# Patient Record
Sex: MALE | Race: WHITE | NOT HISPANIC OR LATINO | Employment: OTHER | URBAN - METROPOLITAN AREA
[De-identification: names, ages, dates, MRNs, and addresses within clinical notes are randomized per-mention and may not be internally consistent; named-entity substitution may affect disease eponyms.]

---

## 2017-01-03 ENCOUNTER — AMBULATORY - HEALTHEAST (OUTPATIENT)
Dept: INFUSION THERAPY | Facility: HOSPITAL | Age: 72
End: 2017-01-03

## 2017-01-03 DIAGNOSIS — I82.409 DVT (DEEP VENOUS THROMBOSIS) (H): ICD-10-CM

## 2017-01-04 ENCOUNTER — COMMUNICATION - HEALTHEAST (OUTPATIENT)
Dept: ONCOLOGY | Facility: HOSPITAL | Age: 72
End: 2017-01-04

## 2017-01-04 ENCOUNTER — AMBULATORY - HEALTHEAST (OUTPATIENT)
Dept: INFUSION THERAPY | Facility: HOSPITAL | Age: 72
End: 2017-01-04

## 2017-01-04 DIAGNOSIS — I82.409 DVT (DEEP VENOUS THROMBOSIS) (H): ICD-10-CM

## 2017-01-04 DIAGNOSIS — C90.01 MULTIPLE MYELOMA IN REMISSION (H): ICD-10-CM

## 2017-01-04 LAB
IGA SERPL-MCNC: 369 MG/DL (ref 700–1700)
IGA SERPL-MCNC: 47 MG/DL (ref 65–400)
IGM SERPL-MCNC: 14 MG/DL (ref 60–280)

## 2017-01-05 LAB
KAPPA FREE LIGHT CHAIN, S - HISTORICAL: 75.3 MG/DL (ref 0.33–1.94)
KAPPA/LAMBDA FLC RATIO - HISTORICAL: 41.6 (ref 0.26–1.65)
LAMBDA FREE LIGHT CHAIN, S - HISTORICAL: 1.81 MG/DL (ref 0.57–2.63)

## 2017-01-06 ENCOUNTER — COMMUNICATION - HEALTHEAST (OUTPATIENT)
Dept: ONCOLOGY | Facility: HOSPITAL | Age: 72
End: 2017-01-06

## 2017-01-06 LAB
ALBUMIN PERCENT: 66.3 % (ref 51–67)
ALBUMIN SERPL ELPH-MCNC: 3.1 G/DL (ref 3.2–4.7)
ALPHA 1 PERCENT: 3.3 % (ref 2–4)
ALPHA 2 PERCENT: 12.2 % (ref 5–13)
ALPHA1 GLOB SERPL ELPH-MCNC: 0.2 G/DL (ref 0.1–0.3)
ALPHA2 GLOB SERPL ELPH-MCNC: 0.6 G/DL (ref 0.4–0.9)
B-GLOBULIN SERPL ELPH-MCNC: 0.5 G/DL (ref 0.7–1.2)
BETA PERCENT: 11 % (ref 10–17)
GAMMA GLOB SERPL ELPH-MCNC: 0.3 G/DL (ref 0.6–1.4)
GAMMA GLOBULIN PERCENT: 7.2 % (ref 9–20)
PATH ICD:: ABNORMAL
PATH ICD:: NORMAL
PROT PATTERN SERPL ELPH-IMP: ABNORMAL
PROT PATTERN SERPL IFE-IMP: NORMAL
PROT SERPL-MCNC: 4.7 G/DL (ref 6–8)
REVIEWING PATHOLOGIST: ABNORMAL
REVIEWING PATHOLOGIST: NORMAL

## 2017-01-12 ENCOUNTER — INFUSION - HEALTHEAST (OUTPATIENT)
Dept: INFUSION THERAPY | Facility: HOSPITAL | Age: 72
End: 2017-01-12

## 2017-01-12 ENCOUNTER — RECORDS - HEALTHEAST (OUTPATIENT)
Dept: ADMINISTRATIVE | Facility: OTHER | Age: 72
End: 2017-01-12

## 2017-01-12 ENCOUNTER — OFFICE VISIT - HEALTHEAST (OUTPATIENT)
Dept: ONCOLOGY | Facility: HOSPITAL | Age: 72
End: 2017-01-12

## 2017-01-12 DIAGNOSIS — C90.01 MULTIPLE MYELOMA IN REMISSION (H): ICD-10-CM

## 2017-01-12 DIAGNOSIS — C90.00 MULTIPLE MYELOMA NOT HAVING ACHIEVED REMISSION (H): ICD-10-CM

## 2017-01-12 DIAGNOSIS — I82.409 DVT (DEEP VENOUS THROMBOSIS) (H): ICD-10-CM

## 2017-01-18 ENCOUNTER — COMMUNICATION - HEALTHEAST (OUTPATIENT)
Dept: ONCOLOGY | Facility: HOSPITAL | Age: 72
End: 2017-01-18

## 2017-01-25 ENCOUNTER — COMMUNICATION - HEALTHEAST (OUTPATIENT)
Dept: ADMINISTRATIVE | Facility: HOSPITAL | Age: 72
End: 2017-01-25

## 2017-01-25 ENCOUNTER — INFUSION - HEALTHEAST (OUTPATIENT)
Dept: INFUSION THERAPY | Facility: HOSPITAL | Age: 72
End: 2017-01-25

## 2017-01-25 DIAGNOSIS — C90.01 MULTIPLE MYELOMA IN REMISSION (H): ICD-10-CM

## 2017-01-25 DIAGNOSIS — I82.409 DVT (DEEP VENOUS THROMBOSIS) (H): ICD-10-CM

## 2017-01-26 ENCOUNTER — AMBULATORY - HEALTHEAST (OUTPATIENT)
Dept: ONCOLOGY | Facility: HOSPITAL | Age: 72
End: 2017-01-26

## 2017-01-26 ENCOUNTER — AMBULATORY - HEALTHEAST (OUTPATIENT)
Dept: INFUSION THERAPY | Facility: HOSPITAL | Age: 72
End: 2017-01-26

## 2017-01-26 DIAGNOSIS — Z79.01 MONITORING FOR ANTICOAGULANT USE: ICD-10-CM

## 2017-01-26 DIAGNOSIS — Z79.01 ENCOUNTER FOR MONITORING COUMADIN THERAPY: ICD-10-CM

## 2017-01-26 DIAGNOSIS — Z51.81 MONITORING FOR ANTICOAGULANT USE: ICD-10-CM

## 2017-01-26 DIAGNOSIS — Z51.81 ENCOUNTER FOR MONITORING COUMADIN THERAPY: ICD-10-CM

## 2017-01-27 ENCOUNTER — COMMUNICATION - HEALTHEAST (OUTPATIENT)
Dept: ONCOLOGY | Facility: HOSPITAL | Age: 72
End: 2017-01-27

## 2017-01-30 ENCOUNTER — COMMUNICATION - HEALTHEAST (OUTPATIENT)
Dept: ONCOLOGY | Facility: HOSPITAL | Age: 72
End: 2017-01-30

## 2017-02-03 ENCOUNTER — RECORDS - HEALTHEAST (OUTPATIENT)
Dept: ADMINISTRATIVE | Facility: OTHER | Age: 72
End: 2017-02-03

## 2017-02-06 ENCOUNTER — COMMUNICATION - HEALTHEAST (OUTPATIENT)
Dept: ONCOLOGY | Facility: HOSPITAL | Age: 72
End: 2017-02-06

## 2017-02-08 ENCOUNTER — INFUSION - HEALTHEAST (OUTPATIENT)
Dept: INFUSION THERAPY | Facility: HOSPITAL | Age: 72
End: 2017-02-08

## 2017-02-08 ENCOUNTER — AMBULATORY - HEALTHEAST (OUTPATIENT)
Dept: ONCOLOGY | Facility: HOSPITAL | Age: 72
End: 2017-02-08

## 2017-02-08 DIAGNOSIS — Z79.01 ENCOUNTER FOR MONITORING COUMADIN THERAPY: ICD-10-CM

## 2017-02-08 DIAGNOSIS — Z51.81 ENCOUNTER FOR MONITORING COUMADIN THERAPY: ICD-10-CM

## 2017-02-08 DIAGNOSIS — C90.01 MULTIPLE MYELOMA IN REMISSION (H): ICD-10-CM

## 2017-02-08 DIAGNOSIS — I82.409 DVT (DEEP VENOUS THROMBOSIS) (H): ICD-10-CM

## 2017-02-22 ENCOUNTER — AMBULATORY - HEALTHEAST (OUTPATIENT)
Dept: ONCOLOGY | Facility: HOSPITAL | Age: 72
End: 2017-02-22

## 2017-02-22 ENCOUNTER — COMMUNICATION - HEALTHEAST (OUTPATIENT)
Dept: ONCOLOGY | Facility: HOSPITAL | Age: 72
End: 2017-02-22

## 2017-02-22 ENCOUNTER — INFUSION - HEALTHEAST (OUTPATIENT)
Dept: INFUSION THERAPY | Facility: HOSPITAL | Age: 72
End: 2017-02-22

## 2017-02-22 DIAGNOSIS — Z51.81 MONITORING FOR ANTICOAGULANT USE: ICD-10-CM

## 2017-02-22 DIAGNOSIS — Z51.81 ENCOUNTER FOR MONITORING COUMADIN THERAPY: ICD-10-CM

## 2017-02-22 DIAGNOSIS — I82.409 DVT (DEEP VENOUS THROMBOSIS) (H): ICD-10-CM

## 2017-02-22 DIAGNOSIS — Z79.01 ENCOUNTER FOR MONITORING COUMADIN THERAPY: ICD-10-CM

## 2017-02-22 DIAGNOSIS — C90.01 MULTIPLE MYELOMA IN REMISSION (H): ICD-10-CM

## 2017-02-22 DIAGNOSIS — C90.00 MULTIPLE MYELOMA (H): ICD-10-CM

## 2017-02-22 DIAGNOSIS — Z79.01 MONITORING FOR ANTICOAGULANT USE: ICD-10-CM

## 2017-02-22 LAB
IGA SERPL-MCNC: 431 MG/DL (ref 700–1700)
IGA SERPL-MCNC: 45 MG/DL (ref 65–400)
IGM SERPL-MCNC: 10 MG/DL (ref 60–280)

## 2017-02-23 LAB
ALBUMIN PERCENT: 68 % (ref 51–67)
ALBUMIN SERPL ELPH-MCNC: 3.1 G/DL (ref 3.2–4.7)
ALPHA 1 PERCENT: 2.8 % (ref 2–4)
ALPHA 2 PERCENT: 11.7 % (ref 5–13)
ALPHA1 GLOB SERPL ELPH-MCNC: 0.1 G/DL (ref 0.1–0.3)
ALPHA2 GLOB SERPL ELPH-MCNC: 0.5 G/DL (ref 0.4–0.9)
B-GLOBULIN SERPL ELPH-MCNC: 0.5 G/DL (ref 0.7–1.2)
BETA PERCENT: 10.3 % (ref 10–17)
GAMMA GLOB SERPL ELPH-MCNC: 0.3 G/DL (ref 0.6–1.4)
GAMMA GLOBULIN PERCENT: 7.2 % (ref 9–20)
KAPPA FREE LIGHT CHAIN, S - HISTORICAL: 39.2 MG/DL (ref 0.33–1.94)
KAPPA/LAMBDA FLC RATIO - HISTORICAL: 30.4 (ref 0.26–1.65)
LAMBDA FREE LIGHT CHAIN, S - HISTORICAL: 1.29 MG/DL (ref 0.57–2.63)
PATH ICD:: ABNORMAL
PATH ICD:: NORMAL
PROT PATTERN SERPL ELPH-IMP: ABNORMAL
PROT PATTERN SERPL IFE-IMP: NORMAL
PROT SERPL-MCNC: 4.5 G/DL (ref 6–8)
REVIEWING PATHOLOGIST: ABNORMAL
REVIEWING PATHOLOGIST: NORMAL

## 2017-02-28 ENCOUNTER — COMMUNICATION - HEALTHEAST (OUTPATIENT)
Dept: ONCOLOGY | Facility: HOSPITAL | Age: 72
End: 2017-02-28

## 2017-02-28 ENCOUNTER — RECORDS - HEALTHEAST (OUTPATIENT)
Dept: ADMINISTRATIVE | Facility: OTHER | Age: 72
End: 2017-02-28

## 2017-03-01 ENCOUNTER — AMBULATORY - HEALTHEAST (OUTPATIENT)
Dept: ONCOLOGY | Facility: HOSPITAL | Age: 72
End: 2017-03-01

## 2017-03-01 ENCOUNTER — OFFICE VISIT - HEALTHEAST (OUTPATIENT)
Dept: ONCOLOGY | Facility: HOSPITAL | Age: 72
End: 2017-03-01

## 2017-03-01 ENCOUNTER — AMBULATORY - HEALTHEAST (OUTPATIENT)
Dept: INFUSION THERAPY | Facility: HOSPITAL | Age: 72
End: 2017-03-01

## 2017-03-01 DIAGNOSIS — Z79.01 ENCOUNTER FOR MONITORING COUMADIN THERAPY: ICD-10-CM

## 2017-03-01 DIAGNOSIS — C90.00 MULTIPLE MYELOMA NOT HAVING ACHIEVED REMISSION (H): ICD-10-CM

## 2017-03-01 DIAGNOSIS — Z79.01 MONITORING FOR ANTICOAGULANT USE: ICD-10-CM

## 2017-03-01 DIAGNOSIS — Z51.81 MONITORING FOR ANTICOAGULANT USE: ICD-10-CM

## 2017-03-01 DIAGNOSIS — Z51.81 ENCOUNTER FOR MONITORING COUMADIN THERAPY: ICD-10-CM

## 2017-03-02 ENCOUNTER — AMBULATORY - HEALTHEAST (OUTPATIENT)
Dept: ONCOLOGY | Facility: HOSPITAL | Age: 72
End: 2017-03-02

## 2017-03-08 ENCOUNTER — INFUSION - HEALTHEAST (OUTPATIENT)
Dept: INFUSION THERAPY | Facility: HOSPITAL | Age: 72
End: 2017-03-08

## 2017-03-08 ENCOUNTER — AMBULATORY - HEALTHEAST (OUTPATIENT)
Dept: ONCOLOGY | Facility: HOSPITAL | Age: 72
End: 2017-03-08

## 2017-03-08 DIAGNOSIS — C90.01 MULTIPLE MYELOMA IN REMISSION (H): ICD-10-CM

## 2017-03-08 DIAGNOSIS — Z79.01 ENCOUNTER FOR MONITORING COUMADIN THERAPY: ICD-10-CM

## 2017-03-08 DIAGNOSIS — Z51.81 ENCOUNTER FOR MONITORING COUMADIN THERAPY: ICD-10-CM

## 2017-03-22 ENCOUNTER — COMMUNICATION - HEALTHEAST (OUTPATIENT)
Dept: ONCOLOGY | Facility: HOSPITAL | Age: 72
End: 2017-03-22

## 2017-03-22 ENCOUNTER — INFUSION - HEALTHEAST (OUTPATIENT)
Dept: INFUSION THERAPY | Facility: HOSPITAL | Age: 72
End: 2017-03-22

## 2017-03-22 DIAGNOSIS — C90.01 MULTIPLE MYELOMA IN REMISSION (H): ICD-10-CM

## 2017-03-22 DIAGNOSIS — I82.409 DVT (DEEP VENOUS THROMBOSIS) (H): ICD-10-CM

## 2017-03-26 ENCOUNTER — COMMUNICATION - HEALTHEAST (OUTPATIENT)
Dept: ONCOLOGY | Facility: HOSPITAL | Age: 72
End: 2017-03-26

## 2017-03-26 DIAGNOSIS — I82.409 DEEP VEIN THROMBOSIS (DVT) (H): ICD-10-CM

## 2017-03-31 ENCOUNTER — RECORDS - HEALTHEAST (OUTPATIENT)
Dept: ADMINISTRATIVE | Facility: OTHER | Age: 72
End: 2017-03-31

## 2017-03-31 ENCOUNTER — COMMUNICATION - HEALTHEAST (OUTPATIENT)
Dept: ONCOLOGY | Facility: HOSPITAL | Age: 72
End: 2017-03-31

## 2017-04-05 ENCOUNTER — INFUSION - HEALTHEAST (OUTPATIENT)
Dept: INFUSION THERAPY | Facility: HOSPITAL | Age: 72
End: 2017-04-05

## 2017-04-05 DIAGNOSIS — C90.01 MULTIPLE MYELOMA IN REMISSION (H): ICD-10-CM

## 2017-04-05 DIAGNOSIS — I82.409 DVT (DEEP VENOUS THROMBOSIS) (H): ICD-10-CM

## 2017-04-06 ENCOUNTER — AMBULATORY - HEALTHEAST (OUTPATIENT)
Dept: ONCOLOGY | Facility: HOSPITAL | Age: 72
End: 2017-04-06

## 2017-04-06 DIAGNOSIS — Z79.01 ENCOUNTER FOR MONITORING COUMADIN THERAPY: ICD-10-CM

## 2017-04-06 DIAGNOSIS — Z51.81 ENCOUNTER FOR MONITORING COUMADIN THERAPY: ICD-10-CM

## 2017-04-17 ENCOUNTER — COMMUNICATION - HEALTHEAST (OUTPATIENT)
Dept: INTERNAL MEDICINE | Facility: CLINIC | Age: 72
End: 2017-04-17

## 2017-04-18 ENCOUNTER — COMMUNICATION - HEALTHEAST (OUTPATIENT)
Dept: ONCOLOGY | Facility: HOSPITAL | Age: 72
End: 2017-04-18

## 2017-04-19 ENCOUNTER — INFUSION - HEALTHEAST (OUTPATIENT)
Dept: INFUSION THERAPY | Facility: HOSPITAL | Age: 72
End: 2017-04-19

## 2017-04-19 DIAGNOSIS — I82.409 DVT (DEEP VENOUS THROMBOSIS) (H): ICD-10-CM

## 2017-04-19 DIAGNOSIS — C90.01 MULTIPLE MYELOMA IN REMISSION (H): ICD-10-CM

## 2017-04-26 ENCOUNTER — RECORDS - HEALTHEAST (OUTPATIENT)
Dept: ADMINISTRATIVE | Facility: OTHER | Age: 72
End: 2017-04-26

## 2017-04-26 ENCOUNTER — AMBULATORY - HEALTHEAST (OUTPATIENT)
Dept: INFUSION THERAPY | Facility: HOSPITAL | Age: 72
End: 2017-04-26

## 2017-04-26 ENCOUNTER — AMBULATORY - HEALTHEAST (OUTPATIENT)
Dept: ONCOLOGY | Facility: HOSPITAL | Age: 72
End: 2017-04-26

## 2017-04-26 DIAGNOSIS — Z51.81 MONITORING FOR ANTICOAGULANT USE: ICD-10-CM

## 2017-04-26 DIAGNOSIS — Z51.81 ENCOUNTER FOR MONITORING COUMADIN THERAPY: ICD-10-CM

## 2017-04-26 DIAGNOSIS — C90.01 MULTIPLE MYELOMA IN REMISSION (H): ICD-10-CM

## 2017-04-26 DIAGNOSIS — Z79.01 ENCOUNTER FOR MONITORING COUMADIN THERAPY: ICD-10-CM

## 2017-04-26 DIAGNOSIS — C90.00 MULTIPLE MYELOMA, REMISSION STATUS UNSPECIFIED (H): ICD-10-CM

## 2017-04-26 DIAGNOSIS — Z79.01 MONITORING FOR ANTICOAGULANT USE: ICD-10-CM

## 2017-04-26 DIAGNOSIS — I82.409 DVT (DEEP VENOUS THROMBOSIS) (H): ICD-10-CM

## 2017-04-26 LAB
IGA SERPL-MCNC: 354 MG/DL (ref 700–1700)
IGA SERPL-MCNC: 44 MG/DL (ref 65–400)
IGM SERPL-MCNC: 10 MG/DL (ref 60–280)

## 2017-04-27 LAB
KAPPA FREE LIGHT CHAIN, S - HISTORICAL: 38.3 MG/DL (ref 0.33–1.94)
KAPPA/LAMBDA FLC RATIO - HISTORICAL: 28.6 (ref 0.26–1.65)
LAMBDA FREE LIGHT CHAIN, S - HISTORICAL: 1.34 MG/DL (ref 0.57–2.63)

## 2017-04-28 LAB
ALBUMIN PERCENT: 66.7 % (ref 51–67)
ALBUMIN SERPL ELPH-MCNC: 3.3 G/DL (ref 3.2–4.7)
ALPHA 1 PERCENT: 3.7 % (ref 2–4)
ALPHA 2 PERCENT: 12.7 % (ref 5–13)
ALPHA1 GLOB SERPL ELPH-MCNC: 0.2 G/DL (ref 0.1–0.3)
ALPHA2 GLOB SERPL ELPH-MCNC: 0.6 G/DL (ref 0.4–0.9)
B-GLOBULIN SERPL ELPH-MCNC: 0.5 G/DL (ref 0.7–1.2)
BETA PERCENT: 10.9 % (ref 10–17)
GAMMA GLOB SERPL ELPH-MCNC: 0.3 G/DL (ref 0.6–1.4)
GAMMA GLOBULIN PERCENT: 6 % (ref 9–20)
PATH ICD:: ABNORMAL
PATH ICD:: NORMAL
PROT PATTERN SERPL ELPH-IMP: ABNORMAL
PROT PATTERN SERPL IFE-IMP: NORMAL
PROT SERPL-MCNC: 4.9 G/DL (ref 6–8)
REVIEWING PATHOLOGIST: ABNORMAL
REVIEWING PATHOLOGIST: NORMAL

## 2017-05-03 ENCOUNTER — OFFICE VISIT - HEALTHEAST (OUTPATIENT)
Dept: ONCOLOGY | Facility: HOSPITAL | Age: 72
End: 2017-05-03

## 2017-05-03 ENCOUNTER — AMBULATORY - HEALTHEAST (OUTPATIENT)
Dept: INFUSION THERAPY | Facility: HOSPITAL | Age: 72
End: 2017-05-03

## 2017-05-03 ENCOUNTER — INFUSION - HEALTHEAST (OUTPATIENT)
Dept: INFUSION THERAPY | Facility: HOSPITAL | Age: 72
End: 2017-05-03

## 2017-05-03 DIAGNOSIS — C90.00 MULTIPLE MYELOMA, REMISSION STATUS UNSPECIFIED (H): ICD-10-CM

## 2017-05-03 DIAGNOSIS — I82.409 DVT (DEEP VENOUS THROMBOSIS) (H): ICD-10-CM

## 2017-05-03 DIAGNOSIS — C90.01 MULTIPLE MYELOMA IN REMISSION (H): ICD-10-CM

## 2017-05-17 ENCOUNTER — INFUSION - HEALTHEAST (OUTPATIENT)
Dept: INFUSION THERAPY | Facility: HOSPITAL | Age: 72
End: 2017-05-17

## 2017-05-17 DIAGNOSIS — C90.01 MULTIPLE MYELOMA IN REMISSION (H): ICD-10-CM

## 2017-05-19 ENCOUNTER — COMMUNICATION - HEALTHEAST (OUTPATIENT)
Dept: ONCOLOGY | Facility: HOSPITAL | Age: 72
End: 2017-05-19

## 2017-05-22 ENCOUNTER — RECORDS - HEALTHEAST (OUTPATIENT)
Dept: ADMINISTRATIVE | Facility: OTHER | Age: 72
End: 2017-05-22

## 2017-05-31 ENCOUNTER — AMBULATORY - HEALTHEAST (OUTPATIENT)
Dept: ONCOLOGY | Facility: HOSPITAL | Age: 72
End: 2017-05-31

## 2017-05-31 ENCOUNTER — INFUSION - HEALTHEAST (OUTPATIENT)
Dept: INFUSION THERAPY | Facility: HOSPITAL | Age: 72
End: 2017-05-31

## 2017-05-31 DIAGNOSIS — C90.00 MULTIPLE MYELOMA, REMISSION STATUS UNSPECIFIED (H): ICD-10-CM

## 2017-05-31 DIAGNOSIS — I82.409 DVT (DEEP VENOUS THROMBOSIS) (H): ICD-10-CM

## 2017-05-31 DIAGNOSIS — Z79.01 ENCOUNTER FOR MONITORING COUMADIN THERAPY: ICD-10-CM

## 2017-05-31 DIAGNOSIS — C90.01 MULTIPLE MYELOMA IN REMISSION (H): ICD-10-CM

## 2017-05-31 DIAGNOSIS — Z51.81 ENCOUNTER FOR MONITORING COUMADIN THERAPY: ICD-10-CM

## 2017-05-31 ASSESSMENT — MIFFLIN-ST. JEOR: SCORE: 1424.59

## 2017-06-14 ENCOUNTER — OFFICE VISIT - HEALTHEAST (OUTPATIENT)
Dept: ONCOLOGY | Facility: HOSPITAL | Age: 72
End: 2017-06-14

## 2017-06-14 ENCOUNTER — INFUSION - HEALTHEAST (OUTPATIENT)
Dept: INFUSION THERAPY | Facility: HOSPITAL | Age: 72
End: 2017-06-14

## 2017-06-14 DIAGNOSIS — C90.01 MULTIPLE MYELOMA IN REMISSION (H): ICD-10-CM

## 2017-06-14 DIAGNOSIS — C90.00 MULTIPLE MYELOMA, REMISSION STATUS UNSPECIFIED (H): ICD-10-CM

## 2017-06-14 DIAGNOSIS — R21 RASH AND NONSPECIFIC SKIN ERUPTION: ICD-10-CM

## 2017-06-19 ENCOUNTER — COMMUNICATION - HEALTHEAST (OUTPATIENT)
Dept: ONCOLOGY | Facility: HOSPITAL | Age: 72
End: 2017-06-19

## 2017-06-21 ENCOUNTER — AMBULATORY - HEALTHEAST (OUTPATIENT)
Dept: INFUSION THERAPY | Facility: HOSPITAL | Age: 72
End: 2017-06-21

## 2017-06-21 DIAGNOSIS — C90.01 MULTIPLE MYELOMA IN REMISSION (H): ICD-10-CM

## 2017-06-21 LAB
IGA SERPL-MCNC: 351 MG/DL (ref 700–1700)
IGA SERPL-MCNC: 38 MG/DL (ref 65–400)
IGM SERPL-MCNC: 14 MG/DL (ref 60–280)

## 2017-06-22 LAB
KAPPA FREE LIGHT CHAIN, S - HISTORICAL: 50.9 MG/DL (ref 0.33–1.94)
KAPPA/LAMBDA FLC RATIO - HISTORICAL: 48.5 (ref 0.26–1.65)
LAMBDA FREE LIGHT CHAIN, S - HISTORICAL: 1.05 MG/DL (ref 0.57–2.63)
PATH ICD:: NORMAL
PROT PATTERN SERPL IFE-IMP: NORMAL
REVIEWING PATHOLOGIST: NORMAL

## 2017-06-23 LAB
ALBUMIN PERCENT: 63.9 % (ref 51–67)
ALBUMIN SERPL ELPH-MCNC: 2.9 G/DL (ref 3.2–4.7)
ALPHA 1 PERCENT: 3.7 % (ref 2–4)
ALPHA 2 PERCENT: 12.4 % (ref 5–13)
ALPHA1 GLOB SERPL ELPH-MCNC: 0.2 G/DL (ref 0.1–0.3)
ALPHA2 GLOB SERPL ELPH-MCNC: 0.6 G/DL (ref 0.4–0.9)
B-GLOBULIN SERPL ELPH-MCNC: 0.6 G/DL (ref 0.7–1.2)
BETA PERCENT: 12.3 % (ref 10–17)
GAMMA GLOB SERPL ELPH-MCNC: 0.4 G/DL (ref 0.6–1.4)
GAMMA GLOBULIN PERCENT: 7.7 % (ref 9–20)
PATH ICD:: ABNORMAL
PROT PATTERN SERPL ELPH-IMP: ABNORMAL
PROT SERPL-MCNC: 4.6 G/DL (ref 6–8)
REVIEWING PATHOLOGIST: ABNORMAL

## 2017-06-27 ENCOUNTER — COMMUNICATION - HEALTHEAST (OUTPATIENT)
Dept: ADMINISTRATIVE | Facility: HOSPITAL | Age: 72
End: 2017-06-27

## 2017-06-28 ENCOUNTER — RECORDS - HEALTHEAST (OUTPATIENT)
Dept: ADMINISTRATIVE | Facility: OTHER | Age: 72
End: 2017-06-28

## 2017-06-28 ENCOUNTER — COMMUNICATION - HEALTHEAST (OUTPATIENT)
Dept: ONCOLOGY | Facility: HOSPITAL | Age: 72
End: 2017-06-28

## 2017-06-28 ENCOUNTER — AMBULATORY - HEALTHEAST (OUTPATIENT)
Dept: ONCOLOGY | Facility: HOSPITAL | Age: 72
End: 2017-06-28

## 2017-06-28 ENCOUNTER — AMBULATORY - HEALTHEAST (OUTPATIENT)
Dept: INFUSION THERAPY | Facility: HOSPITAL | Age: 72
End: 2017-06-28

## 2017-06-28 ENCOUNTER — INFUSION - HEALTHEAST (OUTPATIENT)
Dept: INFUSION THERAPY | Facility: HOSPITAL | Age: 72
End: 2017-06-28

## 2017-06-28 DIAGNOSIS — Z79.01 MONITORING FOR ANTICOAGULANT USE: ICD-10-CM

## 2017-06-28 DIAGNOSIS — Z79.01 ENCOUNTER FOR MONITORING COUMADIN THERAPY: ICD-10-CM

## 2017-06-28 DIAGNOSIS — Z51.81 MONITORING FOR ANTICOAGULANT USE: ICD-10-CM

## 2017-06-28 DIAGNOSIS — I82.409 DVT (DEEP VENOUS THROMBOSIS) (H): ICD-10-CM

## 2017-06-28 DIAGNOSIS — C90.01 MULTIPLE MYELOMA IN REMISSION (H): ICD-10-CM

## 2017-06-28 DIAGNOSIS — Z51.81 ENCOUNTER FOR MONITORING COUMADIN THERAPY: ICD-10-CM

## 2017-07-12 ENCOUNTER — INFUSION - HEALTHEAST (OUTPATIENT)
Dept: INFUSION THERAPY | Facility: HOSPITAL | Age: 72
End: 2017-07-12

## 2017-07-12 DIAGNOSIS — C90.01 MULTIPLE MYELOMA IN REMISSION (H): ICD-10-CM

## 2017-07-14 ENCOUNTER — COMMUNICATION - HEALTHEAST (OUTPATIENT)
Dept: ONCOLOGY | Facility: HOSPITAL | Age: 72
End: 2017-07-14

## 2017-07-19 ENCOUNTER — RECORDS - HEALTHEAST (OUTPATIENT)
Dept: ADMINISTRATIVE | Facility: OTHER | Age: 72
End: 2017-07-19

## 2017-07-26 ENCOUNTER — COMMUNICATION - HEALTHEAST (OUTPATIENT)
Dept: ONCOLOGY | Facility: HOSPITAL | Age: 72
End: 2017-07-26

## 2017-07-26 ENCOUNTER — AMBULATORY - HEALTHEAST (OUTPATIENT)
Dept: INFUSION THERAPY | Facility: HOSPITAL | Age: 72
End: 2017-07-26

## 2017-07-26 ENCOUNTER — INFUSION - HEALTHEAST (OUTPATIENT)
Dept: INFUSION THERAPY | Facility: HOSPITAL | Age: 72
End: 2017-07-26

## 2017-07-26 ENCOUNTER — OFFICE VISIT - HEALTHEAST (OUTPATIENT)
Dept: ONCOLOGY | Facility: HOSPITAL | Age: 72
End: 2017-07-26

## 2017-07-26 DIAGNOSIS — C90.01 MULTIPLE MYELOMA IN REMISSION (H): ICD-10-CM

## 2017-07-26 DIAGNOSIS — C90.00 MULTIPLE MYELOMA, REMISSION STATUS UNSPECIFIED (H): ICD-10-CM

## 2017-07-26 DIAGNOSIS — I82.409 DVT (DEEP VENOUS THROMBOSIS) (H): ICD-10-CM

## 2017-08-07 ENCOUNTER — COMMUNICATION - HEALTHEAST (OUTPATIENT)
Dept: ONCOLOGY | Facility: HOSPITAL | Age: 72
End: 2017-08-07

## 2017-08-07 DIAGNOSIS — R11.0 NAUSEA: ICD-10-CM

## 2017-08-08 ENCOUNTER — COMMUNICATION - HEALTHEAST (OUTPATIENT)
Dept: ADMINISTRATIVE | Facility: HOSPITAL | Age: 72
End: 2017-08-08

## 2017-08-09 ENCOUNTER — INFUSION - HEALTHEAST (OUTPATIENT)
Dept: INFUSION THERAPY | Facility: HOSPITAL | Age: 72
End: 2017-08-09

## 2017-08-09 DIAGNOSIS — C90.01 MULTIPLE MYELOMA IN REMISSION (H): ICD-10-CM

## 2017-08-14 ENCOUNTER — COMMUNICATION - HEALTHEAST (OUTPATIENT)
Dept: ONCOLOGY | Facility: HOSPITAL | Age: 72
End: 2017-08-14

## 2017-08-15 ENCOUNTER — COMMUNICATION - HEALTHEAST (OUTPATIENT)
Dept: ONCOLOGY | Facility: HOSPITAL | Age: 72
End: 2017-08-15

## 2017-08-23 ENCOUNTER — INFUSION - HEALTHEAST (OUTPATIENT)
Dept: INFUSION THERAPY | Facility: HOSPITAL | Age: 72
End: 2017-08-23

## 2017-08-23 ENCOUNTER — AMBULATORY - HEALTHEAST (OUTPATIENT)
Dept: ONCOLOGY | Facility: HOSPITAL | Age: 72
End: 2017-08-23

## 2017-08-23 DIAGNOSIS — C90.01 MULTIPLE MYELOMA IN REMISSION (H): ICD-10-CM

## 2017-08-23 DIAGNOSIS — Z51.81 ENCOUNTER FOR MONITORING COUMADIN THERAPY: ICD-10-CM

## 2017-08-23 DIAGNOSIS — Z79.01 ENCOUNTER FOR MONITORING COUMADIN THERAPY: ICD-10-CM

## 2017-08-23 DIAGNOSIS — I82.409 DVT (DEEP VENOUS THROMBOSIS) (H): ICD-10-CM

## 2017-08-23 LAB
IGA SERPL-MCNC: 36 MG/DL (ref 65–400)
IGA SERPL-MCNC: 366 MG/DL (ref 700–1700)
IGM SERPL-MCNC: 11 MG/DL (ref 60–280)

## 2017-08-24 ENCOUNTER — COMMUNICATION - HEALTHEAST (OUTPATIENT)
Dept: ONCOLOGY | Facility: HOSPITAL | Age: 72
End: 2017-08-24

## 2017-08-24 ENCOUNTER — RECORDS - HEALTHEAST (OUTPATIENT)
Dept: ADMINISTRATIVE | Facility: OTHER | Age: 72
End: 2017-08-24

## 2017-08-24 LAB
KAPPA FREE LIGHT CHAIN, S - HISTORICAL: 61.4 MG/DL (ref 0.33–1.94)
KAPPA/LAMBDA FLC RATIO - HISTORICAL: 54.3 (ref 0.26–1.65)
LAMBDA FREE LIGHT CHAIN, S - HISTORICAL: 1.13 MG/DL (ref 0.57–2.63)

## 2017-08-25 LAB
ALBUMIN PERCENT: 67 % (ref 51–67)
ALBUMIN SERPL ELPH-MCNC: 3.2 G/DL (ref 3.2–4.7)
ALPHA 1 PERCENT: 3.2 % (ref 2–4)
ALPHA 2 PERCENT: 11.5 % (ref 5–13)
ALPHA1 GLOB SERPL ELPH-MCNC: 0.2 G/DL (ref 0.1–0.3)
ALPHA2 GLOB SERPL ELPH-MCNC: 0.6 G/DL (ref 0.4–0.9)
B-GLOBULIN SERPL ELPH-MCNC: 0.5 G/DL (ref 0.7–1.2)
BETA PERCENT: 11.2 % (ref 10–17)
GAMMA GLOB SERPL ELPH-MCNC: 0.3 G/DL (ref 0.6–1.4)
GAMMA GLOBULIN PERCENT: 7.1 % (ref 9–20)
PATH ICD:: ABNORMAL
PATH ICD:: NORMAL
PROT PATTERN SERPL ELPH-IMP: ABNORMAL
PROT PATTERN SERPL IFE-IMP: NORMAL
PROT SERPL-MCNC: 4.8 G/DL (ref 6–8)
REVIEWING PATHOLOGIST: ABNORMAL
REVIEWING PATHOLOGIST: NORMAL

## 2017-09-03 ENCOUNTER — COMMUNICATION - HEALTHEAST (OUTPATIENT)
Dept: ONCOLOGY | Facility: HOSPITAL | Age: 72
End: 2017-09-03

## 2017-09-03 DIAGNOSIS — C90.00 MYELOMA (H): ICD-10-CM

## 2017-09-04 ENCOUNTER — AMBULATORY - HEALTHEAST (OUTPATIENT)
Dept: ONCOLOGY | Facility: HOSPITAL | Age: 72
End: 2017-09-04

## 2017-09-04 DIAGNOSIS — D72.0 GENETIC ANOMALIES OF LEUKOCYTES (H): ICD-10-CM

## 2017-09-04 DIAGNOSIS — C90.00 MULTIPLE MYELOMA NOT HAVING ACHIEVED REMISSION (H): ICD-10-CM

## 2017-09-06 ENCOUNTER — COMMUNICATION - HEALTHEAST (OUTPATIENT)
Dept: ONCOLOGY | Facility: HOSPITAL | Age: 72
End: 2017-09-06

## 2017-09-06 ENCOUNTER — INFUSION - HEALTHEAST (OUTPATIENT)
Dept: INFUSION THERAPY | Facility: HOSPITAL | Age: 72
End: 2017-09-06

## 2017-09-06 DIAGNOSIS — I82.409 DVT (DEEP VENOUS THROMBOSIS) (H): ICD-10-CM

## 2017-09-06 DIAGNOSIS — C90.01 MULTIPLE MYELOMA IN REMISSION (H): ICD-10-CM

## 2017-09-07 ENCOUNTER — AMBULATORY - HEALTHEAST (OUTPATIENT)
Dept: ONCOLOGY | Facility: HOSPITAL | Age: 72
End: 2017-09-07

## 2017-09-07 DIAGNOSIS — C90.00 MULTIPLE MYELOMA, REMISSION STATUS UNSPECIFIED (H): ICD-10-CM

## 2017-09-07 DIAGNOSIS — D72.0 GENETIC ANOMALIES OF LEUKOCYTES (H): ICD-10-CM

## 2017-09-07 DIAGNOSIS — C90.00 MULTIPLE MYELOMA NOT HAVING ACHIEVED REMISSION (H): ICD-10-CM

## 2017-09-11 ENCOUNTER — COMMUNICATION - HEALTHEAST (OUTPATIENT)
Dept: ONCOLOGY | Facility: HOSPITAL | Age: 72
End: 2017-09-11

## 2017-09-12 ENCOUNTER — AMBULATORY - HEALTHEAST (OUTPATIENT)
Dept: LAB | Facility: HOSPITAL | Age: 72
End: 2017-09-12

## 2017-09-12 ENCOUNTER — INFUSION - HEALTHEAST (OUTPATIENT)
Dept: INFUSION THERAPY | Facility: HOSPITAL | Age: 72
End: 2017-09-12

## 2017-09-12 DIAGNOSIS — C90.00 MULTIPLE MYELOMA, REMISSION STATUS UNSPECIFIED (H): ICD-10-CM

## 2017-09-12 DIAGNOSIS — D72.0 GENETIC ANOMALIES OF LEUKOCYTES (H): ICD-10-CM

## 2017-09-13 ENCOUNTER — AMBULATORY - HEALTHEAST (OUTPATIENT)
Dept: INFUSION THERAPY | Facility: HOSPITAL | Age: 72
End: 2017-09-13

## 2017-09-13 DIAGNOSIS — C90.00 MULTIPLE MYELOMA, REMISSION STATUS UNSPECIFIED (H): ICD-10-CM

## 2017-09-13 LAB
LAB AP CHARGES (HE HISTORICAL CONVERSION): NORMAL
PATH REPORT.COMMENTS IMP SPEC: NORMAL
PATH REPORT.FINAL DX SPEC: NORMAL
PATH REPORT.MICROSCOPIC SPEC OTHER STN: NORMAL
RESULT FLAG (HE HISTORICAL CONVERSION): NORMAL

## 2017-09-19 ENCOUNTER — COMMUNICATION - HEALTHEAST (OUTPATIENT)
Dept: ONCOLOGY | Facility: HOSPITAL | Age: 72
End: 2017-09-19

## 2017-09-19 ENCOUNTER — RECORDS - HEALTHEAST (OUTPATIENT)
Dept: ADMINISTRATIVE | Facility: OTHER | Age: 72
End: 2017-09-19

## 2017-09-20 ENCOUNTER — INFUSION - HEALTHEAST (OUTPATIENT)
Dept: INFUSION THERAPY | Facility: HOSPITAL | Age: 72
End: 2017-09-20

## 2017-09-20 ENCOUNTER — AMBULATORY - HEALTHEAST (OUTPATIENT)
Dept: INFUSION THERAPY | Facility: HOSPITAL | Age: 72
End: 2017-09-20

## 2017-09-20 ENCOUNTER — AMBULATORY - HEALTHEAST (OUTPATIENT)
Dept: ONCOLOGY | Facility: HOSPITAL | Age: 72
End: 2017-09-20

## 2017-09-20 ENCOUNTER — OFFICE VISIT - HEALTHEAST (OUTPATIENT)
Dept: ONCOLOGY | Facility: HOSPITAL | Age: 72
End: 2017-09-20

## 2017-09-20 DIAGNOSIS — R79.1 ABNORMAL COAGULATION PROFILE: ICD-10-CM

## 2017-09-20 DIAGNOSIS — C90.01 MULTIPLE MYELOMA IN REMISSION (H): ICD-10-CM

## 2017-09-20 DIAGNOSIS — I82.409 DVT (DEEP VENOUS THROMBOSIS) (H): ICD-10-CM

## 2017-09-20 DIAGNOSIS — C90.00 MULTIPLE MYELOMA, REMISSION STATUS UNSPECIFIED (H): ICD-10-CM

## 2017-09-20 DIAGNOSIS — Z51.11 CHEMOTHERAPY MANAGEMENT, ENCOUNTER FOR: ICD-10-CM

## 2017-09-20 DIAGNOSIS — Z51.81 ANTICOAGULATION MANAGEMENT ENCOUNTER: ICD-10-CM

## 2017-09-20 DIAGNOSIS — Z79.01 ANTICOAGULATION MANAGEMENT ENCOUNTER: ICD-10-CM

## 2017-09-20 DIAGNOSIS — D64.9 ANEMIA: ICD-10-CM

## 2017-09-20 DIAGNOSIS — Z86.718 HISTORY OF DVT IN ADULTHOOD: ICD-10-CM

## 2017-09-20 LAB — SPECIMEN STATUS: NORMAL

## 2017-09-21 ENCOUNTER — COMMUNICATION - HEALTHEAST (OUTPATIENT)
Dept: ONCOLOGY | Facility: HOSPITAL | Age: 72
End: 2017-09-21

## 2017-09-21 LAB
BKR LAB AP CORE BIOPSY/ASPIRATE CLOT: ABNORMAL
LAB AP ASPIRATE SMEAR (HE HISTORICAL CONVERSION): ABNORMAL
LAB AP BONE MARROW DIFF (HE HISTORICAL CONVERSION): ABNORMAL
LAB AP CHARGES (HE HISTORICAL CONVERSION): ABNORMAL
PATH REPORT.ADDENDUM SPEC: ABNORMAL
PATH REPORT.COMMENTS IMP SPEC: ABNORMAL
PATH REPORT.FINAL DX SPEC: ABNORMAL
PATH REPORT.MICROSCOPIC SPEC OTHER STN: ABNORMAL
PATH REPORT.RELEVANT HX SPEC: ABNORMAL
RESULT FLAG (HE HISTORICAL CONVERSION): ABNORMAL

## 2017-09-22 ENCOUNTER — AMBULATORY - HEALTHEAST (OUTPATIENT)
Dept: LAB | Facility: HOSPITAL | Age: 72
End: 2017-09-22

## 2017-09-22 DIAGNOSIS — C90.00 MULTIPLE MYELOMA, REMISSION STATUS UNSPECIFIED (H): ICD-10-CM

## 2017-09-23 LAB
KAPPA LC UR-MCNC: 4.57 MG/DL
KAPPA LC/LAMBDA UR: >6.53 {RATIO} (ref 0.7–6.2)
LAMBDA LC UR-MCNC: <0.7 MG/DL

## 2017-09-26 LAB
PATH ICD:: NORMAL
PROT PATTERN SERPL ELPH-IMP: NORMAL
PROTEIN TOTAL TIMED URINE MG/SPEC LHE: NORMAL MG/24HR (ref 0–150)
REVIEWING PATHOLOGIST: NORMAL
SPECIMEN VOL UR: 2200 ML

## 2017-09-27 ENCOUNTER — AMBULATORY - HEALTHEAST (OUTPATIENT)
Dept: INFUSION THERAPY | Facility: HOSPITAL | Age: 72
End: 2017-09-27

## 2017-09-27 ENCOUNTER — AMBULATORY - HEALTHEAST (OUTPATIENT)
Dept: ONCOLOGY | Facility: HOSPITAL | Age: 72
End: 2017-09-27

## 2017-09-27 DIAGNOSIS — C90.00 MULTIPLE MYELOMA, REMISSION STATUS UNSPECIFIED (H): ICD-10-CM

## 2017-09-27 DIAGNOSIS — R79.1 ABNORMAL COAGULATION PROFILE: ICD-10-CM

## 2017-09-27 DIAGNOSIS — D64.9 ANEMIA: ICD-10-CM

## 2017-09-28 ENCOUNTER — COMMUNICATION - HEALTHEAST (OUTPATIENT)
Dept: ONCOLOGY | Facility: HOSPITAL | Age: 72
End: 2017-09-28

## 2017-09-28 LAB
KAPPA FREE LIGHT CHAIN, S - HISTORICAL: 53.6 MG/DL (ref 0.33–1.94)
KAPPA/LAMBDA FLC RATIO - HISTORICAL: 46.6 (ref 0.26–1.65)
LAMBDA FREE LIGHT CHAIN, S - HISTORICAL: 1.15 MG/DL (ref 0.57–2.63)

## 2017-10-04 ENCOUNTER — AMBULATORY - HEALTHEAST (OUTPATIENT)
Dept: ONCOLOGY | Facility: HOSPITAL | Age: 72
End: 2017-10-04

## 2017-10-04 ENCOUNTER — INFUSION - HEALTHEAST (OUTPATIENT)
Dept: INFUSION THERAPY | Facility: HOSPITAL | Age: 72
End: 2017-10-04

## 2017-10-04 DIAGNOSIS — C90.01 MULTIPLE MYELOMA IN REMISSION (H): ICD-10-CM

## 2017-10-11 ENCOUNTER — AMBULATORY - HEALTHEAST (OUTPATIENT)
Dept: INFUSION THERAPY | Facility: HOSPITAL | Age: 72
End: 2017-10-11

## 2017-10-11 DIAGNOSIS — C90.01 MULTIPLE MYELOMA IN REMISSION (H): ICD-10-CM

## 2017-10-11 DIAGNOSIS — O22.30 DVT (DEEP VEIN THROMBOSIS) IN PREGNANCY: ICD-10-CM

## 2017-10-11 LAB
IGA SERPL-MCNC: 34 MG/DL (ref 65–400)
IGA SERPL-MCNC: 410 MG/DL (ref 700–1700)
IGM SERPL-MCNC: 10 MG/DL (ref 60–280)

## 2017-10-12 ENCOUNTER — AMBULATORY - HEALTHEAST (OUTPATIENT)
Dept: ONCOLOGY | Facility: HOSPITAL | Age: 72
End: 2017-10-12

## 2017-10-12 DIAGNOSIS — Z79.01 ENCOUNTER FOR MONITORING COUMADIN THERAPY: ICD-10-CM

## 2017-10-12 DIAGNOSIS — Z51.81 ENCOUNTER FOR MONITORING COUMADIN THERAPY: ICD-10-CM

## 2017-10-12 LAB
KAPPA FREE LIGHT CHAIN, S - HISTORICAL: 49.9 MG/DL (ref 0.33–1.94)
KAPPA/LAMBDA FLC RATIO - HISTORICAL: 37.5 (ref 0.26–1.65)
LAMBDA FREE LIGHT CHAIN, S - HISTORICAL: 1.33 MG/DL (ref 0.57–2.63)
PATH ICD:: NORMAL
PROT PATTERN SERPL IFE-IMP: NORMAL
REVIEWING PATHOLOGIST: NORMAL

## 2017-10-13 LAB
ALBUMIN PERCENT: 62.7 % (ref 51–67)
ALBUMIN SERPL ELPH-MCNC: 3.1 G/DL (ref 3.2–4.7)
ALPHA 1 PERCENT: 4 % (ref 2–4)
ALPHA 2 PERCENT: 15 % (ref 5–13)
ALPHA1 GLOB SERPL ELPH-MCNC: 0.2 G/DL (ref 0.1–0.3)
ALPHA2 GLOB SERPL ELPH-MCNC: 0.7 G/DL (ref 0.4–0.9)
B-GLOBULIN SERPL ELPH-MCNC: 0.6 G/DL (ref 0.7–1.2)
BETA PERCENT: 12 % (ref 10–17)
GAMMA GLOB SERPL ELPH-MCNC: 0.3 G/DL (ref 0.6–1.4)
GAMMA GLOBULIN PERCENT: 6.3 % (ref 9–20)
PATH ICD:: ABNORMAL
PROT PATTERN SERPL ELPH-IMP: ABNORMAL
PROT SERPL-MCNC: 4.9 G/DL (ref 6–8)
REVIEWING PATHOLOGIST: ABNORMAL

## 2017-10-18 ENCOUNTER — COMMUNICATION - HEALTHEAST (OUTPATIENT)
Dept: ONCOLOGY | Facility: HOSPITAL | Age: 72
End: 2017-10-18

## 2017-10-18 ENCOUNTER — INFUSION - HEALTHEAST (OUTPATIENT)
Dept: INFUSION THERAPY | Facility: HOSPITAL | Age: 72
End: 2017-10-18

## 2017-10-18 DIAGNOSIS — C90.01 MULTIPLE MYELOMA IN REMISSION (H): ICD-10-CM

## 2017-10-19 ENCOUNTER — COMMUNICATION - HEALTHEAST (OUTPATIENT)
Dept: ONCOLOGY | Facility: HOSPITAL | Age: 72
End: 2017-10-19

## 2017-10-23 ENCOUNTER — COMMUNICATION - HEALTHEAST (OUTPATIENT)
Dept: ONCOLOGY | Facility: HOSPITAL | Age: 72
End: 2017-10-23

## 2017-10-30 ENCOUNTER — RECORDS - HEALTHEAST (OUTPATIENT)
Dept: ADMINISTRATIVE | Facility: OTHER | Age: 72
End: 2017-10-30

## 2017-10-30 ENCOUNTER — COMMUNICATION - HEALTHEAST (OUTPATIENT)
Dept: ONCOLOGY | Facility: HOSPITAL | Age: 72
End: 2017-10-30

## 2017-11-01 ENCOUNTER — INFUSION - HEALTHEAST (OUTPATIENT)
Dept: INFUSION THERAPY | Facility: HOSPITAL | Age: 72
End: 2017-11-01

## 2017-11-01 DIAGNOSIS — C90.01 MULTIPLE MYELOMA IN REMISSION (H): ICD-10-CM

## 2017-11-08 ENCOUNTER — AMBULATORY - HEALTHEAST (OUTPATIENT)
Dept: INFUSION THERAPY | Facility: HOSPITAL | Age: 72
End: 2017-11-08

## 2017-11-08 DIAGNOSIS — O22.30 DVT (DEEP VEIN THROMBOSIS) IN PREGNANCY: ICD-10-CM

## 2017-11-08 DIAGNOSIS — C90.00 MULTIPLE MYELOMA (H): ICD-10-CM

## 2017-11-08 LAB
IGA SERPL-MCNC: 36 MG/DL (ref 65–400)
IGA SERPL-MCNC: 394 MG/DL (ref 700–1700)
IGM SERPL-MCNC: 11 MG/DL (ref 60–280)
PATH ICD:: NORMAL
PROT PATTERN SERPL IFE-IMP: NORMAL
REVIEWING PATHOLOGIST: NORMAL

## 2017-11-09 LAB
KAPPA FREE LIGHT CHAIN, S - HISTORICAL: 61.2 MG/DL (ref 0.33–1.94)
KAPPA/LAMBDA FLC RATIO - HISTORICAL: 27.4 (ref 0.26–1.65)
LAMBDA FREE LIGHT CHAIN, S - HISTORICAL: 2.23 MG/DL (ref 0.57–2.63)

## 2017-11-10 LAB
ALBUMIN PERCENT: 66.2 % (ref 51–67)
ALBUMIN SERPL ELPH-MCNC: 3.1 G/DL (ref 3.2–4.7)
ALPHA 1 PERCENT: 3.7 % (ref 2–4)
ALPHA 2 PERCENT: 11.5 % (ref 5–13)
ALPHA1 GLOB SERPL ELPH-MCNC: 0.2 G/DL (ref 0.1–0.3)
ALPHA2 GLOB SERPL ELPH-MCNC: 0.5 G/DL (ref 0.4–0.9)
B-GLOBULIN SERPL ELPH-MCNC: 0.5 G/DL (ref 0.7–1.2)
BETA PERCENT: 11.3 % (ref 10–17)
GAMMA GLOB SERPL ELPH-MCNC: 0.3 G/DL (ref 0.6–1.4)
GAMMA GLOBULIN PERCENT: 7.3 % (ref 9–20)
PATH ICD:: ABNORMAL
PROT PATTERN SERPL ELPH-IMP: ABNORMAL
PROT SERPL-MCNC: 4.7 G/DL (ref 6–8)
REVIEWING PATHOLOGIST: ABNORMAL

## 2017-11-15 ENCOUNTER — OFFICE VISIT - HEALTHEAST (OUTPATIENT)
Dept: ONCOLOGY | Facility: HOSPITAL | Age: 72
End: 2017-11-15

## 2017-11-15 ENCOUNTER — INFUSION - HEALTHEAST (OUTPATIENT)
Dept: INFUSION THERAPY | Facility: HOSPITAL | Age: 72
End: 2017-11-15

## 2017-11-15 DIAGNOSIS — Z51.11 CHEMOTHERAPY MANAGEMENT, ENCOUNTER FOR: ICD-10-CM

## 2017-11-15 DIAGNOSIS — I82.409 DVT (DEEP VENOUS THROMBOSIS) (H): ICD-10-CM

## 2017-11-15 DIAGNOSIS — D61.811 DRUG-INDUCED PANCYTOPENIA (H): ICD-10-CM

## 2017-11-15 DIAGNOSIS — C90.00 MULTIPLE MYELOMA NOT HAVING ACHIEVED REMISSION (H): ICD-10-CM

## 2017-11-15 DIAGNOSIS — C90.01 MULTIPLE MYELOMA IN REMISSION (H): ICD-10-CM

## 2017-11-16 ENCOUNTER — COMMUNICATION - HEALTHEAST (OUTPATIENT)
Dept: ONCOLOGY | Facility: HOSPITAL | Age: 72
End: 2017-11-16

## 2017-11-20 ENCOUNTER — RECORDS - HEALTHEAST (OUTPATIENT)
Dept: ADMINISTRATIVE | Facility: OTHER | Age: 72
End: 2017-11-20

## 2017-11-21 ENCOUNTER — AMBULATORY - HEALTHEAST (OUTPATIENT)
Dept: ONCOLOGY | Facility: HOSPITAL | Age: 72
End: 2017-11-21

## 2017-11-24 ENCOUNTER — COMMUNICATION - HEALTHEAST (OUTPATIENT)
Dept: INTERNAL MEDICINE | Facility: CLINIC | Age: 72
End: 2017-11-24

## 2017-11-29 ENCOUNTER — INFUSION - HEALTHEAST (OUTPATIENT)
Dept: INFUSION THERAPY | Facility: HOSPITAL | Age: 72
End: 2017-11-29

## 2017-11-29 DIAGNOSIS — C90.01 MULTIPLE MYELOMA IN REMISSION (H): ICD-10-CM

## 2017-11-29 DIAGNOSIS — I82.409 DVT (DEEP VENOUS THROMBOSIS) (H): ICD-10-CM

## 2017-11-30 ENCOUNTER — AMBULATORY - HEALTHEAST (OUTPATIENT)
Dept: ONCOLOGY | Facility: HOSPITAL | Age: 72
End: 2017-11-30

## 2017-12-07 ENCOUNTER — RECORDS - HEALTHEAST (OUTPATIENT)
Dept: ADMINISTRATIVE | Facility: OTHER | Age: 72
End: 2017-12-07

## 2017-12-13 ENCOUNTER — COMMUNICATION - HEALTHEAST (OUTPATIENT)
Dept: ONCOLOGY | Facility: HOSPITAL | Age: 72
End: 2017-12-13

## 2017-12-13 ASSESSMENT — MIFFLIN-ST. JEOR
SCORE: 1446.14
SCORE: 1488.32

## 2017-12-14 ENCOUNTER — SURGERY - HEALTHEAST (OUTPATIENT)
Dept: GASTROENTEROLOGY | Facility: HOSPITAL | Age: 72
End: 2017-12-14

## 2017-12-14 ENCOUNTER — RECORDS - HEALTHEAST (OUTPATIENT)
Dept: ADMINISTRATIVE | Facility: OTHER | Age: 72
End: 2017-12-14

## 2017-12-15 ENCOUNTER — COMMUNICATION - HEALTHEAST (OUTPATIENT)
Dept: INTERNAL MEDICINE | Facility: CLINIC | Age: 72
End: 2017-12-15

## 2017-12-18 ENCOUNTER — OFFICE VISIT - HEALTHEAST (OUTPATIENT)
Dept: INTERNAL MEDICINE | Facility: CLINIC | Age: 72
End: 2017-12-18

## 2017-12-18 DIAGNOSIS — D62 ACUTE BLOOD LOSS ANEMIA: ICD-10-CM

## 2017-12-18 DIAGNOSIS — N40.0 BENIGN PROSTATIC HYPERPLASIA WITHOUT LOWER URINARY TRACT SYMPTOMS: ICD-10-CM

## 2017-12-18 DIAGNOSIS — J01.90 ACUTE SINUSITIS, RECURRENCE NOT SPECIFIED, UNSPECIFIED LOCATION: ICD-10-CM

## 2017-12-18 DIAGNOSIS — Z51.81 MEDICATION MONITORING ENCOUNTER: ICD-10-CM

## 2017-12-18 DIAGNOSIS — R68.84 JAW PAIN: ICD-10-CM

## 2017-12-18 DIAGNOSIS — C90.00 MULTIPLE MYELOMA NOT HAVING ACHIEVED REMISSION (H): ICD-10-CM

## 2017-12-18 DIAGNOSIS — D61.818 PANCYTOPENIA (H): ICD-10-CM

## 2017-12-18 DIAGNOSIS — Z86.718 HISTORY OF DVT (DEEP VEIN THROMBOSIS): ICD-10-CM

## 2017-12-18 DIAGNOSIS — E83.42 HYPOMAGNESEMIA: ICD-10-CM

## 2017-12-18 DIAGNOSIS — R19.5 LOOSE STOOLS: ICD-10-CM

## 2017-12-18 DIAGNOSIS — G62.9 PERIPHERAL POLYNEUROPATHY: ICD-10-CM

## 2017-12-18 DIAGNOSIS — S01.311D LACERATION OF RIGHT EAR, SUBSEQUENT ENCOUNTER: ICD-10-CM

## 2017-12-18 ASSESSMENT — MIFFLIN-ST. JEOR: SCORE: 1473.35

## 2017-12-19 ENCOUNTER — COMMUNICATION - HEALTHEAST (OUTPATIENT)
Dept: INTERNAL MEDICINE | Facility: CLINIC | Age: 72
End: 2017-12-19

## 2017-12-19 ENCOUNTER — COMMUNICATION - HEALTHEAST (OUTPATIENT)
Dept: ONCOLOGY | Facility: HOSPITAL | Age: 72
End: 2017-12-19

## 2017-12-19 DIAGNOSIS — E83.42 HYPOMAGNESEMIA: ICD-10-CM

## 2017-12-20 ENCOUNTER — INFUSION - HEALTHEAST (OUTPATIENT)
Dept: INFUSION THERAPY | Facility: HOSPITAL | Age: 72
End: 2017-12-20

## 2017-12-20 ENCOUNTER — AMBULATORY - HEALTHEAST (OUTPATIENT)
Dept: ONCOLOGY | Facility: HOSPITAL | Age: 72
End: 2017-12-20

## 2017-12-20 ENCOUNTER — COMMUNICATION - HEALTHEAST (OUTPATIENT)
Dept: ONCOLOGY | Facility: HOSPITAL | Age: 72
End: 2017-12-20

## 2017-12-20 DIAGNOSIS — Z51.81 ENCOUNTER FOR MONITORING COUMADIN THERAPY: ICD-10-CM

## 2017-12-20 DIAGNOSIS — I82.409 DVT (DEEP VENOUS THROMBOSIS) (H): ICD-10-CM

## 2017-12-20 DIAGNOSIS — C90.01 MULTIPLE MYELOMA IN REMISSION (H): ICD-10-CM

## 2017-12-20 DIAGNOSIS — Z79.01 ENCOUNTER FOR MONITORING COUMADIN THERAPY: ICD-10-CM

## 2017-12-21 LAB
KAPPA LC FREE SER-MCNC: 34.75 MG/DL (ref 0.33–1.94)
KAPPA LC FREE/LAMBDA FREE SER NEPH: 23.32 {RATIO} (ref 0.26–1.65)
LAMBDA LC FREE SERPL-MCNC: 1.49 MG/DL (ref 0.57–2.63)

## 2017-12-22 ENCOUNTER — RECORDS - HEALTHEAST (OUTPATIENT)
Dept: ADMINISTRATIVE | Facility: OTHER | Age: 72
End: 2017-12-22

## 2017-12-26 ENCOUNTER — COMMUNICATION - HEALTHEAST (OUTPATIENT)
Dept: INTERNAL MEDICINE | Facility: CLINIC | Age: 72
End: 2017-12-26

## 2017-12-26 LAB
ALBUMIN PERCENT: 67.6 % (ref 51–67)
ALBUMIN SERPL ELPH-MCNC: 3.3 G/DL (ref 3.2–4.7)
ALPHA 1 PERCENT: 4.2 % (ref 2–4)
ALPHA 2 PERCENT: 10.2 % (ref 5–13)
ALPHA1 GLOB SERPL ELPH-MCNC: 0.2 G/DL (ref 0.1–0.3)
ALPHA2 GLOB SERPL ELPH-MCNC: 0.5 G/DL (ref 0.4–0.9)
B-GLOBULIN SERPL ELPH-MCNC: 0.5 G/DL (ref 0.7–1.2)
BETA PERCENT: 11.2 % (ref 10–17)
GAMMA GLOB SERPL ELPH-MCNC: 0.3 G/DL (ref 0.6–1.4)
GAMMA GLOBULIN PERCENT: 6.8 % (ref 9–20)
PATH ICD:: ABNORMAL
PROT PATTERN SERPL ELPH-IMP: ABNORMAL
PROT SERPL-MCNC: 4.9 G/DL (ref 6–8)
REVIEWING PATHOLOGIST: ABNORMAL

## 2017-12-28 ENCOUNTER — OFFICE VISIT - HEALTHEAST (OUTPATIENT)
Dept: INTERNAL MEDICINE | Facility: CLINIC | Age: 72
End: 2017-12-28

## 2017-12-28 DIAGNOSIS — Z86.718 HISTORY OF DVT (DEEP VEIN THROMBOSIS): ICD-10-CM

## 2017-12-28 DIAGNOSIS — C90.00 MULTIPLE MYELOMA, REMISSION STATUS UNSPECIFIED (H): ICD-10-CM

## 2017-12-28 DIAGNOSIS — Z48.02 VISIT FOR SUTURE REMOVAL: ICD-10-CM

## 2017-12-28 DIAGNOSIS — D13.2 ADENOMATOUS DUODENAL POLYP: ICD-10-CM

## 2017-12-28 DIAGNOSIS — D64.9 ANEMIA, UNSPECIFIED TYPE: ICD-10-CM

## 2017-12-28 DIAGNOSIS — Z51.81 MEDICATION MONITORING ENCOUNTER: ICD-10-CM

## 2017-12-28 DIAGNOSIS — E83.42 HYPOMAGNESEMIA: ICD-10-CM

## 2017-12-28 DIAGNOSIS — R68.84 JAW PAIN: ICD-10-CM

## 2017-12-28 ASSESSMENT — MIFFLIN-ST. JEOR: SCORE: 1473.35

## 2018-01-02 ENCOUNTER — COMMUNICATION - HEALTHEAST (OUTPATIENT)
Dept: ONCOLOGY | Facility: HOSPITAL | Age: 73
End: 2018-01-02

## 2018-01-02 ENCOUNTER — RECORDS - HEALTHEAST (OUTPATIENT)
Dept: ADMINISTRATIVE | Facility: OTHER | Age: 73
End: 2018-01-02

## 2018-01-03 ENCOUNTER — AMBULATORY - HEALTHEAST (OUTPATIENT)
Dept: ONCOLOGY | Facility: HOSPITAL | Age: 73
End: 2018-01-03

## 2018-01-03 ENCOUNTER — INFUSION - HEALTHEAST (OUTPATIENT)
Dept: INFUSION THERAPY | Facility: HOSPITAL | Age: 73
End: 2018-01-03

## 2018-01-03 DIAGNOSIS — I82.409 DVT (DEEP VENOUS THROMBOSIS) (H): ICD-10-CM

## 2018-01-03 DIAGNOSIS — C90.01 MULTIPLE MYELOMA IN REMISSION (H): ICD-10-CM

## 2018-01-03 DIAGNOSIS — Z86.718 HISTORY OF DVT (DEEP VEIN THROMBOSIS): ICD-10-CM

## 2018-01-03 LAB — INR PPP: 1.09 (ref 0.9–1.1)

## 2018-01-08 ENCOUNTER — COMMUNICATION - HEALTHEAST (OUTPATIENT)
Dept: ONCOLOGY | Facility: HOSPITAL | Age: 73
End: 2018-01-08

## 2018-01-09 ENCOUNTER — RECORDS - HEALTHEAST (OUTPATIENT)
Dept: ADMINISTRATIVE | Facility: OTHER | Age: 73
End: 2018-01-09

## 2018-01-17 ENCOUNTER — INFUSION - HEALTHEAST (OUTPATIENT)
Dept: INFUSION THERAPY | Facility: HOSPITAL | Age: 73
End: 2018-01-17

## 2018-01-17 ENCOUNTER — OFFICE VISIT - HEALTHEAST (OUTPATIENT)
Dept: ONCOLOGY | Facility: HOSPITAL | Age: 73
End: 2018-01-17

## 2018-01-17 ENCOUNTER — HOSPITAL ENCOUNTER (OUTPATIENT)
Dept: ULTRASOUND IMAGING | Facility: HOSPITAL | Age: 73
Setting detail: RADIATION/ONCOLOGY SERIES
Discharge: STILL ADMITTED OR ADMINISTRATIVE | End: 2018-01-17
Attending: INTERNAL MEDICINE

## 2018-01-17 ENCOUNTER — AMBULATORY - HEALTHEAST (OUTPATIENT)
Dept: INFUSION THERAPY | Facility: HOSPITAL | Age: 73
End: 2018-01-17

## 2018-01-17 ENCOUNTER — COMMUNICATION - HEALTHEAST (OUTPATIENT)
Dept: INTERNAL MEDICINE | Facility: CLINIC | Age: 73
End: 2018-01-17

## 2018-01-17 DIAGNOSIS — C90.01 MULTIPLE MYELOMA IN REMISSION (H): ICD-10-CM

## 2018-01-17 DIAGNOSIS — C90.00 MULTIPLE MYELOMA, REMISSION STATUS UNSPECIFIED (H): ICD-10-CM

## 2018-01-17 DIAGNOSIS — D64.9 ANEMIA, UNSPECIFIED TYPE: ICD-10-CM

## 2018-01-17 DIAGNOSIS — M79.89 LEFT LEG SWELLING: ICD-10-CM

## 2018-01-17 DIAGNOSIS — E83.42 HYPOMAGNESEMIA: ICD-10-CM

## 2018-01-17 DIAGNOSIS — D61.818 PANCYTOPENIA (H): ICD-10-CM

## 2018-01-17 DIAGNOSIS — Z86.718 HISTORY OF DVT (DEEP VEIN THROMBOSIS): ICD-10-CM

## 2018-01-17 DIAGNOSIS — Z51.81 MEDICATION MONITORING ENCOUNTER: ICD-10-CM

## 2018-01-17 LAB
ANION GAP SERPL CALCULATED.3IONS-SCNC: 8 MMOL/L (ref 5–18)
BASOPHILS # BLD AUTO: 0 THOU/UL (ref 0–0.2)
BASOPHILS NFR BLD AUTO: 1 % (ref 0–2)
BUN SERPL-MCNC: 12 MG/DL (ref 8–28)
CALCIUM SERPL-MCNC: 8.5 MG/DL (ref 8.5–10.5)
CHLORIDE BLD-SCNC: 110 MMOL/L (ref 98–107)
CO2 SERPL-SCNC: 25 MMOL/L (ref 22–31)
CREAT SERPL-MCNC: 0.74 MG/DL (ref 0.7–1.3)
EOSINOPHIL # BLD AUTO: 0.1 THOU/UL (ref 0–0.4)
EOSINOPHIL NFR BLD AUTO: 6 % (ref 0–6)
ERYTHROCYTE [DISTWIDTH] IN BLOOD BY AUTOMATED COUNT: 21.6 % (ref 11–14.5)
GFR SERPL CREATININE-BSD FRML MDRD: >60 ML/MIN/1.73M2
GLUCOSE BLD-MCNC: 104 MG/DL (ref 70–125)
HCT VFR BLD AUTO: 28 % (ref 40–54)
HGB BLD-MCNC: 9.2 G/DL (ref 14–18)
INR PPP: 1.02 (ref 0.9–1.1)
LYMPHOCYTES # BLD AUTO: 0.3 THOU/UL (ref 0.8–4.4)
LYMPHOCYTES NFR BLD AUTO: 15 % (ref 20–40)
MAGNESIUM SERPL-MCNC: 1 MG/DL (ref 1.8–2.6)
MCH RBC QN AUTO: 32.5 PG (ref 27–34)
MCHC RBC AUTO-ENTMCNC: 32.9 G/DL (ref 32–36)
MCV RBC AUTO: 99 FL (ref 80–100)
MONOCYTES # BLD AUTO: 0.2 THOU/UL (ref 0–0.9)
MONOCYTES NFR BLD AUTO: 9 % (ref 2–10)
NEUTROPHILS # BLD AUTO: 1.5 THOU/UL (ref 2–7.7)
NEUTROPHILS NFR BLD AUTO: 69 % (ref 50–70)
OVALOCYTES: ABNORMAL
PLAT MORPH BLD: ABNORMAL
PLATELET # BLD AUTO: 130 THOU/UL (ref 140–440)
PMV BLD AUTO: 11 FL (ref 8.5–12.5)
POTASSIUM BLD-SCNC: 3.7 MMOL/L (ref 3.5–5)
RBC # BLD AUTO: 2.83 MILL/UL (ref 4.4–6.2)
SODIUM SERPL-SCNC: 143 MMOL/L (ref 136–145)
WBC: 2.1 THOU/UL (ref 4–11)

## 2018-01-17 ASSESSMENT — MIFFLIN-ST. JEOR: SCORE: 1492.4

## 2018-01-18 ENCOUNTER — COMMUNICATION - HEALTHEAST (OUTPATIENT)
Dept: INTERNAL MEDICINE | Facility: CLINIC | Age: 73
End: 2018-01-18

## 2018-01-18 ENCOUNTER — OFFICE VISIT - HEALTHEAST (OUTPATIENT)
Dept: INTERNAL MEDICINE | Facility: CLINIC | Age: 73
End: 2018-01-18

## 2018-01-18 DIAGNOSIS — Z86.718 HISTORY OF DVT (DEEP VEIN THROMBOSIS): ICD-10-CM

## 2018-01-18 DIAGNOSIS — D13.2 ADENOMATOUS DUODENAL POLYP: ICD-10-CM

## 2018-01-18 DIAGNOSIS — E83.42 HYPOMAGNESEMIA: ICD-10-CM

## 2018-01-18 DIAGNOSIS — D61.818 PANCYTOPENIA (H): ICD-10-CM

## 2018-01-18 DIAGNOSIS — C90.00 MULTIPLE MYELOMA, REMISSION STATUS UNSPECIFIED (H): ICD-10-CM

## 2018-01-18 LAB — MAGNESIUM SERPL-MCNC: 1.6 MG/DL (ref 1.8–2.6)

## 2018-01-18 ASSESSMENT — MIFFLIN-ST. JEOR: SCORE: 1496.03

## 2018-01-19 ENCOUNTER — ANESTHESIA - HEALTHEAST (OUTPATIENT)
Dept: SURGERY | Facility: HOSPITAL | Age: 73
End: 2018-01-19

## 2018-01-19 ENCOUNTER — SURGERY - HEALTHEAST (OUTPATIENT)
Dept: SURGERY | Facility: HOSPITAL | Age: 73
End: 2018-01-19

## 2018-01-24 ENCOUNTER — COMMUNICATION - HEALTHEAST (OUTPATIENT)
Dept: ONCOLOGY | Facility: HOSPITAL | Age: 73
End: 2018-01-24

## 2018-01-24 ENCOUNTER — RECORDS - HEALTHEAST (OUTPATIENT)
Dept: ADMINISTRATIVE | Facility: OTHER | Age: 73
End: 2018-01-24

## 2018-01-31 ENCOUNTER — INFUSION - HEALTHEAST (OUTPATIENT)
Dept: INFUSION THERAPY | Facility: HOSPITAL | Age: 73
End: 2018-01-31

## 2018-01-31 DIAGNOSIS — C90.01 MULTIPLE MYELOMA IN REMISSION (H): ICD-10-CM

## 2018-01-31 LAB — MAGNESIUM SERPL-MCNC: 1.4 MG/DL (ref 1.8–2.6)

## 2018-02-12 ENCOUNTER — RECORDS - HEALTHEAST (OUTPATIENT)
Dept: ADMINISTRATIVE | Facility: OTHER | Age: 73
End: 2018-02-12

## 2018-02-13 ENCOUNTER — RECORDS - HEALTHEAST (OUTPATIENT)
Dept: ADMINISTRATIVE | Facility: OTHER | Age: 73
End: 2018-02-13

## 2018-02-13 ENCOUNTER — OFFICE VISIT - HEALTHEAST (OUTPATIENT)
Dept: INTERNAL MEDICINE | Facility: CLINIC | Age: 73
End: 2018-02-13

## 2018-02-13 ENCOUNTER — HOSPITAL ENCOUNTER (OUTPATIENT)
Dept: ULTRASOUND IMAGING | Facility: HOSPITAL | Age: 73
Discharge: HOME | End: 2018-02-13
Attending: INTERNAL MEDICINE

## 2018-02-13 ENCOUNTER — COMMUNICATION - HEALTHEAST (OUTPATIENT)
Dept: INTERNAL MEDICINE | Facility: CLINIC | Age: 73
End: 2018-02-13

## 2018-02-13 DIAGNOSIS — C90.00 MULTIPLE MYELOMA, REMISSION STATUS UNSPECIFIED (H): ICD-10-CM

## 2018-02-13 DIAGNOSIS — D64.9 ANEMIA, UNSPECIFIED TYPE: ICD-10-CM

## 2018-02-13 DIAGNOSIS — E83.42 HYPOMAGNESEMIA: ICD-10-CM

## 2018-02-13 DIAGNOSIS — Z86.718 HISTORY OF DVT (DEEP VEIN THROMBOSIS): ICD-10-CM

## 2018-02-13 DIAGNOSIS — M79.89 LEFT LEG SWELLING: ICD-10-CM

## 2018-02-14 ENCOUNTER — INFUSION - HEALTHEAST (OUTPATIENT)
Dept: INFUSION THERAPY | Facility: HOSPITAL | Age: 73
End: 2018-02-14

## 2018-02-14 DIAGNOSIS — C90.01 MULTIPLE MYELOMA IN REMISSION (H): ICD-10-CM

## 2018-02-14 DIAGNOSIS — I82.409 DVT (DEEP VENOUS THROMBOSIS) (H): ICD-10-CM

## 2018-02-14 LAB
ALBUMIN SERPL-MCNC: 2.9 G/DL (ref 3.5–5)
ALP SERPL-CCNC: 55 U/L (ref 45–120)
ALT SERPL W P-5'-P-CCNC: 12 U/L (ref 0–45)
ANION GAP SERPL CALCULATED.3IONS-SCNC: 4 MMOL/L (ref 5–18)
AST SERPL W P-5'-P-CCNC: 13 U/L (ref 0–40)
BASOPHILS # BLD AUTO: 0 THOU/UL (ref 0–0.2)
BASOPHILS NFR BLD AUTO: 2 % (ref 0–2)
BILIRUB SERPL-MCNC: 0.4 MG/DL (ref 0–1)
BUN SERPL-MCNC: 14 MG/DL (ref 8–28)
CALCIUM SERPL-MCNC: 8.4 MG/DL (ref 8.5–10.5)
CHLORIDE BLD-SCNC: 112 MMOL/L (ref 98–107)
CO2 SERPL-SCNC: 27 MMOL/L (ref 22–31)
CREAT SERPL-MCNC: 0.79 MG/DL (ref 0.7–1.3)
EOSINOPHIL # BLD AUTO: 0.2 THOU/UL (ref 0–0.4)
EOSINOPHIL NFR BLD AUTO: 7 % (ref 0–6)
ERYTHROCYTE [DISTWIDTH] IN BLOOD BY AUTOMATED COUNT: 19.5 % (ref 11–14.5)
GFR SERPL CREATININE-BSD FRML MDRD: >60 ML/MIN/1.73M2
GLUCOSE BLD-MCNC: 105 MG/DL (ref 70–125)
HCT VFR BLD AUTO: 28.8 % (ref 40–54)
HGB BLD-MCNC: 9.5 G/DL (ref 14–18)
IGA SERPL-MCNC: 32 MG/DL (ref 65–400)
IGA SERPL-MCNC: 432 MG/DL (ref 700–1700)
IGM SERPL-MCNC: 13 MG/DL (ref 60–280)
INR PPP: 1.08 (ref 0.9–1.1)
LYMPHOCYTES # BLD AUTO: 0.3 THOU/UL (ref 0.8–4.4)
LYMPHOCYTES NFR BLD AUTO: 13 % (ref 20–40)
MAGNESIUM SERPL-MCNC: 1.4 MG/DL (ref 1.8–2.6)
MCH RBC QN AUTO: 33.8 PG (ref 27–34)
MCHC RBC AUTO-ENTMCNC: 33 G/DL (ref 32–36)
MCV RBC AUTO: 103 FL (ref 80–100)
MONOCYTES # BLD AUTO: 0.3 THOU/UL (ref 0–0.9)
MONOCYTES NFR BLD AUTO: 11 % (ref 2–10)
NEUTROPHILS # BLD AUTO: 1.6 THOU/UL (ref 2–7.7)
NEUTROPHILS NFR BLD AUTO: 67 % (ref 50–70)
PLATELET # BLD AUTO: 107 THOU/UL (ref 140–440)
PMV BLD AUTO: 11.8 FL (ref 8.5–12.5)
POTASSIUM BLD-SCNC: 4 MMOL/L (ref 3.5–5)
PROT SERPL-MCNC: 4.9 G/DL (ref 6–8)
RBC # BLD AUTO: 2.81 MILL/UL (ref 4.4–6.2)
SODIUM SERPL-SCNC: 143 MMOL/L (ref 136–145)
WBC: 2.5 THOU/UL (ref 4–11)

## 2018-02-15 LAB
KAPPA LC FREE SER-MCNC: 36.25 MG/DL (ref 0.33–1.94)
KAPPA LC FREE/LAMBDA FREE SER NEPH: 33.88 {RATIO} (ref 0.26–1.65)
LAMBDA LC FREE SERPL-MCNC: 1.07 MG/DL (ref 0.57–2.63)

## 2018-02-19 ENCOUNTER — OFFICE VISIT - HEALTHEAST (OUTPATIENT)
Dept: INTERNAL MEDICINE | Facility: CLINIC | Age: 73
End: 2018-02-19

## 2018-02-19 ENCOUNTER — COMMUNICATION - HEALTHEAST (OUTPATIENT)
Dept: INTERNAL MEDICINE | Facility: CLINIC | Age: 73
End: 2018-02-19

## 2018-02-19 DIAGNOSIS — E83.42 HYPOMAGNESEMIA: ICD-10-CM

## 2018-02-19 DIAGNOSIS — R05.9 COUGH: ICD-10-CM

## 2018-02-19 DIAGNOSIS — I82.409 DVT (DEEP VENOUS THROMBOSIS) (H): ICD-10-CM

## 2018-02-19 DIAGNOSIS — D61.818 PANCYTOPENIA (H): ICD-10-CM

## 2018-02-19 LAB
ALBUMIN PERCENT: 68.5 % (ref 51–67)
ALBUMIN SERPL ELPH-MCNC: 3.2 G/DL (ref 3.2–4.7)
ALPHA 1 PERCENT: 3.4 % (ref 2–4)
ALPHA 2 PERCENT: 11.1 % (ref 5–13)
ALPHA1 GLOB SERPL ELPH-MCNC: 0.2 G/DL (ref 0.1–0.3)
ALPHA2 GLOB SERPL ELPH-MCNC: 0.5 G/DL (ref 0.4–0.9)
B-GLOBULIN SERPL ELPH-MCNC: 0.4 G/DL (ref 0.7–1.2)
BETA PERCENT: 9.5 % (ref 10–17)
GAMMA GLOB SERPL ELPH-MCNC: 0.3 G/DL (ref 0.6–1.4)
GAMMA GLOBULIN PERCENT: 7.5 % (ref 9–20)
INR PPP: 5.4 (ref 0.9–1.1)
PATH ICD:: ABNORMAL
PATH ICD:: NORMAL
PROT PATTERN SERPL ELPH-IMP: ABNORMAL
PROT PATTERN SERPL IFE-IMP: NORMAL
PROT SERPL-MCNC: 4.6 G/DL (ref 6–8)
REVIEWING PATHOLOGIST: ABNORMAL
REVIEWING PATHOLOGIST: NORMAL

## 2018-02-19 ASSESSMENT — MIFFLIN-ST. JEOR: SCORE: 1477.89

## 2018-02-20 ENCOUNTER — COMMUNICATION - HEALTHEAST (OUTPATIENT)
Dept: LAB | Facility: CLINIC | Age: 73
End: 2018-02-20

## 2018-02-20 DIAGNOSIS — I82.409 DVT (DEEP VENOUS THROMBOSIS) (H): ICD-10-CM

## 2018-02-21 ENCOUNTER — AMBULATORY - HEALTHEAST (OUTPATIENT)
Dept: LAB | Facility: CLINIC | Age: 73
End: 2018-02-21

## 2018-02-21 ENCOUNTER — COMMUNICATION - HEALTHEAST (OUTPATIENT)
Dept: INTERNAL MEDICINE | Facility: CLINIC | Age: 73
End: 2018-02-21

## 2018-02-21 DIAGNOSIS — I82.409 DVT (DEEP VENOUS THROMBOSIS) (H): ICD-10-CM

## 2018-02-21 LAB — INR PPP: 4.2 (ref 0.9–1.1)

## 2018-02-22 ENCOUNTER — COMMUNICATION - HEALTHEAST (OUTPATIENT)
Dept: ONCOLOGY | Facility: HOSPITAL | Age: 73
End: 2018-02-22

## 2018-02-22 ENCOUNTER — COMMUNICATION - HEALTHEAST (OUTPATIENT)
Dept: INTERNAL MEDICINE | Facility: CLINIC | Age: 73
End: 2018-02-22

## 2018-02-23 ENCOUNTER — AMBULATORY - HEALTHEAST (OUTPATIENT)
Dept: LAB | Facility: CLINIC | Age: 73
End: 2018-02-23

## 2018-02-23 ENCOUNTER — COMMUNICATION - HEALTHEAST (OUTPATIENT)
Dept: INTERNAL MEDICINE | Facility: CLINIC | Age: 73
End: 2018-02-23

## 2018-02-23 DIAGNOSIS — I82.409 DVT (DEEP VENOUS THROMBOSIS) (H): ICD-10-CM

## 2018-02-23 LAB — INR PPP: 1.6 (ref 0.9–1.1)

## 2018-02-26 ENCOUNTER — AMBULATORY - HEALTHEAST (OUTPATIENT)
Dept: LAB | Facility: CLINIC | Age: 73
End: 2018-02-26

## 2018-02-26 ENCOUNTER — COMMUNICATION - HEALTHEAST (OUTPATIENT)
Dept: INTERNAL MEDICINE | Facility: CLINIC | Age: 73
End: 2018-02-26

## 2018-02-26 DIAGNOSIS — I82.409 DVT (DEEP VENOUS THROMBOSIS) (H): ICD-10-CM

## 2018-02-26 LAB — INR PPP: 3.4 (ref 0.9–1.1)

## 2018-02-28 ENCOUNTER — COMMUNICATION - HEALTHEAST (OUTPATIENT)
Dept: INTERNAL MEDICINE | Facility: CLINIC | Age: 73
End: 2018-02-28

## 2018-02-28 ENCOUNTER — INFUSION - HEALTHEAST (OUTPATIENT)
Dept: INFUSION THERAPY | Facility: HOSPITAL | Age: 73
End: 2018-02-28

## 2018-02-28 DIAGNOSIS — I82.409 DVT (DEEP VENOUS THROMBOSIS) (H): ICD-10-CM

## 2018-02-28 DIAGNOSIS — C90.01 MULTIPLE MYELOMA IN REMISSION (H): ICD-10-CM

## 2018-02-28 DIAGNOSIS — I82.5Y9 CHRONIC DEEP VEIN THROMBOSIS (DVT) OF PROXIMAL VEIN OF LOWER EXTREMITY, UNSPECIFIED LATERALITY (H): ICD-10-CM

## 2018-02-28 LAB — INR PPP: 2.12 (ref 0.9–1.1)

## 2018-03-01 ENCOUNTER — COMMUNICATION - HEALTHEAST (OUTPATIENT)
Dept: ONCOLOGY | Facility: HOSPITAL | Age: 73
End: 2018-03-01

## 2018-03-05 ENCOUNTER — AMBULATORY - HEALTHEAST (OUTPATIENT)
Dept: LAB | Facility: CLINIC | Age: 73
End: 2018-03-05

## 2018-03-05 ENCOUNTER — COMMUNICATION - HEALTHEAST (OUTPATIENT)
Dept: INTERNAL MEDICINE | Facility: CLINIC | Age: 73
End: 2018-03-05

## 2018-03-05 DIAGNOSIS — I82.409 DVT (DEEP VENOUS THROMBOSIS) (H): ICD-10-CM

## 2018-03-05 LAB — INR PPP: 2.3 (ref 0.9–1.1)

## 2018-03-08 ENCOUNTER — COMMUNICATION - HEALTHEAST (OUTPATIENT)
Dept: INTERNAL MEDICINE | Facility: CLINIC | Age: 73
End: 2018-03-08

## 2018-03-08 ENCOUNTER — AMBULATORY - HEALTHEAST (OUTPATIENT)
Dept: LAB | Facility: CLINIC | Age: 73
End: 2018-03-08

## 2018-03-08 DIAGNOSIS — I82.409 DVT (DEEP VENOUS THROMBOSIS) (H): ICD-10-CM

## 2018-03-08 LAB — INR PPP: 2.5 (ref 0.9–1.1)

## 2018-03-14 ENCOUNTER — OFFICE VISIT - HEALTHEAST (OUTPATIENT)
Dept: ONCOLOGY | Facility: HOSPITAL | Age: 73
End: 2018-03-14

## 2018-03-14 ENCOUNTER — INFUSION - HEALTHEAST (OUTPATIENT)
Dept: INFUSION THERAPY | Facility: HOSPITAL | Age: 73
End: 2018-03-14

## 2018-03-14 ENCOUNTER — AMBULATORY - HEALTHEAST (OUTPATIENT)
Dept: INFUSION THERAPY | Facility: HOSPITAL | Age: 73
End: 2018-03-14

## 2018-03-14 DIAGNOSIS — C90.01 MULTIPLE MYELOMA IN REMISSION (H): ICD-10-CM

## 2018-03-14 DIAGNOSIS — I82.409 DVT (DEEP VENOUS THROMBOSIS) (H): ICD-10-CM

## 2018-03-14 DIAGNOSIS — C90.00 MULTIPLE MYELOMA NOT HAVING ACHIEVED REMISSION (H): ICD-10-CM

## 2018-03-14 DIAGNOSIS — I82.5Y9 CHRONIC DEEP VEIN THROMBOSIS (DVT) OF PROXIMAL VEIN OF LOWER EXTREMITY, UNSPECIFIED LATERALITY (H): ICD-10-CM

## 2018-03-14 LAB — INR PPP: 2.04 (ref 0.9–1.1)

## 2018-03-21 ENCOUNTER — COMMUNICATION - HEALTHEAST (OUTPATIENT)
Dept: INTERNAL MEDICINE | Facility: CLINIC | Age: 73
End: 2018-03-21

## 2018-03-21 DIAGNOSIS — I82.409 DVT (DEEP VENOUS THROMBOSIS) (H): ICD-10-CM

## 2018-03-22 ENCOUNTER — COMMUNICATION - HEALTHEAST (OUTPATIENT)
Dept: ONCOLOGY | Facility: HOSPITAL | Age: 73
End: 2018-03-22

## 2018-03-23 ENCOUNTER — COMMUNICATION - HEALTHEAST (OUTPATIENT)
Dept: ONCOLOGY | Facility: HOSPITAL | Age: 73
End: 2018-03-23

## 2018-03-28 ENCOUNTER — OFFICE VISIT - HEALTHEAST (OUTPATIENT)
Dept: INTERNAL MEDICINE | Facility: CLINIC | Age: 73
End: 2018-03-28

## 2018-03-28 ENCOUNTER — INFUSION - HEALTHEAST (OUTPATIENT)
Dept: INFUSION THERAPY | Facility: HOSPITAL | Age: 73
End: 2018-03-28

## 2018-03-28 ENCOUNTER — COMMUNICATION - HEALTHEAST (OUTPATIENT)
Dept: INTERNAL MEDICINE | Facility: CLINIC | Age: 73
End: 2018-03-28

## 2018-03-28 DIAGNOSIS — I82.5Y9 CHRONIC DEEP VEIN THROMBOSIS (DVT) OF PROXIMAL VEIN OF LOWER EXTREMITY, UNSPECIFIED LATERALITY (H): ICD-10-CM

## 2018-03-28 DIAGNOSIS — I82.502 CHRONIC DEEP VEIN THROMBOSIS (DVT) OF LEFT LOWER EXTREMITY, UNSPECIFIED VEIN (H): ICD-10-CM

## 2018-03-28 DIAGNOSIS — C90.00 MULTIPLE MYELOMA NOT HAVING ACHIEVED REMISSION (H): ICD-10-CM

## 2018-03-28 DIAGNOSIS — C90.01 MULTIPLE MYELOMA IN REMISSION (H): ICD-10-CM

## 2018-03-28 DIAGNOSIS — D64.9 ANEMIA, UNSPECIFIED TYPE: ICD-10-CM

## 2018-03-28 DIAGNOSIS — I82.409 DVT (DEEP VENOUS THROMBOSIS) (H): ICD-10-CM

## 2018-03-28 LAB — INR PPP: 2.5 (ref 0.9–1.1)

## 2018-03-28 ASSESSMENT — MIFFLIN-ST. JEOR: SCORE: 1473.35

## 2018-04-02 ENCOUNTER — COMMUNICATION - HEALTHEAST (OUTPATIENT)
Dept: ONCOLOGY | Facility: HOSPITAL | Age: 73
End: 2018-04-02

## 2018-04-02 DIAGNOSIS — R11.0 NAUSEA: ICD-10-CM

## 2018-04-02 DIAGNOSIS — I82.409 DEEP VEIN THROMBOSIS (DVT) (H): ICD-10-CM

## 2018-04-02 DIAGNOSIS — C90.00 MULTIPLE MYELOMA (H): ICD-10-CM

## 2018-04-04 ENCOUNTER — COMMUNICATION - HEALTHEAST (OUTPATIENT)
Dept: ANTICOAGULATION | Facility: CLINIC | Age: 73
End: 2018-04-04

## 2018-04-04 ENCOUNTER — AMBULATORY - HEALTHEAST (OUTPATIENT)
Dept: INFUSION THERAPY | Facility: HOSPITAL | Age: 73
End: 2018-04-04

## 2018-04-04 DIAGNOSIS — I82.409 DVT (DEEP VENOUS THROMBOSIS) (H): ICD-10-CM

## 2018-04-04 DIAGNOSIS — C90.01 MULTIPLE MYELOMA IN REMISSION (H): ICD-10-CM

## 2018-04-04 DIAGNOSIS — I82.5Y9 CHRONIC DEEP VEIN THROMBOSIS (DVT) OF PROXIMAL VEIN OF LOWER EXTREMITY, UNSPECIFIED LATERALITY (H): ICD-10-CM

## 2018-04-04 DIAGNOSIS — C90.00 MULTIPLE MYELOMA NOT HAVING ACHIEVED REMISSION (H): ICD-10-CM

## 2018-04-04 LAB
ALBUMIN SERPL-MCNC: 2.9 G/DL (ref 3.5–5)
ALP SERPL-CCNC: 73 U/L (ref 45–120)
ALT SERPL W P-5'-P-CCNC: 19 U/L (ref 0–45)
ANION GAP SERPL CALCULATED.3IONS-SCNC: 3 MMOL/L (ref 5–18)
AST SERPL W P-5'-P-CCNC: 15 U/L (ref 0–40)
BASOPHILS # BLD AUTO: 0.1 THOU/UL (ref 0–0.2)
BASOPHILS NFR BLD AUTO: 3 % (ref 0–2)
BILIRUB SERPL-MCNC: 0.5 MG/DL (ref 0–1)
BUN SERPL-MCNC: 18 MG/DL (ref 8–28)
CALCIUM SERPL-MCNC: 8.5 MG/DL (ref 8.5–10.5)
CHLORIDE BLD-SCNC: 108 MMOL/L (ref 98–107)
CO2 SERPL-SCNC: 30 MMOL/L (ref 22–31)
CREAT SERPL-MCNC: 0.87 MG/DL (ref 0.7–1.3)
EOSINOPHIL COUNT (ABSOLUTE): 0.2 THOU/UL (ref 0–0.4)
EOSINOPHIL NFR BLD AUTO: 13 % (ref 0–6)
ERYTHROCYTE [DISTWIDTH] IN BLOOD BY AUTOMATED COUNT: 15.4 % (ref 11–14.5)
GFR SERPL CREATININE-BSD FRML MDRD: >60 ML/MIN/1.73M2
GLUCOSE BLD-MCNC: 94 MG/DL (ref 70–125)
HCT VFR BLD AUTO: 29.8 % (ref 40–54)
HGB BLD-MCNC: 9.9 G/DL (ref 14–18)
IGA SERPL-MCNC: 41 MG/DL (ref 65–400)
IGA SERPL-MCNC: 492 MG/DL (ref 700–1700)
IGM SERPL-MCNC: 10 MG/DL (ref 60–280)
INR PPP: 1.92 (ref 0.9–1.1)
LYMPHOCYTES # BLD AUTO: 0.4 THOU/UL (ref 0.8–4.4)
LYMPHOCYTES NFR BLD AUTO: 24 % (ref 20–40)
MCH RBC QN AUTO: 35.2 PG (ref 27–34)
MCHC RBC AUTO-ENTMCNC: 33.2 G/DL (ref 32–36)
MCV RBC AUTO: 106 FL (ref 80–100)
MONOCYTES # BLD AUTO: 0.4 THOU/UL (ref 0–0.9)
MONOCYTES NFR BLD AUTO: 22 % (ref 2–10)
OVALOCYTES: ABNORMAL
PLAT MORPH BLD: ABNORMAL
PLATELET # BLD AUTO: 90 THOU/UL (ref 140–440)
PMV BLD AUTO: 11.2 FL (ref 8.5–12.5)
POTASSIUM BLD-SCNC: 4.2 MMOL/L (ref 3.5–5)
PROT SERPL-MCNC: 5.1 G/DL (ref 6–8)
RBC # BLD AUTO: 2.81 MILL/UL (ref 4.4–6.2)
SODIUM SERPL-SCNC: 141 MMOL/L (ref 136–145)
TEAR DROP: ABNORMAL
TOTAL NEUTROPHILS-ABS(DIFF): 0.7 THOU/UL (ref 2–7.7)
TOTAL NEUTROPHILS-REL(DIFF): 38 % (ref 50–70)
WBC: 1.8 THOU/UL (ref 4–11)

## 2018-04-05 ENCOUNTER — AMBULATORY - HEALTHEAST (OUTPATIENT)
Dept: ONCOLOGY | Facility: HOSPITAL | Age: 73
End: 2018-04-05

## 2018-04-05 DIAGNOSIS — C90.00 MULTIPLE MYELOMA NOT HAVING ACHIEVED REMISSION (H): ICD-10-CM

## 2018-04-05 LAB
KAPPA LC FREE SER-MCNC: 55.5 MG/DL (ref 0.33–1.94)
KAPPA LC FREE/LAMBDA FREE SER NEPH: 39.08 {RATIO} (ref 0.26–1.65)
LAMBDA LC FREE SERPL-MCNC: 1.42 MG/DL (ref 0.57–2.63)
MAGNESIUM SERPL-MCNC: 1.6 MG/DL (ref 1.8–2.6)

## 2018-04-06 LAB
ALBUMIN PERCENT: 67.7 % (ref 51–67)
ALBUMIN SERPL ELPH-MCNC: 3.1 G/DL (ref 3.2–4.7)
ALPHA 1 PERCENT: 3.2 % (ref 2–4)
ALPHA 2 PERCENT: 11.5 % (ref 5–13)
ALPHA1 GLOB SERPL ELPH-MCNC: 0.1 G/DL (ref 0.1–0.3)
ALPHA2 GLOB SERPL ELPH-MCNC: 0.5 G/DL (ref 0.4–0.9)
B-GLOBULIN SERPL ELPH-MCNC: 0.5 G/DL (ref 0.7–1.2)
BETA PERCENT: 10 % (ref 10–17)
GAMMA GLOB SERPL ELPH-MCNC: 0.3 G/DL (ref 0.6–1.4)
GAMMA GLOBULIN PERCENT: 7.6 % (ref 9–20)
PATH ICD:: ABNORMAL
PATH ICD:: NORMAL
PROT PATTERN SERPL ELPH-IMP: ABNORMAL
PROT PATTERN SERPL IFE-IMP: NORMAL
PROT SERPL-MCNC: 4.6 G/DL (ref 6–8)
REVIEWING PATHOLOGIST: ABNORMAL
REVIEWING PATHOLOGIST: NORMAL

## 2018-04-11 ENCOUNTER — AMBULATORY - HEALTHEAST (OUTPATIENT)
Dept: INFUSION THERAPY | Facility: HOSPITAL | Age: 73
End: 2018-04-11

## 2018-04-11 ENCOUNTER — INFUSION - HEALTHEAST (OUTPATIENT)
Dept: INFUSION THERAPY | Facility: HOSPITAL | Age: 73
End: 2018-04-11

## 2018-04-11 ENCOUNTER — OFFICE VISIT - HEALTHEAST (OUTPATIENT)
Dept: ONCOLOGY | Facility: HOSPITAL | Age: 73
End: 2018-04-11

## 2018-04-11 DIAGNOSIS — I82.409 DVT (DEEP VENOUS THROMBOSIS) (H): ICD-10-CM

## 2018-04-11 DIAGNOSIS — D61.818 PANCYTOPENIA (H): ICD-10-CM

## 2018-04-11 DIAGNOSIS — I82.5Y9 CHRONIC DEEP VEIN THROMBOSIS (DVT) OF PROXIMAL VEIN OF LOWER EXTREMITY, UNSPECIFIED LATERALITY (H): ICD-10-CM

## 2018-04-11 DIAGNOSIS — C90.00 MULTIPLE MYELOMA NOT HAVING ACHIEVED REMISSION (H): ICD-10-CM

## 2018-04-11 DIAGNOSIS — M54.9 BACK PAIN: ICD-10-CM

## 2018-04-11 DIAGNOSIS — C90.01 MULTIPLE MYELOMA IN REMISSION (H): ICD-10-CM

## 2018-04-11 LAB — INR PPP: 1.95 (ref 0.9–1.1)

## 2018-04-14 ENCOUNTER — AMBULATORY - HEALTHEAST (OUTPATIENT)
Dept: ONCOLOGY | Facility: HOSPITAL | Age: 73
End: 2018-04-14

## 2018-04-18 ENCOUNTER — COMMUNICATION - HEALTHEAST (OUTPATIENT)
Dept: ONCOLOGY | Facility: HOSPITAL | Age: 73
End: 2018-04-18

## 2018-04-18 ENCOUNTER — COMMUNICATION - HEALTHEAST (OUTPATIENT)
Dept: INTERNAL MEDICINE | Facility: CLINIC | Age: 73
End: 2018-04-18

## 2018-04-19 ENCOUNTER — COMMUNICATION - HEALTHEAST (OUTPATIENT)
Dept: ONCOLOGY | Facility: HOSPITAL | Age: 73
End: 2018-04-19

## 2018-04-25 ENCOUNTER — COMMUNICATION - HEALTHEAST (OUTPATIENT)
Dept: ANTICOAGULATION | Facility: CLINIC | Age: 73
End: 2018-04-25

## 2018-04-25 ENCOUNTER — INFUSION - HEALTHEAST (OUTPATIENT)
Dept: INFUSION THERAPY | Facility: HOSPITAL | Age: 73
End: 2018-04-25

## 2018-04-25 DIAGNOSIS — I82.409 DVT (DEEP VENOUS THROMBOSIS) (H): ICD-10-CM

## 2018-04-25 DIAGNOSIS — I82.5Y9 CHRONIC DEEP VEIN THROMBOSIS (DVT) OF PROXIMAL VEIN OF LOWER EXTREMITY, UNSPECIFIED LATERALITY (H): ICD-10-CM

## 2018-04-25 DIAGNOSIS — C90.01 MULTIPLE MYELOMA IN REMISSION (H): ICD-10-CM

## 2018-04-25 LAB
BASOPHILS # BLD AUTO: 0 THOU/UL (ref 0–0.2)
BASOPHILS NFR BLD AUTO: 1 % (ref 0–2)
EOSINOPHIL # BLD AUTO: 0.1 THOU/UL (ref 0–0.4)
EOSINOPHIL NFR BLD AUTO: 6 % (ref 0–6)
ERYTHROCYTE [DISTWIDTH] IN BLOOD BY AUTOMATED COUNT: 14.9 % (ref 11–14.5)
HCT VFR BLD AUTO: 31.2 % (ref 40–54)
HGB BLD-MCNC: 10.2 G/DL (ref 14–18)
INR PPP: 3.1 (ref 0.9–1.1)
LYMPHOCYTES # BLD AUTO: 0.5 THOU/UL (ref 0.8–4.4)
LYMPHOCYTES NFR BLD AUTO: 23 % (ref 20–40)
MCH RBC QN AUTO: 35.2 PG (ref 27–34)
MCHC RBC AUTO-ENTMCNC: 32.7 G/DL (ref 32–36)
MCV RBC AUTO: 108 FL (ref 80–100)
MONOCYTES # BLD AUTO: 0.2 THOU/UL (ref 0–0.9)
MONOCYTES NFR BLD AUTO: 9 % (ref 2–10)
NEUTROPHILS # BLD AUTO: 1.2 THOU/UL (ref 2–7.7)
NEUTROPHILS NFR BLD AUTO: 60 % (ref 50–70)
PLATELET # BLD AUTO: 72 THOU/UL (ref 140–440)
PMV BLD AUTO: 12.1 FL (ref 8.5–12.5)
RBC # BLD AUTO: 2.9 MILL/UL (ref 4.4–6.2)
WBC: 1.9 THOU/UL (ref 4–11)

## 2018-04-30 ENCOUNTER — COMMUNICATION - HEALTHEAST (OUTPATIENT)
Dept: INTERNAL MEDICINE | Facility: CLINIC | Age: 73
End: 2018-04-30

## 2018-04-30 DIAGNOSIS — D13.2 ADENOMATOUS DUODENAL POLYP: ICD-10-CM

## 2018-05-01 ENCOUNTER — RECORDS - HEALTHEAST (OUTPATIENT)
Dept: ADMINISTRATIVE | Facility: OTHER | Age: 73
End: 2018-05-01

## 2018-05-02 ENCOUNTER — RECORDS - HEALTHEAST (OUTPATIENT)
Dept: ADMINISTRATIVE | Facility: OTHER | Age: 73
End: 2018-05-02

## 2018-05-08 ENCOUNTER — AMBULATORY - HEALTHEAST (OUTPATIENT)
Dept: LAB | Facility: CLINIC | Age: 73
End: 2018-05-08

## 2018-05-08 ENCOUNTER — COMMUNICATION - HEALTHEAST (OUTPATIENT)
Dept: INTERNAL MEDICINE | Facility: CLINIC | Age: 73
End: 2018-05-08

## 2018-05-08 DIAGNOSIS — I82.409 DVT (DEEP VENOUS THROMBOSIS) (H): ICD-10-CM

## 2018-05-08 LAB — INR PPP: 1.5 (ref 0.9–1.1)

## 2018-05-09 ENCOUNTER — AMBULATORY - HEALTHEAST (OUTPATIENT)
Dept: INFUSION THERAPY | Facility: HOSPITAL | Age: 73
End: 2018-05-09

## 2018-05-09 ENCOUNTER — INFUSION - HEALTHEAST (OUTPATIENT)
Dept: INFUSION THERAPY | Facility: HOSPITAL | Age: 73
End: 2018-05-09

## 2018-05-09 DIAGNOSIS — I82.5Y9 CHRONIC DEEP VEIN THROMBOSIS (DVT) OF PROXIMAL VEIN OF LOWER EXTREMITY, UNSPECIFIED LATERALITY (H): ICD-10-CM

## 2018-05-09 DIAGNOSIS — C90.00 MULTIPLE MYELOMA (H): ICD-10-CM

## 2018-05-09 DIAGNOSIS — C90.01 MULTIPLE MYELOMA IN REMISSION (H): ICD-10-CM

## 2018-05-15 ENCOUNTER — COMMUNICATION - HEALTHEAST (OUTPATIENT)
Dept: ONCOLOGY | Facility: HOSPITAL | Age: 73
End: 2018-05-15

## 2018-05-17 ENCOUNTER — COMMUNICATION - HEALTHEAST (OUTPATIENT)
Dept: ONCOLOGY | Facility: HOSPITAL | Age: 73
End: 2018-05-17

## 2018-05-23 ENCOUNTER — INFUSION - HEALTHEAST (OUTPATIENT)
Dept: INFUSION THERAPY | Facility: HOSPITAL | Age: 73
End: 2018-05-23

## 2018-05-23 DIAGNOSIS — C90.01 MULTIPLE MYELOMA IN REMISSION (H): ICD-10-CM

## 2018-05-23 DIAGNOSIS — I82.5Y9 CHRONIC DEEP VEIN THROMBOSIS (DVT) OF PROXIMAL VEIN OF LOWER EXTREMITY, UNSPECIFIED LATERALITY (H): ICD-10-CM

## 2018-05-30 ENCOUNTER — COMMUNICATION - HEALTHEAST (OUTPATIENT)
Dept: ANTICOAGULATION | Facility: CLINIC | Age: 73
End: 2018-05-30

## 2018-06-06 ENCOUNTER — INFUSION - HEALTHEAST (OUTPATIENT)
Dept: INFUSION THERAPY | Facility: HOSPITAL | Age: 73
End: 2018-06-06

## 2018-06-06 ENCOUNTER — COMMUNICATION - HEALTHEAST (OUTPATIENT)
Dept: INTERNAL MEDICINE | Facility: CLINIC | Age: 73
End: 2018-06-06

## 2018-06-06 DIAGNOSIS — I82.5Y9 CHRONIC DEEP VEIN THROMBOSIS (DVT) OF PROXIMAL VEIN OF LOWER EXTREMITY, UNSPECIFIED LATERALITY (H): ICD-10-CM

## 2018-06-06 DIAGNOSIS — I82.409 DVT (DEEP VENOUS THROMBOSIS) (H): ICD-10-CM

## 2018-06-06 DIAGNOSIS — C90.01 MULTIPLE MYELOMA IN REMISSION (H): ICD-10-CM

## 2018-06-06 LAB
ALBUMIN SERPL-MCNC: 2.9 G/DL (ref 3.5–5)
ALP SERPL-CCNC: 64 U/L (ref 45–120)
ALT SERPL W P-5'-P-CCNC: 13 U/L (ref 0–45)
ANION GAP SERPL CALCULATED.3IONS-SCNC: 7 MMOL/L (ref 5–18)
AST SERPL W P-5'-P-CCNC: 14 U/L (ref 0–40)
BASOPHILS # BLD AUTO: 0 THOU/UL (ref 0–0.2)
BASOPHILS NFR BLD AUTO: 2 % (ref 0–2)
BILIRUB SERPL-MCNC: 0.5 MG/DL (ref 0–1)
BUN SERPL-MCNC: 15 MG/DL (ref 8–28)
CALCIUM SERPL-MCNC: 8.7 MG/DL (ref 8.5–10.5)
CHLORIDE BLD-SCNC: 110 MMOL/L (ref 98–107)
CO2 SERPL-SCNC: 26 MMOL/L (ref 22–31)
CREAT SERPL-MCNC: 0.84 MG/DL (ref 0.7–1.3)
EOSINOPHIL # BLD AUTO: 0.1 THOU/UL (ref 0–0.4)
EOSINOPHIL NFR BLD AUTO: 6 % (ref 0–6)
ERYTHROCYTE [DISTWIDTH] IN BLOOD BY AUTOMATED COUNT: 15 % (ref 11–14.5)
GFR SERPL CREATININE-BSD FRML MDRD: >60 ML/MIN/1.73M2
GLUCOSE BLD-MCNC: 101 MG/DL (ref 70–125)
HCT VFR BLD AUTO: 28.8 % (ref 40–54)
HGB BLD-MCNC: 9.6 G/DL (ref 14–18)
IGA SERPL-MCNC: 29 MG/DL (ref 65–400)
IGA SERPL-MCNC: 388 MG/DL (ref 700–1700)
IGM SERPL-MCNC: 10 MG/DL (ref 60–280)
INR PPP: 2.19 (ref 0.9–1.1)
LYMPHOCYTES # BLD AUTO: 0.3 THOU/UL (ref 0.8–4.4)
LYMPHOCYTES NFR BLD AUTO: 18 % (ref 20–40)
MCH RBC QN AUTO: 35.3 PG (ref 27–34)
MCHC RBC AUTO-ENTMCNC: 33.3 G/DL (ref 32–36)
MCV RBC AUTO: 106 FL (ref 80–100)
MONOCYTES # BLD AUTO: 0.3 THOU/UL (ref 0–0.9)
MONOCYTES NFR BLD AUTO: 15 % (ref 2–10)
NEUTROPHILS # BLD AUTO: 1 THOU/UL (ref 2–7.7)
NEUTROPHILS NFR BLD AUTO: 59 % (ref 50–70)
PLATELET # BLD AUTO: 151 THOU/UL (ref 140–440)
PMV BLD AUTO: 11 FL (ref 8.5–12.5)
POTASSIUM BLD-SCNC: 4.1 MMOL/L (ref 3.5–5)
PROT SERPL-MCNC: 5.2 G/DL (ref 6–8)
RBC # BLD AUTO: 2.72 MILL/UL (ref 4.4–6.2)
SODIUM SERPL-SCNC: 143 MMOL/L (ref 136–145)
WBC: 1.8 THOU/UL (ref 4–11)

## 2018-06-07 LAB
KAPPA LC FREE SER-MCNC: 47 MG/DL (ref 0.33–1.94)
KAPPA LC FREE/LAMBDA FREE SER NEPH: 62.67 {RATIO} (ref 0.26–1.65)
LAMBDA LC FREE SERPL-MCNC: 0.75 MG/DL (ref 0.57–2.63)

## 2018-06-08 LAB
ALBUMIN PERCENT: 66.1 % (ref 51–67)
ALBUMIN SERPL ELPH-MCNC: 3.2 G/DL (ref 3.2–4.7)
ALPHA 1 PERCENT: 3.4 % (ref 2–4)
ALPHA 2 PERCENT: 12.3 % (ref 5–13)
ALPHA1 GLOB SERPL ELPH-MCNC: 0.2 G/DL (ref 0.1–0.3)
ALPHA2 GLOB SERPL ELPH-MCNC: 0.6 G/DL (ref 0.4–0.9)
B-GLOBULIN SERPL ELPH-MCNC: 0.5 G/DL (ref 0.7–1.2)
BETA PERCENT: 10.7 % (ref 10–17)
GAMMA GLOB SERPL ELPH-MCNC: 0.4 G/DL (ref 0.6–1.4)
GAMMA GLOBULIN PERCENT: 7.5 % (ref 9–20)
PATH ICD:: ABNORMAL
PATH ICD:: NORMAL
PROT PATTERN SERPL ELPH-IMP: ABNORMAL
PROT PATTERN SERPL IFE-IMP: NORMAL
PROT SERPL-MCNC: 4.8 G/DL (ref 6–8)
REVIEWING PATHOLOGIST: ABNORMAL
REVIEWING PATHOLOGIST: NORMAL

## 2018-06-13 ENCOUNTER — COMMUNICATION - HEALTHEAST (OUTPATIENT)
Dept: ONCOLOGY | Facility: HOSPITAL | Age: 73
End: 2018-06-13

## 2018-06-16 ENCOUNTER — COMMUNICATION - HEALTHEAST (OUTPATIENT)
Dept: ONCOLOGY | Facility: HOSPITAL | Age: 73
End: 2018-06-16

## 2018-06-16 DIAGNOSIS — C90.00 MYELOMA (H): ICD-10-CM

## 2018-06-17 ENCOUNTER — COMMUNICATION - HEALTHEAST (OUTPATIENT)
Dept: ONCOLOGY | Facility: HOSPITAL | Age: 73
End: 2018-06-17

## 2018-06-20 ENCOUNTER — OFFICE VISIT - HEALTHEAST (OUTPATIENT)
Dept: ONCOLOGY | Facility: HOSPITAL | Age: 73
End: 2018-06-20

## 2018-06-20 ENCOUNTER — INFUSION - HEALTHEAST (OUTPATIENT)
Dept: INFUSION THERAPY | Facility: HOSPITAL | Age: 73
End: 2018-06-20

## 2018-06-20 DIAGNOSIS — G89.29 CHRONIC MIDLINE LOW BACK PAIN WITHOUT SCIATICA: ICD-10-CM

## 2018-06-20 DIAGNOSIS — C90.00 MULTIPLE MYELOMA NOT HAVING ACHIEVED REMISSION (H): ICD-10-CM

## 2018-06-20 DIAGNOSIS — C90.01 MULTIPLE MYELOMA IN REMISSION (H): ICD-10-CM

## 2018-06-20 DIAGNOSIS — D61.810 PANCYTOPENIA DUE TO ANTINEOPLASTIC CHEMOTHERAPY (H): ICD-10-CM

## 2018-06-20 DIAGNOSIS — T45.1X5A PANCYTOPENIA DUE TO ANTINEOPLASTIC CHEMOTHERAPY (H): ICD-10-CM

## 2018-06-20 DIAGNOSIS — I82.5Y9 CHRONIC DEEP VEIN THROMBOSIS (DVT) OF PROXIMAL VEIN OF LOWER EXTREMITY, UNSPECIFIED LATERALITY (H): ICD-10-CM

## 2018-06-20 DIAGNOSIS — M54.50 CHRONIC MIDLINE LOW BACK PAIN WITHOUT SCIATICA: ICD-10-CM

## 2018-06-24 ENCOUNTER — HOSPITAL ENCOUNTER (OUTPATIENT)
Dept: MRI IMAGING | Facility: HOSPITAL | Age: 73
Setting detail: RADIATION/ONCOLOGY SERIES
Discharge: STILL ADMITTED OR ADMINISTRATIVE | End: 2018-06-24
Attending: INTERNAL MEDICINE

## 2018-06-24 DIAGNOSIS — C90.00 MULTIPLE MYELOMA NOT HAVING ACHIEVED REMISSION (H): ICD-10-CM

## 2018-06-26 ENCOUNTER — COMMUNICATION - HEALTHEAST (OUTPATIENT)
Dept: ONCOLOGY | Facility: HOSPITAL | Age: 73
End: 2018-06-26

## 2018-06-27 ENCOUNTER — HOSPITAL ENCOUNTER (OUTPATIENT)
Dept: PHYSICAL MEDICINE AND REHAB | Facility: CLINIC | Age: 73
Discharge: HOME | End: 2018-06-27
Attending: INTERNAL MEDICINE

## 2018-06-27 DIAGNOSIS — M47.816 LUMBAR FACET ARTHROPATHY: ICD-10-CM

## 2018-06-27 DIAGNOSIS — M48.062 SPINAL STENOSIS OF LUMBAR REGION WITH NEUROGENIC CLAUDICATION: ICD-10-CM

## 2018-06-27 ASSESSMENT — MIFFLIN-ST. JEOR: SCORE: 1486.96

## 2018-07-03 ENCOUNTER — INFUSION - HEALTHEAST (OUTPATIENT)
Dept: INFUSION THERAPY | Facility: HOSPITAL | Age: 73
End: 2018-07-03

## 2018-07-03 DIAGNOSIS — C90.01 MULTIPLE MYELOMA IN REMISSION (H): ICD-10-CM

## 2018-07-03 DIAGNOSIS — I82.5Y9 CHRONIC DEEP VEIN THROMBOSIS (DVT) OF PROXIMAL VEIN OF LOWER EXTREMITY, UNSPECIFIED LATERALITY (H): ICD-10-CM

## 2018-07-03 LAB
FOLATE SERPL-MCNC: 16.9 NG/ML
INR PPP: 2.45 (ref 0.9–1.1)
TSH SERPL DL<=0.005 MIU/L-ACNC: 1.3 UIU/ML (ref 0.3–5)
VIT B12 SERPL-MCNC: 205 PG/ML (ref 213–816)

## 2018-07-05 ENCOUNTER — COMMUNICATION - HEALTHEAST (OUTPATIENT)
Dept: ANTICOAGULATION | Facility: CLINIC | Age: 73
End: 2018-07-05

## 2018-07-06 LAB
KAPPA LC FREE SER-MCNC: 41 MG/DL (ref 0.33–1.94)
KAPPA LC FREE/LAMBDA FREE SER NEPH: 50 {RATIO} (ref 0.26–1.65)
LAMBDA LC FREE SERPL-MCNC: 0.82 MG/DL (ref 0.57–2.63)

## 2018-07-10 ENCOUNTER — OFFICE VISIT - HEALTHEAST (OUTPATIENT)
Dept: PHYSICAL THERAPY | Facility: REHABILITATION | Age: 73
End: 2018-07-10

## 2018-07-10 DIAGNOSIS — R19.8 ABDOMINAL WEAKNESS: ICD-10-CM

## 2018-07-10 DIAGNOSIS — M54.50 LUMBAR SPINE PAIN: ICD-10-CM

## 2018-07-10 DIAGNOSIS — M48.062 LUMBAR STENOSIS WITH NEUROGENIC CLAUDICATION: ICD-10-CM

## 2018-07-11 ENCOUNTER — COMMUNICATION - HEALTHEAST (OUTPATIENT)
Dept: ONCOLOGY | Facility: HOSPITAL | Age: 73
End: 2018-07-11

## 2018-07-12 ENCOUNTER — COMMUNICATION - HEALTHEAST (OUTPATIENT)
Dept: ONCOLOGY | Facility: HOSPITAL | Age: 73
End: 2018-07-12

## 2018-07-12 ENCOUNTER — AMBULATORY - HEALTHEAST (OUTPATIENT)
Dept: ONCOLOGY | Facility: HOSPITAL | Age: 73
End: 2018-07-12

## 2018-07-13 ENCOUNTER — AMBULATORY - HEALTHEAST (OUTPATIENT)
Dept: ONCOLOGY | Facility: HOSPITAL | Age: 73
End: 2018-07-13

## 2018-07-13 ENCOUNTER — OFFICE VISIT - HEALTHEAST (OUTPATIENT)
Dept: PHYSICAL THERAPY | Facility: REHABILITATION | Age: 73
End: 2018-07-13

## 2018-07-13 DIAGNOSIS — C90.00 MULTIPLE MYELOMA (H): ICD-10-CM

## 2018-07-13 DIAGNOSIS — R19.8 ABDOMINAL WEAKNESS: ICD-10-CM

## 2018-07-13 DIAGNOSIS — M54.50 LUMBAR SPINE PAIN: ICD-10-CM

## 2018-07-13 DIAGNOSIS — M48.062 LUMBAR STENOSIS WITH NEUROGENIC CLAUDICATION: ICD-10-CM

## 2018-07-17 ENCOUNTER — OFFICE VISIT - HEALTHEAST (OUTPATIENT)
Dept: PHYSICAL THERAPY | Facility: REHABILITATION | Age: 73
End: 2018-07-17

## 2018-07-17 DIAGNOSIS — M54.50 LUMBAR SPINE PAIN: ICD-10-CM

## 2018-07-17 DIAGNOSIS — R19.8 ABDOMINAL WEAKNESS: ICD-10-CM

## 2018-07-17 DIAGNOSIS — M48.062 LUMBAR STENOSIS WITH NEUROGENIC CLAUDICATION: ICD-10-CM

## 2018-07-18 ENCOUNTER — INFUSION - HEALTHEAST (OUTPATIENT)
Dept: INFUSION THERAPY | Facility: HOSPITAL | Age: 73
End: 2018-07-18

## 2018-07-18 DIAGNOSIS — I82.5Y9 CHRONIC DEEP VEIN THROMBOSIS (DVT) OF PROXIMAL VEIN OF LOWER EXTREMITY, UNSPECIFIED LATERALITY (H): ICD-10-CM

## 2018-07-18 DIAGNOSIS — C90.01 MULTIPLE MYELOMA IN REMISSION (H): ICD-10-CM

## 2018-07-20 ENCOUNTER — OFFICE VISIT - HEALTHEAST (OUTPATIENT)
Dept: FAMILY MEDICINE | Facility: CLINIC | Age: 73
End: 2018-07-20

## 2018-07-20 ENCOUNTER — OFFICE VISIT - HEALTHEAST (OUTPATIENT)
Dept: PHYSICAL THERAPY | Facility: REHABILITATION | Age: 73
End: 2018-07-20

## 2018-07-20 ENCOUNTER — COMMUNICATION - HEALTHEAST (OUTPATIENT)
Dept: SCHEDULING | Facility: CLINIC | Age: 73
End: 2018-07-20

## 2018-07-20 ENCOUNTER — COMMUNICATION - HEALTHEAST (OUTPATIENT)
Dept: ONCOLOGY | Facility: HOSPITAL | Age: 73
End: 2018-07-20

## 2018-07-20 DIAGNOSIS — J18.9 COMMUNITY ACQUIRED PNEUMONIA, UNSPECIFIED LATERALITY: ICD-10-CM

## 2018-07-20 DIAGNOSIS — R19.8 ABDOMINAL WEAKNESS: ICD-10-CM

## 2018-07-20 DIAGNOSIS — M48.062 LUMBAR STENOSIS WITH NEUROGENIC CLAUDICATION: ICD-10-CM

## 2018-07-20 DIAGNOSIS — M54.50 LUMBAR SPINE PAIN: ICD-10-CM

## 2018-07-22 ENCOUNTER — COMMUNICATION - HEALTHEAST (OUTPATIENT)
Dept: INTERNAL MEDICINE | Facility: CLINIC | Age: 73
End: 2018-07-22

## 2018-07-22 DIAGNOSIS — D13.2 ADENOMATOUS DUODENAL POLYP: ICD-10-CM

## 2018-07-23 ENCOUNTER — COMMUNICATION - HEALTHEAST (OUTPATIENT)
Dept: ANTICOAGULATION | Facility: CLINIC | Age: 73
End: 2018-07-23

## 2018-07-24 ENCOUNTER — OFFICE VISIT - HEALTHEAST (OUTPATIENT)
Dept: PHYSICAL THERAPY | Facility: REHABILITATION | Age: 73
End: 2018-07-24

## 2018-07-24 DIAGNOSIS — R19.8 ABDOMINAL WEAKNESS: ICD-10-CM

## 2018-07-24 DIAGNOSIS — M54.50 LUMBAR SPINE PAIN: ICD-10-CM

## 2018-07-24 DIAGNOSIS — M48.062 LUMBAR STENOSIS WITH NEUROGENIC CLAUDICATION: ICD-10-CM

## 2018-07-25 ENCOUNTER — OFFICE VISIT - HEALTHEAST (OUTPATIENT)
Dept: INTERNAL MEDICINE | Facility: CLINIC | Age: 73
End: 2018-07-25

## 2018-07-25 ENCOUNTER — HOSPITAL ENCOUNTER (OUTPATIENT)
Dept: PHYSICAL MEDICINE AND REHAB | Facility: CLINIC | Age: 73
Discharge: HOME | End: 2018-07-25
Attending: PHYSICIAN ASSISTANT

## 2018-07-25 ENCOUNTER — COMMUNICATION - HEALTHEAST (OUTPATIENT)
Dept: INTERNAL MEDICINE | Facility: CLINIC | Age: 73
End: 2018-07-25

## 2018-07-25 ENCOUNTER — AMBULATORY - HEALTHEAST (OUTPATIENT)
Dept: INFUSION THERAPY | Facility: HOSPITAL | Age: 73
End: 2018-07-25

## 2018-07-25 DIAGNOSIS — E83.42 HYPOMAGNESEMIA: ICD-10-CM

## 2018-07-25 DIAGNOSIS — I82.502 CHRONIC DEEP VEIN THROMBOSIS (DVT) OF LEFT LOWER EXTREMITY, UNSPECIFIED VEIN (H): ICD-10-CM

## 2018-07-25 DIAGNOSIS — D13.2 ADENOMATOUS DUODENAL POLYP: ICD-10-CM

## 2018-07-25 DIAGNOSIS — I82.5Y9 CHRONIC DEEP VEIN THROMBOSIS (DVT) OF PROXIMAL VEIN OF LOWER EXTREMITY, UNSPECIFIED LATERALITY (H): ICD-10-CM

## 2018-07-25 DIAGNOSIS — I82.409 DVT (DEEP VENOUS THROMBOSIS) (H): ICD-10-CM

## 2018-07-25 DIAGNOSIS — C90.00 MULTIPLE MYELOMA (H): ICD-10-CM

## 2018-07-25 DIAGNOSIS — C90.01 MULTIPLE MYELOMA IN REMISSION (H): ICD-10-CM

## 2018-07-25 DIAGNOSIS — R05.9 COUGH: ICD-10-CM

## 2018-07-25 DIAGNOSIS — M47.816 LUMBAR FACET ARTHROPATHY: ICD-10-CM

## 2018-07-25 DIAGNOSIS — M48.061 SPINAL STENOSIS OF LUMBAR REGION WITHOUT NEUROGENIC CLAUDICATION: ICD-10-CM

## 2018-07-25 DIAGNOSIS — C90.00 MULTIPLE MYELOMA NOT HAVING ACHIEVED REMISSION (H): ICD-10-CM

## 2018-07-25 DIAGNOSIS — M48.062 SPINAL STENOSIS OF LUMBAR REGION WITH NEUROGENIC CLAUDICATION: ICD-10-CM

## 2018-07-25 LAB
ALBUMIN SERPL-MCNC: 2.9 G/DL (ref 3.5–5)
ALP SERPL-CCNC: 57 U/L (ref 45–120)
ALT SERPL W P-5'-P-CCNC: 15 U/L (ref 0–45)
ANION GAP SERPL CALCULATED.3IONS-SCNC: 7 MMOL/L (ref 5–18)
AST SERPL W P-5'-P-CCNC: 14 U/L (ref 0–40)
BASOPHILS # BLD AUTO: 0 THOU/UL (ref 0–0.2)
BASOPHILS NFR BLD AUTO: 2 % (ref 0–2)
BILIRUB SERPL-MCNC: 0.5 MG/DL (ref 0–1)
BUN SERPL-MCNC: 19 MG/DL (ref 8–28)
CALCIUM SERPL-MCNC: 8.2 MG/DL (ref 8.5–10.5)
CHLORIDE BLD-SCNC: 106 MMOL/L (ref 98–107)
CO2 SERPL-SCNC: 26 MMOL/L (ref 22–31)
CREAT SERPL-MCNC: 1.1 MG/DL (ref 0.7–1.3)
EOSINOPHIL COUNT (ABSOLUTE): 0.1 THOU/UL (ref 0–0.4)
EOSINOPHIL NFR BLD AUTO: 8 % (ref 0–6)
ERYTHROCYTE [DISTWIDTH] IN BLOOD BY AUTOMATED COUNT: 14.6 % (ref 11–14.5)
GFR SERPL CREATININE-BSD FRML MDRD: >60 ML/MIN/1.73M2
GLUCOSE BLD-MCNC: 119 MG/DL (ref 70–125)
HCT VFR BLD AUTO: 30.4 % (ref 40–54)
HGB BLD-MCNC: 10 G/DL (ref 14–18)
IGA SERPL-MCNC: 27 MG/DL (ref 65–400)
IGA SERPL-MCNC: 410 MG/DL (ref 700–1700)
IGM SERPL-MCNC: 12 MG/DL (ref 60–280)
INR PPP: 1.65 (ref 0.9–1.1)
LYMPHOCYTES # BLD AUTO: 0.2 THOU/UL (ref 0.8–4.4)
LYMPHOCYTES NFR BLD AUTO: 14 % (ref 20–40)
MAGNESIUM SERPL-MCNC: 1.7 MG/DL (ref 1.8–2.6)
MCH RBC QN AUTO: 35 PG (ref 27–34)
MCHC RBC AUTO-ENTMCNC: 32.9 G/DL (ref 32–36)
MCV RBC AUTO: 106 FL (ref 80–100)
MONOCYTES # BLD AUTO: 0.2 THOU/UL (ref 0–0.9)
MONOCYTES NFR BLD AUTO: 12 % (ref 2–10)
OVALOCYTES: ABNORMAL
PLAT MORPH BLD: ABNORMAL
PLATELET # BLD AUTO: 94 THOU/UL (ref 140–440)
PMV BLD AUTO: 11.8 FL (ref 8.5–12.5)
POTASSIUM BLD-SCNC: 3.8 MMOL/L (ref 3.5–5)
PROT SERPL-MCNC: 5.1 G/DL (ref 6–8)
RBC # BLD AUTO: 2.86 MILL/UL (ref 4.4–6.2)
SCHISTOCYTES: ABNORMAL
SODIUM SERPL-SCNC: 139 MMOL/L (ref 136–145)
TEAR DROP: ABNORMAL
TOTAL NEUTROPHILS-ABS(DIFF): 1 THOU/UL (ref 2–7.7)
TOTAL NEUTROPHILS-REL(DIFF): 64 % (ref 50–70)
WBC: 1.5 THOU/UL (ref 4–11)

## 2018-07-25 ASSESSMENT — MIFFLIN-ST. JEOR: SCORE: 1466.09

## 2018-07-26 LAB
ALBUMIN PERCENT: 66.5 % (ref 51–67)
ALBUMIN SERPL ELPH-MCNC: 3.1 G/DL (ref 3.2–4.7)
ALPHA 1 PERCENT: 3.5 % (ref 2–4)
ALPHA 2 PERCENT: 12.9 % (ref 5–13)
ALPHA1 GLOB SERPL ELPH-MCNC: 0.2 G/DL (ref 0.1–0.3)
ALPHA2 GLOB SERPL ELPH-MCNC: 0.6 G/DL (ref 0.4–0.9)
B-GLOBULIN SERPL ELPH-MCNC: 0.5 G/DL (ref 0.7–1.2)
BETA PERCENT: 10.7 % (ref 10–17)
GAMMA GLOB SERPL ELPH-MCNC: 0.3 G/DL (ref 0.6–1.4)
GAMMA GLOBULIN PERCENT: 6.4 % (ref 9–20)
PATH ICD:: ABNORMAL
PATH ICD:: NORMAL
PROT PATTERN SERPL ELPH-IMP: ABNORMAL
PROT PATTERN SERPL IFE-IMP: NORMAL
PROT SERPL-MCNC: 4.6 G/DL (ref 6–8)
REVIEWING PATHOLOGIST: ABNORMAL
REVIEWING PATHOLOGIST: NORMAL

## 2018-07-27 LAB
KAPPA LC FREE SER-MCNC: 47.8 MG/DL (ref 0.33–1.94)
KAPPA LC FREE/LAMBDA FREE SER NEPH: 54.32 {RATIO} (ref 0.26–1.65)
LAMBDA LC FREE SERPL-MCNC: 0.88 MG/DL (ref 0.57–2.63)

## 2018-08-01 ENCOUNTER — INFUSION - HEALTHEAST (OUTPATIENT)
Dept: INFUSION THERAPY | Facility: HOSPITAL | Age: 73
End: 2018-08-01

## 2018-08-01 ENCOUNTER — OFFICE VISIT - HEALTHEAST (OUTPATIENT)
Dept: ONCOLOGY | Facility: HOSPITAL | Age: 73
End: 2018-08-01

## 2018-08-01 ENCOUNTER — AMBULATORY - HEALTHEAST (OUTPATIENT)
Dept: INFUSION THERAPY | Facility: HOSPITAL | Age: 73
End: 2018-08-01

## 2018-08-01 DIAGNOSIS — I82.5Y9 CHRONIC DEEP VEIN THROMBOSIS (DVT) OF PROXIMAL VEIN OF LOWER EXTREMITY, UNSPECIFIED LATERALITY (H): ICD-10-CM

## 2018-08-01 DIAGNOSIS — M54.9 BACK PAIN: ICD-10-CM

## 2018-08-01 DIAGNOSIS — I82.409 DVT (DEEP VENOUS THROMBOSIS) (H): ICD-10-CM

## 2018-08-01 DIAGNOSIS — C90.00 MULTIPLE MYELOMA NOT HAVING ACHIEVED REMISSION (H): ICD-10-CM

## 2018-08-01 DIAGNOSIS — C90.01 MULTIPLE MYELOMA IN REMISSION (H): ICD-10-CM

## 2018-08-01 DIAGNOSIS — D51.9 ANEMIA DUE TO VITAMIN B12 DEFICIENCY, UNSPECIFIED B12 DEFICIENCY TYPE: ICD-10-CM

## 2018-08-01 LAB — INR PPP: 2.58 (ref 0.9–1.1)

## 2018-08-06 ENCOUNTER — COMMUNICATION - HEALTHEAST (OUTPATIENT)
Dept: SCHEDULING | Facility: CLINIC | Age: 73
End: 2018-08-06

## 2018-08-06 ENCOUNTER — COMMUNICATION - HEALTHEAST (OUTPATIENT)
Dept: ONCOLOGY | Facility: HOSPITAL | Age: 73
End: 2018-08-06

## 2018-08-06 ENCOUNTER — COMMUNICATION - HEALTHEAST (OUTPATIENT)
Dept: ANTICOAGULATION | Facility: CLINIC | Age: 73
End: 2018-08-06

## 2018-08-06 DIAGNOSIS — I82.409 DVT (DEEP VENOUS THROMBOSIS) (H): ICD-10-CM

## 2018-08-06 DIAGNOSIS — J06.9 UPPER RESPIRATORY INFECTION: ICD-10-CM

## 2018-08-07 ENCOUNTER — OFFICE VISIT - HEALTHEAST (OUTPATIENT)
Dept: INTERNAL MEDICINE | Facility: CLINIC | Age: 73
End: 2018-08-07

## 2018-08-07 ENCOUNTER — COMMUNICATION - HEALTHEAST (OUTPATIENT)
Dept: INTERNAL MEDICINE | Facility: CLINIC | Age: 73
End: 2018-08-07

## 2018-08-07 ENCOUNTER — COMMUNICATION - HEALTHEAST (OUTPATIENT)
Dept: ANTICOAGULATION | Facility: CLINIC | Age: 73
End: 2018-08-07

## 2018-08-07 ENCOUNTER — RECORDS - HEALTHEAST (OUTPATIENT)
Dept: GENERAL RADIOLOGY | Facility: CLINIC | Age: 73
End: 2018-08-07

## 2018-08-07 DIAGNOSIS — M48.061 SPINAL STENOSIS OF LUMBAR REGION WITHOUT NEUROGENIC CLAUDICATION: ICD-10-CM

## 2018-08-07 DIAGNOSIS — R05.9 COUGH: ICD-10-CM

## 2018-08-07 DIAGNOSIS — I82.502 CHRONIC DEEP VEIN THROMBOSIS (DVT) OF LEFT LOWER EXTREMITY, UNSPECIFIED VEIN (H): ICD-10-CM

## 2018-08-07 DIAGNOSIS — C90.00 MULTIPLE MYELOMA NOT HAVING ACHIEVED REMISSION (H): ICD-10-CM

## 2018-08-07 DIAGNOSIS — I82.409 DVT (DEEP VENOUS THROMBOSIS) (H): ICD-10-CM

## 2018-08-07 LAB — INR PPP: 4.9 (ref 0.9–1.1)

## 2018-08-07 ASSESSMENT — MIFFLIN-ST. JEOR: SCORE: 1468.82

## 2018-08-08 ENCOUNTER — COMMUNICATION - HEALTHEAST (OUTPATIENT)
Dept: ONCOLOGY | Facility: HOSPITAL | Age: 73
End: 2018-08-08

## 2018-08-09 ENCOUNTER — AMBULATORY - HEALTHEAST (OUTPATIENT)
Dept: ONCOLOGY | Facility: HOSPITAL | Age: 73
End: 2018-08-09

## 2018-08-09 ENCOUNTER — COMMUNICATION - HEALTHEAST (OUTPATIENT)
Dept: ANTICOAGULATION | Facility: CLINIC | Age: 73
End: 2018-08-09

## 2018-08-09 ENCOUNTER — AMBULATORY - HEALTHEAST (OUTPATIENT)
Dept: LAB | Facility: CLINIC | Age: 73
End: 2018-08-09

## 2018-08-09 DIAGNOSIS — I82.409 DVT (DEEP VENOUS THROMBOSIS) (H): ICD-10-CM

## 2018-08-09 LAB — INR PPP: 1.4 (ref 0.9–1.1)

## 2018-08-10 ENCOUNTER — COMMUNICATION - HEALTHEAST (OUTPATIENT)
Dept: INTERNAL MEDICINE | Facility: CLINIC | Age: 73
End: 2018-08-10

## 2018-08-10 ENCOUNTER — COMMUNICATION - HEALTHEAST (OUTPATIENT)
Dept: ONCOLOGY | Facility: HOSPITAL | Age: 73
End: 2018-08-10

## 2018-08-10 ENCOUNTER — COMMUNICATION - HEALTHEAST (OUTPATIENT)
Dept: ANTICOAGULATION | Facility: CLINIC | Age: 73
End: 2018-08-10

## 2018-08-10 DIAGNOSIS — I82.409 DVT (DEEP VENOUS THROMBOSIS) (H): ICD-10-CM

## 2018-08-14 ENCOUNTER — COMMUNICATION - HEALTHEAST (OUTPATIENT)
Dept: ANTICOAGULATION | Facility: CLINIC | Age: 73
End: 2018-08-14

## 2018-08-14 LAB — INR PPP: 1.7 (ref 0.9–1.1)

## 2018-08-15 ENCOUNTER — COMMUNICATION - HEALTHEAST (OUTPATIENT)
Dept: ANTICOAGULATION | Facility: CLINIC | Age: 73
End: 2018-08-15

## 2018-08-15 ENCOUNTER — INFUSION - HEALTHEAST (OUTPATIENT)
Dept: INFUSION THERAPY | Facility: HOSPITAL | Age: 73
End: 2018-08-15

## 2018-08-15 DIAGNOSIS — I82.5Y9 CHRONIC DEEP VEIN THROMBOSIS (DVT) OF PROXIMAL VEIN OF LOWER EXTREMITY, UNSPECIFIED LATERALITY (H): ICD-10-CM

## 2018-08-15 DIAGNOSIS — C90.01 MULTIPLE MYELOMA IN REMISSION (H): ICD-10-CM

## 2018-08-15 DIAGNOSIS — I82.409 DVT (DEEP VENOUS THROMBOSIS) (H): ICD-10-CM

## 2018-08-20 ENCOUNTER — COMMUNICATION - HEALTHEAST (OUTPATIENT)
Dept: INTERNAL MEDICINE | Facility: CLINIC | Age: 73
End: 2018-08-20

## 2018-08-21 ENCOUNTER — COMMUNICATION - HEALTHEAST (OUTPATIENT)
Dept: ANTICOAGULATION | Facility: CLINIC | Age: 73
End: 2018-08-21

## 2018-08-21 ENCOUNTER — AMBULATORY - HEALTHEAST (OUTPATIENT)
Dept: LAB | Facility: CLINIC | Age: 73
End: 2018-08-21

## 2018-08-21 DIAGNOSIS — I82.409 DVT (DEEP VENOUS THROMBOSIS) (H): ICD-10-CM

## 2018-08-21 LAB — INR PPP: 2.3 (ref 0.9–1.1)

## 2018-08-29 ENCOUNTER — AMBULATORY - HEALTHEAST (OUTPATIENT)
Dept: INFUSION THERAPY | Facility: HOSPITAL | Age: 73
End: 2018-08-29

## 2018-08-29 ENCOUNTER — INFUSION - HEALTHEAST (OUTPATIENT)
Dept: INFUSION THERAPY | Facility: HOSPITAL | Age: 73
End: 2018-08-29

## 2018-08-29 DIAGNOSIS — C90.00 MULTIPLE MYELOMA NOT HAVING ACHIEVED REMISSION (H): ICD-10-CM

## 2018-08-29 DIAGNOSIS — I82.5Y9 CHRONIC DEEP VEIN THROMBOSIS (DVT) OF PROXIMAL VEIN OF LOWER EXTREMITY, UNSPECIFIED LATERALITY (H): ICD-10-CM

## 2018-08-29 DIAGNOSIS — C90.01 MULTIPLE MYELOMA IN REMISSION (H): ICD-10-CM

## 2018-08-29 LAB
ALBUMIN SERPL-MCNC: 3.1 G/DL (ref 3.5–5)
ALP SERPL-CCNC: 70 U/L (ref 45–120)
ALT SERPL W P-5'-P-CCNC: <9 U/L (ref 0–45)
ANION GAP SERPL CALCULATED.3IONS-SCNC: 6 MMOL/L (ref 5–18)
AST SERPL W P-5'-P-CCNC: 12 U/L (ref 0–40)
BASOPHILS # BLD AUTO: 0.1 THOU/UL (ref 0–0.2)
BASOPHILS NFR BLD AUTO: 2 % (ref 0–2)
BILIRUB SERPL-MCNC: 0.4 MG/DL (ref 0–1)
BUN SERPL-MCNC: 13 MG/DL (ref 8–28)
CALCIUM SERPL-MCNC: 9.1 MG/DL (ref 8.5–10.5)
CHLORIDE BLD-SCNC: 105 MMOL/L (ref 98–107)
CO2 SERPL-SCNC: 29 MMOL/L (ref 22–31)
CREAT SERPL-MCNC: 1.04 MG/DL (ref 0.7–1.3)
EOSINOPHIL # BLD AUTO: 0.1 THOU/UL (ref 0–0.4)
EOSINOPHIL NFR BLD AUTO: 3 % (ref 0–6)
ERYTHROCYTE [DISTWIDTH] IN BLOOD BY AUTOMATED COUNT: 15 % (ref 11–14.5)
GFR SERPL CREATININE-BSD FRML MDRD: >60 ML/MIN/1.73M2
GLUCOSE BLD-MCNC: 95 MG/DL (ref 70–125)
HCT VFR BLD AUTO: 31 % (ref 40–54)
HGB BLD-MCNC: 10.2 G/DL (ref 14–18)
INR PPP: 2.48 (ref 0.9–1.1)
LYMPHOCYTES # BLD AUTO: 0.4 THOU/UL (ref 0.8–4.4)
LYMPHOCYTES NFR BLD AUTO: 13 % (ref 20–40)
MCH RBC QN AUTO: 35.3 PG (ref 27–34)
MCHC RBC AUTO-ENTMCNC: 32.9 G/DL (ref 32–36)
MCV RBC AUTO: 107 FL (ref 80–100)
MONOCYTES # BLD AUTO: 0.3 THOU/UL (ref 0–0.9)
MONOCYTES NFR BLD AUTO: 9 % (ref 2–10)
NEUTROPHILS # BLD AUTO: 2.3 THOU/UL (ref 2–7.7)
NEUTROPHILS NFR BLD AUTO: 73 % (ref 50–70)
PLATELET # BLD AUTO: 170 THOU/UL (ref 140–440)
PMV BLD AUTO: 10.8 FL (ref 8.5–12.5)
POTASSIUM BLD-SCNC: 4.5 MMOL/L (ref 3.5–5)
PROT SERPL-MCNC: 5.6 G/DL (ref 6–8)
RBC # BLD AUTO: 2.89 MILL/UL (ref 4.4–6.2)
SODIUM SERPL-SCNC: 140 MMOL/L (ref 136–145)
WBC: 3.2 THOU/UL (ref 4–11)

## 2018-09-06 ENCOUNTER — OFFICE VISIT - HEALTHEAST (OUTPATIENT)
Dept: INTERNAL MEDICINE | Facility: CLINIC | Age: 73
End: 2018-09-06

## 2018-09-06 ENCOUNTER — COMMUNICATION - HEALTHEAST (OUTPATIENT)
Dept: ONCOLOGY | Facility: HOSPITAL | Age: 73
End: 2018-09-06

## 2018-09-06 DIAGNOSIS — Z86.718 HISTORY OF DVT (DEEP VEIN THROMBOSIS): ICD-10-CM

## 2018-09-06 DIAGNOSIS — C90.00 MULTIPLE MYELOMA NOT HAVING ACHIEVED REMISSION (H): ICD-10-CM

## 2018-09-06 DIAGNOSIS — R05.9 COUGH: ICD-10-CM

## 2018-09-06 ASSESSMENT — MIFFLIN-ST. JEOR: SCORE: 1450.67

## 2018-09-11 ENCOUNTER — COMMUNICATION - HEALTHEAST (OUTPATIENT)
Dept: ANTICOAGULATION | Facility: CLINIC | Age: 73
End: 2018-09-11

## 2018-09-12 ENCOUNTER — INFUSION - HEALTHEAST (OUTPATIENT)
Dept: INFUSION THERAPY | Facility: HOSPITAL | Age: 73
End: 2018-09-12

## 2018-09-12 DIAGNOSIS — C90.01 MULTIPLE MYELOMA IN REMISSION (H): ICD-10-CM

## 2018-09-12 DIAGNOSIS — I82.5Y9 CHRONIC DEEP VEIN THROMBOSIS (DVT) OF PROXIMAL VEIN OF LOWER EXTREMITY, UNSPECIFIED LATERALITY (H): ICD-10-CM

## 2018-09-14 ENCOUNTER — COMMUNICATION - HEALTHEAST (OUTPATIENT)
Dept: ONCOLOGY | Facility: HOSPITAL | Age: 73
End: 2018-09-14

## 2018-09-19 ENCOUNTER — AMBULATORY - HEALTHEAST (OUTPATIENT)
Dept: INFUSION THERAPY | Facility: HOSPITAL | Age: 73
End: 2018-09-19

## 2018-09-19 ENCOUNTER — COMMUNICATION - HEALTHEAST (OUTPATIENT)
Dept: ANTICOAGULATION | Facility: CLINIC | Age: 73
End: 2018-09-19

## 2018-09-19 DIAGNOSIS — I82.409 DVT (DEEP VENOUS THROMBOSIS) (H): ICD-10-CM

## 2018-09-19 DIAGNOSIS — C90.01 MULTIPLE MYELOMA IN REMISSION (H): ICD-10-CM

## 2018-09-19 DIAGNOSIS — I82.5Y9 CHRONIC DEEP VEIN THROMBOSIS (DVT) OF PROXIMAL VEIN OF LOWER EXTREMITY, UNSPECIFIED LATERALITY (H): ICD-10-CM

## 2018-09-19 LAB
ALBUMIN SERPL-MCNC: 3 G/DL (ref 3.5–5)
ALP SERPL-CCNC: 78 U/L (ref 45–120)
ALT SERPL W P-5'-P-CCNC: 26 U/L (ref 0–45)
ANION GAP SERPL CALCULATED.3IONS-SCNC: 6 MMOL/L (ref 5–18)
AST SERPL W P-5'-P-CCNC: 16 U/L (ref 0–40)
BASOPHILS # BLD AUTO: 0 THOU/UL (ref 0–0.2)
BASOPHILS NFR BLD AUTO: 1 % (ref 0–2)
BILIRUB SERPL-MCNC: 0.4 MG/DL (ref 0–1)
BUN SERPL-MCNC: 19 MG/DL (ref 8–28)
CALCIUM SERPL-MCNC: 8.2 MG/DL (ref 8.5–10.5)
CHLORIDE BLD-SCNC: 110 MMOL/L (ref 98–107)
CO2 SERPL-SCNC: 24 MMOL/L (ref 22–31)
CREAT SERPL-MCNC: 0.97 MG/DL (ref 0.7–1.3)
EOSINOPHIL # BLD AUTO: 0.3 THOU/UL (ref 0–0.4)
EOSINOPHIL NFR BLD AUTO: 10 % (ref 0–6)
ERYTHROCYTE [DISTWIDTH] IN BLOOD BY AUTOMATED COUNT: 15 % (ref 11–14.5)
GFR SERPL CREATININE-BSD FRML MDRD: >60 ML/MIN/1.73M2
GLUCOSE BLD-MCNC: 102 MG/DL (ref 70–125)
HCT VFR BLD AUTO: 30.8 % (ref 40–54)
HGB BLD-MCNC: 9.9 G/DL (ref 14–18)
IGA SERPL-MCNC: 32 MG/DL (ref 65–400)
IGA SERPL-MCNC: 406 MG/DL (ref 700–1700)
IGM SERPL-MCNC: 10 MG/DL (ref 60–280)
INR PPP: 2 (ref 0.9–1.1)
LYMPHOCYTES # BLD AUTO: 0.5 THOU/UL (ref 0.8–4.4)
LYMPHOCYTES NFR BLD AUTO: 16 % (ref 20–40)
MCH RBC QN AUTO: 34.9 PG (ref 27–34)
MCHC RBC AUTO-ENTMCNC: 32.1 G/DL (ref 32–36)
MCV RBC AUTO: 109 FL (ref 80–100)
MONOCYTES # BLD AUTO: 0.5 THOU/UL (ref 0–0.9)
MONOCYTES NFR BLD AUTO: 15 % (ref 2–10)
NEUTROPHILS # BLD AUTO: 1.8 THOU/UL (ref 2–7.7)
NEUTROPHILS NFR BLD AUTO: 57 % (ref 50–70)
PLATELET # BLD AUTO: 93 THOU/UL (ref 140–440)
PMV BLD AUTO: 12.3 FL (ref 8.5–12.5)
POTASSIUM BLD-SCNC: 4.2 MMOL/L (ref 3.5–5)
PROT SERPL-MCNC: 4.8 G/DL (ref 6–8)
RBC # BLD AUTO: 2.84 MILL/UL (ref 4.4–6.2)
SODIUM SERPL-SCNC: 140 MMOL/L (ref 136–145)
WBC: 3.3 THOU/UL (ref 4–11)

## 2018-09-20 LAB
KAPPA LC FREE SER-MCNC: 42.25 MG/DL (ref 0.33–1.94)
KAPPA LC FREE/LAMBDA FREE SER NEPH: 36.74 {RATIO} (ref 0.26–1.65)
LAMBDA LC FREE SERPL-MCNC: 1.15 MG/DL (ref 0.57–2.63)

## 2018-09-21 LAB
ALBUMIN PERCENT: 68.1 % (ref 51–67)
ALBUMIN SERPL ELPH-MCNC: 3.2 G/DL (ref 3.2–4.7)
ALPHA 1 PERCENT: 3 % (ref 2–4)
ALPHA 2 PERCENT: 11.7 % (ref 5–13)
ALPHA1 GLOB SERPL ELPH-MCNC: 0.1 G/DL (ref 0.1–0.3)
ALPHA2 GLOB SERPL ELPH-MCNC: 0.5 G/DL (ref 0.4–0.9)
B-GLOBULIN SERPL ELPH-MCNC: 0.5 G/DL (ref 0.7–1.2)
BETA PERCENT: 11 % (ref 10–17)
GAMMA GLOB SERPL ELPH-MCNC: 0.3 G/DL (ref 0.6–1.4)
GAMMA GLOBULIN PERCENT: 6.2 % (ref 9–20)
PATH ICD:: ABNORMAL
PATH ICD:: NORMAL
PROT PATTERN SERPL ELPH-IMP: ABNORMAL
PROT PATTERN SERPL IFE-IMP: NORMAL
PROT SERPL-MCNC: 4.7 G/DL (ref 6–8)
REVIEWING PATHOLOGIST: ABNORMAL
REVIEWING PATHOLOGIST: NORMAL

## 2018-09-26 ENCOUNTER — INFUSION - HEALTHEAST (OUTPATIENT)
Dept: INFUSION THERAPY | Facility: HOSPITAL | Age: 73
End: 2018-09-26

## 2018-09-26 ENCOUNTER — OFFICE VISIT - HEALTHEAST (OUTPATIENT)
Dept: ONCOLOGY | Facility: HOSPITAL | Age: 73
End: 2018-09-26

## 2018-09-26 DIAGNOSIS — C90.01 MULTIPLE MYELOMA IN REMISSION (H): ICD-10-CM

## 2018-09-26 DIAGNOSIS — C90.00 MULTIPLE MYELOMA NOT HAVING ACHIEVED REMISSION (H): ICD-10-CM

## 2018-09-26 DIAGNOSIS — I82.5Y9 CHRONIC DEEP VEIN THROMBOSIS (DVT) OF PROXIMAL VEIN OF LOWER EXTREMITY, UNSPECIFIED LATERALITY (H): ICD-10-CM

## 2018-10-04 ENCOUNTER — COMMUNICATION - HEALTHEAST (OUTPATIENT)
Dept: ONCOLOGY | Facility: HOSPITAL | Age: 73
End: 2018-10-04

## 2018-10-10 ENCOUNTER — COMMUNICATION - HEALTHEAST (OUTPATIENT)
Dept: ANTICOAGULATION | Facility: CLINIC | Age: 73
End: 2018-10-10

## 2018-10-10 ENCOUNTER — INFUSION - HEALTHEAST (OUTPATIENT)
Dept: INFUSION THERAPY | Facility: HOSPITAL | Age: 73
End: 2018-10-10

## 2018-10-10 ENCOUNTER — AMBULATORY - HEALTHEAST (OUTPATIENT)
Dept: INFUSION THERAPY | Facility: HOSPITAL | Age: 73
End: 2018-10-10

## 2018-10-10 DIAGNOSIS — C90.01 MULTIPLE MYELOMA IN REMISSION (H): ICD-10-CM

## 2018-10-10 DIAGNOSIS — I82.409 DVT (DEEP VENOUS THROMBOSIS) (H): ICD-10-CM

## 2018-10-10 DIAGNOSIS — I82.5Y9 CHRONIC DEEP VEIN THROMBOSIS (DVT) OF PROXIMAL VEIN OF LOWER EXTREMITY, UNSPECIFIED LATERALITY (H): ICD-10-CM

## 2018-10-10 LAB — INR PPP: 3.31 (ref 0.9–1.1)

## 2018-10-23 ENCOUNTER — COMMUNICATION - HEALTHEAST (OUTPATIENT)
Dept: ONCOLOGY | Facility: HOSPITAL | Age: 73
End: 2018-10-23

## 2018-10-23 DIAGNOSIS — I82.409 DEEP VEIN THROMBOSIS (DVT) (H): ICD-10-CM

## 2018-10-24 ENCOUNTER — COMMUNICATION - HEALTHEAST (OUTPATIENT)
Dept: ANTICOAGULATION | Facility: CLINIC | Age: 73
End: 2018-10-24

## 2018-10-24 ENCOUNTER — INFUSION - HEALTHEAST (OUTPATIENT)
Dept: INFUSION THERAPY | Facility: HOSPITAL | Age: 73
End: 2018-10-24

## 2018-10-24 DIAGNOSIS — I82.5Y9 CHRONIC DEEP VEIN THROMBOSIS (DVT) OF PROXIMAL VEIN OF LOWER EXTREMITY, UNSPECIFIED LATERALITY (H): ICD-10-CM

## 2018-10-24 DIAGNOSIS — C90.01 MULTIPLE MYELOMA IN REMISSION (H): ICD-10-CM

## 2018-10-24 LAB
ALBUMIN SERPL-MCNC: 2.9 G/DL (ref 3.5–5)
ALP SERPL-CCNC: 59 U/L (ref 45–120)
ALT SERPL W P-5'-P-CCNC: 14 U/L (ref 0–45)
ANION GAP SERPL CALCULATED.3IONS-SCNC: 5 MMOL/L (ref 5–18)
AST SERPL W P-5'-P-CCNC: 14 U/L (ref 0–40)
BASOPHILS # BLD AUTO: 0 THOU/UL (ref 0–0.2)
BASOPHILS NFR BLD AUTO: 2 % (ref 0–2)
BILIRUB SERPL-MCNC: 0.4 MG/DL (ref 0–1)
BUN SERPL-MCNC: 15 MG/DL (ref 8–28)
CALCIUM SERPL-MCNC: 8.6 MG/DL (ref 8.5–10.5)
CHLORIDE BLD-SCNC: 112 MMOL/L (ref 98–107)
CO2 SERPL-SCNC: 26 MMOL/L (ref 22–31)
CREAT SERPL-MCNC: 0.82 MG/DL (ref 0.7–1.3)
EOSINOPHIL # BLD AUTO: 0.1 THOU/UL (ref 0–0.4)
EOSINOPHIL NFR BLD AUTO: 7 % (ref 0–6)
ERYTHROCYTE [DISTWIDTH] IN BLOOD BY AUTOMATED COUNT: 15.8 % (ref 11–14.5)
GFR SERPL CREATININE-BSD FRML MDRD: >60 ML/MIN/1.73M2
GLUCOSE BLD-MCNC: 97 MG/DL (ref 70–125)
HCT VFR BLD AUTO: 28.9 % (ref 40–54)
HGB BLD-MCNC: 9.4 G/DL (ref 14–18)
INR PPP: 2.31 (ref 0.9–1.1)
LYMPHOCYTES # BLD AUTO: 0.4 THOU/UL (ref 0.8–4.4)
LYMPHOCYTES NFR BLD AUTO: 22 % (ref 20–40)
MAGNESIUM SERPL-MCNC: 1.6 MG/DL (ref 1.8–2.6)
MCH RBC QN AUTO: 34.6 PG (ref 27–34)
MCHC RBC AUTO-ENTMCNC: 32.5 G/DL (ref 32–36)
MCV RBC AUTO: 106 FL (ref 80–100)
MONOCYTES # BLD AUTO: 0.2 THOU/UL (ref 0–0.9)
MONOCYTES NFR BLD AUTO: 12 % (ref 2–10)
NEUTROPHILS # BLD AUTO: 1 THOU/UL (ref 2–7.7)
NEUTROPHILS NFR BLD AUTO: 57 % (ref 50–70)
PLATELET # BLD AUTO: 123 THOU/UL (ref 140–440)
PMV BLD AUTO: 10.9 FL (ref 8.5–12.5)
POTASSIUM BLD-SCNC: 4.3 MMOL/L (ref 3.5–5)
PROT SERPL-MCNC: 4.7 G/DL (ref 6–8)
RBC # BLD AUTO: 2.72 MILL/UL (ref 4.4–6.2)
SODIUM SERPL-SCNC: 143 MMOL/L (ref 136–145)
WBC: 1.8 THOU/UL (ref 4–11)

## 2018-10-30 ENCOUNTER — COMMUNICATION - HEALTHEAST (OUTPATIENT)
Dept: ONCOLOGY | Facility: HOSPITAL | Age: 73
End: 2018-10-30

## 2018-11-01 ENCOUNTER — COMMUNICATION - HEALTHEAST (OUTPATIENT)
Dept: ONCOLOGY | Facility: HOSPITAL | Age: 73
End: 2018-11-01

## 2018-11-03 ENCOUNTER — COMMUNICATION - HEALTHEAST (OUTPATIENT)
Dept: ONCOLOGY | Facility: HOSPITAL | Age: 73
End: 2018-11-03

## 2018-11-07 ENCOUNTER — INFUSION - HEALTHEAST (OUTPATIENT)
Dept: INFUSION THERAPY | Facility: HOSPITAL | Age: 73
End: 2018-11-07

## 2018-11-07 DIAGNOSIS — C90.01 MULTIPLE MYELOMA IN REMISSION (H): ICD-10-CM

## 2018-11-07 DIAGNOSIS — I82.5Y9 CHRONIC DEEP VEIN THROMBOSIS (DVT) OF PROXIMAL VEIN OF LOWER EXTREMITY, UNSPECIFIED LATERALITY (H): ICD-10-CM

## 2018-11-14 ENCOUNTER — AMBULATORY - HEALTHEAST (OUTPATIENT)
Dept: INFUSION THERAPY | Facility: HOSPITAL | Age: 73
End: 2018-11-14

## 2018-11-14 ENCOUNTER — COMMUNICATION - HEALTHEAST (OUTPATIENT)
Dept: ANTICOAGULATION | Facility: CLINIC | Age: 73
End: 2018-11-14

## 2018-11-14 DIAGNOSIS — I82.409 DVT (DEEP VENOUS THROMBOSIS) (H): ICD-10-CM

## 2018-11-14 DIAGNOSIS — I82.5Y9 CHRONIC DEEP VEIN THROMBOSIS (DVT) OF PROXIMAL VEIN OF LOWER EXTREMITY, UNSPECIFIED LATERALITY (H): ICD-10-CM

## 2018-11-14 DIAGNOSIS — C90.01 MULTIPLE MYELOMA IN REMISSION (H): ICD-10-CM

## 2018-11-14 LAB
ALBUMIN SERPL-MCNC: 2.9 G/DL (ref 3.5–5)
ALP SERPL-CCNC: 66 U/L (ref 45–120)
ALT SERPL W P-5'-P-CCNC: 15 U/L (ref 0–45)
ANION GAP SERPL CALCULATED.3IONS-SCNC: 5 MMOL/L (ref 5–18)
AST SERPL W P-5'-P-CCNC: 12 U/L (ref 0–40)
BASOPHILS # BLD AUTO: 0 THOU/UL (ref 0–0.2)
BASOPHILS NFR BLD AUTO: 0 % (ref 0–2)
BILIRUB SERPL-MCNC: 0.5 MG/DL (ref 0–1)
BUN SERPL-MCNC: 14 MG/DL (ref 8–28)
CALCIUM SERPL-MCNC: 8.2 MG/DL (ref 8.5–10.5)
CHLORIDE BLD-SCNC: 111 MMOL/L (ref 98–107)
CO2 SERPL-SCNC: 27 MMOL/L (ref 22–31)
CREAT SERPL-MCNC: 0.87 MG/DL (ref 0.7–1.3)
EOSINOPHIL COUNT (ABSOLUTE): 0 THOU/UL (ref 0–0.4)
EOSINOPHIL NFR BLD AUTO: 2 % (ref 0–6)
ERYTHROCYTE [DISTWIDTH] IN BLOOD BY AUTOMATED COUNT: 15.6 % (ref 11–14.5)
GFR SERPL CREATININE-BSD FRML MDRD: >60 ML/MIN/1.73M2
GLUCOSE BLD-MCNC: 98 MG/DL (ref 70–125)
HCT VFR BLD AUTO: 30.3 % (ref 40–54)
HGB BLD-MCNC: 9.9 G/DL (ref 14–18)
IGA SERPL-MCNC: 20 MG/DL (ref 65–400)
IGA SERPL-MCNC: 382 MG/DL
IGM SERPL-MCNC: 11 MG/DL (ref 60–280)
INR PPP: 3.16 (ref 0.9–1.1)
LYMPHOCYTES # BLD AUTO: 0.6 THOU/UL (ref 0.8–4.4)
LYMPHOCYTES NFR BLD AUTO: 30 % (ref 20–40)
MCH RBC QN AUTO: 34.9 PG (ref 27–34)
MCHC RBC AUTO-ENTMCNC: 32.7 G/DL (ref 32–36)
MCV RBC AUTO: 107 FL (ref 80–100)
MONOCYTES # BLD AUTO: 0.4 THOU/UL (ref 0–0.9)
MONOCYTES NFR BLD AUTO: 19 % (ref 2–10)
OVALOCYTES: ABNORMAL
PLAT MORPH BLD: ABNORMAL
PLATELET # BLD AUTO: 92 THOU/UL (ref 140–440)
PMV BLD AUTO: 11.8 FL (ref 8.5–12.5)
POTASSIUM BLD-SCNC: 4.4 MMOL/L (ref 3.5–5)
PROT SERPL-MCNC: 4.7 G/DL (ref 6–8)
RBC # BLD AUTO: 2.84 MILL/UL (ref 4.4–6.2)
SCHISTOCYTES: ABNORMAL
SODIUM SERPL-SCNC: 143 MMOL/L (ref 136–145)
TEAR DROP: ABNORMAL
TOTAL NEUTROPHILS-ABS(DIFF): 0.9 THOU/UL (ref 2–7.7)
TOTAL NEUTROPHILS-REL(DIFF): 49 % (ref 50–70)
WBC: 1.9 THOU/UL (ref 4–11)

## 2018-11-16 LAB
ALBUMIN PERCENT: 64.4 % (ref 51–67)
ALBUMIN SERPL ELPH-MCNC: 2.8 G/DL (ref 3.2–4.7)
ALPHA 1 PERCENT: 3.6 % (ref 2–4)
ALPHA 2 PERCENT: 13.2 % (ref 5–13)
ALPHA1 GLOB SERPL ELPH-MCNC: 0.2 G/DL (ref 0.1–0.3)
ALPHA2 GLOB SERPL ELPH-MCNC: 0.6 G/DL (ref 0.4–0.9)
B-GLOBULIN SERPL ELPH-MCNC: 0.5 G/DL (ref 0.7–1.2)
BETA PERCENT: 11.3 % (ref 10–17)
GAMMA GLOB SERPL ELPH-MCNC: 0.3 G/DL (ref 0.6–1.4)
GAMMA GLOBULIN PERCENT: 7.5 % (ref 9–20)
KAPPA LC FREE SER-MCNC: 60.25 MG/DL (ref 0.33–1.94)
KAPPA LC FREE/LAMBDA FREE SER NEPH: 95.63 {RATIO} (ref 0.26–1.65)
LAMBDA LC FREE SERPL-MCNC: 0.63 MG/DL (ref 0.57–2.63)
PATH ICD:: ABNORMAL
PATH ICD:: NORMAL
PROT PATTERN SERPL ELPH-IMP: ABNORMAL
PROT PATTERN SERPL IFE-IMP: NORMAL
PROT SERPL-MCNC: 4.3 G/DL (ref 6–8)
REVIEWING PATHOLOGIST: ABNORMAL
REVIEWING PATHOLOGIST: NORMAL

## 2018-11-21 ENCOUNTER — OFFICE VISIT - HEALTHEAST (OUTPATIENT)
Dept: ONCOLOGY | Facility: HOSPITAL | Age: 73
End: 2018-11-21

## 2018-11-21 ENCOUNTER — INFUSION - HEALTHEAST (OUTPATIENT)
Dept: INFUSION THERAPY | Facility: HOSPITAL | Age: 73
End: 2018-11-21

## 2018-11-21 ENCOUNTER — AMBULATORY - HEALTHEAST (OUTPATIENT)
Dept: INFUSION THERAPY | Facility: HOSPITAL | Age: 73
End: 2018-11-21

## 2018-11-21 ENCOUNTER — COMMUNICATION - HEALTHEAST (OUTPATIENT)
Dept: ONCOLOGY | Facility: HOSPITAL | Age: 73
End: 2018-11-21

## 2018-11-21 DIAGNOSIS — D61.818 PANCYTOPENIA (H): ICD-10-CM

## 2018-11-21 DIAGNOSIS — C90.00 MULTIPLE MYELOMA NOT HAVING ACHIEVED REMISSION (H): ICD-10-CM

## 2018-11-21 DIAGNOSIS — C90.01 MULTIPLE MYELOMA IN REMISSION (H): ICD-10-CM

## 2018-11-21 DIAGNOSIS — I82.502 CHRONIC DEEP VEIN THROMBOSIS (DVT) OF LEFT LOWER EXTREMITY, UNSPECIFIED VEIN (H): ICD-10-CM

## 2018-11-21 DIAGNOSIS — I82.5Y9 CHRONIC DEEP VEIN THROMBOSIS (DVT) OF PROXIMAL VEIN OF LOWER EXTREMITY, UNSPECIFIED LATERALITY (H): ICD-10-CM

## 2018-11-29 ENCOUNTER — COMMUNICATION - HEALTHEAST (OUTPATIENT)
Dept: ONCOLOGY | Facility: HOSPITAL | Age: 73
End: 2018-11-29

## 2018-12-05 ENCOUNTER — COMMUNICATION - HEALTHEAST (OUTPATIENT)
Dept: ANTICOAGULATION | Facility: CLINIC | Age: 73
End: 2018-12-05

## 2018-12-05 ENCOUNTER — INFUSION - HEALTHEAST (OUTPATIENT)
Dept: INFUSION THERAPY | Facility: HOSPITAL | Age: 73
End: 2018-12-05

## 2018-12-05 DIAGNOSIS — C90.01 MULTIPLE MYELOMA IN REMISSION (H): ICD-10-CM

## 2018-12-05 DIAGNOSIS — I82.409 DVT (DEEP VENOUS THROMBOSIS) (H): ICD-10-CM

## 2018-12-05 DIAGNOSIS — I82.5Y9 CHRONIC DEEP VEIN THROMBOSIS (DVT) OF PROXIMAL VEIN OF LOWER EXTREMITY, UNSPECIFIED LATERALITY (H): ICD-10-CM

## 2018-12-05 LAB — INR PPP: 2.13 (ref 0.9–1.1)

## 2018-12-11 ENCOUNTER — COMMUNICATION - HEALTHEAST (OUTPATIENT)
Dept: ONCOLOGY | Facility: HOSPITAL | Age: 73
End: 2018-12-11

## 2018-12-19 ENCOUNTER — INFUSION - HEALTHEAST (OUTPATIENT)
Dept: INFUSION THERAPY | Facility: HOSPITAL | Age: 73
End: 2018-12-19

## 2018-12-19 DIAGNOSIS — D51.9 ANEMIA DUE TO VITAMIN B12 DEFICIENCY, UNSPECIFIED B12 DEFICIENCY TYPE: ICD-10-CM

## 2018-12-19 DIAGNOSIS — I82.5Y9 CHRONIC DEEP VEIN THROMBOSIS (DVT) OF PROXIMAL VEIN OF LOWER EXTREMITY, UNSPECIFIED LATERALITY (H): ICD-10-CM

## 2018-12-19 DIAGNOSIS — C90.01 MULTIPLE MYELOMA IN REMISSION (H): ICD-10-CM

## 2018-12-19 LAB
ALBUMIN SERPL-MCNC: 3 G/DL (ref 3.5–5)
ALP SERPL-CCNC: 78 U/L (ref 45–120)
ALT SERPL W P-5'-P-CCNC: 11 U/L (ref 0–45)
ANION GAP SERPL CALCULATED.3IONS-SCNC: 4 MMOL/L (ref 5–18)
AST SERPL W P-5'-P-CCNC: 15 U/L (ref 0–40)
BASOPHILS # BLD AUTO: 0.1 THOU/UL (ref 0–0.2)
BASOPHILS NFR BLD AUTO: 2 % (ref 0–2)
BILIRUB SERPL-MCNC: 0.4 MG/DL (ref 0–1)
BUN SERPL-MCNC: 21 MG/DL (ref 8–28)
CALCIUM SERPL-MCNC: 9 MG/DL (ref 8.5–10.5)
CHLORIDE BLD-SCNC: 106 MMOL/L (ref 98–107)
CO2 SERPL-SCNC: 27 MMOL/L (ref 22–31)
CREAT SERPL-MCNC: 1.05 MG/DL (ref 0.7–1.3)
EOSINOPHIL # BLD AUTO: 0.1 THOU/UL (ref 0–0.4)
EOSINOPHIL NFR BLD AUTO: 5 % (ref 0–6)
ERYTHROCYTE [DISTWIDTH] IN BLOOD BY AUTOMATED COUNT: 14.7 % (ref 11–14.5)
GFR SERPL CREATININE-BSD FRML MDRD: >60 ML/MIN/1.73M2
GLUCOSE BLD-MCNC: 131 MG/DL (ref 70–125)
HCT VFR BLD AUTO: 30 % (ref 40–54)
HGB BLD-MCNC: 9.9 G/DL (ref 14–18)
INR PPP: 1.48 (ref 0.9–1.1)
LYMPHOCYTES # BLD AUTO: 0.4 THOU/UL (ref 0.8–4.4)
LYMPHOCYTES NFR BLD AUTO: 14 % (ref 20–40)
MCH RBC QN AUTO: 34.9 PG (ref 27–34)
MCHC RBC AUTO-ENTMCNC: 33 G/DL (ref 32–36)
MCV RBC AUTO: 106 FL (ref 80–100)
MONOCYTES # BLD AUTO: 0.4 THOU/UL (ref 0–0.9)
MONOCYTES NFR BLD AUTO: 14 % (ref 2–10)
NEUTROPHILS # BLD AUTO: 1.9 THOU/UL (ref 2–7.7)
NEUTROPHILS NFR BLD AUTO: 66 % (ref 50–70)
PLATELET # BLD AUTO: 170 THOU/UL (ref 140–440)
PMV BLD AUTO: 11 FL (ref 8.5–12.5)
POTASSIUM BLD-SCNC: 4.3 MMOL/L (ref 3.5–5)
PROT SERPL-MCNC: 5.3 G/DL (ref 6–8)
RBC # BLD AUTO: 2.84 MILL/UL (ref 4.4–6.2)
SODIUM SERPL-SCNC: 137 MMOL/L (ref 136–145)
VIT B12 SERPL-MCNC: 658 PG/ML (ref 213–816)
WBC: 3 THOU/UL (ref 4–11)

## 2018-12-26 ENCOUNTER — AMBULATORY - HEALTHEAST (OUTPATIENT)
Dept: LAB | Facility: CLINIC | Age: 73
End: 2018-12-26

## 2018-12-26 ENCOUNTER — COMMUNICATION - HEALTHEAST (OUTPATIENT)
Dept: ANTICOAGULATION | Facility: CLINIC | Age: 73
End: 2018-12-26

## 2018-12-26 ENCOUNTER — COMMUNICATION - HEALTHEAST (OUTPATIENT)
Dept: ONCOLOGY | Facility: HOSPITAL | Age: 73
End: 2018-12-26

## 2018-12-26 DIAGNOSIS — I82.409 DVT (DEEP VENOUS THROMBOSIS) (H): ICD-10-CM

## 2018-12-26 LAB — INR PPP: 1.5 (ref 0.9–1.1)

## 2018-12-31 ENCOUNTER — AMBULATORY - HEALTHEAST (OUTPATIENT)
Dept: ONCOLOGY | Facility: HOSPITAL | Age: 73
End: 2018-12-31

## 2019-01-02 ENCOUNTER — INFUSION - HEALTHEAST (OUTPATIENT)
Dept: INFUSION THERAPY | Facility: HOSPITAL | Age: 74
End: 2019-01-02

## 2019-01-02 DIAGNOSIS — I82.5Y9 CHRONIC DEEP VEIN THROMBOSIS (DVT) OF PROXIMAL VEIN OF LOWER EXTREMITY, UNSPECIFIED LATERALITY (H): ICD-10-CM

## 2019-01-02 DIAGNOSIS — C90.01 MULTIPLE MYELOMA IN REMISSION (H): ICD-10-CM

## 2019-01-03 ENCOUNTER — COMMUNICATION - HEALTHEAST (OUTPATIENT)
Dept: ONCOLOGY | Facility: HOSPITAL | Age: 74
End: 2019-01-03

## 2019-01-03 ENCOUNTER — AMBULATORY - HEALTHEAST (OUTPATIENT)
Dept: LAB | Facility: CLINIC | Age: 74
End: 2019-01-03

## 2019-01-03 ENCOUNTER — COMMUNICATION - HEALTHEAST (OUTPATIENT)
Dept: ANTICOAGULATION | Facility: CLINIC | Age: 74
End: 2019-01-03

## 2019-01-03 DIAGNOSIS — I82.409 DVT (DEEP VENOUS THROMBOSIS) (H): ICD-10-CM

## 2019-01-03 LAB — INR PPP: 4 (ref 0.9–1.1)

## 2019-01-09 ENCOUNTER — COMMUNICATION - HEALTHEAST (OUTPATIENT)
Dept: ANTICOAGULATION | Facility: CLINIC | Age: 74
End: 2019-01-09

## 2019-01-09 ENCOUNTER — COMMUNICATION - HEALTHEAST (OUTPATIENT)
Dept: ONCOLOGY | Facility: HOSPITAL | Age: 74
End: 2019-01-09

## 2019-01-09 ENCOUNTER — AMBULATORY - HEALTHEAST (OUTPATIENT)
Dept: INFUSION THERAPY | Facility: HOSPITAL | Age: 74
End: 2019-01-09

## 2019-01-09 DIAGNOSIS — I82.409 DVT (DEEP VENOUS THROMBOSIS) (H): ICD-10-CM

## 2019-01-09 DIAGNOSIS — I82.5Y9 CHRONIC DEEP VEIN THROMBOSIS (DVT) OF PROXIMAL VEIN OF LOWER EXTREMITY, UNSPECIFIED LATERALITY (H): ICD-10-CM

## 2019-01-09 DIAGNOSIS — C90.01 MULTIPLE MYELOMA IN REMISSION (H): ICD-10-CM

## 2019-01-09 LAB
ALBUMIN SERPL-MCNC: 2.9 G/DL (ref 3.5–5)
ALP SERPL-CCNC: 98 U/L (ref 45–120)
ALT SERPL W P-5'-P-CCNC: 28 U/L (ref 0–45)
ANION GAP SERPL CALCULATED.3IONS-SCNC: 6 MMOL/L (ref 5–18)
AST SERPL W P-5'-P-CCNC: 14 U/L (ref 0–40)
BASOPHILS # BLD AUTO: 0 THOU/UL (ref 0–0.2)
BASOPHILS NFR BLD AUTO: 0 % (ref 0–2)
BILIRUB SERPL-MCNC: 0.5 MG/DL (ref 0–1)
BUN SERPL-MCNC: 18 MG/DL (ref 8–28)
CALCIUM SERPL-MCNC: 8.4 MG/DL (ref 8.5–10.5)
CHLORIDE BLD-SCNC: 102 MMOL/L (ref 98–107)
CO2 SERPL-SCNC: 27 MMOL/L (ref 22–31)
CREAT SERPL-MCNC: 1.11 MG/DL (ref 0.7–1.3)
EOSINOPHIL COUNT (ABSOLUTE): 0.3 THOU/UL (ref 0–0.4)
EOSINOPHIL NFR BLD AUTO: 12 % (ref 0–6)
ERYTHROCYTE [DISTWIDTH] IN BLOOD BY AUTOMATED COUNT: 15 % (ref 11–14.5)
GFR SERPL CREATININE-BSD FRML MDRD: >60 ML/MIN/1.73M2
GLUCOSE BLD-MCNC: 108 MG/DL (ref 70–125)
HCT VFR BLD AUTO: 31.2 % (ref 40–54)
HGB BLD-MCNC: 10.1 G/DL (ref 14–18)
IGA SERPL-MCNC: 21 MG/DL (ref 65–400)
IGA SERPL-MCNC: 366 MG/DL
IGM SERPL-MCNC: 10 MG/DL (ref 60–280)
INR PPP: 2.13 (ref 0.9–1.1)
LYMPHOCYTES # BLD AUTO: 0.4 THOU/UL (ref 0.8–4.4)
LYMPHOCYTES NFR BLD AUTO: 18 % (ref 20–40)
MCH RBC QN AUTO: 34.2 PG (ref 27–34)
MCHC RBC AUTO-ENTMCNC: 32.4 G/DL (ref 32–36)
MCV RBC AUTO: 106 FL (ref 80–100)
MONOCYTES # BLD AUTO: 0.4 THOU/UL (ref 0–0.9)
MONOCYTES NFR BLD AUTO: 15 % (ref 2–10)
OVALOCYTES: ABNORMAL
PLAT MORPH BLD: ABNORMAL
PLATELET # BLD AUTO: 107 THOU/UL (ref 140–440)
PMV BLD AUTO: 12 FL (ref 8.5–12.5)
POTASSIUM BLD-SCNC: 4.5 MMOL/L (ref 3.5–5)
PROT SERPL-MCNC: 5.1 G/DL (ref 6–8)
RBC # BLD AUTO: 2.95 MILL/UL (ref 4.4–6.2)
SCHISTOCYTES: ABNORMAL
SODIUM SERPL-SCNC: 135 MMOL/L (ref 136–145)
TOTAL NEUTROPHILS-ABS(DIFF): 1.3 THOU/UL (ref 2–7.7)
TOTAL NEUTROPHILS-REL(DIFF): 55 % (ref 50–70)
WBC: 2.4 THOU/UL (ref 4–11)

## 2019-01-10 LAB
KAPPA LC FREE SER-MCNC: 51 MG/DL (ref 0.33–1.94)
KAPPA LC FREE/LAMBDA FREE SER NEPH: 41.46 {RATIO} (ref 0.26–1.65)
LAMBDA LC FREE SERPL-MCNC: 1.23 MG/DL (ref 0.57–2.63)

## 2019-01-11 LAB
ALBUMIN PERCENT: 62.2 % (ref 51–67)
ALBUMIN SERPL ELPH-MCNC: 2.9 G/DL (ref 3.2–4.7)
ALPHA 1 PERCENT: 4.1 % (ref 2–4)
ALPHA 2 PERCENT: 14.4 % (ref 5–13)
ALPHA1 GLOB SERPL ELPH-MCNC: 0.2 G/DL (ref 0.1–0.3)
ALPHA2 GLOB SERPL ELPH-MCNC: 0.7 G/DL (ref 0.4–0.9)
B-GLOBULIN SERPL ELPH-MCNC: 0.6 G/DL (ref 0.7–1.2)
BETA PERCENT: 12 % (ref 10–17)
GAMMA GLOB SERPL ELPH-MCNC: 0.3 G/DL (ref 0.6–1.4)
GAMMA GLOBULIN PERCENT: 7.3 % (ref 9–20)
PATH ICD:: ABNORMAL
PATH ICD:: NORMAL
PROT PATTERN SERPL ELPH-IMP: ABNORMAL
PROT PATTERN SERPL IFE-IMP: NORMAL
PROT SERPL-MCNC: 4.6 G/DL (ref 6–8)
REVIEWING PATHOLOGIST: ABNORMAL
REVIEWING PATHOLOGIST: NORMAL

## 2019-01-16 ENCOUNTER — INFUSION - HEALTHEAST (OUTPATIENT)
Dept: INFUSION THERAPY | Facility: HOSPITAL | Age: 74
End: 2019-01-16

## 2019-01-16 ENCOUNTER — OFFICE VISIT - HEALTHEAST (OUTPATIENT)
Dept: ONCOLOGY | Facility: HOSPITAL | Age: 74
End: 2019-01-16

## 2019-01-16 DIAGNOSIS — C90.01 MULTIPLE MYELOMA IN REMISSION (H): ICD-10-CM

## 2019-01-16 DIAGNOSIS — C90.00 MULTIPLE MYELOMA NOT HAVING ACHIEVED REMISSION (H): ICD-10-CM

## 2019-01-16 DIAGNOSIS — I82.5Y9 CHRONIC DEEP VEIN THROMBOSIS (DVT) OF PROXIMAL VEIN OF LOWER EXTREMITY, UNSPECIFIED LATERALITY (H): ICD-10-CM

## 2019-01-28 ENCOUNTER — COMMUNICATION - HEALTHEAST (OUTPATIENT)
Dept: ONCOLOGY | Facility: HOSPITAL | Age: 74
End: 2019-01-28

## 2019-01-30 ENCOUNTER — INFUSION - HEALTHEAST (OUTPATIENT)
Dept: INFUSION THERAPY | Facility: HOSPITAL | Age: 74
End: 2019-01-30

## 2019-01-30 ENCOUNTER — AMBULATORY - HEALTHEAST (OUTPATIENT)
Dept: INFUSION THERAPY | Facility: HOSPITAL | Age: 74
End: 2019-01-30

## 2019-01-30 ENCOUNTER — COMMUNICATION - HEALTHEAST (OUTPATIENT)
Dept: ANTICOAGULATION | Facility: CLINIC | Age: 74
End: 2019-01-30

## 2019-01-30 DIAGNOSIS — I82.409 DVT (DEEP VENOUS THROMBOSIS) (H): ICD-10-CM

## 2019-01-30 DIAGNOSIS — I82.5Y9 CHRONIC DEEP VEIN THROMBOSIS (DVT) OF PROXIMAL VEIN OF LOWER EXTREMITY, UNSPECIFIED LATERALITY (H): ICD-10-CM

## 2019-01-30 DIAGNOSIS — C90.01 MULTIPLE MYELOMA IN REMISSION (H): ICD-10-CM

## 2019-01-30 LAB — INR PPP: 2.04 (ref 0.9–1.1)

## 2019-01-31 ENCOUNTER — COMMUNICATION - HEALTHEAST (OUTPATIENT)
Dept: ONCOLOGY | Facility: HOSPITAL | Age: 74
End: 2019-01-31

## 2019-02-11 ENCOUNTER — OFFICE VISIT - HEALTHEAST (OUTPATIENT)
Dept: INTERNAL MEDICINE | Facility: CLINIC | Age: 74
End: 2019-02-11

## 2019-02-11 DIAGNOSIS — R06.02 SHORTNESS OF BREATH: ICD-10-CM

## 2019-02-12 ENCOUNTER — COMMUNICATION - HEALTHEAST (OUTPATIENT)
Dept: INTERNAL MEDICINE | Facility: CLINIC | Age: 74
End: 2019-02-12

## 2019-02-12 ENCOUNTER — COMMUNICATION - HEALTHEAST (OUTPATIENT)
Dept: ONCOLOGY | Facility: HOSPITAL | Age: 74
End: 2019-02-12

## 2019-02-13 ENCOUNTER — COMMUNICATION - HEALTHEAST (OUTPATIENT)
Dept: ANTICOAGULATION | Facility: CLINIC | Age: 74
End: 2019-02-13

## 2019-02-13 ENCOUNTER — INFUSION - HEALTHEAST (OUTPATIENT)
Dept: INFUSION THERAPY | Facility: HOSPITAL | Age: 74
End: 2019-02-13

## 2019-02-13 DIAGNOSIS — C90.01 MULTIPLE MYELOMA IN REMISSION (H): ICD-10-CM

## 2019-02-13 DIAGNOSIS — I82.5Y9 CHRONIC DEEP VEIN THROMBOSIS (DVT) OF PROXIMAL VEIN OF LOWER EXTREMITY, UNSPECIFIED LATERALITY (H): ICD-10-CM

## 2019-02-13 LAB
ALBUMIN SERPL-MCNC: 2.4 G/DL (ref 3.5–5)
ALP SERPL-CCNC: 66 U/L (ref 45–120)
ALT SERPL W P-5'-P-CCNC: 15 U/L (ref 0–45)
ANION GAP SERPL CALCULATED.3IONS-SCNC: 7 MMOL/L (ref 5–18)
AST SERPL W P-5'-P-CCNC: 13 U/L (ref 0–40)
BASOPHILS # BLD AUTO: 0 THOU/UL (ref 0–0.2)
BASOPHILS NFR BLD AUTO: 1 % (ref 0–2)
BILIRUB SERPL-MCNC: 0.4 MG/DL (ref 0–1)
BUN SERPL-MCNC: 13 MG/DL (ref 8–28)
CALCIUM SERPL-MCNC: 8.5 MG/DL (ref 8.5–10.5)
CHLORIDE BLD-SCNC: 107 MMOL/L (ref 98–107)
CO2 SERPL-SCNC: 27 MMOL/L (ref 22–31)
CREAT SERPL-MCNC: 0.87 MG/DL (ref 0.7–1.3)
EOSINOPHIL # BLD AUTO: 0.2 THOU/UL (ref 0–0.4)
EOSINOPHIL NFR BLD AUTO: 10 % (ref 0–6)
ERYTHROCYTE [DISTWIDTH] IN BLOOD BY AUTOMATED COUNT: 14.6 % (ref 11–14.5)
GFR SERPL CREATININE-BSD FRML MDRD: >60 ML/MIN/1.73M2
GLUCOSE BLD-MCNC: 114 MG/DL (ref 70–125)
HCT VFR BLD AUTO: 24.9 % (ref 40–54)
HGB BLD-MCNC: 7.8 G/DL (ref 14–18)
INR PPP: 3.09 (ref 0.9–1.1)
LYMPHOCYTES # BLD AUTO: 0.2 THOU/UL (ref 0.8–4.4)
LYMPHOCYTES NFR BLD AUTO: 8 % (ref 20–40)
MCH RBC QN AUTO: 33.1 PG (ref 27–34)
MCHC RBC AUTO-ENTMCNC: 31.3 G/DL (ref 32–36)
MCV RBC AUTO: 106 FL (ref 80–100)
MONOCYTES # BLD AUTO: 0.2 THOU/UL (ref 0–0.9)
MONOCYTES NFR BLD AUTO: 8 % (ref 2–10)
NEUTROPHILS # BLD AUTO: 1.5 THOU/UL (ref 2–7.7)
NEUTROPHILS NFR BLD AUTO: 73 % (ref 50–70)
PLATELET # BLD AUTO: 173 THOU/UL (ref 140–440)
PMV BLD AUTO: 10.9 FL (ref 8.5–12.5)
POTASSIUM BLD-SCNC: 4.1 MMOL/L (ref 3.5–5)
PROT SERPL-MCNC: 4.9 G/DL (ref 6–8)
RBC # BLD AUTO: 2.36 MILL/UL (ref 4.4–6.2)
SODIUM SERPL-SCNC: 141 MMOL/L (ref 136–145)
WBC: 2.1 THOU/UL (ref 4–11)

## 2019-02-20 ENCOUNTER — COMMUNICATION - HEALTHEAST (OUTPATIENT)
Dept: ANTICOAGULATION | Facility: CLINIC | Age: 74
End: 2019-02-20

## 2019-02-20 ENCOUNTER — AMBULATORY - HEALTHEAST (OUTPATIENT)
Dept: INFUSION THERAPY | Facility: HOSPITAL | Age: 74
End: 2019-02-20

## 2019-02-20 DIAGNOSIS — I82.409 DVT (DEEP VENOUS THROMBOSIS) (H): ICD-10-CM

## 2019-02-20 DIAGNOSIS — C90.01 MULTIPLE MYELOMA IN REMISSION (H): ICD-10-CM

## 2019-02-20 LAB
BASOPHILS # BLD AUTO: 0 THOU/UL (ref 0–0.2)
BASOPHILS NFR BLD AUTO: 1 % (ref 0–2)
EOSINOPHIL # BLD AUTO: 0.3 THOU/UL (ref 0–0.4)
EOSINOPHIL NFR BLD AUTO: 8 % (ref 0–6)
ERYTHROCYTE [DISTWIDTH] IN BLOOD BY AUTOMATED COUNT: 15.7 % (ref 11–14.5)
HCT VFR BLD AUTO: 28.3 % (ref 40–54)
HGB BLD-MCNC: 8.9 G/DL (ref 14–18)
INR PPP: 1.68 (ref 0.9–1.1)
LYMPHOCYTES # BLD AUTO: 0.3 THOU/UL (ref 0.8–4.4)
LYMPHOCYTES NFR BLD AUTO: 8 % (ref 20–40)
MCH RBC QN AUTO: 33.6 PG (ref 27–34)
MCHC RBC AUTO-ENTMCNC: 31.4 G/DL (ref 32–36)
MCV RBC AUTO: 107 FL (ref 80–100)
MONOCYTES # BLD AUTO: 0.6 THOU/UL (ref 0–0.9)
MONOCYTES NFR BLD AUTO: 16 % (ref 2–10)
NEUTROPHILS # BLD AUTO: 2.3 THOU/UL (ref 2–7.7)
NEUTROPHILS NFR BLD AUTO: 67 % (ref 50–70)
PLATELET # BLD AUTO: 124 THOU/UL (ref 140–440)
PMV BLD AUTO: 11.7 FL (ref 8.5–12.5)
RBC # BLD AUTO: 2.65 MILL/UL (ref 4.4–6.2)
WBC: 3.5 THOU/UL (ref 4–11)

## 2019-02-21 ENCOUNTER — OFFICE VISIT - HEALTHEAST (OUTPATIENT)
Dept: INTERNAL MEDICINE | Facility: CLINIC | Age: 74
End: 2019-02-21

## 2019-02-21 ENCOUNTER — RECORDS - HEALTHEAST (OUTPATIENT)
Dept: GENERAL RADIOLOGY | Facility: CLINIC | Age: 74
End: 2019-02-21

## 2019-02-21 ENCOUNTER — COMMUNICATION - HEALTHEAST (OUTPATIENT)
Dept: INTERNAL MEDICINE | Facility: CLINIC | Age: 74
End: 2019-02-21

## 2019-02-21 DIAGNOSIS — D64.9 ANEMIA, UNSPECIFIED TYPE: ICD-10-CM

## 2019-02-21 DIAGNOSIS — R06.02 SHORTNESS OF BREATH: ICD-10-CM

## 2019-02-21 DIAGNOSIS — R05.9 COUGH: ICD-10-CM

## 2019-02-21 DIAGNOSIS — C90.00 MULTIPLE MYELOMA NOT HAVING ACHIEVED REMISSION (H): ICD-10-CM

## 2019-02-21 DIAGNOSIS — M48.061 SPINAL STENOSIS OF LUMBAR REGION WITHOUT NEUROGENIC CLAUDICATION: ICD-10-CM

## 2019-02-21 DIAGNOSIS — I82.502 CHRONIC DEEP VEIN THROMBOSIS (DVT) OF LEFT LOWER EXTREMITY, UNSPECIFIED VEIN (H): ICD-10-CM

## 2019-02-26 ENCOUNTER — AMBULATORY - HEALTHEAST (OUTPATIENT)
Dept: ONCOLOGY | Facility: HOSPITAL | Age: 74
End: 2019-02-26

## 2019-02-27 ENCOUNTER — COMMUNICATION - HEALTHEAST (OUTPATIENT)
Dept: ANTICOAGULATION | Facility: CLINIC | Age: 74
End: 2019-02-27

## 2019-02-27 ENCOUNTER — INFUSION - HEALTHEAST (OUTPATIENT)
Dept: INFUSION THERAPY | Facility: HOSPITAL | Age: 74
End: 2019-02-27

## 2019-02-27 ENCOUNTER — HOSPITAL ENCOUNTER (OUTPATIENT)
Dept: CARDIOLOGY | Facility: HOSPITAL | Age: 74
Discharge: HOME | End: 2019-02-27
Attending: INTERNAL MEDICINE

## 2019-02-27 ENCOUNTER — AMBULATORY - HEALTHEAST (OUTPATIENT)
Dept: INFUSION THERAPY | Facility: HOSPITAL | Age: 74
End: 2019-02-27

## 2019-02-27 DIAGNOSIS — I82.5Y9 CHRONIC DEEP VEIN THROMBOSIS (DVT) OF PROXIMAL VEIN OF LOWER EXTREMITY, UNSPECIFIED LATERALITY (H): ICD-10-CM

## 2019-02-27 DIAGNOSIS — R06.02 SHORTNESS OF BREATH: ICD-10-CM

## 2019-02-27 DIAGNOSIS — I82.409 DVT (DEEP VENOUS THROMBOSIS) (H): ICD-10-CM

## 2019-02-27 DIAGNOSIS — C90.01 MULTIPLE MYELOMA IN REMISSION (H): ICD-10-CM

## 2019-02-27 LAB
AORTIC ROOT: 2.9 CM
AORTIC VALVE MEAN VELOCITY: 189 CM/S
AV DIMENSIONLESS INDEX VTI: 0.3
AV MEAN GRADIENT: 16 MMHG
AV PEAK GRADIENT: 28.9 MMHG
AV VALVE AREA: 1.1 CM2
BASOPHILS # BLD AUTO: 0 THOU/UL (ref 0–0.2)
BASOPHILS NFR BLD AUTO: 0 % (ref 0–2)
BSA FOR ECHO PROCEDURE: 1.92 M2
CV BLOOD PRESSURE: NORMAL MMHG
CV ECHO HEIGHT: 69 IN
CV ECHO WEIGHT: 167 LBS
DOP CALC AO PEAK VEL: 269 CM/S
DOP CALC AO VTI: 51.3 CM
DOP CALC LVOT AREA: 3.46 CM2
DOP CALC LVOT DIAMETER: 2.1 CM
DOP CALC LVOT STROKE VOLUME: 56.1 CM3
DOP CALC MV VTI: 42.9 CM
DOP CALCLVOT PEAK VEL VTI: 16.2 CM
EJECTION FRACTION: 63 % (ref 55–75)
EOSINOPHIL COUNT (ABSOLUTE): 0.7 THOU/UL (ref 0–0.4)
EOSINOPHIL NFR BLD AUTO: 21 % (ref 0–6)
ERYTHROCYTE [DISTWIDTH] IN BLOOD BY AUTOMATED COUNT: 16.2 % (ref 11–14.5)
FRACTIONAL SHORTENING: 34.8 % (ref 28–44)
HCT VFR BLD AUTO: 30.3 % (ref 40–54)
HGB BLD-MCNC: 9.4 G/DL (ref 14–18)
INR PPP: 1.74 (ref 0.9–1.1)
INTERVENTRICULAR SEPTUM IN END DIASTOLE: 0.9 CM (ref 0.6–1)
IVS/PW RATIO: 1
LA AREA 1: 11.8 CM2
LA AREA 2: 17.7 CM2
LEFT ATRIUM LENGTH: 4.58 CM
LEFT ATRIUM SIZE: 3.2 CM
LEFT ATRIUM VOLUME INDEX: 20.2 ML/M2
LEFT ATRIUM VOLUME: 38.8 ML
LEFT VENTRICLE CARDIAC INDEX: 2.5 L/MIN/M2
LEFT VENTRICLE CARDIAC OUTPUT: 4.8 L/MIN
LEFT VENTRICLE DIASTOLIC VOLUME INDEX: 67.7 CM3/M2 (ref 34–74)
LEFT VENTRICLE DIASTOLIC VOLUME: 130 CM3 (ref 62–150)
LEFT VENTRICLE HEART RATE: 85 BPM
LEFT VENTRICLE MASS INDEX: 71.7 G/M2
LEFT VENTRICLE SYSTOLIC VOLUME INDEX: 25 CM3/M2 (ref 11–31)
LEFT VENTRICLE SYSTOLIC VOLUME: 48 CM3 (ref 21–61)
LEFT VENTRICULAR INTERNAL DIMENSION IN DIASTOLE: 4.6 CM (ref 4.2–5.8)
LEFT VENTRICULAR INTERNAL DIMENSION IN SYSTOLE: 3 CM (ref 2.5–4)
LEFT VENTRICULAR MASS: 137.7 G
LEFT VENTRICULAR OUTFLOW TRACT MEAN GRADIENT: 2 MMHG
LEFT VENTRICULAR OUTFLOW TRACT MEAN VELOCITY: 62.2 CM/S
LEFT VENTRICULAR POSTERIOR WALL IN END DIASTOLE: 0.9 CM (ref 0.6–1)
LV STROKE VOLUME INDEX: 29.2 ML/M2
LYMPHOCYTES # BLD AUTO: 0.4 THOU/UL (ref 0.8–4.4)
LYMPHOCYTES NFR BLD AUTO: 11 % (ref 20–40)
MCH RBC QN AUTO: 33.2 PG (ref 27–34)
MCHC RBC AUTO-ENTMCNC: 31 G/DL (ref 32–36)
MCV RBC AUTO: 107 FL (ref 80–100)
MITRAL VALVE DECELERATION SLOPE: 4280 MM/S2
MITRAL VALVE E/A RATIO: 0.7
MITRAL VALVE MEAN INFLOW VELOCITY: 86.9 CM/S
MITRAL VALVE PEAK VELOCITY: 128 CM/S
MITRAL VALVE PRESSURE HALF-TIME: 86 MS
MONOCYTES # BLD AUTO: 0.3 THOU/UL (ref 0–0.9)
MONOCYTES NFR BLD AUTO: 8 % (ref 2–10)
MV AREA VTI: 1.31 CM2
MV AVERAGE E/E' RATIO: 11.6 CM/S
MV DECELERATION TIME: 239 MS
MV E'TISSUE VEL-LAT: 7.31 CM/S
MV E'TISSUE VEL-MED: 6.92 CM/S
MV LATERAL E/E' RATIO: 11.3
MV MEAN GRADIENT: 3 MMHG
MV MEDIAL E/E' RATIO: 11.9
MV PEAK A VELOCITY: 112 CM/S
MV PEAK E VELOCITY: 82.4 CM/S
MV PEAK GRADIENT: 6.6 MMHG
MV VALVE AREA BY CONTINUITY EQUATION: 1.3 CM2
MV VALVE AREA PRESSURE 1/2 METHOD: 2.6 CM2
NUC REST DIASTOLIC VOLUME INDEX: 2672 LBS
NUC REST SYSTOLIC VOLUME INDEX: 69 IN
OVALOCYTES: ABNORMAL
PLAT MORPH BLD: NORMAL
PLATELET # BLD AUTO: 140 THOU/UL (ref 140–440)
PMV BLD AUTO: 12.4 FL (ref 8.5–12.5)
RBC # BLD AUTO: 2.83 MILL/UL (ref 4.4–6.2)
TOTAL NEUTROPHILS-ABS(DIFF): 2 THOU/UL (ref 2–7.7)
TOTAL NEUTROPHILS-REL(DIFF): 60 % (ref 50–70)
TRICUSPID REGURGITATION PEAK PRESSURE GRADIENT: 25 MMHG
TRICUSPID VALVE ANULAR PLANE SYSTOLIC EXCURSION: 1.9 CM
TRICUSPID VALVE PEAK REGURGITANT VELOCITY: 250 CM/S
WBC: 3.4 THOU/UL (ref 4–11)

## 2019-02-27 ASSESSMENT — MIFFLIN-ST. JEOR: SCORE: 1477.89

## 2019-02-28 ENCOUNTER — COMMUNICATION - HEALTHEAST (OUTPATIENT)
Dept: INTERNAL MEDICINE | Facility: CLINIC | Age: 74
End: 2019-02-28

## 2019-02-28 ENCOUNTER — COMMUNICATION - HEALTHEAST (OUTPATIENT)
Dept: ONCOLOGY | Facility: HOSPITAL | Age: 74
End: 2019-02-28

## 2019-02-28 DIAGNOSIS — C90.00 MYELOMA (H): ICD-10-CM

## 2019-03-06 ENCOUNTER — AMBULATORY - HEALTHEAST (OUTPATIENT)
Dept: INFUSION THERAPY | Facility: HOSPITAL | Age: 74
End: 2019-03-06

## 2019-03-06 ENCOUNTER — COMMUNICATION - HEALTHEAST (OUTPATIENT)
Dept: ANTICOAGULATION | Facility: CLINIC | Age: 74
End: 2019-03-06

## 2019-03-06 DIAGNOSIS — C90.01 MULTIPLE MYELOMA IN REMISSION (H): ICD-10-CM

## 2019-03-06 DIAGNOSIS — I82.409 DVT (DEEP VENOUS THROMBOSIS) (H): ICD-10-CM

## 2019-03-06 DIAGNOSIS — I82.5Y9 CHRONIC DEEP VEIN THROMBOSIS (DVT) OF PROXIMAL VEIN OF LOWER EXTREMITY, UNSPECIFIED LATERALITY (H): ICD-10-CM

## 2019-03-06 LAB
ALBUMIN SERPL-MCNC: 2.7 G/DL (ref 3.5–5)
ALP SERPL-CCNC: 138 U/L (ref 45–120)
ALT SERPL W P-5'-P-CCNC: 67 U/L (ref 0–45)
ANION GAP SERPL CALCULATED.3IONS-SCNC: 7 MMOL/L (ref 5–18)
AST SERPL W P-5'-P-CCNC: 32 U/L (ref 0–40)
BASOPHILS # BLD AUTO: 0 THOU/UL (ref 0–0.2)
BASOPHILS NFR BLD AUTO: 0 % (ref 0–2)
BILIRUB SERPL-MCNC: 0.3 MG/DL (ref 0–1)
BUN SERPL-MCNC: 19 MG/DL (ref 8–28)
CALCIUM SERPL-MCNC: 8.4 MG/DL (ref 8.5–10.5)
CHLORIDE BLD-SCNC: 105 MMOL/L (ref 98–107)
CO2 SERPL-SCNC: 26 MMOL/L (ref 22–31)
CREAT SERPL-MCNC: 1.13 MG/DL (ref 0.7–1.3)
EOSINOPHIL COUNT (ABSOLUTE): 0.4 THOU/UL (ref 0–0.4)
EOSINOPHIL NFR BLD AUTO: 14 % (ref 0–6)
ERYTHROCYTE [DISTWIDTH] IN BLOOD BY AUTOMATED COUNT: 16.2 % (ref 11–14.5)
GFR SERPL CREATININE-BSD FRML MDRD: >60 ML/MIN/1.73M2
GLUCOSE BLD-MCNC: 102 MG/DL (ref 70–125)
HCT VFR BLD AUTO: 27.2 % (ref 40–54)
HGB BLD-MCNC: 8.5 G/DL (ref 14–18)
IGA SERPL-MCNC: 30 MG/DL (ref 65–400)
IGA SERPL-MCNC: 357 MG/DL
IGM SERPL-MCNC: 17 MG/DL (ref 60–280)
INR PPP: 1.95 (ref 0.9–1.1)
LYMPHOCYTES # BLD AUTO: 0.2 THOU/UL (ref 0.8–4.4)
LYMPHOCYTES NFR BLD AUTO: 9 % (ref 20–40)
MCH RBC QN AUTO: 33.2 PG (ref 27–34)
MCHC RBC AUTO-ENTMCNC: 31.3 G/DL (ref 32–36)
MCV RBC AUTO: 106 FL (ref 80–100)
MONOCYTES # BLD AUTO: 0.5 THOU/UL (ref 0–0.9)
MONOCYTES NFR BLD AUTO: 18 % (ref 2–10)
OVALOCYTES: ABNORMAL
PLAT MORPH BLD: ABNORMAL
PLATELET # BLD AUTO: 112 THOU/UL (ref 140–440)
PMV BLD AUTO: 11.8 FL (ref 8.5–12.5)
POLYCHROMASIA BLD QL SMEAR: ABNORMAL
POTASSIUM BLD-SCNC: 4.3 MMOL/L (ref 3.5–5)
PROT SERPL-MCNC: 5 G/DL (ref 6–8)
RBC # BLD AUTO: 2.56 MILL/UL (ref 4.4–6.2)
SODIUM SERPL-SCNC: 138 MMOL/L (ref 136–145)
TEAR DROP: ABNORMAL
TOTAL NEUTROPHILS-ABS(DIFF): 1.5 THOU/UL (ref 2–7.7)
TOTAL NEUTROPHILS-REL(DIFF): 59 % (ref 50–70)
WBC: 2.6 THOU/UL (ref 4–11)

## 2019-03-07 LAB
KAPPA LC FREE SER-MCNC: 59 MG/DL (ref 0.33–1.94)
KAPPA LC FREE/LAMBDA FREE SER NEPH: 35.98 {RATIO} (ref 0.26–1.65)
LAMBDA LC FREE SERPL-MCNC: 1.64 MG/DL (ref 0.57–2.63)

## 2019-03-08 LAB
ALBUMIN PERCENT: 59.5 % (ref 51–67)
ALBUMIN SERPL ELPH-MCNC: 2.7 G/DL (ref 3.2–4.7)
ALPHA 1 PERCENT: 4.9 % (ref 2–4)
ALPHA 2 PERCENT: 14.9 % (ref 5–13)
ALPHA1 GLOB SERPL ELPH-MCNC: 0.2 G/DL (ref 0.1–0.3)
ALPHA2 GLOB SERPL ELPH-MCNC: 0.7 G/DL (ref 0.4–0.9)
B-GLOBULIN SERPL ELPH-MCNC: 0.6 G/DL (ref 0.7–1.2)
BETA PERCENT: 12.8 % (ref 10–17)
GAMMA GLOB SERPL ELPH-MCNC: 0.4 G/DL (ref 0.6–1.4)
GAMMA GLOBULIN PERCENT: 7.9 % (ref 9–20)
PATH ICD:: ABNORMAL
PATH ICD:: NORMAL
PROT PATTERN SERPL ELPH-IMP: ABNORMAL
PROT PATTERN SERPL IFE-IMP: NORMAL
PROT SERPL-MCNC: 4.6 G/DL (ref 6–8)
REVIEWING PATHOLOGIST: ABNORMAL
REVIEWING PATHOLOGIST: NORMAL

## 2019-03-13 ENCOUNTER — COMMUNICATION - HEALTHEAST (OUTPATIENT)
Dept: ANTICOAGULATION | Facility: CLINIC | Age: 74
End: 2019-03-13

## 2019-03-13 ENCOUNTER — OFFICE VISIT - HEALTHEAST (OUTPATIENT)
Dept: ONCOLOGY | Facility: HOSPITAL | Age: 74
End: 2019-03-13

## 2019-03-13 ENCOUNTER — AMBULATORY - HEALTHEAST (OUTPATIENT)
Dept: INFUSION THERAPY | Facility: HOSPITAL | Age: 74
End: 2019-03-13

## 2019-03-13 ENCOUNTER — INFUSION - HEALTHEAST (OUTPATIENT)
Dept: INFUSION THERAPY | Facility: HOSPITAL | Age: 74
End: 2019-03-13

## 2019-03-13 DIAGNOSIS — I82.509 CHRONIC DEEP VEIN THROMBOSIS (DVT) OF LOWER EXTREMITY, UNSPECIFIED LATERALITY, UNSPECIFIED VEIN (H): ICD-10-CM

## 2019-03-13 DIAGNOSIS — I82.5Y9 CHRONIC DEEP VEIN THROMBOSIS (DVT) OF PROXIMAL VEIN OF LOWER EXTREMITY, UNSPECIFIED LATERALITY (H): ICD-10-CM

## 2019-03-13 DIAGNOSIS — T45.1X5A ANEMIA ASSOCIATED WITH CHEMOTHERAPY: ICD-10-CM

## 2019-03-13 DIAGNOSIS — D64.81 ANEMIA ASSOCIATED WITH CHEMOTHERAPY: ICD-10-CM

## 2019-03-13 DIAGNOSIS — I82.409 DVT (DEEP VENOUS THROMBOSIS) (H): ICD-10-CM

## 2019-03-13 DIAGNOSIS — C90.01 MULTIPLE MYELOMA IN REMISSION (H): ICD-10-CM

## 2019-03-13 LAB
HGB BLD-MCNC: 9.3 G/DL (ref 14–18)
INR PPP: 2.3 (ref 0.9–1.1)

## 2019-03-27 ENCOUNTER — INFUSION - HEALTHEAST (OUTPATIENT)
Dept: INFUSION THERAPY | Facility: HOSPITAL | Age: 74
End: 2019-03-27

## 2019-03-27 ENCOUNTER — COMMUNICATION - HEALTHEAST (OUTPATIENT)
Dept: ONCOLOGY | Facility: HOSPITAL | Age: 74
End: 2019-03-27

## 2019-03-27 DIAGNOSIS — I82.5Y9 CHRONIC DEEP VEIN THROMBOSIS (DVT) OF PROXIMAL VEIN OF LOWER EXTREMITY, UNSPECIFIED LATERALITY (H): ICD-10-CM

## 2019-03-27 DIAGNOSIS — C90.01 MULTIPLE MYELOMA IN REMISSION (H): ICD-10-CM

## 2019-03-27 DIAGNOSIS — C90.02 MULTIPLE MYELOMA IN RELAPSE (H): ICD-10-CM

## 2019-04-02 ENCOUNTER — COMMUNICATION - HEALTHEAST (OUTPATIENT)
Dept: ONCOLOGY | Facility: HOSPITAL | Age: 74
End: 2019-04-02

## 2019-04-10 ENCOUNTER — OFFICE VISIT - HEALTHEAST (OUTPATIENT)
Dept: ONCOLOGY | Facility: HOSPITAL | Age: 74
End: 2019-04-10

## 2019-04-10 ENCOUNTER — INFUSION - HEALTHEAST (OUTPATIENT)
Dept: INFUSION THERAPY | Facility: HOSPITAL | Age: 74
End: 2019-04-10

## 2019-04-10 ENCOUNTER — COMMUNICATION - HEALTHEAST (OUTPATIENT)
Dept: ANTICOAGULATION | Facility: CLINIC | Age: 74
End: 2019-04-10

## 2019-04-10 ENCOUNTER — AMBULATORY - HEALTHEAST (OUTPATIENT)
Dept: INFUSION THERAPY | Facility: HOSPITAL | Age: 74
End: 2019-04-10

## 2019-04-10 DIAGNOSIS — C90.00 MULTIPLE MYELOMA NOT HAVING ACHIEVED REMISSION (H): ICD-10-CM

## 2019-04-10 DIAGNOSIS — I82.5Y9 CHRONIC DEEP VEIN THROMBOSIS (DVT) OF PROXIMAL VEIN OF LOWER EXTREMITY, UNSPECIFIED LATERALITY (H): ICD-10-CM

## 2019-04-10 DIAGNOSIS — C90.01 MULTIPLE MYELOMA IN REMISSION (H): ICD-10-CM

## 2019-04-10 LAB
BASOPHILS # BLD AUTO: 0 THOU/UL (ref 0–0.2)
BASOPHILS NFR BLD AUTO: 1 % (ref 0–2)
EOSINOPHIL # BLD AUTO: 0 THOU/UL (ref 0–0.4)
EOSINOPHIL NFR BLD AUTO: 0 % (ref 0–6)
ERYTHROCYTE [DISTWIDTH] IN BLOOD BY AUTOMATED COUNT: 16 % (ref 11–14.5)
HCT VFR BLD AUTO: 32.5 % (ref 40–54)
HGB BLD-MCNC: 10.3 G/DL (ref 14–18)
INR PPP: 2.23 (ref 0.9–1.1)
LYMPHOCYTES # BLD AUTO: 0.3 THOU/UL (ref 0.8–4.4)
LYMPHOCYTES NFR BLD AUTO: 11 % (ref 20–40)
MCH RBC QN AUTO: 33.6 PG (ref 27–34)
MCHC RBC AUTO-ENTMCNC: 31.7 G/DL (ref 32–36)
MCV RBC AUTO: 106 FL (ref 80–100)
MONOCYTES # BLD AUTO: 0.1 THOU/UL (ref 0–0.9)
MONOCYTES NFR BLD AUTO: 2 % (ref 2–10)
NEUTROPHILS # BLD AUTO: 2.1 THOU/UL (ref 2–7.7)
NEUTROPHILS NFR BLD AUTO: 85 % (ref 50–70)
PLATELET # BLD AUTO: 145 THOU/UL (ref 140–440)
PMV BLD AUTO: 10.7 FL (ref 8.5–12.5)
RBC # BLD AUTO: 3.07 MILL/UL (ref 4.4–6.2)
WBC: 2.5 THOU/UL (ref 4–11)

## 2019-04-22 ENCOUNTER — AMBULATORY - HEALTHEAST (OUTPATIENT)
Dept: ONCOLOGY | Facility: CLINIC | Age: 74
End: 2019-04-22

## 2019-04-22 DIAGNOSIS — C90.02 MULTIPLE MYELOMA IN RELAPSE (H): ICD-10-CM

## 2019-04-24 ENCOUNTER — INFUSION - HEALTHEAST (OUTPATIENT)
Dept: INFUSION THERAPY | Facility: HOSPITAL | Age: 74
End: 2019-04-24

## 2019-04-24 DIAGNOSIS — I82.5Y9 CHRONIC DEEP VEIN THROMBOSIS (DVT) OF PROXIMAL VEIN OF LOWER EXTREMITY, UNSPECIFIED LATERALITY (H): ICD-10-CM

## 2019-04-24 DIAGNOSIS — C90.01 MULTIPLE MYELOMA IN REMISSION (H): ICD-10-CM

## 2019-05-01 ENCOUNTER — COMMUNICATION - HEALTHEAST (OUTPATIENT)
Dept: ANTICOAGULATION | Facility: CLINIC | Age: 74
End: 2019-05-01

## 2019-05-01 ENCOUNTER — AMBULATORY - HEALTHEAST (OUTPATIENT)
Dept: INFUSION THERAPY | Facility: HOSPITAL | Age: 74
End: 2019-05-01

## 2019-05-01 DIAGNOSIS — I82.5Y9 CHRONIC DEEP VEIN THROMBOSIS (DVT) OF PROXIMAL VEIN OF LOWER EXTREMITY, UNSPECIFIED LATERALITY (H): ICD-10-CM

## 2019-05-01 DIAGNOSIS — C90.01 MULTIPLE MYELOMA IN REMISSION (H): ICD-10-CM

## 2019-05-01 DIAGNOSIS — I82.409 DVT (DEEP VENOUS THROMBOSIS) (H): ICD-10-CM

## 2019-05-01 LAB
IGA SERPL-MCNC: 33 MG/DL (ref 65–400)
IGA SERPL-MCNC: 339 MG/DL
IGM SERPL-MCNC: 10 MG/DL (ref 60–280)
INR PPP: 3.05 (ref 0.9–1.1)

## 2019-05-02 LAB
KAPPA LC FREE SER-MCNC: 55.25 MG/DL (ref 0.33–1.94)
KAPPA LC FREE/LAMBDA FREE SER NEPH: 51.64 {RATIO} (ref 0.26–1.65)
LAMBDA LC FREE SERPL-MCNC: 1.07 MG/DL (ref 0.57–2.63)

## 2019-05-03 ENCOUNTER — AMBULATORY - HEALTHEAST (OUTPATIENT)
Dept: ONCOLOGY | Facility: HOSPITAL | Age: 74
End: 2019-05-03

## 2019-05-03 DIAGNOSIS — C90.02 MULTIPLE MYELOMA IN RELAPSE (H): ICD-10-CM

## 2019-05-03 LAB
ALBUMIN PERCENT: 64.7 % (ref 51–67)
ALBUMIN SERPL ELPH-MCNC: 2.9 G/DL (ref 3.2–4.7)
ALPHA 1 PERCENT: 3.7 % (ref 2–4)
ALPHA 2 PERCENT: 13.4 % (ref 5–13)
ALPHA1 GLOB SERPL ELPH-MCNC: 0.2 G/DL (ref 0.1–0.3)
ALPHA2 GLOB SERPL ELPH-MCNC: 0.6 G/DL (ref 0.4–0.9)
B-GLOBULIN SERPL ELPH-MCNC: 0.5 G/DL (ref 0.7–1.2)
BETA PERCENT: 11.5 % (ref 10–17)
GAMMA GLOB SERPL ELPH-MCNC: 0.3 G/DL (ref 0.6–1.4)
GAMMA GLOBULIN PERCENT: 6.7 % (ref 9–20)
PATH ICD:: ABNORMAL
PROT PATTERN SERPL ELPH-IMP: ABNORMAL
PROT SERPL-MCNC: 4.5 G/DL (ref 6–8)
REVIEWING PATHOLOGIST: ABNORMAL

## 2019-05-07 ENCOUNTER — AMBULATORY - HEALTHEAST (OUTPATIENT)
Dept: INFUSION THERAPY | Facility: HOSPITAL | Age: 74
End: 2019-05-07

## 2019-05-07 ENCOUNTER — COMMUNICATION - HEALTHEAST (OUTPATIENT)
Dept: ANTICOAGULATION | Facility: CLINIC | Age: 74
End: 2019-05-07

## 2019-05-07 ENCOUNTER — INFUSION - HEALTHEAST (OUTPATIENT)
Dept: INFUSION THERAPY | Facility: HOSPITAL | Age: 74
End: 2019-05-07

## 2019-05-07 ENCOUNTER — OFFICE VISIT - HEALTHEAST (OUTPATIENT)
Dept: ONCOLOGY | Facility: HOSPITAL | Age: 74
End: 2019-05-07

## 2019-05-07 DIAGNOSIS — I82.409 DVT (DEEP VENOUS THROMBOSIS) (H): ICD-10-CM

## 2019-05-07 DIAGNOSIS — I82.5Y9 CHRONIC DEEP VEIN THROMBOSIS (DVT) OF PROXIMAL VEIN OF LOWER EXTREMITY, UNSPECIFIED LATERALITY (H): ICD-10-CM

## 2019-05-07 DIAGNOSIS — C90.01 MULTIPLE MYELOMA IN REMISSION (H): ICD-10-CM

## 2019-05-07 DIAGNOSIS — C90.00 MULTIPLE MYELOMA NOT HAVING ACHIEVED REMISSION (H): ICD-10-CM

## 2019-05-07 LAB
BASOPHILS # BLD AUTO: 0.1 THOU/UL (ref 0–0.2)
BASOPHILS NFR BLD AUTO: 2 % (ref 0–2)
EOSINOPHIL # BLD AUTO: 0.1 THOU/UL (ref 0–0.4)
EOSINOPHIL NFR BLD AUTO: 4 % (ref 0–6)
ERYTHROCYTE [DISTWIDTH] IN BLOOD BY AUTOMATED COUNT: 15.4 % (ref 11–14.5)
HCT VFR BLD AUTO: 30.1 % (ref 40–54)
HGB BLD-MCNC: 10 G/DL (ref 14–18)
INR PPP: 1.89 (ref 0.9–1.1)
LYMPHOCYTES # BLD AUTO: 0.3 THOU/UL (ref 0.8–4.4)
LYMPHOCYTES NFR BLD AUTO: 13 % (ref 20–40)
MCH RBC QN AUTO: 34.5 PG (ref 27–34)
MCHC RBC AUTO-ENTMCNC: 33.2 G/DL (ref 32–36)
MCV RBC AUTO: 104 FL (ref 80–100)
MONOCYTES # BLD AUTO: 0.2 THOU/UL (ref 0–0.9)
MONOCYTES NFR BLD AUTO: 9 % (ref 2–10)
NEUTROPHILS # BLD AUTO: 1.8 THOU/UL (ref 2–7.7)
NEUTROPHILS NFR BLD AUTO: 72 % (ref 50–70)
PLATELET # BLD AUTO: 155 THOU/UL (ref 140–440)
PMV BLD AUTO: 10.8 FL (ref 8.5–12.5)
RBC # BLD AUTO: 2.9 MILL/UL (ref 4.4–6.2)
WBC: 2.5 THOU/UL (ref 4–11)

## 2019-05-15 ENCOUNTER — AMBULATORY - HEALTHEAST (OUTPATIENT)
Dept: LAB | Facility: CLINIC | Age: 74
End: 2019-05-15

## 2019-05-15 ENCOUNTER — COMMUNICATION - HEALTHEAST (OUTPATIENT)
Dept: ANTICOAGULATION | Facility: CLINIC | Age: 74
End: 2019-05-15

## 2019-05-15 DIAGNOSIS — I82.409 DVT (DEEP VENOUS THROMBOSIS) (H): ICD-10-CM

## 2019-05-15 LAB — INR PPP: 2.4 (ref 0.9–1.1)

## 2019-05-20 ENCOUNTER — RECORDS - HEALTHEAST (OUTPATIENT)
Dept: ADMINISTRATIVE | Facility: OTHER | Age: 74
End: 2019-05-20

## 2019-05-22 ENCOUNTER — INFUSION - HEALTHEAST (OUTPATIENT)
Dept: INFUSION THERAPY | Facility: HOSPITAL | Age: 74
End: 2019-05-22

## 2019-05-22 DIAGNOSIS — C90.01 MULTIPLE MYELOMA IN REMISSION (H): ICD-10-CM

## 2019-05-22 DIAGNOSIS — I82.5Y9 CHRONIC DEEP VEIN THROMBOSIS (DVT) OF PROXIMAL VEIN OF LOWER EXTREMITY, UNSPECIFIED LATERALITY (H): ICD-10-CM

## 2019-05-23 ENCOUNTER — COMMUNICATION - HEALTHEAST (OUTPATIENT)
Dept: ONCOLOGY | Facility: HOSPITAL | Age: 74
End: 2019-05-23

## 2019-05-23 ENCOUNTER — OFFICE VISIT - HEALTHEAST (OUTPATIENT)
Dept: INTERNAL MEDICINE | Facility: CLINIC | Age: 74
End: 2019-05-23

## 2019-05-23 DIAGNOSIS — C90.00 MULTIPLE MYELOMA NOT HAVING ACHIEVED REMISSION (H): ICD-10-CM

## 2019-05-23 DIAGNOSIS — I82.502 CHRONIC DEEP VEIN THROMBOSIS (DVT) OF LEFT LOWER EXTREMITY, UNSPECIFIED VEIN (H): ICD-10-CM

## 2019-05-23 DIAGNOSIS — R05.9 COUGH: ICD-10-CM

## 2019-05-23 DIAGNOSIS — I35.0 AORTIC VALVE STENOSIS, ETIOLOGY OF CARDIAC VALVE DISEASE UNSPECIFIED: ICD-10-CM

## 2019-05-23 DIAGNOSIS — D64.9 ANEMIA, UNSPECIFIED TYPE: ICD-10-CM

## 2019-05-23 DIAGNOSIS — M48.061 SPINAL STENOSIS OF LUMBAR REGION WITHOUT NEUROGENIC CLAUDICATION: ICD-10-CM

## 2019-05-23 DIAGNOSIS — C90.02 MULTIPLE MYELOMA IN RELAPSE (H): ICD-10-CM

## 2019-06-05 ENCOUNTER — INFUSION - HEALTHEAST (OUTPATIENT)
Dept: INFUSION THERAPY | Facility: HOSPITAL | Age: 74
End: 2019-06-05

## 2019-06-05 ENCOUNTER — COMMUNICATION - HEALTHEAST (OUTPATIENT)
Dept: ANTICOAGULATION | Facility: CLINIC | Age: 74
End: 2019-06-05

## 2019-06-05 DIAGNOSIS — C90.01 MULTIPLE MYELOMA IN REMISSION (H): ICD-10-CM

## 2019-06-05 DIAGNOSIS — I82.5Y9 CHRONIC DEEP VEIN THROMBOSIS (DVT) OF PROXIMAL VEIN OF LOWER EXTREMITY, UNSPECIFIED LATERALITY (H): ICD-10-CM

## 2019-06-05 LAB
BASOPHILS # BLD AUTO: 0 THOU/UL (ref 0–0.2)
BASOPHILS NFR BLD AUTO: 1 % (ref 0–2)
EOSINOPHIL # BLD AUTO: 0.1 THOU/UL (ref 0–0.4)
EOSINOPHIL NFR BLD AUTO: 6 % (ref 0–6)
ERYTHROCYTE [DISTWIDTH] IN BLOOD BY AUTOMATED COUNT: 15.6 % (ref 11–14.5)
HCT VFR BLD AUTO: 30.3 % (ref 40–54)
HGB BLD-MCNC: 9.8 G/DL (ref 14–18)
INR PPP: 1.95 (ref 0.9–1.1)
LYMPHOCYTES # BLD AUTO: 0.3 THOU/UL (ref 0.8–4.4)
LYMPHOCYTES NFR BLD AUTO: 16 % (ref 20–40)
MCH RBC QN AUTO: 33.9 PG (ref 27–34)
MCHC RBC AUTO-ENTMCNC: 32.3 G/DL (ref 32–36)
MCV RBC AUTO: 105 FL (ref 80–100)
MONOCYTES # BLD AUTO: 0.3 THOU/UL (ref 0–0.9)
MONOCYTES NFR BLD AUTO: 13 % (ref 2–10)
NEUTROPHILS # BLD AUTO: 1.3 THOU/UL (ref 2–7.7)
NEUTROPHILS NFR BLD AUTO: 64 % (ref 50–70)
PLATELET # BLD AUTO: 128 THOU/UL (ref 140–440)
PMV BLD AUTO: 11 FL (ref 8.5–12.5)
RBC # BLD AUTO: 2.89 MILL/UL (ref 4.4–6.2)
WBC: 2.1 THOU/UL (ref 4–11)

## 2019-06-15 ENCOUNTER — COMMUNICATION - HEALTHEAST (OUTPATIENT)
Dept: ONCOLOGY | Facility: HOSPITAL | Age: 74
End: 2019-06-15

## 2019-06-15 DIAGNOSIS — C90.00 MULTIPLE MYELOMA NOT HAVING ACHIEVED REMISSION (H): ICD-10-CM

## 2019-06-19 ENCOUNTER — INFUSION - HEALTHEAST (OUTPATIENT)
Dept: INFUSION THERAPY | Facility: HOSPITAL | Age: 74
End: 2019-06-19

## 2019-06-19 ENCOUNTER — COMMUNICATION - HEALTHEAST (OUTPATIENT)
Dept: ONCOLOGY | Facility: HOSPITAL | Age: 74
End: 2019-06-19

## 2019-06-19 DIAGNOSIS — C90.01 MULTIPLE MYELOMA IN REMISSION (H): ICD-10-CM

## 2019-06-19 DIAGNOSIS — I82.5Y9 CHRONIC DEEP VEIN THROMBOSIS (DVT) OF PROXIMAL VEIN OF LOWER EXTREMITY, UNSPECIFIED LATERALITY (H): ICD-10-CM

## 2019-06-19 DIAGNOSIS — C90.02 MULTIPLE MYELOMA IN RELAPSE (H): ICD-10-CM

## 2019-07-03 ENCOUNTER — RECORDS - HEALTHEAST (OUTPATIENT)
Dept: ANTICOAGULATION | Facility: CLINIC | Age: 74
End: 2019-07-03

## 2019-07-08 ENCOUNTER — AMBULATORY - HEALTHEAST (OUTPATIENT)
Dept: INTENSIVE CARE | Facility: CLINIC | Age: 74
End: 2019-07-08

## 2019-07-08 DIAGNOSIS — J18.9 PARAPNEUMONIC EFFUSION: ICD-10-CM

## 2019-07-08 DIAGNOSIS — J91.8 PARAPNEUMONIC EFFUSION: ICD-10-CM

## 2019-07-10 ENCOUNTER — COMMUNICATION - HEALTHEAST (OUTPATIENT)
Dept: CARE COORDINATION | Facility: CLINIC | Age: 74
End: 2019-07-10

## 2019-07-10 ENCOUNTER — OFFICE VISIT - HEALTHEAST (OUTPATIENT)
Dept: INTERNAL MEDICINE | Facility: CLINIC | Age: 74
End: 2019-07-10

## 2019-07-10 DIAGNOSIS — A04.72 COLITIS DUE TO CLOSTRIDIUM DIFFICILE: ICD-10-CM

## 2019-07-10 DIAGNOSIS — J18.9 PARAPNEUMONIC EFFUSION: ICD-10-CM

## 2019-07-10 DIAGNOSIS — I48.0 PAROXYSMAL ATRIAL FIBRILLATION (H): ICD-10-CM

## 2019-07-10 DIAGNOSIS — J91.8 PARAPNEUMONIC EFFUSION: ICD-10-CM

## 2019-07-11 ENCOUNTER — COMMUNICATION - HEALTHEAST (OUTPATIENT)
Dept: ANTICOAGULATION | Facility: CLINIC | Age: 74
End: 2019-07-11

## 2019-07-12 ENCOUNTER — AMBULATORY - HEALTHEAST (OUTPATIENT)
Dept: INFUSION THERAPY | Facility: HOSPITAL | Age: 74
End: 2019-07-12

## 2019-07-12 ENCOUNTER — INFUSION - HEALTHEAST (OUTPATIENT)
Dept: INFUSION THERAPY | Facility: HOSPITAL | Age: 74
End: 2019-07-12

## 2019-07-12 ENCOUNTER — COMMUNICATION - HEALTHEAST (OUTPATIENT)
Dept: ANTICOAGULATION | Facility: CLINIC | Age: 74
End: 2019-07-12

## 2019-07-12 ENCOUNTER — OFFICE VISIT - HEALTHEAST (OUTPATIENT)
Dept: ONCOLOGY | Facility: HOSPITAL | Age: 74
End: 2019-07-12

## 2019-07-12 DIAGNOSIS — Z86.718 HISTORY OF DVT (DEEP VEIN THROMBOSIS): ICD-10-CM

## 2019-07-12 DIAGNOSIS — I82.5Y9 CHRONIC DEEP VEIN THROMBOSIS (DVT) OF PROXIMAL VEIN OF LOWER EXTREMITY, UNSPECIFIED LATERALITY (H): ICD-10-CM

## 2019-07-12 DIAGNOSIS — C90.01 MULTIPLE MYELOMA IN REMISSION (H): ICD-10-CM

## 2019-07-12 DIAGNOSIS — C90.00 MULTIPLE MYELOMA NOT HAVING ACHIEVED REMISSION (H): ICD-10-CM

## 2019-07-12 LAB
IGA SERPL-MCNC: 349 MG/DL
IGA SERPL-MCNC: 41 MG/DL (ref 65–400)
IGM SERPL-MCNC: 29 MG/DL (ref 60–280)
INR PPP: 2.03 (ref 0.9–1.1)

## 2019-07-12 ASSESSMENT — MIFFLIN-ST. JEOR: SCORE: 1423.45

## 2019-07-14 LAB
ARUP MISCELLANEOUS TEST: ABNORMAL
MISCELLANEOUS TEST DEPT. - HE HISTORICAL: NORMAL
PERFORMING LAB: NORMAL
SPECIMEN STATUS: NORMAL
TEST NAME: NORMAL

## 2019-07-15 ENCOUNTER — COMMUNICATION - HEALTHEAST (OUTPATIENT)
Dept: ONCOLOGY | Facility: HOSPITAL | Age: 74
End: 2019-07-15

## 2019-07-15 DIAGNOSIS — C90.02 MULTIPLE MYELOMA IN RELAPSE (H): ICD-10-CM

## 2019-07-16 ENCOUNTER — COMMUNICATION - HEALTHEAST (OUTPATIENT)
Dept: ANTICOAGULATION | Facility: CLINIC | Age: 74
End: 2019-07-16

## 2019-07-16 ENCOUNTER — COMMUNICATION - HEALTHEAST (OUTPATIENT)
Dept: ONCOLOGY | Facility: HOSPITAL | Age: 74
End: 2019-07-16

## 2019-07-16 ENCOUNTER — AMBULATORY - HEALTHEAST (OUTPATIENT)
Dept: LAB | Facility: CLINIC | Age: 74
End: 2019-07-16

## 2019-07-16 DIAGNOSIS — I82.409 DVT (DEEP VENOUS THROMBOSIS) (H): ICD-10-CM

## 2019-07-16 LAB
ALBUMIN PERCENT: 56.5 % (ref 51–67)
ALBUMIN SERPL ELPH-MCNC: 2.7 G/DL (ref 3.2–4.7)
ALPHA 1 PERCENT: 6.2 % (ref 2–4)
ALPHA 2 PERCENT: 17.2 % (ref 5–13)
ALPHA1 GLOB SERPL ELPH-MCNC: 0.3 G/DL (ref 0.1–0.3)
ALPHA2 GLOB SERPL ELPH-MCNC: 0.8 G/DL (ref 0.4–0.9)
B-GLOBULIN SERPL ELPH-MCNC: 0.6 G/DL (ref 0.7–1.2)
BETA PERCENT: 12.9 % (ref 10–17)
GAMMA GLOB SERPL ELPH-MCNC: 0.3 G/DL (ref 0.6–1.4)
GAMMA GLOBULIN PERCENT: 7.2 % (ref 9–20)
INR PPP: 2.3 (ref 0.9–1.1)
PATH ICD:: ABNORMAL
PATH ICD:: NORMAL
PROT PATTERN SERPL ELPH-IMP: ABNORMAL
PROT PATTERN SERPL IFE-IMP: NORMAL
PROT SERPL-MCNC: 4.8 G/DL (ref 6–8)
REVIEWING PATHOLOGIST: ABNORMAL
REVIEWING PATHOLOGIST: NORMAL

## 2019-07-18 ENCOUNTER — COMMUNICATION - HEALTHEAST (OUTPATIENT)
Dept: ANTICOAGULATION | Facility: CLINIC | Age: 74
End: 2019-07-18

## 2019-07-18 DIAGNOSIS — I82.409 DVT (DEEP VENOUS THROMBOSIS) (H): ICD-10-CM

## 2019-07-23 ENCOUNTER — COMMUNICATION - HEALTHEAST (OUTPATIENT)
Dept: ANTICOAGULATION | Facility: CLINIC | Age: 74
End: 2019-07-23

## 2019-07-23 ENCOUNTER — AMBULATORY - HEALTHEAST (OUTPATIENT)
Dept: ONCOLOGY | Facility: HOSPITAL | Age: 74
End: 2019-07-23

## 2019-07-23 ENCOUNTER — AMBULATORY - HEALTHEAST (OUTPATIENT)
Dept: LAB | Facility: CLINIC | Age: 74
End: 2019-07-23

## 2019-07-23 DIAGNOSIS — I82.409 DVT (DEEP VENOUS THROMBOSIS) (H): ICD-10-CM

## 2019-07-23 LAB — INR PPP: 2.5 (ref 0.9–1.1)

## 2019-07-25 ENCOUNTER — OFFICE VISIT - HEALTHEAST (OUTPATIENT)
Dept: ONCOLOGY | Facility: HOSPITAL | Age: 74
End: 2019-07-25

## 2019-07-25 ENCOUNTER — COMMUNICATION - HEALTHEAST (OUTPATIENT)
Dept: ANTICOAGULATION | Facility: CLINIC | Age: 74
End: 2019-07-25

## 2019-07-25 ENCOUNTER — INFUSION - HEALTHEAST (OUTPATIENT)
Dept: INFUSION THERAPY | Facility: HOSPITAL | Age: 74
End: 2019-07-25

## 2019-07-25 ENCOUNTER — AMBULATORY - HEALTHEAST (OUTPATIENT)
Dept: INFUSION THERAPY | Facility: HOSPITAL | Age: 74
End: 2019-07-25

## 2019-07-25 DIAGNOSIS — I82.5Y9 CHRONIC DEEP VEIN THROMBOSIS (DVT) OF PROXIMAL VEIN OF LOWER EXTREMITY, UNSPECIFIED LATERALITY (H): ICD-10-CM

## 2019-07-25 DIAGNOSIS — C90.01 MULTIPLE MYELOMA IN REMISSION (H): ICD-10-CM

## 2019-07-25 DIAGNOSIS — C90.00 MULTIPLE MYELOMA NOT HAVING ACHIEVED REMISSION (H): ICD-10-CM

## 2019-07-25 LAB
BASOPHILS # BLD AUTO: 0 THOU/UL (ref 0–0.2)
BASOPHILS NFR BLD AUTO: 0 % (ref 0–2)
EOSINOPHIL # BLD AUTO: 0.1 THOU/UL (ref 0–0.4)
EOSINOPHIL NFR BLD AUTO: 3 % (ref 0–6)
ERYTHROCYTE [DISTWIDTH] IN BLOOD BY AUTOMATED COUNT: 18.4 % (ref 11–14.5)
HCT VFR BLD AUTO: 24.3 % (ref 40–54)
HGB BLD-MCNC: 7.7 G/DL (ref 14–18)
INR PPP: 1.88 (ref 0.9–1.1)
INR PPP: 1.88 (ref 0.9–1.1)
LYMPHOCYTES # BLD AUTO: 0.4 THOU/UL (ref 0.8–4.4)
LYMPHOCYTES NFR BLD AUTO: 13 % (ref 20–40)
MCH RBC QN AUTO: 33.9 PG (ref 27–34)
MCHC RBC AUTO-ENTMCNC: 31.7 G/DL (ref 32–36)
MCV RBC AUTO: 107 FL (ref 80–100)
MONOCYTES # BLD AUTO: 0.2 THOU/UL (ref 0–0.9)
MONOCYTES NFR BLD AUTO: 6 % (ref 2–10)
NEUTROPHILS # BLD AUTO: 2.5 THOU/UL (ref 2–7.7)
NEUTROPHILS NFR BLD AUTO: 78 % (ref 50–70)
PLATELET # BLD AUTO: 121 THOU/UL (ref 140–440)
PMV BLD AUTO: 11.9 FL (ref 8.5–12.5)
RBC # BLD AUTO: 2.27 MILL/UL (ref 4.4–6.2)
WBC: 3.2 THOU/UL (ref 4–11)

## 2019-07-25 ASSESSMENT — MIFFLIN-ST. JEOR: SCORE: 1466.54

## 2019-07-28 ENCOUNTER — AMBULATORY - HEALTHEAST (OUTPATIENT)
Dept: ONCOLOGY | Facility: HOSPITAL | Age: 74
End: 2019-07-28

## 2019-08-05 ENCOUNTER — COMMUNICATION - HEALTHEAST (OUTPATIENT)
Dept: INTERNAL MEDICINE | Facility: CLINIC | Age: 74
End: 2019-08-05

## 2019-08-05 ENCOUNTER — COMMUNICATION - HEALTHEAST (OUTPATIENT)
Dept: ONCOLOGY | Facility: HOSPITAL | Age: 74
End: 2019-08-05

## 2019-08-05 DIAGNOSIS — I82.5Z9 CHRONIC DEEP VEIN THROMBOSIS (DVT) OF DISTAL VEIN OF LOWER EXTREMITY, UNSPECIFIED LATERALITY (H): ICD-10-CM

## 2019-08-05 DIAGNOSIS — I48.0 PAROXYSMAL ATRIAL FIBRILLATION (H): ICD-10-CM

## 2019-08-08 ENCOUNTER — INFUSION - HEALTHEAST (OUTPATIENT)
Dept: INFUSION THERAPY | Facility: HOSPITAL | Age: 74
End: 2019-08-08

## 2019-08-08 ENCOUNTER — HOSPITAL ENCOUNTER (OUTPATIENT)
Dept: RADIOLOGY | Facility: HOSPITAL | Age: 74
Discharge: HOME | End: 2019-08-08
Attending: INTERNAL MEDICINE

## 2019-08-08 ENCOUNTER — COMMUNICATION - HEALTHEAST (OUTPATIENT)
Dept: PULMONOLOGY | Facility: OTHER | Age: 74
End: 2019-08-08

## 2019-08-08 DIAGNOSIS — I82.5Y9 CHRONIC DEEP VEIN THROMBOSIS (DVT) OF PROXIMAL VEIN OF LOWER EXTREMITY, UNSPECIFIED LATERALITY (H): ICD-10-CM

## 2019-08-08 DIAGNOSIS — C90.01 MULTIPLE MYELOMA IN REMISSION (H): ICD-10-CM

## 2019-08-08 DIAGNOSIS — C90.00 MULTIPLE MYELOMA (H): ICD-10-CM

## 2019-08-08 DIAGNOSIS — J18.9 PARAPNEUMONIC EFFUSION: ICD-10-CM

## 2019-08-08 DIAGNOSIS — J91.8 PARAPNEUMONIC EFFUSION: ICD-10-CM

## 2019-08-08 LAB
FOLATE SERPL-MCNC: 18 NG/ML
TSH SERPL DL<=0.005 MIU/L-ACNC: 1.19 UIU/ML (ref 0.3–5)
VIT B12 SERPL-MCNC: 612 PG/ML (ref 213–816)

## 2019-08-19 ENCOUNTER — OFFICE VISIT - HEALTHEAST (OUTPATIENT)
Dept: PULMONOLOGY | Facility: OTHER | Age: 74
End: 2019-08-19

## 2019-08-19 DIAGNOSIS — J18.9 PARAPNEUMONIC EFFUSION: ICD-10-CM

## 2019-08-19 DIAGNOSIS — J91.8 PARAPNEUMONIC EFFUSION: ICD-10-CM

## 2019-08-19 ASSESSMENT — MIFFLIN-ST. JEOR: SCORE: 1471.08

## 2019-08-22 ENCOUNTER — AMBULATORY - HEALTHEAST (OUTPATIENT)
Dept: ONCOLOGY | Facility: CLINIC | Age: 74
End: 2019-08-22

## 2019-08-22 ENCOUNTER — INFUSION - HEALTHEAST (OUTPATIENT)
Dept: INFUSION THERAPY | Facility: HOSPITAL | Age: 74
End: 2019-08-22

## 2019-08-22 ENCOUNTER — COMMUNICATION - HEALTHEAST (OUTPATIENT)
Dept: ANTICOAGULATION | Facility: CLINIC | Age: 74
End: 2019-08-22

## 2019-08-22 ENCOUNTER — OFFICE VISIT - HEALTHEAST (OUTPATIENT)
Dept: ONCOLOGY | Facility: HOSPITAL | Age: 74
End: 2019-08-22

## 2019-08-22 ENCOUNTER — AMBULATORY - HEALTHEAST (OUTPATIENT)
Dept: INFUSION THERAPY | Facility: HOSPITAL | Age: 74
End: 2019-08-22

## 2019-08-22 DIAGNOSIS — T45.1X5A CHEMOTHERAPY-INDUCED NEUTROPENIA (H): ICD-10-CM

## 2019-08-22 DIAGNOSIS — I82.5Y9 CHRONIC DEEP VEIN THROMBOSIS (DVT) OF PROXIMAL VEIN OF LOWER EXTREMITY, UNSPECIFIED LATERALITY (H): ICD-10-CM

## 2019-08-22 DIAGNOSIS — C90.01 MULTIPLE MYELOMA IN REMISSION (H): ICD-10-CM

## 2019-08-22 DIAGNOSIS — D70.1 CHEMOTHERAPY-INDUCED NEUTROPENIA (H): ICD-10-CM

## 2019-08-22 DIAGNOSIS — I82.502 CHRONIC DEEP VEIN THROMBOSIS (DVT) OF LEFT LOWER EXTREMITY, UNSPECIFIED VEIN (H): ICD-10-CM

## 2019-08-22 DIAGNOSIS — C90.00 MULTIPLE MYELOMA NOT HAVING ACHIEVED REMISSION (H): ICD-10-CM

## 2019-08-22 LAB
ALBUMIN SERPL-MCNC: 3 G/DL (ref 3.5–5)
ALP SERPL-CCNC: 82 U/L (ref 45–120)
ALT SERPL W P-5'-P-CCNC: 16 U/L (ref 0–45)
ANION GAP SERPL CALCULATED.3IONS-SCNC: 4 MMOL/L (ref 5–18)
AST SERPL W P-5'-P-CCNC: 13 U/L (ref 0–40)
BASOPHILS # BLD AUTO: 0.1 THOU/UL (ref 0–0.2)
BASOPHILS NFR BLD AUTO: 3 % (ref 0–2)
BILIRUB SERPL-MCNC: 0.4 MG/DL (ref 0–1)
BUN SERPL-MCNC: 24 MG/DL (ref 8–28)
CALCIUM SERPL-MCNC: 9.1 MG/DL (ref 8.5–10.5)
CHLORIDE BLD-SCNC: 110 MMOL/L (ref 98–107)
CO2 SERPL-SCNC: 27 MMOL/L (ref 22–31)
CREAT SERPL-MCNC: 0.96 MG/DL (ref 0.7–1.3)
EOSINOPHIL COUNT (ABSOLUTE): 0.1 THOU/UL (ref 0–0.4)
EOSINOPHIL NFR BLD AUTO: 4 % (ref 0–6)
ERYTHROCYTE [DISTWIDTH] IN BLOOD BY AUTOMATED COUNT: 18.1 % (ref 11–14.5)
GFR SERPL CREATININE-BSD FRML MDRD: >60 ML/MIN/1.73M2
GLUCOSE BLD-MCNC: 109 MG/DL (ref 70–125)
HCT VFR BLD AUTO: 25.8 % (ref 40–54)
HGB BLD-MCNC: 8.2 G/DL (ref 14–18)
INR PPP: 1.64 (ref 0.9–1.1)
LYMPHOCYTES # BLD AUTO: 0.2 THOU/UL (ref 0.8–4.4)
LYMPHOCYTES NFR BLD AUTO: 9 % (ref 20–40)
MCH RBC QN AUTO: 34.9 PG (ref 27–34)
MCHC RBC AUTO-ENTMCNC: 31.8 G/DL (ref 32–36)
MCV RBC AUTO: 110 FL (ref 80–100)
MONOCYTES # BLD AUTO: 0.2 THOU/UL (ref 0–0.9)
MONOCYTES NFR BLD AUTO: 6 % (ref 2–10)
OVALOCYTES: ABNORMAL
PLAT MORPH BLD: ABNORMAL
PLATELET # BLD AUTO: 137 THOU/UL (ref 140–440)
PMV BLD AUTO: 11.6 FL (ref 8.5–12.5)
POTASSIUM BLD-SCNC: 4.3 MMOL/L (ref 3.5–5)
PROT SERPL-MCNC: 5.1 G/DL (ref 6–8)
RBC # BLD AUTO: 2.35 MILL/UL (ref 4.4–6.2)
SODIUM SERPL-SCNC: 141 MMOL/L (ref 136–145)
TOTAL NEUTROPHILS-ABS(DIFF): 1.9 THOU/UL (ref 2–7.7)
TOTAL NEUTROPHILS-REL(DIFF): 77 % (ref 50–70)
WBC: 2.4 THOU/UL (ref 4–11)

## 2019-08-22 ASSESSMENT — MIFFLIN-ST. JEOR: SCORE: 1464.27

## 2019-08-26 ENCOUNTER — COMMUNICATION - HEALTHEAST (OUTPATIENT)
Dept: INTERNAL MEDICINE | Facility: CLINIC | Age: 74
End: 2019-08-26

## 2019-08-26 DIAGNOSIS — D13.2 ADENOMATOUS DUODENAL POLYP: ICD-10-CM

## 2019-09-04 ENCOUNTER — COMMUNICATION - HEALTHEAST (OUTPATIENT)
Dept: ONCOLOGY | Facility: HOSPITAL | Age: 74
End: 2019-09-04

## 2019-09-04 ENCOUNTER — COMMUNICATION - HEALTHEAST (OUTPATIENT)
Dept: ANTICOAGULATION | Facility: CLINIC | Age: 74
End: 2019-09-04

## 2019-09-04 ENCOUNTER — INFUSION - HEALTHEAST (OUTPATIENT)
Dept: INFUSION THERAPY | Facility: HOSPITAL | Age: 74
End: 2019-09-04

## 2019-09-04 DIAGNOSIS — C90.02 MULTIPLE MYELOMA IN RELAPSE (H): ICD-10-CM

## 2019-09-04 DIAGNOSIS — I82.5Y9 CHRONIC DEEP VEIN THROMBOSIS (DVT) OF PROXIMAL VEIN OF LOWER EXTREMITY, UNSPECIFIED LATERALITY (H): ICD-10-CM

## 2019-09-04 DIAGNOSIS — C90.01 MULTIPLE MYELOMA IN REMISSION (H): ICD-10-CM

## 2019-09-04 LAB — INR PPP: 2.77 (ref 0.9–1.1)

## 2019-09-11 ENCOUNTER — AMBULATORY - HEALTHEAST (OUTPATIENT)
Dept: INFUSION THERAPY | Facility: HOSPITAL | Age: 74
End: 2019-09-11

## 2019-09-11 ENCOUNTER — COMMUNICATION - HEALTHEAST (OUTPATIENT)
Dept: ANTICOAGULATION | Facility: CLINIC | Age: 74
End: 2019-09-11

## 2019-09-11 DIAGNOSIS — C90.02 MULTIPLE MYELOMA IN RELAPSE (H): ICD-10-CM

## 2019-09-11 DIAGNOSIS — C90.01 MULTIPLE MYELOMA IN REMISSION (H): ICD-10-CM

## 2019-09-11 DIAGNOSIS — I82.5Y9 CHRONIC DEEP VEIN THROMBOSIS (DVT) OF PROXIMAL VEIN OF LOWER EXTREMITY, UNSPECIFIED LATERALITY (H): ICD-10-CM

## 2019-09-11 LAB
BASOPHILS # BLD AUTO: 0 THOU/UL (ref 0–0.2)
BASOPHILS NFR BLD AUTO: 0 % (ref 0–2)
EOSINOPHIL COUNT (ABSOLUTE): 0.3 THOU/UL (ref 0–0.4)
EOSINOPHIL NFR BLD AUTO: 28 % (ref 0–6)
ERYTHROCYTE [DISTWIDTH] IN BLOOD BY AUTOMATED COUNT: 16.9 % (ref 11–14.5)
HCT VFR BLD AUTO: 29.4 % (ref 40–54)
HGB BLD-MCNC: 9.4 G/DL (ref 14–18)
IGA SERPL-MCNC: 19 MG/DL (ref 65–400)
IGA SERPL-MCNC: 339 MG/DL
IGM SERPL-MCNC: 19 MG/DL (ref 60–280)
INR PPP: 3.04 (ref 0.9–1.1)
LYMPHOCYTES # BLD AUTO: 0.3 THOU/UL (ref 0.8–4.4)
LYMPHOCYTES NFR BLD AUTO: 28 % (ref 20–40)
MANUAL NRBC PER 100 CELLS: 0
MCH RBC QN AUTO: 35.7 PG (ref 27–34)
MCHC RBC AUTO-ENTMCNC: 32 G/DL (ref 32–36)
MCV RBC AUTO: 112 FL (ref 80–100)
MONOCYTES # BLD AUTO: 0.2 THOU/UL (ref 0–0.9)
MONOCYTES NFR BLD AUTO: 22 % (ref 2–10)
OVALOCYTES: ABNORMAL
PLAT MORPH BLD: ABNORMAL
PLATELET # BLD AUTO: 66 THOU/UL (ref 140–440)
PMV BLD AUTO: 12.5 FL (ref 8.5–12.5)
RBC # BLD AUTO: 2.63 MILL/UL (ref 4.4–6.2)
TEAR DROP: ABNORMAL
TOTAL NEUTROPHILS-ABS(DIFF): 0.2 THOU/UL (ref 2–7.7)
TOTAL NEUTROPHILS-REL(DIFF): 22 % (ref 50–70)
WBC: 1 THOU/UL (ref 4–11)

## 2019-09-12 LAB
ALBUMIN PERCENT: 67.2 % (ref 51–67)
ALBUMIN SERPL ELPH-MCNC: 3.2 G/DL (ref 3.2–4.7)
ALPHA 1 PERCENT: 3.2 % (ref 2–4)
ALPHA 2 PERCENT: 12.2 % (ref 5–13)
ALPHA1 GLOB SERPL ELPH-MCNC: 0.2 G/DL (ref 0.1–0.3)
ALPHA2 GLOB SERPL ELPH-MCNC: 0.6 G/DL (ref 0.4–0.9)
B-GLOBULIN SERPL ELPH-MCNC: 0.5 G/DL (ref 0.7–1.2)
BETA PERCENT: 10.8 % (ref 10–17)
GAMMA GLOB SERPL ELPH-MCNC: 0.3 G/DL (ref 0.6–1.4)
GAMMA GLOBULIN PERCENT: 6.6 % (ref 9–20)
KAPPA LC FREE SER-MCNC: 40.18 MG/DL (ref 0.33–1.94)
KAPPA LC FREE/LAMBDA FREE SER NEPH: 39.01 {RATIO} (ref 0.26–1.65)
LAMBDA LC FREE SERPL-MCNC: 1.03 MG/DL (ref 0.57–2.63)
PATH ICD:: ABNORMAL
PATH ICD:: NORMAL
PROT PATTERN SERPL ELPH-IMP: ABNORMAL
PROT PATTERN SERPL IFE-IMP: NORMAL
PROT SERPL-MCNC: 4.8 G/DL (ref 6–8)
REVIEWING PATHOLOGIST: ABNORMAL
REVIEWING PATHOLOGIST: NORMAL

## 2019-09-18 ENCOUNTER — INFUSION - HEALTHEAST (OUTPATIENT)
Dept: INFUSION THERAPY | Facility: HOSPITAL | Age: 74
End: 2019-09-18

## 2019-09-18 ENCOUNTER — AMBULATORY - HEALTHEAST (OUTPATIENT)
Dept: INFUSION THERAPY | Facility: HOSPITAL | Age: 74
End: 2019-09-18

## 2019-09-18 ENCOUNTER — COMMUNICATION - HEALTHEAST (OUTPATIENT)
Dept: ANTICOAGULATION | Facility: CLINIC | Age: 74
End: 2019-09-18

## 2019-09-18 ENCOUNTER — OFFICE VISIT - HEALTHEAST (OUTPATIENT)
Dept: ONCOLOGY | Facility: HOSPITAL | Age: 74
End: 2019-09-18

## 2019-09-18 DIAGNOSIS — C90.01 MULTIPLE MYELOMA IN REMISSION (H): ICD-10-CM

## 2019-09-18 DIAGNOSIS — I82.5Y9 CHRONIC DEEP VEIN THROMBOSIS (DVT) OF PROXIMAL VEIN OF LOWER EXTREMITY, UNSPECIFIED LATERALITY (H): ICD-10-CM

## 2019-09-18 DIAGNOSIS — C90.00 MULTIPLE MYELOMA NOT HAVING ACHIEVED REMISSION (H): ICD-10-CM

## 2019-09-18 LAB
BASOPHILS # BLD AUTO: 0 THOU/UL (ref 0–0.2)
BASOPHILS NFR BLD AUTO: 0 % (ref 0–2)
EOSINOPHIL COUNT (ABSOLUTE): 0.2 THOU/UL (ref 0–0.4)
EOSINOPHIL NFR BLD AUTO: 16 % (ref 0–6)
ERYTHROCYTE [DISTWIDTH] IN BLOOD BY AUTOMATED COUNT: 16 % (ref 11–14.5)
HCT VFR BLD AUTO: 29.4 % (ref 40–54)
HGB BLD-MCNC: 9.4 G/DL (ref 14–18)
INR PPP: 2.28 (ref 0.9–1.1)
LYMPHOCYTES # BLD AUTO: 0.4 THOU/UL (ref 0.8–4.4)
LYMPHOCYTES NFR BLD AUTO: 26 % (ref 20–40)
MCH RBC QN AUTO: 35.3 PG (ref 27–34)
MCHC RBC AUTO-ENTMCNC: 32 G/DL (ref 32–36)
MCV RBC AUTO: 111 FL (ref 80–100)
METAMYELOCYTES (ABSOLUTE): 0 THOU/UL
METAMYELOCYTES NFR BLD MANUAL: 2 %
MONOCYTES # BLD AUTO: 0.1 THOU/UL (ref 0–0.9)
MONOCYTES NFR BLD AUTO: 4 % (ref 2–10)
OVALOCYTES: ABNORMAL
PLAT MORPH BLD: ABNORMAL
PLATELET # BLD AUTO: 131 THOU/UL (ref 140–440)
PMV BLD AUTO: 10.8 FL (ref 8.5–12.5)
POLYCHROMASIA BLD QL SMEAR: ABNORMAL
RBC # BLD AUTO: 2.66 MILL/UL (ref 4.4–6.2)
SCHISTOCYTES: ABNORMAL
TOTAL NEUTROPHILS-ABS(DIFF): 0.7 THOU/UL (ref 2–7.7)
TOTAL NEUTROPHILS-REL(DIFF): 52 % (ref 50–70)
WBC: 1.4 THOU/UL (ref 4–11)

## 2019-09-28 ENCOUNTER — COMMUNICATION - HEALTHEAST (OUTPATIENT)
Dept: ONCOLOGY | Facility: HOSPITAL | Age: 74
End: 2019-09-28

## 2019-09-28 DIAGNOSIS — C90.00 MULTIPLE MYELOMA NOT HAVING ACHIEVED REMISSION (H): ICD-10-CM

## 2019-10-02 ENCOUNTER — COMMUNICATION - HEALTHEAST (OUTPATIENT)
Dept: ONCOLOGY | Facility: HOSPITAL | Age: 74
End: 2019-10-02

## 2019-10-02 DIAGNOSIS — C90.02 MULTIPLE MYELOMA IN RELAPSE (H): ICD-10-CM

## 2019-10-03 ENCOUNTER — COMMUNICATION - HEALTHEAST (OUTPATIENT)
Dept: ANTICOAGULATION | Facility: CLINIC | Age: 74
End: 2019-10-03

## 2019-10-03 ENCOUNTER — INFUSION - HEALTHEAST (OUTPATIENT)
Dept: INFUSION THERAPY | Facility: HOSPITAL | Age: 74
End: 2019-10-03

## 2019-10-03 DIAGNOSIS — C90.01 MULTIPLE MYELOMA IN REMISSION (H): ICD-10-CM

## 2019-10-03 DIAGNOSIS — I82.5Y9 CHRONIC DEEP VEIN THROMBOSIS (DVT) OF PROXIMAL VEIN OF LOWER EXTREMITY, UNSPECIFIED LATERALITY (H): ICD-10-CM

## 2019-10-03 LAB — INR PPP: 3.6 (ref 0.9–1.1)

## 2019-10-10 ENCOUNTER — COMMUNICATION - HEALTHEAST (OUTPATIENT)
Dept: ANTICOAGULATION | Facility: CLINIC | Age: 74
End: 2019-10-10

## 2019-10-10 ENCOUNTER — AMBULATORY - HEALTHEAST (OUTPATIENT)
Dept: INFUSION THERAPY | Facility: HOSPITAL | Age: 74
End: 2019-10-10

## 2019-10-10 ENCOUNTER — COMMUNICATION - HEALTHEAST (OUTPATIENT)
Dept: ONCOLOGY | Facility: HOSPITAL | Age: 74
End: 2019-10-10

## 2019-10-10 DIAGNOSIS — C90.01 MULTIPLE MYELOMA IN REMISSION (H): ICD-10-CM

## 2019-10-10 DIAGNOSIS — I82.409 DVT (DEEP VENOUS THROMBOSIS) (H): ICD-10-CM

## 2019-10-10 DIAGNOSIS — I82.5Y9 CHRONIC DEEP VEIN THROMBOSIS (DVT) OF PROXIMAL VEIN OF LOWER EXTREMITY, UNSPECIFIED LATERALITY (H): ICD-10-CM

## 2019-10-10 LAB
BASOPHILS # BLD AUTO: 0 THOU/UL (ref 0–0.2)
BASOPHILS NFR BLD AUTO: 2 % (ref 0–2)
EOSINOPHIL COUNT (ABSOLUTE): 0.3 THOU/UL (ref 0–0.4)
EOSINOPHIL NFR BLD AUTO: 19 % (ref 0–6)
ERYTHROCYTE [DISTWIDTH] IN BLOOD BY AUTOMATED COUNT: 15.3 % (ref 11–14.5)
HCT VFR BLD AUTO: 30.2 % (ref 40–54)
HGB BLD-MCNC: 9.8 G/DL (ref 14–18)
INR PPP: 1.65 (ref 0.9–1.1)
LYMPHOCYTES # BLD AUTO: 0.2 THOU/UL (ref 0.8–4.4)
LYMPHOCYTES NFR BLD AUTO: 13 % (ref 20–40)
MCH RBC QN AUTO: 35.6 PG (ref 27–34)
MCHC RBC AUTO-ENTMCNC: 32.5 G/DL (ref 32–36)
MCV RBC AUTO: 110 FL (ref 80–100)
MONOCYTES # BLD AUTO: 0.6 THOU/UL (ref 0–0.9)
MONOCYTES NFR BLD AUTO: 32 % (ref 2–10)
OVALOCYTES: ABNORMAL
PLAT MORPH BLD: ABNORMAL
PLATELET # BLD AUTO: 89 THOU/UL (ref 140–440)
PMV BLD AUTO: 12.1 FL (ref 8.5–12.5)
RBC # BLD AUTO: 2.75 MILL/UL (ref 4.4–6.2)
TOTAL NEUTROPHILS-ABS(DIFF): 0.6 THOU/UL (ref 2–7.7)
TOTAL NEUTROPHILS-REL(DIFF): 34 % (ref 50–70)
WBC: 1.8 THOU/UL (ref 4–11)

## 2019-10-11 ENCOUNTER — OFFICE VISIT - HEALTHEAST (OUTPATIENT)
Dept: INTERNAL MEDICINE | Facility: CLINIC | Age: 74
End: 2019-10-11

## 2019-10-11 DIAGNOSIS — I82.502 CHRONIC DEEP VEIN THROMBOSIS (DVT) OF LEFT LOWER EXTREMITY, UNSPECIFIED VEIN (H): ICD-10-CM

## 2019-10-11 DIAGNOSIS — D61.818 PANCYTOPENIA (H): ICD-10-CM

## 2019-10-11 DIAGNOSIS — Z86.19 HISTORY OF CLOSTRIDIOIDES DIFFICILE INFECTION: ICD-10-CM

## 2019-10-11 DIAGNOSIS — C90.00 MULTIPLE MYELOMA NOT HAVING ACHIEVED REMISSION (H): ICD-10-CM

## 2019-10-11 DIAGNOSIS — I35.0 AORTIC VALVE STENOSIS, ETIOLOGY OF CARDIAC VALVE DISEASE UNSPECIFIED: ICD-10-CM

## 2019-10-11 DIAGNOSIS — M48.061 SPINAL STENOSIS OF LUMBAR REGION WITHOUT NEUROGENIC CLAUDICATION: ICD-10-CM

## 2019-10-11 DIAGNOSIS — R05.9 COUGH: ICD-10-CM

## 2019-10-11 DIAGNOSIS — I48.0 PAROXYSMAL ATRIAL FIBRILLATION (H): ICD-10-CM

## 2019-10-16 ENCOUNTER — COMMUNICATION - HEALTHEAST (OUTPATIENT)
Dept: ANTICOAGULATION | Facility: CLINIC | Age: 74
End: 2019-10-16

## 2019-10-16 ENCOUNTER — INFUSION - HEALTHEAST (OUTPATIENT)
Dept: INFUSION THERAPY | Facility: HOSPITAL | Age: 74
End: 2019-10-16

## 2019-10-16 DIAGNOSIS — I82.5Y9 CHRONIC DEEP VEIN THROMBOSIS (DVT) OF PROXIMAL VEIN OF LOWER EXTREMITY, UNSPECIFIED LATERALITY (H): ICD-10-CM

## 2019-10-16 DIAGNOSIS — C90.01 MULTIPLE MYELOMA IN REMISSION (H): ICD-10-CM

## 2019-10-16 LAB
ALBUMIN SERPL-MCNC: 2.9 G/DL (ref 3.5–5)
ALP SERPL-CCNC: 77 U/L (ref 45–120)
ALT SERPL W P-5'-P-CCNC: 19 U/L (ref 0–45)
ANION GAP SERPL CALCULATED.3IONS-SCNC: 6 MMOL/L (ref 5–18)
AST SERPL W P-5'-P-CCNC: 14 U/L (ref 0–40)
BASOPHILS # BLD AUTO: 0 THOU/UL (ref 0–0.2)
BASOPHILS NFR BLD AUTO: 0 % (ref 0–2)
BILIRUB SERPL-MCNC: 0.3 MG/DL (ref 0–1)
BUN SERPL-MCNC: 23 MG/DL (ref 8–28)
CALCIUM SERPL-MCNC: 9 MG/DL (ref 8.5–10.5)
CHLORIDE BLD-SCNC: 109 MMOL/L (ref 98–107)
CO2 SERPL-SCNC: 26 MMOL/L (ref 22–31)
CREAT SERPL-MCNC: 1.07 MG/DL (ref 0.7–1.3)
EOSINOPHIL COUNT (ABSOLUTE): 0.3 THOU/UL (ref 0–0.4)
EOSINOPHIL NFR BLD AUTO: 16 % (ref 0–6)
ERYTHROCYTE [DISTWIDTH] IN BLOOD BY AUTOMATED COUNT: 14.7 % (ref 11–14.5)
GFR SERPL CREATININE-BSD FRML MDRD: >60 ML/MIN/1.73M2
GLUCOSE BLD-MCNC: 103 MG/DL (ref 70–125)
HCT VFR BLD AUTO: 29.7 % (ref 40–54)
HGB BLD-MCNC: 9.7 G/DL (ref 14–18)
INR PPP: 2.23 (ref 0.9–1.1)
LYMPHOCYTES # BLD AUTO: 0.3 THOU/UL (ref 0.8–4.4)
LYMPHOCYTES NFR BLD AUTO: 14 % (ref 20–40)
MCH RBC QN AUTO: 35.8 PG (ref 27–34)
MCHC RBC AUTO-ENTMCNC: 32.7 G/DL (ref 32–36)
MCV RBC AUTO: 110 FL (ref 80–100)
MONOCYTES # BLD AUTO: 0.1 THOU/UL (ref 0–0.9)
MONOCYTES NFR BLD AUTO: 6 % (ref 2–10)
OVALOCYTES: ABNORMAL
PLAT MORPH BLD: NORMAL
PLATELET # BLD AUTO: 141 THOU/UL (ref 140–440)
PMV BLD AUTO: 10.6 FL (ref 8.5–12.5)
POLYCHROMASIA BLD QL SMEAR: ABNORMAL
POTASSIUM BLD-SCNC: 4.4 MMOL/L (ref 3.5–5)
PROT SERPL-MCNC: 5.3 G/DL (ref 6–8)
RBC # BLD AUTO: 2.71 MILL/UL (ref 4.4–6.2)
SODIUM SERPL-SCNC: 141 MMOL/L (ref 136–145)
TOTAL NEUTROPHILS-ABS(DIFF): 1.3 THOU/UL (ref 2–7.7)
TOTAL NEUTROPHILS-REL(DIFF): 64 % (ref 50–70)
WBC: 2 THOU/UL (ref 4–11)

## 2019-10-23 ENCOUNTER — AMBULATORY - HEALTHEAST (OUTPATIENT)
Dept: PHARMACY | Facility: CLINIC | Age: 74
End: 2019-10-23

## 2019-10-23 ENCOUNTER — OFFICE VISIT - HEALTHEAST (OUTPATIENT)
Dept: PHARMACY | Facility: CLINIC | Age: 74
End: 2019-10-23

## 2019-10-23 DIAGNOSIS — C90.00 MULTIPLE MYELOMA NOT HAVING ACHIEVED REMISSION (H): ICD-10-CM

## 2019-10-23 DIAGNOSIS — Z78.9 TAKES DIETARY SUPPLEMENTS: ICD-10-CM

## 2019-10-23 DIAGNOSIS — N40.1 BENIGN PROSTATIC HYPERPLASIA WITH LOWER URINARY TRACT SYMPTOMS, SYMPTOM DETAILS UNSPECIFIED: ICD-10-CM

## 2019-10-23 DIAGNOSIS — Z86.718 HISTORY OF DVT (DEEP VEIN THROMBOSIS): ICD-10-CM

## 2019-10-23 DIAGNOSIS — I48.0 PAROXYSMAL ATRIAL FIBRILLATION (H): ICD-10-CM

## 2019-10-23 DIAGNOSIS — R09.82 POST-NASAL DRIP: ICD-10-CM

## 2019-10-23 DIAGNOSIS — H40.9 GLAUCOMA, UNSPECIFIED GLAUCOMA TYPE, UNSPECIFIED LATERALITY: ICD-10-CM

## 2019-10-23 DIAGNOSIS — I82.502 CHRONIC DEEP VEIN THROMBOSIS (DVT) OF LEFT LOWER EXTREMITY, UNSPECIFIED VEIN (H): ICD-10-CM

## 2019-10-28 ENCOUNTER — COMMUNICATION - HEALTHEAST (OUTPATIENT)
Dept: ONCOLOGY | Facility: HOSPITAL | Age: 74
End: 2019-10-28

## 2019-10-28 DIAGNOSIS — C90.02 MULTIPLE MYELOMA IN RELAPSE (H): ICD-10-CM

## 2019-10-30 ENCOUNTER — INFUSION - HEALTHEAST (OUTPATIENT)
Dept: INFUSION THERAPY | Facility: HOSPITAL | Age: 74
End: 2019-10-30

## 2019-10-30 ENCOUNTER — COMMUNICATION - HEALTHEAST (OUTPATIENT)
Dept: ANTICOAGULATION | Facility: CLINIC | Age: 74
End: 2019-10-30

## 2019-10-30 DIAGNOSIS — C90.01 MULTIPLE MYELOMA IN REMISSION (H): ICD-10-CM

## 2019-10-30 DIAGNOSIS — I82.5Y9 CHRONIC DEEP VEIN THROMBOSIS (DVT) OF PROXIMAL VEIN OF LOWER EXTREMITY, UNSPECIFIED LATERALITY (H): ICD-10-CM

## 2019-10-30 LAB — INR PPP: 2.34 (ref 0.9–1.1)

## 2019-11-06 ENCOUNTER — AMBULATORY - HEALTHEAST (OUTPATIENT)
Dept: INFUSION THERAPY | Facility: HOSPITAL | Age: 74
End: 2019-11-06

## 2019-11-06 DIAGNOSIS — I82.5Y9 CHRONIC DEEP VEIN THROMBOSIS (DVT) OF PROXIMAL VEIN OF LOWER EXTREMITY, UNSPECIFIED LATERALITY (H): ICD-10-CM

## 2019-11-06 DIAGNOSIS — C90.01 MULTIPLE MYELOMA IN REMISSION (H): ICD-10-CM

## 2019-11-06 LAB
IGA SERPL-MCNC: 317 MG/DL
IGA SERPL-MCNC: 33 MG/DL (ref 65–400)
IGM SERPL-MCNC: 10 MG/DL (ref 60–280)
PATH ICD:: NORMAL
PROT PATTERN SERPL IFE-IMP: NORMAL
REVIEWING PATHOLOGIST: NORMAL

## 2019-11-07 LAB
KAPPA LC FREE SER-MCNC: 45.89 MG/DL (ref 0.33–1.94)
KAPPA LC FREE/LAMBDA FREE SER NEPH: 37.31 {RATIO} (ref 0.26–1.65)
LAMBDA LC FREE SERPL-MCNC: 1.23 MG/DL (ref 0.57–2.63)

## 2019-11-08 ENCOUNTER — COMMUNICATION - HEALTHEAST (OUTPATIENT)
Dept: PHARMACY | Facility: CLINIC | Age: 74
End: 2019-11-08

## 2019-11-11 LAB
ALBUMIN PERCENT: 66.8 % (ref 51–67)
ALBUMIN SERPL ELPH-MCNC: 3 G/DL (ref 3.2–4.7)
ALPHA 1 PERCENT: 3.4 % (ref 2–4)
ALPHA 2 PERCENT: 12.1 % (ref 5–13)
ALPHA1 GLOB SERPL ELPH-MCNC: 0.2 G/DL (ref 0.1–0.3)
ALPHA2 GLOB SERPL ELPH-MCNC: 0.5 G/DL (ref 0.4–0.9)
B-GLOBULIN SERPL ELPH-MCNC: 0.5 G/DL (ref 0.7–1.2)
BETA PERCENT: 10.9 % (ref 10–17)
GAMMA GLOB SERPL ELPH-MCNC: 0.3 G/DL (ref 0.6–1.4)
GAMMA GLOBULIN PERCENT: 6.8 % (ref 9–20)
PATH ICD:: ABNORMAL
PROT PATTERN SERPL ELPH-IMP: ABNORMAL
PROT SERPL-MCNC: 4.5 G/DL (ref 6–8)
REVIEWING PATHOLOGIST: ABNORMAL

## 2019-11-12 ENCOUNTER — AMBULATORY - HEALTHEAST (OUTPATIENT)
Dept: INFUSION THERAPY | Facility: HOSPITAL | Age: 74
End: 2019-11-12

## 2019-11-12 DIAGNOSIS — I82.5Y9 CHRONIC DEEP VEIN THROMBOSIS (DVT) OF PROXIMAL VEIN OF LOWER EXTREMITY, UNSPECIFIED LATERALITY (H): ICD-10-CM

## 2019-11-12 DIAGNOSIS — C90.01 MULTIPLE MYELOMA IN REMISSION (H): ICD-10-CM

## 2019-11-12 LAB
BASOPHILS # BLD AUTO: 0.1 THOU/UL (ref 0–0.2)
BASOPHILS NFR BLD AUTO: 6 % (ref 0–2)
EOSINOPHIL COUNT (ABSOLUTE): 0.3 THOU/UL (ref 0–0.4)
EOSINOPHIL NFR BLD AUTO: 14 % (ref 0–6)
ERYTHROCYTE [DISTWIDTH] IN BLOOD BY AUTOMATED COUNT: 15.3 % (ref 11–14.5)
HCT VFR BLD AUTO: 28 % (ref 40–54)
HGB BLD-MCNC: 9 G/DL (ref 14–18)
INR PPP: 1.35 (ref 0.9–1.1)
LYMPHOCYTES # BLD AUTO: 0.2 THOU/UL (ref 0.8–4.4)
LYMPHOCYTES NFR BLD AUTO: 8 % (ref 20–40)
MCH RBC QN AUTO: 34.7 PG (ref 27–34)
MCHC RBC AUTO-ENTMCNC: 32.1 G/DL (ref 32–36)
MCV RBC AUTO: 108 FL (ref 80–100)
MONOCYTES # BLD AUTO: 0.3 THOU/UL (ref 0–0.9)
MONOCYTES NFR BLD AUTO: 16 % (ref 2–10)
OVALOCYTES: ABNORMAL
PLAT MORPH BLD: ABNORMAL
PLATELET # BLD AUTO: 126 THOU/UL (ref 140–440)
PMV BLD AUTO: 11.3 FL (ref 8.5–12.5)
POLYCHROMASIA BLD QL SMEAR: ABNORMAL
RBC # BLD AUTO: 2.59 MILL/UL (ref 4.4–6.2)
TOTAL NEUTROPHILS-ABS(DIFF): 1.1 THOU/UL (ref 2–7.7)
TOTAL NEUTROPHILS-REL(DIFF): 56 % (ref 50–70)
WBC: 2 THOU/UL (ref 4–11)

## 2019-11-13 ENCOUNTER — INFUSION - HEALTHEAST (OUTPATIENT)
Dept: INFUSION THERAPY | Facility: HOSPITAL | Age: 74
End: 2019-11-13

## 2019-11-13 ENCOUNTER — OFFICE VISIT - HEALTHEAST (OUTPATIENT)
Dept: ONCOLOGY | Facility: HOSPITAL | Age: 74
End: 2019-11-13

## 2019-11-13 ENCOUNTER — RECORDS - HEALTHEAST (OUTPATIENT)
Dept: ANTICOAGULATION | Facility: CLINIC | Age: 74
End: 2019-11-13

## 2019-11-13 DIAGNOSIS — I82.4Z9 DEEP VEIN THROMBOSIS (DVT) OF DISTAL VEIN OF LOWER EXTREMITY, UNSPECIFIED CHRONICITY, UNSPECIFIED LATERALITY (H): ICD-10-CM

## 2019-11-13 DIAGNOSIS — C90.01 MULTIPLE MYELOMA IN REMISSION (H): ICD-10-CM

## 2019-11-13 DIAGNOSIS — I82.5Y9 CHRONIC DEEP VEIN THROMBOSIS (DVT) OF PROXIMAL VEIN OF LOWER EXTREMITY, UNSPECIFIED LATERALITY (H): ICD-10-CM

## 2019-11-13 DIAGNOSIS — C90.00 MULTIPLE MYELOMA NOT HAVING ACHIEVED REMISSION (H): ICD-10-CM

## 2019-11-13 DIAGNOSIS — D61.818 PANCYTOPENIA (H): ICD-10-CM

## 2019-11-13 ASSESSMENT — MIFFLIN-ST. JEOR: SCORE: 1504.19

## 2019-11-21 ENCOUNTER — COMMUNICATION - HEALTHEAST (OUTPATIENT)
Dept: ONCOLOGY | Facility: HOSPITAL | Age: 74
End: 2019-11-21

## 2019-11-21 DIAGNOSIS — C90.02 MULTIPLE MYELOMA IN RELAPSE (H): ICD-10-CM

## 2019-11-26 ENCOUNTER — OFFICE VISIT - HEALTHEAST (OUTPATIENT)
Dept: INTERNAL MEDICINE | Facility: CLINIC | Age: 74
End: 2019-11-26

## 2019-11-26 ENCOUNTER — COMMUNICATION - HEALTHEAST (OUTPATIENT)
Dept: ONCOLOGY | Facility: HOSPITAL | Age: 74
End: 2019-11-26

## 2019-11-26 ENCOUNTER — INFUSION - HEALTHEAST (OUTPATIENT)
Dept: INFUSION THERAPY | Facility: HOSPITAL | Age: 74
End: 2019-11-26

## 2019-11-26 DIAGNOSIS — Z86.19 HISTORY OF CLOSTRIDIOIDES DIFFICILE INFECTION: ICD-10-CM

## 2019-11-26 DIAGNOSIS — I82.5Z9 CHRONIC DEEP VEIN THROMBOSIS (DVT) OF DISTAL VEIN OF LOWER EXTREMITY, UNSPECIFIED LATERALITY (H): ICD-10-CM

## 2019-11-26 DIAGNOSIS — I48.0 PAROXYSMAL ATRIAL FIBRILLATION (H): ICD-10-CM

## 2019-11-26 DIAGNOSIS — C90.01 MULTIPLE MYELOMA IN REMISSION (H): ICD-10-CM

## 2019-11-26 DIAGNOSIS — C90.00 MULTIPLE MYELOMA NOT HAVING ACHIEVED REMISSION (H): ICD-10-CM

## 2019-11-26 DIAGNOSIS — I35.0 AORTIC VALVE STENOSIS, ETIOLOGY OF CARDIAC VALVE DISEASE UNSPECIFIED: ICD-10-CM

## 2019-11-26 DIAGNOSIS — I82.5Y9 CHRONIC DEEP VEIN THROMBOSIS (DVT) OF PROXIMAL VEIN OF LOWER EXTREMITY, UNSPECIFIED LATERALITY (H): ICD-10-CM

## 2019-11-26 DIAGNOSIS — I82.502 CHRONIC DEEP VEIN THROMBOSIS (DVT) OF LEFT LOWER EXTREMITY, UNSPECIFIED VEIN (H): ICD-10-CM

## 2019-11-26 DIAGNOSIS — M48.061 SPINAL STENOSIS OF LUMBAR REGION WITHOUT NEUROGENIC CLAUDICATION: ICD-10-CM

## 2019-12-04 ENCOUNTER — RECORDS - HEALTHEAST (OUTPATIENT)
Dept: ADMINISTRATIVE | Facility: OTHER | Age: 74
End: 2019-12-04

## 2019-12-12 ENCOUNTER — RECORDS - HEALTHEAST (OUTPATIENT)
Dept: ADMINISTRATIVE | Facility: OTHER | Age: 74
End: 2019-12-12

## 2019-12-12 ENCOUNTER — AMBULATORY - HEALTHEAST (OUTPATIENT)
Dept: INFUSION THERAPY | Facility: HOSPITAL | Age: 74
End: 2019-12-12

## 2019-12-12 ENCOUNTER — INFUSION - HEALTHEAST (OUTPATIENT)
Dept: INFUSION THERAPY | Facility: HOSPITAL | Age: 74
End: 2019-12-12

## 2019-12-12 DIAGNOSIS — I82.5Y9 CHRONIC DEEP VEIN THROMBOSIS (DVT) OF PROXIMAL VEIN OF LOWER EXTREMITY, UNSPECIFIED LATERALITY (H): ICD-10-CM

## 2019-12-12 DIAGNOSIS — C90.01 MULTIPLE MYELOMA IN REMISSION (H): ICD-10-CM

## 2019-12-12 LAB
ALBUMIN SERPL-MCNC: 3 G/DL (ref 3.5–5)
ALP SERPL-CCNC: 73 U/L (ref 45–120)
ALT SERPL W P-5'-P-CCNC: 14 U/L (ref 0–45)
ANION GAP SERPL CALCULATED.3IONS-SCNC: 4 MMOL/L (ref 5–18)
AST SERPL W P-5'-P-CCNC: 12 U/L (ref 0–40)
BASOPHILS # BLD AUTO: 0 THOU/UL (ref 0–0.2)
BASOPHILS NFR BLD AUTO: 1 % (ref 0–2)
BILIRUB SERPL-MCNC: 0.4 MG/DL (ref 0–1)
BUN SERPL-MCNC: 13 MG/DL (ref 8–28)
CALCIUM SERPL-MCNC: 8.7 MG/DL (ref 8.5–10.5)
CHLORIDE BLD-SCNC: 109 MMOL/L (ref 98–107)
CO2 SERPL-SCNC: 28 MMOL/L (ref 22–31)
CREAT SERPL-MCNC: 0.9 MG/DL (ref 0.7–1.3)
EOSINOPHIL # BLD AUTO: 0.1 THOU/UL (ref 0–0.4)
EOSINOPHIL NFR BLD AUTO: 3 % (ref 0–6)
ERYTHROCYTE [DISTWIDTH] IN BLOOD BY AUTOMATED COUNT: 15.2 % (ref 11–14.5)
GFR SERPL CREATININE-BSD FRML MDRD: >60 ML/MIN/1.73M2
GLUCOSE BLD-MCNC: 94 MG/DL (ref 70–125)
HCT VFR BLD AUTO: 28.3 % (ref 40–54)
HGB BLD-MCNC: 9.2 G/DL (ref 14–18)
LYMPHOCYTES # BLD AUTO: 0.3 THOU/UL (ref 0.8–4.4)
LYMPHOCYTES NFR BLD AUTO: 10 % (ref 20–40)
MCH RBC QN AUTO: 34.8 PG (ref 27–34)
MCHC RBC AUTO-ENTMCNC: 32.5 G/DL (ref 32–36)
MCV RBC AUTO: 107 FL (ref 80–100)
MONOCYTES # BLD AUTO: 0.1 THOU/UL (ref 0–0.9)
MONOCYTES NFR BLD AUTO: 4 % (ref 2–10)
NEUTROPHILS # BLD AUTO: 2.3 THOU/UL (ref 2–7.7)
NEUTROPHILS NFR BLD AUTO: 81 % (ref 50–70)
PLATELET # BLD AUTO: 160 THOU/UL (ref 140–440)
PMV BLD AUTO: 12 FL (ref 8.5–12.5)
POTASSIUM BLD-SCNC: 4 MMOL/L (ref 3.5–5)
PROT SERPL-MCNC: 5 G/DL (ref 6–8)
RBC # BLD AUTO: 2.64 MILL/UL (ref 4.4–6.2)
SODIUM SERPL-SCNC: 141 MMOL/L (ref 136–145)
WBC: 2.9 THOU/UL (ref 4–11)

## 2019-12-17 ENCOUNTER — COMMUNICATION - HEALTHEAST (OUTPATIENT)
Dept: SCHEDULING | Facility: CLINIC | Age: 74
End: 2019-12-17

## 2019-12-17 ENCOUNTER — OFFICE VISIT - HEALTHEAST (OUTPATIENT)
Dept: FAMILY MEDICINE | Facility: CLINIC | Age: 74
End: 2019-12-17

## 2019-12-17 DIAGNOSIS — J01.90 SUBACUTE SINUSITIS, UNSPECIFIED LOCATION: ICD-10-CM

## 2019-12-23 ENCOUNTER — COMMUNICATION - HEALTHEAST (OUTPATIENT)
Dept: SCHEDULING | Facility: CLINIC | Age: 74
End: 2019-12-23

## 2019-12-23 ENCOUNTER — OFFICE VISIT - HEALTHEAST (OUTPATIENT)
Dept: FAMILY MEDICINE | Facility: CLINIC | Age: 74
End: 2019-12-23

## 2019-12-23 DIAGNOSIS — R19.7 DIARRHEA, UNSPECIFIED TYPE: ICD-10-CM

## 2019-12-23 LAB
C DIFF TOX B STL QL: NEGATIVE
RIBOTYPE 027/NAP1/BI: NORMAL

## 2019-12-26 ENCOUNTER — INFUSION - HEALTHEAST (OUTPATIENT)
Dept: INFUSION THERAPY | Facility: HOSPITAL | Age: 74
End: 2019-12-26

## 2019-12-26 DIAGNOSIS — I82.5Y9 CHRONIC DEEP VEIN THROMBOSIS (DVT) OF PROXIMAL VEIN OF LOWER EXTREMITY, UNSPECIFIED LATERALITY (H): ICD-10-CM

## 2019-12-26 DIAGNOSIS — C90.01 MULTIPLE MYELOMA IN REMISSION (H): ICD-10-CM

## 2019-12-26 LAB
IGA SERPL-MCNC: 312 MG/DL
IGA SERPL-MCNC: 51 MG/DL (ref 65–400)
IGM SERPL-MCNC: 7 MG/DL (ref 60–280)

## 2019-12-27 LAB
ALBUMIN PERCENT: 64.3 % (ref 51–67)
ALBUMIN SERPL ELPH-MCNC: 3.1 G/DL (ref 3.2–4.7)
ALPHA 1 PERCENT: 3.7 % (ref 2–4)
ALPHA 2 PERCENT: 14 % (ref 5–13)
ALPHA1 GLOB SERPL ELPH-MCNC: 0.2 G/DL (ref 0.1–0.3)
ALPHA2 GLOB SERPL ELPH-MCNC: 0.7 G/DL (ref 0.4–0.9)
B-GLOBULIN SERPL ELPH-MCNC: 0.5 G/DL (ref 0.7–1.2)
BETA PERCENT: 10.7 % (ref 10–17)
GAMMA GLOB SERPL ELPH-MCNC: 0.4 G/DL (ref 0.6–1.4)
GAMMA GLOBULIN PERCENT: 7.3 % (ref 9–20)
KAPPA LC FREE SER-MCNC: 68.94 MG/DL (ref 0.33–1.94)
KAPPA LC FREE/LAMBDA FREE SER NEPH: 68.94 {RATIO} (ref 0.26–1.65)
LAMBDA LC FREE SERPL-MCNC: 1 MG/DL (ref 0.57–2.63)
PATH ICD:: ABNORMAL
PATH ICD:: NORMAL
PROT PATTERN SERPL ELPH-IMP: ABNORMAL
PROT PATTERN SERPL IFE-IMP: NORMAL
PROT SERPL-MCNC: 4.8 G/DL (ref 6–8)
REVIEWING PATHOLOGIST: ABNORMAL
REVIEWING PATHOLOGIST: NORMAL

## 2019-12-30 ENCOUNTER — COMMUNICATION - HEALTHEAST (OUTPATIENT)
Dept: ONCOLOGY | Facility: HOSPITAL | Age: 74
End: 2019-12-30

## 2019-12-30 DIAGNOSIS — C90.02 MULTIPLE MYELOMA IN RELAPSE (H): ICD-10-CM

## 2020-01-08 ENCOUNTER — OFFICE VISIT - HEALTHEAST (OUTPATIENT)
Dept: ONCOLOGY | Facility: HOSPITAL | Age: 75
End: 2020-01-08

## 2020-01-08 ENCOUNTER — AMBULATORY - HEALTHEAST (OUTPATIENT)
Dept: INFUSION THERAPY | Facility: HOSPITAL | Age: 75
End: 2020-01-08

## 2020-01-08 ENCOUNTER — INFUSION - HEALTHEAST (OUTPATIENT)
Dept: INFUSION THERAPY | Facility: HOSPITAL | Age: 75
End: 2020-01-08

## 2020-01-08 DIAGNOSIS — I82.5Y9 CHRONIC DEEP VEIN THROMBOSIS (DVT) OF PROXIMAL VEIN OF LOWER EXTREMITY, UNSPECIFIED LATERALITY (H): ICD-10-CM

## 2020-01-08 DIAGNOSIS — C90.01 MULTIPLE MYELOMA IN REMISSION (H): ICD-10-CM

## 2020-01-08 DIAGNOSIS — C90.00 MULTIPLE MYELOMA NOT HAVING ACHIEVED REMISSION (H): ICD-10-CM

## 2020-01-08 LAB
ALBUMIN SERPL-MCNC: 3.4 G/DL (ref 3.5–5)
ALP SERPL-CCNC: 73 U/L (ref 45–120)
ALT SERPL W P-5'-P-CCNC: 19 U/L (ref 0–45)
ANION GAP SERPL CALCULATED.3IONS-SCNC: 4 MMOL/L (ref 5–18)
AST SERPL W P-5'-P-CCNC: 15 U/L (ref 0–40)
BASOPHILS # BLD AUTO: 0 THOU/UL (ref 0–0.2)
BASOPHILS NFR BLD AUTO: 2 % (ref 0–2)
BILIRUB SERPL-MCNC: 0.6 MG/DL (ref 0–1)
BUN SERPL-MCNC: 14 MG/DL (ref 8–28)
CALCIUM SERPL-MCNC: 8.9 MG/DL (ref 8.5–10.5)
CHLORIDE BLD-SCNC: 108 MMOL/L (ref 98–107)
CO2 SERPL-SCNC: 27 MMOL/L (ref 22–31)
CREAT SERPL-MCNC: 0.86 MG/DL (ref 0.7–1.3)
EOSINOPHIL # BLD AUTO: 0 THOU/UL (ref 0–0.4)
EOSINOPHIL NFR BLD AUTO: 1 % (ref 0–6)
ERYTHROCYTE [DISTWIDTH] IN BLOOD BY AUTOMATED COUNT: 16.4 % (ref 11–14.5)
GFR SERPL CREATININE-BSD FRML MDRD: >60 ML/MIN/1.73M2
GLUCOSE BLD-MCNC: 103 MG/DL (ref 70–125)
HCT VFR BLD AUTO: 30.2 % (ref 40–54)
HGB BLD-MCNC: 9.8 G/DL (ref 14–18)
LYMPHOCYTES # BLD AUTO: 0.4 THOU/UL (ref 0.8–4.4)
LYMPHOCYTES NFR BLD AUTO: 18 % (ref 20–40)
MCH RBC QN AUTO: 35.5 PG (ref 27–34)
MCHC RBC AUTO-ENTMCNC: 32.5 G/DL (ref 32–36)
MCV RBC AUTO: 109 FL (ref 80–100)
MONOCYTES # BLD AUTO: 0.1 THOU/UL (ref 0–0.9)
MONOCYTES NFR BLD AUTO: 5 % (ref 2–10)
NEUTROPHILS # BLD AUTO: 1.5 THOU/UL (ref 2–7.7)
NEUTROPHILS NFR BLD AUTO: 73 % (ref 50–70)
PLATELET # BLD AUTO: 129 THOU/UL (ref 140–440)
PMV BLD AUTO: 11.7 FL (ref 8.5–12.5)
POTASSIUM BLD-SCNC: 4.4 MMOL/L (ref 3.5–5)
PROT SERPL-MCNC: 5.4 G/DL (ref 6–8)
RBC # BLD AUTO: 2.76 MILL/UL (ref 4.4–6.2)
SODIUM SERPL-SCNC: 139 MMOL/L (ref 136–145)
WBC: 2 THOU/UL (ref 4–11)

## 2020-01-22 ENCOUNTER — INFUSION - HEALTHEAST (OUTPATIENT)
Dept: INFUSION THERAPY | Facility: HOSPITAL | Age: 75
End: 2020-01-22

## 2020-01-22 DIAGNOSIS — C90.01 MULTIPLE MYELOMA IN REMISSION (H): ICD-10-CM

## 2020-01-22 DIAGNOSIS — I82.5Y9 CHRONIC DEEP VEIN THROMBOSIS (DVT) OF PROXIMAL VEIN OF LOWER EXTREMITY, UNSPECIFIED LATERALITY (H): ICD-10-CM

## 2020-01-23 ENCOUNTER — COMMUNICATION - HEALTHEAST (OUTPATIENT)
Dept: ONCOLOGY | Facility: HOSPITAL | Age: 75
End: 2020-01-23

## 2020-01-23 DIAGNOSIS — C90.02 MULTIPLE MYELOMA IN RELAPSE (H): ICD-10-CM

## 2020-02-05 ENCOUNTER — INFUSION - HEALTHEAST (OUTPATIENT)
Dept: INFUSION THERAPY | Facility: HOSPITAL | Age: 75
End: 2020-02-05

## 2020-02-05 DIAGNOSIS — C90.01 MULTIPLE MYELOMA IN REMISSION (H): ICD-10-CM

## 2020-02-05 DIAGNOSIS — I82.5Y9 CHRONIC DEEP VEIN THROMBOSIS (DVT) OF PROXIMAL VEIN OF LOWER EXTREMITY, UNSPECIFIED LATERALITY (H): ICD-10-CM

## 2020-02-05 LAB
ALBUMIN SERPL-MCNC: 3 G/DL (ref 3.5–5)
ALP SERPL-CCNC: 65 U/L (ref 45–120)
ALT SERPL W P-5'-P-CCNC: 12 U/L (ref 0–45)
ANION GAP SERPL CALCULATED.3IONS-SCNC: 6 MMOL/L (ref 5–18)
AST SERPL W P-5'-P-CCNC: 12 U/L (ref 0–40)
BASOPHILS # BLD AUTO: 0 THOU/UL (ref 0–0.2)
BASOPHILS NFR BLD AUTO: 2 % (ref 0–2)
BILIRUB SERPL-MCNC: 0.5 MG/DL (ref 0–1)
BUN SERPL-MCNC: 19 MG/DL (ref 8–28)
CALCIUM SERPL-MCNC: 8.5 MG/DL (ref 8.5–10.5)
CHLORIDE BLD-SCNC: 112 MMOL/L (ref 98–107)
CO2 SERPL-SCNC: 24 MMOL/L (ref 22–31)
CREAT SERPL-MCNC: 1.04 MG/DL (ref 0.7–1.3)
EOSINOPHIL COUNT (ABSOLUTE): 0.1 THOU/UL (ref 0–0.4)
EOSINOPHIL NFR BLD AUTO: 9 % (ref 0–6)
ERYTHROCYTE [DISTWIDTH] IN BLOOD BY AUTOMATED COUNT: 15.3 % (ref 11–14.5)
GFR SERPL CREATININE-BSD FRML MDRD: >60 ML/MIN/1.73M2
GLUCOSE BLD-MCNC: 123 MG/DL (ref 70–125)
HCT VFR BLD AUTO: 28.8 % (ref 40–54)
HGB BLD-MCNC: 9.3 G/DL (ref 14–18)
LYMPHOCYTES # BLD AUTO: 0.3 THOU/UL (ref 0.8–4.4)
LYMPHOCYTES NFR BLD AUTO: 17 % (ref 20–40)
MCH RBC QN AUTO: 35.4 PG (ref 27–34)
MCHC RBC AUTO-ENTMCNC: 32.3 G/DL (ref 32–36)
MCV RBC AUTO: 110 FL (ref 80–100)
MONOCYTES # BLD AUTO: 0.3 THOU/UL (ref 0–0.9)
MONOCYTES NFR BLD AUTO: 16 % (ref 2–10)
OVALOCYTES: ABNORMAL
PLAT MORPH BLD: ABNORMAL
PLATELET # BLD AUTO: 124 THOU/UL (ref 140–440)
PMV BLD AUTO: 11.6 FL (ref 8.5–12.5)
POTASSIUM BLD-SCNC: 3.9 MMOL/L (ref 3.5–5)
PROT SERPL-MCNC: 5 G/DL (ref 6–8)
RBC # BLD AUTO: 2.63 MILL/UL (ref 4.4–6.2)
SODIUM SERPL-SCNC: 142 MMOL/L (ref 136–145)
TOTAL NEUTROPHILS-ABS(DIFF): 0.9 THOU/UL (ref 2–7.7)
TOTAL NEUTROPHILS-REL(DIFF): 56 % (ref 50–70)
WBC: 1.6 THOU/UL (ref 4–11)

## 2020-02-16 ENCOUNTER — COMMUNICATION - HEALTHEAST (OUTPATIENT)
Dept: ONCOLOGY | Facility: HOSPITAL | Age: 75
End: 2020-02-16

## 2020-02-16 DIAGNOSIS — C90.00 MYELOMA (H): ICD-10-CM

## 2020-02-16 DIAGNOSIS — I82.5Z9 CHRONIC DEEP VEIN THROMBOSIS (DVT) OF DISTAL VEIN OF LOWER EXTREMITY, UNSPECIFIED LATERALITY (H): ICD-10-CM

## 2020-02-16 DIAGNOSIS — C90.00 MULTIPLE MYELOMA NOT HAVING ACHIEVED REMISSION (H): ICD-10-CM

## 2020-02-19 ENCOUNTER — INFUSION - HEALTHEAST (OUTPATIENT)
Dept: INFUSION THERAPY | Facility: HOSPITAL | Age: 75
End: 2020-02-19

## 2020-02-19 ENCOUNTER — AMBULATORY - HEALTHEAST (OUTPATIENT)
Dept: ONCOLOGY | Facility: HOSPITAL | Age: 75
End: 2020-02-19

## 2020-02-19 DIAGNOSIS — C90.02 MULTIPLE MYELOMA IN RELAPSE (H): ICD-10-CM

## 2020-02-19 DIAGNOSIS — I82.5Y9 CHRONIC DEEP VEIN THROMBOSIS (DVT) OF PROXIMAL VEIN OF LOWER EXTREMITY, UNSPECIFIED LATERALITY (H): ICD-10-CM

## 2020-02-19 DIAGNOSIS — C90.01 MULTIPLE MYELOMA IN REMISSION (H): ICD-10-CM

## 2020-02-19 LAB
IGA SERPL-MCNC: 21 MG/DL (ref 65–400)
IGA SERPL-MCNC: 328 MG/DL
IGM SERPL-MCNC: 6 MG/DL (ref 60–280)

## 2020-02-20 LAB
ALBUMIN PERCENT: 64.4 % (ref 51–67)
ALBUMIN SERPL ELPH-MCNC: 3 G/DL (ref 3.2–4.7)
ALPHA 1 PERCENT: 3.5 % (ref 2–4)
ALPHA 2 PERCENT: 11.8 % (ref 5–13)
ALPHA1 GLOB SERPL ELPH-MCNC: 0.2 G/DL (ref 0.1–0.3)
ALPHA2 GLOB SERPL ELPH-MCNC: 0.6 G/DL (ref 0.4–0.9)
B-GLOBULIN SERPL ELPH-MCNC: 0.5 G/DL (ref 0.7–1.2)
BETA PERCENT: 10.4 % (ref 10–17)
GAMMA GLOB SERPL ELPH-MCNC: 0.5 G/DL (ref 0.6–1.4)
GAMMA GLOBULIN PERCENT: 9.9 % (ref 9–20)
KAPPA LC FREE SER-MCNC: 65.08 MG/DL (ref 0.33–1.94)
KAPPA LC FREE/LAMBDA FREE SER NEPH: 108.47 {RATIO} (ref 0.26–1.65)
LAMBDA LC FREE SERPL-MCNC: 0.6 MG/DL (ref 0.57–2.63)
M PROTEIN SERPL ELPH-MCNC: 0.1 G/DL
PATH ICD:: ABNORMAL
PROT PATTERN SERPL ELPH-IMP: ABNORMAL
PROT SERPL-MCNC: 4.7 G/DL (ref 6–8)
REVIEWING PATHOLOGIST: ABNORMAL

## 2020-02-24 ENCOUNTER — COMMUNICATION - HEALTHEAST (OUTPATIENT)
Dept: ONCOLOGY | Facility: HOSPITAL | Age: 75
End: 2020-02-24

## 2020-02-24 DIAGNOSIS — C90.02 MULTIPLE MYELOMA IN RELAPSE (H): ICD-10-CM

## 2020-03-04 ENCOUNTER — OFFICE VISIT - HEALTHEAST (OUTPATIENT)
Dept: ONCOLOGY | Facility: HOSPITAL | Age: 75
End: 2020-03-04

## 2020-03-04 ENCOUNTER — INFUSION - HEALTHEAST (OUTPATIENT)
Dept: INFUSION THERAPY | Facility: HOSPITAL | Age: 75
End: 2020-03-04

## 2020-03-04 ENCOUNTER — AMBULATORY - HEALTHEAST (OUTPATIENT)
Dept: INFUSION THERAPY | Facility: HOSPITAL | Age: 75
End: 2020-03-04

## 2020-03-04 DIAGNOSIS — C90.01 MULTIPLE MYELOMA IN REMISSION (H): ICD-10-CM

## 2020-03-04 DIAGNOSIS — I82.5Y9 CHRONIC DEEP VEIN THROMBOSIS (DVT) OF PROXIMAL VEIN OF LOWER EXTREMITY, UNSPECIFIED LATERALITY (H): ICD-10-CM

## 2020-03-04 DIAGNOSIS — C90.02 MULTIPLE MYELOMA IN RELAPSE (H): ICD-10-CM

## 2020-03-04 DIAGNOSIS — C90.00 MULTIPLE MYELOMA NOT HAVING ACHIEVED REMISSION (H): ICD-10-CM

## 2020-03-04 LAB
ALBUMIN SERPL-MCNC: 3.2 G/DL (ref 3.5–5)
ALP SERPL-CCNC: 59 U/L (ref 45–120)
ALT SERPL W P-5'-P-CCNC: 15 U/L (ref 0–45)
ANION GAP SERPL CALCULATED.3IONS-SCNC: 4 MMOL/L (ref 5–18)
AST SERPL W P-5'-P-CCNC: 15 U/L (ref 0–40)
BASOPHILS # BLD AUTO: 0 THOU/UL (ref 0–0.2)
BASOPHILS NFR BLD AUTO: 1 % (ref 0–2)
BILIRUB SERPL-MCNC: 0.6 MG/DL (ref 0–1)
BUN SERPL-MCNC: 20 MG/DL (ref 8–28)
CALCIUM SERPL-MCNC: 8.8 MG/DL (ref 8.5–10.5)
CHLORIDE BLD-SCNC: 109 MMOL/L (ref 98–107)
CO2 SERPL-SCNC: 27 MMOL/L (ref 22–31)
CREAT SERPL-MCNC: 0.99 MG/DL (ref 0.7–1.3)
EOSINOPHIL # BLD AUTO: 0.1 THOU/UL (ref 0–0.4)
EOSINOPHIL NFR BLD AUTO: 6 % (ref 0–6)
ERYTHROCYTE [DISTWIDTH] IN BLOOD BY AUTOMATED COUNT: 15.5 % (ref 11–14.5)
GFR SERPL CREATININE-BSD FRML MDRD: >60 ML/MIN/1.73M2
GLUCOSE BLD-MCNC: 89 MG/DL (ref 70–125)
HCT VFR BLD AUTO: 28.8 % (ref 40–54)
HGB BLD-MCNC: 9.3 G/DL (ref 14–18)
LYMPHOCYTES # BLD AUTO: 0.3 THOU/UL (ref 0.8–4.4)
LYMPHOCYTES NFR BLD AUTO: 21 % (ref 20–40)
MCH RBC QN AUTO: 35.6 PG (ref 27–34)
MCHC RBC AUTO-ENTMCNC: 32.3 G/DL (ref 32–36)
MCV RBC AUTO: 110 FL (ref 80–100)
MONOCYTES # BLD AUTO: 0.2 THOU/UL (ref 0–0.9)
MONOCYTES NFR BLD AUTO: 13 % (ref 2–10)
NEUTROPHILS # BLD AUTO: 0.8 THOU/UL (ref 2–7.7)
NEUTROPHILS NFR BLD AUTO: 58 % (ref 50–70)
PLATELET # BLD AUTO: 129 THOU/UL (ref 140–440)
PMV BLD AUTO: 11.3 FL (ref 8.5–12.5)
POTASSIUM BLD-SCNC: 4.5 MMOL/L (ref 3.5–5)
PROT SERPL-MCNC: 5.2 G/DL (ref 6–8)
RBC # BLD AUTO: 2.61 MILL/UL (ref 4.4–6.2)
SODIUM SERPL-SCNC: 140 MMOL/L (ref 136–145)
WBC: 1.4 THOU/UL (ref 4–11)

## 2020-03-05 LAB
KAPPA LC FREE SER-MCNC: 60.34 MG/DL (ref 0.33–1.94)
KAPPA LC FREE/LAMBDA FREE SER NEPH: 94.28 {RATIO} (ref 0.26–1.65)
LAMBDA LC FREE SERPL-MCNC: 0.64 MG/DL (ref 0.57–2.63)

## 2020-03-06 ENCOUNTER — COMMUNICATION - HEALTHEAST (OUTPATIENT)
Dept: ONCOLOGY | Facility: HOSPITAL | Age: 75
End: 2020-03-06

## 2020-03-18 ENCOUNTER — INFUSION - HEALTHEAST (OUTPATIENT)
Dept: INFUSION THERAPY | Facility: HOSPITAL | Age: 75
End: 2020-03-18

## 2020-03-18 ENCOUNTER — COMMUNICATION - HEALTHEAST (OUTPATIENT)
Dept: ONCOLOGY | Facility: HOSPITAL | Age: 75
End: 2020-03-18

## 2020-03-18 DIAGNOSIS — C90.02 MULTIPLE MYELOMA IN RELAPSE (H): ICD-10-CM

## 2020-03-18 DIAGNOSIS — C90.01 MULTIPLE MYELOMA IN REMISSION (H): ICD-10-CM

## 2020-03-18 DIAGNOSIS — I82.5Y9 CHRONIC DEEP VEIN THROMBOSIS (DVT) OF PROXIMAL VEIN OF LOWER EXTREMITY, UNSPECIFIED LATERALITY (H): ICD-10-CM

## 2020-03-18 LAB
BASOPHILS # BLD AUTO: 0 THOU/UL (ref 0–0.2)
BASOPHILS NFR BLD AUTO: 1 % (ref 0–2)
EOSINOPHIL # BLD AUTO: 0.2 THOU/UL (ref 0–0.4)
EOSINOPHIL NFR BLD AUTO: 11 % (ref 0–6)
ERYTHROCYTE [DISTWIDTH] IN BLOOD BY AUTOMATED COUNT: 15.4 % (ref 11–14.5)
HCT VFR BLD AUTO: 29.5 % (ref 40–54)
HGB BLD-MCNC: 9.4 G/DL (ref 14–18)
INR PPP: 1.31 (ref 0.9–1.1)
LYMPHOCYTES # BLD AUTO: 0.4 THOU/UL (ref 0.8–4.4)
LYMPHOCYTES NFR BLD AUTO: 22 % (ref 20–40)
MCH RBC QN AUTO: 36 PG (ref 27–34)
MCHC RBC AUTO-ENTMCNC: 31.9 G/DL (ref 32–36)
MCV RBC AUTO: 113 FL (ref 80–100)
MONOCYTES # BLD AUTO: 0.2 THOU/UL (ref 0–0.9)
MONOCYTES NFR BLD AUTO: 10 % (ref 2–10)
NEUTROPHILS # BLD AUTO: 0.9 THOU/UL (ref 2–7.7)
NEUTROPHILS NFR BLD AUTO: 53 % (ref 50–70)
PLATELET # BLD AUTO: 78 THOU/UL (ref 140–440)
PMV BLD AUTO: 11.9 FL (ref 8.5–12.5)
RBC # BLD AUTO: 2.61 MILL/UL (ref 4.4–6.2)
WBC: 1.7 THOU/UL (ref 4–11)

## 2020-03-26 ENCOUNTER — COMMUNICATION - HEALTHEAST (OUTPATIENT)
Dept: ONCOLOGY | Facility: HOSPITAL | Age: 75
End: 2020-03-26

## 2020-03-26 DIAGNOSIS — B02.29 POST HERPETIC NEURALGIA: ICD-10-CM

## 2020-04-02 ENCOUNTER — AMBULATORY - HEALTHEAST (OUTPATIENT)
Dept: INFUSION THERAPY | Facility: HOSPITAL | Age: 75
End: 2020-04-02

## 2020-04-02 ENCOUNTER — INFUSION - HEALTHEAST (OUTPATIENT)
Dept: INFUSION THERAPY | Facility: HOSPITAL | Age: 75
End: 2020-04-02

## 2020-04-02 DIAGNOSIS — C90.01 MULTIPLE MYELOMA IN REMISSION (H): ICD-10-CM

## 2020-04-02 DIAGNOSIS — I82.5Y9 CHRONIC DEEP VEIN THROMBOSIS (DVT) OF PROXIMAL VEIN OF LOWER EXTREMITY, UNSPECIFIED LATERALITY (H): ICD-10-CM

## 2020-04-02 LAB
ALBUMIN SERPL-MCNC: 3.1 G/DL (ref 3.5–5)
ALP SERPL-CCNC: 67 U/L (ref 45–120)
ALT SERPL W P-5'-P-CCNC: 29 U/L (ref 0–45)
ANION GAP SERPL CALCULATED.3IONS-SCNC: 6 MMOL/L (ref 5–18)
AST SERPL W P-5'-P-CCNC: 24 U/L (ref 0–40)
BASOPHILS # BLD AUTO: 0 THOU/UL (ref 0–0.2)
BASOPHILS NFR BLD AUTO: 2 % (ref 0–2)
BILIRUB SERPL-MCNC: 0.6 MG/DL (ref 0–1)
BUN SERPL-MCNC: 23 MG/DL (ref 8–28)
CALCIUM SERPL-MCNC: 8.6 MG/DL (ref 8.5–10.5)
CHLORIDE BLD-SCNC: 110 MMOL/L (ref 98–107)
CO2 SERPL-SCNC: 26 MMOL/L (ref 22–31)
CREAT SERPL-MCNC: 1.15 MG/DL (ref 0.7–1.3)
EOSINOPHIL # BLD AUTO: 0.2 THOU/UL (ref 0–0.4)
EOSINOPHIL NFR BLD AUTO: 11 % (ref 0–6)
ERYTHROCYTE [DISTWIDTH] IN BLOOD BY AUTOMATED COUNT: 14.6 % (ref 11–14.5)
GFR SERPL CREATININE-BSD FRML MDRD: >60 ML/MIN/1.73M2
GLUCOSE BLD-MCNC: 105 MG/DL (ref 70–125)
HCT VFR BLD AUTO: 29.6 % (ref 40–54)
HGB BLD-MCNC: 9.6 G/DL (ref 14–18)
LYMPHOCYTES # BLD AUTO: 0.4 THOU/UL (ref 0.8–4.4)
LYMPHOCYTES NFR BLD AUTO: 30 % (ref 20–40)
MCH RBC QN AUTO: 35.3 PG (ref 27–34)
MCHC RBC AUTO-ENTMCNC: 32.4 G/DL (ref 32–36)
MCV RBC AUTO: 109 FL (ref 80–100)
MONOCYTES # BLD AUTO: 0.2 THOU/UL (ref 0–0.9)
MONOCYTES NFR BLD AUTO: 12 % (ref 2–10)
NEUTROPHILS # BLD AUTO: 0.6 THOU/UL (ref 2–7.7)
NEUTROPHILS NFR BLD AUTO: 45 % (ref 50–70)
PLATELET # BLD AUTO: 118 THOU/UL (ref 140–440)
PMV BLD AUTO: 12.3 FL (ref 8.5–12.5)
POTASSIUM BLD-SCNC: 4.2 MMOL/L (ref 3.5–5)
PROT SERPL-MCNC: 5.1 G/DL (ref 6–8)
RBC # BLD AUTO: 2.72 MILL/UL (ref 4.4–6.2)
SODIUM SERPL-SCNC: 142 MMOL/L (ref 136–145)
WBC: 1.4 THOU/UL (ref 4–11)

## 2020-04-14 ENCOUNTER — COMMUNICATION - HEALTHEAST (OUTPATIENT)
Dept: ONCOLOGY | Facility: HOSPITAL | Age: 75
End: 2020-04-14

## 2020-04-14 DIAGNOSIS — C90.02 MULTIPLE MYELOMA IN RELAPSE (H): ICD-10-CM

## 2020-04-15 ENCOUNTER — INFUSION - HEALTHEAST (OUTPATIENT)
Dept: INFUSION THERAPY | Facility: HOSPITAL | Age: 75
End: 2020-04-15

## 2020-04-15 DIAGNOSIS — I82.5Y9 CHRONIC DEEP VEIN THROMBOSIS (DVT) OF PROXIMAL VEIN OF LOWER EXTREMITY, UNSPECIFIED LATERALITY (H): ICD-10-CM

## 2020-04-15 DIAGNOSIS — C90.01 MULTIPLE MYELOMA IN REMISSION (H): ICD-10-CM

## 2020-04-15 LAB
IGA SERPL-MCNC: 19 MG/DL (ref 65–400)
IGA SERPL-MCNC: 318 MG/DL
IGM SERPL-MCNC: 5 MG/DL (ref 60–280)

## 2020-04-17 LAB
ALBUMIN PERCENT: 69.5 % (ref 51–67)
ALBUMIN SERPL ELPH-MCNC: 3.2 G/DL (ref 3.2–4.7)
ALPHA 1 PERCENT: 3 % (ref 2–4)
ALPHA 2 PERCENT: 12 % (ref 5–13)
ALPHA1 GLOB SERPL ELPH-MCNC: 0.1 G/DL (ref 0.1–0.3)
ALPHA2 GLOB SERPL ELPH-MCNC: 0.6 G/DL (ref 0.4–0.9)
B-GLOBULIN SERPL ELPH-MCNC: 0.5 G/DL (ref 0.7–1.2)
BETA PERCENT: 10 % (ref 10–17)
GAMMA GLOB SERPL ELPH-MCNC: 0.3 G/DL (ref 0.6–1.4)
GAMMA GLOBULIN PERCENT: 5.5 % (ref 9–20)
KAPPA LC FREE SER-MCNC: 55.22 MG/DL (ref 0.33–1.94)
KAPPA LC FREE/LAMBDA FREE SER NEPH: 63.47 {RATIO} (ref 0.26–1.65)
LAMBDA LC FREE SERPL-MCNC: 0.87 MG/DL (ref 0.57–2.63)
PATH ICD:: ABNORMAL
PATH ICD:: NORMAL
PROT PATTERN SERPL ELPH-IMP: ABNORMAL
PROT PATTERN SERPL IFE-IMP: NORMAL
PROT SERPL-MCNC: 4.6 G/DL (ref 6–8)
REVIEWING PATHOLOGIST: ABNORMAL
REVIEWING PATHOLOGIST: NORMAL

## 2020-04-29 ENCOUNTER — OFFICE VISIT - HEALTHEAST (OUTPATIENT)
Dept: ONCOLOGY | Facility: HOSPITAL | Age: 75
End: 2020-04-29

## 2020-04-29 ENCOUNTER — AMBULATORY - HEALTHEAST (OUTPATIENT)
Dept: INFUSION THERAPY | Facility: HOSPITAL | Age: 75
End: 2020-04-29

## 2020-04-29 ENCOUNTER — AMBULATORY - HEALTHEAST (OUTPATIENT)
Dept: ONCOLOGY | Facility: HOSPITAL | Age: 75
End: 2020-04-29

## 2020-04-29 ENCOUNTER — INFUSION - HEALTHEAST (OUTPATIENT)
Dept: INFUSION THERAPY | Facility: HOSPITAL | Age: 75
End: 2020-04-29

## 2020-04-29 DIAGNOSIS — I82.5Y9 CHRONIC DEEP VEIN THROMBOSIS (DVT) OF PROXIMAL VEIN OF LOWER EXTREMITY, UNSPECIFIED LATERALITY (H): ICD-10-CM

## 2020-04-29 DIAGNOSIS — C90.01 MULTIPLE MYELOMA IN REMISSION (H): ICD-10-CM

## 2020-04-29 DIAGNOSIS — D51.9 ANEMIA DUE TO VITAMIN B12 DEFICIENCY, UNSPECIFIED B12 DEFICIENCY TYPE: ICD-10-CM

## 2020-04-29 DIAGNOSIS — C90.00 MULTIPLE MYELOMA NOT HAVING ACHIEVED REMISSION (H): ICD-10-CM

## 2020-04-29 DIAGNOSIS — T45.1X5A CHEMOTHERAPY-INDUCED NEUTROPENIA (H): ICD-10-CM

## 2020-04-29 DIAGNOSIS — D70.1 CHEMOTHERAPY-INDUCED NEUTROPENIA (H): ICD-10-CM

## 2020-04-29 LAB
ALBUMIN SERPL-MCNC: 3.2 G/DL (ref 3.5–5)
ALP SERPL-CCNC: 63 U/L (ref 45–120)
ALT SERPL W P-5'-P-CCNC: 15 U/L (ref 0–45)
ANION GAP SERPL CALCULATED.3IONS-SCNC: 6 MMOL/L (ref 5–18)
AST SERPL W P-5'-P-CCNC: 14 U/L (ref 0–40)
BASOPHILS # BLD AUTO: 0 THOU/UL (ref 0–0.2)
BASOPHILS NFR BLD AUTO: 1 % (ref 0–2)
BILIRUB SERPL-MCNC: 0.4 MG/DL (ref 0–1)
BUN SERPL-MCNC: 26 MG/DL (ref 8–28)
CALCIUM SERPL-MCNC: 8.9 MG/DL (ref 8.5–10.5)
CHLORIDE BLD-SCNC: 110 MMOL/L (ref 98–107)
CO2 SERPL-SCNC: 25 MMOL/L (ref 22–31)
CREAT SERPL-MCNC: 1.18 MG/DL (ref 0.7–1.3)
EOSINOPHIL # BLD AUTO: 0.1 THOU/UL (ref 0–0.4)
EOSINOPHIL NFR BLD AUTO: 6 % (ref 0–6)
ERYTHROCYTE [DISTWIDTH] IN BLOOD BY AUTOMATED COUNT: 15 % (ref 11–14.5)
GFR SERPL CREATININE-BSD FRML MDRD: 60 ML/MIN/1.73M2
GLUCOSE BLD-MCNC: 97 MG/DL (ref 70–125)
HCT VFR BLD AUTO: 27.8 % (ref 40–54)
HGB BLD-MCNC: 9.2 G/DL (ref 14–18)
LYMPHOCYTES # BLD AUTO: 0.4 THOU/UL (ref 0.8–4.4)
LYMPHOCYTES NFR BLD AUTO: 25 % (ref 20–40)
MCH RBC QN AUTO: 36.4 PG (ref 27–34)
MCHC RBC AUTO-ENTMCNC: 33.1 G/DL (ref 32–36)
MCV RBC AUTO: 110 FL (ref 80–100)
MONOCYTES # BLD AUTO: 0.1 THOU/UL (ref 0–0.9)
MONOCYTES NFR BLD AUTO: 10 % (ref 2–10)
NEUTROPHILS # BLD AUTO: 0.8 THOU/UL (ref 2–7.7)
NEUTROPHILS NFR BLD AUTO: 57 % (ref 50–70)
PLATELET # BLD AUTO: 104 THOU/UL (ref 140–440)
PMV BLD AUTO: 10.9 FL (ref 8.5–12.5)
POTASSIUM BLD-SCNC: 4.7 MMOL/L (ref 3.5–5)
PROT SERPL-MCNC: 5 G/DL (ref 6–8)
RBC # BLD AUTO: 2.53 MILL/UL (ref 4.4–6.2)
SODIUM SERPL-SCNC: 141 MMOL/L (ref 136–145)
VIT B12 SERPL-MCNC: 362 PG/ML (ref 213–816)
WBC: 1.5 THOU/UL (ref 4–11)

## 2020-04-29 ASSESSMENT — MIFFLIN-ST. JEOR: SCORE: 1501.47

## 2020-04-30 ENCOUNTER — AMBULATORY - HEALTHEAST (OUTPATIENT)
Dept: ONCOLOGY | Facility: CLINIC | Age: 75
End: 2020-04-30

## 2020-05-13 ENCOUNTER — COMMUNICATION - HEALTHEAST (OUTPATIENT)
Dept: ONCOLOGY | Facility: HOSPITAL | Age: 75
End: 2020-05-13

## 2020-05-13 ENCOUNTER — INFUSION - HEALTHEAST (OUTPATIENT)
Dept: INFUSION THERAPY | Facility: HOSPITAL | Age: 75
End: 2020-05-13

## 2020-05-13 DIAGNOSIS — C90.02 MULTIPLE MYELOMA IN RELAPSE (H): ICD-10-CM

## 2020-05-13 DIAGNOSIS — C90.01 MULTIPLE MYELOMA IN REMISSION (H): ICD-10-CM

## 2020-05-13 DIAGNOSIS — I82.5Y9 CHRONIC DEEP VEIN THROMBOSIS (DVT) OF PROXIMAL VEIN OF LOWER EXTREMITY, UNSPECIFIED LATERALITY (H): ICD-10-CM

## 2020-05-13 DIAGNOSIS — D51.9 ANEMIA DUE TO VITAMIN B12 DEFICIENCY, UNSPECIFIED B12 DEFICIENCY TYPE: ICD-10-CM

## 2020-05-14 ENCOUNTER — AMBULATORY - HEALTHEAST (OUTPATIENT)
Dept: ONCOLOGY | Facility: CLINIC | Age: 75
End: 2020-05-14

## 2020-05-14 DIAGNOSIS — C90.00 MULTIPLE MYELOMA NOT HAVING ACHIEVED REMISSION (H): ICD-10-CM

## 2020-05-25 ENCOUNTER — COMMUNICATION - HEALTHEAST (OUTPATIENT)
Dept: ONCOLOGY | Facility: HOSPITAL | Age: 75
End: 2020-05-25

## 2020-05-25 DIAGNOSIS — I82.5Z9 CHRONIC DEEP VEIN THROMBOSIS (DVT) OF DISTAL VEIN OF LOWER EXTREMITY, UNSPECIFIED LATERALITY (H): ICD-10-CM

## 2020-05-26 ENCOUNTER — COMMUNICATION - HEALTHEAST (OUTPATIENT)
Dept: ONCOLOGY | Facility: HOSPITAL | Age: 75
End: 2020-05-26

## 2020-05-26 DIAGNOSIS — B02.29 POST HERPETIC NEURALGIA: ICD-10-CM

## 2020-05-27 ENCOUNTER — INFUSION - HEALTHEAST (OUTPATIENT)
Dept: INFUSION THERAPY | Facility: HOSPITAL | Age: 75
End: 2020-05-27

## 2020-05-27 DIAGNOSIS — C90.01 MULTIPLE MYELOMA IN REMISSION (H): ICD-10-CM

## 2020-05-27 DIAGNOSIS — I82.5Y9 CHRONIC DEEP VEIN THROMBOSIS (DVT) OF PROXIMAL VEIN OF LOWER EXTREMITY, UNSPECIFIED LATERALITY (H): ICD-10-CM

## 2020-05-27 LAB
ALBUMIN SERPL-MCNC: 3.3 G/DL (ref 3.5–5)
ALP SERPL-CCNC: 76 U/L (ref 45–120)
ALT SERPL W P-5'-P-CCNC: 28 U/L (ref 0–45)
ANION GAP SERPL CALCULATED.3IONS-SCNC: 6 MMOL/L (ref 5–18)
AST SERPL W P-5'-P-CCNC: 25 U/L (ref 0–40)
BASOPHILS # BLD AUTO: 0 THOU/UL (ref 0–0.2)
BASOPHILS NFR BLD AUTO: 2 % (ref 0–2)
BILIRUB SERPL-MCNC: 0.4 MG/DL (ref 0–1)
BUN SERPL-MCNC: 33 MG/DL (ref 8–28)
CALCIUM SERPL-MCNC: 9.1 MG/DL (ref 8.5–10.5)
CHLORIDE BLD-SCNC: 107 MMOL/L (ref 98–107)
CO2 SERPL-SCNC: 26 MMOL/L (ref 22–31)
CREAT SERPL-MCNC: 1.38 MG/DL (ref 0.7–1.3)
EOSINOPHIL # BLD AUTO: 0.1 THOU/UL (ref 0–0.4)
EOSINOPHIL NFR BLD AUTO: 3 % (ref 0–6)
ERYTHROCYTE [DISTWIDTH] IN BLOOD BY AUTOMATED COUNT: 14.8 % (ref 11–14.5)
GFR SERPL CREATININE-BSD FRML MDRD: 50 ML/MIN/1.73M2
GLUCOSE BLD-MCNC: 105 MG/DL (ref 70–125)
HCT VFR BLD AUTO: 28.9 % (ref 40–54)
HGB BLD-MCNC: 9.3 G/DL (ref 14–18)
LYMPHOCYTES # BLD AUTO: 0.3 THOU/UL (ref 0.8–4.4)
LYMPHOCYTES NFR BLD AUTO: 19 % (ref 20–40)
MCH RBC QN AUTO: 35.2 PG (ref 27–34)
MCHC RBC AUTO-ENTMCNC: 32.2 G/DL (ref 32–36)
MCV RBC AUTO: 110 FL (ref 80–100)
MONOCYTES # BLD AUTO: 0.1 THOU/UL (ref 0–0.9)
MONOCYTES NFR BLD AUTO: 8 % (ref 2–10)
NEUTROPHILS # BLD AUTO: 1.2 THOU/UL (ref 2–7.7)
NEUTROPHILS NFR BLD AUTO: 67 % (ref 50–70)
OVALOCYTES: ABNORMAL
PLAT MORPH BLD: ABNORMAL
PLATELET # BLD AUTO: 122 THOU/UL (ref 140–440)
PMV BLD AUTO: 12 FL (ref 8.5–12.5)
POLYCHROMASIA BLD QL SMEAR: ABNORMAL
POTASSIUM BLD-SCNC: 4.8 MMOL/L (ref 3.5–5)
PROT SERPL-MCNC: 5.4 G/DL (ref 6–8)
RBC # BLD AUTO: 2.64 MILL/UL (ref 4.4–6.2)
SODIUM SERPL-SCNC: 139 MMOL/L (ref 136–145)
WBC: 1.8 THOU/UL (ref 4–11)

## 2020-06-08 ENCOUNTER — COMMUNICATION - HEALTHEAST (OUTPATIENT)
Dept: ONCOLOGY | Facility: HOSPITAL | Age: 75
End: 2020-06-08

## 2020-06-08 DIAGNOSIS — C90.02 MULTIPLE MYELOMA IN RELAPSE (H): ICD-10-CM

## 2020-06-10 ENCOUNTER — INFUSION - HEALTHEAST (OUTPATIENT)
Dept: INFUSION THERAPY | Facility: HOSPITAL | Age: 75
End: 2020-06-10

## 2020-06-10 DIAGNOSIS — I82.5Y9 CHRONIC DEEP VEIN THROMBOSIS (DVT) OF PROXIMAL VEIN OF LOWER EXTREMITY, UNSPECIFIED LATERALITY (H): ICD-10-CM

## 2020-06-10 DIAGNOSIS — C90.01 MULTIPLE MYELOMA IN REMISSION (H): ICD-10-CM

## 2020-06-10 LAB
IGA SERPL-MCNC: 18 MG/DL (ref 65–400)
IGA SERPL-MCNC: 268 MG/DL
IGM SERPL-MCNC: <5 MG/DL (ref 60–280)

## 2020-06-12 LAB
ALBUMIN PERCENT: 69.6 % (ref 51–67)
ALBUMIN SERPL ELPH-MCNC: 3.2 G/DL (ref 3.2–4.7)
ALPHA 1 PERCENT: 3.6 % (ref 2–4)
ALPHA 2 PERCENT: 12.1 % (ref 5–13)
ALPHA1 GLOB SERPL ELPH-MCNC: 0.2 G/DL (ref 0.1–0.3)
ALPHA2 GLOB SERPL ELPH-MCNC: 0.6 G/DL (ref 0.4–0.9)
B-GLOBULIN SERPL ELPH-MCNC: 0.4 G/DL (ref 0.7–1.2)
BETA PERCENT: 9.7 % (ref 10–17)
GAMMA GLOB SERPL ELPH-MCNC: 0.2 G/DL (ref 0.6–1.4)
GAMMA GLOBULIN PERCENT: 5 % (ref 9–20)
KAPPA LC FREE SER-MCNC: 71.62 MG/DL (ref 0.33–1.94)
KAPPA LC FREE/LAMBDA FREE SER NEPH: 80.47 {RATIO} (ref 0.26–1.65)
LAMBDA LC FREE SERPL-MCNC: 0.89 MG/DL (ref 0.57–2.63)
PATH ICD:: ABNORMAL
PROT PATTERN SERPL ELPH-IMP: ABNORMAL
PROT SERPL-MCNC: 4.6 G/DL (ref 6–8)
REVIEWING PATHOLOGIST: ABNORMAL

## 2020-06-24 ENCOUNTER — INFUSION - HEALTHEAST (OUTPATIENT)
Dept: INFUSION THERAPY | Facility: HOSPITAL | Age: 75
End: 2020-06-24

## 2020-06-24 ENCOUNTER — OFFICE VISIT - HEALTHEAST (OUTPATIENT)
Dept: ONCOLOGY | Facility: HOSPITAL | Age: 75
End: 2020-06-24

## 2020-06-24 ENCOUNTER — AMBULATORY - HEALTHEAST (OUTPATIENT)
Dept: INFUSION THERAPY | Facility: HOSPITAL | Age: 75
End: 2020-06-24

## 2020-06-24 DIAGNOSIS — C90.01 MULTIPLE MYELOMA IN REMISSION (H): ICD-10-CM

## 2020-06-24 DIAGNOSIS — C90.00 MULTIPLE MYELOMA NOT HAVING ACHIEVED REMISSION (H): ICD-10-CM

## 2020-06-24 DIAGNOSIS — D51.9 ANEMIA DUE TO VITAMIN B12 DEFICIENCY, UNSPECIFIED B12 DEFICIENCY TYPE: ICD-10-CM

## 2020-06-24 DIAGNOSIS — I82.5Y9 CHRONIC DEEP VEIN THROMBOSIS (DVT) OF PROXIMAL VEIN OF LOWER EXTREMITY, UNSPECIFIED LATERALITY (H): ICD-10-CM

## 2020-06-24 LAB
ALBUMIN SERPL-MCNC: 3.2 G/DL (ref 3.5–5)
ALP SERPL-CCNC: 89 U/L (ref 45–120)
ALT SERPL W P-5'-P-CCNC: 61 U/L (ref 0–45)
ANION GAP SERPL CALCULATED.3IONS-SCNC: 7 MMOL/L (ref 5–18)
AST SERPL W P-5'-P-CCNC: 71 U/L (ref 0–40)
BASOPHILS # BLD AUTO: 0 THOU/UL (ref 0–0.2)
BASOPHILS NFR BLD AUTO: 0 % (ref 0–2)
BILIRUB SERPL-MCNC: 0.4 MG/DL (ref 0–1)
BUN SERPL-MCNC: 26 MG/DL (ref 8–28)
CALCIUM SERPL-MCNC: 8.8 MG/DL (ref 8.5–10.5)
CHLORIDE BLD-SCNC: 108 MMOL/L (ref 98–107)
CO2 SERPL-SCNC: 24 MMOL/L (ref 22–31)
CREAT SERPL-MCNC: 1.22 MG/DL (ref 0.7–1.3)
EOSINOPHIL COUNT (ABSOLUTE): 0 THOU/UL (ref 0–0.4)
EOSINOPHIL NFR BLD AUTO: 2 % (ref 0–6)
ERYTHROCYTE [DISTWIDTH] IN BLOOD BY AUTOMATED COUNT: 15.1 % (ref 11–14.5)
GFR SERPL CREATININE-BSD FRML MDRD: 58 ML/MIN/1.73M2
GLUCOSE BLD-MCNC: 103 MG/DL (ref 70–125)
HCT VFR BLD AUTO: 26.3 % (ref 40–54)
HGB BLD-MCNC: 8.6 G/DL (ref 14–18)
LYMPHOCYTES # BLD AUTO: 0.3 THOU/UL (ref 0.8–4.4)
LYMPHOCYTES NFR BLD AUTO: 17 % (ref 20–40)
MCH RBC QN AUTO: 36.4 PG (ref 27–34)
MCHC RBC AUTO-ENTMCNC: 32.7 G/DL (ref 32–36)
MCV RBC AUTO: 111 FL (ref 80–100)
MONOCYTES # BLD AUTO: 0.1 THOU/UL (ref 0–0.9)
MONOCYTES NFR BLD AUTO: 6 % (ref 2–10)
OVALOCYTES: ABNORMAL
PLAT MORPH BLD: ABNORMAL
PLATELET # BLD AUTO: 109 THOU/UL (ref 140–440)
PMV BLD AUTO: 11.7 FL (ref 8.5–12.5)
POTASSIUM BLD-SCNC: 4.9 MMOL/L (ref 3.5–5)
PROT SERPL-MCNC: 5.3 G/DL (ref 6–8)
RBC # BLD AUTO: 2.36 MILL/UL (ref 4.4–6.2)
SODIUM SERPL-SCNC: 139 MMOL/L (ref 136–145)
TOTAL NEUTROPHILS-ABS(DIFF): 1.3 THOU/UL (ref 2–7.7)
TOTAL NEUTROPHILS-REL(DIFF): 75 % (ref 50–70)
WBC: 1.7 THOU/UL (ref 4–11)

## 2020-07-02 ENCOUNTER — COMMUNICATION - HEALTHEAST (OUTPATIENT)
Dept: INTERNAL MEDICINE | Facility: CLINIC | Age: 75
End: 2020-07-02

## 2020-07-02 DIAGNOSIS — I82.502 CHRONIC DEEP VEIN THROMBOSIS (DVT) OF LEFT LOWER EXTREMITY, UNSPECIFIED VEIN (H): ICD-10-CM

## 2020-07-07 ENCOUNTER — INFUSION - HEALTHEAST (OUTPATIENT)
Dept: INFUSION THERAPY | Facility: HOSPITAL | Age: 75
End: 2020-07-07

## 2020-07-07 DIAGNOSIS — I82.5Y9 CHRONIC DEEP VEIN THROMBOSIS (DVT) OF PROXIMAL VEIN OF LOWER EXTREMITY, UNSPECIFIED LATERALITY (H): ICD-10-CM

## 2020-07-07 DIAGNOSIS — C90.01 MULTIPLE MYELOMA IN REMISSION (H): ICD-10-CM

## 2020-07-08 ENCOUNTER — COMMUNICATION - HEALTHEAST (OUTPATIENT)
Dept: ONCOLOGY | Facility: HOSPITAL | Age: 75
End: 2020-07-08

## 2020-07-08 DIAGNOSIS — C90.02 MULTIPLE MYELOMA IN RELAPSE (H): ICD-10-CM

## 2020-07-09 ENCOUNTER — COMMUNICATION - HEALTHEAST (OUTPATIENT)
Dept: ONCOLOGY | Facility: HOSPITAL | Age: 75
End: 2020-07-09

## 2020-07-13 ENCOUNTER — COMMUNICATION - HEALTHEAST (OUTPATIENT)
Dept: ONCOLOGY | Facility: HOSPITAL | Age: 75
End: 2020-07-13

## 2020-07-13 DIAGNOSIS — C90.02 MULTIPLE MYELOMA IN RELAPSE (H): ICD-10-CM

## 2020-07-22 ENCOUNTER — INFUSION - HEALTHEAST (OUTPATIENT)
Dept: INFUSION THERAPY | Facility: HOSPITAL | Age: 75
End: 2020-07-22

## 2020-07-22 DIAGNOSIS — C90.01 MULTIPLE MYELOMA IN REMISSION (H): ICD-10-CM

## 2020-07-22 DIAGNOSIS — I82.5Y9 CHRONIC DEEP VEIN THROMBOSIS (DVT) OF PROXIMAL VEIN OF LOWER EXTREMITY, UNSPECIFIED LATERALITY (H): ICD-10-CM

## 2020-07-22 DIAGNOSIS — D51.9 ANEMIA DUE TO VITAMIN B12 DEFICIENCY, UNSPECIFIED B12 DEFICIENCY TYPE: ICD-10-CM

## 2020-07-22 LAB
ALBUMIN SERPL-MCNC: 3.2 G/DL (ref 3.5–5)
ALP SERPL-CCNC: 80 U/L (ref 45–120)
ALT SERPL W P-5'-P-CCNC: 36 U/L (ref 0–45)
ANION GAP SERPL CALCULATED.3IONS-SCNC: 4 MMOL/L (ref 5–18)
AST SERPL W P-5'-P-CCNC: 27 U/L (ref 0–40)
BASOPHILS # BLD AUTO: 0 THOU/UL (ref 0–0.2)
BASOPHILS NFR BLD AUTO: 3 % (ref 0–2)
BILIRUB SERPL-MCNC: 0.4 MG/DL (ref 0–1)
BUN SERPL-MCNC: 31 MG/DL (ref 8–28)
CALCIUM SERPL-MCNC: 8.7 MG/DL (ref 8.5–10.5)
CHLORIDE BLD-SCNC: 110 MMOL/L (ref 98–107)
CO2 SERPL-SCNC: 26 MMOL/L (ref 22–31)
CREAT SERPL-MCNC: 1.29 MG/DL (ref 0.7–1.3)
EOSINOPHIL COUNT (ABSOLUTE): 0.1 THOU/UL (ref 0–0.4)
EOSINOPHIL NFR BLD AUTO: 6 % (ref 0–6)
ERYTHROCYTE [DISTWIDTH] IN BLOOD BY AUTOMATED COUNT: 14.9 % (ref 11–14.5)
FOLATE SERPL-MCNC: 18 NG/ML
GFR SERPL CREATININE-BSD FRML MDRD: 54 ML/MIN/1.73M2
GLUCOSE BLD-MCNC: 115 MG/DL (ref 70–125)
HCT VFR BLD AUTO: 27.3 % (ref 40–54)
HGB BLD-MCNC: 8.6 G/DL (ref 14–18)
LYMPHOCYTES # BLD AUTO: 0.3 THOU/UL (ref 0.8–4.4)
LYMPHOCYTES NFR BLD AUTO: 17 % (ref 20–40)
MCH RBC QN AUTO: 35.7 PG (ref 27–34)
MCHC RBC AUTO-ENTMCNC: 31.5 G/DL (ref 32–36)
MCV RBC AUTO: 113 FL (ref 80–100)
MONOCYTES # BLD AUTO: 0 THOU/UL (ref 0–0.9)
MONOCYTES NFR BLD AUTO: 3 % (ref 2–10)
OVALOCYTES: ABNORMAL
PLAT MORPH BLD: ABNORMAL
PLATELET # BLD AUTO: 93 THOU/UL (ref 140–440)
PMV BLD AUTO: 12.5 FL (ref 8.5–12.5)
POTASSIUM BLD-SCNC: 4.8 MMOL/L (ref 3.5–5)
PROT SERPL-MCNC: 5.2 G/DL (ref 6–8)
RBC # BLD AUTO: 2.41 MILL/UL (ref 4.4–6.2)
RETICS # AUTO: 0.04 MILL/UL (ref 0.01–0.11)
RETICS/RBC NFR AUTO: 1.45 % (ref 0.8–2.7)
SCHISTOCYTES: ABNORMAL
SODIUM SERPL-SCNC: 140 MMOL/L (ref 136–145)
TEAR DROP: ABNORMAL
TOTAL NEUTROPHILS-ABS(DIFF): 1.1 THOU/UL (ref 2–7.7)
TOTAL NEUTROPHILS-REL(DIFF): 71 % (ref 50–70)
VIT B12 SERPL-MCNC: 449 PG/ML (ref 213–816)
WBC: 1.5 THOU/UL (ref 4–11)

## 2020-07-27 ENCOUNTER — RECORDS - HEALTHEAST (OUTPATIENT)
Dept: ADMINISTRATIVE | Facility: OTHER | Age: 75
End: 2020-07-27

## 2020-07-30 ENCOUNTER — OFFICE VISIT - HEALTHEAST (OUTPATIENT)
Dept: INTERNAL MEDICINE | Facility: CLINIC | Age: 75
End: 2020-07-30

## 2020-07-30 DIAGNOSIS — Z00.00 ROUTINE GENERAL MEDICAL EXAMINATION AT A HEALTH CARE FACILITY: ICD-10-CM

## 2020-07-30 DIAGNOSIS — Z86.718 HISTORY OF DVT (DEEP VEIN THROMBOSIS): ICD-10-CM

## 2020-07-30 DIAGNOSIS — C90.00 MULTIPLE MYELOMA NOT HAVING ACHIEVED REMISSION (H): ICD-10-CM

## 2020-07-30 DIAGNOSIS — M48.061 SPINAL STENOSIS OF LUMBAR REGION WITHOUT NEUROGENIC CLAUDICATION: ICD-10-CM

## 2020-07-30 DIAGNOSIS — Z86.19 HISTORY OF CLOSTRIDIOIDES DIFFICILE INFECTION: ICD-10-CM

## 2020-07-30 DIAGNOSIS — M35.89 FIBRILLINOPATHY (H): ICD-10-CM

## 2020-07-30 DIAGNOSIS — Z00.00 HEALTHCARE MAINTENANCE: ICD-10-CM

## 2020-07-30 DIAGNOSIS — I35.0 AORTIC VALVE STENOSIS, ETIOLOGY OF CARDIAC VALVE DISEASE UNSPECIFIED: ICD-10-CM

## 2020-07-30 ASSESSMENT — MIFFLIN-ST. JEOR: SCORE: 1438.2

## 2020-08-04 ENCOUNTER — COMMUNICATION - HEALTHEAST (OUTPATIENT)
Dept: ONCOLOGY | Facility: HOSPITAL | Age: 75
End: 2020-08-04

## 2020-08-04 DIAGNOSIS — C90.02 MULTIPLE MYELOMA IN RELAPSE (H): ICD-10-CM

## 2020-08-05 ENCOUNTER — INFUSION - HEALTHEAST (OUTPATIENT)
Dept: INFUSION THERAPY | Facility: HOSPITAL | Age: 75
End: 2020-08-05

## 2020-08-05 DIAGNOSIS — I82.5Y9 CHRONIC DEEP VEIN THROMBOSIS (DVT) OF PROXIMAL VEIN OF LOWER EXTREMITY, UNSPECIFIED LATERALITY (H): ICD-10-CM

## 2020-08-05 DIAGNOSIS — C90.01 MULTIPLE MYELOMA IN REMISSION (H): ICD-10-CM

## 2020-08-05 LAB
IGA SERPL-MCNC: 15 MG/DL (ref 65–400)
IGA SERPL-MCNC: 270 MG/DL
IGM SERPL-MCNC: <5 MG/DL (ref 60–280)

## 2020-08-06 LAB
KAPPA LC FREE SER-MCNC: 99.71 MG/DL (ref 0.33–1.94)
KAPPA LC FREE/LAMBDA FREE SER NEPH: 127.83 {RATIO} (ref 0.26–1.65)
LAMBDA LC FREE SERPL-MCNC: 0.78 MG/DL (ref 0.57–2.63)

## 2020-08-10 LAB
ALBUMIN PERCENT: 68.6 % (ref 51–67)
ALBUMIN SERPL ELPH-MCNC: 3 G/DL (ref 3.2–4.7)
ALPHA 1 PERCENT: 3.4 % (ref 2–4)
ALPHA 2 PERCENT: 11.7 % (ref 5–13)
ALPHA1 GLOB SERPL ELPH-MCNC: 0.1 G/DL (ref 0.1–0.3)
ALPHA2 GLOB SERPL ELPH-MCNC: 0.5 G/DL (ref 0.4–0.9)
B-GLOBULIN SERPL ELPH-MCNC: 0.4 G/DL (ref 0.7–1.2)
BETA PERCENT: 9.8 % (ref 10–17)
GAMMA GLOB SERPL ELPH-MCNC: 0.3 G/DL (ref 0.6–1.4)
GAMMA GLOBULIN PERCENT: 6.5 % (ref 9–20)
PATH ICD:: ABNORMAL
PATH ICD:: NORMAL
PROT PATTERN SERPL ELPH-IMP: ABNORMAL
PROT PATTERN SERPL IFE-IMP: NORMAL
PROT SERPL-MCNC: 4.4 G/DL (ref 6–8)
REVIEWING PATHOLOGIST: ABNORMAL
REVIEWING PATHOLOGIST: NORMAL

## 2020-08-19 ENCOUNTER — INFUSION - HEALTHEAST (OUTPATIENT)
Dept: INFUSION THERAPY | Facility: HOSPITAL | Age: 75
End: 2020-08-19

## 2020-08-19 ENCOUNTER — AMBULATORY - HEALTHEAST (OUTPATIENT)
Dept: INFUSION THERAPY | Facility: HOSPITAL | Age: 75
End: 2020-08-19

## 2020-08-19 ENCOUNTER — OFFICE VISIT - HEALTHEAST (OUTPATIENT)
Dept: ONCOLOGY | Facility: HOSPITAL | Age: 75
End: 2020-08-19

## 2020-08-19 DIAGNOSIS — C90.01 MULTIPLE MYELOMA IN REMISSION (H): ICD-10-CM

## 2020-08-19 DIAGNOSIS — I82.5Y9 CHRONIC DEEP VEIN THROMBOSIS (DVT) OF PROXIMAL VEIN OF LOWER EXTREMITY, UNSPECIFIED LATERALITY (H): ICD-10-CM

## 2020-08-19 DIAGNOSIS — D51.9 ANEMIA DUE TO VITAMIN B12 DEFICIENCY, UNSPECIFIED B12 DEFICIENCY TYPE: ICD-10-CM

## 2020-08-19 DIAGNOSIS — C90.00 MULTIPLE MYELOMA NOT HAVING ACHIEVED REMISSION (H): ICD-10-CM

## 2020-08-19 LAB
ALBUMIN SERPL-MCNC: 3.3 G/DL (ref 3.5–5)
ALP SERPL-CCNC: 73 U/L (ref 45–120)
ALT SERPL W P-5'-P-CCNC: 55 U/L (ref 0–45)
ANION GAP SERPL CALCULATED.3IONS-SCNC: 4 MMOL/L (ref 5–18)
AST SERPL W P-5'-P-CCNC: 53 U/L (ref 0–40)
BASOPHILS # BLD AUTO: 0 THOU/UL (ref 0–0.2)
BASOPHILS NFR BLD AUTO: 2 % (ref 0–2)
BILIRUB SERPL-MCNC: 0.4 MG/DL (ref 0–1)
BUN SERPL-MCNC: 34 MG/DL (ref 8–28)
CALCIUM SERPL-MCNC: 8.5 MG/DL (ref 8.5–10.5)
CHLORIDE BLD-SCNC: 110 MMOL/L (ref 98–107)
CO2 SERPL-SCNC: 25 MMOL/L (ref 22–31)
CREAT SERPL-MCNC: 1.3 MG/DL (ref 0.7–1.3)
EOSINOPHIL COUNT (ABSOLUTE): 0.1 THOU/UL (ref 0–0.4)
EOSINOPHIL NFR BLD AUTO: 4 % (ref 0–6)
ERYTHROCYTE [DISTWIDTH] IN BLOOD BY AUTOMATED COUNT: 15.1 % (ref 11–14.5)
GFR SERPL CREATININE-BSD FRML MDRD: 54 ML/MIN/1.73M2
GLUCOSE BLD-MCNC: 110 MG/DL (ref 70–125)
HCT VFR BLD AUTO: 25.7 % (ref 40–54)
HGB BLD-MCNC: 8.3 G/DL (ref 14–18)
IMMATURE GRANULOCYTE % - MAN (DIFF): 0 %
IMMATURE GRANULOCYTE ABSOLUTE - MAN (DIFF): 0 THOU/UL
LYMPHOCYTES # BLD AUTO: 0.3 THOU/UL (ref 0.8–4.4)
LYMPHOCYTES NFR BLD AUTO: 24 % (ref 20–40)
MCH RBC QN AUTO: 36.4 PG (ref 27–34)
MCHC RBC AUTO-ENTMCNC: 32.3 G/DL (ref 32–36)
MCV RBC AUTO: 113 FL (ref 80–100)
MONOCYTES # BLD AUTO: 0.1 THOU/UL (ref 0–0.9)
MONOCYTES NFR BLD AUTO: 10 % (ref 2–10)
OVALOCYTES: ABNORMAL
PLAT MORPH BLD: ABNORMAL
PLATELET # BLD AUTO: 78 THOU/UL (ref 140–440)
PMV BLD AUTO: 12.5 FL (ref 8.5–12.5)
POLYCHROMASIA BLD QL SMEAR: ABNORMAL
POTASSIUM BLD-SCNC: 5 MMOL/L (ref 3.5–5)
PROT SERPL-MCNC: 5.1 G/DL (ref 6–8)
RBC # BLD AUTO: 2.28 MILL/UL (ref 4.4–6.2)
SODIUM SERPL-SCNC: 139 MMOL/L (ref 136–145)
TEAR DROP: ABNORMAL
TOTAL NEUTROPHILS-ABS(DIFF): 0.8 THOU/UL (ref 2–7.7)
TOTAL NEUTROPHILS-REL(DIFF): 60 % (ref 50–70)
WBC: 1.4 THOU/UL (ref 4–11)

## 2020-08-23 ENCOUNTER — COMMUNICATION - HEALTHEAST (OUTPATIENT)
Dept: INTERNAL MEDICINE | Facility: CLINIC | Age: 75
End: 2020-08-23

## 2020-08-23 DIAGNOSIS — D13.2 ADENOMATOUS DUODENAL POLYP: ICD-10-CM

## 2020-08-25 ENCOUNTER — AMBULATORY - HEALTHEAST (OUTPATIENT)
Dept: ONCOLOGY | Facility: HOSPITAL | Age: 75
End: 2020-08-25

## 2020-08-25 DIAGNOSIS — C90.00 MULTIPLE MYELOMA NOT HAVING ACHIEVED REMISSION (H): ICD-10-CM

## 2020-08-25 LAB
BASOPHILS # BLD AUTO: 0 THOU/UL (ref 0–0.2)
BASOPHILS NFR BLD AUTO: 1 % (ref 0–2)
EOSINOPHIL # BLD AUTO: 0.2 THOU/UL (ref 0–0.4)
EOSINOPHIL NFR BLD AUTO: 11 % (ref 0–6)
ERYTHROCYTE [DISTWIDTH] IN BLOOD BY AUTOMATED COUNT: 16 % (ref 11–14.5)
HCT VFR BLD AUTO: 25.9 % (ref 40–54)
HGB BLD-MCNC: 8.2 G/DL (ref 14–18)
IMM GRANULOCYTES # BLD: 0 THOU/UL
IMM GRANULOCYTES NFR BLD: 2 %
LYMPHOCYTES # BLD AUTO: 0.3 THOU/UL (ref 0.8–4.4)
LYMPHOCYTES NFR BLD AUTO: 14 % (ref 20–40)
MCH RBC QN AUTO: 36.6 PG (ref 27–34)
MCHC RBC AUTO-ENTMCNC: 31.7 G/DL (ref 32–36)
MCV RBC AUTO: 116 FL (ref 80–100)
MONOCYTES # BLD AUTO: 0.3 THOU/UL (ref 0–0.9)
MONOCYTES NFR BLD AUTO: 14 % (ref 2–10)
NEUTROPHILS # BLD AUTO: 1.1 THOU/UL (ref 2–7.7)
NEUTROPHILS NFR BLD AUTO: 59 % (ref 50–70)
PLATELET # BLD AUTO: 59 THOU/UL (ref 140–440)
PMV BLD AUTO: 14.1 FL (ref 8.5–12.5)
RBC # BLD AUTO: 2.24 MILL/UL (ref 4.4–6.2)
WBC: 1.8 THOU/UL (ref 4–11)

## 2020-08-27 LAB
LAB AP CHARGES (HE HISTORICAL CONVERSION): ABNORMAL
PATH REPORT.COMMENTS IMP SPEC: ABNORMAL
PATH REPORT.FINAL DX SPEC: ABNORMAL
PATH REPORT.MICROSCOPIC SPEC OTHER STN: ABNORMAL
RESULT FLAG (HE HISTORICAL CONVERSION): ABNORMAL

## 2020-09-02 ENCOUNTER — OFFICE VISIT - HEALTHEAST (OUTPATIENT)
Dept: ONCOLOGY | Facility: HOSPITAL | Age: 75
End: 2020-09-02

## 2020-09-02 ENCOUNTER — AMBULATORY - HEALTHEAST (OUTPATIENT)
Dept: INFUSION THERAPY | Facility: HOSPITAL | Age: 75
End: 2020-09-02

## 2020-09-02 ENCOUNTER — INFUSION - HEALTHEAST (OUTPATIENT)
Dept: INFUSION THERAPY | Facility: HOSPITAL | Age: 75
End: 2020-09-02

## 2020-09-02 DIAGNOSIS — I82.5Y9 CHRONIC DEEP VEIN THROMBOSIS (DVT) OF PROXIMAL VEIN OF LOWER EXTREMITY, UNSPECIFIED LATERALITY (H): ICD-10-CM

## 2020-09-02 DIAGNOSIS — C90.01 MULTIPLE MYELOMA IN REMISSION (H): ICD-10-CM

## 2020-09-02 DIAGNOSIS — C90.00 MULTIPLE MYELOMA NOT HAVING ACHIEVED REMISSION (H): ICD-10-CM

## 2020-09-02 LAB
IGA SERPL-MCNC: 16 MG/DL (ref 65–400)
IGA SERPL-MCNC: 228 MG/DL
IGM SERPL-MCNC: <5 MG/DL (ref 60–280)

## 2020-09-03 ENCOUNTER — AMBULATORY - HEALTHEAST (OUTPATIENT)
Dept: ONCOLOGY | Facility: HOSPITAL | Age: 75
End: 2020-09-03

## 2020-09-03 LAB
KAPPA LC FREE SER-MCNC: 70.23 MG/DL (ref 0.33–1.94)
KAPPA LC FREE/LAMBDA FREE SER NEPH: 101.78 {RATIO} (ref 0.26–1.65)
LAMBDA LC FREE SERPL-MCNC: 0.69 MG/DL (ref 0.57–2.63)

## 2020-09-04 ENCOUNTER — COMMUNICATION - HEALTHEAST (OUTPATIENT)
Dept: ONCOLOGY | Facility: HOSPITAL | Age: 75
End: 2020-09-04

## 2020-09-04 DIAGNOSIS — C90.02 MULTIPLE MYELOMA IN RELAPSE (H): ICD-10-CM

## 2020-09-04 LAB
ALBUMIN PERCENT: 69.9 % (ref 51–67)
ALBUMIN SERPL ELPH-MCNC: 2.9 G/DL (ref 3.2–4.7)
ALPHA 1 PERCENT: 3.4 % (ref 2–4)
ALPHA 2 PERCENT: 11.1 % (ref 5–13)
ALPHA1 GLOB SERPL ELPH-MCNC: 0.1 G/DL (ref 0.1–0.3)
ALPHA2 GLOB SERPL ELPH-MCNC: 0.5 G/DL (ref 0.4–0.9)
B-GLOBULIN SERPL ELPH-MCNC: 0.4 G/DL (ref 0.7–1.2)
BETA PERCENT: 10.1 % (ref 10–17)
GAMMA GLOB SERPL ELPH-MCNC: 0.2 G/DL (ref 0.6–1.4)
GAMMA GLOBULIN PERCENT: 5.5 % (ref 9–20)
PATH ICD:: ABNORMAL
PATH ICD:: NORMAL
PROT PATTERN SERPL ELPH-IMP: ABNORMAL
PROT PATTERN SERPL IFE-IMP: NORMAL
PROT SERPL-MCNC: 4.4 G/DL (ref 6–8)
REVIEWING PATHOLOGIST: ABNORMAL
REVIEWING PATHOLOGIST: NORMAL

## 2020-09-09 ENCOUNTER — COMMUNICATION - HEALTHEAST (OUTPATIENT)
Dept: ONCOLOGY | Facility: HOSPITAL | Age: 75
End: 2020-09-09

## 2020-09-09 LAB
BKR LAB AP CORE BIOPSY/ASPIRATE CLOT: ABNORMAL
LAB AP ASPIRATE SMEAR (HE HISTORICAL CONVERSION): ABNORMAL
LAB AP BONE MARROW DIFF (HE HISTORICAL CONVERSION): ABNORMAL
LAB AP CHARGES (HE HISTORICAL CONVERSION): ABNORMAL
PATH REPORT.ADDENDUM SPEC: ABNORMAL
PATH REPORT.COMMENTS IMP SPEC: ABNORMAL
PATH REPORT.FINAL DX SPEC: ABNORMAL
PATH REPORT.MICROSCOPIC SPEC OTHER STN: ABNORMAL
PATH REPORT.MICROSCOPIC SPEC OTHER STN: ABNORMAL
PATH REPORT.RELEVANT HX SPEC: ABNORMAL
RESULT FLAG (HE HISTORICAL CONVERSION): ABNORMAL

## 2020-09-14 LAB
MISCELLANEOUS TEST DEPT. - HE HISTORICAL: NORMAL
PERFORMING LAB: NORMAL
SPECIMEN STATUS: NORMAL
SPECIMEN STATUS: NORMAL
TEST NAME: NORMAL

## 2020-09-16 ENCOUNTER — INFUSION - HEALTHEAST (OUTPATIENT)
Dept: INFUSION THERAPY | Facility: HOSPITAL | Age: 75
End: 2020-09-16

## 2020-09-16 DIAGNOSIS — I82.5Y9 CHRONIC DEEP VEIN THROMBOSIS (DVT) OF PROXIMAL VEIN OF LOWER EXTREMITY, UNSPECIFIED LATERALITY (H): ICD-10-CM

## 2020-09-16 DIAGNOSIS — C90.01 MULTIPLE MYELOMA IN REMISSION (H): ICD-10-CM

## 2020-09-16 DIAGNOSIS — D51.9 ANEMIA DUE TO VITAMIN B12 DEFICIENCY, UNSPECIFIED B12 DEFICIENCY TYPE: ICD-10-CM

## 2020-09-16 LAB
ALBUMIN SERPL-MCNC: 3.1 G/DL (ref 3.5–5)
ALP SERPL-CCNC: 69 U/L (ref 45–120)
ALT SERPL W P-5'-P-CCNC: 19 U/L (ref 0–45)
ANION GAP SERPL CALCULATED.3IONS-SCNC: 8 MMOL/L (ref 5–18)
AST SERPL W P-5'-P-CCNC: 14 U/L (ref 0–40)
BASOPHILS # BLD AUTO: 0 THOU/UL (ref 0–0.2)
BASOPHILS NFR BLD AUTO: 4 % (ref 0–2)
BILIRUB SERPL-MCNC: 0.5 MG/DL (ref 0–1)
BUN SERPL-MCNC: 25 MG/DL (ref 8–28)
CALCIUM SERPL-MCNC: 8.6 MG/DL (ref 8.5–10.5)
CHLORIDE BLD-SCNC: 109 MMOL/L (ref 98–107)
CO2 SERPL-SCNC: 23 MMOL/L (ref 22–31)
CREAT SERPL-MCNC: 1.23 MG/DL (ref 0.7–1.3)
EOSINOPHIL COUNT (ABSOLUTE): 0.1 THOU/UL (ref 0–0.4)
EOSINOPHIL NFR BLD AUTO: 7 % (ref 0–6)
ERYTHROCYTE [DISTWIDTH] IN BLOOD BY AUTOMATED COUNT: 15.9 % (ref 11–14.5)
GFR SERPL CREATININE-BSD FRML MDRD: 57 ML/MIN/1.73M2
GLUCOSE BLD-MCNC: 107 MG/DL (ref 70–125)
HCT VFR BLD AUTO: 24.7 % (ref 40–54)
HGB BLD-MCNC: 7.9 G/DL (ref 14–18)
IMMATURE GRANULOCYTE % - MAN (DIFF): 0 %
IMMATURE GRANULOCYTE ABSOLUTE - MAN (DIFF): 0 THOU/UL
LYMPHOCYTES # BLD AUTO: 0.3 THOU/UL (ref 0.8–4.4)
LYMPHOCYTES NFR BLD AUTO: 29 % (ref 20–40)
MCH RBC QN AUTO: 36.6 PG (ref 27–34)
MCHC RBC AUTO-ENTMCNC: 32 G/DL (ref 32–36)
MCV RBC AUTO: 114 FL (ref 80–100)
MONOCYTES # BLD AUTO: 0.1 THOU/UL (ref 0–0.9)
MONOCYTES NFR BLD AUTO: 13 % (ref 2–10)
OVALOCYTES: ABNORMAL
PLAT MORPH BLD: ABNORMAL
PLATELET # BLD AUTO: 81 THOU/UL (ref 140–440)
PMV BLD AUTO: 12.9 FL (ref 8.5–12.5)
POLYCHROMASIA BLD QL SMEAR: ABNORMAL
POTASSIUM BLD-SCNC: 4.5 MMOL/L (ref 3.5–5)
PROT SERPL-MCNC: 4.8 G/DL (ref 6–8)
RBC # BLD AUTO: 2.16 MILL/UL (ref 4.4–6.2)
REACTIVE LYMPHS: ABNORMAL
RETICS # AUTO: 0.03 MILL/UL (ref 0.01–0.11)
RETICS/RBC NFR AUTO: 1.35 % (ref 0.8–2.7)
SODIUM SERPL-SCNC: 140 MMOL/L (ref 136–145)
TEAR DROP: ABNORMAL
TOTAL NEUTROPHILS-ABS(DIFF): 0.5 THOU/UL (ref 2–7.7)
TOTAL NEUTROPHILS-REL(DIFF): 47 % (ref 50–70)
TSH SERPL DL<=0.005 MIU/L-ACNC: 2.02 UIU/ML (ref 0.3–5)
WBC: 1 THOU/UL (ref 4–11)

## 2020-09-30 ENCOUNTER — INFUSION - HEALTHEAST (OUTPATIENT)
Dept: INFUSION THERAPY | Facility: HOSPITAL | Age: 75
End: 2020-09-30

## 2020-09-30 ENCOUNTER — AMBULATORY - HEALTHEAST (OUTPATIENT)
Dept: ONCOLOGY | Facility: HOSPITAL | Age: 75
End: 2020-09-30

## 2020-09-30 DIAGNOSIS — I82.5Y9 CHRONIC DEEP VEIN THROMBOSIS (DVT) OF PROXIMAL VEIN OF LOWER EXTREMITY, UNSPECIFIED LATERALITY (H): ICD-10-CM

## 2020-09-30 DIAGNOSIS — C90.01 MULTIPLE MYELOMA IN REMISSION (H): ICD-10-CM

## 2020-09-30 LAB
ALBUMIN SERPL-MCNC: 3 G/DL (ref 3.5–5)
ALP SERPL-CCNC: 55 U/L (ref 45–120)
ALT SERPL W P-5'-P-CCNC: 15 U/L (ref 0–45)
ANION GAP SERPL CALCULATED.3IONS-SCNC: 6 MMOL/L (ref 5–18)
AST SERPL W P-5'-P-CCNC: 16 U/L (ref 0–40)
BASOPHILS # BLD AUTO: 0 THOU/UL (ref 0–0.2)
BASOPHILS NFR BLD AUTO: 0 % (ref 0–2)
BILIRUB SERPL-MCNC: 0.4 MG/DL (ref 0–1)
BUN SERPL-MCNC: 21 MG/DL (ref 8–28)
CALCIUM SERPL-MCNC: 7.9 MG/DL (ref 8.5–10.5)
CHLORIDE BLD-SCNC: 113 MMOL/L (ref 98–107)
CO2 SERPL-SCNC: 24 MMOL/L (ref 22–31)
CREAT SERPL-MCNC: 1.11 MG/DL (ref 0.7–1.3)
DOHLE BODIES: ABNORMAL
EOSINOPHIL COUNT (ABSOLUTE): 0.2 THOU/UL (ref 0–0.4)
EOSINOPHIL NFR BLD AUTO: 16 % (ref 0–6)
ERYTHROCYTE [DISTWIDTH] IN BLOOD BY AUTOMATED COUNT: 15.9 % (ref 11–14.5)
GFR SERPL CREATININE-BSD FRML MDRD: >60 ML/MIN/1.73M2
GLUCOSE BLD-MCNC: 100 MG/DL (ref 70–125)
HCT VFR BLD AUTO: 24.9 % (ref 40–54)
HGB BLD-MCNC: 7.8 G/DL (ref 14–18)
IGA SERPL-MCNC: 15 MG/DL (ref 65–400)
IGA SERPL-MCNC: 243 MG/DL
IGM SERPL-MCNC: <5 MG/DL (ref 60–280)
IMMATURE GRANULOCYTE % - MAN (DIFF): 0 %
IMMATURE GRANULOCYTE ABSOLUTE - MAN (DIFF): 0 THOU/UL
LYMPHOCYTES # BLD AUTO: 0.3 THOU/UL (ref 0.8–4.4)
LYMPHOCYTES NFR BLD AUTO: 28 % (ref 20–40)
MCH RBC QN AUTO: 36.6 PG (ref 27–34)
MCHC RBC AUTO-ENTMCNC: 31.3 G/DL (ref 32–36)
MCV RBC AUTO: 117 FL (ref 80–100)
MONOCYTES # BLD AUTO: 0 THOU/UL (ref 0–0.9)
MONOCYTES NFR BLD AUTO: 4 % (ref 2–10)
OVALOCYTES: ABNORMAL
PLAT MORPH BLD: ABNORMAL
PLATELET # BLD AUTO: 51 THOU/UL (ref 140–440)
PMV BLD AUTO: 13.2 FL (ref 8.5–12.5)
POLYCHROMASIA BLD QL SMEAR: ABNORMAL
POTASSIUM BLD-SCNC: 4.6 MMOL/L (ref 3.5–5)
PROT SERPL-MCNC: 4.4 G/DL (ref 6–8)
RBC # BLD AUTO: 2.13 MILL/UL (ref 4.4–6.2)
SODIUM SERPL-SCNC: 143 MMOL/L (ref 136–145)
TEAR DROP: ABNORMAL
TOTAL NEUTROPHILS-ABS(DIFF): 0.6 THOU/UL (ref 2–7.7)
TOTAL NEUTROPHILS-REL(DIFF): 52 % (ref 50–70)
WBC: 1.1 THOU/UL (ref 4–11)

## 2020-10-01 LAB
KAPPA LC FREE SER-MCNC: 99.52 MG/DL (ref 0.33–1.94)
KAPPA LC FREE/LAMBDA FREE SER NEPH: 157.97 {RATIO} (ref 0.26–1.65)
LAMBDA LC FREE SERPL-MCNC: 0.63 MG/DL (ref 0.57–2.63)

## 2020-10-02 ENCOUNTER — COMMUNICATION - HEALTHEAST (OUTPATIENT)
Dept: ONCOLOGY | Facility: HOSPITAL | Age: 75
End: 2020-10-02

## 2020-10-02 DIAGNOSIS — C90.02 MULTIPLE MYELOMA IN RELAPSE (H): ICD-10-CM

## 2020-10-02 LAB
ALBUMIN PERCENT: 71.1 % (ref 51–67)
ALBUMIN SERPL ELPH-MCNC: 3.1 G/DL (ref 3.2–4.7)
ALPHA 1 PERCENT: 3.3 % (ref 2–4)
ALPHA 2 PERCENT: 11 % (ref 5–13)
ALPHA1 GLOB SERPL ELPH-MCNC: 0.1 G/DL (ref 0.1–0.3)
ALPHA2 GLOB SERPL ELPH-MCNC: 0.5 G/DL (ref 0.4–0.9)
B-GLOBULIN SERPL ELPH-MCNC: 0.4 G/DL (ref 0.7–1.2)
BETA PERCENT: 9.8 % (ref 10–17)
GAMMA GLOB SERPL ELPH-MCNC: 0.2 G/DL (ref 0.6–1.4)
GAMMA GLOBULIN PERCENT: 4.8 % (ref 9–20)
PATH ICD:: ABNORMAL
PATH ICD:: NORMAL
PROT PATTERN SERPL ELPH-IMP: ABNORMAL
PROT PATTERN SERPL IFE-IMP: NORMAL
PROT SERPL-MCNC: 4.4 G/DL (ref 6–8)
REVIEWING PATHOLOGIST: ABNORMAL
REVIEWING PATHOLOGIST: NORMAL

## 2020-10-05 ENCOUNTER — OFFICE VISIT - HEALTHEAST (OUTPATIENT)
Dept: ONCOLOGY | Facility: CLINIC | Age: 75
End: 2020-10-05

## 2020-10-05 ENCOUNTER — COMMUNICATION - HEALTHEAST (OUTPATIENT)
Dept: ONCOLOGY | Facility: HOSPITAL | Age: 75
End: 2020-10-05

## 2020-10-05 DIAGNOSIS — T45.1X5A PANCYTOPENIA DUE TO ANTINEOPLASTIC CHEMOTHERAPY (H): ICD-10-CM

## 2020-10-05 DIAGNOSIS — C90.02 MULTIPLE MYELOMA IN RELAPSE (H): ICD-10-CM

## 2020-10-05 DIAGNOSIS — D61.810 PANCYTOPENIA DUE TO ANTINEOPLASTIC CHEMOTHERAPY (H): ICD-10-CM

## 2020-10-07 ENCOUNTER — COMMUNICATION - HEALTHEAST (OUTPATIENT)
Dept: ONCOLOGY | Facility: HOSPITAL | Age: 75
End: 2020-10-07

## 2020-10-08 ENCOUNTER — COMMUNICATION - HEALTHEAST (OUTPATIENT)
Dept: ONCOLOGY | Facility: HOSPITAL | Age: 75
End: 2020-10-08

## 2020-10-08 ENCOUNTER — AMBULATORY - HEALTHEAST (OUTPATIENT)
Dept: ONCOLOGY | Facility: HOSPITAL | Age: 75
End: 2020-10-08

## 2020-10-09 ENCOUNTER — AMBULATORY - HEALTHEAST (OUTPATIENT)
Dept: ONCOLOGY | Facility: HOSPITAL | Age: 75
End: 2020-10-09

## 2020-10-09 ENCOUNTER — COMMUNICATION - HEALTHEAST (OUTPATIENT)
Dept: ONCOLOGY | Facility: HOSPITAL | Age: 75
End: 2020-10-09

## 2020-10-09 DIAGNOSIS — C90.01 MULTIPLE MYELOMA IN REMISSION (H): ICD-10-CM

## 2020-10-09 DIAGNOSIS — C90.00 MULTIPLE MYELOMA NOT HAVING ACHIEVED REMISSION (H): ICD-10-CM

## 2020-10-13 ENCOUNTER — COMMUNICATION - HEALTHEAST (OUTPATIENT)
Dept: ONCOLOGY | Facility: CLINIC | Age: 75
End: 2020-10-13

## 2020-10-13 ENCOUNTER — AMBULATORY - HEALTHEAST (OUTPATIENT)
Dept: INFUSION THERAPY | Facility: HOSPITAL | Age: 75
End: 2020-10-13

## 2020-10-13 ENCOUNTER — HOSPITAL ENCOUNTER (OUTPATIENT)
Dept: PET IMAGING | Facility: HOSPITAL | Age: 75
Setting detail: RADIATION/ONCOLOGY SERIES
Discharge: STILL ADMITTED OR ADMINISTRATIVE | End: 2020-10-13
Attending: INTERNAL MEDICINE

## 2020-10-13 DIAGNOSIS — C90.02 MULTIPLE MYELOMA IN RELAPSE (H): ICD-10-CM

## 2020-10-13 DIAGNOSIS — C90.01 MULTIPLE MYELOMA IN REMISSION (H): ICD-10-CM

## 2020-10-13 LAB
ABO/RH(D): NORMAL
ALBUMIN SERPL-MCNC: 3.1 G/DL (ref 3.5–5)
ALP SERPL-CCNC: 67 U/L (ref 45–120)
ALT SERPL W P-5'-P-CCNC: 29 U/L (ref 0–45)
ANION GAP SERPL CALCULATED.3IONS-SCNC: 5 MMOL/L (ref 5–18)
ANTIBODY SCREEN: NEGATIVE
AST SERPL W P-5'-P-CCNC: 22 U/L (ref 0–40)
BASOPHILS # BLD AUTO: 0 THOU/UL (ref 0–0.2)
BASOPHILS NFR BLD AUTO: 1 % (ref 0–2)
BILIRUB SERPL-MCNC: 0.4 MG/DL (ref 0–1)
BUN SERPL-MCNC: 31 MG/DL (ref 8–28)
CALCIUM SERPL-MCNC: 8.2 MG/DL (ref 8.5–10.5)
CHLORIDE BLD-SCNC: 113 MMOL/L (ref 98–107)
CO2 SERPL-SCNC: 25 MMOL/L (ref 22–31)
CREAT SERPL-MCNC: 1.09 MG/DL (ref 0.7–1.3)
EOSINOPHIL # BLD AUTO: 0.1 THOU/UL (ref 0–0.4)
EOSINOPHIL NFR BLD AUTO: 5 % (ref 0–6)
ERYTHROCYTE [DISTWIDTH] IN BLOOD BY AUTOMATED COUNT: 15.7 % (ref 11–14.5)
GFR SERPL CREATININE-BSD FRML MDRD: >60 ML/MIN/1.73M2
GLUCOSE BLD-MCNC: 89 MG/DL (ref 70–125)
GLUCOSE BLDC GLUCOMTR-MCNC: 91 MG/DL (ref 70–139)
HCT VFR BLD AUTO: 26 % (ref 40–54)
HGB BLD-MCNC: 8.3 G/DL (ref 14–18)
IMM GRANULOCYTES # BLD: 0 THOU/UL
IMM GRANULOCYTES NFR BLD: 4 %
LYMPHOCYTES # BLD AUTO: 0.3 THOU/UL (ref 0.8–4.4)
LYMPHOCYTES NFR BLD AUTO: 31 % (ref 20–40)
MCH RBC QN AUTO: 36.7 PG (ref 27–34)
MCHC RBC AUTO-ENTMCNC: 31.9 G/DL (ref 32–36)
MCV RBC AUTO: 115 FL (ref 80–100)
MONOCYTES # BLD AUTO: 0.1 THOU/UL (ref 0–0.9)
MONOCYTES NFR BLD AUTO: 14 % (ref 2–10)
NEUTROPHILS # BLD AUTO: 0.5 THOU/UL (ref 2–7.7)
NEUTROPHILS NFR BLD AUTO: 46 % (ref 50–70)
PLATELET # BLD AUTO: 97 THOU/UL (ref 140–440)
PMV BLD AUTO: 11 FL (ref 8.5–12.5)
POTASSIUM BLD-SCNC: 4.7 MMOL/L (ref 3.5–5)
PROT SERPL-MCNC: 5 G/DL (ref 6–8)
RBC # BLD AUTO: 2.26 MILL/UL (ref 4.4–6.2)
SODIUM SERPL-SCNC: 143 MMOL/L (ref 136–145)
WBC: 1 THOU/UL (ref 4–11)

## 2020-10-13 ASSESSMENT — MIFFLIN-ST. JEOR: SCORE: 1464.28

## 2020-10-14 ENCOUNTER — OFFICE VISIT - HEALTHEAST (OUTPATIENT)
Dept: ONCOLOGY | Facility: HOSPITAL | Age: 75
End: 2020-10-14

## 2020-10-14 ENCOUNTER — INFUSION - HEALTHEAST (OUTPATIENT)
Dept: INFUSION THERAPY | Facility: HOSPITAL | Age: 75
End: 2020-10-14

## 2020-10-14 ENCOUNTER — AMBULATORY - HEALTHEAST (OUTPATIENT)
Dept: ONCOLOGY | Facility: HOSPITAL | Age: 75
End: 2020-10-14

## 2020-10-14 DIAGNOSIS — C90.02 MULTIPLE MYELOMA IN RELAPSE (H): ICD-10-CM

## 2020-10-14 DIAGNOSIS — C90.01 MULTIPLE MYELOMA IN REMISSION (H): ICD-10-CM

## 2020-10-15 ENCOUNTER — INFUSION - HEALTHEAST (OUTPATIENT)
Dept: INFUSION THERAPY | Facility: HOSPITAL | Age: 75
End: 2020-10-15

## 2020-10-15 ENCOUNTER — AMBULATORY - HEALTHEAST (OUTPATIENT)
Dept: ONCOLOGY | Facility: HOSPITAL | Age: 75
End: 2020-10-15

## 2020-10-15 DIAGNOSIS — C90.01 MULTIPLE MYELOMA IN REMISSION (H): ICD-10-CM

## 2020-10-16 ENCOUNTER — COMMUNICATION - HEALTHEAST (OUTPATIENT)
Dept: ONCOLOGY | Facility: HOSPITAL | Age: 75
End: 2020-10-16

## 2020-10-16 DIAGNOSIS — C90.02 MULTIPLE MYELOMA IN RELAPSE (H): ICD-10-CM

## 2020-10-20 ENCOUNTER — INFUSION - HEALTHEAST (OUTPATIENT)
Dept: INFUSION THERAPY | Facility: HOSPITAL | Age: 75
End: 2020-10-20

## 2020-10-20 DIAGNOSIS — C90.01 MULTIPLE MYELOMA IN REMISSION (H): ICD-10-CM

## 2020-10-20 LAB
BASOPHILS # BLD AUTO: 0 THOU/UL (ref 0–0.2)
BASOPHILS NFR BLD AUTO: 0 % (ref 0–2)
EOSINOPHIL COUNT (ABSOLUTE): 0.1 THOU/UL (ref 0–0.4)
EOSINOPHIL NFR BLD AUTO: 8 % (ref 0–6)
ERYTHROCYTE [DISTWIDTH] IN BLOOD BY AUTOMATED COUNT: 15.1 % (ref 11–14.5)
HCT VFR BLD AUTO: 24.7 % (ref 40–54)
HGB BLD-MCNC: 7.9 G/DL (ref 14–18)
IMMATURE GRANULOCYTE % - MAN (DIFF): 0 %
IMMATURE GRANULOCYTE ABSOLUTE - MAN (DIFF): 0 THOU/UL
LYMPHOCYTES # BLD AUTO: 0.1 THOU/UL (ref 0.8–4.4)
LYMPHOCYTES NFR BLD AUTO: 8 % (ref 20–40)
MCH RBC QN AUTO: 36.1 PG (ref 27–34)
MCHC RBC AUTO-ENTMCNC: 32 G/DL (ref 32–36)
MCV RBC AUTO: 113 FL (ref 80–100)
MONOCYTES # BLD AUTO: 0.1 THOU/UL (ref 0–0.9)
MONOCYTES NFR BLD AUTO: 14 % (ref 2–10)
OVALOCYTES: ABNORMAL
PLAT MORPH BLD: ABNORMAL
PLATELET # BLD AUTO: 93 THOU/UL (ref 140–440)
PMV BLD AUTO: 11.8 FL (ref 8.5–12.5)
POLYCHROMASIA BLD QL SMEAR: ABNORMAL
RBC # BLD AUTO: 2.19 MILL/UL (ref 4.4–6.2)
SCHISTOCYTES: ABNORMAL
TEAR DROP: ABNORMAL
TOTAL NEUTROPHILS-ABS(DIFF): 0.6 THOU/UL (ref 2–7.7)
TOTAL NEUTROPHILS-REL(DIFF): 70 % (ref 50–70)
WBC: 0.9 THOU/UL (ref 4–11)

## 2020-10-21 ENCOUNTER — COMMUNICATION - HEALTHEAST (OUTPATIENT)
Dept: ONCOLOGY | Facility: HOSPITAL | Age: 75
End: 2020-10-21

## 2020-10-28 ENCOUNTER — INFUSION - HEALTHEAST (OUTPATIENT)
Dept: INFUSION THERAPY | Facility: HOSPITAL | Age: 75
End: 2020-10-28

## 2020-10-28 DIAGNOSIS — C90.01 MULTIPLE MYELOMA IN REMISSION (H): ICD-10-CM

## 2020-10-28 LAB
BASOPHILS # BLD AUTO: 0 THOU/UL (ref 0–0.2)
BASOPHILS NFR BLD AUTO: 1 % (ref 0–2)
EOSINOPHIL COUNT (ABSOLUTE): 0 THOU/UL (ref 0–0.4)
EOSINOPHIL NFR BLD AUTO: 1 % (ref 0–6)
ERYTHROCYTE [DISTWIDTH] IN BLOOD BY AUTOMATED COUNT: 14.8 % (ref 11–14.5)
HCT VFR BLD AUTO: 25.4 % (ref 40–54)
HGB BLD-MCNC: 8.1 G/DL (ref 14–18)
IMMATURE GRANULOCYTE % - MAN (DIFF): 1 %
IMMATURE GRANULOCYTE ABSOLUTE - MAN (DIFF): 0 THOU/UL
LYMPHOCYTES # BLD AUTO: 0.1 THOU/UL (ref 0.8–4.4)
LYMPHOCYTES NFR BLD AUTO: 6 % (ref 20–40)
MCH RBC QN AUTO: 36.2 PG (ref 27–34)
MCHC RBC AUTO-ENTMCNC: 31.9 G/DL (ref 32–36)
MCV RBC AUTO: 113 FL (ref 80–100)
METAMYELOCYTES (ABSOLUTE): 0 THOU/UL
METAMYELOCYTES NFR BLD MANUAL: 1 %
MONOCYTES # BLD AUTO: 0.1 THOU/UL (ref 0–0.9)
MONOCYTES NFR BLD AUTO: 11 % (ref 2–10)
OVALOCYTES: ABNORMAL
PLAT MORPH BLD: ABNORMAL
PLATELET # BLD AUTO: 99 THOU/UL (ref 140–440)
PMV BLD AUTO: 12.7 FL (ref 8.5–12.5)
POLYCHROMASIA BLD QL SMEAR: ABNORMAL
RBC # BLD AUTO: 2.24 MILL/UL (ref 4.4–6.2)
SCHISTOCYTES: ABNORMAL
TOTAL NEUTROPHILS-ABS(DIFF): 1 THOU/UL (ref 2–7.7)
TOTAL NEUTROPHILS-REL(DIFF): 80 % (ref 50–70)
WBC: 1.3 THOU/UL (ref 4–11)

## 2020-11-04 ENCOUNTER — INFUSION - HEALTHEAST (OUTPATIENT)
Dept: INFUSION THERAPY | Facility: HOSPITAL | Age: 75
End: 2020-11-04

## 2020-11-04 DIAGNOSIS — D51.9 ANEMIA DUE TO VITAMIN B12 DEFICIENCY, UNSPECIFIED B12 DEFICIENCY TYPE: ICD-10-CM

## 2020-11-04 DIAGNOSIS — C90.01 MULTIPLE MYELOMA IN REMISSION (H): ICD-10-CM

## 2020-11-04 LAB
BASOPHILS # BLD AUTO: 0 THOU/UL (ref 0–0.2)
BASOPHILS NFR BLD AUTO: 0 % (ref 0–2)
EOSINOPHIL COUNT (ABSOLUTE): 0 THOU/UL (ref 0–0.4)
EOSINOPHIL NFR BLD AUTO: 0 % (ref 0–6)
ERYTHROCYTE [DISTWIDTH] IN BLOOD BY AUTOMATED COUNT: 15 % (ref 11–14.5)
HCT VFR BLD AUTO: 24.4 % (ref 40–54)
HGB BLD-MCNC: 7.8 G/DL (ref 14–18)
IGA SERPL-MCNC: 251 MG/DL
IGA SERPL-MCNC: 6 MG/DL (ref 65–400)
IGM SERPL-MCNC: <5 MG/DL (ref 60–280)
IMMATURE GRANULOCYTE % - MAN (DIFF): 0 %
IMMATURE GRANULOCYTE ABSOLUTE - MAN (DIFF): 0 THOU/UL
LYMPHOCYTES # BLD AUTO: 0.2 THOU/UL (ref 0.8–4.4)
LYMPHOCYTES NFR BLD AUTO: 12 % (ref 20–40)
MCH RBC QN AUTO: 36.6 PG (ref 27–34)
MCHC RBC AUTO-ENTMCNC: 32 G/DL (ref 32–36)
MCV RBC AUTO: 115 FL (ref 80–100)
MONOCYTES # BLD AUTO: 0.1 THOU/UL (ref 0–0.9)
MONOCYTES NFR BLD AUTO: 10 % (ref 2–10)
OVALOCYTES: ABNORMAL
PLAT MORPH BLD: ABNORMAL
PLATELET # BLD AUTO: 111 THOU/UL (ref 140–440)
PMV BLD AUTO: 12.1 FL (ref 8.5–12.5)
RBC # BLD AUTO: 2.13 MILL/UL (ref 4.4–6.2)
SCHISTOCYTES: ABNORMAL
TEAR DROP: ABNORMAL
TOTAL NEUTROPHILS-ABS(DIFF): 1 THOU/UL (ref 2–7.7)
TOTAL NEUTROPHILS-REL(DIFF): 78 % (ref 50–70)
WBC: 1.3 THOU/UL (ref 4–11)

## 2020-11-05 ENCOUNTER — COMMUNICATION - HEALTHEAST (OUTPATIENT)
Dept: ONCOLOGY | Facility: HOSPITAL | Age: 75
End: 2020-11-05

## 2020-11-05 ENCOUNTER — AMBULATORY - HEALTHEAST (OUTPATIENT)
Dept: ONCOLOGY | Facility: HOSPITAL | Age: 75
End: 2020-11-05

## 2020-11-05 DIAGNOSIS — C90.02 MULTIPLE MYELOMA IN RELAPSE (H): ICD-10-CM

## 2020-11-05 LAB
KAPPA LC FREE SER-MCNC: 17.62 MG/DL (ref 0.33–1.94)
KAPPA LC FREE/LAMBDA FREE SER NEPH: 88.1 {RATIO} (ref 0.26–1.65)
LAMBDA LC FREE SERPL-MCNC: 0.2 MG/DL (ref 0.57–2.63)

## 2020-11-11 ENCOUNTER — INFUSION - HEALTHEAST (OUTPATIENT)
Dept: INFUSION THERAPY | Facility: HOSPITAL | Age: 75
End: 2020-11-11

## 2020-11-11 ENCOUNTER — AMBULATORY - HEALTHEAST (OUTPATIENT)
Dept: INFUSION THERAPY | Facility: HOSPITAL | Age: 75
End: 2020-11-11

## 2020-11-11 ENCOUNTER — OFFICE VISIT - HEALTHEAST (OUTPATIENT)
Dept: ONCOLOGY | Facility: HOSPITAL | Age: 75
End: 2020-11-11

## 2020-11-11 DIAGNOSIS — C90.01 MULTIPLE MYELOMA IN REMISSION (H): ICD-10-CM

## 2020-11-11 DIAGNOSIS — D61.818 PANCYTOPENIA (H): ICD-10-CM

## 2020-11-11 LAB
ALBUMIN SERPL-MCNC: 3.2 G/DL (ref 3.5–5)
ALP SERPL-CCNC: 64 U/L (ref 45–120)
ALT SERPL W P-5'-P-CCNC: 14 U/L (ref 0–45)
ANION GAP SERPL CALCULATED.3IONS-SCNC: 7 MMOL/L (ref 5–18)
AST SERPL W P-5'-P-CCNC: 14 U/L (ref 0–40)
BASOPHILS # BLD AUTO: 0 THOU/UL (ref 0–0.2)
BASOPHILS NFR BLD AUTO: 0 % (ref 0–2)
BILIRUB SERPL-MCNC: 0.4 MG/DL (ref 0–1)
BUN SERPL-MCNC: 18 MG/DL (ref 8–28)
CALCIUM SERPL-MCNC: 8 MG/DL (ref 8.5–10.5)
CHLORIDE BLD-SCNC: 106 MMOL/L (ref 98–107)
CO2 SERPL-SCNC: 25 MMOL/L (ref 22–31)
CREAT SERPL-MCNC: 1.2 MG/DL (ref 0.7–1.3)
EOSINOPHIL COUNT (ABSOLUTE): 0 THOU/UL (ref 0–0.4)
EOSINOPHIL NFR BLD AUTO: 1 % (ref 0–6)
ERYTHROCYTE [DISTWIDTH] IN BLOOD BY AUTOMATED COUNT: 15.2 % (ref 11–14.5)
GFR SERPL CREATININE-BSD FRML MDRD: 59 ML/MIN/1.73M2
GLUCOSE BLD-MCNC: 109 MG/DL (ref 70–125)
HCT VFR BLD AUTO: 24.7 % (ref 40–54)
HGB BLD-MCNC: 8 G/DL (ref 14–18)
IMMATURE GRANULOCYTE % - MAN (DIFF): 0 %
IMMATURE GRANULOCYTE ABSOLUTE - MAN (DIFF): 0 THOU/UL
LYMPHOCYTES # BLD AUTO: 0.2 THOU/UL (ref 0.8–4.4)
LYMPHOCYTES NFR BLD AUTO: 15 % (ref 20–40)
MCH RBC QN AUTO: 37.6 PG (ref 27–34)
MCHC RBC AUTO-ENTMCNC: 32.4 G/DL (ref 32–36)
MCV RBC AUTO: 116 FL (ref 80–100)
MONOCYTES # BLD AUTO: 0.2 THOU/UL (ref 0–0.9)
MONOCYTES NFR BLD AUTO: 16 % (ref 2–10)
OVALOCYTES: ABNORMAL
PLAT MORPH BLD: ABNORMAL
PLATELET # BLD AUTO: 116 THOU/UL (ref 140–440)
PMV BLD AUTO: 11 FL (ref 8.5–12.5)
POLYCHROMASIA BLD QL SMEAR: ABNORMAL
POTASSIUM BLD-SCNC: 4.4 MMOL/L (ref 3.5–5)
PROT SERPL-MCNC: 5 G/DL (ref 6–8)
RBC # BLD AUTO: 2.13 MILL/UL (ref 4.4–6.2)
SCHISTOCYTES: ABNORMAL
SODIUM SERPL-SCNC: 138 MMOL/L (ref 136–145)
TEAR DROP: ABNORMAL
TOTAL NEUTROPHILS-ABS(DIFF): 1 THOU/UL (ref 2–7.7)
TOTAL NEUTROPHILS-REL(DIFF): 68 % (ref 50–70)
WBC: 1.5 THOU/UL (ref 4–11)

## 2020-11-18 ENCOUNTER — INFUSION - HEALTHEAST (OUTPATIENT)
Dept: INFUSION THERAPY | Facility: HOSPITAL | Age: 75
End: 2020-11-18

## 2020-11-18 DIAGNOSIS — C90.01 MULTIPLE MYELOMA IN REMISSION (H): ICD-10-CM

## 2020-11-19 ENCOUNTER — COMMUNICATION - HEALTHEAST (OUTPATIENT)
Dept: ONCOLOGY | Facility: HOSPITAL | Age: 75
End: 2020-11-19

## 2020-11-19 DIAGNOSIS — C90.02 MULTIPLE MYELOMA IN RELAPSE (H): ICD-10-CM

## 2020-11-25 ENCOUNTER — INFUSION - HEALTHEAST (OUTPATIENT)
Dept: INFUSION THERAPY | Facility: HOSPITAL | Age: 75
End: 2020-11-25

## 2020-11-25 DIAGNOSIS — C90.01 MULTIPLE MYELOMA IN REMISSION (H): ICD-10-CM

## 2020-11-25 LAB
ALBUMIN SERPL-MCNC: 3.1 G/DL (ref 3.5–5)
ALP SERPL-CCNC: 65 U/L (ref 45–120)
ALT SERPL W P-5'-P-CCNC: 25 U/L (ref 0–45)
ANION GAP SERPL CALCULATED.3IONS-SCNC: 6 MMOL/L (ref 5–18)
AST SERPL W P-5'-P-CCNC: 16 U/L (ref 0–40)
BASOPHILS # BLD AUTO: 0 THOU/UL (ref 0–0.2)
BASOPHILS NFR BLD AUTO: 1 % (ref 0–2)
BILIRUB SERPL-MCNC: 0.4 MG/DL (ref 0–1)
BUN SERPL-MCNC: 18 MG/DL (ref 8–28)
CALCIUM SERPL-MCNC: 8.7 MG/DL (ref 8.5–10.5)
CHLORIDE BLD-SCNC: 110 MMOL/L (ref 98–107)
CO2 SERPL-SCNC: 24 MMOL/L (ref 22–31)
CREAT SERPL-MCNC: 1.1 MG/DL (ref 0.7–1.3)
EOSINOPHIL COUNT (ABSOLUTE): 0 THOU/UL (ref 0–0.4)
EOSINOPHIL NFR BLD AUTO: 0 % (ref 0–6)
ERYTHROCYTE [DISTWIDTH] IN BLOOD BY AUTOMATED COUNT: 15.3 % (ref 11–14.5)
GFR SERPL CREATININE-BSD FRML MDRD: >60 ML/MIN/1.73M2
GLUCOSE BLD-MCNC: 117 MG/DL (ref 70–125)
HCT VFR BLD AUTO: 26.8 % (ref 40–54)
HGB BLD-MCNC: 8.5 G/DL (ref 14–18)
IMMATURE GRANULOCYTE % - MAN (DIFF): 0 %
IMMATURE GRANULOCYTE ABSOLUTE - MAN (DIFF): 0 THOU/UL
LYMPHOCYTES # BLD AUTO: 0.1 THOU/UL (ref 0.8–4.4)
LYMPHOCYTES NFR BLD AUTO: 6 % (ref 20–40)
MCH RBC QN AUTO: 36.8 PG (ref 27–34)
MCHC RBC AUTO-ENTMCNC: 31.7 G/DL (ref 32–36)
MCV RBC AUTO: 116 FL (ref 80–100)
MONOCYTES # BLD AUTO: 0.1 THOU/UL (ref 0–0.9)
MONOCYTES NFR BLD AUTO: 6 % (ref 2–10)
OVALOCYTES: ABNORMAL
PLAT MORPH BLD: ABNORMAL
PLATELET # BLD AUTO: 128 THOU/UL (ref 140–440)
PMV BLD AUTO: 10.3 FL (ref 8.5–12.5)
POTASSIUM BLD-SCNC: 4.5 MMOL/L (ref 3.5–5)
PROT SERPL-MCNC: 5 G/DL (ref 6–8)
RBC # BLD AUTO: 2.31 MILL/UL (ref 4.4–6.2)
SODIUM SERPL-SCNC: 140 MMOL/L (ref 136–145)
TEAR DROP: ABNORMAL
TOTAL NEUTROPHILS-ABS(DIFF): 2 THOU/UL (ref 2–7.7)
TOTAL NEUTROPHILS-REL(DIFF): 87 % (ref 50–70)
WBC: 2.3 THOU/UL (ref 4–11)

## 2020-11-30 ENCOUNTER — COMMUNICATION - HEALTHEAST (OUTPATIENT)
Dept: ONCOLOGY | Facility: HOSPITAL | Age: 75
End: 2020-11-30

## 2020-12-02 ENCOUNTER — INFUSION - HEALTHEAST (OUTPATIENT)
Dept: INFUSION THERAPY | Facility: HOSPITAL | Age: 75
End: 2020-12-02

## 2020-12-02 DIAGNOSIS — C90.01 MULTIPLE MYELOMA IN REMISSION (H): ICD-10-CM

## 2020-12-02 LAB
IGA SERPL-MCNC: 272 MG/DL
IGA SERPL-MCNC: 7 MG/DL (ref 65–400)
IGM SERPL-MCNC: <5 MG/DL (ref 60–280)

## 2020-12-03 LAB
KAPPA LC FREE SER-MCNC: 14.42 MG/DL (ref 0.33–1.94)
KAPPA LC FREE/LAMBDA FREE SER NEPH: 80.11 {RATIO} (ref 0.26–1.65)
LAMBDA LC FREE SERPL-MCNC: 0.18 MG/DL (ref 0.57–2.63)

## 2020-12-09 ENCOUNTER — INFUSION - HEALTHEAST (OUTPATIENT)
Dept: INFUSION THERAPY | Facility: HOSPITAL | Age: 75
End: 2020-12-09

## 2020-12-09 ENCOUNTER — OFFICE VISIT - HEALTHEAST (OUTPATIENT)
Dept: ONCOLOGY | Facility: HOSPITAL | Age: 75
End: 2020-12-09

## 2020-12-09 ENCOUNTER — AMBULATORY - HEALTHEAST (OUTPATIENT)
Dept: INFUSION THERAPY | Facility: HOSPITAL | Age: 75
End: 2020-12-09

## 2020-12-09 DIAGNOSIS — C90.01 MULTIPLE MYELOMA IN REMISSION (H): ICD-10-CM

## 2020-12-09 DIAGNOSIS — D51.9 ANEMIA DUE TO VITAMIN B12 DEFICIENCY, UNSPECIFIED B12 DEFICIENCY TYPE: ICD-10-CM

## 2020-12-09 LAB
ALBUMIN SERPL-MCNC: 3.2 G/DL (ref 3.5–5)
ALP SERPL-CCNC: 63 U/L (ref 45–120)
ALT SERPL W P-5'-P-CCNC: 13 U/L (ref 0–45)
ANION GAP SERPL CALCULATED.3IONS-SCNC: 6 MMOL/L (ref 5–18)
AST SERPL W P-5'-P-CCNC: 14 U/L (ref 0–40)
BASOPHILS # BLD AUTO: 0 THOU/UL (ref 0–0.2)
BASOPHILS NFR BLD AUTO: 0 % (ref 0–2)
BILIRUB SERPL-MCNC: 0.4 MG/DL (ref 0–1)
BUN SERPL-MCNC: 21 MG/DL (ref 8–28)
CALCIUM SERPL-MCNC: 8.3 MG/DL (ref 8.5–10.5)
CHLORIDE BLD-SCNC: 109 MMOL/L (ref 98–107)
CO2 SERPL-SCNC: 26 MMOL/L (ref 22–31)
CREAT SERPL-MCNC: 1.21 MG/DL (ref 0.7–1.3)
EOSINOPHIL COUNT (ABSOLUTE): 0 THOU/UL (ref 0–0.4)
EOSINOPHIL NFR BLD AUTO: 0 % (ref 0–6)
ERYTHROCYTE [DISTWIDTH] IN BLOOD BY AUTOMATED COUNT: 14.2 % (ref 11–14.5)
GFR SERPL CREATININE-BSD FRML MDRD: 58 ML/MIN/1.73M2
GLUCOSE BLD-MCNC: 114 MG/DL (ref 70–125)
HCT VFR BLD AUTO: 27.7 % (ref 40–54)
HGB BLD-MCNC: 8.8 G/DL (ref 14–18)
IMMATURE GRANULOCYTE % - MAN (DIFF): 0 %
IMMATURE GRANULOCYTE ABSOLUTE - MAN (DIFF): 0 THOU/UL
LYMPHOCYTES # BLD AUTO: 0.1 THOU/UL (ref 0.8–4.4)
LYMPHOCYTES NFR BLD AUTO: 5 % (ref 20–40)
MCH RBC QN AUTO: 36.5 PG (ref 27–34)
MCHC RBC AUTO-ENTMCNC: 31.8 G/DL (ref 32–36)
MCV RBC AUTO: 115 FL (ref 80–100)
MONOCYTES # BLD AUTO: 0.5 THOU/UL (ref 0–0.9)
MONOCYTES NFR BLD AUTO: 18 % (ref 2–10)
OVALOCYTES: ABNORMAL
PLAT MORPH BLD: NORMAL
PLATELET # BLD AUTO: 140 THOU/UL (ref 140–440)
PMV BLD AUTO: 11 FL (ref 8.5–12.5)
POTASSIUM BLD-SCNC: 4.5 MMOL/L (ref 3.5–5)
PROT SERPL-MCNC: 5 G/DL (ref 6–8)
RBC # BLD AUTO: 2.41 MILL/UL (ref 4.4–6.2)
SCHISTOCYTES: ABNORMAL
SODIUM SERPL-SCNC: 141 MMOL/L (ref 136–145)
TEAR DROP: ABNORMAL
TOTAL NEUTROPHILS-ABS(DIFF): 2 THOU/UL (ref 2–7.7)
TOTAL NEUTROPHILS-REL(DIFF): 77 % (ref 50–70)
WBC: 2.6 THOU/UL (ref 4–11)

## 2020-12-09 ASSESSMENT — MIFFLIN-ST. JEOR: SCORE: 1477.43

## 2020-12-16 ENCOUNTER — COMMUNICATION - HEALTHEAST (OUTPATIENT)
Dept: ONCOLOGY | Facility: HOSPITAL | Age: 75
End: 2020-12-16

## 2020-12-17 ENCOUNTER — COMMUNICATION - HEALTHEAST (OUTPATIENT)
Dept: ONCOLOGY | Facility: HOSPITAL | Age: 75
End: 2020-12-17

## 2020-12-19 ENCOUNTER — COMMUNICATION - HEALTHEAST (OUTPATIENT)
Dept: ONCOLOGY | Facility: HOSPITAL | Age: 75
End: 2020-12-19

## 2020-12-19 DIAGNOSIS — C90.00 MYELOMA (H): ICD-10-CM

## 2020-12-23 ENCOUNTER — INFUSION - HEALTHEAST (OUTPATIENT)
Dept: INFUSION THERAPY | Facility: HOSPITAL | Age: 75
End: 2020-12-23

## 2020-12-23 DIAGNOSIS — C90.01 MULTIPLE MYELOMA IN REMISSION (H): ICD-10-CM

## 2020-12-23 LAB
ALBUMIN SERPL-MCNC: 3 G/DL (ref 3.5–5)
ALP SERPL-CCNC: 68 U/L (ref 45–120)
ALT SERPL W P-5'-P-CCNC: 11 U/L (ref 0–45)
ANION GAP SERPL CALCULATED.3IONS-SCNC: 5 MMOL/L (ref 5–18)
AST SERPL W P-5'-P-CCNC: 13 U/L (ref 0–40)
BASOPHILS # BLD AUTO: 0 THOU/UL (ref 0–0.2)
BASOPHILS NFR BLD AUTO: 1 % (ref 0–2)
BILIRUB SERPL-MCNC: 0.4 MG/DL (ref 0–1)
BUN SERPL-MCNC: 18 MG/DL (ref 8–28)
CALCIUM SERPL-MCNC: 8.1 MG/DL (ref 8.5–10.5)
CHLORIDE BLD-SCNC: 110 MMOL/L (ref 98–107)
CO2 SERPL-SCNC: 27 MMOL/L (ref 22–31)
CREAT SERPL-MCNC: 1.1 MG/DL (ref 0.7–1.3)
EOSINOPHIL COUNT (ABSOLUTE): 0.1 THOU/UL (ref 0–0.4)
EOSINOPHIL NFR BLD AUTO: 4 % (ref 0–6)
ERYTHROCYTE [DISTWIDTH] IN BLOOD BY AUTOMATED COUNT: 13.8 % (ref 11–14.5)
GFR SERPL CREATININE-BSD FRML MDRD: >60 ML/MIN/1.73M2
GLUCOSE BLD-MCNC: 96 MG/DL (ref 70–125)
HCT VFR BLD AUTO: 27.4 % (ref 40–54)
HGB BLD-MCNC: 8.7 G/DL (ref 14–18)
IGA SERPL-MCNC: 248 MG/DL
IGA SERPL-MCNC: 6 MG/DL (ref 65–400)
IGM SERPL-MCNC: <5 MG/DL (ref 60–280)
IMMATURE GRANULOCYTE % - MAN (DIFF): 0 %
IMMATURE GRANULOCYTE ABSOLUTE - MAN (DIFF): 0 THOU/UL
LYMPHOCYTES # BLD AUTO: 0.2 THOU/UL (ref 0.8–4.4)
LYMPHOCYTES NFR BLD AUTO: 8 % (ref 20–40)
MCH RBC QN AUTO: 36.4 PG (ref 27–34)
MCHC RBC AUTO-ENTMCNC: 31.8 G/DL (ref 32–36)
MCV RBC AUTO: 115 FL (ref 80–100)
MONOCYTES # BLD AUTO: 0.2 THOU/UL (ref 0–0.9)
MONOCYTES NFR BLD AUTO: 9 % (ref 2–10)
OVALOCYTES: ABNORMAL
PLAT MORPH BLD: ABNORMAL
PLATELET # BLD AUTO: 96 THOU/UL (ref 140–440)
PMV BLD AUTO: 10.3 FL (ref 8.5–12.5)
POLYCHROMASIA BLD QL SMEAR: ABNORMAL
POTASSIUM BLD-SCNC: 4.3 MMOL/L (ref 3.5–5)
PROT SERPL-MCNC: 4.8 G/DL (ref 6–8)
RBC # BLD AUTO: 2.39 MILL/UL (ref 4.4–6.2)
SCHISTOCYTES: ABNORMAL
SODIUM SERPL-SCNC: 142 MMOL/L (ref 136–145)
TEAR DROP: ABNORMAL
TOTAL NEUTROPHILS-ABS(DIFF): 2 THOU/UL (ref 2–7.7)
TOTAL NEUTROPHILS-REL(DIFF): 78 % (ref 50–70)
WBC: 2.6 THOU/UL (ref 4–11)

## 2020-12-24 LAB
KAPPA LC FREE SER-MCNC: 16.4 MG/DL (ref 0.33–1.94)
KAPPA LC FREE/LAMBDA FREE SER NEPH: 52.9 {RATIO} (ref 0.26–1.65)
LAMBDA LC FREE SERPL-MCNC: 0.31 MG/DL (ref 0.57–2.63)

## 2020-12-25 ENCOUNTER — COMMUNICATION - HEALTHEAST (OUTPATIENT)
Dept: ONCOLOGY | Facility: HOSPITAL | Age: 75
End: 2020-12-25

## 2020-12-25 DIAGNOSIS — B02.29 POST HERPETIC NEURALGIA: ICD-10-CM

## 2020-12-29 ENCOUNTER — COMMUNICATION - HEALTHEAST (OUTPATIENT)
Dept: ONCOLOGY | Facility: HOSPITAL | Age: 75
End: 2020-12-29

## 2021-01-01 ENCOUNTER — TELEPHONE (OUTPATIENT)
Dept: ONCOLOGY | Facility: HOSPITAL | Age: 76
End: 2021-01-01

## 2021-01-01 ENCOUNTER — INFUSION THERAPY VISIT (OUTPATIENT)
Dept: INFUSION THERAPY | Facility: HOSPITAL | Age: 76
End: 2021-01-01
Attending: INTERNAL MEDICINE
Payer: MEDICARE

## 2021-01-01 ENCOUNTER — NURSE TRIAGE (OUTPATIENT)
Dept: NURSING | Facility: CLINIC | Age: 76
End: 2021-01-01
Payer: MEDICARE

## 2021-01-01 ENCOUNTER — HOSPITAL ENCOUNTER (OUTPATIENT)
Dept: CARDIOLOGY | Facility: CLINIC | Age: 76
Discharge: HOME | End: 2021-11-03
Attending: INTERNAL MEDICINE | Admitting: INTERNAL MEDICINE
Payer: MEDICARE

## 2021-01-01 ENCOUNTER — TELEPHONE (OUTPATIENT)
Dept: ONCOLOGY | Facility: HOSPITAL | Age: 76
End: 2021-01-01
Payer: MEDICARE

## 2021-01-01 ENCOUNTER — OFFICE VISIT (OUTPATIENT)
Dept: INTERNAL MEDICINE | Facility: CLINIC | Age: 76
End: 2021-01-01
Payer: MEDICARE

## 2021-01-01 ENCOUNTER — ONCOLOGY VISIT (OUTPATIENT)
Dept: ONCOLOGY | Facility: HOSPITAL | Age: 76
End: 2021-01-01
Attending: INTERNAL MEDICINE
Payer: MEDICARE

## 2021-01-01 VITALS
OXYGEN SATURATION: 99 % | WEIGHT: 167 LBS | HEART RATE: 69 BPM | TEMPERATURE: 97.8 F | DIASTOLIC BLOOD PRESSURE: 54 MMHG | SYSTOLIC BLOOD PRESSURE: 95 MMHG | BODY MASS INDEX: 24.66 KG/M2 | RESPIRATION RATE: 16 BRPM

## 2021-01-01 VITALS
SYSTOLIC BLOOD PRESSURE: 99 MMHG | TEMPERATURE: 97.8 F | RESPIRATION RATE: 16 BRPM | DIASTOLIC BLOOD PRESSURE: 57 MMHG | OXYGEN SATURATION: 98 % | HEART RATE: 62 BPM

## 2021-01-01 VITALS
HEART RATE: 60 BPM | RESPIRATION RATE: 16 BRPM | DIASTOLIC BLOOD PRESSURE: 57 MMHG | SYSTOLIC BLOOD PRESSURE: 119 MMHG | OXYGEN SATURATION: 96 % | TEMPERATURE: 97.4 F

## 2021-01-01 VITALS
HEART RATE: 65 BPM | OXYGEN SATURATION: 100 % | WEIGHT: 165.9 LBS | SYSTOLIC BLOOD PRESSURE: 91 MMHG | DIASTOLIC BLOOD PRESSURE: 57 MMHG | TEMPERATURE: 97.8 F | BODY MASS INDEX: 24.5 KG/M2

## 2021-01-01 DIAGNOSIS — C90.00 MULTIPLE MYELOMA NOT HAVING ACHIEVED REMISSION (H): ICD-10-CM

## 2021-01-01 DIAGNOSIS — T45.1X5A NEUROPATHY DUE TO CHEMOTHERAPEUTIC DRUG (H): ICD-10-CM

## 2021-01-01 DIAGNOSIS — I35.0 AORTIC VALVE STENOSIS, ETIOLOGY OF CARDIAC VALVE DISEASE UNSPECIFIED: ICD-10-CM

## 2021-01-01 DIAGNOSIS — C90.00 MULTIPLE MYELOMA NOT HAVING ACHIEVED REMISSION (H): Primary | ICD-10-CM

## 2021-01-01 DIAGNOSIS — C90.00 MULTIPLE MYELOMA, REMISSION STATUS UNSPECIFIED (H): ICD-10-CM

## 2021-01-01 DIAGNOSIS — D51.9 ANEMIA DUE TO VITAMIN B12 DEFICIENCY, UNSPECIFIED B12 DEFICIENCY TYPE: Primary | ICD-10-CM

## 2021-01-01 DIAGNOSIS — Z86.718 PERSONAL HISTORY OF DVT (DEEP VEIN THROMBOSIS): ICD-10-CM

## 2021-01-01 DIAGNOSIS — Z23 NEED FOR PROPHYLACTIC VACCINATION AND INOCULATION AGAINST INFLUENZA: ICD-10-CM

## 2021-01-01 DIAGNOSIS — I25.10 CORONARY ARTERY CALCIFICATION SEEN ON CAT SCAN: Primary | ICD-10-CM

## 2021-01-01 DIAGNOSIS — Z86.39 HISTORY OF HYPOKALEMIA: ICD-10-CM

## 2021-01-01 DIAGNOSIS — G62.0 NEUROPATHY DUE TO CHEMOTHERAPEUTIC DRUG (H): ICD-10-CM

## 2021-01-01 DIAGNOSIS — M48.061 SPINAL STENOSIS OF LUMBAR REGION WITHOUT NEUROGENIC CLAUDICATION: ICD-10-CM

## 2021-01-01 DIAGNOSIS — I65.22 ASYMPTOMATIC CAROTID ARTERY STENOSIS, LEFT: ICD-10-CM

## 2021-01-01 LAB
ALBUMIN PERCENT: 70.7 % (ref 51–67)
ALBUMIN PERCENT: 70.9 % (ref 51–67)
ALBUMIN SERPL ELPH-MCNC: 3.1 G/DL (ref 3.2–4.7)
ALBUMIN SERPL ELPH-MCNC: 3.2 G/DL (ref 3.2–4.7)
ALBUMIN SERPL-MCNC: 2.9 G/DL (ref 3.5–5)
ALBUMIN SERPL-MCNC: 3 G/DL (ref 3.5–5)
ALBUMIN SERPL-MCNC: 3.2 G/DL (ref 3.5–5)
ALP SERPL-CCNC: 48 U/L (ref 45–120)
ALP SERPL-CCNC: 53 U/L (ref 45–120)
ALP SERPL-CCNC: 61 U/L (ref 45–120)
ALPHA 1 PERCENT: 3 % (ref 2–4)
ALPHA 1 PERCENT: 3.2 % (ref 2–4)
ALPHA 2 PERCENT: 10.7 % (ref 5–13)
ALPHA 2 PERCENT: 11.9 % (ref 5–13)
ALPHA1 GLOB SERPL ELPH-MCNC: 0.1 G/DL (ref 0.1–0.3)
ALPHA1 GLOB SERPL ELPH-MCNC: 0.1 G/DL (ref 0.1–0.3)
ALPHA2 GLOB SERPL ELPH-MCNC: 0.5 G/DL (ref 0.4–0.9)
ALPHA2 GLOB SERPL ELPH-MCNC: 0.5 G/DL (ref 0.4–0.9)
ALT SERPL W P-5'-P-CCNC: 10 U/L (ref 0–45)
ALT SERPL W P-5'-P-CCNC: 10 U/L (ref 0–45)
ALT SERPL W P-5'-P-CCNC: 11 U/L (ref 0–45)
ANION GAP SERPL CALCULATED.3IONS-SCNC: 5 MMOL/L (ref 5–18)
ANION GAP SERPL CALCULATED.3IONS-SCNC: 5 MMOL/L (ref 5–18)
ANION GAP SERPL CALCULATED.3IONS-SCNC: 6 MMOL/L (ref 5–18)
AST SERPL W P-5'-P-CCNC: 14 U/L (ref 0–40)
AST SERPL W P-5'-P-CCNC: 14 U/L (ref 0–40)
AST SERPL W P-5'-P-CCNC: 19 U/L (ref 0–40)
B-GLOBULIN SERPL ELPH-MCNC: 0.4 G/DL (ref 0.7–1.2)
B-GLOBULIN SERPL ELPH-MCNC: 0.5 G/DL (ref 0.7–1.2)
BASOPHILS # BLD MANUAL: 0.1 10E3/UL (ref 0–0.2)
BASOPHILS NFR BLD MANUAL: 4 %
BASOPHILS NFR BLD MANUAL: 5 %
BASOPHILS NFR BLD MANUAL: 5 %
BETA PERCENT: 10.8 % (ref 10–17)
BETA PERCENT: 9.8 % (ref 10–17)
BILIRUB SERPL-MCNC: 0.4 MG/DL (ref 0–1)
BILIRUB SERPL-MCNC: 0.4 MG/DL (ref 0–1)
BILIRUB SERPL-MCNC: 0.5 MG/DL (ref 0–1)
BUN SERPL-MCNC: 16 MG/DL (ref 8–28)
BUN SERPL-MCNC: 17 MG/DL (ref 8–28)
BUN SERPL-MCNC: 26 MG/DL (ref 8–28)
CALCIUM SERPL-MCNC: 8.4 MG/DL (ref 8.5–10.5)
CALCIUM SERPL-MCNC: 8.9 MG/DL (ref 8.5–10.5)
CALCIUM SERPL-MCNC: 9.3 MG/DL (ref 8.5–10.5)
CHLORIDE BLD-SCNC: 109 MMOL/L (ref 98–107)
CHLORIDE BLD-SCNC: 109 MMOL/L (ref 98–107)
CHLORIDE BLD-SCNC: 112 MMOL/L (ref 98–107)
CO2 SERPL-SCNC: 25 MMOL/L (ref 22–31)
CO2 SERPL-SCNC: 26 MMOL/L (ref 22–31)
CO2 SERPL-SCNC: 27 MMOL/L (ref 22–31)
CREAT SERPL-MCNC: 1.13 MG/DL (ref 0.7–1.3)
CREAT SERPL-MCNC: 1.13 MG/DL (ref 0.7–1.3)
CREAT SERPL-MCNC: 1.3 MG/DL (ref 0.7–1.3)
ELLIPTOCYTES BLD QL SMEAR: SLIGHT
ELLIPTOCYTES BLD QL SMEAR: SLIGHT
EOSINOPHIL # BLD MANUAL: 0.1 10E3/UL (ref 0–0.7)
EOSINOPHIL # BLD MANUAL: 0.2 10E3/UL (ref 0–0.7)
EOSINOPHIL # BLD MANUAL: 0.4 10E3/UL (ref 0–0.7)
EOSINOPHIL NFR BLD MANUAL: 14 %
EOSINOPHIL NFR BLD MANUAL: 5 %
EOSINOPHIL NFR BLD MANUAL: 6 %
ERYTHROCYTE [DISTWIDTH] IN BLOOD BY AUTOMATED COUNT: 14.7 % (ref 10–15)
ERYTHROCYTE [DISTWIDTH] IN BLOOD BY AUTOMATED COUNT: 15.3 % (ref 10–15)
ERYTHROCYTE [DISTWIDTH] IN BLOOD BY AUTOMATED COUNT: 15.9 % (ref 10–15)
GAMMA GLOB SERPL ELPH-MCNC: 0.2 G/DL (ref 0.6–1.4)
GAMMA GLOB SERPL ELPH-MCNC: 0.2 G/DL (ref 0.6–1.4)
GAMMA GLOBULIN PERCENT: 4.2 % (ref 9–20)
GAMMA GLOBULIN PERCENT: 4.8 % (ref 9–20)
GFR SERPL CREATININE-BSD FRML MDRD: 53 ML/MIN/1.73M2
GFR SERPL CREATININE-BSD FRML MDRD: 63 ML/MIN/1.73M2
GFR SERPL CREATININE-BSD FRML MDRD: 63 ML/MIN/1.73M2
GLUCOSE BLD-MCNC: 106 MG/DL (ref 70–125)
GLUCOSE BLD-MCNC: 117 MG/DL (ref 70–125)
GLUCOSE BLD-MCNC: 142 MG/DL (ref 70–125)
HCT VFR BLD AUTO: 34.1 % (ref 40–53)
HCT VFR BLD AUTO: 34.6 % (ref 40–53)
HCT VFR BLD AUTO: 35.5 % (ref 40–53)
HGB BLD-MCNC: 11 G/DL (ref 13.3–17.7)
HGB BLD-MCNC: 11 G/DL (ref 13.3–17.7)
HGB BLD-MCNC: 11.3 G/DL (ref 13.3–17.7)
KAPPA LC FREE SER-MCNC: 37.09 MG/DL (ref 0.33–1.94)
KAPPA LC FREE SER-MCNC: 41.63 MG/DL (ref 0.33–1.94)
KAPPA LC FREE SER-MCNC: 57.7 MG/DL (ref 0.33–1.94)
KAPPA LC FREE/LAMBDA FREE SER NEPH: 100.24 {RATIO} (ref 0.26–1.65)
KAPPA LC FREE/LAMBDA FREE SER NEPH: 112.51 {RATIO} (ref 0.26–1.65)
KAPPA LC FREE/LAMBDA FREE SER NEPH: 155.95 {RATIO} (ref 0.26–1.65)
LAMBDA LC FREE SERPL-MCNC: 0.37 MG/DL (ref 0.57–2.63)
LVEF ECHO: NORMAL
LYMPHOCYTES # BLD MANUAL: 0.6 10E3/UL (ref 0.8–5.3)
LYMPHOCYTES # BLD MANUAL: 0.6 10E3/UL (ref 0.8–5.3)
LYMPHOCYTES # BLD MANUAL: 0.7 10E3/UL (ref 0.8–5.3)
LYMPHOCYTES NFR BLD MANUAL: 21 %
LYMPHOCYTES NFR BLD MANUAL: 23 %
LYMPHOCYTES NFR BLD MANUAL: 23 %
MCH RBC QN AUTO: 33.3 PG (ref 26.5–33)
MCH RBC QN AUTO: 33.5 PG (ref 26.5–33)
MCH RBC QN AUTO: 33.7 PG (ref 26.5–33)
MCHC RBC AUTO-ENTMCNC: 31.8 G/DL (ref 31.5–36.5)
MCHC RBC AUTO-ENTMCNC: 31.8 G/DL (ref 31.5–36.5)
MCHC RBC AUTO-ENTMCNC: 32.3 G/DL (ref 31.5–36.5)
MCV RBC AUTO: 105 FL (ref 78–100)
MONOCYTES # BLD MANUAL: 0.4 10E3/UL (ref 0–1.3)
MONOCYTES # BLD MANUAL: 0.6 10E3/UL (ref 0–1.3)
MONOCYTES # BLD MANUAL: 0.7 10E3/UL (ref 0–1.3)
MONOCYTES NFR BLD MANUAL: 13 %
MONOCYTES NFR BLD MANUAL: 19 %
MONOCYTES NFR BLD MANUAL: 25 %
NEUTROPHILS # BLD MANUAL: 1.2 10E3/UL (ref 1.6–8.3)
NEUTROPHILS # BLD MANUAL: 1.4 10E3/UL (ref 1.6–8.3)
NEUTROPHILS # BLD MANUAL: 1.4 10E3/UL (ref 1.6–8.3)
NEUTROPHILS NFR BLD MANUAL: 42 %
NEUTROPHILS NFR BLD MANUAL: 47 %
NEUTROPHILS NFR BLD MANUAL: 48 %
PATH ICD:: ABNORMAL
PATH ICD:: ABNORMAL
PLAT MORPH BLD: ABNORMAL
PLATELET # BLD AUTO: 213 10E3/UL (ref 150–450)
PLATELET # BLD AUTO: 218 10E3/UL (ref 150–450)
PLATELET # BLD AUTO: 271 10E3/UL (ref 150–450)
POTASSIUM BLD-SCNC: 4.3 MMOL/L (ref 3.5–5)
POTASSIUM BLD-SCNC: 4.3 MMOL/L (ref 3.5–5)
POTASSIUM BLD-SCNC: 4.6 MMOL/L (ref 3.5–5)
PROT PATTERN SERPL ELPH-IMP: ABNORMAL
PROT PATTERN SERPL ELPH-IMP: ABNORMAL
PROT SERPL-MCNC: 4.5 G/DL (ref 6–8)
PROT SERPL-MCNC: 4.9 G/DL (ref 6–8)
PROT SERPL-MCNC: 5 G/DL (ref 6–8)
RBC # BLD AUTO: 3.26 10E6/UL (ref 4.4–5.9)
RBC # BLD AUTO: 3.3 10E6/UL (ref 4.4–5.9)
RBC # BLD AUTO: 3.37 10E6/UL (ref 4.4–5.9)
RBC MORPH BLD: ABNORMAL
REVIEWING PATHOLOGIST: ABNORMAL
REVIEWING PATHOLOGIST: ABNORMAL
SODIUM SERPL-SCNC: 140 MMOL/L (ref 136–145)
SODIUM SERPL-SCNC: 142 MMOL/L (ref 136–145)
SODIUM SERPL-SCNC: 142 MMOL/L (ref 136–145)
TOTAL PROTEIN SERUM FOR ELP (SYNCED VALUE): 4.4 G/DL
TOTAL PROTEIN SERUM FOR ELP (SYNCED VALUE): 4.5 G/DL
TOTAL PROTEIN SERUM FOR ELP: 4.4 G/DL (ref 6–8)
TOTAL PROTEIN SERUM FOR ELP: 4.5 G/DL (ref 6–8)
WBC # BLD AUTO: 2.8 10E3/UL (ref 4–11)
WBC # BLD AUTO: 2.9 10E3/UL (ref 4–11)
WBC # BLD AUTO: 2.9 10E3/UL (ref 4–11)

## 2021-01-01 PROCEDURE — 85027 COMPLETE CBC AUTOMATED: CPT | Performed by: INTERNAL MEDICINE

## 2021-01-01 PROCEDURE — 90662 IIV NO PRSV INCREASED AG IM: CPT

## 2021-01-01 PROCEDURE — 84155 ASSAY OF PROTEIN SERUM: CPT | Mod: 91

## 2021-01-01 PROCEDURE — 36591 DRAW BLOOD OFF VENOUS DEVICE: CPT

## 2021-01-01 PROCEDURE — 84155 ASSAY OF PROTEIN SERUM: CPT

## 2021-01-01 PROCEDURE — 99214 OFFICE O/P EST MOD 30 MIN: CPT | Performed by: INTERNAL MEDICINE

## 2021-01-01 PROCEDURE — 250N000011 HC RX IP 250 OP 636: Performed by: NURSE PRACTITIONER

## 2021-01-01 PROCEDURE — 80053 COMPREHEN METABOLIC PANEL: CPT | Performed by: INTERNAL MEDICINE

## 2021-01-01 PROCEDURE — 96367 TX/PROPH/DG ADDL SEQ IV INF: CPT

## 2021-01-01 PROCEDURE — 258N000003 HC RX IP 258 OP 636: Performed by: INTERNAL MEDICINE

## 2021-01-01 PROCEDURE — 96372 THER/PROPH/DIAG INJ SC/IM: CPT | Mod: XS | Performed by: NURSE PRACTITIONER

## 2021-01-01 PROCEDURE — 82247 BILIRUBIN TOTAL: CPT | Performed by: INTERNAL MEDICINE

## 2021-01-01 PROCEDURE — 96413 CHEMO IV INFUSION 1 HR: CPT

## 2021-01-01 PROCEDURE — 84165 PROTEIN E-PHORESIS SERUM: CPT | Mod: 26 | Performed by: PATHOLOGY

## 2021-01-01 PROCEDURE — 93306 TTE W/DOPPLER COMPLETE: CPT

## 2021-01-01 PROCEDURE — 83883 ASSAY NEPHELOMETRY NOT SPEC: CPT

## 2021-01-01 PROCEDURE — 84165 PROTEIN E-PHORESIS SERUM: CPT | Mod: TC

## 2021-01-01 PROCEDURE — 84165 PROTEIN E-PHORESIS SERUM: CPT | Mod: 26

## 2021-01-01 PROCEDURE — 250N000011 HC RX IP 250 OP 636: Performed by: INTERNAL MEDICINE

## 2021-01-01 PROCEDURE — 85027 COMPLETE CBC AUTOMATED: CPT | Performed by: NURSE PRACTITIONER

## 2021-01-01 PROCEDURE — 82040 ASSAY OF SERUM ALBUMIN: CPT | Performed by: INTERNAL MEDICINE

## 2021-01-01 PROCEDURE — 99214 OFFICE O/P EST MOD 30 MIN: CPT | Performed by: NURSE PRACTITIONER

## 2021-01-01 PROCEDURE — G0463 HOSPITAL OUTPT CLINIC VISIT: HCPCS | Mod: 25

## 2021-01-01 PROCEDURE — 96372 THER/PROPH/DIAG INJ SC/IM: CPT | Performed by: NURSE PRACTITIONER

## 2021-01-01 PROCEDURE — 96415 CHEMO IV INFUSION ADDL HR: CPT

## 2021-01-01 PROCEDURE — 258N000003 HC RX IP 258 OP 636: Performed by: NURSE PRACTITIONER

## 2021-01-01 PROCEDURE — 96375 TX/PRO/DX INJ NEW DRUG ADDON: CPT

## 2021-01-01 PROCEDURE — 80053 COMPREHEN METABOLIC PANEL: CPT | Performed by: NURSE PRACTITIONER

## 2021-01-01 PROCEDURE — 93306 TTE W/DOPPLER COMPLETE: CPT | Mod: 26 | Performed by: INTERNAL MEDICINE

## 2021-01-01 PROCEDURE — G0008 ADMIN INFLUENZA VIRUS VAC: HCPCS

## 2021-01-01 RX ORDER — HEPARIN SODIUM (PORCINE) LOCK FLUSH IV SOLN 100 UNIT/ML 100 UNIT/ML
5 SOLUTION INTRAVENOUS
Status: DISCONTINUED | OUTPATIENT
Start: 2021-01-01 | End: 2021-01-01 | Stop reason: HOSPADM

## 2021-01-01 RX ORDER — ALBUTEROL SULFATE 0.83 MG/ML
2.5 SOLUTION RESPIRATORY (INHALATION)
Status: DISCONTINUED | OUTPATIENT
Start: 2021-01-01 | End: 2021-01-01 | Stop reason: HOSPADM

## 2021-01-01 RX ORDER — EPINEPHRINE 1 MG/ML
0.3 INJECTION, SOLUTION INTRAMUSCULAR; SUBCUTANEOUS EVERY 5 MIN PRN
Status: DISCONTINUED | OUTPATIENT
Start: 2021-01-01 | End: 2021-01-01 | Stop reason: HOSPADM

## 2021-01-01 RX ORDER — HEPARIN SODIUM (PORCINE) LOCK FLUSH IV SOLN 100 UNIT/ML 100 UNIT/ML
5 SOLUTION INTRAVENOUS
Status: CANCELLED | OUTPATIENT
Start: 2021-01-01

## 2021-01-01 RX ORDER — DIPHENHYDRAMINE HYDROCHLORIDE 50 MG/ML
50 INJECTION INTRAMUSCULAR; INTRAVENOUS
Status: CANCELLED
Start: 2021-01-01

## 2021-01-01 RX ORDER — DIPHENHYDRAMINE HCL 50 MG
50 CAPSULE ORAL
Status: CANCELLED | OUTPATIENT
Start: 2022-01-01

## 2021-01-01 RX ORDER — ALBUTEROL SULFATE 0.83 MG/ML
2.5 SOLUTION RESPIRATORY (INHALATION)
Status: CANCELLED | OUTPATIENT
Start: 2021-01-01

## 2021-01-01 RX ORDER — NALOXONE HYDROCHLORIDE 0.4 MG/ML
0.2 INJECTION, SOLUTION INTRAMUSCULAR; INTRAVENOUS; SUBCUTANEOUS
Status: DISCONTINUED | OUTPATIENT
Start: 2021-01-01 | End: 2021-01-01 | Stop reason: HOSPADM

## 2021-01-01 RX ORDER — ALBUTEROL SULFATE 90 UG/1
1-2 AEROSOL, METERED RESPIRATORY (INHALATION)
Status: DISCONTINUED | OUTPATIENT
Start: 2021-01-01 | End: 2021-01-01 | Stop reason: HOSPADM

## 2021-01-01 RX ORDER — MEPERIDINE HYDROCHLORIDE 25 MG/ML
25 INJECTION INTRAMUSCULAR; INTRAVENOUS; SUBCUTANEOUS EVERY 30 MIN PRN
Status: CANCELLED | OUTPATIENT
Start: 2021-01-01

## 2021-01-01 RX ORDER — MEPERIDINE HYDROCHLORIDE 25 MG/ML
25 INJECTION INTRAMUSCULAR; INTRAVENOUS; SUBCUTANEOUS EVERY 30 MIN PRN
Status: DISCONTINUED | OUTPATIENT
Start: 2021-01-01 | End: 2021-01-01 | Stop reason: HOSPADM

## 2021-01-01 RX ORDER — DIPHENHYDRAMINE HYDROCHLORIDE 50 MG/ML
50 INJECTION INTRAMUSCULAR; INTRAVENOUS
Status: DISCONTINUED | OUTPATIENT
Start: 2021-01-01 | End: 2021-01-01 | Stop reason: HOSPADM

## 2021-01-01 RX ORDER — ALBUTEROL SULFATE 0.83 MG/ML
2.5 SOLUTION RESPIRATORY (INHALATION)
Status: CANCELLED | OUTPATIENT
Start: 2022-01-01

## 2021-01-01 RX ORDER — LORAZEPAM 2 MG/ML
0.5 INJECTION INTRAMUSCULAR EVERY 4 HOURS PRN
Status: CANCELLED
Start: 2021-01-01

## 2021-01-01 RX ORDER — NALOXONE HYDROCHLORIDE 0.4 MG/ML
0.2 INJECTION, SOLUTION INTRAMUSCULAR; INTRAVENOUS; SUBCUTANEOUS
Status: CANCELLED | OUTPATIENT
Start: 2021-01-01

## 2021-01-01 RX ORDER — ALBUTEROL SULFATE 90 UG/1
1-2 AEROSOL, METERED RESPIRATORY (INHALATION)
Status: CANCELLED
Start: 2021-01-01

## 2021-01-01 RX ORDER — EPINEPHRINE 1 MG/ML
0.3 INJECTION, SOLUTION INTRAMUSCULAR; SUBCUTANEOUS EVERY 5 MIN PRN
Status: CANCELLED | OUTPATIENT
Start: 2021-01-01

## 2021-01-01 RX ORDER — METHYLPREDNISOLONE SODIUM SUCCINATE 125 MG/2ML
125 INJECTION, POWDER, LYOPHILIZED, FOR SOLUTION INTRAMUSCULAR; INTRAVENOUS
Status: DISCONTINUED | OUTPATIENT
Start: 2021-01-01 | End: 2021-01-01 | Stop reason: HOSPADM

## 2021-01-01 RX ORDER — CYANOCOBALAMIN 1000 UG/ML
1000 INJECTION, SOLUTION INTRAMUSCULAR; SUBCUTANEOUS ONCE
Status: CANCELLED
Start: 2021-01-01 | End: 2021-01-01

## 2021-01-01 RX ORDER — CYANOCOBALAMIN 1000 UG/ML
1000 INJECTION, SOLUTION INTRAMUSCULAR; SUBCUTANEOUS ONCE
Status: COMPLETED | OUTPATIENT
Start: 2021-01-01 | End: 2021-01-01

## 2021-01-01 RX ORDER — ACETAMINOPHEN 325 MG/1
650 TABLET ORAL
Status: CANCELLED | OUTPATIENT
Start: 2022-01-01

## 2021-01-01 RX ORDER — GABAPENTIN 300 MG/1
CAPSULE ORAL
Qty: 30 CAPSULE | Refills: 5 | Status: SHIPPED | OUTPATIENT
Start: 2021-01-01 | End: 2022-01-01

## 2021-01-01 RX ORDER — NALOXONE HYDROCHLORIDE 0.4 MG/ML
0.2 INJECTION, SOLUTION INTRAMUSCULAR; INTRAVENOUS; SUBCUTANEOUS
Status: CANCELLED | OUTPATIENT
Start: 2022-01-01

## 2021-01-01 RX ORDER — METHYLPREDNISOLONE SODIUM SUCCINATE 125 MG/2ML
125 INJECTION, POWDER, LYOPHILIZED, FOR SOLUTION INTRAMUSCULAR; INTRAVENOUS
Status: CANCELLED
Start: 2021-01-01

## 2021-01-01 RX ORDER — DIPHENHYDRAMINE HCL 50 MG
50 CAPSULE ORAL
Status: CANCELLED | OUTPATIENT
Start: 2021-01-01

## 2021-01-01 RX ORDER — HEPARIN SODIUM,PORCINE 10 UNIT/ML
5 VIAL (ML) INTRAVENOUS
Status: CANCELLED | OUTPATIENT
Start: 2022-01-01

## 2021-01-01 RX ORDER — HEPARIN SODIUM,PORCINE 10 UNIT/ML
5 VIAL (ML) INTRAVENOUS
Status: CANCELLED | OUTPATIENT
Start: 2021-01-01

## 2021-01-01 RX ORDER — CYANOCOBALAMIN 1000 UG/ML
1000 INJECTION, SOLUTION INTRAMUSCULAR; SUBCUTANEOUS ONCE
Status: CANCELLED
Start: 2022-01-01 | End: 2022-01-01

## 2021-01-01 RX ORDER — DIPHENHYDRAMINE HYDROCHLORIDE 50 MG/ML
50 INJECTION INTRAMUSCULAR; INTRAVENOUS
Status: CANCELLED
Start: 2022-01-01

## 2021-01-01 RX ORDER — METHYLPREDNISOLONE SODIUM SUCCINATE 125 MG/2ML
125 INJECTION, POWDER, LYOPHILIZED, FOR SOLUTION INTRAMUSCULAR; INTRAVENOUS
Status: CANCELLED
Start: 2022-01-01

## 2021-01-01 RX ORDER — HEPARIN SODIUM (PORCINE) LOCK FLUSH IV SOLN 100 UNIT/ML 100 UNIT/ML
5 SOLUTION INTRAVENOUS
Status: CANCELLED | OUTPATIENT
Start: 2022-01-01

## 2021-01-01 RX ORDER — ALBUTEROL SULFATE 90 UG/1
1-2 AEROSOL, METERED RESPIRATORY (INHALATION)
Status: CANCELLED
Start: 2022-01-01

## 2021-01-01 RX ORDER — MEPERIDINE HYDROCHLORIDE 25 MG/ML
25 INJECTION INTRAMUSCULAR; INTRAVENOUS; SUBCUTANEOUS EVERY 30 MIN PRN
Status: CANCELLED | OUTPATIENT
Start: 2022-01-01

## 2021-01-01 RX ORDER — EPINEPHRINE 1 MG/ML
0.3 INJECTION, SOLUTION INTRAMUSCULAR; SUBCUTANEOUS EVERY 5 MIN PRN
Status: CANCELLED | OUTPATIENT
Start: 2022-01-01

## 2021-01-01 RX ORDER — LORAZEPAM 2 MG/ML
0.5 INJECTION INTRAMUSCULAR EVERY 4 HOURS PRN
Status: CANCELLED
Start: 2022-01-01

## 2021-01-01 RX ORDER — ATORVASTATIN CALCIUM 10 MG/1
10 TABLET, FILM COATED ORAL DAILY
Qty: 90 TABLET | Refills: 1 | Status: SHIPPED | OUTPATIENT
Start: 2021-01-01 | End: 2022-01-01

## 2021-01-01 RX ORDER — ACETAMINOPHEN 325 MG/1
650 TABLET ORAL
Status: CANCELLED | OUTPATIENT
Start: 2021-01-01

## 2021-01-01 RX ORDER — POTASSIUM CHLORIDE 1500 MG/1
20 TABLET, EXTENDED RELEASE ORAL EVERY OTHER DAY
Start: 2021-01-01 | End: 2022-01-01

## 2021-01-01 RX ADMIN — CYANOCOBALAMIN 1000 MCG: 1000 INJECTION, SOLUTION INTRAMUSCULAR; SUBCUTANEOUS at 11:50

## 2021-01-01 RX ADMIN — HEPARIN SODIUM (PORCINE) LOCK FLUSH IV SOLN 100 UNIT/ML 5 ML: 100 SOLUTION at 11:53

## 2021-01-01 RX ADMIN — SODIUM CHLORIDE 250 ML: 9 INJECTION, SOLUTION INTRAVENOUS at 09:21

## 2021-01-01 RX ADMIN — CYANOCOBALAMIN 1000 MCG: 1000 INJECTION, SOLUTION INTRAMUSCULAR; SUBCUTANEOUS at 12:10

## 2021-01-01 RX ADMIN — CYANOCOBALAMIN 1000 MCG: 1000 INJECTION, SOLUTION INTRAMUSCULAR; SUBCUTANEOUS at 11:33

## 2021-01-01 RX ADMIN — SODIUM CHLORIDE 250 ML: 9 INJECTION, SOLUTION INTRAVENOUS at 09:02

## 2021-01-01 RX ADMIN — DARATUMUMAB 1200 MG: 100 INJECTION, SOLUTION, CONCENTRATE INTRAVENOUS at 10:05

## 2021-01-01 RX ADMIN — DEXAMETHASONE SODIUM PHOSPHATE 20 MG: 10 INJECTION, SOLUTION INTRAMUSCULAR; INTRAVENOUS at 09:31

## 2021-01-01 RX ADMIN — DEXAMETHASONE SODIUM PHOSPHATE 20 MG: 10 INJECTION, SOLUTION INTRAMUSCULAR; INTRAVENOUS at 09:47

## 2021-01-01 RX ADMIN — HEPARIN 5 ML: 100 SYRINGE at 12:22

## 2021-01-01 RX ADMIN — HEPARIN SODIUM (PORCINE) LOCK FLUSH IV SOLN 100 UNIT/ML 5 ML: 100 SOLUTION at 11:36

## 2021-01-01 RX ADMIN — DARATUMUMAB 1200 MG: 100 INJECTION, SOLUTION, CONCENTRATE INTRAVENOUS at 10:46

## 2021-01-01 RX ADMIN — SODIUM CHLORIDE 250 ML: 9 INJECTION, SOLUTION INTRAVENOUS at 09:18

## 2021-01-01 RX ADMIN — DARATUMUMAB 1200 MG: 100 INJECTION, SOLUTION, CONCENTRATE INTRAVENOUS at 10:16

## 2021-01-01 ASSESSMENT — PAIN SCALES - GENERAL: PAINLEVEL: NO PAIN (0)

## 2021-01-06 ENCOUNTER — OFFICE VISIT - HEALTHEAST (OUTPATIENT)
Dept: ONCOLOGY | Facility: HOSPITAL | Age: 76
End: 2021-01-06

## 2021-01-06 ENCOUNTER — AMBULATORY - HEALTHEAST (OUTPATIENT)
Dept: INFUSION THERAPY | Facility: HOSPITAL | Age: 76
End: 2021-01-06

## 2021-01-06 ENCOUNTER — INFUSION - HEALTHEAST (OUTPATIENT)
Dept: INFUSION THERAPY | Facility: HOSPITAL | Age: 76
End: 2021-01-06

## 2021-01-06 DIAGNOSIS — C90.01 MULTIPLE MYELOMA IN REMISSION (H): ICD-10-CM

## 2021-01-06 DIAGNOSIS — D51.9 ANEMIA DUE TO VITAMIN B12 DEFICIENCY, UNSPECIFIED B12 DEFICIENCY TYPE: ICD-10-CM

## 2021-01-06 LAB
ALBUMIN SERPL-MCNC: 2.9 G/DL (ref 3.5–5)
ALP SERPL-CCNC: 60 U/L (ref 45–120)
ALT SERPL W P-5'-P-CCNC: 18 U/L (ref 0–45)
ANION GAP SERPL CALCULATED.3IONS-SCNC: 6 MMOL/L (ref 5–18)
AST SERPL W P-5'-P-CCNC: 11 U/L (ref 0–40)
BASOPHILS # BLD AUTO: 0 THOU/UL (ref 0–0.2)
BASOPHILS NFR BLD AUTO: 2 % (ref 0–2)
BILIRUB SERPL-MCNC: 0.3 MG/DL (ref 0–1)
BUN SERPL-MCNC: 10 MG/DL (ref 8–28)
CALCIUM SERPL-MCNC: 8.2 MG/DL (ref 8.5–10.5)
CHLORIDE BLD-SCNC: 113 MMOL/L (ref 98–107)
CO2 SERPL-SCNC: 23 MMOL/L (ref 22–31)
CREAT SERPL-MCNC: 0.96 MG/DL (ref 0.7–1.3)
EOSINOPHIL COUNT (ABSOLUTE): 0.1 THOU/UL (ref 0–0.4)
EOSINOPHIL NFR BLD AUTO: 5 % (ref 0–6)
ERYTHROCYTE [DISTWIDTH] IN BLOOD BY AUTOMATED COUNT: 13.6 % (ref 11–14.5)
GFR SERPL CREATININE-BSD FRML MDRD: >60 ML/MIN/1.73M2
GLUCOSE BLD-MCNC: 113 MG/DL (ref 70–125)
HCT VFR BLD AUTO: 27.3 % (ref 40–54)
HGB BLD-MCNC: 8.5 G/DL (ref 14–18)
IMMATURE GRANULOCYTE % - MAN (DIFF): 0 %
IMMATURE GRANULOCYTE ABSOLUTE - MAN (DIFF): 0 THOU/UL
LYMPHOCYTES # BLD AUTO: 0.1 THOU/UL (ref 0.8–4.4)
LYMPHOCYTES NFR BLD AUTO: 5 % (ref 20–40)
MCH RBC QN AUTO: 35.7 PG (ref 27–34)
MCHC RBC AUTO-ENTMCNC: 31.1 G/DL (ref 32–36)
MCV RBC AUTO: 115 FL (ref 80–100)
MONOCYTES # BLD AUTO: 0.4 THOU/UL (ref 0–0.9)
MONOCYTES NFR BLD AUTO: 16 % (ref 2–10)
OVALOCYTES: ABNORMAL
PLAT MORPH BLD: NORMAL
PLATELET # BLD AUTO: 217 THOU/UL (ref 140–440)
PMV BLD AUTO: 10 FL (ref 8.5–12.5)
POTASSIUM BLD-SCNC: 4.4 MMOL/L (ref 3.5–5)
PROT SERPL-MCNC: 4.8 G/DL (ref 6–8)
RBC # BLD AUTO: 2.38 MILL/UL (ref 4.4–6.2)
SCHISTOCYTES: ABNORMAL
SODIUM SERPL-SCNC: 142 MMOL/L (ref 136–145)
TARGETS BLD QL SMEAR: ABNORMAL
TOTAL NEUTROPHILS-ABS(DIFF): 1.9 THOU/UL (ref 2–7.7)
TOTAL NEUTROPHILS-REL(DIFF): 74 % (ref 50–70)
WBC: 2.6 THOU/UL (ref 4–11)

## 2021-01-06 ASSESSMENT — MIFFLIN-ST. JEOR: SCORE: 1481.52

## 2021-01-07 ENCOUNTER — AMBULATORY - HEALTHEAST (OUTPATIENT)
Dept: ONCOLOGY | Facility: HOSPITAL | Age: 76
End: 2021-01-07

## 2021-01-18 ENCOUNTER — COMMUNICATION - HEALTHEAST (OUTPATIENT)
Dept: ONCOLOGY | Facility: HOSPITAL | Age: 76
End: 2021-01-18

## 2021-01-20 ENCOUNTER — INFUSION - HEALTHEAST (OUTPATIENT)
Dept: INFUSION THERAPY | Facility: HOSPITAL | Age: 76
End: 2021-01-20

## 2021-01-20 ENCOUNTER — COMMUNICATION - HEALTHEAST (OUTPATIENT)
Dept: ONCOLOGY | Facility: HOSPITAL | Age: 76
End: 2021-01-20

## 2021-01-20 DIAGNOSIS — C90.01 MULTIPLE MYELOMA IN REMISSION (H): ICD-10-CM

## 2021-01-20 LAB
ALBUMIN SERPL-MCNC: 3 G/DL (ref 3.5–5)
ALP SERPL-CCNC: 74 U/L (ref 45–120)
ALT SERPL W P-5'-P-CCNC: 10 U/L (ref 0–45)
ANION GAP SERPL CALCULATED.3IONS-SCNC: 7 MMOL/L (ref 5–18)
AST SERPL W P-5'-P-CCNC: 10 U/L (ref 0–40)
BASOPHILS # BLD AUTO: 0.1 THOU/UL (ref 0–0.2)
BASOPHILS NFR BLD AUTO: 2 % (ref 0–2)
BILIRUB SERPL-MCNC: 0.4 MG/DL (ref 0–1)
BUN SERPL-MCNC: 19 MG/DL (ref 8–28)
CALCIUM SERPL-MCNC: 8.5 MG/DL (ref 8.5–10.5)
CHLORIDE BLD-SCNC: 102 MMOL/L (ref 98–107)
CO2 SERPL-SCNC: 26 MMOL/L (ref 22–31)
CREAT SERPL-MCNC: 1.37 MG/DL (ref 0.7–1.3)
EOSINOPHIL COUNT (ABSOLUTE): 0.3 THOU/UL (ref 0–0.4)
EOSINOPHIL NFR BLD AUTO: 12 % (ref 0–6)
ERYTHROCYTE [DISTWIDTH] IN BLOOD BY AUTOMATED COUNT: 13.2 % (ref 11–14.5)
GFR SERPL CREATININE-BSD FRML MDRD: 51 ML/MIN/1.73M2
GLUCOSE BLD-MCNC: 117 MG/DL (ref 70–125)
HCT VFR BLD AUTO: 32.4 % (ref 40–54)
HGB BLD-MCNC: 10.3 G/DL (ref 14–18)
IGA SERPL-MCNC: 247 MG/DL
IGA SERPL-MCNC: 8 MG/DL (ref 65–400)
IGM SERPL-MCNC: <5 MG/DL (ref 60–280)
IMMATURE GRANULOCYTE % - MAN (DIFF): 0 %
IMMATURE GRANULOCYTE ABSOLUTE - MAN (DIFF): 0 THOU/UL
LYMPHOCYTES # BLD AUTO: 0.1 THOU/UL (ref 0.8–4.4)
LYMPHOCYTES NFR BLD AUTO: 3 % (ref 20–40)
MCH RBC QN AUTO: 35 PG (ref 27–34)
MCHC RBC AUTO-ENTMCNC: 31.8 G/DL (ref 32–36)
MCV RBC AUTO: 110 FL (ref 80–100)
MONOCYTES # BLD AUTO: 1.2 THOU/UL (ref 0–0.9)
MONOCYTES NFR BLD AUTO: 43 % (ref 2–10)
OVALOCYTES: ABNORMAL
PLAT MORPH BLD: NORMAL
PLATELET # BLD AUTO: 160 THOU/UL (ref 140–440)
PMV BLD AUTO: 9.9 FL (ref 8.5–12.5)
POTASSIUM BLD-SCNC: 5 MMOL/L (ref 3.5–5)
PROT SERPL-MCNC: 5.5 G/DL (ref 6–8)
RBC # BLD AUTO: 2.94 MILL/UL (ref 4.4–6.2)
SCHISTOCYTES: ABNORMAL
SODIUM SERPL-SCNC: 135 MMOL/L (ref 136–145)
TEAR DROP: ABNORMAL
TOTAL NEUTROPHILS-ABS(DIFF): 1.2 THOU/UL (ref 2–7.7)
TOTAL NEUTROPHILS-REL(DIFF): 40 % (ref 50–70)
WBC: 2.9 THOU/UL (ref 4–11)

## 2021-01-21 LAB
KAPPA LC FREE SER-MCNC: 18.17 MG/DL (ref 0.33–1.94)
KAPPA LC FREE/LAMBDA FREE SER NEPH: 44.32 {RATIO} (ref 0.26–1.65)
LAMBDA LC FREE SERPL-MCNC: 0.41 MG/DL (ref 0.57–2.63)

## 2021-01-26 ENCOUNTER — COMMUNICATION - HEALTHEAST (OUTPATIENT)
Dept: ONCOLOGY | Facility: HOSPITAL | Age: 76
End: 2021-01-26

## 2021-01-26 DIAGNOSIS — C90.01 MULTIPLE MYELOMA IN REMISSION (H): ICD-10-CM

## 2021-02-03 ENCOUNTER — AMBULATORY - HEALTHEAST (OUTPATIENT)
Dept: INFUSION THERAPY | Facility: HOSPITAL | Age: 76
End: 2021-02-03

## 2021-02-03 ENCOUNTER — INFUSION - HEALTHEAST (OUTPATIENT)
Dept: INFUSION THERAPY | Facility: HOSPITAL | Age: 76
End: 2021-02-03

## 2021-02-03 ENCOUNTER — OFFICE VISIT - HEALTHEAST (OUTPATIENT)
Dept: ONCOLOGY | Facility: HOSPITAL | Age: 76
End: 2021-02-03

## 2021-02-03 DIAGNOSIS — C90.01 MULTIPLE MYELOMA IN REMISSION (H): ICD-10-CM

## 2021-02-03 DIAGNOSIS — C90.02 MULTIPLE MYELOMA IN RELAPSE (H): ICD-10-CM

## 2021-02-03 DIAGNOSIS — D51.9 ANEMIA DUE TO VITAMIN B12 DEFICIENCY, UNSPECIFIED B12 DEFICIENCY TYPE: ICD-10-CM

## 2021-02-03 LAB
ALBUMIN SERPL-MCNC: 3 G/DL (ref 3.5–5)
ALP SERPL-CCNC: 70 U/L (ref 45–120)
ALT SERPL W P-5'-P-CCNC: 11 U/L (ref 0–45)
ANION GAP SERPL CALCULATED.3IONS-SCNC: 7 MMOL/L (ref 5–18)
AST SERPL W P-5'-P-CCNC: 12 U/L (ref 0–40)
BASOPHILS # BLD AUTO: 0.2 THOU/UL (ref 0–0.2)
BASOPHILS NFR BLD AUTO: 5 % (ref 0–2)
BILIRUB SERPL-MCNC: 0.3 MG/DL (ref 0–1)
BUN SERPL-MCNC: 18 MG/DL (ref 8–28)
CALCIUM SERPL-MCNC: 8.6 MG/DL (ref 8.5–10.5)
CHLORIDE BLD-SCNC: 103 MMOL/L (ref 98–107)
CO2 SERPL-SCNC: 25 MMOL/L (ref 22–31)
CREAT SERPL-MCNC: 1.23 MG/DL (ref 0.7–1.3)
EOSINOPHIL COUNT (ABSOLUTE): 0.1 THOU/UL (ref 0–0.4)
EOSINOPHIL NFR BLD AUTO: 3 % (ref 0–6)
ERYTHROCYTE [DISTWIDTH] IN BLOOD BY AUTOMATED COUNT: 13.5 % (ref 11–14.5)
GFR SERPL CREATININE-BSD FRML MDRD: 57 ML/MIN/1.73M2
GLUCOSE BLD-MCNC: 104 MG/DL (ref 70–125)
HCT VFR BLD AUTO: 31.1 % (ref 40–54)
HGB BLD-MCNC: 10 G/DL (ref 14–18)
IMMATURE GRANULOCYTE % - MAN (DIFF): 0 %
IMMATURE GRANULOCYTE ABSOLUTE - MAN (DIFF): 0 THOU/UL
LYMPHOCYTES # BLD AUTO: 0.1 THOU/UL (ref 0.8–4.4)
LYMPHOCYTES NFR BLD AUTO: 3 % (ref 20–40)
MCH RBC QN AUTO: 34.8 PG (ref 27–34)
MCHC RBC AUTO-ENTMCNC: 32.2 G/DL (ref 32–36)
MCV RBC AUTO: 108 FL (ref 80–100)
MONOCYTES # BLD AUTO: 0.6 THOU/UL (ref 0–0.9)
MONOCYTES NFR BLD AUTO: 13 % (ref 2–10)
OVALOCYTES: ABNORMAL
PLAT MORPH BLD: NORMAL
PLATELET # BLD AUTO: 244 THOU/UL (ref 140–440)
PMV BLD AUTO: 9.9 FL (ref 8.5–12.5)
POTASSIUM BLD-SCNC: 4.5 MMOL/L (ref 3.5–5)
PROT SERPL-MCNC: 5.2 G/DL (ref 6–8)
RBC # BLD AUTO: 2.87 MILL/UL (ref 4.4–6.2)
SCHISTOCYTES: ABNORMAL
SODIUM SERPL-SCNC: 135 MMOL/L (ref 136–145)
TOTAL NEUTROPHILS-ABS(DIFF): 3.4 THOU/UL (ref 2–7.7)
TOTAL NEUTROPHILS-REL(DIFF): 76 % (ref 50–70)
WBC: 4.5 THOU/UL (ref 4–11)

## 2021-02-12 ENCOUNTER — AMBULATORY - HEALTHEAST (OUTPATIENT)
Dept: NURSING | Facility: CLINIC | Age: 76
End: 2021-02-12

## 2021-02-16 ENCOUNTER — COMMUNICATION - HEALTHEAST (OUTPATIENT)
Dept: ONCOLOGY | Facility: HOSPITAL | Age: 76
End: 2021-02-16

## 2021-02-17 ENCOUNTER — INFUSION - HEALTHEAST (OUTPATIENT)
Dept: INFUSION THERAPY | Facility: HOSPITAL | Age: 76
End: 2021-02-17

## 2021-02-17 DIAGNOSIS — C90.01 MULTIPLE MYELOMA IN REMISSION (H): ICD-10-CM

## 2021-02-17 LAB
ALBUMIN SERPL-MCNC: 3.1 G/DL (ref 3.5–5)
ALP SERPL-CCNC: 76 U/L (ref 45–120)
ALT SERPL W P-5'-P-CCNC: 13 U/L (ref 0–45)
ANION GAP SERPL CALCULATED.3IONS-SCNC: 6 MMOL/L (ref 5–18)
AST SERPL W P-5'-P-CCNC: 11 U/L (ref 0–40)
BASOPHILS # BLD AUTO: 0.1 THOU/UL (ref 0–0.2)
BASOPHILS NFR BLD AUTO: 2 % (ref 0–2)
BILIRUB SERPL-MCNC: 0.2 MG/DL (ref 0–1)
BUN SERPL-MCNC: 22 MG/DL (ref 8–28)
CALCIUM SERPL-MCNC: 8.4 MG/DL (ref 8.5–10.5)
CHLORIDE BLD-SCNC: 104 MMOL/L (ref 98–107)
CO2 SERPL-SCNC: 25 MMOL/L (ref 22–31)
CREAT SERPL-MCNC: 1.16 MG/DL (ref 0.7–1.3)
EOSINOPHIL # BLD AUTO: 0.4 THOU/UL (ref 0–0.4)
EOSINOPHIL NFR BLD AUTO: 6 % (ref 0–6)
ERYTHROCYTE [DISTWIDTH] IN BLOOD BY AUTOMATED COUNT: 13.9 % (ref 11–14.5)
GFR SERPL CREATININE-BSD FRML MDRD: >60 ML/MIN/1.73M2
GLUCOSE BLD-MCNC: 105 MG/DL (ref 70–125)
HCT VFR BLD AUTO: 31.9 % (ref 40–54)
HGB BLD-MCNC: 10.1 G/DL (ref 14–18)
IMM GRANULOCYTES # BLD: 0 THOU/UL
IMM GRANULOCYTES NFR BLD: 1 %
LYMPHOCYTES # BLD AUTO: 0.3 THOU/UL (ref 0.8–4.4)
LYMPHOCYTES NFR BLD AUTO: 5 % (ref 20–40)
MCH RBC QN AUTO: 33.8 PG (ref 27–34)
MCHC RBC AUTO-ENTMCNC: 31.7 G/DL (ref 32–36)
MCV RBC AUTO: 107 FL (ref 80–100)
MONOCYTES # BLD AUTO: 1 THOU/UL (ref 0–0.9)
MONOCYTES NFR BLD AUTO: 17 % (ref 2–10)
NEUTROPHILS # BLD AUTO: 3.9 THOU/UL (ref 2–7.7)
NEUTROPHILS NFR BLD AUTO: 69 % (ref 50–70)
PLATELET # BLD AUTO: 182 THOU/UL (ref 140–440)
PMV BLD AUTO: 10.1 FL (ref 8.5–12.5)
POTASSIUM BLD-SCNC: 5 MMOL/L (ref 3.5–5)
PROT SERPL-MCNC: 4.9 G/DL (ref 6–8)
RBC # BLD AUTO: 2.99 MILL/UL (ref 4.4–6.2)
SODIUM SERPL-SCNC: 135 MMOL/L (ref 136–145)
WBC: 5.6 THOU/UL (ref 4–11)

## 2021-02-18 ENCOUNTER — COMMUNICATION - HEALTHEAST (OUTPATIENT)
Dept: ONCOLOGY | Facility: HOSPITAL | Age: 76
End: 2021-02-18

## 2021-02-18 ENCOUNTER — RECORDS - HEALTHEAST (OUTPATIENT)
Dept: ADMINISTRATIVE | Facility: OTHER | Age: 76
End: 2021-02-18

## 2021-02-18 ENCOUNTER — AMBULATORY - HEALTHEAST (OUTPATIENT)
Dept: ONCOLOGY | Facility: HOSPITAL | Age: 76
End: 2021-02-18

## 2021-02-18 DIAGNOSIS — C90.01 MULTIPLE MYELOMA IN REMISSION (H): ICD-10-CM

## 2021-02-22 ENCOUNTER — AMBULATORY - HEALTHEAST (OUTPATIENT)
Dept: ONCOLOGY | Facility: HOSPITAL | Age: 76
End: 2021-02-22

## 2021-02-22 ENCOUNTER — AMBULATORY - HEALTHEAST (OUTPATIENT)
Dept: PHARMACY | Facility: HOSPITAL | Age: 76
End: 2021-02-22

## 2021-02-22 DIAGNOSIS — C90.01 MULTIPLE MYELOMA IN REMISSION (H): ICD-10-CM

## 2021-02-24 ENCOUNTER — AMBULATORY - HEALTHEAST (OUTPATIENT)
Dept: INFUSION THERAPY | Facility: HOSPITAL | Age: 76
End: 2021-02-24

## 2021-02-24 ENCOUNTER — COMMUNICATION - HEALTHEAST (OUTPATIENT)
Dept: ONCOLOGY | Facility: HOSPITAL | Age: 76
End: 2021-02-24

## 2021-02-24 LAB
BASOPHILS # BLD AUTO: 0 THOU/UL (ref 0–0.2)
BASOPHILS NFR BLD AUTO: 1 % (ref 0–2)
EOSINOPHIL COUNT (ABSOLUTE): 0.4 THOU/UL (ref 0–0.4)
EOSINOPHIL NFR BLD AUTO: 14 % (ref 0–6)
ERYTHROCYTE [DISTWIDTH] IN BLOOD BY AUTOMATED COUNT: 13.9 % (ref 11–14.5)
HCT VFR BLD AUTO: 32.4 % (ref 40–54)
HGB BLD-MCNC: 10.1 G/DL (ref 14–18)
IMMATURE GRANULOCYTE % - MAN (DIFF): 0 %
IMMATURE GRANULOCYTE ABSOLUTE - MAN (DIFF): 0 THOU/UL
LYMPHOCYTES # BLD AUTO: 0.3 THOU/UL (ref 0.8–4.4)
LYMPHOCYTES NFR BLD AUTO: 12 % (ref 20–40)
MCH RBC QN AUTO: 33.2 PG (ref 27–34)
MCHC RBC AUTO-ENTMCNC: 31.2 G/DL (ref 32–36)
MCV RBC AUTO: 107 FL (ref 80–100)
MONOCYTES # BLD AUTO: 0.7 THOU/UL (ref 0–0.9)
MONOCYTES NFR BLD AUTO: 28 % (ref 2–10)
OVALOCYTES: ABNORMAL
PLAT MORPH BLD: ABNORMAL
PLATELET # BLD AUTO: 127 THOU/UL (ref 140–440)
PMV BLD AUTO: 10.8 FL (ref 8.5–12.5)
RBC # BLD AUTO: 3.04 MILL/UL (ref 4.4–6.2)
SCHISTOCYTES: ABNORMAL
TOTAL NEUTROPHILS-ABS(DIFF): 1.1 THOU/UL (ref 2–7.7)
TOTAL NEUTROPHILS-REL(DIFF): 45 % (ref 50–70)
WBC: 2.5 THOU/UL (ref 4–11)

## 2021-02-25 ENCOUNTER — OFFICE VISIT - HEALTHEAST (OUTPATIENT)
Dept: INTERNAL MEDICINE | Facility: CLINIC | Age: 76
End: 2021-02-25

## 2021-02-25 ENCOUNTER — COMMUNICATION - HEALTHEAST (OUTPATIENT)
Dept: ADMINISTRATIVE | Facility: CLINIC | Age: 76
End: 2021-02-25

## 2021-02-25 DIAGNOSIS — R19.7 DIARRHEA, UNSPECIFIED TYPE: ICD-10-CM

## 2021-02-25 DIAGNOSIS — J01.40 ACUTE NON-RECURRENT PANSINUSITIS: ICD-10-CM

## 2021-02-25 DIAGNOSIS — Z86.19 HX OF CLOSTRIDIUM DIFFICILE INFECTION: ICD-10-CM

## 2021-03-03 ENCOUNTER — INFUSION - HEALTHEAST (OUTPATIENT)
Dept: INFUSION THERAPY | Facility: HOSPITAL | Age: 76
End: 2021-03-03

## 2021-03-03 ENCOUNTER — AMBULATORY - HEALTHEAST (OUTPATIENT)
Dept: INFUSION THERAPY | Facility: HOSPITAL | Age: 76
End: 2021-03-03

## 2021-03-03 ENCOUNTER — OFFICE VISIT - HEALTHEAST (OUTPATIENT)
Dept: ONCOLOGY | Facility: HOSPITAL | Age: 76
End: 2021-03-03

## 2021-03-03 DIAGNOSIS — I82.502 CHRONIC DEEP VEIN THROMBOSIS (DVT) OF LEFT LOWER EXTREMITY, UNSPECIFIED VEIN (H): ICD-10-CM

## 2021-03-03 DIAGNOSIS — C90.01 MULTIPLE MYELOMA IN REMISSION (H): ICD-10-CM

## 2021-03-03 DIAGNOSIS — C90.02 MULTIPLE MYELOMA IN RELAPSE (H): ICD-10-CM

## 2021-03-03 DIAGNOSIS — D51.9 ANEMIA DUE TO VITAMIN B12 DEFICIENCY, UNSPECIFIED B12 DEFICIENCY TYPE: ICD-10-CM

## 2021-03-03 DIAGNOSIS — D69.6 THROMBOCYTOPENIA (H): ICD-10-CM

## 2021-03-03 LAB
ALBUMIN SERPL-MCNC: 3.1 G/DL (ref 3.5–5)
ALP SERPL-CCNC: 82 U/L (ref 45–120)
ALT SERPL W P-5'-P-CCNC: 23 U/L (ref 0–45)
ANION GAP SERPL CALCULATED.3IONS-SCNC: 6 MMOL/L (ref 5–18)
AST SERPL W P-5'-P-CCNC: 17 U/L (ref 0–40)
BASOPHILS # BLD AUTO: 0.1 THOU/UL (ref 0–0.2)
BASOPHILS NFR BLD AUTO: 3 % (ref 0–2)
BILIRUB SERPL-MCNC: 0.3 MG/DL (ref 0–1)
BUN SERPL-MCNC: 15 MG/DL (ref 8–28)
CALCIUM SERPL-MCNC: 8.5 MG/DL (ref 8.5–10.5)
CHLORIDE BLD-SCNC: 108 MMOL/L (ref 98–107)
CO2 SERPL-SCNC: 25 MMOL/L (ref 22–31)
CREAT SERPL-MCNC: 1.3 MG/DL (ref 0.7–1.3)
EOSINOPHIL COUNT (ABSOLUTE): 0.3 THOU/UL (ref 0–0.4)
EOSINOPHIL NFR BLD AUTO: 7 % (ref 0–6)
ERYTHROCYTE [DISTWIDTH] IN BLOOD BY AUTOMATED COUNT: 14.1 % (ref 11–14.5)
GFR SERPL CREATININE-BSD FRML MDRD: 54 ML/MIN/1.73M2
GLUCOSE BLD-MCNC: 142 MG/DL (ref 70–125)
HCT VFR BLD AUTO: 35.3 % (ref 40–54)
HGB BLD-MCNC: 10.9 G/DL (ref 14–18)
IMMATURE GRANULOCYTE % - MAN (DIFF): 0 %
IMMATURE GRANULOCYTE ABSOLUTE - MAN (DIFF): 0 THOU/UL
LYMPHOCYTES # BLD AUTO: 0.8 THOU/UL (ref 0.8–4.4)
LYMPHOCYTES NFR BLD AUTO: 21 % (ref 20–40)
MCH RBC QN AUTO: 32.8 PG (ref 27–34)
MCHC RBC AUTO-ENTMCNC: 30.9 G/DL (ref 32–36)
MCV RBC AUTO: 106 FL (ref 80–100)
MONOCYTES # BLD AUTO: 0.7 THOU/UL (ref 0–0.9)
MONOCYTES NFR BLD AUTO: 20 % (ref 2–10)
OVALOCYTES: ABNORMAL
PLAT MORPH BLD: NORMAL
PLATELET # BLD AUTO: 229 THOU/UL (ref 140–440)
PMV BLD AUTO: 10 FL (ref 8.5–12.5)
POTASSIUM BLD-SCNC: 4.1 MMOL/L (ref 3.5–5)
PROT SERPL-MCNC: 5.3 G/DL (ref 6–8)
RBC # BLD AUTO: 3.32 MILL/UL (ref 4.4–6.2)
SODIUM SERPL-SCNC: 139 MMOL/L (ref 136–145)
TEAR DROP: ABNORMAL
TOTAL NEUTROPHILS-ABS(DIFF): 1.8 THOU/UL (ref 2–7.7)
TOTAL NEUTROPHILS-REL(DIFF): 49 % (ref 50–70)
WBC: 3.6 THOU/UL (ref 4–11)

## 2021-03-05 ENCOUNTER — AMBULATORY - HEALTHEAST (OUTPATIENT)
Dept: NURSING | Facility: CLINIC | Age: 76
End: 2021-03-05

## 2021-03-10 ENCOUNTER — RECORDS - HEALTHEAST (OUTPATIENT)
Dept: ADMINISTRATIVE | Facility: OTHER | Age: 76
End: 2021-03-10
Payer: MEDICARE

## 2021-03-16 ENCOUNTER — COMMUNICATION - HEALTHEAST (OUTPATIENT)
Dept: ONCOLOGY | Facility: HOSPITAL | Age: 76
End: 2021-03-16

## 2021-03-17 ENCOUNTER — INFUSION - HEALTHEAST (OUTPATIENT)
Dept: INFUSION THERAPY | Facility: HOSPITAL | Age: 76
End: 2021-03-17

## 2021-03-17 DIAGNOSIS — C90.01 MULTIPLE MYELOMA IN REMISSION (H): ICD-10-CM

## 2021-03-17 LAB
ALBUMIN SERPL-MCNC: 3.2 G/DL (ref 3.5–5)
ALP SERPL-CCNC: 76 U/L (ref 45–120)
ALT SERPL W P-5'-P-CCNC: 16 U/L (ref 0–45)
ANION GAP SERPL CALCULATED.3IONS-SCNC: 5 MMOL/L (ref 5–18)
AST SERPL W P-5'-P-CCNC: 11 U/L (ref 0–40)
BASOPHILS # BLD AUTO: 0.1 THOU/UL (ref 0–0.2)
BASOPHILS NFR BLD AUTO: 2 % (ref 0–2)
BILIRUB SERPL-MCNC: 0.3 MG/DL (ref 0–1)
BUN SERPL-MCNC: 26 MG/DL (ref 8–28)
CALCIUM SERPL-MCNC: 8.5 MG/DL (ref 8.5–10.5)
CHLORIDE BLD-SCNC: 107 MMOL/L (ref 98–107)
CO2 SERPL-SCNC: 27 MMOL/L (ref 22–31)
CREAT SERPL-MCNC: 1.28 MG/DL (ref 0.7–1.3)
EOSINOPHIL # BLD AUTO: 0.2 THOU/UL (ref 0–0.4)
EOSINOPHIL NFR BLD AUTO: 4 % (ref 0–6)
ERYTHROCYTE [DISTWIDTH] IN BLOOD BY AUTOMATED COUNT: 14 % (ref 11–14.5)
GFR SERPL CREATININE-BSD FRML MDRD: 55 ML/MIN/1.73M2
GLUCOSE BLD-MCNC: 115 MG/DL (ref 70–125)
HCT VFR BLD AUTO: 35.9 % (ref 40–54)
HGB BLD-MCNC: 11.4 G/DL (ref 14–18)
IGA SERPL-MCNC: 18 MG/DL (ref 65–400)
IGA SERPL-MCNC: 250 MG/DL
IGM SERPL-MCNC: 8 MG/DL (ref 60–280)
IMM GRANULOCYTES # BLD: 0 THOU/UL
IMM GRANULOCYTES NFR BLD: 1 %
LYMPHOCYTES # BLD AUTO: 0.7 THOU/UL (ref 0.8–4.4)
LYMPHOCYTES NFR BLD AUTO: 13 % (ref 20–40)
MCH RBC QN AUTO: 33.1 PG (ref 27–34)
MCHC RBC AUTO-ENTMCNC: 31.8 G/DL (ref 32–36)
MCV RBC AUTO: 104 FL (ref 80–100)
MONOCYTES # BLD AUTO: 0.8 THOU/UL (ref 0–0.9)
MONOCYTES NFR BLD AUTO: 16 % (ref 2–10)
NEUTROPHILS # BLD AUTO: 3.4 THOU/UL (ref 2–7.7)
NEUTROPHILS NFR BLD AUTO: 65 % (ref 50–70)
PLATELET # BLD AUTO: 157 THOU/UL (ref 140–440)
PMV BLD AUTO: 10.2 FL (ref 8.5–12.5)
POTASSIUM BLD-SCNC: 5.3 MMOL/L (ref 3.5–5)
PROT SERPL-MCNC: 5.3 G/DL (ref 6–8)
RBC # BLD AUTO: 3.44 MILL/UL (ref 4.4–6.2)
SODIUM SERPL-SCNC: 139 MMOL/L (ref 136–145)
WBC: 5.2 THOU/UL (ref 4–11)

## 2021-03-18 LAB
KAPPA LC FREE SER-MCNC: 18.72 MG/DL (ref 0.33–1.94)
KAPPA LC FREE/LAMBDA FREE SER NEPH: 26 {RATIO} (ref 0.26–1.65)
LAMBDA LC FREE SERPL-MCNC: 0.72 MG/DL (ref 0.57–2.63)

## 2021-03-22 ENCOUNTER — AMBULATORY - HEALTHEAST (OUTPATIENT)
Dept: ONCOLOGY | Facility: HOSPITAL | Age: 76
End: 2021-03-22

## 2021-03-22 DIAGNOSIS — C90.01 MULTIPLE MYELOMA IN REMISSION (H): ICD-10-CM

## 2021-04-01 ENCOUNTER — AMBULATORY - HEALTHEAST (OUTPATIENT)
Dept: INFUSION THERAPY | Facility: HOSPITAL | Age: 76
End: 2021-04-01

## 2021-04-01 ENCOUNTER — INFUSION - HEALTHEAST (OUTPATIENT)
Dept: INFUSION THERAPY | Facility: HOSPITAL | Age: 76
End: 2021-04-01

## 2021-04-01 ENCOUNTER — OFFICE VISIT - HEALTHEAST (OUTPATIENT)
Dept: ONCOLOGY | Facility: HOSPITAL | Age: 76
End: 2021-04-01

## 2021-04-01 DIAGNOSIS — C90.01 MULTIPLE MYELOMA IN REMISSION (H): ICD-10-CM

## 2021-04-01 DIAGNOSIS — D51.9 ANEMIA DUE TO VITAMIN B12 DEFICIENCY, UNSPECIFIED B12 DEFICIENCY TYPE: ICD-10-CM

## 2021-04-01 LAB
ALBUMIN SERPL-MCNC: 3.2 G/DL (ref 3.5–5)
ALP SERPL-CCNC: 62 U/L (ref 45–120)
ALT SERPL W P-5'-P-CCNC: 19 U/L (ref 0–45)
ANION GAP SERPL CALCULATED.3IONS-SCNC: 5 MMOL/L (ref 5–18)
AST SERPL W P-5'-P-CCNC: 13 U/L (ref 0–40)
BASOPHILS # BLD AUTO: 0.1 THOU/UL (ref 0–0.2)
BASOPHILS NFR BLD AUTO: 3 % (ref 0–2)
BILIRUB SERPL-MCNC: 0.3 MG/DL (ref 0–1)
BUN SERPL-MCNC: 20 MG/DL (ref 8–28)
CALCIUM SERPL-MCNC: 8.4 MG/DL (ref 8.5–10.5)
CHLORIDE BLD-SCNC: 111 MMOL/L (ref 98–107)
CO2 SERPL-SCNC: 26 MMOL/L (ref 22–31)
CREAT SERPL-MCNC: 1.12 MG/DL (ref 0.7–1.3)
EOSINOPHIL COUNT (ABSOLUTE): 0.3 THOU/UL (ref 0–0.4)
EOSINOPHIL NFR BLD AUTO: 7 % (ref 0–6)
ERYTHROCYTE [DISTWIDTH] IN BLOOD BY AUTOMATED COUNT: 14.9 % (ref 11–14.5)
GFR SERPL CREATININE-BSD FRML MDRD: >60 ML/MIN/1.73M2
GLUCOSE BLD-MCNC: 115 MG/DL (ref 70–125)
HCT VFR BLD AUTO: 35.1 % (ref 40–54)
HGB BLD-MCNC: 11.1 G/DL (ref 14–18)
IMMATURE GRANULOCYTE % - MAN (DIFF): 0 %
IMMATURE GRANULOCYTE ABSOLUTE - MAN (DIFF): 0 THOU/UL
LYMPHOCYTES # BLD AUTO: 0.9 THOU/UL (ref 0.8–4.4)
LYMPHOCYTES NFR BLD AUTO: 22 % (ref 20–40)
MCH RBC QN AUTO: 33.2 PG (ref 27–34)
MCHC RBC AUTO-ENTMCNC: 31.6 G/DL (ref 32–36)
MCV RBC AUTO: 105 FL (ref 80–100)
MONOCYTES # BLD AUTO: 0.8 THOU/UL (ref 0–0.9)
MONOCYTES NFR BLD AUTO: 19 % (ref 2–10)
OVALOCYTES: ABNORMAL
PLAT MORPH BLD: NORMAL
PLATELET # BLD AUTO: 234 THOU/UL (ref 140–440)
PMV BLD AUTO: 9.9 FL (ref 8.5–12.5)
POTASSIUM BLD-SCNC: 4.4 MMOL/L (ref 3.5–5)
PROT SERPL-MCNC: 5.1 G/DL (ref 6–8)
RBC # BLD AUTO: 3.34 MILL/UL (ref 4.4–6.2)
SODIUM SERPL-SCNC: 142 MMOL/L (ref 136–145)
TOTAL NEUTROPHILS-ABS(DIFF): 2.1 THOU/UL (ref 2–7.7)
TOTAL NEUTROPHILS-REL(DIFF): 49 % (ref 50–70)
WBC: 4.2 THOU/UL (ref 4–11)

## 2021-04-01 ASSESSMENT — MIFFLIN-ST. JEOR: SCORE: 1465.64

## 2021-04-14 ENCOUNTER — AMBULATORY - HEALTHEAST (OUTPATIENT)
Dept: ONCOLOGY | Facility: HOSPITAL | Age: 76
End: 2021-04-14

## 2021-04-14 ENCOUNTER — COMMUNICATION - HEALTHEAST (OUTPATIENT)
Dept: ONCOLOGY | Facility: HOSPITAL | Age: 76
End: 2021-04-14

## 2021-04-14 DIAGNOSIS — C90.01 MULTIPLE MYELOMA IN REMISSION (H): ICD-10-CM

## 2021-04-19 ENCOUNTER — COMMUNICATION - HEALTHEAST (OUTPATIENT)
Dept: ONCOLOGY | Facility: HOSPITAL | Age: 76
End: 2021-04-19

## 2021-04-19 ENCOUNTER — AMBULATORY - HEALTHEAST (OUTPATIENT)
Dept: ONCOLOGY | Facility: HOSPITAL | Age: 76
End: 2021-04-19

## 2021-04-19 DIAGNOSIS — C90.01 MULTIPLE MYELOMA IN REMISSION (H): ICD-10-CM

## 2021-04-28 ENCOUNTER — INFUSION - HEALTHEAST (OUTPATIENT)
Dept: INFUSION THERAPY | Facility: HOSPITAL | Age: 76
End: 2021-04-28

## 2021-04-28 DIAGNOSIS — D51.9 ANEMIA DUE TO VITAMIN B12 DEFICIENCY, UNSPECIFIED B12 DEFICIENCY TYPE: ICD-10-CM

## 2021-04-28 DIAGNOSIS — C90.01 MULTIPLE MYELOMA IN REMISSION (H): ICD-10-CM

## 2021-04-28 LAB
ALBUMIN SERPL-MCNC: 2.9 G/DL (ref 3.5–5)
ALP SERPL-CCNC: 58 U/L (ref 45–120)
ALT SERPL W P-5'-P-CCNC: 13 U/L (ref 0–45)
ANION GAP SERPL CALCULATED.3IONS-SCNC: 5 MMOL/L (ref 5–18)
AST SERPL W P-5'-P-CCNC: 13 U/L (ref 0–40)
BASOPHILS # BLD AUTO: 0 THOU/UL (ref 0–0.2)
BASOPHILS NFR BLD AUTO: 1 % (ref 0–2)
BILIRUB SERPL-MCNC: 0.3 MG/DL (ref 0–1)
BUN SERPL-MCNC: 19 MG/DL (ref 8–28)
CALCIUM SERPL-MCNC: 8 MG/DL (ref 8.5–10.5)
CHLORIDE BLD-SCNC: 111 MMOL/L (ref 98–107)
CO2 SERPL-SCNC: 27 MMOL/L (ref 22–31)
CREAT SERPL-MCNC: 1.08 MG/DL (ref 0.7–1.3)
EOSINOPHIL COUNT (ABSOLUTE): 0.2 THOU/UL (ref 0–0.4)
EOSINOPHIL NFR BLD AUTO: 7 % (ref 0–6)
ERYTHROCYTE [DISTWIDTH] IN BLOOD BY AUTOMATED COUNT: 15.6 % (ref 11–14.5)
GFR SERPL CREATININE-BSD FRML MDRD: >60 ML/MIN/1.73M2
GLUCOSE BLD-MCNC: 109 MG/DL (ref 70–125)
HCT VFR BLD AUTO: 34.5 % (ref 40–54)
HGB BLD-MCNC: 10.9 G/DL (ref 14–18)
IGA SERPL-MCNC: 12 MG/DL (ref 65–400)
IGA SERPL-MCNC: 211 MG/DL
IGM SERPL-MCNC: <5 MG/DL (ref 60–280)
IMMATURE GRANULOCYTE % - MAN (DIFF): 0 %
IMMATURE GRANULOCYTE ABSOLUTE - MAN (DIFF): 0 THOU/UL
LYMPHOCYTES # BLD AUTO: 0.6 THOU/UL (ref 0.8–4.4)
LYMPHOCYTES NFR BLD AUTO: 19 % (ref 20–40)
MCH RBC QN AUTO: 32.6 PG (ref 27–34)
MCHC RBC AUTO-ENTMCNC: 31.6 G/DL (ref 32–36)
MCV RBC AUTO: 103 FL (ref 80–100)
MONOCYTES # BLD AUTO: 1 THOU/UL (ref 0–0.9)
MONOCYTES NFR BLD AUTO: 28 % (ref 2–10)
OVALOCYTES: ABNORMAL
PLAT MORPH BLD: NORMAL
PLATELET # BLD AUTO: 228 THOU/UL (ref 140–440)
PMV BLD AUTO: 10.1 FL (ref 8.5–12.5)
POTASSIUM BLD-SCNC: 4 MMOL/L (ref 3.5–5)
PROT SERPL-MCNC: 4.8 G/DL (ref 6–8)
RBC # BLD AUTO: 3.34 MILL/UL (ref 4.4–6.2)
SODIUM SERPL-SCNC: 143 MMOL/L (ref 136–145)
TOTAL NEUTROPHILS-ABS(DIFF): 1.5 THOU/UL (ref 2–7.7)
TOTAL NEUTROPHILS-REL(DIFF): 45 % (ref 50–70)
WBC: 3.4 THOU/UL (ref 4–11)

## 2021-04-29 LAB
KAPPA LC FREE SER-MCNC: 21.55 MG/DL (ref 0.33–1.94)
KAPPA LC FREE/LAMBDA FREE SER NEPH: 43.1 {RATIO} (ref 0.26–1.65)
LAMBDA LC FREE SERPL-MCNC: 0.5 MG/DL (ref 0.57–2.63)

## 2021-05-07 ENCOUNTER — COMMUNICATION - HEALTHEAST (OUTPATIENT)
Dept: ONCOLOGY | Facility: HOSPITAL | Age: 76
End: 2021-05-07

## 2021-05-13 ENCOUNTER — AMBULATORY - HEALTHEAST (OUTPATIENT)
Dept: ONCOLOGY | Facility: HOSPITAL | Age: 76
End: 2021-05-13

## 2021-05-13 DIAGNOSIS — C90.01 MULTIPLE MYELOMA IN REMISSION (H): ICD-10-CM

## 2021-05-16 ENCOUNTER — COMMUNICATION - HEALTHEAST (OUTPATIENT)
Dept: ONCOLOGY | Facility: HOSPITAL | Age: 76
End: 2021-05-16

## 2021-05-16 DIAGNOSIS — C90.00 MULTIPLE MYELOMA NOT HAVING ACHIEVED REMISSION (H): ICD-10-CM

## 2021-05-16 DIAGNOSIS — I82.5Z9 CHRONIC DEEP VEIN THROMBOSIS (DVT) OF DISTAL VEIN OF LOWER EXTREMITY, UNSPECIFIED LATERALITY (H): ICD-10-CM

## 2021-05-18 ENCOUNTER — RECORDS - HEALTHEAST (OUTPATIENT)
Dept: INTERNAL MEDICINE | Facility: CLINIC | Age: 76
End: 2021-05-18

## 2021-05-26 ENCOUNTER — AMBULATORY - HEALTHEAST (OUTPATIENT)
Dept: INFUSION THERAPY | Facility: HOSPITAL | Age: 76
End: 2021-05-26

## 2021-05-26 ENCOUNTER — OFFICE VISIT - HEALTHEAST (OUTPATIENT)
Dept: ONCOLOGY | Facility: HOSPITAL | Age: 76
End: 2021-05-26

## 2021-05-26 ENCOUNTER — INFUSION - HEALTHEAST (OUTPATIENT)
Dept: INFUSION THERAPY | Facility: HOSPITAL | Age: 76
End: 2021-05-26

## 2021-05-26 VITALS
HEART RATE: 75 BPM | TEMPERATURE: 98 F | DIASTOLIC BLOOD PRESSURE: 61 MMHG | OXYGEN SATURATION: 99 % | SYSTOLIC BLOOD PRESSURE: 105 MMHG

## 2021-05-26 VITALS
DIASTOLIC BLOOD PRESSURE: 64 MMHG | TEMPERATURE: 97.7 F | SYSTOLIC BLOOD PRESSURE: 117 MMHG | OXYGEN SATURATION: 98 % | HEART RATE: 68 BPM

## 2021-05-26 VITALS
SYSTOLIC BLOOD PRESSURE: 119 MMHG | HEART RATE: 58 BPM | TEMPERATURE: 97.6 F | DIASTOLIC BLOOD PRESSURE: 61 MMHG | OXYGEN SATURATION: 98 %

## 2021-05-26 VITALS
OXYGEN SATURATION: 100 % | TEMPERATURE: 97.8 F | DIASTOLIC BLOOD PRESSURE: 62 MMHG | SYSTOLIC BLOOD PRESSURE: 89 MMHG | HEART RATE: 67 BPM

## 2021-05-26 VITALS
SYSTOLIC BLOOD PRESSURE: 109 MMHG | DIASTOLIC BLOOD PRESSURE: 54 MMHG | OXYGEN SATURATION: 98 % | HEART RATE: 61 BPM | TEMPERATURE: 97.6 F

## 2021-05-26 VITALS
OXYGEN SATURATION: 100 % | SYSTOLIC BLOOD PRESSURE: 112 MMHG | TEMPERATURE: 97.6 F | DIASTOLIC BLOOD PRESSURE: 62 MMHG | HEART RATE: 57 BPM

## 2021-05-26 VITALS — HEART RATE: 74 BPM | SYSTOLIC BLOOD PRESSURE: 150 MMHG | DIASTOLIC BLOOD PRESSURE: 80 MMHG

## 2021-05-26 VITALS
DIASTOLIC BLOOD PRESSURE: 48 MMHG | TEMPERATURE: 98 F | HEART RATE: 60 BPM | SYSTOLIC BLOOD PRESSURE: 102 MMHG | OXYGEN SATURATION: 100 %

## 2021-05-26 VITALS
DIASTOLIC BLOOD PRESSURE: 52 MMHG | OXYGEN SATURATION: 100 % | TEMPERATURE: 97.7 F | HEART RATE: 72 BPM | SYSTOLIC BLOOD PRESSURE: 90 MMHG

## 2021-05-26 VITALS
TEMPERATURE: 97.8 F | HEART RATE: 63 BPM | SYSTOLIC BLOOD PRESSURE: 94 MMHG | OXYGEN SATURATION: 98 % | DIASTOLIC BLOOD PRESSURE: 59 MMHG

## 2021-05-26 DIAGNOSIS — D51.9 ANEMIA DUE TO VITAMIN B12 DEFICIENCY, UNSPECIFIED B12 DEFICIENCY TYPE: ICD-10-CM

## 2021-05-26 DIAGNOSIS — C90.01 MULTIPLE MYELOMA IN REMISSION (H): ICD-10-CM

## 2021-05-26 LAB
ALBUMIN SERPL-MCNC: 3.1 G/DL (ref 3.5–5)
ALP SERPL-CCNC: 55 U/L (ref 45–120)
ALT SERPL W P-5'-P-CCNC: 11 U/L (ref 0–45)
ANION GAP SERPL CALCULATED.3IONS-SCNC: 6 MMOL/L (ref 5–18)
AST SERPL W P-5'-P-CCNC: 14 U/L (ref 0–40)
BASOPHILS # BLD AUTO: 0.1 THOU/UL (ref 0–0.2)
BASOPHILS NFR BLD AUTO: 4 % (ref 0–2)
BILIRUB SERPL-MCNC: 0.3 MG/DL (ref 0–1)
BUN SERPL-MCNC: 20 MG/DL (ref 8–28)
CALCIUM SERPL-MCNC: 8.2 MG/DL (ref 8.5–10.5)
CHLORIDE BLD-SCNC: 110 MMOL/L (ref 98–107)
CO2 SERPL-SCNC: 25 MMOL/L (ref 22–31)
CREAT SERPL-MCNC: 1.11 MG/DL (ref 0.7–1.3)
EOSINOPHIL COUNT (ABSOLUTE): 0.5 THOU/UL (ref 0–0.4)
EOSINOPHIL NFR BLD AUTO: 12 % (ref 0–6)
ERYTHROCYTE [DISTWIDTH] IN BLOOD BY AUTOMATED COUNT: 15.5 % (ref 11–14.5)
GFR SERPL CREATININE-BSD FRML MDRD: >60 ML/MIN/1.73M2
GLUCOSE BLD-MCNC: 125 MG/DL (ref 70–125)
HCT VFR BLD AUTO: 34.6 % (ref 40–54)
HGB BLD-MCNC: 11.2 G/DL (ref 14–18)
IMMATURE GRANULOCYTE % - MAN (DIFF): 0 %
IMMATURE GRANULOCYTE ABSOLUTE - MAN (DIFF): 0 THOU/UL
LYMPHOCYTES # BLD AUTO: 0.6 THOU/UL (ref 0.8–4.4)
LYMPHOCYTES NFR BLD AUTO: 14 % (ref 20–40)
MCH RBC QN AUTO: 33.4 PG (ref 27–34)
MCHC RBC AUTO-ENTMCNC: 32.4 G/DL (ref 32–36)
MCV RBC AUTO: 103 FL (ref 80–100)
MONOCYTES # BLD AUTO: 0.7 THOU/UL (ref 0–0.9)
MONOCYTES NFR BLD AUTO: 17 % (ref 2–10)
OVALOCYTES: ABNORMAL
PLAT MORPH BLD: NORMAL
PLATELET # BLD AUTO: 205 THOU/UL (ref 140–440)
PMV BLD AUTO: 10.2 FL (ref 8.5–12.5)
POLYCHROMASIA BLD QL SMEAR: ABNORMAL
POTASSIUM BLD-SCNC: 4.3 MMOL/L (ref 3.5–5)
PROT SERPL-MCNC: 5 G/DL (ref 6–8)
RBC # BLD AUTO: 3.35 MILL/UL (ref 4.4–6.2)
SCHISTOCYTES: ABNORMAL
SODIUM SERPL-SCNC: 141 MMOL/L (ref 136–145)
TOTAL NEUTROPHILS-ABS(DIFF): 2.2 THOU/UL (ref 2–7.7)
TOTAL NEUTROPHILS-REL(DIFF): 54 % (ref 50–70)
WBC: 4 THOU/UL (ref 4–11)

## 2021-05-27 VITALS
HEART RATE: 61 BPM | SYSTOLIC BLOOD PRESSURE: 109 MMHG | OXYGEN SATURATION: 99 % | TEMPERATURE: 97.7 F | DIASTOLIC BLOOD PRESSURE: 58 MMHG

## 2021-05-27 VITALS
TEMPERATURE: 97.5 F | HEART RATE: 60 BPM | OXYGEN SATURATION: 100 % | DIASTOLIC BLOOD PRESSURE: 55 MMHG | SYSTOLIC BLOOD PRESSURE: 104 MMHG

## 2021-05-27 VITALS
TEMPERATURE: 98.2 F | DIASTOLIC BLOOD PRESSURE: 55 MMHG | HEART RATE: 71 BPM | SYSTOLIC BLOOD PRESSURE: 104 MMHG | OXYGEN SATURATION: 100 %

## 2021-05-27 VITALS
OXYGEN SATURATION: 97 % | SYSTOLIC BLOOD PRESSURE: 115 MMHG | TEMPERATURE: 98.3 F | DIASTOLIC BLOOD PRESSURE: 68 MMHG | HEART RATE: 76 BPM

## 2021-05-27 VITALS
SYSTOLIC BLOOD PRESSURE: 179 MMHG | TEMPERATURE: 98.7 F | OXYGEN SATURATION: 99 % | HEART RATE: 68 BPM | DIASTOLIC BLOOD PRESSURE: 80 MMHG

## 2021-05-27 VITALS
OXYGEN SATURATION: 100 % | SYSTOLIC BLOOD PRESSURE: 87 MMHG | DIASTOLIC BLOOD PRESSURE: 50 MMHG | HEART RATE: 76 BPM | TEMPERATURE: 97.5 F

## 2021-05-27 VITALS
OXYGEN SATURATION: 100 % | DIASTOLIC BLOOD PRESSURE: 53 MMHG | TEMPERATURE: 97.4 F | HEART RATE: 58 BPM | SYSTOLIC BLOOD PRESSURE: 109 MMHG

## 2021-05-27 VITALS
HEART RATE: 66 BPM | DIASTOLIC BLOOD PRESSURE: 57 MMHG | TEMPERATURE: 97.5 F | SYSTOLIC BLOOD PRESSURE: 97 MMHG | OXYGEN SATURATION: 100 %

## 2021-05-27 VITALS
TEMPERATURE: 97.7 F | HEART RATE: 60 BPM | SYSTOLIC BLOOD PRESSURE: 89 MMHG | OXYGEN SATURATION: 100 % | DIASTOLIC BLOOD PRESSURE: 50 MMHG

## 2021-05-27 VITALS
HEART RATE: 62 BPM | DIASTOLIC BLOOD PRESSURE: 55 MMHG | SYSTOLIC BLOOD PRESSURE: 101 MMHG | OXYGEN SATURATION: 95 % | TEMPERATURE: 98.6 F

## 2021-05-27 VITALS
TEMPERATURE: 97.3 F | SYSTOLIC BLOOD PRESSURE: 102 MMHG | DIASTOLIC BLOOD PRESSURE: 55 MMHG | HEART RATE: 75 BPM | OXYGEN SATURATION: 100 %

## 2021-05-27 VITALS
TEMPERATURE: 98.3 F | HEART RATE: 70 BPM | SYSTOLIC BLOOD PRESSURE: 140 MMHG | OXYGEN SATURATION: 97 % | DIASTOLIC BLOOD PRESSURE: 60 MMHG

## 2021-05-27 VITALS
HEART RATE: 64 BPM | DIASTOLIC BLOOD PRESSURE: 55 MMHG | TEMPERATURE: 97.9 F | OXYGEN SATURATION: 99 % | SYSTOLIC BLOOD PRESSURE: 99 MMHG

## 2021-05-27 VITALS
SYSTOLIC BLOOD PRESSURE: 120 MMHG | DIASTOLIC BLOOD PRESSURE: 58 MMHG | TEMPERATURE: 98 F | HEART RATE: 61 BPM | OXYGEN SATURATION: 98 %

## 2021-05-27 VITALS
OXYGEN SATURATION: 99 % | TEMPERATURE: 97.8 F | HEART RATE: 61 BPM | SYSTOLIC BLOOD PRESSURE: 104 MMHG | DIASTOLIC BLOOD PRESSURE: 52 MMHG

## 2021-05-27 VITALS
SYSTOLIC BLOOD PRESSURE: 105 MMHG | HEART RATE: 63 BPM | TEMPERATURE: 97.8 F | DIASTOLIC BLOOD PRESSURE: 49 MMHG | OXYGEN SATURATION: 98 %

## 2021-05-27 VITALS
SYSTOLIC BLOOD PRESSURE: 97 MMHG | OXYGEN SATURATION: 100 % | TEMPERATURE: 97.6 F | HEART RATE: 73 BPM | DIASTOLIC BLOOD PRESSURE: 52 MMHG

## 2021-05-27 VITALS
HEART RATE: 68 BPM | DIASTOLIC BLOOD PRESSURE: 76 MMHG | SYSTOLIC BLOOD PRESSURE: 168 MMHG | RESPIRATION RATE: 18 BRPM | OXYGEN SATURATION: 99 %

## 2021-05-27 VITALS
OXYGEN SATURATION: 100 % | TEMPERATURE: 97.9 F | HEART RATE: 65 BPM | DIASTOLIC BLOOD PRESSURE: 59 MMHG | SYSTOLIC BLOOD PRESSURE: 117 MMHG

## 2021-05-27 NOTE — PROGRESS NOTES
Patient is here for labs, provider and treatment for Multiple myeloma not having achieved remission.

## 2021-05-27 NOTE — PROGRESS NOTES
Pt ambulates to infusion center for treatment following NP visit.  Pt was seen by BRITTANI Valdez CNP and orders approved.  Treatment administered as ordered.  Pt left clinic stable to lobby.  Plan RTC as scheduled.

## 2021-05-27 NOTE — PROGRESS NOTES
[facility] Hematology and Oncology Progress Note    Patient: Theo Meyers  MRN: [id number]  Date of Service: 04/10/2019        Reason for Visit    Chief Complaint   Patient presents with     Malignant Hematology     Multiple myeloma not having achieved remission        Assessment and Plan  1.  Myeloma: Patient continues on his current treatment of Velcade and dexamethasone every other week along with Revlimid.  His labs have fluctuated.. His light chains and the ratio continue to be elevated. It is a little higher this month than it has been running.  If it goes up consistently, we may talk about switching to daratumumab and pomalyst. We will continue current regimen.  He will see Dr. Clifford in 1 month    2. DVT: on coumadin. INR therapeutic.  No change.  He will continue to follow with the Coumadin clinic    3. Pancytopenia: likekly from treatment with velcade and revlimid. Stable. No complications. No changes. Continue to monitor monthly.     4. Aortic stenosis: worse on recent echo. Likely will repeat in one year. If really anxious about this, we could refer to cardiologist. Currently PCP will manage.     ECOG Performance   ECOG Performance Status: 0     Distress Assessment  Distress Assessment Score: No distress    Pain  Currently in Pain: Yes  Pain Score (Initial OR Reassessment): Unable to Assess  Location: low back pain      Problem List    No diagnosis found.   ______________________________________________________________________________    History of Present Illness    DIAGNOSIS:   1. IgG kappa light chain myeloma. Hyposecretory. Diagnosed 2009. No significant measurable M protein. 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 There was also on 11;14 translocation.   2. Deep vein thrombosis of the left leg diagnosed 09/2012 while on treatment.       TREATMENT:   1. Most recently he has been on Velcade every 2 weeks since 09/2012. He is getting dexamethasone 20 mg every other week with the  velcade. Finally, he is receiving Revlimid 20 mg daily for 3 weeks on, 1 week off.   2. Continues on warfarin      PAST TREATMENT and HISTORY:   He presented at diagnosis with a lytic lesion involving the C6 vertebral body, although no measurable M protein. IgG kappa monoclonal immunoglobulin was confirmed by [facility] as well as elevated kappa free light chains with an abnormal kappa lambda ratio. Bone marrow biopsy showed 30% involvement and cytogenetics confirmed deletion of 13 q. as well as 11;14 translocation. He was initially treated with Velcade and dexamethasone and achieved a good partial response. He was referred to the [facility] where he underwent high-dose chemotherapy with autologous peripheral stem cell transplant in April of 2010. He began Revlimid as maintenance therapy post transplant. Repeat bone marrow biopsy done October 2010 showed about 5% kappa restricted plasma cells and so at that time weekly Velcade was added along with his maintenance Revlimid and dexamethasone. He has done well since that time with stable minimal residual disease, best assessed by serum free light chains. He required dose reductions of weekly Velcade because of cytopenias and was ultimately switched to a q.2 week maintenance schedule which he has been on since September 2012. His Revlimid dose is 20 mg daily and he continues on dexamethasone 20 mg p.o. q. Week.       INTERIM HISTORY:    Pt is here today for follow up. He is doing well.  Mild neuropathy. No new issues or concerns except some intermittent SAMANO, shortness of breath and worsening fatigue. A little anxious about echo results    Pain Status  Currently in Pain: Yes    Review of Systems  Constitutional  Constitutional (WDL): All constitutional elements are within defined limits  Fatigue: No Concerns  Fever: No Concerns  Chills: No Concerns  Weight Gain: No Concerns  Weight Loss: No Concerns  Hot flashes/Night Sweats: No Concerns  Neurosensory  Neurosensory  (WDL): Exceptions to WDL  Peripheral Motor Neuropathy: Concerns  Ataxia: No Concerns  Peripheral Sensory Neuropathy: Concerns  Confusion: No Concerns  Dizziness: No Concerns(improving)  Syncope: No Concerns  Eye   Eye Disorder (WDL): Exceptions to WDL  Blurred Vision: Concerns(glaucoma)  Dry Eye: No Concerns  Eye Pain: No Concerns  Watering Eyes: No Concerns  Ear  Ear Disorder (WDL): Exceptions to WDL  Ear Pain: No Concerns  Tinnitus: Concerns(chronic)  Cardiovascular  Cardiovascular (WDL): All cardiovascular elements are within defined limits  Palpitations: No Concerns  Edema: No Concerns  SVT, DVT/PE: No Concerns  Chest Pain - Cardiac: No Concerns  Pulmonary  Respiratory (WDL): Exceptions to WDL  Cough: Concerns  Dyspnea: No Concerns  Hypoxia: No Concerns  Gastrointestinal  Gastrointestinal (WDL): All gastrointestinal elements are within defined limits  Anorexia: No Concerns  Nausea: No Concerns  Vomiting: No Concerns  Dehydration: No Concerns  Dysgeusia: No Concerns  Dysphagia: No Concerns  Mucositis Oral: No Concerns  Esophagitis: No Concerns  Constipation: No Concerns  Diarrhea: No Concerns  Pharyngitis: No Concerns  Dry Mouth: No Concerns  Genitourinary  Genitourinary (WDL): All genitourinary elements are within defined limits  Urinary Frequency: No Concerns  Urinary Retention: No Concerns  Urinary Tract Pain: No Concerns  Lymphatic  Lymph (WDL): All lymph disorder elements are within defined limits  Lymphedema: No Concerns  Lymph Node Discomfort: No Concerns  Musculoskeletal and Connective Tissue  Musculoskeletal and Connetive Tissue Disorders (WDL): Exceptions to WDL  Arthralgia: Concerns(lower back)  Bone Pain: No Concerns  Muscle Weakness : No Concerns  Myalgia: No Concerns  Integumentary  Integumentary (WDL): All integumentary elements are within defined limits  Alopecia: No Concerns  Rash Maculo-Papular: No Concerns  Pruritus: No Concerns  Urticaria: No Concerns  Palmar-Plantar Erythrodysesthesia  Syndrome: No Concerns  Flushing: No Concerns  Patient Coping  Patient Coping: Accepting  Accompanied by  Accompanied by: Alone  Oral Chemo Adherence         Past History  Past Medical History:   Diagnosis Date     Anemia 12/13/2017     Arthritis      Bilateral inguinal hernia      Glaucoma      Glaucoma      History of blood clots     DVT - left chronic     Multiple myeloma (H)     Gets chemo infusions every 2 weeks     Pancytopenia (H)      Peripheral neuropathy      Syncope      TMJ (temporomandibular joint disorder)        PHYSICAL EXAM:  /76   Pulse 60   Temp 97.5  F (36.4  C) (Oral)   Wt 168 lb (76.2 kg)   SpO2 99%   BMI 24.81 kg/m    GENERAL: no acute distress. Cooperative in conversation. Here alone  HEENT: pupils are equal, round and reactive. Oromucosa is clean and intact. No ulcerations or mucositis noted. No bleeding noted.  RESP: lungs are clear bilaterally per auscultation. Regular respiratory rate. No wheezes or rhonchi.  CV: Regular, rate and rhythm. No murmurs.  ABD: soft, nontender. Positive bowel sounds. No organomegaly.   MUSCULOSKELETAL: No lower extremity swelling.   NEURO: non focal. Alert and oriented x3.   PSYCH: within normal limits. No depression or anxiety.  SKIN: warm dry intact   LYMPH: no cervical, supraclavicular or axillary lymphadenopathy        Lab Results    Infusion on 04/10/2019   Component Date Value Ref Range Status     INR 04/10/2019 2.23* 0.90 - 1.10 Final     WBC 04/10/2019 2.5* 4.0 - 11.0 thou/uL Final     RBC 04/10/2019 3.07* 4.40 - 6.20 mill/uL Final     Hemoglobin 04/10/2019 10.3* 14.0 - 18.0 g/dL Final     Hematocrit 04/10/2019 32.5* 40.0 - 54.0 % Final     MCV 04/10/2019 106* 80 - 100 fL Final     MCH 04/10/2019 33.6  27.0 - 34.0 pg Final     MCHC 04/10/2019 31.7* 32.0 - 36.0 g/dL Final     RDW 04/10/2019 16.0* 11.0 - 14.5 % Final     Platelets 04/10/2019 145  140 - 440 thou/uL Final     MPV 04/10/2019 10.7  8.5 - 12.5 fL Final     Neutrophils % 04/10/2019  85* 50 - 70 % Final     Lymphocytes % 04/10/2019 11* 20 - 40 % Final     Monocytes % 04/10/2019 2  2 - 10 % Final     Eosinophils % 04/10/2019 0  0 - 6 % Final     Basophils % 04/10/2019 1  0 - 2 % Final     Neutrophils Absolute 04/10/2019 2.1  2.0 - 7.7 thou/uL Final     Lymphocytes Absolute 04/10/2019 0.3* 0.8 - 4.4 thou/uL Final     Monocytes Absolute 04/10/2019 0.1  0.0 - 0.9 thou/uL Final     Eosinophils Absolute 04/10/2019 0.0  0.0 - 0.4 thou/uL Final     Basophils Absolute 04/10/2019 0.0  0.0 - 0.2 thou/uL Final     Lab Results   Component Value Date    KFLC 59.00 (H) 03/06/2019    KFLC 51.00 (H) 01/09/2019    KFLC 60.25 (H) 11/14/2018    KFLC 42.25 (H) 09/19/2018    KFLC 47.80 (H) 07/25/2018    KFLC 41.00 (H) 07/03/2018    KFLC 47.00 (H) 06/06/2018    KFLC 55.50 (H) 04/04/2018    KFLC 36.25 (H) 02/14/2018    KFLC 34.75 (H) 12/20/2017     Lab Results   Component Value Date    KLR 35.98 (H) 03/06/2019    KLR 41.46 (H) 01/09/2019    KLR 95.63 (H) 11/14/2018    KLR 36.74 (H) 09/19/2018    KLR 54.32 (H) 07/25/2018    KLR 50.00 (H) 07/03/2018    KLR 62.67 (H) 06/06/2018    KLR 39.08 (H) 04/04/2018    KLR 33.88 (H) 02/14/2018    KLR 23.32 (H) 12/20/2017   ]    Imaging    No results found.      Signed by: Alejandra Valdez, CNP

## 2021-05-27 NOTE — TELEPHONE ENCOUNTER
ANTICOAGULATION  MANAGEMENT    Assessment     Today's INR result of 2.23 is Therapeutic (goal INR of 2.0-3.0)        Warfarin taken as previously instructed    No new diet changes affecting INR    No new medication/supplements affecting INR    Continues to tolerate warfarin with no reported s/s of bleeding or thromboembolism     Previous INR was Therapeutic    Plan:     Spoke with Theo regarding INR result and instructed:     Warfarin Dosing Instructions:  Continue current warfarin dose 2.5 mg daily on Sundays, Tuesdays and Thurdays; and 5 mg daily rest of week  (0 % change)    Instructed patient to follow up no later than: one month.    Education provided: importance of therapeutic range, importance of following up for INR monitoring at instructed interval and importance of taking warfarin as instructed    Keith verbalizes understanding and agrees to warfarin dosing plan.    Instructed to call the [facility] for any changes, questions or concerns. (#[phone])   ?   Gricelda Parker RN    Subjective/Objective:      Theo HURT Mira, a 74 y.o. male is on warfarin.     Theo reports:     Home warfarin dose: verbally confirmed home dose with Keith and updated on anticoagulation calendar     Missed doses: No     Medication changes:  No     S/S of bleeding or thromboembolism:  No     New Injury or illness:  No     Changes in diet or alcohol consumption:  No     Upcoming surgery, procedure or cardioversion:  No    Anticoagulation Episode Summary     Current INR goal:   2.0-3.0   TTR:   69.5 % (1.1 y)   Next INR check:   5/8/2019   INR from last check:   2.23 (4/10/2019)   Weekly max warfarin dose:      Target end date:      INR check location:      Preferred lab:      Send INR reminders to:   ANTICOAGULATION POOL A (WBY,WBE,MID,RSC)    Indications    DVT (deep venous thrombosis) (H) [I82.409]           Comments:            Anticoagulation Care Providers     Provider Role Specialty Phone number    Mathew Ott,  MD [facility] Internal Medicine [phone]

## 2021-05-27 NOTE — PROGRESS NOTES
Pt ambulates to infusion center for treatment.  Pt voices no new concerns today.  Treatment administered as ordered without difficulty.  Pt left clinic stable to lobby.  Plan RTC as scheduled.

## 2021-05-28 NOTE — PROGRESS NOTES
Keith came to chemo infusion this morning after port lab draw and MD visit for cycle 124 day 1 using Velcade.  Port flushed with ns, heparinized then deaccessed and site covered.  Lab results noted and pt met provision for treatment.  Velcade was given sq into his RLQ of his abdomen.  He tolerated the injection well and site was covered with a bandaid.  Keith d/c from clinic ambulatory and unaccompanied.

## 2021-05-28 NOTE — TELEPHONE ENCOUNTER
ANTICOAGULATION  MANAGEMENT    Assessment     Today's INR result of 1.89 is Subtherapeutic (goal INR of 2.0-3.0)        Left message asking Keith to call with any missed doses or changes  diet or medicines this week    Continues to tolerate warfarin with no reported s/s of bleeding or thromboembolism     Previous INR was Therapeutic    Plan:     Left a detailed message for Theo regarding INR result and instructed:     Warfarin Dosing Instructions:  Continue current warfarin dose 2.5 mg daily on sun/tue/thu; and 5 mg daily rest of week  (0 % change)    Instructed patient to follow up no later than: one week scheduled      Keith verbalizes understanding and agrees to warfarin dosing plan.    Instructed to call the [facility] for any changes, questions or concerns. (#[phone])   ?   Elle Moreno RN    Subjective/Objective:      Theo HURT Mira, a 74 y.o. male is on warfarin.       Anticoagulation Episode Summary     Current INR goal:   2.0-3.0   TTR:   70.9 % (1.2 y)   Next INR check:   5/15/2019   INR from last check:   3.05! (5/1/2019)   Most recent INR:    1.89! (5/7/2019)   Weekly max warfarin dose:      Target end date:      INR check location:      Preferred lab:      Send INR reminders to:   ANTICOAGULATION POOL A (WBY,WBE,MID,RSC)    Indications    DVT (deep venous thrombosis) (H) [I82.409]           Comments:            Anticoagulation Care Providers     Provider Role Specialty Phone number    Mathew Ott MD Referring Internal Medicine [phone]

## 2021-05-28 NOTE — PROGRESS NOTES
Pt ambulates to infusion center for treatment.  Pt voices no new concerns today.  Velcade given SQ as ordered without difficulty.  Pt left clinic stable to lobby.  Plan RTC as scheduled.

## 2021-05-28 NOTE — TELEPHONE ENCOUNTER
Who is calling:  Patient   Reason for Call:  Returning call   Date of last appointment with primary care:   Okay to leave a detailed message: Yes

## 2021-05-28 NOTE — TELEPHONE ENCOUNTER
ANTICOAGULATION  MANAGEMENT    Assessment     Today's INR result of 3.05 is Supratherapeutic (goal INR of 2.0-3.0)        Warfarin taken as previously instructed    No new diet changes affecting INR    No interaction expected between Robitussin DM and mucinex and warfarin    Continues to tolerate warfarin with no reported s/s of bleeding or thromboembolism     Previous INR was Therapeutic    Plan:     Spoke with Theo regarding INR result and instructed:     Warfarin Dosing Instructions:  Continue current warfarin dose 2.5 mg daily on Sun/Tues/thurs; and 5 mg daily rest of week  (0 % change)    Instructed patient to follow up no later than: two weeks    Education provided: importance of therapeutic range, target INR goal and significance of current INR result and no interaction anticipated between warfarin and mucinex and robitussin dm    Theo verbalizes understanding and agrees to warfarin dosing plan.    Instructed to call the [facility] for any changes, questions or concerns. (#[phone])   ?   Theresa Ramirez RN    Subjective/Objective:      Theo HURT Mira, a 74 y.o. male is on warfarin.     Theo reports:     Home warfarin dose: verbally confirmed correct dose taken     Missed doses: No     Medication changes:  Yes: Robitussin DM and Mucinex     S/S of bleeding or thromboembolism:  No     New Injury or illness:  Yes: upper respiratory infection      Changes in diet or alcohol consumption:  No     Upcoming surgery, procedure or cardioversion:  No    Anticoagulation Episode Summary     Current INR goal:   2.0-3.0   TTR:   70.7 % (1.2 y)   Next INR check:   5/15/2019   INR from last check:   3.05! (5/1/2019)   Weekly max warfarin dose:      Target end date:      INR check location:      Preferred lab:      Send INR reminders to:   ANTICOAGULATION POOL A (WBY,WBE,MID,RSC)    Indications    DVT (deep venous thrombosis) (H) [I82.409]           Comments:            Anticoagulation Care Providers      Provider Role Specialty Phone number    Mathew Ott MD Referring Internal Medicine [phone]

## 2021-05-28 NOTE — TELEPHONE ENCOUNTER
ANTICOAGULATION  MANAGEMENT    Assessment     Today's INR result of 2.4 is Therapeutic (goal INR of 2.0-3.0)        Warfarin taken as previously instructed    No new diet changes affecting INR    No new medication/supplements affecting INR    Continues to tolerate warfarin with no reported s/s of bleeding or thromboembolism     Previous INR was Subtherapeutic    Plan:     Spoke with Theo regarding INR result and instructed:     Warfarin Dosing Instructions:  Continue current warfarin dose 2.5 mg daily on Sundays, Tuesdays and Thursdays; and 5 mg daily rest of week  (0 % change)    Instructed patient to follow up no later than: 1-2 weeks.     Education provided: importance of consistent vitamin K intake, impact of vitamin K foods on INR, importance of therapeutic range, importance of following up for INR monitoring at instructed interval and importance of taking warfarin as instructed    Keith verbalizes understanding and agrees to warfarin dosing plan.    Instructed to call the [facility] for any changes, questions or concerns. (#[phone])   ?   Gricelda Parker RN    Subjective/Objective:      Theo Meyers, a 74 y.o. male is on warfarin.     Theo reports:     Home warfarin dose: verbally confirmed home dose with Keith and updated on anticoagulation calendar     Missed doses: No     Medication changes:  No     S/S of bleeding or thromboembolism:  No     New Injury or illness:  No     Changes in diet or alcohol consumption:  No     Upcoming surgery, procedure or cardioversion:  Yes: Endoscope on 5/20/19    Anticoagulation Episode Summary     Current INR goal:   2.0-3.0   TTR:   71.0 % (1.2 y)   Next INR check:   5/29/2019   INR from last check:   2.40 (5/15/2019)   Weekly max warfarin dose:      Target end date:      INR check location:      Preferred lab:      Send INR reminders to:   ANTICOAGULATION POOL A (WBY,WBE,MID,RSC)    Indications    DVT (deep venous thrombosis) (H) [I82.409]            Comments:            Anticoagulation Care Providers     Provider Role Specialty Phone number    Mathew Ott MD Referring Internal Medicine [phone]

## 2021-05-28 NOTE — PROGRESS NOTES
[facility] Hematology and Oncology Progress Note    Patient: Theo Meyers  MRN: [id number]  Date of Service:  May  7, 2019      Assessment and Plan:    1. Kappa light chain myeloma: Clinically he is doing well with no evidence of progression.  No evidence of new or worsening endorgan damage.  His labs are generally stable going back a year.  He does sometimes have significant fluctuation in his light chain ratio but overall not trending to worsening.  We will continue to monitor as we are with no changes in his treatment plan.    Continue Revlimid 21 days on and 7 days.  Velcade weekly.  I told him if there is clinical suspicion of progression then we will  Need to confirm with another bone marrow.We will see him in clinic roughly every 2 months.  His last imaging was thoracic and lumbar spine MRI in June 2018.  No lytic lesions.    2. Thromboprophylaxis: He is on Coumadin for this.     3.  Pancytopenia: Generally stable.  Continuing B12 injections monthly.     ECOG Performance   ECOG Performance Status: 0    Distress Assessment  Distress Assessment Score: 1    Pain  Currently in Pain: No/denies    Diagnosis:    1. IgG kappa light chain myeloma. Hyposecretory. Diagnosed 2009. No significant measurable M protein. 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 There was also on 11;14 translocation.  Past immunofixations from 2011 show IgG-kappa.  Bone marrow biopsy at 5%.  Residual kappa light chain myeloma involving 20% of nucleated cells.  No significant change from November 2016 marrow cellularity.    2. Deep vein thrombosis of the left leg diagnosed 9/2012 while on treatment.    3.  Vitamin B12 deficiency diagnosed in September 2017.    4.  GI bleed and anemia requiring hospitalization in December 2017.    Treatment:    He presented with a lytic lesion involving the C6 vertebral body, although no measurable M protein. IgG kappa monoclonal immunoglobulin was confirmed by [facility] as well as elevated kappa free  light chains with an abnormal kappa lambda ratio. Bone marrow biopsy showed 30% involvement and cytogenetics confirmed deletion of 13 q. as well as 11;14 translocation. He was initially treated with Velcade and dexamethasone and achieved a good partial response.     He was referred to the [facility] where he underwent high-dose chemotherapy with autologous peripheral stem cell transplant in April of 2010. He began Revlimid as maintenance therapy post transplant. Repeat bone marrow biopsy done October 2010 showed about 5% kappa restricted plasma cells and so at that time weekly Velcade was added along with his maintenance Revlimid and dexamethasone.   He has done well since that time with stable minimal residual disease, best assessed by serum free light chains. He required dose reductions of weekly Velcade because of cytopenias and was ultimately switched to a q 2 week maintenance schedule which he has been on since September 2012.     His Revlimid dose was reduced to 15 mg daily and he continues on dexamethasone 20 mg p.o. weekly  Revlimid dosing changed to 20 mg 3 weeks on 1 week (instead of 15 mg daily) for cost reasons.  Started March, 2018    Interim History:    Theo returns today for follow-up visit.  Overall he states that he is feeling okay.  Still has some postnasal drip and congestion.  No new areas of pain.  No constipation or skin rash.  No fevers or chills.  No headaches.    Review of Systems:    Constitutional  Constitutional (WDL): Exceptions to WDL  Fatigue: Fatigue relieved by rest  Neurosensory  Neurosensory (WDL): Exceptions to WDL  Peripheral Sensory Neuropathy: Asymptomatic, loss of deep tendon reflexes or paresthesia(feet and toes and some fingertips)  Cardiovascular  Cardiovascular (WDL): All cardiovascular elements are within defined limits  Pulmonary  Respiratory (WDL): Exceptions to WDL  Cough: Mild symptoms, nonprescription intervention indicated(white  phlegm)  Gastrointestinal  Gastrointestinal (WDL): All gastrointestinal elements are within defined limits  Genitourinary  Genitourinary (WDL): All genitourinary elements are within defined limits  Integumentary  Integumentary (WDL): All integumentary elements are within defined limits  Patient Coping  Patient Coping: Accepting  Accompanied by  Accompanied by: Alone    Past History:    Past Medical History:   Diagnosis Date     Anemia 12/13/2017     Arthritis      Bilateral inguinal hernia      Glaucoma      Glaucoma      History of blood clots     DVT - left chronic     Multiple myeloma (H)     Gets chemo infusions every 2 weeks     Pancytopenia (H)      Peripheral neuropathy      Syncope      TMJ (temporomandibular joint disorder)      Physical Exam:    Recent Vitals 5/7/2019   Weight 166 lbs 14 oz   BSA (m2) 1.92 m2   /67   Pulse 69   Temp 97.6   Temp src 1   SpO2 97   Some recent data might be hidden     General: patient appears stated age of 74 y.o.. Nontoxic and in no distress.   HEENT: Head: atraumatic, normocephalic. Sclerae anicteric.  Chest:  Normal respiratory effort.   Cardiac:  No edema.   Abdomen: abdomen is non-distended  Extremities: normal tone and muscle bulk.   Skin: no lesions or rash. Warm and dry.   CNS: alert and oriented. Grossly non-focal.   Psychiatric: normal mood and affect.     Lab Results:    Recent Results (from the past 240 hour(s))   Immunoglobulins, Quantitative    Collection Time: 05/01/19  8:23 AM   Result Value Ref Range    Immunoglobulin G 339 (L) 700-1,700 mg/dL    Immunoglobulin M 10 (L) 60 - 280 mg/dL    Immunoglobulin A 33 (L) 65 - 400 mg/dL   Immunoglobulin Free Light Chains, Serum    Collection Time: 05/01/19  8:23 AM   Result Value Ref Range    Kappa Free Lt Chain 55.25 (H) 0.33 - 1.94 mg/dL    Lambda Free Lt Chain 1.07 0.57 - 2.63 mg/dL    Kappa Lambda Ratio 51.64 (H) 0.26 - 1.65   Electrophoresis, Protein, Serum    Collection Time: 05/01/19  8:23 AM   Result Value  Ref Range    Albumin % 64.7 51.0 - 67.0 %    Albumin  2.9 (L) 3.2 - 4.7 g/dL    Alpha 1 % 3.7 2.0 - 4.0 %    Alpha 1 0.2 0.1 - 0.3 g/dL    Alpha 2 % 13.4 (H) 5.0 - 13.0 %    Alpha 2 0.6 0.4 - 0.9 g/dL    % Beta 11.5 10.0 - 17.0 %    Beta 0.5 (L) 0.7 - 1.2 g/dL    Gamma Globulin % 6.7 (L) 9.0 - 20.0 %    Gamma Globulin 0.3 (L) 0.6 - 1.4 g/dL    ELP Comment       The albumin fraction is decreased, and this may represent any combination of protein-calorie malnutrition, bulk protein loss, or accelerated protein breakdown.    Decreased beta globulin fraction, which can be seen in kidney disease, coagulopathies, and malnutrition, among other etiologies.    Decreased gamma globulin fraction. Etiologies include lymphoproliferative disorders, chemotherapy, and immunodeficiency.     Protein, Total 4.5 (L) 6.0 - 8.0 g/dL    Path ICD: C90.01     Interpreted By: Bharath Landis MD    INR    Collection Time: 05/01/19  8:23 AM   Result Value Ref Range    INR 3.05 (H) 0.90 - 1.10   INR    Collection Time: 05/07/19  8:39 AM   Result Value Ref Range    INR 1.89 (H) 0.90 - 1.10   HM1 (CBC with Diff)    Collection Time: 05/07/19  8:40 AM   Result Value Ref Range    WBC 2.5 (L) 4.0 - 11.0 thou/uL    RBC 2.90 (L) 4.40 - 6.20 mill/uL    Hemoglobin 10.0 (L) 14.0 - 18.0 g/dL    Hematocrit 30.1 (L) 40.0 - 54.0 %     (H) 80 - 100 fL    MCH 34.5 (H) 27.0 - 34.0 pg    MCHC 33.2 32.0 - 36.0 g/dL    RDW 15.4 (H) 11.0 - 14.5 %    Platelets 155 140 - 440 thou/uL    MPV 10.8 8.5 - 12.5 fL    Neutrophils % 72 (H) 50 - 70 %    Lymphocytes % 13 (L) 20 - 40 %    Monocytes % 9 2 - 10 %    Eosinophils % 4 0 - 6 %    Basophils % 2 0 - 2 %    Neutrophils Absolute 1.8 (L) 2.0 - 7.7 thou/uL    Lymphocytes Absolute 0.3 (L) 0.8 - 4.4 thou/uL    Monocytes Absolute 0.2 0.0 - 0.9 thou/uL    Eosinophils Absolute 0.1 0.0 - 0.4 thou/uL    Basophils Absolute 0.1 0.0 - 0.2 thou/uL     Imaging:    No results found.       Signed by: Per Clifford MD

## 2021-05-29 NOTE — PROGRESS NOTES
Pt ambulates to infusion center for treatment.  Velcade given as ordered without difficulty.  Pt left clinic stable to [facility].  Plan RTC as scheduled.

## 2021-05-29 NOTE — PATIENT INSTRUCTIONS - HE
If blood pressure consistently runs above 150/85, return for evaluation on your blood pressure.    If your blood pressure is running more consistently less than 105/60, or you are more lightheaded dizzy or fatigued, also get evaluated and have your hemoglobin checked.    Continue to follow with hematology for regular blood work and follow-up.    See me in 1 year for physical, or sooner if needed.    Plan to repeat your heart echo in 1 year.

## 2021-05-29 NOTE — PROGRESS NOTES
Pt ambulates to infusion center for labs and treatment.  Pt voices no new concerns today.  IVAD accessed, labs drawn et sent.  IVAD flushed w/NS et heparin and needle deaccessed.  Treatment administered as ordered without difficulty.  Pt left clinic stable to lobby.  Plan RTC as scheduled.

## 2021-05-29 NOTE — PROGRESS NOTES
[facility] clinic Follow Up Note    Theo Meyers   74 y.o. male    Date of Visit: 5/23/2019    Chief Complaint   Patient presents with     Follow Up     Checkup for anemia, weakness, and shortness of breath     Subjective  Keith is here for follow-up on his chronic cough that he has had since last fall.  He also wanted to talk about some higher blood pressures he had with recent EGD and at his hematology infusion appointments.    He has a past history of multiple myeloma kappa light chain diagnosed in 2009.  He still on Revlimid and Velcade.  I did review the note from May 7 from hematology.  His blood pressure was 136/67.    His blood counts at that time showed a hemoglobin of 10.0.  White count 2.5 and platelets 155.    His disease is stable without evidence of progression according to hematology, and he continues on regular follow-up.    No night sweats or fevers.    He is walking 3 miles a day with his dog.  No significant radicular pain currently.  He has a history of severe spinal stenosis.  I did review June 2018 MRI with L3-4 stenosis.  He occasionally uses the gabapentin at night.    He is on warfarin chronically.  Recent INR on May 15 was 2.4.  No bleeding issues or falls.  He had recurrent DVT in 2018 and is now on chronic warfarin.  DVT in left leg also in 2012.    I did review the cardiac echo February 2019 with patient today.  Mild to moderate aortic stenosis which was an increase from previous mild aortic stenosis.  Ejection fraction 60 to 65%.  He has not had any chest pain or lower extremity edema or increasing shortness of breath.    His blood pressure is usually on the low side.  In February was 96/70.    His blood pressures been somewhat higher recently.  He does not have a history of hypertension.  Was up to 140/80 during his EGD.    Patient states he did have an EGD this past Monday.  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  He was given an 18-month  follow-up.  He did cauterize it.  He is on Prilosec.  No upper abdominal pain or melena.    Urinating well on Flomax.  No dysuria.    He quit smoking in 1975.  Chest x-ray in February of this year was negative.    He had a chronic cough consistent with postnasal drip or mild irritative cough.    He was treated with antibiotics last fall.  No longer having nosebleeds.  Patient does feel he is back to his normal self now.  No shortness of breath.    Continues on glaucoma drops.    PMHx:    Past Medical History:   Diagnosis Date     Anemia 12/13/2017     Arthritis      Bilateral inguinal hernia      Glaucoma      Glaucoma      History of blood clots     DVT - left chronic     Multiple myeloma (H)     Gets chemo infusions every 2 weeks     Pancytopenia (H)      Peripheral neuropathy      Syncope      TMJ (temporomandibular joint disorder)      PSHx:    Past Surgical History:   Procedure Laterality Date     APPENDECTOMY      Age 16     CARPAL TUNNEL RELEASE Bilateral      ESOPHAGOGASTRODUODENOSCOPY N/A 12/14/2017    Procedure: ESOPHAGOGASTRODUODENOSCOPY (EGD) WITH BIOPSYS;  Surgeon: Marlon Roy MD;  Location: [facility];  Service:      ESOPHAGOGASTRODUODENOSCOPY N/A 1/19/2018    Procedure: ENDOSCOPIC ULTRASOUND  ESOPHAGOGASTRODUODENOSCOPY WITH DUODENAL POLYP REMOVAL;  Surgeon: Familia Mcgraw MD;  Location: [facility] OR;  Service:      EYE SURGERY      Cataract     PORTACATH PLACEMENT       VERTEBROPLASTY      T6     WISDOM TOOTH EXTRACTION       Immunizations:   Immunization History   Administered Date(s) Administered     Hep B, historic 03/01/2011, 05/19/2011     HiB, historic,unspecified 03/01/2011, 05/19/2011     IPV 03/01/2011, 05/19/2011     Influenza high dose, seasonal 10/07/2015, 09/21/2016, 09/20/2017, 09/26/2018     Influenza,seasonal quad, PF, 36+MOS 09/24/2014     Pneumo Conj 13-V (2010&after) 10/16/2015     Pneumo Polysac 23-V 05/19/2011     Tdap 03/01/2011       ROS A comprehensive review of  systems was performed and was otherwise negative    Medications, allergies, and problem list were reviewed and updated    Exam  /74 (Patient Site: Right Arm, Patient Position: Sitting, Cuff Size: Adult Large)   Pulse 72   Resp 12   Wt 166 lb 14.4 oz (75.7 kg)   SpO2 100%   BMI 24.65 kg/m    Appears well.  No jaundice.  Lungs are clear.  No wheezing or crackles or rhonchi.  No JVD.  Heart is regular with 2 out of 6 systolic murmur.  Abdomen is nontender.  No ankle edema, just slight indentation on his socks.    Assessment/Plan  1. Cough  Consistent with irritative cough, possibly some postnasal drip.  Persistence after URI last fall.    Symptoms essentially resolved.  Last chest x-ray in February was clear.  Follow-up if needed for relapse of symptoms.    2. Spinal stenosis of lumbar region without neurogenic claudication  Asymptomatic and walking regularly.  PRN gabapentin at night.    3. Chronic deep vein thrombosis (DVT) of left lower extremity, unspecified vein (H)  Chronic warfarin.  Therapeutic.    4. Multiple myeloma not having achieved remission (H)  Managed by hematology.  Regular lab monitoring by hematology.    5. Anemia, unspecified type  Hemoglobin improved.  Will be monitored closely by hematology.    Higher blood pressures likely reflective of his increasing hemoglobin and recovery from illness last fall.  See patient instructions below.  But he has not had high blood pressure in the past.    6. Aortic valve stenosis, etiology of cardiac valve disease unspecified  Repeat echo in 1 year.    History of duodenal tubular adenoma.  Repeat EGD in 18 months.  Continue Prilosec.    BPH controlled with Flomax.    Glaucoma drops.    Gets his B12 shots through hematology office.    Return in about 1 year (around 5/23/2020) for Annual physical.   Patient Instructions   If blood pressure consistently runs above 150/85, return for evaluation on your blood pressure.    If your blood pressure is running  more consistently less than 105/60, or you are more lightheaded dizzy or fatigued, also get evaluated and have your hemoglobin checked.    Continue to follow with hematology for regular blood work and follow-up.    See me in 1 year for physical, or sooner if needed.    Plan to repeat your heart echo in 1 year.    Mathew Ott MD      Current Outpatient Medications   Medication Sig Dispense Refill     acetaminophen (TYLENOL) 500 MG tablet Take 500 mg by mouth every 6 (six) hours as needed for pain.       acyclovir (ZOVIRAX) 400 MG tablet TAKE 1 TABLET BY MOUTH TWICE A  tablet 2     ascorbic acid (VITAMIN C) 1000 MG tablet Take 2,000 mg by mouth daily.        aspirin 81 MG EC tablet Take 2 tablets (162 mg total) by mouth daily. (Patient taking differently: Take 81 mg by mouth daily. )  0     bimatoprost (LUMIGAN) 0.01 % Drop Administer 1 drop to both eyes at bedtime.       brimonidine (ALPHAGAN P) 0.1 % Drop Administer 1 drop to both eyes 3 (three) times a day.        calcium, as carbonate, (TUMS) 200 mg calcium (500 mg) chewable tablet Chew 1 tablet 3 (three) times a day as needed.        calcium-vitamin D 500 mg(1,250mg) -200 unit per tablet Take 1 tablet by mouth daily.       cetirizine (ZYRTEC) 10 MG tablet Take 10 mg by mouth daily.       dexamethasone (DECADRON) 4 MG tablet Take 20 mg by mouth every 14 (fourteen) days.       gabapentin (NEURONTIN) 300 MG capsule TAKE 1-3 CAPSULES BY MOUTH FOUR TIMES DAILY (Patient taking differently: TAKE 1 CAPSULES BY MOUTH 3 times per week) 240 capsule 11     guaiFENesin-dextromethorphan (MUCINEX DM) 600-30 mg Tb12 Take 1 tablet by mouth 2 (two) times a day as needed.        KLOR-CON M20 20 mEq tablet TAKE 1 TABLET BY MOUTH EVERY DAY 90 tablet 3     lenalidomide (REVLIMID) 20 mg cap Take 20 mg by mouth every evening. Three weeks on and one week off 21 capsule 6     magnesium oxide (MAG-OX) 400 mg (241.3 mg magnesium) tablet TAKE 1 TABLET BY MOUTH TWICE A DAY 60 tablet  3     multivitamin (MULTIVITAMIN) per tablet Take 1 tablet by mouth daily.       omega 3-dha-epa-fish oil 900-1,400 mg CpDR Take 1 tablet by mouth daily.       omeprazole (PRILOSEC) 20 MG capsule TAKE 1 CAPSULE BY MOUTH EVERY DAY 90 capsule 3     psyllium (METAMUCIL) powder Take 2 packets by mouth daily. Takes 2 tablespoonsful       tamsulosin (FLOMAX) 0.4 mg cap TAKE ONE CAPSULE BY MOUTH DAILY 90 capsule 3     warfarin (COUMADIN/JANTOVEN) 5 MG tablet TAKE 1 TABLET BY MOUTH DAILY (Patient taking differently: 5 mg on ,, and Saturday and 2.5 mg all other days) 90 tablet 1     sodium chloride (OCEAN) 0.65 % nasal spray 2 sprays into each nostril as needed for congestion. 15 mL 12     No current facility-administered medications for this visit.      Facility-Administered Medications Ordered in Other Visits   Medication Dose Route Frequency Provider Last Rate Last Dose     heparin 100 unit/mL lockflush (PF) porcine 300-600 Units  300-600 Units Intravenous PRN Per Clifford MD   600 Units at 14 1013     No Known Allergies  Social History     Tobacco Use     Smoking status: Former Smoker     Last attempt to quit: 1975     Years since quittin.5     Smokeless tobacco: Never Used   Substance Use Topics     Alcohol use: Yes     Comment: occasional     Drug use: No

## 2021-05-29 NOTE — TELEPHONE ENCOUNTER
ANTICOAGULATION  MANAGEMENT    Assessment     Today's INR result of 1.95 is Subtherapeutic (goal INR of 2.0-3.0)        Warfarin taken as previously instructed    No new diet changes affecting INR    No new medication/supplements affecting INR    Continues to tolerate warfarin with no reported s/s of bleeding or thromboembolism     Previous INR was Therapeutic    Plan:     Spoke with Theo regarding INR result and instructed:     Warfarin Dosing Instructions:  Change warfarin dose to 2.5 mg daily on Sundays and Tuesdays; and 5 mg daily rest of week  (9.1 % change) He preferred to increase dose today and see what happens. He does not like when INR is low.    Instructed patient to follow up no later than: 1-2 weeks.    Education provided: importance of therapeutic range, importance of following up for INR monitoring at instructed interval and importance of taking warfarin as instructed    Keith verbalizes understanding and agrees to warfarin dosing plan.    Instructed to call the [facility] for any changes, questions or concerns. (#[phone])   ?   Gricelda Parker RN    Subjective/Objective:      Theo Meyers, a 74 y.o. male is on warfarin.     Theo reports:     Home warfarin dose: verbally confirmed home dose with Keith and updated on anticoagulation calendar     Missed doses: No     Medication changes:  No     S/S of bleeding or thromboembolism:  No     New Injury or illness:  No     Changes in diet or alcohol consumption:  No     Upcoming surgery, procedure or cardioversion:  No    Anticoagulation Episode Summary     Current INR goal:   2.0-3.0   TTR:   71.8 % (1.3 y)   Next INR check:   6/19/2019   INR from last check:   1.95! (6/5/2019)   Weekly max warfarin dose:      Target end date:      INR check location:      Preferred lab:      Send INR reminders to:   DEWAYNE SUN    Indications    DVT (deep venous thrombosis) (H) [I82.409]           Comments:            Anticoagulation Care Providers      Provider Role Specialty Phone number    Mathew Ott MD Referring Internal Medicine [phone]

## 2021-05-29 NOTE — PROGRESS NOTES
Pt ambulates to infusion center for treatment.  Pt voices no new concerns today.  Velcade given as ordered without difficulty.  Pt left clinic stable to lobby.  Plan RTC as scheduled.

## 2021-05-30 VITALS — BODY MASS INDEX: 23.25 KG/M2 | WEIGHT: 157 LBS | HEIGHT: 69 IN

## 2021-05-30 VITALS — WEIGHT: 165.9 LBS | BODY MASS INDEX: 24.86 KG/M2

## 2021-05-30 VITALS — BODY MASS INDEX: 23.82 KG/M2 | WEIGHT: 159 LBS

## 2021-05-30 VITALS — WEIGHT: 161.44 LBS | BODY MASS INDEX: 24.19 KG/M2

## 2021-05-30 VITALS — WEIGHT: 164.5 LBS | BODY MASS INDEX: 24.65 KG/M2

## 2021-05-30 VITALS — BODY MASS INDEX: 23.52 KG/M2 | WEIGHT: 157 LBS

## 2021-05-30 NOTE — PROGRESS NOTES
Pt ambulates to infusion center for lab draw prior to NP visit.   IVAD accessed, labs drawn et sent.  Pt was seen by BRITTANI Valdez CNP and orders approved.  Treatment administered as ordered.  IVAD flushed w/NS et heparin and needle deaccessed.  Pt left clinic stable to [facility].

## 2021-05-30 NOTE — TELEPHONE ENCOUNTER
Anticoagulation Annual Referral Renewal Review    Theo Meyers's chart reviewed for annual renewal of referral to anticoagulation monitoring.        Criteria for anticoagulation nurse and/or pharmacist renewal met   Warfarin indication: DVT Yes , DVT/PE with previous provider documentation patient to be on extended anticoagulation   Current with INR monitoring/compliant Yes Yes   Date of last office visit 7/10/19 Yes, had office visit within last year   Time in Therapeutic Range (TTR) 70.82 % Yes, TTR > 60%       Theo Meyers met all criteria for anticoagulation management program initiated renewal.  New INR standing orders and anticoagulation referral renewal placed.      Gricelda Parker RN  10:35 AM

## 2021-05-30 NOTE — PROGRESS NOTES
[facility] Hematology and Oncology Progress Note    Patient: Theo Meyers  MRN: [id number]  Date of Service: 07/25/2019        Reason for Visit    Chief Complaint   Patient presents with     HE Cancer     Multiple myeloma not having achieved remission       Assessment and Plan  1.  Myeloma: Patient continues on his current treatment of Velcade and dexamethasone every other week along with Revlimid.  His labs have fluctuated.. His light chains and the ratio continue to be elevated. It is a little higher 2 weeks ago, likely due to being off in June with being in the hospital. If it goes up consistently, we may talk about switching to daratumumab and pomalyst. We will continue current regimen.  He will see Dr. Clifford per protocol    2. DVT: on coumadin. INR slightly subtherapeutic, but was 2.5 2 days ago at PCP.  No change.  He will continue to follow with the Coumadin clinic    3. Pancytopenia: likekly from treatment with velcade and revlimid. Stable. No complications. No changes. Continue to monitor monthly.     4. Recent prolonged hospitalization: slowly recovering. Breathing is better. Trying to increase activity.     ECOG Performance   ECOG Performance Status: 1     Distress Assessment  Distress Assessment Score: No distress    Pain  Currently in Pain: No/denies      Problem List    1. Multiple myeloma not having achieved remission (H)  dexAMETHasone (DECADRON) 4 MG tablet   2. Chronic deep vein thrombosis (DVT) of proximal vein of lower extremity, unspecified laterality (H)        ______________________________________________________________________________    History of Present Illness    DIAGNOSIS:   1. IgG kappa light chain myeloma. Hyposecretory. Diagnosed 2009. No significant measurable M protein. 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 There was also on 11;14 translocation.   2. Deep vein thrombosis of the left leg diagnosed 09/2012 while on treatment.       TREATMENT:   1. Most recently  he has been on Velcade every 2 weeks since 09/2012. He is getting dexamethasone 20 mg every other week with the velcade. Finally, he is receiving Revlimid 20 mg daily for 3 weeks on, 1 week off.   2. Continues on warfarin      PAST TREATMENT and HISTORY:   He presented at diagnosis with a lytic lesion involving the C6 vertebral body, although no measurable M protein. IgG kappa monoclonal immunoglobulin was confirmed by [facility] as well as elevated kappa free light chains with an abnormal kappa lambda ratio. Bone marrow biopsy showed 30% involvement and cytogenetics confirmed deletion of 13 q. as well as 11;14 translocation. He was initially treated with Velcade and dexamethasone and achieved a good partial response. [facility] high-dose chemotherapy with autologous peripheral stem cell transplant in April of 2010. He began Revlimid as maintenance therapy post transplant. Repeat bone marrow biopsy done October 2010 showed about 5% kappa restricted plasma cells and so at that time weekly Velcade was added along with his maintenance Revlimid and dexamethasone. He has done well since that time with stable minimal residual disease, best assessed by serum free light chains. He required dose reductions of weekly Velcade because of cytopenias and was ultimately switched to a q.2 week maintenance schedule which he has been on since September 2012. His Revlimid dose is 20 mg daily and he continues on dexamethasone 20 mg p.o. q. Week.       INTERIM HISTORY:    Pt is here today for follow up. He is doing well.  He has been home for a couple weeks at this point and is quite happy about that.  He is back on his treatment for his myeloma.  He says he still feels pretty fatigued but feels that each day is a little bit better.  He denies any bleeding or bruising complications.  Said his breathing is stable but he does not really push his activity too much so he is not sure if he did  have shortness of breath with activity.    Pain Status  Currently in Pain: No/denies    Review of Systems  Constitutional  Constitutional (WDL): Exceptions to WDL  Fatigue: Concerns(didn't sleep well last night)  Fever: No Concerns  Chills: No Concerns  Weight Gain: No Concerns  Weight Loss: No Concerns  Hot flashes/Night Sweats: No Concerns  Neurosensory  Neurosensory (WDL): Exceptions to WDL  Peripheral Motor Neuropathy: Concerns  Ataxia: No Concerns  Peripheral Sensory Neuropathy: No Concerns  Confusion: No Concerns  Dizziness: Concerns(with quick movements/position changes)  Syncope: No Concerns  Eye   Eye Disorder (WDL): All eye disorder elements are within defined limits(glasses)  Blurred Vision: No Concerns  Dry Eye: No Concerns  Eye Pain: No Concerns  Watering Eyes: No Concerns  Ear  Ear Disorder (WDL): All ear disorder elements are within defined limits  Ear Pain: No Concerns  Tinnitus: No Concerns  Cardiovascular  Cardiovascular (WDL): Exceptions to WDL  Palpitations: Concerns(taking diltiazem daily)  Edema: Concerns(mild in left leg)  SVT, DVT/PE: No Concerns(dvt in left leg years ago)  Chest Pain - Cardiac: No Concerns  Pulmonary  Respiratory (WDL): Exceptions to WDL(recovering from pneumonia)  Cough: No Concerns  Dyspnea: No Concerns  Hypoxia: No Concerns  Gastrointestinal  Gastrointestinal (WDL): Exceptions to WDL  Anorexia: Concerns(improving gradually)  Nausea: No Concerns  Vomiting: No Concerns  Dehydration: No Concerns  Dysgeusia: No Concerns  Dysphagia: No Concerns  Mucositis Oral: No Concerns  Esophagitis: No Concerns  Constipation: No Concerns  Diarrhea: Concerns(being treated for c-diff)  Pharyngitis: No Concerns  Dry Mouth: No Concerns  Genitourinary  Genitourinary (WDL): All genitourinary elements are within defined limits  Urinary Frequency: No Concerns  Urinary Retention: No Concerns  Urinary Tract Pain: No Concerns  Lymphatic  Lymph (WDL): All lymph disorder elements are within defined  "limits  Lymphedema: No Concerns  Lymph Node Discomfort: No Concerns  Musculoskeletal and Connective Tissue  Musculoskeletal and Connetive Tissue Disorders (WDL): Exceptions to WDL  Arthralgia: No Concerns  Bone Pain: No Concerns  Muscle Weakness : Concerns  Myalgia: No Concerns  Integumentary  Integumentary (WDL): Exceptions to WDL(dry flaking skin; rough patch on thumb)  Alopecia: No Concerns  Rash Maculo-Papular: No Concerns  Pruritus: No Concerns  Urticaria: No Concerns  Palmar-Plantar Erythrodysesthesia Syndrome: No Concerns  Flushing: No Concerns  Patient Coping  Patient Coping: Accepting;Open/discussion  Accompanied by  Accompanied by: Alone  Oral Chemo Adherence         Past History  Past Medical History:   Diagnosis Date     Anemia 12/13/2017     Arthritis      Bilateral inguinal hernia      Glaucoma      Glaucoma      History of blood clots     DVT - left chronic     Multiple myeloma (H)     Gets chemo infusions every 2 weeks     Pancytopenia (H)      Peripheral neuropathy      Syncope      TMJ (temporomandibular joint disorder)        PHYSICAL EXAM:  BP (!) 88/52   Pulse 66   Temp 97.6  F (36.4  C) (Oral)   Ht 5' 10\" (1.778 m)   Wt 161 lb (73 kg)   SpO2 94%   BMI 23.10 kg/m    GENERAL: no acute distress. Cooperative in conversation. Here alone  HEENT: pupils are equal, round and reactive. Oromucosa is clean and intact. No ulcerations or mucositis noted. No bleeding noted.  RESP: lungs are clear bilaterally per auscultation. Regular respiratory rate. No wheezes or rhonchi.  CV: Regular, rate and rhythm. No murmurs.  ABD: soft, nontender. Positive bowel sounds. No organomegaly.   MUSCULOSKELETAL: No lower extremity swelling.   NEURO: non focal. Alert and oriented x3.   PSYCH: within normal limits. No depression or anxiety.  SKIN: warm dry intact   LYMPH: no cervical, supraclavicular or axillary lymphadenopathy        Lab Results    Lab Standing Order on 07/25/2019   Component Date Value Ref Range " Status     INR 07/25/2019 1.88* 0.90 - 1.10 Final     WBC 07/25/2019 3.2* 4.0 - 11.0 thou/uL Final     RBC 07/25/2019 2.27* 4.40 - 6.20 mill/uL Final     Hemoglobin 07/25/2019 7.7* 14.0 - 18.0 g/dL Final     Hematocrit 07/25/2019 24.3* 40.0 - 54.0 % Final     MCV 07/25/2019 107* 80 - 100 fL Final     MCH 07/25/2019 33.9  27.0 - 34.0 pg Final     MCHC 07/25/2019 31.7* 32.0 - 36.0 g/dL Final     RDW 07/25/2019 18.4* 11.0 - 14.5 % Final     Platelets 07/25/2019 121* 140 - 440 thou/uL Final    This value was suppressed on a previous preliminary result.     MPV 07/25/2019 11.9  8.5 - 12.5 fL Final    This value was suppressed on a previous preliminary result.     Neutrophils % 07/25/2019 78* 50 - 70 % Final     Lymphocytes % 07/25/2019 13* 20 - 40 % Final     Monocytes % 07/25/2019 6  2 - 10 % Final     Eosinophils % 07/25/2019 3  0 - 6 % Final     Basophils % 07/25/2019 0  0 - 2 % Final     Neutrophils Absolute 07/25/2019 2.5  2.0 - 7.7 thou/uL Final     Lymphocytes Absolute 07/25/2019 0.4* 0.8 - 4.4 thou/uL Final     Monocytes Absolute 07/25/2019 0.2  0.0 - 0.9 thou/uL Final     Eosinophils Absolute 07/25/2019 0.1  0.0 - 0.4 thou/uL Final     Basophils Absolute 07/25/2019 0.0  0.0 - 0.2 thou/uL Final   Lab on 07/23/2019   Component Date Value Ref Range Status     INR 07/23/2019 2.50* 0.90 - 1.10 Final   Lab on 07/16/2019   Component Date Value Ref Range Status     INR 07/16/2019 2.30* 0.90 - 1.10 Final     Lab Results   Component Value Date    KFLC 55.25 (H) 05/01/2019    KFLC 59.00 (H) 03/06/2019    KFLC 51.00 (H) 01/09/2019    KFLC 60.25 (H) 11/14/2018    KFLC 42.25 (H) 09/19/2018    KFLC 47.80 (H) 07/25/2018    KFLC 41.00 (H) 07/03/2018    KFLC 47.00 (H) 06/06/2018    KFLC 55.50 (H) 04/04/2018    KFLC 36.25 (H) 02/14/2018     Lab Results   Component Value Date    KLR 51.64 (H) 05/01/2019    KLR 35.98 (H) 03/06/2019    KLR 41.46 (H) 01/09/2019    KLR 95.63 (H) 11/14/2018    KLR 36.74 (H) 09/19/2018    KLR 54.32 (H)  07/25/2018    KLR 50.00 (H) 07/03/2018    KLR 62.67 (H) 06/06/2018    KLR 39.08 (H) 04/04/2018    KLR 33.88 (H) 02/14/2018   ]    Imaging    Xr Chest 1 View Portable    Result Date: 7/7/2019  EXAM: XR CHEST 1 VIEW PORTABLE LOCATION: [facility] DATE/TIME: 07/07/2019, 5:22 AM INDICATION: Follow-up pleural effusion. COMPARISON: None. FINDINGS: Left basilar pleural catheter. Minimal left pleural fluid with a small amount of fluid in the left fissure. Compressive infiltrate left base. No pneumothorax. Right-sided Port-A-Catheter. Right lung clear. Midthoracic vertebroplasty.     Xr Chest 1 View Portable    Result Date: 7/5/2019  EXAM: XR CHEST 1 VIEW PORTABLE LOCATION: [facility] DATE/TIME: 7/5/2019 5:44 AM INDICATION: Follow-up pleural effusion, chest tube in place COMPARISON: 07/04/2019 FINDINGS: Decreasing size of left pleural effusion. Minimal opacity persists at left base likely residual fluid and atelectasis. Lungs otherwise clear. Port-A-Cath in place. Heart size normal.    Xr Chest 1 View Portable    Result Date: 7/4/2019  EXAM: XR CHEST 1 VIEW PORTABLE LOCATION: [facility] DATE/TIME: 7/4/2019 5:46 AM INDICATION: Pleural fluid drained? COMPARISON: 06/26/2019. FINDINGS: Left basilar pleural catheter. Moderate-sized left pleural effusion which has increased. Compressive consolidation in the left base. Stable right-sided portacatheter. Thoracic vertebroplasty.    Xr Chest 1 View Portable    Result Date: 6/26/2019  EXAM: XR CHEST 1 VIEW PORTABLE LOCATION: [facility] DATE/TIME: 6/26/2019 11:27 AM INDICATION: sob COMPARISON: 06/21/2019 FINDINGS: Opacification persists and likely has worsened slightly at left base consistent with pneumonia and small parapneumonic pleural effusion. Left upper lobe and right lung remain clear. Mild cardiomegaly stable. Port-A-Cath present.    Ct Chest Without Contrast    Result Date: 7/5/2019  EXAM: CT CHEST WO CONTRAST LOCATION: [facility]  Hospital DATE/TIME: 7/5/2019 9:06 AM INDICATION: Follow-up left pleural effusion after drainage Follow-up left pleural effusion after drainage COMPARISON: 06/29/2019 TECHNIQUE: Helical images were obtained through the chest. Multiplanar reformats were obtained. Dose reduction techniques were used. CONTRAST: None. FINDINGS: LUNGS AND PLEURA: Interval placement of a left-sided pigtail drain with significant decrease in left pleural effusion improved aeration at left lung base. Only trace volume of residual pleural fluid present posteriorly and along the major fissure. Mild residual left lower lobe atelectasis. Left upper lobe and right lung clear. MEDIASTINUM: Aortic and coronary artery calcifications. Normal-sized heart. No adenopathy. Port-A-Cath present. LIMITED UPPER ABDOMEN: Heavy atherosclerotic calcifications. MUSCULOSKELETAL: Unchanged moderate compression fracture and prior vertebroplasty involving a midthoracic vertebral body.     CONCLUSION: 1.  Significant improvement with near complete resolution of left pleural effusion after percutaneous drainage. Mild atelectasis persists at left lung base.     Ct Chest Without Contrast    Result Date: 6/29/2019  EXAM: CT CHEST WO CONTRAST LOCATION: [facility] DATE/TIME: 6/29/2019 6:17 PM INDICATION: Shortness of breath, follow up fever and dyspnea. COMPARISON: 06/22/2019. TECHNIQUE: Helical images were obtained through the chest. Multiplanar reformats were obtained. Dose reduction techniques were used. CONTRAST: None. FINDINGS: LUNGS AND PLEURA: There has been interval increase in left-sided pleural effusion which is now more complex and likely loculated. Increasing dense consolidation and volume loss involving majority of left lower lobe. Increasing atelectasis and consolidation now present in left upper lobe. Similar very small right effusion with adjacent atelectasis and consolidation. MEDIASTINUM: Low density blood pool suggestive of anemia. Prominent  coronary artery calcification. Port. No mass or adenopathy. LIMITED UPPER ABDOMEN: No acute change. Extensive atherosclerotic plaque. Fatty replacement of the pancreas. MUSCULOSKELETAL: Presumed bone infarcts or enchondromas right humerus incompletely imaged. Degenerative disease. Thoracic vertebral body fracture post vertebroplasty as on prior.     CONCLUSION: 1.  Interval increase in left-sided pleural effusion which is likely loculated. Increasing adjacent consolidation and atelectasis. Potential empyema not evaluated due to the lack of IV contrast. Fluid sampling would be helpful. 2.  Low-density blood pool suggests anemia, correlate clinically. 3.  Remainder not significantly changed.     Ir Pleural Drainage With Catheter Insertion    Result Date: 7/2/2019  [facility] RADIOLOGY LOCATION: [facility] DATE: 7/2/2019 PROCEDURE: IMAGE GUIDED LEFT CHEST TUBE PLACEMENT INTERVENTIONAL RADIOLOGIST: Lázaro Butler MD. INDICATION: 74-year-old male with history of multiple myeloma, low white blood cell count and left pneumonia with para pneumonic effusion. CONSENT: The risks, benefits and alternatives of left chest tube placement were discussed with the patient  in detail. All questions were answered. Informed consent was given to proceed with the procedure. MODERATE SEDATION: Versed 1.5 mg IV; Fentanyl 75 mcg IV.  Under physician supervision, Versed and fentanyl were administered for moderate sedation. Pulse oximetry, heart rate and blood pressure were continuously monitored by an independent trained observer. The physician spent 15 minutes of face-to-face sedation time with the patient. CONTRAST: None ANTIBIOTICS: None. ADDITIONAL MEDICATIONS: None. FLUOROSCOPIC TIME: 0.9 minutes. RADIATION DOSE: Air Kerma: 9 mGy. COMPLICATIONS: No immediate complications. STERILE BARRIER TECHNIQUE: Maximum sterile barrier technique was used. Cutaneous antisepsis was performed at the operative site with application of 2%  chlorhexidine and large sterile drape. Prior to the procedure, the  and assistant performed hand hygiene and wore hat, mask, sterile gown, and sterile gloves during the entire procedure. PROCEDURE:  The left posterior lateral chest was prepped and draped in usual sterile fashion. Under ultrasound guidance using a Yueh needle the left pleural space was accessed. Pleural fluid was aspirated from the UA catheter to confirm good position within the left  pleural space. Through the UA catheter a Amplatz wire was advanced. Over the Amplatz wire dilatation of the subcutaneous tract a 12 Moroccan was performed. Over the wire a 12 nonlocking chest tube was placed. The chest tube was secured to the skin using 2-0 Prolene suture. FINDINGS: Ultrasound evaluation demonstrates moderate left pleural effusion.     Successful image guided placement of 12 Moroccan left chest tube. ____________________________________________________________________ CPT codes for physician reference only: [id number]     Us Thoracentesis    Result Date: 6/27/2019  EXAM: 1. LEFT THORACENTESIS 2. ULTRASOUND GUIDANCE LOCATION: [facility] DATE/TIME: 6/27/2019 3:47 PM INDICATION: Pleural effusion. PROCEDURE: Informed consent obtained. Time out performed. The chest was prepped and draped in sterile fashion. 10 mL of 1 % lidocaine was infused into the local soft tissues. Under direct ultrasound guidance, a 5 Moroccan Yueh catheter system was placed into the pleural effusion. 1.1 liters of red or bloody fluid were removed and sent to lab, if requested. Patient tolerated procedure well. RADIOLOGIC SUPERVISION AND INTERPRETATION: Left pleural effusion seen. ULTRASOUND GUIDANCE: Images demonstrate the pleural effusion. Catheter seen in good position.     CONCLUSION: Status post left ultrasound-guided thoracentesis. Reference CPT Code: 78651         Signed by: Alejandra Valdez CNP

## 2021-05-30 NOTE — TELEPHONE ENCOUNTER
"Spoke to patient regarding lab work per Dr Clifofrd: \"Light chains are up a little bit, not unexpected since he is been off Revlimid.  We will recheck at his next scheduled lab draw. Light chains are up a little bit, not unexpected since he is been off Revlimid.  We will recheck at his next scheduled lab draw.\".  Pt verbalized understanding.  Reviewed plan for follow up and pt will call scheduling to make future appt.     "

## 2021-05-30 NOTE — PATIENT INSTRUCTIONS - HE
We will help you schedule appointment with Dr. Floyd sometime within the next few months.    If you develop fever, worsening shortness of breath, chest pain, or severe fatigue, seek immediate medical evaluation.    Labs recently checked in hospital.  No new lab work today.    Blood pressure and other vital signs look good today.    Continue with diltiazem as originally prescribed.    Continue with vancomycin.  Continue with doxycycline.    Schedule follow-up appointment with infectious disease as originally instructed.    Follow-up with oncology on Friday to discuss anticoagulation medications.

## 2021-05-30 NOTE — PROGRESS NOTES
Pt arrived ambulatory to clinic for Cycle # 125 Day # 22 of his chemotherapy regimen.  Administered SQ Velcade injection into LLQ of ABD per MD order.  Pt tolerated procedures well, no s/s of bleeding or swelling at site.  Port was flushed with NS and Heparin then de-accessed using 2x2 and papertape.  Pt verbalized understanding of plan of care and return to clinic.

## 2021-05-30 NOTE — PROGRESS NOTES
Clinic Note    Assessment:     Assessment and Plan:    1. Paroxysmal atrial fibrillation (H)  He is tolerating his diltiazem.  Blood pressure and heart rate are within normal limits today.  He is going to have a discussion with hematology/oncology later this week to discuss whether he is going to continue with his warfarin or switch to a novel anticoagulant given his variable INR measurements recently.    2. Parapneumonic effusion  He was discharged on doxycycline and has follow-up with infectious disease.  He still has some shortness of breath but no fevers.  Fatigue is improving.  Eating and drinking normally.  Nontoxic physical exam today.  He was given strict emergent follow-up instructions.    3. Colitis due to Clostridium difficile  On vancomycin currently.  Again, has follow-up with infectious disease.  Bowels are improving.  I encouraged him to eat a daily yogurt.           Patient Instructions   We will help you schedule appointment with Dr. Floyd sometime within the next few months.    If you develop fever, worsening shortness of breath, chest pain, or severe fatigue, seek immediate medical evaluation.    Labs recently checked in hospital.  No new lab work today.    Blood pressure and other vital signs look good today.    Continue with diltiazem as originally prescribed.    Continue with vancomycin.  Continue with doxycycline.    Schedule follow-up appointment with infectious disease as originally instructed.    Follow-up with oncology on Friday to discuss anticoagulation medications.    Return in about 3 months (around 10/10/2019).         Subjective:      Patient comes the clinic today for hospital discharge follow-up.    He was admitted to [facility] on 6/21 and discharged 18 days later.    He presented to the ED with confusion.  Stroke code was called.  Initial head CT was negative.    Chest x-ray while in the ED revealed pneumonia.   his confusion was thought to be related to  infection.    He was started on IV antibiotics.    Unfortunately, he developed C. difficile infection.  He was started on vancomycin.  He developed an electrolyte imbalance with low potassium and low magnesium.    He developed atrial fibrillation and was started on diltiazem.    He was discharged on doxycycline for his pneumonia and told to follow-up with infectious disease.    He had been having troubles with his INR while in the hospital and there was some discussion about possibly switching to a novel anticoagulant such as Xarelto or Eliquis.  Patient has an appointment set up for hematology/oncology in a few days to discuss this further.    Since being discharged from the hospital, he has been feeling relatively well.  He does have some mild shortness of breath but says that this is improving.  He has having some fatigue but says that he is eating and drinking normally.    No fever since being discharged.  His wife has been helping him at home.    Labs were checked yesterday upon discharge, potassium had improved to 3.7.    The following portions of the patient's history were reviewed and updated as appropriate: Allergies, medications, problems, prior note.    Review of Systems:    Review is otherwise negative except for what is mentioned above.     Social Hx:    Social History     Tobacco Use   Smoking Status Former Smoker     Last attempt to quit: 1975     Years since quittin.6   Smokeless Tobacco Never Used         Objective:     Vitals:    07/10/19 1506   BP: 110/62   Pulse: 79   SpO2: 98%   Weight: 147 lb (66.7 kg)       Exam:    General: No apparent distress. Calm. Alert and Oriented X3. Pt behavior is appropriate.  Chest/Lungs: Normal chest wall, clear to auscultation, normal respiratory effort and rate.   Heart/Pulses: Regular rate and rhythm, strong and equal radial pulses, no murmurs. Capillary refill <2 seconds. No edema.   Skin: There is a small pinpoint incision wound on the patient's  left flank where the chest tube was located.  It is well approximated with no bleeding or bruising.      Patient Active Problem List   Diagnosis     Multiple myeloma (H)     Unspecified glaucoma     Cataract     DVT (deep venous thrombosis) (H)     Shingles     Shingles (herpes zoster) polyneuropathy     Back pain     Post herpetic neuralgia     Pancytopenia (H)     Acute blood loss anemia     Syncope and collapse     Elevated INR     Anemia     History of DVT (deep vein thrombosis)     Chronic deep vein thrombosis (DVT) of left lower extremity, unspecified vein (H)     Spinal stenosis of lumbar region without neurogenic claudication     Anemia, vitamin B12 deficiency     Acute encephalopathy     Fever, unspecified fever cause     Chemotherapy-induced neutropenia (H)     Thrombocytopenia (H)     Paroxysmal atrial fibrillation (H)     Nonrheumatic aortic valve stenosis     Parapneumonic effusion     Chest tube in place     SAMANO (dyspnea on exertion)     Immunocompromised state (H)     Current Outpatient Medications   Medication Sig Dispense Refill     acetaminophen (TYLENOL) 500 MG tablet Take 500 mg by mouth every 6 (six) hours as needed for pain.       acyclovir (ZOVIRAX) 400 MG tablet TAKE 1 TABLET BY MOUTH TWICE A  tablet 2     ascorbic acid (VITAMIN C) 1000 MG tablet Take 2,000 mg by mouth daily.        aspirin 81 MG EC tablet Take 2 tablets (162 mg total) by mouth daily. (Patient taking differently: Take 81 mg by mouth daily. )  0     brimonidine (ALPHAGAN P) 0.1 % Drop Administer 1 drop to both eyes 3 (three) times a day.        calcium, as carbonate, (TUMS) 200 mg calcium (500 mg) chewable tablet Chew 1 tablet 3 (three) times a day as needed.        calcium-vitamin D 500 mg(1,250mg) -200 unit per tablet Take 1 tablet by mouth daily.       cetirizine (ZYRTEC) 10 MG tablet Take 10 mg by mouth daily.       CYANOCOBALAMIN, VITAMIN B-12, INJ Inject monthly as directed       dexamethasone (DECADRON) 4 MG  tablet Take 20 mg by mouth every 14 (fourteen) days Prior to chemotherapy.       diltiazem (CARDIZEM CD) 120 MG 24 hr capsule Take 1 capsule (120 mg total) by mouth daily. 30 capsule 0     doxycycline (MONODOX) 100 MG capsule Take 1 capsule (100 mg total) by mouth 2 (two) times a day for 5 days. 10 capsule 0     enoxaparin (LOVENOX) 300 mg/3 mL Soln Inject 0.7 mL (70 mg total) under the skin every 12 (twelve) hours for 5 days. 7 mL 0     gabapentin (NEURONTIN) 300 MG capsule TAKE 1-3 CAPSULES BY MOUTH FOUR TIMES DAILY (Patient taking differently: Take one capsule by mouth daily as needed) 240 capsule 11     KLOR-CON M20 20 mEq tablet TAKE 1 TABLET BY MOUTH EVERY DAY 90 tablet 3     lenalidomide (REVLIMID) 20 mg cap Take 20 mg by mouth every evening. Three weeks on and one week off 21 capsule 6     magnesium oxide (MAG-OX) 400 mg (241.3 mg magnesium) tablet TAKE 1 TABLET BY MOUTH TWICE A DAY (Patient taking differently: TAKE 1 TABLET BY MOUTH TWICE A DAY AS NEEDED) 60 tablet 3     multivitamin (MULTIVITAMIN) per tablet Take 1 tablet by mouth daily.       omega 3-dha-epa-fish oil 900-1,400 mg CpDR Take 1 tablet by mouth daily.       omeprazole (PRILOSEC) 20 MG capsule TAKE 1 CAPSULE BY MOUTH EVERY DAY (Patient taking differently: Take 20 mg by mouth daily as needed.       ) 90 capsule 3     psyllium (METAMUCIL) powder Take 1 packet by mouth daily.              sodium chloride (OCEAN) 0.65 % nasal spray 2 sprays into each nostril as needed for congestion. 15 mL 12     tamsulosin (FLOMAX) 0.4 mg cap TAKE ONE CAPSULE BY MOUTH DAILY 90 capsule 3     vancomycin (VANCOCIN) 125 MG capsule Take 1 capsule (125 mg total) by mouth 4 (four) times a day for 1 day. 4 capsule 0     [START ON 7/11/2019] vancomycin (VANCOCIN) 125 MG capsule Take 1 capsule (125 mg total) by mouth 2 (two) times a day for 7 days. 14 capsule 0     [START ON 7/19/2019] vancomycin (VANCOCIN) 125 MG capsule Take 1 capsule (125 mg total) by mouth every other  day for 14 days. 7 capsule 0     warfarin (COUMADIN/JANTOVEN) 5 MG tablet Take 2.5 mg on Sundays and Tuesdays; and 5 mg all other days of the week. Adjust dose based on INR results as directed.       bimatoprost (LUMIGAN) 0.01 % Drop Administer 1 drop to both eyes at bedtime.       guaiFENesin-dextromethorphan (MUCINEX DM) 600-30 mg Tb12 Take 1 tablet by mouth 2 (two) times a day as needed.        No current facility-administered medications for this visit.      Facility-Administered Medications Ordered in Other Visits   Medication Dose Route Frequency Provider Last Rate Last Dose     heparin 100 unit/mL lockflush (PF) porcine 300-600 Units  300-600 Units Intravenous PRN Per Clifford MD   600 Units at 11/19/14 1013           Percy Mcgrath CNP (Rob)    7/10/2019

## 2021-05-30 NOTE — TELEPHONE ENCOUNTER
ANTICOAGULATION  MANAGEMENT: Discharge Continuity of Care Review    Hospital admission on  6/21/169 for Acute encephalopathy.    Discharge disposition: Home    INR Results:       Recent labs: (last 7 days)     07/05/19  0542 07/06/19  0523 07/07/19  0505 07/08/19  0511 07/09/19  0525   INR 1.62* 1.65* 1.52* 1.44* 1.32*       Warfarin inpatient management: Held and reversed with Vitamin K 10 IVPB on 7/1. Warfarin resumed on 7/8    Warfarin discharge instructions: home regimen continued and Lovenox     Medication Changes Affecting Anticoagulation: Yes: doxycycline and Lovenox    Additional Factors Affecting Anticoagulation: No    Plan     Recommend 7/12 due to Lovenox    Spoke with Keith    Anticoagulation calendar updated    Gricelda Parker RN

## 2021-05-30 NOTE — PROGRESS NOTES
Keith was on the phone with our schedulers yesterday.  There was some confusion about when his velcade is due.  Per Dr Clifford, Keith is getting velcade injections every 2 weeks. This is does not mean that it needs to be the same week as his Revlimid cycle start.  He started his most recent dose of Revlimid on 7/21/19.  He next dose of velcade is due on 7/26 but he will be getting on 7/25.  He then wants his next one on a Wednesday as Wednesdays work better for him.  He wanted to know about his MM labs.  He just had them on 7/12/19.  I let him know that his next MM labs are due on 9/12/19.  He verbalized understanding and was appreciative of the clarification.    Mere Machado RN

## 2021-05-30 NOTE — PROGRESS NOTES
"TCM DISCHARGE FOLLOW UP CALL    Discharge Date:  7/9/2019  Reason for hospital stay (discharge diagnosis)::  Acute encephalopathy, DVT, Fever, unspecified fever cause, Chemotherapy-induced neutropenia, Thrombocytopenia, Paroxysmal A-Fib, Nonrheumatic aortic valve stenosis, Parapneumonic effusion, Immunocompromised state   Are you feeling better, the same or worse since your discharge?:  Patient is feeling better (Slept well last night. \"I need to eat more; lost 20#.\"  Weakness.  Still having loose stools but not as often.)  Do you feel like you have a plan in the event of a health emergency?: No    (RN) Patient provided with information to call us in the event of a health emergency: Yes (Informed pt of 24/7 nurse triage, call oncologist)    As part of your discharge plan, were  home care services ordered for you?: No    Did you receive any new medications, or was there a change to your medications?: Yes    Are you taking those medications, or do you have any established regiment?:  Pt states he's taking diltiazem once a day, doxycycline 1 capsule twice a day, Lovenox injections twice a day, vancomycin 125 mg four times a day today, then decreases to 125 mg twice a day starting tomorrow for 7 days, he's increased ASA 81 mg to 2 tabs (162 mg) a day, magnesium oxide 400 mg twice a day, & no change in omeprazole dose.  States he taken gabapentin since home, and usually only averages ~ 3 capsules a week d/t timing and interactions of his other medications per letter from [facility], supposed to take it 4 hours from the magnesium oxide.  Do you have any follow up visits scheduled with your PCP or Specialist?:  Yes, with PCP and Yes, with Specialist (Percy Mcgrath CNP today)  (RN) Is PCP appt scheduled soon enough (within 14 days of discharge date)?: Yes    Who are you seeing and when is it scheduled?:  Oncology 7/12/19, Pulmonology 8/19/19  RN NOTES::  Recommended eating small frequent meals, and trying Boost to help increase " his calorie/protein intake d/t 20# wt loss.      Karen Rizvi RN Care Manager, Population Health

## 2021-05-30 NOTE — TELEPHONE ENCOUNTER
ANTICOAGULATION  MANAGEMENT    Assessment     Today's INR result of 1.88 is Subtherapeutic (goal INR of 2.0-3.0)        Warfarin taken as previously instructed    No new diet changes affecting INR    No new medication/supplements affecting INR    Continues to tolerate warfarin with no reported s/s of bleeding or thromboembolism     Previous INR was Therapeutic    Plan:     Spoke with Theo regarding INR result and instructed:     Warfarin Dosing Instructions:  Continue current warfarin dose 2.5 mg daily on Sundays and Tuesdays; and 5 mg daily rest of week  (0 % change)This was also instructions by provider he seen today    Instructed patient to follow up no later than: 1-2 weeks    Education provided: importance of therapeutic range, importance of following up for INR monitoring at instructed interval and importance of taking warfarin as instructed    Keith verbalizes understanding and agrees to warfarin dosing plan.    Instructed to call the [facility] for any changes, questions or concerns. (#[phone])   ?   Gricelda Parker RN    Subjective/Objective:      Theo HURT Jasbirtio, a 74 y.o. male is on warfarin.     Theo reports:     Home warfarin dose: verbally confirmed home dose with Keith and updated on anticoagulation calendar     Missed doses: No     Medication changes:  No     S/S of bleeding or thromboembolism:  No     New Injury or illness:  No     Changes in diet or alcohol consumption:  No     Upcoming surgery, procedure or cardioversion:  No    Anticoagulation Episode Summary     Current INR goal:   2.0-3.0   TTR:   70.1 % (1.3 y)   Next INR check:   8/8/2019   INR from last check:      Weekly max warfarin dose:      Target end date:      INR check location:      Preferred lab:      Send INR reminders to:   DEWAYNE SUN    Indications    DVT (deep venous thrombosis) (H) [I82.409]           Comments:            Anticoagulation Care Providers     Provider Role Specialty Phone number    Namrata,  Mathew MARQUEZ MD [facility] Internal Medicine [phone]

## 2021-05-30 NOTE — PROGRESS NOTES
[facility] Hematology and Oncology Progress Note    Patient: Theo Meyers  MRN: [id number]  Date of Service: July 12, 2019      Assessment and Plan:    1. Kappa light chain myeloma: He was due to start his next cycle of Revlimid on June 30.  His most recent cycle completed on June 23.  He still recovering from his prolonged hospitalization.  After some discussion we decided to start his next cycle of Revlimid on Sunday the 21st.  This will be a 3-week delay in starting.  We did check his myeloma labs today and I told him that his light chains may be up as he is been off treatment now for a few weeks.  If so, we will restart at his current dose and schedule of Revlimid and recheck the myeloma labs per the usual schedule.  He is not having any new areas of pain.  No biochemical evidence of any progression.  He will restart Velcade today.  Imaging he had in the hospital over the past few no evidence of lytic lesions.  Continue Revlimid 21 days on and 7 days.  Velcade weekly.  If there is clinical suspicion of progression then we will need to confirm with another bone marrow. We will see him in clinic roughly every 2 months.      2. Thromboprophylaxis: He is now back on Coumadin.  Prefers this over rivaroxaban.    3.  Pancytopenia: Generally stable.  Continuing B12 injections monthly.     4.  Parapneumonic effusion: He had a chest tube for a while this is not out.  Has some dyspnea on exertion but most likely from deconditioning.  I told him this will likely slowly improve with his activity level.    5.  C. difficile colitis: He is on an oral vancomycin taper.    ECOG Performance   ECOG Performance Status: 1    Distress Assessment  Distress Assessment Score: No distress    Pain  Currently in Pain: No/denies  Pain Score (Initial OR Reassessment): No/Denies Pain    Diagnosis:    1.  IgG kappa light chain myeloma. Hyposecretory. Diagnosed 2009. No significant measurable M protein. Initial cytogenetic analysis showed a  deletion 13q. There was also on 11;14 translocation.  Past immunofixations from 2011 show IgG-kappa.  Bone marrow biopsy at 5%.  Residual kappa light chain myeloma involving 20% of nucleated cells.  No significant change from November 2016 marrow cellularity.    2.  Deep vein thrombosis of the left leg diagnosed 9/2012 while on treatment.    3.  Vitamin B12 deficiency diagnosed in September 2017.    4.  GI bleed and anemia requiring hospitalization in December 2017.    Treatment:    He presented with a lytic lesion involving the C6 vertebral body, although no measurable M protein. IgG kappa monoclonal immunoglobulin was confirmed by [facility] as well as elevated kappa free light chains with an abnormal kappa lambda ratio. Bone marrow biopsy showed 30% involvement and cytogenetics confirmed deletion of 13 q. as well as 11;14 translocation. He was initially treated with Velcade and dexamethasone and achieved a good partial response.     He was referred to the [facility] where he underwent high-dose chemotherapy with autologous peripheral stem cell transplant in April of 2010. He began Revlimid as maintenance therapy post transplant. Repeat bone marrow biopsy done October 2010 showed about 5% kappa restricted plasma cells and so at that time weekly Velcade was added along with his maintenance Revlimid and dexamethasone.   He has done well since that time with stable minimal residual disease, best assessed by serum free light chains. He required dose reductions of weekly Velcade because of cytopenias and was ultimately switched to a q 2 week maintenance schedule which he has been on since September 2012.     His Revlimid dose was reduced to 15 mg daily and he continues on dexamethasone 20 mg p.o. weekly  Revlimid dosing changed to 20 mg 3 weeks on 1 week (instead of 15 mg daily) for cost reasons.  Started March, 2018    Interim History:    Theo returns today for follow-up visit.  He was recently discharged from  "the hospital.  He had a prolonged stay with C. difficile colitis, fever, parapneumonic effusion.  He is back home but still is tired and weak.  Stamina remains low.      Review of Systems:    As above in the history.     Review of Systems otherwise Negative for:  General: chills, fever or night sweats  Psychological: anxiety or depression  Ophthalmic: blurry vision, double vision or loss of vision, vision change  ENT: epistaxis, oral lesions, hearing changes  Hematological and Lymphatic: bleeding, bruising, jaundice, swollen lymph nodes  Endocrine: hot flashes, unexpected weight changes  Respiratory: cough, hemoptysis, orthopnea  Cardiovascular: chest pain, edema, palpitations or PND  Gastrointestinal: abdominal pain, blood in stools, change in bowel habits, constipation, diarrhea or nausea/vomiting  Genito-Urinary: change in urinary stream, incontinence, frequency/urgency  Musculoskeletal: joint pain, stiffness, swelling, muscle pain  Neurological: dizziness, headaches, numbness/tingling  Dermatological: lumps and rash    ECOG performance status is 1    Patient Coping  Patient Coping: Accepting;Open/discussion  Accompanied by  Accompanied by: Family Member    Past History:    Past Medical History:   Diagnosis Date     Anemia 12/13/2017     Arthritis      Bilateral inguinal hernia      Glaucoma      Glaucoma      History of blood clots     DVT - left chronic     Multiple myeloma (H)     Gets chemo infusions every 2 weeks     Pancytopenia (H)      Peripheral neuropathy      Syncope      TMJ (temporomandibular joint disorder)      Physical Exam:    Recent Vitals 7/12/2019   Height 5' 10\"   Weight 151 lbs 8 oz   BSA (m2) 1.84 m2   /61   Pulse 93   Temp 97.6   Temp src 1   SpO2 100   Some recent data might be hidden     General: patient appears stated age of 74 y.o.. Nontoxic and in no distress.   HEENT: Head: atraumatic, normocephalic. Sclerae anicteric.  Chest:  Normal respiratory effort.   Cardiac:  No edema. "   Abdomen: abdomen is non-distended  Extremities: normal tone and muscle bulk.   Skin: no lesions or rash. Warm and dry.   CNS: alert and oriented. Grossly non-focal.   Psychiatric: normal mood and affect.     Lab Results:    Recent Results (from the past 240 hour(s))   INR    Collection Time: 07/06/19  5:23 AM   Result Value Ref Range    INR 1.65 (H) 0.90 - 1.10   Potassium - Next AM    Collection Time: 07/06/19  5:23 AM   Result Value Ref Range    Potassium 3.6 3.5 - 5.0 mmol/L   HM1 (CBC with Diff)    Collection Time: 07/06/19  5:23 AM   Result Value Ref Range    WBC 4.0 4.0 - 11.0 thou/uL    RBC 2.65 (L) 4.40 - 6.20 mill/uL    Hemoglobin 8.7 (L) 14.0 - 18.0 g/dL    Hematocrit 27.1 (L) 40.0 - 54.0 %     (H) 80 - 100 fL    MCH 32.8 27.0 - 34.0 pg    MCHC 32.1 32.0 - 36.0 g/dL    RDW 15.9 (H) 11.0 - 14.5 %    Platelets 279 140 - 440 thou/uL    MPV 11.6 8.5 - 12.5 fL    Neutrophils % 74 (H) 50 - 70 %    Lymphocytes % 11 (L) 20 - 40 %    Monocytes % 13 (H) 2 - 10 %    Eosinophils % 1 0 - 6 %    Basophils % 1 0 - 2 %    Neutrophils Absolute 2.9 2.0 - 7.7 thou/uL    Lymphocytes Absolute 0.4 (L) 0.8 - 4.4 thou/uL    Monocytes Absolute 0.5 0.0 - 0.9 thou/uL    Eosinophils Absolute 0.1 0.0 - 0.4 thou/uL    Basophils Absolute 0.0 0.0 - 0.2 thou/uL   Creatinine    Collection Time: 07/06/19  5:23 AM   Result Value Ref Range    Creatinine 1.00 0.70 - 1.30 mg/dL    GFR MDRD Af Amer >60 >60 mL/min/1.73m2    GFR MDRD Non Af Amer >60 >60 mL/min/1.73m2   INR    Collection Time: 07/07/19  5:05 AM   Result Value Ref Range    INR 1.52 (H) 0.90 - 1.10   Potassium - Next AM    Collection Time: 07/07/19  5:05 AM   Result Value Ref Range    Potassium 4.5 3.5 - 5.0 mmol/L   HM1 (CBC with Diff)    Collection Time: 07/07/19  5:05 AM   Result Value Ref Range    WBC 4.2 4.0 - 11.0 thou/uL    RBC 2.66 (L) 4.40 - 6.20 mill/uL    Hemoglobin 8.7 (L) 14.0 - 18.0 g/dL    Hematocrit 27.5 (L) 40.0 - 54.0 %     (H) 80 - 100 fL    MCH  32.7 27.0 - 34.0 pg    MCHC 31.6 (L) 32.0 - 36.0 g/dL    RDW 16.1 (H) 11.0 - 14.5 %    Platelets 311 140 - 440 thou/uL    MPV 11.3 8.5 - 12.5 fL    Neutrophils % 74 (H) 50 - 70 %    Lymphocytes % 11 (L) 20 - 40 %    Monocytes % 15 (H) 2 - 10 %    Eosinophils % 0 0 - 6 %    Basophils % 1 0 - 2 %    Neutrophils Absolute 3.0 2.0 - 7.7 thou/uL    Lymphocytes Absolute 0.5 (L) 0.8 - 4.4 thou/uL    Monocytes Absolute 0.6 0.0 - 0.9 thou/uL    Eosinophils Absolute 0.0 0.0 - 0.4 thou/uL    Basophils Absolute 0.0 0.0 - 0.2 thou/uL   INR    Collection Time: 07/08/19  5:11 AM   Result Value Ref Range    INR 1.44 (H) 0.90 - 1.10   Potassium - Next AM    Collection Time: 07/08/19  5:11 AM   Result Value Ref Range    Potassium 3.9 3.5 - 5.0 mmol/L   HM1 (CBC with Diff)    Collection Time: 07/08/19  5:11 AM   Result Value Ref Range    WBC 3.5 (L) 4.0 - 11.0 thou/uL    RBC 2.42 (L) 4.40 - 6.20 mill/uL    Hemoglobin 7.9 (L) 14.0 - 18.0 g/dL    Hematocrit 24.7 (L) 40.0 - 54.0 %     (H) 80 - 100 fL    MCH 32.6 27.0 - 34.0 pg    MCHC 32.0 32.0 - 36.0 g/dL    RDW 15.8 (H) 11.0 - 14.5 %    Platelets 324 140 - 440 thou/uL    MPV 11.3 8.5 - 12.5 fL    Neutrophils % 70 50 - 70 %    Lymphocytes % 13 (L) 20 - 40 %    Monocytes % 15 (H) 2 - 10 %    Eosinophils % 1 0 - 6 %    Basophils % 1 0 - 2 %    Neutrophils Absolute 2.4 2.0 - 7.7 thou/uL    Lymphocytes Absolute 0.5 (L) 0.8 - 4.4 thou/uL    Monocytes Absolute 0.5 0.0 - 0.9 thou/uL    Eosinophils Absolute 0.1 0.0 - 0.4 thou/uL    Basophils Absolute 0.0 0.0 - 0.2 thou/uL   Basic Metabolic Panel    Collection Time: 07/08/19  5:11 AM   Result Value Ref Range    Sodium 139 136 - 145 mmol/L    Potassium 3.8 3.5 - 5.0 mmol/L    Chloride 105 98 - 107 mmol/L    CO2 25 22 - 31 mmol/L    Anion Gap, Calculation 9 5 - 18 mmol/L    Glucose 98 70 - 125 mg/dL    Calcium 8.7 8.5 - 10.5 mg/dL    BUN 28 8 - 28 mg/dL    Creatinine 1.09 0.70 - 1.30 mg/dL    GFR MDRD Af Amer >60 >60 mL/min/1.73m2    GFR  MDRD Non Af Amer >60 >60 mL/min/1.73m2   INR    Collection Time: 07/09/19  5:25 AM   Result Value Ref Range    INR 1.32 (H) 0.90 - 1.10   Potassium - Next AM    Collection Time: 07/09/19  5:25 AM   Result Value Ref Range    Potassium 3.7 3.5 - 5.0 mmol/L   HM1 (CBC with Diff)    Collection Time: 07/09/19  5:25 AM   Result Value Ref Range    WBC 3.4 (L) 4.0 - 11.0 thou/uL    RBC 2.54 (L) 4.40 - 6.20 mill/uL    Hemoglobin 8.2 (L) 14.0 - 18.0 g/dL    Hematocrit 26.1 (L) 40.0 - 54.0 %     (H) 80 - 100 fL    MCH 32.3 27.0 - 34.0 pg    MCHC 31.4 (L) 32.0 - 36.0 g/dL    RDW 15.7 (H) 11.0 - 14.5 %    Platelets 356 140 - 440 thou/uL    MPV 11.1 8.5 - 12.5 fL    Neutrophils % 72 (H) 50 - 70 %    Lymphocytes % 12 (L) 20 - 40 %    Monocytes % 15 (H) 2 - 10 %    Eosinophils % 1 0 - 6 %    Basophils % 1 0 - 2 %    Neutrophils Absolute 2.4 2.0 - 7.7 thou/uL    Lymphocytes Absolute 0.4 (L) 0.8 - 4.4 thou/uL    Monocytes Absolute 0.5 0.0 - 0.9 thou/uL    Eosinophils Absolute 0.0 0.0 - 0.4 thou/uL    Basophils Absolute 0.0 0.0 - 0.2 thou/uL   Immunoglobulins, Quantitative    Collection Time: 07/12/19 10:22 AM   Result Value Ref Range    Immunoglobulin G 349 (L) 700-1,700 mg/dL    Immunoglobulin M 29 (L) 60 - 280 mg/dL    Immunoglobulin A 41 (L) 65 - 400 mg/dL   Kappa/Lambda Quantitative Free Light Chains and Ratio, Serum - Misc. Lab Test    Collection Time: 07/12/19 10:22 AM   Result Value Ref Range    Miscellanous Test Dept. Chemistry Reference Test     Test Name        Kappa/Lambda Quantitative Free Light Chains and Ratio, Serum    Performing Lab       [facility] [address]  [address] [place] [id number]      Scan Result See [facility] Miscellaneous Test for results    [facility] Misc Test    Collection Time: 07/12/19 10:22 AM   Result Value Ref Range    [facility] Miscellaneous Test SEE NOTE (!)    INR    Collection Time: 07/12/19 12:08 PM   Result Value Ref Range    INR 2.03 (H) 0.90 - 1.10     Imaging:    Cta Head And  Neck    Result Date: 6/21/2019  EXAM: CTA HEAD AND NECK LOCATION: [facility] DATE/TIME: 6/21/2019 3:27 PM INDICATION: TIA or CVA Stroke code COMPARISON: CT head 12/13/2017 CONTRAST: Iohexol (Omni) 75mL TECHNIQUE: Head and neck CT angiogram with IV contrast. Noncontrast head CT followed by axial helical CT images of the head and neck vessels obtained during the arterial phase of intravenous contrast administration. Axial 2D reconstructed images and multiplanar 3D MIP reconstructed images of the head and neck vessels were performed by the technologist. Dose reduction techniques were used. FINDINGS: NONCONTRAST HEAD CT: INTRACRANIAL CONTENTS: No intracranial hemorrhage, extraaxial collection, or mass effect.  No CT evidence of acute infarct. Unchanged mild diffuse parenchymal volume loss with ex vacuo ventricular dilatation. VISUALIZED ORBITS/SINUSES/MASTOIDS: No significant orbital abnormality. No significant paranasal sinus mucosal disease. No significant middle ear or mastoid effusion. BONES/SOFT TISSUES: No significant abnormality. HEAD CTA: ANTERIOR CIRCULATION: No significant stenosis or occlusion. Standard [place] of [place] anatomy. POSTERIOR CIRCULATION: There is fetal origin of the left PCA. No significant stenosis or occlusion. Balanced vertebral arteries supply a normal basilar artery. ANEURYSM/VASCULAR MALFORMATION: None. DURAL VENOUS SINUSES: Expected enhancement of the major dural venous sinuses. NECK CTA: RIGHT CAROTID: 30% stenosis in the right ICA based on NASCET criteria. LEFT CAROTID: 70% stenosis in the left ICA based on NASCET criteria. VERTEBRAL ARTERIES: Balanced vertebral arteries are patent in the neck and into the head. AORTIC ARCH: Classic aortic arch anatomy with no significant stenosis at the origin of the great vessels. MISCELLANEOUS: No evidence for dissection or pseudoaneurysm. The visualized upper chest is unremarkable. Age appropriate appearance of the cervical spine.      CONCLUSION: HEAD CT: 1.  No acute intracranial abnormality. HEAD CTA: 1.  No branch vessel occlusion, high-grade stenosis, aneurysm, or high flow vascular malformation involving proximal [place] vessels. NECK CTA: 1.  There is 70% stenosis of the left ICA and 30% stenosis of the right ICA based on NASCET criteria. Noncontrast CT discussed with Dr. Connell on 6/21/2019 at 1535, and CTA results discussed at 1543    Xr Chest 1 View Portable    Result Date: 7/7/2019  EXAM: XR CHEST 1 VIEW PORTABLE LOCATION: [facility] DATE/TIME: 07/07/2019, 5:22 AM INDICATION: Follow-up pleural effusion. COMPARISON: None. FINDINGS: Left basilar pleural catheter. Minimal left pleural fluid with a small amount of fluid in the left fissure. Compressive infiltrate left base. No pneumothorax. Right-sided Port-A-Catheter. Right lung clear. Midthoracic vertebroplasty.     Xr Chest 1 View Portable    Result Date: 7/5/2019  EXAM: XR CHEST 1 VIEW PORTABLE LOCATION: [facility] DATE/TIME: 7/5/2019 5:44 AM INDICATION: Follow-up pleural effusion, chest tube in place COMPARISON: 07/04/2019 FINDINGS: Decreasing size of left pleural effusion. Minimal opacity persists at left base likely residual fluid and atelectasis. Lungs otherwise clear. Port-A-Cath in place. Heart size normal.    Xr Chest 1 View Portable    Result Date: 7/4/2019  EXAM: XR CHEST 1 VIEW PORTABLE LOCATION: [facility] DATE/TIME: 7/4/2019 5:46 AM INDICATION: Pleural fluid drained? COMPARISON: 06/26/2019. FINDINGS: Left basilar pleural catheter. Moderate-sized left pleural effusion which has increased. Compressive consolidation in the left base. Stable right-sided portacatheter. Thoracic vertebroplasty.    Xr Chest 1 View Portable    Result Date: 6/26/2019  EXAM: XR CHEST 1 VIEW PORTABLE LOCATION: [facility] DATE/TIME: 6/26/2019 11:27 AM INDICATION: sob COMPARISON: 06/21/2019 FINDINGS: Opacification persists and likely has worsened  slightly at left base consistent with pneumonia and small parapneumonic pleural effusion. Left upper lobe and right lung remain clear. Mild cardiomegaly stable. Port-A-Cath present.    Xr Chest 1 View Portable    Result Date: 6/21/2019  EXAM: XR CHEST 1 VIEW PORTABLE LOCATION: [facility] DATE/TIME: 6/21/2019 4:51 PM INDICATION: fever COMPARISON: 2/21/2019 FINDINGS: There is new mild cardiomegaly. There is also new infiltrate at left lung base and blunting at left costophrenic angle consistent with pneumonia small left pleural effusion. Left upper lobe and right lung remain clear. Port-A-Cath present.    Ct Chest Without Contrast    Result Date: 7/5/2019  EXAM: CT CHEST WO CONTRAST LOCATION: [facility] DATE/TIME: 7/5/2019 9:06 AM INDICATION: Follow-up left pleural effusion after drainage Follow-up left pleural effusion after drainage COMPARISON: 06/29/2019 TECHNIQUE: Helical images were obtained through the chest. Multiplanar reformats were obtained. Dose reduction techniques were used. CONTRAST: None. FINDINGS: LUNGS AND PLEURA: Interval placement of a left-sided pigtail drain with significant decrease in left pleural effusion improved aeration at left lung base. Only trace volume of residual pleural fluid present posteriorly and along the major fissure. Mild residual left lower lobe atelectasis. Left upper lobe and right lung clear. MEDIASTINUM: Aortic and coronary artery calcifications. Normal-sized heart. No adenopathy. Port-A-Cath present. LIMITED UPPER ABDOMEN: Heavy atherosclerotic calcifications. MUSCULOSKELETAL: Unchanged moderate compression fracture and prior vertebroplasty involving a midthoracic vertebral body.     CONCLUSION: 1.  Significant improvement with near complete resolution of left pleural effusion after percutaneous drainage. Mild atelectasis persists at left lung base.     Ct Chest Without Contrast    Result Date: 6/29/2019  EXAM: CT CHEST WO CONTRAST LOCATION: [facility]  [facility] DATE/TIME: 6/29/2019 6:17 PM INDICATION: Shortness of breath, follow up fever and dyspnea. COMPARISON: 06/22/2019. TECHNIQUE: Helical images were obtained through the chest. Multiplanar reformats were obtained. Dose reduction techniques were used. CONTRAST: None. FINDINGS: LUNGS AND PLEURA: There has been interval increase in left-sided pleural effusion which is now more complex and likely loculated. Increasing dense consolidation and volume loss involving majority of left lower lobe. Increasing atelectasis and consolidation now present in left upper lobe. Similar very small right effusion with adjacent atelectasis and consolidation. MEDIASTINUM: Low density blood pool suggestive of anemia. Prominent coronary artery calcification. Port. No mass or adenopathy. LIMITED UPPER ABDOMEN: No acute change. Extensive atherosclerotic plaque. Fatty replacement of the pancreas. MUSCULOSKELETAL: Presumed bone infarcts or enchondromas right humerus incompletely imaged. Degenerative disease. Thoracic vertebral body fracture post vertebroplasty as on prior.     CONCLUSION: 1.  Interval increase in left-sided pleural effusion which is likely loculated. Increasing adjacent consolidation and atelectasis. Potential empyema not evaluated due to the lack of IV contrast. Fluid sampling would be helpful. 2.  Low-density blood pool suggests anemia, correlate clinically. 3.  Remainder not significantly changed.     Ct Chest Without Contrast    Result Date: 6/22/2019  EXAM: CT CHEST WO CONTRAST LOCATION: [facility] DATE/TIME: 6/22/2019 5:24 PM INDICATION: Suspected pneumonia in neutropenic patient. Fevers. COMPARISON: CT chest exam 09/23/2012 TECHNIQUE: Helical images were obtained through the chest. Multiplanar reformats were obtained. Dose reduction techniques were used. CONTRAST: None. FINDINGS: LUNGS AND PLEURA: Moderate size left pleural effusion. Extensive infiltrate/consolidation left lower lobe. Trace  right pleural effusion and right basilar atelectasis. MEDIASTINUM: No enlarged mediastinal or hilar lymph nodes. Atherosclerotic disease thoracic aorta. Coronary artery calcifications. Anterior right chest wall Port-A-Cath tip distal SVC. LIMITED UPPER ABDOMEN: Atherosclerotic disease abdominal aorta. Plaque at the origins of the celiac trunk and both renal arteries. Gas and fluid distended small bowel loops and colon. MUSCULOSKELETAL: Upper thoracic vertebroplasty.     CONCLUSION: 1.  Moderate size left pleural effusion with extensive airspace consolidation left lower lobe concerning for pneumonia. 2.  Trace right pleural effusion and right basilar atelectasis. 3.  Advanced atherosclerotic disease.     Mr Brain With Without Contrast    Result Date: 6/23/2019  EXAM: MR BRAIN WITHOUT/WITH CONTRAST LOCATION: [facility] DATE/TIME: 06/23/2019, 12:37 PM INDICATION: Encephalopathy. COMPARISON: 06/21/2019 head CT. CONTRAST: Gadavist 7.5 mL. TECHNIQUE: Routine multiplanar multisequence head MRI without and with intravenous contrast. FINDINGS: INTRACRANIAL CONTENTS: No finding for acute infarct, intracranial hemorrhage, or concerning abnormally enhancing mass. A small developmental venous anomaly in the right frontal lobe is essentially incidental. Minimal burden FLAIR hyperintense presumed chronic small vessel ischemic change. Mild diffuse intraparenchymal volume loss. Ventricles are nondilated. Major intracranial vascular flow-voids are preserved. Left vertebral artery dominance. Brainstem and cerebellum are unremarkable. Cerebellar tonsils are normally positioned. SELLA: No significant abnormality accounting for technique. BONES/SOFT TISSUES: No aggressive osseous lesion involving the calvarium, skull base, or visualized upper cervical spine. ORBITS: Prior bilateral cataract surgery. Visualized portions of the orbits are otherwise unremarkable. SINUSES/MASTOIDS: Mild mucosal thickening in the bilateral  maxillary sinuses, ethmoid air cells, and right sphenoid sinus. No significant middle ear or mastoid effusion.     CONCLUSION: 1.  Age-related changes with no acute intracranial abnormality. No finding for acute infarct, intracranial hemorrhage, or concerning abnormally enhancing mass. 2.  The small developmental venous anomaly in the anterior right frontal lobe is essentially incidental.     Ir Pleural Drainage With Catheter Insertion    Result Date: 7/2/2019  [facility] RADIOLOGY LOCATION: [facility] DATE: 7/2/2019 PROCEDURE: IMAGE GUIDED LEFT CHEST TUBE PLACEMENT INTERVENTIONAL RADIOLOGIST: Lázaro Butler MD. INDICATION: 74-year-old male with history of multiple myeloma, low white blood cell count and left pneumonia with para pneumonic effusion. CONSENT: The risks, benefits and alternatives of left chest tube placement were discussed with the patient  in detail. All questions were answered. Informed consent was given to proceed with the procedure. MODERATE SEDATION: Versed 1.5 mg IV; Fentanyl 75 mcg IV.  Under physician supervision, Versed and fentanyl were administered for moderate sedation. Pulse oximetry, heart rate and blood pressure were continuously monitored by an independent trained observer. The physician spent 15 minutes of face-to-face sedation time with the patient. CONTRAST: None ANTIBIOTICS: None. ADDITIONAL MEDICATIONS: None. FLUOROSCOPIC TIME: 0.9 minutes. RADIATION DOSE: Air Kerma: 9 mGy. COMPLICATIONS: No immediate complications. STERILE BARRIER TECHNIQUE: Maximum sterile barrier technique was used. Cutaneous antisepsis was performed at the operative site with application of 2% chlorhexidine and large sterile drape. Prior to the procedure, the  and assistant performed hand hygiene and wore hat, mask, sterile gown, and sterile gloves during the entire procedure. PROCEDURE:  The left posterior lateral chest was prepped and draped in usual sterile fashion. Under ultrasound guidance  using a Yueh needle the left pleural space was accessed. Pleural fluid was aspirated from the UA catheter to confirm good position within the left  pleural space. Through the UA catheter a Amplatz wire was advanced. Over the Amplatz wire dilatation of the subcutaneous tract a 12 [place] was performed. Over the wire a 12 nonlocking chest tube was placed. The chest tube was secured to the skin using 2-0 Prolene suture. FINDINGS: Ultrasound evaluation demonstrates moderate left pleural effusion.     Successful image guided placement of 12 [place]ian left chest tube. ____________________________________________________________________ CPT codes for physician reference only: 55019 [id number]     Us Thoracentesis    Result Date: 6/27/2019  EXAM: 1. LEFT THORACENTESIS 2. ULTRASOUND GUIDANCE LOCATION: [facility] DATE/TIME: 6/27/2019 3:47 PM INDICATION: Pleural effusion. PROCEDURE: Informed consent obtained. Time out performed. The chest was prepped and draped in sterile fashion. 10 mL of 1 % lidocaine was infused into the local soft tissues. Under direct ultrasound guidance, a 5 [place]ib[place]ian Yueh catheter system was placed into the pleural effusion. 1.1 liters of red or bloody fluid were removed and sent to lab, if requested. Patient tolerated procedure well. RADIOLOGIC SUPERVISION AND INTERPRETATION: Left pleural effusion seen. ULTRASOUND GUIDANCE: Images demonstrate the pleural effusion. Catheter seen in good position.     CONCLUSION: Status post left ultrasound-guided thoracentesis. Reference CPT Code: 85862        Signed by: Per Clifford MD

## 2021-05-30 NOTE — TELEPHONE ENCOUNTER
ANTICOAGULATION  MANAGEMENT    Assessment     Today's INR result of 2.3 is Therapeutic (goal INR of 2.0-3.0)        Warfarin taken as previously instructed    No new diet changes affecting INR    Interaction between Vancomycin and warfarin may be affecting INR- still on Vanco    Completed Doxycycline    Continues to tolerate warfarin with no reported s/s of bleeding or thromboembolism     Previous INR was Therapeutic    Plan:     Spoke with Theo regarding INR result and instructed:     Warfarin Dosing Instructions:  Continue current warfarin dose    2.5 mg every Sun, Tue; 5 mg all other days         Instructed patient to follow up no later than: 1 week. Appt is made for 7/23.     Education provided: importance of taking warfarin as instructed    Keith verbalizes understanding and agrees to warfarin dosing plan.    Instructed to call the [facility] for any changes, questions or concerns. (#[phone])   ?   Eduin Kaminski RN    Subjective/Objective:      Theo Meyers, a 74 y.o. male is on warfarin.     Theo reports:     Home warfarin dose: verbally confirmed home dose with pt and updated on anticoagulation calendar     Missed doses: No     Medication changes:  No     S/S of bleeding or thromboembolism:  No     New Injury or illness:  No     Changes in diet or alcohol consumption:  No     Upcoming surgery, procedure or cardioversion:  No    Anticoagulation Episode Summary     Current INR goal:   2.0-3.0   TTR:   69.6 % (1.3 y)   Next INR check:   7/23/2019   INR from last check:   2.30 (7/16/2019)   Weekly max warfarin dose:      Target end date:      INR check location:      Preferred lab:      Send INR reminders to:   DEWAYNE SUN    Indications    DVT (deep venous thrombosis) (H) [I82.409]           Comments:            Anticoagulation Care Providers     Provider Role Specialty Phone number    Mathew Ott MD Referring Internal Medicine [phone]

## 2021-05-30 NOTE — TELEPHONE ENCOUNTER
ANTICOAGULATION  MANAGEMENT    Assessment     Today's INR result of 2.03 is Therapeutic (goal INR of 2.0-3.0)        Missed dose(s) may be affecting INR     No new diet changes affecting INR    Potential interaction between doxycycline and warfarin which may affect subsequent INRs    Continues to tolerate warfarin with no reported s/s of bleeding or thromboembolism     Previous INR was Subtherapeutic    Plan:     Spoke with Theo regarding INR result and instructed:     Warfarin Dosing Instructions:  Continue current warfarin dose 2.5 mg daily on Sundays and Tuesdays; and 5 mg daily rest of week  (0 % change)  Stop Lovenox injections at this time. He did not want to decrease his warfarin dose at this time. He thinks his INR will go low. Did explained with new medications and hospital stay will effect this.    Instructed patient to follow up no later than: 7/16    Education provided: importance of therapeutic range, importance of following up for INR monitoring at instructed interval, importance of taking warfarin as instructed, monitoring for bleeding signs and symptoms and when to seek medical attention/emergency care    Keith verbalizes understanding and agrees to warfarin dosing plan.    Instructed to call the [facility] for any changes, questions or concerns. (#[phone])   ?   Gricelda Parker RN    Subjective/Objective:      Theo Meyers, a 74 y.o. male is on warfarin.     Theo reports:     Home warfarin dose: verbally confirmed home dose with Keith and updated on anticoagulation calendar     Missed doses: Yes: night he came home from hospital     Medication changes:  Yes: started doxycycline     S/S of bleeding or thromboembolism:  No     New Injury or illness:  Yes: Acute encephalopathy     Changes in diet or alcohol consumption:  Yes-not eating as well due to illness     Upcoming surgery, procedure or cardioversion:  No    Anticoagulation Episode Summary     Current INR goal:   2.0-3.0   TTR:    69.4 % (1.3 y)   Next INR check:   7/16/2019   INR from last check:   2.03 (7/12/2019)   Weekly max warfarin dose:      Target end date:      INR check location:      Preferred lab:      Send INR reminders to:   DEWAYNE SUN    Indications    DVT (deep venous thrombosis) (H) [I82.409]           Comments:            Anticoagulation Care Providers     Provider Role Specialty Phone number    Mathew Ott MD Referring Internal Medicine [phone]

## 2021-05-30 NOTE — TELEPHONE ENCOUNTER
ANTICOAGULATION  MANAGEMENT    Assessment     Today's INR result of 2.5 is Therapeutic (goal INR of 2.0-3.0)        Warfarin taken as previously instructed    No new diet changes affecting INR    should finish Vanco on 7/25    Continues to tolerate warfarin with no reported s/s of bleeding or thromboembolism     Previous INR was Therapeutic    Plan:     Spoke with Theo regarding INR result and instructed:     Warfarin Dosing Instructions:  Continue current warfarin dose 2.5 mg daily on Sundays and Tuesdays; and 5 mg daily rest of week  (0 % change)    Instructed patient to follow up no later than: 1-2 weeks.    Education provided: importance of therapeutic range, importance of following up for INR monitoring at instructed interval and importance of taking warfarin as instructed    Keith verbalizes understanding and agrees to warfarin dosing plan.    Instructed to call the [facility] for any changes, questions or concerns. (#[phone])   ?   Gricelda Parker RN    Subjective/Objective:      Theo Meyers, a 74 y.o. male is on warfarin.     Theo reports:     Home warfarin dose: verbally confirmed home dose with Dve and updated on anticoagulation calendar     Missed doses: No     Medication changes:  No     S/S of bleeding or thromboembolism:  No     New Injury or illness:  No     Changes in diet or alcohol consumption:  No     Upcoming surgery, procedure or cardioversion:  No    Anticoagulation Episode Summary     Current INR goal:   2.0-3.0   TTR:   70.0 % (1.3 y)   Next INR check:   8/6/2019   INR from last check:   2.50 (7/23/2019)   Weekly max warfarin dose:      Target end date:      INR check location:      Preferred lab:      Send INR reminders to:   DEWAYNE SUN    Indications    DVT (deep venous thrombosis) (H) [I82.409]           Comments:            Anticoagulation Care Providers     Provider Role Specialty Phone number    Mathew Ott MD Referring Internal Medicine [phone]

## 2021-05-31 VITALS — HEIGHT: 69 IN | WEIGHT: 167 LBS | BODY MASS INDEX: 24.73 KG/M2

## 2021-05-31 VITALS — BODY MASS INDEX: 23.67 KG/M2 | WEIGHT: 158 LBS

## 2021-05-31 VITALS — BODY MASS INDEX: 25.21 KG/M2 | WEIGHT: 170.2 LBS | HEIGHT: 69 IN

## 2021-05-31 VITALS — WEIGHT: 169 LBS | BODY MASS INDEX: 25.32 KG/M2

## 2021-05-31 VITALS — BODY MASS INDEX: 25.22 KG/M2 | WEIGHT: 168.3 LBS

## 2021-05-31 VITALS — BODY MASS INDEX: 23.63 KG/M2 | WEIGHT: 160 LBS

## 2021-05-31 VITALS — HEIGHT: 69 IN | WEIGHT: 166 LBS | BODY MASS INDEX: 24.59 KG/M2

## 2021-05-31 VITALS — HEIGHT: 69 IN | BODY MASS INDEX: 25.33 KG/M2 | WEIGHT: 171 LBS

## 2021-05-31 VITALS — BODY MASS INDEX: 25.2 KG/M2 | WEIGHT: 168.21 LBS

## 2021-05-31 VITALS — WEIGHT: 169.8 LBS | BODY MASS INDEX: 25.08 KG/M2

## 2021-05-31 VITALS — WEIGHT: 169.3 LBS | BODY MASS INDEX: 25.07 KG/M2 | HEIGHT: 69 IN

## 2021-05-31 VITALS — WEIGHT: 162.4 LBS | BODY MASS INDEX: 24.33 KG/M2

## 2021-05-31 VITALS — BODY MASS INDEX: 24.14 KG/M2 | WEIGHT: 163.5 LBS

## 2021-05-31 NOTE — TELEPHONE ENCOUNTER
Patient notified of clinician's message and verbalized understanding. No further questions at this time.   Rachael Dc CMA ............... 5:20 PM, 08/05/19

## 2021-05-31 NOTE — TELEPHONE ENCOUNTER
Medication Question or Clarification  Who is calling: Patient  What medication are you calling about? (include dose and sig)    Disp Refills Start End    diltiazem (CARDIZEM CD) 120 MG 24 hr capsule 30 capsule 0 7/10/2019 8/9/2019    Sig - Route: Take 1 capsule (120 mg total) by mouth daily. - Oral    Sent to pharmacy as: diltiazem (CARDIZEM CD) 120 MG 24 hr capsule    E-Prescribing Status: Receipt confirmed by pharmacy (7/9/2019  3:36 PM CDT)        Who prescribed the medication?: Demetrius Piedra MBBS  What is your question/concern?: Patient stated he was given this medication while in the hospital.  Patient stated he will be finished with this medication by Saturday.      Patient is looking for clarification if he should continue taking this medication?  If so, will need a refill.  [facility]: [facility] ([place])  Okay to leave a detailed message?: Yes  Site CMT - Please call the pharmacy to obtain any additional needed information.

## 2021-05-31 NOTE — PATIENT INSTRUCTIONS - HE
It was a pleasure seeing you today     Your follow up chest x-ray showed that the fluid accumulated around your lungs continues to improve. There are no additional interventions needed for this, as it has resolved.     We can continue to see you as needed.      Emelia Winkler MD  Pulmonary and Critical Care Medicine  [facility]  Office: [phone]  Pager: [phone]

## 2021-05-31 NOTE — TELEPHONE ENCOUNTER
Patient does not have an appointment with me until October.  Patient can see me sooner than that, if available appointment and we can discuss diltiazem medication.  Otherwise, patient is stable from his appointment on July 10 he can continue on the diltiazem.    Patient was seen by Ed Mcgrath on July 10, with plan to continue diltiazem at that time.    I did refill the diltiazem.

## 2021-05-31 NOTE — TELEPHONE ENCOUNTER
Called Theo to let him know his CXR looks good, very little pleural fluid remains and the rest of the lungs look good. Follow up with Dr. Winkler as scheduled. Per Dr. Conley.

## 2021-05-31 NOTE — PROGRESS NOTES
Pt ambulates to infusion center for lab draw prior to NP visit.  IVAD accessed, labs drawn et sent.  Pt was seen by BRITTANI Valdez CNP today and orders were approved.  Treatment administered as ordered without difficulty.   IVAD flushed w/NS et heparin and needle deaccessed.  Pt left clinic stable to lobby.  Plan RTC as scheduled.

## 2021-05-31 NOTE — PROGRESS NOTES
[facility] Hematology and Oncology Progress Note    Patient: Theo Meyers  MRN: [id number]  Date of Service: 08/22/2019        Reason for Visit    Chief Complaint   Patient presents with     HE Cancer     Multiple myeloma not having achieved remission       Assessment and Plan    1.  Myeloma: Patient continues on his current treatment of Velcade and dexamethasone every other week along with Revlimid.  His labs have fluctuated.. His light chains and the ratio continue to be elevated. It is a little higher 2 weeks ago, likely due to being off in June with being in the hospital. If it goes up consistently, we may talk about switching to daratumumab and pomalyst. We will continue current regimen.  He will see Dr. Clifford per protocol    2. DVT: on coumadin. INR slightly subtherapeutic. He will likely need adjustment.  He will continue to follow with the Coumadin clinic who will call him later today    3. Pancytopenia: likekly from treatment with velcade and revlimid. Stable. No complications. No changes. Continue to monitor monthly.       ECOG Performance   ECOG Performance Status: 1     Distress Assessment  Distress Assessment Score: No distress    Pain  Currently in Pain: No/denies      Problem List    1. Multiple myeloma not having achieved remission (H)     2. Chemotherapy-induced neutropenia (H)     3. Chronic deep vein thrombosis (DVT) of left lower extremity, unspecified vein (H)        ______________________________________________________________________________    History of Present Illness    DIAGNOSIS:   1. IgG kappa light chain myeloma. Hyposecretory. Diagnosed 2009. No significant measurable M protein. 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 There was also on 11;14 translocation.   2. Deep vein thrombosis of the left leg diagnosed 09/2012 while on treatment.       TREATMENT:   1. Most recently he has been on Velcade every 2 weeks since 09/2012. He is getting dexamethasone 20 mg every other  week with the velcade. Finally, he is receiving Revlimid 20 mg daily for 3 weeks on, 1 week off.   2. Continues on warfarin      PAST TREATMENT and HISTORY:   He presented at diagnosis with a lytic lesion involving the C6 vertebral body, although no measurable M protein. IgG kappa monoclonal immunoglobulin was confirmed by [facility] as well as elevated kappa free light chains with an abnormal kappa lambda ratio. Bone marrow biopsy showed 30% involvement and cytogenetics confirmed deletion of 13 q. as well as 11;14 translocation. He was initially treated with Velcade and dexamethasone and achieved a good partial response. He was referred to the [facility] where he underwent high-dose chemotherapy with autologous peripheral stem cell transplant in April of 2010. He began Revlimid as maintenance therapy post transplant. Repeat bone marrow biopsy done October 2010 showed about 5% kappa restricted plasma cells and so at that time weekly Velcade was added along with his maintenance Revlimid and dexamethasone. He has done well since that time with stable minimal residual disease, best assessed by serum free light chains. He required dose reductions of weekly Velcade because of cytopenias and was ultimately switched to a q.2 week maintenance schedule which he has been on since September 2012. His Revlimid dose is 20 mg daily and he continues on dexamethasone 20 mg p.o. q. Week.       INTERIM HISTORY:    Pt is here today for follow up. He is doing pretty well. Gets tired easily since hospitalization, but very active. Breathing is pretty good.     Pain Status  Currently in Pain: No/denies    Review of Systems  Constitutional  Constitutional (WDL): All constitutional elements are within defined limits  Fatigue: No Concerns  Fever: No Concerns  Chills: No Concerns  Weight Gain: No Concerns  Weight Loss: No Concerns  Hot flashes/Night Sweats: No Concerns  Neurosensory  Neurosensory (WDL): Exceptions to WDL  Peripheral  Motor Neuropathy: Concerns  Ataxia: No Concerns  Peripheral Sensory Neuropathy: No Concerns  Confusion: No Concerns  Dizziness: Concerns(improving; not as noticeable with position changes)  Syncope: No Concerns  Eye   Eye Disorder (WDL): All eye disorder elements are within defined limits  Blurred Vision: No Concerns  Dry Eye: No Concerns  Eye Pain: No Concerns  Watering Eyes: No Concerns  Ear  Ear Disorder (WDL): Exceptions to WDL  Ear Pain: No Concerns  Tinnitus: Concerns(bilateral)  Cardiovascular  Cardiovascular (WDL): Exceptions to WDL  Palpitations: Concerns(taking diltiazem daily; will discuss need with PCP at next visit)  Edema: No Concerns  SVT, DVT/PE: No Concerns  Chest Pain - Cardiac: No Concerns  Pulmonary  Respiratory (WDL): Within Defined Limits  Cough: No Concerns  Dyspnea: No Concerns  Hypoxia: No Concerns  Gastrointestinal  Gastrointestinal (WDL): All gastrointestinal elements are within defined limits  Anorexia: No Concerns  Nausea: No Concerns  Vomiting: No Concerns  Dehydration: No Concerns  Dysgeusia: No Concerns  Dysphagia: No Concerns  Mucositis Oral: No Concerns  Esophagitis: No Concerns  Constipation: No Concerns(controlled with metamucil)  Diarrhea: No Concerns  Pharyngitis: No Concerns  Dry Mouth: No Concerns  Genitourinary  Genitourinary (WDL): All genitourinary elements are within defined limits  Urinary Frequency: No Concerns  Urinary Retention: No Concerns  Urinary Tract Pain: No Concerns  Lymphatic  Lymph (WDL): All lymph disorder elements are within defined limits  Lymphedema: No Concerns  Lymph Node Discomfort: No Concerns  Musculoskeletal and Connective Tissue  Musculoskeletal and Connetive Tissue Disorders (WDL): Exceptions to WDL  Arthralgia: No Concerns  Bone Pain: No Concerns  Muscle Weakness : Concerns  Myalgia: No Concerns  Integumentary  Integumentary (WDL): All integumentary elements are within defined limits  Alopecia: No Concerns  Rash Maculo-Papular: No  "Concerns  Pruritus: No Concerns  Urticaria: No Concerns  Palmar-Plantar Erythrodysesthesia Syndrome: No Concerns  Flushing: No Concerns  Patient Coping  Patient Coping: Accepting;Open/discussion  Accompanied by  Accompanied by: Alone  Oral Chemo Adherence         Past History  Past Medical History:   Diagnosis Date     Anemia 12/13/2017     Arthritis      Bilateral inguinal hernia      Glaucoma      Glaucoma      History of blood clots     DVT - left chronic     Multiple myeloma (H)     Gets chemo infusions every 2 weeks     Pancytopenia (H)      Peripheral neuropathy      Syncope      TMJ (temporomandibular joint disorder)        PHYSICAL EXAM:  BP 99/59   Pulse (!) 59   Temp 97.4  F (36.3  C) (Oral)   Ht 5' 10\" (1.778 m)   Wt 160 lb 8 oz (72.8 kg)   SpO2 98%   BMI 23.03 kg/m    GENERAL: no acute distress. Cooperative in conversation. Here alone  HEENT: pupils are equal, round and reactive. Oromucosa is clean and intact. No ulcerations or mucositis noted. No bleeding noted.  RESP: lungs are clear bilaterally per auscultation. Regular respiratory rate. No wheezes or rhonchi.  CV: Regular, rate and rhythm. No murmurs.  ABD: soft, nontender. Positive bowel sounds. No organomegaly.   MUSCULOSKELETAL: No lower extremity swelling.   NEURO: non focal. Alert and oriented x3.   PSYCH: within normal limits. No depression or anxiety.  SKIN: warm dry intact   LYMPH: no cervical, supraclavicular or axillary lymphadenopathy        Lab Results    Lab Standing Order on 08/22/2019   Component Date Value Ref Range Status     Sodium 08/22/2019 141  136 - 145 mmol/L Final     Potassium 08/22/2019 4.3  3.5 - 5.0 mmol/L Final     Chloride 08/22/2019 110* 98 - 107 mmol/L Final     CO2 08/22/2019 27  22 - 31 mmol/L Final     Anion Gap, Calculation 08/22/2019 4* 5 - 18 mmol/L Final     Glucose 08/22/2019 109  70 - 125 mg/dL Final     BUN 08/22/2019 24  8 - 28 mg/dL Final     Creatinine 08/22/2019 0.96  0.70 - 1.30 mg/dL Final     GFR " MDRD Af Amer 08/22/2019 >60  >60 mL/min/1.73m2 Final     GFR MDRD Non Af Amer 08/22/2019 >60  >60 mL/min/1.73m2 Final     Bilirubin, Total 08/22/2019 0.4  0.0 - 1.0 mg/dL Final     Calcium 08/22/2019 9.1  8.5 - 10.5 mg/dL Final     Protein, Total 08/22/2019 5.1* 6.0 - 8.0 g/dL Final     Albumin 08/22/2019 3.0* 3.5 - 5.0 g/dL Final     Alkaline Phosphatase 08/22/2019 82  45 - 120 U/L Final     AST 08/22/2019 13  0 - 40 U/L Final     ALT 08/22/2019 16  0 - 45 U/L Final     INR 08/22/2019 1.64* 0.90 - 1.10 Final     WBC 08/22/2019 2.4* 4.0 - 11.0 thou/uL Preliminary     RBC 08/22/2019 2.35* 4.40 - 6.20 mill/uL Preliminary     Hemoglobin 08/22/2019 8.2* 14.0 - 18.0 g/dL Preliminary     Hematocrit 08/22/2019 25.8* 40.0 - 54.0 % Preliminary     MCV 08/22/2019 110* 80 - 100 fL Preliminary     MCH 08/22/2019 34.9* 27.0 - 34.0 pg Preliminary     MCHC 08/22/2019 31.8* 32.0 - 36.0 g/dL Preliminary     RDW 08/22/2019 18.1* 11.0 - 14.5 % Preliminary     Platelets 08/22/2019 137* 140 - 440 thou/uL Preliminary     MPV 08/22/2019 11.6  8.5 - 12.5 fL Preliminary     Lab Results   Component Value Date    KFLC 55.25 (H) 05/01/2019    KFLC 59.00 (H) 03/06/2019    KFLC 51.00 (H) 01/09/2019    KFLC 60.25 (H) 11/14/2018    KFLC 42.25 (H) 09/19/2018    KFLC 47.80 (H) 07/25/2018    KFLC 41.00 (H) 07/03/2018    KFLC 47.00 (H) 06/06/2018    KFLC 55.50 (H) 04/04/2018    KFLC 36.25 (H) 02/14/2018     Lab Results   Component Value Date    KLR 51.64 (H) 05/01/2019    KLR 35.98 (H) 03/06/2019    KLR 41.46 (H) 01/09/2019    KLR 95.63 (H) 11/14/2018    KLR 36.74 (H) 09/19/2018    KLR 54.32 (H) 07/25/2018    KLR 50.00 (H) 07/03/2018    KLR 62.67 (H) 06/06/2018    KLR 39.08 (H) 04/04/2018    KLR 33.88 (H) 02/14/2018   ]    Imaging    Xr Chest 2 Views    Result Date: 8/8/2019  EXAM: XR CHEST 2 VIEWS LOCATION: [facility] DATE/TIME: 8/8/2019 8:54 AM INDICATION: follow up left pleural effusion and pneumonia COMPARISON: 7/7/2019 and older studies.  Chest CT 7/5/2019 FINDINGS: Right IJ Port-A-Cath. Left basilar chest tube has been removed. There is blunting of the left costophrenic angle extending superiorly and laterally consistent with a trace loculated effusion or pleural thickening. Clearing of most of the parenchymal opacities in the left base with residual areas of fibroatelectasis. Minimal scarring laterally in the left midlung. Right lung is clear. Heart and pulmonary vascularity are normal. Prior mid thoracic vertebroplasty.        Signed by: Alejandra Valdez, JESICA

## 2021-05-31 NOTE — PROGRESS NOTES
Pt ambulates to infusion center for lab draw and treatment.  IVAD accessed, labs drawn et sent.  IVAD flushed w/NS et heparin and needle deaccessed.  Velcade given as ordered.  Pt left clinic stable to lobby.  Plan RTC as scheduled.

## 2021-05-31 NOTE — PROGRESS NOTES
Recently it was noted that [facility] did not have a nurse navigator.  I met with him in clinic today and introduced myself.  I explained my role and gave him my contact information.  He appreciated the visit.  He has many family and friends to assist him.  No needs identified at this time.

## 2021-05-31 NOTE — TELEPHONE ENCOUNTER
Refill Approved    Rx renewed per Medication Renewal Policy. Medication was last renewed on 7/25/18.    Latonya Garcia, [facility] Triage/Med Refill 8/26/2019     Requested Prescriptions   Pending Prescriptions Disp Refills     omeprazole (PRILOSEC) 20 MG capsule [Pharmacy Med Name: OMEPRAZOLE 20MG CAPSULES] 90 capsule 0     Sig: TAKE 1 CAPSULE BY MOUTH ONCE DAILY       GI Medications Refill Protocol Passed - 8/26/2019  7:17 AM        Passed - PCP or prescribing provider visit in last 12 or next 3 months.     Last office visit with prescriber/PCP: 5/23/2019 Mathew Ott MD OR same dept: 7/10/2019 Percy Mcgrath CNP OR same specialty: 7/10/2019 Percy Mcgrath CNP  Last physical: 3/28/2018 Last MTM visit: Visit date not found   Next visit within 3 mo: Visit date not found  Next physical within 3 mo: Visit date not found  Prescriber OR PCP: Mathew Ott MD  Last diagnosis associated with med order: 1. Adenomatous duodenal polyp  - omeprazole (PRILOSEC) 20 MG capsule [Pharmacy Med Name: OMEPRAZOLE 20MG CAPSULES]; TAKE 1 CAPSULE BY MOUTH ONCE DAILY  Dispense: 90 capsule; Refill: 0    If protocol passes may refill for 12 months if within 3 months of last provider visit (or a total of 15 months).

## 2021-05-31 NOTE — PROGRESS NOTES
Pulmonary Clinic Follow-up Visit    Assessment & Plan:  73 yo M with leukopenia from multiple myeloma who presented to [facility] in July 2019 with pneumonia and effusion s/p thoracentesis that initially was parapneumonic,  with increased loculation and size of effusion concerning for complicated parapneumonic versus empyema given ongoing fevers underwent pigtail catheter placement and tPA/DNase protocol.    Presents today for follow-up. CXR looks good - trace residual left effusion. Clinically no new symptoms and is doing well overall.    PLAN:    No specific need to ongoing radiographic surveillance of parapneumonic effusion - resolved      He can follow-up on an as needed basis.       Emelia Winkler MD  Pulmonary and Critical Care Medicine  [facility]  Office: [phone]  Pager: [phone]    ----------------------------    CCx: Follow-up pleural effusion    HPI:   73 yo M with leukopenia from multiple myeloma who presented to [facility] in July 2019 with pneumonia and effusion s/p thoracentesis that initially was parapneumonic, with increased loculation and size of effusion concerning for complicated parapneumonic versus empyema given ongoing fevers underwent pigtail catheter placement and tPA/DNase protocol. He completed 14 days of doxycycline. Cultures were all negative.    Since discharge last month he has been doing well. He has no respiratory complaints. No cough, fevers, chills, or dyspnea. Walks 2 miles per day with his dogs without difficulty. Follow-up CXR was reviewed.      ROS:  A 12-system review was obtained and was negative with the exception of the symptoms endorsed in the history of present illness.    PMH:  Past Medical History:   Diagnosis Date     Anemia 12/13/2017     Arthritis      Bilateral inguinal hernia      Glaucoma      Glaucoma      History of blood clots     DVT - left chronic     Multiple myeloma (H)     Gets chemo infusions every 2 weeks     Pancytopenia (H)       "Peripheral neuropathy      Syncope      TMJ (temporomandibular joint disorder)      Social Hx:  Tobacco: 4 pack years, quit ~50 years ago    Physical Exam:  /72   Pulse 62   Resp 18   Ht 5' 10\" (1.778 m)   Wt 162 lb (73.5 kg)   SpO2 98% Comment: RA  BMI 23.24 kg/m    Gen: Alert, oriented, no distress  HEENT: nasal turbinates are unremarkable, no oropharyngeal lesions, no cervical or supraclavicular lymphadenopathy  CV: RRR, no M/G/R  Resp: CTAB, no focal crackles or wheezes  Abd: soft, nontender, no palpable organomegaly  Skin: no apparent rashes  Ext: no cyanosis, clubbing or edema  Neuro: alert, nonfocal    Labs:  Reviewed  Cultures reviewed - no pos cultures on pleural fluid  Negative cytology    Imaging studies:  Personally reviewed:    8/8/19 CXR:  FINDINGS: Right IJ Port-A-Cath. Left basilar chest tube has been removed. There is blunting of the left costophrenic angle extending superiorly and laterally consistent with a trace loculated effusion or pleural thickening. Clearing of most of the parenchymal opacities in the left base with residual areas of fibroatelectasis. Minimal scarring laterally in the left midlung.     Right lung is clear. Heart and pulmonary vascularity are normal. Prior mid thoracic vertebroplasty.      PFT's  None        "

## 2021-05-31 NOTE — TELEPHONE ENCOUNTER
Order pended. Should patient still continue this medication?  Rachael Dc CMA ............... 4:23 PM, 08/05/19

## 2021-05-31 NOTE — TELEPHONE ENCOUNTER
ANTICOAGULATION  MANAGEMENT    Assessment     Today's INR result of 1.64 is Subtherapeutic (goal INR of 2.0-3.0)        Warfarin taken as previously instructed    No new diet changes affecting INR    No new medication/supplements affecting INR    Continues to tolerate warfarin with no reported s/s of bleeding or thromboembolism     Previous INR was Subtherapeutic    Plan:     Spoke with Theo regarding INR result and instructed:     Warfarin Dosing Instructions:  Take 7.5 mg today then change warfarin dose to 5 mg daily. (16.7 % change) Hh preferred to increase dose to 5 mg daily. Say he used to be on that dose for a long time.     Instructed patient to follow up no later than: 1-2 weeks.    Education provided: importance of therapeutic range, target INR goal and significance of current INR result, importance of following up for INR monitoring at instructed interval, importance of taking warfarin as instructed, monitoring for clotting signs and symptoms and when to seek medical attention/emergency care    Keith verbalizes understanding and agrees to warfarin dosing plan.    Instructed to call the [facility] for any changes, questions or concerns. (#[phone])   ?   Gricelda Parker RN    Subjective/Objective:      BertBLU Meyers, a 74 y.o. male is on warfarin.     Theo reports:     Home warfarin dose: verbally confirmed home dose with Keith and updated on anticoagulation calendar     Missed doses: No     Medication changes:  No     S/S of bleeding or thromboembolism:  No     New Injury or illness:  No     Changes in diet or alcohol consumption:  No     Upcoming surgery, procedure or cardioversion:  No    Anticoagulation Episode Summary     Current INR goal:   2.0-3.0   TTR:   66.3 % (1.4 y)   Next INR check:   9/5/2019   INR from last check:   1.64! (8/22/2019)   Weekly max warfarin dose:      Target end date:      INR check location:      Preferred lab:      Send INR reminders to:   DEWAYNE SUN     Indications    DVT (deep venous thrombosis) (H) [I82.409]           Comments:            Anticoagulation Care Providers     Provider Role Specialty Phone number    Mathew Ott MD Referring Internal Medicine [phone]

## 2021-06-01 ENCOUNTER — RECORDS - HEALTHEAST (OUTPATIENT)
Dept: ADMINISTRATIVE | Facility: CLINIC | Age: 76
End: 2021-06-01

## 2021-06-01 VITALS — BODY MASS INDEX: 23.48 KG/M2 | WEIGHT: 159 LBS

## 2021-06-01 VITALS — BODY MASS INDEX: 24.57 KG/M2 | WEIGHT: 166.4 LBS

## 2021-06-01 VITALS — WEIGHT: 165 LBS | HEIGHT: 69 IN | BODY MASS INDEX: 24.44 KG/M2

## 2021-06-01 VITALS — HEIGHT: 69 IN | WEIGHT: 166 LBS | BODY MASS INDEX: 24.59 KG/M2

## 2021-06-01 VITALS — BODY MASS INDEX: 24.35 KG/M2 | HEIGHT: 69 IN | WEIGHT: 164.4 LBS

## 2021-06-01 VITALS — WEIGHT: 169 LBS | BODY MASS INDEX: 24.96 KG/M2

## 2021-06-01 VITALS — BODY MASS INDEX: 23.47 KG/M2 | WEIGHT: 158.95 LBS

## 2021-06-01 VITALS — HEIGHT: 69 IN | BODY MASS INDEX: 25.03 KG/M2 | WEIGHT: 169 LBS

## 2021-06-01 VITALS — BODY MASS INDEX: 25.02 KG/M2 | WEIGHT: 169.4 LBS

## 2021-06-01 VITALS — BODY MASS INDEX: 23.85 KG/M2 | HEIGHT: 69 IN | WEIGHT: 161 LBS

## 2021-06-01 VITALS — BODY MASS INDEX: 24.51 KG/M2 | WEIGHT: 166 LBS

## 2021-06-01 VITALS — BODY MASS INDEX: 24.63 KG/M2 | WEIGHT: 166.8 LBS

## 2021-06-01 NOTE — PROGRESS NOTES
[facility] Hematology and Oncology Progress Note    Patient: Theo Meyers  MRN: [id number]  Date of Service: September 18, 2019      Assessment and Plan:    1. Kappa light chain myeloma: Labs were reviewed.  Overall he looks stable.  Clinically he is not having any evidence of progressive disease.  No new symptoms.  He is immunofixation from a week ago was negative.  Light chain ratio has decreased as has the total kappa free light chain in the serum.  As such, we will continue on his current treatment plan.  Continue Revlimid 21 days on and 7 days.  Velcade every 2 weeks.  If there is clinical suspicion of progression then we will need to confirm with another bone marrow. We will see him in clinic roughly every 2 months.      2. Thromboprophylaxis: He is now back on Coumadin.  Prefers this over rivaroxaban.    3.  Pancytopenia: Generally stable.  Continuing B12 injections monthly.     4.  Parapneumonic effusion: He had a chest tube for a while this is not out.  Has some dyspnea on exertion but most likely from deconditioning.  I told him this will likely slowly improve with his activity level.    5.  C. difficile colitis: He is on an oral vancomycin taper.    ECOG Performance   ECOG Performance Status: 1    Distress Assessment  Distress Assessment Score: 1    Pain  Currently in Pain: No/denies    Diagnosis:    1.  IgG kappa light chain myeloma. Hyposecretory. Diagnosed 2009. No significant measurable M protein. 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 There was also on 11;14 translocation.  Past immunofixations from 2011 show IgG-kappa.  Bone marrow biopsy at 5%.  Residual kappa light chain myeloma involving 20% of nucleated cells.  No significant change from November 2016 marrow cellularity.    2.  Deep vein thrombosis of the left leg diagnosed 9/2012 while on treatment.    3.  Vitamin B12 deficiency diagnosed in September 2017.    4.  GI bleed and anemia requiring hospitalization in December  2017.    Treatment:    He presented with a lytic lesion involving the C6 vertebral body, although no measurable M protein. IgG kappa monoclonal immunoglobulin was confirmed by [facility] as well as elevated kappa free light chains with an abnormal kappa lambda ratio. Bone marrow biopsy showed 30% involvement and cytogenetics confirmed deletion of 13 q. as well as 11;14 translocation. He was initially treated with Velcade and dexamethasone and achieved a good partial response.     He was referred to the [facility] where he underwent high-dose chemotherapy with autologous peripheral stem cell transplant in April of 2010. He began Revlimid as maintenance therapy post transplant. Repeat bone marrow biopsy done October 2010 showed about 5% kappa restricted plasma cells and so at that time weekly Velcade was added along with his maintenance Revlimid and dexamethasone.   He has done well since that time with stable minimal residual disease, best assessed by serum free light chains. He required dose reductions of weekly Velcade because of cytopenias and was ultimately switched to a q 2 week maintenance schedule which he has been on since September 2012.     His Revlimid dose was reduced to 15 mg daily and he continues on dexamethasone 20 mg p.o. weekly  Revlimid dosing changed to 20 mg, 3 weeks on 1 week (instead of 15 mg daily) for cost reasons.  Started March, 2018    Interim History:    Theo returns today for follow-up visit.  Generally doing okay.  No acute complaints today.  No worsening pain anywhere.  No nausea or vomiting.  No fevers or rash.  Bowel habits are regular.    Review of Systems:    As above in the history.     Review of Systems otherwise Negative for:  General: chills, fever or night sweats  Psychological: anxiety or depression  Ophthalmic: blurry vision, double vision or loss of vision, vision change  ENT: epistaxis, oral lesions, hearing changes  Hematological and Lymphatic: bleeding, bruising,  jaundice, swollen lymph nodes  Endocrine: hot flashes, unexpected weight changes  Respiratory: cough, hemoptysis, orthopnea  Cardiovascular: chest pain, edema, palpitations or PND  Gastrointestinal: abdominal pain, blood in stools, change in bowel habits, constipation, diarrhea or nausea/vomiting  Genito-Urinary: change in urinary stream, incontinence, frequency/urgency  Musculoskeletal: joint pain, stiffness, swelling, muscle pain  Neurological: dizziness, headaches, numbness/tingling  Dermatological: lumps and rash    ECOG performance status is 1    Patient Coping  Patient Coping: Accepting  Accompanied by  Accompanied by: Alone    Past History:    Past Medical History:   Diagnosis Date     Anemia 12/13/2017     Arthritis      Bilateral inguinal hernia      Glaucoma      Glaucoma      History of blood clots     DVT - left chronic     Multiple myeloma (H)     Gets chemo infusions every 2 weeks     Pancytopenia (H)      Peripheral neuropathy      Syncope      TMJ (temporomandibular joint disorder)      Physical Exam:    Recent Vitals 9/18/2019   Height -   Weight 164 lbs 8 oz   BSA (m2) 1.92 m2   /59   Pulse 67   Temp 97.6   Temp src 1   SpO2 100   Some recent data might be hidden     General: patient appears stated age of 74 y.o.. Nontoxic and in no distress.   HEENT: Head: atraumatic, normocephalic. Sclerae anicteric.  Chest:  Normal respiratory effort.   Cardiac:  No edema.  Regular rate and rhythm.  Abdomen: abdomen is soft, non-distended  Extremities: normal tone and muscle bulk.   Skin: no lesions or rash. Warm and dry.   CNS: alert and oriented. Grossly non-focal.   Psychiatric: normal mood and affect.     Lab Results:    Recent Results (from the past 240 hour(s))   INR (add-ons/treatment plan)    Collection Time: 09/11/19 11:16 AM   Result Value Ref Range    INR 3.04 (H) 0.90 - 1.10   Immunofixation Electrophoresis, Serum (add-ons/treatment plan)    Collection Time: 09/11/19 11:16 AM   Result Value Ref  Range    Immunofixation Electrophoresis, Serum Unremarkable immunofixation electrophoresis.         Path ICD: C90.01     Interpreted By: Lan Perez MD    Immunoglobulins, Quantitative    Collection Time: 09/11/19 11:16 AM   Result Value Ref Range    Immunoglobulin G 339 (L) 700-1,700 mg/dL    Immunoglobulin M 19 (L) 60 - 280 mg/dL    Immunoglobulin A 19 (L) 65 - 400 mg/dL   Immunoglobulin Free Light Chains, Serum    Collection Time: 09/11/19 11:16 AM   Result Value Ref Range    Kappa Free Lt Chain 40.18 (H) 0.33 - 1.94 mg/dL    Lambda Free Lt Chain 1.03 0.57 - 2.63 mg/dL    Kappa Lambda Ratio 39.01 (H) 0.26 - 1.65   Electrophoresis, Protein, Serum    Collection Time: 09/11/19 11:16 AM   Result Value Ref Range    Albumin % 67.2 (H) 51.0 - 67.0 %    Albumin  3.2 3.2 - 4.7 g/dL    Alpha 1 % 3.2 2.0 - 4.0 %    Alpha 1 0.2 0.1 - 0.3 g/dL    Alpha 2 % 12.2 5.0 - 13.0 %    Alpha 2 0.6 0.4 - 0.9 g/dL    % Beta 10.8 10.0 - 17.0 %    Beta 0.5 (L) 0.7 - 1.2 g/dL    Gamma Globulin % 6.6 (L) 9.0 - 20.0 %    Gamma Globulin 0.3 (L) 0.6 - 1.4 g/dL    ELP Comment       Decreased beta globulin fraction, which can be seen in kidney disease, coagulopathies, and malnutrition, among other etiologies.    The gamma globulin fraction is decreased. Decreases in gamma globulin occur most frequently in association with protracted chronic inflammatory conditions or certain lymphoproliferative disorders.      Protein, Total 4.8 (L) 6.0 - 8.0 g/dL    Path ICD: C90.01     Interpreted By: Lan Perez MD    HM1 (CBC with Diff)    Collection Time: 09/11/19 11:26 AM   Result Value Ref Range    WBC 1.0 (LL) 4.0 - 11.0 thou/uL    RBC 2.63 (L) 4.40 - 6.20 mill/uL    Hemoglobin 9.4 (L) 14.0 - 18.0 g/dL    Hematocrit 29.4 (L) 40.0 - 54.0 %     (H) 80 - 100 fL    MCH 35.7 (H) 27.0 - 34.0 pg    MCHC 32.0 32.0 - 36.0 g/dL    RDW 16.9 (H) 11.0 - 14.5 %    Platelets 66 (L) 140 - 440 thou/uL    MPV 12.5 8.5 - 12.5 fL   Manual Differential     Collection Time: 09/11/19 11:26 AM   Result Value Ref Range    Total Neutrophils % 22 (L) 50 - 70 %    Lymphocytes % 28 20 - 40 %    Monocytes % 22 (H) 2 - 10 %    Eosinophils %  28 (H) 0 - 6 %    Basophils % 0 0 - 2 %    Total Neutrophils Absolute 0.2 (L) 2.0 - 7.7 thou/ul    Lymphocytes Absolute 0.3 (L) 0.8 - 4.4 thou/uL    Monocytes Absolute 0.2 0.0 - 0.9 thou/uL    Eosinophils Absolute 0.3 0.0 - 0.4 thou/uL    Basophils Absolute 0.0 0.0 - 0.2 thou/uL    Manual nRBC per 100 Cells 0 0-<1    Platelet Estimate Decreased (!) Normal    Ovalocytes 2+ (!) Negative    Tear Drop Cells 1+ (!) Negative   INR    Collection Time: 09/18/19  8:04 AM   Result Value Ref Range    INR 2.28 (H) 0.90 - 1.10   HM1 (CBC with Diff)    Collection Time: 09/18/19  8:04 AM   Result Value Ref Range    WBC 1.4 (LL) 4.0 - 11.0 thou/uL    RBC 2.66 (L) 4.40 - 6.20 mill/uL    Hemoglobin 9.4 (L) 14.0 - 18.0 g/dL    Hematocrit 29.4 (L) 40.0 - 54.0 %     (H) 80 - 100 fL    MCH 35.3 (H) 27.0 - 34.0 pg    MCHC 32.0 32.0 - 36.0 g/dL    RDW 16.0 (H) 11.0 - 14.5 %    Platelets 131 (L) 140 - 440 thou/uL    MPV 10.8 8.5 - 12.5 fL     Imaging:    No results found.       Signed by: Per Clifford MD

## 2021-06-01 NOTE — PROGRESS NOTES
Pt ambulates to infusion center for lab and treatment.  Lab drawn peripherally et sent.  Pt voices no new concerns today.  Velcade given SQ as ordered without difficulty.  Pt left clinic stable to lobby. Plan RTC as scheduled.

## 2021-06-01 NOTE — PROGRESS NOTES
Pt ambulates to infusion center for lab draw prior to MD visit.  IVAD accessed, labs drawn et sent.  Pt was seen by Dr. Clifford and orders were approved.  Treatment administered as ordered without difficulty.  IVAD flushed w/NS et heparin and needle deaccessed.  Pt left clinic stable to [facility].  Plan RTC as scheduled.

## 2021-06-01 NOTE — TELEPHONE ENCOUNTER
Who is calling:  Patient  Reason for Call:  Patient returning phone call from [facility].  Date of last appointment with primary care: none  Okay to leave a detailed message: Yes

## 2021-06-01 NOTE — TELEPHONE ENCOUNTER
ANTICOAGULATION  MANAGEMENT    Assessment     Today's INR result of 2.28 is Therapeutic (goal INR of 2.0-3.0)        Warfarin taken as previously instructed    No new diet changes affecting INR    No new medication/supplements affecting INR    Continues to tolerate warfarin with no reported s/s of bleeding or thromboembolism     Previous INR was Supratherapeutic    Plan:     Spoke with Theo regarding INR result and instructed:     Warfarin Dosing Instructions:  Continue current warfarin dose 2.5 mg daily on Saturdays; and 5 mg daily rest of week  (0 % change)    Instructed patient to follow up no later than: two weeks.    Education provided: importance of therapeutic range, importance of following up for INR monitoring at instructed interval and importance of taking warfarin as instructed    Keith verbalizes understanding and agrees to warfarin dosing plan.    Instructed to call the [facility] for any changes, questions or concerns. (#[phone])   ?   Gricelda Parker RN    Subjective/Objective:      Theo Meyers, a 74 y.o. male is on warfarin.     Theo reports:     Home warfarin dose: verbally confirmed home dose with Keith and updated on anticoagulation calendar     Missed doses: No     Medication changes:  No     S/S of bleeding or thromboembolism:  No     New Injury or illness:  No     Changes in diet or alcohol consumption:  No     Upcoming surgery, procedure or cardioversion:  No    Anticoagulation Episode Summary     Current INR goal:   2.0-3.0   TTR:   65.8 %   Next INR check:   10/2/2019   INR from last check:   2.28 (9/18/2019)   Weekly max warfarin dose:      Target end date:      INR check location:      Preferred lab:      Send INR reminders to:   DEWAYNE SUN    Indications    DVT (deep venous thrombosis) (H) [I82.409]           Comments:            Anticoagulation Care Providers     Provider Role Specialty Phone number    Mathew Ott MD Referring Internal Medicine [phone]

## 2021-06-01 NOTE — TELEPHONE ENCOUNTER
ANTICOAGULATION  MANAGEMENT    Assessment     Today's INR result of 3.04 is Supratherapeutic (goal INR of 2.0-3.0)        Warfarin taken as previously instructed    No new diet changes affecting INR    No new medication/supplements affecting INR    Continues to tolerate warfarin with no reported s/s of bleeding or thromboembolism     Previous INR was Therapeutic    Plan:     Spoke with Theo regarding INR result and instructed:     Warfarin Dosing Instructions:  Continue current warfarin dose 2.5 mg daily on Saturdays; and 5 mg daily rest of week  (0 % change)He preferred to continue dose and recheck next week to see if continues to elevate.     Instructed patient to follow up no later than: one week.    Education provided: importance of consistent vitamin K intake, impact of vitamin K foods on INR, importance of therapeutic range, importance of following up for INR monitoring at instructed interval and importance of taking warfarin as instructed    Keith verbalizes understanding and agrees to warfarin dosing plan.    Instructed to call the [facility] for any changes, questions or concerns. (#[phone])   ?   Gricelda Parker RN    Subjective/Objective:      Theo Meyers, a 74 y.o. male is on warfarin.     Theo reports:     Home warfarin dose: verbally confirmed home dose with Keith and updated on anticoagulation calendar     Missed doses: No     Medication changes:  No     S/S of bleeding or thromboembolism:  No     New Injury or illness:  No     Changes in diet or alcohol consumption:  No     Upcoming surgery, procedure or cardioversion:  No    Anticoagulation Episode Summary     Current INR goal:   2.0-3.0   TTR:   66.0 %   Next INR check:   9/18/2019   INR from last check:   3.04! (9/11/2019)   Weekly max warfarin dose:      Target end date:      INR check location:      Preferred lab:      Send INR reminders to:   DEWAYNE SUN    Indications    DVT (deep venous thrombosis) (H) [I82.409]            Comments:            Anticoagulation Care Providers     Provider Role Specialty Phone number    Mathew Ott MD Referring Internal Medicine [phone]

## 2021-06-01 NOTE — TELEPHONE ENCOUNTER
ANTICOAGULATION  MANAGEMENT    Assessment     Today's INR result of 2.77 is Therapeutic (goal INR of 2.0-3.0)        Less warfarin taken than instructed which may be affecting INR    No new diet changes affecting INR    No new medication/supplements affecting INR    Continues to tolerate warfarin with no reported s/s of bleeding or thromboembolism     Previous INR was Subtherapeutic    Plan:     Spoke with Theo regarding INR result and instructed:     Warfarin Dosing Instructions:  Continue current warfarin dose 2.5 mg daily on Saturdays; and 5 mg daily rest of week  (0 % change)    Instructed patient to follow up no later than: 1-2 weeks.    Education provided: importance of therapeutic range, importance of following up for INR monitoring at instructed interval and importance of taking warfarin as instructed    Keith verbalizes understanding and agrees to warfarin dosing plan.    Instructed to call the [facility] for any changes, questions or concerns. (#[phone])   ?   Gricelda Parker RN    Subjective/Objective:      Theo Meyers, a 74 y.o. male is on warfarin.     Theo reports:     Home warfarin dose: verbally confirmed home dose with Keith and updated on anticoagulation calendar     Missed doses: No     Medication changes:  No     S/S of bleeding or thromboembolism:  No     New Injury or illness:  No     Changes in diet or alcohol consumption:  No     Upcoming surgery, procedure or cardioversion:  No    Anticoagulation Episode Summary     Current INR goal:   2.0-3.0   TTR:   66.2 %   Next INR check:   9/18/2019   INR from last check:   2.77 (9/4/2019)   Weekly max warfarin dose:      Target end date:      INR check location:      Preferred lab:      Send INR reminders to:   DEWAYNE SUN    Indications    DVT (deep venous thrombosis) (H) [I82.409]           Comments:            Anticoagulation Care Providers     Provider Role Specialty Phone number    Mathew Ott MD Referring Internal  Medicine [phone]

## 2021-06-01 NOTE — PROGRESS NOTES
Patient is here for labs, provider visit and treatment of Multiple myeloma not having achieved remission.

## 2021-06-02 VITALS — BODY MASS INDEX: 24.41 KG/M2 | WEIGHT: 165.3 LBS

## 2021-06-02 VITALS — WEIGHT: 170 LBS | BODY MASS INDEX: 25.1 KG/M2

## 2021-06-02 VITALS — HEIGHT: 69 IN | BODY MASS INDEX: 24.73 KG/M2 | WEIGHT: 167 LBS

## 2021-06-02 VITALS — WEIGHT: 159 LBS | BODY MASS INDEX: 23.48 KG/M2

## 2021-06-02 VITALS — WEIGHT: 167 LBS | BODY MASS INDEX: 24.66 KG/M2

## 2021-06-02 VITALS — BODY MASS INDEX: 25.33 KG/M2 | WEIGHT: 171.5 LBS

## 2021-06-02 VITALS — WEIGHT: 168 LBS | BODY MASS INDEX: 24.81 KG/M2

## 2021-06-02 VITALS — WEIGHT: 166.9 LBS | BODY MASS INDEX: 24.65 KG/M2

## 2021-06-02 NOTE — PROGRESS NOTES
Pt ambulates to infusion center for treatment.  Pt voices no new concerns today.  INR drawn peripherally.  Velcade given SQ as ordered without difficulty.  Pt left clinic stable to lobby.  Plan RTC as scheduled.

## 2021-06-02 NOTE — PROGRESS NOTES
[facility] clinic Follow Up Note    Theo Meyers   74 y.o. male    Date of Visit: 10/11/2019    Chief Complaint   Patient presents with     Follow-up     3 month recheck     Subjective  Keith is here for follow-up after hospitalization in June for pneumonia with parapneumonic effusion.  Thoracentesis did show transudate.  He had a chest tube on the left for some time.  Cultures were negative.    He did follow-up with pulmonary medicine in August and I did review that note with him today.  I did review the chest x-ray which otherwise showed clear lung fields.    He does have a chronic postnasal drip type cough and chronic sinusitis.  He does have a continued cough with that with some nasal drainage.  No sinus pain or fever.  He is using some decongestants over-the-counter in addition to Mucinex and saline irrigation.    He does not feel there is any congestion in his chest.  No shortness of breath.    He did develop C. difficile colitis in June when he was in the hospital treated with vancomycin.  His bowels are normal now no abdominal pain.    He did have an episode of paroxysmal H fibrillation in the hospital, although the EKG was normal sinus rhythm.  Does appear to be a brief spell.    He is already on warfarin for history of recurrent DVT in 2012 and then recurrence in 2018.    It does appear that he remained on the low-dose aspirin despite being put on warfarin last year.    No history of coronary artery disease or cardiac stents.    He does have pancytopenia including low platelets.    He just had his blood counts checked yesterday with hemoglobin 9.8, increased.  White count 1.8, increased.  Platelets 89, increased.    He does not have any nosebleeds or bleeding issues currently.    He gets monthly B12 shots at hematology.    He still on chemotherapy intermittently for his kappa light chain myeloma of C6 vertebral body diagnosed in 2009.  Previous autologous bone marrow transplant twice in  2010.    His blood pressure tends to run low.    He was put on diltiazem 120 mg a day in the hospital in June because of atrial fibrillation.    He does complain of chronic fatigue and some mild orthostasis.  No edema.    He does not check his blood pressure.    He denies any symptomatic palpitations since leaving the hospital.    Last month at his hematology appointment his blood pressure was 103/59.    He has severe spinal stenosis.  Imaging last year showed severe stenosis at L3-4.  He takes gabapentin in the evening.    He is back to walking the dog every day.  No new radicular pain.    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  Plan was to repeat in 18 months.  On Prilosec.    No urinary complaints, on Flomax.    Glaucoma, on drops.    PMHx:    Past Medical History:   Diagnosis Date     Anemia 12/13/2017     Arthritis      Bilateral inguinal hernia      Glaucoma      Glaucoma      History of blood clots     DVT - left chronic     Multiple myeloma (H)     Gets chemo infusions every 2 weeks     Pancytopenia (H)      Peripheral neuropathy      Syncope      TMJ (temporomandibular joint disorder)      PSHx:    Past Surgical History:   Procedure Laterality Date     APPENDECTOMY      Age 16     CARPAL TUNNEL RELEASE Bilateral      ESOPHAGOGASTRODUODENOSCOPY N/A 12/14/2017    Procedure: ESOPHAGOGASTRODUODENOSCOPY (EGD) WITH BIOPSYS;  Surgeon: Marlon Roy MD;  Location: [facility];  Service:      ESOPHAGOGASTRODUODENOSCOPY N/A 1/19/2018    Procedure: ENDOSCOPIC ULTRASOUND  ESOPHAGOGASTRODUODENOSCOPY WITH DUODENAL POLYP REMOVAL;  Surgeon: Familia Mcgraw MD;  Location: [facility] OR;  Service:      EYE SURGERY      Cataract     IR PLEURAL DRAINAGE W CATHETER INSERTION  7/2/2019     PORTACATH PLACEMENT       US THORACENTESIS  6/27/2019     VERTEBROPLASTY      T6     WISDOM TOOTH EXTRACTION       Immunizations:   Immunization History   Administered Date(s) Administered     Hep B, historic  03/01/2011, 05/19/2011     HiB, historic,unspecified 03/01/2011, 05/19/2011     IPV 03/01/2011, 05/19/2011     Influenza high dose,seasonal,PF, 65+ yrs 10/07/2015, 09/21/2016, 09/20/2017, 09/26/2018, 10/03/2019     Influenza,seasonal quad, PF, =/> 6months 09/24/2014     Pneumo Conj 13-V (2010&after) 10/16/2015     Pneumo Polysac 23-V 05/19/2011     Tdap 03/01/2011       ROS A comprehensive review of systems was performed and was otherwise negative    Medications, allergies, and problem list were reviewed and updated    Exam  /64 (Patient Site: Right Arm, Patient Position: Sitting, Cuff Size: Adult Regular)   Pulse 72   Wt 167 lb 9.6 oz (76 kg)   BMI 24.05 kg/m    Does not appear acutely ill.  No jaundice.  Lungs are clear.  His nasal mucosa appears normal, no purulent drainage.  He does not have any pharyngitis.  No JVD.  Heart is regular with 2 out of 6 systolic murmur.  Just occasional premature beat but his heart is not irregular, even at extended auscultation.  There is no ankle edema.  Abdomen nontender.  Able to clamp on the exam table.  No leg weakness.    Assessment/Plan  1. Paroxysmal atrial fibrillation (H)  No evidence of recurrence at this time.  Paroxysmal atrial fibrillation appears to be brief in the hospital during a respiratory event.    He has fatigue and low blood pressure on the diltiazem.  He normally runs a low blood pressure.    He will stop the diltiazem to reduce hypotensive and fatigue effects.    He will contact clinic immediately if he has palpitations.    He will remain on the warfarin long-term because of a history of recurrent DVTs.    I did discuss risk of rapid atrial fibrillation with discontinuing diltiazem.  He accepts that risk and wishes to proceed with stopping diltiazem.    He was told not to use decongestants    2. Chronic deep vein thrombosis (DVT) of left lower extremity, unspecified vein (H)  Chronic anticoagulation.  Current warfarin.  Has had fluctuating INR  levels.    I did discuss with him option for Pradaxa or Eliquis type medication.    Refer him to [facility] pharmacy to see if that is possible with his insurance, discussed the change of warfarin over to Eliquis type medication.  - Ambulatory referral to Medication Management    Patient was told to stop the aspirin, as he is on full anticoagulation.  I discussed bleeding risk of taking aspirin plus warfarin.    3. History of Clostridioides difficile infection  Patient was told to avoid anti-biotics if possible.  I recommended he start a probiotic and/or yogurt.    4. Cough  Consistent with his postnasal drip which is chronic.  He has chronic sinusitis.  Does not appear to be severe.  Avoid antibiotics with his C. difficile colitis.    He did tolerate doxycycline in the past and could consider that if needed.    Continue saline irrigation and Mucinex.  He was told to stop decongestant.  Her graph follow-up if worsening symptoms.    Quit smoking in 1975, chest x-ray in August otherwise showed clear lung fields after pneumonia.    5. Spinal stenosis of lumbar region without neurogenic claudication  Moderately severe but stable.    Continue to walk dog daily.  Gabapentin in the evening.    6. Multiple myeloma not having achieved remission (H)  Managed by hematology.  Blood counts checked yesterday    7. Pancytopenia (H)  As above.  B12 shots at hematology office monthly.    8. Aortic valve stenosis, etiology of cardiac valve disease unspecified  Mild to moderate on February 2019 echo.  Plan to repeat in 1 year.  Ejection fraction 60-65%.    History of duodenal tubular adenoma.  Repeat EGD November 2020, 18-month follow-up.  Continue Prilosec.    BPH controlled with Flomax.    Glaucoma on drops.    Return in about 6 weeks (around 11/22/2019) for Recheck.   Patient Instructions   Stop aspirin.    Continue on warfarin and recheck INR next week.    Make an appointment with the [facility] pharmacist for consult on your anticoagulation.   I would have you consider Eliquis or Pradaxa type anticoagulation instead of warfarin, which would avoid the problem of fluctuating INRs and fluctuating anticoagulation with warfarin.    You can discontinue diltiazem.  Monitor blood pressure.  Goal blood pressure less than 135/85.    Follow-up with me in approximately 6 weeks to review your blood pressure and anticoagulation plan.    Avoid antibiotics with your history of C. difficile colitis.    Avoid decongestants, as they can increase her chance of atrial fibrillation.  You can use the Mucinex and saline spray.  If you develop fever or worsening sinusitis symptom, get evaluated at that time.    I would recommend a probiotic and/or daily yogurt, to encourage good bacteria in your intestines, because of your history of C. difficile colitis.    Mathew Ott MD        Current Outpatient Medications   Medication Sig Dispense Refill     acetaminophen (TYLENOL) 500 MG tablet Take 500 mg by mouth every 6 (six) hours as needed for pain.       acyclovir (ZOVIRAX) 400 MG tablet TAKE 1 TABLET BY MOUTH TWICE A  tablet 2     ascorbic acid (VITAMIN C) 1000 MG tablet Take 2,000 mg by mouth daily.        bimatoprost (LUMIGAN) 0.01 % Drop Administer 1 drop to both eyes at bedtime.       brimonidine (ALPHAGAN P) 0.1 % Drop Administer 1 drop to both eyes 3 (three) times a day.        calcium, as carbonate, (TUMS) 200 mg calcium (500 mg) chewable tablet Chew 1 tablet 3 (three) times a day as needed.        calcium-vitamin D 500 mg(1,250mg) -200 unit per tablet Take 1 tablet by mouth daily.       cetirizine (ZYRTEC) 10 MG tablet Take 10 mg by mouth daily.       CYANOCOBALAMIN, VITAMIN B-12, INJ Inject monthly as directed       dexAMETHasone (DECADRON) 4 MG tablet Take 20 mg by mouth every 14 (fourteen) days Prior to chemotherapy. 60 tablet 1     guaiFENesin-dextromethorphan (MUCINEX DM) 600-30 mg Tb12 Take 1 tablet by mouth 2 (two) times a day as needed.        lenalidomide  (REVLIMID) 20 mg cap Take 20 mg by mouth every evening. Three weeks on and one week off 21 capsule 6     magnesium oxide (MAG-OX) 400 mg (241.3 mg magnesium) tablet TAKE 1 TABLET BY MOUTH TWICE A DAY (Patient taking differently: TAKE 1 TABLET BY MOUTH TWICE A DAY AS NEEDED) 60 tablet 3     multivitamin (MULTIVITAMIN) per tablet Take 1 tablet by mouth daily.       omega 3-dha-epa-fish oil 900-1,400 mg CpDR Take 1 tablet by mouth daily.       omeprazole (PRILOSEC) 20 MG capsule TAKE 1 CAPSULE BY MOUTH ONCE DAILY 90 capsule 3     potassium chloride (K-DUR,KLOR-CON) 20 MEQ tablet TAKE 1 TABLET BY MOUTH EVERY DAY 90 tablet 0     psyllium (METAMUCIL) powder Take 1 packet by mouth daily.              sodium chloride (OCEAN) 0.65 % nasal spray 2 sprays into each nostril as needed for congestion. 15 mL 12     tamsulosin (FLOMAX) 0.4 mg cap TAKE ONE CAPSULE BY MOUTH DAILY 90 capsule 3     warfarin (COUMADIN/JANTOVEN) 5 MG tablet TAKE 1 TABLET BY MOUTH EVERY DAY 90 tablet 0     gabapentin (NEURONTIN) 300 MG capsule TAKE 1-3 CAPSULES BY MOUTH FOUR TIMES DAILY (Patient taking differently: Take one capsule by mouth daily as needed) 240 capsule 11     No current facility-administered medications for this visit.      Facility-Administered Medications Ordered in Other Visits   Medication Dose Route Frequency Provider Last Rate Last Dose     heparin 100 unit/mL lockflush (PF) porcine 300-600 Units  300-600 Units Intravenous PRN Per Clifford MD   600 Units at 14 1013     No Known Allergies  Social History     Tobacco Use     Smoking status: Former Smoker     Last attempt to quit: 1975     Years since quittin.9     Smokeless tobacco: Never Used   Substance Use Topics     Alcohol use: Not Currently     Comment: occasional     Drug use: No

## 2021-06-02 NOTE — PROGRESS NOTES
Pt came into infusion clinic for his Velcade injection as ordered. Pt tolerated this well. Pt left infusion clinic via ambulatory and will RTC as sched.

## 2021-06-02 NOTE — PROGRESS NOTES
Pt arrived ambulatory to clinic for Cycle # 129 Day # 1 of his chemotherapy regimen.  Port was accessed using aseptic technique without difficulties with excellent blood return.  Administered SQ Velcade injection into RLQ of ABD per MD order.  Pt tolerated procedures well, no s/s of bleeding or swelling at site.  Port was flushed with NS and Heparin then de-accessed using 2x2 and papertape.  Pt verbalized understanding of plan of care and return to clinic.

## 2021-06-02 NOTE — TELEPHONE ENCOUNTER
ANTICOAGULATION  MANAGEMENT    Assessment     Today's INR result of 2.34 is Therapeutic (goal INR of 2.0-3.0)        Warfarin taken as previously instructed    No new diet changes affecting INR    No new medication/supplements affecting INR    Continues to tolerate warfarin with no reported s/s of bleeding or thromboembolism     Previous INR was Therapeutic    Plan:     Spoke with Theo regarding INR result and instructed:     Warfarin Dosing Instructions:  He has made the decision to stop warfarin and start Xarelto after discussing with Mita Ratliff pharmacist. He will do this today.    Instructed patient to follow up no later than: no longer needs INR    Education provided: importance of therapeutic range, importance of following up for INR monitoring at instructed interval and importance of taking warfarin as instructed    Keith verbalizes understanding and agrees to warfarin dosing plan.    Instructed to call the [facility] for any changes, questions or concerns. (#[phone])   ?   Gricelda Parker RN    Subjective/Objective:      Theo Meyers, a 74 y.o. male is on warfarin.     Theo reports:     Home warfarin dose: verbally confirmed home dose with Keith and updated on anticoagulation calendar     Missed doses: No     Medication changes:  Yes: changing to Xarelto     S/S of bleeding or thromboembolism:  No     New Injury or illness:  No     Changes in diet or alcohol consumption:  No     Upcoming surgery, procedure or cardioversion:  No    Anticoagulation Episode Summary     Current INR goal:   2.0-3.0   TTR:   64.5 %   Next INR check:   10/30/2019   INR from last check:   2.34 (10/30/2019)   Weekly max warfarin dose:      Target end date:      INR check location:      Preferred lab:      Send INR reminders to:   DEWAYNE SUN    Indications    DVT (deep venous thrombosis) (H) [I82.409]           Comments:            Anticoagulation Care Providers     Provider Role Specialty Phone number     Mathew Ott MD [facility] Internal Medicine [phone]

## 2021-06-02 NOTE — TELEPHONE ENCOUNTER
ANTICOAGULATION  MANAGEMENT    Assessment     Today's INR result of 3.6 is Supratherapeutic (goal INR of 2.0-3.0)        Warfarin taken as previously instructed    No new diet changes affecting INR    No new medication/supplements affecting INR    Continues to tolerate warfarin with no reported s/s of bleeding or thromboembolism     Previous INR was Therapeutic    Plan:     Spoke with Theo regarding INR result and instructed:     Warfarin Dosing Instructions:  Take 2.5 mg today then change warfarin dose to 2.5 mg daily on Mondays and Saturdays; and 5 mg daily rest of week  (7.7 % change)    Instructed patient to follow up no later than: 1-2 weeks.    Education provided: importance of therapeutic range, importance of following up for INR monitoring at instructed interval and importance of taking warfarin as instructed    Keith verbalizes understanding and agrees to warfarin dosing plan.    Instructed to call the [facility] for any changes, questions or concerns. (#[phone])   ?   Gricelda Parker RN    Subjective/Objective:      Theo Meyers, a 74 y.o. male is on warfarin.     Theo reports:     Home warfarin dose: verbally confirmed home dose with Keith and updated on anticoagulation calendar     Missed doses: No     Medication changes:  No     S/S of bleeding or thromboembolism:  No     New Injury or illness:  No     Changes in diet or alcohol consumption:  No     Upcoming surgery, procedure or cardioversion:  No    Anticoagulation Episode Summary     Current INR goal:   2.0-3.0   TTR:   63.9 %   Next INR check:   10/17/2019   INR from last check:   3.60! (10/3/2019)   Weekly max warfarin dose:      Target end date:      INR check location:      Preferred lab:      Send INR reminders to:   DEWAYNE SUN    Indications    DVT (deep venous thrombosis) (H) [I82.409]           Comments:            Anticoagulation Care Providers     Provider Role Specialty Phone number    Mathew Ott MD Referring  Internal Medicine [phone]

## 2021-06-02 NOTE — PATIENT INSTRUCTIONS - HE
It was nice to see you today. I value your experience during your clinic visits and would be very thankful for your time with providing feedback if you receive a survey via email. Let me know personally if your experience today was not exceptional so that we can serve you better in the future!     Recommendations from today's Medication Management (MTM) visit:                                                      1. I'll talk to Dr. Clifford to see if he's on board with switching from warfarin to Xarelto - the new blood thinner.     To switch, we would check INR to make sure it's less than 3, then stop warfarin and start Xarelto 20 mg 1 tablet with evening meal.     2. Check blood pressure next week. Let Dr. Ott or I know if it is above 140/90.          To schedule another MTM appointment, please call the clinic directly at [phone] or schedule via [employer].     My MTM pharmacist's contact information:                                                      Please feel free to contact me with any questions or concerns you have.      Mita Ratliff, PharmD, BCACP  Medication Management (MTM) Pharmacist  [facility]  [phone]

## 2021-06-02 NOTE — PATIENT INSTRUCTIONS - HE
Labor Instructions    How do I know if it’s true labor? • One of the most important aspects of any pregnancy is being able to recognize the onset of labor.   Unfortunately, on occasion it can be difficult or confusing, especially if you have had one or mor Stop aspirin.    Continue on warfarin and recheck INR next week.    Make an appointment with the [facility] pharmacist for consult on your anticoagulation.  I would have you consider Eliquis or Pradaxa type anticoagulation instead of warfarin, which would avoid the problem of fluctuating INRs and fluctuating anticoagulation with warfarin.    You can discontinue diltiazem.  Monitor blood pressure.  Goal blood pressure less than 135/85.    Follow-up with me in approximately 6 weeks to review your blood pressure and anticoagulation plan.    Avoid antibiotics with your history of C. difficile colitis.    Avoid decongestants, as they can increase her chance of atrial fibrillation.  You can use the Mucinex and saline spray.  If you develop fever or worsening sinusitis symptom, get evaluated at that time.    I would recommend a probiotic and/or daily yogurt, to encourage good bacteria in your intestines, because of your history of C. difficile colitis.   of the contractions. Having regular (usually closer), longer lasting (35-70 seconds), and sharper (more painful) contractions are the common symptoms of actual labor.   The second way in which labor can begin which occurs in approximately 30% of all patien the room during your labor. During the delivery, the nurses will inform you of the hospital policy and how many coaches are allowed. You may desire pain medication or anesthesia for pain.   You probably discussed some aspects of pain medication with us dur Please call the office within a few days after you are discharged from the hospital to schedule your post-partum visit, which is usually 4-6 weeks after delivery. Any medications necessary will be discussed on an individual basis.   If you decide to jerrod Time M T W Th F S S                                                                                                           Time M T W Th

## 2021-06-02 NOTE — TELEPHONE ENCOUNTER
ANTICOAGULATION  MANAGEMENT    Assessment     Today's INR result of 2.23 is Therapeutic (goal INR of 2.0-3.0)        Warfarin taken as previously instructed    No new diet changes affecting INR    No new medication/supplements affecting INR    Continues to tolerate warfarin with no reported s/s of bleeding or thromboembolism     Previous INR was Subtherapeutic    Plan:     Spoke with Theo regarding INR result and instructed:     Warfarin Dosing Instructions:  Continue current warfarin dose 2.5 mg daily on Saturdys; and 5 mg daily rest of week  (0 % change) he has appointment with pharmacist to discuss one of the new blood thinning agent as a possibility.     Instructed patient to follow up no later than: 1-2 weeks.    Education provided: importance of therapeutic range, importance of following up for INR monitoring at instructed interval and importance of taking warfarin as instructed    Keith verbalizes understanding and agrees to warfarin dosing plan.    Instructed to call the [facility] for any changes, questions or concerns. (#[phone])   ?   Gricelda Parker RN    Subjective/Objective:      Theo HURT Mira, a 74 y.o. male is on warfarin.     Theo reports:     Home warfarin dose: verbally confirmed home dose with Keith and updated on anticoagulation calendar     Missed doses: No     Medication changes:  No     S/S of bleeding or thromboembolism:  No     New Injury or illness:  No     Changes in diet or alcohol consumption:  No     Upcoming surgery, procedure or cardioversion:  No    Anticoagulation Episode Summary     Current INR goal:   2.0-3.0   TTR:   64.5 %   Next INR check:   10/30/2019   INR from last check:   2.23 (10/16/2019)   Weekly max warfarin dose:      Target end date:      INR check location:      Preferred lab:      Send INR reminders to:   DEWAYNE SUN    Indications    DVT (deep venous thrombosis) (H) [I82.409]           Comments:            Anticoagulation Care Providers      Provider Role Specialty Phone number    Mathew Ott MD Referring Internal Medicine [phone]

## 2021-06-02 NOTE — TELEPHONE ENCOUNTER
ANTICOAGULATION  MANAGEMENT    Assessment     Today's INR result of 1.65 is Subtherapeutic (goal INR of 2.0-3.0)        Warfarin taken as previously instructed    No new diet changes affecting INR    Interaction between taking the dexamethaone 20 mg every 14 days. Took last on 10/3 and warfarin may be affecting INR    Continues to tolerate warfarin with no reported s/s of bleeding or thromboembolism     Previous INR was Supratherapeutic    Plan:     Spoke with Theo regarding INR result and instructed:     Warfarin Dosing Instructions:  Change warfarin dose to 2.5 mg daily on Mondays; and 5 mg daily rest of week  (8.3% change) He wanted to go back on regular dose and see what happens.     Instructed patient to follow up no later than: 1 week.    Education provided: importance of therapeutic range, importance of following up for INR monitoring at instructed interval and importance of taking warfarin as instructed    Keith verbalizes understanding and agrees to warfarin dosing plan.    Instructed to call the [facility] for any changes, questions or concerns. (#[phone])   ?   Gricelda Parker RN    Subjective/Objective:      Theo HURT Mira, a 74 y.o. male is on warfarin.     Theo reports:     Home warfarin dose: verbally confirmed home dose with Keith and updated on anticoagulation calendar     Missed doses: No     Medication changes:  No-he takes dexamethasone 20 mg every 14 days. Last on 10/3 and will take again 10/17/19     S/S of bleeding or thromboembolism:  No     New Injury or illness:  No     Changes in diet or alcohol consumption:  No     Upcoming surgery, procedure or cardioversion:  No    Anticoagulation Episode Summary     Current INR goal:   2.0-3.0   TTR:   64.3 %   Next INR check:   10/17/2019   INR from last check:   1.65! (10/10/2019)   Weekly max warfarin dose:      Target end date:      INR check location:      Preferred lab:      Send INR reminders to:   DEWAYNE SUN     Indications    DVT (deep venous thrombosis) (H) [I82.409]           Comments:            Anticoagulation Care Providers     Provider Role Specialty Phone number    Mathew Ott MD Referring Internal Medicine [phone]

## 2021-06-03 VITALS
OXYGEN SATURATION: 100 % | DIASTOLIC BLOOD PRESSURE: 61 MMHG | HEART RATE: 73 BPM | SYSTOLIC BLOOD PRESSURE: 103 MMHG | WEIGHT: 167.55 LBS | TEMPERATURE: 97.8 F | BODY MASS INDEX: 24.04 KG/M2

## 2021-06-03 VITALS — WEIGHT: 164.2 LBS | BODY MASS INDEX: 23.56 KG/M2

## 2021-06-03 VITALS — WEIGHT: 147 LBS | BODY MASS INDEX: 21.09 KG/M2

## 2021-06-03 VITALS
OXYGEN SATURATION: 97 % | BODY MASS INDEX: 23.68 KG/M2 | DIASTOLIC BLOOD PRESSURE: 67 MMHG | WEIGHT: 165 LBS | TEMPERATURE: 97.8 F | HEART RATE: 68 BPM | RESPIRATION RATE: 16 BRPM | SYSTOLIC BLOOD PRESSURE: 112 MMHG

## 2021-06-03 VITALS
WEIGHT: 169.3 LBS | DIASTOLIC BLOOD PRESSURE: 58 MMHG | HEART RATE: 69 BPM | HEIGHT: 70 IN | BODY MASS INDEX: 24.24 KG/M2 | OXYGEN SATURATION: 98 % | SYSTOLIC BLOOD PRESSURE: 106 MMHG | TEMPERATURE: 97.7 F

## 2021-06-03 VITALS — BODY MASS INDEX: 21.69 KG/M2 | HEIGHT: 70 IN | WEIGHT: 151.5 LBS

## 2021-06-03 VITALS
DIASTOLIC BLOOD PRESSURE: 97 MMHG | WEIGHT: 169 LBS | HEART RATE: 98 BPM | SYSTOLIC BLOOD PRESSURE: 161 MMHG | BODY MASS INDEX: 24.25 KG/M2

## 2021-06-03 VITALS
HEART RATE: 80 BPM | DIASTOLIC BLOOD PRESSURE: 78 MMHG | BODY MASS INDEX: 23.96 KG/M2 | SYSTOLIC BLOOD PRESSURE: 128 MMHG | WEIGHT: 167 LBS

## 2021-06-03 VITALS
HEART RATE: 72 BPM | WEIGHT: 167.6 LBS | SYSTOLIC BLOOD PRESSURE: 112 MMHG | BODY MASS INDEX: 24.05 KG/M2 | DIASTOLIC BLOOD PRESSURE: 64 MMHG

## 2021-06-03 VITALS
WEIGHT: 163 LBS | BODY MASS INDEX: 23.39 KG/M2 | HEART RATE: 79 BPM | SYSTOLIC BLOOD PRESSURE: 116 MMHG | OXYGEN SATURATION: 98 % | DIASTOLIC BLOOD PRESSURE: 68 MMHG | TEMPERATURE: 97.9 F

## 2021-06-03 VITALS
SYSTOLIC BLOOD PRESSURE: 103 MMHG | TEMPERATURE: 97.6 F | OXYGEN SATURATION: 100 % | HEART RATE: 67 BPM | BODY MASS INDEX: 23.6 KG/M2 | WEIGHT: 164.5 LBS | DIASTOLIC BLOOD PRESSURE: 59 MMHG

## 2021-06-03 VITALS — BODY MASS INDEX: 23.19 KG/M2 | WEIGHT: 162 LBS | HEIGHT: 70 IN

## 2021-06-03 VITALS — HEIGHT: 70 IN | BODY MASS INDEX: 23.96 KG/M2

## 2021-06-03 VITALS — BODY MASS INDEX: 23.05 KG/M2 | HEIGHT: 70 IN | WEIGHT: 161 LBS

## 2021-06-03 VITALS — HEIGHT: 70 IN | BODY MASS INDEX: 22.98 KG/M2 | WEIGHT: 160.5 LBS

## 2021-06-03 NOTE — PROGRESS NOTES
PT here for velcade inj. PT states neuropathy is stable and only side effect is fatigue. Velcade inj given and pt tolerated without any problems. Follow up reviewed and pt dc' d steady gait.

## 2021-06-03 NOTE — PROGRESS NOTES
[facility] clinic Follow Up Note    Theo Meyers   74 y.o. male    Date of Visit: 11/26/2019    Chief Complaint   Patient presents with     Follow-up     Subjective  Keith is here for follow-up on multiple medical issues.    He had pneumonia with parapneumonic effusion in June of this year.  He had a paroxysmal atrial fibrillation episode during that illness.  He was put on diltiazem 120 mg a day.    His blood pressure usually runs low when he was having fatigue with low blood pressures last month.  He was taken off diltiazem.    His blood pressures remain normal and his fatigue has improved some.    No palpitations or evidence of recurrent atrial fibrillation.    He remains on blood thinners as he has a history of DVT in 2012 and 2018.  He was changed from warfarin over to Xarelto last month.  Patient is requesting a 90-day prescription.    No falls.  No bleeding issues.    History of C. difficile colitis in June.  Treated with oral vancomycin.  He is taking yogurt daily.  He is on Metamucil daily and no recurrent diarrhea.  No abdominal pain.    He has chronic sinus congestion and postnasal drip, on saline irrigation and some nose sprays.  No purulent drainage or severe sinus pain or fever.  He quit smoking in 1975.    He has used doxycycline in the past, but avoiding anti-biotics with a history of C. difficile.    August 2019 chest x-ray was cleared after his pneumonia.    He has pancytopenia on chemotherapy for his multiple myeloma.  Followed closely by hematology.  Autologous bone marrow transplant in 2010.  Originally diagnosed in 2009.    Blood counts last month with platelets 89, white count 1.8 and hemoglobin 9.8.    Gets B12 shots monthly at hematology.    No worsening BPH symptoms on Flomax.    Severe spinal stenosis, last imaged last year.  Takes gabapentin in the evening.  Walks the dog daily.  No new radicular pain or exacerbation of his back pain.    No epigastric pain or blood in stool  or melena.    He had a previous tubular adenoma in the duodenum May 2019 EGD.  Plan repeat EGD November 2020.  He is on Prilosec.    PMHx:    Past Medical History:   Diagnosis Date     Anemia 12/13/2017     Arthritis      Bilateral inguinal hernia      Glaucoma      Glaucoma      History of blood clots     DVT - left chronic     Multiple myeloma (H)     Gets chemo infusions every 2 weeks     Pancytopenia (H)      Peripheral neuropathy      Syncope      TMJ (temporomandibular joint disorder)      PSHx:    Past Surgical History:   Procedure Laterality Date     APPENDECTOMY      Age 16     CARPAL TUNNEL RELEASE Bilateral      ESOPHAGOGASTRODUODENOSCOPY N/A 12/14/2017    Procedure: ESOPHAGOGASTRODUODENOSCOPY (EGD) WITH BIOPSYS;  Surgeon: Marlon Roy MD;  Location: [facility];  Service:      ESOPHAGOGASTRODUODENOSCOPY N/A 1/19/2018    Procedure: ENDOSCOPIC ULTRASOUND  ESOPHAGOGASTRODUODENOSCOPY WITH DUODENAL POLYP REMOVAL;  Surgeon: Familia Mcgraw MD;  Location: [facility] OR;  Service:      EYE SURGERY      Cataract     IR PLEURAL DRAINAGE W CATHETER INSERTION  7/2/2019     PORTACATH PLACEMENT       US THORACENTESIS  6/27/2019     VERTEBROPLASTY      T6     WISDOM TOOTH EXTRACTION       Immunizations:   Immunization History   Administered Date(s) Administered     Hep B, historic 03/01/2011, 05/19/2011     HiB, historic,unspecified 03/01/2011, 05/19/2011     IPV 03/01/2011, 05/19/2011     Influenza high dose,seasonal,PF, 65+ yrs 10/07/2015, 09/21/2016, 09/20/2017, 09/26/2018, 10/03/2019     Influenza,seasonal quad, PF, =/> 6months 09/24/2014     Pneumo Conj 13-V (2010&after) 10/16/2015     Pneumo Polysac 23-V 05/19/2011     Tdap 03/01/2011       ROS A comprehensive review of systems was performed and was otherwise negative    Medications, allergies, and problem list were reviewed and updated    Exam  /78 (Patient Site: Right Arm, Patient Position: Sitting, Cuff Size: Adult Regular)   Pulse 80   Wt 167  lb (75.8 kg)   BMI 23.96 kg/m    Lungs are clear.  Heart is regular with 2/6 murmur.  No ectopy.  Abdomen nontender and no edema.    Assessment/Plan  1. Chronic deep vein thrombosis (DVT) of left lower extremity, unspecified vein (H)  On chronic anticoagulation, now Xarelto.  Given a 90-day prescription.  I did again review bleeding warnings with patient.  Long-term anticoagulation.  - rivaroxaban (XARELTO) 20 mg tablet; Take 1 tablet (20 mg total) by mouth daily with supper.  Dispense: 90 tablet; Refill: 3    2. History of Clostridioides difficile infection  No evidence of recurrence.  Continue yogurt and Metamucil daily.  Avoid antibiotics.    3. Paroxysmal atrial fibrillation (H)  No evidence of recurrence.    4. Spinal stenosis of lumbar region without neurogenic claudication  No exacerbation.  I stressed the importance of daily walking which she is doing.  Gabapentin in the evening.    5. Multiple myeloma not having achieved remission (H)  Management per hematology.  Continues on chronic chemotherapy.  Pancytopenia.    No longer on low-dose aspirin, because of the blood thinner above    Monthly B12 shots given in hematology    6. Aortic valve stenosis, etiology of cardiac valve disease unspecified  Mild to moderate in February of this year.  Plan repeat echo in the spring when he is here for his physical exam next spring.    Glaucoma on drops and up-to-date on ophthalmology follow-up.    BPH stable on Flomax.    History of duodenal tubular adenoma.  18-month follow-up EGD November 2020.  Continue Prilosec.    History of chronic sinusitis and congestion with postnasal drip.  He can restart the Mucinex.  He can use saline.  Avoid Sudafed.  Avoid antibiotics, but could use doxycycline if absolutely needed.    Return in 5 months (on 5/4/2020) for Annual physical.   Patient Instructions   No change in treatment plan.  See me in May for adult wellness visit physical exam.    Seek medical attention if you do have  significant palpitations or racing heart rate sensation.    Avoid antibiotics unless absolutely necessary, as you do have a history of C. difficile colitis.  Continue daily yogurt.    Maintain daily walking through the winter.    Mathew Ott MD        Current Outpatient Medications   Medication Sig Dispense Refill     acetaminophen (TYLENOL) 500 MG tablet Take 500 mg by mouth every 6 (six) hours as needed for pain.       acyclovir (ZOVIRAX) 400 MG tablet TAKE 1 TABLET BY MOUTH TWICE A  tablet 2     ascorbic acid (VITAMIN C) 1000 MG tablet Take 2,000 mg by mouth daily.        bimatoprost (LUMIGAN) 0.01 % Drop Administer 1 drop to both eyes at bedtime.       brimonidine (ALPHAGAN P) 0.1 % Drop Administer 1 drop to both eyes 3 (three) times a day.        calcium, as carbonate, (TUMS) 200 mg calcium (500 mg) chewable tablet Chew 1 tablet 3 (three) times a day as needed.        calcium-vitamin D 500 mg(1,250mg) -200 unit per tablet Take 1 tablet by mouth daily.       cetirizine (ZYRTEC) 10 MG tablet Take 10 mg by mouth daily.       CYANOCOBALAMIN, VITAMIN B-12, INJ Inject monthly as directed       dexAMETHasone (DECADRON) 4 MG tablet Take 20 mg by mouth every 14 (fourteen) days Prior to chemotherapy. 60 tablet 1     gabapentin (NEURONTIN) 300 MG capsule TAKE 1-3 CAPSULES BY MOUTH FOUR TIMES DAILY (Patient taking differently: Take one capsule by mouth daily as needed) 240 capsule 11     lenalidomide (REVLIMID) 20 mg cap Take 20 mg by mouth every evening. Three weeks on and one week off 21 capsule 6     magnesium oxide (MAG-OX) 400 mg (241.3 mg magnesium) tablet TAKE 1 TABLET BY MOUTH TWICE A DAY (Patient taking differently: TAKE 1 TABLET BY MOUTH TWICE A DAY AS NEEDED) 60 tablet 3     multivitamin (MULTIVITAMIN) per tablet Take 1 tablet by mouth daily.       omega 3-dha-epa-fish oil 900-1,400 mg CpDR Take 1 tablet by mouth daily.       omeprazole (PRILOSEC) 20 MG capsule TAKE 1 CAPSULE BY MOUTH ONCE DAILY 90  capsule 3     potassium chloride (K-DUR,KLOR-CON) 20 MEQ tablet TAKE 1 TABLET BY MOUTH EVERY DAY 90 tablet 0     psyllium (METAMUCIL) powder Take 1 packet by mouth daily.              rivaroxaban (XARELTO) 20 mg tablet Take 1 tablet (20 mg total) by mouth daily with supper. 90 tablet 3     sodium chloride (OCEAN) 0.65 % nasal spray 2 sprays into each nostril as needed for congestion. 15 mL 12     tamsulosin (FLOMAX) 0.4 mg cap TAKE ONE CAPSULE BY MOUTH DAILY 90 capsule 3     guaiFENesin-dextromethorphan (MUCINEX DM) 600-30 mg Tb12 Take 1 tablet by mouth 2 (two) times a day as needed.        No current facility-administered medications for this visit.      Facility-Administered Medications Ordered in Other Visits   Medication Dose Route Frequency Provider Last Rate Last Dose     heparin 100 unit/mL lockflush (PF) porcine 300-600 Units  300-600 Units Intravenous PRN Per Clifford MD   600 Units at 14 1013     No Known Allergies  Social History     Tobacco Use     Smoking status: Former Smoker     Last attempt to quit: 1975     Years since quittin.0     Smokeless tobacco: Never Used   Substance Use Topics     Alcohol use: Not Currently     Comment: occasional     Drug use: No

## 2021-06-03 NOTE — TELEPHONE ENCOUNTER
Patient Returning Call    Reason for call:  tcb    Information relayed to patient:    Per: Mita Ratliff, PharmD     Patient was to report on how his medication change was going?   He states things are going good and he has no concerns at this time.    Patient has additional questions:  No  If YES, what are your questions/concerns:  NA    Okay to leave a detailed message?: No call back needed

## 2021-06-03 NOTE — PROGRESS NOTES
Keith came to chemo infusion after labs yesterday and NP visit today for his next dose of Velcade.  He is well educated on his drug.  Velcade was given sq into his LLQ of his abdomen.  He tolerated the injection well and site was covered with a bandaid.  Keith d/c from clinic ambulatory and unaccompanied.

## 2021-06-03 NOTE — TELEPHONE ENCOUNTER
Per MTM visit discussion on 10/23 regarding switch from warfarin to Xarelto, patient's oncologist, Dr. Clifford is on board with this change. Called patient and left a voicemail. Per ACN note on 10/30, patient has stopped warfarin and started Xarelto. Will remove warfarin from medication list.       Mita Ratliff, PharmD, BCACP  Medication Management Pharmacist  [facility]

## 2021-06-03 NOTE — PROGRESS NOTES
[facility] Hematology and Oncology Progress Note    Patient: Theo Meyers  MRN: [id number]  Date of Service: 11/13/2019        Reason for Visit    Chief Complaint   Patient presents with     HE Cancer     Multiple myeloma not having achieved remission       Assessment and Plan    1.  Myeloma: Patient continues on his current treatment of Velcade and dexamethasone every other week along with Revlimid.  His labs have fluctuated.. His light chains and the ratio continue to be elevated.  We talked at length again today about the fact that he has been on this treatment now for over 10 years and there is been a lot of fluctuations within his light chains but he has no other evidence of endorgan damage and his immunofixation is negative so at this time there is no indication to change treatment.  We will continue every other week Velcade with the Revlimid 3 weeks on 1 week off.  He will continue to be seen every 2 months with his myeloma labs.    2. DVT: This is recurrent.  He is on Revlimid so he does need to be on some sort of anticoagulation.  I think Xarelto is a fine choice for him no further INR checks needed.    3. Pancytopenia: likekly from treatment with velcade and revlimid. Stable. No complications. No changes. Continue to monitor monthly.       ECOG Performance   ECOG Performance Status: 1     Distress Assessment  Distress Assessment Score: No distress    Pain  Currently in Pain: No/denies  Pain Score (Initial OR Reassessment): No/Denies Pain      Problem List    No diagnosis found.   ______________________________________________________________________________    History of Present Illness    DIAGNOSIS:   1. IgG kappa light chain myeloma. Hyposecretory. Diagnosed 2009. No significant measurable M protein. 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 There was also on 11;14 translocation.   2. Deep vein thrombosis of the left leg diagnosed 09/2012 while on treatment.       TREATMENT:   1. Most  recently he has been on Velcade every 2 weeks since 09/2012. He is getting dexamethasone 20 mg every other week with the velcade. Finally, he is receiving Revlimid 20 mg daily for 3 weeks on, 1 week off.   2. Continues on warfarin      PAST TREATMENT and HISTORY:   He presented at diagnosis with a lytic lesion involving the C6 vertebral body, although no measurable M protein. IgG kappa monoclonal immunoglobulin was confirmed by [facility] as well as elevated kappa free light chains with an abnormal kappa lambda ratio. Bone marrow biopsy showed 30% involvement and cytogenetics confirmed deletion of 13 q. as well as 11;14 translocation. He was initially treated with Velcade and dexamethasone and achieved a good partial response. He was referred to the [facility] where he underwent high-dose chemotherapy with autologous peripheral stem cell transplant in April of 2010. He began Revlimid as maintenance therapy post transplant. Repeat bone marrow biopsy done October 2010 showed about 5% kappa restricted plasma cells and so at that time weekly Velcade was added along with his maintenance Revlimid and dexamethasone. He has done well since that time with stable minimal residual disease, best assessed by serum free light chains. He required dose reductions of weekly Velcade because of cytopenias and was ultimately switched to a q.2 week maintenance schedule which he has been on since September 2012. His Revlimid dose is 20 mg daily and he continues on dexamethasone 20 mg p.o. q. Week.       INTERIM HISTORY:    Pt is here today for follow up. He is doing pretty well.  He has a little bit of a sinus cold going on with some congestion but it is very mild.  Patient's complaint today is that he feels that he is getting less sleep at night.  He falls asleep early early in the night and then is awake a couple of hours to the middle of the night so he feels more tired and then is napping more during the day.  Other than that  "no other new issues.  He did switch from Coumadin to Xarelto.    Pain Status  Currently in Pain: No/denies    Review of Systems  Constitutional  Constitutional (WDL): Exceptions to WDL  Fatigue: Concerns(difficulty staying asleep)  Neurosensory  Neurosensory (WDL): Exceptions to WDL  Peripheral Motor Neuropathy: Concerns(stable)  Eye   Eye Disorder (WDL): Exceptions to WDL(glasses; glaucoma stable)  Blurred Vision: Concerns(right eye worse)  Ear  Ear Disorder (WDL): Exceptions to WDL  Tinnitus: Concerns(bilateral)  Cardiovascular  Cardiovascular (WDL): All cardiovascular elements are within defined limits  Pulmonary  Respiratory (WDL): Exceptions to WDL  Cough: Concerns(chest/sinus congestion)  Gastrointestinal  Gastrointestinal (WDL): Exceptions to WDL  Constipation: Concerns(varies)  Diarrhea: Concerns(varies)  Genitourinary  Genitourinary (WDL): All genitourinary elements are within defined limits  Lymphatic  Lymph (WDL): All lymph disorder elements are within defined limits  Musculoskeletal and Connective Tissue  Musculoskeletal and Connetive Tissue Disorders (WDL): Exceptions to WDL  Arthralgia: Concerns(right hand)  Integumentary  Integumentary (WDL): All integumentary elements are within defined limits(dermatology appt in December )  Patient Coping  Patient Coping: Accepting;Open/discussion  Accompanied by  Accompanied by: Alone  Oral Chemo Adherence         Past History  Past Medical History:   Diagnosis Date     Anemia 12/13/2017     Arthritis      Bilateral inguinal hernia      Glaucoma      Glaucoma      History of blood clots     DVT - left chronic     Multiple myeloma (H)     Gets chemo infusions every 2 weeks     Pancytopenia (H)      Peripheral neuropathy      Syncope      TMJ (temporomandibular joint disorder)        PHYSICAL EXAM:  /58   Pulse 69   Temp 97.7  F (36.5  C) (Oral)   Ht 5' 10\" (1.778 m)   Wt 169 lb 4.8 oz (76.8 kg)   SpO2 98%   BMI 24.29 kg/m    GENERAL: no acute distress. " Cooperative in conversation. Here alone  HEENT: pupils are equal, round and reactive. Oromucosa is clean and intact. No ulcerations or mucositis noted. No bleeding noted.  RESP: lungs are clear bilaterally per auscultation. Regular respiratory rate. No wheezes or rhonchi.  CV: Regular, rate and rhythm. No murmurs.  ABD: soft, nontender. Positive bowel sounds. No organomegaly.   MUSCULOSKELETAL: No lower extremity swelling.   NEURO: non focal. Alert and oriented x3.   PSYCH: within normal limits. No depression or anxiety.  SKIN: warm dry intact   LYMPH: no cervical, supraclavicular or axillary lymphadenopathy        Lab Results    Lab Standing Order on 11/12/2019   Component Date Value Ref Range Status     INR 11/12/2019 1.35* 0.90 - 1.10 Final     WBC 11/12/2019 2.0* 4.0 - 11.0 thou/uL Final     RBC 11/12/2019 2.59* 4.40 - 6.20 mill/uL Final     Hemoglobin 11/12/2019 9.0* 14.0 - 18.0 g/dL Final     Hematocrit 11/12/2019 28.0* 40.0 - 54.0 % Final     MCV 11/12/2019 108* 80 - 100 fL Final     MCH 11/12/2019 34.7* 27.0 - 34.0 pg Final     MCHC 11/12/2019 32.1  32.0 - 36.0 g/dL Final     RDW 11/12/2019 15.3* 11.0 - 14.5 % Final     Platelets 11/12/2019 126* 140 - 440 thou/uL Final     MPV 11/12/2019 11.3  8.5 - 12.5 fL Final     Total Neutrophils % 11/12/2019 56  50 - 70 % Final     Lymphocytes % 11/12/2019 8* 20 - 40 % Final     Monocytes % 11/12/2019 16* 2 - 10 % Final     Eosinophils %  11/12/2019 14* 0 - 6 % Final     Basophils % 11/12/2019 6* 0 - 2 % Final     Total Neutrophils Absolute 11/12/2019 1.1* 2.0 - 7.7 thou/ul Final     Lymphocytes Absolute 11/12/2019 0.2* 0.8 - 4.4 thou/uL Final     Monocytes Absolute 11/12/2019 0.3  0.0 - 0.9 thou/uL Final     Eosinophils Absolute 11/12/2019 0.3  0.0 - 0.4 thou/uL Final     Basophils Absolute 11/12/2019 0.1  0.0 - 0.2 thou/uL Final     Platelet Estimate 11/12/2019 Decreased* Normal Final     Ovalocytes 11/12/2019 1+* Negative Final     Polychromasia 11/12/2019 1+*  Negative Final   Lab Standing Order on 11/06/2019   Component Date Value Ref Range Status     Immunofixation Electrophoresis, Se* 11/06/2019 No monoclonal component identified.    Final     Path ICD: 11/06/2019 C90.01   Final     Interpreted By: 11/06/2019 Waldemar Enamorado MD    Final     Immunoglobulin G 11/06/2019 317* 700-1,700 mg/dL Final     Immunoglobulin M 11/06/2019 10* 60 - 280 mg/dL Final     Immunoglobulin A 11/06/2019 33* 65 - 400 mg/dL Final     [place] Free Lt Chain 11/06/2019 45.89* 0.33 - 1.94 mg/dL Final     Lambda Free Lt Chain 11/06/2019 1.23  0.57 - 2.63 mg/dL Final     Kappa Lambda Ratio 11/06/2019 37.31* 0.26 - 1.65 Final      Performed and/or entered by:  [address] [address]      Albumin % 11/06/2019 66.8  51.0 - 67.0 % Final     Albumin  11/06/2019 3.0* 3.2 - 4.7 g/dL Final     Alpha 1 % 11/06/2019 3.4  2.0 - 4.0 % Final     Alpha 1 11/06/2019 0.2  0.1 - 0.3 g/dL Final     Alpha 2 % 11/06/2019 12.1  5.0 - 13.0 % Final     Alpha 2 11/06/2019 0.5  0.4 - 0.9 g/dL Final     % Beta 11/06/2019 10.9  10.0 - 17.0 % Final     Beta 11/06/2019 0.5* 0.7 - 1.2 g/dL Final     Gamma Globulin % 11/06/2019 6.8* 9.0 - 20.0 % Final     Gamma Globulin 11/06/2019 0.3* 0.6 - 1.4 g/dL Final     ELP Comment 11/06/2019    Final                    Value:Possible faint band is visible in the gamma globulin region of the gel strip, which may represent a monoclonal globulin, but is too faint to quantify. See immunofixation electrophoresis results.    Decreased gamma globulin fraction. Etiologies include lymphoproliferative disorders, chemotherapy, and immunodeficiency.     Decreased beta globulin fraction, which can be seen in kidney disease, coagulopathies, and malnutrition, among other etiologies.     The albumin fraction is decreased, and this may represent any combination of protein-calorie malnutrition, bulk protein loss, or accelerated protein  breakdown.       Protein, Total 11/06/2019 4.5* 6.0 - 8.0 g/dL Final     Path ICD: 11/06/2019 C90.01   Final     Interpreted By: 11/06/2019 Bharath Landis MD   Final     Lab Results   Component Value Date    KFLC 45.89 (H) 11/06/2019    KFLC 40.18 (H) 09/11/2019    KFLC 55.25 (H) 05/01/2019    KFLC 59.00 (H) 03/06/2019    KFLC 51.00 (H) 01/09/2019    KFLC 60.25 (H) 11/14/2018    KFLC 42.25 (H) 09/19/2018    KFLC 47.80 (H) 07/25/2018    KFLC 41.00 (H) 07/03/2018    KFLC 47.00 (H) 06/06/2018     Lab Results   Component Value Date    KLR 37.31 (H) 11/06/2019    KLR 39.01 (H) 09/11/2019    KLR 51.64 (H) 05/01/2019    KLR 35.98 (H) 03/06/2019    KLR 41.46 (H) 01/09/2019    KLR 95.63 (H) 11/14/2018    KLR 36.74 (H) 09/19/2018    KLR 54.32 (H) 07/25/2018    KLR 50.00 (H) 07/03/2018    KLR 62.67 (H) 06/06/2018   ]    Imaging    No results found.      Signed by: Alejandra Valdez, CNP

## 2021-06-03 NOTE — PATIENT INSTRUCTIONS - HE
No change in treatment plan.  See me in May for adult wellness visit physical exam.    Seek medical attention if you do have significant palpitations or racing heart rate sensation.    Avoid antibiotics unless absolutely necessary, as you do have a history of C. difficile colitis.  Continue daily yogurt.    Maintain daily walking through the winter.

## 2021-06-03 NOTE — TELEPHONE ENCOUNTER
ANTICOAGULATION  MANAGEMENT PROGRAM    Theo Meyers is being discharged from the [facility] Anticoagulation Management Program (ACM).    Reason for discharge: warfarin replaced by alternate therapy, xarelto    ACM referral closed, anticoagulation episode resolved and INR standing order discontinued.     If Theo needs warfarin management in the future, please send a new referral.    Elle Moreno RN

## 2021-06-04 VITALS
SYSTOLIC BLOOD PRESSURE: 117 MMHG | DIASTOLIC BLOOD PRESSURE: 57 MMHG | TEMPERATURE: 98.3 F | OXYGEN SATURATION: 100 % | BODY MASS INDEX: 25.17 KG/M2 | BODY MASS INDEX: 24.29 KG/M2 | HEIGHT: 69 IN | WEIGHT: 168 LBS | HEART RATE: 67 BPM | WEIGHT: 164 LBS

## 2021-06-04 VITALS
HEART RATE: 61 BPM | DIASTOLIC BLOOD PRESSURE: 60 MMHG | TEMPERATURE: 98.3 F | SYSTOLIC BLOOD PRESSURE: 105 MMHG | OXYGEN SATURATION: 100 % | BODY MASS INDEX: 23.82 KG/M2 | WEIGHT: 166 LBS

## 2021-06-04 VITALS
DIASTOLIC BLOOD PRESSURE: 52 MMHG | BODY MASS INDEX: 24.32 KG/M2 | SYSTOLIC BLOOD PRESSURE: 125 MMHG | OXYGEN SATURATION: 99 % | WEIGHT: 164.7 LBS | HEART RATE: 70 BPM | TEMPERATURE: 98.1 F

## 2021-06-04 VITALS
OXYGEN SATURATION: 98 % | WEIGHT: 163 LBS | BODY MASS INDEX: 23.39 KG/M2 | DIASTOLIC BLOOD PRESSURE: 54 MMHG | SYSTOLIC BLOOD PRESSURE: 98 MMHG | TEMPERATURE: 97.8 F | HEART RATE: 59 BPM

## 2021-06-04 VITALS
SYSTOLIC BLOOD PRESSURE: 114 MMHG | OXYGEN SATURATION: 100 % | DIASTOLIC BLOOD PRESSURE: 57 MMHG | TEMPERATURE: 97.6 F | WEIGHT: 165 LBS | HEART RATE: 55 BPM | BODY MASS INDEX: 24.72 KG/M2

## 2021-06-04 VITALS
WEIGHT: 168.7 LBS | BODY MASS INDEX: 24.15 KG/M2 | OXYGEN SATURATION: 99 % | TEMPERATURE: 97.9 F | HEIGHT: 70 IN | SYSTOLIC BLOOD PRESSURE: 107 MMHG | RESPIRATION RATE: 16 BRPM | HEART RATE: 64 BPM | DIASTOLIC BLOOD PRESSURE: 62 MMHG

## 2021-06-04 VITALS
OXYGEN SATURATION: 100 % | SYSTOLIC BLOOD PRESSURE: 95 MMHG | BODY MASS INDEX: 23.39 KG/M2 | DIASTOLIC BLOOD PRESSURE: 52 MMHG | TEMPERATURE: 97.6 F | WEIGHT: 163 LBS | HEART RATE: 67 BPM

## 2021-06-04 VITALS
OXYGEN SATURATION: 98 % | SYSTOLIC BLOOD PRESSURE: 130 MMHG | WEIGHT: 168 LBS | HEART RATE: 60 BPM | BODY MASS INDEX: 25.17 KG/M2 | TEMPERATURE: 98.2 F | DIASTOLIC BLOOD PRESSURE: 60 MMHG

## 2021-06-04 VITALS
TEMPERATURE: 97.8 F | DIASTOLIC BLOOD PRESSURE: 56 MMHG | SYSTOLIC BLOOD PRESSURE: 94 MMHG | OXYGEN SATURATION: 100 % | BODY MASS INDEX: 24.08 KG/M2 | HEART RATE: 63 BPM | WEIGHT: 167.8 LBS

## 2021-06-04 VITALS
SYSTOLIC BLOOD PRESSURE: 105 MMHG | DIASTOLIC BLOOD PRESSURE: 56 MMHG | WEIGHT: 165 LBS | HEART RATE: 69 BPM | BODY MASS INDEX: 23.68 KG/M2 | TEMPERATURE: 97.5 F | OXYGEN SATURATION: 100 %

## 2021-06-04 VITALS
HEART RATE: 63 BPM | DIASTOLIC BLOOD PRESSURE: 56 MMHG | WEIGHT: 162.7 LBS | OXYGEN SATURATION: 99 % | SYSTOLIC BLOOD PRESSURE: 107 MMHG | BODY MASS INDEX: 24.38 KG/M2 | TEMPERATURE: 97.9 F

## 2021-06-04 VITALS
BODY MASS INDEX: 23.7 KG/M2 | SYSTOLIC BLOOD PRESSURE: 106 MMHG | HEIGHT: 69 IN | DIASTOLIC BLOOD PRESSURE: 66 MMHG | HEART RATE: 76 BPM | WEIGHT: 160 LBS

## 2021-06-04 NOTE — PROGRESS NOTES
ASSESSMENT:  1. Subacute sinusitis  Patient with greater than 1 month symptoms of sinus congestion, possible underlying sinus infection.  - amoxicillin-clavulanate (AUGMENTIN) 500-125 mg per tablet; Take 1 tablet (500 mg total) by mouth 2 (two) times a day for 10 days.  Dispense: 20 tablet; Refill: 0        PLAN:  1.  Add Augmentin 500 mg, twice daily x10 days.  2.  Patient to continue symptomatic treatment.  3.  Follow-up as needed.      No orders of the defined types were placed in this encounter.    There are no discontinued medications.    Return in about 2 weeks (around 12/31/2019) for recheck w/ PCP if symptoms persist or worsen.    CHIEF COMPLAINT:  Chief Complaint   Patient presents with     Sinus Problem     sinus pressure for about 1 month. OTC products not resolving it        SUBJECTIVE:  Theo is a 74 y.o. male with a history of sinus problems and multiple myeloma presenting to the clinic with sinus pressure that has been ongoing for the past month. Patient says he needs continuing maintenance treatments for his multiple myeloma. This makes illnesses last much longer for him. He says he currently has green nasal discharge and congestion. He is not able to sleep much longer than an hour and a half because of his congestion. Patient says he regularly wakes up breathing from his mouth. He uses afrin and a nasal spray which provides him with minimal relief. Patient says his absolute neutrophil count was high last week.    He recently started taking xeralto.    REVIEW OF SYSTEMS:   All other systems are negative.    PFSH:  Immunization History   Administered Date(s) Administered     Hep B, historic 03/01/2011, 05/19/2011     HiB, historic,unspecified 03/01/2011, 05/19/2011     IPV 03/01/2011, 05/19/2011     Influenza high dose,seasonal,PF, 65+ yrs 10/07/2015, 09/21/2016, 09/20/2017, 09/26/2018, 10/03/2019     Influenza,seasonal quad, PF, =/> 6months 09/24/2014     Pneumo Conj 13-V (2010&after) 10/16/2015      Pneumo Polysac 23-V 2011     Tdap 2011     Social History     Socioeconomic History     Marital status:      Spouse name: Not on file     Number of children: Not on file     Years of education: Not on file     Highest education level: Not on file   Occupational History     Not on file   Social Needs     Financial resource strain: Not on file     Food insecurity:     Worry: Not on file     Inability: Not on file     Transportation needs:     Medical: Not on file     Non-medical: Not on file   Tobacco Use     Smoking status: Former Smoker     Last attempt to quit: 1975     Years since quittin.1     Smokeless tobacco: Never Used   Substance and Sexual Activity     Alcohol use: Not Currently     Comment: occasional     Drug use: No     Sexual activity: Never   Lifestyle     Physical activity:     Days per week: Not on file     Minutes per session: Not on file     Stress: Not on file   Relationships     Social connections:     Talks on phone: Not on file     Gets together: Not on file     Attends Gnosticism service: Not on file     Active member of club or organization: Not on file     Attends meetings of clubs or organizations: Not on file     Relationship status: Not on file     Intimate partner violence:     Fear of current or ex partner: Not on file     Emotionally abused: Not on file     Physically abused: Not on file     Forced sexual activity: Not on file   Other Topics Concern     Not on file   Social History Narrative     Not on file     Past Medical History:   Diagnosis Date     Anemia 2017     Arthritis      Bilateral inguinal hernia      Glaucoma      Glaucoma      History of blood clots     DVT - left chronic     Multiple myeloma (H)     Gets chemo infusions every 2 weeks     Pancytopenia (H)      Peripheral neuropathy      Syncope      TMJ (temporomandibular joint disorder)      Family History   Problem Relation Age of Onset     Heart disease Mother      Glaucoma Mother       Cancer Father      No Medical Problems Sister      Cancer Brother      Pancreatic cancer Brother      No Medical Problems Brother      Cancer Daughter      Skin cancer Daughter      Melanoma Daughter      Glaucoma Daughter        MEDICATIONS:  Current Outpatient Medications   Medication Sig Dispense Refill     acetaminophen (TYLENOL) 500 MG tablet Take 500 mg by mouth every 6 (six) hours as needed for pain.       acyclovir (ZOVIRAX) 400 MG tablet TAKE 1 TABLET BY MOUTH TWICE A  tablet 2     ascorbic acid (VITAMIN C) 1000 MG tablet Take 2,000 mg by mouth daily.        bimatoprost (LUMIGAN) 0.01 % Drop Administer 1 drop to both eyes at bedtime.       brimonidine (ALPHAGAN P) 0.1 % Drop Administer 1 drop to both eyes 3 (three) times a day.        calcium, as carbonate, (TUMS) 200 mg calcium (500 mg) chewable tablet Chew 1 tablet 3 (three) times a day as needed.        calcium-vitamin D 500 mg(1,250mg) -200 unit per tablet Take 1 tablet by mouth daily.       cetirizine (ZYRTEC) 10 MG tablet Take 10 mg by mouth daily.       CYANOCOBALAMIN, VITAMIN B-12, INJ Inject monthly as directed       gabapentin (NEURONTIN) 300 MG capsule TAKE 1-3 CAPSULES BY MOUTH FOUR TIMES DAILY (Patient taking differently: Take one capsule by mouth daily as needed) 240 capsule 11     lenalidomide (REVLIMID) 20 mg cap Take 20 mg by mouth every evening. Three weeks on and one week off 21 capsule 6     magnesium oxide (MAG-OX) 400 mg (241.3 mg magnesium) tablet TAKE 1 TABLET BY MOUTH TWICE A DAY (Patient taking differently: TAKE 1 TABLET BY MOUTH TWICE A DAY AS NEEDED) 60 tablet 3     multivitamin (MULTIVITAMIN) per tablet Take 1 tablet by mouth daily.       omega 3-dha-epa-fish oil 900-1,400 mg CpDR Take 1 tablet by mouth daily.       omeprazole (PRILOSEC) 20 MG capsule TAKE 1 CAPSULE BY MOUTH ONCE DAILY 90 capsule 3     potassium chloride (K-DUR,KLOR-CON) 20 MEQ tablet TAKE 1 TABLET BY MOUTH EVERY DAY 90 tablet 0     psyllium  (METAMUCIL) powder Take 1 packet by mouth daily.              rivaroxaban (XARELTO) 20 mg tablet Take 1 tablet (20 mg total) by mouth daily with supper. 90 tablet 3     sodium chloride (OCEAN) 0.65 % nasal spray 2 sprays into each nostril as needed for congestion. 15 mL 12     tamsulosin (FLOMAX) 0.4 mg cap TAKE ONE CAPSULE BY MOUTH DAILY 90 capsule 3     amoxicillin-clavulanate (AUGMENTIN) 500-125 mg per tablet Take 1 tablet (500 mg total) by mouth 2 (two) times a day for 10 days. 20 tablet 0     dexAMETHasone (DECADRON) 4 MG tablet Take 20 mg by mouth every 14 (fourteen) days Prior to chemotherapy. 60 tablet 1     guaiFENesin-dextromethorphan (MUCINEX DM) 600-30 mg Tb12 Take 1 tablet by mouth 2 (two) times a day as needed.        No current facility-administered medications for this visit.      Facility-Administered Medications Ordered in Other Visits   Medication Dose Route Frequency Provider Last Rate Last Dose     heparin 100 unit/mL lockflush (PF) porcine 300-600 Units  300-600 Units Intravenous PRN Per Clifford MD   600 Units at 14 1013       TOBACCO USE:  Social History     Tobacco Use   Smoking Status Former Smoker     Last attempt to quit: 1975     Years since quittin.1   Smokeless Tobacco Never Used       VITALS:  Vitals:    19 1148   BP: (!) 161/97   Pulse: 98   Weight: 169 lb (76.7 kg)     Wt Readings from Last 3 Encounters:   19 169 lb (76.7 kg)   19 164 lb 3.2 oz (74.5 kg)   19 167 lb (75.8 kg)       PHYSICAL EXAM:  Constitutional:   Reveals a healthy appearing man.    Vitals: per nursing notes.  Head: Atraumatic, Normocephalic  Nose: Bilateral congestion.  Ears:  External canals, TMs clear.    Eyes:  EOMs full, PERRL.  Lungs: Clear to A&P without rales or wheezes.  Respiratory effort normal.  Cardiac:   Regular rate and rhythm, normal S1, S2, no murmur or gallop.  Musculoskeletal: No peripheral swelling.  Neuro:  Alert and oriented. Cranial nerves, motor,  sensory exams are intact.  No gross focal deficits.  Psychiatric:  Memory intact, mood appropriate.    DATA REVIEWED:  Additional History from Old Records Summarized (2): None.  Decision to Obtain Records (1): None.  Radiology Tests Summarized or Ordered (1): None.  Labs Reviewed or Ordered (1): None.  Medicine Test Summarized or Ordered (1): None.  Independent Review of EKG, X-RAY, or RAPID STREP (2 each): None.    The visit lasted a total of 10 minutes face to face with the patient. Over 50% of the time was spent counseling and educating the patient about sinus pressure.    IKashif, am scribing for and in the presence of, Dr. Bran.    I, Dr. Bran, personally performed the services described in this documentation, as scribed by Kashif Neumann in my presence, and it is both accurate and complete.    Total data points: 1

## 2021-06-04 NOTE — TELEPHONE ENCOUNTER
"Pt reports nasal congestion and sinus pressure for \"a month.\"  Now phlegm has become \"dark, discolored.\"  Trouble sleeping at night due to prolific phlegm.  Has been using saline nasal sprays for 3 weeks.  However no improvement whatsoever.    Pt's voice exhibits significant congestion.  Has been taking Tylenol for frontal headache.  Also Mucinex with some relief.  \"But it's ongoing.\"  \"I might sleep for an hour, then wake up because I can't breathe through my nose.\"    Pt suspects his chemo status prevents improvement in current sinus issue.  Discussed adding warm fluids and honey to his daily regimen.  Pt also agrees to clinical eval to discuss options for current symptoms.  Warm transferred to a  for this purpose now.    Verna Jauregui RN  [facility] Triage     Reason for Disposition    Sinus congestion (pressure, fullness) present > 10 days    Protocols used: SINUS PAIN AND CONGESTION-A-OH      "

## 2021-06-04 NOTE — PROGRESS NOTES
Pt arrived ambulatory to clinic for Cycle # 131 Day # 1 of his chemotherapy regimen.  Port was accessed using aseptic technique without difficulties with excellent blood return.  Administered SQ Velcade injection into RLQ of ABD per MD order.  Pt tolerated procedures well, no s/s of bleeding or swelling at site.  Port was flushed with NS and Heparin then de-accessed using 2x2 and papertape.  Pt verbalized understanding of plan of care and return to clinic.

## 2021-06-04 NOTE — PROGRESS NOTES
Pt came into infusion clinic for his Velcade injection as ordered. Labs drawn as ordered. Pt tolerated injection well. Pt left infusion clinic via ambulatory and will RTC as sched.

## 2021-06-04 NOTE — TELEPHONE ENCOUNTER
"Pt calls regarding Augmentin Rx, prescribed at OV of 12/17/19 for sinusitis.  Has complied with Augmentin regimen.  \"My sinuses did clear up almost immediately.\"  \"Problem is the diarrhea onset again.\"    Pt has adhered to starchy carbs which has been successful to slow loose stools.  Pt has also taken two doses Metamucil powder daily \"which also helps.\"  Diarrhea has not been severe.  Actually improved today after adding dietary measures and doubling Metamucil per Dr Ott's past advice.    Due to pt's history of C-Diff, pt questions whether he should stop the Augmentin prior to completing regimen -> has 3 more days of med.  Discussed with pt that this advice could be obtained and called back to pt.  However pt decides that he is already quite worried about potential C-Diff onsetting.  Therefore prefers clinical eval rather than trying self-care measures such as OTC probiotic combined with dietary measures to control loose stools, along with prn Imodium.  No open appt slots whatsoever.  Pt therefore intends to [facility] with his questions about stopping Augmentin in addition to stool testing for C-Diff.    Verna Jauregui RN  [facility] Triage     Reason for Disposition    Caller has URGENT medication question about med that PCP prescribed and triager unable to answer question    Protocols used: MEDICATION QUESTION CALL-A-AH      "

## 2021-06-04 NOTE — PROGRESS NOTES
Chief Complaint   Patient presents with     Follow-up     from 12/17  pt worried about c diff, has had  painful diarrhea since taking antibiotic        HPI:  Theo Meyers is a 74 y.o. male who presents today complaining of concern for possible C. difficile infection after being treated for community-acquired pneumonia with Augmentin on 12/17/2019.  Patient reports that he has been having painful diarrhea. He denies any fever or blood in stool. He has not had any uncontrollable bowel movements. He denies any abdominal pain.     History obtained from the patient.    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  2014-06: DVT (deep venous thrombosis) (H)  Multiple myeloma (H)  Unspecified glaucoma  Cataract  Syncope and collapse  Elevated INR  Fever, unspecified fever cause  Chemotherapy-induced neutropenia (H)  Thrombocytopenia (H)  Parapneumonic effusion  Chest tube in place  SAMANO (dyspnea on exertion)  Immunocompromised state (H)      Past Medical History:   Diagnosis Date     Anemia 12/13/2017     Arthritis      Bilateral inguinal hernia      Glaucoma      Glaucoma      History of blood clots     DVT - left chronic     Multiple myeloma (H)     Gets chemo infusions every 2 weeks     Pancytopenia (H)      Peripheral neuropathy      Syncope      TMJ (temporomandibular joint disorder)        Social History     Tobacco Use     Smoking status: Former Smoker      Last attempt to quit: 1975     Years since quittin.1     Smokeless tobacco: Never Used   Substance Use Topics     Alcohol use: Not Currently     Comment: occasional       Review of Systems   Constitutional: Negative for fever.   Gastrointestinal: Positive for diarrhea. Negative for abdominal pain, blood in stool, nausea and vomiting.       Vitals:    19 1328   BP: 112/67   Patient Site: Right Arm   Patient Position: Sitting   Cuff Size: Adult Large   Pulse: 68   Resp: 16   Temp: 97.8  F (36.6  C)   TempSrc: Oral   SpO2: 97%   Weight: 165 lb (74.8 kg)       Physical Exam  Constitutional:       General: He is not in acute distress.     Appearance: He is well-developed. He is not diaphoretic.   HENT:      Head: Normocephalic and atraumatic.      Right Ear: External ear normal.      Left Ear: External ear normal.   Eyes:      General:         Right eye: No discharge.         Left eye: No discharge.      Conjunctiva/sclera: Conjunctivae normal.   Cardiovascular:      Rate and Rhythm: Normal rate and regular rhythm.      Heart sounds: Normal heart sounds.   Pulmonary:      Effort: Pulmonary effort is normal. No respiratory distress.      Breath sounds: Normal breath sounds.   Abdominal:      General: Abdomen is flat. There is no distension.      Palpations: Abdomen is soft.      Tenderness: There is no abdominal tenderness. There is no guarding.   Psychiatric:         Behavior: Behavior normal.         Thought Content: Thought content normal.         Judgment: Judgment normal.           Labs:  Results for orders placed or performed in visit on 19   C. difficile Toxigenic by PCR   Result Value Ref Range    C.Difficile Toxigenic by PCR Negative Negative    Ribotype 027/NAP1/B1 Presumptive Negative Presumptive Negative         Clinical Decision Making:  Patient is vitally stable without signs of any severe dehydration.  He is considered high risk for CHF due to his past medical history.  C. difficile  testing was negative today.  Recommend continued Augmentin and follow-up if diarrhea worsens or continues after Augmentin is completed.  At the end of the encounter, I discussed results, diagnosis, medications. Discussed red flags for immediate return to clinic/ER, as well as indications for follow up if no improvement. Patient understood and agreed to plan. Patient was stable for discharge.    1. Diarrhea, unspecified type  C. difficile Toxigenic by PCR         Patient Instructions   1. You will be notified of C Diff results when they are available   2. Practice very good hand hygiene.   3. Continue with the Augmentin as previously prescribed.   4. Continue with solid foods.

## 2021-06-04 NOTE — PATIENT INSTRUCTIONS - HE
1. You will be notified of C Diff results when they are available   2. Practice very good hand hygiene.   3. Continue with the Augmentin as previously prescribed.   4. Continue with solid foods.

## 2021-06-05 VITALS
OXYGEN SATURATION: 100 % | DIASTOLIC BLOOD PRESSURE: 67 MMHG | BODY MASS INDEX: 24.37 KG/M2 | HEART RATE: 73 BPM | WEIGHT: 165 LBS | SYSTOLIC BLOOD PRESSURE: 114 MMHG | TEMPERATURE: 97.6 F

## 2021-06-05 VITALS
WEIGHT: 166.8 LBS | BODY MASS INDEX: 24.63 KG/M2 | DIASTOLIC BLOOD PRESSURE: 54 MMHG | TEMPERATURE: 97.6 F | HEART RATE: 60 BPM | OXYGEN SATURATION: 100 % | SYSTOLIC BLOOD PRESSURE: 105 MMHG

## 2021-06-05 VITALS
SYSTOLIC BLOOD PRESSURE: 121 MMHG | HEART RATE: 72 BPM | TEMPERATURE: 97.5 F | HEIGHT: 70 IN | DIASTOLIC BLOOD PRESSURE: 56 MMHG | BODY MASS INDEX: 23.39 KG/M2 | OXYGEN SATURATION: 100 % | WEIGHT: 163.4 LBS

## 2021-06-05 VITALS
HEART RATE: 98 BPM | SYSTOLIC BLOOD PRESSURE: 126 MMHG | TEMPERATURE: 97.8 F | DIASTOLIC BLOOD PRESSURE: 56 MMHG | WEIGHT: 167.8 LBS | BODY MASS INDEX: 24.85 KG/M2 | OXYGEN SATURATION: 99 % | HEIGHT: 69 IN

## 2021-06-05 VITALS
TEMPERATURE: 97.4 F | SYSTOLIC BLOOD PRESSURE: 119 MMHG | DIASTOLIC BLOOD PRESSURE: 61 MMHG | WEIGHT: 164.3 LBS | HEIGHT: 69 IN | BODY MASS INDEX: 24.34 KG/M2 | HEART RATE: 67 BPM | OXYGEN SATURATION: 100 %

## 2021-06-05 VITALS
OXYGEN SATURATION: 100 % | TEMPERATURE: 97.8 F | DIASTOLIC BLOOD PRESSURE: 71 MMHG | SYSTOLIC BLOOD PRESSURE: 111 MMHG | HEART RATE: 75 BPM | WEIGHT: 163 LBS | BODY MASS INDEX: 24.07 KG/M2

## 2021-06-05 NOTE — PROGRESS NOTES
Keith came to chemo infusion this morning for his next dose of Velcade.  VSS.  Pt assessed.  He is well educated on his treatment plan.  He received velcade as ordered and tolerated it well while in clinic today. Theo de santiago from clinic ambulatory and unaccompanied.

## 2021-06-05 NOTE — PROGRESS NOTES
Pt arrived to infusion, port accessed with great blood return. Labs reviewed. Port de-accessed, bandaid applied to site. Pt tolerated Velcade injection well to RLQ of ABD. Pt ambulated independently out of infusion clinic.

## 2021-06-05 NOTE — PROGRESS NOTES
BertBLU Meyers, 74 y.o., male arrived ambulatory to clinic at 0805 for Cycle #133 Day #1 of his chemotherapy regimen. Port was accessed using aseptic technique without difficulties with excellent blood return. Labs drawn and reviewed. Administered Velcade SQ per MD order. He tolerated injection well. Port was flushed with NS and Heparin then de-accessed using 2x2 and papertape. Theo HURT Mira verbalized understanding of plan of care and return to clinic. Discharged to [facility] at 0935 alert and ambulatory.

## 2021-06-06 NOTE — PROGRESS NOTES
Pt arrived ambulatory for labs and Velcade injection. Injection given into left lower abdomen and bandaid applied. Pt is aware of next RTC.

## 2021-06-06 NOTE — TELEPHONE ENCOUNTER
Keith calls back in wondering if he really needs labs on 2/26 or if he had everything drawn on 2/19.  He states that he did see his multiple myeloma labs and his ratio is the worst he has ever seen it so he thought maybe Dr Clifford would want a redraw on that this week.  I let him know that I will touch base with Dr Clifford tomorrow and let him know.  He will keep his appt for labs on 2/26 for now.    Mere Machado RN

## 2021-06-06 NOTE — PROGRESS NOTES
[facility] Hematology and Oncology Progress Note    Patient: Theo Meyers  MRN: [id number]  Date of Service: January 8, 2020      Assessment and Plan:    1. Kappa light chain myeloma: Kappa free light chains and subsequently the ratio, are up a little bit today.  However only faint light chains on the immunofixation.  We will continue to follow per usual plan to drawing labs.  He has no clinical evidence of progressive disease.  Hemoglobin and kidney function are stable.  Continue Revlimid 21 days on and 7 days.  Velcade every 2 weeks.  If there is clinical suspicion of progression then we will need to confirm with another bone marrow. We will see him in clinic roughly every 2 months.  He is on Xarelto for thromboprophylaxis.  20 mg daily.  Could reduce to 10 mg daily.  He is now just over 10 years out from his diagnosis.    2.  Pancytopenia: Generally stable.  Continuing B12 injections monthly.  I believe this is from the Revlimid.    ECOG Performance   ECOG Performance Status: 1    Distress Assessment  Distress Assessment Score: No distress    Pain  Currently in Pain: No/denies    Diagnosis:    1.  IgG kappa light chain myeloma. Hyposecretory. Diagnosed 2009. No significant measurable M protein. 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 There was also on 11;14 translocation.  Past immunofixations from 2011 show IgG-kappa.  Bone marrow biopsy at 5%.  Residual kappa light chain myeloma involving 20% of nucleated cells.  No significant change from November 2016 marrow cellularity.    2.  Deep vein thrombosis of the left leg diagnosed 9/2012 while on treatment.    3.  Vitamin B12 deficiency diagnosed in September 2017.    4.  GI bleed and anemia requiring hospitalization in December 2017.    Treatment:    He presented with a lytic lesion involving the C6 vertebral body, although no measurable M protein. IgG kappa monoclonal immunoglobulin was confirmed by ELMA as well as elevated kappa free light chains  with an abnormal kappa lambda ratio. Bone marrow biopsy showed 30% involvement and cytogenetics confirmed deletion of 13 q. as well as 11;14 translocation. He was initially treated with Velcade and dexamethasone and achieved a good partial response.     He was referred to the [facility] where he underwent high-dose chemotherapy with autologous peripheral stem cell transplant in April of 2010. He began Revlimid as maintenance therapy post transplant. Repeat bone marrow biopsy done October 2010 showed about 5% kappa restricted plasma cells and so at that time weekly Velcade was added along with his maintenance Revlimid and dexamethasone.   He has done well since that time with stable minimal residual disease, best assessed by serum free light chains. He required dose reductions of weekly Velcade because of cytopenias and was ultimately switched to a q 2 week maintenance schedule which he has been on since September 2012.     His Revlimid dose was reduced to 15 mg daily and he continues on dexamethasone 20 mg p.o. weekly  Revlimid dosing changed to 20 mg, 3 weeks on 1 week (instead of 15 mg daily) for cost reasons.  Started March, 2018    Interim History:    Theo returns today for follow-up visit.  Generally doing okay.  No issues with breathing or coughing.  He is just finishing up an antibiotic course for a cold.  Augmentin.  No fevers.  No new areas of pain.    Review of Systems:    As above in the history.     Review of Systems otherwise Negative for:  General: chills, fever or night sweats  Psychological: anxiety or depression  Ophthalmic: blurry vision, double vision or loss of vision, vision change  ENT: epistaxis, oral lesions, hearing changes  Hematological and Lymphatic: bleeding, bruising, jaundice, swollen lymph nodes  Endocrine: hot flashes, unexpected weight changes  Respiratory: cough, hemoptysis, orthopnea  Cardiovascular: chest pain, edema, palpitations or PND  Gastrointestinal: abdominal  pain, blood in stools, change in bowel habits, constipation, diarrhea or nausea/vomiting  Genito-Urinary: change in urinary stream, incontinence, frequency/urgency  Musculoskeletal: joint pain, stiffness, swelling, muscle pain  Neurological: dizziness, headaches, numbness/tingling  Dermatological: lumps and rash    ECOG performance status is 0    Patient Coping  Patient Coping: Accepting  Accompanied by  Accompanied by: Alone    Past History:    Past Medical History:   Diagnosis Date     Anemia 12/13/2017     Arthritis      Bilateral inguinal hernia      Glaucoma      Glaucoma      History of blood clots     DVT - left chronic     Multiple myeloma (H)     Gets chemo infusions every 2 weeks     Pancytopenia (H)      Peripheral neuropathy      Syncope      TMJ (temporomandibular joint disorder)      Physical Exam:    Recent Vitals 2/5/2020   Weight 163 lbs   BSA (m2) 1.91 m2   BP 95/52   Pulse 67   Temp 97.6   Temp src 1   SpO2 100   Some recent data might be hidden     General: patient appears stated age of 74 y.o.. Nontoxic and in no distress.   HEENT: Head: atraumatic, normocephalic. Sclerae anicteric.  Chest:  Normal respiratory effort.   Cardiac:  No edema.   Abdomen: abdomen is non-distended  Extremities: normal tone and muscle bulk.   Skin: no lesions or rash. Warm and dry.   CNS: alert and oriented. Grossly non-focal.   Psychiatric: normal mood and affect.     Lab Results:    Results for LENIN SCOTT (MRN [id number]) as of 2/11/2020 09:02   Ref. Range 1/8/2020 08:55   Sodium Latest Ref Range: 136 - 145 mmol/L 139   Potassium Latest Ref Range: 3.5 - 5.0 mmol/L 4.4   Chloride Latest Ref Range: 98 - 107 mmol/L 108 (H)   CO2 Latest Ref Range: 22 - 31 mmol/L 27   Anion Gap, Calculation Latest Ref Range: 5 - 18 mmol/L 4 (L)   BUN Latest Ref Range: 8 - 28 mg/dL 14   Creatinine Latest Ref Range: 0.70 - 1.30 mg/dL 0.86   GFR MDRD Af Amer Latest Ref Range: >60 mL/min/1.73m2 >60   GFR MDRD Non Af Amer Latest Ref  Range: >60 mL/min/1.73m2 >60   Calcium Latest Ref Range: 8.5 - 10.5 mg/dL 8.9   AST Latest Ref Range: 0 - 40 U/L 15   ALT Latest Ref Range: 0 - 45 U/L 19   ALBUMIN Latest Ref Range: 3.5 - 5.0 g/dL 3.4 (L)   Protein, Total Latest Ref Range: 6.0 - 8.0 g/dL 5.4 (L)   Alkaline Phosphatase Latest Ref Range: 45 - 120 U/L 73   Bilirubin, Total Latest Ref Range: 0.0 - 1.0 mg/dL 0.6   Glucose Latest Ref Range: 70 - 125 mg/dL 103   WBC Latest Ref Range: 4.0 - 11.0 thou/uL 2.0 (L)   RBC Latest Ref Range: 4.40 - 6.20 mill/uL 2.76 (L)   Hemoglobin Latest Ref Range: 14.0 - 18.0 g/dL 9.8 (L)   Hematocrit Latest Ref Range: 40.0 - 54.0 % 30.2 (L)   MCV Latest Ref Range: 80 - 100 fL 109 (H)   MCH Latest Ref Range: 27.0 - 34.0 pg 35.5 (H)   MCHC Latest Ref Range: 32.0 - 36.0 g/dL 32.5   RDW Latest Ref Range: 11.0 - 14.5 % 16.4 (H)   Platelets Latest Ref Range: 140 - 440 thou/uL 129 (L)   MPV Latest Ref Range: 8.5 - 12.5 fL 11.7   Neutrophils % Latest Ref Range: 50 - 70 % 73 (H)   Lymphocytes % Latest Ref Range: 20 - 40 % 18 (L)   Monocytes % Latest Ref Range: 2 - 10 % 5   Eosinophils % Latest Ref Range: 0 - 6 % 1   Basophils % Latest Ref Range: 0 - 2 % 2   Neutrophils Absolute Latest Ref Range: 2.0 - 7.7 thou/uL 1.5 (L)   Lymphocytes Absolute Latest Ref Range: 0.8 - 4.4 thou/uL 0.4 (L)   Monocytes Absolute Latest Ref Range: 0.0 - 0.9 thou/uL 0.1   Eosinophils Absolute Latest Ref Range: 0.0 - 0.4 thou/uL 0.0   Basophils Absolute Latest Ref Range: 0.0 - 0.2 thou/uL 0.0     Imaging:    No results found.       Signed by: Per Clifford MD

## 2021-06-06 NOTE — TELEPHONE ENCOUNTER
"Call placed to patient per Dr Clifford: \"Light chain ratio was down some.  Please let him know.  I would favor following up as we have been without a bone marrow biopsy at this time since number has come down.\"  Patient verbalized understanding and will follow up as recommended.    "

## 2021-06-06 NOTE — PROGRESS NOTES
[facility] Hematology and Oncology Progress Note    Patient: Theo Meyers  MRN: [id number]  Date of Service:  March 4, 2020      Assessment and Plan:    1. Kappa light chain myeloma: His labs were reviewed.  Light chain ratio spiked up last month but is now trending back downward.  Reviewed these data with patient.  For now we will continue to watch and recheck labs per our usual schedule.  I told him the next step would be a bone marrow biopsy if we are concerned about progressive myeloma.  We would need to document more plasma cells in the bone marrow before we change therapy.   I think he is otherwise tolerating his regimen well.  Is stable cytopenias but no other symptoms.  Continue Revlimid 21 days on and 7 days.  Velcade every 2 weeks.  If there is clinical suspicion of progression then we will need to confirm with another bone marrow. We will see him in clinic roughly every 2 months.  He is on Xarelto for thromboprophylaxis.  20 mg daily.  Could reduce to 10 mg daily.    2.  Pancytopenia: Generally stable.  Continuing B12 injections monthly.  I believe this is from the Revlimid.  The neutropenia is the most significant.  He has not been having frequent infections clinically.    ECOG Performance   ECOG Performance Status: 1    Distress Assessment  Distress Assessment Score: No distress    Pain  Currently in Pain: No/denies    Diagnosis:    1.  IgG kappa light chain myeloma. Hyposecretory. Diagnosed 2009. No significant measurable M protein. 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 There was also on 11;14 translocation.  Past immunofixations from 2011 show IgG-kappa.  Bone marrow biopsy at 5%.  Residual kappa light chain myeloma involving 20% of nucleated cells.  No significant change from November 2016 marrow cellularity.    2.  Deep vein thrombosis of the left leg diagnosed 9/2012 while on treatment.    3.  Vitamin B12 deficiency diagnosed in September 2017.    4.  GI bleed and anemia  requiring hospitalization in December 2017.    Treatment:    He presented with a lytic lesion involving the C6 vertebral body, although no measurable M protein. IgG kappa monoclonal immunoglobulin was confirmed by [facility] as well as elevated kappa free light chains with an abnormal kappa lambda ratio. Bone marrow biopsy showed 30% involvement and cytogenetics confirmed deletion of 13 q. as well as 11;14 translocation. He was initially treated with Velcade and dexamethasone and achieved a good partial response.     He was referred to the [facility] where he underwent high-dose chemotherapy with autologous peripheral stem cell transplant in April of 2010. He began Revlimid as maintenance therapy post transplant. Repeat bone marrow biopsy done October 2010 showed about 5% kappa restricted plasma cells and so at that time weekly Velcade was added along with his maintenance Revlimid and dexamethasone.   He has done well since that time with stable minimal residual disease, best assessed by serum free light chains. He required dose reductions of weekly Velcade because of cytopenias and was ultimately switched to a q 2 week maintenance schedule which he has been on since September 2012.     His Revlimid dose was reduced to 15 mg daily and he continues on dexamethasone 20 mg p.o. weekly  Revlimid dosing changed to 20 mg, 3 weeks on 1 week (instead of 15 mg daily) for cost reasons.  Started March, 2018    Interim History:    Theo returns today for follow-up visit.  Generally doing okay.  Still walking 2 miles a day.  However he notes his stamina is decreased.  No bleeding.  No melena.  No lightheadedness or dizziness.  He recently had a course of Augmentin for sinus infection.    Review of Systems:    As above in the history.     Review of Systems otherwise Negative for:  General: chills, fever or night sweats  Psychological: anxiety or depression  Ophthalmic: blurry vision, double vision or loss of vision, vision  change  ENT: epistaxis, oral lesions, hearing changes  Hematological and Lymphatic: bleeding, bruising, jaundice, swollen lymph nodes  Endocrine: hot flashes, unexpected weight changes  Respiratory: cough, hemoptysis, orthopnea  Cardiovascular: chest pain, edema, palpitations or PND  Gastrointestinal: abdominal pain, blood in stools, change in bowel habits, constipation, diarrhea or nausea/vomiting  Genito-Urinary: change in urinary stream, incontinence, frequency/urgency  Musculoskeletal: joint pain, stiffness, swelling, muscle pain  Neurological: dizziness, headaches, numbness/tingling  Dermatological: lumps and rash    ECOG performance status is 0    Patient Coping  Patient Coping: Accepting  Accompanied by  Accompanied by: Alone    Past History:    Past Medical History:   Diagnosis Date     Anemia 12/13/2017     Arthritis      Bilateral inguinal hernia      Glaucoma      Glaucoma      History of blood clots     DVT - left chronic     Multiple myeloma (H)     Gets chemo infusions every 2 weeks     Pancytopenia (H)      Peripheral neuropathy      Syncope      TMJ (temporomandibular joint disorder)      Physical Exam:    Recent Vitals 3/4/2020   Weight 165 lbs   BSA (m2) 1.92 m2   /56   Pulse 69   Temp 97.5   Temp src 1   SpO2 100   Some recent data might be hidden     General: patient appears stated age of 74 y.o.. Nontoxic and in no distress.   HEENT: Head: atraumatic, normocephalic. Sclerae anicteric.  Chest:  Normal respiratory effort.   Cardiac:  No edema.   Abdomen: abdomen is non-distended  Extremities: normal tone and muscle bulk.   Skin: no lesions or rash. Warm and dry.   CNS: alert and oriented. Grossly non-focal.   Psychiatric: normal mood and affect.     Lab Results:    Recent Results (from the past 240 hour(s))   Comprehensive Metabolic Panel    Collection Time: 03/04/20  8:27 AM   Result Value Ref Range    Sodium 140 136 - 145 mmol/L    Potassium 4.5 3.5 - 5.0 mmol/L    Chloride 109 (H) 98 -  107 mmol/L    CO2 27 22 - 31 mmol/L    Anion Gap, Calculation 4 (L) 5 - 18 mmol/L    Glucose 89 70 - 125 mg/dL    BUN 20 8 - 28 mg/dL    Creatinine 0.99 0.70 - 1.30 mg/dL    GFR MDRD Af Amer >60 >60 mL/min/1.73m2    GFR MDRD Non Af Amer >60 >60 mL/min/1.73m2    Bilirubin, Total 0.6 0.0 - 1.0 mg/dL    Calcium 8.8 8.5 - 10.5 mg/dL    Protein, Total 5.2 (L) 6.0 - 8.0 g/dL    Albumin 3.2 (L) 3.5 - 5.0 g/dL    Alkaline Phosphatase 59 45 - 120 U/L    AST 15 0 - 40 U/L    ALT 15 0 - 45 U/L   HM1 (CBC with Diff)    Collection Time: 03/04/20  8:27 AM   Result Value Ref Range    WBC 1.4 (LL) 4.0 - 11.0 thou/uL    RBC 2.61 (L) 4.40 - 6.20 mill/uL    Hemoglobin 9.3 (L) 14.0 - 18.0 g/dL    Hematocrit 28.8 (L) 40.0 - 54.0 %     (H) 80 - 100 fL    MCH 35.6 (H) 27.0 - 34.0 pg    MCHC 32.3 32.0 - 36.0 g/dL    RDW 15.5 (H) 11.0 - 14.5 %    Platelets 129 (L) 140 - 440 thou/uL    MPV 11.3 8.5 - 12.5 fL    Neutrophils % 58 50 - 70 %    Lymphocytes % 21 20 - 40 %    Monocytes % 13 (H) 2 - 10 %    Eosinophils % 6 0 - 6 %    Basophils % 1 0 - 2 %    Neutrophils Absolute 0.8 (L) 2.0 - 7.7 thou/uL    Lymphocytes Absolute 0.3 (L) 0.8 - 4.4 thou/uL    Monocytes Absolute 0.2 0.0 - 0.9 thou/uL    Eosinophils Absolute 0.1 0.0 - 0.4 thou/uL    Basophils Absolute 0.0 0.0 - 0.2 thou/uL   Kappa/Lambda Quantitative Free Light Chains and Ratio, Serum(FLCS)    Collection Time: 03/04/20  8:27 AM   Result Value Ref Range    Kappa Free Lt Chain 60.34 (H) 0.33 - 1.94 mg/dL    Lambda Free Lt Chain 0.64 0.57 - 2.63 mg/dL    Kappa Lambda Ratio 94.28 (H) 0.26 - 1.65     Imaging:    No results found.       Signed by: Per Clifford MD

## 2021-06-06 NOTE — PROGRESS NOTES
Pt came into infusion clinic for his Velcade injection as ordered. Labs reviewed. Pt tolerated injection well. Pt left infusion clinic via ambulatory and will RTC as sched.

## 2021-06-06 NOTE — TELEPHONE ENCOUNTER
Patient calls in and leaves message on nurse triage line stating that he has a lab only appt this week and then a follow-up with Dr Clifford next week and he has a question.  When I called him back, he did not answer so a message was left asking him to call back.    Mere Machado RN

## 2021-06-06 NOTE — PROGRESS NOTES
BertBLU Meyers, 74 y.o., male arrived ambulatory to clinic at 0800 for Cycle #133 Day #15 of his chemotherapy regimen. Port was accessed using aseptic technique without difficulties with excellent blood return. Labs drawn. VSS. Administered Velcade SQ per MD order. He tolerated injection well. Port was flushed with NS and Heparin then de-accessed using 2x2 and papertape. Theo HURT Mira verbalized understanding of plan of care and return to clinic. Discharged to [facility] at 0835 alert and ambulatory.

## 2021-06-06 NOTE — TELEPHONE ENCOUNTER
Dr. Clifford has reviewed the patient's labs that were drawn in 2/19/2020.  He states that Keith does not meet criteria to have a treatment change at this time.  Instead of waiting 2 months to check his immunoglobulin free light chain serum again we will do this in roughly 4 weeks, 3/18.  They verbalized understanding and would like to come in on the 18th at 8 AM for a lab port draw.  I let him know we would add this on.    Mere Machado RN

## 2021-06-07 NOTE — PROGRESS NOTES
[facility] Hematology and Oncology Progress Note    Patient: Theo Meyers  MRN: [id number]  Date of Service:  March 4, 2020      Assessment and Plan:    1. Kappa light chain myeloma:   His labs were reviewed.  Light chain ratio spiked up in February, but is now trending back downward the last two months.     For now we will continue to watch and recheck labs per our usual schedule. The next step would be a bone marrow biopsy if we are concerned about progressive myeloma.  We would need to document more plasma cells in the bone marrow before we change therapy.     His WBC and ANC have been trending lower these past months. ANC has ranged between 0.6 - 0.9 the past two cycles. No infections. It's stable today, so will continue at same doses but will trend ANC and if trending lower (threshold <0.6), will dose-reduce the Revlimid to 15 mg daily 21 days on/7 days off when he's due for his next cycle.    For now, he will continue Revlimid 21 days on and 7 days (he is 3 days into this cycle).   Velcade every 2 weeks.   Dexamethasone 20 mg every 2 weeks.    We will see him in clinic roughly every 2 months.     He is on Xarelto for thromboprophylaxis.    2.  Pancytopenia:   Generally stable over last two months, but trending lower.  We believe this is from the Revlimid.  The neutropenia is the most significant.  He has not been having frequent infections clinically. See above plan.    He had been receiving Vit B12 injections through June, 2019, but hasn't had any since. I will get an updated Vit B12 level and we will likely resume if running low.    ADDENDUM: Vit B12 trending lower end of normal since off the injections. Will resume.    ECOG Performance   ECOG Performance Status: 1    Distress Assessment  Distress Assessment Score: 1    Pain  Currently in Pain: No/denies    Diagnosis:    1.  IgG kappa light chain myeloma. Hyposecretory. Diagnosed 2009. No significant measurable M protein. Initial cytogenetic analysis  showed a deletion 13q. There was also on 11;14 translocation.  Past immunofixations from 2011 show IgG-kappa.  Bone marrow biopsy at 5%.  Residual kappa light chain myeloma involving 20% of nucleated cells.  No significant change from November 2016 marrow cellularity.    2.  Deep vein thrombosis of the left leg diagnosed 9/2012 while on treatment.    3.  Vitamin B12 deficiency diagnosed in September 2017.    4.  GI bleed and anemia requiring hospitalization in December 2017.    Treatment:    He presented with a lytic lesion involving the C6 vertebral body, although no measurable M protein. IgG kappa monoclonal immunoglobulin was confirmed by [facility] as well as elevated kappa free light chains with an abnormal kappa lambda ratio. Bone marrow biopsy showed 30% involvement and cytogenetics confirmed deletion of 13 q. as well as 11;14 translocation. He was initially treated with Velcade and dexamethasone and achieved a good partial response.     He was referred to the [facility] where he underwent high-dose chemotherapy with autologous peripheral stem cell transplant in April of 2010. He began Revlimid as maintenance therapy post transplant. Repeat bone marrow biopsy done October 2010 showed about 5% kappa restricted plasma cells and so at that time weekly Velcade was added along with his maintenance Revlimid and dexamethasone.   He has done well since that time with stable minimal residual disease, best assessed by serum free light chains. He required dose reductions of weekly Velcade because of cytopenias and was ultimately switched to a q 2 week maintenance schedule which he has been on since September 2012.     His Revlimid dose was reduced to 15 mg daily and he continues on dexamethasone 20 mg p.o. weekly  Revlimid dosing changed to 20 mg, 3 weeks on 1 week (instead of 15 mg daily) for cost reasons.  Started March, 2018    Interim History:    Keith continues on Revlimid, Velcade and dexamethasone. Over the  "past two months, he is doing very well without new concerns. Good appetite with weight gain.  Still walking 2 miles a day.  No bleeding.  No melena.  No lightheadedness or dizziness. No fevers or recurrent infections. No new pain. Intermittent/mild diarrhea on treatment is stable, manages with Metamucil.    Review of Systems:    As above in the history.    ECOG performance status is 0    Patient Coping  Patient Coping: Accepting;Open/discussion  Accompanied by  Accompanied by: Alone    Past History:    Past Medical History:   Diagnosis Date     Anemia 12/13/2017     Arthritis      Bilateral inguinal hernia      Glaucoma      Glaucoma      History of blood clots     DVT - left chronic     Multiple myeloma (H)     Gets chemo infusions every 2 weeks     Pancytopenia (H)      Peripheral neuropathy      Syncope      TMJ (temporomandibular joint disorder)      Physical Exam:    Recent Vitals 4/29/2020   Height 5' 10\"   Weight 168 lbs 11 oz   BSA (m2) 1.94 m2   /62   Pulse 64   Temp 97.9   Temp src -   SpO2 99   Some recent data might be hidden     General: patient appears stated age of 75 y.o.. Nontoxic and in no distress.     Lab Results:    Recent Results (from the past 240 hour(s))   Comprehensive Metabolic Panel    Collection Time: 04/29/20  8:55 AM   Result Value Ref Range    Sodium 141 136 - 145 mmol/L    Potassium 4.7 3.5 - 5.0 mmol/L    Chloride 110 (H) 98 - 107 mmol/L    CO2 25 22 - 31 mmol/L    Anion Gap, Calculation 6 5 - 18 mmol/L    Glucose 97 70 - 125 mg/dL    BUN 26 8 - 28 mg/dL    Creatinine 1.18 0.70 - 1.30 mg/dL    GFR MDRD Af Amer >60 >60 mL/min/1.73m2    GFR MDRD Non Af Amer 60 (L) >60 mL/min/1.73m2    Bilirubin, Total 0.4 0.0 - 1.0 mg/dL    Calcium 8.9 8.5 - 10.5 mg/dL    Protein, Total 5.0 (L) 6.0 - 8.0 g/dL    Albumin 3.2 (L) 3.5 - 5.0 g/dL    Alkaline Phosphatase 63 45 - 120 U/L    AST 14 0 - 40 U/L    ALT 15 0 - 45 U/L   HM1 (CBC with Diff)    Collection Time: 04/29/20  8:55 AM   Result " Value Ref Range    WBC 1.5 (LL) 4.0 - 11.0 thou/uL    RBC 2.53 (L) 4.40 - 6.20 mill/uL    Hemoglobin 9.2 (L) 14.0 - 18.0 g/dL    Hematocrit 27.8 (L) 40.0 - 54.0 %     (H) 80 - 100 fL    MCH 36.4 (H) 27.0 - 34.0 pg    MCHC 33.1 32.0 - 36.0 g/dL    RDW 15.0 (H) 11.0 - 14.5 %    Platelets 104 (L) 140 - 440 thou/uL    MPV 10.9 8.5 - 12.5 fL    Neutrophils % 57 50 - 70 %    Lymphocytes % 25 20 - 40 %    Monocytes % 10 2 - 10 %    Eosinophils % 6 0 - 6 %    Basophils % 1 0 - 2 %    Neutrophils Absolute 0.8 (L) 2.0 - 7.7 thou/uL    Lymphocytes Absolute 0.4 (L) 0.8 - 4.4 thou/uL    Monocytes Absolute 0.1 0.0 - 0.9 thou/uL    Eosinophils Absolute 0.1 0.0 - 0.4 thou/uL    Basophils Absolute 0.0 0.0 - 0.2 thou/uL     Imaging:    No results found.     Billing:  Virtual video visit. Total time 25 minutes.      Signed by: Gretchen Carballo, JESICA

## 2021-06-07 NOTE — PROGRESS NOTES
Pt ambulates to infusion center for lab draw and treatment.  IVAD accessed, labs drawn et sent w/results noted and reviewed w/Dr. Clifford. Dr. Cilfford approved treatment for today. IVAD flushed w/NS et heparin and needle deaccessed.  Velcade given SQ as ordered.  Pt left clinic stable to [facility].  Plan RTC as scheduled.

## 2021-06-07 NOTE — PROGRESS NOTES
"Theo Meyers is a 75 y.o. male who is being evaluated via a billable video visit.      The patient has been notified of following:     \"This video visit will be conducted via a call between you and your physician/provider. We have found that certain health care needs can be provided without the need for an in-person physical exam.  This service lets us provide the care you need with a video conversation.  If a prescription is necessary we can send it directly to your pharmacy.  If lab work is needed we can place an order for that and you can then stop by our lab to have the test done at a later time.    Video visits are billed at different rates depending on your insurance coverage. Please reach out to your insurance provider with any questions.    If during the course of the call the physician/provider feels a video visit is not appropriate, you will not be charged for this service.\"    Patient has given verbal consent to a Video visit? Yes Yes    Patient would like to receive their AVS by AVS Preference: Mail a copy.    Patient would like the video invitation sent by: Text to cell phone: [phone] phone    Will anyone else be joining your video visit? No No        Video Start Time: 0946. 25 minutes total time     Video-Visit Details    Type of service:  Video Visit    Video End Time (time video stopped): 1011  Originating Location (pt. Location): in clinic exam room    Distant Location (provider location):  [facility] CANCER CARE AND [facility]ATOLOGY     Mode of Communication:  Video Conference via [employer]        "

## 2021-06-07 NOTE — PROGRESS NOTES
Per MICHAEL Carballo patient needs to restart his vitamin B12 injections due to his lab level of 04/29. I called and let patient know that we will resume these in 2 weeks when he is back for his velcade and then monthly thereafter.  Gretchen put in order for this.    Gloria Rocha, CMA

## 2021-06-07 NOTE — PROGRESS NOTES
Pt ambulates to infusion center for labs and treatment.  IVAD accessed, labs drawn et sent.  IVAD flushed w/NS et heparin and needle deaccessed.  Velcade given SQ as ordered.  Pt left clinic stable to lobby.  Plan RTC as scheduled.

## 2021-06-07 NOTE — PROGRESS NOTES
Pt ambulates to infusion center for lab draw prior to NP visit.  IVAD accessed, flushes easily w/only intermittent blood return noted.  IVAD flushed w/NS et heparin and needle deaccessed.  Labs were drawn peripherally.  Pt had video conference with NP and orders were approved.  Velcade given SQ as ordered without difficulty.  Pt left clinic stable to [facility].  Plan RTC as scheduled.

## 2021-06-08 NOTE — PROGRESS NOTES
Information on daratumumab, pomalidomide and dexamethasone has been sent to patient per his request.    Mere Machado RN 2/8/17 [id number]

## 2021-06-08 NOTE — PROGRESS NOTES
[facility] Hematology and Oncology Progress Note    Patient: Theo Meyers  MRN: [id number]  Date of Service: 01/12/2017        Reason for Visit    Chief Complaint   Patient presents with     HE Cancer       Assessment and Plan  Kappa light chain myeloma    Presented with compression fracture ~ 2008    Managed with RVD to date    Minor paresthesias in fingers and toes    Gradually increasing free light chain, no current end organ impact  PLAN - Monitor light chains, other parameters, continue same therapy for now (Cycle 94 of RVD)    Previous DVT  On warfarin    Easy bruising    Extensive hip ecchymosis with fall on the ice last month    INR is in target range 2-3  PLAN - Continue warfarin    ECOG Performance   ECOG Performance Status: 0    Distress Assessment  Distress Assessment Score: No distress    Pain  Currently in Pain: No/denies    Problem List    1. Multiple myeloma not having achieved remission     2. DVT (deep venous thrombosis)        ______________________________________________________________________________    Interval History    He's had a couple of interval orthopedic events - a slip on the ice last month with a big bruise on his left hip, and more recently some soreness in his left wrist that was provoked by hours of snow shoveling, and relieved by using a splint.  Apart from that, he's actually having a very busy, happy life, taking care of dogs and grandchildren, helping his wife build a new house, various sports and fitness activities.    We discussed that his free light chains have been trending up for a little while, but at the current time, his disease really is pretty stable clinically despite the biochemical changes.  Dr. Clifford has also discussed this in the past and the agreement had been to stay the course for the time being.    Pain Status  Currently in Pain: No/denies    Review of Systems    Constitutional  Constitutional (WDL): Exceptions to WDL  Fatigue: Fatigue relieved by  rest  Neurosensory  Neurosensory (WDL): Exceptions to WDL  Peripheral Motor Neuropathy: Asymptomatic, clinical or diagnostic observations only, intervention not indicated  Peripheral Sensory Neuropathy: Asymptomatic, loss of deep tendon reflexes or paresthesia  Cardiovascular  Cardiovascular (WDL): All cardiovascular elements are within defined limits  Pulmonary  Respiratory (WDL): Within Defined Limits  Gastrointestinal  Gastrointestinal (WDL): All gastrointestinal elements are within defined limits  Genitourinary  Genitourinary (WDL): All genitourinary elements are within defined limits  Integumentary  Integumentary (WDL): All integumentary elements are within defined limits  Patient Coping  Patient Coping: Accepting  Distress Assessment  Distress Assessment Score: No distress  Accompanied by  Accompanied by: Alone    Past History  Past Medical History   Diagnosis Date     Arthritis      Bilateral inguinal hernia      DVT (deep venous thrombosis)      09/2012     Glaucoma      Multiple myeloma      Gets chemo infusions every 2 weeks     Peripheral neuropathy      TMJ (temporomandibular joint disorder)        Physical Exam    Recent Vitals 1/12/2017   Weight 164 lbs 8 oz   /65   Pulse 58   Temp 97.9   Temp src 1   SpO2 99       General: alert, appears stated age and cooperative  HEENT: Head: Normocephalic, no lesions, without obvious abnormality.  Eye: Normal external eye, conjunctiva, lids cornea, MACHO.  Nose: Normal external nose, mucus membranes and septum.  Pharynx: Dental Hygiene adequate. Normal buccal mucosa. Normal pharynx.  Neck / Thyroid: Supple, no masses, nodes, nodules or enlargement.  Chest: Normal chest wall and respirations. Clear to auscultation.  Cardiac: regular rate and rhythm, S1, S2 normal, no murmur, click, rub or gallop  Abdomen: abdomen is soft without significant tenderness, masses, organomegaly or guarding  Extremities: normal strength, tone, and muscle mass  no deformities or  edema;   Specifically, no changes in the left wrist  Skin: normal  CNS: normal without focal findings, mental status, speech normal, alert and oriented x3 and CATIA  Lymphatics: No abnormally enlarged lymph nodes.    Lab Results    No results found for this or any previous visit (from the past 168 hour(s)).    Imaging    No results found.      Signed by: Jenniffer Salazar MD

## 2021-06-08 NOTE — PROGRESS NOTES
Keith came to chemo infusion this morning for labs and his next dose of Velcade.  VSS. Pt assessed.  Port was accessed with good blood return and labs drawn.  Port was flushed with ns, heparinized then de-accessed.  He received his velcade sq into his abdomen and tolerated it well. Keith d/c from clinic ambulatory.

## 2021-06-08 NOTE — PROGRESS NOTES
Pt ambulates to infusion center for treatment.  Pt voices no new concerns today.  Velcade given SQ as ordered without complication.  Pt left clinic stable to lobby.  Plan RTC as scheduled.

## 2021-06-08 NOTE — PROGRESS NOTES
INR checked today. Result reviewed with JESICA Roberts. No change to his coumadin dose, 2.5 mg twice a week and 5 mg all other days. Theo indicates that he does 2.5 mg on Fridays and Sundays.     INR is to be rechecked on 2/22. Theo verbalized understanding and appreciation of my call.

## 2021-06-08 NOTE — PROGRESS NOTES
Patient's INR reviewed with JESICA Roberts. No change in patient's coumadin dosage. He is to have INR rechecked on 2/22. Keith verbalized understanding and appreciation.

## 2021-06-08 NOTE — PROGRESS NOTES
Pt ambulates to infusion center for lab draw.  IVAD accessed, labs drawn et sent.  IVAD flushed w/NS et heparin and needle deaccessed.  Pt left clinic stable to [facility].  Plan RTC as scheduled.

## 2021-06-08 NOTE — PROGRESS NOTES
Pt ambulates to infusion center following MD visit.  Pt seen by Dr. Salazar and orders approved.  Velcade given SQ as ordered without complication.  Pt left clinic stable to [facility].  Plan RTC as scheduled.

## 2021-06-08 NOTE — PROGRESS NOTES
Pt ambulates to infusion center for labs and treatment.  IVAD accessed, labs drawn et sent w/results noted and reviewed with Dr. Clifford.  Pt is hypotensive on arrival although pt denies dizziness, lightheadedness.  This was also reviewed w/Dr. Clifford.  IV hydration was offered to pt who declined.  IVAD flushed w/NS et hepairn and needle deaccessed.  Velcade given SQ as ordered without complication.  Pt left clinic stable to [facility].  Plan RTC as scheduled.

## 2021-06-08 NOTE — PROGRESS NOTES
Pt ambulates to infusion center for labs and treatment.  IVAD accessed, labs drawn et sent w/results noted.  IVAD flushed w/NS et heparin and needle deaccessed.  Velcade given as ordered.  Pt left clinic stable to lobby.  Plan RTC as scheduled.

## 2021-06-08 NOTE — PROGRESS NOTES
Pt ambulates to infusion center for treatment.  Pt voices no new concerns today.  Velcade and Vitamin B12 injections given as ordered.  Pt left clinic stable to lobby.  Plan RTC as scheduled.

## 2021-06-09 ENCOUNTER — RECORDS - HEALTHEAST (OUTPATIENT)
Dept: ADMINISTRATIVE | Facility: OTHER | Age: 76
End: 2021-06-09

## 2021-06-09 NOTE — PROGRESS NOTES
Patient was seen in clinic today. No change to his coumadin regimen. He will return to clinic on 3/08 and have his INR rechecked at that time.

## 2021-06-09 NOTE — PROGRESS NOTES
INR reviewed with JESICA Roberts. No changes to his coumadin regimen. He is to continue 2.5 mg on Sun + Fri, 5 mg all other days. I left Keith a voicemail. He is to call us back should he have any questions or concerns.     INR is to be rechecked on 4/05.

## 2021-06-09 NOTE — PROGRESS NOTES
Pt ambulates to infusion center for treatment.  Pt voices no new concerns, states that he's been busy with moving to their new home.  Velcade given SQ as ordered without complication.  Pt left clinic stable to lobby.  Plan RTC as scheduled.

## 2021-06-09 NOTE — PROGRESS NOTES
[facility] Hematology and Oncology Progress Note    Patient: Theo Meyers  MRN: [id number]  Date of Service: June 24, 2020      Assessment and Plan:    1. Kappa light chain myeloma: General stability of his myeloma labs.  Light chain ratio is up a bit but this is happened in the past.  We will continue to monitor for any definitive trends.  The only way to really going to know if he is progressing his with the bone marrow biopsy but I do not think he needs one now.  He gets his myeloma labs checked every month.  Continue Revlimid 21 days on and 7 days.  Velcade every 2 weeks.  We will see him in clinic roughly every 2 months.  He is on Xarelto for thromboprophylaxis.  20 mg daily.  Could reduce to 10 mg daily if needed.  No recent signs or symptoms of bleeding    2.  Pancytopenia: Generally stable.  Continuing B12 injections monthly.  I believe this is from the Revlimid.  Hemoglobin is a little bit lower today.  I reviewed with Theo that if his anemia becomes a problem we could try no other therapy which and injections.  Neutrophil count is generally stable.    ECOG Performance   ECOG Performance Status: 0    Distress Assessment  Distress Assessment Score: 1    Pain  Currently in Pain: No/denies    Diagnosis:    1.  IgG kappa light chain myeloma. Hyposecretory. Diagnosed 2009. No significant measurable M protein. 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 There was also on 11;14 translocation.  Past immunofixations from 2011 show IgG-kappa.  Bone marrow biopsy at 5%.  Residual kappa light chain myeloma involving 20% of nucleated cells.  No significant change from November 2016 marrow cellularity.    2.  Deep vein thrombosis of the left leg diagnosed 9/2012 while on treatment.    3.  Vitamin B12 deficiency diagnosed in September 2017.    4.  GI bleed and anemia requiring hospitalization in December 2017.    Treatment:    He presented with a lytic lesion involving the C6 vertebral body, although no  measurable M protein. IgG kappa monoclonal immunoglobulin was confirmed by [facility] as well as elevated kappa free light chains with an abnormal kappa lambda ratio. Bone marrow biopsy showed 30% involvement and cytogenetics confirmed deletion of 13 q. as well as 11;14 translocation. He was initially treated with Velcade and dexamethasone and achieved a good partial response.     He was referred to the [facility] where he underwent high-dose chemotherapy with autologous peripheral stem cell transplant in April of 2010. He began Revlimid as maintenance therapy post transplant. Repeat bone marrow biopsy done October 2010 showed about 5% kappa restricted plasma cells and so at that time weekly Velcade was added along with his maintenance Revlimid and dexamethasone.   He has done well since that time with stable minimal residual disease, best assessed by serum free light chains. He required dose reductions of weekly Velcade because of cytopenias and was ultimately switched to a q 2 week maintenance schedule which he has been on since September 2012.     His Revlimid dose was reduced to 15 mg daily and he continues on dexamethasone 20 mg p.o. weekly  Revlimid dosing changed to 20 mg, 3 weeks on 1 week (instead of 15 mg daily) for cost reasons.  Started March, 2018    Interim History:    Theo returns today for follow-up visit.  Seems to be doing okay.  No recent changes in his health.  No new symptoms to report.  Energy and appetite are good.  Still walks 3 miles a day.  No new areas of pain.  No skin rash or worsening neuropathy.    Review of Systems:    As above in the history.     Review of Systems otherwise Negative for:  General: chills, fever or night sweats  Psychological: anxiety or depression  Ophthalmic: blurry vision, double vision or loss of vision, vision change  ENT: epistaxis, oral lesions, hearing changes  Hematological and Lymphatic: bleeding, bruising, jaundice, swollen lymph nodes  Endocrine:  hot flashes, unexpected weight changes  Respiratory: cough, hemoptysis, orthopnea  Cardiovascular: chest pain, edema, palpitations or PND  Gastrointestinal: abdominal pain, blood in stools, change in bowel habits, constipation, diarrhea or nausea/vomiting  Genito-Urinary: change in urinary stream, incontinence, frequency/urgency  Musculoskeletal: joint pain, stiffness, swelling, muscle pain  Neurological: dizziness, headaches, numbness/tingling  Dermatological: lumps and rash    ECOG performance status is 0    Patient Coping  Patient Coping: Accepting  Accompanied by  Accompanied by: Alone    Past History:    Past Medical History:   Diagnosis Date     Anemia 12/13/2017     Arthritis      Bilateral inguinal hernia      Glaucoma      Glaucoma      History of blood clots     DVT - left chronic     Multiple myeloma (H)     Gets chemo infusions every 2 weeks     Pancytopenia (H)      Peripheral neuropathy      Syncope      TMJ (temporomandibular joint disorder)      Physical Exam:    Recent Vitals 6/24/2020   Height -   Weight 166 lbs   BSA (m2) 1.93 m2   /60   Pulse 61   Temp 98.3   Temp src 1   SpO2 100   Some recent data might be hidden     General: patient appears stated age of 75 y.o.. Nontoxic and in no distress.   HEENT: Head: atraumatic, normocephalic. Sclerae anicteric.  Chest:  Normal respiratory effort.   Cardiac:  No edema.   Abdomen: abdomen is soft, non-distended  Extremities: normal tone and muscle bulk.   Skin: no lesions or rash. Warm and dry.   CNS: alert and oriented. Grossly non-focal.   Psychiatric: normal mood and affect.     Lab Results:    Recent Results (from the past 240 hour(s))   Comprehensive Metabolic Panel    Collection Time: 06/24/20  7:58 AM   Result Value Ref Range    Sodium 139 136 - 145 mmol/L    Potassium 4.9 3.5 - 5.0 mmol/L    Chloride 108 (H) 98 - 107 mmol/L    CO2 24 22 - 31 mmol/L    Anion Gap, Calculation 7 5 - 18 mmol/L    Glucose 103 70 - 125 mg/dL    BUN 26 8 - 28 mg/dL     Creatinine 1.22 0.70 - 1.30 mg/dL    GFR MDRD Af Amer >60 >60 mL/min/1.73m2    GFR MDRD Non Af Amer 58 (L) >60 mL/min/1.73m2    Bilirubin, Total 0.4 0.0 - 1.0 mg/dL    Calcium 8.8 8.5 - 10.5 mg/dL    Protein, Total 5.3 (L) 6.0 - 8.0 g/dL    Albumin 3.2 (L) 3.5 - 5.0 g/dL    Alkaline Phosphatase 89 45 - 120 U/L    AST 71 (H) 0 - 40 U/L    ALT 61 (H) 0 - 45 U/L   HM1 (CBC with Diff)    Collection Time: 06/24/20  7:58 AM   Result Value Ref Range    WBC 1.7 (LL) 4.0 - 11.0 thou/uL    RBC 2.36 (L) 4.40 - 6.20 mill/uL    Hemoglobin 8.6 (L) 14.0 - 18.0 g/dL    Hematocrit 26.3 (L) 40.0 - 54.0 %     (H) 80 - 100 fL    MCH 36.4 (H) 27.0 - 34.0 pg    MCHC 32.7 32.0 - 36.0 g/dL    RDW 15.1 (H) 11.0 - 14.5 %    Platelets 109 (L) 140 - 440 thou/uL    MPV 11.7 8.5 - 12.5 fL   Manual Differential    Collection Time: 06/24/20  7:58 AM   Result Value Ref Range    Total Neutrophils % 75 (H) 50 - 70 %    Lymphocytes % 17 (L) 20 - 40 %    Monocytes % 6 2 - 10 %    Eosinophils %  2 0 - 6 %    Basophils % 0 0 - 2 %    Total Neutrophils Absolute 1.3 (L) 2.0 - 7.7 thou/ul    Lymphocytes Absolute 0.3 (L) 0.8 - 4.4 thou/uL    Monocytes Absolute 0.1 0.0 - 0.9 thou/uL    Eosinophils Absolute 0.0 0.0 - 0.4 thou/uL    Basophils Absolute 0.0 0.0 - 0.2 thou/uL    Platelet Estimate Decreased (!) Normal    Ovalocytes 1+ (!) Negative     Imaging:    No results found.       Signed by: Per Clifford MD

## 2021-06-09 NOTE — PROGRESS NOTES
Pt here for injection which was given in abd without incident.  Pt also had labs drawn from port easily.  Pt denies new complaint and d/c ambulatory to lobby alone

## 2021-06-09 NOTE — TELEPHONE ENCOUNTER
RN cannot approve Refill Request    RN can NOT refill this medication med is not covered by policy/route to provider. Last office visit: 11/26/2019 Mathew Ott MD Last Physical: 3/28/2018 Last MTM visit: Visit date not found Last visit same specialty: 11/26/2019 Mathew Ott MD.  Next visit within 3 mo: Visit date not found  Next physical within 3 mo: Visit date not found      Cindy Landa, [facility] Triage/Med Refill 7/2/2020    Requested Prescriptions   Pending Prescriptions Disp Refills     XARELTO 20 mg tablet [Pharmacy Med Name: XARELTO 20MG TABLETS] 90 tablet 3     Sig: TAKE 1 TABLET(20 MG) BY MOUTH DAILY WITH SUPPER       Apixaban/Rivaroxaban/Dabigatran Refill Protocol Failed - 7/2/2020  6:32 AM        Failed - Route to appropriate pool/provider     Last Anticoagulation Summary:           Passed - Renal function test in last year     Creatinine   Date Value Ref Range Status   06/24/2020 1.22 0.70 - 1.30 mg/dL Final             Passed - PCP or prescribing provider visit in past 12 months       Last office visit with prescriber/PCP: 11/26/2019 Mathew Ott MD OR same dept: 11/26/2019 Mathew Ott MD OR same specialty: 11/26/2019 Mathew Ott MD  Last physical: 3/28/2018 Last MTM visit: Visit date not found   Next visit within 3 mo: Visit date not found  Next physical within 3 mo: Visit date not found  Prescriber OR PCP: Mathew Ott MD  Last diagnosis associated with med order: 1. Chronic deep vein thrombosis (DVT) of left lower extremity, unspecified vein (H)  - XARELTO 20 mg tablet [Pharmacy Med Name: XARELTO 20MG TABLETS]; TAKE 1 TABLET(20 MG) BY MOUTH DAILY WITH SUPPER  Dispense: 90 tablet; Refill: 3    If protocol passes may refill for 12 months if within 3 months of last provider visit (or a total of 15 months).

## 2021-06-09 NOTE — PROGRESS NOTES
Pt ambulates to infusion center for labs and treatment.  IVAD accessed, labs drawn et sent w/results noted and reviewed w/K. JESICA Valdez and treatment authorized.  IVAD flushed w/NS et heparin and needle deaccessed.  Vitamin B12 and Velcade injections given as ordered without difficulty.  Pt left clinic stable to [facility].  Plan RTC as scheduled.

## 2021-06-09 NOTE — PROGRESS NOTES
INR result was reviewed with JESICA Roberts. No change to patient's coumadin dosage. He is to continue on 2.5 mg Sun, Fri and 5 mg all other days. I called Keith to report this. I had to leave him a voicemail. He will return to clinic next week.

## 2021-06-09 NOTE — TELEPHONE ENCOUNTER
Call came in and message left on nurse triage line today from Nelson with [facility].  She states that this patient is scheduled for an upper endoscopy on 7/27.  They are looking for a Xarelto hold prior to this procedure.  I did touch base with Alejandra Valdez CNP who states that this patient can be on a 3 or 5-day hold prior to the procedure.  She said depending on what [facility] is looking for, it can be up to a 5-day hold.  She said that he should resume the day after the procedure.  When I called [facility] back, they did not answer so a message was left on Nelson's extension.  I left a detailed message with this information and asked that she contact the direct triage line back if she has further questions.    Mere Machado RN

## 2021-06-09 NOTE — PROGRESS NOTES
Pt ambulates to infusion center for labs and treatment.  IVAD accessed, labs drawn et sent.  Velcade given SQ as ordered without complication.  IVAD flushed w/NS et heparin and needle deaccessed.  Pt left clinic stable to lobby.  Plan RTC as scheduled.

## 2021-06-09 NOTE — PROGRESS NOTES
Pt here for injection which was given without incident. BP was elevated but pt was rushed as he thought his appointment was tomorrow. No problem with injection and pt d/c ambulatory to lobby alone. Pt has future appointments.

## 2021-06-09 NOTE — PROGRESS NOTES
[facility] Hematology and Oncology Progress Note    Patient: Theo Meyers  MRN: [id number]  Date of Service:  March 1, 2017      Assessment and Plan:    1. Kappa light chain myeloma:  In light chain ratio is down a little bit.  Immunofixation remains negative.  In the past the ELMA has been positive, as recently as late 2016.  As such, I do not think he has progressive disease.  He has no other signs or symptoms to indicate progression.  I reviewed with the patient and his wife that I would like to see 2 laboratory metrics worsening before we check a marrow to document progression.  He was given written information on some lab testing modalities that he was asking about.  Upon progression, I told him I would switch to daratumumab, pomalidomide, and dexamethasone.  We reviewed some of the side effects of these medications.  Her going to repeat his myeloma labs every 2 months.    2. Thromboprophylaxis: He is on Coumadin for this. We'll adjust the dosing is needed.  INR is within range today.    3.  Pancytopenia: Secondary to Revlimid.  This has been stable.  Continue to follow.    Total time spent with the patient and his wife was 40 minutes.  Greater than 50% of the time was spent in counseling and coordinating of care.    ECOG Performance   ECOG Performance Status: 0    Distress Assessment  Distress Assessment Score: 2 (anxious about plans)    Pain  Currently in Pain: No/denies    Diagnosis:    1. IgG kappa light chain myeloma. Hyposecretory. Diagnosed 2009. No significant measurable M protein. 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 There was also on 11;14 translocation.  Past immunofixations from 2011 show IgG-kappa.    2. Deep vein thrombosis of the left leg diagnosed 09/2012 while on treatment.    Treatment:    He presented at that time with a lytic lesion involving the C6 vertebral body, although no measurable M protein. IgG kappa monoclonal immunoglobulin was confirmed by ELMA as well as  elevated kappa free light chains with an abnormal kappa lambda ratio. Bone marrow biopsy showed 30% involvement and cytogenetics confirmed deletion of 13 q. as well as 11;14 translocation. He was initially treated with Velcade and dexamethasone and achieved a good partial response.   He was referred to the [facility] where he underwent high-dose chemotherapy with autologous peripheral stem cell transplant in April of 2010. He began Revlimid as maintenance therapy post transplant. Repeat bone marrow biopsy done October 2010 showed about 5% kappa restricted plasma cells and so at that time weekly Velcade was added along with his maintenance Revlimid and dexamethasone. He has done well since that time with stable minimal residual disease, best assessed by serum free light chains. He required dose reductions of weekly Velcade because of cytopenias and was ultimately switched to a q.2 week maintenance schedule which he has been on since September 2012. His Revlimid dose was reduced to 15 mg daily and he continues on dexamethasone 20 mg p.o. q. Week.    Most recently he has been on Velcade every 2 weeks since 9/2012. He is getting dexamethasone 20 mg every other week. Finally, he is receiving Revlimid 15 mg daily with no breaks.     Interim History:    Theo returns today for follow-up visit.  Overall he is feeling okay.  He does not have any progressive neuropathy.  No fevers or chills.  No skin rash or constipation.  He's not having any pain which is chronic or worsening.  No acute complaints today.    Review of Systems:    Constitutional  Constitutional (WDL): Exceptions to WDL  Fatigue: Fatigue not relieved by rest - Limiting instrumental ADL  Neurosensory  Neurosensory (WDL): Exceptions to WDL  Peripheral Motor Neuropathy: Asymptomatic, clinical or diagnostic observations only, intervention not indicated  Peripheral Sensory Neuropathy: Asymptomatic, loss of deep tendon reflexes or paresthesia (no  changes)  Cardiovascular  Cardiovascular (WDL): All cardiovascular elements are within defined limits  Pulmonary  Respiratory (WDL): Within Defined Limits  Gastrointestinal  Gastrointestinal (WDL): All gastrointestinal elements are within defined limits  Genitourinary  Genitourinary (WDL): All genitourinary elements are within defined limits  Integumentary  Integumentary (WDL): All integumentary elements are within defined limits  Patient Coping  Patient Coping: Accepting  Accompanied by  Accompanied by: Family Member (wife)    Past History:    Past Medical History:   Diagnosis Date     Arthritis      Bilateral inguinal hernia      DVT (deep venous thrombosis)     09/2012     Glaucoma      Multiple myeloma     Gets chemo infusions every 2 weeks     Peripheral neuropathy      TMJ (temporomandibular joint disorder)      Physical Exam:    Recent Vitals 3/1/2017   Weight 165 lbs 14 oz   /69   Pulse 66   Temp 98   Temp src 1   SpO2 98     General: patient appears stated age of 71 y.o.. Nontoxic and in no distress.   HEENT: Head: atraumatic, normocephalic. Sclerae anicteric.  Chest:  Normal respiratory effort.   Cardiac:  No edema.   Abdomen: abdomen is non-distended  Extremities: normal tone and muscle bulk.   Skin: no lesions or rash. Warm and dry.   CNS: alert and oriented x3. Grossly non-focal.   Psychiatric: normal mood and affect.     Lab Results:    Recent Results (from the past 168 hour(s))   INR   Result Value Ref Range    INR 2.01 (H) 0.90 - 1.10      Results for MONICA SCOTT (MRN [id number]) as of 3/1/2017 17:34   Ref. Range 2/22/2017 08:25 2/22/2017 08:25   Sodium Latest Ref Range: 136 - 145 mmol/L 138    Potassium Latest Ref Range: 3.5 - 5.0 mmol/L 3.9    Chloride Latest Ref Range: 98 - 107 mmol/L 109 (H)    CO2 Latest Ref Range: 22 - 31 mmol/L 25    Anion Gap, Calculation Latest Ref Range: 5 - 18 mmol/L 4 (L)    BUN Latest Ref Range: 8 - 28 mg/dL 18    Creatinine Latest Ref Range: 0.70 - 1.30  mg/dL 0.99    GFR MDRD Af Amer Latest Ref Range: >60 mL/min/1.73m2 >60    GFR MDRD Non Af Amer Latest Ref Range: >60 mL/min/1.73m2 >60    Calcium Latest Ref Range: 8.5 - 10.5 mg/dL 8.6    AST Latest Ref Range: 0 - 40 U/L 12    ALT Latest Ref Range: 0 - 45 U/L 14    ALBUMIN Latest Ref Range: 3.5 - 5.0 g/dL 3.0 (L)    Protein, Total Latest Ref Range: 6.0 - 8.0 g/dL 4.9 (L) 4.5 (L)   Alkaline Phosphatase Latest Ref Range: 45 - 120 U/L 67    Bilirubin, Total Latest Ref Range: 0.0 - 1.0 mg/dL 0.6    Glucose Latest Ref Range: 70 - 125 mg/dL 95    % Beta Latest Ref Range: 10.0 - 17.0 %  10.3   Albumin % Latest Ref Range: 51.0 - 67.0 %  68.0 (H)   Alpha 1 Latest Ref Range: 0.1 - 0.3 g/dL  0.1   Alpha 1 % Latest Ref Range: 2.0 - 4.0 %  2.8   Alpha 2 Latest Ref Range: 0.4 - 0.9 g/dL  0.5   Alpha 2 % Latest Ref Range: 5.0 - 13.0 %  11.7   Beta Latest Ref Range: 0.7 - 1.2 g/dL  0.5 (L)   ELP Comment Unknown  The albumin fract...   Gamma Globulin Latest Ref Range: 0.6 - 1.4 g/dL  0.3 (L)   Gamma Globulin % Latest Ref Range: 9.0 - 20.0 %  7.2 (L)   IGA Latest Ref Range: 65 - 400 mg/dL 45 (L)    Immunoglobulin M Latest Ref Range: 60 - 280 mg/dL 10 (L)    Kappa Free Light Chains Latest Ref Range: 0.3300 - 1.94 mg/dL 39.2 (H)    Lambda Free Light Chain Latest Ref Range: 0.5700 - 2.63 mg/dL 1.29    Immunoglobulin G Latest Ref Range: 700 - 1700 mg/dL 431 (L)    INR Latest Ref Range: 0.90 - 1.10  1.96 (H)    WBC Latest Ref Range: 4.0 - 11.0 thou/uL 3.2 (L)    RBC Latest Ref Range: 4.40 - 6.20 mill/uL 3.27 (L)    Hemoglobin Latest Ref Range: 14.0 - 18.0 g/dL 11.0 (L)    Hematocrit Latest Ref Range: 40.0 - 54.0 % 32.7 (L)    MCV Latest Ref Range: 80 - 100 fL 100    MCH Latest Ref Range: 27.0 - 34.0 pg 33.7    MCHC Latest Ref Range: 32.0 - 36.0 g/dL 33.7    RDW Latest Ref Range: 11.0 - 14.5 % 17.0 (H)    Platelets Latest Ref Range: 140 - 440 thou/uL 125 (L)    MPV Latest Ref Range: 7.0 - 10.0 fL 9.1    Total Neutrophils % Latest Ref  Range: 50 - 70 % 69    Lymphocytes % Latest Ref Range: 20 - 40 % 12 (L)    Monocytes % Latest Ref Range: 2 - 10 % 10    Eosinophils %  Latest Ref Range: 0 - 6 % 7 (H)    Basophils % Latest Ref Range: 0 - 2 % 1    Metamyelocytes % Latest Ref Range: <=1 % 1    Total Neutrophils Absolute Latest Ref Range: 2.0 - 7.7 thou/ul 2.2      Imaging:    No results found.       Signed by: Per Clifford MD

## 2021-06-09 NOTE — PROGRESS NOTES
Pt ambulates to infusion center for lab draw prior to MD visit.  IVAD accessed, labs drawn et sent.  Pt was seen by Dr. Clifford and orders were approved.  Treatment administered as ordered without difficulty.  IVAD flushed w/NS et heparin and needle deaccessed.  Pt left clinic stable to [facility].

## 2021-06-09 NOTE — PROGRESS NOTES
Pt ambulates to infusion center for labs and treatment.  IVAD accessed, labs drawn et sent w/results noted.  IVAD flushed w/NS et heparin and needle deaccessed.  Velcade given SQ as ordered.  Pt left clinic stable to [facility] [place].  Plan RTC as scheduled.

## 2021-06-10 NOTE — PROGRESS NOTES
PROCEDURE: Bone marrow Biopsy and Aspiration    INDICATIONS FOR PROCEDURE: myeloma, restaging    DESCRIPTION OF PROCEDURE: Pt was given written information about the procedure.  They provided their written informed consent.  Pt declined premeds.  The patient got into the prone position and I sterilely prepped and draped their right posterior iliac crest.  I used 1% local lidocaine for anesthesia.  I then used an 11-gauge Jamshidi needle.  I was able to get about 10 mL of particulate aspirate.  I then went on to get a core of trabecular bone that was about 10 mm in size.  Patient tolerated the procedure with minimal pain and bleeding.  The specimens were sent for routine histology, flow cytometry, and cytogenetics.  I placed a sterile pressure dressing with instructions for it to remain clean dry and intact for 24 hours.  Patient will return to the clinic for follow-up of these results.

## 2021-06-10 NOTE — PROGRESS NOTES
Theo came to chemo infusion this morning for his next dose of velcade.  VSS.  Pt assessed and is feeling well today.  Port was accessed with excellent blood return and labs were drawn.  Lab results noted.  Velcade given sq as ordered and Theo tolerated this well.  Site was covered with a bandaid.  Port was flushed, heparinized then deaccessed and site covered.  Theo d/c from clinic ambulatory and unaccompanied.

## 2021-06-10 NOTE — PROGRESS NOTES
[facility] Hematology and Oncology Progress Note    Patient: Theo Meyers  MRN: [id number]  Date of Service: 05/03/2017        Reason for Visit    Chief Complaint   Patient presents with     HE Cancer       Assessment and Plan    1. Kappa light chain myeloma: Stable clinically. Light chains have been stable.  Immunofixation remains negative. In the past the ELMA has been positive, as recently as late 2016. We'll continue on his current treatment regimen. We'll see him back in clinic in 2 months. We can continue checking his labs every 2 months. Upon progression, Dr. martinez would switch to daratumumab, pomalidomide, and dexamethasone.    2. DVT: INR is therapeutic. Continue current regimen of 2.5mg 2 days a week and 5mg all other days. Recheck in 4 weeks    3. URI: symptoms are improving with OTC products.     4. Pancytopenia with neutropenia:  Revlimid induced. Educated pt about neutropenic precautions. Recheck his Heme1 today.  He will take his temp TID for the next couple of days and call with fever 100.4 or higher or any symptoms of infections. increase handwashing. Stay away from sick people.        ECOG Performance   ECOG Performance Status: 0     Distress Assessment  Distress Assessment Score: 1    Pain    none      Problem List    1. Multiple myeloma, remission status unspecified     2. DVT (deep venous thrombosis)        ______________________________________________________________________________    History of Present Illness    DIAGNOSIS:   1. IgG kappa light chain myeloma. Hyposecretory. Diagnosed 2009. No significant measurable M protein. 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 There was also on 11;14 translocation.   2. Deep vein thrombosis of the left leg diagnosed 09/2012 while on treatment.      TREATMENT:   1. Most recently he has been on Velcade every 2 weeks since 09/2012. He is getting dexamethasone 20 mg every other week with the velcade. Finally, he is receiving Revlimid 15 mg  daily with no breaks.   2. Continues on warfarin     PAST TREATMENT and HISTORY:   He presented at diagnosis with a lytic lesion involving the C6 vertebral body, although no measurable M protein. IgG kappa monoclonal immunoglobulin was confirmed by ELMA as well as elevated kappa free light chains with an abnormal kappa lambda ratio. Bone marrow biopsy showed 30% involvement and cytogenetics confirmed deletion of 13 q. as well as 11;14 translocation. He was initially treated with Velcade and dexamethasone and achieved a good partial response. He was referred to the [facility] where he underwent high-dose chemotherapy with autologous peripheral stem cell transplant in April of 2010. He began Revlimid as maintenance therapy post transplant. Repeat bone marrow biopsy done October 2010 showed about 5% kappa restricted plasma cells and so at that time weekly Velcade was added along with his maintenance Revlimid and dexamethasone. He has done well since that time with stable minimal residual disease, best assessed by serum free light chains. He required dose reductions of weekly Velcade because of cytopenias and was ultimately switched to a q.2 week maintenance schedule which he has been on since September 2012. His Revlimid dose was reduced to 15 mg daily and he continues on dexamethasone 20 mg p.o. q. Week.      INTERIM HISTORY:   Pt is here today to continue on treatment.  He is doing well for the most part.  He is just recovering from an upper respiratory viral infection.  He denies any other infection problems.  Denies any fevers.  He states that he is actually feeling pretty good and did have his myeloma labs drawn last week and was happy to see that the are stable if not improved.    Pain Status   none    Review of Systems    Constitutional  Constitutional (WDL): Exceptions to WDL  Fatigue: Fatigue relieved by rest  Fever: None  Neurosensory  Neurosensory (WDL): Exceptions to WDL  Peripheral Motor  Neuropathy: Asymptomatic, clinical or diagnostic observations only, intervention not indicated  Ataxia: None  Peripheral Sensory Neuropathy: Asymptomatic, loss of deep tendon reflexes or paresthesia  Syncope: None  Eye   Eye Disorder (WDL): Exceptions to WDL  Watering Eyes: Intervention not indicated  Ear  Ear Disorder (WDL): All ear disorder elements are within defined limits  Cardiovascular  Cardiovascular (WDL): All cardiovascular elements are within defined limits  Pulmonary  Respiratory (WDL): Exceptions to WDL  Cough: None  Gastrointestinal  Gastrointestinal (WDL): All gastrointestinal elements are within defined limits  Genitourinary  Genitourinary (WDL): All genitourinary elements are within defined limits  Lymphatic   WDL  Musculoskeletal and Connective Tissue  Musculoskeletal and Connetive Tissue Disorders (WDL): All Musculoskeletal and Connetive Tissue Disorder elements are within defined limits  Integumentary  Integumentary (WDL): All integumentary elements are within defined limits  Patient Coping  Patient Coping: Accepting  Distress Assessment  Distress Assessment Score: 1  Accompanied by  Accompanied by: Alone  Oral Chemo Adherence       Past History  Past Medical History:   Diagnosis Date     Arthritis      Bilateral inguinal hernia      DVT (deep venous thrombosis)     09/2012     Glaucoma      Multiple myeloma     Gets chemo infusions every 2 weeks     Peripheral neuropathy      TMJ (temporomandibular joint disorder)        PHYSICAL EXAM:  /62  Pulse 62  Temp 98.2  F (36.8  C) (Oral)   Wt 161 lb 7 oz (73.2 kg)  SpO2 95%  BMI 24.19 kg/m2  GENERAL: no acute distress. Cooperative in conversation. Here alone  HEENT: pupils are equal, round and reactive. Oromucosa is clean and intact. No ulcerations or mucositis noted. No bleeding noted.  RESP: lungs are clear bilaterally per auscultation. Regular respiratory rate. No wheezes or rhonchi.  CV: Regular, rate and rhythm. No murmurs.  ABD: soft,  nontender. Positive bowel sounds. No organomegaly.   MUSCULOSKELETAL: No lower extremity swelling.   NEURO: non focal. Alert and oriented x3.   PSYCH: within normal limits. No depression or anxiety.  SKIN: warm dry intact   LYMPH: no cervical, supraclavicular lymphadenopathy        Lab Results    Recent Results (from the past 168 hour(s))   HM1 (CBC with Diff)   Result Value Ref Range    WBC 1.8 (LL) 4.0 - 11.0 thou/uL    RBC 3.06 (L) 4.40 - 6.20 mill/uL    Hemoglobin 10.4 (L) 14.0 - 18.0 g/dL    Hematocrit 31.9 (L) 40.0 - 54.0 %     (H) 80 - 100 fL    MCH 34.0 27.0 - 34.0 pg    MCHC 32.6 32.0 - 36.0 g/dL    RDW 14.8 (H) 11.0 - 14.5 %    Platelets 123 (L) 140 - 440 thou/uL    MPV 11.9 8.5 - 12.5 fL    Neutrophils % 59 50 - 70 %    Lymphocytes % 18 (L) 20 - 40 %    Monocytes % 9 2 - 10 %    Eosinophils % 12 (H) 0 - 6 %    Basophils % 3 (H) 0 - 2 %    Neutrophils Absolute 0.9 (L) 2.0 - 7.7 thou/uL    Lymphocytes Absolute 0.3 (L) 0.8 - 4.4 thou/uL    Monocytes Absolute 0.2 0.0 - 0.9 thou/uL    Eosinophils Absolute 0.2 0.0 - 0.4 thou/uL    Basophils Absolute 0.1 0.0 - 0.2 thou/uL   Manual Differential   Result Value Ref Range    Platelet Estimate Decreased (!) Normal    Ovalocytes 2+ (!) Negative    Schistocytes 1+ (!) Negative       Imaging    No results found.      Signed by: Alejandra Valdez, CNP

## 2021-06-10 NOTE — PROGRESS NOTES
Assessment and Plan:   Patient instructions:  You are due for your yearly dermatology exam this December.    Plan to repeat your heart echo next summer.  If you do have any worsening lightheaded spells or worsening shortness of breath, contact me and we could consider doing the echo before next summer.    Avoid antibiotics if able, with your history of C. difficile colitis.  Continue daily yogurt or probiotic.    Follow-up up with me in 1 year for physical exam.      1. Healthcare maintenance  Patient's main issue is multiple myeloma, diagnosed in 2009 and autologous bone marrow transplant 2010.  He is had persistent pancytopenia but otherwise stable on current chemotherapy under the care of hematology.    He remains active on a regular basis.    No further prostate cancer screening or colon cancer screening planned.    Up-to-date on shots and get a flu shot in the fall.    Relatively low cardiovascular risk profile.  2015 LDL 70 and HDL 69.  He is not been on treatment.    Patient just had lab work on July 22 and I will not have him repeat a blood draw today for a lipid profile.  We can check that next year.    I did review lab work from July 22 with a creatinine stable at 1.29 and normal potassium and liver test.  Blood sugar 115 and normal B12 and folate.    He does get B12 shots monthly at hematology.    2. Multiple myeloma not having achieved remission (H)  I did review labs from July 22 with white count of 1.5 and hemoglobin 8.6 and platelets 93.  That stable.  Continue treatment plan per hematology.    He is still full CODE STATUS.      3. History of Clostridioides difficile infection  He had some loose stools after an Augmentin prescription last fall.  He had C. difficile colitis last June.    He does eat yogurt and bowels are normal now.    4. Spinal stenosis of lumbar region without neurogenic claudication  Severe spinal stenosis on imaging in 2018 at L3-4 but no radicular symptoms and he is walking his  dog every day.  Gabapentin in the evening.  No falls.    5. Aortic valve stenosis, etiology of cardiac valve disease unspecified  Mild to moderate aortic stenosis February 2019 echo with normal ejection fraction.  Patient is asymptomatic with this.  Patient does not wish to undergo the echo this summer with the current coronavirus outbreak and wishes to delay that until next year as long as no new symptoms.    6. History of DVT (deep vein thrombosis) DVT in 2018  Chronic Xarelto.  No bleeding issues.    7. History of sinusitis  His sinusitis did clear last fall.  Not currently being treated.    Quit smoking in 1975    8. Routine general medical examination at a [facility] care facility  Patient had a tubular adenoma of the stomach last year but EGD this month showed no regrowth of the polyp and no further EGD planned.  He continues on Prilosec daily.    Glaucoma, was seen by ophthalmology last month, controlled on current drops.    BPH controlled on Flomax.    The patient's current medical problems were reviewed.    I have had an Advance Directives discussion with the patient.  The following health maintenance schedule was reviewed with the patient and provided in printed form in the after visit summary:   Health Maintenance   Topic Date Due     ZOSTER VACCINES (1 of 2) 03/24/1995     MEDICARE ANNUAL WELLNESS VISIT  10/16/2016     INFLUENZA VACCINE RULE BASED (1) 08/01/2020     TD 18+ HE  03/01/2021     LIPID  10/16/2020     FALL RISK ASSESSMENT  06/24/2021     ADVANCE CARE PLANNING  02/11/2024     COLORECTAL CANCER SCREENING  08/18/2026     HEPATITIS C SCREENING  Completed     PNEUMOCOCCAL IMMUNIZATION 65+ LOW/MEDIUM RISK  Completed     HEPATITIS B VACCINES  Aged Out        Subjective:   Chief Complaint: Theo Meyers is an 75 y.o. male here for an Annual Wellness visit.   HPI: 75-year-old male, still receiving chronic treatment for his multiple myeloma and otherwise stable.  Remains active.    History outlined as  above.    No current cough.    He does have chronic dyspnea on exertion with his anemia but stable.    Some mild lightheaded dizzy spells in the morning when he first gets up but has not had any presyncopal episodes or syncope.    No chest pain or chest pressure.    No swallowing difficulty or epigastric pain.  Bowels are regular now.    No urinary complaints.    No falls.    He is in good spirits, denies depression or worsening anxiety.    He did have a severe pneumonia with effusion a year ago with paroxysmal atrial fibrillation, but no recurrent palpitations    Review of Systems: Otherwise negative alert and oriented x3 with good mood and affect.  Please see above.  The rest of the review of systems are negative for all systems.    Patient Care Team:  Mathew Ott MD as PCP - General  Per Clifford MD as Physician (Hematology and Oncology)  Vandana Pineda, RN as Oncology Nurse Navigator  Mathew Ott MD as Assigned PCP  Mita Ratliff PharmD as Pharmacist (Pharmacist)     Patient Active Problem List   Diagnosis     Multiple myeloma (H)     Unspecified glaucoma     Cataract     DVT (deep venous thrombosis) (H)     Shingles     Shingles (herpes zoster) polyneuropathy     Back pain     Post herpetic neuralgia     Pancytopenia (H)     Syncope and collapse     Elevated INR     History of DVT (deep vein thrombosis)     Chronic deep vein thrombosis (DVT) of left lower extremity, unspecified vein (H)     Spinal stenosis of lumbar region without neurogenic claudication     Anemia, vitamin B12 deficiency     Acute encephalopathy     Fever, unspecified fever cause     Chemotherapy-induced neutropenia (H)     Thrombocytopenia (H)     Paroxysmal atrial fibrillation (H)     Nonrheumatic aortic valve stenosis     Parapneumonic effusion     Chest tube in place     SAMANO (dyspnea on exertion)     Immunocompromised state (H)     Past Medical History:   Diagnosis Date     Anemia 12/13/2017     Arthritis      Bilateral  inguinal hernia      Glaucoma      Glaucoma      History of blood clots     DVT - left chronic     Multiple myeloma (H)     Gets chemo infusions every 2 weeks     Pancytopenia (H)      Peripheral neuropathy      Syncope      TMJ (temporomandibular joint disorder)       Past Surgical History:   Procedure Laterality Date     APPENDECTOMY      Age 16     CARPAL TUNNEL RELEASE Bilateral      ESOPHAGOGASTRODUODENOSCOPY N/A 2017    Procedure: ESOPHAGOGASTRODUODENOSCOPY (EGD) WITH BIOPSYS;  Surgeon: Marlon Roy MD;  Location: [facility];  Service:      ESOPHAGOGASTRODUODENOSCOPY N/A 2018    Procedure: ENDOSCOPIC ULTRASOUND  ESOPHAGOGASTRODUODENOSCOPY WITH DUODENAL POLYP REMOVAL;  Surgeon: Familia Mcgraw MD;  Location: [facility] OR;  Service:      EYE SURGERY      Cataract     IR PLEURAL DRAINAGE W CATHETER INSERTION  2019     PORTACATH PLACEMENT       US THORACENTESIS  2019     VERTEBROPLASTY      T6     WISDOM TOOTH EXTRACTION        Family History   Problem Relation Age of Onset     Heart disease Mother      Glaucoma Mother      Cancer Father      No Medical Problems Sister      Cancer Brother      Pancreatic cancer Brother      No Medical Problems Brother      Cancer Daughter      Skin cancer Daughter      Melanoma Daughter      Glaucoma Daughter       Social History     Socioeconomic History     Marital status:      Spouse name: Not on file     Number of children: Not on file     Years of education: Not on file     Highest education level: Not on file   Occupational History     Not on file   Social Needs     Financial resource strain: Not on file     Food insecurity     Worry: Not on file     Inability: Not on file     Transportation needs     Medical: Not on file     Non-medical: Not on file   Tobacco Use     Smoking status: Former Smoker     Last attempt to quit: 1975     Years since quittin.7     Smokeless tobacco: Never Used   Substance and Sexual Activity      Alcohol use: Not Currently     Comment: occasional     Drug use: No     Sexual activity: Never   Lifestyle     Physical activity     Days per week: Not on file     Minutes per session: Not on file     Stress: Not on file   Relationships     Social connections     Talks on phone: Not on file     Gets together: Not on file     Attends [employer] service: Not on file     Active member of club or organization: Not on file     Attends meetings of clubs or organizations: Not on file     Relationship status: Not on file     Intimate partner violence     Fear of current or ex partner: Not on file     Emotionally abused: Not on file     Physically abused: Not on file     Forced sexual activity: Not on file   Other Topics Concern     Not on file   Social History Narrative     Not on file      Current Outpatient Medications   Medication Sig Dispense Refill     acetaminophen (TYLENOL) 500 MG tablet Take 500 mg by mouth every 6 (six) hours as needed for pain.       acyclovir (ZOVIRAX) 400 MG tablet TAKE 1 TABLET TWICE DAILY 180 tablet 2     ascorbic acid (VITAMIN C) 1000 MG tablet Take 2,000 mg by mouth daily.        bimatoprost (LUMIGAN) 0.01 % Drop Administer 1 drop to both eyes at bedtime.       brimonidine (ALPHAGAN P) 0.1 % Drop Administer 1 drop to both eyes 3 (three) times a day.        calcium, as carbonate, (TUMS) 200 mg calcium (500 mg) chewable tablet Chew 1 tablet 3 (three) times a day as needed.        calcium-vitamin D 500 mg(1,250mg) -200 unit per tablet Take 1 tablet by mouth daily.       cetirizine (ZYRTEC) 10 MG tablet Take 10 mg by mouth daily.       CYANOCOBALAMIN, VITAMIN B-12, INJ Inject monthly as directed       dexAMETHasone (DECADRON) 4 MG tablet Take 20 mg by mouth every 14 (fourteen) days Prior to chemotherapy. 60 tablet 1     gabapentin (NEURONTIN) 300 MG capsule TAKE 1 CAPSULE(300 MG) BY MOUTH DAILY AS NEEDED 30 capsule 5     guaiFENesin-dextromethorphan (MUCINEX DM) 600-30 mg Tb12 Take 1 tablet by  "mouth 2 (two) times a day as needed.        lenalidomide (REVLIMID) 20 mg cap Take 20 mg by mouth every evening. Three weeks on and one week off 21 capsule 6     magnesium oxide (MAG-OX) 400 mg (241.3 mg magnesium) tablet TAKE 1 TABLET BY MOUTH TWICE A DAY (Patient taking differently: Take 350 mg by mouth 2 (two) times a day. ) 60 tablet 3     multivitamin (MULTIVITAMIN) per tablet Take 1 tablet by mouth daily.       omega 3-dha-epa-fish oil 900-1,400 mg CpDR Take 1 tablet by mouth daily.       omeprazole (PRILOSEC) 20 MG capsule TAKE 1 CAPSULE BY MOUTH ONCE DAILY 90 capsule 3     potassium chloride (K-DUR,KLOR-CON) 20 MEQ tablet TAKE 1 TABLET BY MOUTH EVERY DAY 90 tablet 3     psyllium (METAMUCIL) powder Take 1 packet by mouth daily.              sodium chloride (OCEAN) 0.65 % nasal spray 2 sprays into each nostril as needed for congestion. 15 mL 12     tamsulosin (FLOMAX) 0.4 mg cap TAKE ONE CAPSULE BY MOUTH ONCE DAILY 90 capsule 3     XARELTO 20 mg tablet TAKE 1 TABLET(20 MG) BY MOUTH DAILY WITH SUPPER 90 tablet 3     No current facility-administered medications for this visit.      Facility-Administered Medications Ordered in Other Visits   Medication Dose Route Frequency Provider Last Rate Last Dose     heparin 100 unit/mL lockflush (PF) porcine 300-600 Units  300-600 Units Intravenous PRN Per Clifford MD   600 Units at 11/19/14 1013      Objective:   Vital Signs:   Visit Vitals  /66 (Patient Site: Right Arm, Patient Position: Sitting, Cuff Size: Adult Large)   Pulse 76   Ht 5' 8.5\" (1.74 m)   Wt 160 lb (72.6 kg)   BMI 23.97 kg/m           VisionScreening:  No exam data present     PHYSICAL EXAM  He did miss 1 out of 3 words but clock face drawing normal.    Mobility exam is normal, normal gait.    Good mood and affect.  Pupils and irises equal and reactive.  Extraocular muscles intact.  No jaundice or conjunctivitis.  External ears and nose exam is normal and tympanic membranes are normal.  No " cervical or supraclavicular adenopathy.  No JVD and no carotid bruits.  No thyromegaly or nodularity.  Lungs clear to auscultation with good respiratory excursion and no crackles or wheezing.  No dullness.  Spine is straight.  Heart is regular with 2 out of 6 systolic murmur left upper sternal border unchanged and no gallop or rub.  No ankle edema.  Abdomen is thin nontender no hepatosplenomegaly and no pulsatile mass.  Skin exam shows multiple solar lentigo, and some actinic keratoses.  He does appear to have a more sclerotic seborrheic keratosis on his right upper back, that was pointed out to patient today.  No other suspicious lesions noted.    Assessment Results 7/30/2020   Activities of Daily Living No help needed   Instrumental Activities of Daily Living 1 - Full function   Mini Cog Total Score 4   Some recent data might be hidden     A Mini-Cog score of 0-2 suggests the possibility of dementia, score of 3-5 suggests no dementia      Identified Health Risks:     The patient reports that he has difficulty with instrumental activities of daily living.  He was provided with a list of local organizations that provide support services and advised to make a follow up appointment in 1 year follow-up weeks to address this further.   The patient was provided with written information regarding signs of hearing loss.  Patient's advanced directive was discussed and I am comfortable with the patient's wishes.

## 2021-06-10 NOTE — PROGRESS NOTES
Theo is here for Bm bx. He is well versed in the procedure as he has had several. Pre-vitals were good. He voiced no complaints. MIRTA Miller    9:45 Pt tolerated procedure well. His dressing is dry and intact. He was discharged amb without issues. MIRTA Miller

## 2021-06-10 NOTE — PROGRESS NOTES
Pt ambulates to infusion center following NP visit for treatment.  Pt was seen by BRITTANI Valdez CNP and orders approved.  Velcade given SQ as ordered without complication.  Pt left clinic stable to lobby.  Plan RTC as scheduled.

## 2021-06-10 NOTE — PROGRESS NOTES
Pt ambulates to infusion center for labs and treatment.  IVAD accessed, labs drawn et sent.  IVAD flushed w/NS et heparin and needle deaccessed.  Pt voices no new concerns today.  Velcade injection given as ordered.  Pt left clinic stable to lobby.

## 2021-06-10 NOTE — TELEPHONE ENCOUNTER
Refill Approved    Rx renewed per Medication Renewal Policy. Medication was last renewed on 8/26/19.    Latonya Garcia, [facility] Triage/Med Refill 8/25/2020     Requested Prescriptions   Pending Prescriptions Disp Refills     omeprazole (PRILOSEC) 20 MG capsule [Pharmacy Med Name: OMEPRAZOLE 20MG CAPSULES] 90 capsule 3     Sig: TAKE 1 CAPSULE BY MOUTH ONCE DAILY       GI Medications Refill Protocol Passed - 8/23/2020 11:53 AM        Passed - PCP or prescribing provider visit in last 12 or next 3 months.     Last office visit with prescriber/PCP: 11/26/2019 Mathew Ott MD OR same dept: 11/26/2019 Mathew Ott MD OR same specialty: 11/26/2019 Mathew Ott MD  Last physical: 7/30/2020 Last MTM visit: Visit date not found   Next visit within 3 mo: Visit date not found  Next physical within 3 mo: Visit date not found  Prescriber OR PCP: Mathew Ott MD  Last diagnosis associated with med order: 1. Adenomatous duodenal polyp  - omeprazole (PRILOSEC) 20 MG capsule [Pharmacy Med Name: OMEPRAZOLE 20MG CAPSULES]; TAKE 1 CAPSULE BY MOUTH ONCE DAILY  Dispense: 90 capsule; Refill: 3    If protocol passes may refill for 12 months if within 3 months of last provider visit (or a total of 15 months).

## 2021-06-10 NOTE — PROGRESS NOTES
INR reviewed with JESICA Roberts. No changes to coumadin regimen. INR to be rechecked on 5/31. I called Keith to report this. I left him a detailed voicemail. He is to call us should he have any concerns.

## 2021-06-10 NOTE — PROGRESS NOTES
INR within normal range. No change to coumadin regimen. INR is to be rechecked on 6/28 per Tx plan. Keith was made aware.

## 2021-06-10 NOTE — PATIENT INSTRUCTIONS - HE
Pt ambulates to infusion center for lab draw prior to MD visit.  IVAD accessed, labs drawn et sent.  Pt was seen by Dr. Clifford and orders were approved.  Treatment administered as ordered without difficulty.  IVAD flushed w/NS et heparin and needle deaccessed.  Pt left clinic stable to [facility].  Plan RTC as scheduled.

## 2021-06-10 NOTE — PROGRESS NOTES
"Pt ambulates to infusion center for treatment.  Pt states that he woke up in the middle of the night last night with \"sinus congestion\", but otherwise is feeling \"tired, but ok\".  Velcade given SQ as ordered without complication.  Pt left clinic stable to lobby.  Plan RTC as scheduled.  "

## 2021-06-11 ENCOUNTER — AMBULATORY - HEALTHEAST (OUTPATIENT)
Dept: ONCOLOGY | Facility: HOSPITAL | Age: 76
End: 2021-06-11

## 2021-06-11 DIAGNOSIS — C90.01 MULTIPLE MYELOMA IN REMISSION (H): ICD-10-CM

## 2021-06-11 NOTE — TELEPHONE ENCOUNTER
Per Dr Clifford, call placed to Keith to let him know that his kappa ligh chain has come down a fair amount from one month ago.  Dr Clifford wants to keep him on the same regimen and not make any treatment changes at this time.  Keith was curious on some of the numbers so we went over them and he verbalized understanding.  I let him know that the Revlimid has been sent over.    Mere Machado RN

## 2021-06-11 NOTE — PROGRESS NOTES
Pt ambulates to infusion center for labs and treatment.  IVAD accessed, labs drawn et sent w/results noted and reviewed w/Gretchen Carballo CNP.  Nicholas held today for ANC of 500.  IVAD flushed w/NS et heparin and needle deaccessed.  Vitamin B12 injection given as ordered.  Pt left clinic stable to [facility].  Plan RTC as scheduled.

## 2021-06-11 NOTE — PROGRESS NOTES
[facility] Hematology and Oncology Progress Note    Patient: Theo Meyers  MRN: [id number]  Date of Service: September 2, 2020      Assessment and Plan:    1. Kappa light chain myeloma: We reviewed his bone marrow results.  And a specimen obtained there is no evidence of worsening plasma cell number.  Additionally, his bone marrow report now is almost identical to his marrow report from 2017.  There is no evidence of endorgan damage or worsening anemia.  As such, I am not sure that he is clearly progressing.  We will not change his treatment right now.  We will wait and see what his light chains look like from today.  If they are increasing then we will go ahead and change him to daratumumab/pomalidomide/dexamethasone.  If they are decreased then we will likely continue on his current regimen.    Continue Revlimid 21 days on and 7 days.  Velcade every 2 weeks.  We will see him in clinic roughly every 2 months.  He is on Xarelto for thromboprophylaxis.  20 mg daily.  Could reduce to 10 mg daily if needed.  No recent signs or symptoms of bleeding    2.  Pancytopenia: Bone marrow biopsy shows a very hypocellular marrow.  Likely drug effect.  This is with most likely contributing to his pancytopenia.  He has a macrocytosis which is probably from reticulocytes.  B12 and folate were normal in July of this year.  We will check thyroid function and a reticulocyte count at his next visit.    ECOG Performance   ECOG Performance Status: 0    Distress Assessment       Pain  Currently in Pain: No/denies    Diagnosis:    1.  IgG kappa light chain myeloma. Hyposecretory. Diagnosed 2009. No significant measurable M protein. 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 There was also on 11;14 translocation.  Past immunofixations from 2011 show IgG-kappa.  Bone marrow biopsy at 5%.  Residual kappa light chain myeloma involving 20% of nucleated cells.  No significant change from November 2016 marrow cellularity.    2.   Deep vein thrombosis of the left leg diagnosed 9/2012 while on treatment.    3.  Vitamin B12 deficiency diagnosed in September 2017.    4.  GI bleed and anemia requiring hospitalization in December 2017.    Treatment:    He presented with a lytic lesion involving the C6 vertebral body, although no measurable M protein. IgG kappa monoclonal immunoglobulin was confirmed by ELMA as well as elevated kappa free light chains with an abnormal kappa lambda ratio. Bone marrow biopsy showed 30% involvement and cytogenetics confirmed deletion of 13 q. as well as 11;14 translocation. He was initially treated with Velcade and dexamethasone and achieved a good partial response.     He was referred to the [facility] where he underwent high-dose chemotherapy with autologous peripheral stem cell transplant in April of 2010. He began Revlimid as maintenance therapy post transplant. Repeat bone marrow biopsy done October 2010 showed about 5% kappa restricted plasma cells and so at that time weekly Velcade was added along with his maintenance Revlimid and dexamethasone.     He has done well since that time with stable minimal residual disease, best assessed by serum free light chains. He required dose reductions of weekly Velcade because of cytopenias and was ultimately switched to a q 2 week maintenance schedule which he has been on since September 2012.     His Revlimid dose was reduced to 15 mg daily and he continues on dexamethasone 20 mg p.o. weekly  Revlimid dosing changed to 20 mg, 3 weeks on 1 week (instead of 15 mg daily) for cost reasons.  Started March, 2018    Interim History:    Theo returns today for follow-up visit.      Review of Systems:    As above in the history.     Review of Systems otherwise Negative for:  General: chills, fever or night sweats  Psychological: anxiety or depression  Ophthalmic: blurry vision, double vision or loss of vision, vision change  ENT: epistaxis, oral lesions, hearing  changes  Hematological and Lymphatic: bleeding, bruising, jaundice, swollen lymph nodes  Endocrine: hot flashes, unexpected weight changes  Respiratory: cough, hemoptysis, orthopnea  Cardiovascular: chest pain, edema, palpitations or PND  Gastrointestinal: abdominal pain, blood in stools, change in bowel habits, constipation, diarrhea or nausea/vomiting  Genito-Urinary: change in urinary stream, incontinence, frequency/urgency  Musculoskeletal: joint pain, stiffness, swelling, muscle pain  Neurological: dizziness, headaches, numbness/tingling  Dermatological: lumps and rash    ECOG performance status is 0    Patient Coping  Patient Coping: Accepting  Accompanied by  Accompanied by: Alone    Past History:    Past Medical History:   Diagnosis Date     Anemia 12/13/2017     Arthritis      Bilateral inguinal hernia      Glaucoma      Glaucoma      History of blood clots     DVT - left chronic     Multiple myeloma (H)     Gets chemo infusions every 2 weeks     Pancytopenia (H)      Peripheral neuropathy      Syncope      TMJ (temporomandibular joint disorder)      Physical Exam:    Recent Vitals 9/2/2020   Height -   Weight 165 lbs   BSA (m2) 1.9 m2   /57   Pulse 55   Temp 97.6   Temp src 1   SpO2 100   Some recent data might be hidden     General: patient appears stated age of 75 y.o.. Nontoxic and in no distress.   HEENT: Head: atraumatic, normocephalic. Sclerae anicteric.  Chest:  Normal respiratory effort.   Cardiac:  No edema.   Abdomen: abdomen is non-distended  Extremities: normal tone and muscle bulk.   Skin: no lesions or rash. Warm and dry.   CNS: alert and oriented. Grossly non-focal.   Psychiatric: normal mood and affect.     Lab Results:    Recent Results (from the past 240 hour(s))   Flow cytometry    Collection Time: 08/25/20 12:00 AM   Result Value Ref Range    Case Report       Flow Cytometry Report                             Case: [id number]                                    Authorizing Provider:   Courtney Alejandra Lackeyinen,   Collected:           08/25/2020                                          CNP                                                                          Ordering Location:     [facility] and Received:            08/25/2020 1255                                     Hematology                                                                   Pathologist:           Bharath Landis MD                                                        Specimen:    Bone Marrow                                                                                Diagnosis       BONE MARROW, POSTERIOR ILIAC CREST, ASPIRATE WITH FLOW CYTOMETRY IMMUNOPHENOTYPIC CHARACTERIZATION:    -  2% /CD56/CD38(+) PLASMA CELLS    Phenotypic Data          Low Probability: Bone Marrow     Phenotyping Report    Phenotyping Description of Cells    Source: Bone Marrow  Case Reference/Related Case: [facility] [id number]    Antibody  %    Dual   %  B-Related     Labeling     CD19  2      <1  CD20  2       1  CD22   3    KV13-pgo.   1  CD10   NA    SZ87-atb.  1  CD11c  21    TS22-apz.   1        UT98-fwe.   1        Bianca./Calixto.*  1.0:1             Antibody %     Dual   %  T-Related     Labeling     CD5  45    CD3-4   20  CD3   44    CD3-8   22  CD4   20    CD4-8   1  CD8  65    CD4/CD8*  0.9:1   CD16  36      1  CD56   50    CD16-56  36        CD56-38   11 all         Antibody  %    Dual  %  Myeloid/Monocytic/Misc.   Labeling    HLA-DR  60    CS39-577   3 all  CD45   100    NS15-130   2 all  CD34  4 all    HK07-298   2 all  CD13  3  CD64  3  CD38   86    2 all    5 all     Cell count is approximately: 2.0 x 10*3/uL  Viability is: NA    *=ratio  NA=antibody not run  bianca.=kappa  Calixto.=lambda    Charges CPT:  30057 (20 markers)    ICD-10:  C90.00     Result Flag Abnormal (!) Normal   Bone Marrow Biopsy and Exam    Collection Time: 08/25/20  9:20 AM   Result Value Ref Range    Case Report       Bone  Marrow                                       Case: [id number]                                   Authorizing Provider:  Alejandra Valdez,   Collected:           08/25/2020 0920                                     CNP                                                                          Ordering Location:     [facility] and Received:            08/25/2020 1052                                     Hematology                                                                   Pathologist:           Bharath Landis MD                                                        Specimens:   A) - Iliac Crest, Right, Bone marrow biopsdy and aspirate from right iliac crest                    B) - Iliac Crest, Right                                                                             C) - Iliac Crest, Right                                                                             D) - Peripheral Blood                                                                      Final Diagnosis       BONE MARROW, POSTERIOR ILIAC CREST, ASPIRATE, CLOT SECTION, AND BIOPSY:     -   MARKEDLY HYPOCELLULAR MARROW (LESS THAN 5%) INVOLVED BY KAPPA LIGHT CHAIN            RESTRICTED MULTIPLE MYELOMA (ESTIMATED AT APPROXIMATELY 20%)     -   IRON STORES DIFFICULT TO EVALUATE    PERIPHERAL BLOOD:     -  MACROCYTIC PANCYTOPENIA     MCRS    Comment       Morphology, immunohistochemistry, in situ hybridization studies and flow cytometry () demonstrate involvement by multiple myeloma estimated at approximately 20%. Although sampling affect can not be ruled out, the marrow is markedly hypocellular at less than 5%. The case will forwarded to Dr. Dash Camacho for his review regarding percent involvement of the marrow as well as the aplastic anemia-like hypocellular marrow. Pending cytogenetic analysis. Clinical correlation is suggested.    Clinical Information Myeloma     Performed by: Alejandra Valdez CNP     Peripheral  Smear       Red blood cells are decreased in number and overall macrocytic and normochromic. Anisopoikilocytosis and polychromasia are increased but rouleaux formation does not appear prominent.    The white blood cell count and differential appear as reported on the CBC. Leukocytes are decreased in number and demonstrate an absolute neutropenia. No blasts or dysplastic changes are identified.    Platelets are decreased in number but overall normal in appearance.    Bone Marrow Differential       Neutrophils and precursors 46%; Erythroid cells 9%; Lymphocytes 31%; Monocytes 4%; Eosinophils 10%; Basophils 0%; Plasma cells 0%; Blasts 0%.    M:E Ratio: 5:1    Aspirate Smear       Aspirate smears are hypocellular and demonstrate scant marrow particles. Trilineage hematopoiesis is difficult to evaluate.  Iron stores are difficult to evaluate.    Core Biopsy/Aspirate Clot       Bone marrow biopsy and clot sections are hypocellular at less than 5%. Trilineage hematopoiesis is decreased. An iron stain is difficult to evaluate.  demonstrates approximately 20% plasma cells; kappa and lambda in situ hybridization studies demonstrate kappa light chain restriction. Scattered CD20(+) B-cells and CD3(+) T-cells. Clinical correlation is suggested.    All controls stain appropriately.    Special Stains       TARA  FDA Disclaimer:    The In-Situ Hybridization test/s was/were developed and the performance characteristics determined by [facility]. It has not been cleared or approved by the US Food and Drug Administration.    The Food and Drug Administration has determined that clearance or approval is not necessary, because this test is used for clinical purposes. This test should not be regarded as investigational or research.    [facility] is certified for the performance of high-complexity clinical testing under the Clinical Laboratory Improvement Amendments of 1988 (CLIA), and in keeping with the  certification requirements, [employer] has verified this test's accuracy and precision or validity of the method. Additional information is available upon request.    Charges       When performed, bone marrow core biopsies are decalcified prior to tissue processing.    CPT: 16999, 58089, 60535 ×2, 60215 ×2, 81816 ×2, 81835 ×2, 14261 ×2, 64415 ×2, 27759   ICD-10: C90.00    Result Flag Malignant (!) Normal   HM1 (CBC with Diff)    Collection Time: 08/25/20  9:34 AM   Result Value Ref Range    WBC 1.8 (LL) 4.0 - 11.0 thou/uL    RBC 2.24 (L) 4.40 - 6.20 mill/uL    Hemoglobin 8.2 (L) 14.0 - 18.0 g/dL    Hematocrit 25.9 (L) 40.0 - 54.0 %     (H) 80 - 100 fL    MCH 36.6 (H) 27.0 - 34.0 pg    MCHC 31.7 (L) 32.0 - 36.0 g/dL    RDW 16.0 (H) 11.0 - 14.5 %    Platelets 59 (L) 140 - 440 thou/uL    MPV 14.1 (H) 8.5 - 12.5 fL    Neutrophils % 59 50 - 70 %    Lymphocytes % 14 (L) 20 - 40 %    Monocytes % 14 (H) 2 - 10 %    Eosinophils % 11 (H) 0 - 6 %    Basophils % 1 0 - 2 %    Immature Granulocyte % 2 (H) <=0 %    Neutrophils Absolute 1.1 (L) 2.0 - 7.7 thou/uL    Lymphocytes Absolute 0.3 (L) 0.8 - 4.4 thou/uL    Monocytes Absolute 0.3 0.0 - 0.9 thou/uL    Eosinophils Absolute 0.2 0.0 - 0.4 thou/uL    Basophils Absolute 0.0 0.0 - 0.2 thou/uL    Immature Granulocyte Absolute 0.0 <=0.0 thou/uL   Immunoglobulins, Quantitative    Collection Time: 09/02/20  8:37 AM   Result Value Ref Range    Immunoglobulin G 228 (L) 700-1,700 mg/dL    Immunoglobulin M <5 (L) 60 - 280 mg/dL    Immunoglobulin A 16 (L) 65 - 400 mg/dL   Immunoglobulin Free Light Chains, Serum    Collection Time: 09/02/20  8:37 AM   Result Value Ref Range    Kappa Free Lt Chain 70.23 (H) 0.33 - 1.94 mg/dL    Lambda Free Lt Chain 0.69 0.57 - 2.63 mg/dL    Kappa Lambda Ratio 101.78 (H) 0.26 - 1.65     Imaging:    No results found.       Signed by: Per Clifford MD

## 2021-06-11 NOTE — PROGRESS NOTES
Pt here for velcade injection which was given without incident and pt d/c ambulatory to lobby alone.  Pt aware of INR result and plan.

## 2021-06-11 NOTE — PROGRESS NOTES
Pt ambulates to infusion center for treatment.  Pt voices no new concerns.  Velcade given SQ as ordered without complication.  Pt left clinic stable to lobby.  Plan RTC as scheduled.

## 2021-06-11 NOTE — PROGRESS NOTES
Pt ambulates to infusion center for labs and treatment.  Pt voices no new concerns today.  IVAD accessed, labs drawn et sent w/results noted and reviewed with Dr. Clifford.  Treatment is held for today and patient is instructed to hold Revlimid at home until send out lab results are available for MD to review.  Pt voices understanding and will make an appointment to meet with Dr. Clifford to review results.  Flu vaccine given after screening completed.  IVAD flushed w/NS et heparin and needle deaccessed. Pt left clinic stable to [facility].

## 2021-06-11 NOTE — PROGRESS NOTES
INR stable. Reviewed with Dr. Clifford. Patient indicates he does 2.5 mg on Fri and 5 mg all other days. He is to continue doing this.     INR to be rechecked on 7/26.

## 2021-06-11 NOTE — PROGRESS NOTES
[facility] Hematology and Oncology Progress Note    Patient: Theo Meyers  MRN: [id number]  Date of Service: 06/14/2017        Reason for Visit    Rash     Assessment and Plan    1.  Myeloma: Patient continues on his current treatment of Velcade and dexamethasone every other week.  He also gets low dose maintenance Revlimid.  No changes to his regimen at this time.    2.  Rash: Patient has an extensive all over body rash.  Unclear etiology.  At this time I will give him triamcinolone cream.  Also give him a Medrol Dosepak.  If this rash does not improve or returns I would have him refer to dermatology    ECOG Performance     0     Distress Assessment    mild    Pain    none      Problem List    1. Multiple myeloma, remission status unspecified     2. Rash and nonspecific skin eruption        ______________________________________________________________________________    History of Present Illness    DIAGNOSIS:   1. IgG kappa light chain myeloma. Hyposecretory. Diagnosed 2009. No significant measurable M protein. 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 There was also on 11;14 translocation.   2. Deep vein thrombosis of the left leg diagnosed 09/2012 while on treatment.       TREATMENT:   1. Most recently he has been on Velcade every 2 weeks since 09/2012. He is getting dexamethasone 20 mg every other week with the velcade. Finally, he is receiving Revlimid 15 mg daily with no breaks.   2. Continues on warfarin      PAST TREATMENT and HISTORY:   He presented at diagnosis with a lytic lesion involving the C6 vertebral body, although no measurable M protein. IgG kappa monoclonal immunoglobulin was confirmed by ELMA as well as elevated kappa free light chains with an abnormal kappa lambda ratio. Bone marrow biopsy showed 30% involvement and cytogenetics confirmed deletion of 13 q. as well as 11;14 translocation. He was initially treated with Velcade and dexamethasone and achieved a good partial  response. He was referred to the [facility] where he underwent high-dose chemotherapy with autologous peripheral stem cell transplant in April of 2010. He began Revlimid as maintenance therapy post transplant. Repeat bone marrow biopsy done October 2010 showed about 5% kappa restricted plasma cells and so at that time weekly Velcade was added along with his maintenance Revlimid and dexamethasone. He has done well since that time with stable minimal residual disease, best assessed by serum free light chains. He required dose reductions of weekly Velcade because of cytopenias and was ultimately switched to a q.2 week maintenance schedule which he has been on since September 2012. His Revlimid dose was reduced to 15 mg daily and he continues on dexamethasone 20 mg p.o. q. Week.       INTERIM HISTORY:  Is added on to my clinic schedule today for a rash.  He is here to get his Velcade.  He said he noticed the rash about a week ago.  He has not used any new soap products, no new clothes, no new bed sheets, no new medications.  He states he does walk his dogs and they do roll around in the grass and Nina and do rub up against him.  It is slightly itchy.  He has used Benadryl which has helped the itch as well as hydrocortisone which helps slightly.  No fevers.  No open blisters.    Review of Systems    ROS: As above in the history, otherwise the remainder of the 10point ROS is negative and not contributory to current reason for visit.     Past History  Past Medical History:   Diagnosis Date     Arthritis      Bilateral inguinal hernia      DVT (deep venous thrombosis)     09/2012     Glaucoma      Multiple myeloma     Gets chemo infusions every 2 weeks     Peripheral neuropathy      TMJ (temporomandibular joint disorder)        PHYSICAL EXAM:  /60- pulse 63- RR 16- Oxygen 100- temp 97.6  GENERAL: no acute distress. Cooperative in conversation. Here alone.   MUSCULOSKELETAL: No lower extremity swelling.    NEURO: non focal. Alert and oriented x3.   PSYCH: within normal limits. No depression or anxiety.  SKIN: warm dry intact, extensive rash on legs, arms. Small red lesions. Not thickened. No excoriations.   LYMPH: no cervical, supraclavicular lymphadenopathy        Lab Results    No results found for this or any previous visit (from the past 168 hour(s)).    Imaging    No results found.      Signed by: Alejandra Valdez, CNP

## 2021-06-11 NOTE — PROGRESS NOTES
Pt ambulates to infusion center for labs prior to MD visit.  IVAD accessed, labs drawn et sent.  IVAD flushed w/NS et heparin and needle deaccessed.  Pt was seen by Dr. Clifford today and orders were approved.  Velcade given as ordered. Pt left clinic stable to [facility].  Plan RTC as scheduled.

## 2021-06-11 NOTE — PROGRESS NOTES
Pt came into infusion clinic for Day 15, Cycle 99 of his treatement. Labs Reviewed. Medications explained to pt who verbalized understanding. Pt tolerated injection with no complications. Pt c/o rash starting about 1 week ago. Alejandra BEE saw pt in chair. Pt was prescribed oral and topical steroids. Pt was instructed on this and verbalized understanding. Pt left infusion clinic via ambulatory.

## 2021-06-12 ENCOUNTER — HEALTH MAINTENANCE LETTER (OUTPATIENT)
Age: 76
End: 2021-06-12

## 2021-06-12 NOTE — PROGRESS NOTES
Patient's INR result was reviewed with Alejandra MOONEY. She indicates no change in patient's coumadin. I reported this to Keith. He would like to go to 5 mg daily because he thinks his INR is on the low side, 2.0.    He will have his INR rechecked on 9/20.

## 2021-06-12 NOTE — PROGRESS NOTES
Patient dropped off prescription/pharmacy receipt information that he has paid on year to date from [facility] for his Revlimid.  If he could prove that he has spent down his income by 5%, he could potentially get approved for the prescription assistance plan through [employer].  This has been faxed off to them at [phone] along with his initial application again.  Patient was made aware.    Mere Machado RN

## 2021-06-12 NOTE — PROGRESS NOTES
Pt arrived ambulatory to clinic for Cycle # 1 Day # 8 of his chemotherapy regimen.  Port was accessed using aseptic technique without difficulties with excellent blood return.  Dr. Clifford reviewed labs, pt is ok for treatment even though ANC is 0.6.  Administered premedications and Daratumamab per MD order.  Pt tolerate infusion well, no s/s of infusion reaction.  Port was flushed with NS and Heparin then de-accessed using 2x2 and papertape.  Pt verbalized understanding of plan of care and return to clinic.

## 2021-06-12 NOTE — PROGRESS NOTES
Patient dropped off paperwork and asked that we fax it off for him as it has already been signed by Dr Clifford.  The paperwork was for [employer] Patient Assistance Application.  This was sent along with patients insurance information and his W-2 and faxed to Aly Mosqueda, Senior  at [employer] at [phone].    Mere Machado RN

## 2021-06-12 NOTE — TELEPHONE ENCOUNTER
PA initiated for Pomalyst.     Medication Prior Authorization    Start Date: 10/5/20  Type: New  Urgency: Urgent  Med Type: Specialty  Insurance Info: [employer] ([facility]) - Phone [phone] Fax [phone]  Method: ePA  Reference #: Key: [id number] - PA Case ID: [id number]  Pharmacy Filling the Rx: [facility] - [place], [place] - [address]  ___________________________________________________________________________________________________________________:

## 2021-06-12 NOTE — TELEPHONE ENCOUNTER
"Oral Chemotherapy Monitoring Program    Oral Chemotherapy Monitoring    Date: 10/8/20  Primary Oncologist: Per Clifford MD  Primary Oncology Clinic:  [facility]  Cancer Diagnosis: Multiple myeloma not having achieved remission (H)  Regimen: DPD  Drug Name: pomalidomide  Current Dosage: 2 mg   Current Frequency: daily  Cycle Details: d1-21 out of 28 day cycle  Encounter: New Start   New Start:  Planned Start Date: 10/15/20    until disease progression or unacceptable toxcities   Dose adjusted appropriately for: Renal Impairment, Hepatic Impairment   Lab monitoring plan: Per treatment plan   Intervention: See in clinic      Who was educated?: Patient       Mid-Cycle Assessment:  Next pharmacist intervention date: 10/15/20           Drug interactions: Pomalyst with brimonide (opth), gabapentin, and cetirizine - can lead to increased CNS depression. Discussed with Keith, and he currently does not have any issues right now. Will monitor going forward however.    Subjective/Objective:  Theo Meyers is a 75 y.o. male called by phone for an initial visit for oral chemotherapy education.     No flowsheet data found.    Vitals:  BP:   BP Readings from Last 1 Encounters:   10/05/20 130/60     Wt Readings from Last 1 Encounters:   10/05/20 76.2 kg (168 lb)     Estimated body surface area is 1.92 meters squared as calculated from the following:    Height as of 7/30/20: 5' 8.5\" (1.74 m).    Weight as of 10/5/20: 76.2 kg (168 lb).      Labs:  Lab Results   Component Value Date    WBC 1.1 (LL) 09/30/2020    HGB 7.8 (L) 09/30/2020    HCT 24.9 (L) 09/30/2020     (H) 09/30/2020    PLT 51 (L) 09/30/2020     Results for orders placed or performed in visit on 09/30/20   Comprehensive Metabolic Panel (add-ons/treatment plan)   Result Value Ref Range    Sodium 143 136 - 145 mmol/L    Potassium 4.6 3.5 - 5.0 mmol/L    Chloride 113 (H) 98 - 107 mmol/L    CO2 24 22 - 31 mmol/L    Anion Gap, Calculation 6 5 - 18 mmol/L "    Glucose 100 70 - 125 mg/dL    BUN 21 8 - 28 mg/dL    Creatinine 1.11 0.70 - 1.30 mg/dL    GFR MDRD Af Amer >60 >60 mL/min/1.73m2    GFR MDRD Non Af Amer >60 >60 mL/min/1.73m2    Bilirubin, Total 0.4 0.0 - 1.0 mg/dL    Calcium 7.9 (L) 8.5 - 10.5 mg/dL    Protein, Total 4.4 (L) 6.0 - 8.0 g/dL    Albumin 3.0 (L) 3.5 - 5.0 g/dL    Alkaline Phosphatase 55 45 - 120 U/L    AST 16 0 - 40 U/L    ALT 15 0 - 45 U/L         Assessment:  Patient is appropriate to start therapy. Keith is taking acyclovir, rivaroxaban, and will stop the lenalidomide as directed. He just mentioned that he currently takes a 3 mile walk with his dog daily and has been having some fatigue, likely due to the reduced Hgb. Hopefully we see some improvement but did discuss that this regimen could also contribute to low blood counts. We will monitor.    Plan:  Basic chemotherapy teaching was reviewed with Keith including indication, start date of therapy, dose, administration, adverse effects, missed doses, food and drug interactions, monitoring, side effect management, office contact information, and safe handling. Written materials were provided and all questions answered.    Follow-Up:  Will review 10/15/2020 appointment with Dr. Clifford which should also be C1D1 of DPD assuming blood counts have improved. Keith knows we plan to call a couple weeks after that. He has our number if he needs to reach us sooner.     Lupe Garcia (Saumya), PharmD  Oral Chemotherapy Pharmacist  [phone]

## 2021-06-12 NOTE — PROGRESS NOTES
Pt ambulates to infusion center for lab draw prior to MD visit.  IVAD accessed, labs drawn et sent.  Pt seen by Dr. Clifford and orders approved.  Pt returns to infusion center for treatment.  Velcade given SQ as ordered.  IVAD flushed w/NS et heparin and needle deaccessed.  Pt left clinic stable to [facility].  Plan RTC as scheduled.

## 2021-06-12 NOTE — PROGRESS NOTES
Pt here for treatment. Port accessed easily and labs obtained and reviewed with Dr Clifford. Daratumumab administered over 90 minutes without incident. B12 injection also given. Pt is holding pomalidomide for now per Dr Clifford. Port flushed and deaccessed and pt d.c ambulatory to lobby alone.

## 2021-06-12 NOTE — TELEPHONE ENCOUNTER
Called pt to inform him that he would be getting IV Daratumamab for his treatment on 10/14. Explained to pt that for the 1st infusion this is given over 2 days. Pt was added to the chemo schedule 10/15 and was informed of this. Pt states he will be receiving his Pomalyst on 10/15. He will also start the Dexamethasone weekly as prescribed. Pt verbalized understanding of this plan. He will RTC as sched.

## 2021-06-12 NOTE — PROGRESS NOTES
Pt ambulates to infusion center for treatment, he voices no new concerns today.  Velcade given SQ as ordered without complication.  Pt left clinic stable to lobby.  Plan RTC as scheduled.

## 2021-06-12 NOTE — PROGRESS NOTES
"Pt ambulates to infusion center for lab draw and treatment.  Pt c/o feeling \"lightheaded\" this am and states that he hasn't eaten or drank much last evening or this morning.  Pt drank two glasses of water and states that he \"feels better already\". Pt voices no new concerns today.  IVAD accessed, labs drawn et sent w/results noted.  Treatment administered as ordered without difficulty.  IVAD flushed w/NS et heparin and needle deaccessed. Pt left clinic stable to lobby.  Plan RTC as scheduled.  "

## 2021-06-12 NOTE — PROGRESS NOTES
[facility] Hematology and Oncology Progress Note    Patient: Theo Meyers  MRN: [id number]  Date of Service:  July 26, 2017      Assessment and Plan:    1. Kappa light chain myeloma: Myeloma labs from late June were reviewed.  Generally looks stable with no laboratory trending suggesting aggressive disease.  Clinically no evidence of disease progression.  We will continue on his current regimen.  Recheck myeloma labs next month.    In the past the ELMA has been positive, as recently as late 2016. Upon progression, I told him I would switch to daratumumab, pomalidomide, and dexamethasone.     2. Thromboprophylaxis: He is on Coumadin for this. We'll adjust the dosing is needed.  INR is within range today.  Monitor platelets.    3.  Pancytopenia: Secondary to Revlimid. Stable.  Continue to follow.    ECOG Performance   ECOG Performance Status: 0    Distress Assessment  Distress Assessment Score: 2    Pain  Currently in Pain: No/denies    Diagnosis:    1. IgG kappa light chain myeloma. Hyposecretory. Diagnosed 2009. No significant measurable M protein. 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 There was also on 11;14 translocation.  Past immunofixations from 2011 show IgG-kappa.    2. Deep vein thrombosis of the left leg diagnosed 09/2012 while on treatment.    Treatment:    He presented with a lytic lesion involving the C6 vertebral body, although no measurable M protein. IgG kappa monoclonal immunoglobulin was confirmed by ELMA as well as elevated kappa free light chains with an abnormal kappa lambda ratio. Bone marrow biopsy showed 30% involvement and cytogenetics confirmed deletion of 13 q. as well as 11;14 translocation. He was initially treated with Velcade and dexamethasone and achieved a good partial response.   He was referred to the [facility] where he underwent high-dose chemotherapy with autologous peripheral stem cell transplant in April of 2010. He began Revlimid as maintenance  therapy post transplant. Repeat bone marrow biopsy done October 2010 showed about 5% kappa restricted plasma cells and so at that time weekly Velcade was added along with his maintenance Revlimid and dexamethasone.   He has done well since that time with stable minimal residual disease, best assessed by serum free light chains. He required dose reductions of weekly Velcade because of cytopenias and was ultimately switched to a q 2 week maintenance schedule which he has been on since September 2012. His Revlimid dose was reduced to 15 mg daily and he continues on dexamethasone 20 mg p.o. weekly    Interim History:    Theo returns today for follow-up visit.  Overall he is feeling okay.  Maybe a little more fatigue.  No bleeding or bruising.  No worsening numbness or tingling in the hands or feet.  No bony pain.  No constipation or diarrhea.    Review of Systems:    Constitutional  Constitutional (WDL): Exceptions to WDL  Fatigue: Fatigue not relieved by rest - Limiting instrumental ADL (more than it has been)  Weight Gain: 5 - <10% from baseline (up 4lbs since 6/28)  Neurosensory  Neurosensory (WDL): Exceptions to WDL  Peripheral Motor Neuropathy: Asymptomatic, clinical or diagnostic observations only, intervention not indicated  Peripheral Sensory Neuropathy: Asymptomatic, loss of deep tendon reflexes or paresthesia  Cardiovascular  Cardiovascular (WDL): All cardiovascular elements are within defined limits  Pulmonary  Respiratory (WDL): Within Defined Limits  Gastrointestinal  Gastrointestinal (WDL): Exceptions to WDL  Diarrhea: Increase of <4 stools per day over baseline, mild increase in ostomy output compared to baseline (at times; imodium with relief)  Genitourinary  Genitourinary (WDL): All genitourinary elements are within defined limits  Integumentary  Integumentary (WDL): All integumentary elements are within defined limits (rash is gone)  Patient Coping  Patient Coping: Accepting  Accompanied  by  Accompanied by: Family Member (wife)    Past History:    Past Medical History:   Diagnosis Date     Arthritis      Bilateral inguinal hernia      DVT (deep venous thrombosis)     09/2012     Glaucoma      Multiple myeloma     Gets chemo infusions every 2 weeks     Peripheral neuropathy      TMJ (temporomandibular joint disorder)      Physical Exam:    Recent Vitals 7/26/2017   Height -   Weight 162 lbs 6 oz   BSA (m2) -   BP 99/57   Pulse 53   Temp 97.7   Temp src 1   SpO2 98   Some recent data might be hidden     General: patient appears stated age of 72 y.o.. Nontoxic and in no distress.   HEENT: Head: atraumatic, normocephalic. Sclerae anicteric.  Chest:  Normal respiratory effort.   Cardiac:  No edema.   Abdomen: abdomen is soft, non-distended  Extremities: normal tone and muscle bulk.   Skin: no lesions or rash. Warm and dry.   CNS: alert and oriented x3. Grossly non-focal.   Psychiatric: normal mood and affect.     Lab Results:    Recent Results (from the past 168 hour(s))   INR (for treatment plan use)   Result Value Ref Range    INR 2.33 (H) 0.90 - 1.10   Comprehensive Metabolic Panel   Result Value Ref Range    Sodium 141 136 - 145 mmol/L    Potassium 4.4 3.5 - 5.0 mmol/L    Chloride 108 (H) 98 - 107 mmol/L    CO2 28 22 - 31 mmol/L    Anion Gap, Calculation 5 5 - 18 mmol/L    Glucose 99 70 - 125 mg/dL    BUN 20 8 - 28 mg/dL    Creatinine 0.90 0.70 - 1.30 mg/dL    GFR MDRD Af Amer >60 >60 mL/min/1.73m2    GFR MDRD Non Af Amer >60 >60 mL/min/1.73m2    Bilirubin, Total 0.6 0.0 - 1.0 mg/dL    Calcium 8.6 8.5 - 10.5 mg/dL    Protein, Total 5.2 (L) 6.0 - 8.0 g/dL    Albumin 3.1 (L) 3.5 - 5.0 g/dL    Alkaline Phosphatase 63 45 - 120 U/L    AST 18 0 - 40 U/L    ALT 21 0 - 45 U/L   HM1 (CBC with Diff)   Result Value Ref Range    WBC 2.4 (L) 4.0 - 11.0 thou/uL    RBC 3.12 (L) 4.40 - 6.20 mill/uL    Hemoglobin 10.5 (L) 14.0 - 18.0 g/dL    Hematocrit 31.5 (L) 40.0 - 54.0 %     (H) 80 - 100 fL    MCH 33.7  27.0 - 34.0 pg    MCHC 33.3 32.0 - 36.0 g/dL    RDW 16.5 (H) 11.0 - 14.5 %    Platelets 95 (L) 140 - 440 thou/uL    MPV 11.9 8.5 - 12.5 fL   Manual Differential   Result Value Ref Range    Total Neutrophils % 52 50 - 70 %    Lymphocytes % 21 20 - 40 %    Monocytes % 11 (H) 2 - 10 %    Eosinophils %  13 (H) 0 - 6 %    Basophils % 3 (H) 0 - 2 %    Total Neutrophils Absolute 1.2 (L) 2.0 - 7.7 thou/ul    Lymphocytes Absolute 0.5 (L) 0.8 - 4.4 thou/uL    Monocytes Absolute 0.3 0.0 - 0.9 thou/uL    Eosinophils Absolute 0.3 0.0 - 0.4 thou/uL    Basophils Absolute 0.1 0.0 - 0.2 thou/uL    Platelet Estimate Decreased (!) Normal    Ovalocytes 1+ (!) Negative     Imaging:    No results found.       Signed by: Per Clifford MD

## 2021-06-12 NOTE — PROGRESS NOTES
Keith came to chemo infusion this morning for cycle 1 day 2 treatment with daratumumab.  VSS with  systolic.  Pt was assessed and feeling well.  He did have trouble sleeping last night.  Premedication given and 1 hour observation was completed.  VS taken prior to start of daratumumab reveled that BP up to 170's systolic.  He reported feeling fine.  Daratumumab was started at 50cc/hr and increased as ordered every hour by 50cc/hr to max rate of 200cc/hr.  He tolerated the infusion well while in clinic.  Port was flushed with ns, heparinized then de-accessed and site covered.  Keith left clinic ambulatory.

## 2021-06-12 NOTE — PROGRESS NOTES
[facility] Hematology and Oncology Progress Note    Patient: Theo Meyers  MRN: [id number]  Date of Service: October 14, 2020      Assessment and Plan:    1. Kappa light chain myeloma: He is starting Darzalex today.  We are going to hold the pomalidomide until his absolute neutrophil count is at least 2000.  I reviewed the schedule of dosing of the Darzalex with him.  I asked him to give us a call if he has any signs or symptoms of possible reaction.  Other questions were answered.  He will continue weekly dexamethasone.    2.  Pancytopenia: CBC yesterday showed slow recovery of his platelets and hemoglobin.  Still has neutropenia which is precluding starting pomalidomide.  Will check CBC weekly.    3.  Prophylaxis: We can have him stop his acyclovir now that he is off of Velcade.  Continue Xarelto 20 mg daily.    ECOG Performance   ECOG Performance Status: 0    Distress Assessment  Distress Assessment Score: 1    Pain  Currently in Pain: No/denies    Diagnosis:    1.  IgG kappa light chain myeloma. Hyposecretory. Diagnosed 2009. No significant measurable M protein. 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 There was also on 11;14 translocation.  Past immunofixations from 2011 show IgG-kappa.  Bone marrow biopsy at 5%.  Residual kappa light chain myeloma involving 20% of nucleated cells.  No significant change from November 2016 marrow cellularity.    2.  Deep vein thrombosis of the left leg diagnosed 9/2012 while on treatment.    3.  Vitamin B12 deficiency diagnosed in September 2017.    4.  GI bleed and anemia requiring hospitalization in December 2017.    Treatment:    He presented with a lytic lesion involving the C6 vertebral body, although no measurable M protein. IgG kappa monoclonal immunoglobulin was confirmed by ELMA as well as elevated kappa free light chains with an abnormal kappa lambda ratio. Bone marrow biopsy showed 30% involvement and cytogenetics confirmed deletion of 13 q. as well as  11;14 translocation. He was initially treated with Velcade and dexamethasone and achieved a good partial response.     He was referred to the [facility] where he underwent high-dose chemotherapy with autologous peripheral stem cell transplant in April of 2010. He began Revlimid as maintenance therapy post transplant. Repeat bone marrow biopsy done October 2010 showed about 5% kappa restricted plasma cells and so at that time weekly Velcade was added along with his maintenance Revlimid and dexamethasone.     He has done well since that time with stable minimal residual disease, best assessed by serum free light chains. He required dose reductions of weekly Velcade because of cytopenias and was ultimately switched to a q 2 week maintenance schedule which he has been on since September 2012.     His Revlimid dose was reduced to 15 mg daily and he continues on dexamethasone 20 mg p.o. weekly  Revlimid dosing changed to 20 mg, 3 weeks on 1 week (instead of 15 mg daily) for cost reasons.  Started March, 2018    Progressed on:  bortezomib  lenalidomide    Interim History:    Theo returns today for follow-up visit.  Starting darzalex today. Feeling ok. No pain. No acute complaints today.     Review of Systems:    As above in the history.     Review of Systems otherwise Negative for:  General: chills, fever or night sweats  Psychological: anxiety or depression  Ophthalmic: blurry vision, double vision or loss of vision, vision change  ENT: epistaxis, oral lesions, hearing changes  Hematological and Lymphatic: bleeding, bruising, jaundice, swollen lymph nodes  Endocrine: hot flashes, unexpected weight changes  Respiratory: cough, hemoptysis, orthopnea  Cardiovascular: chest pain, edema, palpitations or PND  Gastrointestinal: abdominal pain, blood in stools, change in bowel habits, constipation, diarrhea or nausea/vomiting  Genito-Urinary: change in urinary stream, incontinence,  frequency/urgency  Musculoskeletal: joint pain, stiffness, swelling, muscle pain  Neurological: dizziness, headaches, numbness/tingling  Dermatological: lumps and rash    ECOG performance status is 0    Patient Coping  Patient Coping: Accepting  Accompanied by  Accompanied by: Alone    Past History:    Past Medical History:   Diagnosis Date     Anemia 12/13/2017     Arthritis      Bilateral inguinal hernia      Glaucoma      Glaucoma      History of blood clots     DVT - left chronic     Multiple myeloma (H)     Gets chemo infusions every 2 weeks     Pancytopenia (H)      Peripheral neuropathy      Syncope      TMJ (temporomandibular joint disorder)      Physical Exam:    Recent Vitals 10/14/2020   Height -   Weight 168 lbs   BSA (m2) 1.93 m2   /57   Pulse 67   Temp 98.3   Temp src 1   SpO2 100   Some recent data might be hidden     General: patient appears stated age of 75 y.o.. Nontoxic and in no distress.   HEENT: Head: atraumatic, normocephalic. Sclerae anicteric.  Chest:  Normal respiratory effort.   Cardiac:  No edema.   Abdomen: abdomen is non-distended  Extremities: normal tone and muscle bulk.   Skin: no lesions or rash. Warm and dry.   CNS: alert and oriented. Grossly non-focal.   Psychiatric: normal mood and affect.     Lab Results:    Recent Results (from the past 240 hour(s))   POCT Glucose    Collection Time: 10/13/20  7:44 AM    Specimen: Capillary; Blood   Result Value Ref Range    Glucose 91 70 - 139 mg/dL   Comprehensive Metabolic Panel    Collection Time: 10/13/20  9:50 AM   Result Value Ref Range    Sodium 143 136 - 145 mmol/L    Potassium 4.7 3.5 - 5.0 mmol/L    Chloride 113 (H) 98 - 107 mmol/L    CO2 25 22 - 31 mmol/L    Anion Gap, Calculation 5 5 - 18 mmol/L    Glucose 89 70 - 125 mg/dL    BUN 31 (H) 8 - 28 mg/dL    Creatinine 1.09 0.70 - 1.30 mg/dL    GFR MDRD Af Amer >60 >60 mL/min/1.73m2    GFR MDRD Non Af Amer >60 >60 mL/min/1.73m2    Bilirubin, Total 0.4 0.0 - 1.0 mg/dL    Calcium  8.2 (L) 8.5 - 10.5 mg/dL    Protein, Total 5.0 (L) 6.0 - 8.0 g/dL    Albumin 3.1 (L) 3.5 - 5.0 g/dL    Alkaline Phosphatase 67 45 - 120 U/L    AST 22 0 - 40 U/L    ALT 29 0 - 45 U/L   HM1 (CBC with Diff)    Collection Time: 10/13/20  9:50 AM   Result Value Ref Range    WBC 1.0 (LL) 4.0 - 11.0 thou/uL    RBC 2.26 (L) 4.40 - 6.20 mill/uL    Hemoglobin 8.3 (L) 14.0 - 18.0 g/dL    Hematocrit 26.0 (L) 40.0 - 54.0 %     (H) 80 - 100 fL    MCH 36.7 (H) 27.0 - 34.0 pg    MCHC 31.9 (L) 32.0 - 36.0 g/dL    RDW 15.7 (H) 11.0 - 14.5 %    Platelets 97 (L) 140 - 440 thou/uL    MPV 11.0 8.5 - 12.5 fL    Neutrophils % 46 (L) 50 - 70 %    Lymphocytes % 31 20 - 40 %    Monocytes % 14 (H) 2 - 10 %    Eosinophils % 5 0 - 6 %    Basophils % 1 0 - 2 %    Immature Granulocyte % 4 (H) <=0 %    Neutrophils Absolute 0.5 (L) 2.0 - 7.7 thou/uL    Lymphocytes Absolute 0.3 (L) 0.8 - 4.4 thou/uL    Monocytes Absolute 0.1 0.0 - 0.9 thou/uL    Eosinophils Absolute 0.1 0.0 - 0.4 thou/uL    Basophils Absolute 0.0 0.0 - 0.2 thou/uL    Immature Granulocyte Absolute 0.0 <=0.0 thou/uL   Type and Screen    Collection Time: 10/13/20  9:50 AM   Result Value Ref Range    ABORh A POS     Antibody Screen Negative Negative     Imaging:    Nm Pet Ct Whole Body    Result Date: 10/13/2020  EXAM: NM PET CT WHOLE BODY LOCATION: [facility] DATE/TIME: 10/13/2020 10:10 AM INDICATION: Subsequent treatment planning and restaging for multiple myeloma in relapse. Status post systemic chemotherapy. Monitor treatment response. COMPARISON: FDG PET/CT dated 11/21/2016, CT the thorax dated 07/05/2019 TECHNIQUE: Serum glucose level 91 mg/dL. One hour post intravenous administration of 8.5 mCi F-18 FDG, PET imaging was performed from the skull vertex to feet utilizing attenuation correction with concurrent axial CT and PET/CT image fusion. Dose reduction techniques were used. FINDINGS: Physiologic FDG uptake from the skull vertex to feet. Mild  senescent intracranial changes. Postoperative change of the bilateral lenses. Moderate carotid artery bifurcation calcification. Right chest port with tip terminating near the superior cavoatrial junction. Severe coronary artery calcium/stenting. Left lower lobe scarring/atelectasis. Nonobstructing right inferior pole renal calculus. Pelvic phleboliths. Unchanged non-FDG avid avid lucent lesions in the right posterior iliac bone and left pubic bone. Multilevel degenerative changes of the spine. Chronic compression deformity of T6 with in situ vertebral cement. Benign enchondroma in the right proximal humerus Multilevel degenerative changes of the spine.     No metabolic evidence of active myeloma.      Signed by: Per Clifford MD

## 2021-06-12 NOTE — PROGRESS NOTES
[facility] Hematology and Oncology Progress Note    Patient: Theo Meyers  MRN: [id number]  Date of Service: October 5, 2020      Assessment and Plan:    1. Kappa light chain myeloma: Continues to have worsening of his kappa and lambda light chain ratio.  It is overall 100 and gradually worsening.  I think he is having progression.  He is also having worsening cytopenias.  Not sure if this is from bone marrow infiltration or side effects from his medications.  We are going to switch him to daratumumab/pomalidomide/weekly dexamethasone.  Plan is reviewed with the patient today.  We talked about some of the more common side effects of the drugs.  I gave him some written information about both daratumumab and pomalidomide to take, and review.  We will start on the 14th.  We will hold pomalidomide until his counts improve.  We will start at a 2 mg/day dose of pomalidomide.  All questions were answered.  Revlimid will be stopped.  I would also like to get a PET scan.    2.  Pancytopenia: Bone marrow biopsy shows a very hypocellular marrow.  Likely drug effect.  We had him hold his Revlimid 5 days ago.  We will continue to observe.  Would like to delay initiating pomalidomide until his counts have improved some.    We will start the pomalidomide dose at 2 mg daily instead of 4.  Titrate up as able.    He has a macrocytosis which is probably from reticulocytes.  B12 and folate were normal in July of this year.  TSH is normal today.  Recheck so not elevated consistent with drug effect.    ECOG Performance   ECOG Performance Status: 1    Distress Assessment  Distress Assessment Score: No distress    Pain  Currently in Pain: No/denies    Diagnosis:    1.  IgG kappa light chain myeloma. Hyposecretory. Diagnosed 2009. No significant measurable M protein. 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 There was also on 11;14 translocation.  Past immunofixations from 2011 show IgG-kappa.  Bone marrow biopsy at 5%.   Residual kappa light chain myeloma involving 20% of nucleated cells.  No significant change from November 2016 marrow cellularity.    2.  Deep vein thrombosis of the left leg diagnosed 9/2012 while on treatment.    3.  Vitamin B12 deficiency diagnosed in September 2017.    4.  GI bleed and anemia requiring hospitalization in December 2017.    Treatment:    He presented with a lytic lesion involving the C6 vertebral body, although no measurable M protein. IgG kappa monoclonal immunoglobulin was confirmed by [facility] as well as elevated kappa free light chains with an abnormal kappa lambda ratio. Bone marrow biopsy showed 30% involvement and cytogenetics confirmed deletion of 13 q. as well as 11;14 translocation. He was initially treated with Velcade and dexamethasone and achieved a good partial response.     He was referred to the [facility] ER where he underwent high-dose chemotherapy with autologous peripheral stem cell transplant in April of 2010. He began Revlimid as maintenance therapy post transplant. Repeat bone marrow biopsy done October 2010 showed about 5% kappa restricted plasma cells and so at that time weekly Velcade was added along with his maintenance Revlimid and dexamethasone.     He has done well since that time with stable minimal residual disease, best assessed by serum free light chains. He required dose reductions of weekly Velcade because of cytopenias and was ultimately switched to a q 2 week maintenance schedule which he has been on since September 2012.     His Revlimid dose was reduced to 15 mg daily and he continues on dexamethasone 20 mg p.o. weekly  Revlimid dosing changed to 20 mg, 3 weeks on 1 week (instead of 15 mg daily) for cost reasons.  Started March, 2018    Progressed on:  bortezomib  lenalidomide    Interim History:    Theo returns today for follow-up visit.  Overall doing okay.  No new pain.  Energy and appetite are good.  No bleeding or bruising.  No fevers.    Review  of Systems:    As above in the history.     Review of Systems otherwise Negative for:  General: chills, fever or night sweats  Psychological: anxiety or depression  Ophthalmic: blurry vision, double vision or loss of vision, vision change  ENT: epistaxis, oral lesions, hearing changes  Hematological and Lymphatic: bleeding, bruising, jaundice, swollen lymph nodes  Endocrine: hot flashes, unexpected weight changes  Respiratory: cough, hemoptysis, orthopnea  Cardiovascular: chest pain, edema, palpitations or PND  Gastrointestinal: abdominal pain, blood in stools, change in bowel habits, constipation, diarrhea or nausea/vomiting  Genito-Urinary: change in urinary stream, incontinence, frequency/urgency  Musculoskeletal: joint pain, stiffness, swelling, muscle pain  Neurological: dizziness, headaches, numbness/tingling  Dermatological: lumps and rash    ECOG performance status is 0    Patient Coping  Patient Coping: Accepting  Accompanied by  Accompanied by: Alone    Past History:    Past Medical History:   Diagnosis Date     Anemia 12/13/2017     Arthritis      Bilateral inguinal hernia      Glaucoma      Glaucoma      History of blood clots     DVT - left chronic     Multiple myeloma (H)     Gets chemo infusions every 2 weeks     Pancytopenia (H)      Peripheral neuropathy      Syncope      TMJ (temporomandibular joint disorder)      Physical Exam:    Recent Vitals 10/5/2020   Weight 168 lbs   BSA (m2) 1.92 m2   /60   Pulse 60   Temp 98.2   Temp src 1   SpO2 98   Some recent data might be hidden     General: patient appears stated age of 75 y.o.. Nontoxic and in no distress.   HEENT: Head: atraumatic, normocephalic. Sclerae anicteric.  Chest:  Normal respiratory effort.   Cardiac:  No edema.   Abdomen: abdomen is non-distended  Extremities: normal tone and muscle bulk.   Skin: no lesions or rash. Warm and dry.   CNS: alert and oriented. Grossly non-focal.   Psychiatric: normal mood and affect.     Lab  Results:    Recent Results (from the past 240 hour(s))   Immunoglobulins, Quantitative    Collection Time: 09/30/20  7:56 AM   Result Value Ref Range    Immunoglobulin G 243 (L) 700-1,700 mg/dL    Immunoglobulin M <5 (L) 60 - 280 mg/dL    Immunoglobulin A 15 (L) 65 - 400 mg/dL   Immunoglobulin Free Light Chains, Serum    Collection Time: 09/30/20  7:56 AM   Result Value Ref Range    Kappa Free Lt Chain 99.52 (H) 0.33 - 1.94 mg/dL    Lambda Free Lt Chain 0.63 0.57 - 2.63 mg/dL    Kappa Lambda Ratio 157.97 (H) 0.26 - 1.65   Electrophoresis, Protein, Serum    Collection Time: 09/30/20  7:56 AM   Result Value Ref Range    Albumin % 71.1 (H) 51.0 - 67.0 %    Albumin  3.1 (L) 3.2 - 4.7 g/dL    Alpha 1 % 3.3 2.0 - 4.0 %    Alpha 1 0.1 0.1 - 0.3 g/dL    Alpha 2 % 11.0 5.0 - 13.0 %    Alpha 2 0.5 0.4 - 0.9 g/dL    % Beta 9.8 (L) 10.0 - 17.0 %    Beta 0.4 (L) 0.7 - 1.2 g/dL    Gamma Globulin % 4.8 (L) 9.0 - 20.0 %    Gamma Globulin 0.2 (L) 0.6 - 1.4 g/dL    ELP Comment       The albumin fraction is decreased, and this may represent any combination of protein-calorie malnutrition, bulk protein loss, or accelerated protein breakdown.    Decreased beta globulin fraction, which can be seen in kidney disease, coagulopathies, and malnutrition, among other etiologies.    Decreased gamma globulin fraction. Etiologies include lymphoproliferative disorders, chemotherapy, and immunodeficiency.      Protein, Total 4.4 (L) 6.0 - 8.0 g/dL    Path ICD: C90.01     Interpreted By: Lan Perez MD    Immunofixation Electrophoresis, Serum    Collection Time: 09/30/20  7:56 AM   Result Value Ref Range    Immunofixation Electrophoresis, Serum Free monoclonal kappa light chains present.       Path ICD: C90.01     Interpreted By: Lan Perez MD    HM1 (CBC with Diff)    Collection Time: 09/30/20  7:56 AM   Result Value Ref Range    WBC 1.1 (LL) 4.0 - 11.0 thou/uL    RBC 2.13 (L) 4.40 - 6.20 mill/uL    Hemoglobin 7.8 (L) 14.0 - 18.0  g/dL    Hematocrit 24.9 (L) 40.0 - 54.0 %     (H) 80 - 100 fL    MCH 36.6 (H) 27.0 - 34.0 pg    MCHC 31.3 (L) 32.0 - 36.0 g/dL    RDW 15.9 (H) 11.0 - 14.5 %    Platelets 51 (L) 140 - 440 thou/uL    MPV 13.2 (H) 8.5 - 12.5 fL   Manual Differential    Collection Time: 09/30/20  7:56 AM   Result Value Ref Range    Total Neutrophils % 52 50 - 70 %    Lymphocytes % 28 20 - 40 %    Monocytes % 4 2 - 10 %    Eosinophils %  16 (H) 0 - 6 %    Basophils % 0 0 - 2 %    Immature Granulocyte % - Manual 0 <=0 %    Total Neutrophils Absolute 0.6 (L) 2.0 - 7.7 thou/ul    Lymphocytes Absolute 0.3 (L) 0.8 - 4.4 thou/uL    Monocytes Absolute 0.0 0.0 - 0.9 thou/uL    Eosinophils Absolute 0.2 0.0 - 0.4 thou/uL    Basophils Absolute 0.0 0.0 - 0.2 thou/uL    Immature Granulocyte Absolute - Manual 0.0 <=0.0 thou/uL    Platelet Estimate Decreased (!) Normal    Ovalocytes 2+ (!) Negative    Polychromasia 1+ (!) Negative    Tear Drop Cells 1+ (!) Negative    Dohle Bodies 1+ (!) Negative   Comprehensive Metabolic Panel (add-ons/treatment plan)    Collection Time: 09/30/20  9:06 AM   Result Value Ref Range    Sodium 143 136 - 145 mmol/L    Potassium 4.6 3.5 - 5.0 mmol/L    Chloride 113 (H) 98 - 107 mmol/L    CO2 24 22 - 31 mmol/L    Anion Gap, Calculation 6 5 - 18 mmol/L    Glucose 100 70 - 125 mg/dL    BUN 21 8 - 28 mg/dL    Creatinine 1.11 0.70 - 1.30 mg/dL    GFR MDRD Af Amer >60 >60 mL/min/1.73m2    GFR MDRD Non Af Amer >60 >60 mL/min/1.73m2    Bilirubin, Total 0.4 0.0 - 1.0 mg/dL    Calcium 7.9 (L) 8.5 - 10.5 mg/dL    Protein, Total 4.4 (L) 6.0 - 8.0 g/dL    Albumin 3.0 (L) 3.5 - 5.0 g/dL    Alkaline Phosphatase 55 45 - 120 U/L    AST 16 0 - 40 U/L    ALT 15 0 - 45 U/L     Imaging:    No results found.       Signed by: Per Clifford MD

## 2021-06-12 NOTE — PROGRESS NOTES
Pt ambulates to infusion center for labs and treatment.  IVAD accessed, labs drawn et sent w/results noted and reviewed w/K. JESICA Valdez.  Pt voices no new concerns today.  Pt is hypotensive on arrival, although denies any s/s of this (dizziness, lightheadedness).  This was also reviewed w/K. JESICA Valdez.  IVAD flushed w/NS et heparin and needle deaccessed.  Velcade given SQ as ordered.  Pt left clinic stable to lobby.  Plan RTC as scheduled.

## 2021-06-12 NOTE — PROGRESS NOTES
Bone Marrow Biopsy and Aspiration Procedure Note     Informed consent was obtained and potential risks including bleeding, infection and pain were reviewed with the patient.     Posterior iliac crest(s) prepped with Betadine.     Lidocaine 1% local anesthesia infiltrated into the subcutaneous tissue.    Left PIC bone marrow biopsy and bone marrow aspirate was obtained.     The procedure was tolerated well and there were no complications.    Specimens sent for: routine.    Physician: Bharath Landis

## 2021-06-12 NOTE — TELEPHONE ENCOUNTER
"Called patient with Dr. Clifford message.  \"let him know his PET scan is negative.  No evidence of lytic lesions from myeloma or any other cancer\"  I told him Dr. Clifford would further explain these results to him at his visit tomorrow. Patient had questions about his upcoming appointments. Answered them to the best of my ability.  Reviewed his upcoming appointments dates and times/SONIA Levy RN  "

## 2021-06-12 NOTE — PROGRESS NOTES
RESPIRATORY CARE NOTE     Patient Name: Theo Meyers  Today's Date: 10/14/2020     Called to give neb in outpatient infusion therapy.  Because pt has not been tested for COVID, changed to inhaler due to PUI status.  Instructed and administered Albuterol MDI 2 puffs per Dr Alba.  Clear breath sounds.   systolic, rr 20, HR 74, sat 100% on 3 lpm NC.  Pt alert and oriented.  Pt did well with instruction.        Karen Rhodes

## 2021-06-12 NOTE — TELEPHONE ENCOUNTER
Patient approved for free drug(Pomalyst).  Prescription that was previously sent to [facility] was never picked up so this was canceled by patient.  New prescription along with new authorization # has been sent to the new specialty pharmacy that the free drug program uses.  Patient has been updated.    Mere Machado RN

## 2021-06-12 NOTE — PROGRESS NOTES
"Theo Meyers came in today for a bone marrow biopsy. \"I do not want any premeds.\" He reports he has many bone marrow biopsy's. ID band placed and verified, consent completed. Dr. Landis completed bone marrow biopsy, see his note. Dressings are clean, dry and intact. Theo was discharged to home. He discharged ambulatory and independent from unit at 1056. He has a follow up[ appointment on 09/13/17. The after visit summary was given.  "

## 2021-06-13 NOTE — PROGRESS NOTES
Theo came to chemo infusion this morning for his next dose of Daratumumab.  VSS.  Pt assessed.  Port was accessed with good blood return.  Lab drawn.  He received treatment as ordered and tolerated it well while in clinic today.  Port was flushed with ns, heparinized then de-accessed and site covered.  Theo left clinic ambulatory.  He is aware of his future appointment.

## 2021-06-13 NOTE — PROGRESS NOTES
[facility] Hematology and Oncology Progress Note    Patient: Theo Meyers  MRN: [id number]  Date of Service: 12/09/2020        Reason for Visit    Chief Complaint   Patient presents with     HE Cancer     Multiple myeloma not having achieved remission        Assessment and Plan    1.  Myeloma: pt continues on daratumumab and dexamethasone. Holding pomalyst due to low counts. This regimen was initiated in October and he appears to be responding well. Light chains are improved. He is doing well. He has received daratumumab weekly for 8 weeks. We will now start every other week dosing. He will return in see provider in 4 weeks. Will add in pomalyst.     2. DVT: This is recurrent.  Will be on xarelto indefinitely.     3. Pancytopenia: some treatment and some myeloma. These are all improving with treatment. We are holding pomalyst. His ANC is now normal so we will start pomalyst. We will start at 50% dosing, 2mg daily for 3 weeks, then off for one week. Already has dose at home.       ECOG Performance   ECOG Performance Status: 0     Distress Assessment  Distress Assessment Score: No distress    Pain  Currently in Pain: No/denies      Problem List    1. Multiple myeloma in remission (H)  CC OFFICE VISIT LONG    CC OFFICE VISIT LONG    Infusion Appointment    DISCONTINUED: sodium chloride 0.9% 250 mL infusion    DISCONTINUED: dexAMETHasone 20 mg in sodium chloride 0.9% 50 mL (DECADRON)    DISCONTINUED: diphenhydrAMINE injection 25 mg (BENADRYL)    DISCONTINUED: acetaminophen tablet 650 mg (TYLENOL)    DISCONTINUED: daratumumab 1,200 mg in sodium chloride 0.9% 500 mL (DARZALEX)    DISCONTINUED: sodium chloride flush 20 mL (NS)    DISCONTINUED: heparin lockflush (PF) porcine 300-600 Units    DISCONTINUED: diphenhydrAMINE injection 50 mg (BENADRYL)    DISCONTINUED: famotidine 20 mg/2 mL injection 20 mg (PEPCID)    DISCONTINUED: hydrocortisone sod succ (PF) injection 100 mg    DISCONTINUED: acetaminophen tablet 1,000 mg  (TYLENOL)    DISCONTINUED: sodium chloride 0.9% 500 mL      ______________________________________________________________________________    History of Present Illness    DIAGNOSIS:   1. IgG kappa light chain myeloma. Hyposecretory. Diagnosed 2009. No significant measurable M protein. 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 There was also on 11;14 translocation.   2. Deep vein thrombosis of the left leg diagnosed 09/2012 while on treatment.       TREATMENT:   Started Daratumumab in October 2020. Dexamethasone weekly as well. We have been holding pomalyst due to pancytopenia. Today is cycle 3. We will add in pomalyst today.         PAST TREATMENT and HISTORY:   Most recently he has been on Velcade every 2 weeks since 09/2012. He is getting dexamethasone 20 mg every other week with the velcade. Finally, he is receiving Revlimid 20 mg daily for 3 weeks on, 1 week off.     He presented at diagnosis with a lytic lesion involving the C6 vertebral body, although no measurable M protein. IgG kappa monoclonal immunoglobulin was confirmed by [facility] as well as elevated kappa free light chains with an abnormal kappa lambda ratio. Bone marrow biopsy showed 30% involvement and cytogenetics confirmed deletion of 13 q. as well as 11;14 translocation. He was initially treated with Velcade and dexamethasone and achieved a good partial response. He was referred to the [facility] where he underwent high-dose chemotherapy with autologous peripheral stem cell transplant in April of 2010. He began Revlimid as maintenance therapy post transplant. Repeat bone marrow biopsy done October 2010 showed about 5% kappa restricted plasma cells and so at that time weekly Velcade was added along with his maintenance Revlimid and dexamethasone. He has done well since that time with stable minimal residual disease, best assessed by serum free light chains. He required dose reductions of weekly Velcade because of cytopenias and  was ultimately switched to a q.2 week maintenance schedule which he has been on since September 2012. His Revlimid dose is 20 mg daily and he continues on dexamethasone 20 mg p.o. q. Week.       INTERIM HISTORY:    Pt is here today for follow up. He is doing pretty well.  He is tolerating his new regimen quite well. No new issues. Mild neuropathy, which is stable. Happy with his counts so far. No infectious issues.     Pain Status  Currently in Pain: No/denies    Review of Systems  Constitutional  Constitutional (WDL): All constitutional elements are within defined limits  Neurosensory  Neurosensory (WDL): Exceptions to WDL  Peripheral Motor Neuropathy: Concerns(stable)  Peripheral Sensory Neuropathy: Concerns(stable)  Eye   Eye Disorder (WDL): Exceptions to WDL(glaucoma)  Blurred Vision: Concerns(with straining)  Ear  Ear Disorder (WDL): Exceptions to WDL  Tinnitus: Concerns(occ mild)  Cardiovascular  Cardiovascular (WDL): All cardiovascular elements are within defined limits  Pulmonary  Respiratory (WDL): Exceptions to WDL  Dyspnea: Concerns(with activity)  Gastrointestinal  Gastrointestinal (WDL): All gastrointestinal elements are within defined limits  Genitourinary  Genitourinary (WDL): All genitourinary elements are within defined limits  Lymphatic  Lymph (WDL): All lymph disorder elements are within defined limits  Musculoskeletal and Connective Tissue  Musculoskeletal and Connetive Tissue Disorders (WDL): Exceptions to WDL  Arthralgia: Concerns(right shoulder)  Integumentary  Integumentary (WDL): Exceptions to WDL(left forearm lesion)  Patient Coping  Patient Coping: Accepting;Open/discussion  Accompanied by  Accompanied by: Alone  Oral Chemo Adherence         Past History  Past Medical History:   Diagnosis Date     Anemia 12/13/2017     Arthritis      Bilateral inguinal hernia      Glaucoma      Glaucoma      History of blood clots     DVT - left chronic     Multiple myeloma (H)     Gets chemo infusions  "every 2 weeks     Pancytopenia (H)      Peripheral neuropathy      Syncope      TMJ (temporomandibular joint disorder)        PHYSICAL EXAM  /56   Pulse 72   Temp 97.5  F (36.4  C) (Oral)   Ht 5' 10\" (1.778 m)   Wt 163 lb 6.4 oz (74.1 kg)   SpO2 100%   BMI 23.45 kg/m      GENERAL: no acute distress. Cooperative in conversation. Here alone due to visitor restrictions. Mask on  RESP: Regular respiratory rate. No expiratory wheezes   MUSCULOSKELETAL: no bilateral leg swelling  NEURO: non focal. Alert and oriented x3.   PSYCH: within normal limits. No depression or anxiety.  SKIN: exposed skin is dry intact.     Lab Results    Lab Standing Order on 12/09/2020   Component Date Value Ref Range Status     Sodium 12/09/2020 141  136 - 145 mmol/L Final     Potassium 12/09/2020 4.5  3.5 - 5.0 mmol/L Final     Chloride 12/09/2020 109* 98 - 107 mmol/L Final     CO2 12/09/2020 26  22 - 31 mmol/L Final     Anion Gap, Calculation 12/09/2020 6  5 - 18 mmol/L Final     Glucose 12/09/2020 114  70 - 125 mg/dL Final     BUN 12/09/2020 21  8 - 28 mg/dL Final     Creatinine 12/09/2020 1.21  0.70 - 1.30 mg/dL Final     GFR MDRD Af Amer 12/09/2020 >60  >60 mL/min/1.73m2 Final     GFR MDRD Non Af Amer 12/09/2020 58* >60 mL/min/1.73m2 Final     Bilirubin, Total 12/09/2020 0.4  0.0 - 1.0 mg/dL Final     Calcium 12/09/2020 8.3* 8.5 - 10.5 mg/dL Final     Protein, Total 12/09/2020 5.0* 6.0 - 8.0 g/dL Final     Albumin 12/09/2020 3.2* 3.5 - 5.0 g/dL Final     Alkaline Phosphatase 12/09/2020 63  45 - 120 U/L Final     AST 12/09/2020 14  0 - 40 U/L Final     ALT 12/09/2020 13  0 - 45 U/L Final     WBC 12/09/2020 2.6* 4.0 - 11.0 thou/uL Final     RBC 12/09/2020 2.41* 4.40 - 6.20 mill/uL Final     Hemoglobin 12/09/2020 8.8* 14.0 - 18.0 g/dL Final     Hematocrit 12/09/2020 27.7* 40.0 - 54.0 % Final     MCV 12/09/2020 115* 80 - 100 fL Final     MCH 12/09/2020 36.5* 27.0 - 34.0 pg Final     MCHC 12/09/2020 31.8* 32.0 - 36.0 g/dL Final     " RDW 12/09/2020 14.2  11.0 - 14.5 % Final     Platelets 12/09/2020 140  140 - 440 thou/uL Final     MPV 12/09/2020 11.0  8.5 - 12.5 fL Final     Total Neutrophils % 12/09/2020 77* 50 - 70 % Final     Lymphocytes % 12/09/2020 5* 20 - 40 % Final     Monocytes % 12/09/2020 18* 2 - 10 % Final     Eosinophils %  12/09/2020 0  0 - 6 % Final     Basophils % 12/09/2020 0  0 - 2 % Final     Immature Granulocyte % - Manual 12/09/2020 0  <=0 % Final     Total Neutrophils Absolute 12/09/2020 2.0  2.0 - 7.7 thou/ul Final     Lymphocytes Absolute 12/09/2020 0.1* 0.8 - 4.4 thou/uL Final     Monocytes Absolute 12/09/2020 0.5  0.0 - 0.9 thou/uL Final     Eosinophils Absolute 12/09/2020 0.0  0.0 - 0.4 thou/uL Final     Basophils Absolute 12/09/2020 0.0  0.0 - 0.2 thou/uL Final     Immature Granulocyte Absolute - Ma* 12/09/2020 0.0  <=0.0 thou/uL Final     Platelet Estimate 12/09/2020 Normal  Normal Final     Ovalocytes 12/09/2020 1+* Negative Final     Tear Drop Cells 12/09/2020 1+* Negative Final     Schistocytes 12/09/2020 1+* Negative Final   Infusion on 12/02/2020   Component Date Value Ref Range Status     Immunoglobulin G 12/02/2020 272* 700-1,700 mg/dL Final     Immunoglobulin M 12/02/2020 <5* 60 - 280 mg/dL Final     Immunoglobulin A 12/02/2020 7* 65 - 400 mg/dL Final     [place] Free Lt Chain 12/02/2020 14.42* 0.33 - 1.94 mg/dL Final      Performed and/or entered by:  [address] [address]      Lambda Free Lt Chain 12/02/2020 0.18* 0.57 - 2.63 mg/dL Final     Kappa Lambda Ratio 12/02/2020 80.11* 0.26 - 1.65 Final     Lab Results   Component Value Date    KFLC 14.42 (H) 12/02/2020    KFLC 17.62 (H) 11/04/2020    KFLC 99.52 (H) 09/30/2020    KFLC 70.23 (H) 09/02/2020    KFLC 99.71 (H) 08/05/2020    KFLC 71.62 (H) 06/10/2020    KFLC 55.22 (H) 04/15/2020    KFLC 60.34 (H) 03/04/2020    KFLC 65.08 (H) 02/19/2020    KFLC 68.94 (H) 12/26/2019     Lab Results   Component  Value Date    KLR 80.11 (H) 12/02/2020    KLR 88.10 (H) 11/04/2020    .97 (H) 09/30/2020    .78 (H) 09/02/2020    .83 (H) 08/05/2020    KLR 80.47 (H) 06/10/2020    KLR 63.47 (H) 04/15/2020    KLR 94.28 (H) 03/04/2020    .47 (H) 02/19/2020    KLR 68.94 (H) 12/26/2019   ]    Imaging    No results found.      Signed by: Alejandra Valdez, CNP

## 2021-06-13 NOTE — PROGRESS NOTES
Pt ambulates to infusion center for lab draw prior to NP visit.  IVAD accessed, labs drawn et sent.  Pt was seen by MICHAEL Carballo CNP and orders were approved.  Treatment administered as ordered without difficulty.  IVAD flushed w/NS et heparin and needle deaccessed.  Pt left clinic stable to [facility].  Plan RTC as scheduled.

## 2021-06-13 NOTE — PROGRESS NOTES
Pt ambulates to infusion center for treatment.  IVAD accessed, flushes easily w/good blood return noted.  Treatment administered as ordered without difficulty.  IVAD flushed w/NS et heparin and needle deaccessed.  Pt left clinic stable to [facility]. Plan RTC as scheduled.

## 2021-06-13 NOTE — TELEPHONE ENCOUNTER
Patient calls in today stating that he would like to take Tylenol at home prior to coming in on the days of his infusions.  His insurance company is charging him $24 each time he takes a dose of Tylenol here.  I let him know that this is just fine.  When he arrives in the clinic he should tell his infusion nurse that he does not need the Tylenol as he has already taken it.  He is aware that he gets 650 mg prior to his infusion.  He verbalized understanding and was appreciative.    Mere Machado RN

## 2021-06-13 NOTE — TELEPHONE ENCOUNTER
Oral Chemotherapy Monitoring Program    Oral Chemotherapy Monitoring    Date: 12/17/20  Primary Oncologist: Per Clifford MD  Primary Oncology Clinic:  [facility]  Cancer Diagnosis: Multiple myeloma not having achieved remission (H)  Regimen: DPD  Drug Name: pomalidomide  Current Dosage: 2 mg   Current Frequency: daily  Cycle Details: d1-21 out of 28 day cycle  Encounter: Mid-Cycle Assessment   New Start:   until disease progression or unacceptable toxcities   Intervention: See in clinic      Who was educated?: Patient       Mid-Cycle Assessment:  Start Date of Last Cycle: 12/9/20   Unplanned doses missed in last 2 weeks: 0   Adherence assessment : adherent   Drug Interaction Assessment: No new medications reported   Adverse Effects reported: 0   Next pharmacist intervention date: 1/6/21   Intervention: R           Subjective/Objective:   Theo Meyers is a 75 y.o. male called by phone for a follow-up visit for oral chemotherapy.  Keith said he is doing well and potentially even a little bit better since starting on the 9th. He said had enough to complete this current 21 day cycle and received a delivery of pomalidomide while we were on the phone for his next cycle (~1/6/21).    No flowsheet data found.    Assessment/Plan:  Keith is tolerating therapy with pomalidomide.     Follow-Up:  Will review Keith's appointment with Alejandra Valdez 1/6/21 and plan to call a week or two after that.    Lupe Stevenson), PharmD  Oral Chemotherapy Pharmacist  [phone]

## 2021-06-13 NOTE — PROGRESS NOTES
Pt came into infusion clinic for Day 15, Cycle 104. Labs Reviewed. Pt tolerated injection with no complications. Pt left infusion clinic via ambulatory.

## 2021-06-13 NOTE — PROGRESS NOTES
Theo came to chemo infusion this morning for his next dose of velcade.  No labs needed today. VSS.  Pt assessed.  He does have a chest cold with sinus congestion but otherwise feeling well. Pt is well educated on velcade.  Mere KIRBY met with patient in treatment chair.  Pt will be continuing on present treatment plan (Revlimid, dex, velcade) as his last labs had improved. Velcade given sq into his left upper abdomen.  He tolerated the injection well and site was covered with a bandaid.

## 2021-06-13 NOTE — PROGRESS NOTES
Per Dr Clifford, I talked with Keith while he was in infusion today.  I let him know that because his light chains and ratio came down, he wants to hold off on changing treatment until we absolutely have to.  He would like him to start back on Revlimid and dexamethasone.  I have contacted Chayo to start the refill for Revlimid.  He has enough dexamethasone at home.  He is comfortable with this decision and will keep his follow-up appointments that are already scheduled.    Mere Machado RN 10/4/17 0936

## 2021-06-13 NOTE — PROGRESS NOTES
[facility] Hematology and Oncology Progress Note    Patient: Theo Meyers  MRN: [id number]  Date of Service:  September 20, 2017      Assessment and Plan:    1. Kappa light chain myeloma: He had a bone marrow biopsy.  From an I can tell it does not appear much different in terms of plasma cell percentage when compared to his marrow from last fall.  However, is kappa/lambda light chain ratio has been increasing over the past 4 months.  He also has mild renal insufficiency now which is new.  Overall, I think the picture is 1 of progressive disease.  Reviewed this with the patient and his wife.  Going to get a 24-hour urine to check for proteins but I think we are going to have to change her therapy.  Would recommend daratumumab, pomalidomide, and dexamethasone.  Dexamethasone dose today at 20 mg weekly.  We will start the pomalidomide at 2 mg due to his existing cytopenias and give on a 3 week on 1 week off schedule.  I am going to recheck his light chain ratio next week to document continued worsening.    2. Thromboprophylaxis: He is on Coumadin for this.  INR supratherapeutic today.  We will hold for 2 days and then restart his Coumadin at 5 mg daily except on Fridays when he will take 2-1/2 mg.    3.  Pancytopenia: Secondary to Revlimid. Stable.  Continue to follow.    ECOG Performance   ECOG Performance Status: 0    Distress Assessment  Distress Assessment Score: No distress    Pain  Currently in Pain: No/denies    Diagnosis:    1. IgG kappa light chain myeloma. Hyposecretory. Diagnosed 2009. No significant measurable M protein. 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 There was also on 11;14 translocation.  Past immunofixations from 2011 show IgG-kappa.    2. Deep vein thrombosis of the left leg diagnosed 9/2012 while on treatment.    Treatment:    He presented with a lytic lesion involving the C6 vertebral body, although no measurable M protein. IgG kappa monoclonal immunoglobulin was confirmed  by ELMA as well as elevated kappa free light chains with an abnormal kappa lambda ratio. Bone marrow biopsy showed 30% involvement and cytogenetics confirmed deletion of 13 q. as well as 11;14 translocation. He was initially treated with Velcade and dexamethasone and achieved a good partial response.   He was referred to the [facility] where he underwent high-dose chemotherapy with autologous peripheral stem cell transplant in April of 2010. He began Revlimid as maintenance therapy post transplant. Repeat bone marrow biopsy done October 2010 showed about 5% kappa restricted plasma cells and so at that time weekly Velcade was added along with his maintenance Revlimid and dexamethasone.   He has done well since that time with stable minimal residual disease, best assessed by serum free light chains. He required dose reductions of weekly Velcade because of cytopenias and was ultimately switched to a q 2 week maintenance schedule which he has been on since September 2012. His Revlimid dose was reduced to 15 mg daily and he continues on dexamethasone 20 mg p.o. weekly    Interim History:    Theo returns today for follow-up visit.  Continues to feel well.  No new areas of pain.  Energy and appetite are stable.  Has some watery eyes.  No skin rash.  No lower extremity edema or shortness of breath.    Review of Systems:    Constitutional  Constitutional (WDL): Exceptions to WDL  Fatigue: Fatigue relieved by rest  Neurosensory  Neurosensory (WDL): Exceptions to WDL  Peripheral Motor Neuropathy: Asymptomatic, clinical or diagnostic observations only, intervention not indicated  Ataxia: Asymptomatic, clinical or diagnostic observations only, intervention not indicated  Peripheral Sensory Neuropathy: Asymptomatic, loss of deep tendon reflexes or paresthesia  Cardiovascular  Cardiovascular (WDL): Exceptions to WDL (hx of DVT)  Pulmonary  Respiratory (WDL): Within Defined Limits  Gastrointestinal  Gastrointestinal  (WDL): All gastrointestinal elements are within defined limits  Genitourinary  Genitourinary (WDL): All genitourinary elements are within defined limits  Integumentary  Integumentary (WDL): All integumentary elements are within defined limits  Patient Coping  Patient Coping: Accepting  Accompanied by  Accompanied by: Family Member    Past History:    Past Medical History:   Diagnosis Date     Arthritis      Bilateral inguinal hernia      DVT (deep venous thrombosis)     09/2012     Glaucoma      Multiple myeloma     Gets chemo infusions every 2 weeks     Peripheral neuropathy      TMJ (temporomandibular joint disorder)      Physical Exam:    Recent Vitals 9/20/2017   Weight 168 lbs 5 oz   /67   Pulse 67   Temp -   Temp src -   SpO2 97   Some recent data might be hidden     General: patient appears stated age of 72 y.o.. Nontoxic and in no distress.   HEENT: Head: atraumatic, normocephalic. Sclerae anicteric.  Chest:  Normal respiratory effort.   Cardiac:  No edema.   Abdomen: abdomen is non-distended  Extremities: normal tone and muscle bulk.   Skin: no lesions or rash. Warm and dry.   CNS: alert and oriented x3. Grossly non-focal.   Psychiatric: normal mood and affect.     Lab Results:    Recent Results (from the past 168 hour(s))   INR (for treatment plan use)   Result Value Ref Range    INR 5.20 (H) 0.90 - 1.10   Comprehensive Metabolic Panel   Result Value Ref Range    Sodium 143 136 - 145 mmol/L    Potassium 4.3 3.5 - 5.0 mmol/L    Chloride 109 (H) 98 - 107 mmol/L    CO2 26 22 - 31 mmol/L    Anion Gap, Calculation 8 5 - 18 mmol/L    Glucose 182 (H) 70 - 125 mg/dL    BUN 20 8 - 28 mg/dL    Creatinine 1.32 (H) 0.70 - 1.30 mg/dL    GFR MDRD Af Amer >60 >60 mL/min/1.73m2    GFR MDRD Non Af Amer 53 (L) >60 mL/min/1.73m2    Bilirubin, Total 0.5 0.0 - 1.0 mg/dL    Calcium 8.9 8.5 - 10.5 mg/dL    Protein, Total 5.2 (L) 6.0 - 8.0 g/dL    Albumin 3.3 (L) 3.5 - 5.0 g/dL    Alkaline Phosphatase 80 45 - 120 U/L    AST  17 0 - 40 U/L    ALT 24 0 - 45 U/L   HM1 (CBC with Diff)   Result Value Ref Range    WBC 2.3 (L) 4.0 - 11.0 thou/uL    RBC 3.05 (L) 4.40 - 6.20 mill/uL    Hemoglobin 10.3 (L) 14.0 - 18.0 g/dL    Hematocrit 30.9 (L) 40.0 - 54.0 %     (H) 80 - 100 fL    MCH 33.8 27.0 - 34.0 pg    MCHC 33.3 32.0 - 36.0 g/dL    RDW 16.4 (H) 11.0 - 14.5 %    Platelets 115 (L) 140 - 440 thou/uL    MPV 12.5 8.5 - 12.5 fL    Neutrophils % 84 (H) 50 - 70 %    Lymphocytes % 12 (L) 20 - 40 %    Monocytes % 3 2 - 10 %    Eosinophils % 0 0 - 6 %    Basophils % 1 0 - 2 %    Neutrophils Absolute 1.9 (L) 2.0 - 7.7 thou/uL    Lymphocytes Absolute 0.3 (L) 0.8 - 4.4 thou/uL    Monocytes Absolute 0.1 0.0 - 0.9 thou/uL    Eosinophils Absolute 0.0 0.0 - 0.4 thou/uL    Basophils Absolute 0.0 0.0 - 0.2 thou/uL     Imaging:    No results found.       Signed by: Per Clifford MD

## 2021-06-13 NOTE — PROGRESS NOTES
[facility] Hematology and Oncology Progress Note    Patient: Theo Meyers  MRN: [id number]  Date of Service: October 14, 2020      Assessment and Plan:    1. Kappa light chain myeloma:   Initiated new treatment with daratumumab 10/14 and weekly dexamethasone.   Pomalidomide on hold -  will be initiated when counts have improved from prior revlimid-induced cytopenias (when ANC at least 2000), 2 mg daily (50% dose).   Getting 20 mg IV dex weekly with daratumumab infusions.    Getting a good early response. Kappa light chain down to 17.6 (99.5 at start of this treatment) and K/L ratio down to 88.1 (157.97 at start of this treatment).    With first daratumumab infusion, had infusion reaction. Ultimately was able to complete infusion with a few interruptions and then went onto receive remaining days 8, 15 and 22 infusions without incident.    He is here for start of cycle 2.   Tolerating well, no new side effects noted.   Labwork shows slowly improving WBC (1.5) with ANC 0.78. Hemoglobin (8.0) and platelets (116K) stable.     Will get daratumumab weekly x 4 with cycle 2.  Will continue holding the start of pomalidomide for ANC. Will reassess in 4 weeks with start of cycle 3.  Getting dexamethasone weekly with daratumumab.    In 4 weeks, with cycle 3, will change to daratumumab every 2 weeks. He will need to start oral dexamethasone on his off weeks as plan is for him to take this weekly.    2.  Pancytopenia:   CBC overall stable.   Still has neutropenia (0.78) which is precluding starting pomalidomide.     Will consider initiation of pomalidomide at 2 mg daily (50% dose) once ANC improved.  Will check CBC weekly.    Macrocytosis felt related to reticulocytes. B12 and folate normal 4 months ago. TSH normal. Follow.    3.  Prophylaxis:   Continue Xarelto 20 mg daily.    ECOG Performance   ECOG Performance Status: 1    Distress Assessment  Distress Assessment Score: No distress    Pain  Currently in Pain:  No/denies  Pain Score (Initial OR Reassessment): No/Denies Pain    Diagnosis:    1.  IgG kappa light chain myeloma. Hyposecretory. Diagnosed 2009. No significant measurable M protein. 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 There was also on 11;14 translocation.  Past immunofixations from 2011 show IgG-kappa.  Bone marrow biopsy at 5%.  Residual kappa light chain myeloma involving 20% of nucleated cells.  No significant change from November 2016 marrow cellularity.    2.  Deep vein thrombosis of the left leg diagnosed 9/2012 while on treatment.    3.  Vitamin B12 deficiency diagnosed in September 2017.    4.  GI bleed and anemia requiring hospitalization in December 2017.    Treatment:    He presented with a lytic lesion involving the C6 vertebral body, although no measurable M protein. IgG kappa monoclonal immunoglobulin was confirmed by ELMA as well as elevated kappa free light chains with an abnormal kappa lambda ratio. Bone marrow biopsy showed 30% involvement and cytogenetics confirmed deletion of 13 q. as well as 11;14 translocation. He was initially treated with Velcade and dexamethasone and achieved a good partial response.     He was referred to the [facility] where he underwent high-dose chemotherapy with autologous peripheral stem cell transplant in April of 2010. He began Revlimid as maintenance therapy post transplant. Repeat bone marrow biopsy done October 2010 showed about 5% kappa restricted plasma cells and so at that time weekly Velcade was added along with his maintenance Revlimid and dexamethasone.     He has done well since that time with stable minimal residual disease, best assessed by serum free light chains. He required dose reductions of weekly Velcade because of cytopenias and was ultimately switched to a q 2 week maintenance schedule which he has been on since September 2012.     His Revlimid dose was reduced to 15 mg daily and he continues on dexamethasone 20 mg  p.o. weekly  Revlimid dosing changed to 20 mg, 3 weeks on 1 week (instead of 15 mg daily) for cost reasons.  Started March, 2018. Gradual progression in K/L ratio and worsening cytopenias (unclear if disease marrow infiltration vs cumulative chemo toxicity). Bone marrow biopsy shows hypocellular marrow, likely drug effect. PET negative for lytic disease.    10/14/2020: Initiated daratumumab/pomalidomide/weekly dexamehtasone (pomalidomide on hold until counts improved, then plan to start 2 mg/daily dose)    Progressed on:  revlimid  bortezomib  lenalidomide    Interim History:    Theo returns today for follow-up visit ahead of cycle 2. Getting dex weekly with daratumumab infusions.. Had infusion reaction with cycle 1, day 1 daratumumab but tolerated all subsequent weekly doses well.  Feeling ok. No pain. No acute complaints today. Stable, chronic fatigue. No fevers.    ECOG performance status is 0    Past History:    Past Medical History:   Diagnosis Date     Anemia 12/13/2017     Arthritis      Bilateral inguinal hernia      Glaucoma      Glaucoma      History of blood clots     DVT - left chronic     Multiple myeloma (H)     Gets chemo infusions every 2 weeks     Pancytopenia (H)      Peripheral neuropathy      Syncope      TMJ (temporomandibular joint disorder)      Physical Exam:    Recent Vitals 11/11/2020   Weight 164 lbs 11 oz   BSA (m2) 1.91 m2   /52   Pulse 70   Temp 98.1   Temp src 1   SpO2 99   Some recent data might be hidden     General: patient appears stated age of 75 y.o.. Nontoxic and in no distress. Alone.  HEENT: Head: atraumatic, normocephalic. Sclerae anicteric.  Chest:  Normal respiratory effort.   Cardiac:  No edema.   Abdomen: abdomen is non-distended  Extremities: normal tone and muscle bulk.   Skin: no lesions or rash. Warm and dry.   CNS: alert and oriented. Grossly non-focal.   Psychiatric: normal mood and affect.     Lab Results:    Recent Results (from the past 240 hour(s))    Immunoglobulins, Quantitative    Collection Time: 11/04/20  8:01 AM   Result Value Ref Range    Immunoglobulin G 251 (L) 700-1,700 mg/dL    Immunoglobulin M <5 (L) 60 - 280 mg/dL    Immunoglobulin A 6 (L) 65 - 400 mg/dL   Immunoglobulin Free Light Chains, Serum    Collection Time: 11/04/20  8:01 AM   Result Value Ref Range    Kappa Free Lt Chain 17.62 (H) 0.33 - 1.94 mg/dL    Lambda Free Lt Chain 0.20 (L) 0.57 - 2.63 mg/dL    Kappa Lambda Ratio 88.10 (H) 0.26 - 1.65   HM1 (CBC with Diff)    Collection Time: 11/04/20  8:01 AM   Result Value Ref Range    WBC 1.3 (LL) 4.0 - 11.0 thou/uL    RBC 2.13 (L) 4.40 - 6.20 mill/uL    Hemoglobin 7.8 (L) 14.0 - 18.0 g/dL    Hematocrit 24.4 (L) 40.0 - 54.0 %     (H) 80 - 100 fL    MCH 36.6 (H) 27.0 - 34.0 pg    MCHC 32.0 32.0 - 36.0 g/dL    RDW 15.0 (H) 11.0 - 14.5 %    Platelets 111 (L) 140 - 440 thou/uL    MPV 12.1 8.5 - 12.5 fL   Manual Differential    Collection Time: 11/04/20  8:01 AM   Result Value Ref Range    Total Neutrophils % 78 (H) 50 - 70 %    Lymphocytes % 12 (L) 20 - 40 %    Monocytes % 10 2 - 10 %    Eosinophils %  0 0 - 6 %    Basophils % 0 0 - 2 %    Immature Granulocyte % - Manual 0 <=0 %    Total Neutrophils Absolute 1.0 (L) 2.0 - 7.7 thou/ul    Lymphocytes Absolute 0.2 (L) 0.8 - 4.4 thou/uL    Monocytes Absolute 0.1 0.0 - 0.9 thou/uL    Eosinophils Absolute 0.0 0.0 - 0.4 thou/uL    Basophils Absolute 0.0 0.0 - 0.2 thou/uL    Immature Granulocyte Absolute - Manual 0.0 <=0.0 thou/uL    Platelet Estimate Decreased (!) Normal    Ovalocytes 1+ (!) Negative    Tear Drop Cells 1+ (!) Negative    Schistocytes 1+ (!) Negative   Comprehensive Metabolic Panel    Collection Time: 11/11/20  8:40 AM   Result Value Ref Range    Sodium 138 136 - 145 mmol/L    Potassium 4.4 3.5 - 5.0 mmol/L    Chloride 106 98 - 107 mmol/L    CO2 25 22 - 31 mmol/L    Anion Gap, Calculation 7 5 - 18 mmol/L    Glucose 109 70 - 125 mg/dL    BUN 18 8 - 28 mg/dL    Creatinine 1.20 0.70 -  1.30 mg/dL    GFR MDRD Af Amer >60 >60 mL/min/1.73m2    GFR MDRD Non Af Amer 59 (L) >60 mL/min/1.73m2    Bilirubin, Total 0.4 0.0 - 1.0 mg/dL    Calcium 8.0 (L) 8.5 - 10.5 mg/dL    Protein, Total 5.0 (L) 6.0 - 8.0 g/dL    Albumin 3.2 (L) 3.5 - 5.0 g/dL    Alkaline Phosphatase 64 45 - 120 U/L    AST 14 0 - 40 U/L    ALT 14 0 - 45 U/L   HM1 (CBC with Diff)    Collection Time: 11/11/20  8:40 AM   Result Value Ref Range    WBC 1.5 (LL) 4.0 - 11.0 thou/uL    RBC 2.13 (L) 4.40 - 6.20 mill/uL    Hemoglobin 8.0 (L) 14.0 - 18.0 g/dL    Hematocrit 24.7 (L) 40.0 - 54.0 %     (H) 80 - 100 fL    MCH 37.6 (H) 27.0 - 34.0 pg    MCHC 32.4 32.0 - 36.0 g/dL    RDW 15.2 (H) 11.0 - 14.5 %    Platelets 116 (L) 140 - 440 thou/uL    MPV 11.0 8.5 - 12.5 fL   Manual Differential    Collection Time: 11/11/20  8:40 AM   Result Value Ref Range    Total Neutrophils % 68 50 - 70 %    Lymphocytes % 15 (L) 20 - 40 %    Monocytes % 16 (H) 2 - 10 %    Eosinophils %  1 0 - 6 %    Basophils % 0 0 - 2 %    Immature Granulocyte % - Manual 0 <=0 %    Total Neutrophils Absolute 1.0 (L) 2.0 - 7.7 thou/ul    Lymphocytes Absolute 0.2 (L) 0.8 - 4.4 thou/uL    Monocytes Absolute 0.2 0.0 - 0.9 thou/uL    Eosinophils Absolute 0.0 0.0 - 0.4 thou/uL    Basophils Absolute 0.0 0.0 - 0.2 thou/uL    Immature Granulocyte Absolute - Manual 0.0 <=0.0 thou/uL    Platelet Estimate Decreased (!) Normal    Ovalocytes 3+ (!) Negative    Polychromasia 1+ (!) Negative    Tear Drop Cells 1+ (!) Negative    Schistocytes 1+ (!) Negative     Imaging:    Nm Pet Ct Whole Body    Result Date: 10/13/2020  EXAM: NM PET CT WHOLE BODY LOCATION: [facility] DATE/TIME: 10/13/2020 10:10 AM INDICATION: Subsequent treatment planning and restaging for multiple myeloma in relapse. Status post systemic chemotherapy. Monitor treatment response. COMPARISON: FDG PET/CT dated 11/21/2016, CT the thorax dated 07/05/2019 TECHNIQUE: Serum glucose level 91 mg/dL. One hour  post intravenous administration of 8.5 mCi F-18 FDG, PET imaging was performed from the skull vertex to feet utilizing attenuation correction with concurrent axial CT and PET/CT image fusion. Dose reduction techniques were used. FINDINGS: Physiologic FDG uptake from the skull vertex to feet. Mild senescent intracranial changes. Postoperative change of the bilateral lenses. Moderate carotid artery bifurcation calcification. Right chest port with tip terminating near the superior cavoatrial junction. Severe coronary artery calcium/stenting. Left lower lobe scarring/atelectasis. Nonobstructing right inferior pole renal calculus. Pelvic phleboliths. Unchanged non-FDG avid avid lucent lesions in the right posterior iliac bone and left pubic bone. Multilevel degenerative changes of the spine. Chronic compression deformity of T6 with in situ vertebral cement. Benign enchondroma in the right proximal humerus Multilevel degenerative changes of the spine.     No metabolic evidence of active myeloma.    Total time: 25 min; 20 min counseling/coordination of care    Signed by: Gretchen Carballo, JESICA

## 2021-06-13 NOTE — PROGRESS NOTES
Pt ambulates to infusion center for lab draw prior to MD visit.  IVAD accessed, labs drawn et sent.  Pt seen by Dr. Clifford and orders approved.  Pt returns to infusion center for treatment.  Velcade given SQ as ordered. Flu shot given per pt request.   IVAD flushed w/NS et heparin and needle deaccessed.  Pt left clinic stable to [facility] accompanied by his wife.  Plan RTC as scheduled.

## 2021-06-13 NOTE — PROGRESS NOTES
"Pt ambulates to infusion center for treatment.  Pt states that his \"cold is improving\".  Pt is also c/o vision changes, which he contacted Mere Machado RN about.  I spoke with Mere and she will be contacting patient after Dr. Clifford is able to talk to his opthalmalogist.  Velcade given SQ as ordered.  Pt left clinic stable to [facility].  Plan RTC as scheduled.  "

## 2021-06-13 NOTE — PROGRESS NOTES
Theo Meyers, 74 y.o., male arrived ambulatory to clinic at 0800 for Cycle #2 Day #15 of his treatment regimen. Port was accessed using aseptic technique without difficulties with excellent blood return. Labs drawn and reviewed. Patient assessed and VSS. Administered premedications and treatment per provider order. He tolerated infusion well, no signs or symptoms of infusion reaction. Port was flushed with NS and Heparin then de-accessed using 2x2 and papertape. Theo HURT Mira verbalized understanding of plan of care and return to clinic. Discharged to [facility] at 1200 alert and ambulatory.

## 2021-06-14 NOTE — TELEPHONE ENCOUNTER
"Oral Chemotherapy Monitoring Program    Oral Chemotherapy Monitoring    Date: 1/18/21  Primary Oncologist: Per Clifford MD  Primary Oncology Clinic:  [facility]  Cancer Diagnosis: Multiple myeloma not having achieved remission (H)  Regimen: DPD  Drug Name: pomalidomide  Current Dosage: 2 mg   Current Frequency: daily  Cycle Details: d1-21 out of 28 day cycle  Encounter: Mid-Cycle Assessment   New Start:   until disease progression or unacceptable toxcities   Intervention: See in clinic      Who was educated?: Patient       Mid-Cycle Assessment:  Start Date of Last Cycle: 1/6/21   Unplanned doses missed in last 2 weeks: 0   Adherence assessment : adherent   Drug Interaction Assessment: No new medications reported   Adverse Effects reported: 0   Next pharmacist intervention date: 2/3/21   Intervention: R               Assessment/Plan:  Keith is tolerating therapy well. Denies any AE. Continue as prescribed.    Keith says he has his \"standard cold\" right now. Started 3 days ago (1/16). Started with sore throat, then coughing up phlem, yesterday, congested, but denies fever, chills, or body aches. Taking Mucinex and Pseudophed and making sure he drinks plenty of water.    Discussed how he could he could get tested for COVID if he thinks this starts to differ from his standard cold or if he starts to get a fever or body aches.    Keith also inquired about when he could expect to get the COVID 19 vaccine. I informed him that [facility] has not started vaccinating patients yet, but once we have a plan in place then patients will be contacted to see if theyd like the vaccine.    Follow-Up:  Will plant to review appointment with Dr. Clifford on 2/3. Will plan to call a couple weeks after that for another follow up assessment.     Mann Chandler, PharmD, BCOP  Oral Chemotherapy Pharmacist  [phone]      "

## 2021-06-14 NOTE — PROGRESS NOTES
[facility] Hematology and Oncology Progress Note    Patient: Theo Meyers  MRN: [id number]  Date of Service: 01/06/2021        Reason for Visit    Chief Complaint   Patient presents with     HE Cancer     Multiple myeloma in remission       Assessment and Plan    1.  Myeloma: pt continues on daratumumab and dexamethasone. Holding pomalyst due to low counts. This regimen was initiated in October and he appears to be responding well. Light chains are improved. He is doing well. He has received daratumumab weekly for 8 weeks. Started every other week dosing in December. He will return in see provider in 4 weeks. Did start pomalyst last month at 50% dosing. He will continue that dose. His labs are better than last week.     2. DVT: This is recurrent.  Will be on xarelto indefinitely.     3. Pancytopenia: some treatment and some myeloma. These are all improving with treatment. He did get hospitalized with respiratory symptoms and mild fever. Felt better with antibiotics. CT showed some inflammatory changes    4. Severe aortic atherosclerosis: seen on CT. Pt questions this. I told him to discuss with PCP to see if there is anything that should be done.       ECOG Performance   ECOG Performance Status: 1     Distress Assessment  Distress Assessment Score: No distress    Pain  Currently in Pain: No/denies      Problem List    1. Multiple myeloma in remission (H)  CC OFFICE VISIT LONG    Infusion Appointment      ______________________________________________________________________________    History of Present Illness    DIAGNOSIS:   1. IgG kappa light chain myeloma. Hyposecretory. Diagnosed 2009. No significant measurable M protein. 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 There was also on 11;14 translocation.   2. Deep vein thrombosis of the left leg diagnosed 09/2012 while on treatment.       TREATMENT:   Started Daratumumab in October 2020. Dexamethasone weekly as well. We have been holding pomalyst  due to pancytopenia. Today is cycle 3. We will add in pomalyst today.         PAST TREATMENT and HISTORY:   Most recently he has been on Velcade every 2 weeks since 09/2012. He is getting dexamethasone 20 mg every other week with the velcade. Finally, he is receiving Revlimid 20 mg daily for 3 weeks on, 1 week off.     He presented at diagnosis with a lytic lesion involving the C6 vertebral body, although no measurable M protein. IgG kappa monoclonal immunoglobulin was confirmed by [facility] as well as elevated kappa free light chains with an abnormal kappa lambda ratio. Bone marrow biopsy showed 30% involvement and cytogenetics confirmed deletion of 13 q. as well as 11;14 translocation. He was initially treated with Velcade and dexamethasone and achieved a good partial response. He was referred to the [facility] where he underwent high-dose chemotherapy with autologous peripheral stem cell transplant in April of 2010. He began Revlimid as maintenance therapy post transplant. Repeat bone marrow biopsy done October 2010 showed about 5% kappa restricted plasma cells and so at that time weekly Velcade was added along with his maintenance Revlimid and dexamethasone. He has done well since that time with stable minimal residual disease, best assessed by serum free light chains. He required dose reductions of weekly Velcade because of cytopenias and was ultimately switched to a q.2 week maintenance schedule which he has been on since September 2012. His Revlimid dose is 20 mg daily and he continues on dexamethasone 20 mg p.o. q. Week.       INTERIM HISTORY:    Pt is here today for follow up. He is doing pretty well.  He is tolerating his new regimen quite well. Did fine with adding in pomalyst. Unfortunately did get admitted for a couple of days last week when he had respiratory symptoms with mild fever. Thought he had Covid, but testing was negative. Improved pretty quickly in hospital. Only fever in hospital was  "100.3.     Pain Status  Currently in Pain: No/denies    Review of Systems  Constitutional  Constitutional (WDL): Exceptions to WDL  Weight Gain: Concerns(up 4 pounds since 12/9/2020)  Neurosensory  Neurosensory (WDL): Exceptions to WDL  Peripheral Motor Neuropathy: Concerns(stable)  Peripheral Sensory Neuropathy: Concerns(stable)  Eye   Eye Disorder (WDL): All eye disorder elements are within defined limits  Ear  Ear Disorder (WDL): Exceptions to WDL  Tinnitus: Concerns(bilateral)  Cardiovascular  Cardiovascular (WDL): All cardiovascular elements are within defined limits  Pulmonary  Respiratory (WDL): Exceptions to WDL  Dyspnea: Concerns(improving since hospital discharge)  Gastrointestinal  Gastrointestinal (WDL): All gastrointestinal elements are within defined limits  Genitourinary  Genitourinary (WDL): All genitourinary elements are within defined limits  Lymphatic  Lymph (WDL): All lymph disorder elements are within defined limits  Musculoskeletal and Connective Tissue  Musculoskeletal and Connetive Tissue Disorders (WDL): Exceptions to WDL  Arthralgia: Concerns(right shoulder with certain movements/weight bearing)  Integumentary  Integumentary (WDL): Exceptions to WDL(scab on right chest)  Patient Coping  Patient Coping: Accepting;Open/discussion  Accompanied by  Accompanied by: Alone  Oral Chemo Adherence         Past History  Past Medical History:   Diagnosis Date     Anemia 12/13/2017     Arthritis      Bilateral inguinal hernia      Glaucoma      Glaucoma      History of blood clots     DVT - left chronic     Multiple myeloma (H)     Gets chemo infusions every 2 weeks     Pancytopenia (H)      Peripheral neuropathy      Syncope      TMJ (temporomandibular joint disorder)        PHYSICAL EXAM  /56   Pulse 98   Temp 97.8  F (36.6  C) (Oral)   Ht 5' 9\" (1.753 m)   Wt 167 lb 12.8 oz (76.1 kg)   SpO2 99%   BMI 24.78 kg/m      GENERAL: no acute distress. Cooperative in conversation. Here alone due " to visitor restrictions. Mask on  RESP: Regular respiratory rate. No expiratory wheezes   MUSCULOSKELETAL: no bilateral leg swelling  NEURO: non focal. Alert and oriented x3.   PSYCH: within normal limits. No depression or anxiety.  SKIN: exposed skin is dry intact.     Lab Results    Recent Results (from the past 48 hour(s))   Comprehensive Metabolic Panel   Result Value Ref Range    Sodium 142 136 - 145 mmol/L    Potassium 4.4 3.5 - 5.0 mmol/L    Chloride 113 (H) 98 - 107 mmol/L    CO2 23 22 - 31 mmol/L    Anion Gap, Calculation 6 5 - 18 mmol/L    Glucose 113 70 - 125 mg/dL    BUN 10 8 - 28 mg/dL    Creatinine 0.96 0.70 - 1.30 mg/dL    GFR MDRD Af Amer >60 >60 mL/min/1.73m2    GFR MDRD Non Af Amer >60 >60 mL/min/1.73m2    Bilirubin, Total 0.3 0.0 - 1.0 mg/dL    Calcium 8.2 (L) 8.5 - 10.5 mg/dL    Protein, Total 4.8 (L) 6.0 - 8.0 g/dL    Albumin 2.9 (L) 3.5 - 5.0 g/dL    Alkaline Phosphatase 60 45 - 120 U/L    AST 11 0 - 40 U/L    ALT 18 0 - 45 U/L   HM1 (CBC with Diff)   Result Value Ref Range    WBC 2.6 (L) 4.0 - 11.0 thou/uL    RBC 2.38 (L) 4.40 - 6.20 mill/uL    Hemoglobin 8.5 (L) 14.0 - 18.0 g/dL    Hematocrit 27.3 (L) 40.0 - 54.0 %     (H) 80 - 100 fL    MCH 35.7 (H) 27.0 - 34.0 pg    MCHC 31.1 (L) 32.0 - 36.0 g/dL    RDW 13.6 11.0 - 14.5 %    Platelets 217 140 - 440 thou/uL    MPV 10.0 8.5 - 12.5 fL     Prelim ANC 1.6      Lab Results   Component Value Date    KFLC 16.40 (H) 12/23/2020    KFLC 14.42 (H) 12/02/2020    KFLC 17.62 (H) 11/04/2020    KFLC 99.52 (H) 09/30/2020    KFLC 70.23 (H) 09/02/2020    KFLC 99.71 (H) 08/05/2020    KFLC 71.62 (H) 06/10/2020    KFLC 55.22 (H) 04/15/2020    KFLC 60.34 (H) 03/04/2020    KFLC 65.08 (H) 02/19/2020     Lab Results   Component Value Date    KLR 52.90 (H) 12/23/2020    KLR 80.11 (H) 12/02/2020    KLR 88.10 (H) 11/04/2020    .97 (H) 09/30/2020    .78 (H) 09/02/2020    .83 (H) 08/05/2020    KLR 80.47 (H) 06/10/2020    KLR 63.47 (H)  04/15/2020    KLR 94.28 (H) 03/04/2020    .47 (H) 02/19/2020   ]    Imaging    Xr Chest 1 View Portable    Result Date: 12/29/2020  EXAM: XR CHEST 1 VIEW PORTABLE LOCATION: [facility] DATE/TIME: 12/29/2020 6:23 PM INDICATION: Cough and dyspnea. COMPARISON: 08/08/2019.     Lungs are clear. Redemonstrated slight left costophrenic angle blunting. No pleural effusion of substantial size. No pneumothorax. No pulmonary edema. Stable cardiomediastinal silhouette. Aortic atherosclerosis. Portacatheter tip over the SVC.     Ct Chest Without Contrast    Result Date: 12/29/2020  EXAM: CT CHEST WO CONTRAST LOCATION: [facility] DATE/TIME: 12/29/2020 9:04 PM INDICATION: Shortness of breath, unclear cause, hx of multiple myeloma. COMPARISON: PET/CT 10/13/2020. TECHNIQUE: CT chest without IV contrast. Multiplanar reformats were obtained. Dose reduction techniques were used. CONTRAST: None. FINDINGS: LUNGS AND PLEURA: Mild to moderate bilateral airway thickening. Scattered lower lung predominant endobronchial contents. Few minimal centrilobular nodules, consistent with airways disease. Minimal left base pleural thickening. Minimal basilar predominant  atelectasis / scarring. MEDIASTINUM/AXILLAE: No adenopathy. No pericardial effusion. Moderate-severe LAD predominant coronary calcifications. Aortic valvular calcifications. Nonaneurysmal aorta. Portacatheter. UPPER ABDOMEN: Severe aortic atherosclerosis. Mildly distended gallbladder. MUSCULOSKELETAL: Bony demineralization. Mild T6 compression fracture post vertebroplasty change.     1.  Mild to moderate bilateral airway thickening consistent with infectious / inflammatory airways disease, nonspecific in etiology. 2.  Scattered lower lung predominant retained airway debris or mucous plugging. 3.  No focal consolidation or pneumonia. 4.  Other findings in the report.         Signed by: Alejandra Valdez, JESICA

## 2021-06-14 NOTE — PROGRESS NOTES
Pt ambulates to infusion center for lab draw prior to MD visit.  IVAD accessed, labs drawn et sent.  Pt was seen by Dr. Clifford today and orders were approved.  Treatment administered as ordered without difficulty.  IVAD flushed w/NS et heparin and needle deaccessed.  Vitamin B12 injection given as ordered.  Pt left clinic stable to [facility].  Plan RTC as scheduled.

## 2021-06-14 NOTE — PROGRESS NOTES
Theo came to chemo infusion this morning for labs and to get restarted on treatment after hospitalization.  VSS.  Pt assessed.  He is most concerned with his jaw that he is unable to open fully.  He is seeing his primary MD regarding this and will be following up next week.  Per Dr martinez Hm1 and CMP do not need to be drawn today as he had these drawn on 12/18 at his primary MD appointment.  Port was accessed with good blood return and labs drawn.  Triage RN will follow up with pt regarding his INR.  He then received his velcade sq into his abdomen.  He tolerated the injection well while in clinic.  He took his dexamethasone brought from home while in clinic.  He is rescheduling his future appointments as they are off due to his hospitalization prior to leaving.

## 2021-06-14 NOTE — PROGRESS NOTES
Pt ambulates to infusion center following MD visit.  Pt seen by Dr. Clifford and orders approved.  Velcade given SQ as ordered without complication.  [facility]  Plan RTC as scheduled.

## 2021-06-14 NOTE — TELEPHONE ENCOUNTER
Patient calls in today stating that yesterday he started developing a cough and some shortness of breath.  He states that this continues today as well as the chills.  He denies any fever stating that his temperature this morning was 96.8.  He was only able to sleep about 2 hours as his cough was pretty bad last evening.  Just listening to him over the phone, the cough was quite frequent and sounded like he was trying to cough phlegm up.  He states that today is his last day to take Pomalyst and then he has 1 week off.  Patient has a scheduled appointment with our office on 1/6/2021 for lab, NP and infusion.  I let him know that he should go to [email or web address] and put in his symptoms.  And let him know that this would be a visit with a provider who will assess him and likely put in Covid test orders to be completed.  I let him know that if his test comes back positive, he will need to move out his appointments until at least 2 weeks post positive results or until he is asymptomatic beyond the 2 weeks.  If the results are negative and he does not have any symptoms by 1/5/2021, he can continue to keep his appointment.  Patient verbalized understanding.  He states that this feels like when he had pneumonia about a year and a half ago.  I did asked that he address this with the provider that he talks to.  Does not Covid, I told him we do not want to just put his symptoms aside, we do want to address them.  He states that he will call us either way to let us know    Mere Machado RN

## 2021-06-14 NOTE — PROGRESS NOTES
INR results reviewed with Dr. Clifford. Keith is continue on 2 baby aspirins daily. INR is to be rechecked on 1/03. I called Keith to report this; I spoke with his spouse. She verbalized understanding and appreciaiton of our conversation.

## 2021-06-14 NOTE — PROGRESS NOTES
[facility] Follow Up Note    Theo Meyers   72 y.o. male    Date of Visit: 12/18/2017    Chief Complaint   Patient presents with     Hospital Visit Follow Up     blood loss anemia. falls     Subjective  Theo is here with his wife, Cherri.  He is here for hospital follow-up.  He has not seen me for 2 years since November 2015 when he saw me for a preop for hernia surgery.    Patient was diagnosed with multiple myeloma after T6 compression fracture back in 2009.  He is undergone chemotherapy, eventually high-dose chemotherapy with autologous peripheral stem cell transplant in 2010.  He has had continue multiple myeloma, currently on Revlimid and regular infusion therapy with oncology/hematology.  He has pancytopenia with baseline white count of 2.0, hemoglobin around 10 and platelets around 115.  Hemoglobin on November 18 was 9.7.    Patient had a DVT in 2000 1200 been on Coumadin since, usually therapeutic.    Patient had some mild intermittent lightheaded spells and orthostatic spells for a couple of years.    He was stable until the week after Thanksgiving he developed a stomach flu, along with many of his other family members.  He had nausea vomiting and diarrhea for a few days but that improved.  No hematemesis or blood in stool or melena at that time.    He has had some chronic yellow stools for many years, he takes a fiber supplement and Imodium.  Some greasiness to the stools, likely malabsorption.    On December 11 he had acute nausea with loss of appetite and upper abdominal bloating with discomfort.  Soon thereafter he began having melena.  He had some increased weakness and poor oral intake.  This progressed until December 13 when he fell with loss of consciousness early in the morning, lacerated behind his right ear.  He had quite a bit of bleeding, the wife noted that it looked almost like pink water coming out of his ear.    He was brought to the emergency room by  ambulance and hemoglobin was 4.0.    He was given multiple transfusions including 4 units RBCs, 2 units platelets, 2 units FFP.  His INR was found to be 13.5, INR reversed and not put back on Coumadin.    Head CT scan was negative for bleed, chest x-ray negative.    He had some bruising on his left neck and some left jaw pain that occurred after these events, presumably trauma from loss of consciousness episodes.    December 13 soft tissue CT scan of the neck showed some inflammatory changes in the left anterior neck without fluid collection.  Consistent with patient's recent trauma.  The swelling and bruising has been improving, really no pain anymore.  Swallowing okay.  He has had some reduced opening of the mouth with some left jaw pain he has had chronic jaw pain for many years but worse since the fall with trauma last week.    Patient was started on Augmentin for possible sinusitis with nasal congestion in the hospital.  He is currently on that, no fever or purulent discharge from the sinuses, having less sinus congestion.    He is on aspirin 162 mg a day since being off the Coumadin.  No calf tenderness.  No new chest pain or increasing shortness of breath.    Remains ambulatory and active.    No further lightheaded dizzy spells or syncope.    He saw oncology last month for his multiple myeloma and has an appointment in 2 days as well.    When he left the hospital on December 15 his white count was 2.0 with 72% PMNs and 9% lymphocytes.  Hemoglobin 9.0.  Platelets 130.    Bone marrow biopsy in the hospital showed less than 5% bone marrow with markedly hypocellular bone marrow with approximately 20% multiple myeloma.    Creatinine was normal at 0.8, normal potassium and sodium but albumin was 2.5.  Normal blood sugar.    He has been urinating normally, no hematuria.  He is on Flomax for some mild BPH symptoms but no worsening.    He said some herbal bowel and has been on Metamucil 1 scoop a day for many years.   Is been somewhat more greasy and loose over the recent years.  No recurrent melena since being in the hospital.    Past history of glaucoma on drops, stable.    He has had some mild peripheral neuropathy since the chemotherapy, no foot sores with minimal numbness.    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  Esophageal biopsy showed inflammation but no dysplasia.  No active bleeding was noted.  There was no Tammie-Erazo tear.    GI hypothesized that he may have had a Tammie-Erazo tear with bleeding from the nausea and vomiting likely viral gastroenteritis Thanksgiving, but then resolved.  Lower likelihood of the duodenal adenoma causing some bleeding.  Patient has not had a follow-up appointment with gastroenterology yet.    August 2016 colonoscopy was normal.  No further colonoscopies are planned.    He did see dermatology week ago, had number of AK's frozen but no new skin cancers noted.    Laceration behind his right ear was sutured, but then glue used on top of the sutures because of continued bleeding.  I was done on November 13 in the ER.  No further bleeding, no significant tenderness there.  No change in hearing.    Cardiac echo in 2015 showed ejection fraction 55-60% with mild MR and mild aortic stenosis.      PMHx:    Past Medical History:   Diagnosis Date     Anemia 12/13/2017     Arthritis      Bilateral inguinal hernia      DVT (deep venous thrombosis)     09/2012     Glaucoma      Multiple myeloma     Gets chemo infusions every 2 weeks     Peripheral neuropathy      Syncope      TMJ (temporomandibular joint disorder)      PSHx:    Past Surgical History:   Procedure Laterality Date     APPENDECTOMY      Age 16     CARPAL TUNNEL RELEASE Bilateral      CATARACT EXTRACTION Bilateral      ESOPHAGOGASTRODUODENOSCOPY N/A 12/14/2017    Procedure: ESOPHAGOGASTRODUODENOSCOPY (EGD) WITH BIOPSYS;  Surgeon: Marlon Roy MD;  Location: [facility];   "Service:      PORTACATH PLACEMENT       VERTEBROPLASTY      T6     WISDOM TOOTH EXTRACTION       Immunizations:   Immunization History   Administered Date(s) Administered     Hep B, historic 03/01/2011, 05/19/2011     HiB, historic,unspecified 03/01/2011, 05/19/2011     IPV 03/01/2011, 05/19/2011     Influenza high dose, seasonal 10/07/2015, 09/21/2016, 09/20/2017     Influenza,seasonal quad, PF, 36+MOS 09/24/2014     Pneumo Conj 13-V (2010&after) 10/16/2015     Pneumo Polysac 23-V 05/19/2011     Tdap 03/01/2011       ROS A comprehensive review of systems was performed and was otherwise negative    Medications, allergies, and problem list were reviewed and updated    Exam  /80  Pulse 66  Ht 5' 9\" (1.753 m)  Wt 166 lb (75.3 kg)  SpO2 99%  BMI 24.51 kg/m2  Left pupil larger than right pupil, confirmed as baseline per wife.  Alert and oriented ×3 with normal mood and affect.  He does have reduced opening of his jaw, but no clicking or locking.  Left side of the jaw appears to be reduced range of motion.  Minimal swelling in that area no erythema.  He does have some bruising on the left side of his neck, no hematoma with minimal tenderness.  Good range of motion on the neck and no midline tenderness.  Lungs are clear to auscultation with good respiratory excursion, no crepitus or dullness.  Heart is regular with 1 out of 6 to 2 out of 6 systolic murmur.  No rub or gallop.  Abdomen is without significant tenderness, no epigastric tenderness no hepatosplenomegaly.  No lower abdominal tenderness and belly soft.  No lower extremity edema or calf tenderness.    Laceration behind the right ear is healed well and no active bleeding.  The glue is starting to flake up.  He does have a number of sutures, but they appear embedded in the glue.  Tympanic membrane appears normal and external ear canal appears normal.    Port-A-Cath site in the right subclavian area, without tenderness or erythema.    He has a small " laceration on right forearm, is healed well and no active bleeding.  Continue with Band-Aid.    Assessment/Plan  1. Acute blood loss anemia  Severe blood loss anemia likely from upper GI bleed approximately 2 days before his hospital admission from unclear cause.  Possibly previous Tammie-Erazo tear that was not seen on EGD, or other occult upper GI source.    He has baseline pancytopenia with very poor bone marrow after chemotherapy for his multiple myeloma.    He then had additional blood loss from laceration from his right ear on the day of admission.    Hemoglobin improved back to baseline and stable at discharge.  I will recheck today.  He will be following up closely with his hematologist.    Refer back to gastroenterology to determine if more evaluation warranted for his upper GI bleed episode.  Consider PillCam.    He also had the adenomatous polyp on duodenum, and I will have gastroenterology determine his follow-up endoscopy planned for that polyp.    Would anticipate not needing a colonoscopy with his normal colonoscopy last year and description of his melena suggesting upper GI source of bleeding.  I will have gastroneurology determine endoscopy assessment plan.    He will continue off the Coumadin until further GI evaluation and stabilization of his hemoglobin.    If he has any further evidence of bleeding, he will need to stop the aspirin.      - HM1(CBC and Differential)  - HM1 (CBC with Diff)  - Ambulatory referral to Gastroenterology  - Manual Differential    2. Laceration of right ear, subsequent encounter  Healing well and no sign of infection.  Patient and wife are given signs of infection to watch for.  I will reevaluate next week, hopefully the glue will be coming out more not be able to get at the sutures.    3. History of DVT (deep vein thrombosis)  Patient was warned of risk of recurrent DVT.  He was told to continue to ambulate on a regular basis, avoid prolonged sitting.  Medical  attention immediately if any calf swelling or tenderness or increasing shortness of breath or chest pain.    Continue on the Aspirin a day, I would plan long-term to get him back on Coumadin, but reevaluate after further GI evaluation.    4. Loose stools  Chronic loose stools, I suspect some pancreatic malabsorption.  I encouraged him to discuss this further with gastroenterology.    Could consider pancreatic enzyme supplement prescribed by gastroenterology.    I recommended increase of his Metamucil to 2 scoops a day.  Can can continue the Imodium.    5. Pancytopenia  Baseline with previous chemotherapy and multiple myeloma.  To be followed by hematology.    6. Multiple myeloma not having achieved remission  Management per hematology, continues on current infusion therapy and Revlimid.    7. Benign prostatic hyperplasia without lower urinary tract symptoms  Mild, Flomax.    8. Acute sinusitis, recurrence not specified, unspecified location  Symptoms resolving.  Finish Augmentin.  Using some saline irrigation.    9. Jaw pain  Reduced opening of the jaw, some left jaw trauma likely from the fall with syncope, he says some left soft tissue neck ecchymosis from the trauma, see previous next CT scan.    Should slowly improve over the next few weeks.    10. Peripheral polyneuropathy  Mild, stable.    11. Hypomagnesemia  Previously low but normal on recheck in the hospital.  Recheck today.  - Magnesium    12. Medication monitoring encounter    - Comprehensive Metabolic Panel  - Phosphorus    Glaucoma, controlled on drops.    Multiple AK's, just saw dermatology last week    Return in 10 days (on 12/28/2017) for Recheck.   Patient Instructions   Lab work today.    See  for referral to gastroenterology to discuss further endoscopy.    You can discuss the loose stools with gastroenterology.  Continue Imodium.  Increase Metamucil to 2 scoops daily.  Avoid large fatty meals.    Continue the omeprazole twice a day.   Seek medical attention immediately if you develop recurrent abdominal pain, black stools, or weakness.    Seek medical attention immediately if you develops leg swelling, calf tenderness, or shortness of breath or chest pain.    See me next week for follow-up and removal of the sutures.  Seek medical attention immediately if increasing redness, tenderness or drainage from the right ear.            Mathew Ott MD  Total time with patient over 40 minutes and over 50% coord care.  Time all face to face.      Current Outpatient Prescriptions   Medication Sig Dispense Refill     acyclovir (ZOVIRAX) 400 MG tablet Take 400 mg by mouth 2 (two) times a day.       amoxicillin-clavulanate (AUGMENTIN) 875-125 mg per tablet Take 1 tablet by mouth 2 (two) times a day for 6 days. 12 tablet 0     ascorbic acid (VITAMIN C) 1000 MG tablet Take 1,000 mg by mouth daily.        aspirin 81 MG EC tablet Take 2 tablets (162 mg total) by mouth daily.  0     bimatoprost (LUMIGAN) 0.01 % Drop Administer 1 drop to both eyes at bedtime.       brimonidine (ALPHAGAN P) 0.1 % Drop Apply 1 drop to eye 3 (three) times a day.        calcium, as carbonate, (TUMS) 200 mg calcium (500 mg) chewable tablet Chew 1 tablet 3 (three) times a day as needed.        calcium-vitamin D 500 mg(1,250mg) -200 unit per tablet Take 1 tablet by mouth daily.       cetirizine (ZYRTEC) 10 MG tablet Take 10 mg by mouth daily.       dexamethasone (DECADRON) 4 MG tablet Take 20 mg by mouth every 14 (fourteen) days.       gabapentin (NEURONTIN) 300 MG capsule Take 300-900 mg by mouth daily.        guaiFENesin-dextromethorphan (MUCINEX DM) 600-30 mg Tb12 Take 1 tablet by mouth 2 (two) times a day as needed.        lenalidomide (REVLIMID) 20 mg cap Take 20 mg by mouth daily.       loperamide (IMODIUM) 2 mg capsule Take 2 mg by mouth 4 (four) times a day as needed for diarrhea.       multivitamin (MULTIVITAMIN) per tablet Take 1 tablet by mouth daily.       omega  3-dha-epa-fish oil 900-1,400 mg CpDR Take 1 tablet by mouth daily.       omeprazole (PRILOSEC) 20 MG capsule Take 1 capsule (20 mg total) by mouth 2 (two) times a day before meals. 60 capsule 0     potassium chloride SA (K-DUR,KLOR-CON) 20 MEQ tablet Take 20 mEq by mouth daily.       psyllium (METAMUCIL) powder Take 1 packet by mouth daily as needed.        sodium chloride (OCEAN) 0.65 % nasal spray 2 sprays into each nostril as needed for congestion. 15 mL 12     tamsulosin (FLOMAX) 0.4 mg Cp24 Take 0.4 mg by mouth daily.       ondansetron (ZOFRAN-ODT) 8 MG disintegrating tablet Take 8 mg by mouth every 8 (eight) hours as needed for nausea.       No current facility-administered medications for this visit.      Facility-Administered Medications Ordered in Other Visits   Medication Dose Route Frequency Provider Last Rate Last Dose     heparin 100 unit/mL lockflush (PF) porcine 300-600 Units  300-600 Units Intravenous PRN Per Clifford MD   600 Units at 11/19/14 1013     Allergies   Allergen Reactions     Pseudoephedrine-Guaifenesin Unknown     TB12     Social History   Substance Use Topics     Smoking status: Former Smoker     Quit date: 11/16/1975     Smokeless tobacco: Never Used     Alcohol use 0.6 oz/week     1 Glasses of wine per week

## 2021-06-14 NOTE — PROGRESS NOTES
Pt ambulates to infusion center for lab draw prior to NP visit.  IVAD accessed, labs drawn et sent.  Pt was seen by BRITTANI Valdez CNP and orders were approved.  Treatment administered as ordered without difficulty.  Vitamin B12 injection given as ordered.  IVAD flushed w/NS et heparin and needle deaccessed.  Pt left clinic stable to [facility].  Plan RTC as scheduled.

## 2021-06-14 NOTE — PROGRESS NOTES
[facility] Hematology and Oncology Progress Note    Patient: Theo Meyers  MRN: [id number]  Date of Service: November 15, 2017      Assessment and Plan:    1. Kappa light chain myeloma: Since her last visit I decided to hold starting daratumumab and his kappa lambda ratio has decreased significantly.  Clinically he is doing well with no evidence of progressive disease.  His bone marrow did not definitively show progression.  We will therefore keep him on his current regimen.  We will try 20 mg 3 weeks on 1 week off instead of 50 mg daily for cost purposes.  Will keep a closer eye on his blood counts with this change.      2. Thromboprophylaxis: He is on Coumadin for this.  INR is therapeutic today.  No changes in the Coumadin dose.    3.  Pancytopenia: Secondary to Revlimid. Stable.  Continue to follow.    ECOG Performance   ECOG Performance Status: 0    Distress Assessment  Distress Assessment Score: 1    Pain  Currently in Pain: No/denies    Diagnosis:    1. IgG kappa light chain myeloma. Hyposecretory. Diagnosed 2009. No significant measurable M protein. 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 There was also on 11;14 translocation.  Past immunofixations from 2011 show IgG-kappa.    2. Deep vein thrombosis of the left leg diagnosed 9/2012 while on treatment.    Treatment:    He presented with a lytic lesion involving the C6 vertebral body, although no measurable M protein. IgG kappa monoclonal immunoglobulin was confirmed by [facility] as well as elevated kappa free light chains with an abnormal kappa lambda ratio. Bone marrow biopsy showed 30% involvement and cytogenetics confirmed deletion of 13 q. as well as 11;14 translocation. He was initially treated with Velcade and dexamethasone and achieved a good partial response.   He was referred to the [facility] where he underwent high-dose chemotherapy with autologous peripheral stem cell transplant in April of 2010. He began Revlimid as  maintenance therapy post transplant. Repeat bone marrow biopsy done October 2010 showed about 5% kappa restricted plasma cells and so at that time weekly Velcade was added along with his maintenance Revlimid and dexamethasone.   He has done well since that time with stable minimal residual disease, best assessed by serum free light chains. He required dose reductions of weekly Velcade because of cytopenias and was ultimately switched to a q 2 week maintenance schedule which he has been on since September 2012.   His Revlimid dose was reduced to 15 mg daily and he continues on dexamethasone 20 mg p.o. weekly    Interim History:    Theo returns today for follow-up visit.  Doing okay.  No acute complaints.  No worsening neuropathy.  No fevers, chills, or night sweats.  He is wondering if he could change his Revlimid schedule.  No skin rash.  Bowel habits are normal.      Review of Systems:    Constitutional  Constitutional (WDL): Exceptions to WDL  Fatigue: Fatigue relieved by rest  Neurosensory  Neurosensory (WDL): Exceptions to WDL  Peripheral Motor Neuropathy: Asymptomatic, clinical or diagnostic observations only, intervention not indicated  Peripheral Sensory Neuropathy: Asymptomatic, loss of deep tendon reflexes or paresthesia  Cardiovascular  Cardiovascular (WDL): All cardiovascular elements are within defined limits  Pulmonary  Respiratory (WDL): Within Defined Limits  Gastrointestinal  Gastrointestinal (WDL): All gastrointestinal elements are within defined limits  Genitourinary  Genitourinary (WDL): All genitourinary elements are within defined limits  Integumentary  Integumentary (WDL): All integumentary elements are within defined limits  Patient Coping  Patient Coping: Accepting  Accompanied by  Accompanied by: Alone    Past History:    Past Medical History:   Diagnosis Date     Arthritis      Bilateral inguinal hernia      DVT (deep venous thrombosis)     09/2012     Glaucoma      Multiple myeloma      Gets chemo infusions every 2 weeks     Peripheral neuropathy      TMJ (temporomandibular joint disorder)      Physical Exam:    Recent Vitals 11/15/2017   Weight 169 lbs   /64   Pulse 67   Temp 97.8   Temp src 1   SpO2 100   Some recent data might be hidden     General: patient appears stated age of 72 y.o.. Nontoxic and in no distress.   HEENT: Head: atraumatic, normocephalic. Sclerae anicteric.  Chest:  Normal respiratory effort.   Cardiac:  No edema.   Abdomen: abdomen is soft, non-distended  Extremities: normal tone and muscle bulk.   Skin: no lesions or rash. Warm and dry.   CNS: alert and oriented x3. Grossly non-focal.   Psychiatric: normal mood and affect.     Lab Results:    Results for MONICA CSOTT (MRN [id number]) as of 11/15/2017 09:09   Ref. Range 11/8/2017 08:08   Sodium Latest Ref Range: 136 - 145 mmol/L 139   Potassium Latest Ref Range: 3.5 - 5.0 mmol/L 4.7   Chloride Latest Ref Range: 98 - 107 mmol/L 108 (H)   CO2 Latest Ref Range: 22 - 31 mmol/L 28   Anion Gap, Calculation Latest Ref Range: 5 - 18 mmol/L 3 (L)   BUN Latest Ref Range: 8 - 28 mg/dL 18   Creatinine Latest Ref Range: 0.70 - 1.30 mg/dL 0.93   GFR MDRD Af Amer Latest Ref Range: >60 mL/min/1.73m2 >60   GFR MDRD Non Af Amer Latest Ref Range: >60 mL/min/1.73m2 >60   Calcium Latest Ref Range: 8.5 - 10.5 mg/dL 8.6   AST Latest Ref Range: 0 - 40 U/L 13   ALT Latest Ref Range: 0 - 45 U/L 18   ALBUMIN Latest Ref Range: 3.5 - 5.0 g/dL 2.9 (L)   Protein, Total Latest Ref Range: 6.0 - 8.0 g/dL 5.0 (L)   Alkaline Phosphatase Latest Ref Range: 45 - 120 U/L 75   Bilirubin, Total Latest Ref Range: 0.0 - 1.0 mg/dL 0.6   Glucose Latest Ref Range: 70 - 125 mg/dL 103   % Beta Latest Ref Range: 10.0 - 17.0 %    Albumin % Latest Ref Range: 51.0 - 67.0 %    Alpha 1 Latest Ref Range: 0.1 - 0.3 g/dL    Alpha 1 % Latest Ref Range: 2.0 - 4.0 %    Alpha 2 Latest Ref Range: 0.4 - 0.9 g/dL    Alpha 2 % Latest Ref Range: 5.0 - 13.0 %    Beta Latest Ref  Range: 0.7 - 1.2 g/dL    ELP Comment Unknown    Gamma Globulin Latest Ref Range: 0.6 - 1.4 g/dL    Gamma Globulin % Latest Ref Range: 9.0 - 20.0 %    IGA Latest Ref Range: 65 - 400 mg/dL 36 (L)   Immunoglobulin M Latest Ref Range: 60 - 280 mg/dL 11 (L)   [place] Free Light Chains Latest Ref Range: 0.3300 - 1.94 mg/dL 61.2 (H)   Lambda Free Light Chain Latest Ref Range: 0.5700 - 2.63 mg/dL 2.23   Immunoglobulin G Latest Ref Range: 700 - 1700 mg/dL 394 (L)   WBC Latest Ref Range: 4.0 - 11.0 thou/uL 2.2 (L)   RBC Latest Ref Range: 4.40 - 6.20 mill/uL 2.79 (L)   Hemoglobin Latest Ref Range: 14.0 - 18.0 g/dL 9.7 (L)   Hematocrit Latest Ref Range: 40.0 - 54.0 % 29.3 (L)   MCV Latest Ref Range: 80 - 100 fL 105 (H)   MCH Latest Ref Range: 27.0 - 34.0 pg 34.8 (H)   MCHC Latest Ref Range: 32.0 - 36.0 g/dL 33.1   RDW Latest Ref Range: 11.0 - 14.5 % 16.2 (H)   Platelets Latest Ref Range: 140 - 440 thou/uL 91 (L)   MPV Latest Ref Range: 8.5 - 12.5 fL 12.3   Neutrophils % Latest Ref Range: 50 - 70 % 54   Lymphocytes % Latest Ref Range: 20 - 40 % 19 (L)   Monocytes % Latest Ref Range: 2 - 10 % 18 (H)   Eosinophils % Latest Ref Range: 0 - 6 % 8 (H)   Basophils % Latest Ref Range: 0 - 2 % 1   Neutrophils Absolute Latest Ref Range: 2.0 - 7.7 thou/uL 1.2 (L)   Lymphocytes Absolute Latest Ref Range: 0.8 - 4.4 thou/uL 0.4 (L)   Monocytes Absolute Latest Ref Range: 0.0 - 0.9 thou/uL 0.4   Eosinophils Absolute Latest Ref Range: 0.0 - 0.4 thou/uL 0.2   Basophils Absolute Latest Ref Range: 0.0 - 0.2 thou/uL 0.0     No results found for this or any previous visit (from the past 168 hour(s)).     Imaging:    No results found.       Signed by: Per Clifford MD

## 2021-06-14 NOTE — PROGRESS NOTES
[facility] Hematology and Oncology Progress Note    Patient: Theo Meyers  MRN: [id number]  Date of Service: February 3, 2021      Assessment and Plan:    1. Kappa light chain myeloma: He is responding to his current treatment.  He is on pomalidomide 2 mg daily.  His week off starts today.  I told him that we would likely increase the dose to 3 mg in the near future as his white blood cell count and platelet count are improving.  He is otherwise tolerating his treatment quite well.  Continue weekly dexamethasone.  We reviewed his Darzalex dosing.  He has 3 more doses at the 2-week interval.  He should be seen in clinic every 2 months and will think he needs to be seen more frequently than that.    2.  Pancytopenia: His counts are improving with the less frequent dosing of Darzalex.  We will likely increase his pomalidomide dose soon.    3.  Prophylaxis: Continue Xarelto 20 mg daily.    ECOG Performance   ECOG Performance Status: 0    Distress Assessment  Distress Assessment Score: No distress    Pain  Currently in Pain: No/denies    Diagnosis:    1.  IgG kappa light chain myeloma. Hyposecretory. Diagnosed 2009. No significant measurable M protein. 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 There was also on 11;14 translocation.  Past immunofixations from 2011 show IgG-kappa.  Bone marrow biopsy at 5%.  Residual kappa light chain myeloma involving 20% of nucleated cells.  No significant change from November 2016 marrow cellularity.    2.  Deep vein thrombosis of the left leg diagnosed 9/2012 while on treatment.    3.  Vitamin B12 deficiency diagnosed in September 2017.    4.  GI bleed and anemia requiring hospitalization in December 2017.    Treatment:    He presented with a lytic lesion involving the C6 vertebral body, although no measurable M protein. IgG kappa monoclonal immunoglobulin was confirmed by [facility] as well as elevated kappa free light chains with an abnormal kappa lambda ratio. Bone marrow  biopsy showed 30% involvement and cytogenetics confirmed deletion of 13 q. as well as 11;14 translocation. He was initially treated with Velcade and dexamethasone and achieved a good partial response.     He was referred to the [facility] where he underwent high-dose chemotherapy with autologous peripheral stem cell transplant in April of 2010. He began Revlimid as maintenance therapy post transplant. Repeat bone marrow biopsy done October 2010 showed about 5% kappa restricted plasma cells and so at that time weekly Velcade was added along with his maintenance Revlimid and dexamethasone.     He has done well since that time with stable minimal residual disease, best assessed by serum free light chains. He required dose reductions of weekly Velcade because of cytopenias and was ultimately switched to a q 2 week maintenance schedule which he has been on since September 2012.     His Revlimid dose was reduced to 15 mg daily and he continues on dexamethasone 20 mg p.o. weekly  Revlimid dosing changed to 20 mg, 3 weeks on 1 week (instead of 15 mg daily) for cost reasons.  Started March, 2018    Started daratumumab/pomalidomide/dexamethasone on October 14, 2020.    Progressed on:  bortezomib  lenalidomide    Interim History:    Theo returns today for follow-up visit.  Starting darzalex today. Feeling ok. No pain. No acute complaints today.     Review of Systems:    As above in the history.     Review of Systems otherwise Negative for:  General: chills, fever or night sweats  Psychological: anxiety or depression  Ophthalmic: blurry vision, double vision or loss of vision, vision change  ENT: epistaxis, oral lesions, hearing changes  Hematological and Lymphatic: bleeding, bruising, jaundice, swollen lymph nodes  Endocrine: hot flashes, malaise/lethargy or unexpected weight changes  Respiratory: cough, hemoptysis, orthopnea or shortness of breath/SAMANO  Cardiovascular: chest pain, edema, palpitations or  PND  Gastrointestinal: abdominal pain, blood in stools, change in bowel habits, constipation, diarrhea or nausea/vomiting  Genito-Urinary: change in urinary stream, incontinence, frequency/urgency  Musculoskeletal: joint pain, stiffness, swelling, muscle pain or weakness  Neurological: dizziness, headaches, numbness/tingling  Dermatological: lumps and rash    ECOG performance status is 0    Patient Coping  Patient Coping: Accepting  Accompanied by  Accompanied by: Alone    Past History:    Past Medical History:   Diagnosis Date     Anemia 12/13/2017     Arthritis      Bilateral inguinal hernia      Glaucoma      Glaucoma      History of blood clots     DVT - left chronic     Multiple myeloma (H)     Gets chemo infusions every 2 weeks     Pancytopenia (H)      Peripheral neuropathy      Syncope      TMJ (temporomandibular joint disorder)      Physical Exam:    Recent Vitals 2/3/2021   Height -   Weight 163 lbs   BSA (m2) 1.9 m2   /71   Pulse 75   Temp 97.8   Temp src 1   SpO2 100   Some recent data might be hidden     General: patient appears stated age of 75 y.o.. Nontoxic and in no distress.   HEENT: Head: atraumatic, normocephalic. Sclerae anicteric.  Chest:  Normal respiratory effort.   Cardiac:  No edema.   Abdomen: abdomen is non-distended  Extremities: normal tone and muscle bulk.   Skin: no lesions or rash. Warm and dry.   CNS: alert and oriented. Grossly non-focal.   Psychiatric: normal mood and affect.     Lab Results:    Recent Results (from the past 240 hour(s))   Comprehensive Metabolic Panel    Collection Time: 02/03/21  7:59 AM   Result Value Ref Range    Sodium 135 (L) 136 - 145 mmol/L    Potassium 4.5 3.5 - 5.0 mmol/L    Chloride 103 98 - 107 mmol/L    CO2 25 22 - 31 mmol/L    Anion Gap, Calculation 7 5 - 18 mmol/L    Glucose 104 70 - 125 mg/dL    BUN 18 8 - 28 mg/dL    Creatinine 1.23 0.70 - 1.30 mg/dL    GFR MDRD Af Amer >60 >60 mL/min/1.73m2    GFR MDRD Non Af Amer 57 (L) >60 mL/min/1.73m2     Bilirubin, Total 0.3 0.0 - 1.0 mg/dL    Calcium 8.6 8.5 - 10.5 mg/dL    Protein, Total 5.2 (L) 6.0 - 8.0 g/dL    Albumin 3.0 (L) 3.5 - 5.0 g/dL    Alkaline Phosphatase 70 45 - 120 U/L    AST 12 0 - 40 U/L    ALT 11 0 - 45 U/L   HM1 (CBC with Diff)    Collection Time: 02/03/21  7:59 AM   Result Value Ref Range    WBC 4.5 4.0 - 11.0 thou/uL    RBC 2.87 (L) 4.40 - 6.20 mill/uL    Hemoglobin 10.0 (L) 14.0 - 18.0 g/dL    Hematocrit 31.1 (L) 40.0 - 54.0 %     (H) 80 - 100 fL    MCH 34.8 (H) 27.0 - 34.0 pg    MCHC 32.2 32.0 - 36.0 g/dL    RDW 13.5 11.0 - 14.5 %    Platelets 244 140 - 440 thou/uL    MPV 9.9 8.5 - 12.5 fL   Manual Differential    Collection Time: 02/03/21  7:59 AM   Result Value Ref Range    Total Neutrophils % 76 (H) 50 - 70 %    Lymphocytes % 3 (L) 20 - 40 %    Monocytes % 13 (H) 2 - 10 %    Eosinophils %  3 0 - 6 %    Basophils % 5 (H) 0 - 2 %    Immature Granulocyte % - Manual 0 <=0 %    Total Neutrophils Absolute 3.4 2.0 - 7.7 thou/ul    Lymphocytes Absolute 0.1 (L) 0.8 - 4.4 thou/uL    Monocytes Absolute 0.6 0.0 - 0.9 thou/uL    Eosinophils Absolute 0.1 0.0 - 0.4 thou/uL    Basophils Absolute 0.2 0.0 - 0.2 thou/uL    Immature Granulocyte Absolute - Manual 0.0 <=0.0 thou/uL    Platelet Estimate Normal Normal    Ovalocytes 1+ (!) Negative    Schistocytes 1+ (!) Negative     Imaging:    No results found.      Signed by: Per Clifford MD

## 2021-06-14 NOTE — PROGRESS NOTES
"Pt ambulates to infusion center for treatment.  Pt c/o some \"stomach issues\" from Thanksgiving dinner, but otherwise has no new concerns today.  Velcade given SQ as ordered.  Pt will finish his Revlimid 15 mg capsules, take a week off then start on 20 mg PO QD for 3 weeks, off for one and repeat.  Pt verbalizes understanding of this.  Pt left clinic stable to [facility].  Plan RTC as scheduled.  "

## 2021-06-14 NOTE — PROGRESS NOTES
Pt ambulates to infusion center for labs and treatment.  IVAD accessed, labs drawn et sent w/results noted.  Treatment administered as ordered without difficulty.  IVAD flushed w/NS et heparin and needle deaccessed.  Pt left clinic stable to lobby.  Plan RTC as scheduled.

## 2021-06-14 NOTE — PROGRESS NOTES
Theo Meyers, 74 y.o., male arrived ambulatory to clinic at 0800 for Cycle #4 Day #15 of his treatment regimen. Port was accessed using aseptic technique without difficulties with excellent blood return. Labs drawn and reviewed. Patient assessed and VSS. Patient refused COVID PCR swab test. Administered premedications and treatment per provider order. Given 500ml normal saline today because patient feeling dehydrated and okay'd by Alejandra Valdez CNP. He tolerated infusion well, no signs or symptoms of infusion reaction. Port was flushed with NS and Heparin then de-accessed using 2x2 and papertape. Theo Meyers verbalized understanding of plan of care and return to clinic. Discharged to [facility] at 1150 alert and ambulatory.

## 2021-06-15 NOTE — PROGRESS NOTES
Pt ambulates to infusion center for labs and treatment.  Pt voices no new concerns today.  IVAD accessed, lab drawn et sent w/results noted and reviewed with BRITTANI Valdez CNP.  Pt encouraged to continue his magnesium regime and try to increase his intake of magnesium rich food.  IVAD flushed w/NS et heparin and needle deaccessed.  Velcade given SQ as ordered.  Pt left clinic stable to lobby.  Plan RTC as scheduled.

## 2021-06-15 NOTE — PROGRESS NOTES
Pt ambulates to infusion center for lab draw and treatment.  IVAD accessed, labs drawn et sent w/results noted.  Treatment administered as ordered without difficulty. IVAD flushed w/NS et heparin and needle deaccessed.  Pt left clinic stable to [facility].  Plan RTC as scheduled.

## 2021-06-15 NOTE — PROGRESS NOTES
[facility] Clinic Follow Up Note    Theo Meyers   72 y.o. male    Date of Visit: 12/28/2017    Chief Complaint   Patient presents with     Pre-op Exam     Endoscopic U/S with needle aspiration. on 1[facility]19, [facility]     Follow-up     1 wk follow up, surture removal Rt ear, still cant open jaw very wide.     Subjective  Theo is here for follow-up of multiple medical issues related to recent hospitalization.    Patient reported GI illness consistent with stomach flu and of November, the week after Thanksgiving.  He improved until December 11.  Patient had onset of acute nausea and vomiting and melena on December 11.  Increasing weakness and fell with laceration of right ear on December 13.    He was brought to the emergency room and found to have a hemoglobin of 4.  He has underlying multiple myeloma and has been receiving treatment.    Bone marrow biopsy in the hospital showed hypocellular bone marrow, less than 5% with 20% or more multiple myeloma.  He received multiple transfusions.  On December 18 his hemoglobin was 10.6, white count 2.7 platelets 158, all improved.    He has not had any further nausea and vomiting, back to a near normal diet.  Stools have normalized and no melena.  Improving strength, no new weakness or falls or lightheadedness.    He was having some loose stools which have been more chronic, but he increase the Metamucil and now on 2 scoops a day, stools normalized and no longer needing Imodium.  No abdominal pain complaints.    With his fall on December 13 he had left jaw and neck trauma with bruising.  The bruising is resolved.  The jaw feels much better now.  He had some stiffness to his jaw and difficulty opening his mouth, but that slowly improving.  CT scan of the neck on December 13 showed some soft tissue swelling consistent with trauma, no other cause noted.    Patient was seen by hematology on December 20.  He is restarted his chemotherapy and infusion  treatment with steroids for the multiple myeloma.    Past history of autologous peripheral stem cell transplant in 2010 and high-dose chemotherapy.    He has a past history of DVT in 2012 during chemotherapy.  He had been on Coumadin but that was stopped during the GI bleed earlier this month.  He is on aspirin 162 mg a day.  No calf pain or leg swelling.  No increasing shortness of breath or chest pain.    Baseline peripheral neuropathy after chemotherapy.  No new falls.    Laceration behind his right ear has healed well.  No pain or drainage from that now.    In the hospital he had an EGD on December 14.  It did not show a Tammie-Erazo tear, but was hypothesized that he may have had a previous tear with bleeding that was not seen on current EGD.  There was also a duodenal adenomatous polyp was biopsied but no dysplasia.  Patient does have endoscopic ultrasound planned for January 19 at [facility] for follow-up of the polyp.    Patient had a colonoscopy August 2016 that was negative.    Patient did have a low magnesium level in the hospital and magnesium level was still borderline low at 1.4 on check recently, now on a slow magnesium supplement daily.  No palpitations or history of ventricular arrhythmia.  Potassium level was normal on last check.    Patient has finished his antibiotic with Augmentin for possible sinusitis in the hospital.  No nasal discharge or fevers.    Recent creatinine was normal at 0.9 on December 18    Mild BPH symptoms controlled with Flomax.  No dysuria or hematuria.    Cardiac echo in 2015 with ejection fraction 55-60% with mild mitral regurgitation and mild aortic stenosis.    PMHx:    Past Medical History:   Diagnosis Date     Anemia 12/13/2017     Arthritis      Bilateral inguinal hernia      DVT (deep venous thrombosis)     09/2012     Glaucoma      Multiple myeloma     Gets chemo infusions every 2 weeks     Peripheral neuropathy      Syncope      TMJ (temporomandibular  "joint disorder)      PSHx:    Past Surgical History:   Procedure Laterality Date     APPENDECTOMY      Age 16     CARPAL TUNNEL RELEASE Bilateral      CATARACT EXTRACTION Bilateral      ESOPHAGOGASTRODUODENOSCOPY N/A 12/14/2017    Procedure: ESOPHAGOGASTRODUODENOSCOPY (EGD) WITH BIOPSYS;  Surgeon: Marlon Roy MD;  Location: [facility];  Service:      PORTACATH PLACEMENT       VERTEBROPLASTY      T6     WISDOM TOOTH EXTRACTION       Immunizations:   Immunization History   Administered Date(s) Administered     Hep B, historic 03/01/2011, 05/19/2011     HiB, historic,unspecified 03/01/2011, 05/19/2011     IPV 03/01/2011, 05/19/2011     Influenza high dose, seasonal 10/07/2015, 09/21/2016, 09/20/2017     Influenza,seasonal quad, PF, 36+MOS 09/24/2014     Pneumo Conj 13-V (2010&after) 10/16/2015     Pneumo Polysac 23-V 05/19/2011     Tdap 03/01/2011       ROS A comprehensive review of systems was performed and was otherwise negative    Medications, allergies, and problem list were reviewed and updated    Exam  /67  Pulse 67  Ht 5' 9\" (1.753 m)  Wt 166 lb (75.3 kg)  SpO2 98%  BMI 24.51 kg/m2  Left pupil somewhat larger than right pupil, baseline.  Alert and oriented ×3.  Laceration behind right ear is completely healed.  No erythema of the tissue.  4 sutures were removed and glue was removed.  Difficult removal of sutures with the adherent glue.  No cervical or subclavicular adenopathy or JVD.  Lungs are clear to auscultation with good respiratory excursion and no dullness.  It is regular with 1 out of 6 systolic murmur.  No gallop or rub.  Abdomen is nontender.  No edema.  The bruising on his neck is resolved.  Improved opening of the jaw.    EKG on December 13, 2017 normal sinus rhythm, normal EKG.    Assessment/Plan  1. Anemia, unspecified type  Improved on recent check on December 18.  He has a lab appointment on January 17 with hematology, I will plan to have him rechecked the blood counts " then.    Multifactorial with poor bone marrow with his multiple myeloma, as well as blood loss anemia with a laceration behind the right ear, and he may have had some GI blood loss with the nausea and melena reported on December 11.  Possible bleeding from the adenomatous polyp or an occult Tammie-Erazo tear that was not seen on EGD.  No evidence of new GI bleeding.  - HM1(CBC and Differential); Future    Patient is off his Coumadin until further evaluation of the GI bleeding event.    Patient does report a consult has been set up on January 9, did ask that he proceed with that consult, to determine any additional evaluation of his GI bleeding episode, other than the EGD that is plan.  He has not had a PillCam.    Continue twice daily omeprazole.    2. Multiple myeloma, remission status unspecified  Management per hematology with planned follow-up on January 17.  Continue treatment per hematology.  - HM1(CBC and Differential); Future    Mild peripheral neuropathy associate with previous chemotherapy, mild and stable.    3. History of DVT (deep vein thrombosis)  Patient given warnings of risk of recurrent DVT and pulmonary embolism.  If he does have calf swelling or pain or increasing shortness of breath or chest pain is told to seek medical attention immediately.    He will continue on aspirin 162 mg a day, keep him on that even though he is going to have an EGD with polypectomy.    Once the polypectomy is done, could consider returning to long-term Coumadin because of his history of DVT and current malignancy.    4. Hypomagnesemia  Continue over-the-counter daily supplement.  Recheck magnesium level the day before his EGD, I have asked that he get these labs drawn on the 17th when he goes in for his hematology labs.  - Magnesium; Future    5. Visit for suture removal  Removal of sutures as above, is healed well.  Just keep area clean and dry.    6. Adenomatous duodenal polyp  Plan EGD with endoscopic ultrasound on  January 19 with GI.    Patient will plan to proceed with that.  Skin the aspirin at this time given his history of DVT,  long as no recurrent GI bleeding.    He should not need to take any medications on the day of the EGD.    7. Jaw pain, associated with soft tissue injury and impact trauma from fall on December 13.  Improved.  Continue range of motion stretching of the jaw, is warned not to over stretch the jaw.  Improving over time.    8. Medication monitoring encounter  Have patient draw labs on January 17 when it hematology.  Patient has a Port-A-Cath.  - Basic Metabolic Panel; Future    Loose stools, essentially resolved.  He is off antibiotics and now on 2 scoops of Metamucil.  Continue Metamucil long-term.  He just had a colonoscopy last year and not planning repeat colonoscopy.    Mild BPH controlled with Flomax.    Glaucoma, controlled on drops.    Return in 3 weeks (on 1/18/2018) for Recheck.   Patient Instructions   Keep area behind right ear dry and clean.    Continue aspirin 162 mg a day.  Seek medical attention immediately if calf pain or swelling or increasing shortness of breath or new chest pain.  Plan to discuss restarting Coumadin after your endoscopy in January.    Plan to see gastroenterology for consult, you had stated that was scheduled on January 9.    See hematology on January 17 as planned, with planned blood work including rechecking hemoglobin, also have them check magnesium and potassium level.    I will see you on January 18 for assessment just prior to the endoscopy on January 19.    Mathew Ott MD  Total time with patient over 40 minutes and over 50% coord care.  Time all face to face.      Current Outpatient Prescriptions   Medication Sig Dispense Refill     acyclovir (ZOVIRAX) 400 MG tablet Take 400 mg by mouth 2 (two) times a day.       ascorbic acid (VITAMIN C) 1000 MG tablet Take 1,000 mg by mouth daily.        aspirin 81 MG EC tablet Take 2 tablets (162 mg total) by mouth  daily.  0     bimatoprost (LUMIGAN) 0.01 % Drop Administer 1 drop to both eyes at bedtime.       brimonidine (ALPHAGAN P) 0.1 % Drop Apply 1 drop to eye 3 (three) times a day.        calcium, as carbonate, (TUMS) 200 mg calcium (500 mg) chewable tablet Chew 1 tablet 3 (three) times a day as needed.        calcium-vitamin D 500 mg(1,250mg) -200 unit per tablet Take 1 tablet by mouth daily.       cetirizine (ZYRTEC) 10 MG tablet Take 10 mg by mouth daily.       dexamethasone (DECADRON) 4 MG tablet Take 20 mg by mouth every 14 (fourteen) days.       gabapentin (NEURONTIN) 300 MG capsule Take 300 mg by mouth daily.        guaiFENesin-dextromethorphan (MUCINEX DM) 600-30 mg Tb12 Take 1 tablet by mouth 2 (two) times a day as needed.        lenalidomide (REVLIMID) 20 mg cap Take 20 mg by mouth daily.       magnesium 30 mg tablet Take 1 tablet (30 mg total) by mouth 2 (two) times a day. 60 tablet 5     multivitamin (MULTIVITAMIN) per tablet Take 1 tablet by mouth daily.       omega 3-dha-epa-fish oil 900-1,400 mg CpDR Take 1 tablet by mouth daily.       omeprazole (PRILOSEC) 20 MG capsule Take 1 capsule (20 mg total) by mouth 2 (two) times a day before meals. 60 capsule 0     ondansetron (ZOFRAN-ODT) 8 MG disintegrating tablet Take 8 mg by mouth every 8 (eight) hours as needed for nausea.       potassium chloride SA (K-DUR,KLOR-CON) 20 MEQ tablet Take 20 mEq by mouth daily.       psyllium (METAMUCIL) powder Take 1 packet by mouth daily as needed.        sodium chloride (OCEAN) 0.65 % nasal spray 2 sprays into each nostril as needed for congestion. 15 mL 12     tamsulosin (FLOMAX) 0.4 mg Cp24 Take 0.4 mg by mouth daily.       loperamide (IMODIUM) 2 mg capsule Take 2 mg by mouth 4 (four) times a day as needed for diarrhea.       No current facility-administered medications for this visit.      Facility-Administered Medications Ordered in Other Visits   Medication Dose Route Frequency Provider Last Rate Last Dose     heparin  100 unit/mL lockflush (PF) porcine 300-600 Units  300-600 Units Intravenous PRN Per Clifford MD   600 Units at 11/19/14 1013     Allergies   Allergen Reactions     Pseudoephedrine-Guaifenesin Unknown     TB12     Social History   Substance Use Topics     Smoking status: Former Smoker     Quit date: 11/16/1975     Smokeless tobacco: Never Used     Alcohol use 0.6 oz/week     1 Glasses of wine per week

## 2021-06-15 NOTE — PROGRESS NOTES
Pt ambulates to infusion center for lab and treatment.  Lab drawn peripherally per pt request.  INR result will be followed up on by triage RN.  Pt voices no new concerns today.  Velcade given SQ as ordered without difficulty.  Pt left clinic stable to lobby.  Plan RTC as scheduled.

## 2021-06-15 NOTE — PROGRESS NOTES
Pt ambulates to infusion center for lab draw prior to MD visit.  IVAD accessed, labs drawn et sent.  Pt was seen by Dr. Clifford today and orders were approved.  Treatment administered as ordered without difficulty.  IVAD flushed w/NS et heparin and needle deaccessed.  Pt left clinic stable to [facility].  Plan RTC as scheduled.

## 2021-06-15 NOTE — PROGRESS NOTES
[facility] Hematology and Oncology Progress Note    Patient: Theo Meyers  MRN: [id number]  Date of Service: 03/03/2021        Reason for Visit    Chief Complaint   Patient presents with     HE Cancer     Multiple Myeloma       Assessment and Plan    1.  Myeloma: pt continues on daratumumab and dexamethasone. This regimen was initiated in October and he appears to be responding well. Light chains have plateaued, but ratio continues to improve. He is doing well. He has received daratumumab weekly for 8 weeks. Started every other week dosing in December. This will be his last cycle with every 2 week dosing and then we will switch to every 4 weeks. He will return in see provider in 4 weeks. After that, we can probably see him every other cycle. We did increase pomalyst to 3mg. Will continue that for now. Takes for 21 days and then off for 7 days.     2. DVT: This is recurrent.  Will be on xarelto indefinitely.     3. Anemia, leukopenia: likely from treatment and myeloma. No complications. Will continue to monitor.     4. Recurrent URI: on augmentin for 10 days. Feels like symptoms are getting better.     ECOG Performance   ECOG Performance Status: 0     Distress Assessment  Distress Assessment Score: No distress    Pain  Currently in Pain: No/denies      Problem List    1. Multiple myeloma in relapse (H)     2. Chronic deep vein thrombosis (DVT) of left lower extremity, unspecified vein (H)     3. Thrombocytopenia (H)        ______________________________________________________________________________    History of Present Illness    DIAGNOSIS:   1. IgG kappa light chain myeloma. Hyposecretory. Diagnosed 2009. No significant measurable M protein. 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 There was also on 11;14 translocation.   2. Deep vein thrombosis of the left leg diagnosed 09/2012 while on treatment.       TREATMENT:   Started Daratumumab in October 2020. Dexamethasone weekly as well.  Today is  cycle 6. Pomalyst added with cycle 3.         PAST TREATMENT and HISTORY:   Most recently he has been on Velcade every 2 weeks since 09/2012. He is getting dexamethasone 20 mg every other week with the velcade. Finally, he is receiving Revlimid 20 mg daily for 3 weeks on, 1 week off.     He presented at diagnosis with a lytic lesion involving the C6 vertebral body, although no measurable M protein. IgG kappa monoclonal immunoglobulin was confirmed by [facility] as well as elevated kappa free light chains with an abnormal kappa lambda ratio. Bone marrow biopsy showed 30% involvement and cytogenetics confirmed deletion of 13 q. as well as 11;14 translocation. He was initially treated with Velcade and dexamethasone and achieved a good partial response. He was referred to the [facility] where he underwent high-dose chemotherapy with autologous peripheral stem cell transplant in April of 2010. He began Revlimid as maintenance therapy post transplant. Repeat bone marrow biopsy done October 2010 showed about 5% kappa restricted plasma cells and so at that time weekly Velcade was added along with his maintenance Revlimid and dexamethasone. He has done well since that time with stable minimal residual disease, best assessed by serum free light chains. He required dose reductions of weekly Velcade because of cytopenias and was ultimately switched to a q.2 week maintenance schedule which he has been on since September 2012. His Revlimid dose is 20 mg daily and he continues on dexamethasone 20 mg p.o. q. Week.       INTERIM HISTORY:    Pt is here today for follow up. He is doing pretty well.  No new issues except had URI again. [place] like had cold for 4-5 weeks. Now on antibiotics and feels better. Still quite fatigued. Got first Covid dose.     Pain Status  Currently in Pain: No/denies    Review of Systems    Constitutional  Constitutional (WDL): Exceptions to WDL  Fatigue: Fatigue relieved by  rest  Neurosensory  Neurosensory (WDL): Exceptions to WDL  Peripheral Sensory Neuropathy: Asymptomatic, loss of deep tendon reflexes or paresthesia  Eye   Eye Disorder (WDL): Exceptions to WDL  Blurred Vision: Intervention not indicated  Ear  Ear Disorder (WDL): Exceptions to WDL  Tinnitus: Mild symptoms, intervention not indicated  Cardiovascular  Cardiovascular (WDL): Exceptions to WDL  Edema Limbs: 5 - 10% inter-limb discrepancy in volume or circumference at point of greatest visible difference, swelling or obscuration of anatomic architecture on close inspection  Pulmonary  Respiratory (WDL): Exceptions to WDL  Cough: Mild symptoms, nonprescription intervention indicated(on antibiotics fr cough x 10 days (3 days left))  Dyspnea: Shortness of breath with moderate exertion  Gastrointestinal  Gastrointestinal (WDL): Exceptions to WDL  Anorexia: Loss of appetite without alteration in eating habits(fills up fast)  Diarrhea: Increase of <4 stools per day over baseline, mild increase in ostomy output compared to baseline  Genitourinary  Genitourinary (WDL): All genitourinary elements are within defined limits  Lymphatic  Lymph (WDL): Assessment not pertinent to visit  Musculoskeletal and Connective Tissue  Musculoskeletal and Connetive Tissue Disorders (WDL): Assessment not pertinent to visit  Integumentary  Integumentary (WDL): All integumentary elements are within defined limits  Patient Coping  Patient Coping: Accepting  Distress Assessment  Distress Assessment Score: No distress  Accompanied by  Accompanied by: Alone  Oral Chemo Adherence             Past History  Past Medical History:   Diagnosis Date     Anemia 12/13/2017     Arthritis      Bilateral inguinal hernia      Chest tube in place      Glaucoma      Glaucoma      History of blood clots     DVT - left chronic     Multiple myeloma (H)     Gets chemo infusions every 2 weeks     Pancytopenia (H)      Parapneumonic effusion      Peripheral neuropathy       Syncope      TMJ (temporomandibular joint disorder)        PHYSICAL EXAM  /67   Pulse 73   Temp 97.6  F (36.4  C) (Oral)   Wt 165 lb (74.8 kg)   SpO2 100%   BMI 24.37 kg/m      GENERAL: no acute distress. Cooperative in conversation. Here alone due to visitor restrictions. Mask on  RESP: Regular respiratory rate. No expiratory wheezes   MUSCULOSKELETAL: no bilateral leg swelling  NEURO: non focal. Alert and oriented x3.   PSYCH: within normal limits. No depression or anxiety.  SKIN: exposed skin is dry intact.     Lab Results    Recent Results (from the past 48 hour(s))   Comprehensive Metabolic Panel   Result Value Ref Range    Sodium 139 136 - 145 mmol/L    Potassium 4.1 3.5 - 5.0 mmol/L    Chloride 108 (H) 98 - 107 mmol/L    CO2 25 22 - 31 mmol/L    Anion Gap, Calculation 6 5 - 18 mmol/L    Glucose 142 (H) 70 - 125 mg/dL    BUN 15 8 - 28 mg/dL    Creatinine 1.30 0.70 - 1.30 mg/dL    GFR MDRD Af Amer >60 >60 mL/min/1.73m2    GFR MDRD Non Af Amer 54 (L) >60 mL/min/1.73m2    Bilirubin, Total 0.3 0.0 - 1.0 mg/dL    Calcium 8.5 8.5 - 10.5 mg/dL    Protein, Total 5.3 (L) 6.0 - 8.0 g/dL    Albumin 3.1 (L) 3.5 - 5.0 g/dL    Alkaline Phosphatase 82 45 - 120 U/L    AST 17 0 - 40 U/L    ALT 23 0 - 45 U/L   HM1 (CBC with Diff)   Result Value Ref Range    WBC 3.6 (L) 4.0 - 11.0 thou/uL    RBC 3.32 (L) 4.40 - 6.20 mill/uL    Hemoglobin 10.9 (L) 14.0 - 18.0 g/dL    Hematocrit 35.3 (L) 40.0 - 54.0 %     (H) 80 - 100 fL    MCH 32.8 27.0 - 34.0 pg    MCHC 30.9 (L) 32.0 - 36.0 g/dL    RDW 14.1 11.0 - 14.5 %    Platelets 229 140 - 440 thou/uL    MPV 10.0 8.5 - 12.5 fL   Manual Differential   Result Value Ref Range    Total Neutrophils % 49 (L) 50 - 70 %    Lymphocytes % 21 20 - 40 %    Monocytes % 20 (H) 2 - 10 %    Eosinophils %  7 (H) 0 - 6 %    Basophils % 3 (H) 0 - 2 %    Immature Granulocyte % - Manual 0 <=0 %    Total Neutrophils Absolute 1.8 (L) 2.0 - 7.7 thou/ul    Lymphocytes Absolute 0.8 0.8 - 4.4 thou/uL     Monocytes Absolute 0.7 0.0 - 0.9 thou/uL    Eosinophils Absolute 0.3 0.0 - 0.4 thou/uL    Basophils Absolute 0.1 0.0 - 0.2 thou/uL    Immature Granulocyte Absolute - Manual 0.0 <=0.0 thou/uL    Platelet Estimate Normal Normal    Ovalocytes 1+ (!) Negative    Tear Drop Cells 1+ (!) Negative           Lab Results   Component Value Date    KFLC 18.17 (H) 01/20/2021    KFLC 16.40 (H) 12/23/2020    KFLC 14.42 (H) 12/02/2020    KFLC 17.62 (H) 11/04/2020    KFLC 99.52 (H) 09/30/2020    KFLC 70.23 (H) 09/02/2020    KFLC 99.71 (H) 08/05/2020    KFLC 71.62 (H) 06/10/2020    KFLC 55.22 (H) 04/15/2020    KFLC 60.34 (H) 03/04/2020     Lab Results   Component Value Date    KLR 44.32 (H) 01/20/2021    KLR 52.90 (H) 12/23/2020    KLR 80.11 (H) 12/02/2020    KLR 88.10 (H) 11/04/2020    .97 (H) 09/30/2020    .78 (H) 09/02/2020    .83 (H) 08/05/2020    KLR 80.47 (H) 06/10/2020    KLR 63.47 (H) 04/15/2020    KLR 94.28 (H) 03/04/2020   ]    Imaging    No results found.      Signed by: Alejandra Valdez, CNP

## 2021-06-15 NOTE — PROGRESS NOTES
Pt ambulates to infusion center for lab draw prior to NP visit.  IVAD accessed, labs drawn et sent.  Pt seen by BRITTANI Valdez CNP and orders entered and approved.  Pt returns to infusion center for treatment.  Magnesium sulfate given IV as ordered without complication.  IVAD flushed w/NS et heparin and needle deaccessed.  Velcade given SQ as ordered.  Pt left clinic stable to ultrasound and will return to our lobby to await results.

## 2021-06-15 NOTE — TELEPHONE ENCOUNTER
Oral Chemotherapy Monitoring Program    Oral Chemotherapy Monitoring    Date: 2/16/21  Primary Oncologist: Per Clifford MD  Primary Oncology Clinic:  [facility]  Cancer Diagnosis: Multiple myeloma not having achieved remission (H)  Regimen: DPD  Drug Name: pomalidomide  Current Dosage: 2 mg   Current Frequency: daily  Cycle Details: d1-21 out of 28 day cycle  Encounter: Mid-Cycle Assessment   New Start:   until disease progression or unacceptable toxcities   Intervention: See in clinic      Who was educated?: Patient       Mid-Cycle Assessment:  Start Date of Last Cycle: 2/4/21   Unplanned doses missed in last 2 weeks: 0   Adherence assessment : Adherent   Drug Interaction Assessment: No new medications reported   Adverse Effects reported: 0   Next pharmacist intervention date: 3/3/21   Intervention: R               Assessment/Plan:  Keith is tolerating therapy well. Continue as prescribed.  Denies any AE.  Might increase dose in future cycles to 3 mg, but pending how his labs look in the coming weeks.  Next cycle for pomalidomide to start 3/4.    Follow-Up:  Will review appointment with Dr. Clifford on 3/3.  Will plan for one more monthly assessment phone call in the event that we increase dose, but then will assess to see if Keith qualifies for quarterly assessment calls since he is stable.    Mann Chandler, PharmD, BCOP  Oral Chemotherapy Pharmacist  [phone]

## 2021-06-15 NOTE — ANESTHESIA PREPROCEDURE EVALUATION
Anesthesia Evaluation      Patient summary reviewed     Airway   Mallampati: II   Pulmonary - negative ROS and normal exam                          Cardiovascular   ECG reviewed     ROS comment: EF 55-60%. Mild AS, Mild MI     Neuro/Psych    (+) neuromuscular disease,      Comments: Shingles    Endo/Other       Comments: Multiple Myeloma  H/O DVT    GI/Hepatic/Renal       Other findings: Hb 9.2, K 3.7, Mg 1.6      Dental - normal exam                        Anesthesia Plan  Planned anesthetic: MAC    ASA 3     Anesthetic plan and risks discussed with: patient and spouse    Post-op plan: routine recovery

## 2021-06-15 NOTE — TELEPHONE ENCOUNTER
Reason for call:  Patient reporting a symptom. COLD LIKE SXS    Symptom or request: Productive cough, nasal congestion. Worse at night and wakes up 2-3 times a night. No fever, patient is also going through chemo and feels that it is due to his low white count (2.1). Feels like a sinus infection.      Pharm: [facility] [address]    Duration (how long have symptoms been present): ONGOING MONTH AN HALF    Have you been treated for this before? Yes    Additional comments: Would like to know if a medication can be called in or if there is anything else he can do from home.     Phone Number patient can be reached at:  Home number on file [phone] (home)    Best Time: anytime    Can we leave a detailed message on this number: Yes    Call taken on 2/25/2021 at 11:06 AM by Tiffany Candelaria

## 2021-06-15 NOTE — PROGRESS NOTES
Theo Meyers is a 75 y.o. male who is being evaluated via a billable video visit.      How would you like to obtain your AVS? MyChart.  If dropped from the video visit, the video invitation should be resent by: Text to cell phone: [phone]  Will anyone else be joining your video visit? No      Video Start Time: 3:15pm    Assessment & Plan     Acute non-recurrent pansinusitis: 1.5 month course of sinus pressure, pain, congestion, post nasal drip. He has had good success with Augmentin in the past. Will treat with Augmentin. Discussed resting, pushing fluids, follow up if symptoms persist or worsen.   - amoxicillin-clavulanate (AUGMENTIN) 875-125 mg per tablet  Dispense: 20 tablet; Refill: 0    Diarrhea, unspecified type/Hx of Clostridium difficile infection: Hx of c-diff colitis. Has been on Vancomycin in the past for prophylaxis, will do this again for 10 days, two times a day. Discussed eating a daily yogurt with a probiotic in it to help reestablish his gut of good bacteria. Lomotil ordered as a precaution. Follow up if diarrhea starts.   - diphenoxylate-atropine (LOMOTIL) 2.5-0.025 mg per tablet  Dispense: 30 tablet; Refill: 0  - vancomycin (VANCOCIN) 125 MG capsule  Dispense: 20 capsule; Refill: 0  69}  Return if symptoms worsen or fail to improve.    Oumou Gregory, [facility]   Theo Meyers is 75 y.o. and presents today for the following health issues   HPI The patient reports having sinus congestion, pressure, post nasal drip for the past 1.5 months.     He reports being in the Hospital in December for what he thought was COVID, was discharged home on Augmentin for febrile neutropenia. He does have a history of severe c-diff so he was sent home on oral Vancomycin as prophylaxis.     He denies a fever or chills today. He does have a history of Multiple Myeloma, is currently undergoing chemotherapy for this.         Objective        Physical Exam    The patient is well nourished and groomed.    Color is somewhat ashen gray.    Able to talk in full sentences without shortness of breath, equal chest rise and fall.    Dry cough noted.     Video-Visit Details    Type of service:  Video Visit    Video End Time (time video stopped): 3:22  Originating Location (pt. Location): Home    Distant Location (provider location):  [facility]     Platform used for Video Visit: [employer]

## 2021-06-15 NOTE — PROGRESS NOTES
[facility] Hematology and Oncology Progress Note    Patient: Theo Meyers  MRN: [id number]  Date of Service:         Reason for Visit    Myeloma    Assessment and Plan    1.  Myeloma: Patient continues on his current treatment of Velcade and dexamethasone every other week along with Revlimid.  His labs have overall been stable. We will continue current regimen. Return to see Dr. Clifford in one month.     2.  Swelling in left leg: I will get US. If there is acute clot, we will need to restart coumadin. Currently on aspirin due to bleeding and very elevated INR in hospital in December.     3. GI issues: getting an EUS on Friday and then a pill cam next week. Asked pt to have [facility] send us a copy of reports.     4. Pancytopenia: from revlimid and velcade. No complications at this time. Continue to monitor and take precautions.     5. Low magnesium: give 4g mag sulfate today, IV. Increase oral magnesium from daily to TID.     ECOG Performance   ECOG Performance Status: 0      Distress Assessment  Distress Assessment Score: 1    Pain  Currently in Pain: No/denies       Problem List    1. Multiple myeloma, remission status unspecified     2. Pancytopenia     3. Left leg swelling  US Venous Leg Left   4. Multiple myeloma in remission  DISCONTINUED: bortezomib 2.5 mg chemo (VELCADE)    DISCONTINUED: diphenhydrAMINE injection 50 mg (BENADRYL)    DISCONTINUED: famotidine 20 mg/2 mL injection 20 mg (PEPCID)    DISCONTINUED: hydrocortisone sod succ (PF) 100 mg/2 mL injection 100 mg    DISCONTINUED: acetaminophen tablet 1,000 mg (TYLENOL)      ______________________________________________________________________________    History of Present Illness    DIAGNOSIS:   1. IgG kappa light chain myeloma. Hyposecretory. Diagnosed 2009. No significant measurable M protein. 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 There was also on 11;14 translocation.   2. Deep vein thrombosis of the left leg diagnosed 09/2012 while on  treatment.       TREATMENT:   1. Most recently he has been on Velcade every 2 weeks since 09/2012. He is getting dexamethasone 20 mg every other week with the velcade. Finally, he is receiving Revlimid 20 mg daily for 3 weeks on, 1 week off.   2. Continues on warfarin      PAST TREATMENT and HISTORY:   He presented at diagnosis with a lytic lesion involving the C6 vertebral body, although no measurable M protein. IgG kappa monoclonal immunoglobulin was confirmed by [facility] as well as elevated kappa free light chains with an abnormal kappa lambda ratio. Bone marrow biopsy showed 30% involvement and cytogenetics confirmed deletion of 13 q. as well as 11;14 translocation. He was initially treated with Velcade and dexamethasone and achieved a good partial response. He was referred to the [facility] where he underwent high-dose chemotherapy with autologous peripheral stem cell transplant in April of 2010. He began Revlimid as maintenance therapy post transplant. Repeat bone marrow biopsy done October 2010 showed about 5% kappa restricted plasma cells and so at that time weekly Velcade was added along with his maintenance Revlimid and dexamethasone. He has done well since that time with stable minimal residual disease, best assessed by serum free light chains. He required dose reductions of weekly Velcade because of cytopenias and was ultimately switched to a q.2 week maintenance schedule which he has been on since September 2012. His Revlimid dose was reduced to 15 mg daily and he continues on dexamethasone 20 mg p.o. q. Week.       INTERIM HISTORY:  Pt is doing well. Was in the hospital in December and is feeling much better. No bleeding complications. No other new issues except feels like his left leg is a little swollen. No pain or redness.       Review of Systems    ROS: As above in the history, otherwise the remainder of the 10point ROS is negative and not contributory to current reason for visit.     Past  "History  Past Medical History:   Diagnosis Date     Anemia 12/13/2017     Arthritis      Bilateral inguinal hernia      DVT (deep venous thrombosis)     09/2012     Glaucoma      Multiple myeloma     Gets chemo infusions every 2 weeks     Peripheral neuropathy      Syncope      TMJ (temporomandibular joint disorder)        PHYSICAL EXAM:  /55  Pulse 62  Temp 97.8  F (36.6  C) (Oral)   Ht 5' 9\" (1.753 m)  Wt 170 lb 3.2 oz (77.2 kg)  SpO2 98%  BMI 25.13 kg/m2  GENERAL: no acute distress. Cooperative in conversation. Here alone  HEENT: pupils are equal, round and reactive. Oromucosa is clean and intact. No ulcerations or mucositis noted. No bleeding noted.  RESP: lungs are clear bilaterally per auscultation. Regular respiratory rate. No wheezes or rhonchi.  CV: Regular, rate and rhythm. No murmurs.  ABD: soft, nontender. Positive bowel sounds. No organomegaly.   MUSCULOSKELETAL: mild bilateral lower extremity swelling, left greater than right.   NEURO: non focal. Alert and oriented x3.   PSYCH: within normal limits. No depression or anxiety.  SKIN: warm dry intact   LYMPH: no cervical, supraclavicular lymphadenopathy        Lab Results    Recent Results (from the past 168 hour(s))   INR (for treatment plan use)   Result Value Ref Range    INR 1.02 0.90 - 1.10   Basic Metabolic Panel   Result Value Ref Range    Sodium 143 136 - 145 mmol/L    Potassium 3.7 3.5 - 5.0 mmol/L    Chloride 110 (H) 98 - 107 mmol/L    CO2 25 22 - 31 mmol/L    Anion Gap, Calculation 8 5 - 18 mmol/L    Glucose 104 70 - 125 mg/dL    Calcium 8.5 8.5 - 10.5 mg/dL    BUN 12 8 - 28 mg/dL    Creatinine 0.74 0.70 - 1.30 mg/dL    GFR MDRD Af Amer >60 >60 mL/min/1.73m2    GFR MDRD Non Af Amer >60 >60 mL/min/1.73m2   Magnesium   Result Value Ref Range    Magnesium 1.0 (LL) 1.8 - 2.6 mg/dL   HM1 (CBC with Diff)   Result Value Ref Range    WBC 2.1 (L) 4.0 - 11.0 thou/uL    RBC 2.83 (L) 4.40 - 6.20 mill/uL    Hemoglobin 9.2 (L) 14.0 - 18.0 g/dL    " Hematocrit 28.0 (L) 40.0 - 54.0 %    MCV 99 80 - 100 fL    MCH 32.5 27.0 - 34.0 pg    MCHC 32.9 32.0 - 36.0 g/dL    RDW 21.6 (H) 11.0 - 14.5 %    Platelets 130 (L) 140 - 440 thou/uL    MPV 11.0 8.5 - 12.5 fL    Neutrophils % 69 50 - 70 %    Lymphocytes % 15 (L) 20 - 40 %    Monocytes % 9 2 - 10 %    Eosinophils % 6 0 - 6 %    Basophils % 1 0 - 2 %    Neutrophils Absolute 1.5 (L) 2.0 - 7.7 thou/uL    Lymphocytes Absolute 0.3 (L) 0.8 - 4.4 thou/uL    Monocytes Absolute 0.2 0.0 - 0.9 thou/uL    Eosinophils Absolute 0.1 0.0 - 0.4 thou/uL    Basophils Absolute 0.0 0.0 - 0.2 thou/uL   Manual Differential   Result Value Ref Range    Platelet Estimate Decreased (!) Normal    Ovalocytes 1+ (!) Negative       Imaging    No results found.      Signed by: Alejandra Valdez, CNP

## 2021-06-15 NOTE — PROGRESS NOTES
[facility] Clinic Follow Up Note    Theo Meyers   72 y.o. male    Date of Visit: 1/18/2018    Chief Complaint   Patient presents with     Pre-op Exam     endoscopy with US to remove polyp, on 1/19, @ [facility], Dr Lucien Smith  Theo is here for a preop assessment prior to endoscopy with gastric polyp removal planned for tomorrow.    Patient had nausea and vomiting with melena in December.  He became weak from the anemia and fell the right ear laceration with additional bleeding.  His hemoglobin was 4.0 in the hospital, requiring multiple blood transfusions.    EGD showed a duodenal adenomatous polyp with no dysplasia, there was no obvious sign of bleeding at that time.  He showed no further evidence of GI bleeding.  Hemoglobin is stabilized at 9.2 yesterday.    He has multiple myeloma, status post autologous bone marrow transplant and extensive chemotherapy.  He has hypocellular bone marrow with significant multiple myeloma.  Currently getting Velcade, Revlimid and dexamethasone.    Patient has a past history of DVT in 2012.  Coumadin was discontinued last year because of bleeding.  He has had some chronic left lower extremity edema.  Yesterday ultrasound of the leg showed a chronic DVT on the left but no acute DVT.  No acute change in the leg swelling or new pain.    No increasing shortness of breath or chest pain.    He is fairly active and walks dog.  No chest pain or chest pressure.  No palpitations or history of arrhythmia or syncope.    He had some left jaw and neck pain after the fall in December, that has resolved.    Peripheral neuropathy from chemotherapy, stable.  No foot sores.    Mild BPH on Flomax, but stable and no dysuria.    Cardiac echo in 2015 with ejection fraction 55-60% with mild mitral regurgitation and mild aortic stenosis.    His kidney function was normal and creatinine 0.74 yesterday.    He has had hypomagnesemia since his chemotherapy, takes a magnesium  "supplement daily.  But yesterday his magnesium level is 1.0.    He was given 4 g of IV magnesium in clinic and told him increase his oral magnesium supplement 3 times a day.    His diarrhea, which have been chronic, is much better now on daily Metamucil.  Not using Imodium.    No flare of his sinusitis which was treated last month.  No cough.    Past history of glaucoma on drops.    No history of sleep apnea.    Colonoscopy August 2016 was negative.    PMHx:    Past Medical History:   Diagnosis Date     Anemia 12/13/2017     Arthritis      Bilateral inguinal hernia      DVT (deep venous thrombosis)     09/2012     Glaucoma      Multiple myeloma     Gets chemo infusions every 2 weeks     Peripheral neuropathy      Syncope      TMJ (temporomandibular joint disorder)      PSHx:    Past Surgical History:   Procedure Laterality Date     APPENDECTOMY      Age 16     CARPAL TUNNEL RELEASE Bilateral      CATARACT EXTRACTION Bilateral      ESOPHAGOGASTRODUODENOSCOPY N/A 12/14/2017    Procedure: ESOPHAGOGASTRODUODENOSCOPY (EGD) WITH BIOPSYS;  Surgeon: Marlon Roy MD;  Location: [facility];  Service:      PORTACATH PLACEMENT       VERTEBROPLASTY      T6     WISDOM TOOTH EXTRACTION       Immunizations:   Immunization History   Administered Date(s) Administered     Hep B, historic 03/01/2011, 05/19/2011     HiB, historic,unspecified 03/01/2011, 05/19/2011     IPV 03/01/2011, 05/19/2011     Influenza high dose, seasonal 10/07/2015, 09/21/2016, 09/20/2017     Influenza,seasonal quad, PF, 36+MOS 09/24/2014     Pneumo Conj 13-V (2010&after) 10/16/2015     Pneumo Polysac 23-V 05/19/2011     Tdap 03/01/2011       ROS A comprehensive review of systems was performed and was otherwise negative    Medications, allergies, and problem list were reviewed and updated    Exam  /74  Pulse 63  Ht 5' 9\" (1.753 m)  Wt 171 lb (77.6 kg)  SpO2 98%  BMI 25.25 kg/m2  Alert and oriented ×3.  Pupils and irises equal and reactive.  No " SHA GUTIERREZ    [id number]    History: 60y Male is status post right knee superficial I&D, POD # 4. Patient is doing well and is comfortable. The patient's pain is controlled using the prescribed pain medications. The patient is participating in physical therapy. He ambulates on his own frequently. Denies nausea, vomiting, chest pain, shortness of breath, abdominal pain or fever. No new complaints.          Culture - Surgical Swab (09.18.20 @ 02:14)    -  Ampicillin/Sulbactam: S <=8/4    -  Cefazolin: S <=4    -  Clindamycin: S 0.5    -  Erythromycin: S 0.5    -  Gentamicin: S <=1 Should not be used as monotherapy    -  Oxacillin: S 0.5    -  Penicillin: R >8    -  RIF- Rifampin: S <=1 Should not be used as monotherapy    -  Tetra/Doxy: S <=1    -  Trimethoprim/Sulfamethoxazole: S <=0.5/9.5    -  Vancomycin: S 2    Specimen Source: .Surgical Swab right knee superficial wound    Culture Results:   Numerous Staphylococcus aureus    Organism Identification: Staphylococcus aureus    Organism: Staphylococcus aureus    Method Type: JIM            MEDICATIONS  (STANDING):  acetaminophen   Tablet .. 975 milliGRAM(s) Oral every 8 hours  aspirin enteric coated 81 milliGRAM(s) Oral two times a day  ceFAZolin   IVPB 2000 milliGRAM(s) IV Intermittent every 8 hours  clopidogrel Tablet 75 milliGRAM(s) Oral daily  influenza   Vaccine 0.5 milliLiter(s) IntraMuscular once  lactated ringers. 1000 milliLiter(s) (100 mL/Hr) IV Continuous <Continuous>  pantoprazole    Tablet 40 milliGRAM(s) Oral before breakfast  polyethylene glycol 3350 17 Gram(s) Oral daily  senna 2 Tablet(s) Oral at bedtime    MEDICATIONS  (PRN):  aluminum hydroxide/magnesium hydroxide/simethicone Suspension 30 milliLiter(s) Oral four times a day PRN Indigestion  bisacodyl Suppository 10 milliGRAM(s) Rectal daily PRN If no bowel movement by postoperative day #2  HYDROmorphone   Tablet 4 milliGRAM(s) Oral every 3 hours PRN Severe Pain (7 - 10)  HYDROmorphone  Injectable 0.5 milliGRAM(s) IV Push every 3 hours PRN breakthrough pain  magnesium hydroxide Suspension 30 milliLiter(s) Oral daily PRN Constipation  ondansetron Injectable 4 milliGRAM(s) IV Push every 6 hours PRN Nausea and/or Vomiting  oxyCODONE    IR 5 milliGRAM(s) Oral every 3 hours PRN Mild Pain (1 - 3)  oxyCODONE    IR 10 milliGRAM(s) Oral every 3 hours PRN Moderate Pain (4 - 6)    Vital Signs Last 24 Hrs  T(C): 36.5 (21 Sep 2020 07:12), Max: 37.1 (20 Sep 2020 23:40)  T(F): 97.7 (21 Sep 2020 07:12), Max: 98.7 (20 Sep 2020 23:40)  HR: 60 (21 Sep 2020 07:12) (60 - 70)  BP: 147/98 (21 Sep 2020 07:12) (120/73 - 147/98)  BP(mean): --  RR: 19 (21 Sep 2020 07:12) (18 - 20)  SpO2: 99% (21 Sep 2020 07:12) (96% - 99%)  Lying in bed in NAD, awake and alert  Physical exam: Right lower extremity- The right knee prevena dressing is clean, dry and intact and functioing. No drainage or discharge in cannister. Prevena removed. Staples intact. No active dc noted. Small amount of bloody dc on prevena dsg. No erythema is noted. No blistering. No ecchymosis. The calf is supple. No calf tenderness. Sensation to light touch is grossly intact distally. Motor function distally is 5/5. Extensor hallucis longus and flexor hallucis longus are intact. No foot drop. 2+ dorsalis pedis pulse. Capillary refill is less than 2 seconds. No cyanosis.    Primary Orthopedic Assessment:  • s/p RIGHT knee superficial I&D, POD#4      Plan:   - Prevena vac changed  - Continue knee immobilizer RLE  • DVT prophylaxis as prescribed- Plavix/ASA, including use of compression devices and ankle pumps  • Continue physical therapy  • Weightbearing as tolerated of the right lower extremity with assistance of a walker  • Incentive spirometry encouraged  • Pain control as clinically indicated  - ID- IV abx  - Getting Midline  • Discharge planning – anticipated discharge is Home after midline and home abx arranged   jaundice.  Pharynx is widely patent without inflammation.  Teeth in good condition.  No thrush.  No JVD and no carotid bruits.  No cervical or supraclavicular adenopathy or neck mass.  Lungs are clear to auscultation with normal respiratory excursion.  Heart is regular with 1 out of 6 to 2 out of 6 systolic murmur right upper sternal border.  Abdomen is nonobese nontender no hepatosplenomegaly.  No epigastric tenderness.  No lower extremity edema on the right, trace on the left, which he states is chronic.  No calf tenderness.  Laceration behind the right ear is healed well.    ECG from December 13, 2017 shows normal sinus rhythm, normal EKG.    Assessment/Plan  1.  Duodenal polyp    Patient was given clearance to proceed with EGD tomorrow in the hospital.  He will be n.p.o. after 11 AM.  He does not need to take any medications on the morning of the procedure.      Plan EGD removal of the polyp tomorrow.  Patient had continued on aspirin because of his history of DVT.  Was told to stop the aspirin tomorrow.  I did discuss with him significant risk of bleeding.  He is also at higher risk for blood loss anemia because of poor bone marrow with his multiple myeloma and history of chemotherapy.    Patient accepts these risks and wishes to proceed as planned.  Patient was told to go to the emergency room immediately if significant GI bleeding occurs after this procedure.  Also told to seek medical attention immediately if increased weakness or lightheadedness.    I will have him restart the Prilosec 20 mg a day and take tonight.  - omeprazole (PRILOSEC) 20 MG capsule; Take 1 capsule (20 mg total) by mouth daily.  Dispense: 30 capsule; Refill: 3    No sign of recurrent GI bleeding after the episode last month.    2. Multiple myeloma, remission status unspecified  Follow-up in 1 month with hematology.  Continue with chemotherapy as current.    3. History of DVT (deep vein thrombosis)  Old DVT, yesterday ultrasound did not  show acute DVT.  Follow-up with me in 3 weeks, to discuss possibly going back on Coumadin.    4. Pancytopenia  White count 2.1, hemoglobin 9.2 and platelets 130 yesterday.    5. Hypomagnesemia  Recheck magnesium level today.  I would like to see the magnesium level 1.4 or greater prior to EGD.    - Magnesium    Increase magnesium supplement to 3 times a day.    Peripheral neuropathy with unsteady gait, no falls.    BPH, mild on Flomax.    Glaucoma, take eyedrops as usual.    Mild aortic stenosis, asymptomatic    Return in about 3 weeks (around 2/8/2018) for Recheck.   Patient Instructions   Restart omeprazole 20 mg a day, take tonight.    Do not take your aspirin tomorrow.  You do not need to take any medications the morning of the procedure.    Take your eyedrops as usual the morning of the procedure.    Restart aspirin day after procedure, but if you have significant bleeding or black stools or increasing abdominal pain, seek medical attention immediately.    Follow-up with me in approximately 3 weeks.    Increase magnesium supplement to 3 times a day.    Mathew Ott MD  Total time with patient over 25 minutes and over 50% coord care.  Time all face to face.      Current Outpatient Prescriptions   Medication Sig Dispense Refill     acyclovir (ZOVIRAX) 400 MG tablet Take 400 mg by mouth 2 (two) times a day.       ascorbic acid (VITAMIN C) 1000 MG tablet Take 1,000 mg by mouth daily.        aspirin 81 MG EC tablet Take 2 tablets (162 mg total) by mouth daily.  0     bimatoprost (LUMIGAN) 0.01 % Drop Administer 1 drop to both eyes at bedtime.       brimonidine (ALPHAGAN P) 0.1 % Drop Apply 1 drop to eye 3 (three) times a day.        calcium, as carbonate, (TUMS) 200 mg calcium (500 mg) chewable tablet Chew 1 tablet 3 (three) times a day as needed.        calcium-vitamin D 500 mg(1,250mg) -200 unit per tablet Take 1 tablet by mouth daily.       cetirizine (ZYRTEC) 10 MG tablet Take 10 mg by mouth daily.        dexamethasone (DECADRON) 4 MG tablet Take 20 mg by mouth every 14 (fourteen) days.       gabapentin (NEURONTIN) 300 MG capsule Take 300 mg by mouth daily.        guaiFENesin-dextromethorphan (MUCINEX DM) 600-30 mg Tb12 Take 1 tablet by mouth 2 (two) times a day as needed.        lenalidomide (REVLIMID) 20 mg cap Take 20 mg by mouth daily.       loperamide (IMODIUM) 2 mg capsule Take 2 mg by mouth 4 (four) times a day as needed for diarrhea.       magnesium 30 mg tablet Take 1 tablet (30 mg total) by mouth 2 (two) times a day. 60 tablet 5     multivitamin (MULTIVITAMIN) per tablet Take 1 tablet by mouth daily.       omega 3-dha-epa-fish oil 900-1,400 mg CpDR Take 1 tablet by mouth daily.       omeprazole (PRILOSEC) 20 MG capsule Take 1 capsule (20 mg total) by mouth 2 (two) times a day before meals. 60 capsule 0     ondansetron (ZOFRAN-ODT) 8 MG disintegrating tablet Take 8 mg by mouth every 8 (eight) hours as needed for nausea.       potassium chloride SA (K-DUR,KLOR-CON) 20 MEQ tablet Take 20 mEq by mouth daily.       psyllium (METAMUCIL) powder Take 1 packet by mouth daily as needed.        sodium chloride (OCEAN) 0.65 % nasal spray 2 sprays into each nostril as needed for congestion. 15 mL 12     tamsulosin (FLOMAX) 0.4 mg Cp24 TAKE ONE CAPSULE BY MOUTH DAILY 90 capsule 3     omeprazole (PRILOSEC) 20 MG capsule Take 1 capsule (20 mg total) by mouth daily. 30 capsule 3     No current facility-administered medications for this visit.      Facility-Administered Medications Ordered in Other Visits   Medication Dose Route Frequency Provider Last Rate Last Dose     heparin 100 unit/mL lockflush (PF) porcine 300-600 Units  300-600 Units Intravenous PRN Per Clifford MD   600 Units at 11/19/14 1013     Allergies   Allergen Reactions     Pseudoephedrine-Guaifenesin Unknown     TB12     Social History   Substance Use Topics     Smoking status: Former Smoker     Quit date: 11/16/1975     Smokeless tobacco: Never Used      Alcohol use 0.6 oz/week     1 Glasses of wine per week

## 2021-06-15 NOTE — ANESTHESIA POSTPROCEDURE EVALUATION
[facility]  ENDOSCOPIC ULTRAS[facility]  ESOPHAGOGASTRODUODENOSCOPY WITH DUODENAL POLYP REMOVAL  Anesthesia type: MAC    Patient location: Phase II Recovery  Last vitals:   Vitals:    01/19/18 1200   BP: 137/76   Pulse: (!) 52   Resp: 12   Temp:    SpO2: 97%     Post vital signs: stable  Level of consciousness: awake and responds to simple questions  Post-anesthesia pain: pain controlled  Post-anesthesia nausea and vomiting: no  Pulmonary: unassisted, return to baseline  Cardiovascular: stable and blood pressure at baseline  Hydration: adequate  Anesthetic events: no    QCDR Measures:  ASA# 11 - Daisy-op Cardiac Arrest: ASA11B - Patient did NOT experience unanticipated cardiac arrest  ASA# 12 - Daisy-op Mortality Rate: ASA12B - Patient did NOT die  ASA# 13 - PACU Re-Intubation Rate: ASA13B - Patient did NOT require a new airway mgmt  ASA# 10 - Composite Anes Safety: ASA10A - No serious adverse event    Additional Notes:

## 2021-06-16 PROBLEM — R50.81 NEUTROPENIC FEVER (H): Status: ACTIVE | Noted: 2020-12-29

## 2021-06-16 PROBLEM — D70.9 NEUTROPENIC FEVER (H): Status: ACTIVE | Noted: 2020-12-29

## 2021-06-16 PROBLEM — I82.502 CHRONIC DEEP VEIN THROMBOSIS (DVT) OF LEFT LOWER EXTREMITY, UNSPECIFIED VEIN (H): Status: ACTIVE | Noted: 2018-03-28

## 2021-06-16 PROBLEM — Z86.718 HISTORY OF DVT (DEEP VEIN THROMBOSIS): Status: ACTIVE | Noted: 2017-12-28

## 2021-06-16 PROBLEM — D61.818 PANCYTOPENIA (H): Status: ACTIVE | Noted: 2017-12-13

## 2021-06-16 PROBLEM — D51.9 ANEMIA, VITAMIN B12 DEFICIENCY: Status: ACTIVE | Noted: 2018-08-01

## 2021-06-16 PROBLEM — I48.0 PAROXYSMAL ATRIAL FIBRILLATION (H): Status: ACTIVE | Noted: 2019-06-25

## 2021-06-16 PROBLEM — I35.0 NONRHEUMATIC AORTIC VALVE STENOSIS: Status: ACTIVE | Noted: 2019-06-25

## 2021-06-16 PROBLEM — M48.061 SPINAL STENOSIS OF LUMBAR REGION WITHOUT NEUROGENIC CLAUDICATION: Status: ACTIVE | Noted: 2018-07-25

## 2021-06-16 NOTE — PROGRESS NOTES
Pt arrived ambulatory to clinic for Cycle # 7 Day # 1 of his chemotherapy regimen.  Port was accessed using aseptic technique without difficulties with excellent blood return.  Administered premedications, subcutaneous B-12 injection, and Daratumamab per MD order.  Pt tolerate infusion well, no s/s of infusion reaction.  Port was flushed with NS and Heparin then de-accessed using 2x2 and papertape.  Pt verbalized understanding of plan of care and return to clinic.

## 2021-06-16 NOTE — PROGRESS NOTES
[facility] Clinic Follow Up Note    Theo Meyers   73 y.o. male    Date of Visit: 3/28/2018    Chief Complaint   Patient presents with     Follow-up     INR, magox 400mg.     Subjective  Theo is here for a checkup prior to a planned EGD to follow-up after a gastric polyp was removed in January.  He has his repeat EGD scheduled for May 1.  They told him they would not planning a biopsy, and he was unsure if he needed to stop his Coumadin or not.    In January 2018 he had recurrent left leg DVT.  It was described as a chronic DVT without acute DVT.  It was extending into the popliteal fossa.  Repeat ultrasound in February 2018 was a stable chronic DVT of the left peroneal vein extending to the popliteal vein.    Patient had a DVT back in 2012 on the left leg during chemotherapy.    Patient still on chemotherapy for his multiple myeloma.    Patient did have a major GI bleed in January with severe low hemoglobin.  He has not had any recurrent GI bleeding or blood in stool or other bleeding issues.  He does not feel more short of breath or lightheaded or more fatigue.    No new chest pain or increasing shortness of breath.  No epigastric pain or problems swallowing.  No melena.    Last hemoglobin on February 14 was stable at 9.5.    Patient has a Port-A-Cath and gets labs drawn hematology, he was told it would be drawn him next week.  He still gets regular infusions.  On Revlimid, Velcade and dexamethasone for his multiple myeloma.  He saw Dr. Clifford on March 14, no evidence of progression and continued on same treatment plan.    No fevers or night sweats or new adenopathy.    He has had chronic low magnesium, now started on magnesium oxide supplement 400 mg twice daily.  No diarrhea.  Last magnesium was 1.4 on February 14.    Urinating okay with the BPH, on Flomax.  No dysuria or fever.    PMHx:    Past Medical History:   Diagnosis Date     Anemia 12/13/2017     Arthritis      Bilateral inguinal  "hernia      Glaucoma      History of blood clots     DVT - left chronic     Multiple myeloma     Gets chemo infusions every 2 weeks     Pancytopenia      Peripheral neuropathy      Syncope      TMJ (temporomandibular joint disorder)      PSHx:    Past Surgical History:   Procedure Laterality Date     APPENDECTOMY      Age 16     CARPAL TUNNEL RELEASE Bilateral      ESOPHAGOGASTRODUODENOSCOPY N/A 12/14/2017    Procedure: ESOPHAGOGASTRODUODENOSCOPY (EGD) WITH BIOPSYS;  Surgeon: Marlon Roy MD;  Location: [facility];  Service:      ESOPHAGOGASTRODUODENOSCOPY N/A 1/19/2018    Procedure: ENDOSCOPIC ULTRASOUND  ESOPHAGOGASTRODUODENOSCOPY WITH DUODENAL POLYP REMOVAL;  Surgeon: Familia Mcgraw MD;  Location: [facility] OR;  Service:      EYE SURGERY      Cataract     PORTACATH PLACEMENT       VERTEBROPLASTY      T6     WISDOM TOOTH EXTRACTION       Immunizations:   Immunization History   Administered Date(s) Administered     Hep B, historic 03/01/2011, 05/19/2011     HiB, historic,unspecified 03/01/2011, 05/19/2011     IPV 03/01/2011, 05/19/2011     Influenza high dose, seasonal 10/07/2015, 09/21/2016, 09/20/2017     Influenza,seasonal quad, PF, 36+MOS 09/24/2014     Pneumo Conj 13-V (2010&after) 10/16/2015     Pneumo Polysac 23-V 05/19/2011     Tdap 03/01/2011       ROS A comprehensive review of systems was performed and was otherwise negative    Medications, allergies, and problem list were reviewed and updated    Exam  /68  Pulse 63  Ht 5' 9\" (1.753 m)  Wt 166 lb (75.3 kg)  SpO2 99%  BMI 24.51 kg/m2  Patient appears well.  No neurologic changes.  Lungs are clear.  Trace ankle edema stable.  No calf tenderness.  Heart is regular without murmur.  Abdomen is nontender.  No jaundice.    Assessment/Plan  1. Chronic deep vein thrombosis (DVT) of left lower extremity, unspecified vein  Plan for long-term Coumadin.  If significant recurring bleeding issues, may need to reevaluate, as it did appear to be a " chronic calf vein thrombosis.,  But patient does have continued risk factors with ongoing chemotherapy.  He is ambulatory.    INR check today.    2. Multiple myeloma not having achieved remission  Chemotherapy per hematology.  Will have labs drawn next week with hematology office.  Hypomagnesemia will be managed by hematology, lab draw planned next week.      3. Anemia, unspecified type  Blood counts to be checked next week and hematology.    History of tubulovillous adenoma of the stomach that was removed in January.  Suspected at least partial source of the anemia.    Plan repeat EGD on May 1.  Patient states he does not need to do a preop for that.  He will contact gastroenterology about his Coumadin.  If he does need to stop that, directions as below.    Cough, that was suggestive of viral URI is now resolved.  Lungs clear.    Glaucoma controlled with drops.    Return in about 4 months (around 7/28/2018) for Recheck.   Patient Instructions   INR today.    Discuss stopping the warfarin with gastroenterology.  If they need you to stop your warfarin for the stomach endoscopy, stop your warfarin 3 days before the endoscopy and restart warfarin the night of your endoscopy if no bleeding issues.  Restart the warfarin your usual dose.    Routine follow-up with me in approximately 4 months.    Hematology will be checking lab work next week.    Mathew Ott MD      Current Outpatient Prescriptions   Medication Sig Dispense Refill     acetaminophen (TYLENOL) 500 MG tablet Take 500 mg by mouth every 6 (six) hours as needed for pain.       acyclovir (ZOVIRAX) 400 MG tablet Take 400 mg by mouth 2 (two) times a day.       ascorbic acid (VITAMIN C) 1000 MG tablet Take 2,000 mg by mouth daily.        aspirin 81 MG EC tablet Take 2 tablets (162 mg total) by mouth daily.  0     bimatoprost (LUMIGAN) 0.01 % Drop Administer 1 drop to both eyes at bedtime.       brimonidine (ALPHAGAN P) 0.1 % Drop Administer 1 drop to both eyes 3  (three) times a day.        calcium, as carbonate, (TUMS) 200 mg calcium (500 mg) chewable tablet Chew 1 tablet 3 (three) times a day as needed.        calcium-vitamin D 500 mg(1,250mg) -200 unit per tablet Take 1 tablet by mouth daily.       cetirizine (ZYRTEC) 10 MG tablet Take 10 mg by mouth daily.       dexamethasone (DECADRON) 4 MG tablet Take 20 mg by mouth every 14 (fourteen) days.       gabapentin (NEURONTIN) 300 MG capsule Take 300 mg by mouth daily.        guaiFENesin-dextromethorphan (MUCINEX DM) 600-30 mg Tb12 Take 1 tablet by mouth 2 (two) times a day as needed.        lenalidomide (REVLIMID) 20 mg cap Take 20 mg by mouth every evening.        loperamide (IMODIUM) 2 mg capsule Take 2 mg by mouth 4 (four) times a day as needed for diarrhea.       magnesium oxide (MAGOX) 400 mg tablet Take 1 tablet (400 mg total) by mouth 2 (two) times a day. 60 tablet 3     multivitamin (MULTIVITAMIN) per tablet Take 1 tablet by mouth daily.       omega 3-dha-epa-fish oil 900-1,400 mg CpDR Take 1 tablet by mouth daily.       omeprazole (PRILOSEC) 20 MG capsule Take 1 capsule (20 mg total) by mouth daily. 30 capsule 3     ondansetron (ZOFRAN-ODT) 8 MG disintegrating tablet Take 8 mg by mouth every 8 (eight) hours as needed for nausea.       potassium chloride SA (K-DUR,KLOR-CON) 20 MEQ tablet Take 20 mEq by mouth daily.       psyllium (METAMUCIL) powder Take 2 packets by mouth daily. Takes 2 tablespoonsful       sodium chloride (OCEAN) 0.65 % nasal spray 2 sprays into each nostril as needed for congestion. 15 mL 12     tamsulosin (FLOMAX) 0.4 mg Cp24 Take 0.4 mg by mouth every evening.       warfarin (COUMADIN) 5 MG tablet Take 1 tablet (5 mg total) by mouth daily. 30 tablet 1     No current facility-administered medications for this visit.      Facility-Administered Medications Ordered in Other Visits   Medication Dose Route Frequency Provider Last Rate Last Dose     heparin 100 unit/mL lockflush (PF) porcine 300-600  Units  300-600 Units Intravenous PRN Per Clifford MD   600 Units at 11/19/14 1013     Allergies   Allergen Reactions     Pseudoephedrine-Guaifenesin Unknown     TB12     Social History   Substance Use Topics     Smoking status: Former Smoker     Quit date: 11/16/1975     Smokeless tobacco: Never Used     Alcohol use 0.6 oz/week     1 Glasses of wine per week

## 2021-06-16 NOTE — PROGRESS NOTES
"Pt ambulates to infusion center for treatment.  Pt states that he's \"getting over a cold\" which he saw his primary MD for \"earlier this week\" and was started on zpack.  Pt voices no other new concerns today.  Velcade given SQ as ordered without complication.  Pt left clinic stable to lobby.  Plan RTC as scheduled.  "

## 2021-06-16 NOTE — PROGRESS NOTES
Pt ambulates to infusion center for labs and treatment.  IVAD accessed, labs drawn et sent w/results noted.  Velcade given SQ as ordered.  IVAD flushed w/NS et heparin and needle deaccessed.  Pt left clinic stable to [facility].  Plan RTC as scheduled.

## 2021-06-16 NOTE — PROGRESS NOTES
Pt ambulates to infusion center for treatment following MD visit.  Pt seen by Dr. Clifford and orders approved.  Velcade given SQ as ordered without complication.  Pt left clinic stable to [facility].  Plan RTC as scheduled.

## 2021-06-16 NOTE — PROGRESS NOTES
[facility] Clinic Follow Up Note    Theo Meyers   72 y.o. male    Date of Visit: 2/19/2018    Chief Complaint   Patient presents with     Follow-up     INR     Subjective  Theo is here for follow-up on initiation of Coumadin for recurrent left leg DVT.    He also new complaint of cough with some nausea and vomiting 2 days ago which is now resolved.  His grandson is sick with a similar illness.  He had one day of nausea and vomiting, now has some mild nasal congestion and nonproductive cough.  No fever or shortness of breath.  No abdominal pain.  His bowels returned to normal.  He is eating normal food now.  No significant sore throat or sinus pain.  No fever.    Patient had left leg DVT back in 2012 when getting chemotherapy.    He has been treated for multiple myeloma for many years including a bone marrow transplant 2010.  Still getting chemotherapy and Revlimid and Velcade.    Patient had increasing left lower extremity edema and foot swelling.  Ultrasound last week did show a stable old partial DVT clot.  No acute clot.  He did want to go back on Coumadin, see discussion last week of bleeding risk versus blood clot risk.  Patient did chose to go back on Coumadin.  He is no longer on the aspirin.    His lower extremity edema is improved in the left leg, almost resolved.  He is ambulating normally.  No increasing shortness of breath or chest pain.    No bleeding issues.    Patient had been on Coumadin for some time since the 2012 blood clot but had a GI bleed in December of last year complicated by a fall with a laceration behind the right ear and hemoglobin as low as 4.0 requiring multiple transfusions.    He has pancytopenia secondary to his cancer and treatment for that.  His hemoglobin is stable at 9.5 when checked on February 14.  White count 2.5 and platelets 107.    Patient denies epigastric pain.  He did have a tubulovillous adenoma removed from his stomach last month.  They are  planning a 3 month follow-up EGD on that.  Still on Prilosec.  His colonoscopy August 2016 was negative.    He was having some intermittent stools on and off for the past few years, since chemotherapy.  He has been on Metamucil regularly and that has improved, just soft stools, regular without full diarrhea.  Except for 2 days ago for 1 day.    BPH is still mild with Flomax treatment.    Past history of hypomagnesemia associate with previous chemotherapy.  His magnesium level is 1.4 in February 14.  He did not get an magnesium infusion at that time.  He continues on his Slow-Mag oral supplement 3-4 times a day.    No palpitations or tachycardia.  No history of arrhythmias.    Glaucoma controlled on drops.  Her graph February 14 his creatinine was 0.78, potassium 4.0 normal AST ALT.    2015 cardiac echo with ejection fraction 55-60% and mild aortic stenosis.    PMHx:    Past Medical History:   Diagnosis Date     Anemia 12/13/2017     Arthritis      Bilateral inguinal hernia      Glaucoma      History of blood clots     DVT - left chronic     Multiple myeloma     Gets chemo infusions every 2 weeks     Pancytopenia      Peripheral neuropathy      Syncope      TMJ (temporomandibular joint disorder)      PSHx:    Past Surgical History:   Procedure Laterality Date     APPENDECTOMY      Age 16     CARPAL TUNNEL RELEASE Bilateral      ESOPHAGOGASTRODUODENOSCOPY N/A 12/14/2017    Procedure: ESOPHAGOGASTRODUODENOSCOPY (EGD) WITH BIOPSYS;  Surgeon: Marlon Roy MD;  Location: [facility];  Service:      ESOPHAGOGASTRODUODENOSCOPY N/A 1/19/2018    Procedure: ENDOSCOPIC ULTRASOUND  ESOPHAGOGASTRODUODENOSCOPY WITH DUODENAL POLYP REMOVAL;  Surgeon: Familia Mcgraw MD;  Location: [facility] OR;  Service:      EYE SURGERY      Cataract     PORTACATH PLACEMENT       VERTEBROPLASTY      T6     WISDOM TOOTH EXTRACTION       Immunizations:   Immunization History   Administered Date(s) Administered     Hep B, historic  "03/01/2011, 05/19/2011     HiB, historic,unspecified 03/01/2011, 05/19/2011     IPV 03/01/2011, 05/19/2011     Influenza high dose, seasonal 10/07/2015, 09/21/2016, 09/20/2017     Influenza,seasonal quad, PF, 36+MOS 09/24/2014     Pneumo Conj 13-V (2010&after) 10/16/2015     Pneumo Polysac 23-V 05/19/2011     Tdap 03/01/2011       ROS A comprehensive review of systems was performed and was otherwise negative    Medications, allergies, and problem list were reviewed and updated    Exam  /78  Pulse 79  Temp 98.3  F (36.8  C)  Ht 5' 9\" (1.753 m)  Wt 167 lb (75.8 kg)  SpO2 98%  BMI 24.66 kg/m2  Alert and oriented ×3.  No jaundice.  Very minimal nonexudative pharyngitis diffusely, not specifically postnasal drip.  No cervical adenopathy.  Lungs are clear to auscultation with good respiratory excursion.  There is just trace ankle edema the left lower extremity which is improved.  None on the right.  No calf tenderness.  Abdomen is nontender no hepatomegaly.  No epigastric tenderness.    Assessment/Plan  1. DVT (deep venous thrombosis)  Upper extremity edema improved.  Patient did wish to resume Coumadin, see previous discussion of bleeding risk.    Goal INR 2-3.  Plan long-term Coumadin.  Patient told to recheck an INR in 1 week, or sooner if needed.  - INR  - Ambulatory referral to Anticoagulation Monitoring    2. Pancytopenia with his multiple myeloma    Management per hematology  From his cancer and previous cancer treatment.  Hemoglobin is stabilized.  Follow closely with hematology.    History of tubulovillous adenoma of the stomach.  Continue Prilosec.  Follow-up EGD in April.  We will need to come off the Coumadin temporarily for that scope.  I will see him within 2 weeks before that scope.    3. Hypomagnesemia  This will be followed through his hematologist office as they can draw through his Port-A-Cath and give him IV magnesium infusions if needed.  He will get his labs checked again on February 28.  " Continue the oral slow magnesium    4. Cough  Consistent with mild viral URI, currently improving.  Patient states she should have mild symptoms did improve over this next week.  He will call if any worsening symptoms.    Borderline loose stools, controlled with Metamucil and stable.    Glaucoma controlled with drops    Return in about 5 weeks (around 3/26/2018) for Recheck.   Patient Instructions   INR check today.  You may get a call to adjust Coumadin.  Recheck INR in 1 week, or possibly sooner.    See me in approximately 5 weeks, after you see hematology in March, but before your follow-up stomach scope for the adenoma.    Discussed your low magnesium treatment with your hematologist at next visit.    Mathew Ott MD      Current Outpatient Prescriptions   Medication Sig Dispense Refill     acetaminophen (TYLENOL) 500 MG tablet Take 500 mg by mouth every 6 (six) hours as needed for pain.       ascorbic acid (VITAMIN C) 1000 MG tablet Take 2,000 mg by mouth daily.        aspirin 81 MG EC tablet Take 2 tablets (162 mg total) by mouth daily.  0     bimatoprost (LUMIGAN) 0.01 % Drop Administer 1 drop to both eyes at bedtime.       brimonidine (ALPHAGAN P) 0.1 % Drop Administer 1 drop to both eyes 3 (three) times a day.        calcium, as carbonate, (TUMS) 200 mg calcium (500 mg) chewable tablet Chew 1 tablet 3 (three) times a day as needed.        calcium-vitamin D 500 mg(1,250mg) -200 unit per tablet Take 1 tablet by mouth daily.       cetirizine (ZYRTEC) 10 MG tablet Take 10 mg by mouth daily.       dexamethasone (DECADRON) 4 MG tablet Take 20 mg by mouth every 14 (fourteen) days.       gabapentin (NEURONTIN) 300 MG capsule Take 300 mg by mouth daily.        lenalidomide (REVLIMID) 20 mg cap Take 20 mg by mouth every evening.        loperamide (IMODIUM) 2 mg capsule Take 2 mg by mouth 4 (four) times a day as needed for diarrhea.       MAGNESIUM ORAL Take 1 tablet by mouth 3 (three) times a day.        multivitamin (MULTIVITAMIN) per tablet Take 1 tablet by mouth daily.       omega 3-dha-epa-fish oil 900-1,400 mg CpDR Take 1 tablet by mouth daily.       omeprazole (PRILOSEC) 20 MG capsule Take 1 capsule (20 mg total) by mouth daily. 30 capsule 3     ondansetron (ZOFRAN-ODT) 8 MG disintegrating tablet Take 8 mg by mouth every 8 (eight) hours as needed for nausea.       potassium chloride SA (K-DUR,KLOR-CON) 20 MEQ tablet Take 20 mEq by mouth daily.       psyllium (METAMUCIL) powder Take 2 packets by mouth daily. Takes 2 tablespoonsful       sodium chloride (OCEAN) 0.65 % nasal spray 2 sprays into each nostril as needed for congestion. 15 mL 12     tamsulosin (FLOMAX) 0.4 mg Cp24 Take 0.4 mg by mouth every evening.       warfarin (COUMADIN) 5 MG tablet Take 1 tablet (5 mg total) by mouth daily. 30 tablet 1     acyclovir (ZOVIRAX) 400 MG tablet Take 400 mg by mouth 2 (two) times a day.       guaiFENesin-dextromethorphan (MUCINEX DM) 600-30 mg Tb12 Take 1 tablet by mouth 2 (two) times a day as needed.        No current facility-administered medications for this visit.      Facility-Administered Medications Ordered in Other Visits   Medication Dose Route Frequency Provider Last Rate Last Dose     heparin 100 unit/mL lockflush (PF) porcine 300-600 Units  300-600 Units Intravenous PRN Per Clifford MD   600 Units at 11/19/14 1013     Allergies   Allergen Reactions     Pseudoephedrine-Guaifenesin Unknown     TB12     Social History   Substance Use Topics     Smoking status: Former Smoker     Quit date: 11/16/1975     Smokeless tobacco: Never Used     Alcohol use 0.6 oz/week     1 Glasses of wine per week

## 2021-06-16 NOTE — PROGRESS NOTES
Pt ambulates to infusion center for labs and treatment.  Pt states that he has had some increased fatigue since he started an increased dose of pomalyst.  He otherwise voices no new concerns today.  IVAD accessed, labs drawn et sent w/results noted.  Treatment administered as ordered without difficulty.  IVAD flushed w/NS et heparin and needle deaccessed.  Pt left clinic stable to [facility].  Plan RTC as scheduled.

## 2021-06-16 NOTE — TELEPHONE ENCOUNTER
Oral Chemotherapy Monitoring Program    Oral Chemotherapy Monitoring    Date: 4/14/21  Primary Oncologist: Per Clifford MD  Primary Oncology Clinic:  MEDICAL [facility]COLOGY  Cancer Diagnosis: Multiple myeloma not having achieved remission (H)  Regimen: DPD  Drug Name: pomalidomide  Current Dosage: 3 mg   Current Frequency: daily  Cycle Details: d1-21 out of 28 day cycle  Encounter: Mid-Cycle Assessment   New Start:   until disease progression or unacceptable toxcities   Intervention: See in clinic      Who was educated?: Patient       Mid-Cycle Assessment:  Start Date of Last Cycle: 3/31/21   Planned next cycle start date: 4/28/21   Unplanned doses missed in last 2 weeks: 0   Adherence assessment : Adherent   Drug Interaction Assessment: No new medications reported   Adverse Effects reported: 1   Adverse Events and Interventions: Fatigue (grade 0/1): Not as much as energy of course, but overall doing okay he said.   Next pharmacist intervention date: 5/26/21   Intervention: R           Subjective/Objective:  Theo Meyers is a 76 y.o. male called by phone for a follow-up visit for oral chemotherapy.  Keith said he is doing well. He has one more week of pomalidomide then off for a week. His main question was surrounding financial coverage of pomalidomide, and I sent [employer] a message regarding calling him tomorrow to discuss.     No flowsheet data found.    Assessment/Plan:  Keith is tolerating therapy and will continue. We will get a refill sent in to [facility] as well.    Follow-Up:  Will review next appt at the end of May and then plan to call quarterly. Keith was okay with this, and he can call us if he needs us before then of course.    Lupe Garcia, PharmD  Oral Chemotherapy Pharmacist  [phone]        Oral Chemotherapy Monitoring Program   Left Voicemail    Attempted to contact patient today for follow up regarding oral chemotherapy, pomalidomide, for follow-up assessment. No answer. Left voicemail  for patient to call back to clinic if they have any concerns. No medication name was left.    Lupe Garcia, PharmD  Oral Chemotherapy Pharmacist  [phone]

## 2021-06-16 NOTE — PROGRESS NOTES
[facility] Hematology and Oncology Progress Note    Patient: Theo Meyres  MRN: [id number]  Date of Service: 04/01/2021        Reason for Visit    Chief Complaint   Patient presents with     HE Cancer     Multiple myeloma in relapse       Assessment and Plan    1.  Myeloma: pt continues on daratumumab and dexamethasone. This regimen was initiated in October and he appears to be responding well. Light chains have plateaued, but ratio continues to improve. He is doing well. He has received daratumumab weekly for 8 weeks. Started every other week dosing in December. Now getting it every 4 weeks. He will return in see provider in 4 weeks. After that, we can probably see him every other cycle. We did increase pomalyst to 3mg. Will continue that for now. Takes for 21 days and then off for 7 days.     2. DVT: This is recurrent.  Will be on xarelto indefinitely.     3. Anemia: likely from treatment and myeloma. No complications. Will continue to monitor.       ECOG Performance   ECOG Performance Status: 0     Distress Assessment  Distress Assessment Score: No distress    Pain  Currently in Pain: No/denies      Problem List    1. Multiple myeloma in remission (H)  CC OFFICE VISIT LONG      ______________________________________________________________________________    History of Present Illness    DIAGNOSIS:   1. IgG kappa light chain myeloma. Hyposecretory. Diagnosed 2009. No significant measurable M protein. 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 There was also on 11;14 translocation.   2. Deep vein thrombosis of the left leg diagnosed 09/2012 while on treatment.       TREATMENT:   Started Daratumumab in October 2020. Dexamethasone weekly as well.  Today is cycle 7. Pomalyst added with cycle 3.         PAST TREATMENT and HISTORY:   Most recently he has been on Velcade every 2 weeks since 09/2012. He is getting dexamethasone 20 mg every other week with the velcade. Finally, he is receiving Revlimid 20 mg  daily for 3 weeks on, 1 week off.     He presented at diagnosis with a lytic lesion involving the C6 vertebral body, although no measurable M protein. IgG kappa monoclonal immunoglobulin was confirmed by [facility] as well as elevated kappa free light chains with an abnormal kappa lambda ratio. Bone marrow biopsy showed 30% involvement and cytogenetics confirmed deletion of 13 q. as well as 11;14 translocation. He was initially treated with Velcade and dexamethasone and achieved a good partial response. He was referred to the [facility] where he underwent high-dose chemotherapy with autologous peripheral stem cell transplant in April of 2010. He began Revlimid as maintenance therapy post transplant. Repeat bone marrow biopsy done October 2010 showed about 5% kappa restricted plasma cells and so at that time weekly Velcade was added along with his maintenance Revlimid and dexamethasone. He has done well since that time with stable minimal residual disease, best assessed by serum free light chains. He required dose reductions of weekly Velcade because of cytopenias and was ultimately switched to a q.2 week maintenance schedule which he has been on since September 2012. His Revlimid dose is 20 mg daily and he continues on dexamethasone 20 mg p.o. q. Week.       INTERIM HISTORY:    Pt is here today for follow up. He is doing pretty well.  No new issues and is feeling pretty good. Fully vaccinated.     Pain Status  Currently in Pain: No/denies    Review of Systems    Constitutional  Constitutional (WDL): All constitutional elements are within defined limits  Neurosensory  Neurosensory (WDL): Exceptions to WDL  Peripheral Motor Neuropathy: Concerns(stable)  Peripheral Sensory Neuropathy: Concerns(stable)  Eye   Eye Disorder (WDL): Exceptions to WDL  Blurred Vision: Concerns(strain with electronics)  Ear  Ear Disorder (WDL): Exceptions to WDL  Tinnitus: Concerns(bilateral)  Cardiovascular  Cardiovascular (WDL): All  "cardiovascular elements are within defined limits  Pulmonary  Respiratory (WDL): Within Defined Limits  Gastrointestinal  Gastrointestinal (WDL): Exceptions to WDL  Constipation: Concerns(using Metamucil daily)  Genitourinary  Genitourinary (WDL): All genitourinary elements are within defined limits  Lymphatic  Lymph (WDL): All lymph disorder elements are within defined limits  Musculoskeletal and Connective Tissue  Musculoskeletal and Connetive Tissue Disorders (WDL): Exceptions to WDL  Arthralgia: Concerns(right shoulder)  Myalgia: Concerns(right deltoid)  Integumentary  Integumentary (WDL): Exceptions to WDL(recent biopsies from dermatology)  Patient Coping  Patient Coping: Accepting;Open/discussion  Accompanied by  Accompanied by: Alone  Oral Chemo Adherence               Past History  Past Medical History:   Diagnosis Date     Anemia 12/13/2017     Arthritis      Bilateral inguinal hernia      Chest tube in place      Glaucoma      Glaucoma      History of blood clots     DVT - left chronic     Multiple myeloma (H)     Gets chemo infusions every 2 weeks     Pancytopenia (H)      Parapneumonic effusion      Peripheral neuropathy      Syncope      TMJ (temporomandibular joint disorder)        PHYSICAL EXAM  /61   Pulse 67   Temp 97.4  F (36.3  C) (Oral)   Ht 5' 9\" (1.753 m)   Wt 164 lb 4.8 oz (74.5 kg)   SpO2 100%   BMI 24.26 kg/m      GENERAL: no acute distress. Cooperative in conversation. Here alone due to visitor restrictions. Mask on  RESP: Regular respiratory rate. No expiratory wheezes   MUSCULOSKELETAL: no bilateral leg swelling  NEURO: non focal. Alert and oriented x3.   PSYCH: within normal limits. No depression or anxiety.  SKIN: exposed skin is dry intact.     Lab Results    Recent Results (from the past 48 hour(s))   Comprehensive Metabolic Panel   Result Value Ref Range    Sodium 142 136 - 145 mmol/L    Potassium 4.4 3.5 - 5.0 mmol/L    Chloride 111 (H) 98 - 107 mmol/L    CO2 26 22 - 31 " mmol/L    Anion Gap, Calculation 5 5 - 18 mmol/L    Glucose 115 70 - 125 mg/dL    BUN 20 8 - 28 mg/dL    Creatinine 1.12 0.70 - 1.30 mg/dL    GFR MDRD Af Amer >60 >60 mL/min/1.73m2    GFR MDRD Non Af Amer >60 >60 mL/min/1.73m2    Bilirubin, Total 0.3 0.0 - 1.0 mg/dL    Calcium 8.4 (L) 8.5 - 10.5 mg/dL    Protein, Total 5.1 (L) 6.0 - 8.0 g/dL    Albumin 3.2 (L) 3.5 - 5.0 g/dL    Alkaline Phosphatase 62 45 - 120 U/L    AST 13 0 - 40 U/L    ALT 19 0 - 45 U/L   HM1 (CBC with Diff)   Result Value Ref Range    WBC 4.2 4.0 - 11.0 thou/uL    RBC 3.34 (L) 4.40 - 6.20 mill/uL    Hemoglobin 11.1 (L) 14.0 - 18.0 g/dL    Hematocrit 35.1 (L) 40.0 - 54.0 %     (H) 80 - 100 fL    MCH 33.2 27.0 - 34.0 pg    MCHC 31.6 (L) 32.0 - 36.0 g/dL    RDW 14.9 (H) 11.0 - 14.5 %    Platelets 234 140 - 440 thou/uL    MPV 9.9 8.5 - 12.5 fL   Manual Differential   Result Value Ref Range    Total Neutrophils % 49 (L) 50 - 70 %    Lymphocytes % 22 20 - 40 %    Monocytes % 19 (H) 2 - 10 %    Eosinophils %  7 (H) 0 - 6 %    Basophils % 3 (H) 0 - 2 %    Immature Granulocyte % - Manual 0 <=0 %    Total Neutrophils Absolute 2.1 2.0 - 7.7 thou/ul    Lymphocytes Absolute 0.9 0.8 - 4.4 thou/uL    Monocytes Absolute 0.8 0.0 - 0.9 thou/uL    Eosinophils Absolute 0.3 0.0 - 0.4 thou/uL    Basophils Absolute 0.1 0.0 - 0.2 thou/uL    Immature Granulocyte Absolute - Manual 0.0 <=0.0 thou/uL    Platelet Estimate Normal Normal    Ovalocytes 1+ (!) Negative           Lab Results   Component Value Date    KF 18.72 (H) 03/17/2021    KFLC 18.17 (H) 01/20/2021    KFLC 16.40 (H) 12/23/2020    KFLC 14.42 (H) 12/02/2020    KFLC 17.62 (H) 11/04/2020    KFLC 99.52 (H) 09/30/2020    KFLC 70.23 (H) 09/02/2020    KFLC 99.71 (H) 08/05/2020    KFLC 71.62 (H) 06/10/2020    KFLC 55.22 (H) 04/15/2020     Lab Results   Component Value Date    KLR 26.00 (H) 03/17/2021    KLR 44.32 (H) 01/20/2021    KLR 52.90 (H) 12/23/2020    KLR 80.11 (H) 12/02/2020    KLR 88.10 (H)  11/04/2020    .97 (H) 09/30/2020    .78 (H) 09/02/2020    .83 (H) 08/05/2020    KLR 80.47 (H) 06/10/2020    KLR 63.47 (H) 04/15/2020   ]    Imaging    No results found.      Signed by: Alejandra Valdez, CNP

## 2021-06-16 NOTE — PROGRESS NOTES
Pt ambulates to infusion center for treatment.  Velcade injection given SQ as ordered.  Pt left clinic stable to [facility] and is aware of upcoming appointments.

## 2021-06-16 NOTE — TELEPHONE ENCOUNTER
Oral Chemotherapy Monitoring Program    Oral Chemotherapy Monitoring    Date: 3/16/21  Primary Oncologist: Per Clifford MD  Primary Oncology Clinic:  [facility]ICAL [facility]Y  Cancer Diagnosis: Multiple myeloma not having achieved remission (H)  Regimen: DPD  Drug Name: pomalidomide  Current Dosage: 3 mg   Current Frequency: daily  Cycle Details: d1-21 out of 28 day cycle  Encounter: Mid-Cycle Assessment   New Start:   until disease progression or unacceptable toxcities   Intervention: See in clinic      Who was educated?: Patient       Mid-Cycle Assessment:  Start Date of Last Cycle: 3/3/21   Unplanned doses missed in last 2 weeks: 0   Adherence assessment : Adherent   Drug Interaction Assessment: No new medications reported   Adverse Effects reported: 0   Adverse Events and Interventions: Fatigue (grade 0/1): maybe a little worse than when he was on the 2 mg dose   Next pharmacist intervention date: 4/1/21   Intervention: R               Assessment/Plan:  Keith is tolerating therapy well and confirmed he is taking the dose increase. Stated he might be feeling a little more tired than usual on this higher dose, but otherwise doing well. He's interested to see what his labs are tomorrow.    Follow-Up:  Will review appointment 4/1 with Alejandra and then next assessment call a couple weeks later. May then push out to quarterly calls. Made sure he had our phone number in the mean time.    Mann Chandler, PharmD, BCOP  Oral Chemotherapy Pharmacist  [phone]

## 2021-06-16 NOTE — PROGRESS NOTES
[facility] Follow Up Note    Theo Meyers   72 y.o. male    Date of Visit: 2/13/2018    Chief Complaint   Patient presents with     Follow-up     Stomach polyp removal F/U      Subjective  Theo is here for follow-up on his left lower leg swelling with history of DVT.    Patient is undergone multiple chemotherapy including bone marrow transplant 2010 for multiple myeloma.  He had a DVT in his left leg in 2012.  He has been on Coumadin.  He had some chronic left lower extremity edema with that.  In December he began having some GI bleeding of unclear cause.  He also had a fall with laceration behind his ear and had a hemoglobin down to 4.0.  Multiple transfusions.  EGD did not show a clear source of bleeding.  He did have a tubulovillous adenoma of the stomach that was removed in January.  No high-grade dysplasia.    He has been off of Coumadin and on aspirin 162 mg a day.  No epigastric pain or blood in stool or melena.  No increasing fatigue or shortness of breath or lightheadedness.    He is on Prilosec 20 mg a day now for GI prophylaxis with the aspirin.    He continues on chemotherapy and dexamethasone for his multiple myeloma.  Revlimid and Velcade.    Over the past 2 weeks he has had increase of his left lower extremity edema.  Some increased of his foot edema.  No significant pain or calf pain.    He has had some loose stools in the past but since using Metamucil every day that has largely resolved and that stable.  No melena.    Patient has chronic hypocellular bone marrow on bone marrow biopsy last month.  Hemoglobin was 9.2 last month.  Platelets 130 and white count 2.1.    Past history of hypomagnesemia.  IV infusion of magnesium with hematology.  He has a blood draw scheduled for tomorrow with hematology.  No palpitations or new muscle spasms.    Past history of mild BPH on Flomax.  No new urinary symptoms.    Colonoscopy August 2016 was negative.    2015 cardiac echo with  ejection fraction 55-60% and mild aortic stenosis.  No symptoms.    On eyedrops for glaucoma.    No headache complaints.  Eating normally.  No nausea.    PMHx:    Past Medical History:   Diagnosis Date     Anemia 12/13/2017     Arthritis      Bilateral inguinal hernia      Glaucoma      History of blood clots     DVT - left chronic     Multiple myeloma     Gets chemo infusions every 2 weeks     Pancytopenia      Peripheral neuropathy      Syncope      TMJ (temporomandibular joint disorder)      PSHx:    Past Surgical History:   Procedure Laterality Date     APPENDECTOMY      Age 16     CARPAL TUNNEL RELEASE Bilateral      ESOPHAGOGASTRODUODENOSCOPY N/A 12/14/2017    Procedure: ESOPHAGOGASTRODUODENOSCOPY (EGD) WITH BIOPSYS;  Surgeon: Marlon Roy MD;  Location: [facility] GI;  Service:      ESOPHAGOGASTRODUODENOSCOPY N/A 1/19/2018    Procedure: ENDOSCOPIC ULTRASOUND  ESOPHAGOGASTRODUODENOSCOPY WITH DUODENAL POLYP REMOVAL;  Surgeon: Familia Mcgraw MD;  Location: [facility] OR;  Service:      EYE SURGERY      Cataract     PORTACATH PLACEMENT       VERTEBROPLASTY      T6     WISDOM TOOTH EXTRACTION       Immunizations:   Immunization History   Administered Date(s) Administered     Hep B, historic 03/01/2011, 05/19/2011     HiB, historic,unspecified 03/01/2011, 05/19/2011     IPV 03/01/2011, 05/19/2011     Influenza high dose, seasonal 10/07/2015, 09/21/2016, 09/20/2017     Influenza,seasonal quad, PF, 36+MOS 09/24/2014     Pneumo Conj 13-V (2010&after) 10/16/2015     Pneumo Polysac 23-V 05/19/2011     Tdap 03/01/2011       ROS A comprehensive review of systems was performed and was otherwise negative    Medications, allergies, and problem list were reviewed and updated    Exam  /76  Pulse 61  Wt 169 lb 12.8 oz (77 kg)  SpO2 93%  BMI 25.08 kg/m2  Appears well.  No jaundice.  Lungs are clear.  Heart is regular 1-2 out of 6 murmur.  Abdomen is nontender.  He does have +1 lower extremity edema of the left  lower extremity which is increased.  No edema of the right leg.  No calf tenderness or cord.  No erythema.    Assessment/Plan  1. Left leg swelling  Increased lower extremity edema of the left leg.  He does have an old partially occlusive clot with some venous insufficiency.  He does not wear compression stockings, did discuss that as an option for him to wear.    I did discuss risk of recurrent blood clot and risk of pulmonary embolism which can be fatal.  I also discussed significant bleeding risk with using a blood thinner such as warfarin.  Also alternatives such as Eliquis discussed, but also with concern over lack of antidote.    Patient accepts risk of recurrent GI bleeding, possibly even fatal bleeding complicated by his poor bone marrow and red blood cell production.    He does wish to restart Coumadin at previous dose of 5 mg a day.  Stop aspirin in 5 days, see me in 6, on Monday and recheck an INR then.    Recheck ultrasound now to evaluate for days acute clot.  - US Venous Leg Left; Future    2. History of DVT (deep vein thrombosis)    - warfarin (COUMADIN) 5 MG tablet; Take 1 tablet (5 mg total) by mouth daily.  Dispense: 30 tablet; Refill: 1  - US Venous Leg Left; Future    3. Multiple myeloma, remission status unspecified  Follow-up with hematology    4. Anemia, unspecified type  Labs tomorrow    5. Hypomagnesemia  Labs tomorrow at hematology    Chronic loose stools, well-controlled now with Metamucil daily.  As needed Imodium.    Glaucoma, drops.  Her graph mild BPH stable on Flomax.    Tubulovillous adenoma of the stomach, GI recommended a 3 month follow-up to assess for complete removal.    Return in 6 days (on 2/19/2018) for Recheck.   Patient Instructions   Continue aspirin 162 mg a day until Sunday, do not take aspirin on Sunday.    Start warfarin 5 mg every evening, starting today.    See  to set up a hold and call ultrasound of your left leg to rule out DVT today.    See me on Monday,  February 19 for checkup in clinic, and INR check.    Draw labs tomorrow with hematology.    Continue on Prilosec 20 mg a day.    Mathew Ott MD  Total time with patient over 25 minutes and over 50% coord care.  Time all face to face.      Current Outpatient Prescriptions   Medication Sig Dispense Refill     acetaminophen (TYLENOL) 500 MG tablet Take 500 mg by mouth every 6 (six) hours as needed for pain.       acyclovir (ZOVIRAX) 400 MG tablet Take 400 mg by mouth 2 (two) times a day.       ascorbic acid (VITAMIN C) 1000 MG tablet Take 2,000 mg by mouth daily.        aspirin 81 MG EC tablet Take 2 tablets (162 mg total) by mouth daily.  0     bimatoprost (LUMIGAN) 0.01 % Drop Administer 1 drop to both eyes at bedtime.       brimonidine (ALPHAGAN P) 0.1 % Drop Administer 1 drop to both eyes 3 (three) times a day.        calcium, as carbonate, (TUMS) 200 mg calcium (500 mg) chewable tablet Chew 1 tablet 3 (three) times a day as needed.        calcium-vitamin D 500 mg(1,250mg) -200 unit per tablet Take 1 tablet by mouth daily.       cetirizine (ZYRTEC) 10 MG tablet Take 10 mg by mouth daily.       dexamethasone (DECADRON) 4 MG tablet Take 20 mg by mouth every 14 (fourteen) days.       gabapentin (NEURONTIN) 300 MG capsule Take 300 mg by mouth daily.        guaiFENesin-dextromethorphan (MUCINEX DM) 600-30 mg Tb12 Take 1 tablet by mouth 2 (two) times a day as needed.        lenalidomide (REVLIMID) 20 mg cap Take 20 mg by mouth every evening.        loperamide (IMODIUM) 2 mg capsule Take 2 mg by mouth 4 (four) times a day as needed for diarrhea.       MAGNESIUM ORAL Take 1 tablet by mouth 3 (three) times a day.       multivitamin (MULTIVITAMIN) per tablet Take 1 tablet by mouth daily.       omega 3-dha-epa-fish oil 900-1,400 mg CpDR Take 1 tablet by mouth daily.       omeprazole (PRILOSEC) 20 MG capsule Take 1 capsule (20 mg total) by mouth daily. 30 capsule 3     ondansetron (ZOFRAN-ODT) 8 MG disintegrating tablet  Take 8 mg by mouth every 8 (eight) hours as needed for nausea.       potassium chloride SA (K-DUR,KLOR-CON) 20 MEQ tablet Take 20 mEq by mouth daily.       psyllium (METAMUCIL) powder Take 2 packets by mouth daily. Takes 2 tablespoonsful       sodium chloride (OCEAN) 0.65 % nasal spray 2 sprays into each nostril as needed for congestion. 15 mL 12     tamsulosin (FLOMAX) 0.4 mg Cp24 Take 0.4 mg by mouth every evening.       warfarin (COUMADIN) 5 MG tablet Take 1 tablet (5 mg total) by mouth daily. 30 tablet 1     No current facility-administered medications for this visit.      Facility-Administered Medications Ordered in Other Visits   Medication Dose Route Frequency Provider Last Rate Last Dose     heparin 100 unit/mL lockflush (PF) porcine 300-600 Units  300-600 Units Intravenous PRN Per Clifford MD   600 Units at 11/19/14 1013     Allergies   Allergen Reactions     Pseudoephedrine-Guaifenesin Unknown     TB12     Social History   Substance Use Topics     Smoking status: Former Smoker     Quit date: 11/16/1975     Smokeless tobacco: Never Used     Alcohol use 0.6 oz/week     1 Glasses of wine per week

## 2021-06-16 NOTE — PROGRESS NOTES
[facility] Hematology and Oncology Progress Note    Patient: Theo Meyers  MRN: [id number]  Date of Service:  March 14, 2018      Assessment and Plan:    1. Kappa light chain myeloma: He continues on Revlimid, Velcade every 2 weeks, and dexamethasone. Revlimid is now 21 days on and 7 days off.  Numbers look stable.  We reviewed his light chain data going back a year and a half.  Overall there is been no trend to worsening.  He is not in remission but has no evidence of clinical progression or endorgan damage.  Therefore I think we can stay on his current regimen.  His light chain ratio does fluctuate significantly.  Therefore, I would like to see more of her progression in the marrow before we change his therapy.    2. Thromboprophylaxis: He is back on Coumadin.  He did have an episode of fall with supratherapeutic INR and significant bleeding and blood loss anemia in December 2017.  He will follow-up with the Coumadin clinic for his INR checks.  His fall was secondary to syncopal episodes.  Unclear if the syncope was secondary to the anemia with anemia secondary to fall from syncope.    3.  Pancytopenia: Secondary to Revlimid.  Neutrophils, hemoglobin, and platelets stable. Continue to follow.    4.  Hypomagnesemia: He is been taking Slow-Mag over-the-counter.  I put in a prescription for magnesium oxide 400 mg twice daily.    Total time spent with the patient today was 30 minutes.  Greater than 50% of the time was spent in counseling and coordinating of care.    ECOG Performance   ECOG Performance Status: 1    Distress Assessment  Distress Assessment Score: 1    Pain  Currently in Pain: No/denies    Diagnosis:    1. IgG kappa light chain myeloma. Hyposecretory. Diagnosed 2009. No significant measurable M protein. 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 There was also on 11;14 translocation.  Past immunofixations from 2011 show IgG-kappa.    2. Deep vein thrombosis of the left leg diagnosed 9/2012  while on treatment.    Treatment:    He presented with a lytic lesion involving the C6 vertebral body, although no measurable M protein. IgG kappa monoclonal immunoglobulin was confirmed by [facility] as well as elevated kappa free light chains with an abnormal kappa lambda ratio. Bone marrow biopsy showed 30% involvement and cytogenetics confirmed deletion of 13 q. as well as 11;14 translocation. He was initially treated with Velcade and dexamethasone and achieved a good partial response.   He was referred to the [facility] where he underwent high-dose chemotherapy with autologous peripheral stem cell transplant in April of 2010. He began Revlimid as maintenance therapy post transplant. Repeat bone marrow biopsy done October 2010 showed about 5% kappa restricted plasma cells and so at that time weekly Velcade was added along with his maintenance Revlimid and dexamethasone.     He required dose reductions of weekly Velcade because of cytopenias and was ultimately switched to a q 2 week maintenance schedule which he has been on since September 2012.     His Revlimid dose was reduced to 20 mg, 3 weeks on 1 week off, in November 2017.  He continues on dexamethasone 20 mg p.o. weekly    He was evaluated for progression in September 2017.  [place] light chain restricted myeloma was in about 20% of the cells.  The marrow is very hypocellular at 5% or less.  Bone marrow biopsy from November 2016 showed hypocellular marrow with 10-20% + plasma cells.    Interim History:    Theo returns today for for a follow-up visit.  Generally feeling well today.  No new complaints.  No new areas of pain.  No bleeding.  No lightheadedness or dizziness.  He is back on his Coumadin now.  He is just getting over an upper respiratory infection.  Bowel habits are functioning normally.  No headaches.  No fevers or chills.    Review of Systems:    Constitutional  Constitutional (WDL): Exceptions to WDL  Fatigue: Fatigue not relieved by  rest - Limiting instrumental ADL  Weight Loss: to <10% from baseline, intervention not indicated (down 4lbs since 2/19)  Neurosensory  Neurosensory (WDL): Exceptions to WDL  Peripheral Motor Neuropathy: Asymptomatic, clinical or diagnostic observations only, intervention not indicated  Ataxia: Asymptomatic, clinical or diagnostic observations only, intervention not indicated  Peripheral Sensory Neuropathy: Asymptomatic, loss of deep tendon reflexes or paresthesia  Cardiovascular  Cardiovascular (WDL): All cardiovascular elements are within defined limits  Pulmonary  Respiratory (WDL): Exceptions to WDL  Cough: Mild symptoms, nonprescription intervention indicated (has a cold)  Gastrointestinal  Gastrointestinal (WDL): Exceptions to WDL  Anorexia: Loss of appetite without alteration in eating habits (with cold, appetite has not been great)  Genitourinary  Genitourinary (WDL): Exceptions to WDL  Urinary Frequency: Present  Integumentary  Integumentary (WDL): All integumentary elements are within defined limits  Patient Coping  Patient Coping: Accepting  Accompanied by  Accompanied by: Alone    Past History:    Past Medical History:   Diagnosis Date     Anemia 12/13/2017     Arthritis      Bilateral inguinal hernia      Glaucoma      History of blood clots     DVT - left chronic     Multiple myeloma     Gets chemo infusions every 2 weeks     Pancytopenia      Peripheral neuropathy      Syncope      TMJ (temporomandibular joint disorder)      Physical Exam:    Recent Vitals 3/14/2018   Height -   Weight 163 lbs 8 oz   BSA (m2) -   BP 95/53   Pulse 69   Temp 97.6   Temp src 1   SpO2 96   Some recent data might be hidden     General: patient appears stated age of 72 y.o.. Nontoxic and in no distress.   HEENT: Head: atraumatic, normocephalic. Sclerae anicteric.  Chest:  Normal respiratory effort.   Cardiac:  No edema.   Abdomen: abdomen is non-distended  Extremities: normal tone and muscle bulk.   Skin: no lesions or rash.  Warm and dry.  No ecchymoses.  CNS: alert and oriented x3. Grossly non-focal.   Psychiatric: normal mood and affect.     Lab Results:    Recent Results (from the past 168 hour(s))   INR   Result Value Ref Range    INR 2.50 (H) 0.90 - 1.10   INR (for treatment plan use)   Result Value Ref Range    INR 2.04 (H) 0.90 - 1.10      Imaging:    Us Venous Leg Left    Result Date: 2/13/2018  US VENOUS LEG LEFT 2/13/2018 10:33 AM INDICATION: History of DVT left leg.  Is been off Coumadin.  Increasing leg swelling.  Recent ultrasound showed a partially occlusive old clot, evaluate for new thrombus TECHNIQUE: Routine exam without and with compression, augmentation, and duplex utilizing 2D gray-scale imaging, Doppler interrogation with color-flow and spectral waveform analysis. COMPARISON: 01/17/2018 FINDINGS: The common femoral, femoral, popliteal, and segmentally visualized calf veins were evaluated. The opposite CFV was also included in the evaluation. Stable appearance of chronic deep venous thrombosis in the in the proximal peroneal veins extending into the popliteal vein. The thrombus is echogenic and mural which is consistent with chronic deep venous thrombosis. No occlusive hypoechoic thrombus is identified to suggest acute deep venous thrombosis. Remainder of examination is negative. No popliteal cysts.     CONCLUSION: 1.  Stable chronic deep venous thrombosis proximal left peroneal veins extending into the popliteal vein compared to 01/17/2018. 2.  No acute deep venous thrombosis. Findings reported to Dr. Mathew Ott at 10:45 AM on 02/13/2018.       Signed by: Per Clifford MD

## 2021-06-16 NOTE — PROGRESS NOTES
[facility] Hematology and Oncology Progress Note    Patient: Theo Meyers  MRN: [id number]  Date of Service: March 14, 2018      Assessment and Plan:    1. Kappa light chain myeloma: Since her last visit I decided to hold starting daratumumab and his kappa lambda ratio has decreased significantly.  Clinically he is doing well with no evidence of progressive disease.  His bone marrow did not definitively show progression.  We will therefore keep him on his current regimen.  We will try 20 mg 3 weeks on 1 week off instead of 50 mg daily for cost purposes.  Will keep a closer eye on his blood counts with this change.      2. Thromboprophylaxis: He is on Coumadin for this.  INR is therapeutic today.  No changes in the Coumadin dose.    3.  Pancytopenia: Secondary to Revlimid. Stable.  Continue to follow.    ECOG Performance   ECOG Performance Status: 1    Distress Assessment  Distress Assessment Score: 1    Pain  Currently in Pain: No/denies    Diagnosis:    1. IgG kappa light chain myeloma. Hyposecretory. Diagnosed 2009. No significant measurable M protein. 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 There was also on 11;14 translocation.  Past immunofixations from 2011 show IgG-kappa.    2. Deep vein thrombosis of the left leg diagnosed 9/2012 while on treatment.    Treatment:    He presented with a lytic lesion involving the C6 vertebral body, although no measurable M protein. IgG kappa monoclonal immunoglobulin was confirmed by ELMA as well as elevated kappa free light chains with an abnormal kappa lambda ratio. Bone marrow biopsy showed 30% involvement and cytogenetics confirmed deletion of 13 q. as well as 11;14 translocation. He was initially treated with Velcade and dexamethasone and achieved a good partial response.   He was referred to the [facility] where he underwent high-dose chemotherapy with autologous peripheral stem cell transplant in April of 2010. He began Revlimid as  maintenance therapy post transplant. Repeat bone marrow biopsy done October 2010 showed about 5% kappa restricted plasma cells and so at that time weekly Velcade was added along with his maintenance Revlimid and dexamethasone.   He has done well since that time with stable minimal residual disease, best assessed by serum free light chains. He required dose reductions of weekly Velcade because of cytopenias and was ultimately switched to a q 2 week maintenance schedule which he has been on since September 2012.   His Revlimid dose was reduced to 15 mg daily and he continues on dexamethasone 20 mg p.o. weekly    Interim History:    Theo returns today for follow-up visit.  Doing okay.  No acute complaints.  No worsening neuropathy.  No fevers, chills, or night sweats.  He is wondering if he could change his Revlimid schedule.  No skin rash.  Bowel habits are normal.      Review of Systems:    Constitutional  Constitutional (WDL): Exceptions to WDL  Fatigue: Fatigue not relieved by rest - Limiting instrumental ADL  Weight Loss: to <10% from baseline, intervention not indicated (down 4lbs since 2/19)  Neurosensory  Neurosensory (WDL): Exceptions to WDL  Peripheral Motor Neuropathy: Asymptomatic, clinical or diagnostic observations only, intervention not indicated  Ataxia: Asymptomatic, clinical or diagnostic observations only, intervention not indicated  Peripheral Sensory Neuropathy: Asymptomatic, loss of deep tendon reflexes or paresthesia  Cardiovascular  Cardiovascular (WDL): All cardiovascular elements are within defined limits  Pulmonary  Respiratory (WDL): Exceptions to WDL  Cough: Mild symptoms, nonprescription intervention indicated (has a cold)  Gastrointestinal  Gastrointestinal (WDL): Exceptions to WDL  Anorexia: Loss of appetite without alteration in eating habits (with cold, appetite has not been great)  Genitourinary  Genitourinary (WDL): Exceptions to WDL  Urinary Frequency:  Present  Integumentary  Integumentary (WDL): All integumentary elements are within defined limits  Patient Coping  Patient Coping: Accepting  Accompanied by  Accompanied by: Alone    Past History:    Past Medical History:   Diagnosis Date     Anemia 12/13/2017     Arthritis      Bilateral inguinal hernia      Glaucoma      History of blood clots     DVT - left chronic     Multiple myeloma     Gets chemo infusions every 2 weeks     Pancytopenia      Peripheral neuropathy      Syncope      TMJ (temporomandibular joint disorder)      Physical Exam:    Recent Vitals 3/14/2018   Height -   Weight 163 lbs 8 oz   BSA (m2) -   BP 95/53   Pulse 69   Temp 97.6   Temp src 1   SpO2 96   Some recent data might be hidden     General: patient appears stated age of 72 y.o.. Nontoxic and in no distress.   HEENT: Head: atraumatic, normocephalic. Sclerae anicteric.  Chest:  Normal respiratory effort.   Cardiac:  No edema.   Abdomen: abdomen is soft, non-distended  Extremities: normal tone and muscle bulk.   Skin: no lesions or rash. Warm and dry.   CNS: alert and oriented x3. Grossly non-focal.   Psychiatric: normal mood and affect.     Lab Results:    Results for MONICA SCOTT (MRN [id number]) as of 11/15/2017 09:09   Ref. Range 11/8/2017 08:08   Sodium Latest Ref Range: 136 - 145 mmol/L 139   Potassium Latest Ref Range: 3.5 - 5.0 mmol/L 4.7   Chloride Latest Ref Range: 98 - 107 mmol/L 108 (H)   CO2 Latest Ref Range: 22 - 31 mmol/L 28   Anion Gap, Calculation Latest Ref Range: 5 - 18 mmol/L 3 (L)   BUN Latest Ref Range: 8 - 28 mg/dL 18   Creatinine Latest Ref Range: 0.70 - 1.30 mg/dL 0.93   GFR MDRD Af Amer Latest Ref Range: >60 mL/min/1.73m2 >60   GFR MDRD Non Af Amer Latest Ref Range: >60 mL/min/1.73m2 >60   Calcium Latest Ref Range: 8.5 - 10.5 mg/dL 8.6   AST Latest Ref Range: 0 - 40 U/L 13   ALT Latest Ref Range: 0 - 45 U/L 18   ALBUMIN Latest Ref Range: 3.5 - 5.0 g/dL 2.9 (L)   Protein, Total Latest Ref Range: 6.0 - 8.0  g/dL 5.0 (L)   Alkaline Phosphatase Latest Ref Range: 45 - 120 U/L 75   Bilirubin, Total Latest Ref Range: 0.0 - 1.0 mg/dL 0.6   Glucose Latest Ref Range: 70 - 125 mg/dL 103   % Beta Latest Ref Range: 10.0 - 17.0 %    Albumin % Latest Ref Range: 51.0 - 67.0 %    Alpha 1 Latest Ref Range: 0.1 - 0.3 g/dL    Alpha 1 % Latest Ref Range: 2.0 - 4.0 %    Alpha 2 Latest Ref Range: 0.4 - 0.9 g/dL    Alpha 2 % Latest Ref Range: 5.0 - 13.0 %    Beta Latest Ref Range: 0.7 - 1.2 g/dL    ELP Comment Unknown    Gamma Globulin Latest Ref Range: 0.6 - 1.4 g/dL    Gamma Globulin % Latest Ref Range: 9.0 - 20.0 %    IGA Latest Ref Range: 65 - 400 mg/dL 36 (L)   Immunoglobulin M Latest Ref Range: 60 - 280 mg/dL 11 (L)   [place] Free Light Chains Latest Ref Range: 0.3300 - 1.94 mg/dL 61.2 (H)   Lambda Free Light Chain Latest Ref Range: 0.5700 - 2.63 mg/dL 2.23   Immunoglobulin G Latest Ref Range: 700 - 1700 mg/dL 394 (L)   WBC Latest Ref Range: 4.0 - 11.0 thou/uL 2.2 (L)   RBC Latest Ref Range: 4.40 - 6.20 mill/uL 2.79 (L)   Hemoglobin Latest Ref Range: 14.0 - 18.0 g/dL 9.7 (L)   Hematocrit Latest Ref Range: 40.0 - 54.0 % 29.3 (L)   MCV Latest Ref Range: 80 - 100 fL 105 (H)   MCH Latest Ref Range: 27.0 - 34.0 pg 34.8 (H)   MCHC Latest Ref Range: 32.0 - 36.0 g/dL 33.1   RDW Latest Ref Range: 11.0 - 14.5 % 16.2 (H)   Platelets Latest Ref Range: 140 - 440 thou/uL 91 (L)   MPV Latest Ref Range: 8.5 - 12.5 fL 12.3   Neutrophils % Latest Ref Range: 50 - 70 % 54   Lymphocytes % Latest Ref Range: 20 - 40 % 19 (L)   Monocytes % Latest Ref Range: 2 - 10 % 18 (H)   Eosinophils % Latest Ref Range: 0 - 6 % 8 (H)   Basophils % Latest Ref Range: 0 - 2 % 1   Neutrophils Absolute Latest Ref Range: 2.0 - 7.7 thou/uL 1.2 (L)   Lymphocytes Absolute Latest Ref Range: 0.8 - 4.4 thou/uL 0.4 (L)   Monocytes Absolute Latest Ref Range: 0.0 - 0.9 thou/uL 0.4   Eosinophils Absolute Latest Ref Range: 0.0 - 0.4 thou/uL 0.2   Basophils Absolute Latest Ref Range: 0.0 -  0.2 thou/uL 0.0     Recent Results (from the past 168 hour(s))   INR   Result Value Ref Range    INR 2.50 (H) 0.90 - 1.10   INR (for treatment plan use)   Result Value Ref Range    INR 2.04 (H) 0.90 - 1.10        Imaging:    Us Venous Leg Left    Result Date: 2/13/2018  US VENOUS LEG LEFT 2/13/2018 10:33 AM INDICATION: History of DVT left leg.  Is been off Coumadin.  Increasing leg swelling.  Recent ultrasound showed a partially occlusive old clot, evaluate for new thrombus TECHNIQUE: Routine exam without and with compression, augmentation, and duplex utilizing 2D gray-scale imaging, Doppler interrogation with color-flow and spectral waveform analysis. COMPARISON: 01/17/2018 FINDINGS: The common femoral, femoral, popliteal, and segmentally visualized calf veins were evaluated. The opposite CFV was also included in the evaluation. Stable appearance of chronic deep venous thrombosis in the in the proximal peroneal veins extending into the popliteal vein. The thrombus is echogenic and mural which is consistent with chronic deep venous thrombosis. No occlusive hypoechoic thrombus is identified to suggest acute deep venous thrombosis. Remainder of examination is negative. No popliteal cysts.     CONCLUSION: 1.  Stable chronic deep venous thrombosis proximal left peroneal veins extending into the popliteal vein compared to 01/17/2018. 2.  No acute deep venous thrombosis. Findings reported to Dr. Mathew Ott at 10:45 AM on 02/13/2018.         Signed by: Per Clifford MD

## 2021-06-17 NOTE — TELEPHONE ENCOUNTER
Telephone Encounter by Wes Bullock at 10/5/2020  3:20 PM     Author: Wes Bullock Service: -- Author Type: Financial Resource Guide    Filed: 10/5/2020  3:20 PM Encounter Date: 10/5/2020 Status: Signed    : Wes Bullock (Financial Resource Guide)       PA approved for Pomalyst.     Medication Prior Authorization    Start Date: 10/5/20  Status: Approved  Approval Date: 10/5/20  Authorization Effective Date: 10/5/20  Authorization Expiration Date: 12/31/21  Type: New  Urgency: Urgent  Med Type: Specialty  Insurance Info: [employer] ([facility]) - Phone [phone] Fax [phone]  Method: ePA  Reference #: Key: [id number] - PA Case ID: [id number]  Pharmacy Filling the Rx: [facility] - [facility] [address] AT [address] formerly [facility]  Pharmacy Notified: Yes  Patient Notified: Yes  ___________________________________________________________________________________________________________________:

## 2021-06-17 NOTE — PROGRESS NOTES
"Pt ambulates to infusion center for treatment.  Pt voices no new concerns today, states that his back pain is \"much better\".  Velcade given SQ as ordered without complication.  Pt left clinic stable to lobby.  Plan RTC as scheduled.  "

## 2021-06-17 NOTE — PROGRESS NOTES
[facility] Hematology and Oncology Progress Note    Patient: Theo Meyers  MRN: [id number]  Date of Service:         Reason for Visit    Myeloma    Assessment and Plan    1.  Myeloma: Patient continues on his current treatment of Velcade and dexamethasone every other week along with Revlimid.  His labs have fluctuating, but overall stable. His light chains have gone up last week.  We will continue current regimen.      2. DVT: on coumadin. Was slightly subtherapeutic last week. We will recheck today. Being managed by coumadin clinic.     3. Pancytopenia: from revlimid and velcade. No complications at this time. Continue to monitor and take precautions.     4. Back pain: likely muscular, very positional. Continue heat/ice, exercising, PT. Try low dose flexeril. If no improvement in the next week or two, encourage him to call us.     ECOG Performance   ECOG Performance Status: 1      Distress Assessment  Distress Assessment Score: 2 (Back pain)    Pain  Currently in Pain: Yes  Pain Score (Initial OR Reassessment): 10  Location: Back see above.       Problem List    1. Multiple myeloma not having achieved remission     2. DVT (deep venous thrombosis)     3. Pancytopenia     4. Back pain        ______________________________________________________________________________    History of Present Illness    DIAGNOSIS:   1. IgG kappa light chain myeloma. Hyposecretory. Diagnosed 2009. No significant measurable M protein. 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 There was also on 11;14 translocation.   2. Deep vein thrombosis of the left leg diagnosed 09/2012 while on treatment.       TREATMENT:   1. Most recently he has been on Velcade every 2 weeks since 09/2012. He is getting dexamethasone 20 mg every other week with the velcade. Finally, he is receiving Revlimid 20 mg daily for 3 weeks on, 1 week off.   2. Continues on warfarin      PAST TREATMENT and HISTORY:   He presented at diagnosis with a lytic  lesion involving the C6 vertebral body, although no measurable M protein. IgG kappa monoclonal immunoglobulin was confirmed by ELMA as well as elevated kappa free light chains with an abnormal kappa lambda ratio. Bone marrow biopsy showed 30% involvement and cytogenetics confirmed deletion of 13 q. as well as 11;14 translocation. He was initially treated with Velcade and dexamethasone and achieved a good partial response. He was referred to the [facility] where he underwent high-dose chemotherapy with autologous peripheral stem cell transplant in April of 2010. He began Revlimid as maintenance therapy post transplant. Repeat bone marrow biopsy done October 2010 showed about 5% kappa restricted plasma cells and so at that time weekly Velcade was added along with his maintenance Revlimid and dexamethasone. He has done well since that time with stable minimal residual disease, best assessed by serum free light chains. He required dose reductions of weekly Velcade because of cytopenias and was ultimately switched to a q.2 week maintenance schedule which he has been on since September 2012. His Revlimid dose is 20 mg daily and he continues on dexamethasone 20 mg p.o. q. Week.       INTERIM HISTORY:  Pt is doing well. Having intermittent back pain. Using exercising and ibuprofen prn which helps. His energy levels are good. He denies any new issues. Denies fevers. Denies vision issues. Denies changes in his bowels.       Review of Systems    ROS: As above in the history, otherwise the remainder of the 10point ROS is negative and not contributory to current reason for visit.     Past History  Past Medical History:   Diagnosis Date     Anemia 12/13/2017     Arthritis      Bilateral inguinal hernia      Glaucoma      History of blood clots     DVT - left chronic     Multiple myeloma     Gets chemo infusions every 2 weeks     Pancytopenia      Peripheral neuropathy      Syncope      TMJ (temporomandibular joint  disorder)        PHYSICAL EXAM:  /58  Pulse 62  Temp 97.8  F (36.6  C) (Oral)   Wt 166 lb (75.3 kg)  SpO2 100%  BMI 24.51 kg/m2  GENERAL: no acute distress. Cooperative in conversation. Here alone  HEENT: pupils are equal, round and reactive. Oromucosa is clean and intact. No ulcerations or mucositis noted. No bleeding noted.  RESP: lungs are clear bilaterally per auscultation. Regular respiratory rate. No wheezes or rhonchi.  CV: Regular, rate and rhythm. No murmurs.  ABD: soft, nontender. Positive bowel sounds. No organomegaly.   MUSCULOSKELETAL: mild bilateral lower extremity swelling, left greater than right.   NEURO: non focal. Alert and oriented x3.   PSYCH: within normal limits. No depression or anxiety.  SKIN: warm dry intact   LYMPH: no cervical, supraclavicular lymphadenopathy        Lab Results    Lab Standing Order on 04/04/2018   Component Date Value Ref Range Status     Sodium 04/04/2018 141  136 - 145 mmol/L Final     Potassium 04/04/2018 4.2  3.5 - 5.0 mmol/L Final     Chloride 04/04/2018 108* 98 - 107 mmol/L Final     CO2 04/04/2018 30  22 - 31 mmol/L Final     Anion Gap, Calculation 04/04/2018 3* 5 - 18 mmol/L Final     Glucose 04/04/2018 94  70 - 125 mg/dL Final     BUN 04/04/2018 18  8 - 28 mg/dL Final     Creatinine 04/04/2018 0.87  0.70 - 1.30 mg/dL Final     GFR MDRD Af Amer 04/04/2018 >60  >60 mL/min/1.73m2 Final     GFR MDRD Non Af Amer 04/04/2018 >60  >60 mL/min/1.73m2 Final     Bilirubin, Total 04/04/2018 0.5  0.0 - 1.0 mg/dL Final     Calcium 04/04/2018 8.5  8.5 - 10.5 mg/dL Final     Protein, Total 04/04/2018 5.1* 6.0 - 8.0 g/dL Final     Albumin 04/04/2018 2.9* 3.5 - 5.0 g/dL Final     Alkaline Phosphatase 04/04/2018 73  45 - 120 U/L Final     AST 04/04/2018 15  0 - 40 U/L Final     ALT 04/04/2018 19  0 - 45 U/L Final     Immunoglobulin G 04/04/2018 492* 700 - 1700 mg/dL Final     Immunoglobulin M 04/04/2018 10* 60 - 280 mg/dL Final     Immunoglobulin A 04/04/2018 41* 65 -  400 mg/dL Final     [place] Free Lt Chain 04/04/2018 55.50* 0.33 - 1.94 mg/dL Final    NOTE: This result is outside the initial measuring range of this assay. Samples  which are outside of the initial measuring range of the assay must be  re-assayed at a dilution in order to obtain a result. In these cases, the  result reported may not be proportional to results that are within the  measuring range and were performed using the initial dilution.  This is due to the fact that all serum free light chain assays are prone to  sample non-linearity. That is, some samples will have different results if  performed on different dilutions. In some instances where the result is just  outside the limit of the initial measuring range, the results obtained from the  diluted sample may be lower or higher than expected.  In these cases the result will be reported as greater than or less than the  limit of the measuring range.     Lambda Free Lt Chain 04/04/2018 1.42  0.57 - 2.63 mg/dL Final     Kappa Lambda Ratio 04/04/2018 39.08* 0.26 - 1.65 Final    PERFORMED AT  [address] [address]      Albumin % 04/04/2018 67.7* 51.0 - 67.0 % Final     Albumin  04/04/2018 3.1* 3.2 - 4.7 g/dL Final     Alpha 1 % 04/04/2018 3.2  2.0 - 4.0 % Final     Alpha 1 04/04/2018 0.1  0.1 - 0.3 g/dL Final     Alpha 2 % 04/04/2018 11.5  5.0 - 13.0 % Final     Alpha 2 04/04/2018 0.5  0.4 - 0.9 g/dL Final     % Beta 04/04/2018 10.0  10.0 - 17.0 % Final     Beta 04/04/2018 0.5* 0.7 - 1.2 g/dL Final     Gamma Globulin % 04/04/2018 7.6* 9.0 - 20.0 % Final     Gamma Globulin 04/04/2018 0.3* 0.6 - 1.4 g/dL Final     ELP Comment 04/04/2018    Final                    Value:Distortion of gamma globulin region, most likely obscuring a faint monoclonal band.     Decrease in beta globulin and gamma globulin fractions, possibly related to chemotherapy effect, lymphoproliferative disorder, immunodeficiency, or  liver disease. Suggest clinical correlation and follow-up.     Mild nonspecific decrease in albumin fraction, possibly related to bulk protein loss, accelerated protein breakdown, or malnutrition.      Protein, Total 04/04/2018 4.6* 6.0 - 8.0 g/dL Final     Path ICD: 04/04/2018 C90.01   Final     Interpreted By: 04/04/2018 Dash Camacho MD   Final     Immunofixation Electrophoresis, Se* 04/04/2018 Ill-defined electrophoresis pattern, possibly obscuring faint IgG kappa monoclonal protein. Recommend repeat study in 6-8 weeks.    Final     Path ICD: 04/04/2018 C90.01   Final     Interpreted By: 04/04/2018 Dash Camacho MD   Final     WBC 04/04/2018 1.8* 4.0 - 11.0 thou/uL Final     RBC 04/04/2018 2.81* 4.40 - 6.20 mill/uL Final     Hemoglobin 04/04/2018 9.9* 14.0 - 18.0 g/dL Final     Hematocrit 04/04/2018 29.8* 40.0 - 54.0 % Final     MCV 04/04/2018 106* 80 - 100 fL Final     MCH 04/04/2018 35.2* 27.0 - 34.0 pg Final     MCHC 04/04/2018 33.2  32.0 - 36.0 g/dL Final     RDW 04/04/2018 15.4* 11.0 - 14.5 % Final     Platelets 04/04/2018 90* 140 - 440 thou/uL Final     MPV 04/04/2018 11.2  8.5 - 12.5 fL Final     INR 04/04/2018 1.92* 0.90 - 1.10 Final     Total Neutrophils % 04/04/2018 38* 50 - 70 % Final     Lymphocytes % 04/04/2018 24  20 - 40 % Final     Monocytes % 04/04/2018 22* 2 - 10 % Final     Eosinophils %  04/04/2018 13* 0 - 6 % Final     Basophils % 04/04/2018 3* 0 - 2 % Final     Total Neutrophils Absolute 04/04/2018 0.7* 2.0 - 7.7 thou/ul Final     Lymphocytes Absolute 04/04/2018 0.4* 0.8 - 4.4 thou/uL Final     Monocytes Absolute 04/04/2018 0.4  0.0 - 0.9 thou/uL Final     Eosinophils Absolute 04/04/2018 0.2  0.0 - 0.4 thou/uL Final     Basophils Absolute 04/04/2018 0.1  0.0 - 0.2 thou/uL Final     Platelet Estimate 04/04/2018 Decreased* Normal Final     Ovalocytes 04/04/2018 2+* Negative Final     Tear Drop Cells 04/04/2018 1+* Negative Final     Magnesium 04/04/2018 1.6* 1.8 - 2.6 mg/dL Final    ]  Imaging    No results found.      Signed by: Alejandra Valdez, CNP

## 2021-06-17 NOTE — PROGRESS NOTES
Pt arrived ambulatory to clinic for Cycle # 8 Day # 1 of his chemotherapy regimen.  Port was accessed using aseptic technique without difficulties with excellent blood return.  Labs were reviewed, pt ok for treatment.  Administered premedications, subcutaneous B-12 injection, and Daratumamab per MD order.  Pt tolerate infusion well, no s/s of infusion reaction.  Port was flushed with NS and Heparin then de-accessed using 2x2 and papertape.  Pt verbalized understanding of plan of care and return to clinic.

## 2021-06-17 NOTE — PATIENT INSTRUCTIONS - HE
Patient Instructions by Alejandra Valdez CNP at 3/13/2019  8:30 AM     Author: Alejandra Valdez CNP Service: -- Author Type: Nurse Practitioner    Filed: 3/13/2019  9:06 AM Encounter Date: 3/13/2019 Status: Signed    : Alejandra Valdez CNP (Nurse Practitioner)       Patient Education     Iron Supplements  Most people think of iron as a metal used to make pots, frying pans, and soup kettles. This same metal (or mineral) also plays a vital role in the body. Iron helps the blood cells carry oxygen. When you dont get enough iron, you may feel tired and lack energy. Over time, without enough iron, your body makes fewer red blood cells. This can cause a condition called iron-deficiency anemia. Since your body doesnt make iron, you must get it from foods or supplements.     Food sources of iron  Iron is found in a few types of foods. Good sources include:    Red meat, poultry, fish, eggs    Dried fruits (especially raisins, prunes, figs)    Legumes such as dried beans and lentils    Breads and cereals with iron added    Blackstrap molasses    Spinach    Foods cooked in cast-iron pans. This is especially true of acidic foods, such as tomatoes and marc.   Why use a supplement?  Women often need additional iron because they lose blood during their menstrual period. But even grown men and boys may need a supplement at some point. You may need an iron supplement if any of the following is true for you:    I rarely eat foods that are high in iron (such as red meat, poultry, dried beans, and foods with iron added).    I am a vegetarian and I rarely eat legumes (dried beans, peas, lentils).    I have heavy menstrual periods.    I am pregnant or breastfeeding.    I have been diagnosed with iron-deficiency anemia.  If you take iron  Here are some tips to help you get the most from an iron supplement:    Take it with vitamin C for better absorption.    Dont take an iron supplement with milk. The  calcium in milk limits iron absorption.    Iron supplements can cause constipation. To prevent this, drink plenty of water, eat high-fiber foods, and exercise often. Also try an iron supplement with an added stool softener.    Eat a healthy diet to get all the nutrients your body needs.  Recommended iron intake  The amount of iron each person needs is different, and varies by age. Women who are pregnant or breastfeeding need extra iron. But taking more than the suggested amount is not always healthy. Your healthcare provider can help you choose the right amount of iron for you.   Date Last Reviewed: 6/1/2017 2000-2017 [employer]. [address] [place] [address], [place] [address]. All rights reserved. This information is not intended as a substitute for professional medical care. Always follow your healthcare professional's instructions.

## 2021-06-17 NOTE — PROGRESS NOTES
Pt ambulates to infusion center for treatment following NP visit.  Pt seen by BRITTANI Valdez CNP and orders approved.  Velcade given SQ as ordered without complication.  Pt left clinic stable to [facility]. Plan RTC as scheduled.

## 2021-06-17 NOTE — TELEPHONE ENCOUNTER
Refill Request  Medication name:   Requested Prescriptions     Pending Prescriptions Disp Refills     rivaroxaban ANTICOAGULANT (XARELTO) 20 mg tablet 90 tablet 3     Who prescribed the medication: MAKENZIE  Last refill on medication: 07/02/2020  [facility]ed [facility]  Last appointment with PCP: 02/25/2021  Next appointment: Appointment scheduled for 09/09/2021    Liborio Simon RT (R)

## 2021-06-17 NOTE — TELEPHONE ENCOUNTER
Telephone Encounter by Ondina Vanegas at 5/7/2021  3:38 PM     Author: Ondina Vanegas Service: -- Author Type: Financial Resource Guide    Filed: 5/7/2021  3:39 PM Encounter Date: 5/7/2021 Status: Signed    : Ondina Vanegas (Financial Resource Guide)

## 2021-06-17 NOTE — PROGRESS NOTES
Pt ambulates to infusion center for labs and treatment.  Labs drawn peripherally w/results noted and reviewed w/Dr. Clifford.  Pt is approved for treatment per MD.  Velcade given SQ as ordered without complication.  Pt left clinic stable to [facility].  Plan RTC as scheduled.

## 2021-06-18 NOTE — PROGRESS NOTES
Pt ambulates to infusion center for treatment.  Velcade given SQ as ordered.  Pt left clinic stable to [facility].  Plan RTC as scheduled.

## 2021-06-18 NOTE — PROGRESS NOTES
Pt ambulates to infusion center following MD visit.  Pt was seen by Dr. Clifford today and orders approved.  Velcade given SQ as ordered without difficulty.  Pt left clinic stable to lobby.  Plan RTC as scheduled.

## 2021-06-18 NOTE — LETTER
Letter by Mathew Ott MD at      Author: Mathew Ott MD Service: -- Author Type: --    Filed:  Encounter Date: 2/21/2019 Status: (Other)       Theo Meyers  [address]  [facility] [address]             February 21, 2019         Dear Mr. Meyers,    Below are the results from your recent visit:    Resulted Orders   XR Chest 2 Views    Narrative    XR CHEST 2 VIEWS  2/21/2019 12:08 PM    INDICATION: Shortness of breath  COMPARISON: 08/07/2018    FINDINGS: Port-A-Cath tip in the SVC. Heart size upper limits of normal. Previous vertebral plasty T6.    No significant acute new findings.       Lungs were clear on chest x-ray.  No cause of cough on chest x-ray.  No change in treatment plan.    Please call with questions or contact us using [employer].    Sincerely,        Electronically signed by Mathew Ott MD

## 2021-06-18 NOTE — PATIENT INSTRUCTIONS - HE
Patient Instructions by Mathew Ott MD at 7/30/2020  9:40 AM     Author: Mathew Ott MD Service: -- Author Type: Physician    Filed: 7/30/2020 10:17 AM Encounter Date: 7/30/2020 Status: Addendum    : Mathew Ott MD (Physician)    Related Notes: Original Note by Mathew Ott MD (Physician) filed at 7/30/2020 10:16 AM       You are due for your yearly dermatology exam this December.    Plan to repeat your heart echo next summer.  If you do have any worsening lightheaded spells or worsening shortness of breath, contact me and we could consider doing the echo before next summer.    Avoid antibiotics if able, with your history of C. difficile colitis.  Continue daily yogurt or probiotic.    Follow-up up with me in 1 year for physical exam.  Patient Education   Instrumental Activities of Daily Living  Your Health Risk Assessment indicates you have difficulties with instrumental activities of daily living which include laundry, housekeeping, banking, shopping, using the telephone, food preparation, transportation, or taking your own medications. Please make a follow up appointment for us to address this issue in more detail.    [employer] has resources available on the following website: https://[email or web address].org/caregivers.[email or web address]     Also, here is a local agency that provides help with meals and other assistance:   [facility] Line: [phone]     Patient Education   Signs of Hearing Loss  Hearing loss is a problem shared by many people. In fact, it is one of the most common health conditions, particularly as people age. Most people over age 65 have some hearing loss, and by age 80, almost everyone does. Because hearing loss usually occurs slowly over the years, you may not realize your hearing ability has gotten worse.       Have your hearing checked  Contact your [facility] care provider if you:    Have to strain to hear normal conversation.    Have to watch other peoples faces very carefully to  follow what theyre saying.    Need to ask people to repeat what theyve said.    Often misunderstand what people are saying.    Turn the volume of the television or radio up so high that others complain.    Feel that people are mumbling when theyre talking to you.    Find that the effort to hear leaves you feeling tired and irritated.    Notice, when using the phone, that you hear better with 1 ear than the other.    4942-5209 [employer]. [address] [place] [address], [place] [address]. All rights reserved. This information is not intended as a substitute for professional medical care. Always follow your healthcare professional's instructions.           Advance Directive  Patients advance directive was discussed and I am comfortable with the patients wishes.  Patient Education   Personalized Prevention Plan  You are due for the preventive services outlined below.  Your care team is available to assist you in scheduling these services.  If you have already completed any of these items, please share that information with your care team to update in your medical record.  Health Maintenance   Topic Date Due   ? ZOSTER VACCINES (1 of 2) 03/24/1995   ? MEDICARE ANNUAL WELLNESS VISIT  10/16/2016   ? INFLUENZA VACCINE RULE BASED (1) 08/01/2020   ? TD 18+ HE  03/01/2021   ? LIPID  10/16/2020   ? FALL RISK ASSESSMENT  06/24/2021   ? ADVANCE CARE PLANNING  02/11/2024   ? COLORECTAL CANCER SCREENING  08/18/2026   ? HEPATITIS C SCREENING  Completed   ? PNEUMOCOCCAL IMMUNIZATION 65+ LOW/MEDIUM RISK  Completed   ? HEPATITIS B VACCINES  Aged Out

## 2021-06-18 NOTE — PROGRESS NOTES
[facility] Hematology and Oncology Progress Note    Patient: Theo Meyers  MRN: [id number]  Date of Service: June 20, 2019      Assessment and Plan:    1. Kappa light chain myeloma: Slight radiation was markedly elevated today.  No other signs or symptoms of progressive disease.  We will have him return to clinic in a month to recheck his labs.  They continue to increase then we will likely change his regimen at that time.  Next regimen will either be clinical trial or dimple/pom/dex.     2. Thromboprophylaxis: He is on Coumadin for this.  INR is therapeutic today.  No changes in the Coumadin dose.  Significant GI bleeding in December 2017 requiring hospitalization.    3.  Pancytopenia: He has a neutropenia which is stable around 1.  Most likely due to Revlimid.  Macrocytic anemia.  B12 deficient in September 2017.  Recheck indices at next blood draw.    4.  Back pain: Were going to repeat an MRI.  We will send him to the spine clinic.  He may be a candidate for injections or physical therapy.  MRI in August 2015 showed degenerative changes in the lumbar disks with mild to moderate degree.  Also facet arthropathy.  Also lateral disc bulging at L3-4.    ECOG Performance   ECOG Performance Status: 1    Distress Assessment  Distress Assessment Score: 2 (pain)    Pain  Currently in Pain: Yes  Pain Score (Initial OR Reassessment): 8  Location: low back; can move around from left to right    Diagnosis:    1. IgG kappa light chain myeloma. Hyposecretory. Diagnosed 2009. No significant measurable M protein. 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 There was also on 11;14 translocation.  Past immunofixations from 2011 show IgG-kappa.  Bone marrow biopsy at 5%.  Residual kappa light chain myeloma involving 20% of nucleated cells.  No significant change from November 2016 marrow cellularity.    2. Deep vein thrombosis of the left leg diagnosed 9/2012 while on treatment.    3.  Vitamin B12 deficiency diagnosed in  September 2017.    4.  GI bleed and anemia requiring hospitalization in December 2017.    Treatment:    He presented with a lytic lesion involving the C6 vertebral body, although no measurable M protein. IgG kappa monoclonal immunoglobulin was confirmed by ELMA as well as elevated kappa free light chains with an abnormal kappa lambda ratio. Bone marrow biopsy showed 30% involvement and cytogenetics confirmed deletion of 13 q. as well as 11;14 translocation. He was initially treated with Velcade and dexamethasone and achieved a good partial response.   He was referred to the [facility] where he underwent high-dose chemotherapy with autologous peripheral stem cell transplant in April of 2010. He began Revlimid as maintenance therapy post transplant. Repeat bone marrow biopsy done October 2010 showed about 5% kappa restricted plasma cells and so at that time weekly Velcade was added along with his maintenance Revlimid and dexamethasone.   He has done well since that time with stable minimal residual disease, best assessed by serum free light chains. He required dose reductions of weekly Velcade because of cytopenias and was ultimately switched to a q 2 week maintenance schedule which he has been on since September 2012.     His Revlimid dose was reduced to 15 mg daily and he continues on dexamethasone 20 mg p.o. weekly  Revlimid dosing changed to 20 mg 3 weeks on 1 week (instead of 15 mg daily) for cost reasons.  Started March 2018    Interim History:    Theo returns today for follow-up visit.  He was last seen about 2 months ago.  Since then he has developed lower back pain.  This is been a chronic problem but it has been worse lately.  It is now steady.  He is taking ibuprofen and Flexeril.  No radicular symptoms.  No other changes in his health since his last visit.    Review of Systems:    Constitutional  Constitutional (WDL): Exceptions to WDL  Fatigue: Fatigue relieved by  rest  Neurosensory  Neurosensory (WDL): Exceptions to WDL  Peripheral Sensory Neuropathy: Asymptomatic, loss of deep tendon reflexes or paresthesia  Cardiovascular  Cardiovascular (WDL): All cardiovascular elements are within defined limits  Pulmonary  Respiratory (WDL): Within Defined Limits  Gastrointestinal  Gastrointestinal (WDL): All gastrointestinal elements are within defined limits  Genitourinary  Genitourinary (WDL): All genitourinary elements are within defined limits  Integumentary  Integumentary (WDL): All integumentary elements are within defined limits  Patient Coping  Patient Coping: Accepting  Accompanied by  Accompanied by: Alone    Past History:    Past Medical History:   Diagnosis Date     Anemia 12/13/2017     Arthritis      Bilateral inguinal hernia      Glaucoma      History of blood clots     DVT - left chronic     Multiple myeloma (H)     Gets chemo infusions every 2 weeks     Pancytopenia (H)      Peripheral neuropathy (H)      Syncope      TMJ (temporomandibular joint disorder)      Physical Exam:    Recent Vitals 6/20/2018   Weight 166 lbs 13 oz   /51   Pulse 58   Temp 97.5   Temp src 1   SpO2 100   Some recent data might be hidden     General: patient appears stated age of 73 y.o.. Nontoxic and in no distress.   HEENT: Head: atraumatic, normocephalic. Sclerae anicteric.  Chest:  Normal respiratory effort.   Cardiac:  No edema.   Abdomen: abdomen is non-distended  Extremities: normal tone and muscle bulk.   Skin: no lesions or rash. Warm and dry.   CNS: alert and oriented. Grossly non-focal.   Psychiatric: normal mood and affect.     Lab Results:    Results for MONICA SCOTT (MRN [id number]) as of 6/24/2018 20:50   Ref. Range 6/6/2018 08:09 6/6/2018 08:09   Sodium Latest Ref Range: 136 - 145 mmol/L 143    Potassium Latest Ref Range: 3.5 - 5.0 mmol/L 4.1    Chloride Latest Ref Range: 98 - 107 mmol/L 110 (H)    CO2 Latest Ref Range: 22 - 31 mmol/L 26    Anion Gap, Calculation  Latest Ref Range: 5 - 18 mmol/L 7    BUN Latest Ref Range: 8 - 28 mg/dL 15    Creatinine Latest Ref Range: 0.70 - 1.30 mg/dL 0.84    GFR MDRD Af Amer Latest Ref Range: >60 mL/min/1.73m2 >60    GFR MDRD Non Af Amer Latest Ref Range: >60 mL/min/1.73m2 >60    Calcium Latest Ref Range: 8.5 - 10.5 mg/dL 8.7    AST Latest Ref Range: 0 - 40 U/L 14    ALT Latest Ref Range: 0 - 45 U/L 13    ALBUMIN Latest Ref Range: 3.5 - 5.0 g/dL 2.9 (L)    Protein, Total Latest Ref Range: 6.0 - 8.0 g/dL 5.2 (L) 4.8 (L)   Alkaline Phosphatase Latest Ref Range: 45 - 120 U/L 64    Bilirubin, Total Latest Ref Range: 0.0 - 1.0 mg/dL 0.5    Glucose Latest Ref Range: 70 - 125 mg/dL 101    % Beta Latest Ref Range: 10.0 - 17.0 %  10.7   Albumin % Latest Ref Range: 51.0 - 67.0 %  66.1   Alpha 1 Latest Ref Range: 0.1 - 0.3 g/dL  0.2   Alpha 1 % Latest Ref Range: 2.0 - 4.0 %  3.4   Alpha 2 Latest Ref Range: 0.4 - 0.9 g/dL  0.6   Alpha 2 % Latest Ref Range: 5.0 - 13.0 %  12.3   Beta Latest Ref Range: 0.7 - 1.2 g/dL  0.5 (L)   ELP Comment Unknown  See immunofixatio...   Gamma Globulin Latest Ref Range: 0.6 - 1.4 g/dL  0.4 (L)   Gamma Globulin % Latest Ref Range: 9.0 - 20.0 %  7.5 (L)   IGA Latest Ref Range: 65 - 400 mg/dL 29 (L)    Immunoglobulin M Latest Ref Range: 60 - 280 mg/dL 10 (L)    Immunoglobulin G Latest Ref Range: 700 - 1700 mg/dL 388 (L)    INR Latest Ref Range: 0.90 - 1.10  2.19 (H)    WBC Latest Ref Range: 4.0 - 11.0 thou/uL 1.8 (LL)    RBC Latest Ref Range: 4.40 - 6.20 mill/uL 2.72 (L)    Hemoglobin Latest Ref Range: 14.0 - 18.0 g/dL 9.6 (L)    Hematocrit Latest Ref Range: 40.0 - 54.0 % 28.8 (L)    MCV Latest Ref Range: 80 - 100 fL 106 (H)    MCH Latest Ref Range: 27.0 - 34.0 pg 35.3 (H)    MCHC Latest Ref Range: 32.0 - 36.0 g/dL 33.3    RDW Latest Ref Range: 11.0 - 14.5 % 15.0 (H)    Platelets Latest Ref Range: 140 - 440 thou/uL 151    MPV Latest Ref Range: 8.5 - 12.5 fL 11.0    Neutrophils % Latest Ref Range: 50 - 70 % 59    Lymphocytes  % Latest Ref Range: 20 - 40 % 18 (L)    Monocytes % Latest Ref Range: 2 - 10 % 15 (H)    Eosinophils % Latest Ref Range: 0 - 6 % 6    Basophils % Latest Ref Range: 0 - 2 % 2    Neutrophils Absolute Latest Ref Range: 2.0 - 7.7 thou/uL 1.0 (L)    Lymphocytes Absolute Latest Ref Range: 0.8 - 4.4 thou/uL 0.3 (L)    Monocytes Absolute Latest Ref Range: 0.0 - 0.9 thou/uL 0.3    Eosinophils Absolute Latest Ref Range: 0.0 - 0.4 thou/uL 0.1    Basophils Absolute Latest Ref Range: 0.0 - 0.2 thou/uL 0.0      Imaging:    No results found.       Signed by: Per Clifford MD

## 2021-06-18 NOTE — LETTER
Letter by Mathew Ott MD at      Author: Mathew Ott MD Service: -- Author Type: --    Filed:  Encounter Date: 2/28/2019 Status: (Other)       Theo Meyers  [address]  [place] [address]             February 28, 2019         Dear Mr. Meyers,    Below are the results from your recent visit:    Resulted Orders   Echo Complete   Result Value Ref Range    LV volume diastolic 130 62 - 150 cm3    LV volume systolic 48 21 - 61 cm3    HR 85 bpm    IVSd 0.9 0.6 - 1.0 cm    LVIDd 4.6 4.2 - 5.8 cm    LVIDs 3 2.5 - 4.0 cm    LVOT diam 2.1 cm    LVOT mean gradient 2 mmHg    LVOT peak VTI 16.2 cm    LVOT mean quinten 62.2 cm/s    LV PWd 0.9 0.6 - 1.0 cm    MV E' lat quinten 7.31 cm/s    MV E' med quinten 6.92 cm/s    AV mean quinten 189 cm/s    AV mean gradient 16 mmHg    AV VTI 51.3 cm    AV peak quinetn 269 cm/s    AO root 2.9 cm    LA size 3.2 cm    MV decel slope 4,280 mm/s2    MV decel time 239 ms    MV P 1/2 time 86 ms    MV peak A quinten 112 cm/s    MV peak E quinten 82.4 cm/s    MV mean quinten 86.9 cm/s    MV mean gradient 3 mmHg    MV VTI 42.9 cm    MV peak velocityoctiy 128 cm/s    TR peak quinten 250 cm/s    TAPSE 1.9 cm    LA area 2 17.7 cm2    LA area 1 11.8 cm2    LA length 4.58 cm    Weight 2,672 lbs    /58 mmHg    BSA 1.92 m2    Hieght 69 in    IVS/PW ratio 1.0     TR peak gradent 25.0 mmHg    LV FS 34.8 28 - 44 %    Echo LVEF calculated 63 55 - 75 %    LA volume 38.8 mL    LV mass 137.7 g    AV area 1.1 cm2    MV area p 1/2 time 2.6 cm2    MV area cont eq 1.3 cm2    MV E/A Ratio 0.7     LVOT area 3.46 cm2    LVOT SV 56.1 cm3    AV peak gradient 28.9 mmHg    MV peak gradient 6.6 mmHg    LV systolic volume index 25.0 11 - 31 cm3/m2    LV diastolic volume index 67.7 34 - 74 cm3/m2    LA volume index 20.2 mL/m2    LV mass index 71.7 g/m2    LV SVi 29.2 ml/m2    MV med E/e' ratio 11.9     MV lat E/e' ratio 11.3     LV CO 4.8 l/min    LV Ci 2.5 l/min/m2    Height 69.0 in    Weight 167 lbs    MV Avg E/e' Ratio 11.6 cm/s    AV  DIM IND VTI 0.3     MVA VTI 1.31 cm2    Narrative    1. Normal left ventricular size and systolic performance with a visually   estimated ejection fraction of 60-65%.   2. There is mild to moderate aortic stenosis.   3. Normal right ventricular size and systolic performance.     When compared to the prior real-time echocardiogram dated 5 November 2015,   the degree of aortic stenosis has increased slightly from mild to now   mild-moderate.  Otherwise the findings are similar in both examinations.       The degree of your aortic stenosis has increased mildly, now mild to moderate aortic stenosis.  This is not severe enough to cause symptoms, however.    Your heart function was otherwise normal.    No change in treatment plan.    I would recommend a repeat heart echo in 1 year.    Please call with questions or contact us using [employer].    Sincerely,        Electronically signed by Mathew Ott MD

## 2021-06-18 NOTE — PATIENT INSTRUCTIONS - HE
Patient Instructions by Per Clifford MD at 10/5/2020 12:45 PM     Author: Per Clifford MD Service: -- Author Type: Physician    Filed: 10/5/2020  1:41 PM Encounter Date: 10/5/2020 Status: Signed    : Per Clifford MD (Physician)       Patient Education   Patient Education     Pomalidomide Oral capsule  What is this medicine?  POMALIDOMIDE (pom a LID oh mide) is a chemotherapy drug used to treat multiple myeloma. It targets specific proteins within cancer cells and stops the cancer cell from growing.  This medicine may be used for other purposes; ask your health care provider or pharmacist if you have questions.  What should I tell my health care provider before I take this medicine?  They need to know if you have any of these conditions:    history of blood clots    irregular monthly periods or menstrual cycles    an unusual or allergic reaction to pomalidomide, other medicines, foods, dyes, or preservatives    pregnant or trying to get pregnant    breast-feeding  How should I use this medicine?  Take this medicine by mouth with a glass of water. Follow the directions on the prescription label. Take this medicine on an empty stomach, at least 2 hours before or 2 hours after a meal. Do not take with food. Do not cut, crush, or chew this medicine. Take your medicine at regular intervals. Do not take it more often than directed. Do not stop taking except on your doctor's advice.  A special MedGuide will be given to you by the pharmacist with each prescription and refill. Be sure to read this information carefully each time.  Talk to your pediatrician regarding the use of this medicine in children. Special care may be needed.  Overdosage: If you think you've taken too much of this medicine contact a poison control center or emergency room at once.  NOTE: This medicine is only for you. Do not share this medicine with others.  What if I miss a dose?  If you miss a dose, take it as soon as you can. If  your next dose is to be taken in less than 12 hours, then do not take the missed dose. Take the next dose at your regular time. Do not take double or extra doses.  What may interact with this medicine?  This medicine may interact with the following medications:    amprenavir    boceprevir    carbamazepine    dalfopristin; quinupristin    delavirdine    enzalutamide    fosamprenavir    indinavir    isoniazid, INH    itraconazole    ketoconazole    nicardipine    rifampin    ritonavir    [place], [place] perforatum    telaprevir    telithromycin    thiabendazole    tipranavir    tobacco (cigarettes)  This list may not describe all possible interactions. Give your health care provider a list of all the medicines, herbs, non-prescription drugs, or dietary supplements you use. Also tell them if you smoke, drink alcohol, or use illegal drugs. Some items may interact with your medicine.  What should I watch for while using this medicine?  Visit your doctor for regular check ups. Tell your doctor or healthcare professional if your symptoms do not start to get better or if they get worse. You will need to have important blood work done while you are taking this medicine.  This medicine is available only through a special program. Doctors, pharmacies, and patients must meet all of the conditions of the program. Your health care provider will help you get signed up with the program if you need this medicine. Through the program you will only receive up to a 28 day supply of the medicine at one time. You will need a new prescription for each refill.  This medicine can cause birth defects. Do not get pregnant while taking this drug. Females with child-bearing potential will need to have 2 negative pregnancy tests before starting this medicine. Pregnancy testing must be done every 2 to 4 weeks as directed while taking this medicine. Use 2 reliable forms of birth control together while you are taking this medicine and  for 1 month after you stop taking this medicine. If you think that you might be pregnant talk to your doctor right away.  Men must use a latex condom during sexual contact with a woman while taking this medicine and for 28 days after you stop taking this medicine. A latex condom is needed even if you have had a vasectomy. Contact your doctor right away if your partner becomes pregnant. Do not donate sperm while taking this medicine and for 28 days after you stop taking this medicine.  Do not give blood while taking the medicine and for 1 month after completion of treatment to avoid exposing pregnant women to the medicine through the donated blood.  You may need blood work done while you are taking this medicine.  If you are going to have surgery or any other procedures, tell your doctor you are taking this medicine.  What side effects may I notice from receiving this medicine?  Side effects that you should report to your doctor or health care professional as soon as possible:    allergic reactions like skin rash, itching or hives, swelling of the face, lips, or tongue    bloody or dark, tarry stools    breathing problems    chest pain    confusion    dark urine    fever, infection, runny nose, or sore throat    nausea, vomiting    pain, tingling, numbness in the hands or feet    red spots on the skin    swelling of your hands, ankles or leg    trouble passing urine or change in the amount of urine    unusual bleeding or bruising  Side effects that usually do not require medical attention (Report these to your doctor or health care professional if they continue or are bothersome.):    constipation    diarrhea    dizziness    headache    joint pain    muscle pain    tiredness    trouble sleeping  This list may not describe all possible side effects. Call your doctor for medical advice about side effects. You may report side effects to FDA at 1-843-FDA-2948.  Where should I keep my medicine?  Keep out of the reach of  children.  Store between 20 and 25 degrees C (68 and 77 degrees F). Throw away any unused medicine after the expiration date.  NOTE: This sheet is a summary. It may not cover all possible information. If you have questions about this medicine, talk to your doctor, pharmacist, or health care provider.  NOTE:This sheet is a summary. It may not cover all possible information. If you have questions about this medicine, talk to your doctor, pharmacist, or health care provider. Copyright  2015 [employer]           Daratumumab injection  Brand Name: DARZALEX  What is this medicine?  DARATUMUMAB (dyllan a toom ue mab) is a monoclonal antibody. It is used to treat multiple myeloma.  How should I use this medicine?  This medicine is for infusion into a vein. It is given by a health care professional in a hospital or clinic setting.  Talk to your pediatrician regarding the use of this medicine in children. Special care may be needed.  What side effects may I notice from receiving this medicine?  Side effects that you should report to your doctor or health care professional as soon as possible:    allergic reactions like skin rash, itching or hives, swelling of the face, lips, or tongue    breathing problems    chills    cough    dizziness    feeling faint or lightheaded    headache    low blood counts - this medicine may decrease the number of white blood cells, red blood cells and platelets. You may be at increased risk for infections and bleeding.    nausea, vomiting    shortness of breath    signs of decreased platelets or bleeding - bruising, pinpoint red spots on the skin, black, tarry stools, blood in the urine    signs of decreased red blood cells - unusually weak or tired, feeling faint or lightheaded, falls    signs of infection - fever or chills, cough, sore throat, pain or difficulty passing urine  Side effects that usually do not require medical attention (report to your doctor or health care professional if they  continue or are bothersome):    back pain    diarrhea    muscle cramps    pain, tingling, numbness in the hands or feet    swelling of the ankles, feet, hands    tiredness  What may interact with this medicine?  Interactions have not been studied.  Give your health care provider a list of all the medicines, herbs, non-prescription drugs, or dietary supplements you use. Also tell them if you smoke, drink alcohol, or use illegal drugs. Some items may interact with your medicine.  What if I miss a dose?  Keep appointments for follow-up doses as directed. It is important not to miss your dose. Call your doctor or health care professional if you are unable to keep an appointment.  Where should I keep my medicine?  Keep out of the reach of children.  This drug is given in a hospital or clinic and will not be stored at home.  What should I tell my health care provider before I take this medicine?  They need to know if you have any of these conditions:    infection (especially a virus infection such as chickenpox, cold sores, or herpes)    lung or breathing disease    pregnant or trying to get pregnant    breast-feeding    an unusual or allergic reaction to daratumumab, other medicines, foods, dyes, or preservatives  What should I watch for while using this medicine?  This drug may make you feel generally unwell. Report any side effects. Continue your course of treatment even though you feel ill unless your doctor tells you to stop.  This medicine can cause serious allergic reactions. To reduce your risk you may need to take medicine before treatment with this medicine. Take your medicine as directed.  This medicine can affect the results of blood tests to match your blood type. These changes can last for up to 6 months after the final dose. Your healthcare provider will do blood tests to match your blood type before you start treatment. Tell all of your healthcare providers that you are being treated with this medicine  before receiving a blood transfusion.  This medicine can affect the results of some tests used to determine treatment response; extra tests may be needed to evaluate response.  Do not become pregnant while taking this medicine or for 3 months after stopping it. Women should inform their doctor if they wish to become pregnant or think they might be pregnant. There is a potential for serious side effects to an unborn child. Talk to your health care professional or pharmacist for more information.  NOTE:This sheet is a summary. It may not cover all possible information. If you have questions about this medicine, talk to your doctor, pharmacist, or health care provider. Copyright  2018 [employer]

## 2021-06-18 NOTE — PROGRESS NOTES
Pt ambulates to infusion center for labs and treatment.  IVAD accessed, labs drawn et sent.  IVAD flushed w/NS et heparin and needle deaccessed.  Velcade given SQ as ordered without difficulty.  Pt left clinic stable to lobby.  Plan RTC as scheduled.

## 2021-06-18 NOTE — PROGRESS NOTES
ASSESSMENT: Theo Meyers is a 73 y.o. male with past medical history significant for multiple myeloma (getting chemotherapy infusions every 2 weeks), glaucoma, DVT (on warfarin), syncopal episode related to significant GI bleeding December 2017 requiring hospitalization, pancytopenia (neutropenia around 1) who presents today for new patient evaluation of chronic bilateral low back pain without radicular symptoms.  MRI lumbar spine shows degenerative changes most pronounced at L3-4 where there is moderate to severe spinal canal stenosis and severe left lateral recess stenosis.  Patient does not have symptoms of neurogenic claudication.  He is able to walk 3 miles without having to stop and rest.  He seems to be more symptomatic from facet arthropathy which is most pronounced at L3-4 and to a lesser extent at L4-5.  SI joint provocative testing was negative.  He does have peripheral neuropathy in fingers and toes.  Other than the sensory deficit related to the peripheral neuropathy, he was neurologically intact.    HAL: 28  Who 5: 12    PLAN:  A shared decision making model was used.  The patient's values and choices were respected.  The following represents what was discussed and decided upon by the physician assistant and the patient.      1.  DIAGNOSTIC TESTS: I reviewed the MRI lumbar spine in detail with the help of a spine model.  No further diagnostic tests were ordered.    2.  PHYSICAL THERAPY:  Discussed the importance of core strengthening, ROM, stretching exercises with the patient and how each of these entities is important in decreasing pain.  Explained to the patient that the purpose of physical therapy is to teach the patient a home exercise program.  These exercises need to be performed every day in order to decrease pain and prevent future occurrences of pain.  I placed an order for the patient begin physical therapy at the [place] [facility].    3.  MEDICATIONS: No changes are  made to the patient's medications.  He is on gabapentin 300 mg daily for peripheral neuropathy.  Patient does state that he uses ibuprofen for pain.  I suggested that he avoid ibuprofen since he is on warfarin.  She also uses Tylenol as needed.    4.  INTERVENTIONS: No interventions were ordered.  Patient may benefit from interventional pain management if he fails to improve with conservative treatment.  I would likely recommend a bilateral L3-4, L4-5 facet joint injection initially.  We would need to obtain medical clearance from his oncologist for him to have the procedure.   -If that does not help, we could consider an L4-5 interlaminar epidural steroid injection to address the spinal stenosis at L3-4.  The patient would need to be off of his warfarin for the interlaminar epidural steroid injection.     5.  PATIENT EDUCATION: The patient asked if he would benefit from spine surgery.  I told the patient that I believe we should exhaust all conservative treatment options before considering surgical intervention.  He is neurologically intact.  -Patient is in agreement with the above plan.  All questions were answered.    6.  FOLLOW-UP:   I will see the patient back in the clinic in about 4 weeks to follow-up.  If he has any questions or concerns in the meantime, he should not hesitate to contact our clinic.      SUBJECTIVE:  Theo Meyers  Is a 73 y.o. male who presents today in consultation at the request of Dr. Clifford for new patient evaluation of low back pain.  Patient states that he has a history of low back pain.  Patient was last seen in our clinic in 2013.  At that time he was having low back pain that radiated into both legs.  He had a bilateral L4-5 transforaminal epidural steroid injection on August 15, 2013.  According to the patient's chart, he did have some relief of his pain following this injection at his two-week post procedure follow-up visit.  However, the patient today does not feel it was  very helpful.  He remembers that the physical therapy that he was getting at that same time was the more effective treatment for him.  The patient states that after 3 months of physical therapy, his symptoms had almost completely resolved.  The patient states that he was doing well until about 2 months ago, when he began to have back pain again.  He denies any specific injury or event at that time to cause the pain.  He states the pain comes and goes.  He complained about back pain when he was at his oncology visit last week and he was referred to our clinic for further evaluation and treatment.    She complains of bilateral lower back pain.  Patient reports that pain spans across the low back/upper buttock bilaterally.  He states that it switches sides, but both sides are equally affected.  He denies any pain radiating down the legs, as he did not 2013.  Describes the pain as an aching pain.  He states that his back feels very stiff.  His pain is aggravated with getting in and out of a car, getting in and out of bed, transitioning from seated to standing, prolonged standing, and bending.  The patient states that his walking is not limited by pain.  He goes for a 3 mile walk with his dogs on a regular basis.  He does note some increased soreness after he finishes the walk.  The patient states that he has some relief of his pain if he is leaning forward.  The patient states this pain is not really holding him back from doing any activities that he wants to be doing.  Patient states that he has peripheral neuropathy affecting the fingers and toes which is stable.  He denies loss of bowel or bladder control.  Denies any recent fevers, chills, sweats.  Denies weakness down the legs.    Patient to physical therapy in 2013.  He felt this was very helpful.  He still do some of his home exercises.  He does not go to chiropractor.  He has not had any injections since his bilateral L4-5 transforaminal epidural steroid  injection on August 15, 2013.  He has never had any spine surgery.  The patient uses gabapentin 300 mg daily for peripheral neuropathy.  He states that he uses ibuprofen as needed for pain as well.  He also takes Tylenol.  He is on warfarin for DVT.  He takes the dexamethasone when he has his chemotherapy treatment.  He has noticed that he feels no back pain on the days that he takes his dexamethasone.    Current Outpatient Prescriptions on File Prior to Encounter   Medication Sig Dispense Refill     acetaminophen (TYLENOL) 500 MG tablet Take 500 mg by mouth every 6 (six) hours as needed for pain.       acyclovir (ZOVIRAX) 400 MG tablet Take 400 mg by mouth 2 (two) times a day.       ascorbic acid (VITAMIN C) 1000 MG tablet Take 2,000 mg by mouth daily.        aspirin 81 MG EC tablet Take 2 tablets (162 mg total) by mouth daily.  0     bimatoprost (LUMIGAN) 0.01 % Drop Administer 1 drop to both eyes at bedtime.       brimonidine (ALPHAGAN P) 0.1 % Drop Administer 1 drop to both eyes 3 (three) times a day.        calcium, as carbonate, (TUMS) 200 mg calcium (500 mg) chewable tablet Chew 1 tablet 3 (three) times a day as needed.        calcium-vitamin D 500 mg(1,250mg) -200 unit per tablet Take 1 tablet by mouth daily.       cetirizine (ZYRTEC) 10 MG tablet Take 10 mg by mouth daily.       cyclobenzaprine (FLEXERIL) 10 MG tablet Take 0.5-1 tablets (5-10 mg total) by mouth 3 (three) times a day as needed for muscle spasms. 30 tablet 1     dexamethasone (DECADRON) 4 MG tablet Take 20 mg by mouth every 14 (fourteen) days.       gabapentin (NEURONTIN) 300 MG capsule Take 300 mg by mouth daily.        guaiFENesin-dextromethorphan (MUCINEX DM) 600-30 mg Tb12 Take 1 tablet by mouth 2 (two) times a day as needed.        KLOR-CON M20 20 mEq tablet TAKE 1 TABLET BY MOUTH EVERY DAY 90 tablet 3     lenalidomide (REVLIMID) 20 mg cap Take 20 mg by mouth every evening.        loperamide (IMODIUM) 2 mg capsule Take 2 mg by mouth 4  (four) times a day as needed for diarrhea.       magnesium oxide (MAG-OX) 400 mg tablet TAKE 1 TABLET (400 MG TOTAL) BY MOUTH 2 (TWO) TIMES A DAY. 60 tablet 3     multivitamin (MULTIVITAMIN) per tablet Take 1 tablet by mouth daily.       omega 3-dha-epa-fish oil 900-1,400 mg CpDR Take 1 tablet by mouth daily.       omeprazole (PRILOSEC) 20 MG capsule Take 1 capsule (20 mg total) by mouth daily. 30 capsule 2     ondansetron (ZOFRAN-ODT) 8 MG disintegrating tablet Take 8 mg by mouth every 8 (eight) hours as needed for nausea.       psyllium (METAMUCIL) powder Take 2 packets by mouth daily. Takes 2 tablespoonsful       sodium chloride (OCEAN) 0.65 % nasal spray 2 sprays into each nostril as needed for congestion. 15 mL 12     tamsulosin (FLOMAX) 0.4 mg Cp24 Take 0.4 mg by mouth every evening.       warfarin (COUMADIN) 5 MG tablet TAKE 1 TABLET BY MOUTH DAILY 90 tablet 1     Current Facility-Administered Medications on File Prior to Encounter   Medication Dose Route Frequency Provider Last Rate Last Dose     heparin 100 unit/mL lockflush (PF) porcine 300-600 Units  300-600 Units Intravenous PRN Per Clifford MD   600 Units at 11/19/14 1013       No Known Allergies    Past Medical History:   Diagnosis Date     Anemia 12/13/2017     Arthritis      Bilateral inguinal hernia      Glaucoma      History of blood clots     DVT - left chronic     Multiple myeloma (H)     Gets chemo infusions every 2 weeks     Pancytopenia (H)      Peripheral neuropathy (H)      Syncope      TMJ (temporomandibular joint disorder)         Past Surgical History:   Procedure Laterality Date     APPENDECTOMY      Age 16     CARPAL TUNNEL RELEASE Bilateral      ESOPHAGOGASTRODUODENOSCOPY N/A 12/14/2017    Procedure: ESOPHAGOGASTRODUODENOSCOPY (EGD) WITH BIOPSYS;  Surgeon: Marlon Roy MD;  Location: [facility];  Service:      ESOPHAGOGASTRODUODENOSCOPY N/A 1/19/2018    Procedure: ENDOSCOPIC ULTRASOUND  ESOPHAGOGASTRODUODENOSCOPY WITH  DUODENAL POLYP REMOVAL;  Surgeon: Familia Mcgraw MD;  Location: [facility];  Service:      EYE SURGERY      Cataract     PORTACATH PLACEMENT       VERTEBROPLASTY      T6     WISDOM TOOTH EXTRACTION       Family history: Father and brother had cancer    Social history: The patient is .  He is a retired  from [employer].  Patient drinks 3-4 alcoholic beverages per month.  He denies tobacco use.  He denies illicit drug use.    ROS: Positive for blood in stools, easy bruising, back pain, black stools, fainting.  The changes in stools and fainting are related to a GI bleed in December 2017.  This is resolved.  Specifically negative for bowel/bladder dysfunction, fevers,chills, appetite changes, unexplained weight loss.   Otherwise 13 systems reviewed are negative.  Please see the patient's intake questionnaire from today for details.      OBJECTIVE:  PHYSICAL EXAMINATION:    CONSTITUTIONAL:  Vital signs as above.  No acute distress.  The patient is well nourished and well groomed.  PSYCHIATRIC:  The patient is awake, alert, oriented to person, place, time and answering questions appropriately with clear speech.    HEENT: Normocephalic, atraumatic.  Sclera clear.  Neck is supple.  SKIN:  Skin over the face, bilateral lower extremities, and posterior torso is clean, dry, intact without rashes.    GAIT:  Gait is non-antalgic.  The patient is able to heel and toe walk without significant difficulty.    STANDING EXAMINATION: No significant tenderness palpation bilateral lower lumbar paraspinous muscles.  No significant tenderness palpation sacroiliac joints.  Lumbar range of motion is full.  Lumbar facet loading maneuvers did not increase pain today.  MUSCLE STRENGTH:  The patient has 5/5 strength for the bilateral hip flexors, knee flexors/extensors, ankle dorsiflexors/plantar flexors, great toe extensors, ankle evertors/invertors.  NEUROLOGICAL: 2+ patellar, and 1+ Achilles reflexes bilaterally.  Negative  Babinski's bilaterally.  No ankle clonus bilaterally. Sensation to light touch is intact in the bilateral L4, L5, and S1 dermatomes.  VASCULAR:  2/4 dorsalis pedis pulses bilaterally.  Bilateral lower extremities are warm.    There is no pitting edema of the bilateral lower extremities.  ABDOMINAL:  Soft, non-distended, non-tender throughout all quadrants.  No pulsatile mass palpated in the left lower quadrant.  LYMPH NODES:  No palpable or tender inguinal lymph nodes.  MUSCULOSKELETAL: Negative pelvic distraction.  Negative thigh thrust bilaterally.  Negative Clayton's bilaterally.    RESULTS: Reviewed the MRI lumbar spine from [facility] dated June 24, 2018.  This shows degenerative changes which have progressed since 2015.  Findings are most pronounced at L3-4 there is severe left lateral recess stenosis and overall moderate to severe spinal canal stenosis related to a mild circumferential disc bulge and left paracentral protrusion with mild to moderate facet arthropathy.  There is also mild spinal canal stenosis at L2-3.  There is moderate right and mild left lateral recess stenosis at L4-5 where there is also mild foraminal stenosis.  Patient also has multilevel facet arthropathy most pronounced at L3-4 and L4-5.  Please see report for further details.    I also reviewed the MRI thoracic spine from [facility] dated June 25, 2018.  This shows redemonstration of chronic moderate compression fracture at T6 with slightly exaggerated focal thoracic kyphosis.  There are mild degenerative changes without significant canal or foraminal stenosis.  There is redemonstration of slight heterogeneity throughout bone marrow of the thoracic spine which is nonspecific but could be observed in multiple myeloma.  Please see report for further details.

## 2021-06-19 NOTE — PROGRESS NOTES
[facility] Hematology and Oncology Progress Note    Patient: Theo Meyers  MRN: [id number]  Date of Service: 08/01/18          Reason for Visit    Myeloma    Assessment and Plan    1.  Myeloma: Patient continues on his current treatment of Velcade and dexamethasone every other week along with Revlimid.  His labs have fluctuating, but overall stable. His light chains and the ratio continue to be elevated, but continue to fluctuate.  We will continue current regimen.  He will see Dr. Clifford in 2 months.     2. DVT: on coumadin. Therapeutic today. Being managed by coumadin clinic.     3. Pancytopenia: from revlimid and velcade. No complications at this time. Continue to monitor and take precautions. Encourage him to call with infectious, bleeding complications.      4. Back pain: improved with PT. No need for any other interventions.     ECOG Performance   ECOG Performance Status: 0      Distress Assessment  Distress Assessment Score: No distress    Pain  Currently in Pain: No/denies       Problem List    1. Multiple myeloma not having achieved remission (H)     2. DVT (deep venous thrombosis) (H)        ______________________________________________________________________________    History of Present Illness    DIAGNOSIS:   1. IgG kappa light chain myeloma. Hyposecretory. Diagnosed 2009. No significant measurable M protein. 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 There was also on 11;14 translocation.   2. Deep vein thrombosis of the left leg diagnosed 09/2012 while on treatment.       TREATMENT:   1. Most recently he has been on Velcade every 2 weeks since 09/2012. He is getting dexamethasone 20 mg every other week with the velcade. Finally, he is receiving Revlimid 20 mg daily for 3 weeks on, 1 week off.   2. Continues on warfarin      PAST TREATMENT and HISTORY:   He presented at diagnosis with a lytic lesion involving the C6 vertebral body, although no measurable M protein. IgG kappa monoclonal  immunoglobulin was confirmed by ELMA as well as elevated kappa free light chains with an abnormal kappa lambda ratio. Bone marrow biopsy showed 30% involvement and cytogenetics confirmed deletion of 13 q. as well as 11;14 translocation. He was initially treated with Velcade and dexamethasone and achieved a good partial response. He was referred to the [facility] where he underwent high-dose chemotherapy with autologous peripheral stem cell transplant in April of 2010. He began Revlimid as maintenance therapy post transplant. Repeat bone marrow biopsy done October 2010 showed about 5% kappa restricted plasma cells and so at that time weekly Velcade was added along with his maintenance Revlimid and dexamethasone. He has done well since that time with stable minimal residual disease, best assessed by serum free light chains. He required dose reductions of weekly Velcade because of cytopenias and was ultimately switched to a q.2 week maintenance schedule which he has been on since September 2012. His Revlimid dose is 20 mg daily and he continues on dexamethasone 20 mg p.o. q. Week.       INTERIM HISTORY:  Pt is doing well. Having intermittent back pain, this has improved quite a bit with PT. His energy levels are good. He denies any new issues. Denies fevers. Denies vision issues. Denies changes in his bowels.       Review of Systems    ROS: As above in the history, otherwise the remainder of the 10point ROS is negative and not contributory to current reason for visit.     Past History  Past Medical History:   Diagnosis Date     Anemia 12/13/2017     Arthritis      Bilateral inguinal hernia      Glaucoma      History of blood clots     DVT - left chronic     Multiple myeloma (H)     Gets chemo infusions every 2 weeks     Pancytopenia (H)      Peripheral neuropathy (H)      Syncope      TMJ (temporomandibular joint disorder)        PHYSICAL EXAM:  /59  Pulse (!) 54  Temp 97.7  F (36.5  C) (Oral)   Wt  166 lb 6.4 oz (75.5 kg)  SpO2 98%  BMI 24.57 kg/m2  GENERAL: no acute distress. Cooperative in conversation. Here alone  HEENT: pupils are equal, round and reactive. Oromucosa is clean and intact. No ulcerations or mucositis noted. No bleeding noted.  RESP: lungs are clear bilaterally per auscultation. Regular respiratory rate. No wheezes or rhonchi.  CV: Regular, rate and rhythm. No murmurs.  ABD: soft, nontender. Positive bowel sounds. No organomegaly.   MUSCULOSKELETAL: mild/trace bilateral lower extremity swelling.   NEURO: non focal. Alert and oriented x3.   PSYCH: within normal limits. No depression or anxiety.  SKIN: warm dry intact   LYMPH: no cervical, supraclavicular lymphadenopathy        Lab Results    Lab Standing Order on 08/01/2018   Component Date Value Ref Range Status     INR 08/01/2018 2.58* 0.90 - 1.10 Final   Lab Standing Order on 07/25/2018   Component Date Value Ref Range Status     Sodium 07/25/2018 139  136 - 145 mmol/L Final     Potassium 07/25/2018 3.8  3.5 - 5.0 mmol/L Final     Chloride 07/25/2018 106  98 - 107 mmol/L Final     CO2 07/25/2018 26  22 - 31 mmol/L Final     Anion Gap, Calculation 07/25/2018 7  5 - 18 mmol/L Final     Glucose 07/25/2018 119  70 - 125 mg/dL Final     BUN 07/25/2018 19  8 - 28 mg/dL Final     Creatinine 07/25/2018 1.10  0.70 - 1.30 mg/dL Final     GFR MDRD Af Amer 07/25/2018 >60  >60 mL/min/1.73m2 Final     GFR MDRD Non Af Amer 07/25/2018 >60  >60 mL/min/1.73m2 Final     Bilirubin, Total 07/25/2018 0.5  0.0 - 1.0 mg/dL Final     Calcium 07/25/2018 8.2* 8.5 - 10.5 mg/dL Final     Protein, Total 07/25/2018 5.1* 6.0 - 8.0 g/dL Final     Albumin 07/25/2018 2.9* 3.5 - 5.0 g/dL Final     Alkaline Phosphatase 07/25/2018 57  45 - 120 U/L Final     AST 07/25/2018 14  0 - 40 U/L Final     ALT 07/25/2018 15  0 - 45 U/L Final     Immunoglobulin G 07/25/2018 410* 700 - 1700 mg/dL Final     Immunoglobulin M 07/25/2018 12* 60 - 280 mg/dL Final     Immunoglobulin A  07/25/2018 27* 65 - 400 mg/dL Final     Kappa Free Lt Chain 07/25/2018 47.80* 0.33 - 1.94 mg/dL Corrected    CORRECTED ON 07/27 AT 1400: PREVIOUSLY REPORTED AS >19.40 NOTE: This result is  outside the initial measuring range of this assay. Samples which are outside of  the initial measuring range of the assay must be re assayed at a dilution in  order to obtain a result. In these cases, the result reported may not be  proportional to results that are within the measuring range and were performed  using the initial dilution. This is due to the fact that all serum free light  chain assays are prone to sample non linearity. That is, some samples will have  different results if performed on different dilutions. In some instances where  the result is just outside the limit of the initial measuring range, the  results obtained from the diluted sample may be lower or higher than expected.  In these cases the result will be reported as greater than or less than the  limit of the measuring range.     Lambda Free Lt Chain 07/25/2018 0.88  0.57 - 2.63 mg/dL Final     Kappa Lambda Ratio 07/25/2018 54.32* 0.26 - 1.65 Corrected    CORRECTED ON 07/27 AT 1400: PREVIOUSLY REPORTED AS Unable to Calculate    PERFORMED AT  [address] [address]      Albumin % 07/25/2018 66.5  51.0 - 67.0 % Final     Albumin  07/25/2018 3.1* 3.2 - 4.7 g/dL Final     Alpha 1 % 07/25/2018 3.5  2.0 - 4.0 % Final     Alpha 1 07/25/2018 0.2  0.1 - 0.3 g/dL Final     Alpha 2 % 07/25/2018 12.9  5.0 - 13.0 % Final     Alpha 2 07/25/2018 0.6  0.4 - 0.9 g/dL Final     % Beta 07/25/2018 10.7  10.0 - 17.0 % Final     Beta 07/25/2018 0.5* 0.7 - 1.2 g/dL Final     Gamma Globulin % 07/25/2018 6.4* 9.0 - 20.0 % Final     Gamma Globulin 07/25/2018 0.3* 0.6 - 1.4 g/dL Final     ELP Comment 07/25/2018    Final                    Value:See ELMA results.     The albumin fraction is decreased, and this may represent  any combination of protein-calorie malnutrition, bulk protein loss, or accelerated protein breakdown.     Decreased beta globulin fraction, which can be seen in kidney disease, coagulopathies, and malnutrition, among other etiologies.     Decreased gamma globulin fraction. Etiologies include lymphoproliferative disorders, chemotherapy, and immunodeficiency.      Protein, Total 07/25/2018 4.6* 6.0 - 8.0 g/dL Final     Path ICD: 07/25/2018 C90.01   Final     Interpreted By: 07/25/2018 Bharath Landis MD   Final     Immunofixation Electrophoresis, Se* 07/25/2018 Unremarkable immunofixation electrophoresis.    Final     Path ICD: 07/25/2018 C90.01   Final     Interpreted By: 07/25/2018 Bharath Landis MD   Final     WBC 07/25/2018 1.5* 4.0 - 11.0 thou/uL Final     RBC 07/25/2018 2.86* 4.40 - 6.20 mill/uL Final     Hemoglobin 07/25/2018 10.0* 14.0 - 18.0 g/dL Final     Hematocrit 07/25/2018 30.4* 40.0 - 54.0 % Final     MCV 07/25/2018 106* 80 - 100 fL Final     MCH 07/25/2018 35.0* 27.0 - 34.0 pg Final     MCHC 07/25/2018 32.9  32.0 - 36.0 g/dL Final     RDW 07/25/2018 14.6* 11.0 - 14.5 % Final     Platelets 07/25/2018 94* 140 - 440 thou/uL Final     MPV 07/25/2018 11.8  8.5 - 12.5 fL Final     INR 07/25/2018 1.65* 0.90 - 1.10 Final     Magnesium 07/25/2018 1.7* 1.8 - 2.6 mg/dL Final     Total Neutrophils % 07/25/2018 64  50 - 70 % Final     Lymphocytes % 07/25/2018 14* 20 - 40 % Final     Monocytes % 07/25/2018 12* 2 - 10 % Final     Eosinophils %  07/25/2018 8* 0 - 6 % Final     Basophils % 07/25/2018 2  0 - 2 % Final     Total Neutrophils Absolute 07/25/2018 1.0* 2.0 - 7.7 thou/ul Final     Lymphocytes Absolute 07/25/2018 0.2* 0.8 - 4.4 thou/uL Final     Monocytes Absolute 07/25/2018 0.2  0.0 - 0.9 thou/uL Final     Eosinophils Absolute 07/25/2018 0.1  0.0 - 0.4 thou/uL Final     Basophils Absolute 07/25/2018 0.0  0.0 - 0.2 thou/uL Final     Platelet Estimate 07/25/2018 Decreased* Normal Final     Ovalocytes  07/25/2018 1+* Negative Final     Schistocytes 07/25/2018 1+* Negative Final     Tear Drop Cells 07/25/2018 1+* Negative Final   ]  Imaging    No results found.      Signed by: Alejandra Valdez, CNP

## 2021-06-19 NOTE — PROGRESS NOTES
[facility] Daily Progress     Patient Name: Theo Meyers  Date: 7/13/2018  Visit #: 2/12  Referral Diagnosis: lumbar stenosis   Referring provider: Shayna Silva PA-C  Visit Diagnosis:     ICD-10-CM    1. Lumbar spine pain M54.5    2. Lumbar stenosis with neurogenic claudication M48.062    3. Abdominal weakness R19.8        Assessment:        Patient is a 73 y.o. male that presents with signs and symptoms consistent with midline lumbar spine pain secondary to L2-L5 central canal spinal stenosis per MRI imaging. Patient demonstrates impairments including impaired core stability, pelvic posture, and lumbar flexibility leading to impaired functional mobility. Patient's functional limitations include transfer movements such as rolling over in bed and getting in/out of the car.    Today patient returns from initial visit 3 days ago, he does feel the exercises in the morning are helping. Today was spent on lumbar assessment, demonstrating tightness of his bilateral thoraco>lumbar paraspinals, QL on the right more than left.     HEP/POC compliance is  good .  Patient demonstrates understanding/independence with home program.  Patient is appropriate to continue with skilled physical therapy intervention, as indicated by initial plan of care.    Goal Status:  Pt. will be independent with home exercise program in : 4 weeks  Pt. will have improved quality of sleep: with less pain;waking less times/night;in 6 weeks  Patient will stand : with no pain;with less difficultty;30 minutes;for home chores;in 6 weeks  Patient will sit: 30 minutes;with no pain;with less difficultty;in 6 weeks  Patient will transfer: sit/stand;for in/out of chair;for car;in 6 weeks  Patient will decrease : HAL score;for improved quality of function;for improved quality of life;in 6 weeks      Plan / Patient Education:     Continue with initial plan of care.  Progress with home program as tolerated.    Plan for next visit: pelvic  postural awareness, core strengthening and hip abd/ext, glute strengthening, squats, lumbar rotations, LTR, abdo isos     Subjective:     Pain Rating: hadn't noticed any today  Patient reports he is tired today, he didn't sleep much last night and that happens on occasion. He did do the exercises when he had an ache in his back and it felt better. Patient likes to tell stories.     Functional limitations are described as occurring with:   performing routine daily activities  transitional movements getting in  bed and car and getting out of  bed and car  rolling over in bed, he sleeps on his back, doing housework and yard work    IMAGING: Findings are most pronounced at L3-L4 where there is severe left lateral recess narrowing and overall moderate to severe central spinal canal stenosis. Mild left and moderate right foraminal narrowing at this level. Mild spinal canal stenosis at L2-L3. Moderate right and mild left lateral recess narrowing at L4-L5 where there is mild foraminal narrowing.       Objective:     Lumbar ROM:             Date: 7/13/2018       *Indicate scale AROM AROM AROM   Lumbar Flexion  WFL       Lumbar Extension  limited - added to HEP x 10         Right Left Right Left Right Left   Lumbar Sidebending  WFL WFL            Lumbar Rotation  WFL WFL            Thoracic Flexion         Thoracic Extension         Thoracic Sidebending               Thoracic Rotation                  Lower Extremity Strength:               Date: 7/13/18       LE strength/5 Right Left Right Left Right Left   Hip Flexion (L1-3)               Hip Extension (L5-S1)               Hip Abduction (L4-5)               Hip Adduction (L2-3)               Hip External Rotation               Hip Internal Rotation               Knee Extension (L3-4)               Knee Flexion               Ankle Dorsiflexion (L4-5)               Great Toe Extension (L5)               Ankle Plantar flexion (S1)               Abdominals              Treatment Today       Patient Education: Patient was educated on continuing plan of care, progress and review of current HEP. Patient educated on importance of consistency with exercise and therapy, as well as activity modification in order to see change and improvements. Patient demonstrated and verbalized understanding.     Exercises:  Exercise #1: SKTC - hold 30 sec x 2  Comment #1: supine figure 4 stretch - hold 30 sec x 2  Exercise #2: supine lumbar rotation stretch - hold 30 sec x 2  Comment #2: sit to stands - 10 reps   Exercise #3: prone laying 5 min  Comment #3: lumbar extension in standing - 10 reps      TREATMENT MINUTES COMMENTS   Evaluation     Self-care/ Home management 15 Education on continuing plan of care, pelvic tilts in sitting   Manual therapy 10 STM of lumbar paraspinals, QL and piriformis in prone with pillow under hip crease   Neuromuscular Re-education     Therapeutic Activity     Therapeutic Exercises  See above flowsheet   Gait training     Modality__________________                Total 25    Blank areas are intentional and mean the treatment did not include these items.       Esperanza Gaston, PT  7/13/2018

## 2021-06-19 NOTE — PROGRESS NOTES
"Pt ambulates to infusion center for treatment.  Pt c/o having a \"sinus infection\" which he was prescribed Levaquin for by his primary.  He voices no other new concerns.  Velcade given SQ as ordered without difficulty.  Pt left clinic stable to lobby.  Plan RTC as scheduled.  "

## 2021-06-19 NOTE — PROGRESS NOTES
Assessment:   Theo Meyers is a 73 y.o. y.o. male with past medical history significant for multiple myeloma (getting chemotherapy infusions every 2 weeks), glaucoma, DVT (on warfarin), syncopal episode related to significant GI bleeding December 2017 requiring hospitalization, pancytopenia (neutropenia at 1.5) who presents today for follow-up regarding chronic bilateral low back pain without radicular symptoms.  MRI lumbar spine shows degenerative changes most pronounced at L3-4 there is moderate to severe spinal canal stenosis and severe left lateral recess stenosis.  Patient does not have symptoms of neurogenic claudication.  He seems to be more symptomatic from facet arthropathy which is most pronounced at L3-4 and to a lesser extent at L4-5.  Patient notes 75-80% improvement in his pain with physical therapy.        Plan:     A shared decision making plan was used.  The patient's values and choices were respected.  The following represents what was discussed and decided upon by the physician assistant and the patient.      1.  DIAGNOSTIC TESTS: I reviewed the MRI lumbar spine in detail with the help of a spine model.  No further diagnostic tests were ordered.    2.  PHYSICAL THERAPY: Patient is currently in physical therapy.  He has had 5 sessions.  I encouraged him to continue doing his home exercises.    3.  MEDICATIONS: No changes are made to the patient's medications.  Patient is not taking any pain relief medications.    4.  INTERVENTIONS: No interventions were ordered.  If pain were to flare back up and not respond to conservative treatment, we could consider interventional pain management.  I would likely begin with facet joint injections.    5.  PATIENT EDUCATION: Patient's blood pressure was low today, 88/52.  Patient typically runs about 100/60.  Patient has an appointment with his primary care provider later this morning.  Patient does feel slightly lightheaded.  He has been dealing with an  upper respiratory infection and is taking a decongestant.  -Patient is in agreement with the above plan.  All questions were answered.    6.  FOLLOW-UP: Patient to follow-up with me as needed.  If you have any questions or concerns, he should not hesitate to call.    Subjective:     Theo Meyers is a 73 y.o. male who presents today for follow-up regarding chronic bilateral low back pain.  I saw the patient in consultation on June 27.  I referred the patient physical therapy.  Patient has had 5 sessions of physical therapy.  He notes 75-80% improvement in his pain.    Patient states that he has been feeling very good and for the past 2 days, he has felt almost no pain.  Patient states he has only slight ache in the right lower back.  He denies any significant pain down the legs.  He states with one stretch (half pigeon) he feels slight pain in the right groin/proximal medial thigh.  This resolves when he stops stretching.  Patient has numbness and tingling in toes due to peripheral neuropathy which is unchanged.  He denies weakness.  He rates his pain today as 0.  He is unable to identify aggravating or alleviating factors the pain.  He denies any new symptoms since his last seen.    As mentioned above, patient has had 5 sessions of physical therapy.  He does his home exercises.  He is not taking any pain relieving medications.    Past medical history is reviewed and is pertinent for having an upper respiratory infection.  He recently can completed a Z-Gonzalo.  He continues to use Mucinex.  He still feels some congestion in the chest.    Family history is reviewed and is unchanged in the interim.    Review of Systems:  Positive for numbness/tingling.  Negative for loss of bowel/bladder control, footdrop, weakness, headache, dizziness, nausea/vomiting, blurry vision, balance changes.     Objective:   CONSTITUTIONAL:  Vital signs as above.  No acute distress.  The patient is well nourished and well groomed.     PSYCHIATRIC:  The patient is awake, alert, oriented to person, place and time.  The patient is answering questions appropriately with clear speech.  Normal affect.  HEENT: Normocephalic, atraumatic.  Sclera clear.    SKIN:  Skin over the face, posterior torso, bilateral upper and lower extremities is clean, dry, intact without rashes.  MUSCULOSKELETAL:  Gait is non-antalgic.  The patient is able to heel and toe walk without any difficulty.  No significant tenderness over the bilateral lower lumbar paraspinal muscles.   Lumbar range of motion is full.  Lumbar facet loading maneuvers did not reproduce pain.   The patient has 5/5 strength for the bilateral hip flexors, knee flexors/extensors, ankle dorsiflexors/plantar flexors, ankle evertors/invertors.    NEUROLOGICAL: Sensation to light touch is intact in the bilateral L4, L5, and S1 dermatomes.       RESULTS:  Reviewed the MRI lumbar spine from [facility] dated June 24, 2018.  This shows degenerative changes which have progressed since 2015.  Findings are most pronounced at L3-4 there is severe left lateral recess stenosis and overall moderate to severe spinal canal stenosis related to a mild circumferential disc bulge and left paracentral protrusion with mild to moderate facet arthropathy.  There is also mild spinal canal stenosis at L2-3.  There is moderate right and mild left lateral recess stenosis at L4-5 where there is also mild foraminal stenosis.  Patient also has multilevel facet arthropathy most pronounced at L3-4 and L4-5.  Please see report for further details.

## 2021-06-19 NOTE — PROGRESS NOTES
Patient here today for labs, OV with BRITTANI Valdez CNP, and velcade injection for multiple myeloma/SONIA Levy RN

## 2021-06-19 NOTE — PROGRESS NOTES
[facility] Certification Request    July 10, 2018      Patient: Theo Meyers  MR Number: [id number]  YOB: 1945  Date of Visit: 7/10/2018      Dear Shayna Silva,     Thank you for this referral.   We are seeing Theo Meyers for Physical Therapy of lumbar spinal stenosis.    Medicare and/or Medicaid requires physician review and approval of the treatment plan. Please review the plan of care and verify that you agree with the therapy plan of care by co-signing this note.      Plan of Care  Authorization / Certification Start Date: 07/10/18  Authorization / Certification End Date: 10/10/18  Authorization / Certification Number of Visits: 12  Communication with: Referral Source  Patient Related Instruction: Nature of Condition;Treatment plan and rationale;Self Care instruction;Basis of treatment;Body mechanics;Posture;Next steps;Expected outcome  Times per Week: 1-2  Number of Weeks: 6-8  Number of Visits: 12  Therapeutic Exercise: ROM;Stretching;Strengthening  Neuromuscular Reeducation: kinesio tape;posture;balance/proprioception;TNE;core  Manual Therapy: soft tissue mobilization;myofascial release;joint mobilization;muscle energy  Modalities: TENS  Gait Training: as indicated    Goals:  Pt. will be independent with home exercise program in : 4 weeks  Pt. will have improved quality of sleep: with less pain;waking less times/night;in 6 weeks  Patient will stand : with no pain;with less difficultty;30 minutes;for home chores;in 6 weeks  Patient will sit: 30 minutes;with no pain;with less difficultty;in 6 weeks  Patient will transfer: sit/stand;for in/out of chair;for car;in 6 weeks  Patient will decrease : HAL score;for improved quality of function;for improved quality of life;in 6 weeks      If you have any questions or concerns, please don't hesitate to call.    Sincerely,      Esperanza Gaston, PT  [phone]      Physician recommendation:     ___ Follow therapist's  recommendation        ___ Modify therapy      *Physician co-signature indicates they certify the need for these services furnished within this plan and while under their care.          [facility]   [facility] Initial Evaluation    Patient Name: Theo Meyers  Date of evaluation: 7/10/2018  Referral Diagnosis: Spinal stenosis of lumbar region with neurogenic claudication  Referring provider: Shayna Silva PA-C  Visit Diagnosis:     ICD-10-CM    1. Lumbar spine pain M54.5    2. Lumbar stenosis with neurogenic claudication M48.062    3. Abdominal weakness R19.8        Assessment:        Patient is a 73 y.o. male that presents with signs and symptoms consistent with midline lumbar spine pain secondary to L2-L5 central canal spinal stenosis per MRI imaging. Patient demonstrates impairments including impaired core stability, pelvic posture, and lumbar flexibility leading to impaired functional mobility. Patient's functional limitations include transfer movements such as rolling over in bed and getting in/out of the car. Today patient responded well to patient education and therapeutic exercise. Most of today's session was spent on plan of care review and subjective measures, so objective measures were not taken at this visit; will address at next visit and trial manual therapy.    Patient educated on and demonstrated understanding of nature of impairment, plan of care, patient role and HEP. Patient compliant with PT and prognosis is good. Patient would benefit from skilled PT to progress and improve above impairments.    The POC is dynamic and will be modified on an ongoing basis.  Patient will return to clinic if symptoms persist.  Barriers to achieving goals as noted in the assessment section may affect outcome.  Prognosis to achieve goals is  good   Pt. is appropriate for skilled PT intervention as outlined in the Plan of Care (POC).  Pt. is a good candidate for skilled PT services to improve  pain levels and function.    Goals:  Pt. will be independent with home exercise program in : 4 weeks  Pt. will have improved quality of sleep: with less pain;waking less times/night;in 6 weeks  Patient will stand : with no pain;with less difficultty;30 minutes;for home chores;in 6 weeks  Patient will sit: 30 minutes;with no pain;with less difficultty;in 6 weeks  Patient will transfer: sit/stand;for in/out of chair;for car;in 6 weeks  Patient will decrease : HAL score;for improved quality of function;for improved quality of life;in 6 weeks    Patient's expectations/goals are realistic.    Barriers to Learning or Achieving Goals:  Chronicity of the problem.       Plan / Patient Instructions:        Plan of Care:   Authorization / Certification Start Date: 07/10/18  Authorization / Certification End Date: 10/10/18  Authorization / Certification Number of Visits: 12  Communication with: Referral Source  Patient Related Instruction: Nature of Condition;Treatment plan and rationale;Self Care instruction;Basis of treatment;Body mechanics;Posture;Next steps;Expected outcome  Times per Week: 1-2  Number of Weeks: 6-8  Number of Visits: 12  Therapeutic Exercise: ROM;Stretching;Strengthening  Neuromuscular Reeducation: kinesio tape;posture;balance/proprioception;TNE;core  Manual Therapy: soft tissue mobilization;myofascial release;joint mobilization;muscle energy  Modalities: TENS  Gait Training: as indicated    POC and pathology of condition were reviewed with patient.  Pt. is in agreement with the Plan of Care  A Home Exercise Program (HEP) was initiated today.  Pt. was instructed in exercises by PT and patient was given a handout with detailed instructions.    Plan for next visit: lumbar assessment, review HEP, add core stability exercises, hip/glute strengthening exercises     Subjective:         History of Present Illness:    Theo is a 73 y.o. male who presents to therapy today with complaints of midline low back pain.  Date of onset is 2018 and onset was gradual and his previous pain had come back. Symptoms are intermittent, getting better and not improving. Patient reports he had this same thing about 4 years ago, and he had MRI, it was very painful he was only able to stand at the island and bend forward; he had tried PT for about 3 months and by that point the pain was almost all gone. Patient has stopped doing sit ups, his pain is similar to what it was before, there are times it is painful to lay in bed, get into a car, change positions. He reports  an episodic history of similar symptoms. He describes their previous level of function as not limited. Patient does 3 mile walks with his two dogs. Patient might attribute him feeling better this week to stopping the sit ups per Shayna's request.    Pain Ratin - 3 this week has been pretty good, he is feeling some anterior L hip pain  Pain rating at best: 1   Pain rating at worst: 5   Pain description: aching and sharp    Functional limitations are described as occurring with:   performing routine daily activities  transitional movements getting in  bed and car and getting out of  bed and car  rolling over in bed, he sleeps on his back, doing housework and yard work    Patient reports benefit from:  rest  , anti-inflammatory    IMAGING: Findings are most pronounced at L3-L4 where there is severe left lateral recess narrowing and overall moderate to severe central spinal canal stenosis. Mild left and moderate right foraminal narrowing at this level. Mild spinal canal stenosis at L2-L3. Moderate right and mild left lateral recess narrowing at L4-L5 where there is mild foraminal narrowing.       Objective:      Note: Items left blank indicates the item was not performed or not indicated at the time of the evaluation.    Patient Outcome Measures :    Modified Oswestry Low Back Pain Disablity Questionnaire  in %: 32   Scores range from 0-100%, where a score of 0% represents  minimal pain and maximal function. The minimal clinically important difference is a score reduction of 12%.    Examination  1. Lumbar spine pain     2. Lumbar stenosis with neurogenic claudication     3. Abdominal weakness       Involved side: Bilateral and it does travel from L to R side  Posture Observation:      General sitting posture is  fair.  General standing posture is fair.    Lumbar ROM:  NT today  Date: 7/10/2018     *Indicate scale AROM AROM AROM   Lumbar Flexion      Lumbar Extension       Right Left Right Left Right Left   Lumbar Sidebending         Lumbar Rotation         Thoracic Flexion      Thoracic Extension      Thoracic Sidebending         Thoracic Rotation           Lower Extremity Strength:   NT today   Date: 7/10/2018     LE strength/5 Right Left Right Left Right Left   Hip Flexion (L1-3)         Hip Extension (L5-S1)         Hip Abduction (L4-5)         Hip Adduction (L2-3)         Hip External Rotation         Hip Internal Rotation         Knee Extension (L3-4)         Knee Flexion         Ankle Dorsiflexion (L4-5)         Great Toe Extension (L5)         Ankle Plantar flexion (S1)         Abdominals        Sensation    NT today        Reflex Testing  Lumbar Dermatomes Right Left UE Reflexes Right Left   Iliac Crest and Groin (L1)   Biceps (C5-6)     Anterior Medial Thigh (L2)   Brachioradialis (C5-6)     Anterior Thigh, Medial Epicondyle Femur (L3)   Triceps (C7-8)     Lateral Thigh, Anterior Knee, Medial Leg/Malleolus (L4)   Wild s test     Lateral Leg, Dorsal Foot (L5)   LE Reflexes     Lateral Foot (S1)   Patellar (L3-4)     Posterior Leg (S2)   Achilles (S1-2)     Other:   Babinski Response         Lumbar Special Tests:   NT today  Lumbar Special Tests Right Left SI Tests Right  Left   Quadrant test   SI Compression     Straight leg raise   SI Distraction     Crossover response   POSH Test     Slump   Sacral Thrust     Sit-up test  FADIR     Trunk extensor endurance test  Resisted  Abduction     Prone instability test  Other:     Pubic shotgun  Other:         Treatment Today       Patient Education: Patient educated on plan of care, prognosis, PT/patient role and HEP. Patient educated on impairments related to condition and reproduction of symptoms. Patient instructed to focus on the small goals and this may be a long process to recovery, and that exercises at home are just as important as coming to therapy. Patient was educated on importance of activity modification and exercise consistency in order to see change and progress. Patient demonstrated and verbalized understanding.     Exercises:  Exercise #1: SKTC - hold 30 sec x 2  Comment #1: supine figure 4 stretch - hold 30 sec x 2  Exercise #2: supine lumbar rotation stretch - hold 30 sec x 2  Comment #2: sit to stands - 10 reps    Patient does piriformis stretch, SLR, bridging, lumbar twists for abdominal strengthening,       TREATMENT MINUTES COMMENTS   Evaluation 30    Self-care/ Home management     Manual therapy     Neuromuscular Re-education     Therapeutic Activity     Therapeutic Exercises 25 See flowsheet; review of current HEP and continuing plan of care  Importance of activity modification and exercise consistency and not overdoing it to help get him stronger   Gait training     Modality__________________                Total 55    Blank areas are intentional and mean the treatment did not include these items.     PT Evaluation Code: (Please list factors)  Patient History/Comorbidities: lumbar stenosis, DVT, multiple myloma  Examination: decreased core stability and flexibility  Clinical Presentation: stable  Clinical Decision Making: low    Patient History/  Comorbidities Examination  (body structures and functions, activity limitations, and/or participation restrictions) Clinical Presentation Clinical Decision Making (Complexity)   No documented Comorbidities or personal factors 1-2 Elements Stable and/or uncomplicated Low   1-2  documented comorbidities or personal factor 3 Elements Evolving clinical presentation with changing characteristics Moderate   3-4 documented comorbidities or personal factors 4 or more Unstable and unpredictable High                Esperanza Gaston, PT  7/10/2018  10:56 AM

## 2021-06-19 NOTE — PROGRESS NOTES
Pt ambulates to infusion center for injections following NP visit.  Pt was seen by BRITTANI Valdez CNP today and orders were approved.  Velcade and Vitamin B12 injections given as ordered.  [facility]  Plan RTC as scheduled.

## 2021-06-19 NOTE — PROGRESS NOTES
[facility] Discharge Summary  Patient Name: Theo Meyers  Date: 8/20/2018  Referral Diagnosis: lumbar stenosis  Referring provider: Shayna Silva PA-C  Visit Diagnosis:   1. Lumbar spine pain     2. Lumbar stenosis with neurogenic claudication     3. Abdominal weakness         Goals:  Pt. will be independent with home exercise program in : 4 weeks  Pt. will have improved quality of sleep: with less pain;waking less times/night;in 6 weeks  Patient will stand : with no pain;with less difficultty;30 minutes;for home chores;in 6 weeks  Patient will sit: 30 minutes;with no pain;with less difficultty;in 6 weeks  Patient will transfer: sit/stand;for in/out of chair;for car;in 6 weeks  Patient will decrease : HAL score;for improved quality of function;for improved quality of life;in 6 weeks    Patient was seen for 5 visits for physical therapy of lumbar stenosis from 7/10/19 to 7/24/18 with no follow up appointments.   The patient met goals and has demonstrated understanding of and independence in the home program for self-care, and progression to next steps.  He will initiate contact if questions or concerns arise.  The patient reports feeling better and did not wish to schedule further therapy at this time.  No further therapy is required at this time.    Therapy will be discontinued at this time.  The patient will need a new referral to resume physical therapy treatment. Please see below for patient's current status.    Thank you for your referral.  Esperanza Gaston, PT, DPT  8/20/2018   12:17 PM      [facility] Daily Progress     Patient Name: Theo Meyers  Date: 7/24/2018  Visit #: 5/12  Referral Diagnosis: lumbar stenosis   Referring provider: Shayna Silva PA-C  Visit Diagnosis:     ICD-10-CM    1. Lumbar spine pain M54.5    2. Lumbar stenosis with neurogenic claudication M48.062    3. Abdominal weakness R19.8        Assessment:        Patient is a 73 y.o. male that presents  with signs and symptoms consistent with midline lumbar spine pain secondary to L2-L5 central canal spinal stenosis per MRI imaging. Patient demonstrates impairments including impaired core stability, pelvic posture, and lumbar flexibility leading to impaired functional mobility. Patient's functional limitations include transfer movements such as rolling over in bed and getting in/out of the car.    Today patient reports he is feeling pretty good, he does feel the exercises in the morning are helping. Patient continues to demonstrate tightness of his bilateral thoraco>lumbar paraspinals, QL on the right more than left. Patient and PT agreed he will trial independence.     HEP/POC compliance is  good .  Patient demonstrates understanding/independence with home program.  Patient is appropriate to continue with skilled physical therapy intervention, as indicated by initial plan of care.    Goal Status:  Pt. will be independent with home exercise program in : 4 weeks  Pt. will have improved quality of sleep: with less pain;waking less times/night;in 6 weeks  Patient will stand : with no pain;with less difficultty;30 minutes;for home chores;in 6 weeks  Patient will sit: 30 minutes;with no pain;with less difficultty;in 6 weeks  Patient will transfer: sit/stand;for in/out of chair;for car;in 6 weeks  Patient will decrease : HAL score;for improved quality of function;for improved quality of life;in 6 weeks      Plan / Patient Education:     Continue with initial plan of care.  Progress with home program as tolerated.    Plan for next visit: pelvic postural awareness, core strengthening and hip abd/ext, glute strengthening, squats, lumbar rotations, manual to throracolumbar spine - kinesiotape? - add more core exercises     Subjective:     Pain Rating: hadn't noticed any today, feeling pretty good   Patient did have a cold Friday and over the weekend that seemed to make him tired. Patient likes to tell stories.     Functional  limitations are described as occurring with:   performing routine daily activities  transitional movements getting in  bed and car and getting out of  bed and car  rolling over in bed, he sleeps on his back, doing housework and yard work    IMAGING: Findings are most pronounced at L3-L4 where there is severe left lateral recess narrowing and overall moderate to severe central spinal canal stenosis. Mild left and moderate right foraminal narrowing at this level. Mild spinal canal stenosis at L2-L3. Moderate right and mild left lateral recess narrowing at L4-L5 where there is mild foraminal narrowing.       Objective:     Lumbar ROM:             Date: 7/13/2018       *Indicate scale AROM AROM AROM   Lumbar Flexion  WFL       Lumbar Extension  limited - added to HEP x 10         Right Left Right Left Right Left   Lumbar Sidebending  WFL WFL            Lumbar Rotation  WFL WFL            Thoracic Flexion         Thoracic Extension         Thoracic Sidebending               Thoracic Rotation                    Treatment Today       Patient Education: Patient was educated on continuing plan of care, progress and review of current HEP. Patient educated on importance of consistency with exercise and therapy, as well as activity modification in order to see change and improvements. Patient demonstrated and verbalized understanding.     Exercises:  Exercise #1: SKTC - hold 30 sec x 2  Comment #1: supine figure 4 stretch - hold 30 sec x 2  Exercise #2: supine lumbar rotation stretch - hold 30 sec x 2  Comment #2: sit to stands - 10 reps   Exercise #3: prone laying 5 min  Comment #3: lumbar extension in standing - 10 reps  Exercise #4: hip abd/ext - 10-20 reps  Comment #4: supine bridge with hip adduction - 10-20 reps  Exercise #5: abdominal isometrics - hold 5 sec x 10  Comment #5: LTR - 5 reps x 4  Exercise #6: supine hip abd/ER with band - 10 reps each side      TREATMENT MINUTES COMMENTS   Evaluation     Self-care/ Home  management  Education on continuing plan of care, pelvic tilts in sitting   Manual therapy 15 STM of lumbar paraspinals, QL and piriformis in prone with pillow under hip crease   Neuromuscular Re-education     Therapeutic Activity     Therapeutic Exercises 10 See above flowsheet; added core strengthening exercises  Review of POC and HAL   Gait training     Modality__________________                Total 25    Blank areas are intentional and mean the treatment did not include these items.       Esperanza Gaston, PT  7/24/2018

## 2021-06-19 NOTE — PROGRESS NOTES
[facility] Daily Progress     Patient Name: Theo Meyers  Date: 7/20/2018  Visit #: 4/12  Referral Diagnosis: lumbar stenosis   Referring provider: Shayna Silva PA-C  Visit Diagnosis:     ICD-10-CM    1. Lumbar spine pain M54.5    2. Lumbar stenosis with neurogenic claudication M48.062    3. Abdominal weakness R19.8        Assessment:        Patient is a 73 y.o. male that presents with signs and symptoms consistent with midline lumbar spine pain secondary to L2-L5 central canal spinal stenosis per MRI imaging. Patient demonstrates impairments including impaired core stability, pelvic posture, and lumbar flexibility leading to impaired functional mobility. Patient's functional limitations include transfer movements such as rolling over in bed and getting in/out of the car.    Today patient reports he is feeling pretty good, he does feel the exercises in the morning are helping. Patient continues to demonstrate tightness of his bilateral thoraco>lumbar paraspinals, QL on the right more than left. Patient and PT agreed he may only need the 1 visits before trialing independence.     HEP/POC compliance is  good .  Patient demonstrates understanding/independence with home program.  Patient is appropriate to continue with skilled physical therapy intervention, as indicated by initial plan of care.    Goal Status:  Pt. will be independent with home exercise program in : 4 weeks  Pt. will have improved quality of sleep: with less pain;waking less times/night;in 6 weeks  Patient will stand : with no pain;with less difficultty;30 minutes;for home chores;in 6 weeks  Patient will sit: 30 minutes;with no pain;with less difficultty;in 6 weeks  Patient will transfer: sit/stand;for in/out of chair;for car;in 6 weeks  Patient will decrease : HAL score;for improved quality of function;for improved quality of life;in 6 weeks      Plan / Patient Education:     Continue with initial plan of care.  Progress with  home program as tolerated.    Plan for next visit: pelvic postural awareness, core strengthening and hip abd/ext, glute strengthening, squats, lumbar rotations, manual to throracolumbar spine - kinesiotape? - add more core exercises     Subjective:     Pain Rating: hadn't noticed any today   He does get some R anterior hip pain on occasion but not much. New exercises feel pretty comfortable  No pain over the weekend, he is feeling pretty good, getting better. Patient likes to tell stories.     Functional limitations are described as occurring with:   performing routine daily activities  transitional movements getting in  bed and car and getting out of  bed and car  rolling over in bed, he sleeps on his back, doing housework and yard work    IMAGING: Findings are most pronounced at L3-L4 where there is severe left lateral recess narrowing and overall moderate to severe central spinal canal stenosis. Mild left and moderate right foraminal narrowing at this level. Mild spinal canal stenosis at L2-L3. Moderate right and mild left lateral recess narrowing at L4-L5 where there is mild foraminal narrowing.       Objective:     Lumbar ROM:             Date: 7/13/2018       *Indicate scale AROM AROM AROM   Lumbar Flexion  WFL       Lumbar Extension  limited - added to HEP x 10         Right Left Right Left Right Left   Lumbar Sidebending  WFL WFL            Lumbar Rotation  WFL WFL            Thoracic Flexion         Thoracic Extension         Thoracic Sidebending               Thoracic Rotation                    Treatment Today       Patient Education: Patient was educated on continuing plan of care, progress and review of current HEP. Patient educated on importance of consistency with exercise and therapy, as well as activity modification in order to see change and improvements. Patient demonstrated and verbalized understanding.     Exercises:  Exercise #1: SKTC - hold 30 sec x 2  Comment #1: supine figure 4 stretch -  hold 30 sec x 2  Exercise #2: supine lumbar rotation stretch - hold 30 sec x 2  Comment #2: sit to stands - 10 reps   Exercise #3: prone laying 5 min  Comment #3: lumbar extension in standing - 10 reps  Exercise #4: hip abd/ext - 10-20 reps  Comment #4: supine bridge with hip adduction - 10-20 reps  Exercise #5: abdominal isometrics - hold 5 sec x 10  Comment #5: LTR - 5 reps x 4  Exercise #6: supine hip abd/ER with band - 10 reps each side      TREATMENT MINUTES COMMENTS   Evaluation     Self-care/ Home management  Education on continuing plan of care, pelvic tilts in sitting   Manual therapy 23 STM of lumbar paraspinals, QL and piriformis in prone with pillow under hip crease   Neuromuscular Re-education     Therapeutic Activity     Therapeutic Exercises 3 See above flowsheet; added core strengthening exercises   Gait training     Modality__________________                Total 26    Blank areas are intentional and mean the treatment did not include these items.       Esperanza Gaston, PT  7/20/2018

## 2021-06-19 NOTE — PROGRESS NOTES
[facility] Daily Progress     Patient Name: Theo Meyers  Date: 7/17/2018  Visit #: 3/12  Referral Diagnosis: lumbar stenosis   Referring provider: Shayna Silva PA-C  Visit Diagnosis:     ICD-10-CM    1. Lumbar spine pain M54.5    2. Lumbar stenosis with neurogenic claudication M48.062    3. Abdominal weakness R19.8        Assessment:        Patient is a 73 y.o. male that presents with signs and symptoms consistent with midline lumbar spine pain secondary to L2-L5 central canal spinal stenosis per MRI imaging. Patient demonstrates impairments including impaired core stability, pelvic posture, and lumbar flexibility leading to impaired functional mobility. Patient's functional limitations include transfer movements such as rolling over in bed and getting in/out of the car.    Today patient reports he is feeling pretty good, he does feel the exercises in the morning are helping. Patient continues to demonstrate tightness of his bilateral thoraco>lumbar paraspinals, QL on the right more than left. Patient and PT agreed he may only need the 2 visits before trialing independence.     HEP/POC compliance is  good .  Patient demonstrates understanding/independence with home program.  Patient is appropriate to continue with skilled physical therapy intervention, as indicated by initial plan of care.    Goal Status:  Pt. will be independent with home exercise program in : 4 weeks  Pt. will have improved quality of sleep: with less pain;waking less times/night;in 6 weeks  Patient will stand : with no pain;with less difficultty;30 minutes;for home chores;in 6 weeks  Patient will sit: 30 minutes;with no pain;with less difficultty;in 6 weeks  Patient will transfer: sit/stand;for in/out of chair;for car;in 6 weeks  Patient will decrease : HAL score;for improved quality of function;for improved quality of life;in 6 weeks      Plan / Patient Education:     Continue with initial plan of care.  Progress with  home program as tolerated.    Plan for next visit: pelvic postural awareness, core strengthening and hip abd/ext, glute strengthening, squats, lumbar rotations, manual to throracolumbar spine - kinesiotape?    Subjective:     Pain Rating: hadn't noticed any today   No pain over the weekend, he is feeling pretty good, getting better. Patient likes to tell stories.     Functional limitations are described as occurring with:   performing routine daily activities  transitional movements getting in  bed and car and getting out of  bed and car  rolling over in bed, he sleeps on his back, doing housework and yard work    IMAGING: Findings are most pronounced at L3-L4 where there is severe left lateral recess narrowing and overall moderate to severe central spinal canal stenosis. Mild left and moderate right foraminal narrowing at this level. Mild spinal canal stenosis at L2-L3. Moderate right and mild left lateral recess narrowing at L4-L5 where there is mild foraminal narrowing.       Objective:     Lumbar ROM:             Date: 7/13/2018       *Indicate scale AROM AROM AROM   Lumbar Flexion  WFL       Lumbar Extension  limited - added to HEP x 10         Right Left Right Left Right Left   Lumbar Sidebending  WFL WFL            Lumbar Rotation  WFL WFL            Thoracic Flexion         Thoracic Extension         Thoracic Sidebending               Thoracic Rotation                    Treatment Today       Patient Education: Patient was educated on continuing plan of care, progress and review of current HEP. Patient educated on importance of consistency with exercise and therapy, as well as activity modification in order to see change and improvements. Patient demonstrated and verbalized understanding.     Exercises:  Exercise #1: SKTC - hold 30 sec x 2  Comment #1: supine figure 4 stretch - hold 30 sec x 2  Exercise #2: supine lumbar rotation stretch - hold 30 sec x 2  Comment #2: sit to stands - 10 reps   Exercise #3:  prone laying 5 min  Comment #3: lumbar extension in standing - 10 reps      TREATMENT MINUTES COMMENTS   Evaluation     Self-care/ Home management  Education on continuing plan of care, pelvic tilts in sitting   Manual therapy 10 STM of lumbar paraspinals, QL and piriformis in prone with pillow under hip crease   Neuromuscular Re-education     Therapeutic Activity     Therapeutic Exercises 15 See above flowsheet; added core strengthening exercises   Gait training     Modality__________________                Total 25    Blank areas are intentional and mean the treatment did not include these items.       Esperanza Gaston, PT  7/17/2018

## 2021-06-19 NOTE — PROGRESS NOTES
[facility]     SUBJECTIVE: Theo Meyers is a 73 y.o. male who presents today with a cough.    He says for the past 2 days he has had a worsening cough with productive sputum.  He has not been sleeping well.  He has a slight runny nose.  No significant shortness of breath.  No fevers.  Has had a little increased fatigue.  He has a long history of multiple myeloma and is known to be immunosuppressed.  He is worried he has pneumonia again.     ROS:   - As above     PMH:   Past Medical History:   Diagnosis Date     Anemia 12/13/2017     Arthritis      Bilateral inguinal hernia      Glaucoma      History of blood clots     DVT - left chronic     Multiple myeloma (H)     Gets chemo infusions every 2 weeks     Pancytopenia (H)      Peripheral neuropathy (H)      Syncope      TMJ (temporomandibular joint disorder)          OBJECTIVE:    Vitals: /60 (Patient Site: Right Arm, Patient Position: Sitting, Cuff Size: Adult Large)  Pulse 67  Temp 97.7  F (36.5  C) (Oral)   Resp 20  Wt 169 lb 6.4 oz (76.8 kg)  SpO2 98%  BMI 25.02 kg/m2   BMI= Body mass index is 25.02 kg/(m^2).    General: Sitting on exam table, NAD  HEENT: PERRL, MMM.  Normal Conjunctiva, normal TMs bilaterally, no nasal drainage, no sinus tenderness, no pharyngeal erythema or tonsillar exudates.  Non-tender/non-enlarged ant cervical nodes.  Neck: Supple  CV: RRR, no m/r/g  Pulm: Subtle crackles with occasional wheeze that clears with coughing right greater than left.  No increased work of breathing.  Ext: Warm, no edema, no erythema  Neuro: Alert and interactive  Psych: Calm, normal affect    ASSESSMENT AND PLAN:   Theo Meyers is a 73 y.o. male who presents today with a cough; in stable condition.     1. Community acquired pneumonia, unspecified laterality  - 2 days of worsening productive cough in the setting of immune suppression due to multiple myeloma.  I discussed that it was unclear at this point whether he has a viral illness  or the beginning of a bacterial pneumonia.  We discussed options today including watchful waiting, obtaining a chest x-ray, or empirically treating and he opted to empirically treat.  Discussed risks and benefits of this plan and he was in agreement.  Okay to use over-the-counter medicines for symptom control as well.  Return precautions discussed.  - azithromycin (ZITHROMAX Z-VEE) 250 MG tablet; Take 2 tablets (500 mg) on  Day 1,  followed by 1 tablet (250 mg) once daily on Days 2 through 5.  Dispense: 6 tablet; Refill: 0    Options for treatment and/or follow-up care were reviewed with the patient and/or guardian. Theo Meyers and/or guardian was engaged and actively involved in the decision making process. He and/or guardian verbalized understanding of the options discussed and was satisfied with the final plan.    Follow up above with PCP within 1 week if not improving or sooner if develops new or worsening symptoms.    Lexx Martinez, DO    This note was created with help of Dragon dictation system. Grammatical /typing errors are not intentional.

## 2021-06-19 NOTE — PROGRESS NOTES
Theo came to chemo infusion this morning for lab and her next dose of velcade.  VSS.  Pt assessed and is feeling well.  Port accessed with good blood return and labs drawn.  Port was flushed and heparinized then deaccessed.  Velcade given as ordered into his LLQ of his abdomen sq.  He tolerated the injection well and site was covered with a bandaid.  INR result noted and reviewed with patient. Theo d/c from clinic ambulatory and unaccompanied.

## 2021-06-19 NOTE — LETTER
Letter by Emelia Winkler MD at      Author: Emelia Winkler MD Service: -- Author Type: --    Filed:  Encounter Date: 8/19/2019 Status: (Other)         Mathew Ott MD  [facility]  [address] [place] [address]                                  August 19, 2019    Patient: Theo Meyers   MR Number: [id number]   YOB: 1945   Date of Visit: 8/19/2019     Dear Dr. Namrata MD:    Thank you for referring Theo Meyers to me for evaluation. Below are the relevant portions of my assessment and plan of care.    If you have questions, please do not hesitate to call me. I look forward to following Theo along with you.    Sincerely,        Emelia Winkler MD          CC  No Recipients  Emelia Winkler MD  8/19/2019  3:13 PM  Sign at close encounter  Pulmonary Clinic Follow-up Visit    Assessment & Plan:  75 yo M with leukopenia from multiple myeloma who presented to [facility] in July 2019 with pneumonia and effusion s/p thoracentesis that initially was parapneumonic,  with increased loculation and size of effusion concerning for complicated parapneumonic versus empyema given ongoing fevers underwent pigtail catheter placement and tPA/DNase protocol .    Presents today for follow-up. CXR looks good - trace residual left effusion. Clinically no new symptoms and is doing well overall.    PLAN:    No specific need to ongoing radiographic surveillance of parapneumonic effusion - resolved      He can follow-up on an as needed basis.       Emelia Winkler MD  Pulmonary and Critical Care Medicine  [facility]  Office: [phone]  Pager: [phone]    ----------------------------    CCx: Follow-up pleural effusion    HPI:   75 yo M with leukopenia from multiple myeloma who presented to [facility] in July 2019 with pneumonia and effusion s/p thoracentesis that initially was parapneumonic, with increased loculation and size of effusion concerning for  "complicated parapneumonic versus empyema given ongoing fevers underwent pigtail catheter placement and tPA/DNase protocol . He completed 14 days of doxycycline. Cultures were all negative.    Since discharge last month he has been doing well. He has no respiratory complaints. No cough, fevers, chills, or dyspnea. Walks 2 miles per day with his dogs without difficulty. Follow-up CXR was reviewed.      ROS:  A 12-system review was obtained and was negative with the exception of the symptoms endorsed in the history of present illness.    PMH:  Past Medical History:   Diagnosis Date   ? Anemia 12/13/2017   ? Arthritis    ? Bilateral inguinal hernia    ? Glaucoma    ? Glaucoma    ? History of blood clots     DVT - left chronic   ? Multiple myeloma (H)     Gets chemo infusions every 2 weeks   ? Pancytopenia (H)    ? Peripheral neuropathy    ? Syncope    ? TMJ (temporomandibular joint disorder)      Social Hx:  Tobacco: 4 pack years, quit ~50 years ago    Physical Exam:  /72   Pulse 62   Resp 18   Ht 5' 10\" (1.778 m)   Wt 162 lb (73.5 kg)   SpO2 98% Comment: RA  BMI 23.24 kg/m     Gen: Alert, oriented, no distress  HEENT: nasal turbinates are unremarkable, no oropharyngeal lesions, no cervical or supraclavicular lymphadenopathy  CV: RRR, no M/G/R  Resp: CTAB, no focal crackles or wheezes  Abd: soft, nontender, no palpable organomegaly  Skin: no apparent rashes  Ext: no cyanosis, clubbing or edema  Neuro: alert, nonfocal    Labs:  Reviewed  Cultures reviewed - no pos cultures on pleural fluid  Negative cytology    Imaging studies:  Personally reviewed:    8/8/19 CXR:  FINDINGS: Right IJ Port-A-Cath. Left basilar chest tube has been removed. There is blunting of the left costophrenic angle extending superiorly and laterally consistent with a trace loculated effusion or pleural thickening. Clearing of most of the parenchymal opacities in the left base with residual areas of fibroatelectasis. Minimal scarring " laterally in the left midlung.     Right lung is clear. Heart and pulmonary vascularity are normal. Prior mid thoracic vertebroplasty.      PFT's  None

## 2021-06-19 NOTE — PROGRESS NOTES
[facility] clinic Follow Up Note    Theo Meyers   73 y.o. male    Date of Visit: 8/7/2018    Chief Complaint   Patient presents with     Cough     x 2 days, blood in sputum, INR      Subjective  Theo is here with his wife.  Patient is here for recurrent cough now with hemoptysis.    Patient saw me in July 25 2 weeks into a cough with nasal congestion.  He just finished a Z-Gonzalo and was getting better.  Patient stated he did get completely better at that time.    This past weekend he began having increasing coughing and some nasal congestion for recurrence.  Yesterday it had a significant increase in cough and he was coughing up some brown sputum with a small amount of blood.    No fevers, mild malaise.  He is using a decongestant and Mucinex and saline irrigation of his nose again.    No shortness of breath or chest pain.  No diarrhea or GI symptoms.  Some mild ear congestion but not ear pain.    Patient has multiple myeloma on Velcade and Revlimid chemotherapy and dexamethasone.  Pancytopenia.  Last blood counts in July were stable with a white count of 1.5.  Saw hematology on August 1 with no changes in 2 month follow-up given.    He has a past history of left leg DVT recurrence last year and is all on long-term Coumadin.  INR on August 1 was 2.6.    Yesterday he was put on a Z-Gonzalo.  He does feel significantly better today.  Less congestion, less cough, no gross hemoptysis.  No shortness of breath.    No flare of his severe spinal stenosis.  Takes gabapentin at night.    He has not been sleeping well of recent nights with the cough.  Yesterday he became slightly confused getting his antibiotic in the evening and could not find his car.  He tried to open other people's cars, the police were called on him.  He denies confusion.  The wife has not had other major spells.  No falls.  No other neurologic deficits.  No headache.    PMHx:    Past Medical History:   Diagnosis Date     Anemia 12/13/2017      "Arthritis      Bilateral inguinal hernia      Glaucoma      History of blood clots     DVT - left chronic     Multiple myeloma (H)     Gets chemo infusions every 2 weeks     Pancytopenia (H)      Peripheral neuropathy (H)      Syncope      TMJ (temporomandibular joint disorder)      PSHx:    Past Surgical History:   Procedure Laterality Date     APPENDECTOMY      Age 16     CARPAL TUNNEL RELEASE Bilateral      ESOPHAGOGASTRODUODENOSCOPY N/A 12/14/2017    Procedure: ESOPHAGOGASTRODUODENOSCOPY (EGD) WITH BIOPSYS;  Surgeon: Marlon Roy MD;  Location: [facility];  Service:      ESOPHAGOGASTRODUODENOSCOPY N/A 1/19/2018    Procedure: ENDOSCOPIC ULTRASOUND  ESOPHAGOGASTRODUODENOSCOPY WITH DUODENAL POLYP REMOVAL;  Surgeon: Familia Mcgraw MD;  Location: [facility] OR;  Service:      EYE SURGERY      Cataract     PORTACATH PLACEMENT       VERTEBROPLASTY      T6     WISDOM TOOTH EXTRACTION       Immunizations:   Immunization History   Administered Date(s) Administered     Hep B, historic 03/01/2011, 05/19/2011     HiB, historic,unspecified 03/01/2011, 05/19/2011     IPV 03/01/2011, 05/19/2011     Influenza high dose, seasonal 10/07/2015, 09/21/2016, 09/20/2017     Influenza,seasonal quad, PF, 36+MOS 09/24/2014     Pneumo Conj 13-V (2010&after) 10/16/2015     Pneumo Polysac 23-V 05/19/2011     Tdap 03/01/2011       ROS A comprehensive review of systems was performed and was otherwise negative    Medications, allergies, and problem list were reviewed and updated    Exam  /62  Pulse 70  Temp 97.6  F (36.4  C)  Ht 5' 9\" (1.753 m)  Wt 165 lb (74.8 kg)  SpO2 99%  BMI 24.37 kg/m2  Patient is alert and oriented ×3.  Nonfocal neurologic exam.  Able to clamp on the exam table without difficulty.  No jaundice or conjunctivitis.  Tympanic membranes are normal.  He has moderate congestion of his nose with clear discharge.  Some mild postnasal drip type pharyngitis no exudate.  No cervical or supraclavicular " adenopathy.  Lungs are clear.  Heart is regular with 1 out of 6 systolic murmur just trace ankle edema.    Assessment/Plan  1. Cough  Consistent with postnasal drip I suspect recurrent sinusitis.    Chest x-ray images reviewed by me and lung fields appear clear.  Will await radiology review.  - XR Chest 2 Views; Future    Small amount of hemoptysis with high INR.    He did recover from his symptoms with the first Z-Gonzalo, and then had recurrence off antibiotics.  He is feeling better already back on the Z-Gonzalo.  He will finish the current Z-Gonzalo.  But if he gets worse or fails to improve over the next 5 days, I did discuss with him alternative of Levaquin or Cipro antibiotic.    I discussed risks of antibiotics including Cipro, including tendon rupture risk, medication reaction risk, diarrhea risk, resistant bacteria risk and other risks.  Patient would accept these risks if needed.    Could consider Levaquin 500 mg daily for 10 days if he is not improving on the Z-Gonzalo.    He was told to hold the saline irrigation of his nose until INR is rechecked, given risk of epistaxis.  Otherwise he can continue that in the Mucinex.    Follow-up if not improving.    2. Multiple myeloma not having achieved remission (H)  Followed by hematology.  2 month follow-up with Dr. Clifford.  Continue current chemotherapy and steroids.    3. Chronic deep vein thrombosis (DVT) of left lower extremity, unspecified vein (H)    High INR  Hold warfarin for 2 days and recheck INR in 2 days.  - INR    4. Spinal stenosis of lumbar region without neurogenic claudication  No flare pain.    5. DVT (deep venous thrombosis) (H)    - INR    Confusional spell yesterday in the parking lot.  No evidence of new neurologic event.  No head trauma to suspect bleed and no headache.  I suspect he has not been sleeping well and was still became somewhat confused in the parking lot.  No evidence of confusion now.  He was given a handout for adult driving assessment and  [facility]    I did suggest a follow-up in the next 1-2 months if there are any other concerns, otherwise he is followed by his hematologist.    History of gastric polyp May 2018 with one-year follow-up EGD planned.  On Prilosec.    Flomax for BPH.    Blood pressure runs low at baseline.    No Follow-up on file.   Patient Instructions   Finish the current azithromycin antibiotic.    If you have worsening sinusitis symptoms, or the cough does not improve in 5 days, contact the clinic to change antibiotic to Levaquin or Cipro.    Continue salt water irrigation of the nose and gargling.    Do not take warfarin today or tomorrow.  Recheck INR in 2 days.    Follow-up with me as needed for issues.    Mathew Ott MD        Current Outpatient Prescriptions   Medication Sig Dispense Refill     acetaminophen (TYLENOL) 500 MG tablet Take 500 mg by mouth every 6 (six) hours as needed for pain.       acyclovir (ZOVIRAX) 400 MG tablet Take 400 mg by mouth 2 (two) times a day.       ascorbic acid (VITAMIN C) 1000 MG tablet Take 2,000 mg by mouth daily.        aspirin 81 MG EC tablet Take 2 tablets (162 mg total) by mouth daily. (Patient taking differently: Take 81 mg by mouth daily. )  0     azithromycin (ZITHROMAX Z-VEE) 250 MG tablet Take 2 tablets (500 mg) on  Day 1,  followed by 1 tablet (250 mg) once daily on Days 2 through 5. 6 tablet 0     bimatoprost (LUMIGAN) 0.01 % Drop Administer 1 drop to both eyes at bedtime.       brimonidine (ALPHAGAN P) 0.1 % Drop Administer 1 drop to both eyes 3 (three) times a day.        calcium, as carbonate, (TUMS) 200 mg calcium (500 mg) chewable tablet Chew 1 tablet 3 (three) times a day as needed.        calcium-vitamin D 500 mg(1,250mg) -200 unit per tablet Take 1 tablet by mouth daily.       cetirizine (ZYRTEC) 10 MG tablet Take 10 mg by mouth daily.       cyclobenzaprine (FLEXERIL) 10 MG tablet Take 0.5-1 tablets (5-10 mg total) by mouth 3 (three) times a day as needed  for muscle spasms. 30 tablet 1     dexamethasone (DECADRON) 4 MG tablet Take 20 mg by mouth every 14 (fourteen) days.       gabapentin (NEURONTIN) 300 MG capsule Take 300 mg by mouth daily.        guaiFENesin-dextromethorphan (MUCINEX DM) 600-30 mg Tb12 Take 1 tablet by mouth 2 (two) times a day as needed.        KLOR-CON M20 20 mEq tablet TAKE 1 TABLET BY MOUTH EVERY DAY 90 tablet 3     lenalidomide (REVLIMID) 20 mg cap Take 20 mg by mouth every evening.        loperamide (IMODIUM) 2 mg capsule Take 2 mg by mouth 4 (four) times a day as needed for diarrhea.       magnesium oxide (MAG-OX) 400 mg tablet TAKE 1 TABLET (400 MG TOTAL) BY MOUTH 2 (TWO) TIMES A DAY. 60 tablet 3     multivitamin (MULTIVITAMIN) per tablet Take 1 tablet by mouth daily.       omega 3-dha-epa-fish oil 900-1,400 mg CpDR Take 1 tablet by mouth daily.       omeprazole (PRILOSEC) 20 MG capsule TAKE 1 CAPSULE BY MOUTH EVERY DAY 90 capsule 3     ondansetron (ZOFRAN-ODT) 8 MG disintegrating tablet Take 8 mg by mouth every 8 (eight) hours as needed for nausea.       psyllium (METAMUCIL) powder Take 2 packets by mouth daily. Takes 2 tablespoonsful       sodium chloride (OCEAN) 0.65 % nasal spray 2 sprays into each nostril as needed for congestion. 15 mL 12     tamsulosin (FLOMAX) 0.4 mg Cp24 Take 0.4 mg by mouth every evening.       warfarin (COUMADIN) 5 MG tablet TAKE 1 TABLET BY MOUTH DAILY 90 tablet 1     No current facility-administered medications for this visit.      Facility-Administered Medications Ordered in Other Visits   Medication Dose Route Frequency Provider Last Rate Last Dose     heparin 100 unit/mL lockflush (PF) porcine 300-600 Units  300-600 Units Intravenous PRN Per Clifford MD   600 Units at 11/19/14 1013     No Known Allergies  Social History   Substance Use Topics     Smoking status: Former Smoker     Quit date: 11/16/1975     Smokeless tobacco: Never Used     Alcohol use 0.6 oz/week     1 Glasses of wine per week

## 2021-06-19 NOTE — PROGRESS NOTES
[facility] clinic Follow Up Note    Theo Meyers   73 y.o. male    Date of Visit: 7/25/2018    Chief Complaint   Patient presents with     Follow-up     Cough     x 1 week was seen at walk in not sure if it is allergies cough worse at night and some troubles sleeping      Subjective  Theo is here for routine follow-up of his multiple medical issues, he also is a new complaint of a cough he developed approximately 2 weeks ago.    He developed a moderately productive cough.  He was given a Z-Gonzalo which he just finished.  His sputum is cleared up, just a scant amount of sputum, much better.  He denies any fevers.  Denies shortness of breath or chest pain.  He has been taking over-the-counter decongestant.  He does tend to run a low blood pressure.  No palpitations or history of arrhythmias.    Patient has not had a chest x-ray, but he did not want to get a chest x-ray today, but one was offered.  He does feel like he is all better now.    He continues on chemotherapy for his multiple myeloma including dexamethasone.  He sees hematology next week.    He had blood drawn today with his stable low white count of 1.5.  Hemoglobin slightly better at 10.  Platelets slightly lower 94.    Normal liver tests and creatinine stable at 1.1.  Calcium 8.2.  Her graph he said history of low magnesium, currently on magnesium oxide 400 mg twice daily.  Magnesium level today was 1.7.    He has had recurrent left leg DVT earlier this year with a stable blood clot on ultrasound in March.  Previous DVT during chemotherapy in 2012.  He is on long-term Coumadin now.  INR was slightly subtherapeutic at 1.6, denies missing any doses.    No worsening leg swelling.    BPH is well controlled on Flomax.  No dysuria.    Glaucoma controlled and was recently seen by ophthalmology and given a 4 month follow-up.    He does have severe spinal canal stenosis at L3-4 seen on MRI last month.  I did review the MRI report personally  today.    Patient was just seen in the spine clinic today.  He is made significant improvement with physical therapy and will continue that.  He does not have any radicular pain or leg weakness now.  He does have some intermittent achiness in his bilateral lower back.  Still able to walk his dog on a daily basis.  No injection intervention is planned.  He does take gabapentin 300 mg in the evening.  He is avoiding ibuprofen on Coumadin.  Does not take other pain pills.    Earlier this month his TSH was normal.  He did have a borderline B12 level of 205.    No chest pain or palpitations.    No epigastric pain.  Previous GI bleed associated with gastric polyp.  He just had an EGD in May with one tubular adenoma, 10 mm, removed.  One-year follow-up EGD was recommended.  He still on Prilosec.  No blood in stool or melena.    Hemoglobin today was 10.    PMHx:    Past Medical History:   Diagnosis Date     Anemia 12/13/2017     Arthritis      Bilateral inguinal hernia      Glaucoma      History of blood clots     DVT - left chronic     Multiple myeloma (H)     Gets chemo infusions every 2 weeks     Pancytopenia (H)      Peripheral neuropathy (H)      Syncope      TMJ (temporomandibular joint disorder)      PSHx:    Past Surgical History:   Procedure Laterality Date     APPENDECTOMY      Age 16     CARPAL TUNNEL RELEASE Bilateral      ESOPHAGOGASTRODUODENOSCOPY N/A 12/14/2017    Procedure: ESOPHAGOGASTRODUODENOSCOPY (EGD) WITH BIOPSYS;  Surgeon: Marlon Roy MD;  Location: [facility];  Service:      ESOPHAGOGASTRODUODENOSCOPY N/A 1/19/2018    Procedure: ENDOSCOPIC ULTRASOUND  ESOPHAGOGASTRODUODENOSCOPY WITH DUODENAL POLYP REMOVAL;  Surgeon: Familia Mcgraw MD;  Location: [facility] OR;  Service:      EYE SURGERY      Cataract     PORTACATH PLACEMENT       VERTEBROPLASTY      T6     WISDOM TOOTH EXTRACTION       Immunizations:   Immunization History   Administered Date(s) Administered     Hep B, historic  "03/01/2011, 05/19/2011     HiB, historic,unspecified 03/01/2011, 05/19/2011     IPV 03/01/2011, 05/19/2011     Influenza high dose, seasonal 10/07/2015, 09/21/2016, 09/20/2017     Influenza,seasonal quad, PF, 36+MOS 09/24/2014     Pneumo Conj 13-V (2010&after) 10/16/2015     Pneumo Polysac 23-V 05/19/2011     Tdap 03/01/2011       ROS A comprehensive review of systems was performed and was otherwise negative    Medications, allergies, and problem list were reviewed and updated    Exam  BP 94/54 (Patient Site: Left Arm, Patient Position: Sitting, Cuff Size: Adult Regular)  Pulse 62  Ht 5' 9\" (1.753 m)  Wt 164 lb 6.4 oz (74.6 kg)  SpO2 99%  BMI 24.28 kg/m2  No thrush.  No significant pharyngitis.  His lungs are clear.  Good respiratory excursion.  Heart is regular, did not hear a slight 1 out of 6 systolic murmur in the supine position today.  No rub.  Abdomen is nontender and no epigastric tenderness or hepatomegaly.  Trace ankle edema, baseline.    Assessment/Plan  1. Cough  Consistent with URI, possible viral type URI.  Resolved after Z-Gonzalo.  Declined a chest x-ray today.  I did discuss with patient that he is at high risk for infections with his low white count and chemotherapy.    If he has any worsening symptoms or fever develops, or continues to cough for more than 2 weeks more, patient was told to seek medical attention for evaluation.    2. Adenomatous duodenal polyp  Continue on omeprazole long-term.  One-year follow-up EGD  - omeprazole (PRILOSEC) 20 MG capsule; TAKE 1 CAPSULE BY MOUTH EVERY DAY  Dispense: 90 capsule; Refill: 3    3. Chronic deep vein thrombosis (DVT) of left lower extremity, unspecified vein (H)  Long-term Coumadin.  INR subtherapeutic today and will be contacted to adjust dose and recheck.    4. Multiple myeloma not having achieved remission (H)  Managed by hematology and will be seen next week.  Continue chemotherapy.    5. Spinal stenosis of lumbar region without neurogenic " claudication  Severe at L3-4 but no clear radicular symptoms currently.  Continue regular walking.  Was seen by spine clinic today that manages this issue.    Avoiding ibuprofen or Aleve with his Coumadin.    Continue gabapentin 300 mg nightly.    6. Hypomagnesemia  Adequately replaced on today's check.  Continue magnesium oxide twice daily.    Flomax EPH, stable.    Glaucoma, stable on drops and four-month follow-up with ophthalmology.    Borderline low B12 level.  I would hesitate to start giving him B12 injections with his hematologic malignancy, however.  I will have his hematologist review that issue.    His hematologist is caring for his ongoing medical care at this time.  He can see the spine clinic as needed for the back pain.    Follow-up with me as needed otherwise in 1 year for a adult wellness visit    Return in about 1 year (around 7/25/2019) for Annual physical.   Patient Instructions   No medication changes today.    He will be contacted to adjust your warfarin, and will be given instructions on when to recheck your INR.    Seek medical attention if any worsening cough, worsening shortness of breath or fever develops.    Continue to follow closely with hematology.    Follow-up with me for an adult wellness visit in 1 year, or as needed.    Continue omeprazole daily, because of your history of stomach polyp.    Mathew Ott MD  I spent a total time with patient of over 25 minutes and over 50% coord care.  Time all face to face.      Current Outpatient Prescriptions   Medication Sig Dispense Refill     acetaminophen (TYLENOL) 500 MG tablet Take 500 mg by mouth every 6 (six) hours as needed for pain.       acyclovir (ZOVIRAX) 400 MG tablet Take 400 mg by mouth 2 (two) times a day.       ascorbic acid (VITAMIN C) 1000 MG tablet Take 2,000 mg by mouth daily.        aspirin 81 MG EC tablet Take 2 tablets (162 mg total) by mouth daily. (Patient taking differently: Take 81 mg by mouth daily. )  0      bimatoprost (LUMIGAN) 0.01 % Drop Administer 1 drop to both eyes at bedtime.       brimonidine (ALPHAGAN P) 0.1 % Drop Administer 1 drop to both eyes 3 (three) times a day.        calcium, as carbonate, (TUMS) 200 mg calcium (500 mg) chewable tablet Chew 1 tablet 3 (three) times a day as needed.        calcium-vitamin D 500 mg(1,250mg) -200 unit per tablet Take 1 tablet by mouth daily.       cetirizine (ZYRTEC) 10 MG tablet Take 10 mg by mouth daily.       dexamethasone (DECADRON) 4 MG tablet Take 20 mg by mouth every 14 (fourteen) days.       gabapentin (NEURONTIN) 300 MG capsule Take 300 mg by mouth daily.        guaiFENesin-dextromethorphan (MUCINEX DM) 600-30 mg Tb12 Take 1 tablet by mouth 2 (two) times a day as needed.        KLOR-CON M20 20 mEq tablet TAKE 1 TABLET BY MOUTH EVERY DAY 90 tablet 3     lenalidomide (REVLIMID) 20 mg cap Take 20 mg by mouth every evening.        magnesium oxide (MAG-OX) 400 mg tablet TAKE 1 TABLET (400 MG TOTAL) BY MOUTH 2 (TWO) TIMES A DAY. 60 tablet 3     multivitamin (MULTIVITAMIN) per tablet Take 1 tablet by mouth daily.       omega 3-dha-epa-fish oil 900-1,400 mg CpDR Take 1 tablet by mouth daily.       ondansetron (ZOFRAN-ODT) 8 MG disintegrating tablet Take 8 mg by mouth every 8 (eight) hours as needed for nausea.       psyllium (METAMUCIL) powder Take 2 packets by mouth daily. Takes 2 tablespoonsful       sodium chloride (OCEAN) 0.65 % nasal spray 2 sprays into each nostril as needed for congestion. 15 mL 12     tamsulosin (FLOMAX) 0.4 mg Cp24 Take 0.4 mg by mouth every evening.       warfarin (COUMADIN) 5 MG tablet TAKE 1 TABLET BY MOUTH DAILY 90 tablet 1     cyclobenzaprine (FLEXERIL) 10 MG tablet Take 0.5-1 tablets (5-10 mg total) by mouth 3 (three) times a day as needed for muscle spasms. 30 tablet 1     loperamide (IMODIUM) 2 mg capsule Take 2 mg by mouth 4 (four) times a day as needed for diarrhea.       omeprazole (PRILOSEC) 20 MG capsule TAKE 1 CAPSULE BY MOUTH  EVERY DAY 90 capsule 3     No current facility-administered medications for this visit.      Facility-Administered Medications Ordered in Other Visits   Medication Dose Route Frequency Provider Last Rate Last Dose     heparin 100 unit/mL lockflush (PF) porcine 300-600 Units  300-600 Units Intravenous PRN Per Clifford MD   600 Units at 11/19/14 1013     No Known Allergies  Social History   Substance Use Topics     Smoking status: Former Smoker     Quit date: 11/16/1975     Smokeless tobacco: Never Used     Alcohol use 0.6 oz/week     1 Glasses of wine per week

## 2021-06-20 NOTE — PROGRESS NOTES
[facility] Hematology and Oncology Progress Note    Patient: Theo Meyers  MRN: [id number]  Date of Service: September 26, 2018      Assessment and Plan:    1. Kappa light chain myeloma: Overall, his numbers look stable to improved.  Ratio is down compared to 2 months ago.  Clinically he is doing well with no evidence of progressive of disease.  No M protein noted on serum protein electrophoresis.  No evidence of any worsening endorgan damage.  Continue Revlimid 21 days on 7 days off.  Velcade every other week.    2. Thromboprophylaxis: He is on Coumadin for this.  INR is therapeutic today.  No changes in the Coumadin dose.  Significant GI bleeding in December 2017 requiring hospitalization.    3.  Pancytopenia: Stable.  Neutropenia has apparently improved some.  Anemia normocytic.  Generally stable.  Continue to follow.  He does have a history of B12 deficiency.    ECOG Performance   ECOG Performance Status: 0    Distress Assessment  Distress Assessment Score: No distress    Pain  Currently in Pain: No/denies    Diagnosis:    1. IgG kappa light chain myeloma. Hyposecretory. Diagnosed 2009. No significant measurable M protein. 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 There was also on 11;14 translocation.  Past immunofixations from 2011 show IgG-kappa.  Bone marrow biopsy at 5%.  Residual kappa light chain myeloma involving 20% of nucleated cells.  No significant change from November 2016 marrow cellularity.    2. Deep vein thrombosis of the left leg diagnosed 9/2012 while on treatment.    3.  Vitamin B12 deficiency diagnosed in September 2017.    4.  GI bleed and anemia requiring hospitalization in December 2017.    Treatment:    He presented with a lytic lesion involving the C6 vertebral body, although no measurable M protein. IgG kappa monoclonal immunoglobulin was confirmed by ELMA as well as elevated kappa free light chains with an abnormal kappa lambda ratio. Bone marrow biopsy showed 30%  involvement and cytogenetics confirmed deletion of 13 q. as well as 11;14 translocation. He was initially treated with Velcade and dexamethasone and achieved a good partial response.     He was referred to the [facility] where he underwent high-dose chemotherapy with autologous peripheral stem cell transplant in April of 2010. He began Revlimid as maintenance therapy post transplant. Repeat bone marrow biopsy done October 2010 showed about 5% kappa restricted plasma cells and so at that time weekly Velcade was added along with his maintenance Revlimid and dexamethasone.   He has done well since that time with stable minimal residual disease, best assessed by serum free light chains. He required dose reductions of weekly Velcade because of cytopenias and was ultimately switched to a q 2 week maintenance schedule which he has been on since September 2012.     His Revlimid dose was reduced to 15 mg daily and he continues on dexamethasone 20 mg p.o. weekly  Revlimid dosing changed to 20 mg 3 weeks on 1 week (instead of 15 mg daily) for cost reasons.  Started March, 2018    Interim History:    Theo returns today for follow-up visit.  Overall he states that he is feeling okay.  No pain anywhere.  Back pain is improved.  No fevers or chills.  No rash or mouth sores.  His energy and appetite are stable.  Still maintaining good activity.  No acute complaints today.    Review of Systems:    Constitutional  Constitutional (WDL): Exceptions to WDL  Fatigue: Fatigue relieved by rest  Neurosensory  Neurosensory (WDL): Exceptions to WDL  Peripheral Motor Neuropathy: Asymptomatic, clinical or diagnostic observations only, intervention not indicated  Peripheral Sensory Neuropathy: Asymptomatic, loss of deep tendon reflexes or paresthesia  Cardiovascular  Cardiovascular (WDL): All cardiovascular elements are within defined limits  Pulmonary  Respiratory (WDL): Exceptions to WDL  Cough: Mild symptoms, nonprescription  intervention indicated  Dyspnea: Shortness of breath with moderate exertion  Gastrointestinal  Gastrointestinal (WDL): All gastrointestinal elements are within defined limits  Genitourinary  Genitourinary (WDL): All genitourinary elements are within defined limits  Integumentary  Integumentary (WDL): All integumentary elements are within defined limits  Patient Coping  Patient Coping: Accepting  Accompanied by  Accompanied by: Alone    Past History:    Past Medical History:   Diagnosis Date     Anemia 12/13/2017     Arthritis      Bilateral inguinal hernia      Glaucoma      History of blood clots     DVT - left chronic     Multiple myeloma (H)     Gets chemo infusions every 2 weeks     Pancytopenia (H)      Peripheral neuropathy (H)      Syncope      TMJ (temporomandibular joint disorder)      Physical Exam:    Recent Vitals 9/26/2018   Height -   Weight 169 lbs   BSA (m2) -   /56   Pulse 62   Temp 97.5   Temp src 1   SpO2 99   Some recent data might be hidden     General: patient appears stated age of 73 y.o.. Nontoxic and in no distress.   HEENT: Head: atraumatic, normocephalic. Sclerae anicteric.  Chest:  Normal respiratory effort.   Cardiac:  No edema.   Abdomen: abdomen is non-distended  Extremities: normal tone and muscle bulk.   Skin: no lesions or rash. Warm and dry.   CNS: alert and oriented. Grossly non-focal.   Psychiatric: normal mood and affect.     Lab Results:    Recent Results (from the past 240 hour(s))   Comprehensive Metabolic Panel (add-ons/treatment plan)    Collection Time: 09/19/18  8:07 AM   Result Value Ref Range    Sodium 140 136 - 145 mmol/L    Potassium 4.2 3.5 - 5.0 mmol/L    Chloride 110 (H) 98 - 107 mmol/L    CO2 24 22 - 31 mmol/L    Anion Gap, Calculation 6 5 - 18 mmol/L    Glucose 102 70 - 125 mg/dL    BUN 19 8 - 28 mg/dL    Creatinine 0.97 0.70 - 1.30 mg/dL    GFR MDRD Af Amer >60 >60 mL/min/1.73m2    GFR MDRD Non Af Amer >60 >60 mL/min/1.73m2    Bilirubin, Total 0.4 0.0 -  1.0 mg/dL    Calcium 8.2 (L) 8.5 - 10.5 mg/dL    Protein, Total 4.8 (L) 6.0 - 8.0 g/dL    Albumin 3.0 (L) 3.5 - 5.0 g/dL    Alkaline Phosphatase 78 45 - 120 U/L    AST 16 0 - 40 U/L    ALT 26 0 - 45 U/L   Immunoglobulins, Quantitative    Collection Time: 09/19/18  8:07 AM   Result Value Ref Range    Immunoglobulin G 406 (L) 700 - 1700 mg/dL    Immunoglobulin M 10 (L) 60 - 280 mg/dL    Immunoglobulin A 32 (L) 65 - 400 mg/dL   Immunoglobulin Free Light Chains, Serum    Collection Time: 09/19/18  8:07 AM   Result Value Ref Range    Kappa Free Lt Chain 42.25 (H) 0.33 - 1.94 mg/dL    Lambda Free Lt Chain 1.15 0.57 - 2.63 mg/dL    Kappa Lambda Ratio 36.74 (H) 0.26 - 1.65   Electrophoresis, Protein, Serum    Collection Time: 09/19/18  8:07 AM   Result Value Ref Range    Albumin % 68.1 (H) 51.0 - 67.0 %    Albumin  3.2 3.2 - 4.7 g/dL    Alpha 1 % 3.0 2.0 - 4.0 %    Alpha 1 0.1 0.1 - 0.3 g/dL    Alpha 2 % 11.7 5.0 - 13.0 %    Alpha 2 0.5 0.4 - 0.9 g/dL    % Beta 11.0 10.0 - 17.0 %    Beta 0.5 (L) 0.7 - 1.2 g/dL    Gamma Globulin % 6.2 (L) 9.0 - 20.0 %    Gamma Globulin 0.3 (L) 0.6 - 1.4 g/dL    ELP Comment       Multiple faint bands are visible in the gamma globulin region of the gel strip, which may represent a monoclonal globulin, but is too faint to quantify. See immunofixation electrophoresis results.      Decreased beta globulin fraction, which can be seen in kidney disease, coagulopathies, and malnutrition, among other etiologies.    Decreased gamma globulin fraction. Etiologies include lymphoproliferative disorders, chemotherapy, and immunodeficiency.      Protein, Total 4.7 (L) 6.0 - 8.0 g/dL    Path ICD: I82.5Y9     Interpreted By: Bharath Landis MD    Immunofixation Electrophoresis, Serum    Collection Time: 09/19/18  8:07 AM   Result Value Ref Range    Immunofixation Electrophoresis, Serum       Multiple faint bands. Favor faint monoclonal IgG kappa and faint free monoclonal kappa.     Path ICD: C90.01      Interpreted By: Bharath Landis MD    HM1 (CBC with Diff)    Collection Time: 09/19/18  8:07 AM   Result Value Ref Range    WBC 3.3 (L) 4.0 - 11.0 thou/uL    RBC 2.84 (L) 4.40 - 6.20 mill/uL    Hemoglobin 9.9 (L) 14.0 - 18.0 g/dL    Hematocrit 30.8 (L) 40.0 - 54.0 %     (H) 80 - 100 fL    MCH 34.9 (H) 27.0 - 34.0 pg    MCHC 32.1 32.0 - 36.0 g/dL    RDW 15.0 (H) 11.0 - 14.5 %    Platelets 93 (L) 140 - 440 thou/uL    MPV 12.3 8.5 - 12.5 fL    Neutrophils % 57 50 - 70 %    Lymphocytes % 16 (L) 20 - 40 %    Monocytes % 15 (H) 2 - 10 %    Eosinophils % 10 (H) 0 - 6 %    Basophils % 1 0 - 2 %    Neutrophils Absolute 1.8 (L) 2.0 - 7.7 thou/uL    Lymphocytes Absolute 0.5 (L) 0.8 - 4.4 thou/uL    Monocytes Absolute 0.5 0.0 - 0.9 thou/uL    Eosinophils Absolute 0.3 0.0 - 0.4 thou/uL    Basophils Absolute 0.0 0.0 - 0.2 thou/uL   INR    Collection Time: 09/19/18  8:11 AM   Result Value Ref Range    INR 2.00 (H) 0.90 - 1.10     Imaging:    No results found.       Signed by: Per Clifford MD

## 2021-06-20 NOTE — PROGRESS NOTES
Pt ambulates to infusion center for treatment.  Injections given as ordered without difficulty.  Pt left clinic stable to markell.  Plan RTC as scheduled.

## 2021-06-20 NOTE — PROGRESS NOTES
Pt here for injection after seeing MD.  No problem with velcade in abdomen and B12 and flu shot in arms.  Pt denies complaint and d/c ambulatory to lobby alone.  Pt aware of treatment plan.

## 2021-06-20 NOTE — PROGRESS NOTES
Pt ambulates to infusion center for labs and treatment.  Lab results reviewed.  Velcade and Vitamin B12 injections given as ordered.  Pt left clinic stable to lobby.  Plan RTC as scheduled.

## 2021-06-20 NOTE — PROGRESS NOTES
[facility] clinic Follow Up Note    Theo Meyers   73 y.o. male    Date of Visit: 9/6/2018    Chief Complaint   Patient presents with     Cough     white phlegm, dizziness     Subjective  Theo is here for follow-up on his cough.  Patient developed increased cough, suspected sinusitis versus bronchitis.  He took a Z-Gonzalo in early August and then changed to Levaquin August 10 until August 20.  That did significantly help his cough and he returned to baseline, just rare irritative cough as of 1 week ago.  August 7 chest x-ray negative.    He continued on Mucinex and Zyrtec.  Only using saline irrigation rarely.  He stopped the decongestant.    He continues on Prilosec and Metamucil for reflux and that is well-controlled.    He denies sinus pain or purulent discharge.  No fever.    One week ago at a football game he aspirated some popcorn.  He had some significant increased cough since then.  It has been steadily improving and almost back to baseline now.  No fever or worsening shortness of breath.    He sleeping well at night now.    The cough tends to occur when he changes position such as getting up in the morning or lying down at night.  Cough since last just a few minutes.    No flare of his spinal stenosis pain.  He is no longer taking gabapentin.  Her graph he continues on multiple myeloma treatment.  History of stem cell transplant.  On Velcade, Revlimid, dexamethasone.    Quit smoking in 1975.  No hemoptysis currently.  He did have some scant hemoptysis previously but he is on Coumadin.    Past history of left leg DVT on long-term Coumadin.  Recent INR therapeutic.    On August 29 his white count was up to 3.2 with 73% PMNs.    Patient has a low blood pressure with some mild orthostasis.  He has been eating okay but has lost 4 pounds.  He does not always have a good appetite.  No new abdominal pain.  Past history of gastric polyp on EGD in May of this year.  One-year follow-up was planned.  No  "blood in stool or melena.    No new confusional spells.    He does take eyedrops for glaucoma.    PMHx:    Past Medical History:   Diagnosis Date     Anemia 12/13/2017     Arthritis      Bilateral inguinal hernia      Glaucoma      History of blood clots     DVT - left chronic     Multiple myeloma (H)     Gets chemo infusions every 2 weeks     Pancytopenia (H)      Peripheral neuropathy (H)      Syncope      TMJ (temporomandibular joint disorder)      PSHx:    Past Surgical History:   Procedure Laterality Date     APPENDECTOMY      Age 16     CARPAL TUNNEL RELEASE Bilateral      ESOPHAGOGASTRODUODENOSCOPY N/A 12/14/2017    Procedure: ESOPHAGOGASTRODUODENOSCOPY (EGD) WITH BIOPSYS;  Surgeon: Marlon Roy MD;  Location: [facility] GI;  Service:      ESOPHAGOGASTRODUODENOSCOPY N/A 1/19/2018    Procedure: ENDOSCOPIC ULTRASOUND  ESOPHAGOGASTRODUODENOSCOPY WITH DUODENAL POLYP REMOVAL;  Surgeon: Familia Mcgraw MD;  Location: [facility] OR;  Service:      EYE SURGERY      Cataract     PORTACATH PLACEMENT       VERTEBROPLASTY      T6     WISDOM TOOTH EXTRACTION       Immunizations:   Immunization History   Administered Date(s) Administered     Hep B, historic 03/01/2011, 05/19/2011     HiB, historic,unspecified 03/01/2011, 05/19/2011     IPV 03/01/2011, 05/19/2011     Influenza high dose, seasonal 10/07/2015, 09/21/2016, 09/20/2017     Influenza,seasonal quad, PF, 36+MOS 09/24/2014     Pneumo Conj 13-V (2010&after) 10/16/2015     Pneumo Polysac 23-V 05/19/2011     Tdap 03/01/2011       ROS A comprehensive review of systems was performed and was otherwise negative    Medications, allergies, and problem list were reviewed and updated    Exam  BP 94/69  Pulse 79  Ht 5' 9\" (1.753 m)  Wt 161 lb (73 kg)  SpO2 99%  BMI 23.78 kg/m2  Some very mild irritative pharyngitis, no exudate.  No thrush.  No cervical or supraclavicular adenopathy or neck mass.  Lungs are clear to auscultation with normal respiratory excursion.  " Heart is regular with 1 out of 6 systolic murmur.  There is no ankle edema today, previous trace edema.    Assessment/Plan  1. Cough  Consistent with postnasal drip or irritative cough.  Lower likelihood for reflux, he is on Metamucil and Prilosec.    He may have had minor aspiration event with popcorn which exacerbated his nasal congestion 1 week ago.  Cough is steadily improving his lungs are clear now.    Patient was told if he has worsening symptoms, the cough is not improving further over the next month return for further evaluation.  Could consider repeat chest x-ray and ENT evaluation.    He could consider Astelin nasal spray, which I discussed with patient.        2. Multiple myeloma not having achieved remission (H)  Management per hematology.    3. History of DVT (deep vein thrombosis)  Long-term Coumadin, recent INR therapeutic    Continue glaucoma eyedrops.    Severe spinal stenosis, pain is been less prominent.  No longer needing gabapentin.  Continue regular walking.    Low blood pressure with orthostasis fairly mild, chronic.  Does run a low blood pressure.  Worse in the morning.  He does take Flomax at night for his BPH, that stable.  Emphasized staying well-hydrated.    History of gastric polyp, consider one-year follow-up EGD next May.  On Prilosec.    No recurrent confusional spells, likely associated with lack of sleep at this time.    Return in about 6 months (around 3/6/2019) for Recheck.   Patient Instructions   You can continue the Mucinex and Zyrtec.  You can increase saline irrigation.    If your cough worsens, return for reevaluation.  You could consider repeat chest x-ray and evaluation by the ENT, sinus, Dr.    Routine follow-up in 6 months, or as needed.    Mathew Ott MD  I spent a total time with patient of over 25 minutes and over 50% coord care.  Time all face to face.      Current Outpatient Prescriptions   Medication Sig Dispense Refill     acetaminophen (TYLENOL) 500 MG tablet  Take 500 mg by mouth every 6 (six) hours as needed for pain.       ascorbic acid (VITAMIN C) 1000 MG tablet Take 2,000 mg by mouth daily.        aspirin 81 MG EC tablet Take 2 tablets (162 mg total) by mouth daily. (Patient taking differently: Take 81 mg by mouth daily. )  0     bimatoprost (LUMIGAN) 0.01 % Drop Administer 1 drop to both eyes at bedtime.       brimonidine (ALPHAGAN P) 0.1 % Drop Administer 1 drop to both eyes 3 (three) times a day.        calcium, as carbonate, (TUMS) 200 mg calcium (500 mg) chewable tablet Chew 1 tablet 3 (three) times a day as needed.        calcium-vitamin D 500 mg(1,250mg) -200 unit per tablet Take 1 tablet by mouth daily.       cetirizine (ZYRTEC) 10 MG tablet Take 10 mg by mouth daily.       dexamethasone (DECADRON) 4 MG tablet Take 20 mg by mouth every 14 (fourteen) days.       guaiFENesin-dextromethorphan (MUCINEX DM) 600-30 mg Tb12 Take 1 tablet by mouth 2 (two) times a day as needed.        KLOR-CON M20 20 mEq tablet TAKE 1 TABLET BY MOUTH EVERY DAY 90 tablet 3     lenalidomide (REVLIMID) 20 mg cap Take 20 mg by mouth every evening.        loperamide (IMODIUM) 2 mg capsule Take 2 mg by mouth 4 (four) times a day as needed for diarrhea.       magnesium oxide (MAG-OX) 400 mg tablet TAKE 1 TABLET (400 MG TOTAL) BY MOUTH 2 (TWO) TIMES A DAY. 60 tablet 3     multivitamin (MULTIVITAMIN) per tablet Take 1 tablet by mouth daily.       omega 3-dha-epa-fish oil 900-1,400 mg CpDR Take 1 tablet by mouth daily.       omeprazole (PRILOSEC) 20 MG capsule TAKE 1 CAPSULE BY MOUTH EVERY DAY 90 capsule 3     ondansetron (ZOFRAN-ODT) 8 MG disintegrating tablet Take 8 mg by mouth every 8 (eight) hours as needed for nausea.       psyllium (METAMUCIL) powder Take 2 packets by mouth daily. Takes 2 tablespoonsful       sodium chloride (OCEAN) 0.65 % nasal spray 2 sprays into each nostril as needed for congestion. 15 mL 12     tamsulosin (FLOMAX) 0.4 mg Cp24 Take 0.4 mg by mouth every evening.        warfarin (COUMADIN) 5 MG tablet TAKE 1 TABLET BY MOUTH DAILY 90 tablet 1     acyclovir (ZOVIRAX) 400 MG tablet Take 400 mg by mouth 2 (two) times a day.       gabapentin (NEURONTIN) 300 MG capsule Take 300 mg by mouth daily.        No current facility-administered medications for this visit.      Facility-Administered Medications Ordered in Other Visits   Medication Dose Route Frequency Provider Last Rate Last Dose     heparin 100 unit/mL lockflush (PF) porcine 300-600 Units  300-600 Units Intravenous PRN Per Clifford MD   600 Units at 11/19/14 1013     No Known Allergies  Social History   Substance Use Topics     Smoking status: Former Smoker     Quit date: 11/16/1975     Smokeless tobacco: Never Used     Alcohol use 0.6 oz/week     1 Glasses of wine per week

## 2021-06-21 NOTE — PROGRESS NOTES
Pt ambulates to infusion center for treatment following NP visit.  Pt was seen by BRITTANI Valdez CNP and orders approved.  Treatment administered as ordered without difficulty.  Pt left clinic stable to lobby.  Plan RTC as scheduled.

## 2021-06-21 NOTE — PROGRESS NOTES
[facility] Hematology and Oncology Progress Note    Patient: Theo Meyers  MRN: [id number]  Date of Service: 11/21/2018        Reason for Visit    Chief Complaint   Patient presents with     Malignant Hematology     Multiple myeloma not having achieved remission        Assessment and Plan  1.  Myeloma: Patient continues on his current treatment of Velcade and dexamethasone every other week along with Revlimid.  His labs have fluctuated.. His light chains and the ratio continue to be elevated. It is a little higher this month than it has been running.  If it goes up again, we may talk about switching to daratumumab and pomalyst. We will continue current regimen.  He will see Dr. Clifford in 2 months.     2. DVT: on coumadin. INR was 3.1 last week. Being managed by coumadin clinic.     3. Pancytopenia/neutropenia: from revlimid and velcade. No complications at this time. Continue to monitor and take precautions. Encourage him to call with infectious, bleeding complications.      ECOG Performance   ECOG Performance Status: 0     Distress Assessment  Distress Assessment Score: 3    Pain  Currently in Pain: No/denies      Problem List    1. Multiple myeloma not having achieved remission (H)     2. Pancytopenia (H)     3. Chronic deep vein thrombosis (DVT) of left lower extremity, unspecified vein (H)        ______________________________________________________________________________    History of Present Illness    DIAGNOSIS:   1. IgG kappa light chain myeloma. Hyposecretory. Diagnosed 2009. No significant measurable M protein. 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 There was also on 11;14 translocation.   2. Deep vein thrombosis of the left leg diagnosed 09/2012 while on treatment.       TREATMENT:   1. Most recently he has been on Velcade every 2 weeks since 09/2012. He is getting dexamethasone 20 mg every other week with the velcade. Finally, he is receiving Revlimid 20 mg daily for 3 weeks on, 1  week off.   2. Continues on warfarin      PAST TREATMENT and HISTORY:   He presented at diagnosis with a lytic lesion involving the C6 vertebral body, although no measurable M protein. IgG kappa monoclonal immunoglobulin was confirmed by [facility] as well as elevated kappa free light chains with an abnormal kappa lambda ratio. Bone marrow biopsy showed 30% involvement and cytogenetics confirmed deletion of 13 q. as well as 11;14 translocation. He was initially treated with Velcade and dexamethasone and achieved a good partial response. He was referred to the [facility] where he underwent high-dose chemotherapy with autologous peripheral stem cell transplant in April of 2010. He began Revlimid as maintenance therapy post transplant. Repeat bone marrow biopsy done October 2010 showed about 5% kappa restricted plasma cells and so at that time weekly Velcade was added along with his maintenance Revlimid and dexamethasone. He has done well since that time with stable minimal residual disease, best assessed by serum free light chains. He required dose reductions of weekly Velcade because of cytopenias and was ultimately switched to a q.2 week maintenance schedule which he has been on since September 2012. His Revlimid dose is 20 mg daily and he continues on dexamethasone 20 mg p.o. q. Week.       INTERIM HISTORY:    Pt is here today for follow up. He is doing well. Mild fatigue. Mild neuropathy. No new issues or concerns    Pain Status  Currently in Pain: No/denies    Review of Systems    Constitutional  Constitutional (WDL): Exceptions to WDL  Fatigue: Fatigue relieved by rest  Neurosensory  Neurosensory (WDL): Exceptions to WDL  Peripheral Motor Neuropathy: Asymptomatic, clinical or diagnostic observations only, intervention not indicated  Peripheral Sensory Neuropathy: Asymptomatic, loss of deep tendon reflexes or paresthesia(feet and hands)  Eye   Eye Disorder (WDL): Exceptions to WDL(glasses/glaucoma)  Dry  Eye: Asymptomatic, clinical or diagnostic observations only, mild symptoms relieved by lubricants  Ear  Ear Disorder (WDL): All ear disorder elements are within defined limits  Cardiovascular  Cardiovascular (WDL): Exceptions to WDL  Edema: Yes  Edema Limbs: 5 - 10% inter-limb discrepancy in volume or circumference at point of greatest visible difference, swelling or obscuration of anatomic architecture on close inspection(LT leg>RT leg)  Pulmonary  Respiratory (WDL): Within Defined Limits  Gastrointestinal  Gastrointestinal (WDL): All gastrointestinal elements are within defined limits  Genitourinary  Genitourinary (WDL): All genitourinary elements are within defined limits  Lymphatic  Lymph (WDL): Assessment not pertinent to visit  Musculoskeletal and Connective Tissue  Musculoskeletal and Connetive Tissue Disorders (WDL): All Musculoskeletal and Connetive Tissue Disorder elements are within defined limits  Integumentary  Integumentary (WDL): All integumentary elements are within defined limits  Patient Coping  Patient Coping: Accepting  Distress Assessment  Distress Assessment Score: 3  Accompanied by  Accompanied by: Alone  Oral Chemo Adherence       Past History  Past Medical History:   Diagnosis Date     Anemia 12/13/2017     Arthritis      Bilateral inguinal hernia      Glaucoma      Glaucoma      History of blood clots     DVT - left chronic     Multiple myeloma (H)     Gets chemo infusions every 2 weeks     Pancytopenia (H)      Peripheral neuropathy      Syncope      TMJ (temporomandibular joint disorder)        PHYSICAL EXAM:  /56   Pulse 66   Temp 97.7  F (36.5  C) (Oral)   Wt 171 lb 8 oz (77.8 kg)   SpO2 99%   BMI 25.33 kg/m    GENERAL: no acute distress. Cooperative in conversation. Here with   HEENT: pupils are equal, round and reactive. Oromucosa is clean and intact. No ulcerations or mucositis noted. No bleeding noted.  RESP: lungs are clear bilaterally per auscultation. Regular  respiratory rate. No wheezes or rhonchi.  CV: Regular, rate and rhythm. No murmurs.  ABD: soft, nontender. Positive bowel sounds. No organomegaly.   MUSCULOSKELETAL: No lower extremity swelling.   NEURO: non focal. Alert and oriented x3.   PSYCH: within normal limits. No depression or anxiety.  SKIN: warm dry intact   LYMPH: no cervical, supraclavicular or axillary lymphadenopathy        Lab Results    Lab Standing Order on 11/14/2018   Component Date Value Ref Range Status     Sodium 11/14/2018 143  136 - 145 mmol/L Final     Potassium 11/14/2018 4.4  3.5 - 5.0 mmol/L Final     Chloride 11/14/2018 111* 98 - 107 mmol/L Final     CO2 11/14/2018 27  22 - 31 mmol/L Final     Anion Gap, Calculation 11/14/2018 5  5 - 18 mmol/L Final     Glucose 11/14/2018 98  70 - 125 mg/dL Final     BUN 11/14/2018 14  8 - 28 mg/dL Final     Creatinine 11/14/2018 0.87  0.70 - 1.30 mg/dL Final     GFR MDRD Af Amer 11/14/2018 >60  >60 mL/min/1.73m2 Final     GFR MDRD Non Af Amer 11/14/2018 >60  >60 mL/min/1.73m2 Final     Bilirubin, Total 11/14/2018 0.5  0.0 - 1.0 mg/dL Final     Calcium 11/14/2018 8.2* 8.5 - 10.5 mg/dL Final     Protein, Total 11/14/2018 4.7* 6.0 - 8.0 g/dL Final     Albumin 11/14/2018 2.9* 3.5 - 5.0 g/dL Final     Alkaline Phosphatase 11/14/2018 66  45 - 120 U/L Final     AST 11/14/2018 12  0 - 40 U/L Final     ALT 11/14/2018 15  0 - 45 U/L Final     Immunoglobulin G 11/14/2018 382* 700-1,700 mg/dL Final     Immunoglobulin M 11/14/2018 11* 60 - 280 mg/dL Final     Immunoglobulin A 11/14/2018 20* 65 - 400 mg/dL Final     [place] Free Lt Chain 11/14/2018 60.25* 0.33 - 1.94 mg/dL Final    NOTE: This result is outside the initial measuring range of this assay. Samples  which are outside of the initial measuring range of the assay must be  re-assayed at a dilution in order to obtain a result. In these cases, the  result reported may not be proportional to results that are within the  measuring range and were performed using the  initial dilution.  This is due to the fact that all serum free light chain assays are prone to  sample non-linearity. That is, some samples will have different results if  performed on different dilutions. In some instances where the result is just  outside the limit of the initial measuring range, the results obtained from the  diluted sample may be lower or higher than expected.  In these cases the result will be reported as greater than or less than the  limit of the measuring range.     Lambda Free Lt Chain 11/14/2018 0.63  0.57 - 2.63 mg/dL Final     Kappa Lambda Ratio 11/14/2018 95.63* 0.26 - 1.65 Final    PERFORMED AT  [address] [address]      Albumin % 11/14/2018 64.4  51.0 - 67.0 % Final     Albumin  11/14/2018 2.8* 3.2 - 4.7 g/dL Final     Alpha 1 % 11/14/2018 3.6  2.0 - 4.0 % Final     Alpha 1 11/14/2018 0.2  0.1 - 0.3 g/dL Final     Alpha 2 % 11/14/2018 13.2* 5.0 - 13.0 % Final     Alpha 2 11/14/2018 0.6  0.4 - 0.9 g/dL Final     % Beta 11/14/2018 11.3  10.0 - 17.0 % Final     Beta 11/14/2018 0.5* 0.7 - 1.2 g/dL Final     Gamma Globulin % 11/14/2018 7.5* 9.0 - 20.0 % Final     Gamma Globulin 11/14/2018 0.3* 0.6 - 1.4 g/dL Final     ELP Comment 11/14/2018    Final                    Value:At least two faint bands are visible in the gamma globulin region of the gel strip, which may represent a monoclonal globulin, but is too faint to quantify. See immunofixation electrophoresis to further characterize.    The albumin fraction is decreased, and this may represent any combination of protein-calorie malnutrition, bulk protein loss, or accelerated protein breakdown.    Decreased beta globulin fraction, which can be seen in kidney disease, coagulopathies, and malnutrition, among other etiologies.    Decreased gamma globulin fraction. Etiologies include lymphoproliferative disorders, chemotherapy, and immunodeficiency.         Protein, Total  11/14/2018 4.3* 6.0 - 8.0 g/dL Final     Path ICD: 11/14/2018 C90.01   Final     Interpreted By: 11/14/2018 Bharath Landis MD   Final     Immunofixation Electrophoresis, Se* 11/14/2018 Favor faint monoclonal IgG-kappa immunoglobulin present.    Favor additional faint free monoclonal kappa light chains present.      Final     Path ICD: 11/14/2018 C90.01   Final     Interpreted By: 11/14/2018 Bharath Landis MD   Final     WBC 11/14/2018 1.9* 4.0 - 11.0 thou/uL Final     RBC 11/14/2018 2.84* 4.40 - 6.20 mill/uL Final     Hemoglobin 11/14/2018 9.9* 14.0 - 18.0 g/dL Final     Hematocrit 11/14/2018 30.3* 40.0 - 54.0 % Final     MCV 11/14/2018 107* 80 - 100 fL Final     MCH 11/14/2018 34.9* 27.0 - 34.0 pg Final     MCHC 11/14/2018 32.7  32.0 - 36.0 g/dL Final     RDW 11/14/2018 15.6* 11.0 - 14.5 % Final     Platelets 11/14/2018 92* 140 - 440 thou/uL Final    Slide reviewed, platelet estimate agrees with instrument results      MPV 11/14/2018 11.8  8.5 - 12.5 fL Final     INR 11/14/2018 3.16* 0.90 - 1.10 Final     Total Neutrophils % 11/14/2018 49* 50 - 70 % Final     Lymphocytes % 11/14/2018 30  20 - 40 % Final     Monocytes % 11/14/2018 19* 2 - 10 % Final     Eosinophils %  11/14/2018 2  0 - 6 % Final     Basophils % 11/14/2018 0  0 - 2 % Final     Total Neutrophils Absolute 11/14/2018 0.9* 2.0 - 7.7 thou/ul Final     Lymphocytes Absolute 11/14/2018 0.6* 0.8 - 4.4 thou/uL Final     Monocytes Absolute 11/14/2018 0.4  0.0 - 0.9 thou/uL Final     Eosinophils Absolute 11/14/2018 0.0  0.0 - 0.4 thou/uL Final     Basophils Absolute 11/14/2018 0.0  0.0 - 0.2 thou/uL Final     Platelet Estimate 11/14/2018 Decreased* Normal Final     Ovalocytes 11/14/2018 1+* Negative Final     Schistocytes 11/14/2018 1+* Negative Final     Tear Drop Cells 11/14/2018 1+* Negative Final     Last couple of kappa/lambda ratios were: 36, 54, 50. Last week it was 95.     Imaging    No results found.      Signed by: Alejandra Valdez, CNP

## 2021-06-21 NOTE — PROGRESS NOTES
Pt arrived ambulatory to clinic for Cycle # 117 Day # 22 of his chemotherapy regimen.  Administered SQ Velcade and B-12 injections per MD order.  Pt tolerated procedures well, no s/s of bleeding or swelling at site.  Pt verbalized understanding of plan of care and return to clinic.

## 2021-06-22 NOTE — TELEPHONE ENCOUNTER
ANTICOAGULATION  MANAGEMENT    Assessment     Today's INR result of 2.13 is Therapeutic (goal INR of 2.0-3.0)        Warfarin taken as previously instructed    No new diet changes affecting INR    No new medication/supplements affecting INR    Continues to tolerate warfarin with no reported s/s of bleeding or thromboembolism     Previous INR was Supratherapeutic    Plan:     Spoke with Theo regarding INR result and instructed:     Warfarin Dosing Instructions:  Continue current warfarin dose 5 mg daily on Mondays, Wednesdays and Fridays; and 2.5 mg daily rest of week  (0 % change)He questioned if we need to increase his warfarin dose. Explained with that last INR of 4.0 we should not increase dose. He agreed at this time.    Instructed patient to follow up no later than: 1-2 weeks.    Education provided: importance of therapeutic range, importance of following up for INR monitoring at instructed interval and importance of taking warfarin as instructed    Keith verbalizes understanding and agrees to warfarin dosing plan.    Instructed to call the [facility] for any changes, questions or concerns. (#[phone])   ?   Gricelda Parker RN    Subjective/Objective:      Theo HURT Mira, a 73 y.o. male is on warfarin.     Theo reports:     Home warfarin dose: verbally confirmed home dose with Keith and updated on anticoagulation calendar     Missed doses: Yes: held as instructed.     Medication changes:  No     S/S of bleeding or thromboembolism:  No     New Injury or illness:  No     Changes in diet or alcohol consumption:  No     Upcoming surgery, procedure or cardioversion:  No    Anticoagulation Episode Summary     Current INR goal:   2.0-3.0   TTR:   66.4 % (10.5 mo)   Next INR check:   1/23/2019   INR from last check:   2.13 (1/9/2019)   Weekly max warfarin dose:      Target end date:      INR check location:      Preferred lab:      Send INR reminders to:   ANTICOAGULATION POOL A (WBY,WBE,MID,RSC)     Indications    DVT (deep venous thrombosis) (H) [I82.409]           Comments:            Anticoagulation Care Providers     Provider Role Specialty Phone number    Mathew Ott MD Referring Internal Medicine [phone]

## 2021-06-22 NOTE — TELEPHONE ENCOUNTER
ANTICOAGULATION  MANAGEMENT    Assessment     Today's INR result of 4.0 is Supratherapeutic (goal INR of 2.0-3.0)        Warfarin taken as previously instructed    No new diet changes affecting INR    taking some OTC medications    Continues to tolerate warfarin with no reported s/s of bleeding or thromboembolism     Previous INR was Subtherapeutic    Plan:     Spoke with Theo regarding INR result and instructed:     Warfarin Dosing Instructions:  Hold dose today then continue current warfarin dose 5 mg daily on Mondays, Wednesdays and Fridays; and 2.5 mg daily rest of week  (0 % change)He did not want to adjust dose any more then just holding today.    Instructed patient to follow up no later than: 7-10 days. Discussed to make sure he talks with [facility] after every INR.    Education provided: importance of consistent vitamin K intake, impact of vitamin K foods on INR, importance of therapeutic range, importance of following up for INR monitoring at instructed interval and importance of taking warfarin as instructed    Keith verbalizes understanding and agrees to warfarin dosing plan.    Instructed to call the [facility] for any changes, questions or concerns. (#[phone])   ?   Gricelda Parker RN    Subjective/Objective:      Theo HURT Jasbirtio, a 73 y.o. male is on warfarin.     Theo reports:     Home warfarin dose: verbally confirmed home dose with Keith and updated on anticoagulation calendar     Missed doses: No     Medication changes:  Yes: OTC medications for cold     S/S of bleeding or thromboembolism:  No     New Injury or illness:  Yes: cold, sinus issues.     Changes in diet or alcohol consumption:  No     Upcoming surgery, procedure or cardioversion:  No    Anticoagulation Episode Summary     Current INR goal:   2.0-3.0   TTR:   66.8 % (10.3 mo)   Next INR check:   1/14/2019   INR from last check:   4.00! (1/3/2019)   Weekly max warfarin dose:      Target end date:      INR check location:       Preferred lab:      Send INR reminders to:   ANTICOAGULATION POOL A (WBY,WBE,MID,RSC)    Indications    DVT (deep venous thrombosis) (H) [I82.409]           Comments:            Anticoagulation Care Providers     Provider Role Specialty Phone number    Mathew Ott MD Referring Internal Medicine [phone]

## 2021-06-22 NOTE — PROGRESS NOTES
Pt here today for Velcade injection. Administered in pt left arm per request. Pt tolerated injection well and left clinic independent and ambulatory.

## 2021-06-22 NOTE — PROGRESS NOTES
Pt ambulates to infusion center for treatment.  Pt voices no new concerns today.  Velcade given SQ as ordered without difficulty.  INR drawn.  Pt left clinic stable to lobby.  Plan RTC as scheduled.

## 2021-06-22 NOTE — PROGRESS NOTES
Pt ambulates to infusion center for labs and treatment.  IVAD accessed, labs drawn et sent w/results noted.  Treatment administered as ordered without difficulty.  IVAD flushed w/NS et heparin and needle deaccessed.  Pt left clinic stable to lobby.

## 2021-06-23 NOTE — PROGRESS NOTES
[facility] Progress Note    Patient: Theo Meyers  MRN: [id number]  Date of Service: January 16, 2019      Assessment and Plan:    1. Kappa light chain myeloma: Compared to his last check, his involved light chain and light chain ratio have decreased today.  Generally back to his baseline.  We reviewed his numbers going back 2 years and there has not been a trend to worsening.  No evidence of progressive endorgan damage.  Symptomatically he is doing okay.  Continue Revlimid 21 days on and 7 days off and Velcade every other week.  We will see him back in clinic in 2 months with lab check.    2. Thromboprophylaxis: He is on Coumadin for this.     3.  Pancytopenia: Generally stable.  Continuing B12 injections monthly.  Neutrophils a little higher today.  We will not change his Revlimid dose.    ECOG Performance   ECOG Performance Status: 1    Distress Assessment  Distress Assessment Score: 2    Pain  Currently in Pain: No/denies    Diagnosis:    1. IgG kappa light chain myeloma. Hyposecretory. Diagnosed 2009. No significant measurable M protein. 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 There was also on 11;14 translocation.  Past immunofixations from 2011 show IgG-kappa.  Bone marrow biopsy at 5%.  Residual kappa light chain myeloma involving 20% of nucleated cells.  No significant change from November 2016 marrow cellularity.    2. Deep vein thrombosis of the left leg diagnosed 9/2012 while on treatment.    3.  Vitamin B12 deficiency diagnosed in September 2017.    4.  GI bleed and anemia requiring hospitalization in December 2017.    Treatment:    He presented with a lytic lesion involving the C6 vertebral body, although no measurable M protein. IgG kappa monoclonal immunoglobulin was confirmed by [facility] as well as elevated kappa free light chains with an abnormal kappa lambda ratio. Bone marrow biopsy showed 30% involvement and cytogenetics confirmed deletion of 13 q. as well as  11;14 translocation. He was initially treated with Velcade and dexamethasone and achieved a good partial response.     He was referred to the [facility] where he underwent high-dose chemotherapy with autologous peripheral stem cell transplant in April of 2010. He began Revlimid as maintenance therapy post transplant. Repeat bone marrow biopsy done October 2010 showed about 5% kappa restricted plasma cells and so at that time weekly Velcade was added along with his maintenance Revlimid and dexamethasone.   He has done well since that time with stable minimal residual disease, best assessed by serum free light chains. He required dose reductions of weekly Velcade because of cytopenias and was ultimately switched to a q 2 week maintenance schedule which he has been on since September 2012.     His Revlimid dose was reduced to 15 mg daily and he continues on dexamethasone 20 mg p.o. weekly  Revlimid dosing changed to 20 mg 3 weeks on 1 week (instead of 15 mg daily) for cost reasons.  Started March, 2018    Interim History:    Theo returns today for follow-up visit.  Overall he states that he is feeling okay.  Continuing to exercise.  No nausea or vomiting.  No fevers.  No pain.  No rash or constipation.  Energy and appetite are okay.    Review of Systems:    Constitutional  Constitutional (WDL): Exceptions to WDL  Fatigue: Fatigue not relieved by rest - Limiting instrumental ADL  Neurosensory  Neurosensory (WDL): Exceptions to WDL  Peripheral Motor Neuropathy: Asymptomatic, clinical or diagnostic observations only, intervention not indicated  Peripheral Sensory Neuropathy: Asymptomatic, loss of deep tendon reflexes or paresthesia  Cardiovascular  Cardiovascular (WDL): All cardiovascular elements are within defined limits  Pulmonary  Respiratory (WDL): Exceptions to WDL  Cough: Mild symptoms, nonprescription intervention indicated  Dyspnea: Shortness of breath with moderate  exertion  Gastrointestinal  Gastrointestinal (WDL): All gastrointestinal elements are within defined limits  Genitourinary  Genitourinary (WDL): All genitourinary elements are within defined limits  Integumentary  Integumentary (WDL): All integumentary elements are within defined limits(dry)  Patient Coping  Patient Coping: Accepting  Accompanied by  Accompanied by: Alone    Past History:    Past Medical History:   Diagnosis Date     Anemia 12/13/2017     Arthritis      Bilateral inguinal hernia      Glaucoma      Glaucoma      History of blood clots     DVT - left chronic     Multiple myeloma (H)     Gets chemo infusions every 2 weeks     Pancytopenia (H)      Peripheral neuropathy      Syncope      TMJ (temporomandibular joint disorder)      Physical Exam:    Recent Vitals 1/16/2019   Weight 168 lbs   BSA (m2) 1.93 m2   /63   Pulse 66   Temp 97.6   Temp src 1   SpO2 98   Some recent data might be hidden     General: patient appears stated age of 73 y.o.. Nontoxic and in no distress.   HEENT: Head: atraumatic, normocephalic. Sclerae anicteric.  Chest:  Normal respiratory effort.   Cardiac:  No edema.  Regular rate and rhythm.  No murmur appreciated.  Abdomen: abdomen is non-distended  Extremities: normal tone and muscle bulk.   Skin: no lesions or rash. Warm and dry.   CNS: alert and oriented. Grossly non-focal.   Psychiatric: normal mood and affect.     Lab Results:    Recent Results (from the past 240 hour(s))   Comprehensive Metabolic Panel (add-ons/treatment plan)    Collection Time: 01/09/19  7:58 AM   Result Value Ref Range    Sodium 135 (L) 136 - 145 mmol/L    Potassium 4.5 3.5 - 5.0 mmol/L    Chloride 102 98 - 107 mmol/L    CO2 27 22 - 31 mmol/L    Anion Gap, Calculation 6 5 - 18 mmol/L    Glucose 108 70 - 125 mg/dL    BUN 18 8 - 28 mg/dL    Creatinine 1.11 0.70 - 1.30 mg/dL    GFR MDRD Af Amer >60 >60 mL/min/1.73m2    GFR MDRD Non Af Amer >60 >60 mL/min/1.73m2    Bilirubin, Total 0.5 0.0 - 1.0 mg/dL     Calcium 8.4 (L) 8.5 - 10.5 mg/dL    Protein, Total 5.1 (L) 6.0 - 8.0 g/dL    Albumin 2.9 (L) 3.5 - 5.0 g/dL    Alkaline Phosphatase 98 45 - 120 U/L    AST 14 0 - 40 U/L    ALT 28 0 - 45 U/L   Immunoglobulins, Quantitative    Collection Time: 01/09/19  7:58 AM   Result Value Ref Range    Immunoglobulin G 366 (L) 700-1,700 mg/dL    Immunoglobulin M 10 (L) 60 - 280 mg/dL    Immunoglobulin A 21 (L) 65 - 400 mg/dL   Immunoglobulin Free Light Chains, Serum    Collection Time: 01/09/19  7:58 AM   Result Value Ref Range    Kappa Free Lt Chain 51.00 (H) 0.33 - 1.94 mg/dL    Lambda Free Lt Chain 1.23 0.57 - 2.63 mg/dL    Kappa Lambda Ratio 41.46 (H) 0.26 - 1.65   Electrophoresis, Protein, Serum    Collection Time: 01/09/19  7:58 AM   Result Value Ref Range    Albumin % 62.2 51.0 - 67.0 %    Albumin  2.9 (L) 3.2 - 4.7 g/dL    Alpha 1 % 4.1 (H) 2.0 - 4.0 %    Alpha 1 0.2 0.1 - 0.3 g/dL    Alpha 2 % 14.4 (H) 5.0 - 13.0 %    Alpha 2 0.7 0.4 - 0.9 g/dL    % Beta 12.0 10.0 - 17.0 %    Beta 0.6 (L) 0.7 - 1.2 g/dL    Gamma Globulin % 7.3 (L) 9.0 - 20.0 %    Gamma Globulin 0.3 (L) 0.6 - 1.4 g/dL    ELP Comment       Two faint bands are visible in the gamma globulin region of the gel strip, which may represent a monoclonal globulin, but is too faint to quantify. Suggest immunofixation electrophoresis to further characterize and/or repeat electrophoreses at intervals.    Decreased beta globulin fraction, which can be seen in kidney disease, coagulopathies, and malnutrition, among other etiologies.    Decreased gamma globulin fraction. Etiologies include lymphoproliferative disorders, chemotherapy, and immunodeficiency.    The albumin fraction is decreased, and this may represent any combination of protein-calorie malnutrition, bulk protein loss, or accelerated protein breakdown.          Protein, Total 4.6 (L) 6.0 - 8.0 g/dL    Path ICD: C90.01     Interpreted By: Bharath Landis MD    Immunofixation Electrophoresis, Serum     Collection Time: 01/09/19  7:58 AM   Result Value Ref Range    Immunofixation Electrophoresis, Serum       Difficult case. Possible faint monoclonal IgG-kappa immunoglobulin present. Consider repeat study if clinically indicated.     Path ICD: C90.01     Interpreted By: Bharath Landis MD    INR    Collection Time: 01/09/19  7:58 AM   Result Value Ref Range    INR 2.13 (H) 0.90 - 1.10   HM1 (CBC with Diff)    Collection Time: 01/09/19  7:58 AM   Result Value Ref Range    WBC 2.4 (L) 4.0 - 11.0 thou/uL    RBC 2.95 (L) 4.40 - 6.20 mill/uL    Hemoglobin 10.1 (L) 14.0 - 18.0 g/dL    Hematocrit 31.2 (L) 40.0 - 54.0 %     (H) 80 - 100 fL    MCH 34.2 (H) 27.0 - 34.0 pg    MCHC 32.4 32.0 - 36.0 g/dL    RDW 15.0 (H) 11.0 - 14.5 %    Platelets 107 (L) 140 - 440 thou/uL    MPV 12.0 8.5 - 12.5 fL   Manual Differential    Collection Time: 01/09/19  7:58 AM   Result Value Ref Range    Total Neutrophils % 55 50 - 70 %    Lymphocytes % 18 (L) 20 - 40 %    Monocytes % 15 (H) 2 - 10 %    Eosinophils %  12 (H) 0 - 6 %    Basophils % 0 0 - 2 %    Total Neutrophils Absolute 1.3 (L) 2.0 - 7.7 thou/ul    Lymphocytes Absolute 0.4 (L) 0.8 - 4.4 thou/uL    Monocytes Absolute 0.4 0.0 - 0.9 thou/uL    Eosinophils Absolute 0.3 0.0 - 0.4 thou/uL    Basophils Absolute 0.0 0.0 - 0.2 thou/uL    Platelet Estimate Decreased (!) Normal    Ovalocytes 1+ (!) Negative    Schistocytes 1+ (!) Negative     Imaging:    No results found.       Signed by: Per Clifford MD

## 2021-06-23 NOTE — PROGRESS NOTES
Clinic Note    Assessment:     Assessment and Plan:  1. Shortness of breath  Patient is a relatively poor historian and it was difficult to delineate an accurate HPI today.  He initially wanted to be evaluated for possible sinus infection.  However, he admits that he is not having any sinus pain or pressure.  He is not having much nasal congestion.  In fact, he is not having many URI-type symptoms at all.  Upon further questioning, he does apparently seem to be having some persistent shortness of breath but no chest pain.  Again, he had a hard time describing his symptoms today.  I would like him to record his symptoms and bring them to his PCP next week to decide if he needs any additional workup at that point.  He does not appear infectious at all today and he has normal vital signs.       Patient Instructions   We will set you up with a follow-up appointment with Dr. Floyd next week.    I do not think that you have a sinus infection today.  Your vital signs are stable.  You have a normal physical exam.    Monitor your symptoms of exertional shortness of breath and write these down.  Bring these descriptions to your follow-up appointment with Dr. Floyd    Return in about 1 week (around 2/18/2019).         Subjective:      Theo Meyers is a 73 y.o. male who comes to clinic today with shortness of breath.    Patient has history of multiple myeloma.  Says he is immunosuppressed.    Initially came to the clinic today because he was wanting to be evaluated for possible sinus infection.  Not having any sinus pain or pressure.  Minimal amounts of nasal discharge.  Very mild cough at times.  No chest pain.    He does say that he has been dealing with some fatigue.  Upon further questioning, this turns out to be more along the lines of shortness of breath.    He becomes short of breath with exertional activities.  For example, patient had to shovel his driveway 4 times yesterday-he feels as though he becomes short  of breath shortly after starting to clear his driveway.  He feels as though he would probably start to feel short of breath after walking up 2 flights of stairs.  He is unsure if this is related to deconditioning.  Knows that he is not as active as he used to be.  He does sit quite frequently.    He is not currently having any chest pain.  No swelling in his lower extremities.  Blood pressure has been well controlled.    The following portions of the patient's history were reviewed and updated as appropriate: Allergies, medications, problem list, prior note.     Review of Systems:    Review is otherwise negative except for what is mentioned above.     Social Hx:    Social History     Tobacco Use   Smoking Status Former Smoker     Last attempt to quit: 1975     Years since quittin.2   Smokeless Tobacco Never Used         Objective:     Vitals:    19 1317   BP: 122/70   Pulse: 68   Temp: 98.1  F (36.7  C)   TempSrc: Oral   Weight: 170 lb (77.1 kg)       Exam:    General: No apparent distress. Calm. Alert and Oriented X3. Pt behavior is appropriate.  Head:Atraumatic. Normocephalic, non-tender to palpation  Neck: Supple. No JVD. Full ROM. No adenopathy  Eyes: PERRL, No discharge. No strabismus. No nystagmus.  Ears: TMs pearly gray with landmarks visible.   Nose/Mouth/Throat: Patent nares, no oral lesions, pharynx clear and without exudate. Uvula mid-line. Nasal septum mid-line. Clear turbinates.   Lymph: No axillar or cervical adenopathy.   Chest/Lungs: Normal chest wall, clear to auscultation, normal respiratory effort and rate.   Heart/Pulses: Regular rate and rhythm, strong and equal radial pulses, no murmurs. Capillary refill <2 seconds. No edema.   Abdomen: Soft, no palpable masses. No hepatosplenomegaly, no tenderness with palpation noted. Bowel sounds active in all quadrants. No increased tympany.   Genitalia: Not examined.   Musculoskeletal: No CVA tenderness with palpation. Good ROM with  extremities.   Neurologic: Interactive, alert, no focal findings, CNs intact.   Skin: Warm, dry. Normal hair pattern. Free of lesions. Normal skin turgor.       Patient Active Problem List   Diagnosis     Multiple myeloma (H)     Glaucoma     Cataract     DVT (deep venous thrombosis) (H)     Shingles     Shingles (herpes zoster) polyneuropathy     Back pain     Post herpetic neuralgia     Pancytopenia (H)     Acute blood loss anemia     Syncope and collapse     Elevated INR     Anemia     History of DVT (deep vein thrombosis)     Chronic deep vein thrombosis (DVT) of left lower extremity, unspecified vein (H)     Spinal stenosis of lumbar region without neurogenic claudication     Anemia, vitamin B12 deficiency     Current Outpatient Medications   Medication Sig Dispense Refill     acetaminophen (TYLENOL) 500 MG tablet Take 500 mg by mouth every 6 (six) hours as needed for pain.       acyclovir (ZOVIRAX) 400 MG tablet Take 400 mg by mouth 2 (two) times a day.       ascorbic acid (VITAMIN C) 1000 MG tablet Take 2,000 mg by mouth daily.        aspirin 81 MG EC tablet Take 2 tablets (162 mg total) by mouth daily. (Patient taking differently: Take 81 mg by mouth daily. )  0     bimatoprost (LUMIGAN) 0.01 % Drop Administer 1 drop to both eyes at bedtime.       brimonidine (ALPHAGAN P) 0.1 % Drop Administer 1 drop to both eyes 3 (three) times a day.        calcium, as carbonate, (TUMS) 200 mg calcium (500 mg) chewable tablet Chew 1 tablet 3 (three) times a day as needed.        calcium-vitamin D 500 mg(1,250mg) -200 unit per tablet Take 1 tablet by mouth daily.       cetirizine (ZYRTEC) 10 MG tablet Take 10 mg by mouth daily.       dexamethasone (DECADRON) 4 MG tablet Take 20 mg by mouth every 14 (fourteen) days.       gabapentin (NEURONTIN) 300 MG capsule Take 300 mg by mouth daily.        guaiFENesin-dextromethorphan (MUCINEX DM) 600-30 mg Tb12 Take 1 tablet by mouth 2 (two) times a day as needed.        KLOR-CON M20 20  mEq tablet TAKE 1 TABLET BY MOUTH EVERY DAY 90 tablet 3     lenalidomide (REVLIMID) 20 mg cap Take 20 mg by mouth every evening. Three weeks on and one week off 21 capsule 5     magnesium oxide (MAG-OX) 400 mg (241.3 mg magnesium) tablet TAKE 1 TABLET BY MOUTH TWICE A DAY 60 tablet 3     multivitamin (MULTIVITAMIN) per tablet Take 1 tablet by mouth daily.       omega 3-dha-epa-fish oil 900-1,400 mg CpDR Take 1 tablet by mouth daily.       omeprazole (PRILOSEC) 20 MG capsule TAKE 1 CAPSULE BY MOUTH EVERY DAY 90 capsule 3     psyllium (METAMUCIL) powder Take 2 packets by mouth daily. Takes 2 tablespoonsful       sodium chloride (OCEAN) 0.65 % nasal spray 2 sprays into each nostril as needed for congestion. 15 mL 12     tamsulosin (FLOMAX) 0.4 mg cap TAKE ONE CAPSULE BY MOUTH DAILY 90 capsule 3     warfarin (COUMADIN/JANTOVEN) 5 MG tablet TAKE 1 TABLET BY MOUTH DAILY (Patient taking differently: 5 mg 3 days a week and 4 days 2.5 mg) 90 tablet 1     tamsulosin (FLOMAX) 0.4 mg Cp24 Take 0.4 mg by mouth every evening.       No current facility-administered medications for this visit.      Facility-Administered Medications Ordered in Other Visits   Medication Dose Route Frequency Provider Last Rate Last Dose     heparin 100 unit/mL lockflush (PF) porcine 300-600 Units  300-600 Units Intravenous PRN Per Clifford MD   600 Units at 11/19/14 1013       I spent 25 minutes with patient face to face, of which >50% was counseling regarding the above plan       Percy Mcgrath CNP (Rob)    2/11/2019

## 2021-06-23 NOTE — PROGRESS NOTES
Pt ambulates to infusion center for treatment following MD visit.  Pt was seen by Dr. Clifford today and orders were approved.  Injections given as ordered without difficulty.  Pt left clinic stable to lobby.  Plan RTC as scheduled.

## 2021-06-23 NOTE — PROGRESS NOTES
Keith came to chemo infusion this morning for his next dose of velcade as well as a INR check.  VSS.  Pt assessed.  Pt is well educated on velcade.  Velcade give sq into his LLQ of his abdomen.  He tolerated the injection well.  Keith d/c from clinic ambulatory and unaccompanied.

## 2021-06-23 NOTE — PATIENT INSTRUCTIONS - HE
We will set you up with a follow-up appointment with Dr. Floyd next week.    I do not think that you have a sinus infection today.  Your vital signs are stable.  You have a normal physical exam.    Monitor your symptoms of exertional shortness of breath and write these down.  Bring these descriptions to your follow-up appointment with Dr. Floyd

## 2021-06-23 NOTE — TELEPHONE ENCOUNTER
ANTICOAGULATION  MANAGEMENT    Assessment     Today's INR result of 2.04 is Therapeutic (goal INR of 2.0-3.0)        Warfarin taken as previously instructed    No new diet changes affecting INR    No new medication/supplements affecting INR    Continues to tolerate warfarin with no reported s/s of bleeding or thromboembolism     Previous INR was Therapeutic    Plan:     Spoke with Theo regarding INR result and instructed:     Warfarin Dosing Instructions:  Continue current warfarin dose    5 mg on Mon, Wed, Fri; 2.5 mg all other days       Instructed patient to follow up no later than: 4 weeks    Education provided: importance of therapeutic range    Theo verbalizes understanding and agrees to warfarin dosing plan.    Instructed to call the [facility] for any changes, questions or concerns. (#[phone])   ?   Theresa Ramirez RN    Subjective/Objective:      Theo HURT Mira, a 73 y.o. male is on warfarin.     Theo reports:     Home warfarin dose: verbalized correct dose taken with Theo     Missed doses: No     Medication changes:  No     S/S of bleeding or thromboembolism:  No     New Injury or illness:  No     Changes in diet or alcohol consumption:  No     Upcoming surgery, procedure or cardioversion:  No    Anticoagulation Episode Summary     Current INR goal:   2.0-3.0   TTR:   68.5 % (11.2 mo)   Next INR check:   2/27/2019   INR from last check:   2.04 (1/30/2019)   Weekly max warfarin dose:      Target end date:      INR check location:      Preferred lab:      Send INR reminders to:   ANTICOAGULATION POOL A (WBY,WBE,MID,RSC)    Indications    DVT (deep venous thrombosis) (H) [I82.409]           Comments:            Anticoagulation Care Providers     Provider Role Specialty Phone number    Mathew Ott MD Referring Internal Medicine [phone]

## 2021-06-24 NOTE — TELEPHONE ENCOUNTER
RN cannot approve Refill Request    RN can NOT refill this medication historical medication requested.     Latonya Garcia, [facility] Triage/Med Refill 2/14/2019    Requested Prescriptions   Pending Prescriptions Disp Refills     gabapentin (NEURONTIN) 300 MG capsule [Pharmacy Med Name: GABAPENTIN 300MG CAPSULES] 240 capsule 0     Sig: TAKE 1-3 CAPSULES BY MOUTH FOUR TIMES DAILY    Gabapentin/Levetiracetam/Tiagabine Refill Protocol  Passed - 2/12/2019  2:23 PM       Passed - PCP or prescribing provider visit in past 12 months or next 3 months    Last office visit with prescriber/PCP: 9/6/2018 Mathew Ott MD OR same dept: 2/11/2019 Percy Mcgrath CNP OR same specialty: 2/11/2019 Percy Mcgrath CNP  Last physical: 3/28/2018 Last MTM visit: Visit date not found   Next visit within 3 mo: Visit date not found  Next physical within 3 mo: Visit date not found  Prescriber OR PCP: Mathew Ott MD  Last diagnosis associated with med order: There are no diagnoses linked to this encounter.  If protocol passes may refill for 12 months if within 3 months of last provider visit (or a total of 15 months).

## 2021-06-24 NOTE — PROGRESS NOTES
Pt ambulates to infusion center for labs and treatment.  Pt c/o increased fatigue and SAMANO.  IVAD accessed, labs drawn et sent w/results noted.  Pt would like to wait for blood transfusion and have his hemoglobin checked again next week.  This was approved per Dr. Clifford.  IVAD flushed w/NS et heparin and needle deaccessed.  Treatment administered as ordered without difficulty.  Pt left clinic stable to [facility].  Plan RTC as scheduled.

## 2021-06-24 NOTE — TELEPHONE ENCOUNTER
ANTICOAGULATION  MANAGEMENT    Assessment     Today's INR result of 2.3 is Therapeutic (goal INR of 2.0-3.0)        Warfarin taken as previously instructed    No new diet changes affecting INR    No new medication/supplements affecting INR    Continues to tolerate warfarin with no reported s/s of bleeding or thromboembolism     Previous INR was Subtherapeutic    Plan:     Left a detailed message for Theo regarding INR result and instructed:     Warfarin Dosing Instructions:  Continue current warfarin dose 2.5 mg daily on sun/tue/thu; and 5 mg daily rest of week  (0 % change)    Instructed patient to follow up no later than: 1-2 weeks      Instructed to call the [facility] for any changes, questions or concerns. (#[phone])   ?   Elle Moreno RN    Subjective/Objective:      Theo Meyers, a 73 y.o. male is on warfarin.     Theo reports:     Home warfarin dose: as updated on anticoagulation calendar per template     Missed doses: No     Medication changes:  No     S/S of bleeding or thromboembolism:  No     New Injury or illness:  No     Changes in diet or alcohol consumption:  No     Upcoming surgery, procedure or cardioversion:  No    Anticoagulation Episode Summary     Current INR goal:   2.0-3.0   TTR:   67.2 % (1 y)   Next INR check:   3/27/2019   INR from last check:   2.30 (3/13/2019)   Weekly max warfarin dose:      Target end date:      INR check location:      Preferred lab:      Send INR reminders to:   ANTICOAGULATION POOL A (WBY,WBE,MID,RSC)    Indications    DVT (deep venous thrombosis) (H) [I82.409]           Comments:            Anticoagulation Care Providers     Provider Role Specialty Phone number    Mathew Ott MD Referring Internal Medicine [phone]

## 2021-06-24 NOTE — TELEPHONE ENCOUNTER
ANTICOAGULATION  MANAGEMENT    Assessment     Today's INR result of 1.68 is Subtherapeutic (goal INR of 2.0-3.0)        Warfarin taken as previously instructed    No new diet changes affecting INR    No new medication/supplements affecting INR    Continues to tolerate warfarin with no reported s/s of bleeding or thromboembolism     Previous INR was Supratherapeutic at 3.09 on 2/13/19.    Plan:     Spoke with Keith regarding INR result and instructed:     Warfarin Dosing Instructions:  (takes in the evening. has 5mg tabs)   - advised one time booster with 7.5mg warfarin dose tonight,     - then continue current warfarin dose 5 mg daily on Mon/Wed/Fri; and 2.5 mg daily rest of week.    Instructed patient to follow up no later than:  1-2 wks.   - scheduled on 2/27/19 @ [facility] during infusion.    Education provided: importance of consistent vitamin K intake, target INR goal and significance of current INR result and importance of notifying clinic for changes in medications    Keith verbalizes understanding and agrees to warfarin dosing plan.    Instructed to call the [facility] for any changes, questions or concerns. (#[phone])   ?   Elle Montgomery RN    Subjective/Objective:      BertBLU Meyers, a 73 y.o. male is on warfarin.     Theo reports:     Home warfarin dose: verbally confirmed home dose with Keith and updated on anticoagulation calendar     Missed doses: No     Medication changes:  Yes:  On 2/13/19 had infusion with heparin sodium, porcine, Borttezomib (Valcade) sub-q every other week,  Bortezomib 2.5mg chemo and Cyanocobalamin (B-12)     S/S of bleeding or thromboembolism:  No     New Injury or illness:  Yes:  Hgb today was 8.9 g/dl.   - Hx of multiple myeloma and chronic DVT of LE.  He reports he is immunosuppressed.     Changes in diet or alcohol consumption:  No     Upcoming surgery, procedure or cardioversion:  Yes:  Scheduled for infusion on 2/27/19 @ [facility].    Anticoagulation  Episode Summary     Current INR goal:   2.0-3.0   TTR:   69.5 % (11.9 mo)   Next INR check:   3/6/2019   INR from last check:   1.68! (2/20/2019)   Weekly max warfarin dose:      Target end date:      INR check location:      Preferred lab:      Send INR reminders to:   ANTICOAGULATION POOL A (WBY,WBE,MID,RSC)    Indications    DVT (deep venous thrombosis) (H) [I82.409]           Comments:            Anticoagulation Care Providers     Provider Role Specialty Phone number    Mathew Ott MD Referring Internal Medicine [phone]

## 2021-06-24 NOTE — TELEPHONE ENCOUNTER
ANTICOAGULATION  MANAGEMENT    Assessment     Today's INR result of 3.09 is Supratherapeutic (goal INR of 2.0-3.0)        Warfarin taken as previously instructed    No new diet changes affecting INR    No new medication/supplements affecting INR    Continues to tolerate warfarin with no reported s/s of bleeding or thromboembolism     Previous INR was Therapeutic    Plan:     Spoke with Theo regarding INR result and instructed:     Warfarin Dosing Instructions:  take 2.5 mg today then continue current warfarin dose 5 mg daily on Mondays, Wednesdays and Fridays; and 2.5 mg daily rest of week  (0 % change)    Instructed patient to follow up no later than: 1-2 weeks.    Education provided: importance of therapeutic range, importance of following up for INR monitoring at instructed interval and importance of taking warfarin as instructed    Keith verbalizes understanding and agrees to warfarin dosing plan.    Instructed to call the [facility] for any changes, questions or concerns. (#[phone])   ?   Gricelda Parker RN    Subjective/Objective:      Theo Meyers, a 73 y.o. male is on warfarin.     Theo reports:     Home warfarin dose: verbally confirmed home dose with Keith and updated on anticoagulation calendar     Missed doses: No     Medication changes:  No     S/S of bleeding or thromboembolism:  No     New Injury or illness:  No-says his hgb is low 7.8 and will get this rechecked again next week.     Changes in diet or alcohol consumption:  No     Upcoming surgery, procedure or cardioversion:  No    Anticoagulation Episode Summary     Current INR goal:   2.0-3.0   TTR:   69.4 % (11.6 mo)   Next INR check:   2/27/2019   INR from last check:   3.09! (2/13/2019)   Weekly max warfarin dose:      Target end date:      INR check location:      Preferred lab:      Send INR reminders to:   ANTICOAGULATION POOL A (WBY,WBE,MID,RSC)    Indications    DVT (deep venous thrombosis) (H) [I82.409]           Comments:             Anticoagulation Care Providers     Provider Role Specialty Phone number    Mathew Ott MD Referring Internal Medicine [phone]

## 2021-06-24 NOTE — PROGRESS NOTES
Pt ambulates to infusion center for labs and treatment.  Lab drawn peripherally w/results noted.  Velcade given as ordered.  Pt left clinic stable to lobby.  Plan RTC as scheduled.

## 2021-06-24 NOTE — PROGRESS NOTES
Pt ambulates to infusion center following NP visit.  Pt was seen by BRITTANI Valdez CNP today and orders were approved.  Velcade and vitamin B12 given as ordered.  Pt left clinic stable to lobby.  Plan RTC as scheduled.

## 2021-06-24 NOTE — PROGRESS NOTES
[facility] clinic Follow Up Note    Theo Meyers   73 y.o. male    Date of Visit: 2/21/2019    Chief Complaint   Patient presents with     Follow-up     Shortness of breath     Subjective  Keith is here to review his shortness of breath with global weakness and cough.    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  He has had recurrence and still is on Velcade, Revlimid and dexamethasone chemotherapy.    He has had pancytopenia, last hemoglobin in January was 10.1.    Last November he developed a mild cold with cough and mild nasal congestion.  He did not treat with antibiotics, continued to have simmering symptoms over the past couple of months.  He did have some mild to moderate intermittent nosebleeds at the time.    He continues to cough but it is significantly better.  No sinus pain or pressure.  No fever.  No earache or headache.  No further nosebleeds.    Patient has been having increasing shortness of breath with exertion, when he shovels snow or ambulates.  That has been worsening over the past couple of months.    February 13 hemoglobin check of 7.8 as he was complaining of increasing dizziness and weakness at that time.  He opted not to have a blood transfusion.    He rechecked his hemoglobin on February 20 and it was 8.9.    Patient will again follow closely with hematology, and will be seen again next week.    He does not feel the shortness of breath or fatigue is worsening at this time.    No chest pain or chest pressure or palpitations.  No lower extremity edema.    Last cardiac echo November 2015 with ejection fraction 55-60%, some diastolic dysfunction noted.  He did have some mild aortic stenosis and mild MR at the time.  He does have a heart murmur.    He has not been getting regular exercise this winter.    He quit smoking in 1975.    August 2018 chest x-ray was clear.    Usually his blood pressure is normal, somewhat lower recently.  He  does not have a history of hypertension.    He does have a past history of DVT in 2012 and is on long-term warfarin.  He had a mildly high INR earlier this month but recheck yesterday was 1.6 INR.    No flare of his severe spinal stenosis.  MRI June 2018 with L 3-4 stenosis.  No radicular pain complaints.    He previously walked his dog a regular basis but is not been walking recently.    On Flomax for BPH.    Past history of gastric polyp removed January 2018 with repeat in May 2018 but no ulcer.  He has not seen melena.  He is on Prilosec and Metamucil.        PMHx:    Past Medical History:   Diagnosis Date     Anemia 12/13/2017     Arthritis      Bilateral inguinal hernia      Glaucoma      Glaucoma      History of blood clots     DVT - left chronic     Multiple myeloma (H)     Gets chemo infusions every 2 weeks     Pancytopenia (H)      Peripheral neuropathy      Syncope      TMJ (temporomandibular joint disorder)      PSHx:    Past Surgical History:   Procedure Laterality Date     APPENDECTOMY      Age 16     CARPAL TUNNEL RELEASE Bilateral      ESOPHAGOGASTRODUODENOSCOPY N/A 12/14/2017    Procedure: ESOPHAGOGASTRODUODENOSCOPY (EGD) WITH BIOPSYS;  Surgeon: Marlon Roy MD;  Location: [facility];  Service:      ESOPHAGOGASTRODUODENOSCOPY N/A 1/19/2018    Procedure: ENDOSCOPIC ULTRASOUND  ESOPHAGOGASTRODUODENOSCOPY WITH DUODENAL POLYP REMOVAL;  Surgeon: Familia Mcgraw MD;  Location: [facility] OR;  Service:      EYE SURGERY      Cataract     PORTACATH PLACEMENT       VERTEBROPLASTY      T6     WISDOM TOOTH EXTRACTION       Immunizations:   Immunization History   Administered Date(s) Administered     Hep B, historic 03/01/2011, 05/19/2011     HiB, historic,unspecified 03/01/2011, 05/19/2011     IPV 03/01/2011, 05/19/2011     Influenza high dose, seasonal 10/07/2015, 09/21/2016, 09/20/2017, 09/26/2018     Influenza,seasonal quad, PF, 36+MOS 09/24/2014     Pneumo Conj 13-V (2010&after) 10/16/2015      Pneumo Polysac 23-V 05/19/2011     Tdap 03/01/2011       ROS A comprehensive review of systems was performed and was otherwise negative    Medications, allergies, and problem list were reviewed and updated    Exam  BP 96/70 (Patient Site: Right Arm, Patient Position: Sitting, Cuff Size: Adult Large)   Pulse 80   Resp 12   Wt 167 lb (75.8 kg)   SpO2 99%   BMI 24.66 kg/m    No pharyngitis.  No cervical or supraclavicular adenopathy.  No JVD.  Lungs are clear to auscultation with good respiratory excursion.  Heart is regular with 2 out of 6 systolic murmur right upper sternal border.  No rub or gallop.  No ankle edema.  Abdomen is nontender no hepatomegaly.  No epigastric tenderness.    Assessment/Plan  1. Shortness of breath  I suspect secondary to the anemia.    He has poor bone marrow after stem cell transplant, currently getting chemotherapy for his multiple myeloma.    On top of that he had a probable viral illness late last fall with intermittent nosebleeds.    Check chest x-ray today to rule out infiltrate or mass or fibrosis.    Repeat cardiac echo to reevaluate diastolic dysfunction and aortic stenosis.    Follow-up sooner if significant worsening shortness of breath or weakness.  He will follow-up closely with hematology regarding his anemia.    Follow-up with me in May for routine checkup if he continues to improve.  - XR Chest 2 Views; Future  - Echo Complete; Future    2. Cough  Likely some mild postnasal drip pharyngitis in the past but now largely resolved.  Check chest x-ray given his immunosuppression.  - XR Chest 2 Views; Future    3. Multiple myeloma not having achieved remission (H)  Managed by hematology    4. Chronic deep vein thrombosis (DVT) of left lower extremity, unspecified vein (H)  Chronic Coumadin.    5. Spinal stenosis of lumbar region without neurogenic claudication  No new radicular pain.  Continue regular walking.  Needs to return to a regular walking program and improve  conditioning.    6. Anemia, unspecified type  As above.  He does get monthly B12 shots.    History of gastric polyp, consider repeat EGD this spring.    Glaucoma, on drops.    BPH, on Flomax.    Lower blood pressure, with some weakness and lightheadedness, hopefully will improve with improved anemia.  May need to discontinue Flomax if that worsens.    Return in about 3 months (around 5/21/2019) for Recheck.   Patient Instructions   Chest x-ray today.    See  to set up a heart echo.    I will mail you the results of above tests, if they are okay.    Follow-up with oncology as planned, and recheck hemoglobin as planned.    If you develop worsening cough or sinusitis symptoms, or your weakness and dizziness worsen, follow-up sooner than planned.    Otherwise, see me in approximately 3 months for a checkup.        Mathew Ott MD  I spent a total time with patient of over 25 minutes and over 50% coord care.  Time all face to face.      Current Outpatient Medications   Medication Sig Dispense Refill     acetaminophen (TYLENOL) 500 MG tablet Take 500 mg by mouth every 6 (six) hours as needed for pain.       acyclovir (ZOVIRAX) 400 MG tablet TAKE 1 TABLET TWICE DAILY 180 tablet 0     ascorbic acid (VITAMIN C) 1000 MG tablet Take 2,000 mg by mouth daily.        aspirin 81 MG EC tablet Take 2 tablets (162 mg total) by mouth daily. (Patient taking differently: Take 81 mg by mouth daily. )  0     bimatoprost (LUMIGAN) 0.01 % Drop Administer 1 drop to both eyes at bedtime.       brimonidine (ALPHAGAN P) 0.1 % Drop Administer 1 drop to both eyes 3 (three) times a day.        calcium, as carbonate, (TUMS) 200 mg calcium (500 mg) chewable tablet Chew 1 tablet 3 (three) times a day as needed.        calcium-vitamin D 500 mg(1,250mg) -200 unit per tablet Take 1 tablet by mouth daily.       cetirizine (ZYRTEC) 10 MG tablet Take 10 mg by mouth daily.       dexamethasone (DECADRON) 4 MG tablet Take 20 mg by mouth every 14  (fourteen) days.       gabapentin (NEURONTIN) 300 MG capsule TAKE 1-3 CAPSULES BY MOUTH FOUR TIMES DAILY 240 capsule 11     guaiFENesin-dextromethorphan (MUCINEX DM) 600-30 mg Tb12 Take 1 tablet by mouth 2 (two) times a day as needed.        KLOR-CON M20 20 mEq tablet TAKE 1 TABLET BY MOUTH EVERY DAY 90 tablet 3     lenalidomide (REVLIMID) 20 mg cap Take 20 mg by mouth every evening. Three weeks on and one week off 21 capsule 5     magnesium oxide (MAG-OX) 400 mg (241.3 mg magnesium) tablet TAKE 1 TABLET BY MOUTH TWICE A DAY 60 tablet 3     multivitamin (MULTIVITAMIN) per tablet Take 1 tablet by mouth daily.       omega 3-dha-epa-fish oil 900-1,400 mg CpDR Take 1 tablet by mouth daily.       omeprazole (PRILOSEC) 20 MG capsule TAKE 1 CAPSULE BY MOUTH EVERY DAY 90 capsule 3     psyllium (METAMUCIL) powder Take 2 packets by mouth daily. Takes 2 tablespoonsful       sodium chloride (OCEAN) 0.65 % nasal spray 2 sprays into each nostril as needed for congestion. 15 mL 12     tamsulosin (FLOMAX) 0.4 mg cap TAKE ONE CAPSULE BY MOUTH DAILY 90 capsule 3     warfarin (COUMADIN/JANTOVEN) 5 MG tablet TAKE 1 TABLET BY MOUTH DAILY (Patient taking differently: 5 mg 3 days a week and 4 days 2.5 mg) 90 tablet 1     No current facility-administered medications for this visit.      Facility-Administered Medications Ordered in Other Visits   Medication Dose Route Frequency Provider Last Rate Last Dose     heparin 100 unit/mL lockflush (PF) porcine 300-600 Units  300-600 Units Intravenous PRN Per Clifford MD   600 Units at 14 1013     No Known Allergies  Social History     Tobacco Use     Smoking status: Former Smoker     Last attempt to quit: 1975     Years since quittin.2     Smokeless tobacco: Never Used   Substance Use Topics     Alcohol use: Yes     Alcohol/week: 0.6 oz     Types: 1 Glasses of wine per week     Comment: occasional     Drug use: No

## 2021-06-24 NOTE — PROGRESS NOTES
[facility] Hematology and Oncology Progress Note    Patient: Theo Meyers  MRN: [id number]  Date of Service: 03/13/2019        Reason for Visit    Chief Complaint   Patient presents with     Malignant Hematology     Multiple myeloma not having achieved remission        Assessment and Plan  1.  Myeloma: Patient continues on his current treatment of Velcade and dexamethasone every other week along with Revlimid.  His labs have fluctuated.. His light chains and the ratio continue to be elevated. It is a little higher this month than it has been running.  If it goes up consistently, we may talk about switching to daratumumab and pomalyst. We will continue current regimen.  He will see Dr. Clifford in 2 months.     2. DVT: on coumadin. INR was 1.95 last week. Being managed by coumadin clinic. Wants to recheck today    3. Pancytopenia: worried about anemia. Will recheck hemoglobin today. Some intermittent shortness of breath, SAMANO. Likely from revlimid and velcade. No complications at this time. Continue to monitor and take precautions. Encourage him to call with infectious, bleeding complications.      4. Aortic stenosis: worse on recent echo. Likely will repeat in one year. If really anxious about this, we could refer to cardiologist.     ECOG Performance   ECOG Performance Status: 1     Distress Assessment  Distress Assessment Score: No distress    Pain  Currently in Pain: No/denies      Problem List    1. Anemia associated with chemotherapy  Hemoglobin   2. Chronic deep vein thrombosis (DVT) of lower extremity, unspecified laterality, unspecified vein (H)  INR      ______________________________________________________________________________    History of Present Illness    DIAGNOSIS:   1. IgG kappa light chain myeloma. Hyposecretory. Diagnosed 2009. No significant measurable M protein. 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 There was also on 11;14 translocation.   2. Deep vein thrombosis of the left  leg diagnosed 09/2012 while on treatment.       TREATMENT:   1. Most recently he has been on Velcade every 2 weeks since 09/2012. He is getting dexamethasone 20 mg every other week with the velcade. Finally, he is receiving Revlimid 20 mg daily for 3 weeks on, 1 week off.   2. Continues on warfarin      PAST TREATMENT and HISTORY:   He presented at diagnosis with a lytic lesion involving the C6 vertebral body, although no measurable M protein. IgG kappa monoclonal immunoglobulin was confirmed by [facility] as well as elevated kappa free light chains with an abnormal kappa lambda ratio. Bone marrow biopsy showed 30% involvement and cytogenetics confirmed deletion of 13 q. as well as 11;14 translocation. He was initially treated with Velcade and dexamethasone and achieved a good partial response. He was referred to the [facility] where he underwent high-dose chemotherapy with autologous peripheral stem cell transplant in April of 2010. He began Revlimid as maintenance therapy post transplant. Repeat bone marrow biopsy done October 2010 showed about 5% kappa restricted plasma cells and so at that time weekly Velcade was added along with his maintenance Revlimid and dexamethasone. He has done well since that time with stable minimal residual disease, best assessed by serum free light chains. He required dose reductions of weekly Velcade because of cytopenias and was ultimately switched to a q.2 week maintenance schedule which he has been on since September 2012. His Revlimid dose is 20 mg daily and he continues on dexamethasone 20 mg p.o. q. Week.       INTERIM HISTORY:    Pt is here today for follow up. He is doing well.  Mild neuropathy. No new issues or concerns except some intermittent SAMANO, shortness of breath and worsening fatigue. A little anxious about echo results    Pain Status  Currently in Pain: No/denies    Review of Systems  Constitutional  Constitutional (WDL): Exceptions to WDL  Fatigue:  Concerns(due to shoveling yesterday)  Fever: No Concerns  Chills: No Concerns  Weight Gain: No Concerns  Weight Loss: No Concerns  Hot flashes/Night Sweats: No Concerns  Neurosensory  Neurosensory (WDL): Exceptions to WDL  Peripheral Motor Neuropathy: Concerns  Ataxia: No Concerns  Peripheral Sensory Neuropathy: Concerns  Confusion: No Concerns  Dizziness: No Concerns(improving the last few weeks)  Syncope: No Concerns  Eye   Eye Disorder (WDL): Exceptions to WDL  Blurred Vision: Concerns(glaucoma)  Dry Eye: No Concerns  Eye Pain: No Concerns  Watering Eyes: No Concerns  Ear  Ear Disorder (WDL): Exceptions to WDL  Ear Pain: No Concerns  Tinnitus: Concerns(chronic)  Cardiovascular  Cardiovascular (WDL): All cardiovascular elements are within defined limits  Palpitations: No Concerns  Edema: No Concerns  SVT, DVT/PE: No Concerns  Chest Pain - Cardiac: No Concerns  Pulmonary  Respiratory (WDL): Exceptions to WDL  Cough: Concerns(recovering from chest congestion)  Dyspnea: No Concerns  Hypoxia: No Concerns  Gastrointestinal  Gastrointestinal (WDL): Exceptions to WDL  Anorexia: Concerns(portions are smaller)  Nausea: No Concerns  Vomiting: No Concerns  Dehydration: No Concerns  Dysgeusia: No Concerns  Dysphagia: No Concerns  Mucositis Oral: No Concerns  Esophagitis: No Concerns  Constipation: No Concerns  Diarrhea: No Concerns  Pharyngitis: No Concerns  Dry Mouth: No Concerns  Genitourinary  Genitourinary (WDL): All genitourinary elements are within defined limits  Urinary Frequency: No Concerns  Urinary Retention: No Concerns  Urinary Tract Pain: No Concerns  Lymphatic  Lymph (WDL): All lymph disorder elements are within defined limits  Lymphedema: No Concerns  Lymph Node Discomfort: No Concerns  Musculoskeletal and Connective Tissue  Musculoskeletal and Connetive Tissue Disorders (WDL): All Musculoskeletal and Connetive Tissue Disorder elements are within defined limits  Arthralgia: No Concerns  Bone Pain: No  Concerns  Muscle Weakness : No Concerns  Myalgia: No Concerns  Integumentary  Integumentary (WDL): All integumentary elements are within defined limits  Alopecia: No Concerns  Rash Maculo-Papular: No Concerns  Pruritus: No Concerns  Urticaria: No Concerns  Palmar-Plantar Erythrodysesthesia Syndrome: No Concerns  Flushing: No Concerns  Patient Coping  Patient Coping: Accepting  Accompanied by  Accompanied by: Alone  Oral Chemo Adherence         Past History  Past Medical History:   Diagnosis Date     Anemia 12/13/2017     Arthritis      Bilateral inguinal hernia      Glaucoma      Glaucoma      History of blood clots     DVT - left chronic     Multiple myeloma (H)     Gets chemo infusions every 2 weeks     Pancytopenia (H)      Peripheral neuropathy      Syncope      TMJ (temporomandibular joint disorder)        PHYSICAL EXAM:  /57   Pulse 71   Temp 97.9  F (36.6  C) (Oral)   Wt 165 lb 4.8 oz (75 kg)   SpO2 99%   BMI 24.41 kg/m    GENERAL: no acute distress. Cooperative in conversation. Here alone  HEENT: pupils are equal, round and reactive. [place]ucosa is clean and intact. No ulcerations or mucositis noted. No bleeding noted.  RESP: lungs are clear bilaterally per auscultation. Regular respiratory rate. No wheezes or rhonchi.  CV: Regular, rate and rhythm. No murmurs.  ABD: soft, nontender. Positive bowel sounds. No organomegaly.   MUSCULOSKELETAL: No lower extremity swelling.   NEURO: non focal. Alert and oriented x3.   PSYCH: within normal limits. No depression or anxiety.  SKIN: warm dry intact   LYMPH: no cervical, supraclavicular or axillary lymphadenopathy        Lab Results    Office Visit on 03/13/2019   Component Date Value Ref Range Status     Hemoglobin 03/13/2019 9.3* 14.0 - 18.0 g/dL Final   Lab Standing Order on 03/06/2019   Component Date Value Ref Range Status     Sodium 03/06/2019 138  136 - 145 mmol/L Final     Potassium 03/06/2019 4.3  3.5 - 5.0 mmol/L Final     Chloride 03/06/2019 105   98 - 107 mmol/L Final     CO2 03/06/2019 26  22 - 31 mmol/L Final     Anion Gap, Calculation 03/06/2019 7  5 - 18 mmol/L Final     Glucose 03/06/2019 102  70 - 125 mg/dL Final     BUN 03/06/2019 19  8 - 28 mg/dL Final     Creatinine 03/06/2019 1.13  0.70 - 1.30 mg/dL Final     GFR MDRD Af Amer 03/06/2019 >60  >60 mL/min/1.73m2 Final     GFR MDRD Non Af Amer 03/06/2019 >60  >60 mL/min/1.73m2 Final     Bilirubin, Total 03/06/2019 0.3  0.0 - 1.0 mg/dL Final     Calcium 03/06/2019 8.4* 8.5 - 10.5 mg/dL Final     Protein, Total 03/06/2019 5.0* 6.0 - 8.0 g/dL Final     Albumin 03/06/2019 2.7* 3.5 - 5.0 g/dL Final     Alkaline Phosphatase 03/06/2019 138* 45 - 120 U/L Final     AST 03/06/2019 32  0 - 40 U/L Final     ALT 03/06/2019 67* 0 - 45 U/L Final     Immunoglobulin G 03/06/2019 357* 700-1,700 mg/dL Final     Immunoglobulin M 03/06/2019 17* 60 - 280 mg/dL Final     Immunoglobulin A 03/06/2019 30* 65 - 400 mg/dL Final     [place] Free Lt Chain 03/06/2019 59.00* 0.33 - 1.94 mg/dL Final    NOTE: This result is outside the initial measuring range of this assay. Samples  which are outside of the initial measuring range of the assay must be  re-assayed at a dilution in order to obtain a result. In these cases, the  result reported may not be proportional to results that are within the  measuring range and were performed using the initial dilution.  This is due to the fact that all serum free light chain assays are prone to  sample non-linearity. That is, some samples will have different results if  performed on different dilutions. In some instances where the result is just  outside the limit of the initial measuring range, the results obtained from the  diluted sample may be lower or higher than expected.  In these cases the result will be reported as greater than or less than the  limit of the measuring range.     Lambda Free Lt Chain 03/06/2019 1.64  0.57 - 2.63 mg/dL Final     Kappa Lambda Ratio 03/06/2019 35.98* 0.26 -  1.65 Final    PERFORMED AT  [address] [address]      Albumin % 03/06/2019 59.5  51.0 - 67.0 % Final     Albumin  03/06/2019 2.7* 3.2 - 4.7 g/dL Final     Alpha 1 % 03/06/2019 4.9* 2.0 - 4.0 % Final     Alpha 1 03/06/2019 0.2  0.1 - 0.3 g/dL Final     Alpha 2 % 03/06/2019 14.9* 5.0 - 13.0 % Final     Alpha 2 03/06/2019 0.7  0.4 - 0.9 g/dL Final     % Beta 03/06/2019 12.8  10.0 - 17.0 % Final     Beta 03/06/2019 0.6* 0.7 - 1.2 g/dL Final     Gamma Globulin % 03/06/2019 7.9* 9.0 - 20.0 % Final     Gamma Globulin 03/06/2019 0.4* 0.6 - 1.4 g/dL Final     ELP Comment 03/06/2019    Final                    Value:The albumin fraction is decreased, and this may represent any combination of protein-calorie malnutrition, bulk protein loss, or accelerated protein breakdown.    Decreased beta globulin fraction, which can be seen in kidney disease, coagulopathies, and malnutrition, among other etiologies.    Decreased gamma globulin fraction. Etiologies include lymphoproliferative disorders, chemotherapy, and immunodeficiency.       Protein, Total 03/06/2019 4.6* 6.0 - 8.0 g/dL Final     Path ICD: 03/06/2019 C90.01   Final     Interpreted By: 03/06/2019 Bharath Landis MD   Final     Immunofixation Electrophoresis, Se* 03/06/2019 Unremarkable immunofixation electrophoresis.     Final     Path ICD: 03/06/2019 C90.01   Final     Interpreted By: 03/06/2019 Bharath Landis MD   Final     WBC 03/06/2019 2.6* 4.0 - 11.0 thou/uL Final     RBC 03/06/2019 2.56* 4.40 - 6.20 mill/uL Final     Hemoglobin 03/06/2019 8.5* 14.0 - 18.0 g/dL Final     Hematocrit 03/06/2019 27.2* 40.0 - 54.0 % Final     MCV 03/06/2019 106* 80 - 100 fL Final     MCH 03/06/2019 33.2  27.0 - 34.0 pg Final     MCHC 03/06/2019 31.3* 32.0 - 36.0 g/dL Final     RDW 03/06/2019 16.2* 11.0 - 14.5 % Final     Platelets 03/06/2019 112* 140 - 440 thou/uL Final    This value was suppressed on a previous  preliminary result.     MPV 03/06/2019 11.8  8.5 - 12.5 fL Final    This value was suppressed on a previous preliminary result.     INR 03/06/2019 1.95* 0.90 - 1.10 Final     Total Neutrophils % 03/06/2019 59  50 - 70 % Final     Lymphocytes % 03/06/2019 9* 20 - 40 % Final     Monocytes % 03/06/2019 18* 2 - 10 % Final     Eosinophils %  03/06/2019 14* 0 - 6 % Final     Basophils % 03/06/2019 0  0 - 2 % Final     Total Neutrophils Absolute 03/06/2019 1.5* 2.0 - 7.7 thou/ul Final     Lymphocytes Absolute 03/06/2019 0.2* 0.8 - 4.4 thou/uL Final     Monocytes Absolute 03/06/2019 0.5  0.0 - 0.9 thou/uL Final     Eosinophils Absolute 03/06/2019 0.4  0.0 - 0.4 thou/uL Final     Basophils Absolute 03/06/2019 0.0  0.0 - 0.2 thou/uL Final     Platelet Estimate 03/06/2019 Decreased* Normal Final     Ovalocytes 03/06/2019 1+* Negative Final     Polychromasia 03/06/2019 1+* Negative Final     Tear Drop Cells 03/06/2019 1+* Negative Final     Lab Results   Component Value Date    KFLC 59.00 (H) 03/06/2019    KFLC 51.00 (H) 01/09/2019    KFLC 60.25 (H) 11/14/2018    KFLC 42.25 (H) 09/19/2018    KFLC 47.80 (H) 07/25/2018    KFLC 41.00 (H) 07/03/2018    KFLC 47.00 (H) 06/06/2018    KFLC 55.50 (H) 04/04/2018    KFLC 36.25 (H) 02/14/2018    KFLC 34.75 (H) 12/20/2017     Lab Results   Component Value Date    KLR 35.98 (H) 03/06/2019    KLR 41.46 (H) 01/09/2019    KLR 95.63 (H) 11/14/2018    KLR 36.74 (H) 09/19/2018    KLR 54.32 (H) 07/25/2018    KLR 50.00 (H) 07/03/2018    KLR 62.67 (H) 06/06/2018    KLR 39.08 (H) 04/04/2018    KLR 33.88 (H) 02/14/2018    KLR 23.32 (H) 12/20/2017   ]    Imaging    Xr Chest 2 Views    Result Date: 2/21/2019  XR CHEST 2 VIEWS 2/21/2019 12:08 PM INDICATION: Shortness of breath COMPARISON: 08/07/2018 FINDINGS: Port-A-Cath tip in the SVC. Heart size upper limits of normal. Previous vertebral plasty T6. No significant acute new findings.        Signed by: Alejandra Valdez, CNP

## 2021-06-24 NOTE — TELEPHONE ENCOUNTER
ANTICOAGULATION  MANAGEMENT    Assessment     Today's INR result of 1.95 is Subtherapeutic (goal INR of 2.0-3.0)        Warfarin taken as previously instructed    No new diet changes affecting INR    No new medication/supplements affecting INR    Continues to tolerate warfarin with no reported s/s of bleeding or thromboembolism     Previous INR was Subtherapeutic    Plan:     Spoke with Theo regarding INR result and instructed:     Warfarin Dosing Instructions:  Continue current warfarin dose 2.5 mg daily on Sundays, Tuesdays and Thurdsays; and 5 mg daily rest of week  (0 % change)    Instructed patient to follow up no later than: 1-2 weeks.    Education provided: importance of therapeutic range, importance of following up for INR monitoring at instructed interval and importance of taking warfarin as instructed    Keith verbalizes understanding and agrees to warfarin dosing plan.    Instructed to call the [facility] for any changes, questions or concerns. (#[phone])   ?   Gricelda Parker RN    Subjective/Objective:      Theo HURT Jasbirtio, a 73 y.o. male is on warfarin.     Theo reports:     Home warfarin dose: verbally confirmed home dose with Keith and updated on anticoagulation calendar     Missed doses: No     Medication changes:  No     S/S of bleeding or thromboembolism:  No     New Injury or illness:  No     Changes in diet or alcohol consumption:  No     Upcoming surgery, procedure or cardioversion:  No    Anticoagulation Episode Summary     Current INR goal:   2.0-3.0   TTR:   66.8 % (1 y)   Next INR check:   3/20/2019   INR from last check:   1.95! (3/6/2019)   Weekly max warfarin dose:      Target end date:      INR check location:      Preferred lab:      Send INR reminders to:   ANTICOAGULATION POOL A (WBY,WBE,MID,RSC)    Indications    DVT (deep venous thrombosis) (H) [I82.409]           Comments:            Anticoagulation Care Providers     Provider Role Specialty Phone number    Namrata,  Mathew MARQUEZ MD [facility] Internal Medicine [phone]

## 2021-06-24 NOTE — PATIENT INSTRUCTIONS - HE
Chest x-ray today.    See  to set up a heart echo.    I will mail you the results of above tests, if they are okay.    Follow-up with oncology as planned, and recheck hemoglobin as planned.    If you develop worsening cough or sinusitis symptoms, or your weakness and dizziness worsen, follow-up sooner than planned.    Otherwise, see me in approximately 3 months for a checkup.

## 2021-06-24 NOTE — TELEPHONE ENCOUNTER
ANTICOAGULATION  MANAGEMENT    Assessment     Today's INR result of 1.74 is Subtherapeutic (goal INR of 2.0-3.0)        Warfarin taken as previously instructed    No new diet changes affecting INR    No new medication/supplements affecting INR    Continues to tolerate warfarin with no reported s/s of bleeding or thromboembolism     Previous INR was Subtherapeutic    Plan:     Spoke with Theo regarding INR result and instructed:     Warfarin Dosing Instructions:  Take 7.5 mg today then change warfarin dose to 2.5 mg daily on Tuesdays, Thursdays and Saturdays; and 5 mg daily rest of week  (10 % change)He preferred to increased dose today.    Instructed patient to follow up no later than: one week. He has lab draw again next week.    Education provided: importance of therapeutic range, target INR goal and significance of current INR result, importance of following up for INR monitoring at instructed interval and importance of taking warfarin as instructed    Keith verbalizes understanding and agrees to warfarin dosing plan.    Instructed to call the [facility] for any changes, questions or concerns. (#[phone])   ?   Gricelda Parker RN    Subjective/Objective:      BertBLU Meyers, a 73 y.o. male is on warfarin.     Theo reports:     Home warfarin dose: verbally confirmed home dose with Keith and updated on anticoagulation calendar     Missed doses: No     Medication changes:  No     S/S of bleeding or thromboembolism:  No     New Injury or illness:  Yes: hgb was lower and causing symptoms. It is slowly coming up every week on its own.     Changes in diet or alcohol consumption:  No     Upcoming surgery, procedure or cardioversion:  No    Anticoagulation Episode Summary     Current INR goal:   2.0-3.0   TTR:   68.1 % (1 y)   Next INR check:   3/6/2019   INR from last check:   1.74! (2/27/2019)   Weekly max warfarin dose:      Target end date:      INR check location:      Preferred lab:      Send INR  reminders to:   ANTICOAGULATION POOL A (WBY,WBE,MID,RSC)    Indications    DVT (deep venous thrombosis) (H) [I82.409]           Comments:            Anticoagulation Care Providers     Provider Role Specialty Phone number    Mathew Ott MD Referring Internal Medicine [phone]

## 2021-06-25 DIAGNOSIS — D51.9 ANEMIA DUE TO VITAMIN B12 DEFICIENCY, UNSPECIFIED B12 DEFICIENCY TYPE: Primary | ICD-10-CM

## 2021-06-25 RX ORDER — CYANOCOBALAMIN 1000 UG/ML
1000 INJECTION, SOLUTION INTRAMUSCULAR; SUBCUTANEOUS ONCE
Status: CANCELLED
Start: 2021-07-22 | End: 2021-07-22

## 2021-06-25 RX ORDER — ALBUTEROL SULFATE 0.83 MG/ML
2.5 SOLUTION RESPIRATORY (INHALATION)
Status: CANCELLED | OUTPATIENT
Start: 2021-07-22

## 2021-06-25 RX ORDER — EPINEPHRINE 1 MG/ML
0.3 INJECTION, SOLUTION, CONCENTRATE INTRAVENOUS EVERY 5 MIN PRN
Status: CANCELLED | OUTPATIENT
Start: 2021-07-22

## 2021-06-25 RX ORDER — MEPERIDINE HYDROCHLORIDE 25 MG/ML
25 INJECTION INTRAMUSCULAR; INTRAVENOUS; SUBCUTANEOUS EVERY 30 MIN PRN
Status: CANCELLED | OUTPATIENT
Start: 2021-07-22

## 2021-06-25 RX ORDER — NALOXONE HYDROCHLORIDE 0.4 MG/ML
0.2 INJECTION, SOLUTION INTRAMUSCULAR; INTRAVENOUS; SUBCUTANEOUS
Status: CANCELLED | OUTPATIENT
Start: 2021-07-22

## 2021-06-25 RX ORDER — ALBUTEROL SULFATE 90 UG/1
1-2 AEROSOL, METERED RESPIRATORY (INHALATION)
Status: CANCELLED
Start: 2021-07-22

## 2021-06-25 RX ORDER — DIPHENHYDRAMINE HYDROCHLORIDE 50 MG/ML
50 INJECTION INTRAMUSCULAR; INTRAVENOUS
Status: CANCELLED
Start: 2021-07-22

## 2021-06-25 RX ORDER — METHYLPREDNISOLONE SODIUM SUCCINATE 125 MG/2ML
125 INJECTION, POWDER, LYOPHILIZED, FOR SOLUTION INTRAMUSCULAR; INTRAVENOUS
Status: CANCELLED
Start: 2021-07-22

## 2021-06-25 NOTE — PROGRESS NOTES
"...........................................  Oncology Rooming Note    05/26/21 8:31 AM    Theo Meyers is a 76 y.o. male who presents for:    Chief Complaint   Patient presents with     HE Cancer     Multiple myeloma in relapse        Initial Vitals: /65   Pulse 64   Temp 97.3  F (36.3  C) (Oral)   Wt 168 lb (76.2 kg)   SpO2 99%   BMI 24.81 kg/m       Estimated body mass index is 24.81 kg/m  as calculated from the following:    Height as of 4/1/21: 5' 9\" (1.753 m).    Weight as of this encounter: 168 lb (76.2 kg).     Body surface area is 1.93 meters squared.      Allergies reviewed: Yes  Medications reviewed: Yes    Refills needed: No    Clinical concerns: no concerns      Amy Bell RN      "

## 2021-06-25 NOTE — PROGRESS NOTES
[facility] Hematology and Oncology Progress Note    Patient: Theo Meyers  MRN: [id number]  Date of Service: May 26, 2021      Assessment and Plan:    1. Kappa light chain myeloma: He is now receiving daratumumab monthly with the pomalidomide and dexamethasone.  Tolerating his therapy well with minimal side effects.  His light chains have come down.  They may be plateauing now.  We'll continue to check every 2 months.  He'll be seen in clinic every 2 months.  His CBC is good with an appropriate neutrophil count.  He is at pomalidomide 3 mg which we will hold the dose it.    2.  Pancytopenia: Counts have improved.  Only mild neutropenia.  Continue to follow clinically.    3.  Prophylaxis: Continue Xarelto 20 mg daily.    Total time spent on the visit today was 40 minutes including document review, face-to-face time with patient, and documentation.    ECOG Performance   ECOG Performance Status: 0    Distress Assessment  Distress Assessment Score: No distress    Pain  Currently in Pain: No/denies    Diagnosis:    1.  IgG kappa light chain myeloma. Hyposecretory. Diagnosed 2009. No significant measurable M protein. 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 There was also on 11;14 translocation.  Past immunofixations from 2011 show IgG-kappa.  Bone marrow biopsy at 5%.  Residual kappa light chain myeloma involving 20% of nucleated cells.  No significant change from November 2016 marrow cellularity.    2.  Deep vein thrombosis of the left leg diagnosed 9/2012 while on treatment.    3.  Vitamin B12 deficiency diagnosed in September 2017.    4.  GI bleed and anemia requiring hospitalization in December 2017.    Treatment:    He presented with a lytic lesion involving the C6 vertebral body, although no measurable M protein. IgG kappa monoclonal immunoglobulin was confirmed by ELMA as well as elevated kappa free light chains with an abnormal kappa lambda ratio. Bone marrow biopsy showed 30% involvement and  cytogenetics confirmed deletion of 13 q. as well as 11;14 translocation. He was initially treated with Velcade and dexamethasone and achieved a good partial response.     He was referred to the [facility] where he underwent high-dose chemotherapy with autologous peripheral stem cell transplant in April of 2010. He began Revlimid as maintenance therapy post transplant. Repeat bone marrow biopsy done October 2010 showed about 5% kappa restricted plasma cells and so at that time weekly Velcade was added along with his maintenance Revlimid and dexamethasone.     He has done well since that time with stable minimal residual disease, best assessed by serum free light chains. He required dose reductions of weekly Velcade because of cytopenias and was ultimately switched to a q 2 week maintenance schedule which he has been on since September 2012.     His Revlimid dose was reduced to 15 mg daily and he continues on dexamethasone 20 mg p.o. weekly  Revlimid dosing changed to 20 mg, 3 weeks on 1 week (instead of 15 mg daily) for cost reasons.  Started March, 2018    Started daratumumab/pomalidomide/dexamethasone (20mg every 14 days) on October 14, 2020.    Progressed on:  bortezomib  lenalidomide    Interim History:    Theo returns today for follow-up visit.  Feeling pretty well.  No rash, fatigue, nausea or vomiting, or pain.  No acute complaints today.      Review of Systems:    As above in the history.     Review of Systems otherwise Negative for:  General: chills, fever or night sweats  Psychological: anxiety or depression  Ophthalmic: blurry vision, double vision or loss of vision, vision change  ENT: epistaxis, oral lesions, hearing changes  Hematological and Lymphatic: bleeding, bruising, jaundice, swollen lymph nodes  Endocrine: hot flashes, malaise/lethargy or unexpected weight changes  Respiratory: cough, hemoptysis, orthopnea or shortness of breath/SAMANO  Cardiovascular: chest pain, edema, palpitations  or PND  Gastrointestinal: abdominal pain, blood in stools, change in bowel habits, constipation, diarrhea or nausea/vomiting  Genito-Urinary: change in urinary stream, incontinence, frequency/urgency  Musculoskeletal: joint pain, stiffness, swelling, muscle pain or weakness  Neurological: dizziness, headaches, numbness/tingling  Dermatological: lumps and rash    Patient Coping  Patient Coping: Accepting  Accompanied by  Accompanied by: Alone    Past History:    Past Medical History:   Diagnosis Date     Anemia 12/13/2017     Arthritis      Bilateral inguinal hernia      Chest tube in place      Glaucoma      Glaucoma      History of blood clots     DVT - left chronic     Multiple myeloma (H)     Gets chemo infusions every 2 weeks     Pancytopenia (H)      Parapneumonic effusion      Peripheral neuropathy      Syncope      TMJ (temporomandibular joint disorder)      Physical Exam:    Recent Vitals 5/26/2021   Height -   Weight 168 lbs   BSA (m2) 1.93 m2   /65   Pulse 64   Temp 97.3   Temp src 1   SpO2 99   Some recent data might be hidden     General: patient appears stated age of 76 y.o.. Nontoxic and in no distress.   HEENT: Head: atraumatic, normocephalic. Sclerae anicteric.  Chest:  Normal respiratory effort.   Cardiac:  No edema.   Abdomen: abdomen is non-distended  Extremities: normal tone and muscle bulk.   Skin: no lesions or rash on visible skin. Warm and dry.   CNS: alert and oriented. Grossly non-focal.   Psychiatric: normal mood and affect.     Lab Results:    Recent Results (from the past 240 hour(s))   Comprehensive Metabolic Panel    Collection Time: 05/26/21  7:58 AM   Result Value Ref Range    Sodium 141 136 - 145 mmol/L    Potassium 4.3 3.5 - 5.0 mmol/L    Chloride 110 (H) 98 - 107 mmol/L    CO2 25 22 - 31 mmol/L    Anion Gap, Calculation 6 5 - 18 mmol/L    Glucose 125 70 - 125 mg/dL    BUN 20 8 - 28 mg/dL    Creatinine 1.11 0.70 - 1.30 mg/dL    GFR MDRD Af Amer >60 >60 mL/min/1.73m2    GFR  MDRD Non Af Amer >60 >60 mL/min/1.73m2    Bilirubin, Total 0.3 0.0 - 1.0 mg/dL    Calcium 8.2 (L) 8.5 - 10.5 mg/dL    Protein, Total 5.0 (L) 6.0 - 8.0 g/dL    Albumin 3.1 (L) 3.5 - 5.0 g/dL    Alkaline Phosphatase 55 45 - 120 U/L    AST 14 0 - 40 U/L    ALT 11 0 - 45 U/L   HM1 (CBC with Diff)    Collection Time: 05/26/21  7:58 AM   Result Value Ref Range    WBC 4.0 4.0 - 11.0 thou/uL    RBC 3.35 (L) 4.40 - 6.20 mill/uL    Hemoglobin 11.2 (L) 14.0 - 18.0 g/dL    Hematocrit 34.6 (L) 40.0 - 54.0 %     (H) 80 - 100 fL    MCH 33.4 27.0 - 34.0 pg    MCHC 32.4 32.0 - 36.0 g/dL    RDW 15.5 (H) 11.0 - 14.5 %    Platelets 205 140 - 440 thou/uL    MPV 10.2 8.5 - 12.5 fL     Imaging:    No results found.      Signed by: Per Clifford MD

## 2021-06-25 NOTE — PROGRESS NOTES
Pt ambulates to infusion center for lab draw prior to MD visit.  IVAD accessed, labs drawn et sent.  Pt was seen by Dr. Clifford and orders were approved. Treatment administered as ordered without difficulty.  IVAD flushed w/NS et heparin and needle deaccessed.  Vitamin B12 given subcutaneous as ordered.  Pt left clinic stable to [facility].  Plan RTC as scheduled.

## 2021-06-27 DIAGNOSIS — C90.00 MULTIPLE MYELOMA (H): Primary | ICD-10-CM

## 2021-07-03 NOTE — ADDENDUM NOTE
Addendum Note by Vicky Sewell RN at 4/5/2018 12:00 PM     Author: Vicky Sewell RN Service: -- Author Type: Registered Nurse    Filed: 4/5/2018 12:00 PM Encounter Date: 4/4/2018 Status: Signed    : Vicky Sewell RN (Registered Nurse)    Addended by: VICKY SEWELL on: 4/5/2018 12:00 PM        Modules accepted: Orders

## 2021-07-03 NOTE — ADDENDUM NOTE
Addendum Note by Vicky Sewell RN at 11/29/2017 10:20 AM     Author: Vicky Sewell RN Service: -- Author Type: Registered Nurse    Filed: 11/29/2017 10:20 AM Encounter Date: 11/29/2017 Status: Signed    : Vicky Sewell RN (Registered Nurse)    Addended by: VICKY SEWELL on: 11/29/2017 10:20 AM        Modules accepted: Orders

## 2021-07-03 NOTE — ADDENDUM NOTE
Addendum Note by Hollie Best at 8/25/2020  9:15 AM     Author: Hollie Best Service: -- Author Type:     Filed: 9/3/2020  6:16 AM Encounter Date: 8/25/2020 Status: Signed    : Hollie Best ()    Addended by: HOLLIE BEST on: 9/3/2020 06:16 AM        Modules accepted: Orders

## 2021-07-03 NOTE — ADDENDUM NOTE
Addendum Note by Shefali Valdez CNP at 7/8/2020  2:55 PM     Author: Shefali Valdez CNP Service: -- Author Type: Nurse Practitioner    Filed: 7/8/2020  2:55 PM Encounter Date: 7/8/2020 Status: Signed    : Shefali Valdez CNP (Nurse Practitioner)    Addended by: SHEFALI VALDEZ on: 7/8/2020 02:55 PM        Modules accepted: Orders

## 2021-07-03 NOTE — ADDENDUM NOTE
Addendum Note by Vicky Sewell RN at 4/26/2017 12:50 PM     Author: Vicky Sewell RN Service: -- Author Type: Registered Nurse    Filed: 4/26/2017 12:50 PM Encounter Date: 4/26/2017 Status: Signed    : Vicky Sewell RN (Registered Nurse)    Addended by: VICKY SEWELL on: 4/26/2017 12:50 PM        Modules accepted: Orders

## 2021-07-03 NOTE — ADDENDUM NOTE
Addendum Note by Shefali Valdez CNP at 8/1/2018  9:39 AM     Author: Shefali Valdez CNP Service: -- Author Type: Nurse Practitioner    Filed: 8/1/2018  9:39 AM Encounter Date: 8/1/2018 Status: Signed    : Shefali Valdez CNP (Nurse Practitioner)    Addended by: SHEFALI VALDEZ on: 8/1/2018 09:39 AM        Modules accepted: Orders

## 2021-07-06 VITALS
HEART RATE: 64 BPM | OXYGEN SATURATION: 99 % | WEIGHT: 168 LBS | SYSTOLIC BLOOD PRESSURE: 108 MMHG | TEMPERATURE: 97.3 F | DIASTOLIC BLOOD PRESSURE: 65 MMHG | BODY MASS INDEX: 24.81 KG/M2

## 2021-07-09 ENCOUNTER — AMBULATORY - HEALTHEAST (OUTPATIENT)
Dept: ONCOLOGY | Facility: HOSPITAL | Age: 76
End: 2021-07-09

## 2021-07-09 DIAGNOSIS — C90.01 MULTIPLE MYELOMA IN REMISSION (H): ICD-10-CM

## 2021-07-14 PROBLEM — K92.1 MELENA: Status: RESOLVED | Noted: 2017-12-13 | Resolved: 2018-01-17

## 2021-07-16 ENCOUNTER — TRANSFERRED RECORDS (OUTPATIENT)
Dept: HEALTH INFORMATION MANAGEMENT | Facility: CLINIC | Age: 76
End: 2021-07-16

## 2021-07-21 ENCOUNTER — LAB (OUTPATIENT)
Dept: INFUSION THERAPY | Facility: HOSPITAL | Age: 76
End: 2021-07-21
Payer: MEDICARE

## 2021-07-21 ENCOUNTER — INFUSION THERAPY VISIT (OUTPATIENT)
Dept: INFUSION THERAPY | Facility: HOSPITAL | Age: 76
End: 2021-07-21
Payer: MEDICARE

## 2021-07-21 ENCOUNTER — ONCOLOGY VISIT (OUTPATIENT)
Dept: ONCOLOGY | Facility: HOSPITAL | Age: 76
End: 2021-07-21
Payer: MEDICARE

## 2021-07-21 VITALS
DIASTOLIC BLOOD PRESSURE: 59 MMHG | HEART RATE: 63 BPM | BODY MASS INDEX: 23.85 KG/M2 | TEMPERATURE: 97.3 F | RESPIRATION RATE: 16 BRPM | HEIGHT: 69 IN | SYSTOLIC BLOOD PRESSURE: 102 MMHG | OXYGEN SATURATION: 99 % | WEIGHT: 161 LBS

## 2021-07-21 DIAGNOSIS — C90.00 MULTIPLE MYELOMA (H): Primary | ICD-10-CM

## 2021-07-21 DIAGNOSIS — C90.00 MULTIPLE MYELOMA NOT HAVING ACHIEVED REMISSION (H): ICD-10-CM

## 2021-07-21 DIAGNOSIS — C90.00 MULTIPLE MYELOMA NOT HAVING ACHIEVED REMISSION (H): Primary | ICD-10-CM

## 2021-07-21 DIAGNOSIS — D51.9 ANEMIA DUE TO VITAMIN B12 DEFICIENCY, UNSPECIFIED B12 DEFICIENCY TYPE: ICD-10-CM

## 2021-07-21 LAB
ALBUMIN SERPL-MCNC: 3 G/DL (ref 3.5–5)
ALP SERPL-CCNC: 54 U/L (ref 45–120)
ALT SERPL W P-5'-P-CCNC: 13 U/L (ref 0–45)
ANION GAP SERPL CALCULATED.3IONS-SCNC: 8 MMOL/L (ref 5–18)
AST SERPL W P-5'-P-CCNC: 11 U/L (ref 0–40)
BASOPHILS # BLD AUTO: 0.1 10E3/UL (ref 0–0.2)
BASOPHILS NFR BLD AUTO: 2 %
BILIRUB SERPL-MCNC: 0.4 MG/DL (ref 0–1)
BUN SERPL-MCNC: 31 MG/DL (ref 8–28)
CALCIUM SERPL-MCNC: 8.7 MG/DL (ref 8.5–10.5)
CHLORIDE BLD-SCNC: 111 MMOL/L (ref 98–107)
CO2 SERPL-SCNC: 23 MMOL/L (ref 22–31)
CREAT SERPL-MCNC: 1.25 MG/DL (ref 0.7–1.3)
EOSINOPHIL # BLD AUTO: 0.2 10E3/UL (ref 0–0.7)
EOSINOPHIL NFR BLD AUTO: 5 %
ERYTHROCYTE [DISTWIDTH] IN BLOOD BY AUTOMATED COUNT: 14.6 % (ref 10–15)
GFR SERPL CREATININE-BSD FRML MDRD: 56 ML/MIN/1.73M2
GLUCOSE BLD-MCNC: 124 MG/DL (ref 70–125)
HCT VFR BLD AUTO: 37.4 % (ref 40–53)
HGB BLD-MCNC: 11.9 G/DL (ref 13.3–17.7)
IMM GRANULOCYTES # BLD: 0 10E3/UL
IMM GRANULOCYTES NFR BLD: 0 %
LYMPHOCYTES # BLD AUTO: 0.8 10E3/UL (ref 0.8–5.3)
LYMPHOCYTES NFR BLD AUTO: 17 %
MCH RBC QN AUTO: 32.8 PG (ref 26.5–33)
MCHC RBC AUTO-ENTMCNC: 31.8 G/DL (ref 31.5–36.5)
MCV RBC AUTO: 103 FL (ref 78–100)
MONOCYTES # BLD AUTO: 0.9 10E3/UL (ref 0–1.3)
MONOCYTES NFR BLD AUTO: 19 %
NEUTROPHILS # BLD AUTO: 2.6 10E3/UL (ref 1.6–8.3)
NEUTROPHILS NFR BLD AUTO: 57 %
NRBC # BLD AUTO: 0 10E3/UL
NRBC BLD AUTO-RTO: 0 /100
PLATELET # BLD AUTO: 308 10E3/UL (ref 150–450)
POTASSIUM BLD-SCNC: 4.3 MMOL/L (ref 3.5–5)
PROT SERPL-MCNC: 5 G/DL (ref 6–8)
RBC # BLD AUTO: 3.63 10E6/UL (ref 4.4–5.9)
SODIUM SERPL-SCNC: 142 MMOL/L (ref 136–145)
WBC # BLD AUTO: 4.5 10E3/UL (ref 4–11)

## 2021-07-21 PROCEDURE — 250N000013 HC RX MED GY IP 250 OP 250 PS 637: Performed by: NURSE PRACTITIONER

## 2021-07-21 PROCEDURE — 80053 COMPREHEN METABOLIC PANEL: CPT | Performed by: NURSE PRACTITIONER

## 2021-07-21 PROCEDURE — 250N000011 HC RX IP 250 OP 636: Performed by: NURSE PRACTITIONER

## 2021-07-21 PROCEDURE — G0463 HOSPITAL OUTPT CLINIC VISIT: HCPCS | Mod: 25

## 2021-07-21 PROCEDURE — 99214 OFFICE O/P EST MOD 30 MIN: CPT | Performed by: NURSE PRACTITIONER

## 2021-07-21 PROCEDURE — 96409 CHEMO IV PUSH SNGL DRUG: CPT

## 2021-07-21 PROCEDURE — 85025 COMPLETE CBC W/AUTO DIFF WBC: CPT | Performed by: NURSE PRACTITIONER

## 2021-07-21 PROCEDURE — 96372 THER/PROPH/DIAG INJ SC/IM: CPT | Mod: XS | Performed by: NURSE PRACTITIONER

## 2021-07-21 PROCEDURE — 36415 COLL VENOUS BLD VENIPUNCTURE: CPT | Performed by: NURSE PRACTITIONER

## 2021-07-21 PROCEDURE — 96367 TX/PROPH/DG ADDL SEQ IV INF: CPT

## 2021-07-21 PROCEDURE — 258N000003 HC RX IP 258 OP 636: Performed by: NURSE PRACTITIONER

## 2021-07-21 RX ORDER — ACETAMINOPHEN 325 MG/1
650 TABLET ORAL ONCE
Status: CANCELLED | OUTPATIENT
Start: 2021-08-17

## 2021-07-21 RX ORDER — ALBUTEROL SULFATE 90 UG/1
1-2 AEROSOL, METERED RESPIRATORY (INHALATION)
Status: DISCONTINUED | OUTPATIENT
Start: 2021-07-21 | End: 2021-07-21 | Stop reason: HOSPADM

## 2021-07-21 RX ORDER — ALBUTEROL SULFATE 90 UG/1
1-2 AEROSOL, METERED RESPIRATORY (INHALATION)
Status: CANCELLED
Start: 2021-08-18

## 2021-07-21 RX ORDER — NALOXONE HYDROCHLORIDE 0.4 MG/ML
0.2 INJECTION, SOLUTION INTRAMUSCULAR; INTRAVENOUS; SUBCUTANEOUS
Status: CANCELLED | OUTPATIENT
Start: 2021-08-18

## 2021-07-21 RX ORDER — ALBUTEROL SULFATE 0.83 MG/ML
2.5 SOLUTION RESPIRATORY (INHALATION)
Status: DISCONTINUED | OUTPATIENT
Start: 2021-07-21 | End: 2021-07-21 | Stop reason: HOSPADM

## 2021-07-21 RX ORDER — DIPHENHYDRAMINE HYDROCHLORIDE 50 MG/ML
50 INJECTION INTRAMUSCULAR; INTRAVENOUS
Status: CANCELLED
Start: 2021-07-21

## 2021-07-21 RX ORDER — ACETAMINOPHEN 500 MG
500 TABLET ORAL EVERY 8 HOURS PRN
COMMUNITY
Start: 2020-12-31

## 2021-07-21 RX ORDER — METHYLPREDNISOLONE SODIUM SUCCINATE 125 MG/2ML
125 INJECTION, POWDER, LYOPHILIZED, FOR SOLUTION INTRAMUSCULAR; INTRAVENOUS
Status: DISCONTINUED | OUTPATIENT
Start: 2021-07-21 | End: 2021-07-21 | Stop reason: HOSPADM

## 2021-07-21 RX ORDER — ACYCLOVIR 400 MG/1
TABLET ORAL
COMMUNITY
Start: 2020-12-21 | End: 2021-09-28

## 2021-07-21 RX ORDER — ACETAMINOPHEN 325 MG/1
650 TABLET ORAL ONCE
Status: DISCONTINUED | OUTPATIENT
Start: 2021-07-21 | End: 2021-07-21 | Stop reason: HOSPADM

## 2021-07-21 RX ORDER — MEPERIDINE HYDROCHLORIDE 25 MG/ML
25 INJECTION INTRAMUSCULAR; INTRAVENOUS; SUBCUTANEOUS EVERY 30 MIN PRN
Status: DISCONTINUED | OUTPATIENT
Start: 2021-07-21 | End: 2021-07-21 | Stop reason: HOSPADM

## 2021-07-21 RX ORDER — ALBUTEROL SULFATE 90 UG/1
1-2 AEROSOL, METERED RESPIRATORY (INHALATION)
Status: CANCELLED
Start: 2021-07-21

## 2021-07-21 RX ORDER — EPINEPHRINE 1 MG/ML
0.3 INJECTION, SOLUTION INTRAMUSCULAR; SUBCUTANEOUS EVERY 5 MIN PRN
Status: CANCELLED | OUTPATIENT
Start: 2021-08-17

## 2021-07-21 RX ORDER — METHYLPREDNISOLONE SODIUM SUCCINATE 125 MG/2ML
125 INJECTION, POWDER, LYOPHILIZED, FOR SOLUTION INTRAMUSCULAR; INTRAVENOUS
Status: CANCELLED
Start: 2021-08-17

## 2021-07-21 RX ORDER — LORAZEPAM 2 MG/ML
0.5 INJECTION INTRAMUSCULAR EVERY 4 HOURS PRN
Status: CANCELLED
Start: 2021-07-21

## 2021-07-21 RX ORDER — DIPHENHYDRAMINE HCL 50 MG
50 CAPSULE ORAL ONCE
Status: CANCELLED | OUTPATIENT
Start: 2021-07-21

## 2021-07-21 RX ORDER — HEPARIN SODIUM (PORCINE) LOCK FLUSH IV SOLN 100 UNIT/ML 100 UNIT/ML
5 SOLUTION INTRAVENOUS
Status: CANCELLED | OUTPATIENT
Start: 2021-08-17

## 2021-07-21 RX ORDER — HEPARIN SODIUM,PORCINE 10 UNIT/ML
5 VIAL (ML) INTRAVENOUS
Status: CANCELLED | OUTPATIENT
Start: 2021-07-21

## 2021-07-21 RX ORDER — EPINEPHRINE 1 MG/ML
0.3 INJECTION, SOLUTION INTRAMUSCULAR; SUBCUTANEOUS EVERY 5 MIN PRN
Status: DISCONTINUED | OUTPATIENT
Start: 2021-07-21 | End: 2021-07-21 | Stop reason: HOSPADM

## 2021-07-21 RX ORDER — ALBUTEROL SULFATE 90 UG/1
1-2 AEROSOL, METERED RESPIRATORY (INHALATION)
Status: CANCELLED
Start: 2021-08-17

## 2021-07-21 RX ORDER — ALBUTEROL SULFATE 0.83 MG/ML
2.5 SOLUTION RESPIRATORY (INHALATION)
Status: CANCELLED | OUTPATIENT
Start: 2021-08-18

## 2021-07-21 RX ORDER — DIPHENHYDRAMINE HCL 50 MG
50 CAPSULE ORAL ONCE
Status: COMPLETED | OUTPATIENT
Start: 2021-07-21 | End: 2021-07-21

## 2021-07-21 RX ORDER — NALOXONE HYDROCHLORIDE 0.4 MG/ML
0.2 INJECTION, SOLUTION INTRAMUSCULAR; INTRAVENOUS; SUBCUTANEOUS
Status: CANCELLED | OUTPATIENT
Start: 2021-08-17

## 2021-07-21 RX ORDER — HEPARIN SODIUM (PORCINE) LOCK FLUSH IV SOLN 100 UNIT/ML 100 UNIT/ML
5 SOLUTION INTRAVENOUS
Status: CANCELLED | OUTPATIENT
Start: 2021-07-21

## 2021-07-21 RX ORDER — DEXAMETHASONE 4 MG/1
TABLET ORAL
COMMUNITY
Start: 2020-10-09 | End: 2021-08-17

## 2021-07-21 RX ORDER — DIPHENHYDRAMINE HYDROCHLORIDE 50 MG/ML
50 INJECTION INTRAMUSCULAR; INTRAVENOUS
Status: CANCELLED
Start: 2021-08-17

## 2021-07-21 RX ORDER — NALOXONE HYDROCHLORIDE 0.4 MG/ML
0.2 INJECTION, SOLUTION INTRAMUSCULAR; INTRAVENOUS; SUBCUTANEOUS
Status: CANCELLED | OUTPATIENT
Start: 2021-07-21

## 2021-07-21 RX ORDER — HEPARIN SODIUM,PORCINE 10 UNIT/ML
5 VIAL (ML) INTRAVENOUS
Status: CANCELLED | OUTPATIENT
Start: 2021-08-17

## 2021-07-21 RX ORDER — DIPHENHYDRAMINE HYDROCHLORIDE 50 MG/ML
50 INJECTION INTRAMUSCULAR; INTRAVENOUS
Status: DISCONTINUED | OUTPATIENT
Start: 2021-07-21 | End: 2021-07-21 | Stop reason: HOSPADM

## 2021-07-21 RX ORDER — DIPHENHYDRAMINE HYDROCHLORIDE 50 MG/ML
50 INJECTION INTRAMUSCULAR; INTRAVENOUS
Status: CANCELLED
Start: 2021-08-18

## 2021-07-21 RX ORDER — MEPERIDINE HYDROCHLORIDE 25 MG/ML
25 INJECTION INTRAMUSCULAR; INTRAVENOUS; SUBCUTANEOUS EVERY 30 MIN PRN
Status: CANCELLED | OUTPATIENT
Start: 2021-08-17

## 2021-07-21 RX ORDER — CYANOCOBALAMIN 1000 UG/ML
1000 INJECTION, SOLUTION INTRAMUSCULAR; SUBCUTANEOUS ONCE
Status: COMPLETED | OUTPATIENT
Start: 2021-07-21 | End: 2021-07-21

## 2021-07-21 RX ORDER — MEPERIDINE HYDROCHLORIDE 25 MG/ML
25 INJECTION INTRAMUSCULAR; INTRAVENOUS; SUBCUTANEOUS EVERY 30 MIN PRN
Status: CANCELLED | OUTPATIENT
Start: 2021-08-18

## 2021-07-21 RX ORDER — ALBUTEROL SULFATE 0.83 MG/ML
2.5 SOLUTION RESPIRATORY (INHALATION)
Status: CANCELLED | OUTPATIENT
Start: 2021-07-21

## 2021-07-21 RX ORDER — NALOXONE HYDROCHLORIDE 0.4 MG/ML
0.2 INJECTION, SOLUTION INTRAMUSCULAR; INTRAVENOUS; SUBCUTANEOUS
Status: DISCONTINUED | OUTPATIENT
Start: 2021-07-21 | End: 2021-07-21 | Stop reason: HOSPADM

## 2021-07-21 RX ORDER — METHYLPREDNISOLONE SODIUM SUCCINATE 125 MG/2ML
125 INJECTION, POWDER, LYOPHILIZED, FOR SOLUTION INTRAMUSCULAR; INTRAVENOUS
Status: CANCELLED
Start: 2021-07-21

## 2021-07-21 RX ORDER — CYANOCOBALAMIN 1000 UG/ML
1000 INJECTION, SOLUTION INTRAMUSCULAR; SUBCUTANEOUS ONCE
Status: CANCELLED
Start: 2021-08-18 | End: 2021-08-18

## 2021-07-21 RX ORDER — ACETAMINOPHEN 325 MG/1
650 TABLET ORAL ONCE
Status: CANCELLED | OUTPATIENT
Start: 2021-07-21

## 2021-07-21 RX ORDER — LORAZEPAM 2 MG/ML
0.5 INJECTION INTRAMUSCULAR EVERY 4 HOURS PRN
Status: CANCELLED
Start: 2021-08-17

## 2021-07-21 RX ORDER — EPINEPHRINE 1 MG/ML
0.3 INJECTION, SOLUTION INTRAMUSCULAR; SUBCUTANEOUS EVERY 5 MIN PRN
Status: CANCELLED | OUTPATIENT
Start: 2021-07-21

## 2021-07-21 RX ORDER — MEPERIDINE HYDROCHLORIDE 25 MG/ML
25 INJECTION INTRAMUSCULAR; INTRAVENOUS; SUBCUTANEOUS EVERY 30 MIN PRN
Status: CANCELLED | OUTPATIENT
Start: 2021-07-21

## 2021-07-21 RX ORDER — EPINEPHRINE 1 MG/ML
0.3 INJECTION, SOLUTION INTRAMUSCULAR; SUBCUTANEOUS EVERY 5 MIN PRN
Status: CANCELLED | OUTPATIENT
Start: 2021-08-18

## 2021-07-21 RX ORDER — POTASSIUM CHLORIDE 1500 MG/1
TABLET, EXTENDED RELEASE ORAL
COMMUNITY
Start: 2021-05-17 | End: 2021-01-01

## 2021-07-21 RX ORDER — CALCIUM CARBONATE 500(1250)
1 TABLET,CHEWABLE ORAL DAILY PRN
COMMUNITY

## 2021-07-21 RX ORDER — DIPHENHYDRAMINE HCL 50 MG
50 CAPSULE ORAL ONCE
Status: CANCELLED | OUTPATIENT
Start: 2021-08-17

## 2021-07-21 RX ORDER — ALBUTEROL SULFATE 0.83 MG/ML
2.5 SOLUTION RESPIRATORY (INHALATION)
Status: CANCELLED | OUTPATIENT
Start: 2021-08-17

## 2021-07-21 RX ORDER — HEPARIN SODIUM (PORCINE) LOCK FLUSH IV SOLN 100 UNIT/ML 100 UNIT/ML
5 SOLUTION INTRAVENOUS
Status: DISCONTINUED | OUTPATIENT
Start: 2021-07-21 | End: 2021-07-21 | Stop reason: HOSPADM

## 2021-07-21 RX ORDER — METHYLPREDNISOLONE SODIUM SUCCINATE 125 MG/2ML
125 INJECTION, POWDER, LYOPHILIZED, FOR SOLUTION INTRAMUSCULAR; INTRAVENOUS
Status: CANCELLED
Start: 2021-08-18

## 2021-07-21 RX ADMIN — SODIUM CHLORIDE 250 ML: 9 INJECTION, SOLUTION INTRAVENOUS at 10:28

## 2021-07-21 RX ADMIN — DARATUMUMAB 1200 MG: 100 INJECTION, SOLUTION, CONCENTRATE INTRAVENOUS at 11:35

## 2021-07-21 RX ADMIN — HEPARIN 5 ML: 100 SYRINGE at 13:06

## 2021-07-21 RX ADMIN — CYANOCOBALAMIN 1000 MCG: 1000 INJECTION, SOLUTION INTRAMUSCULAR at 13:06

## 2021-07-21 RX ADMIN — DEXAMETHASONE SODIUM PHOSPHATE 20 MG: 10 INJECTION, SOLUTION INTRAMUSCULAR; INTRAVENOUS at 10:52

## 2021-07-21 RX ADMIN — DIPHENHYDRAMINE HCL 50 MG: 50 CAPSULE ORAL at 10:28

## 2021-07-21 ASSESSMENT — MIFFLIN-ST. JEOR: SCORE: 1450.67

## 2021-07-21 NOTE — LETTER
7/21/2021         RE: Theo Meyers  [address]  [place] [address]        Dear Colleague,    Thank you for referring your patient, Theo Meyers, to the [facility] [place]. Please see a copy of my visit note below.    [facility] Hematology and Oncology Progress Note    Patient: Theo Meyers  MRN: [id number]  Date of Service: 07/21/2021        Reason for Visit    Chief Complaint   Patient presents with     Cancer     Multiple myeloma (H)       Assessment and Plan    Cancer Staging  No matching staging information was found for the patient.    1.  Myeloma: pt continues on daratumumab and dexamethasone. This regimen was initiated in October and he appears to be stable. Light chains have come up a little since December, but ratio is stable/improved. He is tolerating well. He has received daratumumab weekly for 8 weeks. Started every other week dosing in December. Now getting it every 4 weeks.  We did increase pomalyst to 3mg. Will continue that for now. Takes for 21 days and then off for 7 days. Will get labs and daratumumab in 4 weeks and see Dr. Clifford in 8 weeks.      2. DVT: This is recurrent.  Will be on xarelto indefinitely.      3. Anemia: likely from treatment and myeloma. No complications. Will continue to monitor.      ECOG Performance    0 - Independent    Distress Screening (within last 30 days)    No data recorded     Pain  Pain Score: No Pain (0)    Problem List    Patient Active Problem List   Diagnosis     Multiple myeloma (H)     Unspecified glaucoma     Cataract     DVT (deep venous thrombosis) (H)     Shingles     Shingles (herpes zoster) polyneuropathy     Back pain     Post herpetic neuralgia     Pancytopenia (H)     Syncope and collapse     Elevated INR     History of DVT (deep vein thrombosis)     Chronic deep vein thrombosis (DVT) of left lower extremity, unspecified vein (H)     Spinal stenosis of lumbar region without neurogenic claudication      Anemia, vitamin B12 deficiency     Chemotherapy-induced neutropenia (H)     Thrombocytopenia (H)     Paroxysmal atrial fibrillation (H)     Nonrheumatic aortic valve stenosis     SAMANO (dyspnea on exertion)     Immunocompromised state (H)     Neutropenic fever (H)     Personal history of DVT (deep vein thrombosis)     Paroxysmal A-fib (H)        ______________________________________________________________________________    History of Present Illness    DIAGNOSIS:   1. IgG kappa light chain myeloma. Hyposecretory. Diagnosed 2009. No significant measurable M protein. 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 There was also on 11;14 translocation.   2. Deep vein thrombosis of the left leg diagnosed 09/2012 while on treatment.       TREATMENT:   Started Daratumumab in October 2020. Dexamethasone weekly as well.  Today is cycle 12. Pomalyst added with cycle 3.          PAST TREATMENT and HISTORY:   Velcade every 2 weeks since 09/2012. He is getting dexamethasone 20 mg every other week with the velcade. Finally, he is receiving Revlimid 20 mg daily for 3 weeks on, 1 week off.      He presented at diagnosis with a lytic lesion involving the C6 vertebral body, although no measurable M protein. IgG kappa monoclonal immunoglobulin was confirmed by ELMA as well as elevated kappa free light chains with an abnormal kappa lambda ratio. Bone marrow biopsy showed 30% involvement and cytogenetics confirmed deletion of 13 q. as well as 11;14 translocation. He was initially treated with Velcade and dexamethasone and achieved a good partial response. He was referred to the [facility] where he underwent high-dose chemotherapy with autologous peripheral stem cell transplant in April of 2010. He began Revlimid as maintenance therapy post transplant. Repeat bone marrow biopsy done October 2010 showed about 5% kappa restricted plasma cells and so at that time weekly Velcade was added along with his maintenance  "Revlimid and dexamethasone. He has done well since that time with stable minimal residual disease, best assessed by serum free light chains. He required dose reductions of weekly Velcade because of cytopenias and was ultimately switched to a q.2 week maintenance schedule which he has been on since September 2012. His Revlimid dose is 20 mg daily and he continues on dexamethasone 20 mg p.o. q. Week.       INTERIM HISTORY:     Pt is here today for follow up.  Patient is doing pretty well.  He did have a skin lesion removed on his chest and that has gotten infected so he is finishing up some antibiotics.  He does feel that it is getting better.  Other than that he has some mild fatigue but overall tolerating treatment well and feeling pretty good.    Review of Systems    Pertinent items are noted in HPI.    Past History    Past Medical History:   Diagnosis Date     Anemia 12/13/2017     Arthritis      Bilateral inguinal hernia      Chest tube in place      Glaucoma      Glaucoma      History of blood clots     DVT - left chronic     Multiple myeloma (H)     Gets chemo infusions every 2 weeks     Pancytopenia (H)      Parapneumonic effusion      Peripheral neuropathy      Syncope      TMJ (temporomandibular joint disorder)        PHYSICAL EXAM  /59 (BP Location: Right arm)   Pulse 63   Temp 97.3  F (36.3  C)   Resp 16   Ht 1.753 m (5' 9\")   Wt 73 kg (161 lb)   SpO2 99%   BMI 23.78 kg/m      GENERAL: no acute distress. Cooperative in conversation. Here alone. Mask on  RESP: Regular respiratory rate. No expiratory wheezes   MUSCULOSKELETAL: no bilateral leg swelling  NEURO: non focal. Alert and oriented x3.   PSYCH: within normal limits. No depression or anxiety.  SKIN: exposed skin is dry intact.  He does have a Band-Aid on his chest that he change it this morning.  There is a little bit of mild redness around that but per patient it is improved.      Lab Results    Recent Results (from the past 168 hour(s)) " "  Comprehensive metabolic panel   Result Value Ref Range    Sodium 142 136 - 145 mmol/L    Potassium 4.3 3.5 - 5.0 mmol/L    Chloride 111 (H) 98 - 107 mmol/L    Carbon Dioxide (CO2) 23 22 - 31 mmol/L    Anion Gap 8 5 - 18 mmol/L    Urea Nitrogen 31 (H) 8 - 28 mg/dL    Creatinine 1.25 0.70 - 1.30 mg/dL    Calcium 8.7 8.5 - 10.5 mg/dL    Glucose 124 70 - 125 mg/dL    Alkaline Phosphatase 54 45 - 120 U/L    AST 11 0 - 40 U/L    ALT 13 0 - 45 U/L    Protein Total 5.0 (L) 6.0 - 8.0 g/dL    Albumin 3.0 (L) 3.5 - 5.0 g/dL    Bilirubin Total 0.4 0.0 - 1.0 mg/dL    GFR Estimate 56 (L) >60 mL/min/1.73m2   CBC with platelets and differential   Result Value Ref Range    WBC Count 4.5 4.0 - 11.0 10e3/uL    RBC Count 3.63 (L) 4.40 - 5.90 10e6/uL    Hemoglobin 11.9 (L) 13.3 - 17.7 g/dL    Hematocrit 37.4 (L) 40.0 - 53.0 %     (H) 78 - 100 fL    MCH 32.8 26.5 - 33.0 pg    MCHC 31.8 31.5 - 36.5 g/dL    RDW 14.6 10.0 - 15.0 %    Platelet Count 308 150 - 450 10e3/uL    % Neutrophils 57 %    % Lymphocytes 17 %    % Monocytes 19 %    % Eosinophils 5 %    % Basophils 2 %    % Immature Granulocytes 0 %    NRBCs per 100 WBC 0 <1 /100    Absolute Neutrophils 2.6 1.6 - 8.3 10e3/uL    Absolute Lymphocytes 0.8 0.8 - 5.3 10e3/uL    Absolute Monocytes 0.9 0.0 - 1.3 10e3/uL    Absolute Eosinophils 0.2 0.0 - 0.7 10e3/uL    Absolute Basophils 0.1 0.0 - 0.2 10e3/uL    Absolute Immature Granulocytes 0.0 <=0.0 10e3/uL    Absolute NRBCs 0.0 10e3/uL     Reviewed myeloma labs.      Imaging    No results found.      Signed by: Alejandra H. Elghazi, APRN CNP    Oncology Rooming Note    July 21, 2021 9:37 AM   Theo Meyers is a 76 year old male who presents for:    Chief Complaint   Patient presents with     Cancer     Multiple myeloma (H)     Initial Vitals: /59 (BP Location: Right arm)   Pulse 63   Temp 97.3  F (36.3  C)   Resp 16   Ht 1.753 m (5' 9\")   Wt 73 kg (161 lb)   SpO2 99%   BMI 23.78 kg/m   Estimated body mass index is " "23.78 kg/m  as calculated from the following:    Height as of this encounter: 1.753 m (5' 9\").    Weight as of this encounter: 73 kg (161 lb). Body surface area is 1.89 meters squared.  No Pain (0) Comment: Data Unavailable   No LMP for male patient.  Allergies reviewed: Yes  Medications reviewed: Yes    Medications: Medication refills not needed today.  Pharmacy name entered into [employer]: [id number] [place] [id number] [address] [facility] [address] [place]    Clinical concerns: Pt c/o infection on his chest. Is on doxycyline but that is not helping.        Lucía Avalos                Again, thank you for allowing me to participate in the care of your patient.        Sincerely,        CECELIA Elaine CNP    "

## 2021-07-21 NOTE — PROGRESS NOTES
"Oncology Rooming Note    July 21, 2021 9:37 AM   Theo Meyers is a 76 year old male who presents for:    Chief Complaint   Patient presents with     Cancer     Multiple myeloma (H)     Initial Vitals: /59 (BP Location: Right arm)   Pulse 63   Temp 97.3  F (36.3  C)   Resp 16   Ht 1.753 m (5' 9\")   Wt 73 kg (161 lb)   SpO2 99%   BMI 23.78 kg/m   Estimated body mass index is 23.78 kg/m  as calculated from the following:    Height as of this encounter: 1.753 m (5' 9\").    Weight as of this encounter: 73 kg (161 lb). Body surface area is 1.89 meters squared.  No Pain (0) Comment: Data Unavailable   No LMP for male patient.  Allergies reviewed: Yes  Medications reviewed: Yes    Medications: Medication refills not needed today.  Pharmacy name entered into [facility]: [facility] #[id number] [address] [facility] [place]    Clinical concerns: Pt c/o infection on his chest. Is on doxycyline but that is not helping.        Lucía Avalos            "

## 2021-07-21 NOTE — PROGRESS NOTES
[facility] Hematology and Oncology Progress Note    Patient: Theo Meyers  MRN: [id number]  Date of Service: 07/21/2021        Reason for Visit    Chief Complaint   Patient presents with     Cancer     Multiple myeloma (H)       Assessment and Plan    Cancer Staging  No matching staging information was found for the patient.    1.  Myeloma: pt continues on daratumumab and dexamethasone. This regimen was initiated in October and he appears to be stable. Light chains have come up a little since December, but ratio is stable/improved. He is tolerating well. He has received daratumumab weekly for 8 weeks. Started every other week dosing in December. Now getting it every 4 weeks.  We did increase pomalyst to 3mg. Will continue that for now. Takes for 21 days and then off for 7 days. Will get labs and daratumumab in 4 weeks and see Dr. Clifford in 8 weeks.      2. DVT: This is recurrent.  Will be on xarelto indefinitely.      3. Anemia: likely from treatment and myeloma. No complications. Will continue to monitor.      ECOG Performance    0 - Independent    Distress Screening (within last 30 days)    No data recorded     Pain  Pain Score: No Pain (0)    Problem List    Patient Active Problem List   Diagnosis     Multiple myeloma (H)     Unspecified glaucoma     Cataract     DVT (deep venous thrombosis) (H)     Shingles     Shingles (herpes zoster) polyneuropathy     Back pain     Post herpetic neuralgia     Pancytopenia (H)     Syncope and collapse     Elevated INR     History of DVT (deep vein thrombosis)     Chronic deep vein thrombosis (DVT) of left lower extremity, unspecified vein (H)     Spinal stenosis of lumbar region without neurogenic claudication     Anemia, vitamin B12 deficiency     Chemotherapy-induced neutropenia (H)     Thrombocytopenia (H)     Paroxysmal atrial fibrillation (H)     Nonrheumatic aortic valve stenosis     SAMANO (dyspnea on exertion)     Immunocompromised state (H)     Neutropenic  fever (H)     Personal history of DVT (deep vein thrombosis)     Paroxysmal A-fib (H)        ______________________________________________________________________________    History of Present Illness    DIAGNOSIS:   1. IgG kappa light chain myeloma. Hyposecretory. Diagnosed 2009. No significant measurable M protein. 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 There was also on 11;14 translocation.   2. Deep vein thrombosis of the left leg diagnosed 09/2012 while on treatment.       TREATMENT:   Started Daratumumab in October 2020. Dexamethasone weekly as well.  Today is cycle 12. Pomalyst added with cycle 3.          PAST TREATMENT and HISTORY:   Velcade every 2 weeks since 09/2012. He is getting dexamethasone 20 mg every other week with the velcade. Finally, he is receiving Revlimid 20 mg daily for 3 weeks on, 1 week off.      He presented at diagnosis with a lytic lesion involving the C6 vertebral body, although no measurable M protein. IgG kappa monoclonal immunoglobulin was confirmed by [facility] as well as elevated kappa free light chains with an abnormal kappa lambda ratio. Bone marrow biopsy showed 30% involvement and cytogenetics confirmed deletion of 13 q. as well as 11;14 translocation. He was initially treated with Velcade and dexamethasone and achieved a good partial response. He was referred to the [facility] where he underwent high-dose chemotherapy with autologous peripheral stem cell transplant in April of 2010. He began Revlimid as maintenance therapy post transplant. Repeat bone marrow biopsy done October 2010 showed about 5% kappa restricted plasma cells and so at that time weekly Velcade was added along with his maintenance Revlimid and dexamethasone. He has done well since that time with stable minimal residual disease, best assessed by serum free light chains. He required dose reductions of weekly Velcade because of cytopenias and was ultimately switched to a q.2 week  "maintenance schedule which he has been on since September 2012. His Revlimid dose is 20 mg daily and he continues on dexamethasone 20 mg p.o. q. Week.       INTERIM HISTORY:     Pt is here today for follow up.  Patient is doing pretty well.  He did have a skin lesion removed on his chest and that has gotten infected so he is finishing up some antibiotics.  He does feel that it is getting better.  Other than that he has some mild fatigue but overall tolerating treatment well and feeling pretty good.    Review of Systems    Pertinent items are noted in HPI.    Past History    Past Medical History:   Diagnosis Date     Anemia 12/13/2017     Arthritis      Bilateral inguinal hernia      Chest tube in place      Glaucoma      Glaucoma      History of blood clots     DVT - left chronic     Multiple myeloma (H)     Gets chemo infusions every 2 weeks     Pancytopenia (H)      Parapneumonic effusion      Peripheral neuropathy      Syncope      TMJ (temporomandibular joint disorder)        PHYSICAL EXAM  /59 (BP Location: Right arm)   Pulse 63   Temp 97.3  F (36.3  C)   Resp 16   Ht 1.753 m (5' 9\")   Wt 73 kg (161 lb)   SpO2 99%   BMI 23.78 kg/m      GENERAL: no acute distress. Cooperative in conversation. Here alone. Mask on  RESP: Regular respiratory rate. No expiratory wheezes   MUSCULOSKELETAL: no bilateral leg swelling  NEURO: non focal. Alert and oriented x3.   PSYCH: within normal limits. No depression or anxiety.  SKIN: exposed skin is dry intact.  He does have a Band-Aid on his chest that he change it this morning.  There is a little bit of mild redness around that but per patient it is improved.      Lab Results    Recent Results (from the past 168 hour(s))   Comprehensive metabolic panel   Result Value Ref Range    Sodium 142 136 - 145 mmol/L    Potassium 4.3 3.5 - 5.0 mmol/L    Chloride 111 (H) 98 - 107 mmol/L    Carbon Dioxide (CO2) 23 22 - 31 mmol/L    Anion Gap 8 5 - 18 mmol/L    Urea Nitrogen 31 " (H) 8 - 28 mg/dL    Creatinine 1.25 0.70 - 1.30 mg/dL    Calcium 8.7 8.5 - 10.5 mg/dL    Glucose 124 70 - 125 mg/dL    Alkaline Phosphatase 54 45 - 120 U/L    AST 11 0 - 40 U/L    ALT 13 0 - 45 U/L    Protein Total 5.0 (L) 6.0 - 8.0 g/dL    Albumin 3.0 (L) 3.5 - 5.0 g/dL    Bilirubin Total 0.4 0.0 - 1.0 mg/dL    GFR Estimate 56 (L) >60 mL/min/1.73m2   CBC with platelets and differential   Result Value Ref Range    WBC Count 4.5 4.0 - 11.0 10e3/uL    RBC Count 3.63 (L) 4.40 - 5.90 10e6/uL    Hemoglobin 11.9 (L) 13.3 - 17.7 g/dL    Hematocrit 37.4 (L) 40.0 - 53.0 %     (H) 78 - 100 fL    MCH 32.8 26.5 - 33.0 pg    MCHC 31.8 31.5 - 36.5 g/dL    RDW 14.6 10.0 - 15.0 %    Platelet Count 308 150 - 450 10e3/uL    % Neutrophils 57 %    % Lymphocytes 17 %    % Monocytes 19 %    % Eosinophils 5 %    % Basophils 2 %    % Immature Granulocytes 0 %    NRBCs per 100 WBC 0 <1 /100    Absolute Neutrophils 2.6 1.6 - 8.3 10e3/uL    Absolute Lymphocytes 0.8 0.8 - 5.3 10e3/uL    Absolute Monocytes 0.9 0.0 - 1.3 10e3/uL    Absolute Eosinophils 0.2 0.0 - 0.7 10e3/uL    Absolute Basophils 0.1 0.0 - 0.2 10e3/uL    Absolute Immature Granulocytes 0.0 <=0.0 10e3/uL    Absolute NRBCs 0.0 10e3/uL     Reviewed myeloma labs.      Imaging    No results found.      Signed by: CECELIA Elaine CNP

## 2021-08-03 ENCOUNTER — TELEPHONE (OUTPATIENT)
Dept: ONCOLOGY | Facility: HOSPITAL | Age: 76
End: 2021-08-03

## 2021-08-03 DIAGNOSIS — C90.00 MULTIPLE MYELOMA NOT HAVING ACHIEVED REMISSION (H): Primary | ICD-10-CM

## 2021-08-03 NOTE — TELEPHONE ENCOUNTER
Oral Chemotherapy Monitoring Program   Left Voicemail    Attempted to contact patient today for follow up regarding oral chemotherapy, pomalidomide, for routine assessment. No answer. Left voicemail for patient to call us back at [phone] when able. No medication name was left.    Mann Chandler, PharmD, [facility]  Clinical Oncology Pharmacist  [facility]  August 3, 2021

## 2021-08-09 ENCOUNTER — NURSE TRIAGE (OUTPATIENT)
Dept: NURSING | Facility: CLINIC | Age: 76
End: 2021-08-09

## 2021-08-09 DIAGNOSIS — R05.9 COUGH: Primary | ICD-10-CM

## 2021-08-09 NOTE — TELEPHONE ENCOUNTER
Given the patient's complex medical history, he should be evaluated in person.  Given presence of respiratory symptoms, Covid should be excluded.  I can order Covid testing and if negative, he can be evaluated with an available provider here at the office or at our walk-in clinic.  If he feels his symptoms are worsening, ER is appropriate.

## 2021-08-09 NOTE — TELEPHONE ENCOUNTER
FNA triage call : Fully covid vaccinated 4/2021.  Hx of chemo tx IV at [facility] with Dr Per Clifford and appt is 8/17/21 .  Hx of dizziness , after stem cell transplant that was corrected with IV fluid .   Presenting problem :  Pt called . New of this week productive cough for the last week .  Currently :  1&0 , &  eating are ok, and homebound independent activity , mild  Intermittently  Dizziness/ vertigo  when up- quick head movements and  more tired  ,  Voided within the hour , pulse is 62 now, taking Mucinex &  helping  and Oxymetazolime nasal spray bid - is controlling the nasal discharge .   Guideline used : Covid suspected A Ah and then Dizziness / veritgo A H and then  Dizziness A Oh.   Disposition and recommendations : go to ED / walk in or office visit with PCP approval . Pt would like PCP opinion about if he could have antibiotic for cold sx and dizziness or should he had appointment or what would PCP recommend , please  call back Pt ASAP to advise .   Caller verbalizes understanding and denies further questions and will call back if further symptoms to triage or questions  . Sandie Harris RN  - [facility] Nurse Advisor   COVID 19 Nurse Triage Plan/Patient Instructions    Please be aware that novel coronavirus (COVID-19) may be circulating in the community. If you develop symptoms such as fever, cough, or SOB or if you have concerns about the presence of another infection including coronavirus (COVID-19), please contact your health care provider or visit [email or web address].     Disposition/Instructions    ED Visit recommended or urgent care or PCP office visit or PCP 2nd level triage . . Follow protocol based instructions.     Thank you for taking steps to prevent the spread of this virus.  o Limit your contact with others.  o Wear a simple mask to cover your cough.  o Wash your hands well and often.      Reason for Disposition    [1] COVID-19 vaccine series completed (fully vaccinated)  in past 3 months AND [2] new-onset of COVID-19 symptoms BUT [3] no known exposure    [1] Dizziness is main symptom AND [2] NO spinning sensation (i.e., vertigo)    Spinning or tilting sensation (vertigo) present now and one or more stroke risk factors (i.e., hypertension, diabetes, prior stroke/TIA, heart attack, age over 60) (Exception: prior physician evaluation for this AND no different/worse than usual)    Additional Information    Negative: SEVERE difficulty breathing (e.g., struggling for each breath, speaks in single words)    Negative: Difficult to awaken or acting confused (e.g., disoriented, slurred speech)    Negative: Bluish (or gray) lips or face now    Negative: Shock suspected (e.g., cold/pale/clammy skin, too weak to stand, low BP, rapid pulse)    Negative: Sounds like a life-threatening emergency to the triager    Negative: [1] COVID-19 exposure AND [2] has not completed COVID-19 vaccine series AND [3] no symptoms    Negative: [1] COVID-19 exposure AND [2] completed COVID-19 vaccine series (fully vaccinated) AND [3] no symptoms    Negative: COVID-19 vaccine reaction suspected (e.g., fever, headache, muscle aches) occurring during days 1-3 after getting vaccine    Negative: COVID-19 vaccine, questions about    Negative: [1] Weakness (i.e., paralysis, loss of muscle strength) of the face, arm or leg on one side of the body AND [2] sudden onset AND [3] present now    Negative: [1] Numbness (i.e., loss of sensation) of the face, arm or leg on one side of the body AND [2] sudden onset AND [3] present now    Negative: [1] Loss of speech or garbled speech AND [2] sudden onset AND [3] present now    Negative: Difficult to awaken or acting confused (e.g., disoriented, slurred speech)    Negative: Sounds like a life-threatening emergency to the triager    Negative: Followed a head injury    Negative: Followed an ear injury    Negative: Localized weakness or numbness is main symptom    Negative: Dizziness  relates to riding in a car, going to an [employer] [place], etc.    Negative: Shock suspected (e.g., cold/pale/clammy skin, too weak to stand, low BP, rapid pulse)    Negative: Difficult to awaken or acting confused (e.g., disoriented, slurred speech)    Negative: Fainted, and still feels dizzy afterwards    Negative: Severe difficulty breathing (e.g., struggling for each breath, speaks in single words)    Negative: Overdose (accidental or intentional) of medications    Negative: New neurologic deficit that is present now: * Weakness of the face, arm, or leg on one side of the body * Numbness of the face, arm, or leg on one side of the body * Loss of speech or garbled speech    Negative: Heart beating < 50 beats per minute OR > 140 beats per minute    Negative: Sounds like a life-threatening emergency to the triager    Negative: Chest pain    Negative: Rectal bleeding, bloody stool, or tarry-black stool    Negative: Vomiting is the main symptom    Negative: Diarrhea is the main symptom    Negative: Headache is the main symptom    Negative: Heat exhaustion suspected (i.e., dehydration from heat exposure)    Negative: Patient states that he/she is having an anxiety/panic attack    Negative: SEVERE dizziness (e.g., unable to stand, requires support to walk, feels like passing out now)    Negative: SEVERE headache or neck pain    Protocols used: CORONAVIRUS (COVID-19) DIAGNOSED OR [id number] 3.25, DIZZINESS - VERTIGO-A-AH, DIZZINESS-A-OH

## 2021-08-09 NOTE — TELEPHONE ENCOUNTER
Pt was advised of Dr Kulkarni recommendations and he does not want to get covid tested at this time. He will wait for Dr Preston return or ER if symptoms worsen.

## 2021-08-15 DIAGNOSIS — I48.0 PAROXYSMAL ATRIAL FIBRILLATION (H): Primary | ICD-10-CM

## 2021-08-17 ENCOUNTER — LAB (OUTPATIENT)
Dept: INFUSION THERAPY | Facility: HOSPITAL | Age: 76
End: 2021-08-17
Attending: INTERNAL MEDICINE
Payer: MEDICARE

## 2021-08-17 VITALS
SYSTOLIC BLOOD PRESSURE: 114 MMHG | RESPIRATION RATE: 16 BRPM | WEIGHT: 161 LBS | TEMPERATURE: 97.8 F | BODY MASS INDEX: 23.78 KG/M2 | DIASTOLIC BLOOD PRESSURE: 64 MMHG | OXYGEN SATURATION: 95 % | HEART RATE: 59 BPM

## 2021-08-17 DIAGNOSIS — C90.00 MULTIPLE MYELOMA NOT HAVING ACHIEVED REMISSION (H): ICD-10-CM

## 2021-08-17 DIAGNOSIS — C90.00 MULTIPLE MYELOMA NOT HAVING ACHIEVED REMISSION (H): Primary | ICD-10-CM

## 2021-08-17 DIAGNOSIS — R05.9 COUGH: ICD-10-CM

## 2021-08-17 DIAGNOSIS — R12 HEARTBURN: Primary | ICD-10-CM

## 2021-08-17 DIAGNOSIS — D51.9 ANEMIA DUE TO VITAMIN B12 DEFICIENCY, UNSPECIFIED B12 DEFICIENCY TYPE: Primary | ICD-10-CM

## 2021-08-17 LAB
ALBUMIN SERPL-MCNC: 2.8 G/DL (ref 3.5–5)
ALP SERPL-CCNC: 59 U/L (ref 45–120)
ALT SERPL W P-5'-P-CCNC: 9 U/L (ref 0–45)
ANION GAP SERPL CALCULATED.3IONS-SCNC: 6 MMOL/L (ref 5–18)
AST SERPL W P-5'-P-CCNC: 12 U/L (ref 0–40)
BASOPHILS # BLD MANUAL: 0.2 10E3/UL (ref 0–0.2)
BASOPHILS NFR BLD MANUAL: 4 %
BILIRUB SERPL-MCNC: 0.3 MG/DL (ref 0–1)
BUN SERPL-MCNC: 13 MG/DL (ref 8–28)
CALCIUM SERPL-MCNC: 8.6 MG/DL (ref 8.5–10.5)
CHLORIDE BLD-SCNC: 109 MMOL/L (ref 98–107)
CO2 SERPL-SCNC: 24 MMOL/L (ref 22–31)
CREAT SERPL-MCNC: 1.11 MG/DL (ref 0.7–1.3)
ELLIPTOCYTES BLD QL SMEAR: SLIGHT
EOSINOPHIL # BLD MANUAL: 0.4 10E3/UL (ref 0–0.7)
EOSINOPHIL NFR BLD MANUAL: 9 %
ERYTHROCYTE [DISTWIDTH] IN BLOOD BY AUTOMATED COUNT: 15 % (ref 10–15)
GFR SERPL CREATININE-BSD FRML MDRD: 64 ML/MIN/1.73M2
GLUCOSE BLD-MCNC: 94 MG/DL (ref 70–125)
HCT VFR BLD AUTO: 35.7 % (ref 40–53)
HGB BLD-MCNC: 11.2 G/DL (ref 13.3–17.7)
LYMPHOCYTES # BLD MANUAL: 0.7 10E3/UL (ref 0.8–5.3)
LYMPHOCYTES NFR BLD MANUAL: 16 %
MCH RBC QN AUTO: 32.2 PG (ref 26.5–33)
MCHC RBC AUTO-ENTMCNC: 31.4 G/DL (ref 31.5–36.5)
MCV RBC AUTO: 103 FL (ref 78–100)
MONOCYTES # BLD MANUAL: 1 10E3/UL (ref 0–1.3)
MONOCYTES NFR BLD MANUAL: 22 %
MYELOCYTES # BLD MANUAL: 0 10E3/UL
MYELOCYTES NFR BLD MANUAL: 1 %
NEUTROPHILS # BLD MANUAL: 2.2 10E3/UL (ref 1.6–8.3)
NEUTROPHILS NFR BLD MANUAL: 48 %
PLAT MORPH BLD: ABNORMAL
PLATELET # BLD AUTO: 246 10E3/UL (ref 150–450)
POTASSIUM BLD-SCNC: 4.1 MMOL/L (ref 3.5–5)
PROT SERPL-MCNC: 4.8 G/DL (ref 6–8)
RBC # BLD AUTO: 3.48 10E6/UL (ref 4.4–5.9)
RBC MORPH BLD: ABNORMAL
SODIUM SERPL-SCNC: 139 MMOL/L (ref 136–145)
WBC # BLD AUTO: 4.6 10E3/UL (ref 4–11)

## 2021-08-17 PROCEDURE — 84165 PROTEIN E-PHORESIS SERUM: CPT | Mod: TC

## 2021-08-17 PROCEDURE — 258N000003 HC RX IP 258 OP 636: Performed by: NURSE PRACTITIONER

## 2021-08-17 PROCEDURE — 83883 ASSAY NEPHELOMETRY NOT SPEC: CPT

## 2021-08-17 PROCEDURE — 80053 COMPREHEN METABOLIC PANEL: CPT | Performed by: NURSE PRACTITIONER

## 2021-08-17 PROCEDURE — 96367 TX/PROPH/DG ADDL SEQ IV INF: CPT

## 2021-08-17 PROCEDURE — 96372 THER/PROPH/DIAG INJ SC/IM: CPT | Mod: XS | Performed by: NURSE PRACTITIONER

## 2021-08-17 PROCEDURE — 36591 DRAW BLOOD OFF VENOUS DEVICE: CPT

## 2021-08-17 PROCEDURE — 250N000011 HC RX IP 250 OP 636: Performed by: NURSE PRACTITIONER

## 2021-08-17 PROCEDURE — 250N000013 HC RX MED GY IP 250 OP 250 PS 637: Performed by: NURSE PRACTITIONER

## 2021-08-17 PROCEDURE — 85027 COMPLETE CBC AUTOMATED: CPT | Performed by: NURSE PRACTITIONER

## 2021-08-17 PROCEDURE — U0003 INFECTIOUS AGENT DETECTION BY NUCLEIC ACID (DNA OR RNA); SEVERE ACUTE RESPIRATORY SYNDROME CORONAVIRUS 2 (SARS-COV-2) (CORONAVIRUS DISEASE [COVID-19]), AMPLIFIED PROBE TECHNIQUE, MAKING USE OF HIGH THROUGHPUT TECHNOLOGIES AS DESCRIBED BY CMS-2020-01-R: HCPCS

## 2021-08-17 PROCEDURE — 96409 CHEMO IV PUSH SNGL DRUG: CPT

## 2021-08-17 RX ORDER — RIVAROXABAN 20 MG/1
20 TABLET, FILM COATED ORAL
COMMUNITY
Start: 2021-07-11 | End: 2022-01-01

## 2021-08-17 RX ORDER — EPINEPHRINE 1 MG/ML
0.3 INJECTION, SOLUTION INTRAMUSCULAR; SUBCUTANEOUS EVERY 5 MIN PRN
Status: CANCELLED | OUTPATIENT
Start: 2021-08-18

## 2021-08-17 RX ORDER — CYANOCOBALAMIN 1000 UG/ML
1000 INJECTION, SOLUTION INTRAMUSCULAR; SUBCUTANEOUS ONCE
Status: COMPLETED | OUTPATIENT
Start: 2021-08-17 | End: 2021-08-17

## 2021-08-17 RX ORDER — HEPARIN SODIUM (PORCINE) LOCK FLUSH IV SOLN 100 UNIT/ML 100 UNIT/ML
5 SOLUTION INTRAVENOUS
Status: DISCONTINUED | OUTPATIENT
Start: 2021-08-17 | End: 2021-08-17 | Stop reason: HOSPADM

## 2021-08-17 RX ORDER — CYANOCOBALAMIN 1000 UG/ML
1000 INJECTION, SOLUTION INTRAMUSCULAR; SUBCUTANEOUS ONCE
Status: CANCELLED
Start: 2021-08-18 | End: 2021-08-18

## 2021-08-17 RX ORDER — DIPHENHYDRAMINE HYDROCHLORIDE 50 MG/ML
50 INJECTION INTRAMUSCULAR; INTRAVENOUS
Status: CANCELLED
Start: 2021-08-18

## 2021-08-17 RX ORDER — DIPHENHYDRAMINE HCL 50 MG
50 CAPSULE ORAL ONCE
Status: COMPLETED | OUTPATIENT
Start: 2021-08-17 | End: 2021-08-17

## 2021-08-17 RX ORDER — ALBUTEROL SULFATE 0.83 MG/ML
2.5 SOLUTION RESPIRATORY (INHALATION)
Status: CANCELLED | OUTPATIENT
Start: 2021-08-18

## 2021-08-17 RX ORDER — ALBUTEROL SULFATE 90 UG/1
1-2 AEROSOL, METERED RESPIRATORY (INHALATION)
Status: CANCELLED
Start: 2021-08-18

## 2021-08-17 RX ORDER — TAMSULOSIN HYDROCHLORIDE 0.4 MG/1
CAPSULE ORAL
Qty: 90 CAPSULE | Refills: 1 | Status: SHIPPED | OUTPATIENT
Start: 2021-08-17 | End: 2022-01-01

## 2021-08-17 RX ORDER — NALOXONE HYDROCHLORIDE 0.4 MG/ML
0.2 INJECTION, SOLUTION INTRAMUSCULAR; INTRAVENOUS; SUBCUTANEOUS
Status: CANCELLED | OUTPATIENT
Start: 2021-08-18

## 2021-08-17 RX ORDER — METHYLPREDNISOLONE SODIUM SUCCINATE 125 MG/2ML
125 INJECTION, POWDER, LYOPHILIZED, FOR SOLUTION INTRAMUSCULAR; INTRAVENOUS
Status: CANCELLED
Start: 2021-08-18

## 2021-08-17 RX ORDER — DIPHENOXYLATE HCL/ATROPINE 2.5-.025MG
TABLET ORAL
COMMUNITY
Start: 2021-02-25 | End: 2022-01-01

## 2021-08-17 RX ORDER — MEPERIDINE HYDROCHLORIDE 25 MG/ML
25 INJECTION INTRAMUSCULAR; INTRAVENOUS; SUBCUTANEOUS EVERY 30 MIN PRN
Status: CANCELLED | OUTPATIENT
Start: 2021-08-18

## 2021-08-17 RX ADMIN — DEXAMETHASONE SODIUM PHOSPHATE 20 MG: 10 INJECTION, SOLUTION INTRAMUSCULAR; INTRAVENOUS at 09:43

## 2021-08-17 RX ADMIN — DARATUMUMAB 1200 MG: 100 INJECTION, SOLUTION, CONCENTRATE INTRAVENOUS at 10:15

## 2021-08-17 RX ADMIN — HEPARIN 5 ML: 100 SYRINGE at 11:48

## 2021-08-17 RX ADMIN — SODIUM CHLORIDE 250 ML: 9 INJECTION, SOLUTION INTRAVENOUS at 09:43

## 2021-08-17 RX ADMIN — DIPHENHYDRAMINE HYDROCHLORIDE 50 MG: 50 CAPSULE ORAL at 09:43

## 2021-08-17 RX ADMIN — CYANOCOBALAMIN 1000 MCG: 1000 INJECTION, SOLUTION INTRAMUSCULAR at 10:21

## 2021-08-17 NOTE — TELEPHONE ENCOUNTER
Refill Request  Medication name: Pending Prescriptions:                       Disp   Refills    omeprazole (PRILOSEC) 20 MG DR capsule                      Who prescribed the medication: Namrata  Last refill on medication: 05/16/21  [facility]ed Pharmacy: [facility]  Last appointment with PCP: 07/30/20  Next appointment: Appointment scheduled for 09/09/21

## 2021-08-17 NOTE — PROGRESS NOTES
Infusion Nursing Note:  Theo HURT Jasbircuauhtemocchris presents today for D1C12 and B12 injection.  Patient seen by provider today: No   present during visit today: Not Applicable.    Note: Once pt seated in infusion chair, nurse noted pt has Suspected COVID 19 banner. Pt states he called his PCP to report symptoms he believes are due to a sinus injection and a COVID test was ordered at that time. Pt says he does not want to be tested. Pt brought to private room and treated as PUI.  Spoke with Gretchen BEE who said pt could be treated today as his symptoms are improving but we should also COVID test him. Pt agreeable. COVID test sent and pt instructed to isolate while awaiting results. Pt verbalized understanding of this.   Pt also instructed to call PCP if cold symptoms do not improve.     Intravenous Access:  Implanted Port.    Treatment Conditions:  Results reviewed, labs MET treatment parameters, ok to proceed with treatment.      Post Infusion Assessment:  Patient tolerated infusion without incident.       Discharge Plan:   Patient discharged in stable condition accompanied by: self.      Meg Garcia RN

## 2021-08-18 LAB
KAPPA LC FREE SER-MCNC: 34.15 MG/DL (ref 0.33–1.94)
KAPPA LC FREE/LAMBDA FREE SER NEPH: 97.57 {RATIO} (ref 0.26–1.65)
LAMBDA LC FREE SERPL-MCNC: 0.35 MG/DL (ref 0.57–2.63)

## 2021-08-19 ENCOUNTER — TELEPHONE (OUTPATIENT)
Dept: INTERNAL MEDICINE | Facility: CLINIC | Age: 76
End: 2021-08-19

## 2021-08-19 ENCOUNTER — TELEPHONE (OUTPATIENT)
Dept: ONCOLOGY | Facility: HOSPITAL | Age: 76
End: 2021-08-19

## 2021-08-19 LAB
ALBUMIN PERCENT: 69 % (ref 51–67)
ALBUMIN SERPL ELPH-MCNC: 3 G/DL (ref 3.2–4.7)
ALPHA 1 PERCENT: 3.5 % (ref 2–4)
ALPHA 2 PERCENT: 12.8 % (ref 5–13)
ALPHA1 GLOB SERPL ELPH-MCNC: 0.2 G/DL (ref 0.1–0.3)
ALPHA2 GLOB SERPL ELPH-MCNC: 0.6 G/DL (ref 0.4–0.9)
B-GLOBULIN SERPL ELPH-MCNC: 0.4 G/DL (ref 0.7–1.2)
BETA PERCENT: 10.2 % (ref 10–17)
GAMMA GLOB SERPL ELPH-MCNC: 0.2 G/DL (ref 0.6–1.4)
GAMMA GLOBULIN PERCENT: 4.5 % (ref 9–20)
PATH ICD:: ABNORMAL
PROT PATTERN SERPL ELPH-IMP: ABNORMAL
REVIEWING PATHOLOGIST: ABNORMAL
TOTAL PROTEIN SERUM FOR ELP: 4.4 G/DL (ref 6–8)

## 2021-08-19 PROCEDURE — 84165 PROTEIN E-PHORESIS SERUM: CPT | Mod: 26

## 2021-08-19 NOTE — TELEPHONE ENCOUNTER
Have patient make an appointment.  I would suggest a virtual appointment until he knows the status of his COVID-19 test.

## 2021-08-19 NOTE — TELEPHONE ENCOUNTER
Spoke with the patient and scheduled him for a virtual visit with an available provider on 8/23. Patient was informed that the appointment may be changed to in clinic given that his COVID test result comes back negative.

## 2021-08-19 NOTE — TELEPHONE ENCOUNTER
Reason for Call:  Other sinus congestion concerns    Detailed comments: Patient called and states he has had a sinus infection for about 3 weeks and has been using a nasal spray. Patient states the spray should only be used for 3 days. Patient states he is wondering if he should get an antibiotic? Patient took a covid test on 08/17/2021 and the results are still pending. Patient also wants to know when he could get the covid booster vaccine since he gets chemotherapy. I advised patient that we are not giving anyone the booster vaccine yet because we are waiting for the CDC guidelines.     Phone Number Patient can be reached at: Home number on file [phone] (home)    Best Time: ANY    Can we leave a detailed message on this number? YES    Call taken on 8/19/2021 at 12:43 PM by Maureen Boothe

## 2021-08-19 NOTE — TELEPHONE ENCOUNTER
Called pt to inform him that his COVID test is still pending from 8/17. Pt states his symptoms are unchanged. He feels it is a sinus infection and he would like an antibiotic. Pt states he will contact PCP for this.   Call placed to [facility]  to inquire about COVID results. Per Lucía,  at [facility] pt's COVID test was sent to [facility] in [place]. Results will not be back for 1-4 days.

## 2021-08-21 LAB
SARS-COV-2 RNA RESP QL NAA+PROBE: NORMAL
SPECIMEN SOURCE: NORMAL

## 2021-08-23 ENCOUNTER — OFFICE VISIT (OUTPATIENT)
Dept: INTERNAL MEDICINE | Facility: CLINIC | Age: 76
End: 2021-08-23
Payer: MEDICARE

## 2021-08-23 VITALS
SYSTOLIC BLOOD PRESSURE: 127 MMHG | WEIGHT: 161 LBS | OXYGEN SATURATION: 99 % | HEART RATE: 51 BPM | HEIGHT: 69 IN | DIASTOLIC BLOOD PRESSURE: 76 MMHG | BODY MASS INDEX: 23.85 KG/M2

## 2021-08-23 DIAGNOSIS — R09.81 CONGESTION OF PARANASAL SINUS: Primary | ICD-10-CM

## 2021-08-23 PROCEDURE — 99213 OFFICE O/P EST LOW 20 MIN: CPT | Performed by: INTERNAL MEDICINE

## 2021-08-23 RX ORDER — DOXYCYCLINE 100 MG/1
100 CAPSULE ORAL 2 TIMES DAILY
Qty: 20 CAPSULE | Refills: 0 | Status: SHIPPED | OUTPATIENT
Start: 2021-08-23 | End: 2021-09-02

## 2021-08-23 RX ORDER — FLUTICASONE PROPIONATE 50 MCG
2 SPRAY, SUSPENSION (ML) NASAL DAILY
COMMUNITY
Start: 2021-08-23

## 2021-08-23 ASSESSMENT — MIFFLIN-ST. JEOR: SCORE: 1450.67

## 2021-08-23 NOTE — PROGRESS NOTES
Assessment & Plan     Congestion of paranasal sinus  76-year-old male who is immunosuppressed getting treatment for multiple myeloma.  He has had persistent sinus congestion for the past 4 weeks.  Has assumed it may represent a sinus infection as he has a history of recurrent sinus infections although symptoms seem to be improving over the last week.  No purulent drainage.  There is some associated cough.  He did test negative for Covid.  Denies any headache.  No fevers or chills or other flulike symptoms at any point.  Symptoms seem to be aggravated by poor air quality that we experienced earlier this summer.  He is finding use of nasal decongestant to be helpful but I am cautioning on continued use as he could be developing tolerance to the medication.  Allergies certainly may be a contributing factor to current symptoms.  In addition to the Claritin that he takes, I would recommend starting Flonase 2 sprays each nostril daily.  This will help him wean off of the decongestant.  Cannot exclude low-grade a resolving sinus infection and if symptoms do persist beyond the next week, beginning an antibiotic doxycycline 100 mg twice daily is reasonable.  He will try not to start the antibiotic especially with his history of C. difficile colitis.  He will continue his probiotic regimen.  - doxycycline hyclate (VIBRAMYCIN) 100 MG capsule  Dispense: 20 capsule; Refill: 0  - fluticasone (FLONASE) 50 MCG/ACT nasal spray              Return in about 2 weeks (around 9/6/2021) for Routine preventive.    Talat Contreras MD  [facility]          Subjective       HPI 76-year-old male with persistent sinus congestion for the past 4 weeks  See assessment and plan for details of visit    Current Outpatient Medications   Medication     acetaminophen (TYLENOL) 500 MG tablet     acyclovir (ZOVIRAX) 400 MG tablet     bimatoprost (LUMIGAN) 0.03 % ophthalmic drops     brimonidine (ALPHAGAN P) 0.1 %  "SOLN     calcium carbonate 1250 (500 Ca) MG CHEW     calcium carbonate-vitamin D (OYSTER SHELL CALCIUM/D) 500-200 MG-UNIT tablet     Calcium-Vitamin D (CALCIUM + D PO)     diphenoxylate-atropine (LOMOTIL) 2.5-0.025 MG tablet     doxycycline hyclate (VIBRAMYCIN) 100 MG capsule     fluticasone (FLONASE) 50 MCG/ACT nasal spray     gabapentin (NEURONTIN) 300 MG capsule     loratadine (CLARITIN REDITABS) 10 MG dissolvable tablet     Multiple Vitamin (MULTI-VITAMIN PO)     Omega-3 Fatty Acids (OMEGA 3 PO)     omeprazole (PRILOSEC) 20 MG DR capsule     pomalidomide (POMALYST) 3 MG capsule     potassium chloride ER (KLOR-CON M) 20 MEQ CR tablet     Psyllium (METAMUCIL PO)     senna-docusate (SENNA PLUS) 8.6-50 MG per tablet     sulfamethoxazole-trimethoprim (BACTRIM DS) 800-160 MG per tablet     tamsulosin (FLOMAX) 0.4 MG capsule     XARELTO ANTICOAGULANT 20 MG TABS tablet     No current facility-administered medications for this visit.        Review of Systems  No fevers or chills        Objective    Vitals:    08/23/21 1421   BP: 127/76   BP Location: Right arm   Patient Position: Sitting   Cuff Size: Adult Regular   Pulse: 51   SpO2: 99%   Weight: 73 kg (161 lb)   Height: 1.753 m (5' 9\")        Physical Exam  TMs normal  No cervical lymphadenopathy  Lungs clear bilaterally      "

## 2021-08-26 ENCOUNTER — TELEPHONE (OUTPATIENT)
Dept: ONCOLOGY | Facility: HOSPITAL | Age: 76
End: 2021-08-26

## 2021-08-26 ENCOUNTER — IMMUNIZATION (OUTPATIENT)
Dept: NURSING | Facility: CLINIC | Age: 76
End: 2021-08-26
Payer: MEDICARE

## 2021-08-26 PROCEDURE — 0003A PR COVID VAC PFIZER DIL RECON 30 MCG/0.3 ML IM: CPT

## 2021-08-26 PROCEDURE — 91300 PR COVID VAC PFIZER DIL RECON 30 MCG/0.3 ML IM: CPT
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
## 2021-09-03 DIAGNOSIS — C90.00 MULTIPLE MYELOMA NOT HAVING ACHIEVED REMISSION (H): Primary | ICD-10-CM

## 2021-09-15 ENCOUNTER — ONCOLOGY VISIT (OUTPATIENT)
Dept: ONCOLOGY | Facility: HOSPITAL | Age: 76
End: 2021-09-15
Attending: INTERNAL MEDICINE
Payer: MEDICARE

## 2021-09-15 ENCOUNTER — INFUSION THERAPY VISIT (OUTPATIENT)
Dept: INFUSION THERAPY | Facility: HOSPITAL | Age: 76
End: 2021-09-15
Attending: INTERNAL MEDICINE
Payer: MEDICARE

## 2021-09-15 VITALS
OXYGEN SATURATION: 100 % | SYSTOLIC BLOOD PRESSURE: 90 MMHG | TEMPERATURE: 97.5 F | BODY MASS INDEX: 23.92 KG/M2 | RESPIRATION RATE: 12 BRPM | DIASTOLIC BLOOD PRESSURE: 62 MMHG | WEIGHT: 162 LBS | HEART RATE: 62 BPM

## 2021-09-15 DIAGNOSIS — C90.00 MULTIPLE MYELOMA NOT HAVING ACHIEVED REMISSION (H): ICD-10-CM

## 2021-09-15 DIAGNOSIS — C90.00 MULTIPLE MYELOMA NOT HAVING ACHIEVED REMISSION (H): Primary | ICD-10-CM

## 2021-09-15 DIAGNOSIS — D51.9 ANEMIA DUE TO VITAMIN B12 DEFICIENCY, UNSPECIFIED B12 DEFICIENCY TYPE: Primary | ICD-10-CM

## 2021-09-15 LAB
ALBUMIN SERPL-MCNC: 3 G/DL (ref 3.5–5)
ALP SERPL-CCNC: 53 U/L (ref 45–120)
ALT SERPL W P-5'-P-CCNC: <9 U/L (ref 0–45)
ANION GAP SERPL CALCULATED.3IONS-SCNC: 6 MMOL/L (ref 5–18)
AST SERPL W P-5'-P-CCNC: 16 U/L (ref 0–40)
BASOPHILS # BLD MANUAL: 0 10E3/UL (ref 0–0.2)
BASOPHILS NFR BLD MANUAL: 0 %
BILIRUB SERPL-MCNC: 0.4 MG/DL (ref 0–1)
BUN SERPL-MCNC: 15 MG/DL (ref 8–28)
CALCIUM SERPL-MCNC: 8.8 MG/DL (ref 8.5–10.5)
CHLORIDE BLD-SCNC: 112 MMOL/L (ref 98–107)
CO2 SERPL-SCNC: 24 MMOL/L (ref 22–31)
CREAT SERPL-MCNC: 1.17 MG/DL (ref 0.7–1.3)
EOSINOPHIL # BLD MANUAL: 0.2 10E3/UL (ref 0–0.7)
EOSINOPHIL NFR BLD MANUAL: 6 %
ERYTHROCYTE [DISTWIDTH] IN BLOOD BY AUTOMATED COUNT: 15.9 % (ref 10–15)
GFR SERPL CREATININE-BSD FRML MDRD: 60 ML/MIN/1.73M2
GLUCOSE BLD-MCNC: 119 MG/DL (ref 70–125)
HCT VFR BLD AUTO: 34.6 % (ref 40–53)
HGB BLD-MCNC: 11.1 G/DL (ref 13.3–17.7)
LYMPHOCYTES # BLD MANUAL: 0.7 10E3/UL (ref 0.8–5.3)
LYMPHOCYTES NFR BLD MANUAL: 25 %
MCH RBC QN AUTO: 33.2 PG (ref 26.5–33)
MCHC RBC AUTO-ENTMCNC: 32.1 G/DL (ref 31.5–36.5)
MCV RBC AUTO: 104 FL (ref 78–100)
MONOCYTES # BLD MANUAL: 0.7 10E3/UL (ref 0–1.3)
MONOCYTES NFR BLD MANUAL: 23 %
NEUTROPHILS # BLD MANUAL: 1.3 10E3/UL (ref 1.6–8.3)
NEUTROPHILS NFR BLD MANUAL: 46 %
PLAT MORPH BLD: ABNORMAL
PLATELET # BLD AUTO: 233 10E3/UL (ref 150–450)
POTASSIUM BLD-SCNC: 4.6 MMOL/L (ref 3.5–5)
PROT SERPL-MCNC: 4.8 G/DL (ref 6–8)
RBC # BLD AUTO: 3.34 10E6/UL (ref 4.4–5.9)
RBC MORPH BLD: ABNORMAL
SODIUM SERPL-SCNC: 142 MMOL/L (ref 136–145)
WBC # BLD AUTO: 2.9 10E3/UL (ref 4–11)

## 2021-09-15 PROCEDURE — 96409 CHEMO IV PUSH SNGL DRUG: CPT

## 2021-09-15 PROCEDURE — 250N000011 HC RX IP 250 OP 636: Performed by: INTERNAL MEDICINE

## 2021-09-15 PROCEDURE — G0463 HOSPITAL OUTPT CLINIC VISIT: HCPCS

## 2021-09-15 PROCEDURE — 99215 OFFICE O/P EST HI 40 MIN: CPT | Performed by: INTERNAL MEDICINE

## 2021-09-15 PROCEDURE — 85027 COMPLETE CBC AUTOMATED: CPT | Performed by: INTERNAL MEDICINE

## 2021-09-15 PROCEDURE — G0463 HOSPITAL OUTPT CLINIC VISIT: HCPCS | Mod: 25

## 2021-09-15 PROCEDURE — 250N000013 HC RX MED GY IP 250 OP 250 PS 637: Performed by: INTERNAL MEDICINE

## 2021-09-15 PROCEDURE — 250N000011 HC RX IP 250 OP 636: Performed by: NURSE PRACTITIONER

## 2021-09-15 PROCEDURE — 258N000003 HC RX IP 258 OP 636: Performed by: INTERNAL MEDICINE

## 2021-09-15 PROCEDURE — 80053 COMPREHEN METABOLIC PANEL: CPT | Performed by: INTERNAL MEDICINE

## 2021-09-15 PROCEDURE — 96372 THER/PROPH/DIAG INJ SC/IM: CPT | Mod: XS | Performed by: NURSE PRACTITIONER

## 2021-09-15 PROCEDURE — 96375 TX/PRO/DX INJ NEW DRUG ADDON: CPT

## 2021-09-15 RX ORDER — ALBUTEROL SULFATE 0.83 MG/ML
2.5 SOLUTION RESPIRATORY (INHALATION)
Status: CANCELLED | OUTPATIENT
Start: 2021-01-01

## 2021-09-15 RX ORDER — LORAZEPAM 2 MG/ML
0.5 INJECTION INTRAMUSCULAR EVERY 4 HOURS PRN
Status: CANCELLED
Start: 2021-09-15

## 2021-09-15 RX ORDER — DIPHENHYDRAMINE HCL 50 MG
50 CAPSULE ORAL
Status: CANCELLED | OUTPATIENT
Start: 2021-01-01

## 2021-09-15 RX ORDER — DIPHENHYDRAMINE HYDROCHLORIDE 50 MG/ML
50 INJECTION INTRAMUSCULAR; INTRAVENOUS
Status: CANCELLED
Start: 2021-01-01

## 2021-09-15 RX ORDER — ACETAMINOPHEN 325 MG/1
650 TABLET ORAL
Status: DISCONTINUED | OUTPATIENT
Start: 2021-09-15 | End: 2021-09-15 | Stop reason: HOSPADM

## 2021-09-15 RX ORDER — LORAZEPAM 2 MG/ML
0.5 INJECTION INTRAMUSCULAR EVERY 4 HOURS PRN
Status: CANCELLED
Start: 2021-01-01

## 2021-09-15 RX ORDER — METHYLPREDNISOLONE SODIUM SUCCINATE 125 MG/2ML
125 INJECTION, POWDER, LYOPHILIZED, FOR SOLUTION INTRAMUSCULAR; INTRAVENOUS
Status: CANCELLED
Start: 2021-09-15

## 2021-09-15 RX ORDER — EPINEPHRINE 1 MG/ML
0.3 INJECTION, SOLUTION INTRAMUSCULAR; SUBCUTANEOUS EVERY 5 MIN PRN
Status: CANCELLED | OUTPATIENT
Start: 2021-01-01

## 2021-09-15 RX ORDER — HEPARIN SODIUM,PORCINE 10 UNIT/ML
5 VIAL (ML) INTRAVENOUS
Status: CANCELLED | OUTPATIENT
Start: 2021-01-01

## 2021-09-15 RX ORDER — HEPARIN SODIUM (PORCINE) LOCK FLUSH IV SOLN 100 UNIT/ML 100 UNIT/ML
5 SOLUTION INTRAVENOUS
Status: CANCELLED | OUTPATIENT
Start: 2021-09-15

## 2021-09-15 RX ORDER — HEPARIN SODIUM (PORCINE) LOCK FLUSH IV SOLN 100 UNIT/ML 100 UNIT/ML
5 SOLUTION INTRAVENOUS
Status: CANCELLED | OUTPATIENT
Start: 2021-01-01

## 2021-09-15 RX ORDER — MEPERIDINE HYDROCHLORIDE 25 MG/ML
25 INJECTION INTRAMUSCULAR; INTRAVENOUS; SUBCUTANEOUS EVERY 30 MIN PRN
Status: CANCELLED | OUTPATIENT
Start: 2021-09-15

## 2021-09-15 RX ORDER — EPINEPHRINE 1 MG/ML
0.3 INJECTION, SOLUTION INTRAMUSCULAR; SUBCUTANEOUS EVERY 5 MIN PRN
Status: DISCONTINUED | OUTPATIENT
Start: 2021-09-15 | End: 2021-09-15 | Stop reason: HOSPADM

## 2021-09-15 RX ORDER — DIPHENHYDRAMINE HYDROCHLORIDE 50 MG/ML
50 INJECTION INTRAMUSCULAR; INTRAVENOUS
Status: DISCONTINUED | OUTPATIENT
Start: 2021-09-15 | End: 2021-09-15 | Stop reason: HOSPADM

## 2021-09-15 RX ORDER — ALBUTEROL SULFATE 0.83 MG/ML
2.5 SOLUTION RESPIRATORY (INHALATION)
Status: DISCONTINUED | OUTPATIENT
Start: 2021-09-15 | End: 2021-09-15 | Stop reason: HOSPADM

## 2021-09-15 RX ORDER — MEPERIDINE HYDROCHLORIDE 25 MG/ML
25 INJECTION INTRAMUSCULAR; INTRAVENOUS; SUBCUTANEOUS EVERY 30 MIN PRN
Status: DISCONTINUED | OUTPATIENT
Start: 2021-09-15 | End: 2021-09-15 | Stop reason: HOSPADM

## 2021-09-15 RX ORDER — CYANOCOBALAMIN 1000 UG/ML
1000 INJECTION, SOLUTION INTRAMUSCULAR; SUBCUTANEOUS ONCE
Status: CANCELLED
Start: 2021-01-01 | End: 2021-01-01

## 2021-09-15 RX ORDER — ACETAMINOPHEN 325 MG/1
650 TABLET ORAL
Status: CANCELLED | OUTPATIENT
Start: 2021-01-01

## 2021-09-15 RX ORDER — METHYLPREDNISOLONE SODIUM SUCCINATE 125 MG/2ML
125 INJECTION, POWDER, LYOPHILIZED, FOR SOLUTION INTRAMUSCULAR; INTRAVENOUS
Status: CANCELLED
Start: 2021-01-01

## 2021-09-15 RX ORDER — DIPHENHYDRAMINE HYDROCHLORIDE 50 MG/ML
50 INJECTION INTRAMUSCULAR; INTRAVENOUS
Status: CANCELLED
Start: 2021-09-15

## 2021-09-15 RX ORDER — MEPERIDINE HYDROCHLORIDE 25 MG/ML
25 INJECTION INTRAMUSCULAR; INTRAVENOUS; SUBCUTANEOUS EVERY 30 MIN PRN
Status: CANCELLED | OUTPATIENT
Start: 2021-01-01

## 2021-09-15 RX ORDER — ALBUTEROL SULFATE 90 UG/1
1-2 AEROSOL, METERED RESPIRATORY (INHALATION)
Status: CANCELLED
Start: 2021-01-01

## 2021-09-15 RX ORDER — ALBUTEROL SULFATE 90 UG/1
1-2 AEROSOL, METERED RESPIRATORY (INHALATION)
Status: CANCELLED
Start: 2021-09-15

## 2021-09-15 RX ORDER — HEPARIN SODIUM,PORCINE 10 UNIT/ML
5 VIAL (ML) INTRAVENOUS
Status: DISCONTINUED | OUTPATIENT
Start: 2021-09-15 | End: 2021-09-15 | Stop reason: HOSPADM

## 2021-09-15 RX ORDER — NALOXONE HYDROCHLORIDE 0.4 MG/ML
0.2 INJECTION, SOLUTION INTRAMUSCULAR; INTRAVENOUS; SUBCUTANEOUS
Status: DISCONTINUED | OUTPATIENT
Start: 2021-09-15 | End: 2021-09-15 | Stop reason: HOSPADM

## 2021-09-15 RX ORDER — EPINEPHRINE 1 MG/ML
0.3 INJECTION, SOLUTION INTRAMUSCULAR; SUBCUTANEOUS EVERY 5 MIN PRN
Status: CANCELLED | OUTPATIENT
Start: 2021-09-15

## 2021-09-15 RX ORDER — ALBUTEROL SULFATE 90 UG/1
1-2 AEROSOL, METERED RESPIRATORY (INHALATION)
Status: DISCONTINUED | OUTPATIENT
Start: 2021-09-15 | End: 2021-09-15 | Stop reason: HOSPADM

## 2021-09-15 RX ORDER — NALOXONE HYDROCHLORIDE 0.4 MG/ML
0.2 INJECTION, SOLUTION INTRAMUSCULAR; INTRAVENOUS; SUBCUTANEOUS
Status: CANCELLED | OUTPATIENT
Start: 2021-09-15

## 2021-09-15 RX ORDER — LORAZEPAM 2 MG/ML
0.5 INJECTION INTRAMUSCULAR EVERY 4 HOURS PRN
Status: DISCONTINUED | OUTPATIENT
Start: 2021-09-15 | End: 2021-09-15 | Stop reason: HOSPADM

## 2021-09-15 RX ORDER — NALOXONE HYDROCHLORIDE 0.4 MG/ML
0.2 INJECTION, SOLUTION INTRAMUSCULAR; INTRAVENOUS; SUBCUTANEOUS
Status: CANCELLED | OUTPATIENT
Start: 2021-01-01

## 2021-09-15 RX ORDER — DIPHENHYDRAMINE HCL 50 MG
50 CAPSULE ORAL
Status: COMPLETED | OUTPATIENT
Start: 2021-09-15 | End: 2021-09-15

## 2021-09-15 RX ORDER — DIPHENHYDRAMINE HCL 50 MG
50 CAPSULE ORAL
Status: CANCELLED | OUTPATIENT
Start: 2021-09-15

## 2021-09-15 RX ORDER — ACETAMINOPHEN 325 MG/1
650 TABLET ORAL
Status: CANCELLED | OUTPATIENT
Start: 2021-09-15

## 2021-09-15 RX ORDER — CYANOCOBALAMIN 1000 UG/ML
1000 INJECTION, SOLUTION INTRAMUSCULAR; SUBCUTANEOUS ONCE
Status: COMPLETED | OUTPATIENT
Start: 2021-09-15 | End: 2021-09-15

## 2021-09-15 RX ORDER — METHYLPREDNISOLONE SODIUM SUCCINATE 125 MG/2ML
125 INJECTION, POWDER, LYOPHILIZED, FOR SOLUTION INTRAMUSCULAR; INTRAVENOUS
Status: DISCONTINUED | OUTPATIENT
Start: 2021-09-15 | End: 2021-09-15 | Stop reason: HOSPADM

## 2021-09-15 RX ORDER — HEPARIN SODIUM (PORCINE) LOCK FLUSH IV SOLN 100 UNIT/ML 100 UNIT/ML
5 SOLUTION INTRAVENOUS
Status: DISCONTINUED | OUTPATIENT
Start: 2021-09-15 | End: 2021-09-15 | Stop reason: HOSPADM

## 2021-09-15 RX ORDER — HEPARIN SODIUM,PORCINE 10 UNIT/ML
5 VIAL (ML) INTRAVENOUS
Status: CANCELLED | OUTPATIENT
Start: 2021-09-15

## 2021-09-15 RX ORDER — ALBUTEROL SULFATE 0.83 MG/ML
2.5 SOLUTION RESPIRATORY (INHALATION)
Status: CANCELLED | OUTPATIENT
Start: 2021-09-15

## 2021-09-15 RX ADMIN — SODIUM CHLORIDE 250 ML: 9 INJECTION, SOLUTION INTRAVENOUS at 09:40

## 2021-09-15 RX ADMIN — HEPARIN 5 ML: 100 SYRINGE at 12:38

## 2021-09-15 RX ADMIN — DEXAMETHASONE SODIUM PHOSPHATE 20 MG: 10 INJECTION, SOLUTION INTRAMUSCULAR; INTRAVENOUS at 10:00

## 2021-09-15 RX ADMIN — DARATUMUMAB 1200 MG: 100 INJECTION, SOLUTION, CONCENTRATE INTRAVENOUS at 11:06

## 2021-09-15 RX ADMIN — DIPHENHYDRAMINE HYDROCHLORIDE 50 MG: 50 CAPSULE ORAL at 09:41

## 2021-09-15 RX ADMIN — CYANOCOBALAMIN 1000 MCG: 1000 INJECTION, SOLUTION INTRAMUSCULAR; SUBCUTANEOUS at 12:38

## 2021-09-15 ASSESSMENT — PAIN SCALES - GENERAL: PAINLEVEL: NO PAIN (0)

## 2021-09-15 NOTE — LETTER
"    9/15/2021         RE: Theo Meyers  [address]  [facility] [address]        Dear Colleague,    Thank you for referring your patient, Theo Meyers, to the [facility]. Please see a copy of my visit note below.    Oncology Rooming Note    September 15, 2021 8:33 AM   Theo Meyers is a 76 year old male who presents for:    Chief Complaint   Patient presents with     Oncology Clinic Visit     Multiple myeloma (H)     Initial Vitals: BP 90/62 (BP Location: Left arm, Patient Position: Sitting, Cuff Size: Adult Regular)   Pulse 62   Temp 97.5  F (36.4  C) (Oral)   Resp 12   Wt 73.5 kg (162 lb)   SpO2 100%   BMI 23.92 kg/m   Estimated body mass index is 23.92 kg/m  as calculated from the following:    Height as of 8/23/21: 1.753 m (5' 9\").    Weight as of this encounter: 73.5 kg (162 lb). Body surface area is 1.89 meters squared.  No Pain (0) Comment: Data Unavailable   No LMP for male patient.  Allergies reviewed: Yes  Medications reviewed: Yes    Medications: Medication refills not needed today.  Pharmacy name entered into [place]:    [facility][id number] - [place], [place] - [address] AT [facility] [place] [address] [place]  [facility] - [place], [place] - [address] [facility]    Clinical concerns:  Multiple myeloma (H)      Ale Millan, [facility] Hematology and Oncology Progress Note    Patient: Theo Meyers  MRN: [id number]  Date of Service: Sep 15, 2021        Assessment and Plan:    1. Kappa light chain myeloma:  Kappa lc is slowly increasing. No other parameters worsening. He is tolerating therapy well.We will see what his numbers are next month. If they are rising, we will increase pomalidomide to 4mg daily.   At progression we can consider CART cell therapy.      2.  Pancytopenia: Counts have improved.  Only mild neutropenia which is generally stable.  Continue to follow clinically.     3.  Prophylaxis: Continue " Xarelto 20 mg daily.  No issues with bleeding recently.    I spent 40 minutes in the care of this patient today, which included time necessary for preparation for the visit, face to face time with the patient, communication of recommendations to the care team, and documentation time.    ECOG Performance  0    Diagnosis:    1.  IgG kappa light chain myeloma. Hyposecretory. Diagnosed 2009. No significant measurable M protein. 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 There was also on 11;14 translocation.  Past immunofixations from 2011 show IgG-kappa.  Bone marrow biopsy 8, 2020 at 5%.  Residual kappa light chain myeloma involving 20% of nucleated cells.  No significant change from November 2016 marrow cellularity.     2.  Deep vein thrombosis of the left leg diagnosed 9/2012 while on treatment.     3.  Vitamin B12 deficiency diagnosed in September 2017.     4.  GI bleed and anemia requiring hospitalization in December 2017.    5. MOHS surgery in January, 2021; May 2021 (chest; squamous).     Treatment:    He presented with a lytic lesion involving the C6 vertebral body, although no measurable M protein. IgG kappa monoclonal immunoglobulin was confirmed by ELMA as well as elevated kappa free light chains with an abnormal kappa lambda ratio. Bone marrow biopsy showed 30% involvement and cytogenetics confirmed deletion of 13 q. as well as 11;14 translocation. He was initially treated with Velcade and dexamethasone and achieved a good partial response.     He was referred to the [facility] where he underwent high-dose chemotherapy with autologous peripheral stem cell transplant in April of 2010. He began Revlimid as maintenance therapy post transplant. Repeat bone marrow biopsy done October 2010 showed about 5% kappa restricted plasma cells and so at that time weekly Velcade was added along with his maintenance Revlimid and dexamethasone.      He has done well since that time with stable minimal  residual disease, best assessed by serum free light chains. He required dose reductions of weekly Velcade because of cytopenias and was ultimately switched to a q 2 week maintenance schedule which he has been on since September 2012.      His Revlimid dose was reduced to 15 mg daily and he continues on dexamethasone 20 mg p.o. weekly  Revlimid dosing changed to 20 mg, 3 weeks on 1 week (instead of 15 mg daily) for cost reasons.  Started March, 2018     Started daratumumab/pomalidomide/dexamethasone (20mg every 14 days) on October 14, 2020.     Progressed on:  bortezomib  lenalidomide    Interim History:    Keith returns today for follow-up visit.  Overall he is feeling pretty well.  Energy and appetite are good.  No problems with his bowels.  No fevers.  No skin rash.  He has had 2 or 3 squamous cell carcinomas removed this year.    Review of Systems:    As above in the history.     Review of Systems otherwise Negative for:  General: chills, fever or night sweats  Psychological: anxiety or depression  Ophthalmic: blurry vision, double vision or loss of vision, vision change  ENT: epistaxis, oral lesions, hearing changes  Hematological and Lymphatic: bleeding, bruising, jaundice, swollen lymph nodes  Endocrine: hot flashes, unexpected weight changes  Respiratory: cough, hemoptysis, orthopnea or shortness of breath/SAMANO  Cardiovascular: chest pain, edema, palpitations or PND  Gastrointestinal: abdominal pain, blood in stools, change in bowel habits, constipation, diarrhea or nausea/vomiting  Genito-Urinary: change in urinary stream, incontinence, frequency/urgency  Musculoskeletal: joint pain, stiffness, swelling, muscle pain  Neurological: dizziness, headaches, numbness/tingling  Dermatological: lumps and rash    Past History:    Past Medical History:   Diagnosis Date     Anemia 12/13/2017     Arthritis      Bilateral inguinal hernia      Chest tube in place      Glaucoma      Glaucoma      History of blood clots      DVT - left chronic     Multiple myeloma (H)     Gets chemo infusions every 2 weeks     Pancytopenia (H)      Parapneumonic effusion      Peripheral neuropathy      Syncope      TMJ (temporomandibular joint disorder)      Physical Exam:    BP 90/62 (BP Location: Left arm, Patient Position: Sitting, Cuff Size: Adult Regular)   Pulse 62   Temp 97.5  F (36.4  C) (Oral)   Resp 12   Wt 73.5 kg (162 lb)   SpO2 100%   BMI 23.92 kg/m      General: patient appears stated age of 76 year old. Nontoxic and in no distress.   HEENT: Head: atraumatic, normocephalic. Sclerae anicteric.  Chest:  Normal respiratory effort  Cardiac:  No edema.  Regular rate and rhythm.  3/6 systolic ejection murmur is heard.  Abdomen: abdomen is non-distended  Extremities: normal tone and muscle bulk.  Skin: no lesions or rash on visible skin. Warm and dry.   CNS: alert and oriented. Grossly non-focal.   Psychiatric: normal mood and affect.     Lab Results:    Recent Results (from the past 168 hour(s))   Comprehensive metabolic panel   Result Value Ref Range    Sodium 142 136 - 145 mmol/L    Potassium 4.6 3.5 - 5.0 mmol/L    Chloride 112 (H) 98 - 107 mmol/L    Carbon Dioxide (CO2) 24 22 - 31 mmol/L    Anion Gap 6 5 - 18 mmol/L    Urea Nitrogen 15 8 - 28 mg/dL    Creatinine 1.17 0.70 - 1.30 mg/dL    Calcium 8.8 8.5 - 10.5 mg/dL    Glucose 119 70 - 125 mg/dL    Alkaline Phosphatase 53 45 - 120 U/L    AST 16 0 - 40 U/L    ALT <9 0 - 45 U/L    Protein Total 4.8 (L) 6.0 - 8.0 g/dL    Albumin 3.0 (L) 3.5 - 5.0 g/dL    Bilirubin Total 0.4 0.0 - 1.0 mg/dL    GFR Estimate 60 (L) >60 mL/min/1.73m2   CBC with platelets and differential   Result Value Ref Range    WBC Count 2.9 (L) 4.0 - 11.0 10e3/uL    RBC Count 3.34 (L) 4.40 - 5.90 10e6/uL    Hemoglobin 11.1 (L) 13.3 - 17.7 g/dL    Hematocrit 34.6 (L) 40.0 - 53.0 %     (H) 78 - 100 fL    MCH 33.2 (H) 26.5 - 33.0 pg    MCHC 32.1 31.5 - 36.5 g/dL    RDW 15.9 (H) 10.0 - 15.0 %    Platelet Count 233 150  - 450 10e3/uL     Imaging:    No results found.      Signed by: Per Clifford MD        Again, thank you for allowing me to participate in the care of your patient.        Sincerely,        Per Clifford MD

## 2021-09-15 NOTE — PROGRESS NOTES
[facility] Hematology and Oncology Progress Note    Patient: Theo Meyers  MRN: [id number]  Date of Service: Sep 15, 2021        Assessment and Plan:    1. Kappa light chain myeloma:  Kappa lc is slowly increasing. No other parameters worsening. He is tolerating therapy well.We will see what his numbers are next month. If they are rising, we will increase pomalidomide to 4mg daily.   At progression we can consider CART cell therapy.      2.  Pancytopenia: Counts have improved.  Only mild neutropenia which is generally stable.  Continue to follow clinically.     3.  Prophylaxis: Continue Xarelto 20 mg daily.  No issues with bleeding recently.    I spent 40 minutes in the care of this patient today, which included time necessary for preparation for the visit, face to face time with the patient, communication of recommendations to the care team, and documentation time.    ECOG Performance  0    Diagnosis:    1.  IgG kappa light chain myeloma. Hyposecretory. Diagnosed 2009. No significant measurable M protein. 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 There was also on 11;14 translocation.  Past immunofixations from 2011 show IgG-kappa.  Bone marrow biopsy 8, 2020 at 5%.  Residual kappa light chain myeloma involving 20% of nucleated cells.  No significant change from November 2016 marrow cellularity.     2.  Deep vein thrombosis of the left leg diagnosed 9/2012 while on treatment.     3.  Vitamin B12 deficiency diagnosed in September 2017.     4.  GI bleed and anemia requiring hospitalization in December 2017.    5. MOHS surgery in January, 2021; May 2021 (chest; squamous).     Treatment:    He presented with a lytic lesion involving the C6 vertebral body, although no measurable M protein. IgG kappa monoclonal immunoglobulin was confirmed by [facility] as well as elevated kappa free light chains with an abnormal kappa lambda ratio. Bone marrow biopsy showed 30% involvement and cytogenetics confirmed  deletion of 13 q. as well as 11;14 translocation. He was initially treated with Velcade and dexamethasone and achieved a good partial response.     He was referred to the [facility] where he underwent high-dose chemotherapy with autologous peripheral stem cell transplant in April of 2010. He began Revlimid as maintenance therapy post transplant. Repeat bone marrow biopsy done October 2010 showed about 5% kappa restricted plasma cells and so at that time weekly Velcade was added along with his maintenance Revlimid and dexamethasone.      He has done well since that time with stable minimal residual disease, best assessed by serum free light chains. He required dose reductions of weekly Velcade because of cytopenias and was ultimately switched to a q 2 week maintenance schedule which he has been on since September 2012.      His Revlimid dose was reduced to 15 mg daily and he continues on dexamethasone 20 mg p.o. weekly  Revlimid dosing changed to 20 mg, 3 weeks on 1 week (instead of 15 mg daily) for cost reasons.  Started March, 2018     Started daratumumab/pomalidomide/dexamethasone (20mg every 14 days) on October 14, 2020.     Progressed on:  bortezomib  lenalidomide    Interim History:    Keith returns today for follow-up visit.  Overall he is feeling pretty well.  Energy and appetite are good.  No problems with his bowels.  No fevers.  No skin rash.  He has had 2 or 3 squamous cell carcinomas removed this year.    Review of Systems:    As above in the history.     Review of Systems otherwise Negative for:  General: chills, fever or night sweats  Psychological: anxiety or depression  Ophthalmic: blurry vision, double vision or loss of vision, vision change  ENT: epistaxis, oral lesions, hearing changes  Hematological and Lymphatic: bleeding, bruising, jaundice, swollen lymph nodes  Endocrine: hot flashes, unexpected weight changes  Respiratory: cough, hemoptysis, orthopnea or shortness of  breath/SAMANO  Cardiovascular: chest pain, edema, palpitations or PND  Gastrointestinal: abdominal pain, blood in stools, change in bowel habits, constipation, diarrhea or nausea/vomiting  Genito-Urinary: change in urinary stream, incontinence, frequency/urgency  Musculoskeletal: joint pain, stiffness, swelling, muscle pain  Neurological: dizziness, headaches, numbness/tingling  Dermatological: lumps and rash    Past History:    Past Medical History:   Diagnosis Date     Anemia 12/13/2017     Arthritis      Bilateral inguinal hernia      Chest tube in place      Glaucoma      Glaucoma      History of blood clots     DVT - left chronic     Multiple myeloma (H)     Gets chemo infusions every 2 weeks     Pancytopenia (H)      Parapneumonic effusion      Peripheral neuropathy      Syncope      TMJ (temporomandibular joint disorder)      Physical Exam:    BP 90/62 (BP Location: Left arm, Patient Position: Sitting, Cuff Size: Adult Regular)   Pulse 62   Temp 97.5  F (36.4  C) (Oral)   Resp 12   Wt 73.5 kg (162 lb)   SpO2 100%   BMI 23.92 kg/m      General: patient appears stated age of 76 year old. Nontoxic and in no distress.   HEENT: Head: atraumatic, normocephalic. Sclerae anicteric.  Chest:  Normal respiratory effort  Cardiac:  No edema.  Regular rate and rhythm.  3/6 systolic ejection murmur is heard.  Abdomen: abdomen is non-distended  Extremities: normal tone and muscle bulk.  Skin: no lesions or rash on visible skin. Warm and dry.   CNS: alert and oriented. Grossly non-focal.   Psychiatric: normal mood and affect.     Lab Results:    Recent Results (from the past 168 hour(s))   Comprehensive metabolic panel   Result Value Ref Range    Sodium 142 136 - 145 mmol/L    Potassium 4.6 3.5 - 5.0 mmol/L    Chloride 112 (H) 98 - 107 mmol/L    Carbon Dioxide (CO2) 24 22 - 31 mmol/L    Anion Gap 6 5 - 18 mmol/L    Urea Nitrogen 15 8 - 28 mg/dL    Creatinine 1.17 0.70 - 1.30 mg/dL    Calcium 8.8 8.5 - 10.5 mg/dL    Glucose  119 70 - 125 mg/dL    Alkaline Phosphatase 53 45 - 120 U/L    AST 16 0 - 40 U/L    ALT <9 0 - 45 U/L    Protein Total 4.8 (L) 6.0 - 8.0 g/dL    Albumin 3.0 (L) 3.5 - 5.0 g/dL    Bilirubin Total 0.4 0.0 - 1.0 mg/dL    GFR Estimate 60 (L) >60 mL/min/1.73m2   CBC with platelets and differential   Result Value Ref Range    WBC Count 2.9 (L) 4.0 - 11.0 10e3/uL    RBC Count 3.34 (L) 4.40 - 5.90 10e6/uL    Hemoglobin 11.1 (L) 13.3 - 17.7 g/dL    Hematocrit 34.6 (L) 40.0 - 53.0 %     (H) 78 - 100 fL    MCH 33.2 (H) 26.5 - 33.0 pg    MCHC 32.1 31.5 - 36.5 g/dL    RDW 15.9 (H) 10.0 - 15.0 %    Platelet Count 233 150 - 450 10e3/uL     Imaging:    No results found.      Signed by: Per Clifford MD

## 2021-09-15 NOTE — PROGRESS NOTES
Pt ambulates to infusion center for lab draw prior to MD visit.  IVAD accessed, labs drawn et sent.  Pt was seen by Dr. Clifford and orders were approved.  Treatment administered as ordered without difficulty.  IVAD flushed w/NS et heparin and needle deaccessed.  Pt left clinic stable to [facility].  Plan RTC as scheduled.

## 2021-09-15 NOTE — PROGRESS NOTES
"Oncology Rooming Note    September 15, 2021 8:33 AM   Theo Meyers is a 76 year old male who presents for:    Chief Complaint   Patient presents with     Oncology Clinic Visit     Multiple myeloma (H)     Initial Vitals: BP 90/62 (BP Location: Left arm, Patient Position: Sitting, Cuff Size: Adult Regular)   Pulse 62   Temp 97.5  F (36.4  C) (Oral)   Resp 12   Wt 73.5 kg (162 lb)   SpO2 100%   BMI 23.92 kg/m   Estimated body mass index is 23.92 kg/m  as calculated from the following:    Height as of 8/23/21: 1.753 m (5' 9\").    Weight as of this encounter: 73.5 kg (162 lb). Body surface area is 1.89 meters squared.  No Pain (0) Comment: Data Unavailable   No LMP for male patient.  Allergies reviewed: Yes  Medications reviewed: Yes    Medications: Medication refills not needed today.  Pharmacy name entered into [employer] [place]:    [facility] #[id number] - [place], [place] - [address] [facility] [address]  [facility] - [place], [place] - [address] [place] [address]    Clinical concerns:  Multiple myeloma (H)      Ale Millan, MARY            "

## 2021-09-26 DIAGNOSIS — B02.23 SHINGLES (HERPES ZOSTER) POLYNEUROPATHY: Primary | ICD-10-CM

## 2021-09-28 RX ORDER — ACYCLOVIR 400 MG/1
TABLET ORAL
Qty: 180 TABLET | Refills: 1 | Status: SHIPPED | OUTPATIENT
Start: 2021-09-28 | End: 2022-01-01

## 2021-09-29 DIAGNOSIS — C90.00 MULTIPLE MYELOMA NOT HAVING ACHIEVED REMISSION (H): Primary | ICD-10-CM

## 2021-10-02 ENCOUNTER — HEALTH MAINTENANCE LETTER (OUTPATIENT)
Age: 76
End: 2021-10-02

## 2021-10-05 ENCOUNTER — OFFICE VISIT (OUTPATIENT)
Dept: INTERNAL MEDICINE | Facility: CLINIC | Age: 76
End: 2021-10-05
Payer: MEDICARE

## 2021-10-05 VITALS
SYSTOLIC BLOOD PRESSURE: 110 MMHG | HEIGHT: 69 IN | WEIGHT: 163 LBS | DIASTOLIC BLOOD PRESSURE: 66 MMHG | OXYGEN SATURATION: 96 % | HEART RATE: 62 BPM | BODY MASS INDEX: 24.14 KG/M2

## 2021-10-05 DIAGNOSIS — M48.061 SPINAL STENOSIS OF LUMBAR REGION WITHOUT NEUROGENIC CLAUDICATION: ICD-10-CM

## 2021-10-05 DIAGNOSIS — Z00.00 ENCOUNTER FOR WELLNESS EXAMINATION IN ADULT: Primary | ICD-10-CM

## 2021-10-05 DIAGNOSIS — Z85.828 HISTORY OF SQUAMOUS CELL CARCINOMA OF SKIN: ICD-10-CM

## 2021-10-05 DIAGNOSIS — I65.22 ASYMPTOMATIC STENOSIS OF LEFT CAROTID ARTERY: ICD-10-CM

## 2021-10-05 DIAGNOSIS — I35.0 AORTIC VALVE STENOSIS, ETIOLOGY OF CARDIAC VALVE DISEASE UNSPECIFIED: ICD-10-CM

## 2021-10-05 DIAGNOSIS — Z86.19 HISTORY OF CLOSTRIDIOIDES DIFFICILE COLITIS: ICD-10-CM

## 2021-10-05 DIAGNOSIS — Z86.718 PERSONAL HISTORY OF DVT (DEEP VEIN THROMBOSIS): ICD-10-CM

## 2021-10-05 DIAGNOSIS — H40.9 GLAUCOMA, UNSPECIFIED GLAUCOMA TYPE, UNSPECIFIED LATERALITY: ICD-10-CM

## 2021-10-05 DIAGNOSIS — Z51.81 ENCOUNTER FOR THERAPEUTIC DRUG MONITORING: ICD-10-CM

## 2021-10-05 DIAGNOSIS — C90.00 MULTIPLE MYELOMA, REMISSION STATUS UNSPECIFIED (H): ICD-10-CM

## 2021-10-05 DIAGNOSIS — N40.0 BENIGN PROSTATIC HYPERPLASIA, UNSPECIFIED WHETHER LOWER URINARY TRACT SYMPTOMS PRESENT: ICD-10-CM

## 2021-10-05 DIAGNOSIS — Z87.09 HISTORY OF SINUSITIS: ICD-10-CM

## 2021-10-05 LAB
CHOLEST SERPL-MCNC: 160 MG/DL
CK SERPL-CCNC: 113 U/L (ref 30–190)
FASTING STATUS PATIENT QL REPORTED: YES
HDLC SERPL-MCNC: 51 MG/DL
LDLC SERPL CALC-MCNC: 95 MG/DL
TRIGL SERPL-MCNC: 71 MG/DL

## 2021-10-05 PROCEDURE — 82550 ASSAY OF CK (CPK): CPT | Performed by: INTERNAL MEDICINE

## 2021-10-05 PROCEDURE — 36415 COLL VENOUS BLD VENIPUNCTURE: CPT | Performed by: INTERNAL MEDICINE

## 2021-10-05 PROCEDURE — 80061 LIPID PANEL: CPT | Performed by: INTERNAL MEDICINE

## 2021-10-05 PROCEDURE — G0439 PPPS, SUBSEQ VISIT: HCPCS | Performed by: INTERNAL MEDICINE

## 2021-10-05 ASSESSMENT — ACTIVITIES OF DAILY LIVING (ADL): CURRENT_FUNCTION: NO ASSISTANCE NEEDED

## 2021-10-05 ASSESSMENT — MIFFLIN-ST. JEOR: SCORE: 1459.74

## 2021-10-05 NOTE — PROGRESS NOTES
"SUBJECTIVE:   Theo Meyers is a 76 year old male who presents for Preventive Visit.    {Split Bill scripting  The purpose of this visit is to discuss your medical history and prevent health problems before you are sick. You may be responsible for a co-pay, coinsurance, or deductible if your visit today includes services such as checking on a sore throat, having an x-ray or lab test, or treating and evaluating a new or existing condition :073695}  Patient has been advised of split billing requirements and indicates understanding: {Yes and No:660073}   Are you in the first 12 months of your Medicare coverage?  { :462116::\"No\"}    HPI  Do you feel safe in your environment? { :466323}    Have you ever done Advance Care Planning? (For example, a Health Directive, POLST, or a discussion with a medical provider or your loved ones about your wishes): { :529014}    {Hearing Test Done (Optional):354990}   Fall risk  { :646986}  {If any of the above assessments are answered yes, consider ordering appropriate referrals (Optional):218071::\"click delete button to remove this line now\"}  Cognitive Screening { :814478}    {Do you have sleep apnea, excessive snoring or daytime drowsiness? (Optional):788119}    Reviewed and updated as needed this visit by clinical staff                 Reviewed and updated as needed this visit by Provider                Social History     Tobacco Use     Smoking status: Former Smoker     Quit date: 1975     Years since quittin.9     Smokeless tobacco: Never Used   Substance Use Topics     Alcohol use: Not Currently     Comment: Alcoholic Drinks/day: occasional     {Rooming Staff- Complete this question if Prescreen response is not shown below for today's visit. If you drink alcohol do you typically have >3 drinks per day or >7 drinks per week? (Optional):663581}    No flowsheet data found.{add AUDIT responses (Optional) (A score of 7 for adult men is an indication of hazardous " "drinking; a score of 8 or more is an indication of an alcohol use disorder.  A score of 7 or more for adult women is an indication of hazardous drinking or an alchohol use disorder):863556}    {Outside tests to abstract? :045819}    {additional problems to add (Optional):119114}    Current providers sharing in care for this patient include: {Rooming staff:  Please update Care Team in Rooming Activity, refresh this note and then delete this statement}  Patient Care Team:  Mathew Ott MD as PCP - General (Internal Medicine)  Zarina Foss MD as BMT Physician  Hannah Munoz RN as BMT Nurse Coordinator  Per Clifford MD as MD (Hematology & Oncology)  Mita Ratliff, EvieD as Pharmacist (Pharmacist)  Amy Bell RN as Specialty Care Coordinator (Hematology & Oncology)  Per Clifford MD as Assigned Cancer Care Provider  Talat Contreras MD as Assigned PCP    The following health maintenance items are reviewed in Epic and correct as of today:  Health Maintenance Due   Topic Date Due     ANNUAL REVIEW OF HM ORDERS  Never done     ZOSTER IMMUNIZATION (1 of 2) Never done     LIPID  10/16/2020     DTAP/TDAP/TD IMMUNIZATION (3 - Td or Tdap) 05/19/2021     MEDICARE ANNUAL WELLNESS VISIT  07/30/2021     INFLUENZA VACCINE (1) 09/01/2021     {Chronicprobdata (optional):142377}  {Decision Support (Optional):416791}        Review of Systems  {ROS COMP (Optional):846461}    OBJECTIVE:   There were no vitals taken for this visit. Estimated body mass index is 23.92 kg/m  as calculated from the following:    Height as of 8/23/21: 1.753 m (5' 9\").    Weight as of 9/15/21: 73.5 kg (162 lb).  Physical Exam  {Exam (Optional) :871529}    {Diagnostic Test Results (Optional):642184::\"Diagnostic Test Results:\",\"Labs reviewed in Epic\"}    ASSESSMENT / PLAN:   {Diag Picklist:024514}    Patient has been advised of split billing requirements and indicates understanding: {YES / NO:155948::\"Yes\"}  COUNSELING:  {Medicare " "Counselin}    Estimated body mass index is 23.92 kg/m  as calculated from the following:    Height as of 21: 1.753 m (5' 9\").    Weight as of 9/15/21: 73.5 kg (162 lb).    {Weight Management Plan (ACO) Complete if BMI is abnormal-  Ages 18-64  BMI >24.9.  Age 65+ with BMI <23 or >30 (Optional):407334}    He reports that he quit smoking about 45 years ago. He has never used smokeless tobacco.      Appropriate preventive services were discussed with this patient, including applicable screening as appropriate for cardiovascular disease, diabetes, osteopenia/osteoporosis, and glaucoma.  As appropriate for age/gender, discussed screening for colorectal cancer, prostate cancer, breast cancer, and cervical cancer. Checklist reviewing preventive services available has been given to the patient.    Reviewed patients plan of care and provided an AVS. The {CarePlan:856726} for Theo meets the Care Plan requirement. This Care Plan has been established and reviewed with the {PATIENT, FAMILY MEMBER, CAREGIVER:917463}.    Counseling Resources:  ATP IV Guidelines  Pooled Cohorts Equation Calculator  Breast Cancer Risk Calculator  Breast Cancer: Medication to Reduce Risk  FRAX Risk Assessment  ICSI Preventive Guidelines  Dietary Guidelines for Americans,   USDA's MyPlate  ASA Prophylaxis  Lung CA Screening    Mathew Ott MD  [facility]    Identified Health Risks:  "

## 2021-10-05 NOTE — PATIENT INSTRUCTIONS
Schedule a heart echo for follow-up on her aortic stenosis.    Schedule carotid ultrasound to reevaluate your carotid artery stenosis.    Check your [employer] computer portal for results of your tests.    See me in clinic in approximately 1 month, nonfasting visit, to discuss starting cholesterol medication such as atorvastatin.  You can discuss this with your cancer doctor first.    Get your flu shot sometime this fall.    If you do have an infected cyst of your chest, you can start the doxycycline antibiotic that you have already been given.    Follow-up sooner than planned with your skin doctor, to discuss possible recurrent squamous cell cancer of your chest, you can also discuss your other sclerotic skin lesions with dermatology.    Consider saline irrigation or [place] pot of your nose if you feel you have chronic nasal irritation or recurrent sinusitis.  You do not need to use the Flonase nasal spray if you do not have nasal allergies.  You can use doxycycline again in the future for sinusitis if needed.  But otherwise you are trying to avoid antibiotics with your history of C. difficile colitis.    You are due for a tetanus shot if you do have a puncture wound, puncture bite.
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
## 2021-10-07 ENCOUNTER — TRANSFERRED RECORDS (OUTPATIENT)
Dept: HEALTH INFORMATION MANAGEMENT | Facility: CLINIC | Age: 76
End: 2021-10-07

## 2021-10-07 ENCOUNTER — HOSPITAL ENCOUNTER (OUTPATIENT)
Dept: ULTRASOUND IMAGING | Facility: CLINIC | Age: 76
Discharge: HOME | End: 2021-10-07
Attending: INTERNAL MEDICINE | Admitting: INTERNAL MEDICINE
Payer: MEDICARE

## 2021-10-07 DIAGNOSIS — I65.22 ASYMPTOMATIC STENOSIS OF LEFT CAROTID ARTERY: ICD-10-CM

## 2021-10-07 PROCEDURE — 93880 EXTRACRANIAL BILAT STUDY: CPT

## 2021-10-13 NOTE — PROGRESS NOTES
Pt ambulates to infusion center for labs and treatment.  IVAD accessed, labs drawn et sent w/results noted et reviewed w/K. JESICA Valdez.  Treatment administered as ordered without difficulty.  Flu vaccine given, Vitamin B12 given.  IVAD flushed w/NS et heparin and needle deaccessed.  Pt left clinic stable to [facility].  Plan RTC as scheduled.

## 2021-11-05 NOTE — TELEPHONE ENCOUNTER
Free Drug Application Initiated  Medication [facility]  Sponsor [facility]  Phone # [phone]  Fax # [phone]  Additional Information 2022 renewal application

## 2021-11-10 NOTE — PROGRESS NOTES
[facility] Progress Note    Patient: Theo Meyers  MRN: [id number]  Date of Service: Nov 10, 2021          Reason for Visit    Chief Complaint   Patient presents with     Oncology Clinic Visit     Multiple myeloma (H)       Assessment and Plan    Cancer Staging  No matching staging information was found for the patient.    1.  Myeloma: pt continues on daratumumab and dexamethasone. This regimen was initiated in October 2020. Has been tolerating, but light chains are slowly going up. He has received daratumumab weekly for 8 weeks. Started every other week dosing in December. Now getting it every 4 weeks.  We did increase pomalyst to 3mg. Will continue that for now. If light chains continue to go up, we may try pomalyst at 4mg if he can tolerate.  Will get labs and daratumumab in 4 weeks and see Dr. Clifford in 8 weeks.      2. DVT: This is recurrent.  Will be on xarelto indefinitely.      3. Anemia: likely from treatment and myeloma. No complications. Will continue to monitor.      ECOG Performance    0 - Independent    Distress Screening (within last 30 days)    No data recorded     Pain  Pain Score: No Pain (0)    Problem List    Patient Active Problem List   Diagnosis     Multiple myeloma (H)     Unspecified glaucoma     Cataract     DVT (deep venous thrombosis) (H)     Shingles     Shingles (herpes zoster) polyneuropathy     Back pain     Post herpetic neuralgia     Pancytopenia (H)     Syncope and collapse     Elevated INR     History of DVT (deep vein thrombosis)     Chronic deep vein thrombosis (DVT) of left lower extremity, unspecified vein (H)     Spinal stenosis of lumbar region without neurogenic claudication     Anemia, vitamin B12 deficiency     Chemotherapy-induced neutropenia (H)     Thrombocytopenia (H)     Paroxysmal atrial fibrillation (H)     Nonrheumatic aortic valve stenosis     SAMANO (dyspnea on exertion)     Immunocompromised state (H)     Neutropenic fever (H)      Personal history of DVT (deep vein thrombosis)     Paroxysmal A-fib (H)        ______________________________________________________________________________    History of Present Illness    DIAGNOSIS:   1. IgG kappa light chain myeloma. Hyposecretory. Diagnosed 2009. No significant measurable M protein. 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 There was also on 11;14 translocation.   2. Deep vein thrombosis of the left leg diagnosed 09/2012 while on treatment.       TREATMENT:   Started Daratumumab in October 2020. Dexamethasone weekly as well.  Today is cycle 15. Pomalyst added with cycle 3.          PAST TREATMENT and HISTORY:   Velcade every 2 weeks since 09/2012. He is getting dexamethasone 20 mg every other week with the velcade. Finally, he is receiving Revlimid 20 mg daily for 3 weeks on, 1 week off.      He presented at diagnosis with a lytic lesion involving the C6 vertebral body, although no measurable M protein. IgG kappa monoclonal immunoglobulin was confirmed by [facility] as well as elevated kappa free light chains with an abnormal kappa lambda ratio. Bone marrow biopsy showed 30% involvement and cytogenetics confirmed deletion of 13 q. as well as 11;14 translocation. He was initially treated with Velcade and dexamethasone and achieved a good partial response. He was referred to the [facility] where he underwent high-dose chemotherapy with autologous peripheral stem cell transplant in April of 2010. He began Revlimid as maintenance therapy post transplant. Repeat bone marrow biopsy done October 2010 showed about 5% kappa restricted plasma cells and so at that time weekly Velcade was added along with his maintenance Revlimid and dexamethasone. He has done well since that time with stable minimal residual disease, best assessed by serum free light chains. He required dose reductions of weekly Velcade because of cytopenias and was ultimately switched to a q.2 week maintenance  schedule which he has been on since September 2012. His Revlimid dose is 20 mg daily and he continues on dexamethasone 20 mg p.o. q. Week.       INTERIM HISTORY:     Pt is here today for follow up.  Patient is doing pretty well.  No new issues. Feeling fine. No new bone or back pain.     Review of Systems    Pertinent items are noted in HPI.    Past History    Past Medical History:   Diagnosis Date     Anemia 12/13/2017     Arthritis      Bilateral inguinal hernia      Chest tube in place      Glaucoma      Glaucoma      History of blood clots     DVT - left chronic     Multiple myeloma (H)     Gets chemo infusions every 2 weeks     Pancytopenia (H)      Parapneumonic effusion      Peripheral neuropathy      Syncope      TMJ (temporomandibular joint disorder)        PHYSICAL EXAM  BP 95/54   Pulse 69   Temp 97.8  F (36.6  C)   Resp 16   Wt 75.8 kg (167 lb)   SpO2 99%   BMI 24.66 kg/m      GENERAL: no acute distress. Cooperative in conversation. Here alone. Mask on  RESP: Regular respiratory rate. No expiratory wheezes   MUSCULOSKELETAL: no bilateral leg swelling  NEURO: non focal. Alert and oriented x3.   PSYCH: within normal limits. No depression or anxiety.  SKIN: exposed skin is dry intact.  He does have a Band-Aid on his chest that he change it this morning.  There is a little bit of mild redness around that but per patient it is improved.      Lab Results    Recent Results (from the past 168 hour(s))   Echocardiogram Complete   Result Value Ref Range    LVEF  60-65%    Comprehensive metabolic panel   Result Value Ref Range    Sodium 140 136 - 145 mmol/L    Potassium 4.6 3.5 - 5.0 mmol/L    Chloride 109 (H) 98 - 107 mmol/L    Carbon Dioxide (CO2) 26 22 - 31 mmol/L    Anion Gap 5 5 - 18 mmol/L    Urea Nitrogen 17 8 - 28 mg/dL    Creatinine 1.30 0.70 - 1.30 mg/dL    Calcium 8.9 8.5 - 10.5 mg/dL    Glucose 142 (H) 70 - 125 mg/dL    Alkaline Phosphatase 53 45 - 120 U/L    AST 14 0 - 40 U/L    ALT 11 0 - 45 U/L     Protein Total 5.0 (L) 6.0 - 8.0 g/dL    Albumin 3.2 (L) 3.5 - 5.0 g/dL    Bilirubin Total 0.5 0.0 - 1.0 mg/dL    GFR Estimate 53 (L) >60 mL/min/1.73m2   CBC with platelets and differential   Result Value Ref Range    WBC Count 2.9 (L) 4.0 - 11.0 10e3/uL    RBC Count 3.37 (L) 4.40 - 5.90 10e6/uL    Hemoglobin 11.3 (L) 13.3 - 17.7 g/dL    Hematocrit 35.5 (L) 40.0 - 53.0 %     (H) 78 - 100 fL    MCH 33.5 (H) 26.5 - 33.0 pg    MCHC 31.8 31.5 - 36.5 g/dL    RDW 15.3 (H) 10.0 - 15.0 %    Platelet Count 271 150 - 450 10e3/uL   Manual Differential   Result Value Ref Range    % Neutrophils 47 %    % Lymphocytes 23 %    % Monocytes 19 %    % Eosinophils 6 %    % Basophils 5 %    Absolute Neutrophils 1.4 (L) 1.6 - 8.3 10e3/uL    Absolute Lymphocytes 0.7 (L) 0.8 - 5.3 10e3/uL    Absolute Monocytes 0.6 0.0 - 1.3 10e3/uL    Absolute Eosinophils 0.2 0.0 - 0.7 10e3/uL    Absolute Basophils 0.1 0.0 - 0.2 10e3/uL    RBC Morphology Confirmed RBC Indices     Platelet Assessment  Automated Count Confirmed. Platelet morphology is normal.     Automated Count Confirmed. Platelet morphology is normal.    Elliptocytes Slight (A) None Seen     Reviewed myeloma labs.      Imaging    Echocardiogram Complete    Result Date: 11/3/2021  [id number]^MAKENZIE^FABRIZIO^[place] [address]  Name: MONICA SCOTT MRN: [id number] : 1945 Study Date: 2021 12:49 PM Age: 76 yrs Gender: Male Patient Location: [facility] Reason For Study: Aortic valve stenosis, etiology of cardiac valve disease unspeci Ordering Physician: FABRIZIO BANEGAS Referring Physician: FABRIZIO BANEGAS Performed By: AH  BSA: 1.9 m2 Height: 69 in Weight: 163 lb HR: 67 BP: 112/67 mmHg ______________________________________________________________________________ ______________________________________________________________________________ Interpretation Summary  There is mild concentric left ventricular  hypertrophy. The visual ejection fraction is 60-65%. Grade II or moderate diastolic dysfunction. Mild valvular aortic stenosis. There is mild (1+) mitral regurgitation. There is mild (1+) tricuspid regurgitation. ______________________________________________________________________________ Left Ventricle The left ventricle is normal in size. There is mild concentric left ventricular hypertrophy. The visual ejection fraction is 60-65%. Diastolic Doppler findings (E/E' ratio and/or other parameters) suggest left ventricular filling pressures are increased. Grade II or moderate diastolic dysfunction. No regional wall motion abnormalities noted.  Right Ventricle The right ventricle is normal size. TAPSE is normal, which is consistent with normal right ventricular systolic function.  Atria The left atrium is mild to moderately dilated. Right atrial size is normal.  Mitral Valve There is moderate mitral annular calcification. The mitral valve leaflets appear thickened, but open well. There is mild (1+) mitral regurgitation.  Tricuspid Valve Tricuspid valve leaflets appear normal. There is mild (1+) tricuspid regurgitation. Right ventricular systolic pressure is normal.  Aortic Valve Moderate aortic valve calcification is present. No aortic regurgitation is present. Mild valvular aortic stenosis.  Pulmonic Valve The pulmonic valve is normal in structure and function.  Vessels The aorta root is normal. Normal size ascending aorta.  Pericardium There is no pericardial effusion.  ______________________________________________________________________________ MMode/2D Measurements & Calculations IVSd: 1.2 cm LVIDd: 3.8 cm LVIDs: 2.7 cm LVPWd: 1.1 cm FS: 29.4 % LV mass(C)d: 148.1 grams LV mass(C)dI: 78.2 grams/m2  Ao root diam: 3.5 cm LA dimension: 3.6 cm asc Aorta Diam: 3.8 cm LA/Ao: 1.0 LVOT diam: 2.2 cm LVOT area: 3.8 cm2 LA Volume (BP): 73.0 ml LA Volume Index (BP): 38.6 ml/m2  LA Volume Indexed (AL/bp): 40.0 ml/m2 RWT:  0.60  Doppler Measurements & Calculations MV E max iam: 94.3 cm/sec MV A max iam: 91.8 cm/sec MV E/A: 1.0 MV max P.5 mmHg MV mean P.2 mmHg MV V2 VTI: 36.1 cm MVA(VTI): 2.3 cm2 MV dec time: 0.31 sec Ao V2 max: 267.1 cm/sec Ao max P.0 mmHg Ao V2 mean: 178.7 cm/sec Ao mean PG: 15.0 mmHg Ao V2 VTI: 62.5 cm JOHANN(I,D): 1.3 cm2 JOHANN(V,D): 1.4 cm2 LV V1 max PG: 3.9 mmHg LV V1 max: 98.2 cm/sec LV V1 VTI: 22.2 cm SV(LVOT): 83.7 ml SI(LVOT): 44.2 ml/m2 PA acc time: 0.12 sec TR max iam: 253.8 cm/sec TR max P.8 mmHg AV Iam Ratio (DI): 0.37 JOHANN Index (cm2/m2): 0.71  E/E' av.1 Lateral E/e': 16.7 Medial E/e': 15.6  ______________________________________________________________________________ Report approved by: Cristina Trotter 2021 02:28 PM           Signed by: CECELIA Elaine CNP

## 2021-11-10 NOTE — PROGRESS NOTES
Pt ambulates to infusion center for lab draw prior to NP visit.  IVAD accessed, labs drawn et sent.  Pt was seen by BRITTANI Valdez CNP today and orders were approved.  Treatment administered as ordered without difficulty.  IVAD flushed w/NS et heparin and needle deaccessed.  Vitamin B12 injection given as ordered.  Pt left clinic stable to [facility].  Plan RTC as scheduled.

## 2021-11-10 NOTE — PROGRESS NOTES
"Oncology Rooming Note    November 10, 2021 8:21 AM   Theo Meyers is a 76 year old male who presents for:    Chief Complaint   Patient presents with     Oncology Clinic Visit     Multiple myeloma (H)     Initial Vitals: BP 95/54   Pulse 69   Temp 97.8  F (36.6  C)   Resp 16   Wt 75.8 kg (167 lb)   SpO2 99%   BMI 24.66 kg/m   Estimated body mass index is 24.66 kg/m  as calculated from the following:    Height as of 10/5/21: 1.753 m (5' 9\").    Weight as of this encounter: 75.8 kg (167 lb). Body surface area is 1.92 meters squared.  No Pain (0) Comment: Data Unavailable   No LMP for male patient.  Allergies reviewed: Yes  Medications reviewed: Yes    Medications: Medication refills not needed today.  Pharmacy name entered into [facility]:    [id number] - [place], [place] - [address] [facility] [address]  [facility] - [place], [place] - [address]    Clinical concerns: None       Lucía Avalos            "

## 2021-11-10 NOTE — LETTER
"    11/10/2021         RE: Theo Meyers  [address]  [facility] [address]        Dear Colleague,    Thank you for referring your patient, Theo Meyers, to the [facility] [place]. Please see a copy of my visit note below.    Oncology Rooming Note    November 10, 2021 8:21 AM   Theo Meyers is a 76 year old male who presents for:    Chief Complaint   Patient presents with     Oncology Clinic Visit     Multiple myeloma (H)     Initial Vitals: BP 95/54   Pulse 69   Temp 97.8  F (36.6  C)   Resp 16   Wt 75.8 kg (167 lb)   SpO2 99%   BMI 24.66 kg/m   Estimated body mass index is 24.66 kg/m  as calculated from the following:    Height as of 10/5/21: 1.753 m (5' 9\").    Weight as of this encounter: 75.8 kg (167 lb). Body surface area is 1.92 meters squared.  No Pain (0) Comment: Data Unavailable   No LMP for male patient.  Allergies reviewed: Yes  Medications reviewed: Yes    Medications: Medication refills not needed today.  Pharmacy name entered into "[employer]":    [id number] - [place], [place] - [address] AT [facility] [address]  [facility]  [place], [place] - [address] [place] [address]    Clinical concerns: None       Lucía Avalos              [facility] Hematology and Oncology Progress Note    Patient: Theo Meyers  MRN: [id number]  Date of Service: Nov 10, 2021          Reason for Visit    Chief Complaint   Patient presents with     Oncology Clinic Visit     Multiple myeloma (H)       Assessment and Plan    Cancer Staging  No matching staging information was found for the patient.    1.  Myeloma: pt continues on daratumumab and dexamethasone. This regimen was initiated in October 2020. Has been tolerating, but light chains are slowly going up. He has received daratumumab weekly for 8 weeks. Started every other week dosing in December. Now getting it every 4 weeks.  We did increase pomalyst to 3mg. Will continue that for now. If " light chains continue to go up, we may try pomalyst at 4mg if he can tolerate.  Will get labs and daratumumab in 4 weeks and see Dr. Clifford in 8 weeks.      2. DVT: This is recurrent.  Will be on xarelto indefinitely.      3. Anemia: likely from treatment and myeloma. No complications. Will continue to monitor.      ECOG Performance    0 - Independent    Distress Screening (within last 30 days)    No data recorded     Pain  Pain Score: No Pain (0)    Problem List    Patient Active Problem List   Diagnosis     Multiple myeloma (H)     Unspecified glaucoma     Cataract     DVT (deep venous thrombosis) (H)     Shingles     Shingles (herpes zoster) polyneuropathy     Back pain     Post herpetic neuralgia     Pancytopenia (H)     Syncope and collapse     Elevated INR     History of DVT (deep vein thrombosis)     Chronic deep vein thrombosis (DVT) of left lower extremity, unspecified vein (H)     Spinal stenosis of lumbar region without neurogenic claudication     Anemia, vitamin B12 deficiency     Chemotherapy-induced neutropenia (H)     Thrombocytopenia (H)     Paroxysmal atrial fibrillation (H)     Nonrheumatic aortic valve stenosis     SAMANO (dyspnea on exertion)     Immunocompromised state (H)     Neutropenic fever (H)     Personal history of DVT (deep vein thrombosis)     Paroxysmal A-fib (H)        ______________________________________________________________________________    History of Present Illness    DIAGNOSIS:   1. IgG kappa light chain myeloma. Hyposecretory. Diagnosed 2009. No significant measurable M protein. 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 There was also on 11;14 translocation.   2. Deep vein thrombosis of the left leg diagnosed 09/2012 while on treatment.       TREATMENT:   Started Daratumumab in October 2020. Dexamethasone weekly as well.  Today is cycle 15. Pomalyst added with cycle 3.          PAST TREATMENT and HISTORY:   Velcade every 2 weeks since 09/2012. He is getting  dexamethasone 20 mg every other week with the velcade. Finally, he is receiving Revlimid 20 mg daily for 3 weeks on, 1 week off.      He presented at diagnosis with a lytic lesion involving the C6 vertebral body, although no measurable M protein. IgG kappa monoclonal immunoglobulin was confirmed by [facility] as well as elevated kappa free light chains with an abnormal kappa lambda ratio. Bone marrow biopsy showed 30% involvement and cytogenetics confirmed deletion of 13 q. as well as 11;14 translocation. He was initially treated with Velcade and dexamethasone and achieved a good partial response. He was referred to the [facility] where he underwent high-dose chemotherapy with autologous peripheral stem cell transplant in April of 2010. He began Revlimid as maintenance therapy post transplant. Repeat bone marrow biopsy done October 2010 showed about 5% kappa restricted plasma cells and so at that time weekly Velcade was added along with his maintenance Revlimid and dexamethasone. He has done well since that time with stable minimal residual disease, best assessed by serum free light chains. He required dose reductions of weekly Velcade because of cytopenias and was ultimately switched to a q.2 week maintenance schedule which he has been on since September 2012. His Revlimid dose is 20 mg daily and he continues on dexamethasone 20 mg p.o. q. Week.       INTERIM HISTORY:     Pt is here today for follow up.  Patient is doing pretty well.  No new issues. Feeling fine. No new bone or back pain.     Review of Systems    Pertinent items are noted in HPI.    Past History    Past Medical History:   Diagnosis Date     Anemia 12/13/2017     Arthritis      Bilateral inguinal hernia      Chest tube in place      Glaucoma      Glaucoma      History of blood clots     DVT - left chronic     Multiple myeloma (H)     Gets chemo infusions every 2 weeks     Pancytopenia (H)      Parapneumonic effusion      Peripheral neuropathy       Syncope      TMJ (temporomandibular joint disorder)        PHYSICAL EXAM  BP 95/54   Pulse 69   Temp 97.8  F (36.6  C)   Resp 16   Wt 75.8 kg (167 lb)   SpO2 99%   BMI 24.66 kg/m      GENERAL: no acute distress. Cooperative in conversation. Here alone. Mask on  RESP: Regular respiratory rate. No expiratory wheezes   MUSCULOSKELETAL: no bilateral leg swelling  NEURO: non focal. Alert and oriented x3.   PSYCH: within normal limits. No depression or anxiety.  SKIN: exposed skin is dry intact.  He does have a Band-Aid on his chest that he change it this morning.  There is a little bit of mild redness around that but per patient it is improved.      Lab Results    Recent Results (from the past 168 hour(s))   Echocardiogram Complete   Result Value Ref Range    LVEF  60-65%    Comprehensive metabolic panel   Result Value Ref Range    Sodium 140 136 - 145 mmol/L    Potassium 4.6 3.5 - 5.0 mmol/L    Chloride 109 (H) 98 - 107 mmol/L    Carbon Dioxide (CO2) 26 22 - 31 mmol/L    Anion Gap 5 5 - 18 mmol/L    Urea Nitrogen 17 8 - 28 mg/dL    Creatinine 1.30 0.70 - 1.30 mg/dL    Calcium 8.9 8.5 - 10.5 mg/dL    Glucose 142 (H) 70 - 125 mg/dL    Alkaline Phosphatase 53 45 - 120 U/L    AST 14 0 - 40 U/L    ALT 11 0 - 45 U/L    Protein Total 5.0 (L) 6.0 - 8.0 g/dL    Albumin 3.2 (L) 3.5 - 5.0 g/dL    Bilirubin Total 0.5 0.0 - 1.0 mg/dL    GFR Estimate 53 (L) >60 mL/min/1.73m2   CBC with platelets and differential   Result Value Ref Range    WBC Count 2.9 (L) 4.0 - 11.0 10e3/uL    RBC Count 3.37 (L) 4.40 - 5.90 10e6/uL    Hemoglobin 11.3 (L) 13.3 - 17.7 g/dL    Hematocrit 35.5 (L) 40.0 - 53.0 %     (H) 78 - 100 fL    MCH 33.5 (H) 26.5 - 33.0 pg    MCHC 31.8 31.5 - 36.5 g/dL    RDW 15.3 (H) 10.0 - 15.0 %    Platelet Count 271 150 - 450 10e3/uL   Manual Differential   Result Value Ref Range    % Neutrophils 47 %    % Lymphocytes 23 %    % Monocytes 19 %    % Eosinophils 6 %    % Basophils 5 %    Absolute Neutrophils 1.4  (L) 1.6 - 8.3 10e3/uL    Absolute Lymphocytes 0.7 (L) 0.8 - 5.3 10e3/uL    Absolute Monocytes 0.6 0.0 - 1.3 10e3/uL    Absolute Eosinophils 0.2 0.0 - 0.7 10e3/uL    Absolute Basophils 0.1 0.0 - 0.2 10e3/uL    RBC Morphology Confirmed RBC Indices     Platelet Assessment  Automated Count Confirmed. Platelet morphology is normal.     Automated Count Confirmed. Platelet morphology is normal.    Elliptocytes Slight (A) None Seen     Reviewed myeloma labs.      Imaging    Echocardiogram Complete    Result Date: 11/3/2021  [id number]^MAKENZIE^FABRIZIO^[place] [address]  Name: MONICA SCOTT MRN: [id number] : 1945 Study Date: 2021 12:49 PM Age: 76 yrs Gender: Male Patient [facility]: [facility] Reason For Study: Aortic valve stenosis, etiology of cardiac valve disease unspeci Ordering Physician: FABRIZIO BANEGAS Referring Physician: FABRIZIO BANEGAS Performed By: ILIR  BSA: 1.9 m2 Height: 69 in Weight: 163 lb HR: 67 BP: 112/67 mmHg ______________________________________________________________________________ ______________________________________________________________________________ Interpretation Summary  There is mild concentric left ventricular hypertrophy. The visual ejection fraction is 60-65%. Grade II or moderate diastolic dysfunction. Mild valvular aortic stenosis. There is mild (1+) mitral regurgitation. There is mild (1+) tricuspid regurgitation. ______________________________________________________________________________ Left Ventricle The left ventricle is normal in size. There is mild concentric left ventricular hypertrophy. The visual ejection fraction is 60-65%. Diastolic Doppler findings (E/E' ratio and/or other parameters) suggest left ventricular filling pressures are increased. Grade II or moderate diastolic dysfunction. No regional wall motion abnormalities noted.  Right Ventricle The right ventricle is normal size. TAPSE is  normal, which is consistent with normal right ventricular systolic function.  Atria The left atrium is mild to moderately dilated. Right atrial size is normal.  Mitral Valve There is moderate mitral annular calcification. The mitral valve leaflets appear thickened, but open well. There is mild (1+) mitral regurgitation.  Tricuspid Valve Tricuspid valve leaflets appear normal. There is mild (1+) tricuspid regurgitation. Right ventricular systolic pressure is normal.  Aortic Valve Moderate aortic valve calcification is present. No aortic regurgitation is present. Mild valvular aortic stenosis.  Pulmonic Valve The pulmonic valve is normal in structure and function.  Vessels The aorta root is normal. Normal size ascending aorta.  Pericardium There is no pericardial effusion.  ______________________________________________________________________________ MMode/2D Measurements & Calculations IVSd: 1.2 cm LVIDd: 3.8 cm LVIDs: 2.7 cm LVPWd: 1.1 cm FS: 29.4 % LV mass(C)d: 148.1 grams LV mass(C)dI: 78.2 grams/m2  Ao root diam: 3.5 cm LA dimension: 3.6 cm asc Aorta Diam: 3.8 cm LA/Ao: 1.0 LVOT diam: 2.2 cm LVOT area: 3.8 cm2 LA Volume (BP): 73.0 ml LA Volume Index (BP): 38.6 ml/m2  LA Volume Indexed (AL/bp): 40.0 ml/m2 RWT: 0.60  Doppler Measurements & Calculations MV E max iam: 94.3 cm/sec MV A max iam: 91.8 cm/sec MV E/A: 1.0 MV max P.5 mmHg MV mean P.2 mmHg MV V2 VTI: 36.1 cm MVA(VTI): 2.3 cm2 MV dec time: 0.31 sec Ao V2 max: 267.1 cm/sec Ao max P.0 mmHg Ao V2 mean: 178.7 cm/sec Ao mean PG: 15.0 mmHg Ao V2 VTI: 62.5 cm JOHANN(I,D): 1.3 cm2 JOHANN(V,D): 1.4 cm2 LV V1 max PG: 3.9 mmHg LV V1 max: 98.2 cm/sec LV V1 VTI: 22.2 cm SV(LVOT): 83.7 ml SI(LVOT): 44.2 ml/m2 PA acc time: 0.12 sec TR max iam: 253.8 cm/sec TR max P.8 mmHg AV Iam Ratio (DI): 0.37 JOHANN Index (cm2/m2): 0.71  E/E' av.1 Lateral E/e': 16.7 Medial E/e': 15.6  ______________________________________________________________________________ Report  approved by: Cristina Trotter 11/03/2021 02:28 PM           Signed by: CECELIA Elaine CNP      Again, thank you for allowing me to participate in the care of your patient.        Sincerely,        CECELIA Elaine CNP

## 2021-11-12 NOTE — PROGRESS NOTES
[facility] clinic Follow Up Note    Theo Meyers   76 year old male    Date of Visit: 11/12/2021    Chief Complaint   Patient presents with     RECHECK     F/U Echo/US     Subjective  Theo is here to discuss evidence of arterial disease on his studies.    October 2021 LDL 95 and HDL 51 without medication.  He is not been on a cholesterol medication in the past.    December 2020 CT scan of the chest showed moderate to severe coronary calcification of the LAD and severe aortic atherosclerosis.    October 2021 ultrasound of his carotids showed 50-69% stenosis with plaque in the left and just mild plaque on the right.  No history of stroke.    Patient has multiple myeloma since 2009.  Underwent a bone marrow transplant in 2010.  He is on maintenance chemotherapy.  He is having rising light chain levels, unclear prognosis but otherwise doing well at this time.    History of DVT in 2018, on chronic Xarelto.    His echo in October showed just mild aortic stenosis, stable.    Patient gets his liver tests checked monthly through the oncology office and his AST ALT have been normal.  No history of liver disease.    No significant muscle achiness or weakness.  He exercises regularly and does lift light weights.    Is not had chest pain or chest pressure or increasing shortness of breath.  His blood pressure is always been low to normal without medication.  He has not had a heart event in the past.  No chest pain.  Good exertional ability.    CK was normal last month    PMHx:    Past Medical History:   Diagnosis Date     Anemia 12/13/2017     Arthritis      Bilateral inguinal hernia      Chest tube in place      Glaucoma      Glaucoma      History of blood clots     DVT - left chronic     Multiple myeloma (H)     Gets chemo infusions every 2 weeks     Pancytopenia (H)      Parapneumonic effusion      Peripheral neuropathy      Syncope      TMJ (temporomandibular joint disorder)      PSHx:    Past Surgical  History:   Procedure Laterality Date     APPENDECTOMY      Age 16     ESOPHAGOSCOPY, GASTROSCOPY, DUODENOSCOPY (EGD), COMBINED N/A 12/14/2017    Procedure: ESOPHAGOGASTRODUODENOSCOPY (EGD) WITH BIOPSYS;  Surgeon: Marlon Roy MD;  Location: [facility];  Service:      ESOPHAGOSCOPY, GASTROSCOPY, DUODENOSCOPY (EGD), COMBINED N/A 1/19/2018    Procedure: ENDOSCOPIC ULTRASOUND  ESOPHAGOGASTRODUODENOSCOPY WITH DUODENAL POLYP REMOVAL;  Surgeon: Familia Mcgraw MD;  Location: [facility] OR;  Service:      EYE SURGERY      Cataract     IR MISCELLANEOUS PROCEDURE  6/17/2009     IR MISCELLANEOUS PROCEDURE  7/31/2009     IR MISCELLANEOUS PROCEDURE  8/13/2009     IR PLEURAL DRAINAGE WITH CATHETER INSERTION  7/2/2019     PORTACATH PLACEMENT       RELEASE CARPAL TUNNEL Bilateral      US THORACENTESIS  6/27/2019     VERTEBROPLASTY      T6     WISDOM TOOTH EXTRACTION       Immunizations:   Immunization History   Administered Date(s) Administered     COVID-19,PF,Pfizer (12+ Yrs) 02/12/2021, 03/05/2021, 08/26/2021     FLU 6-35 months 11/17/2011, 09/30/2013     Hep B, Peds or Adolescent 05/31/2012     HepB, Unspecified 03/01/2011, 05/19/2011     Hib (PRP-T) 05/19/2011, 05/24/2012     Hib, Unspecified 03/01/2011, 05/19/2011     Influenza (High Dose) 3 valent vaccine 10/07/2015, 09/21/2016, 09/20/2017, 09/26/2018, 10/03/2019     Influenza Vaccine IM > 6 months Valent IIV4 (Alfuria,Fluzone) 09/24/2014     Influenza, Quad, High Dose, Pf, 65yr+ (Fluzone HD) 09/30/2020, 10/13/2021     Pneumo Conj 13-V (2010&after) 10/16/2015     Pneumococcal 23 valent 05/19/2011, 05/24/2012     Poliovirus, inactivated (IPV) 03/01/2011, 05/19/2011, 05/31/2012     Tdap (Adacel,Boostrix) 03/01/2011     Tetanus 05/19/2011       ROS A comprehensive review of systems was performed and was otherwise negative    Medications, allergies, and problem list were reviewed and updated    Exam  BP 91/57   Pulse 65   Temp 97.8  F (36.6  C)   Wt 75.3 kg (165 lb  14.4 oz)   SpO2 100%   BMI 24.50 kg/m    Appears well.  Heart regular with 3 out of 6 systolic murmur.  No edema.  Abdomen thin, nontender.    Assessment/Plan  1. Coronary artery calcification seen on CAT scan  I did review the CT scan with patient today.  Does have evidence of vascular disease with calcification.    His cholesterol levels are excellent, but I did discuss with patient that there is some evidence to suggest benefit with reduced heart attack event in people who are high risk for arterial disease, regardless of cholesterol levels.    I did discuss toxicity risk with atorvastatin including muscle and liver toxicity.  He will get his liver test checked monthly through oncology office.    Patient does wish to proceed with atorvastatin at this time.  Patient accepts risk of medication.  See patient instructions.  - atorvastatin (LIPITOR) 10 MG tablet; Take 1 tablet (10 mg) by mouth daily  Dispense: 90 tablet; Refill: 1    2. Asymptomatic carotid artery stenosis, left    - atorvastatin (LIPITOR) 10 MG tablet; Take 1 tablet (10 mg) by mouth daily  Dispense: 90 tablet; Refill: 1    3. Spinal stenosis of lumbar region without neurogenic claudication  No flare    4. Personal history of DVT (deep vein thrombosis)  Chronic Xarelto    5. Multiple myeloma, remission status unspecified (H)  Management per oncology, follow-up in January with oncology.  He can discuss his prognosis with oncologist.  If his prognosis significantly change for multiple myeloma, he may reconsider the atorvastatin.    6. History of hypokalemia  Patient reports that he is now taking the potassium every other day.  He had some high levels of potassium earlier.  His potassium level was normal earlier this week, continue current potassium supplement  - potassium chloride ER (KLOR-CON M) 20 MEQ CR tablet; Take 1 tablet (20 mEq) by mouth every other day      Return in about 2 months (around 1/12/2022).,  Video visit.  Patient can do Doximity  on his phone  Patient Instructions   Start atorvastatin 10 mg every day in order to reduce chance of heart attack or stroke with your evidence of arterial disease.    If you have significant increasing muscle achiness or weakness, or abdominal pain or significant side effects, contact me and stop atorvastatin.    Otherwise set up a virtual visit to discuss this with me in January, after you see your oncologist.    Monitor your liver tests on a monthly basis through oncology office, as you are doing.  If your AST/ALT becomes abnormal, stop atorvastatin and contact me.    Mathew Ott MD, MD        Current Outpatient Medications   Medication Sig Dispense Refill     acetaminophen (TYLENOL) 500 MG tablet Take 500-1,000 mg by mouth       acyclovir (ZOVIRAX) 400 MG tablet TAKE 1 TABLET BY MOUTH TWICE DAILY 180 tablet 1     atorvastatin (LIPITOR) 10 MG tablet Take 1 tablet (10 mg) by mouth daily 90 tablet 1     bimatoprost (LUMIGAN) 0.03 % ophthalmic drops Place 1 drop into both eyes At Bedtime.       brimonidine (ALPHAGAN P) 0.1 % SOLN Place 1 drop into both eyes every 8 hours.       calcium carbonate 1250 (500 Ca) MG CHEW Take 1 tablet by mouth       calcium carbonate-vitamin D (OYSTER SHELL CALCIUM/D) 500-200 MG-UNIT tablet Take 1 tablet by mouth       Calcium-Vitamin D (CALCIUM + D PO) Take 1 tablet by mouth daily.       diphenoxylate-atropine (LOMOTIL) 2.5-0.025 MG tablet TAKE 1 TABLET BY MOUTH FOUR TIMES DAILY AS NEEDED FOR DIARRHEA       gabapentin (NEURONTIN) 300 MG capsule TAKE 1 CAPSULE(300 MG) BY MOUTH DAILY AS NEEDED 30 capsule 5     Multiple Vitamin (MULTI-VITAMIN PO) Take 1 capsule by mouth daily.       Omega-3 Fatty Acids (OMEGA 3 PO) TAKE AS DIRECTED        omeprazole (PRILOSEC) 20 MG DR capsule TAKE 1 CAPSULE BY MOUTH ONCE DAILY 90 capsule 3     pomalidomide (POMALYST) 3 MG capsule Take 1 capsule (3 mg) by mouth daily Swallow whole, do NOT break, crush, chew or open capsule. Take on days 1-21 of repeated  28 day cycles. 21 capsule 0     potassium chloride ER (KLOR-CON M) 20 MEQ CR tablet Take 1 tablet (20 mEq) by mouth every other day       Psyllium (METAMUCIL PO) USE AS DIRECTED        tamsulosin (FLOMAX) 0.4 MG capsule TAKE 1 CAPSULE BY MOUTH EVERY DAY 90 capsule 1     UNABLE TO FIND MEDICATION NAME: Certazine       XARELTO ANTICOAGULANT 20 MG TABS tablet        fluticasone (FLONASE) 50 MCG/ACT nasal spray Spray 2 sprays into both nostrils daily (Patient not taking: Reported on 2021)       loratadine (CLARITIN REDITABS) 10 MG dissolvable tablet Take 1 tablet by mouth daily. (Patient not taking: Reported on 2021)       senna-docusate (SENNA PLUS) 8.6-50 MG per tablet Take 2 tablets by mouth 2 times daily as needed. (Patient not taking: Reported on 2021)       sulfamethoxazole-trimethoprim (BACTRIM DS) 800-160 MG per tablet Take 1 tablet by mouth 2 times daily. ON Monday AND Tuesday  (Patient not taking: Reported on 11/10/2021)       Allergies   Allergen Reactions     Centex-Pse Er [Pseudoephedrine-Guaifenesin Cr]      TB12     Social History     Tobacco Use     Smoking status: Former Smoker     Quit date: 1975     Years since quittin.0     Smokeless tobacco: Never Used   Substance Use Topics     Alcohol use: Not Currently     Comment: Alcoholic Drinks/day: occasional     Drug use: No

## 2021-11-12 NOTE — PATIENT INSTRUCTIONS
Start atorvastatin 10 mg every day in order to reduce chance of heart attack or stroke with your evidence of arterial disease.    If you have significant increasing muscle achiness or weakness, or abdominal pain or significant side effects, contact me and stop atorvastatin.    Otherwise set up a virtual visit to discuss this with me in January, after you see your oncologist.    Monitor your liver tests on a monthly basis through oncology office, as you are doing.  If your AST/ALT becomes abnormal, stop atorvastatin and contact me.

## 2021-11-17 NOTE — ORAL ONC MGMT
Oral Chemotherapy Monitoring Program    Subjective/Objective:  Theo Meyers is a 76 year old male with multiple myeloma contacted by phone for a follow-up visit for oral chemotherapy. Laura Valdez reached out and would like Theo to increase to pomalidomide 4 mg daily for 21 days of each 28 day cycle due to slight progression of his MM. They had discussed that this was a possibility at his last appointment.    Assessment/Plan:  Keith is in agreement of the increase and is aware to finish his current cycle of pomalidomide 3 mg. We will release 4 mg for his next cycle, which he is due to start 12/8/21.     Follow-Up:  We will call Keith about a week into his next cycle to see how he is tolerating the dose increase.    Refill Due:  11/24/2021    Sudarshan Zaragoza, PharmD  Oral Chemotherapy Pharmacist  [phone]

## 2021-11-17 NOTE — TELEPHONE ENCOUNTER
Triage Call:     Pt calling to find out how long a person can be contagious with COVID-19. He has a guest coming that tested positive for covid 2 weeks ago and another person that tested negative but was exposed within the last few days due to living with people with COVID-19.     Pt was given information about testing and general covid-19 answers    Gretchen Duncan RN  [facility] Nurse Advisor 3:12 PM 11/17/2021    Reason for Disposition    COVID-19 Testing, questions about    Protocols used: CORONAVIRUS (COVID-19) EXPOSURE-A- 8.25.2021    COVID 19 Nurse Triage Plan/Patient Instructions    Please be aware that novel coronavirus (COVID-19) may be circulating in the community. If you develop symptoms such as fever, cough, or SOB or if you have concerns about the presence of another infection including coronavirus (COVID-19), please contact your health care provider or visit [email or web address].     Disposition/Instructions    Home care recommended. Follow home care protocol based instructions.    Thank you for taking steps to prevent the spread of this virus.  o Limit your contact with others.  o Wear a simple mask to cover your cough.  o Wash your hands well and often.    Resources    [facility]: About COVID-19: [email or web address]    CDC: What to Do If You're Sick: www.cdc.gov/coronavirus/2019-ncov/about/steps-when-sick.html    CDC: Ending Home Isolation: www.cdc.gov/coronavirus/2019-ncov/hcp/disposition-in-home-patients.html     CDC: Caring for Someone: www.cdc.gov/coronavirus/2019-ncov/if-you-are-sick/care-for-someone.html     [facility]: Interim Guidance for Hospital Discharge to Home: www.health.[facility]/diseases/coronavirus/hcp/hospdischarge.pdf    [facility] clinical trials (COVID-19 research studies): [email or web address]-trials     Below are the COVID-19 hotlines at the [place] [facility] ([facility]). Interpreters are available.   o For [place]  questions: Call 816-012-5720 or 1-260.417.4085 (7 a.m. to 7 p.m.)  o For questions about schools and childcare: Call 048-792-2117 or 1-380.359.3193 (7 a.m. to 7 p.m.)

## 2021-12-08 NOTE — PROGRESS NOTES
Pt ambulates to infusion center for labs and treatment.  IVAD accessed, labs drawn et sent w/results noted.  Pt voices no new concerns today.  Treatment administered as ordered without difficulty.  IVAD flushed w/NS et heparin and needle deaccessed.  Pt left clinic stable to lobby.  Plan RTC as scheduled.

## 2021-12-14 NOTE — ORAL ONC MGMT
Oral Chemotherapy Monitoring Program    Subjective/Objective:  Theo Meyers is a 76 year old male contacted by phone for a follow-up visit for oral chemotherapy.  Keith said he started the increased pomalidomide dose 4 mg last Wednesday 12/8 as planned. He hasn't noticed any new side effects or any worsened side effects thus far. He did have questions on if the COVID vaccines had any impact on efficacy of the current regimen. Did discuss that usually I think of immunocompromised status affecting immune response to vaccines instead of the opposite effect, and would still recommend getting the COVID booster shot (which he said he received already). Did recommend he bring this up to Dr. Clifford for his expert opinion.     ORAL CHEMOTHERAPY 8/3/2021 8/26/2021 9/29/2021 10/27/2021 11/30/2021 12/14/2021   Assessment Type Monthly Follow up Incoming phone call Refill Refill Refill Other   Diagnosis Code Multiple Myeloma Multiple Myeloma Multiple Myeloma Multiple Myeloma Multiple Myeloma Multiple Myeloma   Providers Dr. Venessa Clifford   Clinic Name/Location [facility]   Drug Name Pomalyst (pomalidomide) Pomalyst (pomalidomide) Pomalyst (pomalidomide) Pomalyst (pomalidomide) Pomalyst (pomalidomide) Pomalyst (pomalidomide)   Dose 3 mg 3 mg 3 mg 3 mg 4 mg 4 mg   Current Schedule Daily Daily Daily Daily Daily Daily   Cycle Details 3 weeks on, 1 week off 3 weeks on, 1 week off 3 weeks on, 1 week off 3 weeks on, 1 week off 3 weeks on, 1 week off 3 weeks on, 1 week off   Start Date of Last Cycle 7/21/2021 8/15/2021 - - - 12/8/2021   Planned next cycle start date 8/17/2021 9/12/2021 - - - 1/5/2022   Doses missed in last 2 weeks - 0 - - - -   Adherence Assessment - Adherent - - - -   Adverse Effects - No AE identified during assessment - - - -   Any new drug interactions? - No - - - -   Is the dose as ordered appropriate for  "the patient? - Yes - - - -       Last PHQ-2 Score on record:   PHQ-2 ( 1999 Pf[employer]) 10/5/2021 8/23/2021   Q1: Little interest or pleasure in doing things 0 0   Q2: Feeling down, depressed or hopeless 0 0   PHQ-2 Score 0 0   PHQ-2 Total Score (12-17 Years)- Positive if 3 or more points; Administer PHQ-A if positive 0 0   Q1: Little interest or pleasure in doing things Not at all -   Q2: Feeling down, depressed or hopeless Not at all -   PHQ-2 Score 0 -       Vitals:  BP:   BP Readings from Last 1 Encounters:   12/08/21 119/57     Wt Readings from Last 1 Encounters:   11/12/21 75.3 kg (165 lb 14.4 oz)     Estimated body surface area is 1.91 meters squared as calculated from the following:    Height as of 10/5/21: 1.753 m (5' 9\").    Weight as of 11/12/21: 75.3 kg (165 lb 14.4 oz).    Labs:  _  Result Component Current Result Ref Range   Sodium 142 (12/8/2021) 136 - 145 mmol/L     _  Result Component Current Result Ref Range   Potassium 4.3 (12/8/2021) 3.5 - 5.0 mmol/L     _  Result Component Current Result Ref Range   Calcium 9.3 (12/8/2021) 8.5 - 10.5 mg/dL     No results found for Mag within last 30 days.     No results found for Phos within last 30 days.     _  Result Component Current Result Ref Range   Albumin 3.0 (L) (12/8/2021) 3.5 - 5.0 g/dL     _  Result Component Current Result Ref Range   Urea Nitrogen 16 (12/8/2021) 8 - 28 mg/dL     _  Result Component Current Result Ref Range   Creatinine 1.13 (12/8/2021) 0.70 - 1.30 mg/dL     _  Result Component Current Result Ref Range   AST 19 (12/8/2021) 0 - 40 U/L     _  Result Component Current Result Ref Range   ALT 10 (12/8/2021) 0 - 45 U/L     _  Result Component Current Result Ref Range   Bilirubin Total 0.4 (12/8/2021) 0.0 - 1.0 mg/dL     _  Result Component Current Result Ref Range   WBC Count 2.8 (L) (12/8/2021) 4.0 - 11.0 10e3/uL     _  Result Component Current Result Ref Range   Hemoglobin 11.0 (L) (12/8/2021) 13.3 - 17.7 g/dL     _  Result Component " Current Result Ref Range   Platelet Count 218 (12/8/2021) 150 - 450 10e3/uL     _  Result Component Current Result Ref Range   Absolute Neutrophils 1.2 (L) (12/8/2021) 1.6 - 8.3 10e3/uL     Assessment/Plan:  Keith is tolerating pomalidomide 4 mg dose increase so far. He will continue with his three weeks on, one week off and will get labs prior to next cycle start date.    Follow-Up:  Will review 1/5/22 appt and labs. Will plan to call one more time during next cycle and then can move back to quarterly if appropriate at that time.     Refill Due:  12/27/21    Lupe Garcia, PharmD, BCOP  Oral Chemotherapy Pharmacist  [phone]

## 2022-01-01 ENCOUNTER — HOSPITAL ENCOUNTER (OUTPATIENT)
Dept: CARDIOLOGY | Facility: CLINIC | Age: 77
Discharge: HOME | End: 2022-07-01
Attending: INTERNAL MEDICINE
Payer: MEDICARE

## 2022-01-01 ENCOUNTER — APPOINTMENT (OUTPATIENT)
Dept: LAB | Facility: CLINIC | Age: 77
End: 2022-01-01
Attending: STUDENT IN AN ORGANIZED HEALTH CARE EDUCATION/TRAINING PROGRAM
Payer: MEDICARE

## 2022-01-01 ENCOUNTER — TELEPHONE (OUTPATIENT)
Dept: CARDIOLOGY | Facility: CLINIC | Age: 77
End: 2022-01-01

## 2022-01-01 ENCOUNTER — OFFICE VISIT (OUTPATIENT)
Dept: INTERNAL MEDICINE | Facility: CLINIC | Age: 77
End: 2022-01-01
Payer: MEDICARE

## 2022-01-01 ENCOUNTER — VIRTUAL VISIT (OUTPATIENT)
Dept: CARDIOLOGY | Facility: CLINIC | Age: 77
End: 2022-01-01
Attending: INTERNAL MEDICINE
Payer: MEDICARE

## 2022-01-01 ENCOUNTER — TELEPHONE (OUTPATIENT)
Dept: ONCOLOGY | Facility: HOSPITAL | Age: 77
End: 2022-01-01
Payer: MEDICARE

## 2022-01-01 ENCOUNTER — APPOINTMENT (OUTPATIENT)
Dept: LAB | Facility: CLINIC | Age: 77
End: 2022-01-01
Attending: PHYSICIAN ASSISTANT
Payer: MEDICARE

## 2022-01-01 ENCOUNTER — LAB (OUTPATIENT)
Dept: LAB | Facility: CLINIC | Age: 77
End: 2022-01-01
Attending: INTERNAL MEDICINE
Payer: MEDICARE

## 2022-01-01 ENCOUNTER — INFUSION THERAPY VISIT (OUTPATIENT)
Dept: TRANSPLANT | Facility: CLINIC | Age: 77
End: 2022-01-01
Attending: PHYSICIAN ASSISTANT
Payer: MEDICARE

## 2022-01-01 ENCOUNTER — LAB (OUTPATIENT)
Dept: INFUSION THERAPY | Facility: HOSPITAL | Age: 77
End: 2022-01-01
Attending: INTERNAL MEDICINE
Payer: MEDICARE

## 2022-01-01 ENCOUNTER — TELEPHONE (OUTPATIENT)
Dept: INFECTIOUS DISEASES | Facility: CLINIC | Age: 77
End: 2022-01-01
Payer: MEDICARE

## 2022-01-01 ENCOUNTER — APPOINTMENT (OUTPATIENT)
Dept: LAB | Facility: CLINIC | Age: 77
End: 2022-01-01
Attending: NURSE PRACTITIONER
Payer: MEDICARE

## 2022-01-01 ENCOUNTER — OFFICE VISIT (OUTPATIENT)
Dept: TRANSPLANT | Facility: CLINIC | Age: 77
End: 2022-01-01
Attending: STUDENT IN AN ORGANIZED HEALTH CARE EDUCATION/TRAINING PROGRAM
Payer: MEDICARE

## 2022-01-01 ENCOUNTER — APPOINTMENT (OUTPATIENT)
Dept: NUCLEAR MEDICINE | Facility: CLINIC | Age: 77
DRG: 193 | End: 2022-01-01
Attending: INTERNAL MEDICINE
Payer: MEDICARE

## 2022-01-01 ENCOUNTER — ONCOLOGY VISIT (OUTPATIENT)
Dept: ONCOLOGY | Facility: HOSPITAL | Age: 77
End: 2022-01-01
Attending: INTERNAL MEDICINE
Payer: MEDICARE

## 2022-01-01 ENCOUNTER — INFUSION THERAPY VISIT (OUTPATIENT)
Dept: TRANSPLANT | Facility: CLINIC | Age: 77
End: 2022-01-01
Attending: INTERNAL MEDICINE
Payer: MEDICARE

## 2022-01-01 ENCOUNTER — LAB (OUTPATIENT)
Dept: CARDIOLOGY | Facility: CLINIC | Age: 77
End: 2022-01-01
Payer: MEDICARE

## 2022-01-01 ENCOUNTER — APPOINTMENT (OUTPATIENT)
Dept: CT IMAGING | Facility: CLINIC | Age: 77
DRG: 193 | End: 2022-01-01
Attending: INTERNAL MEDICINE
Payer: MEDICARE

## 2022-01-01 ENCOUNTER — TELEPHONE (OUTPATIENT)
Dept: CARDIOLOGY | Facility: CLINIC | Age: 77
End: 2022-01-01
Payer: MEDICARE

## 2022-01-01 ENCOUNTER — HOSPITAL ENCOUNTER (OUTPATIENT)
Dept: NUCLEAR MEDICINE | Facility: CLINIC | Age: 77
Setting detail: NUCLEAR MEDICINE
Discharge: HOME | End: 2022-05-03
Attending: INTERNAL MEDICINE
Payer: MEDICARE

## 2022-01-01 ENCOUNTER — HOSPITAL ENCOUNTER (OUTPATIENT)
Facility: CLINIC | Age: 77
Discharge: HOME | End: 2022-07-25
Attending: INTERNAL MEDICINE | Admitting: INTERNAL MEDICINE
Payer: MEDICARE

## 2022-01-01 ENCOUNTER — RESEARCH ENCOUNTER (OUTPATIENT)
Dept: TRANSPLANT | Facility: CLINIC | Age: 77
End: 2022-01-01

## 2022-01-01 ENCOUNTER — APPOINTMENT (OUTPATIENT)
Dept: RADIOLOGY | Facility: CLINIC | Age: 77
DRG: 193 | End: 2022-01-01
Attending: EMERGENCY MEDICINE
Payer: MEDICARE

## 2022-01-01 ENCOUNTER — OFFICE VISIT (OUTPATIENT)
Dept: TRANSPLANT | Facility: CLINIC | Age: 77
End: 2022-01-01
Attending: NURSE PRACTITIONER
Payer: MEDICARE

## 2022-01-01 ENCOUNTER — LAB (OUTPATIENT)
Dept: LAB | Facility: CLINIC | Age: 77
End: 2022-01-01
Payer: MEDICARE

## 2022-01-01 ENCOUNTER — HOSPITAL ENCOUNTER (EMERGENCY)
Facility: CLINIC | Age: 77
Discharge: HOME | DRG: 193 | End: 2022-03-14
Attending: EMERGENCY MEDICINE | Admitting: EMERGENCY MEDICINE
Payer: MEDICARE

## 2022-01-01 ENCOUNTER — ONCOLOGY VISIT (OUTPATIENT)
Dept: ONCOLOGY | Facility: HOSPITAL | Age: 77
End: 2022-01-01
Attending: NURSE PRACTITIONER
Payer: MEDICARE

## 2022-01-01 ENCOUNTER — OFFICE VISIT (OUTPATIENT)
Dept: CARDIOLOGY | Facility: CLINIC | Age: 77
End: 2022-01-01
Payer: MEDICARE

## 2022-01-01 ENCOUNTER — TELEPHONE (OUTPATIENT)
Dept: TRANSPLANT | Facility: CLINIC | Age: 77
End: 2022-01-01
Payer: MEDICARE

## 2022-01-01 ENCOUNTER — HOSPITAL ENCOUNTER (OUTPATIENT)
Dept: CT IMAGING | Facility: CLINIC | Age: 77
Discharge: HOME | End: 2022-10-04
Attending: INTERNAL MEDICINE | Admitting: INTERNAL MEDICINE
Payer: MEDICARE

## 2022-01-01 ENCOUNTER — LAB REQUISITION (OUTPATIENT)
Dept: LAB | Facility: CLINIC | Age: 77
End: 2022-01-01

## 2022-01-01 ENCOUNTER — HOSPITAL ENCOUNTER (EMERGENCY)
Facility: CLINIC | Age: 77
Discharge: HOME | End: 2022-09-10
Attending: STUDENT IN AN ORGANIZED HEALTH CARE EDUCATION/TRAINING PROGRAM | Admitting: STUDENT IN AN ORGANIZED HEALTH CARE EDUCATION/TRAINING PROGRAM
Payer: MEDICARE

## 2022-01-01 ENCOUNTER — DOCUMENTATION ONLY (OUTPATIENT)
Dept: CARDIOLOGY | Facility: CLINIC | Age: 77
End: 2022-01-01

## 2022-01-01 ENCOUNTER — PREP FOR PROCEDURE (OUTPATIENT)
Dept: CARDIOLOGY | Facility: CLINIC | Age: 77
End: 2022-01-01

## 2022-01-01 ENCOUNTER — HOSPITAL ENCOUNTER (OUTPATIENT)
Facility: CLINIC | Age: 77
Discharge: HOME | End: 2022-06-23
Attending: INTERNAL MEDICINE | Admitting: INTERNAL MEDICINE
Payer: MEDICARE

## 2022-01-01 ENCOUNTER — ANESTHESIA EVENT (OUTPATIENT)
Dept: CARDIOLOGY | Facility: HOSPITAL | Age: 77
End: 2022-01-01
Payer: MEDICARE

## 2022-01-01 ENCOUNTER — TELEPHONE (OUTPATIENT)
Dept: TRANSPLANT | Facility: CLINIC | Age: 77
End: 2022-01-01

## 2022-01-01 ENCOUNTER — MYC MEDICAL ADVICE (OUTPATIENT)
Dept: CARDIOLOGY | Facility: CLINIC | Age: 77
End: 2022-01-01
Payer: MEDICARE

## 2022-01-01 ENCOUNTER — HOSPITAL ENCOUNTER (OUTPATIENT)
Dept: PET IMAGING | Facility: CLINIC | Age: 77
Discharge: HOME | End: 2022-07-01
Attending: INTERNAL MEDICINE
Payer: MEDICARE

## 2022-01-01 ENCOUNTER — APPOINTMENT (OUTPATIENT)
Dept: MEDSURG UNIT | Facility: CLINIC | Age: 77
End: 2022-01-01
Attending: INTERNAL MEDICINE
Payer: MEDICARE

## 2022-01-01 ENCOUNTER — HOSPITAL ENCOUNTER (OUTPATIENT)
Dept: CARDIOLOGY | Facility: HOSPITAL | Age: 77
Discharge: HOME | End: 2022-04-01
Attending: INTERNAL MEDICINE | Admitting: NURSE PRACTITIONER
Payer: MEDICARE

## 2022-01-01 ENCOUNTER — HOSPITAL ENCOUNTER (INPATIENT)
Facility: HOSPITAL | Age: 77
Setting detail: SURGERY ADMIT
End: 2022-01-01
Attending: INTERNAL MEDICINE | Admitting: INTERNAL MEDICINE
Payer: MEDICARE

## 2022-01-01 ENCOUNTER — RESEARCH ENCOUNTER (OUTPATIENT)
Dept: ONCOLOGY | Facility: CLINIC | Age: 77
End: 2022-01-01

## 2022-01-01 ENCOUNTER — ANESTHESIA (OUTPATIENT)
Dept: CARDIOLOGY | Facility: HOSPITAL | Age: 77
End: 2022-01-01
Payer: MEDICARE

## 2022-01-01 ENCOUNTER — HOSPITAL ENCOUNTER (OUTPATIENT)
Facility: AMBULATORY SURGERY CENTER | Age: 77
Discharge: HOME | End: 2022-07-25
Attending: RADIOLOGY | Admitting: RADIOLOGY
Payer: MEDICARE

## 2022-01-01 ENCOUNTER — INFUSION THERAPY VISIT (OUTPATIENT)
Dept: INFUSION THERAPY | Facility: HOSPITAL | Age: 77
End: 2022-01-01
Attending: NURSE PRACTITIONER
Payer: MEDICARE

## 2022-01-01 ENCOUNTER — HOSPITAL ENCOUNTER (INPATIENT)
Facility: CLINIC | Age: 77
LOS: 4 days | Discharge: HOME | DRG: 193 | End: 2022-03-21
Attending: EMERGENCY MEDICINE | Admitting: HOSPITALIST
Payer: MEDICARE

## 2022-01-01 ENCOUNTER — ALLIED HEALTH/NURSE VISIT (OUTPATIENT)
Dept: CARDIOLOGY | Facility: CLINIC | Age: 77
End: 2022-01-01
Attending: INTERNAL MEDICINE

## 2022-01-01 ENCOUNTER — HOSPITAL ENCOUNTER (OUTPATIENT)
Dept: PET IMAGING | Facility: HOSPITAL | Age: 77
Discharge: HOME | End: 2022-01-27
Attending: INTERNAL MEDICINE | Admitting: INTERNAL MEDICINE
Payer: MEDICARE

## 2022-01-01 ENCOUNTER — MYC MEDICAL ADVICE (OUTPATIENT)
Dept: CARDIOLOGY | Facility: CLINIC | Age: 77
End: 2022-01-01

## 2022-01-01 ENCOUNTER — APPOINTMENT (OUTPATIENT)
Dept: CARDIOLOGY | Facility: CLINIC | Age: 77
DRG: 193 | End: 2022-01-01
Attending: INTERNAL MEDICINE
Payer: MEDICARE

## 2022-01-01 ENCOUNTER — ANCILLARY PROCEDURE (OUTPATIENT)
Dept: RADIOLOGY | Facility: AMBULATORY SURGERY CENTER | Age: 77
End: 2022-01-01
Attending: INTERNAL MEDICINE
Payer: MEDICARE

## 2022-01-01 ENCOUNTER — APPOINTMENT (OUTPATIENT)
Dept: CT IMAGING | Facility: CLINIC | Age: 77
End: 2022-01-01
Attending: STUDENT IN AN ORGANIZED HEALTH CARE EDUCATION/TRAINING PROGRAM
Payer: MEDICARE

## 2022-01-01 ENCOUNTER — MEDICAL CORRESPONDENCE (OUTPATIENT)
Dept: TRANSPLANT | Facility: CLINIC | Age: 77
End: 2022-01-01
Payer: MEDICARE

## 2022-01-01 ENCOUNTER — TELEPHONE (OUTPATIENT)
Dept: INFECTIOUS DISEASES | Facility: CLINIC | Age: 77
End: 2022-01-01

## 2022-01-01 ENCOUNTER — HOSPITAL ENCOUNTER (OUTPATIENT)
Dept: GENERAL RADIOLOGY | Facility: CLINIC | Age: 77
Discharge: HOME | End: 2022-07-01
Attending: INTERNAL MEDICINE
Payer: MEDICARE

## 2022-01-01 ENCOUNTER — TELEPHONE (OUTPATIENT)
Dept: ONCOLOGY | Facility: HOSPITAL | Age: 77
End: 2022-01-01

## 2022-01-01 ENCOUNTER — DOCUMENTATION ONLY (OUTPATIENT)
Dept: ONCOLOGY | Facility: HOSPITAL | Age: 77
End: 2022-01-01
Payer: MEDICARE

## 2022-01-01 ENCOUNTER — ALLIED HEALTH/NURSE VISIT (OUTPATIENT)
Dept: CARDIOLOGY | Facility: CLINIC | Age: 77
End: 2022-01-01

## 2022-01-01 ENCOUNTER — PATIENT OUTREACH (OUTPATIENT)
Dept: ONCOLOGY | Facility: HOSPITAL | Age: 77
End: 2022-01-01
Payer: MEDICARE

## 2022-01-01 ENCOUNTER — OFFICE VISIT (OUTPATIENT)
Dept: CARDIOLOGY | Facility: CLINIC | Age: 77
End: 2022-01-01
Attending: EMERGENCY MEDICINE
Payer: MEDICARE

## 2022-01-01 ENCOUNTER — TELEPHONE (OUTPATIENT)
Dept: ONCOLOGY | Facility: CLINIC | Age: 77
End: 2022-01-01

## 2022-01-01 ENCOUNTER — HOSPITAL ENCOUNTER (OUTPATIENT)
Dept: GENERAL RADIOLOGY | Facility: CLINIC | Age: 77
Discharge: HOME | End: 2022-05-03
Attending: INTERNAL MEDICINE
Payer: MEDICARE

## 2022-01-01 ENCOUNTER — OFFICE VISIT (OUTPATIENT)
Dept: CARDIOLOGY | Facility: CLINIC | Age: 77
End: 2022-01-01
Attending: NURSE PRACTITIONER
Payer: MEDICARE

## 2022-01-01 ENCOUNTER — HOSPITAL ENCOUNTER (OUTPATIENT)
Facility: CLINIC | Age: 77
Discharge: HOME | End: 2022-08-01
Attending: INTERNAL MEDICINE | Admitting: INTERNAL MEDICINE
Payer: MEDICARE

## 2022-01-01 ENCOUNTER — HOSPITAL ENCOUNTER (OUTPATIENT)
Dept: PET IMAGING | Facility: CLINIC | Age: 77
Discharge: HOME | End: 2022-08-22
Attending: INTERNAL MEDICINE
Payer: MEDICARE

## 2022-01-01 ENCOUNTER — VIRTUAL VISIT (OUTPATIENT)
Dept: INTERNAL MEDICINE | Facility: CLINIC | Age: 77
End: 2022-01-01
Payer: MEDICARE

## 2022-01-01 ENCOUNTER — OFFICE VISIT (OUTPATIENT)
Dept: INTERNAL MEDICINE | Facility: CLINIC | Age: 77
End: 2022-01-01

## 2022-01-01 ENCOUNTER — HOSPITAL ENCOUNTER (OUTPATIENT)
Dept: CARDIOLOGY | Facility: CLINIC | Age: 77
Discharge: HOME | End: 2022-06-09
Attending: INTERNAL MEDICINE | Admitting: INTERNAL MEDICINE
Payer: MEDICARE

## 2022-01-01 ENCOUNTER — HOSPITAL ENCOUNTER (OUTPATIENT)
Facility: CLINIC | Age: 77
Discharge: HOME | End: 2022-08-08
Attending: STUDENT IN AN ORGANIZED HEALTH CARE EDUCATION/TRAINING PROGRAM | Admitting: STUDENT IN AN ORGANIZED HEALTH CARE EDUCATION/TRAINING PROGRAM
Payer: MEDICARE

## 2022-01-01 ENCOUNTER — NURSE TRIAGE (OUTPATIENT)
Dept: NURSING | Facility: CLINIC | Age: 77
End: 2022-01-01
Payer: MEDICARE

## 2022-01-01 ENCOUNTER — OFFICE VISIT (OUTPATIENT)
Dept: EDUCATION SERVICES | Facility: CLINIC | Age: 77
End: 2022-01-01
Attending: INTERNAL MEDICINE
Payer: MEDICARE

## 2022-01-01 ENCOUNTER — TELEPHONE (OUTPATIENT)
Dept: INTERNAL MEDICINE | Facility: CLINIC | Age: 77
End: 2022-01-01

## 2022-01-01 ENCOUNTER — HOSPITAL ENCOUNTER (OUTPATIENT)
Facility: CLINIC | Age: 77
Discharge: HOME | End: 2022-05-20
Attending: INTERNAL MEDICINE | Admitting: INTERNAL MEDICINE
Payer: MEDICARE

## 2022-01-01 ENCOUNTER — OFFICE VISIT (OUTPATIENT)
Dept: INFECTIOUS DISEASES | Facility: CLINIC | Age: 77
End: 2022-01-01
Payer: MEDICARE

## 2022-01-01 VITALS
WEIGHT: 159.3 LBS | OXYGEN SATURATION: 99 % | SYSTOLIC BLOOD PRESSURE: 98 MMHG | DIASTOLIC BLOOD PRESSURE: 66 MMHG | RESPIRATION RATE: 20 BRPM | OXYGEN SATURATION: 97 % | OXYGEN SATURATION: 100 % | TEMPERATURE: 97.4 F | DIASTOLIC BLOOD PRESSURE: 86 MMHG | WEIGHT: 160.3 LBS | RESPIRATION RATE: 18 BRPM | DIASTOLIC BLOOD PRESSURE: 65 MMHG | TEMPERATURE: 98.1 F | TEMPERATURE: 98 F | HEART RATE: 58 BPM | HEART RATE: 70 BPM | SYSTOLIC BLOOD PRESSURE: 122 MMHG | SYSTOLIC BLOOD PRESSURE: 117 MMHG | BODY MASS INDEX: 23.73 KG/M2 | HEART RATE: 56 BPM | BODY MASS INDEX: 23.67 KG/M2

## 2022-01-01 VITALS
DIASTOLIC BLOOD PRESSURE: 74 MMHG | WEIGHT: 162.8 LBS | HEART RATE: 59 BPM | SYSTOLIC BLOOD PRESSURE: 116 MMHG | OXYGEN SATURATION: 99 % | BODY MASS INDEX: 24.22 KG/M2

## 2022-01-01 VITALS
HEIGHT: 68 IN | SYSTOLIC BLOOD PRESSURE: 144 MMHG | WEIGHT: 166.6 LBS | HEART RATE: 63 BPM | DIASTOLIC BLOOD PRESSURE: 83 MMHG | OXYGEN SATURATION: 100 % | TEMPERATURE: 98 F | BODY MASS INDEX: 25.25 KG/M2

## 2022-01-01 VITALS
WEIGHT: 162 LBS | RESPIRATION RATE: 14 BRPM | SYSTOLIC BLOOD PRESSURE: 122 MMHG | HEART RATE: 78 BPM | DIASTOLIC BLOOD PRESSURE: 80 MMHG | BODY MASS INDEX: 24.63 KG/M2

## 2022-01-01 VITALS
RESPIRATION RATE: 18 BRPM | TEMPERATURE: 97.6 F | OXYGEN SATURATION: 100 % | DIASTOLIC BLOOD PRESSURE: 80 MMHG | SYSTOLIC BLOOD PRESSURE: 130 MMHG | HEIGHT: 69 IN | WEIGHT: 159.9 LBS | HEART RATE: 74 BPM | BODY MASS INDEX: 23.68 KG/M2

## 2022-01-01 VITALS
WEIGHT: 157.6 LBS | DIASTOLIC BLOOD PRESSURE: 52 MMHG | BODY MASS INDEX: 23.34 KG/M2 | HEART RATE: 61 BPM | RESPIRATION RATE: 14 BRPM | OXYGEN SATURATION: 100 % | TEMPERATURE: 97.8 F | HEIGHT: 69 IN | SYSTOLIC BLOOD PRESSURE: 89 MMHG

## 2022-01-01 VITALS
HEART RATE: 69 BPM | TEMPERATURE: 97.5 F | RESPIRATION RATE: 16 BRPM | DIASTOLIC BLOOD PRESSURE: 57 MMHG | OXYGEN SATURATION: 100 % | SYSTOLIC BLOOD PRESSURE: 135 MMHG

## 2022-01-01 VITALS
HEART RATE: 80 BPM | DIASTOLIC BLOOD PRESSURE: 70 MMHG | WEIGHT: 165 LBS | HEIGHT: 69 IN | SYSTOLIC BLOOD PRESSURE: 120 MMHG | RESPIRATION RATE: 20 BRPM | BODY MASS INDEX: 24.44 KG/M2

## 2022-01-01 VITALS
HEART RATE: 73 BPM | TEMPERATURE: 97.3 F | WEIGHT: 159.8 LBS | SYSTOLIC BLOOD PRESSURE: 118 MMHG | DIASTOLIC BLOOD PRESSURE: 64 MMHG | RESPIRATION RATE: 16 BRPM | BODY MASS INDEX: 23.8 KG/M2 | OXYGEN SATURATION: 100 %

## 2022-01-01 VITALS
SYSTOLIC BLOOD PRESSURE: 157 MMHG | OXYGEN SATURATION: 100 % | HEART RATE: 66 BPM | BODY MASS INDEX: 23.51 KG/M2 | TEMPERATURE: 97.8 F | WEIGHT: 158.7 LBS | RESPIRATION RATE: 35 BRPM | HEIGHT: 69 IN | DIASTOLIC BLOOD PRESSURE: 63 MMHG

## 2022-01-01 VITALS
SYSTOLIC BLOOD PRESSURE: 130 MMHG | HEART RATE: 54 BPM | HEART RATE: 65 BPM | BODY MASS INDEX: 24.63 KG/M2 | TEMPERATURE: 97.6 F | SYSTOLIC BLOOD PRESSURE: 125 MMHG | OXYGEN SATURATION: 100 % | WEIGHT: 162 LBS | DIASTOLIC BLOOD PRESSURE: 73 MMHG | BODY MASS INDEX: 24.74 KG/M2 | DIASTOLIC BLOOD PRESSURE: 86 MMHG | RESPIRATION RATE: 16 BRPM | WEIGHT: 162.3 LBS | TEMPERATURE: 97.5 F | OXYGEN SATURATION: 100 %

## 2022-01-01 VITALS
TEMPERATURE: 97.5 F | DIASTOLIC BLOOD PRESSURE: 88 MMHG | OXYGEN SATURATION: 100 % | SYSTOLIC BLOOD PRESSURE: 141 MMHG | HEART RATE: 65 BPM | RESPIRATION RATE: 16 BRPM

## 2022-01-01 VITALS
RESPIRATION RATE: 14 BRPM | SYSTOLIC BLOOD PRESSURE: 132 MMHG | DIASTOLIC BLOOD PRESSURE: 86 MMHG | OXYGEN SATURATION: 100 % | HEART RATE: 65 BPM

## 2022-01-01 VITALS
HEART RATE: 50 BPM | OXYGEN SATURATION: 100 % | TEMPERATURE: 98 F | SYSTOLIC BLOOD PRESSURE: 138 MMHG | RESPIRATION RATE: 16 BRPM | BODY MASS INDEX: 23.72 KG/M2 | WEIGHT: 160.6 LBS | DIASTOLIC BLOOD PRESSURE: 74 MMHG

## 2022-01-01 VITALS
TEMPERATURE: 97.7 F | RESPIRATION RATE: 14 BRPM | DIASTOLIC BLOOD PRESSURE: 58 MMHG | HEART RATE: 78 BPM | WEIGHT: 161.2 LBS | BODY MASS INDEX: 23.88 KG/M2 | OXYGEN SATURATION: 100 % | HEIGHT: 69 IN | SYSTOLIC BLOOD PRESSURE: 100 MMHG

## 2022-01-01 VITALS
BODY MASS INDEX: 23.86 KG/M2 | OXYGEN SATURATION: 100 % | HEART RATE: 69 BPM | TEMPERATURE: 97.7 F | SYSTOLIC BLOOD PRESSURE: 102 MMHG | WEIGHT: 160.2 LBS | RESPIRATION RATE: 18 BRPM | DIASTOLIC BLOOD PRESSURE: 64 MMHG

## 2022-01-01 VITALS
RESPIRATION RATE: 16 BRPM | OXYGEN SATURATION: 100 % | TEMPERATURE: 97.7 F | DIASTOLIC BLOOD PRESSURE: 54 MMHG | HEART RATE: 60 BPM | SYSTOLIC BLOOD PRESSURE: 106 MMHG

## 2022-01-01 VITALS
WEIGHT: 158 LBS | DIASTOLIC BLOOD PRESSURE: 54 MMHG | HEART RATE: 62 BPM | BODY MASS INDEX: 23.95 KG/M2 | SYSTOLIC BLOOD PRESSURE: 122 MMHG | RESPIRATION RATE: 16 BRPM | HEIGHT: 68 IN

## 2022-01-01 VITALS
TEMPERATURE: 97.8 F | BODY MASS INDEX: 23.73 KG/M2 | HEART RATE: 54 BPM | SYSTOLIC BLOOD PRESSURE: 109 MMHG | WEIGHT: 159.3 LBS | DIASTOLIC BLOOD PRESSURE: 77 MMHG | OXYGEN SATURATION: 100 % | RESPIRATION RATE: 14 BRPM

## 2022-01-01 VITALS
DIASTOLIC BLOOD PRESSURE: 79 MMHG | WEIGHT: 160 LBS | BODY MASS INDEX: 25.06 KG/M2 | OXYGEN SATURATION: 100 % | TEMPERATURE: 97.4 F | RESPIRATION RATE: 18 BRPM | HEART RATE: 59 BPM | SYSTOLIC BLOOD PRESSURE: 144 MMHG

## 2022-01-01 VITALS
HEART RATE: 81 BPM | OXYGEN SATURATION: 98 % | TEMPERATURE: 97.8 F | RESPIRATION RATE: 16 BRPM | OXYGEN SATURATION: 94 % | TEMPERATURE: 97.6 F | DIASTOLIC BLOOD PRESSURE: 62 MMHG | RESPIRATION RATE: 18 BRPM | HEART RATE: 85 BPM | SYSTOLIC BLOOD PRESSURE: 104 MMHG | DIASTOLIC BLOOD PRESSURE: 56 MMHG | SYSTOLIC BLOOD PRESSURE: 102 MMHG

## 2022-01-01 VITALS
SYSTOLIC BLOOD PRESSURE: 107 MMHG | RESPIRATION RATE: 16 BRPM | TEMPERATURE: 96.8 F | DIASTOLIC BLOOD PRESSURE: 65 MMHG | BODY MASS INDEX: 24.73 KG/M2 | WEIGHT: 162.2 LBS | OXYGEN SATURATION: 100 % | HEART RATE: 54 BPM

## 2022-01-01 VITALS
HEART RATE: 65 BPM | RESPIRATION RATE: 16 BRPM | TEMPERATURE: 97.6 F | SYSTOLIC BLOOD PRESSURE: 122 MMHG | DIASTOLIC BLOOD PRESSURE: 68 MMHG | OXYGEN SATURATION: 100 %

## 2022-01-01 VITALS
DIASTOLIC BLOOD PRESSURE: 59 MMHG | OXYGEN SATURATION: 100 % | TEMPERATURE: 97.4 F | RESPIRATION RATE: 14 BRPM | BODY MASS INDEX: 23.7 KG/M2 | HEART RATE: 64 BPM | WEIGHT: 158.2 LBS | HEIGHT: 69 IN | HEART RATE: 73 BPM | DIASTOLIC BLOOD PRESSURE: 70 MMHG | WEIGHT: 160 LBS | BODY MASS INDEX: 23.36 KG/M2 | SYSTOLIC BLOOD PRESSURE: 125 MMHG | SYSTOLIC BLOOD PRESSURE: 120 MMHG

## 2022-01-01 VITALS
WEIGHT: 167.1 LBS | TEMPERATURE: 97.6 F | SYSTOLIC BLOOD PRESSURE: 120 MMHG | OXYGEN SATURATION: 98 % | DIASTOLIC BLOOD PRESSURE: 61 MMHG | BODY MASS INDEX: 25.41 KG/M2 | RESPIRATION RATE: 16 BRPM | HEART RATE: 71 BPM | RESPIRATION RATE: 18 BRPM | OXYGEN SATURATION: 96 % | SYSTOLIC BLOOD PRESSURE: 121 MMHG | BODY MASS INDEX: 24.51 KG/M2 | HEART RATE: 63 BPM | TEMPERATURE: 97.8 F | DIASTOLIC BLOOD PRESSURE: 61 MMHG | WEIGHT: 166 LBS

## 2022-01-01 VITALS
BODY MASS INDEX: 22.96 KG/M2 | RESPIRATION RATE: 16 BRPM | DIASTOLIC BLOOD PRESSURE: 89 MMHG | HEART RATE: 62 BPM | HEIGHT: 69 IN | SYSTOLIC BLOOD PRESSURE: 164 MMHG | OXYGEN SATURATION: 98 % | WEIGHT: 155 LBS

## 2022-01-01 VITALS
SYSTOLIC BLOOD PRESSURE: 146 MMHG | WEIGHT: 165 LBS | OXYGEN SATURATION: 100 % | RESPIRATION RATE: 16 BRPM | HEART RATE: 73 BPM | HEIGHT: 67 IN | BODY MASS INDEX: 25.9 KG/M2 | TEMPERATURE: 97.3 F | DIASTOLIC BLOOD PRESSURE: 88 MMHG

## 2022-01-01 VITALS
HEART RATE: 58 BPM | RESPIRATION RATE: 16 BRPM | SYSTOLIC BLOOD PRESSURE: 151 MMHG | DIASTOLIC BLOOD PRESSURE: 69 MMHG | OXYGEN SATURATION: 98 % | TEMPERATURE: 97.7 F

## 2022-01-01 VITALS
DIASTOLIC BLOOD PRESSURE: 76 MMHG | SYSTOLIC BLOOD PRESSURE: 110 MMHG | TEMPERATURE: 96 F | OXYGEN SATURATION: 100 % | RESPIRATION RATE: 16 BRPM | HEART RATE: 53 BPM | BODY MASS INDEX: 23.18 KG/M2 | WEIGHT: 155.6 LBS

## 2022-01-01 VITALS
HEART RATE: 60 BPM | RESPIRATION RATE: 16 BRPM | DIASTOLIC BLOOD PRESSURE: 68 MMHG | WEIGHT: 160.8 LBS | TEMPERATURE: 97.7 F | SYSTOLIC BLOOD PRESSURE: 117 MMHG | OXYGEN SATURATION: 100 % | BODY MASS INDEX: 23.95 KG/M2

## 2022-01-01 VITALS
HEART RATE: 66 BPM | TEMPERATURE: 97.8 F | RESPIRATION RATE: 16 BRPM | WEIGHT: 171 LBS | OXYGEN SATURATION: 99 % | SYSTOLIC BLOOD PRESSURE: 120 MMHG | BODY MASS INDEX: 25.25 KG/M2 | DIASTOLIC BLOOD PRESSURE: 60 MMHG

## 2022-01-01 VITALS
RESPIRATION RATE: 18 BRPM | TEMPERATURE: 97.7 F | OXYGEN SATURATION: 98 % | SYSTOLIC BLOOD PRESSURE: 150 MMHG | DIASTOLIC BLOOD PRESSURE: 78 MMHG | HEART RATE: 59 BPM

## 2022-01-01 VITALS
SYSTOLIC BLOOD PRESSURE: 143 MMHG | HEART RATE: 64 BPM | RESPIRATION RATE: 18 BRPM | TEMPERATURE: 97.3 F | BODY MASS INDEX: 23.92 KG/M2 | WEIGHT: 157.3 LBS | OXYGEN SATURATION: 98 % | DIASTOLIC BLOOD PRESSURE: 68 MMHG

## 2022-01-01 VITALS
TEMPERATURE: 97.7 F | OXYGEN SATURATION: 100 % | DIASTOLIC BLOOD PRESSURE: 63 MMHG | RESPIRATION RATE: 20 BRPM | SYSTOLIC BLOOD PRESSURE: 126 MMHG | HEART RATE: 51 BPM | DIASTOLIC BLOOD PRESSURE: 60 MMHG | HEART RATE: 70 BPM | BODY MASS INDEX: 25.7 KG/M2 | WEIGHT: 169 LBS | SYSTOLIC BLOOD PRESSURE: 106 MMHG | OXYGEN SATURATION: 98 % | RESPIRATION RATE: 16 BRPM | TEMPERATURE: 97.6 F

## 2022-01-01 VITALS
SYSTOLIC BLOOD PRESSURE: 140 MMHG | WEIGHT: 163.1 LBS | HEART RATE: 61 BPM | SYSTOLIC BLOOD PRESSURE: 96 MMHG | DIASTOLIC BLOOD PRESSURE: 51 MMHG | RESPIRATION RATE: 16 BRPM | OXYGEN SATURATION: 100 % | TEMPERATURE: 97.6 F | RESPIRATION RATE: 20 BRPM | BODY MASS INDEX: 24.8 KG/M2 | TEMPERATURE: 97.6 F | WEIGHT: 170 LBS | HEART RATE: 52 BPM | BODY MASS INDEX: 25.1 KG/M2 | OXYGEN SATURATION: 100 % | DIASTOLIC BLOOD PRESSURE: 69 MMHG

## 2022-01-01 VITALS
BODY MASS INDEX: 23.03 KG/M2 | HEART RATE: 63 BPM | OXYGEN SATURATION: 100 % | TEMPERATURE: 97.4 F | DIASTOLIC BLOOD PRESSURE: 69 MMHG | SYSTOLIC BLOOD PRESSURE: 99 MMHG | RESPIRATION RATE: 16 BRPM | WEIGHT: 154.6 LBS

## 2022-01-01 VITALS
DIASTOLIC BLOOD PRESSURE: 66 MMHG | OXYGEN SATURATION: 89 % | SYSTOLIC BLOOD PRESSURE: 106 MMHG | BODY MASS INDEX: 24.73 KG/M2 | WEIGHT: 166.2 LBS | HEART RATE: 57 BPM

## 2022-01-01 VITALS
DIASTOLIC BLOOD PRESSURE: 73 MMHG | SYSTOLIC BLOOD PRESSURE: 124 MMHG | OXYGEN SATURATION: 100 % | HEART RATE: 71 BPM | TEMPERATURE: 97.6 F | RESPIRATION RATE: 18 BRPM

## 2022-01-01 VITALS
RESPIRATION RATE: 16 BRPM | OXYGEN SATURATION: 100 % | DIASTOLIC BLOOD PRESSURE: 87 MMHG | HEART RATE: 72 BPM | TEMPERATURE: 97.4 F | WEIGHT: 154 LBS | BODY MASS INDEX: 22.94 KG/M2 | SYSTOLIC BLOOD PRESSURE: 163 MMHG

## 2022-01-01 VITALS
OXYGEN SATURATION: 100 % | DIASTOLIC BLOOD PRESSURE: 88 MMHG | HEART RATE: 58 BPM | RESPIRATION RATE: 14 BRPM | SYSTOLIC BLOOD PRESSURE: 143 MMHG | TEMPERATURE: 97.7 F

## 2022-01-01 VITALS
SYSTOLIC BLOOD PRESSURE: 116 MMHG | OXYGEN SATURATION: 98 % | DIASTOLIC BLOOD PRESSURE: 66 MMHG | HEART RATE: 57 BPM | TEMPERATURE: 97.5 F | RESPIRATION RATE: 16 BRPM

## 2022-01-01 VITALS
BODY MASS INDEX: 24.3 KG/M2 | OXYGEN SATURATION: 100 % | TEMPERATURE: 97.5 F | WEIGHT: 165.8 LBS | BODY MASS INDEX: 25.27 KG/M2 | RESPIRATION RATE: 16 BRPM | HEART RATE: 76 BPM | SYSTOLIC BLOOD PRESSURE: 107 MMHG | OXYGEN SATURATION: 100 % | TEMPERATURE: 97.6 F | DIASTOLIC BLOOD PRESSURE: 65 MMHG | RESPIRATION RATE: 16 BRPM | HEART RATE: 62 BPM | SYSTOLIC BLOOD PRESSURE: 96 MMHG | WEIGHT: 159.39 LBS | DIASTOLIC BLOOD PRESSURE: 62 MMHG

## 2022-01-01 VITALS
SYSTOLIC BLOOD PRESSURE: 108 MMHG | DIASTOLIC BLOOD PRESSURE: 56 MMHG | RESPIRATION RATE: 14 BRPM | HEIGHT: 68 IN | WEIGHT: 162 LBS | HEART RATE: 63 BPM | BODY MASS INDEX: 24.55 KG/M2

## 2022-01-01 VITALS
HEIGHT: 68 IN | DIASTOLIC BLOOD PRESSURE: 70 MMHG | OXYGEN SATURATION: 100 % | TEMPERATURE: 97.4 F | BODY MASS INDEX: 24.41 KG/M2 | RESPIRATION RATE: 14 BRPM | HEART RATE: 64 BPM | WEIGHT: 161.1 LBS | SYSTOLIC BLOOD PRESSURE: 108 MMHG

## 2022-01-01 VITALS
RESPIRATION RATE: 20 BRPM | HEART RATE: 96 BPM | WEIGHT: 160 LBS | DIASTOLIC BLOOD PRESSURE: 64 MMHG | BODY MASS INDEX: 23.7 KG/M2 | SYSTOLIC BLOOD PRESSURE: 100 MMHG | HEIGHT: 69 IN

## 2022-01-01 VITALS
SYSTOLIC BLOOD PRESSURE: 98 MMHG | DIASTOLIC BLOOD PRESSURE: 63 MMHG | BODY MASS INDEX: 24.06 KG/M2 | TEMPERATURE: 97.6 F | HEART RATE: 82 BPM | RESPIRATION RATE: 16 BRPM | OXYGEN SATURATION: 98 % | WEIGHT: 161.5 LBS

## 2022-01-01 VITALS
RESPIRATION RATE: 16 BRPM | HEART RATE: 63 BPM | DIASTOLIC BLOOD PRESSURE: 54 MMHG | OXYGEN SATURATION: 100 % | TEMPERATURE: 97.7 F | SYSTOLIC BLOOD PRESSURE: 115 MMHG

## 2022-01-01 VITALS
HEIGHT: 69 IN | HEART RATE: 62 BPM | TEMPERATURE: 97.6 F | WEIGHT: 157 LBS | BODY MASS INDEX: 23.25 KG/M2 | SYSTOLIC BLOOD PRESSURE: 154 MMHG | DIASTOLIC BLOOD PRESSURE: 87 MMHG | RESPIRATION RATE: 16 BRPM | OXYGEN SATURATION: 100 %

## 2022-01-01 VITALS
DIASTOLIC BLOOD PRESSURE: 64 MMHG | RESPIRATION RATE: 16 BRPM | WEIGHT: 162.7 LBS | TEMPERATURE: 97.5 F | BODY MASS INDEX: 24.8 KG/M2 | HEART RATE: 54 BPM | SYSTOLIC BLOOD PRESSURE: 99 MMHG | OXYGEN SATURATION: 100 %

## 2022-01-01 VITALS
RESPIRATION RATE: 16 BRPM | OXYGEN SATURATION: 98 % | TEMPERATURE: 97.8 F | DIASTOLIC BLOOD PRESSURE: 63 MMHG | SYSTOLIC BLOOD PRESSURE: 110 MMHG | HEART RATE: 60 BPM

## 2022-01-01 VITALS
RESPIRATION RATE: 16 BRPM | TEMPERATURE: 97.5 F | HEART RATE: 70 BPM | OXYGEN SATURATION: 97 % | SYSTOLIC BLOOD PRESSURE: 114 MMHG | DIASTOLIC BLOOD PRESSURE: 59 MMHG

## 2022-01-01 VITALS
TEMPERATURE: 97.5 F | OXYGEN SATURATION: 100 % | SYSTOLIC BLOOD PRESSURE: 94 MMHG | DIASTOLIC BLOOD PRESSURE: 50 MMHG | RESPIRATION RATE: 18 BRPM | HEART RATE: 67 BPM

## 2022-01-01 VITALS
OXYGEN SATURATION: 100 % | HEART RATE: 65 BPM | WEIGHT: 155.6 LBS | TEMPERATURE: 98 F | SYSTOLIC BLOOD PRESSURE: 131 MMHG | DIASTOLIC BLOOD PRESSURE: 59 MMHG | BODY MASS INDEX: 23.18 KG/M2 | RESPIRATION RATE: 16 BRPM

## 2022-01-01 VITALS
OXYGEN SATURATION: 100 % | DIASTOLIC BLOOD PRESSURE: 71 MMHG | HEART RATE: 62 BPM | BODY MASS INDEX: 23.61 KG/M2 | WEIGHT: 158.5 LBS | RESPIRATION RATE: 16 BRPM | SYSTOLIC BLOOD PRESSURE: 116 MMHG | TEMPERATURE: 97.3 F

## 2022-01-01 VITALS
HEART RATE: 68 BPM | DIASTOLIC BLOOD PRESSURE: 74 MMHG | OXYGEN SATURATION: 99 % | BODY MASS INDEX: 24.09 KG/M2 | SYSTOLIC BLOOD PRESSURE: 127 MMHG | RESPIRATION RATE: 18 BRPM | WEIGHT: 161.7 LBS | TEMPERATURE: 97.3 F

## 2022-01-01 VITALS
OXYGEN SATURATION: 99 % | SYSTOLIC BLOOD PRESSURE: 168 MMHG | WEIGHT: 160 LBS | HEIGHT: 68 IN | BODY MASS INDEX: 24.25 KG/M2 | RESPIRATION RATE: 10 BRPM | DIASTOLIC BLOOD PRESSURE: 79 MMHG | HEART RATE: 57 BPM | TEMPERATURE: 97.7 F

## 2022-01-01 VITALS
HEART RATE: 66 BPM | SYSTOLIC BLOOD PRESSURE: 101 MMHG | OXYGEN SATURATION: 100 % | RESPIRATION RATE: 16 BRPM | BODY MASS INDEX: 22.96 KG/M2 | DIASTOLIC BLOOD PRESSURE: 64 MMHG | WEIGHT: 154.1 LBS | TEMPERATURE: 97.4 F

## 2022-01-01 VITALS
OXYGEN SATURATION: 100 % | BODY MASS INDEX: 24.5 KG/M2 | TEMPERATURE: 97.6 F | DIASTOLIC BLOOD PRESSURE: 69 MMHG | SYSTOLIC BLOOD PRESSURE: 116 MMHG | HEART RATE: 58 BPM | WEIGHT: 160.7 LBS

## 2022-01-01 VITALS
TEMPERATURE: 97.4 F | OXYGEN SATURATION: 100 % | DIASTOLIC BLOOD PRESSURE: 89 MMHG | RESPIRATION RATE: 14 BRPM | SYSTOLIC BLOOD PRESSURE: 162 MMHG | HEART RATE: 63 BPM

## 2022-01-01 VITALS
OXYGEN SATURATION: 98 % | DIASTOLIC BLOOD PRESSURE: 79 MMHG | SYSTOLIC BLOOD PRESSURE: 128 MMHG | BODY MASS INDEX: 23.38 KG/M2 | WEIGHT: 157.2 LBS | HEART RATE: 71 BPM

## 2022-01-01 VITALS
BODY MASS INDEX: 24.09 KG/M2 | SYSTOLIC BLOOD PRESSURE: 114 MMHG | WEIGHT: 161.7 LBS | HEART RATE: 63 BPM | OXYGEN SATURATION: 100 % | TEMPERATURE: 96.8 F | DIASTOLIC BLOOD PRESSURE: 68 MMHG | RESPIRATION RATE: 16 BRPM

## 2022-01-01 VITALS
OXYGEN SATURATION: 95 % | SYSTOLIC BLOOD PRESSURE: 109 MMHG | WEIGHT: 159.8 LBS | HEART RATE: 68 BPM | DIASTOLIC BLOOD PRESSURE: 58 MMHG | BODY MASS INDEX: 23.6 KG/M2 | TEMPERATURE: 97.9 F | RESPIRATION RATE: 20 BRPM

## 2022-01-01 VITALS
OXYGEN SATURATION: 99 % | HEART RATE: 59 BPM | WEIGHT: 161 LBS | BODY MASS INDEX: 24.48 KG/M2 | RESPIRATION RATE: 16 BRPM | DIASTOLIC BLOOD PRESSURE: 64 MMHG | SYSTOLIC BLOOD PRESSURE: 110 MMHG

## 2022-01-01 VITALS
HEART RATE: 67 BPM | WEIGHT: 159.9 LBS | SYSTOLIC BLOOD PRESSURE: 108 MMHG | BODY MASS INDEX: 23.61 KG/M2 | OXYGEN SATURATION: 100 % | DIASTOLIC BLOOD PRESSURE: 70 MMHG

## 2022-01-01 VITALS
HEART RATE: 75 BPM | RESPIRATION RATE: 16 BRPM | SYSTOLIC BLOOD PRESSURE: 154 MMHG | OXYGEN SATURATION: 97 % | TEMPERATURE: 97.5 F | DIASTOLIC BLOOD PRESSURE: 80 MMHG | BODY MASS INDEX: 23.83 KG/M2 | WEIGHT: 160 LBS

## 2022-01-01 VITALS
DIASTOLIC BLOOD PRESSURE: 72 MMHG | TEMPERATURE: 97.5 F | OXYGEN SATURATION: 95 % | SYSTOLIC BLOOD PRESSURE: 120 MMHG | HEART RATE: 58 BPM | RESPIRATION RATE: 20 BRPM

## 2022-01-01 VITALS
TEMPERATURE: 98.4 F | SYSTOLIC BLOOD PRESSURE: 115 MMHG | HEART RATE: 64 BPM | RESPIRATION RATE: 16 BRPM | OXYGEN SATURATION: 98 % | DIASTOLIC BLOOD PRESSURE: 61 MMHG

## 2022-01-01 VITALS
HEART RATE: 65 BPM | TEMPERATURE: 97.7 F | SYSTOLIC BLOOD PRESSURE: 114 MMHG | OXYGEN SATURATION: 100 % | DIASTOLIC BLOOD PRESSURE: 76 MMHG | RESPIRATION RATE: 14 BRPM

## 2022-01-01 VITALS
TEMPERATURE: 97.5 F | OXYGEN SATURATION: 100 % | HEART RATE: 66 BPM | DIASTOLIC BLOOD PRESSURE: 79 MMHG | SYSTOLIC BLOOD PRESSURE: 143 MMHG | RESPIRATION RATE: 16 BRPM

## 2022-01-01 VITALS
HEART RATE: 88 BPM | BODY MASS INDEX: 23.6 KG/M2 | WEIGHT: 159.8 LBS | DIASTOLIC BLOOD PRESSURE: 72 MMHG | OXYGEN SATURATION: 96 % | SYSTOLIC BLOOD PRESSURE: 113 MMHG

## 2022-01-01 VITALS
HEART RATE: 60 BPM | TEMPERATURE: 96.8 F | SYSTOLIC BLOOD PRESSURE: 130 MMHG | WEIGHT: 161.4 LBS | OXYGEN SATURATION: 96 % | BODY MASS INDEX: 23.91 KG/M2 | RESPIRATION RATE: 16 BRPM | HEIGHT: 69 IN | DIASTOLIC BLOOD PRESSURE: 68 MMHG

## 2022-01-01 DIAGNOSIS — C90.02 MULTIPLE MYELOMA IN RELAPSE (H): ICD-10-CM

## 2022-01-01 DIAGNOSIS — C90.00 MULTIPLE MYELOMA NOT HAVING ACHIEVED REMISSION (H): Primary | ICD-10-CM

## 2022-01-01 DIAGNOSIS — I27.20 PULMONARY HYPERTENSION (H): ICD-10-CM

## 2022-01-01 DIAGNOSIS — C90.00 MULTIPLE MYELOMA NOT HAVING ACHIEVED REMISSION (H): ICD-10-CM

## 2022-01-01 DIAGNOSIS — I27.20 MODERATE PULMONARY HYPERTENSION (H): ICD-10-CM

## 2022-01-01 DIAGNOSIS — C90.02 MULTIPLE MYELOMA IN RELAPSE (H): Primary | ICD-10-CM

## 2022-01-01 DIAGNOSIS — I27.20 MODERATE PULMONARY HYPERTENSION (H): Primary | ICD-10-CM

## 2022-01-01 DIAGNOSIS — D64.89 ANEMIA DUE TO OTHER CAUSE, NOT CLASSIFIED: Primary | ICD-10-CM

## 2022-01-01 DIAGNOSIS — R79.89 ELEVATED BRAIN NATRIURETIC PEPTIDE (BNP) LEVEL: ICD-10-CM

## 2022-01-01 DIAGNOSIS — D70.1 CHEMOTHERAPY-INDUCED NEUTROPENIA (H): ICD-10-CM

## 2022-01-01 DIAGNOSIS — R06.09 DOE (DYSPNEA ON EXERTION): ICD-10-CM

## 2022-01-01 DIAGNOSIS — I48.20 CHRONIC ATRIAL FIBRILLATION (H): Primary | ICD-10-CM

## 2022-01-01 DIAGNOSIS — E78.00 HYPERCHOLESTEROLEMIA: ICD-10-CM

## 2022-01-01 DIAGNOSIS — D61.89 ANEMIA DUE TO OTHER BONE MARROW FAILURE (H): ICD-10-CM

## 2022-01-01 DIAGNOSIS — R50.81 NEUTROPENIC FEVER (H): ICD-10-CM

## 2022-01-01 DIAGNOSIS — R06.09 DOE (DYSPNEA ON EXERTION): Primary | ICD-10-CM

## 2022-01-01 DIAGNOSIS — I20.89 ANGINA AT REST (H): ICD-10-CM

## 2022-01-01 DIAGNOSIS — I35.0 NONRHEUMATIC AORTIC VALVE STENOSIS: ICD-10-CM

## 2022-01-01 DIAGNOSIS — I35.0 AORTIC VALVE STENOSIS, ETIOLOGY OF CARDIAC VALVE DISEASE UNSPECIFIED: ICD-10-CM

## 2022-01-01 DIAGNOSIS — C90.00 MULTIPLE MYELOMA (H): ICD-10-CM

## 2022-01-01 DIAGNOSIS — Z86.2 PERSONAL HISTORY OF DISEASES OF BLOOD AND BLOOD-FORMING ORGANS: ICD-10-CM

## 2022-01-01 DIAGNOSIS — I35.0 AORTIC VALVE STENOSIS, ETIOLOGY OF CARDIAC VALVE DISEASE UNSPECIFIED: Primary | ICD-10-CM

## 2022-01-01 DIAGNOSIS — I65.22 ASYMPTOMATIC CAROTID ARTERY STENOSIS, LEFT: ICD-10-CM

## 2022-01-01 DIAGNOSIS — I48.91 ATRIAL FIBRILLATION, UNSPECIFIED TYPE (H): Primary | ICD-10-CM

## 2022-01-01 DIAGNOSIS — I50.813 ACUTE ON CHRONIC RIGHT-SIDED HEART FAILURE (H): ICD-10-CM

## 2022-01-01 DIAGNOSIS — I27.20 PULMONARY HYPERTENSION (H): Primary | ICD-10-CM

## 2022-01-01 DIAGNOSIS — I48.19 PERSISTENT ATRIAL FIBRILLATION (H): Primary | ICD-10-CM

## 2022-01-01 DIAGNOSIS — I50.31 ACUTE DIASTOLIC CONGESTIVE HEART FAILURE (H): ICD-10-CM

## 2022-01-01 DIAGNOSIS — Z86.718 PERSONAL HISTORY OF DVT (DEEP VEIN THROMBOSIS): ICD-10-CM

## 2022-01-01 DIAGNOSIS — I48.0 PAROXYSMAL ATRIAL FIBRILLATION (H): ICD-10-CM

## 2022-01-01 DIAGNOSIS — T45.1X5A CHEMOTHERAPY-INDUCED NEUTROPENIA (H): Primary | ICD-10-CM

## 2022-01-01 DIAGNOSIS — T45.1X5A CHEMOTHERAPY-INDUCED NEUTROPENIA (H): ICD-10-CM

## 2022-01-01 DIAGNOSIS — D70.9 NEUTROPENIC FEVER (H): ICD-10-CM

## 2022-01-01 DIAGNOSIS — Z11.59 ENCOUNTER FOR SCREENING FOR OTHER VIRAL DISEASES: ICD-10-CM

## 2022-01-01 DIAGNOSIS — I48.91 ATRIAL FIBRILLATION, UNSPECIFIED TYPE (H): ICD-10-CM

## 2022-01-01 DIAGNOSIS — I35.0 NONRHEUMATIC AORTIC VALVE STENOSIS: Primary | ICD-10-CM

## 2022-01-01 DIAGNOSIS — N18.31 CHRONIC KIDNEY DISEASE, STAGE 3A (H): ICD-10-CM

## 2022-01-01 DIAGNOSIS — Z94.84 STEM CELLS TRANSPLANT STATUS (H): ICD-10-CM

## 2022-01-01 DIAGNOSIS — D64.89 ANEMIA DUE TO OTHER CAUSE, NOT CLASSIFIED: ICD-10-CM

## 2022-01-01 DIAGNOSIS — I65.22 CAROTID ARTERY STENOSIS, ASYMPTOMATIC, LEFT: ICD-10-CM

## 2022-01-01 DIAGNOSIS — Z92.89 HISTORY OF CARDIOVERSION: ICD-10-CM

## 2022-01-01 DIAGNOSIS — Z71.9 VISIT FOR COUNSELING: Primary | ICD-10-CM

## 2022-01-01 DIAGNOSIS — T45.1X5A NEUROPATHY DUE TO CHEMOTHERAPEUTIC DRUG (H): ICD-10-CM

## 2022-01-01 DIAGNOSIS — R50.81 NEUTROPENIC FEVER (H): Primary | ICD-10-CM

## 2022-01-01 DIAGNOSIS — Z94.84 STEM CELLS TRANSPLANT STATUS (H): Primary | ICD-10-CM

## 2022-01-01 DIAGNOSIS — G62.0 NEUROPATHY DUE TO CHEMOTHERAPEUTIC DRUG (H): ICD-10-CM

## 2022-01-01 DIAGNOSIS — D61.818 PANCYTOPENIA (H): ICD-10-CM

## 2022-01-01 DIAGNOSIS — D51.9 ANEMIA DUE TO VITAMIN B12 DEFICIENCY, UNSPECIFIED B12 DEFICIENCY TYPE: Primary | ICD-10-CM

## 2022-01-01 DIAGNOSIS — I25.84 CORONARY ARTERY DISEASE DUE TO CALCIFIED CORONARY LESION: ICD-10-CM

## 2022-01-01 DIAGNOSIS — I50.22 CHRONIC SYSTOLIC CONGESTIVE HEART FAILURE (H): ICD-10-CM

## 2022-01-01 DIAGNOSIS — R79.89 ELEVATED LIVER FUNCTION TESTS: ICD-10-CM

## 2022-01-01 DIAGNOSIS — I35.0 SEVERE AORTIC STENOSIS: Primary | ICD-10-CM

## 2022-01-01 DIAGNOSIS — I48.19 PERSISTENT ATRIAL FIBRILLATION (H): ICD-10-CM

## 2022-01-01 DIAGNOSIS — Z86.718 HISTORY OF DVT (DEEP VEIN THROMBOSIS): ICD-10-CM

## 2022-01-01 DIAGNOSIS — M48.061 SPINAL STENOSIS OF LUMBAR REGION WITHOUT NEUROGENIC CLAUDICATION: ICD-10-CM

## 2022-01-01 DIAGNOSIS — I48.19 ATRIAL FIBRILLATION, PERSISTENT (H): ICD-10-CM

## 2022-01-01 DIAGNOSIS — I25.10 CORONARY ARTERY DISEASE DUE TO CALCIFIED CORONARY LESION: ICD-10-CM

## 2022-01-01 DIAGNOSIS — D70.1 CHEMOTHERAPY-INDUCED NEUTROPENIA (H): Primary | ICD-10-CM

## 2022-01-01 DIAGNOSIS — I50.31 ACUTE HEART FAILURE WITH PRESERVED EJECTION FRACTION (HFPEF) (H): ICD-10-CM

## 2022-01-01 DIAGNOSIS — R12 HEARTBURN: ICD-10-CM

## 2022-01-01 DIAGNOSIS — Z20.822 COVID-19 RULED OUT BY LABORATORY TESTING: Primary | ICD-10-CM

## 2022-01-01 DIAGNOSIS — N28.9 RENAL INSUFFICIENCY: ICD-10-CM

## 2022-01-01 DIAGNOSIS — I50.31 ACUTE HEART FAILURE WITH PRESERVED EJECTION FRACTION (HFPEF) (H): Primary | ICD-10-CM

## 2022-01-01 DIAGNOSIS — I48.91 NEW ONSET ATRIAL FIBRILLATION (H): Primary | ICD-10-CM

## 2022-01-01 DIAGNOSIS — I50.22 CHRONIC SYSTOLIC CONGESTIVE HEART FAILURE (H): Primary | ICD-10-CM

## 2022-01-01 DIAGNOSIS — B39.9 HISTOPLASMOSIS: ICD-10-CM

## 2022-01-01 DIAGNOSIS — Z11.59 ENCOUNTER FOR SCREENING FOR VIRAL DISEASE: ICD-10-CM

## 2022-01-01 DIAGNOSIS — D51.9 ANEMIA DUE TO VITAMIN B12 DEFICIENCY, UNSPECIFIED B12 DEFICIENCY TYPE: ICD-10-CM

## 2022-01-01 DIAGNOSIS — D70.9 NEUTROPENIC FEVER (H): Primary | ICD-10-CM

## 2022-01-01 DIAGNOSIS — R06.00 DYSPNEA, UNSPECIFIED: ICD-10-CM

## 2022-01-01 DIAGNOSIS — C90.00 MULTIPLE MYELOMA, REMISSION STATUS UNSPECIFIED (H): Primary | ICD-10-CM

## 2022-01-01 DIAGNOSIS — I25.10 CORONARY ARTERY CALCIFICATION SEEN ON CAT SCAN: Primary | ICD-10-CM

## 2022-01-01 DIAGNOSIS — I48.0 PAROXYSMAL ATRIAL FIBRILLATION (H): Primary | ICD-10-CM

## 2022-01-01 DIAGNOSIS — D69.6 THROMBOCYTOPENIA (H): ICD-10-CM

## 2022-01-01 DIAGNOSIS — C90.00 MULTIPLE MYELOMA (H): Primary | ICD-10-CM

## 2022-01-01 DIAGNOSIS — J43.8 OTHER EMPHYSEMA (H): ICD-10-CM

## 2022-01-01 DIAGNOSIS — E83.39 HYPOPHOSPHATEMIA: Primary | ICD-10-CM

## 2022-01-01 DIAGNOSIS — R06.02 SHORTNESS OF BREATH ON EXERTION: ICD-10-CM

## 2022-01-01 DIAGNOSIS — Z53.9 ERRONEOUS ENCOUNTER--DISREGARD: Primary | ICD-10-CM

## 2022-01-01 DIAGNOSIS — J20.8 VIRAL BRONCHITIS: ICD-10-CM

## 2022-01-01 DIAGNOSIS — I35.0 SEVERE AORTIC STENOSIS: ICD-10-CM

## 2022-01-01 DIAGNOSIS — Z11.59 ENCOUNTER FOR SCREENING FOR OTHER VIRAL DISEASES: Primary | ICD-10-CM

## 2022-01-01 DIAGNOSIS — Z01.818 EXAMINATION PRIOR TO CHEMOTHERAPY: ICD-10-CM

## 2022-01-01 DIAGNOSIS — I35.0 AORTIC STENOSIS: Primary | ICD-10-CM

## 2022-01-01 DIAGNOSIS — Z23 NEED FOR COVID-19 VACCINE: ICD-10-CM

## 2022-01-01 DIAGNOSIS — H40.9 GLAUCOMA, UNSPECIFIED GLAUCOMA TYPE, UNSPECIFIED LATERALITY: ICD-10-CM

## 2022-01-01 DIAGNOSIS — C90.00 MULTIPLE MYELOMA, REMISSION STATUS UNSPECIFIED (H): ICD-10-CM

## 2022-01-01 DIAGNOSIS — Z01.818 EXAMINATION PRIOR TO CHEMOTHERAPY: Primary | ICD-10-CM

## 2022-01-01 DIAGNOSIS — N18.2 CHRONIC RENAL INSUFFICIENCY, STAGE 2 (MILD): ICD-10-CM

## 2022-01-01 DIAGNOSIS — B02.23 SHINGLES (HERPES ZOSTER) POLYNEUROPATHY: ICD-10-CM

## 2022-01-01 DIAGNOSIS — J20.8 VIRAL BRONCHITIS: Primary | ICD-10-CM

## 2022-01-01 DIAGNOSIS — Z86.39 HISTORY OF HYPOKALEMIA: ICD-10-CM

## 2022-01-01 DIAGNOSIS — I48.20 CHRONIC ATRIAL FIBRILLATION (H): ICD-10-CM

## 2022-01-01 DIAGNOSIS — C90.01 MULTIPLE MYELOMA IN REMISSION (H): Primary | ICD-10-CM

## 2022-01-01 DIAGNOSIS — J18.9 PNEUMONIA OF BOTH LUNGS DUE TO INFECTIOUS ORGANISM, UNSPECIFIED PART OF LUNG: ICD-10-CM

## 2022-01-01 LAB
1,3 BETA GLUCAN SER-MCNC: <31 PG/ML
A FUMIGATUS IGE QN: <0.1 KU/L
A FUMIGATUS1 AB SER QL ID: NORMAL
A FUMIGATUS6 AB SER QL ID: NORMAL
ABO/RH TYPE: NORMAL
ABO/RH(D): ABNORMAL
ACANTHOCYTES BLD QL SMEAR: SLIGHT
ADDITIONAL COMMENTS: NORMAL
ALBUMIN MFR UR ELPH: 20.6 %
ALBUMIN MFR UR ELPH: 22.3 %
ALBUMIN MFR UR ELPH: 34.8 %
ALBUMIN PERCENT: 66.4 % (ref 51–67)
ALBUMIN PERCENT: 70.5 % (ref 51–67)
ALBUMIN PERCENT: 70.5 % (ref 51–67)
ALBUMIN PERCENT: 70.6 % (ref 51–67)
ALBUMIN PERCENT: 71.1 % (ref 51–67)
ALBUMIN SERPL BCG-MCNC: 3.1 G/DL (ref 3.5–5.2)
ALBUMIN SERPL BCG-MCNC: 3.1 G/DL (ref 3.5–5.2)
ALBUMIN SERPL BCG-MCNC: 3.3 G/DL (ref 3.5–5.2)
ALBUMIN SERPL BCG-MCNC: 3.4 G/DL (ref 3.5–5.2)
ALBUMIN SERPL BCG-MCNC: 3.4 G/DL (ref 3.5–5.2)
ALBUMIN SERPL BCG-MCNC: 3.7 G/DL (ref 3.5–5.2)
ALBUMIN SERPL BCG-MCNC: 3.8 G/DL (ref 3.5–5.2)
ALBUMIN SERPL BCG-MCNC: 3.8 G/DL (ref 3.5–5.2)
ALBUMIN SERPL ELPH-MCNC: 2.9 G/DL (ref 3.7–5.1)
ALBUMIN SERPL ELPH-MCNC: 3.1 G/DL (ref 3.2–4.7)
ALBUMIN SERPL ELPH-MCNC: 3.1 G/DL (ref 3.7–5.1)
ALBUMIN SERPL ELPH-MCNC: 3.2 G/DL (ref 3.2–4.7)
ALBUMIN SERPL ELPH-MCNC: 3.3 G/DL (ref 3.2–4.7)
ALBUMIN SERPL ELPH-MCNC: 3.3 G/DL (ref 3.2–4.7)
ALBUMIN SERPL ELPH-MCNC: 3.3 G/DL (ref 3.7–5.1)
ALBUMIN SERPL ELPH-MCNC: 3.6 G/DL (ref 3.7–5.1)
ALBUMIN SERPL ELPH-MCNC: 3.7 G/DL (ref 3.2–4.7)
ALBUMIN SERPL-MCNC: 2.6 G/DL (ref 3.5–5)
ALBUMIN SERPL-MCNC: 2.7 G/DL (ref 3.4–5)
ALBUMIN SERPL-MCNC: 2.8 G/DL (ref 3.4–5)
ALBUMIN SERPL-MCNC: 2.8 G/DL (ref 3.5–5)
ALBUMIN SERPL-MCNC: 2.8 G/DL (ref 3.5–5)
ALBUMIN SERPL-MCNC: 2.9 G/DL (ref 3.4–5)
ALBUMIN SERPL-MCNC: 2.9 G/DL (ref 3.5–5)
ALBUMIN SERPL-MCNC: 3 G/DL (ref 3.4–5)
ALBUMIN SERPL-MCNC: 3 G/DL (ref 3.5–5)
ALBUMIN SERPL-MCNC: 3 G/DL (ref 3.5–5)
ALBUMIN SERPL-MCNC: 3.1 G/DL (ref 3.5–5)
ALBUMIN SERPL-MCNC: 3.2 G/DL (ref 3.4–5)
ALBUMIN SERPL-MCNC: 3.2 G/DL (ref 3.5–5)
ALBUMIN SERPL-MCNC: 3.2 G/DL (ref 3.5–5)
ALBUMIN SERPL-MCNC: 3.4 G/DL (ref 3.5–5)
ALBUMIN SERPL-MCNC: 3.5 G/DL (ref 3.5–5)
ALBUMIN UR-MCNC: NEGATIVE MG/DL
ALP SERPL-CCNC: 105 U/L (ref 40–129)
ALP SERPL-CCNC: 110 U/L (ref 40–150)
ALP SERPL-CCNC: 110 U/L (ref 40–150)
ALP SERPL-CCNC: 114 U/L (ref 40–150)
ALP SERPL-CCNC: 136 U/L (ref 40–150)
ALP SERPL-CCNC: 141 U/L (ref 40–150)
ALP SERPL-CCNC: 50 U/L (ref 45–120)
ALP SERPL-CCNC: 56 U/L (ref 45–120)
ALP SERPL-CCNC: 60 U/L (ref 45–120)
ALP SERPL-CCNC: 62 U/L (ref 40–150)
ALP SERPL-CCNC: 64 U/L (ref 40–129)
ALP SERPL-CCNC: 65 U/L (ref 45–120)
ALP SERPL-CCNC: 66 U/L (ref 45–120)
ALP SERPL-CCNC: 67 U/L (ref 40–129)
ALP SERPL-CCNC: 67 U/L (ref 45–120)
ALP SERPL-CCNC: 67 U/L (ref 45–120)
ALP SERPL-CCNC: 69 U/L (ref 40–129)
ALP SERPL-CCNC: 69 U/L (ref 45–120)
ALP SERPL-CCNC: 69 U/L (ref 45–120)
ALP SERPL-CCNC: 70 U/L (ref 40–150)
ALP SERPL-CCNC: 70 U/L (ref 45–120)
ALP SERPL-CCNC: 71 U/L (ref 40–150)
ALP SERPL-CCNC: 72 U/L (ref 45–120)
ALP SERPL-CCNC: 74 U/L (ref 40–129)
ALP SERPL-CCNC: 77 U/L (ref 45–120)
ALP SERPL-CCNC: 79 U/L (ref 45–120)
ALP SERPL-CCNC: 81 U/L (ref 40–150)
ALP SERPL-CCNC: 86 U/L (ref 40–150)
ALP SERPL-CCNC: 86 U/L (ref 40–150)
ALP SERPL-CCNC: 92 U/L (ref 40–150)
ALP SERPL-CCNC: 93 U/L (ref 40–150)
ALP SERPL-CCNC: 97 U/L (ref 40–129)
ALP SERPL-CCNC: 98 U/L (ref 40–150)
ALP SERPL-CCNC: 99 U/L (ref 40–129)
ALPHA 1 PERCENT: 2.9 % (ref 2–4)
ALPHA 1 PERCENT: 3 % (ref 2–4)
ALPHA 1 PERCENT: 3.1 % (ref 2–4)
ALPHA 1 PERCENT: 3.5 % (ref 2–4)
ALPHA 1 PERCENT: 3.5 % (ref 2–4)
ALPHA 2 PERCENT: 10.7 % (ref 5–13)
ALPHA 2 PERCENT: 11.6 % (ref 5–13)
ALPHA 2 PERCENT: 12.1 % (ref 5–13)
ALPHA 2 PERCENT: 12.3 % (ref 5–13)
ALPHA 2 PERCENT: 12.4 % (ref 5–13)
ALPHA1 GLOB MFR UR ELPH: 2.5 %
ALPHA1 GLOB MFR UR ELPH: 4.1 %
ALPHA1 GLOB MFR UR ELPH: 6.9 %
ALPHA1 GLOB SERPL ELPH-MCNC: 0.1 G/DL (ref 0.1–0.3)
ALPHA1 GLOB SERPL ELPH-MCNC: 0.2 G/DL (ref 0.1–0.3)
ALPHA1 GLOB SERPL ELPH-MCNC: 0.2 G/DL (ref 0.1–0.3)
ALPHA1 GLOB SERPL ELPH-MCNC: 0.3 G/DL (ref 0.2–0.4)
ALPHA2 GLOB MFR UR ELPH: 2.7 %
ALPHA2 GLOB MFR UR ELPH: 4.4 %
ALPHA2 GLOB MFR UR ELPH: 5.7 %
ALPHA2 GLOB SERPL ELPH-MCNC: 0.5 G/DL (ref 0.4–0.9)
ALPHA2 GLOB SERPL ELPH-MCNC: 0.5 G/DL (ref 0.5–0.9)
ALPHA2 GLOB SERPL ELPH-MCNC: 0.6 G/DL (ref 0.4–0.9)
ALPHA2 GLOB SERPL ELPH-MCNC: 0.6 G/DL (ref 0.5–0.9)
ALT SERPL W P-5'-P-CCNC: 12 U/L (ref 0–45)
ALT SERPL W P-5'-P-CCNC: 124 U/L (ref 0–70)
ALT SERPL W P-5'-P-CCNC: 13 U/L (ref 0–45)
ALT SERPL W P-5'-P-CCNC: 132 U/L (ref 0–70)
ALT SERPL W P-5'-P-CCNC: 14 U/L (ref 0–45)
ALT SERPL W P-5'-P-CCNC: 14 U/L (ref 0–45)
ALT SERPL W P-5'-P-CCNC: 15 U/L (ref 0–45)
ALT SERPL W P-5'-P-CCNC: 16 U/L (ref 0–45)
ALT SERPL W P-5'-P-CCNC: 17 U/L (ref 0–45)
ALT SERPL W P-5'-P-CCNC: 173 U/L (ref 0–70)
ALT SERPL W P-5'-P-CCNC: 179 U/L (ref 0–70)
ALT SERPL W P-5'-P-CCNC: 201 U/L (ref 0–70)
ALT SERPL W P-5'-P-CCNC: 22 U/L (ref 10–50)
ALT SERPL W P-5'-P-CCNC: 23 U/L (ref 10–50)
ALT SERPL W P-5'-P-CCNC: 26 U/L (ref 10–50)
ALT SERPL W P-5'-P-CCNC: 27 U/L (ref 0–45)
ALT SERPL W P-5'-P-CCNC: 29 U/L (ref 0–45)
ALT SERPL W P-5'-P-CCNC: 29 U/L (ref 0–70)
ALT SERPL W P-5'-P-CCNC: 31 U/L (ref 0–70)
ALT SERPL W P-5'-P-CCNC: 31 U/L (ref 0–70)
ALT SERPL W P-5'-P-CCNC: 38 U/L (ref 10–50)
ALT SERPL W P-5'-P-CCNC: 40 U/L (ref 0–70)
ALT SERPL W P-5'-P-CCNC: 48 U/L (ref 10–50)
ALT SERPL W P-5'-P-CCNC: 50 U/L (ref 0–45)
ALT SERPL W P-5'-P-CCNC: 53 U/L (ref 10–50)
ALT SERPL W P-5'-P-CCNC: 57 U/L (ref 0–70)
ALT SERPL W P-5'-P-CCNC: 57 U/L (ref 10–50)
ALT SERPL W P-5'-P-CCNC: 70 U/L (ref 0–70)
ALT SERPL W P-5'-P-CCNC: 71 U/L (ref 0–70)
ALT SERPL W P-5'-P-CCNC: 78 U/L (ref 0–45)
ALT SERPL W P-5'-P-CCNC: 85 U/L (ref 0–45)
ALT SERPL W P-5'-P-CCNC: 91 U/L (ref 0–45)
ALT SERPL W P-5'-P-CCNC: 94 U/L (ref 0–70)
ALT SERPL W P-5'-P-CCNC: 97 U/L (ref 0–70)
ANION GAP SERPL CALCULATED.3IONS-SCNC: 1 MMOL/L (ref 3–14)
ANION GAP SERPL CALCULATED.3IONS-SCNC: 10 MMOL/L (ref 3–14)
ANION GAP SERPL CALCULATED.3IONS-SCNC: 10 MMOL/L (ref 5–18)
ANION GAP SERPL CALCULATED.3IONS-SCNC: 11 MMOL/L (ref 5–18)
ANION GAP SERPL CALCULATED.3IONS-SCNC: 11 MMOL/L (ref 5–18)
ANION GAP SERPL CALCULATED.3IONS-SCNC: 12 MMOL/L (ref 7–15)
ANION GAP SERPL CALCULATED.3IONS-SCNC: 13 MMOL/L (ref 7–15)
ANION GAP SERPL CALCULATED.3IONS-SCNC: 2 MMOL/L (ref 3–14)
ANION GAP SERPL CALCULATED.3IONS-SCNC: 3 MMOL/L (ref 3–14)
ANION GAP SERPL CALCULATED.3IONS-SCNC: 4 MMOL/L (ref 3–14)
ANION GAP SERPL CALCULATED.3IONS-SCNC: 5 MMOL/L (ref 5–18)
ANION GAP SERPL CALCULATED.3IONS-SCNC: 6 MMOL/L (ref 3–14)
ANION GAP SERPL CALCULATED.3IONS-SCNC: 6 MMOL/L (ref 5–18)
ANION GAP SERPL CALCULATED.3IONS-SCNC: 7 MMOL/L (ref 3–14)
ANION GAP SERPL CALCULATED.3IONS-SCNC: 7 MMOL/L (ref 3–14)
ANION GAP SERPL CALCULATED.3IONS-SCNC: 7 MMOL/L (ref 5–18)
ANION GAP SERPL CALCULATED.3IONS-SCNC: 8 MMOL/L (ref 3–14)
ANION GAP SERPL CALCULATED.3IONS-SCNC: 8 MMOL/L (ref 5–18)
ANION GAP SERPL CALCULATED.3IONS-SCNC: 9 MMOL/L (ref 3–14)
ANION GAP SERPL CALCULATED.3IONS-SCNC: 9 MMOL/L (ref 5–18)
ANION GAP SERPL CALCULATED.3IONS-SCNC: 9 MMOL/L (ref 7–15)
ANION GAP SERPL CALCULATED.3IONS-SCNC: <1 MMOL/L (ref 3–14)
ANTIBODY SCREEN, TUBE: NORMAL
ANTIBODY SCREEN: POSITIVE
ANTIBODY UNIDENTIFIED: NORMAL
APPEARANCE UR: ABNORMAL
APPEARANCE UR: CLEAR
APPEARANCE UR: CLEAR
ASPERGILLUS AB SER QL ID: NORMAL
ASPERGILLUS AB TITR SER CF: NORMAL {TITER}
AST SERPL W P-5'-P-CCNC: 13 U/L (ref 0–40)
AST SERPL W P-5'-P-CCNC: 14 U/L (ref 0–40)
AST SERPL W P-5'-P-CCNC: 15 U/L (ref 0–40)
AST SERPL W P-5'-P-CCNC: 16 U/L (ref 0–40)
AST SERPL W P-5'-P-CCNC: 165 U/L (ref 0–45)
AST SERPL W P-5'-P-CCNC: 17 U/L (ref 0–45)
AST SERPL W P-5'-P-CCNC: 17 U/L (ref 10–50)
AST SERPL W P-5'-P-CCNC: 18 U/L (ref 10–50)
AST SERPL W P-5'-P-CCNC: 19 U/L (ref 0–40)
AST SERPL W P-5'-P-CCNC: 20 U/L (ref 0–45)
AST SERPL W P-5'-P-CCNC: 20 U/L (ref 10–50)
AST SERPL W P-5'-P-CCNC: 21 U/L (ref 0–40)
AST SERPL W P-5'-P-CCNC: 21 U/L (ref 0–45)
AST SERPL W P-5'-P-CCNC: 25 U/L (ref 10–50)
AST SERPL W P-5'-P-CCNC: 26 U/L (ref 0–45)
AST SERPL W P-5'-P-CCNC: 26 U/L (ref 10–50)
AST SERPL W P-5'-P-CCNC: 27 U/L (ref 0–45)
AST SERPL W P-5'-P-CCNC: 31 U/L (ref 0–45)
AST SERPL W P-5'-P-CCNC: 34 U/L (ref 0–45)
AST SERPL W P-5'-P-CCNC: 38 U/L (ref 0–45)
AST SERPL W P-5'-P-CCNC: 39 U/L (ref 0–40)
AST SERPL W P-5'-P-CCNC: 46 U/L (ref 0–45)
AST SERPL W P-5'-P-CCNC: 50 U/L (ref 0–45)
AST SERPL W P-5'-P-CCNC: 53 U/L (ref 0–45)
AST SERPL W P-5'-P-CCNC: 57 U/L (ref 0–40)
AST SERPL W P-5'-P-CCNC: 57 U/L (ref 0–45)
AST SERPL W P-5'-P-CCNC: 59 U/L (ref 0–40)
AST SERPL W P-5'-P-CCNC: 65 U/L (ref 0–45)
ATRIAL RATE - MUSE: 227 BPM
ATRIAL RATE - MUSE: 288 BPM
ATRIAL RATE - MUSE: 416 BPM
ATRIAL RATE - MUSE: 59 BPM
ATRIAL RATE - MUSE: 63 BPM
ATRIAL RATE - MUSE: 66 BPM
ATRIAL RATE - MUSE: 70 BPM
ATRIAL RATE - MUSE: 75 BPM
ATRIAL RATE - MUSE: 78 BPM
ATRIAL RATE - MUSE: 94 BPM
ATRIAL RATE - MUSE: 98 BPM
B DERMAT AB SER QL ID: NORMAL
B DERMAT AB SER-ACNC: 0 IV
B-GLOBULIN MFR UR ELPH: 47 %
B-GLOBULIN MFR UR ELPH: 48.4 %
B-GLOBULIN MFR UR ELPH: 58.4 %
B-GLOBULIN SERPL ELPH-MCNC: 0.4 G/DL (ref 0.6–1)
B-GLOBULIN SERPL ELPH-MCNC: 0.5 G/DL (ref 0.6–1)
B-GLOBULIN SERPL ELPH-MCNC: 0.5 G/DL (ref 0.7–1.2)
B-GLOBULIN SERPL ELPH-MCNC: 0.6 G/DL (ref 0.7–1.2)
B2 MICROGLOB TUMOR MARKER SER-MCNC: 5.3 MG/L
BACTERIA #/AREA URNS HPF: ABNORMAL /HPF
BACTERIA BLD CULT: NO GROWTH
BACTERIA BLD CULT: NO GROWTH
BACTERIA SPT CULT: NORMAL
BASOPHILS # BLD AUTO: 0 10E3/UL (ref 0–0.2)
BASOPHILS # BLD AUTO: 0.1 10E3/UL (ref 0–0.2)
BASOPHILS # BLD MANUAL: 0 10E3/UL (ref 0–0.2)
BASOPHILS # BLD MANUAL: 0.1 10E3/UL (ref 0–0.2)
BASOPHILS NFR BLD AUTO: 0 %
BASOPHILS NFR BLD AUTO: 1 %
BASOPHILS NFR BLD AUTO: 2 %
BASOPHILS NFR BLD AUTO: 3 %
BASOPHILS NFR BLD MANUAL: 0 %
BASOPHILS NFR BLD MANUAL: 1 %
BASOPHILS NFR BLD MANUAL: 2 %
BASOPHILS NFR BLD MANUAL: 2 %
BASOPHILS NFR BLD MANUAL: 3 %
BASOPHILS NFR BLD MANUAL: 4 %
BETA PERCENT: 10.1 % (ref 10–17)
BETA PERCENT: 10.5 % (ref 10–17)
BETA PERCENT: 10.6 % (ref 10–17)
BETA PERCENT: 10.9 % (ref 10–17)
BETA PERCENT: 9.9 % (ref 10–17)
BILIRUB DIRECT SERPL-MCNC: 0.1 MG/DL (ref 0–0.2)
BILIRUB DIRECT SERPL-MCNC: 0.2 MG/DL (ref 0–0.2)
BILIRUB DIRECT SERPL-MCNC: <0.1 MG/DL (ref 0–0.2)
BILIRUB DIRECT SERPL-MCNC: <0.1 MG/DL (ref 0–0.2)
BILIRUB DIRECT SERPL-MCNC: <0.2 MG/DL (ref 0–0.3)
BILIRUB DIRECT SERPL-MCNC: <0.2 MG/DL (ref 0–0.3)
BILIRUB SERPL-MCNC: 0.2 MG/DL
BILIRUB SERPL-MCNC: 0.2 MG/DL (ref 0.2–1.3)
BILIRUB SERPL-MCNC: 0.3 MG/DL
BILIRUB SERPL-MCNC: 0.3 MG/DL (ref 0.2–1.3)
BILIRUB SERPL-MCNC: 0.4 MG/DL (ref 0.2–1.3)
BILIRUB SERPL-MCNC: 0.4 MG/DL (ref 0–1)
BILIRUB SERPL-MCNC: 0.5 MG/DL (ref 0.2–1.3)
BILIRUB SERPL-MCNC: 0.5 MG/DL (ref 0.2–1.3)
BILIRUB SERPL-MCNC: 0.5 MG/DL (ref 0–1)
BILIRUB SERPL-MCNC: 0.6 MG/DL (ref 0–1)
BILIRUB SERPL-MCNC: 0.7 MG/DL
BILIRUB SERPL-MCNC: 0.7 MG/DL (ref 0–1)
BILIRUB SERPL-MCNC: 0.8 MG/DL (ref 0–1)
BILIRUB SERPL-MCNC: 0.9 MG/DL (ref 0–1)
BILIRUB UR QL STRIP: NEGATIVE
BILL ONLY STEM CELL INFUSION: NORMAL
BILL ONLY STEM CELL INFUSION: NORMAL
BLD PROD TYP BPU: NORMAL
BLOOD COMPONENT TYPE: NORMAL
BMT WORKUP IRRADIATED BLOOD REQUIRED: NORMAL
BNP SERPL-MCNC: 207 PG/ML (ref 0–80)
BNP SERPL-MCNC: 211 PG/ML (ref 0–80)
BNP SERPL-MCNC: 221 PG/ML (ref 0–78)
BNP SERPL-MCNC: 227 PG/ML (ref 0–78)
BNP SERPL-MCNC: 260 PG/ML (ref 0–78)
BNP SERPL-MCNC: 270 PG/ML (ref 0–78)
BNP SERPL-MCNC: 689 PG/ML (ref 0–80)
BUN SERPL-MCNC: 17 MG/DL (ref 8–28)
BUN SERPL-MCNC: 18 MG/DL (ref 7–30)
BUN SERPL-MCNC: 19 MG/DL (ref 8–28)
BUN SERPL-MCNC: 21 MG/DL (ref 7–30)
BUN SERPL-MCNC: 21 MG/DL (ref 7–30)
BUN SERPL-MCNC: 22 MG/DL (ref 7–30)
BUN SERPL-MCNC: 22 MG/DL (ref 8–28)
BUN SERPL-MCNC: 23 MG/DL (ref 8–28)
BUN SERPL-MCNC: 23.4 MG/DL (ref 8–23)
BUN SERPL-MCNC: 24 MG/DL (ref 7–30)
BUN SERPL-MCNC: 24 MG/DL (ref 7–30)
BUN SERPL-MCNC: 24 MG/DL (ref 8–28)
BUN SERPL-MCNC: 25 MG/DL (ref 7–30)
BUN SERPL-MCNC: 25 MG/DL (ref 8–28)
BUN SERPL-MCNC: 25 MG/DL (ref 8–28)
BUN SERPL-MCNC: 26 MG/DL (ref 7–30)
BUN SERPL-MCNC: 26 MG/DL (ref 7–30)
BUN SERPL-MCNC: 26.9 MG/DL (ref 8–23)
BUN SERPL-MCNC: 27 MG/DL (ref 8–23)
BUN SERPL-MCNC: 27.1 MG/DL (ref 8–23)
BUN SERPL-MCNC: 27.8 MG/DL (ref 8–23)
BUN SERPL-MCNC: 28 MG/DL (ref 7–30)
BUN SERPL-MCNC: 29 MG/DL (ref 7–30)
BUN SERPL-MCNC: 29 MG/DL (ref 8–28)
BUN SERPL-MCNC: 30 MG/DL (ref 7–30)
BUN SERPL-MCNC: 30 MG/DL (ref 8–28)
BUN SERPL-MCNC: 31 MG/DL (ref 8–28)
BUN SERPL-MCNC: 31.7 MG/DL (ref 8–23)
BUN SERPL-MCNC: 34 MG/DL (ref 8–28)
BUN SERPL-MCNC: 37 MG/DL (ref 8–28)
BUN SERPL-MCNC: 37.2 MG/DL (ref 8–23)
BUN SERPL-MCNC: 39 MG/DL (ref 8–28)
BUN SERPL-MCNC: 42.7 MG/DL (ref 8–23)
BUN SERPL-MCNC: 49 MG/DL (ref 8–28)
BUN SERPL-MCNC: 62 MG/DL (ref 7–30)
BURR CELLS BLD QL SMEAR: SLIGHT
C PNEUM DNA SPEC QL NAA+PROBE: NOT DETECTED
C REACTIVE PROTEIN LHE: 0.6 MG/DL (ref 0–0.8)
C REACTIVE PROTEIN LHE: 4 MG/DL (ref 0–0.8)
C REACTIVE PROTEIN LHE: 7.7 MG/DL (ref 0–0.8)
CALCIUM SERPL-MCNC: 7.9 MG/DL (ref 8.5–10.1)
CALCIUM SERPL-MCNC: 8 MG/DL (ref 8.5–10.1)
CALCIUM SERPL-MCNC: 8 MG/DL (ref 8.5–10.1)
CALCIUM SERPL-MCNC: 8.1 MG/DL (ref 8.5–10.1)
CALCIUM SERPL-MCNC: 8.1 MG/DL (ref 8.5–10.1)
CALCIUM SERPL-MCNC: 8.1 MG/DL (ref 8.5–10.5)
CALCIUM SERPL-MCNC: 8.1 MG/DL (ref 8.5–10.5)
CALCIUM SERPL-MCNC: 8.2 MG/DL (ref 8.5–10.1)
CALCIUM SERPL-MCNC: 8.2 MG/DL (ref 8.5–10.1)
CALCIUM SERPL-MCNC: 8.2 MG/DL (ref 8.5–10.5)
CALCIUM SERPL-MCNC: 8.2 MG/DL (ref 8.8–10.2)
CALCIUM SERPL-MCNC: 8.3 MG/DL (ref 8.5–10.1)
CALCIUM SERPL-MCNC: 8.4 MG/DL (ref 8.5–10.1)
CALCIUM SERPL-MCNC: 8.4 MG/DL (ref 8.5–10.5)
CALCIUM SERPL-MCNC: 8.4 MG/DL (ref 8.8–10.2)
CALCIUM SERPL-MCNC: 8.5 MG/DL (ref 8.5–10.1)
CALCIUM SERPL-MCNC: 8.5 MG/DL (ref 8.5–10.5)
CALCIUM SERPL-MCNC: 8.5 MG/DL (ref 8.8–10.2)
CALCIUM SERPL-MCNC: 8.6 MG/DL (ref 8.5–10.1)
CALCIUM SERPL-MCNC: 8.6 MG/DL (ref 8.5–10.5)
CALCIUM SERPL-MCNC: 8.6 MG/DL (ref 8.8–10.2)
CALCIUM SERPL-MCNC: 8.7 MG/DL (ref 8.5–10.1)
CALCIUM SERPL-MCNC: 8.7 MG/DL (ref 8.5–10.5)
CALCIUM SERPL-MCNC: 8.7 MG/DL (ref 8.8–10.2)
CALCIUM SERPL-MCNC: 8.8 MG/DL (ref 8.5–10.5)
CALCIUM SERPL-MCNC: 8.8 MG/DL (ref 8.8–10.2)
CALCIUM SERPL-MCNC: 8.8 MG/DL (ref 8.8–10.2)
CALCIUM SERPL-MCNC: 8.9 MG/DL (ref 8.5–10.1)
CALCIUM SERPL-MCNC: 9 MG/DL (ref 8.8–10.2)
CD3 CELLS # BLD: 212 CELLS/UL (ref 603–2990)
CD3 CELLS NFR BLD: 90 % (ref 49–84)
CD3+CD4+ CELLS # BLD: 73 CELLS/UL (ref 441–2156)
CD3+CD4+ CELLS NFR BLD: 31 % (ref 28–63)
CD3+CD4+ CELLS/CD3+CD8+ CLL BLD: 0.53 % (ref 1.4–2.6)
CD3+CD8+ CELLS # BLD: 136 CELLS/UL (ref 125–1312)
CD3+CD8+ CELLS NFR BLD: 58 % (ref 10–40)
CHLORIDE BLD-SCNC: 102 MMOL/L (ref 98–107)
CHLORIDE BLD-SCNC: 103 MMOL/L (ref 94–109)
CHLORIDE BLD-SCNC: 103 MMOL/L (ref 98–107)
CHLORIDE BLD-SCNC: 105 MMOL/L (ref 98–107)
CHLORIDE BLD-SCNC: 106 MMOL/L (ref 98–107)
CHLORIDE BLD-SCNC: 106 MMOL/L (ref 98–107)
CHLORIDE BLD-SCNC: 107 MMOL/L (ref 94–109)
CHLORIDE BLD-SCNC: 107 MMOL/L (ref 94–109)
CHLORIDE BLD-SCNC: 107 MMOL/L (ref 98–107)
CHLORIDE BLD-SCNC: 107 MMOL/L (ref 98–107)
CHLORIDE BLD-SCNC: 108 MMOL/L (ref 94–109)
CHLORIDE BLD-SCNC: 108 MMOL/L (ref 98–107)
CHLORIDE BLD-SCNC: 109 MMOL/L (ref 98–107)
CHLORIDE BLD-SCNC: 109 MMOL/L (ref 98–107)
CHLORIDE BLD-SCNC: 110 MMOL/L (ref 94–109)
CHLORIDE BLD-SCNC: 110 MMOL/L (ref 94–109)
CHLORIDE BLD-SCNC: 110 MMOL/L (ref 98–107)
CHLORIDE BLD-SCNC: 111 MMOL/L (ref 94–109)
CHLORIDE BLD-SCNC: 111 MMOL/L (ref 98–107)
CHLORIDE BLD-SCNC: 112 MMOL/L (ref 94–109)
CHLORIDE BLD-SCNC: 112 MMOL/L (ref 98–107)
CHLORIDE BLD-SCNC: 113 MMOL/L (ref 94–109)
CHLORIDE BLD-SCNC: 113 MMOL/L (ref 98–107)
CHLORIDE BLD-SCNC: 114 MMOL/L (ref 94–109)
CHLORIDE SERPL-SCNC: 101 MMOL/L (ref 98–107)
CHLORIDE SERPL-SCNC: 102 MMOL/L (ref 98–107)
CHLORIDE SERPL-SCNC: 102 MMOL/L (ref 98–107)
CHLORIDE SERPL-SCNC: 103 MMOL/L (ref 98–107)
CHLORIDE SERPL-SCNC: 108 MMOL/L (ref 98–107)
CHLORIDE SERPL-SCNC: 95 MMOL/L (ref 98–107)
CK SERPL-CCNC: 72 U/L (ref 30–300)
CO2 SERPL-SCNC: 21 MMOL/L (ref 20–32)
CO2 SERPL-SCNC: 21 MMOL/L (ref 22–31)
CO2 SERPL-SCNC: 21 MMOL/L (ref 22–31)
CO2 SERPL-SCNC: 22 MMOL/L (ref 20–32)
CO2 SERPL-SCNC: 22 MMOL/L (ref 22–31)
CO2 SERPL-SCNC: 22 MMOL/L (ref 22–31)
CO2 SERPL-SCNC: 23 MMOL/L (ref 22–31)
CO2 SERPL-SCNC: 24 MMOL/L (ref 20–32)
CO2 SERPL-SCNC: 24 MMOL/L (ref 20–32)
CO2 SERPL-SCNC: 24 MMOL/L (ref 22–31)
CO2 SERPL-SCNC: 25 MMOL/L (ref 20–32)
CO2 SERPL-SCNC: 25 MMOL/L (ref 22–31)
CO2 SERPL-SCNC: 26 MMOL/L (ref 20–32)
CO2 SERPL-SCNC: 26 MMOL/L (ref 22–31)
CO2 SERPL-SCNC: 26 MMOL/L (ref 22–31)
CO2 SERPL-SCNC: 27 MMOL/L (ref 20–32)
CO2 SERPL-SCNC: 27 MMOL/L (ref 20–32)
CO2 SERPL-SCNC: 27 MMOL/L (ref 22–31)
CO2 SERPL-SCNC: 28 MMOL/L (ref 20–32)
CO2 SERPL-SCNC: 28 MMOL/L (ref 22–31)
COCCIDIOIDES AB TITR SER CF: NORMAL {TITER}
CODING SYSTEM: NORMAL
COLLECT DURATION TIME UR: 24 H
COLOR UR AUTO: YELLOW
CREAT 24H UR-MRATE: 0.95 G/SPEC (ref 1–2)
CREAT CL 24H UR+SERPL-VRATE: 45 ML/MIN
CREAT CL/1.73 SQ M 24H UR+SERPL-ARVRAT: 42 ML/MIN/1.7M2
CREAT SERPL-MCNC: 0.87 MG/DL (ref 0.7–1.3)
CREAT SERPL-MCNC: 0.95 MG/DL (ref 0.7–1.3)
CREAT SERPL-MCNC: 1.07 MG/DL (ref 0.66–1.25)
CREAT SERPL-MCNC: 1.08 MG/DL (ref 0.66–1.25)
CREAT SERPL-MCNC: 1.12 MG/DL (ref 0.66–1.25)
CREAT SERPL-MCNC: 1.12 MG/DL (ref 0.7–1.3)
CREAT SERPL-MCNC: 1.13 MG/DL (ref 0.7–1.3)
CREAT SERPL-MCNC: 1.23 MG/DL (ref 0.7–1.3)
CREAT SERPL-MCNC: 1.24 MG/DL (ref 0.66–1.25)
CREAT SERPL-MCNC: 1.24 MG/DL (ref 0.67–1.17)
CREAT SERPL-MCNC: 1.25 MG/DL (ref 0.7–1.3)
CREAT SERPL-MCNC: 1.26 MG/DL (ref 0.7–1.3)
CREAT SERPL-MCNC: 1.3 MG/DL (ref 0.7–1.3)
CREAT SERPL-MCNC: 1.31 MG/DL (ref 0.66–1.25)
CREAT SERPL-MCNC: 1.31 MG/DL (ref 0.7–1.3)
CREAT SERPL-MCNC: 1.31 MG/DL (ref 0.7–1.3)
CREAT SERPL-MCNC: 1.33 MG/DL (ref 0.66–1.25)
CREAT SERPL-MCNC: 1.34 MG/DL (ref 0.66–1.25)
CREAT SERPL-MCNC: 1.34 MG/DL (ref 0.66–1.25)
CREAT SERPL-MCNC: 1.34 MG/DL (ref 0.7–1.3)
CREAT SERPL-MCNC: 1.36 MG/DL (ref 0.66–1.25)
CREAT SERPL-MCNC: 1.41 MG/DL (ref 0.66–1.25)
CREAT SERPL-MCNC: 1.41 MG/DL (ref 0.67–1.17)
CREAT SERPL-MCNC: 1.41 MG/DL (ref 0.7–1.3)
CREAT SERPL-MCNC: 1.42 MG/DL (ref 0.7–1.3)
CREAT SERPL-MCNC: 1.43 MG/DL (ref 0.66–1.25)
CREAT SERPL-MCNC: 1.44 MG/DL (ref 0.66–1.25)
CREAT SERPL-MCNC: 1.45 MG/DL (ref 0.67–1.17)
CREAT SERPL-MCNC: 1.47 MG/DL (ref 0.66–1.25)
CREAT SERPL-MCNC: 1.47 MG/DL (ref 0.66–1.25)
CREAT SERPL-MCNC: 1.48 MG/DL (ref 0.66–1.25)
CREAT SERPL-MCNC: 1.55 MG/DL (ref 0.7–1.3)
CREAT SERPL-MCNC: 1.58 MG/DL (ref 0.66–1.25)
CREAT SERPL-MCNC: 1.61 MG/DL (ref 0.7–1.3)
CREAT SERPL-MCNC: 1.63 MG/DL (ref 0.67–1.17)
CREAT SERPL-MCNC: 1.64 MG/DL (ref 0.66–1.25)
CREAT SERPL-MCNC: 1.64 MG/DL (ref 0.67–1.17)
CREAT SERPL-MCNC: 1.72 MG/DL (ref 0.67–1.17)
CREAT SERPL-MCNC: 1.79 MG/DL (ref 0.66–1.25)
CREAT SERPL-MCNC: 1.9 MG/DL (ref 0.67–1.17)
CREAT SERPL-MCNC: 1.93 MG/DL (ref 0.67–1.17)
CREAT SERPL-MCNC: 2.07 MG/DL (ref 0.7–1.3)
CREAT SERPL-MCNC: 2.39 MG/DL (ref 0.66–1.25)
CREAT UR-MCNC: 85 MG/DL
CROSSMATCH: NORMAL
CRP SERPL-MCNC: 5 MG/L (ref 0–8)
CULTURE HARVEST COMPLETE DATE: NORMAL
CULTURE HARVEST COMPLETE DATE: NORMAL
CV STRESS CURRENT BP HE: NORMAL
CV STRESS CURRENT HR HE: 65
CV STRESS CURRENT HR HE: 68
CV STRESS CURRENT HR HE: 72
CV STRESS CURRENT HR HE: 72
CV STRESS CURRENT HR HE: 74
CV STRESS CURRENT HR HE: 76
CV STRESS CURRENT HR HE: 76
CV STRESS CURRENT HR HE: 78
CV STRESS CURRENT HR HE: 79
CV STRESS CURRENT HR HE: 79
CV STRESS CURRENT HR HE: 81
CV STRESS CURRENT HR HE: 84
CV STRESS CURRENT HR HE: 84
CV STRESS CURRENT HR HE: 87
CV STRESS CURRENT HR HE: 89
CV STRESS CURRENT HR HE: 95
CV STRESS DEVIATION TIME HE: NORMAL
CV STRESS ECHO PERCENT HR HE: NORMAL
CV STRESS EXERCISE STAGE HE: NORMAL
CV STRESS FINAL RESTING BP HE: NORMAL
CV STRESS FINAL RESTING HR HE: 84
CV STRESS MAX HR HE: 95
CV STRESS MAX TREADMILL GRADE HE: 0
CV STRESS MAX TREADMILL SPEED HE: 0
CV STRESS PEAK DIA BP HE: NORMAL
CV STRESS PEAK SYS BP HE: NORMAL
CV STRESS PHASE HE: NORMAL
CV STRESS PROTOCOL HE: NORMAL
CV STRESS RESTING PT POSITION HE: NORMAL
CV STRESS ST DEVIATION AMOUNT HE: NORMAL
CV STRESS ST DEVIATION ELEVATION HE: NORMAL
CV STRESS ST EVELATION AMOUNT HE: NORMAL
CV STRESS TEST TYPE HE: NORMAL
CV STRESS TOTAL STAGE TIME MIN 1 HE: NORMAL
D DIMER PPP FEU-MCNC: 0.61 UG/ML FEU (ref 0–0.5)
D DIMER PPP FEU-MCNC: <0.27 UG/ML FEU (ref 0–0.5)
DACRYOCYTES BLD QL SMEAR: SLIGHT
DEPRECATED HCO3 PLAS-SCNC: 21 MMOL/L (ref 22–29)
DEPRECATED HCO3 PLAS-SCNC: 23 MMOL/L (ref 22–29)
DEPRECATED HCO3 PLAS-SCNC: 23 MMOL/L (ref 22–29)
DEPRECATED HCO3 PLAS-SCNC: 25 MMOL/L (ref 22–29)
DEPRECATED HCO3 PLAS-SCNC: 25 MMOL/L (ref 22–29)
DEPRECATED HCO3 PLAS-SCNC: 27 MMOL/L (ref 22–29)
DEPRECATED HCO3 PLAS-SCNC: 28 MMOL/L (ref 22–29)
DEPRECATED HCO3 PLAS-SCNC: 29 MMOL/L (ref 22–29)
DEPRECATED MISC ALLERGEN IGE RAST QL: NORMAL
DIASTOLIC BLOOD PRESSURE - MUSE: NORMAL MMHG
DONOR CYTOMEGALOVIRUS ABY: NORMAL
DONOR HEP B CORE ABY: NORMAL
DONOR HEP B SURF AGN: NORMAL
DONOR HEPATITIS C ABY: NORMAL
DONOR HTLV 1&2 ANTIBODY: NORMAL
DONOR TREPONEMA PAL ABY: NORMAL
EBV VCA IGG SER IA-ACNC: 251 U/ML
EBV VCA IGG SER IA-ACNC: POSITIVE
ELLIPTOCYTES BLD QL SMEAR: ABNORMAL
ELLIPTOCYTES BLD QL SMEAR: SLIGHT
EOSINOPHIL # BLD AUTO: 0 10E3/UL (ref 0–0.7)
EOSINOPHIL # BLD AUTO: 0.1 10E3/UL (ref 0–0.7)
EOSINOPHIL # BLD AUTO: 0.4 10E3/UL (ref 0–0.7)
EOSINOPHIL # BLD MANUAL: 0 10E3/UL (ref 0–0.7)
EOSINOPHIL # BLD MANUAL: 0.1 10E3/UL (ref 0–0.7)
EOSINOPHIL # BLD MANUAL: 0.1 10E3/UL (ref 0–0.7)
EOSINOPHIL # BLD MANUAL: 0.2 10E3/UL (ref 0–0.7)
EOSINOPHIL # BLD MANUAL: 0.4 10E3/UL (ref 0–0.7)
EOSINOPHIL NFR BLD AUTO: 0 %
EOSINOPHIL NFR BLD AUTO: 1 %
EOSINOPHIL NFR BLD AUTO: 2 %
EOSINOPHIL NFR BLD AUTO: 25 %
EOSINOPHIL NFR BLD AUTO: 3 %
EOSINOPHIL NFR BLD AUTO: 7 %
EOSINOPHIL NFR BLD MANUAL: 0 %
EOSINOPHIL NFR BLD MANUAL: 1 %
EOSINOPHIL NFR BLD MANUAL: 11 %
EOSINOPHIL NFR BLD MANUAL: 2 %
EOSINOPHIL NFR BLD MANUAL: 4 %
EOSINOPHIL NFR BLD MANUAL: 6 %
EOSINOPHIL NFR BLD MANUAL: 9 %
ERYTHROCYTE [DISTWIDTH] IN BLOOD BY AUTOMATED COUNT: 11.6 % (ref 10–15)
ERYTHROCYTE [DISTWIDTH] IN BLOOD BY AUTOMATED COUNT: 11.7 % (ref 10–15)
ERYTHROCYTE [DISTWIDTH] IN BLOOD BY AUTOMATED COUNT: 12.1 % (ref 10–15)
ERYTHROCYTE [DISTWIDTH] IN BLOOD BY AUTOMATED COUNT: 12.3 % (ref 10–15)
ERYTHROCYTE [DISTWIDTH] IN BLOOD BY AUTOMATED COUNT: 12.3 % (ref 10–15)
ERYTHROCYTE [DISTWIDTH] IN BLOOD BY AUTOMATED COUNT: 12.4 % (ref 10–15)
ERYTHROCYTE [DISTWIDTH] IN BLOOD BY AUTOMATED COUNT: 12.8 % (ref 10–15)
ERYTHROCYTE [DISTWIDTH] IN BLOOD BY AUTOMATED COUNT: 12.9 % (ref 10–15)
ERYTHROCYTE [DISTWIDTH] IN BLOOD BY AUTOMATED COUNT: 12.9 % (ref 10–15)
ERYTHROCYTE [DISTWIDTH] IN BLOOD BY AUTOMATED COUNT: 13 % (ref 10–15)
ERYTHROCYTE [DISTWIDTH] IN BLOOD BY AUTOMATED COUNT: 13.1 % (ref 10–15)
ERYTHROCYTE [DISTWIDTH] IN BLOOD BY AUTOMATED COUNT: 13.2 % (ref 10–15)
ERYTHROCYTE [DISTWIDTH] IN BLOOD BY AUTOMATED COUNT: 13.4 % (ref 10–15)
ERYTHROCYTE [DISTWIDTH] IN BLOOD BY AUTOMATED COUNT: 13.7 % (ref 10–15)
ERYTHROCYTE [DISTWIDTH] IN BLOOD BY AUTOMATED COUNT: 13.7 % (ref 10–15)
ERYTHROCYTE [DISTWIDTH] IN BLOOD BY AUTOMATED COUNT: 14.2 % (ref 10–15)
ERYTHROCYTE [DISTWIDTH] IN BLOOD BY AUTOMATED COUNT: 14.8 % (ref 10–15)
ERYTHROCYTE [DISTWIDTH] IN BLOOD BY AUTOMATED COUNT: 14.8 % (ref 10–15)
ERYTHROCYTE [DISTWIDTH] IN BLOOD BY AUTOMATED COUNT: 14.9 % (ref 10–15)
ERYTHROCYTE [DISTWIDTH] IN BLOOD BY AUTOMATED COUNT: 15 % (ref 10–15)
ERYTHROCYTE [DISTWIDTH] IN BLOOD BY AUTOMATED COUNT: 15.2 % (ref 10–15)
ERYTHROCYTE [DISTWIDTH] IN BLOOD BY AUTOMATED COUNT: 15.5 % (ref 10–15)
ERYTHROCYTE [DISTWIDTH] IN BLOOD BY AUTOMATED COUNT: 15.7 % (ref 10–15)
ERYTHROCYTE [DISTWIDTH] IN BLOOD BY AUTOMATED COUNT: 15.8 % (ref 10–15)
ERYTHROCYTE [DISTWIDTH] IN BLOOD BY AUTOMATED COUNT: 15.9 % (ref 10–15)
ERYTHROCYTE [DISTWIDTH] IN BLOOD BY AUTOMATED COUNT: 15.9 % (ref 10–15)
ERYTHROCYTE [DISTWIDTH] IN BLOOD BY AUTOMATED COUNT: 16.1 % (ref 10–15)
ERYTHROCYTE [DISTWIDTH] IN BLOOD BY AUTOMATED COUNT: 16.1 % (ref 10–15)
ERYTHROCYTE [DISTWIDTH] IN BLOOD BY AUTOMATED COUNT: 16.2 % (ref 10–15)
ERYTHROCYTE [DISTWIDTH] IN BLOOD BY AUTOMATED COUNT: 16.2 % (ref 10–15)
ERYTHROCYTE [DISTWIDTH] IN BLOOD BY AUTOMATED COUNT: 16.3 % (ref 10–15)
ERYTHROCYTE [DISTWIDTH] IN BLOOD BY AUTOMATED COUNT: 16.3 % (ref 10–15)
ERYTHROCYTE [DISTWIDTH] IN BLOOD BY AUTOMATED COUNT: 16.4 % (ref 10–15)
ERYTHROCYTE [DISTWIDTH] IN BLOOD BY AUTOMATED COUNT: 16.7 % (ref 10–15)
ERYTHROCYTE [DISTWIDTH] IN BLOOD BY AUTOMATED COUNT: 16.9 % (ref 10–15)
ERYTHROCYTE [DISTWIDTH] IN BLOOD BY AUTOMATED COUNT: 17.1 % (ref 10–15)
ERYTHROCYTE [DISTWIDTH] IN BLOOD BY AUTOMATED COUNT: 17.2 % (ref 10–15)
ERYTHROCYTE [DISTWIDTH] IN BLOOD BY AUTOMATED COUNT: 17.3 % (ref 10–15)
ERYTHROCYTE [DISTWIDTH] IN BLOOD BY AUTOMATED COUNT: 17.4 % (ref 10–15)
ERYTHROCYTE [DISTWIDTH] IN BLOOD BY AUTOMATED COUNT: 17.5 % (ref 10–15)
ERYTHROCYTE [DISTWIDTH] IN BLOOD BY AUTOMATED COUNT: 18.3 % (ref 10–15)
ERYTHROCYTE [DISTWIDTH] IN BLOOD BY AUTOMATED COUNT: 18.3 % (ref 10–15)
FERRITIN SERPL-MCNC: 184 NG/ML (ref 26–388)
FLUAV H1 2009 PAND RNA SPEC QL NAA+PROBE: NOT DETECTED
FLUAV H1 RNA SPEC QL NAA+PROBE: NOT DETECTED
FLUAV H3 RNA SPEC QL NAA+PROBE: NOT DETECTED
FLUAV RNA SPEC QL NAA+PROBE: NEGATIVE
FLUAV RNA SPEC QL NAA+PROBE: NOT DETECTED
FLUBV RNA RESP QL NAA+PROBE: NEGATIVE
FLUBV RNA SPEC QL NAA+PROBE: NOT DETECTED
FOLATE SERPL-MCNC: 17.3 NG/ML
FOLATE SERPL-MCNC: 29.8 NG/ML (ref 4.6–34.8)
FRAGMENTS BLD QL SMEAR: SLIGHT
GAMMA GLOB MFR UR ELPH: 12.8 %
GAMMA GLOB MFR UR ELPH: 19.7 %
GAMMA GLOB MFR UR ELPH: 9.7 %
GAMMA GLOB SERPL ELPH-MCNC: 0.1 G/DL (ref 0.6–1.4)
GAMMA GLOB SERPL ELPH-MCNC: 0.1 G/DL (ref 0.7–1.6)
GAMMA GLOB SERPL ELPH-MCNC: 0.1 G/DL (ref 0.7–1.6)
GAMMA GLOB SERPL ELPH-MCNC: 0.2 G/DL (ref 0.6–1.4)
GAMMA GLOB SERPL ELPH-MCNC: 0.2 G/DL (ref 0.7–1.6)
GAMMA GLOB SERPL ELPH-MCNC: 0.2 G/DL (ref 0.7–1.6)
GAMMA GLOB SERPL ELPH-MCNC: 0.4 G/DL (ref 0.6–1.4)
GAMMA GLOBULIN PERCENT: 3.2 % (ref 9–20)
GAMMA GLOBULIN PERCENT: 3.5 % (ref 9–20)
GAMMA GLOBULIN PERCENT: 3.8 % (ref 9–20)
GAMMA GLOBULIN PERCENT: 3.9 % (ref 9–20)
GAMMA GLOBULIN PERCENT: 9.4 % (ref 9–20)
GFR SERPL CREATININE-BSD FRML MDRD: 27 ML/MIN/1.73M2
GFR SERPL CREATININE-BSD FRML MDRD: 32 ML/MIN/1.73M2
GFR SERPL CREATININE-BSD FRML MDRD: 35 ML/MIN/1.73M2
GFR SERPL CREATININE-BSD FRML MDRD: 36 ML/MIN/1.73M2
GFR SERPL CREATININE-BSD FRML MDRD: 39 ML/MIN/1.73M2
GFR SERPL CREATININE-BSD FRML MDRD: 40 ML/MIN/1.73M2
GFR SERPL CREATININE-BSD FRML MDRD: 43 ML/MIN/1.73M2
GFR SERPL CREATININE-BSD FRML MDRD: 44 ML/MIN/1.73M2
GFR SERPL CREATININE-BSD FRML MDRD: 45 ML/MIN/1.73M2
GFR SERPL CREATININE-BSD FRML MDRD: 46 ML/MIN/1.73M2
GFR SERPL CREATININE-BSD FRML MDRD: 48 ML/MIN/1.73M2
GFR SERPL CREATININE-BSD FRML MDRD: 50 ML/MIN/1.73M2
GFR SERPL CREATININE-BSD FRML MDRD: 51 ML/MIN/1.73M2
GFR SERPL CREATININE-BSD FRML MDRD: 54 ML/MIN/1.73M2
GFR SERPL CREATININE-BSD FRML MDRD: 55 ML/MIN/1.73M2
GFR SERPL CREATININE-BSD FRML MDRD: 56 ML/MIN/1.73M2
GFR SERPL CREATININE-BSD FRML MDRD: 57 ML/MIN/1.73M2
GFR SERPL CREATININE-BSD FRML MDRD: 59 ML/MIN/1.73M2
GFR SERPL CREATININE-BSD FRML MDRD: 60 ML/MIN/1.73M2
GFR SERPL CREATININE-BSD FRML MDRD: 61 ML/MIN/1.73M2
GFR SERPL CREATININE-BSD FRML MDRD: 67 ML/MIN/1.73M2
GFR SERPL CREATININE-BSD FRML MDRD: 68 ML/MIN/1.73M2
GFR SERPL CREATININE-BSD FRML MDRD: 68 ML/MIN/1.73M2
GFR SERPL CREATININE-BSD FRML MDRD: 71 ML/MIN/1.73M2
GFR SERPL CREATININE-BSD FRML MDRD: 71 ML/MIN/1.73M2
GFR SERPL CREATININE-BSD FRML MDRD: 83 ML/MIN/1.73M2
GFR SERPL CREATININE-BSD FRML MDRD: 89 ML/MIN/1.73M2
GLUCOSE BLD-MCNC: 103 MG/DL (ref 70–125)
GLUCOSE BLD-MCNC: 103 MG/DL (ref 70–125)
GLUCOSE BLD-MCNC: 112 MG/DL (ref 70–99)
GLUCOSE BLD-MCNC: 114 MG/DL (ref 70–125)
GLUCOSE BLD-MCNC: 116 MG/DL (ref 70–125)
GLUCOSE BLD-MCNC: 116 MG/DL (ref 70–99)
GLUCOSE BLD-MCNC: 117 MG/DL (ref 70–99)
GLUCOSE BLD-MCNC: 117 MG/DL (ref 70–99)
GLUCOSE BLD-MCNC: 118 MG/DL (ref 70–125)
GLUCOSE BLD-MCNC: 118 MG/DL (ref 70–125)
GLUCOSE BLD-MCNC: 119 MG/DL (ref 70–125)
GLUCOSE BLD-MCNC: 120 MG/DL (ref 70–125)
GLUCOSE BLD-MCNC: 120 MG/DL (ref 70–99)
GLUCOSE BLD-MCNC: 121 MG/DL (ref 70–125)
GLUCOSE BLD-MCNC: 121 MG/DL (ref 70–125)
GLUCOSE BLD-MCNC: 121 MG/DL (ref 70–99)
GLUCOSE BLD-MCNC: 121 MG/DL (ref 70–99)
GLUCOSE BLD-MCNC: 124 MG/DL (ref 70–99)
GLUCOSE BLD-MCNC: 129 MG/DL (ref 70–125)
GLUCOSE BLD-MCNC: 133 MG/DL (ref 70–99)
GLUCOSE BLD-MCNC: 142 MG/DL (ref 70–99)
GLUCOSE BLD-MCNC: 147 MG/DL (ref 70–125)
GLUCOSE BLD-MCNC: 202 MG/DL (ref 70–125)
GLUCOSE BLD-MCNC: 84 MG/DL (ref 70–99)
GLUCOSE BLD-MCNC: 89 MG/DL (ref 70–125)
GLUCOSE BLD-MCNC: 90 MG/DL (ref 70–99)
GLUCOSE BLD-MCNC: 92 MG/DL (ref 70–99)
GLUCOSE BLD-MCNC: 94 MG/DL (ref 70–99)
GLUCOSE BLD-MCNC: 95 MG/DL (ref 70–99)
GLUCOSE BLD-MCNC: 97 MG/DL (ref 70–125)
GLUCOSE BLD-MCNC: 98 MG/DL (ref 70–125)
GLUCOSE BLD-MCNC: 98 MG/DL (ref 70–99)
GLUCOSE BLD-MCNC: 98 MG/DL (ref 70–99)
GLUCOSE BLDC GLUCOMTR-MCNC: 81 MG/DL (ref 70–99)
GLUCOSE SERPL-MCNC: 107 MG/DL (ref 70–99)
GLUCOSE SERPL-MCNC: 116 MG/DL (ref 70–99)
GLUCOSE SERPL-MCNC: 119 MG/DL (ref 70–99)
GLUCOSE SERPL-MCNC: 130 MG/DL (ref 70–99)
GLUCOSE SERPL-MCNC: 132 MG/DL (ref 70–99)
GLUCOSE SERPL-MCNC: 84 MG/DL (ref 70–99)
GLUCOSE SERPL-MCNC: 89 MG/DL (ref 70–99)
GLUCOSE SERPL-MCNC: 97 MG/DL (ref 70–99)
GLUCOSE UR STRIP-MCNC: NEGATIVE MG/DL
GRAM STAIN RESULT: NORMAL
H CAPSUL AG SER IA-ACNC: NOT DETECTED
H CAPSUL AG SER QL IA: NOT DETECTED
H CAPSUL AG UR QL IA: NOT DETECTED
H CAPSUL AG UR-MCNC: NOT DETECTED NG/ML
H CAPSUL MYC AB TITR SER CF: NORMAL {TITER}
H CAPSUL YST AB TITR SER CF: NORMAL {TITER}
HADV DNA SPEC QL NAA+PROBE: NOT DETECTED
HBV DNA SERPL QL NAA+PROBE: NORMAL
HCOV PNL SPEC NAA+PROBE: NOT DETECTED
HCT VFR BLD AUTO: 22.9 % (ref 40–53)
HCT VFR BLD AUTO: 24.3 % (ref 40–53)
HCT VFR BLD AUTO: 24.4 % (ref 40–53)
HCT VFR BLD AUTO: 24.6 % (ref 40–53)
HCT VFR BLD AUTO: 24.7 % (ref 40–53)
HCT VFR BLD AUTO: 24.8 % (ref 40–53)
HCT VFR BLD AUTO: 25.2 % (ref 40–53)
HCT VFR BLD AUTO: 25.5 % (ref 40–53)
HCT VFR BLD AUTO: 25.7 % (ref 40–53)
HCT VFR BLD AUTO: 26 % (ref 40–53)
HCT VFR BLD AUTO: 26.4 % (ref 40–53)
HCT VFR BLD AUTO: 26.6 % (ref 40–53)
HCT VFR BLD AUTO: 26.9 % (ref 40–53)
HCT VFR BLD AUTO: 27.1 % (ref 40–53)
HCT VFR BLD AUTO: 27.1 % (ref 40–53)
HCT VFR BLD AUTO: 27.3 % (ref 40–53)
HCT VFR BLD AUTO: 27.3 % (ref 40–53)
HCT VFR BLD AUTO: 27.7 % (ref 40–53)
HCT VFR BLD AUTO: 27.8 % (ref 40–53)
HCT VFR BLD AUTO: 27.8 % (ref 40–53)
HCT VFR BLD AUTO: 28 % (ref 40–53)
HCT VFR BLD AUTO: 28 % (ref 40–53)
HCT VFR BLD AUTO: 28.3 % (ref 40–53)
HCT VFR BLD AUTO: 28.4 % (ref 40–53)
HCT VFR BLD AUTO: 29 % (ref 40–53)
HCT VFR BLD AUTO: 29.2 % (ref 40–53)
HCT VFR BLD AUTO: 29.3 % (ref 40–53)
HCT VFR BLD AUTO: 29.7 % (ref 40–53)
HCT VFR BLD AUTO: 30.2 % (ref 40–53)
HCT VFR BLD AUTO: 30.3 % (ref 40–53)
HCT VFR BLD AUTO: 30.8 % (ref 40–53)
HCT VFR BLD AUTO: 31.4 % (ref 40–53)
HCT VFR BLD AUTO: 32 % (ref 40–53)
HCT VFR BLD AUTO: 32.1 % (ref 40–53)
HCT VFR BLD AUTO: 32.1 % (ref 40–53)
HCT VFR BLD AUTO: 32.4 % (ref 40–53)
HCT VFR BLD AUTO: 32.8 % (ref 40–53)
HCT VFR BLD AUTO: 33.8 % (ref 40–53)
HCT VFR BLD AUTO: 34.3 % (ref 40–53)
HCT VFR BLD AUTO: 35 % (ref 40–53)
HCT VFR BLD AUTO: 35.3 % (ref 40–53)
HCT VFR BLD AUTO: 37.5 % (ref 40–53)
HCV RNA SERPL QL NAA+PROBE: NORMAL
HGB BLD-MCNC: 10 G/DL (ref 13.3–17.7)
HGB BLD-MCNC: 10.1 G/DL (ref 13.3–17.7)
HGB BLD-MCNC: 10.2 G/DL (ref 13.3–17.7)
HGB BLD-MCNC: 10.2 G/DL (ref 13.3–17.7)
HGB BLD-MCNC: 10.3 G/DL (ref 13.3–17.7)
HGB BLD-MCNC: 10.5 G/DL (ref 13.3–17.7)
HGB BLD-MCNC: 10.5 G/DL (ref 13.3–17.7)
HGB BLD-MCNC: 10.7 G/DL (ref 13.3–17.7)
HGB BLD-MCNC: 10.9 G/DL (ref 13.3–17.7)
HGB BLD-MCNC: 11.1 G/DL (ref 13.3–17.7)
HGB BLD-MCNC: 11.4 G/DL (ref 13.3–17.7)
HGB BLD-MCNC: 11.9 G/DL (ref 13.3–17.7)
HGB BLD-MCNC: 7.7 G/DL (ref 13.3–17.7)
HGB BLD-MCNC: 7.7 G/DL (ref 13.3–17.7)
HGB BLD-MCNC: 7.8 G/DL (ref 13.3–17.7)
HGB BLD-MCNC: 8 G/DL (ref 13.3–17.7)
HGB BLD-MCNC: 8.1 G/DL (ref 13.3–17.7)
HGB BLD-MCNC: 8.3 G/DL (ref 13.3–17.7)
HGB BLD-MCNC: 8.4 G/DL (ref 13.3–17.7)
HGB BLD-MCNC: 8.5 G/DL (ref 13.3–17.7)
HGB BLD-MCNC: 8.7 G/DL (ref 13.3–17.7)
HGB BLD-MCNC: 8.7 G/DL (ref 13.3–17.7)
HGB BLD-MCNC: 8.8 G/DL (ref 13.3–17.7)
HGB BLD-MCNC: 8.9 G/DL (ref 13.3–17.7)
HGB BLD-MCNC: 8.9 G/DL (ref 13.3–17.7)
HGB BLD-MCNC: 9 G/DL (ref 13.3–17.7)
HGB BLD-MCNC: 9.1 G/DL (ref 13.3–17.7)
HGB BLD-MCNC: 9.2 G/DL (ref 13.3–17.7)
HGB BLD-MCNC: 9.3 G/DL (ref 13.3–17.7)
HGB BLD-MCNC: 9.4 G/DL (ref 13.3–17.7)
HGB BLD-MCNC: 9.5 G/DL (ref 13.3–17.7)
HGB BLD-MCNC: 9.6 G/DL (ref 13.3–17.7)
HGB BLD-MCNC: 9.8 G/DL (ref 13.3–17.7)
HGB BLD-MCNC: 9.9 G/DL (ref 13.3–17.7)
HGB BLD-MCNC: 9.9 G/DL (ref 13.3–17.7)
HGB UR QL STRIP: NEGATIVE
HIV1+2 AB SERPL QL IA: NORMAL
HIV1+2 RNA SERPL QL NAA+PROBE: NORMAL
HMPV RNA SPEC QL NAA+PROBE: NOT DETECTED
HOLD SPECIMEN: NORMAL
HPIV1 RNA SPEC QL NAA+PROBE: NOT DETECTED
HPIV2 RNA SPEC QL NAA+PROBE: NOT DETECTED
HPIV3 RNA SPEC QL NAA+PROBE: NOT DETECTED
HPIV4 RNA SPEC QL NAA+PROBE: NOT DETECTED
HSV1 IGG SERPL QL IA: 0.62 INDEX
HSV1 IGG SERPL QL IA: NORMAL
HSV2 IGG SERPL QL IA: 0.05 INDEX
HSV2 IGG SERPL QL IA: NORMAL
HYALINE CASTS: 10 /LPF
HYALINE CASTS: 11 /LPF
IGA SERPL-MCNC: 2 MG/DL (ref 84–499)
IGA SERPL-MCNC: 2 MG/DL (ref 84–499)
IGA SERPL-MCNC: 4 MG/DL (ref 84–499)
IGA SERPL-MCNC: <2 MG/DL (ref 84–499)
IGA SERPL-MCNC: <2 MG/DL (ref 84–499)
IGE SERPL-ACNC: <2 KU/L
IGG SERPL-MCNC: 186 MG/DL (ref 610–1616)
IGG SERPL-MCNC: 208 MG/DL (ref 610–1616)
IGG SERPL-MCNC: 231 MG/DL (ref 610–1616)
IGG SERPL-MCNC: 79 MG/DL (ref 610–1616)
IGG SERPL-MCNC: 86 MG/DL (ref 610–1616)
IGM SERPL-MCNC: <10 MG/DL (ref 35–242)
IMM GRANULOCYTES # BLD: 0 10E3/UL
IMM GRANULOCYTES # BLD: 0.1 10E3/UL
IMM GRANULOCYTES NFR BLD: 0 %
IMM GRANULOCYTES NFR BLD: 1 %
IMM GRANULOCYTES NFR BLD: 2 %
IMM GRANULOCYTES NFR BLD: 2 %
IMM GRANULOCYTES NFR BLD: 3 %
INR PPP: 0.91 (ref 0.85–1.15)
INR PPP: 1.15 (ref 0.85–1.15)
INR PPP: 1.36 (ref 0.85–1.15)
INTERPRETATION ECG - MUSE: NORMAL
INTERPRETATION: NORMAL
ISCN: NORMAL
ISSUE DATE AND TIME: NORMAL
KAPPA LC FREE SER-MCNC: 100.9 MG/DL (ref 0.33–1.94)
KAPPA LC FREE SER-MCNC: 108.57 MG/DL (ref 0.33–1.94)
KAPPA LC FREE SER-MCNC: 152.02 MG/DL (ref 0.33–1.94)
KAPPA LC FREE SER-MCNC: 29.02 MG/DL (ref 0.33–1.94)
KAPPA LC FREE SER-MCNC: 38.53 MG/DL (ref 0.33–1.94)
KAPPA LC FREE SER-MCNC: 55.04 MG/DL (ref 0.33–1.94)
KAPPA LC FREE SER-MCNC: 56.66 MG/DL (ref 0.33–1.94)
KAPPA LC FREE SER-MCNC: 57.39 MG/DL (ref 0.33–1.94)
KAPPA LC FREE SER-MCNC: 84.26 MG/DL (ref 0.33–1.94)
KAPPA LC FREE SER-MCNC: 87.74 MG/DL (ref 0.33–1.94)
KAPPA LC FREE SER-MCNC: 88.26 MG/DL (ref 0.33–1.94)
KAPPA LC FREE/LAMBDA FREE SER NEPH: 106.91 {RATIO} (ref 0.26–1.65)
KAPPA LC FREE/LAMBDA FREE SER NEPH: 148.76 {RATIO} (ref 0.26–1.65)
KAPPA LC FREE/LAMBDA FREE SER NEPH: 207.29 {RATIO} (ref 0.26–1.65)
KAPPA LC FREE/LAMBDA FREE SER NEPH: 256.87 {RATIO} (ref 0.26–1.65)
KAPPA LC FREE/LAMBDA FREE SER NEPH: 347.93 {RATIO} (ref 0.26–1.65)
KAPPA LC FREE/LAMBDA FREE SER NEPH: 443.47 {RATIO} (ref 0.26–1.65)
KAPPA LC FREE/LAMBDA FREE SER NEPH: 516.12 {RATIO} (ref 0.26–1.65)
KAPPA LC FREE/LAMBDA FREE SER NEPH: 517 {RATIO} (ref 0.26–1.65)
KAPPA LC FREE/LAMBDA FREE SER NEPH: 519.18 {RATIO} (ref 0.26–1.65)
KAPPA LC FREE/LAMBDA FREE SER NEPH: 844.56 {RATIO} (ref 0.26–1.65)
KAPPA LC FREE/LAMBDA FREE SER NEPH: 92.56 {RATIO} (ref 0.26–1.65)
KETONES UR STRIP-MCNC: NEGATIVE MG/DL
LAMBDA LC FREE SERPL-MCNC: 0.14 MG/DL (ref 0.57–2.63)
LAMBDA LC FREE SERPL-MCNC: 0.15 MG/DL (ref 0.57–2.63)
LAMBDA LC FREE SERPL-MCNC: 0.17 MG/DL (ref 0.57–2.63)
LAMBDA LC FREE SERPL-MCNC: 0.17 MG/DL (ref 0.57–2.63)
LAMBDA LC FREE SERPL-MCNC: 0.18 MG/DL (ref 0.57–2.63)
LAMBDA LC FREE SERPL-MCNC: 0.19 MG/DL (ref 0.57–2.63)
LAMBDA LC FREE SERPL-MCNC: 0.21 MG/DL (ref 0.57–2.63)
LAMBDA LC FREE SERPL-MCNC: 0.29 MG/DL (ref 0.57–2.63)
LAMBDA LC FREE SERPL-MCNC: 0.37 MG/DL (ref 0.57–2.63)
LAMBDA LC FREE SERPL-MCNC: 0.53 MG/DL (ref 0.57–2.63)
LAMBDA LC FREE SERPL-MCNC: 0.62 MG/DL (ref 0.57–2.63)
LDH SERPL L TO P-CCNC: 142 U/L (ref 85–227)
LDH SERPL L TO P-CCNC: 152 U/L (ref 0–250)
LDH SERPL L TO P-CCNC: 159 U/L (ref 85–227)
LDH SERPL L TO P-CCNC: 161 U/L (ref 85–227)
LDH SERPL L TO P-CCNC: 169 U/L (ref 0–250)
LDH SERPL L TO P-CCNC: 172 U/L (ref 85–227)
LDH SERPL L TO P-CCNC: 191 U/L (ref 85–227)
LDH SERPL L TO P-CCNC: 194 U/L (ref 85–227)
LDH SERPL L TO P-CCNC: 203 U/L (ref 85–227)
LDH SERPL L TO P-CCNC: 213 U/L (ref 85–227)
LDH SERPL L TO P-CCNC: 226 U/L (ref 85–227)
LEUKOCYTE ESTERASE UR QL STRIP: NEGATIVE
LVEF ECHO: NORMAL
LVEF ECHO: NORMAL
LYMPHOCYTES # BLD AUTO: 0 10E3/UL (ref 0.8–5.3)
LYMPHOCYTES # BLD AUTO: 0.1 10E3/UL (ref 0.8–5.3)
LYMPHOCYTES # BLD AUTO: 0.2 10E3/UL (ref 0.8–5.3)
LYMPHOCYTES # BLD AUTO: 0.3 10E3/UL (ref 0.8–5.3)
LYMPHOCYTES # BLD MANUAL: 0 10E3/UL (ref 0.8–5.3)
LYMPHOCYTES # BLD MANUAL: 0.1 10E3/UL (ref 0.8–5.3)
LYMPHOCYTES # BLD MANUAL: 0.2 10E3/UL (ref 0.8–5.3)
LYMPHOCYTES # BLD MANUAL: 0.3 10E3/UL (ref 0.8–5.3)
LYMPHOCYTES # BLD MANUAL: 0.4 10E3/UL (ref 0.8–5.3)
LYMPHOCYTES # BLD MANUAL: 0.4 10E3/UL (ref 0.8–5.3)
LYMPHOCYTES # BLD MANUAL: 0.5 10E3/UL (ref 0.8–5.3)
LYMPHOCYTES # BLD MANUAL: 0.6 10E3/UL (ref 0.8–5.3)
LYMPHOCYTES NFR BLD AUTO: 0 %
LYMPHOCYTES NFR BLD AUTO: 0 %
LYMPHOCYTES NFR BLD AUTO: 1 %
LYMPHOCYTES NFR BLD AUTO: 1 %
LYMPHOCYTES NFR BLD AUTO: 2 %
LYMPHOCYTES NFR BLD AUTO: 2 %
LYMPHOCYTES NFR BLD AUTO: 21 %
LYMPHOCYTES NFR BLD AUTO: 22 %
LYMPHOCYTES NFR BLD AUTO: 3 %
LYMPHOCYTES NFR BLD AUTO: 3 %
LYMPHOCYTES NFR BLD AUTO: 4 %
LYMPHOCYTES NFR BLD AUTO: 5 %
LYMPHOCYTES NFR BLD AUTO: 6 %
LYMPHOCYTES NFR BLD AUTO: 7 %
LYMPHOCYTES NFR BLD MANUAL: 1 %
LYMPHOCYTES NFR BLD MANUAL: 12 %
LYMPHOCYTES NFR BLD MANUAL: 12 %
LYMPHOCYTES NFR BLD MANUAL: 2 %
LYMPHOCYTES NFR BLD MANUAL: 21 %
LYMPHOCYTES NFR BLD MANUAL: 22 %
LYMPHOCYTES NFR BLD MANUAL: 24 %
LYMPHOCYTES NFR BLD MANUAL: 24 %
LYMPHOCYTES NFR BLD MANUAL: 36 %
LYMPHOCYTES NFR BLD MANUAL: 42 %
LYMPHOCYTES NFR BLD MANUAL: 5 %
LYMPHOCYTES NFR BLD MANUAL: 5 %
LYMPHOCYTES NFR BLD MANUAL: 6 %
LYMPHOCYTES NFR BLD MANUAL: 7 %
LYMPHOCYTES NFR BLD MANUAL: 7 %
LYMPHOCYTES NFR BLD MANUAL: 9 %
Lab: NORMAL
M PNEUMO DNA SPEC QL NAA+PROBE: NOT DETECTED
M PROTEIN MFR UR ELPH: 16 %
M PROTEIN MFR UR ELPH: 25.7 %
M PROTEIN MFR UR ELPH: 39.5 %
M PROTEIN SERPL ELPH-MCNC: 0 G/DL
M PROTEIN SERPL ELPH-MCNC: 0.1 G/DL
MAGNESIUM SERPL-MCNC: 1.6 MG/DL (ref 1.6–2.3)
MAGNESIUM SERPL-MCNC: 1.6 MG/DL (ref 1.6–2.3)
MAGNESIUM SERPL-MCNC: 1.7 MG/DL (ref 1.6–2.3)
MAGNESIUM SERPL-MCNC: 1.8 MG/DL (ref 1.6–2.3)
MAGNESIUM SERPL-MCNC: 1.9 MG/DL (ref 1.7–2.3)
MAGNESIUM SERPL-MCNC: 1.9 MG/DL (ref 1.7–2.3)
MAGNESIUM SERPL-MCNC: 2 MG/DL (ref 1.6–2.3)
MAGNESIUM SERPL-MCNC: 2 MG/DL (ref 1.6–2.3)
MAGNESIUM SERPL-MCNC: 2.2 MG/DL (ref 1.8–2.6)
MCH RBC QN AUTO: 32.6 PG (ref 26.5–33)
MCH RBC QN AUTO: 32.8 PG (ref 26.5–33)
MCH RBC QN AUTO: 33 PG (ref 26.5–33)
MCH RBC QN AUTO: 33.1 PG (ref 26.5–33)
MCH RBC QN AUTO: 33.1 PG (ref 26.5–33)
MCH RBC QN AUTO: 33.4 PG (ref 26.5–33)
MCH RBC QN AUTO: 33.4 PG (ref 26.5–33)
MCH RBC QN AUTO: 33.6 PG (ref 26.5–33)
MCH RBC QN AUTO: 33.7 PG (ref 26.5–33)
MCH RBC QN AUTO: 33.8 PG (ref 26.5–33)
MCH RBC QN AUTO: 33.8 PG (ref 26.5–33)
MCH RBC QN AUTO: 33.9 PG (ref 26.5–33)
MCH RBC QN AUTO: 33.9 PG (ref 26.5–33)
MCH RBC QN AUTO: 34 PG (ref 26.5–33)
MCH RBC QN AUTO: 34.1 PG (ref 26.5–33)
MCH RBC QN AUTO: 34.2 PG (ref 26.5–33)
MCH RBC QN AUTO: 34.3 PG (ref 26.5–33)
MCH RBC QN AUTO: 34.4 PG (ref 26.5–33)
MCH RBC QN AUTO: 34.4 PG (ref 26.5–33)
MCH RBC QN AUTO: 34.5 PG (ref 26.5–33)
MCH RBC QN AUTO: 34.6 PG (ref 26.5–33)
MCH RBC QN AUTO: 34.7 PG (ref 26.5–33)
MCH RBC QN AUTO: 34.9 PG (ref 26.5–33)
MCH RBC QN AUTO: 35 PG (ref 26.5–33)
MCH RBC QN AUTO: 35 PG (ref 26.5–33)
MCH RBC QN AUTO: 35.3 PG (ref 26.5–33)
MCH RBC QN AUTO: 35.4 PG (ref 26.5–33)
MCH RBC QN AUTO: 35.4 PG (ref 26.5–33)
MCH RBC QN AUTO: 35.6 PG (ref 26.5–33)
MCH RBC QN AUTO: 36.1 PG (ref 26.5–33)
MCH RBC QN AUTO: 36.2 PG (ref 26.5–33)
MCH RBC QN AUTO: 36.3 PG (ref 26.5–33)
MCH RBC QN AUTO: 36.4 PG (ref 26.5–33)
MCH RBC QN AUTO: 36.4 PG (ref 26.5–33)
MCH RBC QN AUTO: 36.5 PG (ref 26.5–33)
MCH RBC QN AUTO: 36.6 PG (ref 26.5–33)
MCH RBC QN AUTO: 36.6 PG (ref 26.5–33)
MCH RBC QN AUTO: 36.7 PG (ref 26.5–33)
MCH RBC QN AUTO: 36.8 PG (ref 26.5–33)
MCHC RBC AUTO-ENTMCNC: 30.6 G/DL (ref 31.5–36.5)
MCHC RBC AUTO-ENTMCNC: 31 G/DL (ref 31.5–36.5)
MCHC RBC AUTO-ENTMCNC: 31.2 G/DL (ref 31.5–36.5)
MCHC RBC AUTO-ENTMCNC: 31.7 G/DL (ref 31.5–36.5)
MCHC RBC AUTO-ENTMCNC: 31.8 G/DL (ref 31.5–36.5)
MCHC RBC AUTO-ENTMCNC: 31.9 G/DL (ref 31.5–36.5)
MCHC RBC AUTO-ENTMCNC: 31.9 G/DL (ref 31.5–36.5)
MCHC RBC AUTO-ENTMCNC: 32 G/DL (ref 31.5–36.5)
MCHC RBC AUTO-ENTMCNC: 32 G/DL (ref 31.5–36.5)
MCHC RBC AUTO-ENTMCNC: 32.1 G/DL (ref 31.5–36.5)
MCHC RBC AUTO-ENTMCNC: 32.2 G/DL (ref 31.5–36.5)
MCHC RBC AUTO-ENTMCNC: 32.3 G/DL (ref 31.5–36.5)
MCHC RBC AUTO-ENTMCNC: 32.4 G/DL (ref 31.5–36.5)
MCHC RBC AUTO-ENTMCNC: 32.4 G/DL (ref 31.5–36.5)
MCHC RBC AUTO-ENTMCNC: 32.5 G/DL (ref 31.5–36.5)
MCHC RBC AUTO-ENTMCNC: 32.5 G/DL (ref 31.5–36.5)
MCHC RBC AUTO-ENTMCNC: 32.6 G/DL (ref 31.5–36.5)
MCHC RBC AUTO-ENTMCNC: 32.7 G/DL (ref 31.5–36.5)
MCHC RBC AUTO-ENTMCNC: 32.7 G/DL (ref 31.5–36.5)
MCHC RBC AUTO-ENTMCNC: 32.8 G/DL (ref 31.5–36.5)
MCHC RBC AUTO-ENTMCNC: 32.8 G/DL (ref 31.5–36.5)
MCHC RBC AUTO-ENTMCNC: 32.9 G/DL (ref 31.5–36.5)
MCHC RBC AUTO-ENTMCNC: 33 G/DL (ref 31.5–36.5)
MCHC RBC AUTO-ENTMCNC: 33.2 G/DL (ref 31.5–36.5)
MCHC RBC AUTO-ENTMCNC: 33.3 G/DL (ref 31.5–36.5)
MCHC RBC AUTO-ENTMCNC: 33.5 G/DL (ref 31.5–36.5)
MCHC RBC AUTO-ENTMCNC: 33.6 G/DL (ref 31.5–36.5)
MCHC RBC AUTO-ENTMCNC: 33.9 G/DL (ref 31.5–36.5)
MCV RBC AUTO: 100 FL (ref 78–100)
MCV RBC AUTO: 100 FL (ref 78–100)
MCV RBC AUTO: 101 FL (ref 78–100)
MCV RBC AUTO: 101 FL (ref 78–100)
MCV RBC AUTO: 102 FL (ref 78–100)
MCV RBC AUTO: 103 FL (ref 78–100)
MCV RBC AUTO: 104 FL (ref 78–100)
MCV RBC AUTO: 105 FL (ref 78–100)
MCV RBC AUTO: 105 FL (ref 78–100)
MCV RBC AUTO: 106 FL (ref 78–100)
MCV RBC AUTO: 107 FL (ref 78–100)
MCV RBC AUTO: 108 FL (ref 78–100)
MCV RBC AUTO: 109 FL (ref 78–100)
MCV RBC AUTO: 110 FL (ref 78–100)
MCV RBC AUTO: 110 FL (ref 78–100)
MCV RBC AUTO: 111 FL (ref 78–100)
MCV RBC AUTO: 111 FL (ref 78–100)
MCV RBC AUTO: 113 FL (ref 78–100)
MCV RBC AUTO: 114 FL (ref 78–100)
MCV RBC AUTO: 115 FL (ref 78–100)
MCV RBC AUTO: 118 FL (ref 78–100)
MCV RBC AUTO: 118 FL (ref 78–100)
METAMYELOCYTES # BLD MANUAL: 0 10E3/UL
METAMYELOCYTES NFR BLD MANUAL: 1 %
METHODS: NORMAL
MONOCLONAL PEAK: 0.2 G/DL
MONOCYTES # BLD AUTO: 0 10E3/UL (ref 0–1.3)
MONOCYTES # BLD AUTO: 0.2 10E3/UL (ref 0–1.3)
MONOCYTES # BLD AUTO: 0.3 10E3/UL (ref 0–1.3)
MONOCYTES # BLD AUTO: 0.4 10E3/UL (ref 0–1.3)
MONOCYTES # BLD AUTO: 0.5 10E3/UL (ref 0–1.3)
MONOCYTES # BLD AUTO: 0.6 10E3/UL (ref 0–1.3)
MONOCYTES # BLD AUTO: 0.6 10E3/UL (ref 0–1.3)
MONOCYTES # BLD AUTO: 0.7 10E3/UL (ref 0–1.3)
MONOCYTES # BLD MANUAL: 0 10E3/UL (ref 0–1.3)
MONOCYTES # BLD MANUAL: 0.1 10E3/UL (ref 0–1.3)
MONOCYTES # BLD MANUAL: 0.1 10E3/UL (ref 0–1.3)
MONOCYTES # BLD MANUAL: 0.2 10E3/UL (ref 0–1.3)
MONOCYTES # BLD MANUAL: 0.3 10E3/UL (ref 0–1.3)
MONOCYTES # BLD MANUAL: 0.4 10E3/UL (ref 0–1.3)
MONOCYTES # BLD MANUAL: 0.5 10E3/UL (ref 0–1.3)
MONOCYTES # BLD MANUAL: 0.6 10E3/UL (ref 0–1.3)
MONOCYTES # BLD MANUAL: 0.7 10E3/UL (ref 0–1.3)
MONOCYTES # BLD MANUAL: 1.2 10E3/UL (ref 0–1.3)
MONOCYTES # BLD MANUAL: 1.4 10E3/UL (ref 0–1.3)
MONOCYTES NFR BLD AUTO: 0 %
MONOCYTES NFR BLD AUTO: 1 %
MONOCYTES NFR BLD AUTO: 1 %
MONOCYTES NFR BLD AUTO: 11 %
MONOCYTES NFR BLD AUTO: 11 %
MONOCYTES NFR BLD AUTO: 12 %
MONOCYTES NFR BLD AUTO: 13 %
MONOCYTES NFR BLD AUTO: 13 %
MONOCYTES NFR BLD AUTO: 14 %
MONOCYTES NFR BLD AUTO: 16 %
MONOCYTES NFR BLD AUTO: 16 %
MONOCYTES NFR BLD AUTO: 18 %
MONOCYTES NFR BLD AUTO: 19 %
MONOCYTES NFR BLD AUTO: 2 %
MONOCYTES NFR BLD AUTO: 20 %
MONOCYTES NFR BLD AUTO: 25 %
MONOCYTES NFR BLD AUTO: 26 %
MONOCYTES NFR BLD AUTO: 29 %
MONOCYTES NFR BLD AUTO: 6 %
MONOCYTES NFR BLD AUTO: 9 %
MONOCYTES NFR BLD MANUAL: 0 %
MONOCYTES NFR BLD MANUAL: 11 %
MONOCYTES NFR BLD MANUAL: 16 %
MONOCYTES NFR BLD MANUAL: 17 %
MONOCYTES NFR BLD MANUAL: 23 %
MONOCYTES NFR BLD MANUAL: 28 %
MONOCYTES NFR BLD MANUAL: 28 %
MONOCYTES NFR BLD MANUAL: 30 %
MONOCYTES NFR BLD MANUAL: 32 %
MONOCYTES NFR BLD MANUAL: 39 %
MONOCYTES NFR BLD MANUAL: 4 %
MONOCYTES NFR BLD MANUAL: 42 %
MONOCYTES NFR BLD MANUAL: 5 %
MONOCYTES NFR BLD MANUAL: 6 %
MONOCYTES NFR BLD MANUAL: 6 %
MONOCYTES NFR BLD MANUAL: 9 %
MUCOUS THREADS #/AREA URNS LPF: PRESENT /LPF
MUCOUS THREADS #/AREA URNS LPF: PRESENT /LPF
MYELOCYTES # BLD MANUAL: 0 10E3/UL
MYELOCYTES NFR BLD MANUAL: 1 %
MYELOCYTES NFR BLD MANUAL: 1 %
MYELOCYTES NFR BLD MANUAL: 2 %
NEUTROPHILS # BLD AUTO: 0.5 10E3/UL (ref 1.6–8.3)
NEUTROPHILS # BLD AUTO: 0.6 10E3/UL (ref 1.6–8.3)
NEUTROPHILS # BLD AUTO: 0.6 10E3/UL (ref 1.6–8.3)
NEUTROPHILS # BLD AUTO: 0.7 10E3/UL (ref 1.6–8.3)
NEUTROPHILS # BLD AUTO: 0.9 10E3/UL (ref 1.6–8.3)
NEUTROPHILS # BLD AUTO: 1 10E3/UL (ref 1.6–8.3)
NEUTROPHILS # BLD AUTO: 1.5 10E3/UL (ref 1.6–8.3)
NEUTROPHILS # BLD AUTO: 2.2 10E3/UL (ref 1.6–8.3)
NEUTROPHILS # BLD AUTO: 2.3 10E3/UL (ref 1.6–8.3)
NEUTROPHILS # BLD AUTO: 2.5 10E3/UL (ref 1.6–8.3)
NEUTROPHILS # BLD AUTO: 2.7 10E3/UL (ref 1.6–8.3)
NEUTROPHILS # BLD AUTO: 3 10E3/UL (ref 1.6–8.3)
NEUTROPHILS # BLD AUTO: 3.7 10E3/UL (ref 1.6–8.3)
NEUTROPHILS # BLD AUTO: 3.9 10E3/UL (ref 1.6–8.3)
NEUTROPHILS # BLD AUTO: 4 10E3/UL (ref 1.6–8.3)
NEUTROPHILS # BLD AUTO: 4.5 10E3/UL (ref 1.6–8.3)
NEUTROPHILS # BLD AUTO: 6.9 10E3/UL (ref 1.6–8.3)
NEUTROPHILS # BLD MANUAL: 0.2 10E3/UL (ref 1.6–8.3)
NEUTROPHILS # BLD MANUAL: 0.3 10E3/UL (ref 1.6–8.3)
NEUTROPHILS # BLD MANUAL: 0.3 10E3/UL (ref 1.6–8.3)
NEUTROPHILS # BLD MANUAL: 0.4 10E3/UL (ref 1.6–8.3)
NEUTROPHILS # BLD MANUAL: 0.6 10E3/UL (ref 1.6–8.3)
NEUTROPHILS # BLD MANUAL: 1 10E3/UL (ref 1.6–8.3)
NEUTROPHILS # BLD MANUAL: 1.2 10E3/UL (ref 1.6–8.3)
NEUTROPHILS # BLD MANUAL: 1.5 10E3/UL (ref 1.6–8.3)
NEUTROPHILS # BLD MANUAL: 1.5 10E3/UL (ref 1.6–8.3)
NEUTROPHILS # BLD MANUAL: 1.8 10E3/UL (ref 1.6–8.3)
NEUTROPHILS # BLD MANUAL: 2 10E3/UL (ref 1.6–8.3)
NEUTROPHILS # BLD MANUAL: 2.6 10E3/UL (ref 1.6–8.3)
NEUTROPHILS # BLD MANUAL: 2.6 10E3/UL (ref 1.6–8.3)
NEUTROPHILS # BLD MANUAL: 3.4 10E3/UL (ref 1.6–8.3)
NEUTROPHILS # BLD MANUAL: 4.3 10E3/UL (ref 1.6–8.3)
NEUTROPHILS # BLD MANUAL: 4.6 10E3/UL (ref 1.6–8.3)
NEUTROPHILS NFR BLD AUTO: 30 %
NEUTROPHILS NFR BLD AUTO: 51 %
NEUTROPHILS NFR BLD AUTO: 63 %
NEUTROPHILS NFR BLD AUTO: 66 %
NEUTROPHILS NFR BLD AUTO: 67 %
NEUTROPHILS NFR BLD AUTO: 75 %
NEUTROPHILS NFR BLD AUTO: 75 %
NEUTROPHILS NFR BLD AUTO: 77 %
NEUTROPHILS NFR BLD AUTO: 77 %
NEUTROPHILS NFR BLD AUTO: 78 %
NEUTROPHILS NFR BLD AUTO: 81 %
NEUTROPHILS NFR BLD AUTO: 82 %
NEUTROPHILS NFR BLD AUTO: 83 %
NEUTROPHILS NFR BLD AUTO: 84 %
NEUTROPHILS NFR BLD AUTO: 85 %
NEUTROPHILS NFR BLD AUTO: 89 %
NEUTROPHILS NFR BLD AUTO: 96 %
NEUTROPHILS NFR BLD AUTO: 96 %
NEUTROPHILS NFR BLD AUTO: 97 %
NEUTROPHILS NFR BLD AUTO: 98 %
NEUTROPHILS NFR BLD MANUAL: 28 %
NEUTROPHILS NFR BLD MANUAL: 33 %
NEUTROPHILS NFR BLD MANUAL: 38 %
NEUTROPHILS NFR BLD MANUAL: 42 %
NEUTROPHILS NFR BLD MANUAL: 44 %
NEUTROPHILS NFR BLD MANUAL: 46 %
NEUTROPHILS NFR BLD MANUAL: 48 %
NEUTROPHILS NFR BLD MANUAL: 59 %
NEUTROPHILS NFR BLD MANUAL: 65 %
NEUTROPHILS NFR BLD MANUAL: 79 %
NEUTROPHILS NFR BLD MANUAL: 80 %
NEUTROPHILS NFR BLD MANUAL: 81 %
NEUTROPHILS NFR BLD MANUAL: 85 %
NEUTROPHILS NFR BLD MANUAL: 88 %
NEUTROPHILS NFR BLD MANUAL: 90 %
NEUTROPHILS NFR BLD MANUAL: 98 %
NITRATE UR QL: NEGATIVE
NRBC # BLD AUTO: 0 10E3/UL
NRBC BLD AUTO-RTO: 0 /100
NRBC BLD MANUAL-RTO: 0 %
NT-PROBNP SERPL-MCNC: 1042 PG/ML (ref 0–1800)
NT-PROBNP SERPL-MCNC: 1240 PG/ML (ref 0–1800)
NT-PROBNP SERPL-MCNC: 1779 PG/ML (ref 0–1800)
NT-PROBNP SERPL-MCNC: 1805 PG/ML (ref 0–450)
NT-PROBNP SERPL-MCNC: 3039 PG/ML (ref 0–450)
NT-PROBNP SERPL-MCNC: 5262 PG/ML (ref 0–1800)
OBSERVATION IMP: NEGATIVE
OXYHGB MFR BLDA: 92 % (ref 92–100)
OXYHGB MFR BLDV: 50 % (ref 92–100)
OXYHGB MFR BLDV: 53 % (ref 92–100)
OXYHGB MFR BLDV: 96 % (ref 92–100)
P AXIS - MUSE: 68 DEGREES
P AXIS - MUSE: 83 DEGREES
P AXIS - MUSE: NORMAL DEGREES
P JIROVECII DNA SPEC QL NAA+PROBE: NOT DETECTED
PATH ICD:: ABNORMAL
PATH ICD:: NORMAL
PATH REPORT.COMMENTS IMP SPEC: ABNORMAL
PATH REPORT.COMMENTS IMP SPEC: NORMAL
PATH REPORT.COMMENTS IMP SPEC: YES
PATH REPORT.COMMENTS IMP SPEC: YES
PATH REPORT.FINAL DX SPEC: ABNORMAL
PATH REPORT.FINAL DX SPEC: ABNORMAL
PATH REPORT.FINAL DX SPEC: NORMAL
PATH REPORT.FINAL DX SPEC: NORMAL
PATH REPORT.GROSS SPEC: ABNORMAL
PATH REPORT.MICROSCOPIC SPEC OTHER STN: ABNORMAL
PATH REPORT.MICROSCOPIC SPEC OTHER STN: NORMAL
PATH REPORT.MICROSCOPIC SPEC OTHER STN: NORMAL
PATH REPORT.RELEVANT HX SPEC: ABNORMAL
PATH REPORT.RELEVANT HX SPEC: ABNORMAL
PATH REPORT.RELEVANT HX SPEC: NORMAL
PATH REPORT.RELEVANT HX SPEC: NORMAL
PERFORMING LABORATORY: NORMAL
PH UR STRIP: 5 [PH] (ref 5–7)
PH UR STRIP: 5 [PH] (ref 5–7)
PH UR STRIP: 6 [PH] (ref 5–7)
PHOSPHATE SERPL-MCNC: 2.1 MG/DL (ref 2.5–4.5)
PHOSPHATE SERPL-MCNC: 3.2 MG/DL (ref 2.5–4.5)
PHOSPHATE SERPL-MCNC: 3.4 MG/DL (ref 2.5–4.5)
PHOSPHATE SERPL-MCNC: 3.4 MG/DL (ref 2.5–4.5)
PHOSPHATE SERPL-MCNC: 3.7 MG/DL (ref 2.5–4.5)
PHOSPHATE SERPL-MCNC: 3.7 MG/DL (ref 2.5–4.5)
PHOSPHATE SERPL-MCNC: 3.8 MG/DL (ref 2.5–4.5)
PHOSPHATE SERPL-MCNC: 3.8 MG/DL (ref 2.5–4.5)
PHOSPHATE SERPL-MCNC: 3.9 MG/DL (ref 2.5–4.5)
PHOSPHATE SERPL-MCNC: 4 MG/DL (ref 2.5–4.5)
PHOSPHATE SERPL-MCNC: 4.2 MG/DL (ref 2.5–4.5)
PHOSPHATE SERPL-MCNC: 4.6 MG/DL (ref 2.5–4.5)
PLAT MORPH BLD: ABNORMAL
PLAT MORPH BLD: NORMAL
PLATELET # BLD AUTO: 119 10E3/UL (ref 150–450)
PLATELET # BLD AUTO: 120 10E3/UL (ref 150–450)
PLATELET # BLD AUTO: 122 10E3/UL (ref 150–450)
PLATELET # BLD AUTO: 123 10E3/UL (ref 150–450)
PLATELET # BLD AUTO: 128 10E3/UL (ref 150–450)
PLATELET # BLD AUTO: 129 10E3/UL (ref 150–450)
PLATELET # BLD AUTO: 131 10E3/UL (ref 150–450)
PLATELET # BLD AUTO: 139 10E3/UL (ref 150–450)
PLATELET # BLD AUTO: 141 10E3/UL (ref 150–450)
PLATELET # BLD AUTO: 142 10E3/UL (ref 150–450)
PLATELET # BLD AUTO: 144 10E3/UL (ref 150–450)
PLATELET # BLD AUTO: 148 10E3/UL (ref 150–450)
PLATELET # BLD AUTO: 150 10E3/UL (ref 150–450)
PLATELET # BLD AUTO: 153 10E3/UL (ref 150–450)
PLATELET # BLD AUTO: 160 10E3/UL (ref 150–450)
PLATELET # BLD AUTO: 170 10E3/UL (ref 150–450)
PLATELET # BLD AUTO: 170 10E3/UL (ref 150–450)
PLATELET # BLD AUTO: 173 10E3/UL (ref 150–450)
PLATELET # BLD AUTO: 206 10E3/UL (ref 150–450)
PLATELET # BLD AUTO: 222 10E3/UL (ref 150–450)
PLATELET # BLD AUTO: 238 10E3/UL (ref 150–450)
PLATELET # BLD AUTO: 25 10E3/UL (ref 150–450)
PLATELET # BLD AUTO: 281 10E3/UL (ref 150–450)
PLATELET # BLD AUTO: 29 10E3/UL (ref 150–450)
PLATELET # BLD AUTO: 30 10E3/UL (ref 150–450)
PLATELET # BLD AUTO: 35 10E3/UL (ref 150–450)
PLATELET # BLD AUTO: 39 10E3/UL (ref 150–450)
PLATELET # BLD AUTO: 42 10E3/UL (ref 150–450)
PLATELET # BLD AUTO: 44 10E3/UL (ref 150–450)
PLATELET # BLD AUTO: 46 10E3/UL (ref 150–450)
PLATELET # BLD AUTO: 46 10E3/UL (ref 150–450)
PLATELET # BLD AUTO: 47 10E3/UL (ref 150–450)
PLATELET # BLD AUTO: 48 10E3/UL (ref 150–450)
PLATELET # BLD AUTO: 48 10E3/UL (ref 150–450)
PLATELET # BLD AUTO: 50 10E3/UL (ref 150–450)
PLATELET # BLD AUTO: 56 10E3/UL (ref 150–450)
PLATELET # BLD AUTO: 60 10E3/UL (ref 150–450)
PLATELET # BLD AUTO: 62 10E3/UL (ref 150–450)
PLATELET # BLD AUTO: 63 10E3/UL (ref 150–450)
PLATELET # BLD AUTO: 67 10E3/UL (ref 150–450)
PLATELET # BLD AUTO: 67 10E3/UL (ref 150–450)
PLATELET # BLD AUTO: 75 10E3/UL (ref 150–450)
PLATELET # BLD AUTO: 89 10E3/UL (ref 150–450)
PLATELET # BLD AUTO: 98 10E3/UL (ref 150–450)
POLYCHROMASIA BLD QL SMEAR: SLIGHT
POTASSIUM BLD-SCNC: 3.5 MMOL/L (ref 3.5–5)
POTASSIUM BLD-SCNC: 3.7 MMOL/L (ref 3.4–5.3)
POTASSIUM BLD-SCNC: 3.7 MMOL/L (ref 3.5–5)
POTASSIUM BLD-SCNC: 3.8 MMOL/L (ref 3.4–5.3)
POTASSIUM BLD-SCNC: 3.9 MMOL/L (ref 3.4–5.3)
POTASSIUM BLD-SCNC: 3.9 MMOL/L (ref 3.4–5.3)
POTASSIUM BLD-SCNC: 3.9 MMOL/L (ref 3.5–5)
POTASSIUM BLD-SCNC: 3.9 MMOL/L (ref 3.5–5)
POTASSIUM BLD-SCNC: 4 MMOL/L (ref 3.5–5)
POTASSIUM BLD-SCNC: 4.1 MMOL/L (ref 3.4–5.3)
POTASSIUM BLD-SCNC: 4.2 MMOL/L (ref 3.4–5.3)
POTASSIUM BLD-SCNC: 4.2 MMOL/L (ref 3.5–5)
POTASSIUM BLD-SCNC: 4.3 MMOL/L (ref 3.4–5.3)
POTASSIUM BLD-SCNC: 4.3 MMOL/L (ref 3.5–5)
POTASSIUM BLD-SCNC: 4.3 MMOL/L (ref 3.5–5)
POTASSIUM BLD-SCNC: 4.4 MMOL/L (ref 3.4–5.3)
POTASSIUM BLD-SCNC: 4.4 MMOL/L (ref 3.4–5.3)
POTASSIUM BLD-SCNC: 4.4 MMOL/L (ref 3.5–5)
POTASSIUM BLD-SCNC: 4.5 MMOL/L (ref 3.5–5)
POTASSIUM BLD-SCNC: 4.5 MMOL/L (ref 3.5–5)
POTASSIUM BLD-SCNC: 4.6 MMOL/L (ref 3.5–5)
POTASSIUM BLD-SCNC: 4.6 MMOL/L (ref 3.5–5)
POTASSIUM BLD-SCNC: 4.8 MMOL/L (ref 3.5–5)
POTASSIUM BLD-SCNC: 4.9 MMOL/L (ref 3.5–5)
POTASSIUM SERPL-SCNC: 4.3 MMOL/L (ref 3.4–5.3)
POTASSIUM SERPL-SCNC: 4.4 MMOL/L (ref 3.4–5.3)
POTASSIUM SERPL-SCNC: 4.4 MMOL/L (ref 3.4–5.3)
POTASSIUM SERPL-SCNC: 4.5 MMOL/L (ref 3.4–5.3)
POTASSIUM SERPL-SCNC: 4.5 MMOL/L (ref 3.4–5.3)
POTASSIUM SERPL-SCNC: 4.6 MMOL/L (ref 3.4–5.3)
POTASSIUM SERPL-SCNC: 4.7 MMOL/L (ref 3.4–5.3)
POTASSIUM SERPL-SCNC: 4.8 MMOL/L (ref 3.4–5.3)
PR INTERVAL - MUSE: 174 MS
PR INTERVAL - MUSE: NORMAL MS
PROCALCITONIN SERPL-MCNC: 0.11 NG/ML (ref 0–0.49)
PROMYELOCYTES # BLD MANUAL: 0 10E3/UL
PROMYELOCYTES NFR BLD MANUAL: 1 %
PROT ELPH PNL UR ELPH: NORMAL
PROT PATTERN SERPL ELPH-IMP: ABNORMAL
PROT PATTERN SERPL IFE-IMP: NORMAL
PROT PATTERN UR ELPH-IMP: ABNORMAL
PROT SERPL-MCNC: 4.7 G/DL (ref 6.4–8.3)
PROT SERPL-MCNC: 4.7 G/DL (ref 6–8)
PROT SERPL-MCNC: 4.7 G/DL (ref 6–8)
PROT SERPL-MCNC: 4.8 G/DL (ref 6.4–8.3)
PROT SERPL-MCNC: 4.8 G/DL (ref 6.4–8.3)
PROT SERPL-MCNC: 4.8 G/DL (ref 6.8–8.8)
PROT SERPL-MCNC: 4.8 G/DL (ref 6.8–8.8)
PROT SERPL-MCNC: 4.9 G/DL (ref 6.8–8.8)
PROT SERPL-MCNC: 4.9 G/DL (ref 6.8–8.8)
PROT SERPL-MCNC: 4.9 G/DL (ref 6–8)
PROT SERPL-MCNC: 5 G/DL (ref 6.8–8.8)
PROT SERPL-MCNC: 5 G/DL (ref 6.8–8.8)
PROT SERPL-MCNC: 5 G/DL (ref 6–8)
PROT SERPL-MCNC: 5.1 G/DL (ref 6.8–8.8)
PROT SERPL-MCNC: 5.1 G/DL (ref 6.8–8.8)
PROT SERPL-MCNC: 5.2 G/DL (ref 6.8–8.8)
PROT SERPL-MCNC: 5.2 G/DL (ref 6–8)
PROT SERPL-MCNC: 5.3 G/DL (ref 6.4–8.3)
PROT SERPL-MCNC: 5.3 G/DL (ref 6.8–8.8)
PROT SERPL-MCNC: 5.3 G/DL (ref 6–8)
PROT SERPL-MCNC: 5.3 G/DL (ref 6–8)
PROT SERPL-MCNC: 5.4 G/DL (ref 6.8–8.8)
PROT SERPL-MCNC: 5.4 G/DL (ref 6.8–8.8)
PROT SERPL-MCNC: 5.5 G/DL (ref 6.4–8.3)
PROT SERPL-MCNC: 5.5 G/DL (ref 6–8)
PROT SERPL-MCNC: 5.7 G/DL (ref 6–8)
QRS DURATION - MUSE: 82 MS
QRS DURATION - MUSE: 82 MS
QRS DURATION - MUSE: 86 MS
QRS DURATION - MUSE: 88 MS
QRS DURATION - MUSE: 90 MS
QRS DURATION - MUSE: 94 MS
QRS DURATION - MUSE: 96 MS
QT - MUSE: 390 MS
QT - MUSE: 392 MS
QT - MUSE: 392 MS
QT - MUSE: 398 MS
QT - MUSE: 406 MS
QT - MUSE: 408 MS
QT - MUSE: 416 MS
QT - MUSE: 428 MS
QT - MUSE: 428 MS
QT - MUSE: 436 MS
QT - MUSE: 442 MS
QTC - MUSE: 394 MS
QTC - MUSE: 410 MS
QTC - MUSE: 422 MS
QTC - MUSE: 428 MS
QTC - MUSE: 428 MS
QTC - MUSE: 431 MS
QTC - MUSE: 435 MS
QTC - MUSE: 437 MS
QTC - MUSE: 440 MS
QTC - MUSE: 449 MS
QTC - MUSE: 462 MS
R AXIS - MUSE: 23 DEGREES
R AXIS - MUSE: 32 DEGREES
R AXIS - MUSE: 36 DEGREES
R AXIS - MUSE: 37 DEGREES
R AXIS - MUSE: 40 DEGREES
R AXIS - MUSE: 43 DEGREES
R AXIS - MUSE: 45 DEGREES
R AXIS - MUSE: 46 DEGREES
R AXIS - MUSE: 5 DEGREES
R AXIS - MUSE: 59 DEGREES
R AXIS - MUSE: 60 DEGREES
RATE PRESSURE PRODUCT: NORMAL
RBC # BLD AUTO: 2.13 10E6/UL (ref 4.4–5.9)
RBC # BLD AUTO: 2.16 10E6/UL (ref 4.4–5.9)
RBC # BLD AUTO: 2.28 10E6/UL (ref 4.4–5.9)
RBC # BLD AUTO: 2.28 10E6/UL (ref 4.4–5.9)
RBC # BLD AUTO: 2.29 10E6/UL (ref 4.4–5.9)
RBC # BLD AUTO: 2.3 10E6/UL (ref 4.4–5.9)
RBC # BLD AUTO: 2.32 10E6/UL (ref 4.4–5.9)
RBC # BLD AUTO: 2.35 10E6/UL (ref 4.4–5.9)
RBC # BLD AUTO: 2.36 10E6/UL (ref 4.4–5.9)
RBC # BLD AUTO: 2.36 10E6/UL (ref 4.4–5.9)
RBC # BLD AUTO: 2.38 10E6/UL (ref 4.4–5.9)
RBC # BLD AUTO: 2.39 10E6/UL (ref 4.4–5.9)
RBC # BLD AUTO: 2.44 10E6/UL (ref 4.4–5.9)
RBC # BLD AUTO: 2.46 10E6/UL (ref 4.4–5.9)
RBC # BLD AUTO: 2.46 10E6/UL (ref 4.4–5.9)
RBC # BLD AUTO: 2.48 10E6/UL (ref 4.4–5.9)
RBC # BLD AUTO: 2.51 10E6/UL (ref 4.4–5.9)
RBC # BLD AUTO: 2.51 10E6/UL (ref 4.4–5.9)
RBC # BLD AUTO: 2.52 10E6/UL (ref 4.4–5.9)
RBC # BLD AUTO: 2.54 10E6/UL (ref 4.4–5.9)
RBC # BLD AUTO: 2.55 10E6/UL (ref 4.4–5.9)
RBC # BLD AUTO: 2.56 10E6/UL (ref 4.4–5.9)
RBC # BLD AUTO: 2.57 10E6/UL (ref 4.4–5.9)
RBC # BLD AUTO: 2.67 10E6/UL (ref 4.4–5.9)
RBC # BLD AUTO: 2.7 10E6/UL (ref 4.4–5.9)
RBC # BLD AUTO: 2.71 10E6/UL (ref 4.4–5.9)
RBC # BLD AUTO: 2.72 10E6/UL (ref 4.4–5.9)
RBC # BLD AUTO: 2.73 10E6/UL (ref 4.4–5.9)
RBC # BLD AUTO: 2.91 10E6/UL (ref 4.4–5.9)
RBC # BLD AUTO: 2.93 10E6/UL (ref 4.4–5.9)
RBC # BLD AUTO: 2.95 10E6/UL (ref 4.4–5.9)
RBC # BLD AUTO: 2.96 10E6/UL (ref 4.4–5.9)
RBC # BLD AUTO: 2.96 10E6/UL (ref 4.4–5.9)
RBC # BLD AUTO: 3.02 10E6/UL (ref 4.4–5.9)
RBC # BLD AUTO: 3.03 10E6/UL (ref 4.4–5.9)
RBC # BLD AUTO: 3.03 10E6/UL (ref 4.4–5.9)
RBC # BLD AUTO: 3.04 10E6/UL (ref 4.4–5.9)
RBC # BLD AUTO: 3.23 10E6/UL (ref 4.4–5.9)
RBC # BLD AUTO: 3.24 10E6/UL (ref 4.4–5.9)
RBC # BLD AUTO: 3.3 10E6/UL (ref 4.4–5.9)
RBC # BLD AUTO: 3.37 10E6/UL (ref 4.4–5.9)
RBC # BLD AUTO: 3.5 10E6/UL (ref 4.4–5.9)
RBC MORPH BLD: ABNORMAL
RBC MORPH BLD: NORMAL
RBC URINE: 0 /HPF
RBC URINE: 0 /HPF
REVIEWING PATHOLOGIST: ABNORMAL
REVIEWING PATHOLOGIST: NORMAL
RSV RNA SPEC QL NAA+PROBE: NOT DETECTED
RSV RNA SPEC QL NAA+PROBE: NOT DETECTED
RV+EV RNA SPEC QL NAA+PROBE: DETECTED
SARS-COV-2 RNA RESP QL NAA+PROBE: NEGATIVE
SCANNED LAB RESULT: NORMAL
SODIUM SERPL-SCNC: 134 MMOL/L (ref 133–144)
SODIUM SERPL-SCNC: 134 MMOL/L (ref 133–144)
SODIUM SERPL-SCNC: 135 MMOL/L (ref 136–145)
SODIUM SERPL-SCNC: 136 MMOL/L (ref 136–145)
SODIUM SERPL-SCNC: 137 MMOL/L (ref 136–145)
SODIUM SERPL-SCNC: 138 MMOL/L (ref 133–144)
SODIUM SERPL-SCNC: 138 MMOL/L (ref 133–144)
SODIUM SERPL-SCNC: 138 MMOL/L (ref 136–145)
SODIUM SERPL-SCNC: 139 MMOL/L (ref 133–144)
SODIUM SERPL-SCNC: 139 MMOL/L (ref 133–144)
SODIUM SERPL-SCNC: 139 MMOL/L (ref 136–145)
SODIUM SERPL-SCNC: 140 MMOL/L (ref 133–144)
SODIUM SERPL-SCNC: 140 MMOL/L (ref 136–145)
SODIUM SERPL-SCNC: 141 MMOL/L (ref 136–145)
SODIUM SERPL-SCNC: 142 MMOL/L (ref 133–144)
SODIUM SERPL-SCNC: 142 MMOL/L (ref 136–145)
SODIUM SERPL-SCNC: 142 MMOL/L (ref 136–145)
SODIUM SERPL-SCNC: 143 MMOL/L (ref 133–144)
SODIUM SERPL-SCNC: 144 MMOL/L (ref 133–144)
SODIUM SERPL-SCNC: 145 MMOL/L (ref 133–144)
SODIUM SERPL-SCNC: 145 MMOL/L (ref 133–144)
SP GR UR STRIP: 1.01 (ref 1–1.03)
SPECIMEN EXPIRATION DATE: ABNORMAL
SPECIMEN EXPIRATION DATE: NORMAL
SPECIMEN VOL UR: 1121 ML
STRESS ECHO BASELINE DIASTOLIC HE: 79
STRESS ECHO BASELINE HR: 67
STRESS ECHO BASELINE SYSTOLIC BP: 140
STRESS ECHO CALCULATED PERCENT HR: 66 %
STRESS ECHO LAST STRESS DIASTOLIC BP: 73
STRESS ECHO LAST STRESS HR: 76
STRESS ECHO LAST STRESS SYSTOLIC BP: 133
STRESS ECHO TARGET HR: 144
SYSTOLIC BLOOD PRESSURE - MUSE: NORMAL MMHG
T AXIS - MUSE: -2 DEGREES
T AXIS - MUSE: 3 DEGREES
T AXIS - MUSE: 40 DEGREES
T AXIS - MUSE: 43 DEGREES
T AXIS - MUSE: 43 DEGREES
T AXIS - MUSE: 50 DEGREES
T AXIS - MUSE: 51 DEGREES
T AXIS - MUSE: 55 DEGREES
T AXIS - MUSE: 55 DEGREES
T AXIS - MUSE: 60 DEGREES
T AXIS - MUSE: 63 DEGREES
T CELL COMMENT: ABNORMAL
T4 FREE SERPL-MCNC: 1.11 NG/DL (ref 0.9–1.7)
TEST NAME: NORMAL
TOTAL PROTEIN SERUM FOR ELP (SYNCED VALUE): 4.6 G/DL
TOTAL PROTEIN SERUM FOR ELP (SYNCED VALUE): 4.7 G/DL
TOTAL PROTEIN SERUM FOR ELP (SYNCED VALUE): 5.3 G/DL
TOTAL PROTEIN SERUM FOR ELP: 4.5 G/DL (ref 6.4–8.3)
TOTAL PROTEIN SERUM FOR ELP: 4.6 G/DL (ref 6.4–8.3)
TOTAL PROTEIN SERUM FOR ELP: 4.6 G/DL (ref 6–8)
TOTAL PROTEIN SERUM FOR ELP: 4.7 G/DL (ref 6.4–8.3)
TOTAL PROTEIN SERUM FOR ELP: 4.7 G/DL (ref 6–8)
TOTAL PROTEIN SERUM FOR ELP: 5 G/DL (ref 6.4–8.3)
TOTAL PROTEIN SERUM FOR ELP: 5.3 G/DL (ref 6–8)
TOXIC GRANULES BLD QL SMEAR: PRESENT
TROPONIN I SERPL HS-MCNC: 100 NG/L
TROPONIN I SERPL HS-MCNC: 23 NG/L
TROPONIN I SERPL-MCNC: 0.03 NG/ML (ref 0–0.29)
TROPONIN I SERPL-MCNC: 0.04 NG/ML (ref 0–0.29)
TROPONIN I SERPL-MCNC: 0.05 NG/ML (ref 0–0.29)
TROPONIN I SERPL-MCNC: 0.13 NG/ML (ref 0–0.29)
TROPONIN I SERPL-MCNC: 0.14 NG/ML (ref 0–0.29)
TROPONIN T SERPL HS-MCNC: 71 NG/L
TRYPANOSOMA CRUZI: NORMAL
TSH SERPL DL<=0.005 MIU/L-ACNC: 2.42 UIU/ML (ref 0.3–5)
TSH SERPL DL<=0.005 MIU/L-ACNC: 7.6 UIU/ML (ref 0.3–4.2)
UNIT ABO/RH: NORMAL
UNIT NUMBER: NORMAL
UNIT STATUS: NORMAL
UNIT TYPE ISBT: 5100
UNIT TYPE ISBT: 5100
UNIT TYPE ISBT: 6200
UNIT TYPE ISBT: 8400
UNIT TYPE ISBT: 9500
UNIT TYPE ISBT: 9500
URATE SERPL-MCNC: 8.5 MG/DL (ref 3.5–7.2)
UROBILINOGEN UR STRIP-MCNC: NORMAL MG/DL
VENTRICULAR RATE- MUSE: 55 BPM
VENTRICULAR RATE- MUSE: 58 BPM
VENTRICULAR RATE- MUSE: 59 BPM
VENTRICULAR RATE- MUSE: 60 BPM
VENTRICULAR RATE- MUSE: 63 BPM
VENTRICULAR RATE- MUSE: 66 BPM
VENTRICULAR RATE- MUSE: 70 BPM
VENTRICULAR RATE- MUSE: 70 BPM
VENTRICULAR RATE- MUSE: 73 BPM
VENTRICULAR RATE- MUSE: 75 BPM
VENTRICULAR RATE- MUSE: 78 BPM
VIT B12 SERPL-MCNC: 1684 PG/ML (ref 213–816)
VIT B12 SERPL-MCNC: 881 PG/ML (ref 232–1245)
WBC # BLD AUTO: 0.1 10E3/UL (ref 4–11)
WBC # BLD AUTO: 0.2 10E3/UL (ref 4–11)
WBC # BLD AUTO: 0.4 10E3/UL (ref 4–11)
WBC # BLD AUTO: 0.6 10E3/UL (ref 4–11)
WBC # BLD AUTO: 0.6 10E3/UL (ref 4–11)
WBC # BLD AUTO: 0.7 10E3/UL (ref 4–11)
WBC # BLD AUTO: 0.9 10E3/UL (ref 4–11)
WBC # BLD AUTO: 1 10E3/UL (ref 4–11)
WBC # BLD AUTO: 1 10E3/UL (ref 4–11)
WBC # BLD AUTO: 1.2 10E3/UL (ref 4–11)
WBC # BLD AUTO: 1.2 10E3/UL (ref 4–11)
WBC # BLD AUTO: 1.4 10E3/UL (ref 4–11)
WBC # BLD AUTO: 1.5 10E3/UL (ref 4–11)
WBC # BLD AUTO: 1.5 10E3/UL (ref 4–11)
WBC # BLD AUTO: 1.6 10E3/UL (ref 4–11)
WBC # BLD AUTO: 1.7 10E3/UL (ref 4–11)
WBC # BLD AUTO: 2 10E3/UL (ref 4–11)
WBC # BLD AUTO: 2.2 10E3/UL (ref 4–11)
WBC # BLD AUTO: 2.3 10E3/UL (ref 4–11)
WBC # BLD AUTO: 2.5 10E3/UL (ref 4–11)
WBC # BLD AUTO: 2.6 10E3/UL (ref 4–11)
WBC # BLD AUTO: 2.8 10E3/UL (ref 4–11)
WBC # BLD AUTO: 2.8 10E3/UL (ref 4–11)
WBC # BLD AUTO: 3 10E3/UL (ref 4–11)
WBC # BLD AUTO: 3.1 10E3/UL (ref 4–11)
WBC # BLD AUTO: 3.1 10E3/UL (ref 4–11)
WBC # BLD AUTO: 3.2 10E3/UL (ref 4–11)
WBC # BLD AUTO: 3.3 10E3/UL (ref 4–11)
WBC # BLD AUTO: 3.3 10E3/UL (ref 4–11)
WBC # BLD AUTO: 3.5 10E3/UL (ref 4–11)
WBC # BLD AUTO: 3.9 10E3/UL (ref 4–11)
WBC # BLD AUTO: 4.2 10E3/UL (ref 4–11)
WBC # BLD AUTO: 4.4 10E3/UL (ref 4–11)
WBC # BLD AUTO: 4.6 10E3/UL (ref 4–11)
WBC # BLD AUTO: 4.8 10E3/UL (ref 4–11)
WBC # BLD AUTO: 5.1 10E3/UL (ref 4–11)
WBC # BLD AUTO: 5.3 10E3/UL (ref 4–11)
WBC # BLD AUTO: 5.4 10E3/UL (ref 4–11)
WBC # BLD AUTO: 7.2 10E3/UL (ref 4–11)
WBC URINE: 1 /HPF
WBC URINE: <1 /HPF
WNV RNA SERPL DONR QL NAA+PROBE: NORMAL

## 2022-01-01 PROCEDURE — 99214 OFFICE O/P EST MOD 30 MIN: CPT

## 2022-01-01 PROCEDURE — 86140 C-REACTIVE PROTEIN: CPT | Performed by: INTERNAL MEDICINE

## 2022-01-01 PROCEDURE — 96375 TX/PRO/DX INJ NEW DRUG ADDON: CPT

## 2022-01-01 PROCEDURE — 84155 ASSAY OF PROTEIN SERUM: CPT | Performed by: INTERNAL MEDICINE

## 2022-01-01 PROCEDURE — 272N000001 HC OR GENERAL SUPPLY STERILE: Performed by: INTERNAL MEDICINE

## 2022-01-01 PROCEDURE — 85025 COMPLETE CBC W/AUTO DIFF WBC: CPT | Performed by: INTERNAL MEDICINE

## 2022-01-01 PROCEDURE — 80048 BASIC METABOLIC PNL TOTAL CA: CPT | Performed by: INTERNAL MEDICINE

## 2022-01-01 PROCEDURE — 210N000002 HC R&B HEART CARE

## 2022-01-01 PROCEDURE — 96413 CHEMO IV INFUSION 1 HR: CPT

## 2022-01-01 PROCEDURE — 86335 IMMUNFIX E-PHORSIS/URINE/CSF: CPT | Mod: 26 | Performed by: PATHOLOGY

## 2022-01-01 PROCEDURE — 93005 ELECTROCARDIOGRAM TRACING: CPT | Performed by: EMERGENCY MEDICINE

## 2022-01-01 PROCEDURE — 87798 DETECT AGENT NOS DNA AMP: CPT | Performed by: INTERNAL MEDICINE

## 2022-01-01 PROCEDURE — 80048 BASIC METABOLIC PNL TOTAL CA: CPT

## 2022-01-01 PROCEDURE — 250N000009 HC RX 250: Performed by: INTERNAL MEDICINE

## 2022-01-01 PROCEDURE — 258N000003 HC RX IP 258 OP 636: Performed by: INTERNAL MEDICINE

## 2022-01-01 PROCEDURE — 96367 TX/PROPH/DG ADDL SEQ IV INF: CPT

## 2022-01-01 PROCEDURE — 258N000003 HC RX IP 258 OP 636: Performed by: NURSE PRACTITIONER

## 2022-01-01 PROCEDURE — U0005 INFEC AGEN DETEC AMPLI PROBE: HCPCS

## 2022-01-01 PROCEDURE — 250N000013 HC RX MED GY IP 250 OP 250 PS 637: Performed by: INTERNAL MEDICINE

## 2022-01-01 PROCEDURE — 93306 TTE W/DOPPLER COMPLETE: CPT | Mod: 26 | Performed by: INTERNAL MEDICINE

## 2022-01-01 PROCEDURE — 86334 IMMUNOFIX E-PHORESIS SERUM: CPT

## 2022-01-01 PROCEDURE — P9040 RBC LEUKOREDUCED IRRADIATED: HCPCS | Performed by: PHYSICIAN ASSISTANT

## 2022-01-01 PROCEDURE — 99223 1ST HOSP IP/OBS HIGH 75: CPT | Performed by: INTERNAL MEDICINE

## 2022-01-01 PROCEDURE — 71045 X-RAY EXAM CHEST 1 VIEW: CPT

## 2022-01-01 PROCEDURE — 99207 PR NO CHARGE LOS: CPT

## 2022-01-01 PROCEDURE — 82248 BILIRUBIN DIRECT: CPT | Performed by: INTERNAL MEDICINE

## 2022-01-01 PROCEDURE — 93000 ELECTROCARDIOGRAM COMPLETE: CPT | Performed by: GENERAL ACUTE CARE HOSPITAL

## 2022-01-01 PROCEDURE — 92960 CARDIOVERSION ELECTRIC EXT: CPT | Performed by: NURSE PRACTITIONER

## 2022-01-01 PROCEDURE — 86335 IMMUNFIX E-PHORSIS/URINE/CSF: CPT | Mod: 26

## 2022-01-01 PROCEDURE — 84100 ASSAY OF PHOSPHORUS: CPT | Performed by: INTERNAL MEDICINE

## 2022-01-01 PROCEDURE — 83521 IG LIGHT CHAINS FREE EACH: CPT

## 2022-01-01 PROCEDURE — 94640 AIRWAY INHALATION TREATMENT: CPT

## 2022-01-01 PROCEDURE — 88184 FLOWCYTOMETRY/ TC 1 MARKER: CPT | Performed by: INTERNAL MEDICINE

## 2022-01-01 PROCEDURE — A9552 F18 FDG: HCPCS | Performed by: INTERNAL MEDICINE

## 2022-01-01 PROCEDURE — 99215 OFFICE O/P EST HI 40 MIN: CPT | Performed by: INTERNAL MEDICINE

## 2022-01-01 PROCEDURE — G0463 HOSPITAL OUTPT CLINIC VISIT: HCPCS | Mod: 25

## 2022-01-01 PROCEDURE — 96401 CHEMO ANTI-NEOPL SQ/IM: CPT

## 2022-01-01 PROCEDURE — 99214 OFFICE O/P EST MOD 30 MIN: CPT | Performed by: INTERNAL MEDICINE

## 2022-01-01 PROCEDURE — 78816 PET IMAGE W/CT FULL BODY: CPT | Mod: PS

## 2022-01-01 PROCEDURE — 88188 FLOWCYTOMETRY/READ 9-15: CPT | Mod: GC | Performed by: STUDENT IN AN ORGANIZED HEALTH CARE EDUCATION/TRAINING PROGRAM

## 2022-01-01 PROCEDURE — 99001 SPECIMEN HANDLING PT-LAB: CPT | Performed by: INTERNAL MEDICINE

## 2022-01-01 PROCEDURE — 85027 COMPLETE CBC AUTOMATED: CPT | Performed by: NURSE PRACTITIONER

## 2022-01-01 PROCEDURE — 93306 TTE W/DOPPLER COMPLETE: CPT

## 2022-01-01 PROCEDURE — 83615 LACTATE (LD) (LDH) ENZYME: CPT | Performed by: INTERNAL MEDICINE

## 2022-01-01 PROCEDURE — 84165 PROTEIN E-PHORESIS SERUM: CPT | Mod: TC

## 2022-01-01 PROCEDURE — 80053 COMPREHEN METABOLIC PANEL: CPT

## 2022-01-01 PROCEDURE — 250N000011 HC RX IP 250 OP 636: Performed by: INTERNAL MEDICINE

## 2022-01-01 PROCEDURE — 88184 FLOWCYTOMETRY/ TC 1 MARKER: CPT | Performed by: STUDENT IN AN ORGANIZED HEALTH CARE EDUCATION/TRAINING PROGRAM

## 2022-01-01 PROCEDURE — 84165 PROTEIN E-PHORESIS SERUM: CPT | Mod: TC | Performed by: PATHOLOGY

## 2022-01-01 PROCEDURE — 83615 LACTATE (LD) (LDH) ENZYME: CPT | Performed by: PHYSICIAN ASSISTANT

## 2022-01-01 PROCEDURE — 86635 COCCIDIOIDES ANTIBODY: CPT | Performed by: INTERNAL MEDICINE

## 2022-01-01 PROCEDURE — 258N000003 HC RX IP 258 OP 636: Performed by: PHYSICIAN ASSISTANT

## 2022-01-01 PROCEDURE — 86334 IMMUNOFIX E-PHORESIS SERUM: CPT | Performed by: PATHOLOGY

## 2022-01-01 PROCEDURE — 250N000011 HC RX IP 250 OP 636: Performed by: PHYSICIAN ASSISTANT

## 2022-01-01 PROCEDURE — 88275 CYTOGENETICS 100-300: CPT | Mod: 59

## 2022-01-01 PROCEDURE — 96361 HYDRATE IV INFUSION ADD-ON: CPT

## 2022-01-01 PROCEDURE — 99233 SBSQ HOSP IP/OBS HIGH 50: CPT | Performed by: INTERNAL MEDICINE

## 2022-01-01 PROCEDURE — 93460 R&L HRT ART/VENTRICLE ANGIO: CPT | Performed by: INTERNAL MEDICINE

## 2022-01-01 PROCEDURE — 85007 BL SMEAR W/DIFF WBC COUNT: CPT | Performed by: INTERNAL MEDICINE

## 2022-01-01 PROCEDURE — 85610 PROTHROMBIN TIME: CPT | Performed by: EMERGENCY MEDICINE

## 2022-01-01 PROCEDURE — 84166 PROTEIN E-PHORESIS/URINE/CSF: CPT | Performed by: PATHOLOGY

## 2022-01-01 PROCEDURE — 85014 HEMATOCRIT: CPT | Performed by: INTERNAL MEDICINE

## 2022-01-01 PROCEDURE — 99207 PR NO CHARGE NURSE ONLY: CPT

## 2022-01-01 PROCEDURE — 87205 SMEAR GRAM STAIN: CPT | Performed by: HOSPITALIST

## 2022-01-01 PROCEDURE — 82746 ASSAY OF FOLIC ACID SERUM: CPT

## 2022-01-01 PROCEDURE — 83521 IG LIGHT CHAINS FREE EACH: CPT | Mod: 59 | Performed by: INTERNAL MEDICINE

## 2022-01-01 PROCEDURE — 36591 DRAW BLOOD OFF VENOUS DEVICE: CPT

## 2022-01-01 PROCEDURE — 200N000001 HC R&B ICU

## 2022-01-01 PROCEDURE — 85007 BL SMEAR W/DIFF WBC COUNT: CPT

## 2022-01-01 PROCEDURE — 83735 ASSAY OF MAGNESIUM: CPT

## 2022-01-01 PROCEDURE — C9803 HOPD COVID-19 SPEC COLLECT: HCPCS

## 2022-01-01 PROCEDURE — 88161 CYTOPATH SMEAR OTHER SOURCE: CPT | Mod: 26 | Performed by: PATHOLOGY

## 2022-01-01 PROCEDURE — 87449 NOS EACH ORGANISM AG IA: CPT | Performed by: INTERNAL MEDICINE

## 2022-01-01 PROCEDURE — 99207 PR NO BILLABLE SERVICE THIS VISIT: CPT | Performed by: NURSE PRACTITIONER

## 2022-01-01 PROCEDURE — 86698 HISTOPLASMA ANTIBODY: CPT | Performed by: INTERNAL MEDICINE

## 2022-01-01 PROCEDURE — 96368 THER/DIAG CONCURRENT INF: CPT

## 2022-01-01 PROCEDURE — 250N000011 HC RX IP 250 OP 636: Performed by: NURSE PRACTITIONER

## 2022-01-01 PROCEDURE — 86335 IMMUNFIX E-PHORSIS/URINE/CSF: CPT | Performed by: PATHOLOGY

## 2022-01-01 PROCEDURE — 84166 PROTEIN E-PHORESIS/URINE/CSF: CPT | Mod: 26 | Performed by: PATHOLOGY

## 2022-01-01 PROCEDURE — 93005 ELECTROCARDIOGRAM TRACING: CPT

## 2022-01-01 PROCEDURE — 88365 INSITU HYBRIDIZATION (FISH): CPT | Mod: 26 | Performed by: PATHOLOGY

## 2022-01-01 PROCEDURE — 85025 COMPLETE CBC W/AUTO DIFF WBC: CPT

## 2022-01-01 PROCEDURE — 78452 HT MUSCLE IMAGE SPECT MULT: CPT

## 2022-01-01 PROCEDURE — 93016 CV STRESS TEST SUPVJ ONLY: CPT | Performed by: INTERNAL MEDICINE

## 2022-01-01 PROCEDURE — 86696 HERPES SIMPLEX TYPE 2 TEST: CPT

## 2022-01-01 PROCEDURE — 99205 OFFICE O/P NEW HI 60 MIN: CPT | Performed by: INTERNAL MEDICINE

## 2022-01-01 PROCEDURE — 82040 ASSAY OF SERUM ALBUMIN: CPT

## 2022-01-01 PROCEDURE — 250N000012 HC RX MED GY IP 250 OP 636 PS 637: Performed by: INTERNAL MEDICINE

## 2022-01-01 PROCEDURE — 94642 AEROSOL INHALATION TREATMENT: CPT | Performed by: INTERNAL MEDICINE

## 2022-01-01 PROCEDURE — 78816 PET IMAGE W/CT FULL BODY: CPT | Mod: 26

## 2022-01-01 PROCEDURE — 86612 BLASTOMYCES ANTIBODY: CPT | Performed by: INTERNAL MEDICINE

## 2022-01-01 PROCEDURE — 80069 RENAL FUNCTION PANEL: CPT

## 2022-01-01 PROCEDURE — 83735 ASSAY OF MAGNESIUM: CPT | Performed by: STUDENT IN AN ORGANIZED HEALTH CARE EDUCATION/TRAINING PROGRAM

## 2022-01-01 PROCEDURE — 36591 DRAW BLOOD OFF VENOUS DEVICE: CPT | Performed by: INTERNAL MEDICINE

## 2022-01-01 PROCEDURE — 250N000011 HC RX IP 250 OP 636

## 2022-01-01 PROCEDURE — 84100 ASSAY OF PHOSPHORUS: CPT | Performed by: PHYSICIAN ASSISTANT

## 2022-01-01 PROCEDURE — 96372 THER/PROPH/DIAG INJ SC/IM: CPT | Mod: XS | Performed by: PHYSICIAN ASSISTANT

## 2022-01-01 PROCEDURE — 99223 1ST HOSP IP/OBS HIGH 75: CPT | Performed by: HOSPITALIST

## 2022-01-01 PROCEDURE — 36415 COLL VENOUS BLD VENIPUNCTURE: CPT | Performed by: HOSPITALIST

## 2022-01-01 PROCEDURE — 80053 COMPREHEN METABOLIC PANEL: CPT | Performed by: INTERNAL MEDICINE

## 2022-01-01 PROCEDURE — 84145 PROCALCITONIN (PCT): CPT | Performed by: HOSPITALIST

## 2022-01-01 PROCEDURE — 99203 OFFICE O/P NEW LOW 30 MIN: CPT | Performed by: THORACIC SURGERY (CARDIOTHORACIC VASCULAR SURGERY)

## 2022-01-01 PROCEDURE — 36415 COLL VENOUS BLD VENIPUNCTURE: CPT | Performed by: PATHOLOGY

## 2022-01-01 PROCEDURE — 85027 COMPLETE CBC AUTOMATED: CPT

## 2022-01-01 PROCEDURE — 84165 PROTEIN E-PHORESIS SERUM: CPT | Mod: 26

## 2022-01-01 PROCEDURE — 83880 ASSAY OF NATRIURETIC PEPTIDE: CPT | Performed by: PATHOLOGY

## 2022-01-01 PROCEDURE — 99214 OFFICE O/P EST MOD 30 MIN: CPT | Mod: 25 | Performed by: INTERNAL MEDICINE

## 2022-01-01 PROCEDURE — 38222 DX BONE MARROW BX & ASPIR: CPT | Performed by: NURSE PRACTITIONER

## 2022-01-01 PROCEDURE — 80053 COMPREHEN METABOLIC PANEL: CPT | Performed by: EMERGENCY MEDICINE

## 2022-01-01 PROCEDURE — G1010 CDSM STANSON: HCPCS

## 2022-01-01 PROCEDURE — 83880 ASSAY OF NATRIURETIC PEPTIDE: CPT | Performed by: EMERGENCY MEDICINE

## 2022-01-01 PROCEDURE — 96372 THER/PROPH/DIAG INJ SC/IM: CPT | Performed by: STUDENT IN AN ORGANIZED HEALTH CARE EDUCATION/TRAINING PROGRAM

## 2022-01-01 PROCEDURE — 250N000011 HC RX IP 250 OP 636: Performed by: STUDENT IN AN ORGANIZED HEALTH CARE EDUCATION/TRAINING PROGRAM

## 2022-01-01 PROCEDURE — 82550 ASSAY OF CK (CPK): CPT | Performed by: STUDENT IN AN ORGANIZED HEALTH CARE EDUCATION/TRAINING PROGRAM

## 2022-01-01 PROCEDURE — 86901 BLOOD TYPING SEROLOGIC RH(D): CPT | Performed by: PHYSICIAN ASSISTANT

## 2022-01-01 PROCEDURE — 81003 URINALYSIS AUTO W/O SCOPE: CPT | Performed by: INTERNAL MEDICINE

## 2022-01-01 PROCEDURE — 84484 ASSAY OF TROPONIN QUANT: CPT | Performed by: STUDENT IN AN ORGANIZED HEALTH CARE EDUCATION/TRAINING PROGRAM

## 2022-01-01 PROCEDURE — G0463 HOSPITAL OUTPT CLINIC VISIT: HCPCS

## 2022-01-01 PROCEDURE — 86665 EPSTEIN-BARR CAPSID VCA: CPT

## 2022-01-01 PROCEDURE — 36415 COLL VENOUS BLD VENIPUNCTURE: CPT | Performed by: INTERNAL MEDICINE

## 2022-01-01 PROCEDURE — 86970 RBC PRETX INCUBATJ W/CHEMICL: CPT

## 2022-01-01 PROCEDURE — 82248 BILIRUBIN DIRECT: CPT | Performed by: PHYSICIAN ASSISTANT

## 2022-01-01 PROCEDURE — 83735 ASSAY OF MAGNESIUM: CPT | Performed by: INTERNAL MEDICINE

## 2022-01-01 PROCEDURE — 99152 MOD SED SAME PHYS/QHP 5/>YRS: CPT | Performed by: INTERNAL MEDICINE

## 2022-01-01 PROCEDURE — 99443 PR PHYSICIAN TELEPHONE EVALUATION 21-30 MIN: CPT | Mod: 95 | Performed by: INTERNAL MEDICINE

## 2022-01-01 PROCEDURE — 85027 COMPLETE CBC AUTOMATED: CPT | Performed by: INTERNAL MEDICINE

## 2022-01-01 PROCEDURE — 99207 CT ANGIOGRAM TAVR: CPT | Mod: 26 | Performed by: INTERNAL MEDICINE

## 2022-01-01 PROCEDURE — 96360 HYDRATION IV INFUSION INIT: CPT

## 2022-01-01 PROCEDURE — 86703 HIV-1/HIV-2 1 RESULT ANTBDY: CPT | Mod: GZ

## 2022-01-01 PROCEDURE — 99214 OFFICE O/P EST MOD 30 MIN: CPT | Performed by: NURSE PRACTITIONER

## 2022-01-01 PROCEDURE — 88185 FLOWCYTOMETRY/TC ADD-ON: CPT | Performed by: INTERNAL MEDICINE

## 2022-01-01 PROCEDURE — 78580 LUNG PERFUSION IMAGING: CPT | Mod: 26

## 2022-01-01 PROCEDURE — 85025 COMPLETE CBC W/AUTO DIFF WBC: CPT | Performed by: NURSE PRACTITIONER

## 2022-01-01 PROCEDURE — 99215 OFFICE O/P EST HI 40 MIN: CPT | Mod: GC | Performed by: INTERNAL MEDICINE

## 2022-01-01 PROCEDURE — 82746 ASSAY OF FOLIC ACID SERUM: CPT | Performed by: HOSPITALIST

## 2022-01-01 PROCEDURE — 78580 LUNG PERFUSION IMAGING: CPT

## 2022-01-01 PROCEDURE — 99215 OFFICE O/P EST HI 40 MIN: CPT

## 2022-01-01 PROCEDURE — 92960 CARDIOVERSION ELECTRIC EXT: CPT

## 2022-01-01 PROCEDURE — 84165 PROTEIN E-PHORESIS SERUM: CPT | Mod: 26 | Performed by: STUDENT IN AN ORGANIZED HEALTH CARE EDUCATION/TRAINING PROGRAM

## 2022-01-01 PROCEDURE — 86360 T CELL ABSOLUTE COUNT/RATIO: CPT

## 2022-01-01 PROCEDURE — A9500 TC99M SESTAMIBI: HCPCS | Performed by: INTERNAL MEDICINE

## 2022-01-01 PROCEDURE — 88237 TISSUE CULTURE BONE MARROW: CPT

## 2022-01-01 PROCEDURE — 86606 ASPERGILLUS ANTIBODY: CPT | Performed by: INTERNAL MEDICINE

## 2022-01-01 PROCEDURE — 88237 TISSUE CULTURE BONE MARROW: CPT | Performed by: INTERNAL MEDICINE

## 2022-01-01 PROCEDURE — 38222 DX BONE MARROW BX & ASPIR: CPT

## 2022-01-01 PROCEDURE — 86900 BLOOD TYPING SEROLOGIC ABO: CPT | Mod: 91

## 2022-01-01 PROCEDURE — 96365 THER/PROPH/DIAG IV INF INIT: CPT

## 2022-01-01 PROCEDURE — 83880 ASSAY OF NATRIURETIC PEPTIDE: CPT | Performed by: INTERNAL MEDICINE

## 2022-01-01 PROCEDURE — 83880 ASSAY OF NATRIURETIC PEPTIDE: CPT

## 2022-01-01 PROCEDURE — 88369 M/PHMTRC ALYSISHQUANT/SEMIQ: CPT | Mod: 26 | Performed by: MEDICAL GENETICS

## 2022-01-01 PROCEDURE — 86850 RBC ANTIBODY SCREEN: CPT

## 2022-01-01 PROCEDURE — 36415 COLL VENOUS BLD VENIPUNCTURE: CPT

## 2022-01-01 PROCEDURE — 999N000054 HC STATISTIC EKG NON-CHARGEABLE

## 2022-01-01 PROCEDURE — 87385 HISTOPLASMA CAPSUL AG IA: CPT | Performed by: INTERNAL MEDICINE

## 2022-01-01 PROCEDURE — 99239 HOSP IP/OBS DSCHRG MGMT >30: CPT | Mod: GC | Performed by: INTERNAL MEDICINE

## 2022-01-01 PROCEDURE — 84165 PROTEIN E-PHORESIS SERUM: CPT | Mod: 26 | Performed by: PATHOLOGY

## 2022-01-01 PROCEDURE — 999N000157 HC STATISTIC RCP TIME EA 10 MIN

## 2022-01-01 PROCEDURE — 84155 ASSAY OF PROTEIN SERUM: CPT | Mod: 91 | Performed by: INTERNAL MEDICINE

## 2022-01-01 PROCEDURE — 96417 CHEMO IV INFUS EACH ADDL SEQ: CPT

## 2022-01-01 PROCEDURE — 82810 BLOOD GASES O2 SAT ONLY: CPT

## 2022-01-01 PROCEDURE — 85014 HEMATOCRIT: CPT | Performed by: EMERGENCY MEDICINE

## 2022-01-01 PROCEDURE — 88311 DECALCIFY TISSUE: CPT | Mod: 26 | Performed by: STUDENT IN AN ORGANIZED HEALTH CARE EDUCATION/TRAINING PROGRAM

## 2022-01-01 PROCEDURE — 36573 INSJ PICC RS&I 5 YR+: CPT | Mod: LT | Performed by: RADIOLOGY

## 2022-01-01 PROCEDURE — 38221 DX BONE MARROW BIOPSIES: CPT | Performed by: STUDENT IN AN ORGANIZED HEALTH CARE EDUCATION/TRAINING PROGRAM

## 2022-01-01 PROCEDURE — 0001U RBC DNA HEA 35 AG 11 BLD GRP: CPT

## 2022-01-01 PROCEDURE — 36430 TRANSFUSION BLD/BLD COMPNT: CPT

## 2022-01-01 PROCEDURE — 96415 CHEMO IV INFUSION ADDL HR: CPT

## 2022-01-01 PROCEDURE — 250N000011 HC RX IP 250 OP 636: Performed by: HOSPITALIST

## 2022-01-01 PROCEDURE — 96366 THER/PROPH/DIAG IV INF ADDON: CPT

## 2022-01-01 PROCEDURE — 83521 IG LIGHT CHAINS FREE EACH: CPT | Mod: 59

## 2022-01-01 PROCEDURE — 84439 ASSAY OF FREE THYROXINE: CPT

## 2022-01-01 PROCEDURE — 88341 IMHCHEM/IMCYTCHM EA ADD ANTB: CPT | Mod: 26 | Performed by: PATHOLOGY

## 2022-01-01 PROCEDURE — 99223 1ST HOSP IP/OBS HIGH 75: CPT | Mod: AI | Performed by: INTERNAL MEDICINE

## 2022-01-01 PROCEDURE — 96372 THER/PROPH/DIAG INJ SC/IM: CPT | Mod: XU | Performed by: NURSE PRACTITIONER

## 2022-01-01 PROCEDURE — 85610 PROTHROMBIN TIME: CPT | Performed by: INTERNAL MEDICINE

## 2022-01-01 PROCEDURE — 94640 AIRWAY INHALATION TREATMENT: CPT | Mod: 76

## 2022-01-01 PROCEDURE — 84443 ASSAY THYROID STIM HORMONE: CPT

## 2022-01-01 PROCEDURE — 86922 COMPATIBILITY TEST ANTIGLOB: CPT | Performed by: PHYSICIAN ASSISTANT

## 2022-01-01 PROCEDURE — 88313 SPECIAL STAINS GROUP 2: CPT | Mod: 26 | Performed by: PATHOLOGY

## 2022-01-01 PROCEDURE — 83880 ASSAY OF NATRIURETIC PEPTIDE: CPT | Performed by: STUDENT IN AN ORGANIZED HEALTH CARE EDUCATION/TRAINING PROGRAM

## 2022-01-01 PROCEDURE — 258N000003 HC RX IP 258 OP 636: Performed by: HOSPITALIST

## 2022-01-01 PROCEDURE — 82607 VITAMIN B-12: CPT

## 2022-01-01 PROCEDURE — 250N000011 HC RX IP 250 OP 636: Performed by: EMERGENCY MEDICINE

## 2022-01-01 PROCEDURE — 36415 COLL VENOUS BLD VENIPUNCTURE: CPT | Performed by: STUDENT IN AN ORGANIZED HEALTH CARE EDUCATION/TRAINING PROGRAM

## 2022-01-01 PROCEDURE — 999N000134 HC STATISTIC PP CARE STAGE 3

## 2022-01-01 PROCEDURE — 38208 THAW PRESERVED STEM CELLS: CPT | Performed by: STUDENT IN AN ORGANIZED HEALTH CARE EDUCATION/TRAINING PROGRAM

## 2022-01-01 PROCEDURE — 84155 ASSAY OF PROTEIN SERUM: CPT

## 2022-01-01 PROCEDURE — G1004 CDSM NDSC: HCPCS | Mod: GC

## 2022-01-01 PROCEDURE — 86870 RBC ANTIBODY IDENTIFICATION: CPT

## 2022-01-01 PROCEDURE — 370N000017 HC ANESTHESIA TECHNICAL FEE, PER MIN

## 2022-01-01 PROCEDURE — 85004 AUTOMATED DIFF WBC COUNT: CPT | Performed by: INTERNAL MEDICINE

## 2022-01-01 PROCEDURE — 94660 CPAP INITIATION&MGMT: CPT

## 2022-01-01 PROCEDURE — 80053 COMPREHEN METABOLIC PANEL: CPT | Performed by: STUDENT IN AN ORGANIZED HEALTH CARE EDUCATION/TRAINING PROGRAM

## 2022-01-01 PROCEDURE — 99215 OFFICE O/P EST HI 40 MIN: CPT | Performed by: NURSE PRACTITIONER

## 2022-01-01 PROCEDURE — 84166 PROTEIN E-PHORESIS/URINE/CSF: CPT | Mod: 26

## 2022-01-01 PROCEDURE — 84484 ASSAY OF TROPONIN QUANT: CPT | Performed by: EMERGENCY MEDICINE

## 2022-01-01 PROCEDURE — 96372 THER/PROPH/DIAG INJ SC/IM: CPT | Performed by: NURSE PRACTITIONER

## 2022-01-01 PROCEDURE — 86902 BLOOD TYPE ANTIGEN DONOR EA: CPT

## 2022-01-01 PROCEDURE — 343N000001 HC RX 343: Performed by: INTERNAL MEDICINE

## 2022-01-01 PROCEDURE — 86780 TREPONEMA PALLIDUM: CPT

## 2022-01-01 PROCEDURE — 94640 AIRWAY INHALATION TREATMENT: CPT | Performed by: INTERNAL MEDICINE

## 2022-01-01 PROCEDURE — 82784 ASSAY IGA/IGD/IGG/IGM EACH: CPT | Performed by: INTERNAL MEDICINE

## 2022-01-01 PROCEDURE — 78452 HT MUSCLE IMAGE SPECT MULT: CPT | Mod: 26 | Performed by: GENERAL ACUTE CARE HOSPITAL

## 2022-01-01 PROCEDURE — 99207 BONE MARROW BIOPSY: CPT | Performed by: STUDENT IN AN ORGANIZED HEALTH CARE EDUCATION/TRAINING PROGRAM

## 2022-01-01 PROCEDURE — 99152 MOD SED SAME PHYS/QHP 5/>YRS: CPT | Mod: GC | Performed by: INTERNAL MEDICINE

## 2022-01-01 PROCEDURE — 99233 SBSQ HOSP IP/OBS HIGH 50: CPT | Mod: 25 | Performed by: INTERNAL MEDICINE

## 2022-01-01 PROCEDURE — 80053 COMPREHEN METABOLIC PANEL: CPT | Performed by: NURSE PRACTITIONER

## 2022-01-01 PROCEDURE — 99207 PR NO BILLABLE SERVICE THIS VISIT: CPT | Mod: 95 | Performed by: INTERNAL MEDICINE

## 2022-01-01 PROCEDURE — 88368 INSITU HYBRIDIZATION MANUAL: CPT | Mod: 26 | Performed by: MEDICAL GENETICS

## 2022-01-01 PROCEDURE — 96372 THER/PROPH/DIAG INJ SC/IM: CPT | Mod: XS | Performed by: STUDENT IN AN ORGANIZED HEALTH CARE EDUCATION/TRAINING PROGRAM

## 2022-01-01 PROCEDURE — 82040 ASSAY OF SERUM ALBUMIN: CPT | Performed by: INTERNAL MEDICINE

## 2022-01-01 PROCEDURE — 258N000003 HC RX IP 258 OP 636: Performed by: EMERGENCY MEDICINE

## 2022-01-01 PROCEDURE — 85018 HEMOGLOBIN: CPT

## 2022-01-01 PROCEDURE — 86687 HTLV-I ANTIBODY: CPT

## 2022-01-01 PROCEDURE — 86850 RBC ANTIBODY SCREEN: CPT | Performed by: PHYSICIAN ASSISTANT

## 2022-01-01 PROCEDURE — 84132 ASSAY OF SERUM POTASSIUM: CPT | Performed by: INTERNAL MEDICINE

## 2022-01-01 PROCEDURE — 94799 UNLISTED PULMONARY SVC/PX: CPT

## 2022-01-01 PROCEDURE — G1004 CDSM NDSC: HCPCS | Mod: PS

## 2022-01-01 PROCEDURE — U0003 INFECTIOUS AGENT DETECTION BY NUCLEIC ACID (DNA OR RNA); SEVERE ACUTE RESPIRATORY SYNDROME CORONAVIRUS 2 (SARS-COV-2) (CORONAVIRUS DISEASE [COVID-19]), AMPLIFIED PROBE TECHNIQUE, MAKING USE OF HIGH THROUGHPUT TECHNOLOGIES AS DESCRIBED BY CMS-2020-01-R: HCPCS

## 2022-01-01 PROCEDURE — 88313 SPECIAL STAINS GROUP 2: CPT | Mod: TC | Performed by: INTERNAL MEDICINE

## 2022-01-01 PROCEDURE — 99285 EMERGENCY DEPT VISIT HI MDM: CPT | Mod: 25

## 2022-01-01 PROCEDURE — 99213 OFFICE O/P EST LOW 20 MIN: CPT

## 2022-01-01 PROCEDURE — 93018 CV STRESS TEST I&R ONLY: CPT | Performed by: GENERAL ACUTE CARE HOSPITAL

## 2022-01-01 PROCEDURE — 99222 1ST HOSP IP/OBS MODERATE 55: CPT | Performed by: INTERNAL MEDICINE

## 2022-01-01 PROCEDURE — 99232 SBSQ HOSP IP/OBS MODERATE 35: CPT | Performed by: INTERNAL MEDICINE

## 2022-01-01 PROCEDURE — 96374 THER/PROPH/DIAG INJ IV PUSH: CPT

## 2022-01-01 PROCEDURE — 93306 TTE W/DOPPLER COMPLETE: CPT | Mod: 26 | Performed by: GENERAL ACUTE CARE HOSPITAL

## 2022-01-01 PROCEDURE — 86334 IMMUNOFIX E-PHORESIS SERUM: CPT | Mod: 26 | Performed by: PATHOLOGY

## 2022-01-01 PROCEDURE — 88275 CYTOGENETICS 100-300: CPT | Performed by: INTERNAL MEDICINE

## 2022-01-01 PROCEDURE — M0220 HC INJECTION TIXAGEVIMAB & CILGAVIMAB (EVUSHELD): HCPCS | Performed by: NURSE PRACTITIONER

## 2022-01-01 PROCEDURE — 93010 ELECTROCARDIOGRAM REPORT: CPT | Performed by: INTERNAL MEDICINE

## 2022-01-01 PROCEDURE — 86850 RBC ANTIBODY SCREEN: CPT | Performed by: INTERNAL MEDICINE

## 2022-01-01 PROCEDURE — 93451 RIGHT HEART CATH: CPT | Performed by: INTERNAL MEDICINE

## 2022-01-01 PROCEDURE — 258N000003 HC RX IP 258 OP 636: Performed by: NURSE ANESTHETIST, CERTIFIED REGISTERED

## 2022-01-01 PROCEDURE — 86922 COMPATIBILITY TEST ANTIGLOB: CPT

## 2022-01-01 PROCEDURE — 88311 DECALCIFY TISSUE: CPT | Mod: 26 | Performed by: PATHOLOGY

## 2022-01-01 PROCEDURE — 88311 DECALCIFY TISSUE: CPT | Mod: TC | Performed by: INTERNAL MEDICINE

## 2022-01-01 PROCEDURE — 88364 INSITU HYBRIDIZATION (FISH): CPT | Mod: 26 | Performed by: PATHOLOGY

## 2022-01-01 PROCEDURE — 87633 RESP VIRUS 12-25 TARGETS: CPT | Performed by: INTERNAL MEDICINE

## 2022-01-01 PROCEDURE — 86334 IMMUNOFIX E-PHORESIS SERUM: CPT | Performed by: STUDENT IN AN ORGANIZED HEALTH CARE EDUCATION/TRAINING PROGRAM

## 2022-01-01 PROCEDURE — 86901 BLOOD TYPING SEROLOGIC RH(D): CPT

## 2022-01-01 PROCEDURE — C1894 INTRO/SHEATH, NON-LASER: HCPCS | Performed by: INTERNAL MEDICINE

## 2022-01-01 PROCEDURE — 93017 CV STRESS TEST TRACING ONLY: CPT

## 2022-01-01 PROCEDURE — 36415 COLL VENOUS BLD VENIPUNCTURE: CPT | Performed by: EMERGENCY MEDICINE

## 2022-01-01 PROCEDURE — 84484 ASSAY OF TROPONIN QUANT: CPT | Performed by: INTERNAL MEDICINE

## 2022-01-01 PROCEDURE — 71260 CT THORAX DX C+: CPT

## 2022-01-01 PROCEDURE — 80053 COMPREHEN METABOLIC PANEL: CPT | Performed by: PATHOLOGY

## 2022-01-01 PROCEDURE — 78816 PET IMAGE W/CT FULL BODY: CPT | Mod: 26 | Performed by: RADIOLOGY

## 2022-01-01 PROCEDURE — 86335 IMMUNFIX E-PHORSIS/URINE/CSF: CPT | Performed by: STUDENT IN AN ORGANIZED HEALTH CARE EDUCATION/TRAINING PROGRAM

## 2022-01-01 PROCEDURE — 93010 ELECTROCARDIOGRAM REPORT: CPT | Mod: HOP | Performed by: INTERNAL MEDICINE

## 2022-01-01 PROCEDURE — 5A09357 ASSISTANCE WITH RESPIRATORY VENTILATION, LESS THAN 24 CONSECUTIVE HOURS, CONTINUOUS POSITIVE AIRWAY PRESSURE: ICD-10-PCS | Performed by: EMERGENCY MEDICINE

## 2022-01-01 PROCEDURE — 85025 COMPLETE CBC W/AUTO DIFF WBC: CPT | Performed by: EMERGENCY MEDICINE

## 2022-01-01 PROCEDURE — 84443 ASSAY THYROID STIM HORMONE: CPT | Performed by: HOSPITALIST

## 2022-01-01 PROCEDURE — 84484 ASSAY OF TROPONIN QUANT: CPT

## 2022-01-01 PROCEDURE — 82248 BILIRUBIN DIRECT: CPT

## 2022-01-01 PROCEDURE — 93005 ELECTROCARDIOGRAM TRACING: CPT | Mod: XU

## 2022-01-01 PROCEDURE — 99213 OFFICE O/P EST LOW 20 MIN: CPT | Mod: 95 | Performed by: INTERNAL MEDICINE

## 2022-01-01 PROCEDURE — 85379 FIBRIN DEGRADATION QUANT: CPT | Performed by: INTERNAL MEDICINE

## 2022-01-01 PROCEDURE — 85025 COMPLETE CBC W/AUTO DIFF WBC: CPT | Performed by: STUDENT IN AN ORGANIZED HEALTH CARE EDUCATION/TRAINING PROGRAM

## 2022-01-01 PROCEDURE — C1769 GUIDE WIRE: HCPCS | Performed by: INTERNAL MEDICINE

## 2022-01-01 PROCEDURE — 93451 RIGHT HEART CATH: CPT | Mod: 26 | Performed by: INTERNAL MEDICINE

## 2022-01-01 PROCEDURE — 81050 URINALYSIS VOLUME MEASURE: CPT | Performed by: PATHOLOGY

## 2022-01-01 PROCEDURE — 91305 COVID-19,PF,PFIZER (12+ YRS): CPT | Performed by: INTERNAL MEDICINE

## 2022-01-01 PROCEDURE — 86334 IMMUNOFIX E-PHORESIS SERUM: CPT | Mod: 26

## 2022-01-01 PROCEDURE — C1887 CATHETER, GUIDING: HCPCS | Performed by: INTERNAL MEDICINE

## 2022-01-01 PROCEDURE — 36591 DRAW BLOOD OFF VENOUS DEVICE: CPT | Performed by: PATHOLOGY

## 2022-01-01 PROCEDURE — 88313 SPECIAL STAINS GROUP 2: CPT | Mod: TC,91 | Performed by: INTERNAL MEDICINE

## 2022-01-01 PROCEDURE — 99153 MOD SED SAME PHYS/QHP EA: CPT | Performed by: INTERNAL MEDICINE

## 2022-01-01 PROCEDURE — 85007 BL SMEAR W/DIFF WBC COUNT: CPT | Performed by: NURSE PRACTITIONER

## 2022-01-01 PROCEDURE — 85027 COMPLETE CBC AUTOMATED: CPT | Performed by: PATHOLOGY

## 2022-01-01 PROCEDURE — 88342 IMHCHEM/IMCYTCHM 1ST ANTB: CPT | Mod: 26 | Performed by: PATHOLOGY

## 2022-01-01 PROCEDURE — 93005 ELECTROCARDIOGRAM TRACING: CPT | Performed by: STUDENT IN AN ORGANIZED HEALTH CARE EDUCATION/TRAINING PROGRAM

## 2022-01-01 PROCEDURE — 87040 BLOOD CULTURE FOR BACTERIA: CPT | Performed by: EMERGENCY MEDICINE

## 2022-01-01 PROCEDURE — 88342 IMHCHEM/IMCYTCHM 1ST ANTB: CPT | Mod: 26 | Performed by: STUDENT IN AN ORGANIZED HEALTH CARE EDUCATION/TRAINING PROGRAM

## 2022-01-01 PROCEDURE — 83615 LACTATE (LD) (LDH) ENZYME: CPT

## 2022-01-01 PROCEDURE — 80048 BASIC METABOLIC PNL TOTAL CA: CPT | Performed by: PATHOLOGY

## 2022-01-01 PROCEDURE — 250N000011 HC RX IP 250 OP 636: Mod: JW | Performed by: INTERNAL MEDICINE

## 2022-01-01 PROCEDURE — 999N000142 HC STATISTIC PROCEDURE PREP ONLY

## 2022-01-01 PROCEDURE — 36592 COLLECT BLOOD FROM PICC: CPT

## 2022-01-01 PROCEDURE — 93000 ELECTROCARDIOGRAM COMPLETE: CPT | Performed by: INTERNAL MEDICINE

## 2022-01-01 PROCEDURE — 82040 ASSAY OF SERUM ALBUMIN: CPT | Performed by: EMERGENCY MEDICINE

## 2022-01-01 PROCEDURE — 99417 PROLNG OP E/M EACH 15 MIN: CPT

## 2022-01-01 PROCEDURE — 84999 UNLISTED CHEMISTRY PROCEDURE: CPT | Mod: 91

## 2022-01-01 PROCEDURE — 88305 TISSUE EXAM BY PATHOLOGIST: CPT | Mod: 26 | Performed by: STUDENT IN AN ORGANIZED HEALTH CARE EDUCATION/TRAINING PROGRAM

## 2022-01-01 PROCEDURE — 83735 ASSAY OF MAGNESIUM: CPT | Performed by: PHYSICIAN ASSISTANT

## 2022-01-01 PROCEDURE — 82575 CREATININE CLEARANCE TEST: CPT | Performed by: PATHOLOGY

## 2022-01-01 PROCEDURE — 38222 DX BONE MARROW BX & ASPIR: CPT | Performed by: PHYSICIAN ASSISTANT

## 2022-01-01 PROCEDURE — 258N000003 HC RX IP 258 OP 636: Performed by: STUDENT IN AN ORGANIZED HEALTH CARE EDUCATION/TRAINING PROGRAM

## 2022-01-01 PROCEDURE — 96409 CHEMO IV PUSH SNGL DRUG: CPT

## 2022-01-01 PROCEDURE — 38222 DX BONE MARROW BX & ASPIR: CPT | Performed by: STUDENT IN AN ORGANIZED HEALTH CARE EDUCATION/TRAINING PROGRAM

## 2022-01-01 PROCEDURE — 87636 SARSCOV2 & INF A&B AMP PRB: CPT | Performed by: EMERGENCY MEDICINE

## 2022-01-01 PROCEDURE — 83521 IG LIGHT CHAINS FREE EACH: CPT | Performed by: INTERNAL MEDICINE

## 2022-01-01 PROCEDURE — 99215 OFFICE O/P EST HI 40 MIN: CPT | Mod: 25

## 2022-01-01 PROCEDURE — 84484 ASSAY OF TROPONIN QUANT: CPT | Performed by: PATHOLOGY

## 2022-01-01 PROCEDURE — 71046 X-RAY EXAM CHEST 2 VIEWS: CPT | Mod: 26 | Performed by: RADIOLOGY

## 2022-01-01 PROCEDURE — 250N000009 HC RX 250: Performed by: HOSPITALIST

## 2022-01-01 PROCEDURE — 88341 IMHCHEM/IMCYTCHM EA ADD ANTB: CPT | Mod: 26 | Performed by: STUDENT IN AN ORGANIZED HEALTH CARE EDUCATION/TRAINING PROGRAM

## 2022-01-01 PROCEDURE — 82962 GLUCOSE BLOOD TEST: CPT

## 2022-01-01 PROCEDURE — 99207 BONE MARROW BIOPSY: CPT | Performed by: PATHOLOGY

## 2022-01-01 PROCEDURE — 84165 PROTEIN E-PHORESIS SERUM: CPT | Mod: TC | Performed by: STUDENT IN AN ORGANIZED HEALTH CARE EDUCATION/TRAINING PROGRAM

## 2022-01-01 PROCEDURE — 250N000011 HC RX IP 250 OP 636: Mod: JW | Performed by: NURSE PRACTITIONER

## 2022-01-01 PROCEDURE — 84999 UNLISTED CHEMISTRY PROCEDURE: CPT

## 2022-01-01 PROCEDURE — 84100 ASSAY OF PHOSPHORUS: CPT

## 2022-01-01 PROCEDURE — 88305 TISSUE EXAM BY PATHOLOGIST: CPT | Mod: 26 | Performed by: PATHOLOGY

## 2022-01-01 PROCEDURE — 71250 CT THORAX DX C-: CPT

## 2022-01-01 PROCEDURE — 99417 PROLNG OP E/M EACH 15 MIN: CPT | Performed by: INTERNAL MEDICINE

## 2022-01-01 PROCEDURE — 93460 R&L HRT ART/VENTRICLE ANGIO: CPT | Mod: 26 | Performed by: INTERNAL MEDICINE

## 2022-01-01 PROCEDURE — 83880 ASSAY OF NATRIURETIC PEPTIDE: CPT | Mod: GZ | Performed by: NURSE PRACTITIONER

## 2022-01-01 PROCEDURE — 87798 DETECT AGENT NOS DNA AMP: CPT

## 2022-01-01 PROCEDURE — 83880 ASSAY OF NATRIURETIC PEPTIDE: CPT | Performed by: NURSE PRACTITIONER

## 2022-01-01 PROCEDURE — 38222 DX BONE MARROW BX & ASPIR: CPT | Mod: LT | Performed by: PHYSICIAN ASSISTANT

## 2022-01-01 PROCEDURE — 82607 VITAMIN B-12: CPT | Performed by: HOSPITALIST

## 2022-01-01 PROCEDURE — 82728 ASSAY OF FERRITIN: CPT | Performed by: INTERNAL MEDICINE

## 2022-01-01 PROCEDURE — 88188 FLOWCYTOMETRY/READ 9-15: CPT | Performed by: STUDENT IN AN ORGANIZED HEALTH CARE EDUCATION/TRAINING PROGRAM

## 2022-01-01 PROCEDURE — 38208 THAW PRESERVED STEM CELLS: CPT | Performed by: INTERNAL MEDICINE

## 2022-01-01 PROCEDURE — A9540 TC99M MAA: HCPCS | Performed by: INTERNAL MEDICINE

## 2022-01-01 PROCEDURE — 80053 COMPREHEN METABOLIC PANEL: CPT | Performed by: PHYSICIAN ASSISTANT

## 2022-01-01 PROCEDURE — 86901 BLOOD TYPING SEROLOGIC RH(D): CPT | Mod: 91

## 2022-01-01 PROCEDURE — 82232 ASSAY OF BETA-2 PROTEIN: CPT

## 2022-01-01 PROCEDURE — 86359 T CELLS TOTAL COUNT: CPT

## 2022-01-01 PROCEDURE — 84550 ASSAY OF BLOOD/URIC ACID: CPT

## 2022-01-01 PROCEDURE — 250N000013 HC RX MED GY IP 250 OP 250 PS 637: Performed by: EMERGENCY MEDICINE

## 2022-01-01 PROCEDURE — 86880 COOMBS TEST DIRECT: CPT

## 2022-01-01 PROCEDURE — 36592 COLLECT BLOOD FROM PICC: CPT | Performed by: INTERNAL MEDICINE

## 2022-01-01 PROCEDURE — 999N000132 HC STATISTIC PP CARE STAGE 1

## 2022-01-01 PROCEDURE — 250N000009 HC RX 250: Performed by: NURSE ANESTHETIST, CERTIFIED REGISTERED

## 2022-01-01 PROCEDURE — 88291 CYTO/MOLECULAR REPORT: CPT | Performed by: MEDICAL GENETICS

## 2022-01-01 PROCEDURE — 85097 BONE MARROW INTERPRETATION: CPT | Performed by: PATHOLOGY

## 2022-01-01 PROCEDURE — 85610 PROTHROMBIN TIME: CPT | Performed by: STUDENT IN AN ORGANIZED HEALTH CARE EDUCATION/TRAINING PROGRAM

## 2022-01-01 PROCEDURE — 82310 ASSAY OF CALCIUM: CPT | Performed by: INTERNAL MEDICINE

## 2022-01-01 PROCEDURE — 82784 ASSAY IGA/IGD/IGG/IGM EACH: CPT | Performed by: PHYSICIAN ASSISTANT

## 2022-01-01 PROCEDURE — 85379 FIBRIN DEGRADATION QUANT: CPT | Performed by: STUDENT IN AN ORGANIZED HEALTH CARE EDUCATION/TRAINING PROGRAM

## 2022-01-01 PROCEDURE — 85018 HEMOGLOBIN: CPT | Mod: 91

## 2022-01-01 PROCEDURE — 88313 SPECIAL STAINS GROUP 2: CPT | Mod: 26 | Performed by: STUDENT IN AN ORGANIZED HEALTH CARE EDUCATION/TRAINING PROGRAM

## 2022-01-01 PROCEDURE — 82784 ASSAY IGA/IGD/IGG/IGM EACH: CPT

## 2022-01-01 PROCEDURE — 85097 BONE MARROW INTERPRETATION: CPT | Performed by: STUDENT IN AN ORGANIZED HEALTH CARE EDUCATION/TRAINING PROGRAM

## 2022-01-01 PROCEDURE — 81001 URINALYSIS AUTO W/SCOPE: CPT | Performed by: INTERNAL MEDICINE

## 2022-01-01 PROCEDURE — 71046 X-RAY EXAM CHEST 2 VIEWS: CPT

## 2022-01-01 PROCEDURE — 0001U RBC DNA HEA 35 AG 11 BLD GRP: CPT | Performed by: INTERNAL MEDICINE

## 2022-01-01 PROCEDURE — 88185 FLOWCYTOMETRY/TC ADD-ON: CPT | Performed by: NURSE PRACTITIONER

## 2022-01-01 PROCEDURE — 81001 URINALYSIS AUTO W/SCOPE: CPT

## 2022-01-01 PROCEDURE — 83521 IG LIGHT CHAINS FREE EACH: CPT | Performed by: PHYSICIAN ASSISTANT

## 2022-01-01 PROCEDURE — 0054A COVID-19,PF,PFIZER (12+ YRS): CPT | Performed by: INTERNAL MEDICINE

## 2022-01-01 DEVICE — IMPLANTABLE DEVICE: Type: IMPLANTABLE DEVICE | Site: ARM | Status: FUNCTIONAL

## 2022-01-01 RX ORDER — METHYLPREDNISOLONE SODIUM SUCCINATE 125 MG/2ML
125 INJECTION, POWDER, LYOPHILIZED, FOR SOLUTION INTRAMUSCULAR; INTRAVENOUS
Status: CANCELLED
Start: 2022-01-01

## 2022-01-01 RX ORDER — HEPARIN SODIUM (PORCINE) LOCK FLUSH IV SOLN 100 UNIT/ML 100 UNIT/ML
5 SOLUTION INTRAVENOUS
Status: DISCONTINUED | OUTPATIENT
Start: 2022-01-01 | End: 2022-01-01 | Stop reason: HOSPADM

## 2022-01-01 RX ORDER — NALOXONE HYDROCHLORIDE 0.4 MG/ML
0.2 INJECTION, SOLUTION INTRAMUSCULAR; INTRAVENOUS; SUBCUTANEOUS
Status: CANCELLED | OUTPATIENT
Start: 2022-01-01

## 2022-01-01 RX ORDER — DIPHENHYDRAMINE HCL 25 MG
25 CAPSULE ORAL EVERY 4 HOURS
Status: DISCONTINUED | OUTPATIENT
Start: 2022-01-01 | End: 2022-01-01 | Stop reason: HOSPADM

## 2022-01-01 RX ORDER — MEPERIDINE HYDROCHLORIDE 25 MG/ML
25 INJECTION INTRAMUSCULAR; INTRAVENOUS; SUBCUTANEOUS EVERY 30 MIN PRN
Status: CANCELLED | OUTPATIENT
Start: 2022-01-01

## 2022-01-01 RX ORDER — LORAZEPAM 2 MG/ML
0.5 INJECTION INTRAMUSCULAR EVERY 4 HOURS PRN
Status: CANCELLED | OUTPATIENT
Start: 2022-01-01

## 2022-01-01 RX ORDER — HEPARIN SODIUM,PORCINE 10 UNIT/ML
5 VIAL (ML) INTRAVENOUS
Status: CANCELLED | OUTPATIENT
Start: 2022-01-01

## 2022-01-01 RX ORDER — CYANOCOBALAMIN 1000 UG/ML
1 INJECTION, SOLUTION INTRAMUSCULAR; SUBCUTANEOUS
COMMUNITY
End: 2022-01-01

## 2022-01-01 RX ORDER — IPRATROPIUM BROMIDE AND ALBUTEROL SULFATE 2.5; .5 MG/3ML; MG/3ML
3 SOLUTION RESPIRATORY (INHALATION) 3 TIMES DAILY
Status: DISCONTINUED | OUTPATIENT
Start: 2022-01-01 | End: 2022-01-01 | Stop reason: HOSPADM

## 2022-01-01 RX ORDER — METHYLPREDNISOLONE SODIUM SUCCINATE 125 MG/2ML
125 INJECTION, POWDER, LYOPHILIZED, FOR SOLUTION INTRAMUSCULAR; INTRAVENOUS
Status: DISCONTINUED | OUTPATIENT
Start: 2022-01-01 | End: 2022-01-01 | Stop reason: HOSPADM

## 2022-01-01 RX ORDER — DIPHENHYDRAMINE HYDROCHLORIDE 50 MG/ML
50 INJECTION INTRAMUSCULAR; INTRAVENOUS
Status: CANCELLED
Start: 2022-01-01

## 2022-01-01 RX ORDER — CYANOCOBALAMIN 1000 UG/ML
1000 INJECTION, SOLUTION INTRAMUSCULAR; SUBCUTANEOUS ONCE
Status: COMPLETED | OUTPATIENT
Start: 2022-01-01 | End: 2022-01-01

## 2022-01-01 RX ORDER — PENTAMIDINE ISETHIONATE 300 MG/300MG
300 INHALANT RESPIRATORY (INHALATION)
Status: CANCELLED
Start: 2022-01-01

## 2022-01-01 RX ORDER — ALBUTEROL SULFATE 90 UG/1
1-2 AEROSOL, METERED RESPIRATORY (INHALATION)
Status: CANCELLED
Start: 2022-01-01

## 2022-01-01 RX ORDER — NALOXONE HYDROCHLORIDE 0.4 MG/ML
0.4 INJECTION, SOLUTION INTRAMUSCULAR; INTRAVENOUS; SUBCUTANEOUS
Status: DISCONTINUED | OUTPATIENT
Start: 2022-01-01 | End: 2022-01-01 | Stop reason: HOSPADM

## 2022-01-01 RX ORDER — HEPARIN SODIUM (PORCINE) LOCK FLUSH IV SOLN 100 UNIT/ML 100 UNIT/ML
5 SOLUTION INTRAVENOUS ONCE
Status: COMPLETED | OUTPATIENT
Start: 2022-01-01 | End: 2022-01-01

## 2022-01-01 RX ORDER — ALBUTEROL SULFATE 0.83 MG/ML
2.5 SOLUTION RESPIRATORY (INHALATION)
Status: CANCELLED | OUTPATIENT
Start: 2022-01-01

## 2022-01-01 RX ORDER — HEPARIN SODIUM,PORCINE 10 UNIT/ML
3 VIAL (ML) INTRAVENOUS ONCE
Status: COMPLETED | OUTPATIENT
Start: 2022-01-01 | End: 2022-01-01

## 2022-01-01 RX ORDER — FLUCONAZOLE 100 MG/1
100 TABLET ORAL DAILY
Qty: 30 TABLET | Refills: 1 | Status: SHIPPED | OUTPATIENT
Start: 2022-01-01 | End: 2022-01-01

## 2022-01-01 RX ORDER — NALOXONE HYDROCHLORIDE 0.4 MG/ML
0.2 INJECTION, SOLUTION INTRAMUSCULAR; INTRAVENOUS; SUBCUTANEOUS
Status: DISCONTINUED | OUTPATIENT
Start: 2022-01-01 | End: 2022-01-01 | Stop reason: HOSPADM

## 2022-01-01 RX ORDER — ALBUTEROL SULFATE 90 UG/1
1-2 AEROSOL, METERED RESPIRATORY (INHALATION)
Status: DISCONTINUED | OUTPATIENT
Start: 2022-01-01 | End: 2022-01-01 | Stop reason: HOSPADM

## 2022-01-01 RX ORDER — ALBUTEROL SULFATE 0.83 MG/ML
2.5 SOLUTION RESPIRATORY (INHALATION)
Status: DISCONTINUED | OUTPATIENT
Start: 2022-01-01 | End: 2022-01-01 | Stop reason: HOSPADM

## 2022-01-01 RX ORDER — SULFAMETHOXAZOLE AND TRIMETHOPRIM 400; 80 MG/1; MG/1
1 TABLET ORAL DAILY
Qty: 30 TABLET | Refills: 1 | Status: CANCELLED | OUTPATIENT
Start: 2022-01-01

## 2022-01-01 RX ORDER — SULFAMETHOXAZOLE AND TRIMETHOPRIM 400; 80 MG/1; MG/1
1 TABLET ORAL DAILY
Status: DISCONTINUED | OUTPATIENT
Start: 2022-01-01 | End: 2022-01-01 | Stop reason: HOSPADM

## 2022-01-01 RX ORDER — EPINEPHRINE 1 MG/ML
0.3 INJECTION, SOLUTION INTRAMUSCULAR; SUBCUTANEOUS EVERY 5 MIN PRN
Status: CANCELLED | OUTPATIENT
Start: 2022-01-01

## 2022-01-01 RX ORDER — ONDANSETRON 2 MG/ML
8 INJECTION INTRAMUSCULAR; INTRAVENOUS ONCE
Status: CANCELLED | OUTPATIENT
Start: 2022-01-01

## 2022-01-01 RX ORDER — OXYCODONE HYDROCHLORIDE 5 MG/1
5 TABLET ORAL EVERY 4 HOURS PRN
Status: DISCONTINUED | OUTPATIENT
Start: 2022-01-01 | End: 2022-01-01 | Stop reason: HOSPADM

## 2022-01-01 RX ORDER — DIPHENHYDRAMINE HCL 25 MG
25 CAPSULE ORAL EVERY 4 HOURS
Status: CANCELLED
Start: 2022-01-01

## 2022-01-01 RX ORDER — HEPARIN SODIUM (PORCINE) LOCK FLUSH IV SOLN 100 UNIT/ML 100 UNIT/ML
5 SOLUTION INTRAVENOUS
Status: CANCELLED | OUTPATIENT
Start: 2022-01-01

## 2022-01-01 RX ORDER — DIPHENHYDRAMINE HYDROCHLORIDE 50 MG/ML
50 INJECTION INTRAMUSCULAR; INTRAVENOUS
Status: DISCONTINUED | OUTPATIENT
Start: 2022-01-01 | End: 2022-01-01 | Stop reason: HOSPADM

## 2022-01-01 RX ORDER — HEPARIN SODIUM (PORCINE) LOCK FLUSH IV SOLN 100 UNIT/ML 100 UNIT/ML
500 SOLUTION INTRAVENOUS ONCE
Status: COMPLETED | OUTPATIENT
Start: 2022-01-01 | End: 2022-01-01

## 2022-01-01 RX ORDER — EPINEPHRINE 1 MG/ML
0.3 INJECTION, SOLUTION INTRAMUSCULAR; SUBCUTANEOUS EVERY 5 MIN PRN
Status: DISCONTINUED | OUTPATIENT
Start: 2022-01-01 | End: 2022-01-01 | Stop reason: HOSPADM

## 2022-01-01 RX ORDER — ALBUTEROL SULFATE 0.83 MG/ML
2.5 SOLUTION RESPIRATORY (INHALATION)
Status: CANCELLED
Start: 2022-01-01

## 2022-01-01 RX ORDER — METHYLPREDNISOLONE 4 MG/1
20 TABLET ORAL DAILY
Qty: 10 TABLET | Refills: 1 | Status: SHIPPED | OUTPATIENT
Start: 2022-01-01 | End: 2022-01-01

## 2022-01-01 RX ORDER — METHYLPREDNISOLONE SODIUM SUCCINATE 125 MG/2ML
60 INJECTION, POWDER, LYOPHILIZED, FOR SOLUTION INTRAMUSCULAR; INTRAVENOUS ONCE
Status: COMPLETED | OUTPATIENT
Start: 2022-01-01 | End: 2022-01-01

## 2022-01-01 RX ORDER — LIDOCAINE 40 MG/G
CREAM TOPICAL
Status: CANCELLED | OUTPATIENT
Start: 2022-01-01

## 2022-01-01 RX ORDER — DIPHENHYDRAMINE HCL 25 MG
50 CAPSULE ORAL ONCE
Status: COMPLETED | OUTPATIENT
Start: 2022-01-01 | End: 2022-01-01

## 2022-01-01 RX ORDER — LIDOCAINE 40 MG/G
CREAM TOPICAL
Status: DISCONTINUED | OUTPATIENT
Start: 2022-01-01 | End: 2022-01-01 | Stop reason: HOSPADM

## 2022-01-01 RX ORDER — CEFDINIR 300 MG/1
300 CAPSULE ORAL EVERY 12 HOURS SCHEDULED
Status: DISCONTINUED | OUTPATIENT
Start: 2022-01-01 | End: 2022-01-01 | Stop reason: HOSPADM

## 2022-01-01 RX ORDER — RIVAROXABAN 20 MG/1
TABLET, FILM COATED ORAL
COMMUNITY
Start: 2022-01-01 | End: 2022-01-01

## 2022-01-01 RX ORDER — MEPERIDINE HYDROCHLORIDE 25 MG/ML
25 INJECTION INTRAMUSCULAR; INTRAVENOUS; SUBCUTANEOUS EVERY 30 MIN PRN
Status: DISCONTINUED | OUTPATIENT
Start: 2022-01-01 | End: 2022-01-01 | Stop reason: HOSPADM

## 2022-01-01 RX ORDER — MEPERIDINE HYDROCHLORIDE 25 MG/ML
25-50 INJECTION INTRAMUSCULAR; INTRAVENOUS; SUBCUTANEOUS
Status: DISCONTINUED | OUTPATIENT
Start: 2022-01-01 | End: 2022-01-01 | Stop reason: HOSPADM

## 2022-01-01 RX ORDER — POTASSIUM CHLORIDE 750 MG/1
40 TABLET, EXTENDED RELEASE ORAL
Status: DISCONTINUED | OUTPATIENT
Start: 2022-01-01 | End: 2022-01-01 | Stop reason: HOSPADM

## 2022-01-01 RX ORDER — ACETAMINOPHEN 325 MG/1
650 TABLET ORAL ONCE
Status: COMPLETED | OUTPATIENT
Start: 2022-01-01 | End: 2022-01-01

## 2022-01-01 RX ORDER — ACETAMINOPHEN 325 MG/1
650 TABLET ORAL ONCE
Status: CANCELLED | OUTPATIENT
Start: 2022-01-01

## 2022-01-01 RX ORDER — PIPERACILLIN SODIUM, TAZOBACTAM SODIUM 3; .375 G/15ML; G/15ML
3.38 INJECTION, POWDER, LYOPHILIZED, FOR SOLUTION INTRAVENOUS ONCE
Status: COMPLETED | OUTPATIENT
Start: 2022-01-01 | End: 2022-01-01

## 2022-01-01 RX ORDER — DEXAMETHASONE 4 MG/1
20 TABLET ORAL ONCE
Status: CANCELLED
Start: 2022-01-01 | End: 2022-01-01

## 2022-01-01 RX ORDER — MEPERIDINE HYDROCHLORIDE 25 MG/ML
25-50 INJECTION INTRAMUSCULAR; INTRAVENOUS; SUBCUTANEOUS
Status: CANCELLED | OUTPATIENT
Start: 2022-01-01 | End: 2022-01-01

## 2022-01-01 RX ORDER — ATORVASTATIN CALCIUM 10 MG/1
10 TABLET, FILM COATED ORAL DAILY
Qty: 90 TABLET | Refills: 3 | Status: SHIPPED | OUTPATIENT
Start: 2022-01-01

## 2022-01-01 RX ORDER — ACETAMINOPHEN 325 MG/1
650 TABLET ORAL EVERY 6 HOURS PRN
Status: DISCONTINUED | OUTPATIENT
Start: 2022-01-01 | End: 2022-01-01 | Stop reason: HOSPADM

## 2022-01-01 RX ORDER — HEPARIN SODIUM (PORCINE) LOCK FLUSH IV SOLN 100 UNIT/ML 100 UNIT/ML
5 SOLUTION INTRAVENOUS
Status: ACTIVE | OUTPATIENT
Start: 2022-01-01

## 2022-01-01 RX ORDER — DIPHENHYDRAMINE HCL 25 MG
50 CAPSULE ORAL ONCE
Status: CANCELLED | OUTPATIENT
Start: 2022-01-01

## 2022-01-01 RX ORDER — METHYLPREDNISOLONE SODIUM SUCCINATE 125 MG/2ML
60 INJECTION, POWDER, LYOPHILIZED, FOR SOLUTION INTRAMUSCULAR; INTRAVENOUS ONCE
Status: CANCELLED | OUTPATIENT
Start: 2022-01-01

## 2022-01-01 RX ORDER — PANTOPRAZOLE SODIUM 20 MG/1
40 TABLET, DELAYED RELEASE ORAL
Status: DISCONTINUED | OUTPATIENT
Start: 2022-01-01 | End: 2022-01-01 | Stop reason: HOSPADM

## 2022-01-01 RX ORDER — ASPIRIN 325 MG
325 TABLET ORAL ONCE
Status: CANCELLED | OUTPATIENT
Start: 2022-01-01 | End: 2022-01-01

## 2022-01-01 RX ORDER — AZITHROMYCIN 500 MG/5ML
500 INJECTION, POWDER, LYOPHILIZED, FOR SOLUTION INTRAVENOUS ONCE
Status: COMPLETED | OUTPATIENT
Start: 2022-01-01 | End: 2022-01-01

## 2022-01-01 RX ORDER — ACYCLOVIR 200 MG/1
400 CAPSULE ORAL 2 TIMES DAILY
Status: DISCONTINUED | OUTPATIENT
Start: 2022-01-01 | End: 2022-01-01 | Stop reason: HOSPADM

## 2022-01-01 RX ORDER — ACETAMINOPHEN 325 MG/1
650 TABLET ORAL
Status: CANCELLED | OUTPATIENT
Start: 2022-01-01

## 2022-01-01 RX ORDER — HEPARIN SODIUM (PORCINE) LOCK FLUSH IV SOLN 100 UNIT/ML 100 UNIT/ML
SOLUTION INTRAVENOUS
Status: COMPLETED
Start: 2022-01-01 | End: 2022-01-01

## 2022-01-01 RX ORDER — IOPAMIDOL 755 MG/ML
INJECTION, SOLUTION INTRAVASCULAR
Status: DISCONTINUED | OUTPATIENT
Start: 2022-01-01 | End: 2022-01-01 | Stop reason: HOSPADM

## 2022-01-01 RX ORDER — SULFAMETHOXAZOLE AND TRIMETHOPRIM 400; 80 MG/1; MG/1
1 TABLET ORAL DAILY
Qty: 90 TABLET | Refills: 1 | Status: SHIPPED | OUTPATIENT
Start: 2022-01-01 | End: 2022-01-01

## 2022-01-01 RX ORDER — SODIUM CHLORIDE 9 MG/ML
INJECTION, SOLUTION INTRAVENOUS CONTINUOUS
Status: CANCELLED | OUTPATIENT
Start: 2022-01-01

## 2022-01-01 RX ORDER — HEPARIN SODIUM 1000 [USP'U]/ML
INJECTION, SOLUTION INTRAVENOUS; SUBCUTANEOUS
Status: DISCONTINUED | OUTPATIENT
Start: 2022-01-01 | End: 2022-01-01 | Stop reason: HOSPADM

## 2022-01-01 RX ORDER — ONDANSETRON 2 MG/ML
8 INJECTION INTRAMUSCULAR; INTRAVENOUS ONCE
Status: COMPLETED | OUTPATIENT
Start: 2022-01-01 | End: 2022-01-01

## 2022-01-01 RX ORDER — HEPARIN SODIUM,PORCINE 10 UNIT/ML
5 VIAL (ML) INTRAVENOUS ONCE
Status: COMPLETED | OUTPATIENT
Start: 2022-01-01 | End: 2022-01-01

## 2022-01-01 RX ORDER — REGADENOSON 0.08 MG/ML
0.4 INJECTION, SOLUTION INTRAVENOUS ONCE
Status: COMPLETED | OUTPATIENT
Start: 2022-01-01 | End: 2022-01-01

## 2022-01-01 RX ORDER — FLUCONAZOLE 200 MG/1
200 TABLET ORAL DAILY
Qty: 90 TABLET | Refills: 1 | Status: SHIPPED | OUTPATIENT
Start: 2022-01-01

## 2022-01-01 RX ORDER — LIDOCAINE HYDROCHLORIDE 10 MG/ML
INJECTION, SOLUTION EPIDURAL; INFILTRATION; INTRACAUDAL; PERINEURAL PRN
Status: DISCONTINUED | OUTPATIENT
Start: 2022-01-01 | End: 2022-01-01 | Stop reason: HOSPADM

## 2022-01-01 RX ORDER — ASPIRIN 81 MG/1
243 TABLET, CHEWABLE ORAL ONCE
Status: CANCELLED | OUTPATIENT
Start: 2022-01-01

## 2022-01-01 RX ORDER — TAMSULOSIN HYDROCHLORIDE 0.4 MG/1
CAPSULE ORAL
Qty: 90 CAPSULE | Refills: 2 | Status: SHIPPED | OUTPATIENT
Start: 2022-01-01

## 2022-01-01 RX ORDER — FUROSEMIDE 20 MG
40 TABLET ORAL DAILY
Qty: 180 TABLET | Refills: 0 | Status: SHIPPED | OUTPATIENT
Start: 2022-01-01 | End: 2022-01-01

## 2022-01-01 RX ORDER — PREDNISONE 20 MG/1
40 TABLET ORAL DAILY
Status: DISCONTINUED | OUTPATIENT
Start: 2022-01-01 | End: 2022-01-01 | Stop reason: HOSPADM

## 2022-01-01 RX ORDER — OXYCODONE AND ACETAMINOPHEN 5; 325 MG/1; MG/1
2 TABLET ORAL ONCE
Status: DISCONTINUED | OUTPATIENT
Start: 2022-01-01 | End: 2022-01-01

## 2022-01-01 RX ORDER — LIDOCAINE HYDROCHLORIDE 10 MG/ML
10 INJECTION, SOLUTION EPIDURAL; INFILTRATION; INTRACAUDAL; PERINEURAL ONCE
Status: CANCELLED | OUTPATIENT
Start: 2022-01-01 | End: 2022-01-01

## 2022-01-01 RX ORDER — FLUMAZENIL 0.1 MG/ML
0.2 INJECTION, SOLUTION INTRAVENOUS
Status: DISCONTINUED | OUTPATIENT
Start: 2022-01-01 | End: 2022-01-01 | Stop reason: HOSPADM

## 2022-01-01 RX ORDER — LORAZEPAM 2 MG/ML
0.5 INJECTION INTRAMUSCULAR EVERY 4 HOURS PRN
Status: CANCELLED
Start: 2022-01-01

## 2022-01-01 RX ORDER — TAMSULOSIN HYDROCHLORIDE 0.4 MG/1
0.4 CAPSULE ORAL AT BEDTIME
Status: DISCONTINUED | OUTPATIENT
Start: 2022-01-01 | End: 2022-01-01 | Stop reason: HOSPADM

## 2022-01-01 RX ORDER — ACETAMINOPHEN 325 MG/1
650 TABLET ORAL EVERY 4 HOURS
Status: CANCELLED
Start: 2022-01-01

## 2022-01-01 RX ORDER — ATORVASTATIN CALCIUM 10 MG/1
10 TABLET, FILM COATED ORAL EVERY EVENING
Status: DISCONTINUED | OUTPATIENT
Start: 2022-01-01 | End: 2022-01-01 | Stop reason: HOSPADM

## 2022-01-01 RX ORDER — SODIUM CHLORIDE 9 MG/ML
INJECTION, SOLUTION INTRAVENOUS CONTINUOUS
Status: DISCONTINUED | OUTPATIENT
Start: 2022-01-01 | End: 2022-01-01 | Stop reason: HOSPADM

## 2022-01-01 RX ORDER — IPRATROPIUM BROMIDE AND ALBUTEROL SULFATE 2.5; .5 MG/3ML; MG/3ML
3 SOLUTION RESPIRATORY (INHALATION) EVERY 4 HOURS
Status: DISCONTINUED | OUTPATIENT
Start: 2022-01-01 | End: 2022-01-01

## 2022-01-01 RX ORDER — MAGNESIUM OXIDE 400 MG/1
400 TABLET ORAL 2 TIMES DAILY
COMMUNITY

## 2022-01-01 RX ORDER — HEPARIN SODIUM,PORCINE 10 UNIT/ML
5-20 VIAL (ML) INTRAVENOUS EVERY 24 HOURS
Status: DISCONTINUED | OUTPATIENT
Start: 2022-01-01 | End: 2022-01-01 | Stop reason: HOSPADM

## 2022-01-01 RX ORDER — FUROSEMIDE 20 MG
20 TABLET ORAL DAILY
Qty: 30 TABLET | Refills: 0 | Status: SHIPPED | OUTPATIENT
Start: 2022-01-01 | End: 2022-01-01

## 2022-01-01 RX ORDER — SODIUM CHLORIDE 9 MG/ML
INJECTION, SOLUTION INTRAVENOUS CONTINUOUS PRN
Status: DISCONTINUED | OUTPATIENT
Start: 2022-01-01 | End: 2022-01-01

## 2022-01-01 RX ORDER — CETIRIZINE HYDROCHLORIDE 10 MG/1
10 TABLET ORAL EVERY EVENING
COMMUNITY

## 2022-01-01 RX ORDER — IPRATROPIUM BROMIDE AND ALBUTEROL SULFATE 2.5; .5 MG/3ML; MG/3ML
3 SOLUTION RESPIRATORY (INHALATION)
Status: DISCONTINUED | OUTPATIENT
Start: 2022-01-01 | End: 2022-01-01

## 2022-01-01 RX ORDER — POTASSIUM CHLORIDE 750 MG/1
20 TABLET, EXTENDED RELEASE ORAL
Status: DISCONTINUED | OUTPATIENT
Start: 2022-01-01 | End: 2022-01-01 | Stop reason: HOSPADM

## 2022-01-01 RX ORDER — LEVOFLOXACIN 250 MG/1
250 TABLET, FILM COATED ORAL DAILY
Qty: 30 TABLET | Refills: 1 | Status: SHIPPED | OUTPATIENT
Start: 2022-01-01 | End: 2022-01-01

## 2022-01-01 RX ORDER — CYANOCOBALAMIN 1000 UG/ML
1000 INJECTION, SOLUTION INTRAMUSCULAR; SUBCUTANEOUS ONCE
Status: CANCELLED
Start: 2022-01-01 | End: 2022-01-01

## 2022-01-01 RX ORDER — OXYCODONE HYDROCHLORIDE 10 MG/1
10 TABLET ORAL EVERY 4 HOURS PRN
Status: DISCONTINUED | OUTPATIENT
Start: 2022-01-01 | End: 2022-01-01 | Stop reason: HOSPADM

## 2022-01-01 RX ORDER — IOPAMIDOL 755 MG/ML
100 INJECTION, SOLUTION INTRAVASCULAR ONCE
Status: COMPLETED | OUTPATIENT
Start: 2022-01-01 | End: 2022-01-01

## 2022-01-01 RX ORDER — RIVAROXABAN 20 MG/1
20 TABLET, FILM COATED ORAL
Qty: 90 TABLET | Refills: 3 | Status: SHIPPED | OUTPATIENT
Start: 2022-01-01 | End: 2022-01-01

## 2022-01-01 RX ORDER — LIDOCAINE 40 MG/G
CREAM TOPICAL
Status: COMPLETED | OUTPATIENT
Start: 2022-01-01 | End: 2022-01-01

## 2022-01-01 RX ORDER — EPINEPHRINE 1 MG/ML
0.3 INJECTION, SOLUTION, CONCENTRATE INTRAVENOUS EVERY 5 MIN PRN
Status: DISCONTINUED | OUTPATIENT
Start: 2022-01-01 | End: 2022-01-01 | Stop reason: HOSPADM

## 2022-01-01 RX ORDER — DIPHENHYDRAMINE HCL 50 MG
50 CAPSULE ORAL
Status: CANCELLED | OUTPATIENT
Start: 2022-01-01

## 2022-01-01 RX ORDER — HEPARIN SODIUM (PORCINE) LOCK FLUSH IV SOLN 100 UNIT/ML 100 UNIT/ML
5-10 SOLUTION INTRAVENOUS
Status: DISCONTINUED | OUTPATIENT
Start: 2022-01-01 | End: 2022-01-01 | Stop reason: HOSPADM

## 2022-01-01 RX ORDER — DEXAMETHASONE 4 MG/1
TABLET ORAL
Qty: 120 TABLET | Refills: 1 | Status: SHIPPED | OUTPATIENT
Start: 2022-01-01 | End: 2022-01-01

## 2022-01-01 RX ORDER — METHYLPREDNISOLONE SODIUM SUCCINATE 125 MG/2ML
60 INJECTION, POWDER, LYOPHILIZED, FOR SOLUTION INTRAMUSCULAR; INTRAVENOUS EVERY 24 HOURS
Status: DISCONTINUED | OUTPATIENT
Start: 2022-01-01 | End: 2022-01-01

## 2022-01-01 RX ORDER — RIVAROXABAN 20 MG/1
TABLET, FILM COATED ORAL
COMMUNITY
Start: 2022-01-01

## 2022-01-01 RX ORDER — ACETAMINOPHEN 650 MG/1
650 SUPPOSITORY RECTAL EVERY 6 HOURS PRN
Status: DISCONTINUED | OUTPATIENT
Start: 2022-01-01 | End: 2022-01-01 | Stop reason: HOSPADM

## 2022-01-01 RX ORDER — ACYCLOVIR 400 MG/1
400 TABLET ORAL 2 TIMES DAILY
Qty: 180 TABLET | Refills: 1 | Status: SHIPPED | OUTPATIENT
Start: 2022-01-01

## 2022-01-01 RX ORDER — LEVOFLOXACIN 250 MG/1
250 TABLET, FILM COATED ORAL DAILY
Qty: 30 TABLET | Refills: 0 | Status: SHIPPED | OUTPATIENT
Start: 2022-01-01 | End: 2022-01-01

## 2022-01-01 RX ORDER — NICARDIPINE HYDROCHLORIDE 2.5 MG/ML
INJECTION INTRAVENOUS
Status: DISCONTINUED | OUTPATIENT
Start: 2022-01-01 | End: 2022-01-01 | Stop reason: HOSPADM

## 2022-01-01 RX ORDER — ALLOPURINOL 300 MG/1
300 TABLET ORAL DAILY
Qty: 30 TABLET | Refills: 3 | Status: SHIPPED | OUTPATIENT
Start: 2022-01-01 | End: 2022-01-01

## 2022-01-01 RX ORDER — BIMATOPROST 0.1 MG/ML
SOLUTION/ DROPS OPHTHALMIC
COMMUNITY
Start: 2022-01-01 | End: 2022-01-01

## 2022-01-01 RX ORDER — ACYCLOVIR 400 MG/1
TABLET ORAL
Qty: 180 TABLET | Refills: 1 | Status: SHIPPED | OUTPATIENT
Start: 2022-01-01 | End: 2022-01-01

## 2022-01-01 RX ORDER — NITROGLYCERIN 0.4 MG/1
0.4 TABLET SUBLINGUAL EVERY 5 MIN PRN
Status: DISCONTINUED | OUTPATIENT
Start: 2022-01-01 | End: 2022-01-01 | Stop reason: HOSPADM

## 2022-01-01 RX ORDER — HEPARIN SODIUM,PORCINE 10 UNIT/ML
5 VIAL (ML) INTRAVENOUS
Status: DISCONTINUED | OUTPATIENT
Start: 2022-01-01 | End: 2022-01-01 | Stop reason: HOSPADM

## 2022-01-01 RX ORDER — HEPARIN SODIUM (PORCINE) LOCK FLUSH IV SOLN 100 UNIT/ML 100 UNIT/ML
5 SOLUTION INTRAVENOUS ONCE
Status: DISCONTINUED | OUTPATIENT
Start: 2022-01-01 | End: 2022-01-01 | Stop reason: HOSPADM

## 2022-01-01 RX ORDER — GABAPENTIN 300 MG/1
CAPSULE ORAL
Qty: 30 CAPSULE | Refills: 5 | Status: SHIPPED | OUTPATIENT
Start: 2022-01-01 | End: 2022-01-01

## 2022-01-01 RX ORDER — PREDNISONE 10 MG/1
10 TABLET ORAL DAILY
Status: DISCONTINUED | OUTPATIENT
Start: 2022-01-01 | End: 2022-01-01 | Stop reason: HOSPADM

## 2022-01-01 RX ORDER — POTASSIUM CHLORIDE 1500 MG/1
20 TABLET, EXTENDED RELEASE ORAL
Status: CANCELLED | OUTPATIENT
Start: 2022-01-01

## 2022-01-01 RX ORDER — BRIMONIDINE TARTRATE 1 MG/ML
1 SOLUTION/ DROPS OPHTHALMIC 3 TIMES DAILY
Status: DISCONTINUED | OUTPATIENT
Start: 2022-01-01 | End: 2022-01-01 | Stop reason: HOSPADM

## 2022-01-01 RX ORDER — LORAZEPAM 1 MG/1
1 TABLET ORAL ONCE
Status: CANCELLED | OUTPATIENT
Start: 2022-01-01 | End: 2022-01-01

## 2022-01-01 RX ORDER — ACETAMINOPHEN 325 MG/1
650 TABLET ORAL EVERY 4 HOURS
Status: DISCONTINUED | OUTPATIENT
Start: 2022-01-01 | End: 2022-01-01 | Stop reason: HOSPADM

## 2022-01-01 RX ORDER — NITROGLYCERIN 5 MG/ML
VIAL (ML) INTRAVENOUS
Status: DISCONTINUED | OUTPATIENT
Start: 2022-01-01 | End: 2022-01-01 | Stop reason: HOSPADM

## 2022-01-01 RX ORDER — LORAZEPAM 1 MG/1
1 TABLET ORAL ONCE
Status: DISCONTINUED | OUTPATIENT
Start: 2022-01-01 | End: 2022-01-01

## 2022-01-01 RX ORDER — HEPARIN SODIUM,PORCINE 10 UNIT/ML
5 VIAL (ML) INTRAVENOUS DAILY
Qty: 100 ML | Refills: 1 | Status: SHIPPED | OUTPATIENT
Start: 2022-01-01 | End: 2022-01-01

## 2022-01-01 RX ORDER — PENTAMIDINE ISETHIONATE 300 MG/300MG
300 INHALANT RESPIRATORY (INHALATION)
Status: DISCONTINUED | OUTPATIENT
Start: 2022-01-01 | End: 2022-01-01 | Stop reason: HOSPADM

## 2022-01-01 RX ORDER — LIDOCAINE HYDROCHLORIDE 10 MG/ML
10 INJECTION, SOLUTION EPIDURAL; INFILTRATION; INTRACAUDAL; PERINEURAL ONCE
Status: DISCONTINUED | OUTPATIENT
Start: 2022-01-01 | End: 2022-01-01

## 2022-01-01 RX ORDER — METHYLPREDNISOLONE SODIUM SUCCINATE 125 MG/2ML
100 INJECTION, POWDER, LYOPHILIZED, FOR SOLUTION INTRAMUSCULAR; INTRAVENOUS ONCE
Status: COMPLETED | OUTPATIENT
Start: 2022-01-01 | End: 2022-01-01

## 2022-01-01 RX ORDER — ATROPINE SULFATE 0.1 MG/ML
0.5 INJECTION INTRAVENOUS
Status: DISCONTINUED | OUTPATIENT
Start: 2022-01-01 | End: 2022-01-01 | Stop reason: HOSPADM

## 2022-01-01 RX ORDER — FUROSEMIDE 10 MG/ML
40 INJECTION INTRAMUSCULAR; INTRAVENOUS ONCE
Status: COMPLETED | OUTPATIENT
Start: 2022-01-01 | End: 2022-01-01

## 2022-01-01 RX ORDER — DOXYCYCLINE 100 MG/1
CAPSULE ORAL
COMMUNITY
Start: 2021-07-26 | End: 2022-01-01

## 2022-01-01 RX ORDER — GABAPENTIN 300 MG/1
300 CAPSULE ORAL AT BEDTIME
Qty: 30 CAPSULE | Refills: 3 | Status: SHIPPED | OUTPATIENT
Start: 2022-01-01

## 2022-01-01 RX ORDER — MEROPENEM 1 G/1
1 INJECTION, POWDER, FOR SOLUTION INTRAVENOUS EVERY 8 HOURS
Status: DISCONTINUED | OUTPATIENT
Start: 2022-01-01 | End: 2022-01-01

## 2022-01-01 RX ORDER — FLUCONAZOLE 200 MG/1
200 TABLET ORAL DAILY
Qty: 90 TABLET | Refills: 1 | Status: SHIPPED | OUTPATIENT
Start: 2022-01-01 | End: 2022-01-01

## 2022-01-01 RX ORDER — FUROSEMIDE 10 MG/ML
20 INJECTION INTRAMUSCULAR; INTRAVENOUS ONCE
Status: COMPLETED | OUTPATIENT
Start: 2022-01-01 | End: 2022-01-01

## 2022-01-01 RX ORDER — SULFAMETHOXAZOLE/TRIMETHOPRIM 800-160 MG
2 TABLET ORAL 2 TIMES DAILY
Status: DISCONTINUED | OUTPATIENT
Start: 2022-01-01 | End: 2022-01-01

## 2022-01-01 RX ORDER — ONDANSETRON 8 MG/1
8 TABLET, FILM COATED ORAL EVERY 8 HOURS PRN
Qty: 10 TABLET | Refills: 0 | Status: SHIPPED | OUTPATIENT
Start: 2022-01-01 | End: 2022-01-01

## 2022-01-01 RX ORDER — AMINOPHYLLINE 25 MG/ML
50 INJECTION, SOLUTION INTRAVENOUS
Status: DISCONTINUED | OUTPATIENT
Start: 2022-01-01 | End: 2022-01-01 | Stop reason: HOSPADM

## 2022-01-01 RX ORDER — ALLOPURINOL 300 MG/1
TABLET ORAL
COMMUNITY
Start: 2022-01-01 | End: 2022-01-01

## 2022-01-01 RX ORDER — SULFAMETHOXAZOLE AND TRIMETHOPRIM 400; 80 MG/1; MG/1
TABLET ORAL
COMMUNITY
Start: 2022-01-01 | End: 2022-01-01

## 2022-01-01 RX ORDER — CEFDINIR 300 MG/1
300 CAPSULE ORAL EVERY 12 HOURS
Qty: 10 CAPSULE | Refills: 0 | Status: SHIPPED | OUTPATIENT
Start: 2022-01-01 | End: 2022-01-01

## 2022-01-01 RX ORDER — METHYLPREDNISOLONE SODIUM SUCCINATE 125 MG/2ML
100 INJECTION, POWDER, LYOPHILIZED, FOR SOLUTION INTRAMUSCULAR; INTRAVENOUS ONCE
Status: CANCELLED | OUTPATIENT
Start: 2022-01-01

## 2022-01-01 RX ORDER — HEPARIN SODIUM (PORCINE) LOCK FLUSH IV SOLN 100 UNIT/ML 100 UNIT/ML
5 SOLUTION INTRAVENOUS DAILY PRN
Status: DISCONTINUED | OUTPATIENT
Start: 2022-01-01 | End: 2022-01-01 | Stop reason: HOSPADM

## 2022-01-01 RX ORDER — FUROSEMIDE 20 MG
20 TABLET ORAL
Status: DISCONTINUED | OUTPATIENT
Start: 2022-01-01 | End: 2022-01-01 | Stop reason: HOSPADM

## 2022-01-01 RX ORDER — PIPERACILLIN SODIUM, TAZOBACTAM SODIUM 3; .375 G/15ML; G/15ML
3.38 INJECTION, POWDER, LYOPHILIZED, FOR SOLUTION INTRAVENOUS EVERY 8 HOURS
Status: DISCONTINUED | OUTPATIENT
Start: 2022-01-01 | End: 2022-01-01

## 2022-01-01 RX ORDER — FENTANYL CITRATE 50 UG/ML
INJECTION, SOLUTION INTRAMUSCULAR; INTRAVENOUS
Status: DISCONTINUED | OUTPATIENT
Start: 2022-01-01 | End: 2022-01-01 | Stop reason: HOSPADM

## 2022-01-01 RX ORDER — FLUCONAZOLE 200 MG/1
200 TABLET ORAL DAILY
Qty: 30 TABLET | Refills: 1 | Status: SHIPPED | OUTPATIENT
Start: 2022-01-01 | End: 2022-01-01

## 2022-01-01 RX ORDER — ASPIRIN 81 MG/1
243 TABLET, CHEWABLE ORAL ONCE
Status: DISCONTINUED | OUTPATIENT
Start: 2022-01-01 | End: 2022-01-01 | Stop reason: HOSPADM

## 2022-01-01 RX ORDER — LORAZEPAM 2 MG/ML
0.5 INJECTION INTRAMUSCULAR EVERY 4 HOURS PRN
Status: DISCONTINUED | OUTPATIENT
Start: 2022-01-01 | End: 2022-01-01 | Stop reason: HOSPADM

## 2022-01-01 RX ORDER — GUAIFENESIN AND DEXTROMETHORPHAN HYDROBROMIDE 600; 30 MG/1; MG/1
1 TABLET, EXTENDED RELEASE ORAL DAILY PRN
COMMUNITY
End: 2022-01-01

## 2022-01-01 RX ORDER — METHYLPREDNISOLONE 4 MG/1
20 TABLET ORAL DAILY
Qty: 10 TABLET | Refills: 9 | Status: SHIPPED | OUTPATIENT
Start: 2022-01-01 | End: 2022-01-01

## 2022-01-01 RX ORDER — POTASSIUM CHLORIDE 1500 MG/1
20 TABLET, EXTENDED RELEASE ORAL
Qty: 20 TABLET | Refills: 3 | Status: SHIPPED | OUTPATIENT
Start: 2022-01-01

## 2022-01-01 RX ORDER — FUROSEMIDE 20 MG
40 TABLET ORAL DAILY
Qty: 30 TABLET | Refills: 0
Start: 2022-01-01 | End: 2022-01-01

## 2022-01-01 RX ORDER — PREDNISONE 20 MG/1
20 TABLET ORAL DAILY
Status: DISCONTINUED | OUTPATIENT
Start: 2022-01-01 | End: 2022-01-01

## 2022-01-01 RX ORDER — METHYLPREDNISOLONE 4 MG/1
TABLET ORAL
Qty: 30 TABLET | Refills: 3 | Status: SHIPPED | OUTPATIENT
Start: 2022-01-01 | End: 2022-01-01

## 2022-01-01 RX ORDER — SULFAMETHOXAZOLE AND TRIMETHOPRIM 400; 80 MG/1; MG/1
1 TABLET ORAL DAILY
Qty: 30 TABLET | Refills: 1 | Status: SHIPPED | OUTPATIENT
Start: 2022-01-01 | End: 2022-01-01

## 2022-01-01 RX ORDER — PROCHLORPERAZINE MALEATE 10 MG
10 TABLET ORAL EVERY 6 HOURS PRN
Qty: 30 TABLET | Refills: 0 | Status: SHIPPED | OUTPATIENT
Start: 2022-01-01 | End: 2022-01-01

## 2022-01-01 RX ORDER — ACYCLOVIR 400 MG/1
400 TABLET ORAL 2 TIMES DAILY
Qty: 60 TABLET | Refills: 11 | Status: SHIPPED | OUTPATIENT
Start: 2022-01-01 | End: 2022-01-01

## 2022-01-01 RX ORDER — SULFAMETHOXAZOLE/TRIMETHOPRIM 800-160 MG
2 TABLET ORAL 3 TIMES DAILY
Status: DISCONTINUED | OUTPATIENT
Start: 2022-01-01 | End: 2022-01-01 | Stop reason: DRUGHIGH

## 2022-01-01 RX ORDER — HEPARIN SODIUM,PORCINE 10 UNIT/ML
VIAL (ML) INTRAVENOUS PRN
Status: DISCONTINUED | OUTPATIENT
Start: 2022-01-01 | End: 2022-01-01 | Stop reason: HOSPADM

## 2022-01-01 RX ORDER — FLUTICASONE PROPIONATE 50 MCG
2 SPRAY, SUSPENSION (ML) NASAL DAILY
Status: DISCONTINUED | OUTPATIENT
Start: 2022-01-01 | End: 2022-01-01 | Stop reason: HOSPADM

## 2022-01-01 RX ORDER — POTASSIUM CHLORIDE 1500 MG/1
40 TABLET, EXTENDED RELEASE ORAL
Status: CANCELLED | OUTPATIENT
Start: 2022-01-01

## 2022-01-01 RX ORDER — PREDNISONE 20 MG/1
20 TABLET ORAL DAILY
Status: DISCONTINUED | OUTPATIENT
Start: 2022-01-01 | End: 2022-01-01 | Stop reason: HOSPADM

## 2022-01-01 RX ORDER — OXYCODONE AND ACETAMINOPHEN 5; 325 MG/1; MG/1
2 TABLET ORAL ONCE
Status: CANCELLED | OUTPATIENT
Start: 2022-01-01 | End: 2022-01-01

## 2022-01-01 RX ORDER — PREDNISONE 10 MG/1
TABLET ORAL
Qty: 30 TABLET | Refills: 0 | Status: SHIPPED | OUTPATIENT
Start: 2022-01-01 | End: 2022-01-01

## 2022-01-01 RX ORDER — FENTANYL CITRATE 50 UG/ML
25 INJECTION, SOLUTION INTRAMUSCULAR; INTRAVENOUS
Status: DISCONTINUED | OUTPATIENT
Start: 2022-01-01 | End: 2022-01-01 | Stop reason: HOSPADM

## 2022-01-01 RX ORDER — HEPARIN SODIUM,PORCINE 10 UNIT/ML
5-20 VIAL (ML) INTRAVENOUS
Status: DISCONTINUED | OUTPATIENT
Start: 2022-01-01 | End: 2022-01-01 | Stop reason: HOSPADM

## 2022-01-01 RX ORDER — ASPIRIN 325 MG
325 TABLET ORAL ONCE
Status: DISCONTINUED | OUTPATIENT
Start: 2022-01-01 | End: 2022-01-01 | Stop reason: HOSPADM

## 2022-01-01 RX ORDER — LEVOFLOXACIN 500 MG/1
500 TABLET, FILM COATED ORAL DAILY
Qty: 30 TABLET | Refills: 3 | Status: SHIPPED | OUTPATIENT
Start: 2022-01-01

## 2022-01-01 RX ORDER — DEXAMETHASONE 4 MG/1
20 TABLET ORAL ONCE
Status: COMPLETED | OUTPATIENT
Start: 2022-01-01 | End: 2022-01-01

## 2022-01-01 RX ORDER — ACETAMINOPHEN 325 MG/1
650 TABLET ORAL ONCE
Status: DISCONTINUED | OUTPATIENT
Start: 2022-01-01 | End: 2022-01-01 | Stop reason: HOSPADM

## 2022-01-01 RX ADMIN — BRIMONIDINE TARTRATE 1 DROP: 1 SOLUTION/ DROPS OPHTHALMIC at 13:10

## 2022-01-01 RX ADMIN — DEXAMETHASONE SODIUM PHOSPHATE 20 MG: 10 INJECTION, SOLUTION INTRAMUSCULAR; INTRAVENOUS at 09:33

## 2022-01-01 RX ADMIN — DEXTROSE MONOHYDRATE 500 ML: 50 INJECTION, SOLUTION INTRAVENOUS at 08:20

## 2022-01-01 RX ADMIN — SODIUM CHLORIDE 500 ML: 9 INJECTION, SOLUTION INTRAVENOUS at 09:02

## 2022-01-01 RX ADMIN — DARATUMUMAB AND HYALURONIDASE-FIHJ (HUMAN RECOMBINANT) 1800 MG: 1800; 30000 INJECTION SUBCUTANEOUS at 10:36

## 2022-01-01 RX ADMIN — VANCOMYCIN HYDROCHLORIDE 750 MG: 5 INJECTION, POWDER, LYOPHILIZED, FOR SOLUTION INTRAVENOUS at 17:59

## 2022-01-01 RX ADMIN — MEROPENEM 1 G: 1 INJECTION, POWDER, FOR SOLUTION INTRAVENOUS at 11:18

## 2022-01-01 RX ADMIN — METHYLPREDNISOLONE SODIUM SUCCINATE 100 MG: 125 INJECTION, POWDER, FOR SOLUTION INTRAMUSCULAR; INTRAVENOUS at 08:04

## 2022-01-01 RX ADMIN — MICAFUNGIN SODIUM 100 MG: 50 INJECTION, POWDER, LYOPHILIZED, FOR SOLUTION INTRAVENOUS at 14:00

## 2022-01-01 RX ADMIN — RIVAROXABAN 20 MG: 10 TABLET, FILM COATED ORAL at 18:24

## 2022-01-01 RX ADMIN — NITROGLYCERIN 0.4 MG: 0.4 TABLET SUBLINGUAL at 04:25

## 2022-01-01 RX ADMIN — BRIMONIDINE TARTRATE 1 DROP: 1 SOLUTION/ DROPS OPHTHALMIC at 07:55

## 2022-01-01 RX ADMIN — FLUTICASONE PROPIONATE 2 SPRAY: 50 SPRAY, METERED NASAL at 12:52

## 2022-01-01 RX ADMIN — DARATUMUMAB AND HYALURONIDASE-FIHJ (HUMAN RECOMBINANT) 1800 MG: 1800; 30000 INJECTION SUBCUTANEOUS at 11:11

## 2022-01-01 RX ADMIN — DIPHENHYDRAMINE HYDROCHLORIDE 50 MG: 25 CAPSULE ORAL at 10:30

## 2022-01-01 RX ADMIN — IPRATROPIUM BROMIDE AND ALBUTEROL SULFATE 3 ML: 2.5; .5 SOLUTION RESPIRATORY (INHALATION) at 19:32

## 2022-01-01 RX ADMIN — FILGRASTIM 300 MCG: 300 INJECTION, SOLUTION INTRAVENOUS; SUBCUTANEOUS at 09:02

## 2022-01-01 RX ADMIN — DEXTROSE MONOHYDRATE 250 ML: 50 INJECTION, SOLUTION INTRAVENOUS at 08:35

## 2022-01-01 RX ADMIN — FUROSEMIDE 20 MG: 20 TABLET ORAL at 17:50

## 2022-01-01 RX ADMIN — RIVAROXABAN 20 MG: 10 TABLET, FILM COATED ORAL at 18:11

## 2022-01-01 RX ADMIN — BRIMONIDINE TARTRATE 1 DROP: 1 SOLUTION/ DROPS OPHTHALMIC at 20:53

## 2022-01-01 RX ADMIN — PANTOPRAZOLE SODIUM 40 MG: 20 TABLET, DELAYED RELEASE ORAL at 08:50

## 2022-01-01 RX ADMIN — SODIUM CHLORIDE 250 ML: 9 INJECTION, SOLUTION INTRAVENOUS at 13:46

## 2022-01-01 RX ADMIN — DARATUMUMAB 1200 MG: 100 INJECTION, SOLUTION, CONCENTRATE INTRAVENOUS at 10:45

## 2022-01-01 RX ADMIN — IPRATROPIUM BROMIDE AND ALBUTEROL SULFATE 3 ML: 2.5; .5 SOLUTION RESPIRATORY (INHALATION) at 08:51

## 2022-01-01 RX ADMIN — CYCLOPHOSPHAMIDE 560 MG: 1 INJECTION, POWDER, FOR SOLUTION INTRAVENOUS; ORAL at 09:26

## 2022-01-01 RX ADMIN — BRIMONIDINE TARTRATE 1 DROP: 1 SOLUTION/ DROPS OPHTHALMIC at 19:21

## 2022-01-01 RX ADMIN — CARFILZOMIB 104 MG: 60 INJECTION, POWDER, LYOPHILIZED, FOR SOLUTION INTRAVENOUS at 08:55

## 2022-01-01 RX ADMIN — METHYLPREDNISOLONE SODIUM SUCCINATE 62.5 MG: 125 INJECTION, POWDER, FOR SOLUTION INTRAMUSCULAR; INTRAVENOUS at 11:32

## 2022-01-01 RX ADMIN — FUROSEMIDE 40 MG: 10 INJECTION, SOLUTION INTRAVENOUS at 04:26

## 2022-01-01 RX ADMIN — PENTAMIDINE ISETHIONATE 300 MG: 300 INHALANT RESPIRATORY (INHALATION) at 13:28

## 2022-01-01 RX ADMIN — MEROPENEM 1 G: 1 INJECTION, POWDER, FOR SOLUTION INTRAVENOUS at 18:23

## 2022-01-01 RX ADMIN — METHYLPREDNISOLONE SODIUM SUCCINATE 62.5 MG: 125 INJECTION, POWDER, FOR SOLUTION INTRAMUSCULAR; INTRAVENOUS at 14:59

## 2022-01-01 RX ADMIN — DIPHENHYDRAMINE HYDROCHLORIDE 50 MG: 25 CAPSULE ORAL at 08:04

## 2022-01-01 RX ADMIN — VANCOMYCIN HYDROCHLORIDE 750 MG: 5 INJECTION, POWDER, LYOPHILIZED, FOR SOLUTION INTRAVENOUS at 06:40

## 2022-01-01 RX ADMIN — ACETAMINOPHEN 650 MG: 325 TABLET ORAL at 10:44

## 2022-01-01 RX ADMIN — SODIUM CHLORIDE: 9 INJECTION, SOLUTION INTRAVENOUS at 09:49

## 2022-01-01 RX ADMIN — Medication 5 ML: at 07:47

## 2022-01-01 RX ADMIN — ACETAMINOPHEN 650 MG: 325 TABLET ORAL at 10:52

## 2022-01-01 RX ADMIN — DEXTROSE MONOHYDRATE 500 ML: 50 INJECTION, SOLUTION INTRAVENOUS at 08:45

## 2022-01-01 RX ADMIN — Medication 5 ML: at 08:57

## 2022-01-01 RX ADMIN — CYANOCOBALAMIN 1000 MCG: 1000 INJECTION, SOLUTION INTRAMUSCULAR at 11:47

## 2022-01-01 RX ADMIN — Medication 33 MILLICURIE: at 08:15

## 2022-01-01 RX ADMIN — ACYCLOVIR 400 MG: 200 CAPSULE ORAL at 20:28

## 2022-01-01 RX ADMIN — Medication 1 PACKET: at 12:52

## 2022-01-01 RX ADMIN — PIPERACILLIN AND TAZOBACTAM 3.38 G: 3; .375 INJECTION, POWDER, LYOPHILIZED, FOR SOLUTION INTRAVENOUS at 05:18

## 2022-01-01 RX ADMIN — FUROSEMIDE 20 MG: 20 TABLET ORAL at 17:21

## 2022-01-01 RX ADMIN — ACYCLOVIR 400 MG: 200 CAPSULE ORAL at 21:19

## 2022-01-01 RX ADMIN — DIPHENHYDRAMINE HYDROCHLORIDE 50 MG: 25 CAPSULE ORAL at 15:00

## 2022-01-01 RX ADMIN — SODIUM CHLORIDE, PRESERVATIVE FREE 5 ML: 5 INJECTION INTRAVENOUS at 09:45

## 2022-01-01 RX ADMIN — BRIMONIDINE TARTRATE 1 DROP: 1 SOLUTION/ DROPS OPHTHALMIC at 08:51

## 2022-01-01 RX ADMIN — PREDNISONE 40 MG: 20 TABLET ORAL at 12:51

## 2022-01-01 RX ADMIN — Medication 5 ML: at 10:49

## 2022-01-01 RX ADMIN — FLUTICASONE PROPIONATE 2 SPRAY: 50 SPRAY, METERED NASAL at 07:54

## 2022-01-01 RX ADMIN — ACYCLOVIR 400 MG: 200 CAPSULE ORAL at 08:24

## 2022-01-01 RX ADMIN — CARFILZOMIB 104 MG: 60 INJECTION, POWDER, LYOPHILIZED, FOR SOLUTION INTRAVENOUS at 09:33

## 2022-01-01 RX ADMIN — Medication 5 ML: at 13:47

## 2022-01-01 RX ADMIN — SODIUM CHLORIDE 250 ML: 9 INJECTION, SOLUTION INTRAVENOUS at 13:17

## 2022-01-01 RX ADMIN — REGADENOSON 0.4 MG: 0.08 INJECTION, SOLUTION INTRAVENOUS at 07:51

## 2022-01-01 RX ADMIN — METHYLPREDNISOLONE SODIUM SUCCINATE 62.5 MG: 125 INJECTION, POWDER, FOR SOLUTION INTRAMUSCULAR; INTRAVENOUS at 08:20

## 2022-01-01 RX ADMIN — AZD7442 300 MG: KIT at 16:08

## 2022-01-01 RX ADMIN — DEXTROSE MONOHYDRATE 250 ML: 50 INJECTION, SOLUTION INTRAVENOUS at 08:54

## 2022-01-01 RX ADMIN — CYCLOPHOSPHAMIDE 560 MG: 1 INJECTION, POWDER, FOR SOLUTION INTRAVENOUS; ORAL at 09:29

## 2022-01-01 RX ADMIN — Medication 5 ML: at 07:32

## 2022-01-01 RX ADMIN — CEFDINIR 300 MG: 300 CAPSULE ORAL at 12:51

## 2022-01-01 RX ADMIN — Medication 5 ML: at 11:10

## 2022-01-01 RX ADMIN — FLUTICASONE PROPIONATE 2 SPRAY: 50 SPRAY, METERED NASAL at 08:51

## 2022-01-01 RX ADMIN — MICAFUNGIN SODIUM 100 MG: 50 INJECTION, POWDER, LYOPHILIZED, FOR SOLUTION INTRAVENOUS at 13:15

## 2022-01-01 RX ADMIN — ACETAMINOPHEN 650 MG: 325 TABLET, FILM COATED ORAL at 12:11

## 2022-01-01 RX ADMIN — CARFILZOMIB 104 MG: 60 INJECTION, POWDER, LYOPHILIZED, FOR SOLUTION INTRAVENOUS at 09:03

## 2022-01-01 RX ADMIN — Medication 5 ML: at 10:54

## 2022-01-01 RX ADMIN — DARATUMUMAB AND HYALURONIDASE-FIHJ (HUMAN RECOMBINANT) 1800 MG: 1800; 30000 INJECTION SUBCUTANEOUS at 11:09

## 2022-01-01 RX ADMIN — Medication 5 ML: at 07:34

## 2022-01-01 RX ADMIN — Medication 500 UNITS: at 14:35

## 2022-01-01 RX ADMIN — ONDANSETRON 4 MG: 2 INJECTION INTRAMUSCULAR; INTRAVENOUS at 08:50

## 2022-01-01 RX ADMIN — TAMSULOSIN HYDROCHLORIDE 0.4 MG: 0.4 CAPSULE ORAL at 21:11

## 2022-01-01 RX ADMIN — DARATUMUMAB AND HYALURONIDASE-FIHJ (HUMAN RECOMBINANT) 1800 MG: 1800; 30000 INJECTION SUBCUTANEOUS at 09:16

## 2022-01-01 RX ADMIN — RIVAROXABAN 20 MG: 10 TABLET, FILM COATED ORAL at 17:21

## 2022-01-01 RX ADMIN — DARATUMUMAB AND HYALURONIDASE-FIHJ (HUMAN RECOMBINANT) 1800 MG: 1800; 30000 INJECTION SUBCUTANEOUS at 11:13

## 2022-01-01 RX ADMIN — IOPAMIDOL 75 ML: 755 INJECTION, SOLUTION INTRAVENOUS at 20:33

## 2022-01-01 RX ADMIN — FLUDARABINE PHOSPHATE 45 MG: 50 INJECTION, POWDER, LYOPHILIZED, FOR SOLUTION INTRAVENOUS at 11:30

## 2022-01-01 RX ADMIN — Medication 500 UNITS: at 08:07

## 2022-01-01 RX ADMIN — Medication 5 ML: at 10:22

## 2022-01-01 RX ADMIN — DARATUMUMAB AND HYALURONIDASE-FIHJ (HUMAN RECOMBINANT) 1800 MG: 1800; 30000 INJECTION SUBCUTANEOUS at 16:21

## 2022-01-01 RX ADMIN — CEFDINIR 300 MG: 300 CAPSULE ORAL at 20:16

## 2022-01-01 RX ADMIN — DIPHENHYDRAMINE HYDROCHLORIDE 25 MG: 25 CAPSULE ORAL at 12:11

## 2022-01-01 RX ADMIN — PANTOPRAZOLE SODIUM 40 MG: 20 TABLET, DELAYED RELEASE ORAL at 12:50

## 2022-01-01 RX ADMIN — SODIUM CHLORIDE 250 ML: 9 INJECTION, SOLUTION INTRAVENOUS at 09:31

## 2022-01-01 RX ADMIN — METHYLPREDNISOLONE SODIUM SUCCINATE 62.5 MG: 125 INJECTION, POWDER, FOR SOLUTION INTRAMUSCULAR; INTRAVENOUS at 11:54

## 2022-01-01 RX ADMIN — SODIUM CHLORIDE 250 ML: 9 INJECTION, SOLUTION INTRAVENOUS at 11:26

## 2022-01-01 RX ADMIN — IPRATROPIUM BROMIDE AND ALBUTEROL SULFATE 3 ML: 2.5; .5 SOLUTION RESPIRATORY (INHALATION) at 03:23

## 2022-01-01 RX ADMIN — IPRATROPIUM BROMIDE AND ALBUTEROL SULFATE 3 ML: 2.5; .5 SOLUTION RESPIRATORY (INHALATION) at 08:04

## 2022-01-01 RX ADMIN — IPRATROPIUM BROMIDE AND ALBUTEROL SULFATE 3 ML: 2.5; .5 SOLUTION RESPIRATORY (INHALATION) at 04:25

## 2022-01-01 RX ADMIN — ACETAMINOPHEN 650 MG: 325 TABLET ORAL at 10:12

## 2022-01-01 RX ADMIN — ACETAMINOPHEN 650 MG: 325 TABLET, FILM COATED ORAL at 12:03

## 2022-01-01 RX ADMIN — SODIUM CHLORIDE 500 ML: 9 INJECTION, SOLUTION INTRAVENOUS at 12:56

## 2022-01-01 RX ADMIN — Medication 3 ML: at 07:42

## 2022-01-01 RX ADMIN — Medication 5 ML: at 16:10

## 2022-01-01 RX ADMIN — ATORVASTATIN CALCIUM 10 MG: 10 TABLET, FILM COATED ORAL at 20:17

## 2022-01-01 RX ADMIN — HEPARIN 5 ML: 100 SYRINGE at 09:48

## 2022-01-01 RX ADMIN — ACETAMINOPHEN 650 MG: 325 TABLET ORAL at 08:04

## 2022-01-01 RX ADMIN — FILGRASTIM 400 MCG: 480 INJECTION, SOLUTION INTRAVENOUS; SUBCUTANEOUS at 12:27

## 2022-01-01 RX ADMIN — SULFAMETHOXAZOLE AND TRIMETHOPRIM 2 TABLET: 800; 160 TABLET ORAL at 18:26

## 2022-01-01 RX ADMIN — DEXTROSE MONOHYDRATE 500 ML: 50 INJECTION, SOLUTION INTRAVENOUS at 09:20

## 2022-01-01 RX ADMIN — FUROSEMIDE 20 MG: 10 INJECTION, SOLUTION INTRAVENOUS at 11:18

## 2022-01-01 RX ADMIN — KIT FOR THE PREPARATION OF TECHNETIUM TC 99M ALBUMIN AGGREGATED 7 MILLICURIE: 2.5 INJECTION, POWDER, FOR SOLUTION INTRAVENOUS at 11:59

## 2022-01-01 RX ADMIN — SODIUM CHLORIDE 500 ML: 9 INJECTION, SOLUTION INTRAVENOUS at 08:20

## 2022-01-01 RX ADMIN — DEXTROSE MONOHYDRATE 500 ML: 50 INJECTION, SOLUTION INTRAVENOUS at 08:19

## 2022-01-01 RX ADMIN — MEROPENEM 1 G: 1 INJECTION, POWDER, FOR SOLUTION INTRAVENOUS at 12:12

## 2022-01-01 RX ADMIN — SULFAMETHOXAZOLE AND TRIMETHOPRIM 1 TABLET: 400; 80 TABLET ORAL at 12:51

## 2022-01-01 RX ADMIN — ATORVASTATIN CALCIUM 10 MG: 10 TABLET, FILM COATED ORAL at 21:20

## 2022-01-01 RX ADMIN — FUROSEMIDE 20 MG: 20 TABLET ORAL at 08:50

## 2022-01-01 RX ADMIN — HEPARIN 5 ML: 100 SYRINGE at 08:22

## 2022-01-01 RX ADMIN — Medication 8.7 MILLICURIE: at 07:05

## 2022-01-01 RX ADMIN — Medication 5 ML: at 10:34

## 2022-01-01 RX ADMIN — ALBUTEROL SULFATE 2.5 MG: 0.83 SOLUTION RESPIRATORY (INHALATION) at 08:47

## 2022-01-01 RX ADMIN — Medication 5 ML: at 12:54

## 2022-01-01 RX ADMIN — BRIMONIDINE TARTRATE 1 DROP: 1 SOLUTION/ DROPS OPHTHALMIC at 14:26

## 2022-01-01 RX ADMIN — IOPAMIDOL 100 ML: 755 INJECTION, SOLUTION INTRAVENOUS at 12:44

## 2022-01-01 RX ADMIN — IPRATROPIUM BROMIDE AND ALBUTEROL SULFATE 3 ML: 2.5; .5 SOLUTION RESPIRATORY (INHALATION) at 00:25

## 2022-01-01 RX ADMIN — SODIUM CHLORIDE 500 ML: 9 INJECTION, SOLUTION INTRAVENOUS at 11:40

## 2022-01-01 RX ADMIN — Medication 500 UNITS: at 10:07

## 2022-01-01 RX ADMIN — MEROPENEM 1 G: 1 INJECTION, POWDER, FOR SOLUTION INTRAVENOUS at 03:24

## 2022-01-01 RX ADMIN — DEXTROSE MONOHYDRATE 250 ML: 50 INJECTION, SOLUTION INTRAVENOUS at 09:17

## 2022-01-01 RX ADMIN — SODIUM CHLORIDE: 9 INJECTION, SOLUTION INTRAVENOUS at 14:08

## 2022-01-01 RX ADMIN — Medication 5 ML: at 09:59

## 2022-01-01 RX ADMIN — SODIUM CHLORIDE 500 ML: 9 INJECTION, SOLUTION INTRAVENOUS at 08:04

## 2022-01-01 RX ADMIN — Medication 500 UNITS: at 10:53

## 2022-01-01 RX ADMIN — HEPARIN 5 ML: 100 SYRINGE at 10:03

## 2022-01-01 RX ADMIN — ACETAMINOPHEN 650 MG: 325 TABLET ORAL at 15:00

## 2022-01-01 RX ADMIN — VANCOMYCIN HYDROCHLORIDE 750 MG: 5 INJECTION, POWDER, LYOPHILIZED, FOR SOLUTION INTRAVENOUS at 20:12

## 2022-01-01 RX ADMIN — DIPHENHYDRAMINE HYDROCHLORIDE 25 MG: 25 CAPSULE ORAL at 12:03

## 2022-01-01 RX ADMIN — FILGRASTIM 400 MCG: 480 INJECTION, SOLUTION INTRAVENOUS; SUBCUTANEOUS at 11:12

## 2022-01-01 RX ADMIN — LIDOCAINE: 40 CREAM TOPICAL at 11:33

## 2022-01-01 RX ADMIN — MEROPENEM 1 G: 1 INJECTION, POWDER, FOR SOLUTION INTRAVENOUS at 10:45

## 2022-01-01 RX ADMIN — Medication 5 ML: at 09:33

## 2022-01-01 RX ADMIN — FLUTICASONE PROPIONATE 2 SPRAY: 50 SPRAY, METERED NASAL at 08:34

## 2022-01-01 RX ADMIN — PENTAMIDINE ISETHIONATE 300 MG: 300 INHALANT RESPIRATORY (INHALATION) at 08:46

## 2022-01-01 RX ADMIN — Medication 5 ML: at 11:31

## 2022-01-01 RX ADMIN — Medication 5 ML: at 10:03

## 2022-01-01 RX ADMIN — CARFILZOMIB 37 MG: 60 INJECTION, POWDER, LYOPHILIZED, FOR SOLUTION INTRAVENOUS at 08:45

## 2022-01-01 RX ADMIN — METHYLPREDNISOLONE SODIUM SUCCINATE 62.5 MG: 125 INJECTION, POWDER, FOR SOLUTION INTRAMUSCULAR; INTRAVENOUS at 10:53

## 2022-01-01 RX ADMIN — SULFAMETHOXAZOLE AND TRIMETHOPRIM 2 TABLET: 800; 160 TABLET ORAL at 12:35

## 2022-01-01 RX ADMIN — ACETAMINOPHEN 650 MG: 325 TABLET, FILM COATED ORAL at 11:54

## 2022-01-01 RX ADMIN — Medication 5 ML: at 11:03

## 2022-01-01 RX ADMIN — SODIUM CHLORIDE 250 ML: 9 INJECTION, SOLUTION INTRAVENOUS at 09:07

## 2022-01-01 RX ADMIN — MEROPENEM 1 G: 1 INJECTION, POWDER, FOR SOLUTION INTRAVENOUS at 03:19

## 2022-01-01 RX ADMIN — AZITHROMYCIN MONOHYDRATE 500 MG: 500 INJECTION, POWDER, LYOPHILIZED, FOR SOLUTION INTRAVENOUS at 05:52

## 2022-01-01 RX ADMIN — BRIMONIDINE TARTRATE 1 DROP: 1 SOLUTION/ DROPS OPHTHALMIC at 08:34

## 2022-01-01 RX ADMIN — SODIUM CHLORIDE 250 ML: 9 INJECTION, SOLUTION INTRAVENOUS at 11:00

## 2022-01-01 RX ADMIN — FLUDEOXYGLUCOSE F-18 9.91 MCI.: 500 INJECTION, SOLUTION INTRAVENOUS at 07:37

## 2022-01-01 RX ADMIN — DEXAMETHASONE SODIUM PHOSPHATE 20 MG: 10 INJECTION, SOLUTION INTRAMUSCULAR; INTRAVENOUS at 09:30

## 2022-01-01 RX ADMIN — MEROPENEM 1 G: 1 INJECTION, POWDER, FOR SOLUTION INTRAVENOUS at 03:59

## 2022-01-01 RX ADMIN — DIPHENHYDRAMINE HYDROCHLORIDE 50 MG: 25 CAPSULE ORAL at 08:20

## 2022-01-01 RX ADMIN — DIPHENHYDRAMINE HYDROCHLORIDE 50 MG: 25 CAPSULE ORAL at 10:52

## 2022-01-01 RX ADMIN — IPRATROPIUM BROMIDE AND ALBUTEROL SULFATE 3 ML: 2.5; .5 SOLUTION RESPIRATORY (INHALATION) at 12:17

## 2022-01-01 RX ADMIN — PREDNISONE 20 MG: 20 TABLET ORAL at 08:16

## 2022-01-01 RX ADMIN — CARFILZOMIB 37 MG: 60 INJECTION, POWDER, LYOPHILIZED, FOR SOLUTION INTRAVENOUS at 09:56

## 2022-01-01 RX ADMIN — ACETAMINOPHEN 650 MG: 325 TABLET ORAL at 08:37

## 2022-01-01 RX ADMIN — CYCLOPHOSPHAMIDE 560 MG: 1 INJECTION, POWDER, FOR SOLUTION INTRAVENOUS; ORAL at 10:51

## 2022-01-01 RX ADMIN — CARFILZOMIB 78 MG: 60 INJECTION, POWDER, LYOPHILIZED, FOR SOLUTION INTRAVENOUS at 08:49

## 2022-01-01 RX ADMIN — Medication 5 ML: at 09:46

## 2022-01-01 RX ADMIN — CYANOCOBALAMIN 1000 MCG: 1000 INJECTION INTRAMUSCULAR; SUBCUTANEOUS at 10:34

## 2022-01-01 RX ADMIN — HEPARIN 5 ML: 100 SYRINGE at 09:47

## 2022-01-01 RX ADMIN — HEPARIN 5 ML: 100 SYRINGE at 10:41

## 2022-01-01 RX ADMIN — SODIUM CHLORIDE 1000 ML: 9 INJECTION, SOLUTION INTRAVENOUS at 14:03

## 2022-01-01 RX ADMIN — DIPHENHYDRAMINE HYDROCHLORIDE 25 MG: 25 CAPSULE ORAL at 07:58

## 2022-01-01 RX ADMIN — HEPARIN 5 ML: 100 SYRINGE at 06:48

## 2022-01-01 RX ADMIN — ONDANSETRON 8 MG: 2 INJECTION INTRAMUSCULAR; INTRAVENOUS at 08:40

## 2022-01-01 RX ADMIN — Medication 5 ML: at 10:01

## 2022-01-01 RX ADMIN — HEPARIN 5 ML: 100 SYRINGE at 11:00

## 2022-01-01 RX ADMIN — CARFILZOMIB 78 MG: 60 INJECTION, POWDER, LYOPHILIZED, FOR SOLUTION INTRAVENOUS at 09:45

## 2022-01-01 RX ADMIN — IPRATROPIUM BROMIDE AND ALBUTEROL SULFATE 3 ML: 2.5; .5 SOLUTION RESPIRATORY (INHALATION) at 20:59

## 2022-01-01 RX ADMIN — ACYCLOVIR 400 MG: 200 CAPSULE ORAL at 20:16

## 2022-01-01 RX ADMIN — IPRATROPIUM BROMIDE AND ALBUTEROL SULFATE 3 ML: 2.5; .5 SOLUTION RESPIRATORY (INHALATION) at 20:01

## 2022-01-01 RX ADMIN — RIVAROXABAN 20 MG: 10 TABLET, FILM COATED ORAL at 17:50

## 2022-01-01 RX ADMIN — DEXTROSE MONOHYDRATE 250 ML: 50 INJECTION, SOLUTION INTRAVENOUS at 09:51

## 2022-01-01 RX ADMIN — Medication 1 PACKET: at 11:32

## 2022-01-01 RX ADMIN — IOPAMIDOL 75 ML: 755 INJECTION, SOLUTION INTRAVENOUS at 08:17

## 2022-01-01 RX ADMIN — BRIMONIDINE TARTRATE 1 DROP: 1 SOLUTION/ DROPS OPHTHALMIC at 12:52

## 2022-01-01 RX ADMIN — ACYCLOVIR 400 MG: 200 CAPSULE ORAL at 12:51

## 2022-01-01 RX ADMIN — DIPHENHYDRAMINE HYDROCHLORIDE 50 MG: 25 CAPSULE ORAL at 08:10

## 2022-01-01 RX ADMIN — SULFAMETHOXAZOLE AND TRIMETHOPRIM 1 TABLET: 400; 80 TABLET ORAL at 08:16

## 2022-01-01 RX ADMIN — HEPARIN, PORCINE (PF) 10 UNIT/ML INTRAVENOUS SYRINGE 5 ML: at 08:49

## 2022-01-01 RX ADMIN — ACETAMINOPHEN 650 MG: 325 TABLET ORAL at 10:30

## 2022-01-01 RX ADMIN — PANTOPRAZOLE SODIUM 40 MG: 20 TABLET, DELAYED RELEASE ORAL at 06:39

## 2022-01-01 RX ADMIN — TAMSULOSIN HYDROCHLORIDE 0.4 MG: 0.4 CAPSULE ORAL at 23:02

## 2022-01-01 RX ADMIN — ATORVASTATIN CALCIUM 10 MG: 10 TABLET, FILM COATED ORAL at 20:16

## 2022-01-01 RX ADMIN — DIPHENHYDRAMINE HYDROCHLORIDE 50 MG: 25 CAPSULE ORAL at 10:02

## 2022-01-01 RX ADMIN — DIPHENHYDRAMINE HYDROCHLORIDE 50 MG: 25 CAPSULE ORAL at 10:12

## 2022-01-01 RX ADMIN — CEFDINIR 300 MG: 300 CAPSULE ORAL at 11:32

## 2022-01-01 RX ADMIN — SODIUM CHLORIDE, PRESERVATIVE FREE 5 ML: 5 INJECTION INTRAVENOUS at 15:28

## 2022-01-01 RX ADMIN — CARFILZOMIB 104 MG: 60 INJECTION, POWDER, LYOPHILIZED, FOR SOLUTION INTRAVENOUS at 10:52

## 2022-01-01 RX ADMIN — Medication 500 UNITS: at 10:18

## 2022-01-01 RX ADMIN — CARFILZOMIB 52 MG: 60 INJECTION, POWDER, LYOPHILIZED, FOR SOLUTION INTRAVENOUS at 08:54

## 2022-01-01 RX ADMIN — IPRATROPIUM BROMIDE AND ALBUTEROL SULFATE 3 ML: 2.5; .5 SOLUTION RESPIRATORY (INHALATION) at 13:40

## 2022-01-01 RX ADMIN — FILGRASTIM 480 MCG: 480 INJECTION, SOLUTION INTRAVENOUS; SUBCUTANEOUS at 15:32

## 2022-01-01 RX ADMIN — IPRATROPIUM BROMIDE AND ALBUTEROL SULFATE 3 ML: 2.5; .5 SOLUTION RESPIRATORY (INHALATION) at 21:04

## 2022-01-01 RX ADMIN — HEPARIN 5 ML: 100 SYRINGE at 09:45

## 2022-01-01 RX ADMIN — Medication: at 09:21

## 2022-01-01 RX ADMIN — SODIUM CHLORIDE 500 ML: 9 INJECTION, SOLUTION INTRAVENOUS at 11:30

## 2022-01-01 RX ADMIN — HEPARIN 5 ML: 100 SYRINGE at 11:50

## 2022-01-01 RX ADMIN — FLUDEOXYGLUCOSE F-18 10.12 MCI.: 500 INJECTION, SOLUTION INTRAVENOUS at 07:43

## 2022-01-01 RX ADMIN — ACYCLOVIR 400 MG: 200 CAPSULE ORAL at 20:17

## 2022-01-01 RX ADMIN — SODIUM CHLORIDE 250 ML: 9 INJECTION, SOLUTION INTRAVENOUS at 09:47

## 2022-01-01 RX ADMIN — IPRATROPIUM BROMIDE AND ALBUTEROL SULFATE 3 ML: .5; 3 SOLUTION RESPIRATORY (INHALATION) at 06:16

## 2022-01-01 RX ADMIN — FLUDEOXYGLUCOSE F-18 10.65 MCI.: 500 INJECTION, SOLUTION INTRAVENOUS at 08:12

## 2022-01-01 RX ADMIN — Medication 500 UNITS: at 07:29

## 2022-01-01 RX ADMIN — Medication 5 ML: at 07:38

## 2022-01-01 RX ADMIN — ONDANSETRON 8 MG: 2 INJECTION INTRAMUSCULAR; INTRAVENOUS at 08:21

## 2022-01-01 RX ADMIN — HEPARIN 5 ML: 100 SYRINGE at 10:58

## 2022-01-01 RX ADMIN — BRIMONIDINE TARTRATE 1 DROP: 1 SOLUTION/ DROPS OPHTHALMIC at 20:36

## 2022-01-01 RX ADMIN — DARATUMUMAB 1200 MG: 100 INJECTION, SOLUTION, CONCENTRATE INTRAVENOUS at 10:47

## 2022-01-01 RX ADMIN — DEXTROSE MONOHYDRATE 250 ML: 50 INJECTION, SOLUTION INTRAVENOUS at 09:15

## 2022-01-01 RX ADMIN — Medication 5 ML: at 14:54

## 2022-01-01 RX ADMIN — DARATUMUMAB 1200 MG: 100 INJECTION, SOLUTION, CONCENTRATE INTRAVENOUS at 09:58

## 2022-01-01 RX ADMIN — FUROSEMIDE 20 MG: 20 TABLET ORAL at 18:11

## 2022-01-01 RX ADMIN — ACYCLOVIR 400 MG: 200 CAPSULE ORAL at 08:16

## 2022-01-01 RX ADMIN — VANCOMYCIN HYDROCHLORIDE 1250 MG: 5 INJECTION, POWDER, LYOPHILIZED, FOR SOLUTION INTRAVENOUS at 06:13

## 2022-01-01 RX ADMIN — SODIUM CHLORIDE 500 ML: 9 INJECTION, SOLUTION INTRAVENOUS at 07:56

## 2022-01-01 RX ADMIN — FLUDARABINE PHOSPHATE 45 MG: 50 INJECTION, POWDER, LYOPHILIZED, FOR SOLUTION INTRAVENOUS at 11:41

## 2022-01-01 RX ADMIN — Medication 5 ML: at 09:36

## 2022-01-01 RX ADMIN — ACETAMINOPHEN 650 MG: 325 TABLET ORAL at 07:58

## 2022-01-01 RX ADMIN — HEPARIN 5 ML: 100 SYRINGE at 08:46

## 2022-01-01 RX ADMIN — CARFILZOMIB 104 MG: 60 INJECTION, POWDER, LYOPHILIZED, FOR SOLUTION INTRAVENOUS at 09:56

## 2022-01-01 RX ADMIN — SULFAMETHOXAZOLE AND TRIMETHOPRIM 2 TABLET: 800; 160 TABLET ORAL at 08:34

## 2022-01-01 RX ADMIN — FUROSEMIDE 20 MG: 20 TABLET ORAL at 08:16

## 2022-01-01 RX ADMIN — IPRATROPIUM BROMIDE AND ALBUTEROL SULFATE 3 ML: 2.5; .5 SOLUTION RESPIRATORY (INHALATION) at 09:25

## 2022-01-01 RX ADMIN — ACYCLOVIR 400 MG: 200 CAPSULE ORAL at 08:50

## 2022-01-01 RX ADMIN — BRIMONIDINE TARTRATE 1 DROP: 1 SOLUTION/ DROPS OPHTHALMIC at 20:18

## 2022-01-01 RX ADMIN — DEXTROSE MONOHYDRATE 500 ML: 50 INJECTION, SOLUTION INTRAVENOUS at 08:46

## 2022-01-01 RX ADMIN — TAMSULOSIN HYDROCHLORIDE 0.4 MG: 0.4 CAPSULE ORAL at 21:19

## 2022-01-01 RX ADMIN — IPRATROPIUM BROMIDE AND ALBUTEROL SULFATE 3 ML: 2.5; .5 SOLUTION RESPIRATORY (INHALATION) at 07:35

## 2022-01-01 RX ADMIN — FUROSEMIDE 40 MG: 10 INJECTION, SOLUTION INTRAVENOUS at 08:51

## 2022-01-01 RX ADMIN — CYANOCOBALAMIN 1000 MCG: 1000 INJECTION, SOLUTION INTRAMUSCULAR; SUBCUTANEOUS at 12:40

## 2022-01-01 RX ADMIN — DARATUMUMAB AND HYALURONIDASE-FIHJ (HUMAN RECOMBINANT) 1800 MG: 1800; 30000 INJECTION SUBCUTANEOUS at 09:12

## 2022-01-01 RX ADMIN — PANTOPRAZOLE SODIUM 40 MG: 20 TABLET, DELAYED RELEASE ORAL at 08:24

## 2022-01-01 RX ADMIN — HEPARIN SODIUM (PORCINE) LOCK FLUSH IV SOLN 100 UNIT/ML 5 ML: 100 SOLUTION at 08:12

## 2022-01-01 RX ADMIN — METHOHEXITAL SODIUM 60 MG: 500 INJECTION, POWDER, LYOPHILIZED, FOR SOLUTION INTRAMUSCULAR; INTRAVENOUS; RECTAL at 09:54

## 2022-01-01 RX ADMIN — BRIMONIDINE TARTRATE 1 DROP: 1 SOLUTION/ DROPS OPHTHALMIC at 11:34

## 2022-01-01 RX ADMIN — DEXTROSE MONOHYDRATE 250 ML: 50 INJECTION, SOLUTION INTRAVENOUS at 08:08

## 2022-01-01 RX ADMIN — ATORVASTATIN CALCIUM 10 MG: 10 TABLET, FILM COATED ORAL at 20:28

## 2022-01-01 RX ADMIN — MEROPENEM 1 G: 1 INJECTION, POWDER, FOR SOLUTION INTRAVENOUS at 20:22

## 2022-01-01 RX ADMIN — HEPARIN 5 ML: 100 SYRINGE at 10:25

## 2022-01-01 RX ADMIN — DEXAMETHASONE 20 MG: 4 TABLET ORAL at 09:19

## 2022-01-01 RX ADMIN — SODIUM CHLORIDE 250 ML: 9 INJECTION, SOLUTION INTRAVENOUS at 13:37

## 2022-01-01 RX ADMIN — HEPARIN, PORCINE (PF) 10 UNIT/ML INTRAVENOUS SYRINGE 5 ML: at 08:48

## 2022-01-01 RX ADMIN — PREDNISONE 20 MG: 20 TABLET ORAL at 13:10

## 2022-01-01 RX ADMIN — DARATUMUMAB AND HYALURONIDASE-FIHJ (HUMAN RECOMBINANT) 1800 MG: 1800; 30000 INJECTION SUBCUTANEOUS at 12:04

## 2022-01-01 RX ADMIN — CYANOCOBALAMIN 1000 MCG: 1000 INJECTION, SOLUTION INTRAMUSCULAR at 12:19

## 2022-01-01 RX ADMIN — Medication 5 ML: at 09:47

## 2022-01-01 RX ADMIN — SODIUM CHLORIDE 250 ML: 9 INJECTION, SOLUTION INTRAVENOUS at 12:03

## 2022-01-01 RX ADMIN — DEXAMETHASONE SODIUM PHOSPHATE 20 MG: 10 INJECTION, SOLUTION INTRAMUSCULAR; INTRAVENOUS at 09:56

## 2022-01-01 RX ADMIN — Medication 3 ML: at 07:43

## 2022-01-01 RX ADMIN — DEXTROSE MONOHYDRATE 500 ML: 50 INJECTION, SOLUTION INTRAVENOUS at 08:49

## 2022-01-01 RX ADMIN — MEROPENEM 1 G: 1 INJECTION, POWDER, FOR SOLUTION INTRAVENOUS at 18:12

## 2022-01-01 RX ADMIN — Medication 5 ML: at 08:06

## 2022-01-01 RX ADMIN — FLUDARABINE PHOSPHATE 45 MG: 50 INJECTION, POWDER, LYOPHILIZED, FOR SOLUTION INTRAVENOUS at 08:54

## 2022-01-01 RX ADMIN — DIPHENHYDRAMINE HYDROCHLORIDE 25 MG: 25 CAPSULE ORAL at 11:54

## 2022-01-01 RX ADMIN — CYANOCOBALAMIN 1000 MCG: 1000 INJECTION INTRAMUSCULAR; SUBCUTANEOUS at 11:28

## 2022-01-01 RX ADMIN — CARFILZOMIB 104 MG: 60 INJECTION, POWDER, LYOPHILIZED, FOR SOLUTION INTRAVENOUS at 09:17

## 2022-01-01 RX ADMIN — HEPARIN SODIUM (PORCINE) LOCK FLUSH IV SOLN 100 UNIT/ML 500 UNITS: 100 SOLUTION at 19:01

## 2022-01-01 RX ADMIN — VANCOMYCIN HYDROCHLORIDE 750 MG: 5 INJECTION, POWDER, LYOPHILIZED, FOR SOLUTION INTRAVENOUS at 07:42

## 2022-01-01 RX ADMIN — SODIUM CHLORIDE, PRESERVATIVE FREE 500 UNITS: 5 INJECTION INTRAVENOUS at 08:19

## 2022-01-01 RX ADMIN — IPRATROPIUM BROMIDE AND ALBUTEROL SULFATE 3 ML: 2.5; .5 SOLUTION RESPIRATORY (INHALATION) at 13:37

## 2022-01-01 RX ADMIN — SODIUM CHLORIDE 500 ML: 9 INJECTION, SOLUTION INTRAVENOUS at 09:27

## 2022-01-01 RX ADMIN — CARFILZOMIB 52 MG: 60 INJECTION, POWDER, LYOPHILIZED, FOR SOLUTION INTRAVENOUS at 09:30

## 2022-01-01 RX ADMIN — HEPARIN 5 ML: 100 SYRINGE at 12:31

## 2022-01-01 RX ADMIN — SULFAMETHOXAZOLE AND TRIMETHOPRIM 1 TABLET: 400; 80 TABLET ORAL at 08:49

## 2022-01-01 RX ADMIN — DEXAMETHASONE 20 MG: 4 TABLET ORAL at 09:14

## 2022-01-01 RX ADMIN — FUROSEMIDE 20 MG: 20 TABLET ORAL at 12:51

## 2022-01-01 RX ADMIN — HEPARIN SODIUM (PORCINE) LOCK FLUSH IV SOLN 100 UNIT/ML 5 ML: 100 SOLUTION at 12:22

## 2022-01-01 RX ADMIN — METHYLPREDNISOLONE SODIUM SUCCINATE 62.5 MG: 125 INJECTION, POWDER, FOR SOLUTION INTRAMUSCULAR; INTRAVENOUS at 08:00

## 2022-01-01 RX ADMIN — DARATUMUMAB AND HYALURONIDASE-FIHJ (HUMAN RECOMBINANT) 1800 MG: 1800; 30000 INJECTION SUBCUTANEOUS at 09:06

## 2022-01-01 RX ADMIN — Medication 5 ML: at 09:14

## 2022-01-01 RX ADMIN — Medication 5 ML: at 11:39

## 2022-01-01 ASSESSMENT — ACTIVITIES OF DAILY LIVING (ADL)
ADLS_ACUITY_SCORE: 10
DEPENDENT_IADLS:: INDEPENDENT
ADLS_ACUITY_SCORE: 10
WALKING_OR_CLIMBING_STAIRS_DIFFICULTY: NO
ADLS_ACUITY_SCORE: 10
ADLS_ACUITY_SCORE: 8
ADLS_ACUITY_SCORE: 14
ADLS_ACUITY_SCORE: 6
ADLS_ACUITY_SCORE: 10
ADLS_ACUITY_SCORE: 6
ADLS_ACUITY_SCORE: 10
ADLS_ACUITY_SCORE: 6
ADLS_ACUITY_SCORE: 12
ADLS_ACUITY_SCORE: 6
WEAR_GLASSES_OR_BLIND: YES
ADLS_ACUITY_SCORE: 10
ADLS_ACUITY_SCORE: 6
ADLS_ACUITY_SCORE: 6
ADLS_ACUITY_SCORE: 10
ADLS_ACUITY_SCORE: 6
ADLS_ACUITY_SCORE: 7
ADLS_ACUITY_SCORE: 10
TOILETING_ISSUES: NO
ADLS_ACUITY_SCORE: 6
ADLS_ACUITY_SCORE: 10
ADLS_ACUITY_SCORE: 14
ADLS_ACUITY_SCORE: 6
ADLS_ACUITY_SCORE: 6
ADLS_ACUITY_SCORE: 12
ADLS_ACUITY_SCORE: 6
ADLS_ACUITY_SCORE: 14
ADLS_ACUITY_SCORE: 6
ADLS_ACUITY_SCORE: 14
ADLS_ACUITY_SCORE: 8
ADLS_ACUITY_SCORE: 10
ADLS_ACUITY_SCORE: 7
ADLS_ACUITY_SCORE: 6
ADLS_ACUITY_SCORE: 14
ADLS_ACUITY_SCORE: 14
VISION_MANAGEMENT: GLASSES
ADLS_ACUITY_SCORE: 6
ADLS_ACUITY_SCORE: 35
ADLS_ACUITY_SCORE: 6
ADLS_ACUITY_SCORE: 6
ADLS_ACUITY_SCORE: 10
ADLS_ACUITY_SCORE: 14
ADLS_ACUITY_SCORE: 10
ADLS_ACUITY_SCORE: 9
ADLS_ACUITY_SCORE: 6
ADLS_ACUITY_SCORE: 6
ADLS_ACUITY_SCORE: 10
ADLS_ACUITY_SCORE: 6
ADLS_ACUITY_SCORE: 10
ADLS_ACUITY_SCORE: 14
ADLS_ACUITY_SCORE: 6
ADLS_ACUITY_SCORE: 14
ADLS_ACUITY_SCORE: 6
ADLS_ACUITY_SCORE: 14
CHANGE_IN_FUNCTIONAL_STATUS_SINCE_ONSET_OF_CURRENT_ILLNESS/INJURY: NO
ADLS_ACUITY_SCORE: 6
ADLS_ACUITY_SCORE: 6
ADLS_ACUITY_SCORE: 7
DIFFICULTY_EATING/SWALLOWING: NO
ADLS_ACUITY_SCORE: 10
FALL_HISTORY_WITHIN_LAST_SIX_MONTHS: NO
ADLS_ACUITY_SCORE: 10
ADLS_ACUITY_SCORE: 6
ADLS_ACUITY_SCORE: 10
ADLS_ACUITY_SCORE: 6
ADLS_ACUITY_SCORE: 7
ADLS_ACUITY_SCORE: 6
ADLS_ACUITY_SCORE: 10
ADLS_ACUITY_SCORE: 6
ADLS_ACUITY_SCORE: 6
ADLS_ACUITY_SCORE: 12
ADLS_ACUITY_SCORE: 7
ADLS_ACUITY_SCORE: 6
ADLS_ACUITY_SCORE: 9
ADLS_ACUITY_SCORE: 10
ADLS_ACUITY_SCORE: 6
ADLS_ACUITY_SCORE: 7
DRESSING/BATHING_DIFFICULTY: NO
ADLS_ACUITY_SCORE: 10
ADLS_ACUITY_SCORE: 7
ADLS_ACUITY_SCORE: 10
ADLS_ACUITY_SCORE: 12
ADLS_ACUITY_SCORE: 6
ADLS_ACUITY_SCORE: 7
ADLS_ACUITY_SCORE: 6
ADLS_ACUITY_SCORE: 10
ADLS_ACUITY_SCORE: 10
CONCENTRATING,_REMEMBERING_OR_MAKING_DECISIONS_DIFFICULTY: NO
ADLS_ACUITY_SCORE: 10
DOING_ERRANDS_INDEPENDENTLY_DIFFICULTY: NO
ADLS_ACUITY_SCORE: 12
ADLS_ACUITY_SCORE: 10

## 2022-01-01 ASSESSMENT — ENCOUNTER SYMPTOMS
SHORTNESS OF BREATH: 1
MUSCULOSKELETAL NEGATIVE: 1
FEVER: 0
BACK PAIN: 0
BLOOD IN STOOL: 0
FEVER: 0
DIARRHEA: 0
BRUISES/BLEEDS EASILY: 1
GASTROINTESTINAL NEGATIVE: 1
SENSORY CHANGE: 0
SHORTNESS OF BREATH: 0
RESPIRATORY NEGATIVE: 1
DIZZINESS: 0
CHILLS: 0
PALPITATIONS: 0
CONFUSION: 0
EYES NEGATIVE: 1
ORTHOPNEA: 0
NECK PAIN: 0
BRUISES/BLEEDS EASILY: 1
SPUTUM PRODUCTION: 0
DIAPHORESIS: 1
NERVOUS/ANXIOUS: 0
ABDOMINAL PAIN: 0
DIZZINESS: 0
PHOTOPHOBIA: 0
PSYCHIATRIC NEGATIVE: 1
SORE THROAT: 0
WOUND: 0
PND: 0
DYSRHYTHMIAS: 1
WEAKNESS: 0
EYE PAIN: 0
CONSTIPATION: 0
BRUISES/BLEEDS EASILY: 0
HEADACHES: 0
CARDIOVASCULAR NEGATIVE: 1
FOCAL WEAKNESS: 0
SINUS PAIN: 0
MEMORY LOSS: 0
SHORTNESS OF BREATH: 1
NEUROLOGICAL NEGATIVE: 1
WEIGHT LOSS: 0
DIAPHORESIS: 0
CHILLS: 1
COUGH: 1
FEVER: 0
COUGH: 0
BLURRED VISION: 0
DEPRESSION: 0
MYALGIAS: 0

## 2022-01-01 ASSESSMENT — PAIN SCALES - GENERAL
PAINLEVEL: NO PAIN (0)
PAINLEVEL: NO PAIN (0)
PAINLEVEL: MILD PAIN (2)
PAINLEVEL: NO PAIN (0)
PAINLEVEL: SEVERE PAIN (6)
PAINLEVEL: NO PAIN (0)
PAINLEVEL: MILD PAIN (3)
PAINLEVEL: MILD PAIN (2)
PAINLEVEL: NO PAIN (0)
PAINLEVEL: MILD PAIN (2)
PAINLEVEL: NO PAIN (0)
PAINLEVEL: MODERATE PAIN (5)
PAINLEVEL: NO PAIN (0)
PAINLEVEL: NO PAIN (0)

## 2022-01-01 ASSESSMENT — COLUMBIA-SUICIDE SEVERITY RATING SCALE - C-SSRS
3. HAVE YOU BEEN THINKING ABOUT HOW YOU MIGHT KILL YOURSELF?: NO
4. HAVE YOU HAD THESE THOUGHTS AND HAD SOME INTENTION OF ACTING ON THEM?: NO
5. HAVE YOU STARTED TO WORK OUT OR WORKED OUT THE DETAILS OF HOW TO KILL YOURSELF? DO YOU INTEND TO CARRY OUT THIS PLAN?: NO
1. IN THE PAST MONTH, HAVE YOU WISHED YOU WERE DEAD OR WISHED YOU COULD GO TO SLEEP AND NOT WAKE UP?: NO
2. HAVE YOU ACTUALLY HAD ANY THOUGHTS OF KILLING YOURSELF IN THE PAST MONTH?: NO
6. HAVE YOU EVER DONE ANYTHING, STARTED TO DO ANYTHING, OR PREPARED TO DO ANYTHING TO END YOUR LIFE?: NO

## 2022-01-01 ASSESSMENT — ANXIETY QUESTIONNAIRES
GAD7 TOTAL SCORE: 0
5. BEING SO RESTLESS THAT IT IS HARD TO SIT STILL: NOT AT ALL
1. FEELING NERVOUS, ANXIOUS, OR ON EDGE: NOT AT ALL
3. WORRYING TOO MUCH ABOUT DIFFERENT THINGS: NOT AT ALL
6. BECOMING EASILY ANNOYED OR IRRITABLE: NOT AT ALL
IF YOU CHECKED OFF ANY PROBLEMS ON THIS QUESTIONNAIRE, HOW DIFFICULT HAVE THESE PROBLEMS MADE IT FOR YOU TO DO YOUR WORK, TAKE CARE OF THINGS AT HOME, OR GET ALONG WITH OTHER PEOPLE: NOT DIFFICULT AT ALL
2. NOT BEING ABLE TO STOP OR CONTROL WORRYING: NOT AT ALL
GAD7 TOTAL SCORE: 0
7. FEELING AFRAID AS IF SOMETHING AWFUL MIGHT HAPPEN: NOT AT ALL

## 2022-01-01 ASSESSMENT — PATIENT HEALTH QUESTIONNAIRE - PHQ9
SUM OF ALL RESPONSES TO PHQ QUESTIONS 1-9: 6
SUM OF ALL RESPONSES TO PHQ QUESTIONS 1-9: 0
SUM OF ALL RESPONSES TO PHQ QUESTIONS 1-9: 6
5. POOR APPETITE OR OVEREATING: NOT AT ALL
10. IF YOU CHECKED OFF ANY PROBLEMS, HOW DIFFICULT HAVE THESE PROBLEMS MADE IT FOR YOU TO DO YOUR WORK, TAKE CARE OF THINGS AT HOME, OR GET ALONG WITH OTHER PEOPLE: SOMEWHAT DIFFICULT
SUM OF ALL RESPONSES TO PHQ QUESTIONS 1-9: 6

## 2022-01-01 ASSESSMENT — MIFFLIN-ST. JEOR: SCORE: 1460.19

## 2022-01-01 ASSESSMENT — LIFESTYLE VARIABLES: TOBACCO_USE: 1

## 2022-01-05 NOTE — PROGRESS NOTES
[facility] Hematology and Oncology Progress Note    Patient: Theo Meyers  MRN: [id number]  Date of Service: Jan 5, 2022        Assessment and Plan:    1. Kappa light chain myeloma:  pomalidomide is at 3mg daily. Continue monthly daratumumab. If his light chains are up, then will send for an opinion cellular therapy.  But also repeat a bone marrow biopsy and a PET/CT has not been over a year since we have done these investigations.  Clinically stable.  He is receiving daratumumab monthly.    2.  Pancytopenia: Still has mild neutropenia but this is stable.  Mild anemia which is also stable.      3.  Prophylaxis: Continue Xarelto 20 mg daily.  No issues with bleeding recently.    I spent 30 minutes in the care of this patient today, which included time necessary for preparation for the visit, face to face time with the patient, communication of recommendations to the care team, and documentation time.    ECOG Performance  0    Diagnosis:    1.  IgG kappa light chain myeloma. Hyposecretory. Diagnosed 2009. No significant measurable M protein. 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 There was also on 11;14 translocation.  Past immunofixations from 2011 show IgG-kappa.  Bone marrow biopsy 8, 2020 at 5%.  Residual kappa light chain myeloma involving 20% of nucleated cells.  No significant change from November 2016 marrow cellularity.     2.  Deep vein thrombosis of the left leg diagnosed 9/2012 while on treatment.     3.  Vitamin B12 deficiency diagnosed in September 2017.     4.  GI bleed and anemia requiring hospitalization in December 2017.    5. MOHS surgery in January, 2021; May 2021 (chest; squamous).     Treatment:    He presented with a lytic lesion involving the C6 vertebral body, although no measurable M protein. IgG kappa monoclonal immunoglobulin was confirmed by [facility] as well as elevated kappa free light chains with an abnormal kappa lambda ratio. Bone marrow biopsy showed 30%  involvement and cytogenetics confirmed deletion of 13 q. as well as 11;14 translocation. He was initially treated with Velcade and dexamethasone and achieved a good partial response.     He was referred to the [facility] where he underwent high-dose chemotherapy with autologous peripheral stem cell transplant in April of 2010. He began Revlimid as maintenance therapy post transplant. Repeat bone marrow biopsy done October 2010 showed about 5% kappa restricted plasma cells and so at that time weekly Velcade was added along with his maintenance Revlimid and dexamethasone.      He has done well since that time with stable minimal residual disease, best assessed by serum free light chains. He required dose reductions of weekly Velcade because of cytopenias and was ultimately switched to a q 2 week maintenance schedule which he has been on since September 2012.      His Revlimid dose was reduced to 15 mg daily and he continues on dexamethasone 20 mg p.o. weekly  Revlimid dosing changed to 20 mg, 3 weeks on 1 week (instead of 15 mg daily) for cost reasons.  Started March, 2018     Started daratumumab/pomalidomide/dexamethasone (20mg every 14 days) on October 14, 2020.     Progressed on:  bortezomib  lenalidomide    Interim History:    Keith returns today for follow-up visit.  Overall he is feeling pretty well. No changes.  Energy and appetite are good.  Shoveling snow. Got the booster in August, CoVID.     Review of Systems:    As above in the history.     Review of Systems otherwise Negative for:  General: chills, fever or night sweats  Psychological: anxiety or depression  Ophthalmic: blurry vision, double vision or loss of vision, vision change  ENT: epistaxis, oral lesions, hearing changes  Hematological and Lymphatic: bleeding, bruising, jaundice, swollen lymph nodes  Endocrine: hot flashes, unexpected weight changes  Respiratory: cough, hemoptysis, orthopnea or shortness of breath/SAMANO  Cardiovascular:  chest pain, edema, palpitations or PND  Gastrointestinal: abdominal pain, blood in stools, change in bowel habits, constipation, diarrhea or nausea/vomiting  Genito-Urinary: change in urinary stream, incontinence, frequency/urgency  Musculoskeletal: joint pain, stiffness, swelling, muscle pain  Neurological: dizziness, headaches, numbness/tingling  Dermatological: lumps and rash    Past History:    Past Medical History:   Diagnosis Date     Anemia 12/13/2017     Arthritis      Bilateral inguinal hernia      Chest tube in place      Glaucoma      Glaucoma      History of blood clots     DVT - left chronic     Multiple myeloma (H)     Gets chemo infusions every 2 weeks     Pancytopenia (H)      Parapneumonic effusion      Peripheral neuropathy      Syncope      TMJ (temporomandibular joint disorder)      Physical Exam:    /61   Pulse 63   Temp 97.8  F (36.6  C)   Resp 16   Wt 75.3 kg (166 lb)   SpO2 96%   BMI 24.51 kg/m      General: patient appears stated age of 76 year old. Nontoxic and in no distress.   HEENT: Head: atraumatic, normocephalic. Sclerae anicteric.  Chest:  Normal respiratory effort  Cardiac:  No edema.  Regular rate and rhythm.  3/6 systolic ejection murmur is heard.  Abdomen: abdomen is non-distended  Extremities: normal tone and muscle bulk.  Skin: no lesions or rash on visible skin. Warm and dry.   CNS: alert and oriented. Grossly non-focal.   Psychiatric: normal mood and affect.     Lab Results:    Recent Results (from the past 168 hour(s))   Comprehensive metabolic panel   Result Value Ref Range    Sodium 139 136 - 145 mmol/L    Potassium 4.9 3.5 - 5.0 mmol/L    Chloride 106 98 - 107 mmol/L    Carbon Dioxide (CO2) 26 22 - 31 mmol/L    Anion Gap 7 5 - 18 mmol/L    Urea Nitrogen 22 8 - 28 mg/dL    Creatinine 1.23 0.70 - 1.30 mg/dL    Calcium 8.8 8.5 - 10.5 mg/dL    Glucose 103 70 - 125 mg/dL    Alkaline Phosphatase 70 45 - 120 U/L    AST 14 0 - 40 U/L    ALT 13 0 - 45 U/L    Protein  Total 5.3 (L) 6.0 - 8.0 g/dL    Albumin 3.2 (L) 3.5 - 5.0 g/dL    Bilirubin Total 0.4 0.0 - 1.0 mg/dL    GFR Estimate 61 >60 mL/min/1.73m2   CBC with platelets and differential   Result Value Ref Range    WBC Count 3.3 (L) 4.0 - 11.0 10e3/uL    RBC Count 3.37 (L) 4.40 - 5.90 10e6/uL    Hemoglobin 11.4 (L) 13.3 - 17.7 g/dL    Hematocrit 35.3 (L) 40.0 - 53.0 %     (H) 78 - 100 fL    MCH 33.8 (H) 26.5 - 33.0 pg    MCHC 32.3 31.5 - 36.5 g/dL    RDW 14.8 10.0 - 15.0 %    Platelet Count 238 150 - 450 10e3/uL   Manual Differential   Result Value Ref Range    % Neutrophils 44 %    % Lymphocytes 9 %    % Monocytes 42 %    % Eosinophils 2 %    % Basophils 3 %    Absolute Neutrophils 1.5 (L) 1.6 - 8.3 10e3/uL    Absolute Lymphocytes 0.3 (L) 0.8 - 5.3 10e3/uL    Absolute Monocytes 1.4 (H) 0.0 - 1.3 10e3/uL    Absolute Eosinophils 0.1 0.0 - 0.7 10e3/uL    Absolute Basophils 0.1 0.0 - 0.2 10e3/uL    RBC Morphology Confirmed RBC Indices     Platelet Assessment  Automated Count Confirmed. Platelet morphology is normal.     Automated Count Confirmed. Platelet morphology is normal.    Elliptocytes Slight (A) None Seen     Imaging:    No results found.      Signed by: Per Clifford MD

## 2022-01-05 NOTE — PROGRESS NOTES
Infusion Nursing Note:  Theo HURT Mira presents today for cycle 17 day 1 treatment with Daratumumab.    Patient seen by provider today: Yes:    present during visit today: Not Applicable.    Note: Keith received treatment as ordered. Dexamethasone only premed given (as clarified with pharmacy that this is what he has been getting).      Intravenous Access:  Implanted Port.    Treatment Conditions:  Results reviewed, labs MET treatment parameters, ok to proceed with treatment.      Post Infusion Assessment:  Patient tolerated infusion without incident.       Discharge Plan:   Patient discharged in stable condition accompanied by: self.      Milagros Huntley RN

## 2022-01-05 NOTE — PROGRESS NOTES
"Oncology Rooming Note    January 5, 2022 8:27 AM   Theo Meyers is a 76 year old male who presents for:    Chief Complaint   Patient presents with     Oncology Clinic Visit     Initial Vitals: /61   Pulse 63   Temp 97.8  F (36.6  C)   Resp 16   Wt 75.3 kg (166 lb)   SpO2 96%   BMI 24.51 kg/m   Estimated body mass index is 24.51 kg/m  as calculated from the following:    Height as of 10/5/21: 1.753 m (5' 9\").    Weight as of this encounter: 75.3 kg (166 lb). Body surface area is 1.91 meters squared.  No Pain (0) Comment: Data Unavailable   No LMP for male patient.  Allergies reviewed: Yes  Medications reviewed: Yes    Medications: Medication refills not needed today.  Pharmacy name entered into [facility]:    [id number] - [place], [place] - [address] AT [facility] [address] [place]  [facility] - [place], [place] - [address] [facility]    Clinical concerns: None       Elizabeth Connell LPN            "

## 2022-01-05 NOTE — LETTER
"    1/5/2022         RE: Theo Meyers  [address]  [facility] [address]        Dear Colleague,    Thank you for referring your patient, Theo Meyers, to the [facility]. Please see a copy of my visit note below.    Oncology Rooming Note    January 5, 2022 8:27 AM   Theo Meyers is a 76 year old male who presents for:    Chief Complaint   Patient presents with     Oncology Clinic Visit     Initial Vitals: /61   Pulse 63   Temp 97.8  F (36.6  C)   Resp 16   Wt 75.3 kg (166 lb)   SpO2 96%   BMI 24.51 kg/m   Estimated body mass index is 24.51 kg/m  as calculated from the following:    Height as of 10/5/21: 1.753 m (5' 9\").    Weight as of this encounter: 75.3 kg (166 lb). Body surface area is 1.91 meters squared.  No Pain (0) Comment: Data Unavailable   No LMP for male patient.  Allergies reviewed: Yes  Medications reviewed: Yes    Medications: Medication refills not needed today.  Pharmacy name entered into Therio:    [id number] [place], [place] - [address] [facility] [address]  [facility] - [place], [place] - [address]    Clinical concerns: None       Elizabeth Connell LPN              [facility] Hematology and Oncology Progress Note    Patient: Theo Meyers  MRN: [id number]  Date of Service: Jan 5, 2022        Assessment and Plan:    1. Kappa light chain myeloma:  pomalidomide is at 3mg daily. Continue monthly daratumumab. If his light chains are up, then will send for an opinion cellular therapy.  But also repeat a bone marrow biopsy and a PET/CT has not been over a year since we have done these investigations.  Clinically stable.  He is receiving daratumumab monthly.    2.  Pancytopenia: Still has mild neutropenia but this is stable.  Mild anemia which is also stable.      3.  Prophylaxis: Continue Xarelto 20 mg daily.  No issues with bleeding recently.    I spent 30 minutes in the care of this " patient today, which included time necessary for preparation for the visit, face to face time with the patient, communication of recommendations to the care team, and documentation time.    ECOG Performance  0    Diagnosis:    1.  IgG kappa light chain myeloma. Hyposecretory. Diagnosed 2009. No significant measurable M protein. 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 There was also on 11;14 translocation.  Past immunofixations from 2011 show IgG-kappa.  Bone marrow biopsy 8, 2020 at 5%.  Residual kappa light chain myeloma involving 20% of nucleated cells.  No significant change from November 2016 marrow cellularity.     2.  Deep vein thrombosis of the left leg diagnosed 9/2012 while on treatment.     3.  Vitamin B12 deficiency diagnosed in September 2017.     4.  GI bleed and anemia requiring hospitalization in December 2017.    5. MOHS surgery in January, 2021; May 2021 (chest; squamous).     Treatment:    He presented with a lytic lesion involving the C6 vertebral body, although no measurable M protein. IgG kappa monoclonal immunoglobulin was confirmed by EL[facility] as well as elevated kappa free light chains with an abnormal kappa lambda ratio. Bone marrow biopsy showed 30% involvement and cytogenetics confirmed deletion of 13 q. as well as 11;14 translocation. He was initially treated with Velcade and dexamethasone and achieved a good partial response.     He was referred to the [facility] where he underwent high-dose chemotherapy with autologous peripheral stem cell transplant in April of 2010. He began Revlimid as maintenance therapy post transplant. Repeat bone marrow biopsy done October 2010 showed about 5% kappa restricted plasma cells and so at that time weekly Velcade was added along with his maintenance Revlimid and dexamethasone.      He has done well since that time with stable minimal residual disease, best assessed by serum free light chains. He required dose reductions of weekly  Velcade because of cytopenias and was ultimately switched to a q 2 week maintenance schedule which he has been on since September 2012.      His Revlimid dose was reduced to 15 mg daily and he continues on dexamethasone 20 mg p.o. weekly  Revlimid dosing changed to 20 mg, 3 weeks on 1 week (instead of 15 mg daily) for cost reasons.  Started March, 2018     Started daratumumab/pomalidomide/dexamethasone (20mg every 14 days) on October 14, 2020.     Progressed on:  bortezomib  lenalidomide    Interim History:    Keith returns today for follow-up visit.  Overall he is feeling pretty well. No changes.  Energy and appetite are good.  Shoveling snow. Got the booster in August, CoVID.     Review of Systems:    As above in the history.     Review of Systems otherwise Negative for:  General: chills, fever or night sweats  Psychological: anxiety or depression  Ophthalmic: blurry vision, double vision or loss of vision, vision change  ENT: epistaxis, oral lesions, hearing changes  Hematological and Lymphatic: bleeding, bruising, jaundice, swollen lymph nodes  Endocrine: hot flashes, unexpected weight changes  Respiratory: cough, hemoptysis, orthopnea or shortness of breath/SAMANO  Cardiovascular: chest pain, edema, palpitations or PND  Gastrointestinal: abdominal pain, blood in stools, change in bowel habits, constipation, diarrhea or nausea/vomiting  Genito-Urinary: change in urinary stream, incontinence, frequency/urgency  Musculoskeletal: joint pain, stiffness, swelling, muscle pain  Neurological: dizziness, headaches, numbness/tingling  Dermatological: lumps and rash    Past History:    Past Medical History:   Diagnosis Date     Anemia 12/13/2017     Arthritis      Bilateral inguinal hernia      Chest tube in place      Glaucoma      Glaucoma      History of blood clots     DVT - left chronic     Multiple myeloma (H)     Gets chemo infusions every 2 weeks     Pancytopenia (H)      Parapneumonic effusion      Peripheral  neuropathy      Syncope      TMJ (temporomandibular joint disorder)      Physical Exam:    /61   Pulse 63   Temp 97.8  F (36.6  C)   Resp 16   Wt 75.3 kg (166 lb)   SpO2 96%   BMI 24.51 kg/m      General: patient appears stated age of 76 year old. Nontoxic and in no distress.   HEENT: Head: atraumatic, normocephalic. Sclerae anicteric.  Chest:  Normal respiratory effort  Cardiac:  No edema.  Regular rate and rhythm.  3/6 systolic ejection murmur is heard.  Abdomen: abdomen is non-distended  Extremities: normal tone and muscle bulk.  Skin: no lesions or rash on visible skin. Warm and dry.   CNS: alert and oriented. Grossly non-focal.   Psychiatric: normal mood and affect.     Lab Results:    Recent Results (from the past 168 hour(s))   Comprehensive metabolic panel   Result Value Ref Range    Sodium 139 136 - 145 mmol/L    Potassium 4.9 3.5 - 5.0 mmol/L    Chloride 106 98 - 107 mmol/L    Carbon Dioxide (CO2) 26 22 - 31 mmol/L    Anion Gap 7 5 - 18 mmol/L    Urea Nitrogen 22 8 - 28 mg/dL    Creatinine 1.23 0.70 - 1.30 mg/dL    Calcium 8.8 8.5 - 10.5 mg/dL    Glucose 103 70 - 125 mg/dL    Alkaline Phosphatase 70 45 - 120 U/L    AST 14 0 - 40 U/L    ALT 13 0 - 45 U/L    Protein Total 5.3 (L) 6.0 - 8.0 g/dL    Albumin 3.2 (L) 3.5 - 5.0 g/dL    Bilirubin Total 0.4 0.0 - 1.0 mg/dL    GFR Estimate 61 >60 mL/min/1.73m2   CBC with platelets and differential   Result Value Ref Range    WBC Count 3.3 (L) 4.0 - 11.0 10e3/uL    RBC Count 3.37 (L) 4.40 - 5.90 10e6/uL    Hemoglobin 11.4 (L) 13.3 - 17.7 g/dL    Hematocrit 35.3 (L) 40.0 - 53.0 %     (H) 78 - 100 fL    MCH 33.8 (H) 26.5 - 33.0 pg    MCHC 32.3 31.5 - 36.5 g/dL    RDW 14.8 10.0 - 15.0 %    Platelet Count 238 150 - 450 10e3/uL   Manual Differential   Result Value Ref Range    % Neutrophils 44 %    % Lymphocytes 9 %    % Monocytes 42 %    % Eosinophils 2 %    % Basophils 3 %    Absolute Neutrophils 1.5 (L) 1.6 - 8.3 10e3/uL    Absolute Lymphocytes 0.3  (L) 0.8 - 5.3 10e3/uL    Absolute Monocytes 1.4 (H) 0.0 - 1.3 10e3/uL    Absolute Eosinophils 0.1 0.0 - 0.7 10e3/uL    Absolute Basophils 0.1 0.0 - 0.2 10e3/uL    RBC Morphology Confirmed RBC Indices     Platelet Assessment  Automated Count Confirmed. Platelet morphology is normal.     Automated Count Confirmed. Platelet morphology is normal.    Elliptocytes Slight (A) None Seen     Imaging:    No results found.      Signed by: Per Clifford MD        Again, thank you for allowing me to participate in the care of your patient.        Sincerely,        Per Clifford MD

## 2022-01-10 NOTE — TELEPHONE ENCOUNTER
Patient calls in today and leaves a message on the nurse triage line stating that he has questions on when Dr. Clifford would like him to have a PET scan done as well as a bone marrow biopsy.  After looking through the patient's chart, Dr. Clifford note states from 1/5/2022 that if his light chains are up, then will send for an opinion cellular therapy.  But also repeat a bone marrow biopsy and a PET/CT since they have not been done in over a year.      I will send this message over to Dr. Clifford and the RN care coordinator for the patient so that lab results can be reviewed and patient can be updated.    Patient has been called and updated that he should receive a call in the next day or 2.    Mere Machado RN

## 2022-01-13 NOTE — TELEPHONE ENCOUNTER
RN Cancer Care Coordinator called patient at request of Dr. Per Clifford to let him know that based on his labs, he will look at setting up a repeat bone marrow biopsy and PET scan in a few weeks.     Patient knows that his recent labs looked a bit better than the previous time, and so understands the urgency may not be there now. He strategizes out loud that he will be in for lab draw on 1/26, and then here for treatment and clinic visit with Alejandra on 2/2.     He mentions that Dr. Clifford had talked about - if labs continued to deteriorate - a possible referral to N, and a study. His only reservation about waiting the 2-3 weeks until next visits was that if he qualifies for a study he wants to be referred as soon as possible to not lose an opportunity.     Writer acknowledged his thinking and will let Dr. Clifford and Alejandra know.   He verbalized understanding and appreciation for call.

## 2022-01-14 NOTE — TELEPHONE ENCOUNTER
Per Dr. Clifford, Keith is to have a PET scan and bone marrow biopsy prior to his follow up in clinic on Weds, 2/02.     Keith has labs due on Wed, 1/26. MD would like the above done regardless of these lab results.     I report this to Keith. All will be reviewed with him on Weds, 2/02.    He verbalized understanding and appreciation of our conversation.     Vicky Horne  RN Care Coordinator  [facility]

## 2022-01-18 NOTE — PROGRESS NOTES
Keith is a 76 year old who is being evaluated via a billable video visit.      How would you like to obtain your AVS? [email or web address]  If the video visit is dropped, the invitation should be resent by: Text to cell phone: [phone]  Will anyone else be joining your video visit? No      Video Start Time: 5:07 PM  [facility] video-virtual note    Theo Meyers   76 year old male    Date of Visit: 1/18/2022    Chief Complaint   Patient presents with     Follow Up     2 month follow up - New rx for Lipitor at last visit, no side effects     Subjective  Keith is a 76-year-old male with history of vascular disease.  No history of strokes.  October 2021 carotid ultrasound showed 50 to 69% on the left with mild plaque in the right, no stenosis.  December 2020 chest CT scan showed moderate to severe coronary calcifications of the LAD and severe atherosclerosis of aorta.  No history of cardiac event or chest pain.    October 2021 LDL 95 and HDL 51, but given the diagnosis of vascular disease I did have him start atorvastatin 10 mg a day.    He had labs done at oncology with normal AST and ALT on January 5, 2022.  Creatinine stable at 1.2.    He is having increasing light chains with his multiple myeloma.  He was diagnosed with multiple Vyloma in 2009, bone marrow transplant 2010 and is on maintenance chemotherapy now.    He also has a history of DVT in 2018 and continues on chronic Xarelto.    Patient had cough and sinus congestion symptoms 10 days ago and just finished a course of doxycycline.  He does have a history of sinusitis, but he is all better from that now.  No shortness of breath or fever or continued cough.    PMHx:    Past Medical History:   Diagnosis Date     Anemia 12/13/2017     Arthritis      Bilateral inguinal hernia      Chest tube in place      Glaucoma      Glaucoma      History of blood clots     DVT - left chronic     Multiple myeloma (H)     Gets chemo infusions every 2 weeks      Pancytopenia (H)      Parapneumonic effusion      Peripheral neuropathy      Syncope      TMJ (temporomandibular joint disorder)      PSHx:    Past Surgical History:   Procedure Laterality Date     APPENDECTOMY      Age 16     ESOPHAGOSCOPY, GASTROSCOPY, DUODENOSCOPY (EGD), COMBINED N/A 12/14/2017    Procedure: ESOPHAGOGASTRODUODENOSCOPY (EGD) WITH BIOPSYS;  Surgeon: Marlon Ryo MD;  Location: [facility];  Service:      ESOPHAGOSCOPY, GASTROSCOPY, DUODENOSCOPY (EGD), COMBINED N/A 1/19/2018    Procedure: ENDOSCOPIC ULTRASOUND  ESOPHAGOGASTRODUODENOSCOPY WITH DUODENAL POLYP REMOVAL;  Surgeon: Familia Mcgraw MD;  Location: [facility] OR;  Service:      EYE SURGERY      Cataract     IR MISCELLANEOUS PROCEDURE  6/17/2009     IR MISCELLANEOUS PROCEDURE  7/31/2009     IR MISCELLANEOUS PROCEDURE  8/13/2009     IR PLEURAL DRAINAGE WITH CATHETER INSERTION  7/2/2019     PORTACATH PLACEMENT       RELEASE CARPAL TUNNEL Bilateral      US THORACENTESIS  6/27/2019     VERTEBROPLASTY      T6     WISDOM TOOTH EXTRACTION       Immunizations:   Immunization History   Administered Date(s) Administered     COVID-19,PF,Pfizer (12+ Yrs) 02/12/2021, 03/05/2021, 08/26/2021     FLU 6-35 months 11/17/2011, 09/30/2013     Hep B, Peds or Adolescent 05/31/2012     HepB, Unspecified 03/01/2011, 05/19/2011     Hib (PRP-T) 05/19/2011, 05/24/2012     Hib, Unspecified 03/01/2011, 05/19/2011     Influenza (High Dose) 3 valent vaccine 10/07/2015, 09/21/2016, 09/20/2017, 09/26/2018, 10/03/2019     Influenza Vaccine IM > 6 months Valent IIV4 (Alfuria,Fluzone) 09/24/2014     Influenza, Quad, High Dose, Pf, 65yr+ (Fluzone HD) 09/30/2020, 10/13/2021     Pneumo Conj 13-V (2010&after) 10/16/2015     Pneumococcal 23 valent 05/19/2011, 05/24/2012     Poliovirus, inactivated (IPV) 03/01/2011, 05/19/2011, 05/31/2012     Tdap (Adacel,Boostrix) 03/01/2011     Tetanus 05/19/2011       ROS A comprehensive review of systems was performed and was otherwise  negative    Medications, allergies, and problem list were reviewed and updated    Exam  There were no vitals taken for this visit.  He appears well.  No jaundice.  He is bright and alert without dyspnea.    Assessment/Plan  1. Coronary artery calcification seen on CAT scan  Asymptomatic.  Continue atorvastatin 10 mg a day without plans for further titration, because of good cholesterol numbers previously and age.    I did discuss toxicity risk with atorvastatin but no evident toxicity at this time.  He will continue have liver test checked at oncology  - atorvastatin (LIPITOR) 10 MG tablet; Take 1 tablet (10 mg) by mouth daily  Dispense: 90 tablet; Refill: 3    2. Asymptomatic carotid artery stenosis, left  As above  - atorvastatin (LIPITOR) 10 MG tablet; Take 1 tablet (10 mg) by mouth daily  Dispense: 90 tablet; Refill: 3    3. History of DVT (deep vein thrombosis)  Long-term Xarelto    4. Multiple myeloma not having achieved remission (H)  Management per hematology/oncology.  On maintenance chemotherapy.    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    Walks regularly with history of spinal stenosis.    Mild aortic stenosis October 2021 echo was stable.      Return in about 9 months (around 10/18/2022) for Routine preventive.   Patient Instructions   Continue on atorvastatin.  If you do develop increasing muscle achiness or weakness, contact me.  Continue to have your liver test checked through oncology clinic.    Follow-up in October for your adult wellness visit physical exam    Mathew Ott MD, MD        Current Outpatient Medications   Medication Sig Dispense Refill     acetaminophen (TYLENOL) 500 MG tablet Take 500-1,000 mg by mouth       acyclovir (ZOVIRAX) 400 MG tablet TAKE 1 TABLET BY MOUTH TWICE DAILY 180 tablet 1     atorvastatin (LIPITOR) 10 MG tablet Take 1 tablet (10 mg) by mouth daily 90 tablet 3      bimatoprost (LUMIGAN) 0.03 % ophthalmic drops Place 1 drop into both eyes At Bedtime.       brimonidine (ALPHAGAN P) 0.1 % SOLN Place 1 drop into both eyes every 8 hours.       calcium carbonate 1250 (500 Ca) MG CHEW Take 1 tablet by mouth       calcium carbonate-vitamin D (OYSTER SHELL CALCIUM/D) 500-200 MG-UNIT tablet Take 1 tablet by mouth       Calcium-Vitamin D (CALCIUM + D PO) Take 1 tablet by mouth daily.       fluticasone (FLONASE) 50 MCG/ACT nasal spray Spray 2 sprays into both nostrils daily       gabapentin (NEURONTIN) 300 MG capsule TAKE 1 CAPSULE(300 MG) BY MOUTH DAILY AS NEEDED 30 capsule 5     Multiple Vitamin (MULTI-VITAMIN PO) Take 1 capsule by mouth daily.       Omega-3 Fatty Acids (OMEGA 3 PO) TAKE AS DIRECTED        omeprazole (PRILOSEC) 20 MG DR capsule TAKE 1 CAPSULE BY MOUTH ONCE DAILY 90 capsule 3     pomalidomide (POMALYST) 4 MG capsule Take 1 capsule (4 mg) by mouth daily Swallow whole, do NOT break, crush, chew or open capsule. Take on days 1-21 of repeated 28 day cycles. (Patient taking differently: Take 3 mg by mouth daily Swallow whole, do NOT break, crush, chew or open capsule. Take on days 1-21 of repeated 28 day cycles.) 21 capsule 0     potassium chloride ER (KLOR-CON M) 20 MEQ CR tablet Take 1 tablet (20 mEq) by mouth every other day       Psyllium (METAMUCIL PO) USE AS DIRECTED        tamsulosin (FLOMAX) 0.4 MG capsule TAKE 1 CAPSULE BY MOUTH EVERY DAY 90 capsule 1     UNABLE TO FIND MEDICATION NAME: Certazine       XARELTO ANTICOAGULANT 20 MG TABS tablet        Allergies   Allergen Reactions     Centex-Pse Er [Pseudoephedrine-Guaifenesin Cr]      TB12     Social History     Tobacco Use     Smoking status: Former Smoker     Quit date: 1975     Years since quittin.2     Smokeless tobacco: Never Used   Substance Use Topics     Alcohol use: Not Currently     Comment: Alcoholic Drinks/day: occasional     Drug use: No           Video-Visit Details    Type of service:   Video Visit    Video End Time:5:15 PM    Originating Location (pt. Location): Home    Distant Location (provider location):  [facility]     Platform used for Video Visit: Flybitsparam

## 2022-01-18 NOTE — PATIENT INSTRUCTIONS
Continue on atorvastatin.  If you do develop increasing muscle achiness or weakness, contact me.  Continue to have your liver test checked through oncology clinic.    Follow-up in October for your adult wellness visit physical exam

## 2022-01-19 NOTE — TELEPHONE ENCOUNTER
Free Drug Application Approved  Effective Dates 01/14/22-12/31/22  Patient notified? yes  Additional Information

## 2022-02-02 NOTE — PROGRESS NOTES
Pt ambulates to infusion center for treatment following NP visit.  Pt was seen by BRITTANI Valdez CNP and orders were approved.  IVAD accessed, labs drawn et sent w/results noted.  Treatment administered as ordered without difficulty. Evusheld injections given as ordered without difficulty.  Pt was monitored for 60 minutes following injections.  IVAD flushed w/NS et heparin and needle deaccessed.  Vitamin B12 injection given.  Pt left clinic stable to lobby.  Plan RTC as scheduled.

## 2022-02-02 NOTE — LETTER
"    2/2/2022         RE: Theo Meyers  [address]  [facility] [address]        Dear Colleague,    Thank you for referring your patient, Theo Meyers, to the [facility] [place]. Please see a copy of my visit note below.    Oncology Rooming Note    February 2, 2022 8:18 AM   Theo Meyers is a 76 year old male who presents for:    Chief Complaint   Patient presents with     Oncology Clinic Visit     Initial Vitals: /60   Pulse 66   Temp 97.8  F (36.6  C)   Resp 16   Wt 77.6 kg (171 lb)   SpO2 99%   BMI 25.25 kg/m   Estimated body mass index is 25.25 kg/m  as calculated from the following:    Height as of 10/5/21: 1.753 m (5' 9\").    Weight as of this encounter: 77.6 kg (171 lb). Body surface area is 1.94 meters squared.  No Pain (0) Comment: Data Unavailable   No LMP for male patient.  Allergies reviewed: Yes  Medications reviewed: Yes    Medications: Medication refills not needed today.  Pharmacy name entered into [employer]:    [id number] - [place], [place] - [address] [facility] [address]  [facility] - [place], [place] - [address] [facility]    Clinical concerns: PET Scan, Biopsy      Meg Garcia RN              [facility] Hematology and Oncology Progress Note    Patient: Theo Meyers  MRN: [id number]  Date of Service: Feb 2, 2022          Reason for Visit    Chief Complaint   Patient presents with     Oncology Clinic Visit       Assessment and Plan    Cancer Staging  No matching staging information was found for the patient.    1.  Myeloma: pt continues on daratumumab and dexamethasone. This regimen was initiated in October 2020.  Now getting it every 4 weeks.  We did increase pomalyst to 4mg due to rising light chains.  We did repeat a bone marrow biopsy last week that did show about 20 to 30% plasma cells.  We did repeat a PET scan which also shows a new lesion in his right scapula.  His light chains are overall " stable over the last couple months but are still pretty elevated at about 55.  After discussion with Dr. Clifford he would like to refer patient to be evaluated for car T-cell therapy.  Referral placed.  I will have our care coordinator contact their care coordinator to initiate.  In the meantime patient will continue his current regimen.  He will see Dr. Clifford or myself every 1 to 2 months.     2. DVT: This is recurrent.  Will be on xarelto indefinitely.      3. Anemia: likely from treatment and myeloma. No complications. Will continue to monitor.     4.  Risk for Covid complications: He has been vaccinated and boosted. All while on treatment. continues on treatment. Talked to patient and evaluated by me. We discussed the risks and benefits of receiving EVUSHELD, as well as alternative treatments. The Emergency Use Administration (EUA) was provided to the patient for review and an opportunity for questions was provided. Patient wishes to proceed with EVUSHELD.    5.  Neutropenia: This is likely from his Pomalyst.  It does appear that his total white blood cell count is better today.  Still awaiting his ANC.  He will be due to start his next cycle of Pomalyst tomorrow.  We will go ahead and do that as long as his ANC is 800 or above.  Urged him to take precautions.     ECOG Performance    0 - Independent    Distress Screening (within last 30 days)    No data recorded     Pain  Pain Score: No Pain (0)    Problem List    Patient Active Problem List   Diagnosis     Multiple myeloma (H)     Unspecified glaucoma     Cataract     DVT (deep venous thrombosis) (H)     Shingles     Shingles (herpes zoster) polyneuropathy     Back pain     Post herpetic neuralgia     Pancytopenia (H)     Syncope and collapse     Elevated INR     History of DVT (deep vein thrombosis)     Chronic deep vein thrombosis (DVT) of left lower extremity, unspecified vein (H)     Spinal stenosis of lumbar region without neurogenic claudication      Anemia, vitamin B12 deficiency     Chemotherapy-induced neutropenia (H)     Thrombocytopenia (H)     Paroxysmal atrial fibrillation (H)     Nonrheumatic aortic valve stenosis     SAAMNO (dyspnea on exertion)     Immunocompromised state (H)     Neutropenic fever (H)     Personal history of DVT (deep vein thrombosis)     Paroxysmal A-fib (H)        ______________________________________________________________________________    History of Present Illness    DIAGNOSIS:   1. IgG kappa light chain myeloma. Hyposecretory. Diagnosed 2009. No significant measurable M protein. 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 There was also on 11;14 translocation.   2. Deep vein thrombosis of the left leg diagnosed 09/2012 while on treatment.       TREATMENT:   Started Daratumumab in October 2020. Dexamethasone weekly as well.  Today is cycle 18. Pomalyst added with cycle 3.  We have decreased the dose initially and then have now gone back up to full dose.         PAST TREATMENT and HISTORY:   Velcade every 2 weeks since 09/2012. He is getting dexamethasone 20 mg every other week with the velcade. Finally, he is receiving Revlimid 20 mg daily for 3 weeks on, 1 week off.      He presented at diagnosis with a lytic lesion involving the C6 vertebral body, although no measurable M protein. IgG kappa monoclonal immunoglobulin was confirmed by [facility] as well as elevated kappa free light chains with an abnormal kappa lambda ratio. Bone marrow biopsy showed 30% involvement and cytogenetics confirmed deletion of 13 q. as well as 11;14 translocation. He was initially treated with Velcade and dexamethasone and achieved a good partial response. He was referred to the [facility] where he underwent high-dose chemotherapy with autologous peripheral stem cell transplant in April of 2010. He began Revlimid as maintenance therapy post transplant. Repeat bone marrow biopsy done October 2010 showed about 5% kappa restricted plasma  cells and so at that time weekly Velcade was added along with his maintenance Revlimid and dexamethasone. He has done well since that time with stable minimal residual disease, best assessed by serum free light chains. He required dose reductions of weekly Velcade because of cytopenias and was ultimately switched to a q.2 week maintenance schedule which he has been on since September 2012. His Revlimid dose is 20 mg daily and he continues on dexamethasone 20 mg p.o. q. Week.       INTERIM HISTORY:     Pt is here today for follow up.  Patient is doing pretty well.  No new issues. Feeling fine. No new bone or back pain.  He has not noticed any pain in his right scapula.  He states he just got anxious with thinking that the myeloma is getting worse.  He had a PET scan and a bone marrow biopsy last week and is waiting to get final results from all of that.  Patient feels pretty educated both going on and does understand the plan.  He denies any worrisome things.  Denies any fevers or infectious complaints.    Review of Systems    Pertinent items are noted in HPI.    Past History    Past Medical History:   Diagnosis Date     Anemia 12/13/2017     Arthritis      Bilateral inguinal hernia      Chest tube in place      Glaucoma      Glaucoma      History of blood clots     DVT - left chronic     Multiple myeloma (H)     Gets chemo infusions every 2 weeks     Pancytopenia (H)      Parapneumonic effusion      Peripheral neuropathy      Syncope      TMJ (temporomandibular joint disorder)        PHYSICAL EXAM  /60   Pulse 66   Temp 97.8  F (36.6  C)   Resp 16   Wt 77.6 kg (171 lb)   SpO2 99%   BMI 25.25 kg/m      GENERAL: no acute distress. Cooperative in conversation. Here with wife today. Mask on  RESP: Regular respiratory rate. No expiratory wheezes   MUSCULOSKELETAL: no bilateral leg swelling  NEURO: non focal. Alert and oriented x3.   PSYCH: within normal limits. No depression or anxiety.  SKIN: exposed skin is  dry intact.        Lab Results    Recent Results (from the past 168 hour(s))   Glucose by meter   Result Value Ref Range    GLUCOSE BY METER POCT 81 70 - 99 mg/dL   CBC with platelets and differential   Result Value Ref Range    WBC Count 2.6 (L) 4.0 - 11.0 10e3/uL    RBC Count 3.03 (L) 4.40 - 5.90 10e6/uL    Hemoglobin 10.2 (L) 13.3 - 17.7 g/dL    Hematocrit 32.0 (L) 40.0 - 53.0 %     (H) 78 - 100 fL    MCH 33.7 (H) 26.5 - 33.0 pg    MCHC 31.9 31.5 - 36.5 g/dL    RDW 15.2 (H) 10.0 - 15.0 %    Platelet Count 206 150 - 450 10e3/uL     Reviewed myeloma labs.      Imaging    PET Oncology Whole Body    Result Date: 1/27/2022  Combined Report of: PET and CT on  1/27/2022 9:38 AM : 1. PET of the neck, chest, abdomen, and pelvis. 2. PET CT Fusion for Attenuation Correction and Anatomical Localization:  3. 3D MIP and PET-CT fused images were processed on an independent workstation and archived to PACS and reviewed by a radiologist. Technique: 1. PET: The patient received 10.65 mCi of F-18-FDG; the serum glucose was 81 prior to administration, body weight was 166 pounds images were evaluated in the axial, sagittal, and coronal planes as well as the rotational whole body MIP. Images were acquired from the Vertex to the Feet. UPTAKE WAS MEASURED AT 65 MINUTES. BACKGROUND:  Liver SUV max= 3.4,   Aorta Blood SUV Max: 2.43. 2. CT: Volumetric acquisition for clinical interpretation of the chest, abdomen, and pelvis acquired at 3 mm sections . The chest, abdomen, and pelvis were evaluated at 5 mm sections in bone, soft tissue, and lung windows.  3. 3D MIP and PET-CT fused images were processed on an independent workstation and archived to PACS and reviewed by a radiologist. INDICATION: Multiple myeloma, follow up; Multiple myeloma, recurrence; Multiple myeloma not having achieved remission (H) ADDITIONAL INFORMATION OBTAINED FROM EMR: 76-year-old male diagnosed with IgG Kappa light chain myeloma in 2009, Initial cytogenetic  analysis showed a deletion 13q. There was also on 11;14 translocation.  Past immunofixation from 2011 show IgG-kappa. Bone marrow biopsy 8, 2020 at 5%.  Residual kappa light chain myeloma involving 20% of nucleated cells.  No significant change from November 2016 marrow cellularity recent PET/CT demonstrating no evidence of active myeloma COMPARISON: 10/13/2020. FINDINGS: HEAD/NECK: There is no  suspicious FDG uptake in the neck. CHEST: There is no suspicious FDG uptake in the chest. Unchanged heavy coronary artery disease. Stable Port-A-Cath position. ABDOMEN AND PELVIS: There is no suspicious FDG uptake in the abdomen or pelvis. Stable heavy aortic and branch vessel calcifications. LOWER EXTREMITIES: No abnormal masses or hypermetabolic lesions. Enchondroma in the right proximal humerus. BONES, MUSCLES and SUPERFICIAL SOFT TISSUES: Lytic lesions in the right scapula with an SUV max of 5.75 (series 4, image 149). Otherwise no abnormal FDG uptake in the skeleton. Stable T6 vertebral body compression deformity with intervertebral cement. Stable left pubic symphyseal and right iliac lucent lesions.     IMPRESSION: In this patient with multiple myeloma diagnosed in 2009, New solitary focus of active disease in the right scapula. No pathologic fractures. Otherwise examination stable since 10/13/2020 . I have personally reviewed the examination and initial interpretation and I agree with the findings. SHANKAR MOSS MD   SYSTEM ID:  [id number]        Signed by: CECELIA Elaine CNP      Again, thank you for allowing me to participate in the care of your patient.        Sincerely,        CECELIA Elaine CNP

## 2022-02-02 NOTE — PROGRESS NOTES
"Oncology Rooming Note    February 2, 2022 8:18 AM   Theo Meyers is a 76 year old male who presents for:    Chief Complaint   Patient presents with     Oncology Clinic Visit     Initial Vitals: /60   Pulse 66   Temp 97.8  F (36.6  C)   Resp 16   Wt 77.6 kg (171 lb)   SpO2 99%   BMI 25.25 kg/m   Estimated body mass index is 25.25 kg/m  as calculated from the following:    Height as of 10/5/21: 1.753 m (5' 9\").    Weight as of this encounter: 77.6 kg (171 lb). Body surface area is 1.94 meters squared.  No Pain (0) Comment: Data Unavailable   No LMP for male patient.  Allergies reviewed: Yes  Medications reviewed: Yes    Medications: Medication refills not needed today.  Pharmacy name entered into [facility]:    [id number] - [place], [place] - [address] [facility] [address]  [facility] - [place], [place] - [address] [facility]    Clinical concerns: PET Scan, Biopsy      Meg Garcia RN            "

## 2022-02-02 NOTE — PROGRESS NOTES
[facility] Hematology and Oncology Progress Note    Patient: Theo Meyers  MRN: [id number]  Date of Service: Feb 2, 2022          Reason for Visit    Chief Complaint   Patient presents with     Oncology Clinic Visit       Assessment and Plan    Cancer Staging  No matching staging information was found for the patient.    1.  Myeloma: pt continues on daratumumab and dexamethasone. This regimen was initiated in October 2020.  Now getting it every 4 weeks.  We did increase pomalyst to 4mg due to rising light chains.  We did repeat a bone marrow biopsy last week that did show about 20 to 30% plasma cells.  We did repeat a PET scan which also shows a new lesion in his right scapula.  His light chains are overall stable over the last couple months but are still pretty elevated at about 55.  After discussion with Dr. Clifford he would like to refer patient to be evaluated for car T-cell therapy.  Referral placed.  I will have our care coordinator contact their care coordinator to initiate.  In the meantime patient will continue his current regimen.  He will see Dr. Clifford or myself every 1 to 2 months.     2. DVT: This is recurrent.  Will be on xarelto indefinitely.      3. Anemia: likely from treatment and myeloma. No complications. Will continue to monitor.     4.  Risk for Covid complications: He has been vaccinated and boosted. All while on treatment. continues on treatment. Talked to patient and evaluated by me. We discussed the risks and benefits of receiving EVUSHELD, as well as alternative treatments. The Emergency Use Administration (EUA) was provided to the patient for review and an opportunity for questions was provided. Patient wishes to proceed with EVUSHELD.    5.  Neutropenia: This is likely from his Pomalyst.  It does appear that his total white blood cell count is better today.  Still awaiting his ANC.  He will be due to start his next cycle of Pomalyst tomorrow.  We will go ahead and do that as  long as his ANC is 800 or above.  Urged him to take precautions.     ECOG Performance    0 - Independent    Distress Screening (within last 30 days)    No data recorded     Pain  Pain Score: No Pain (0)    Problem List    Patient Active Problem List   Diagnosis     Multiple myeloma (H)     Unspecified glaucoma     Cataract     DVT (deep venous thrombosis) (H)     Shingles     Shingles (herpes zoster) polyneuropathy     Back pain     Post herpetic neuralgia     Pancytopenia (H)     Syncope and collapse     Elevated INR     History of DVT (deep vein thrombosis)     Chronic deep vein thrombosis (DVT) of left lower extremity, unspecified vein (H)     Spinal stenosis of lumbar region without neurogenic claudication     Anemia, vitamin B12 deficiency     Chemotherapy-induced neutropenia (H)     Thrombocytopenia (H)     Paroxysmal atrial fibrillation (H)     Nonrheumatic aortic valve stenosis     SAMANO (dyspnea on exertion)     Immunocompromised state (H)     Neutropenic fever (H)     Personal history of DVT (deep vein thrombosis)     Paroxysmal A-fib (H)        ______________________________________________________________________________    History of Present Illness    DIAGNOSIS:   1. IgG kappa light chain myeloma. Hyposecretory. Diagnosed 2009. No significant measurable M protein. 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 There was also on 11;14 translocation.   2. Deep vein thrombosis of the left leg diagnosed 09/2012 while on treatment.       TREATMENT:   Started Daratumumab in October 2020. Dexamethasone weekly as well.  Today is cycle 18. Pomalyst added with cycle 3.  We have decreased the dose initially and then have now gone back up to full dose.         PAST TREATMENT and HISTORY:   Velcade every 2 weeks since 09/2012. He is getting dexamethasone 20 mg every other week with the velcade. Finally, he is receiving Revlimid 20 mg daily for 3 weeks on, 1 week off.      He presented at diagnosis with a lytic  lesion involving the C6 vertebral body, although no measurable M protein. IgG kappa monoclonal immunoglobulin was confirmed by [facility] as well as elevated kappa free light chains with an abnormal kappa lambda ratio. Bone marrow biopsy showed 30% involvement and cytogenetics confirmed deletion of 13 q. as well as 11;14 translocation. He was initially treated with Velcade and dexamethasone and achieved a good partial response. He was referred to the [facility] where he underwent high-dose chemotherapy with autologous peripheral stem cell transplant in April of 2010. He began Revlimid as maintenance therapy post transplant. Repeat bone marrow biopsy done October 2010 showed about 5% kappa restricted plasma cells and so at that time weekly Velcade was added along with his maintenance Revlimid and dexamethasone. He has done well since that time with stable minimal residual disease, best assessed by serum free light chains. He required dose reductions of weekly Velcade because of cytopenias and was ultimately switched to a q.2 week maintenance schedule which he has been on since September 2012. His Revlimid dose is 20 mg daily and he continues on dexamethasone 20 mg p.o. q. Week.       INTERIM HISTORY:     Pt is here today for follow up.  Patient is doing pretty well.  No new issues. Feeling fine. No new bone or back pain.  He has not noticed any pain in his right scapula.  He states he just got anxious with thinking that the myeloma is getting worse.  He had a PET scan and a bone marrow biopsy last week and is waiting to get final results from all of that.  Patient feels pretty educated both going on and does understand the plan.  He denies any worrisome things.  Denies any fevers or infectious complaints.    Review of Systems    Pertinent items are noted in HPI.    Past History    Past Medical History:   Diagnosis Date     Anemia 12/13/2017     Arthritis      Bilateral inguinal hernia      Chest tube in place       Glaucoma      Glaucoma      History of blood clots     DVT - left chronic     Multiple myeloma (H)     Gets chemo infusions every 2 weeks     Pancytopenia (H)      Parapneumonic effusion      Peripheral neuropathy      Syncope      TMJ (temporomandibular joint disorder)        PHYSICAL EXAM  /60   Pulse 66   Temp 97.8  F (36.6  C)   Resp 16   Wt 77.6 kg (171 lb)   SpO2 99%   BMI 25.25 kg/m      GENERAL: no acute distress. Cooperative in conversation. Here with wife today. Mask on  RESP: Regular respiratory rate. No expiratory wheezes   MUSCULOSKELETAL: no bilateral leg swelling  NEURO: non focal. Alert and oriented x3.   PSYCH: within normal limits. No depression or anxiety.  SKIN: exposed skin is dry intact.        Lab Results    Recent Results (from the past 168 hour(s))   Glucose by meter   Result Value Ref Range    GLUCOSE BY METER POCT 81 70 - 99 mg/dL   CBC with platelets and differential   Result Value Ref Range    WBC Count 2.6 (L) 4.0 - 11.0 10e3/uL    RBC Count 3.03 (L) 4.40 - 5.90 10e6/uL    Hemoglobin 10.2 (L) 13.3 - 17.7 g/dL    Hematocrit 32.0 (L) 40.0 - 53.0 %     (H) 78 - 100 fL    MCH 33.7 (H) 26.5 - 33.0 pg    MCHC 31.9 31.5 - 36.5 g/dL    RDW 15.2 (H) 10.0 - 15.0 %    Platelet Count 206 150 - 450 10e3/uL     Reviewed myeloma labs.      Imaging    PET Oncology Whole Body    Result Date: 1/27/2022  Combined Report of: PET and CT on  1/27/2022 9:38 AM : 1. PET of the neck, chest, abdomen, and pelvis. 2. PET CT Fusion for Attenuation Correction and Anatomical Localization:  3. 3D MIP and PET-CT fused images were processed on an independent workstation and archived to PACS and reviewed by a radiologist. Technique: 1. PET: The patient received 10.65 mCi of F-18-FDG; the serum glucose was 81 prior to administration, body weight was 166 pounds images were evaluated in the axial, sagittal, and coronal planes as well as the rotational whole body MIP. Images were acquired from the  Vertex to the Feet. UPTAKE WAS MEASURED AT 65 MINUTES. BACKGROUND:  Liver SUV max= 3.4,   Aorta Blood SUV Max: 2.43. 2. CT: Volumetric acquisition for clinical interpretation of the chest, abdomen, and pelvis acquired at 3 mm sections . The chest, abdomen, and pelvis were evaluated at 5 mm sections in bone, soft tissue, and lung windows.  3. 3D MIP and PET-CT fused images were processed on an independent workstation and archived to PACS and reviewed by a radiologist. INDICATION: Multiple myeloma, follow up; Multiple myeloma, recurrence; Multiple myeloma not having achieved remission (H) ADDITIONAL INFORMATION OBTAINED FROM EMR: 76-year-old male diagnosed with IgG Kappa light chain myeloma in 2009, Initial cytogenetic analysis showed a deletion 13q. There was also on 11;14 translocation.  Past immunofixation from 2011 show IgG-kappa. Bone marrow biopsy 8, 2020 at 5%.  Residual kappa light chain myeloma involving 20% of nucleated cells.  No significant change from November 2016 marrow cellularity recent PET/CT demonstrating no evidence of active myeloma COMPARISON: 10/13/2020. FINDINGS: HEAD/NECK: There is no  suspicious FDG uptake in the neck. CHEST: There is no suspicious FDG uptake in the chest. Unchanged heavy coronary artery disease. Stable Port-A-Cath position. ABDOMEN AND PELVIS: There is no suspicious FDG uptake in the abdomen or pelvis. Stable heavy aortic and branch vessel calcifications. LOWER EXTREMITIES: No abnormal masses or hypermetabolic lesions. Enchondroma in the right proximal humerus. BONES, MUSCLES and SUPERFICIAL SOFT TISSUES: Lytic lesions in the right scapula with an SUV max of 5.75 (series 4, image 149). Otherwise no abnormal FDG uptake in the skeleton. Stable T6 vertebral body compression deformity with intervertebral cement. Stable left pubic symphyseal and right iliac lucent lesions.     IMPRESSION: In this patient with multiple myeloma diagnosed in 2009, New solitary focus of active  disease in the right scapula. No pathologic fractures. Otherwise examination stable since 10/13/2020 . I have personally reviewed the examination and initial interpretation and I agree with the findings. SHANKAR MOSS MD   SYSTEM ID:  [id number]        Signed by: CECELIA Elaine CNP

## 2022-02-10 NOTE — TELEPHONE ENCOUNTER
Called pt. to confirm new appointment. The pt. Stated that he is not hospitalized or plans to be hospitalized during the time of the appointment.     Gave our office number in case the pt. has any further questions or concerns.

## 2022-02-13 NOTE — PROGRESS NOTES
BMT Consultation    Theo Meyers is a 76 year old male referred by Dr. Clifford for refractory myeloma.        Hematologic history:  Multiple myeloma diagnosed in 2009, IgG kappa but evolving to light chain secretory, del 13q, t(11;14).      Date Treatment Response Toxicities/Complications   7/2009 Velcade/Dex x 8 cycles VGPR    4/2010 HD-Jennifer/PBSCT CR    6/2010 Rev maintenance     10/2012 RVD  cytopenias   9/2012 RVD (q 2wk velcade) Long remission    10/2020 Brianne/pom/dex Recent progresson                             HPI:  Please see my entry above for hematologic history.  He is a pleasant 76-year-old former  of [employer] who has a long history with multiple myeloma primarily managed by Dr. Clifford.  His up from therapy consisted of Velcade dexamethasone followed by high-dose melphalan and transplant followed by Revlimid maintenance from which he benefited from an almos-year period of disease control.  In October 2012, he was restarted on RVD and tolerated this reasonably well.  He remained on various versions of this for approximately 8 years with good quality life and good control.  Eventually began to show signs of progression and in October 2020 treatment was transitioned to Brianne, Pom, Dex.  He tolerated this well but unfortunately is not derive the same longer-term benefit.  He remains feeling well, with a good performance status, and does not have any notable signs or symptoms of myeloma that is depicted in the graph below, he has clear signs of gradual progression based on a rising kappa light chain.    He remains active and busy with good health at the age of 76.  He spends time with friends, works around the house, works in his yard, and is  to supportive wife who also has reasonable health.  He has no specific organ function, and he is not notably disabled.      ASSESSMENT AND PLAN:  Progressive multiple myeloma-he is now received 3 lines of therapy and has progressive multiple myeloma  based on the rising kappa chain.  He has an excellent performance status, good organ function, and is independent and fit.  He is a reasonable candidate for CAR-T therapy and today I focused our conversation on this option.  I explained to him the rationale for the treatment and how the treatment is accomplished.  I described the process of T-cell a pheresis and how the cells would be sent to manufacturing facility where they would be modified by inserting genes that provide a novel function of the cells.  I pointed out that at the time that the cells are available, he would receive lymphodepleting chemotherapy consisting of fludarabine and cyclophosphamide in a few days later the cellular infusion.  The current product that he would be eligible to receive is Francia-tegan that carries a high risk of significant cytokine release syndrome.  Accordingly, he would be admitted to the hospital to receive the cellular infusion and there would be expectation that he would be likely to experience CRS within 24-48 hrs.  His hospital stay would be a minimum of 7 days although I explained that it could be longer.  I explained that another complication would be immune cell associated neurotoxicity syndrome (ICANS) which could create confusion, mental status changes, and although the primary risk is during for 7 days, this could extend to 28 days.  For that reason he would need a caregiver for the entire 20 a day.  And we would restrict his ability to drive during that time due to the potential risk.  I spent time reviewing the pros and cons of this approach and I do recommend that he consider it given his good health but also the progressive disease after 3 lines of therapy.  I explained that there are other options available including selinexor, other monoclonal antibodies, and other clinical trials.  We talked about the pros and cons of some these other approaches as well.    He is interested in proceeding and so we will will  attempt to secure the slot for apheresis and hopefully move forward shortly.    ROS:    10 point ROS neg other than the symptoms noted above in the HPI.        Past Medical History:   Diagnosis Date     Anemia 2017     Arthritis      Bilateral inguinal hernia      Chest tube in place      Glaucoma      Glaucoma      History of blood clots     DVT - left chronic     Multiple myeloma (H)     Gets chemo infusions every 2 weeks     Pancytopenia (H)      Parapneumonic effusion      Peripheral neuropathy      Syncope      TMJ (temporomandibular joint disorder)        Past Surgical History:   Procedure Laterality Date     APPENDECTOMY      Age 16     ESOPHAGOSCOPY, GASTROSCOPY, DUODENOSCOPY (EGD), COMBINED N/A 2017    Procedure: ESOPHAGOGASTRODUODENOSCOPY (EGD) WITH BIOPSYS;  Surgeon: Marlon Roy MD;  Location: [facility];  Service:      ESOPHAGOSCOPY, GASTROSCOPY, DUODENOSCOPY (EGD), COMBINED N/A 2018    Procedure: ENDOSCOPIC ULTRASOUND  ESOPHAGOGASTRODUODENOSCOPY WITH DUODENAL POLYP REMOVAL;  Surgeon: Familia Mcgraw MD;  Location: [facility] OR;  Service:      EYE SURGERY      Cataract     IR MISCELLANEOUS PROCEDURE  2009     IR MISCELLANEOUS PROCEDURE  2009     IR MISCELLANEOUS PROCEDURE  2009     IR PLEURAL DRAINAGE WITH CATHETER INSERTION  2019     PORTACATH PLACEMENT       RELEASE CARPAL TUNNEL Bilateral      US THORACENTESIS  2019     VERTEBROPLASTY      T6     WISDOM TOOTH EXTRACTION         Family History   Problem Relation Age of Onset     Heart Disease Mother      Glaucoma Mother      Cancer Father      No Known Problems Sister      Cancer Brother      Pancreatic Cancer Brother      No Known Problems Brother      Cancer Daughter      Skin Cancer Daughter      Melanoma Daughter      Glaucoma Daughter        Social History     Tobacco Use     Smoking status: Former Smoker     Quit date: 1975     Years since quittin.2     Smokeless tobacco: Never Used    Substance Use Topics     Alcohol use: Not Currently     Comment: Alcoholic Drinks/day: occasional     Drug use: No         Allergies   Allergen Reactions     Centex-Pse Er [Pseudoephedrine-Guaifenesin Cr]      TB12        Current Outpatient Medications   Medication Sig Dispense Refill     acetaminophen (TYLENOL) 500 MG tablet Take 500-1,000 mg by mouth       acyclovir (ZOVIRAX) 400 MG tablet TAKE 1 TABLET BY MOUTH TWICE DAILY 180 tablet 1     ascorbic acid 1000 MG TABS tablet        atorvastatin (LIPITOR) 10 MG tablet Take 1 tablet (10 mg) by mouth daily 90 tablet 3     bimatoprost (LUMIGAN) 0.03 % ophthalmic drops Place 1 drop into both eyes At Bedtime.       brimonidine (ALPHAGAN P) 0.1 % SOLN Place 1 drop into both eyes every 8 hours.       calcium carbonate 1250 (500 Ca) MG CHEW Take 1 tablet by mouth       calcium carbonate-vitamin D (OYSTER SHELL CALCIUM/D) 500-200 MG-UNIT tablet Take 1 tablet by mouth       Calcium-Vitamin D (CALCIUM + D PO) Take 1 tablet by mouth daily.       fluticasone (FLONASE) 50 MCG/ACT nasal spray Spray 2 sprays into both nostrils daily       gabapentin (NEURONTIN) 300 MG capsule TAKE 1 CAPSULE(300 MG) BY MOUTH DAILY AS NEEDED 30 capsule 5     magnesium oxide 400 MG CAPS        Multiple Vitamin (MULTI-VITAMIN PO) Take 1 capsule by mouth daily.       Omega-3 Fatty Acids (OMEGA 3 PO) TAKE AS DIRECTED        omeprazole (PRILOSEC) 20 MG DR capsule TAKE 1 CAPSULE BY MOUTH ONCE DAILY 90 capsule 3     pomalidomide (POMALYST) 4 MG capsule Take 1 capsule (4 mg) by mouth daily Swallow whole, do NOT break, crush, chew or open capsule. Take on days 1-21 of repeated 28 day cycles. 21 capsule 0     potassium chloride ER (KLOR-CON M) 20 MEQ CR tablet Take 1 tablet (20 mEq) by mouth every other day       Psyllium (METAMUCIL PO) USE AS DIRECTED        tamsulosin (FLOMAX) 0.4 MG capsule TAKE 1 CAPSULE BY MOUTH EVERY DAY 90 capsule 1     UNABLE TO FIND MEDICATION NAME: Certazine       XARELTO  ANTICOAGULANT 20 MG TABS tablet          KPS:  100    Physical Exam:     Physical Exam     Kappa Free Light chain trends    TODAY'S ASSESSMENT BY SYSTEMS     HEMATOLOGY, COAGULATION    He has no evidence of marrow failure    IMMUNOCOMPROMISED HOST  He is immunocompromised by virtue of his disease    PULMONARY  No history of pulmonary disease but he is a former smoker    LIVER  He has no history of liver disease    GASTROINTESTINAL  No history gastrointestinal disease    RENAL  No history of renal disease    CARDIOVASCULAR  He has history of coronary artery disease but has not sustained an MI     ENDOCRINE  No history of diabetes    MUSCULOSKELETAL  He walks and is active without specific limitation    NEUROLOGIC  No known neurologic disease    PSYCHIATRIC  No history of psychiatric disease    SOCIAL  He lives locally and with a supportive wife    Theo understood the above assessment and recommendations.  Multiple questions answered.  No barriers to learning identified.       Known issues that I take into account for medical decisions, with salient changes to the plan considering these complexities noted above.    Patient Active Problem List   Diagnosis     Multiple myeloma (H)     Unspecified glaucoma     Cataract     DVT (deep venous thrombosis) (H)     Shingles     Shingles (herpes zoster) polyneuropathy     Back pain     Post herpetic neuralgia     Pancytopenia (H)     Syncope and collapse     Elevated INR     History of DVT (deep vein thrombosis)     Chronic deep vein thrombosis (DVT) of left lower extremity, unspecified vein (H)     Spinal stenosis of lumbar region without neurogenic claudication     Anemia, vitamin B12 deficiency     Chemotherapy-induced neutropenia (H)     Thrombocytopenia (H)     Paroxysmal atrial fibrillation (H)     Nonrheumatic aortic valve stenosis     SAMANO (dyspnea on exertion)     Immunocompromised state (H)     Neutropenic fever (H)     Personal history of DVT (deep vein thrombosis)      Jason A-fib (H)       Today's summary: We will look towards securing a a pheresis slot and hopefully move forward    I spent 90 minutes in the care of this patient today, which included time necessary for preparation for the visit, obtaining history, ordering medications/tests/procedures as medically indicated, review of pertinent medical literature, counseling of the patient, communication of recommendations to the care team, and documentation time.    FABIENNE VALDIVIA MD  February 14, 2022        ------------------------------------------------------------------------------------------------------------------------------------------------    Patient Care Team       Relationship Specialty Notifications Start End    Mathew Ott MD PCP - General Internal Medicine  10/24/19     Phone: [phone] Fax: [phone]         [facility] [place] [address]    Zarina Foss MD BMT Physician   6/27/11     Populated via Import on 6/28/11    Phone: [phone] Fax: [phone]         [address] [facility] [address] [facility] [address]    Hannah Munoz RN BMT Nurse Coordinator   6/13/13     Per Clifford MD MD Hematology & Oncology Admissions 11/19/18     Phone: [phone] Fax: [phone] [address] [facility] CANCER [facility] [address]    Mita Ratliff, PharmD Pharmacist Pharmacist  10/23/19     Phone: [phone] Fax: [phone]         [facility] [place] [address] [place] [address]    Per Clifford MD Assigned Cancer Care Provider   7/16/21     Phone: [phone] Fax: [phone] [address] [facility] CANCER CENTER [facility] [address]    Mathew Ott MD Assigned PCP   10/17/21     Phone: [phone] Fax: [phone] [address] [place] [address]    Saundra Layton, RN Specialty Care Coordinator Hematology & Oncology Admissions 11/2/21           MDM

## 2022-02-14 NOTE — LETTER
2/14/2022     RE: Theo Meyers  [address]  [facility] [address]    Dear Colleague,    Thank you for referring your patient, Theo Meyers, to the [facility] [place]. Please see a copy of my visit note below.           BMT Consultation  Theo Meyers is a 76 year old male referred by Dr. Clifford for refractory myeloma.    Hematologic history:  Multiple myeloma diagnosed in 2009, IgG kappa but evolving to light chain secretory, del 13q, t(11;14).      Date Treatment Response Toxicities/Complications   7/2009 Velcade/Dex x 8 cycles VGPR    4/2010 HD-Jennifer/PBSCT CR    6/2010 Rev maintenance     10/2012 RVD  cytopenias   9/2012 RVD (q 2wk velcade) Long remission    10/2020 Brianne/pom/dex Recent progresson                             HPI:  Please see my entry above for hematologic history.  He is a pleasant 76-year-old former  of [employer] who has a long history with multiple myeloma primarily managed by Dr. Clifford.  His up from therapy consisted of Velcade dexamethasone followed by high-dose melphalan and transplant followed by Revlimid maintenance from which he benefited from an almos-year period of disease control.  In October 2012, he was restarted on RVD and tolerated this reasonably well.  He remained on various versions of this for approximately 8 years with good quality life and good control.  Eventually began to show signs of progression and in October 2020 treatment was transitioned to Brianne, Pom, Dex.  He tolerated this well but unfortunately is not derive the same longer-term benefit.  He remains feeling well, with a good performance status, and does not have any notable signs or symptoms of myeloma that is depicted in the graph below, he has clear signs of gradual progression based on a rising kappa light chain.    He remains active and busy with good health at the age of 76.  He spends time with friends, works around the house, works in his yard,  and is  to supportive wife who also has reasonable health.  He has no specific organ function, and he is not notably disabled.      ASSESSMENT AND PLAN:  Progressive multiple myeloma-he is now received 3 lines of therapy and has progressive multiple myeloma based on the rising kappa chain.  He has an excellent performance status, good organ function, and is independent and fit.  He is a reasonable candidate for CAR-T therapy and today I focused our conversation on this option.  I explained to him the rationale for the treatment and how the treatment is accomplished.  I described the process of T-cell a pheresis and how the cells would be sent to manufacturing facility where they would be modified by inserting genes that provide a novel function of the cells.  I pointed out that at the time that the cells are available, he would receive lymphodepleting chemotherapy consisting of fludarabine and cyclophosphamide in a few days later the cellular infusion.  The current product that he would be eligible to receive is Francia-tegan that carries a high risk of significant cytokine release syndrome.  Accordingly, he would be admitted to the hospital to receive the cellular infusion and there would be expectation that he would be likely to experience CRS within 24-48 hrs.  His hospital stay would be a minimum of 7 days although I explained that it could be longer.  I explained that another complication would be immune cell associated neurotoxicity syndrome (ICANS) which could create confusion, mental status changes, and although the primary risk is during for 7 days, this could extend to 28 days.  For that reason he would need a caregiver for the entire 20 a day.  And we would restrict his ability to drive during that time due to the potential risk.  I spent time reviewing the pros and cons of this approach and I do recommend that he consider it given his good health but also the progressive disease after 3 lines of  therapy.  I explained that there are other options available including selinexor, other monoclonal antibodies, and other clinical trials.  We talked about the pros and cons of some these other approaches as well.    He is interested in proceeding and so we will will attempt to secure the slot for apheresis and hopefully move forward shortly.    ROS:    10 point ROS neg other than the symptoms noted above in the HPI.        Past Medical History:   Diagnosis Date     Anemia 12/13/2017     Arthritis      Bilateral inguinal hernia      Chest tube in place      Glaucoma      Glaucoma      History of blood clots     DVT - left chronic     Multiple myeloma (H)     Gets chemo infusions every 2 weeks     Pancytopenia (H)      Parapneumonic effusion      Peripheral neuropathy      Syncope      TMJ (temporomandibular joint disorder)        Past Surgical History:   Procedure Laterality Date     APPENDECTOMY      Age 16     ESOPHAGOSCOPY, GASTROSCOPY, DUODENOSCOPY (EGD), COMBINED N/A 12/14/2017    Procedure: ESOPHAGOGASTRODUODENOSCOPY (EGD) WITH BIOPSYS;  Surgeon: Marlon Roy MD;  Location: [facility];  Service:      ESOPHAGOSCOPY, GASTROSCOPY, DUODENOSCOPY (EGD), COMBINED N/A 1/19/2018    Procedure: ENDOSCOPIC ULTRASOUND  ESOPHAGOGASTRODUODENOSCOPY WITH DUODENAL POLYP REMOVAL;  Surgeon: Familia Mcgraw MD;  Location: [facility] OR;  Service:      EYE SURGERY      Cataract     IR MISCELLANEOUS PROCEDURE  6/17/2009     IR MISCELLANEOUS PROCEDURE  7/31/2009     IR MISCELLANEOUS PROCEDURE  8/13/2009     IR PLEURAL DRAINAGE WITH CATHETER INSERTION  7/2/2019     PORTACATH PLACEMENT       RELEASE CARPAL TUNNEL Bilateral      US THORACENTESIS  6/27/2019     VERTEBROPLASTY      T6     WISDOM TOOTH EXTRACTION         Family History   Problem Relation Age of Onset     Heart Disease Mother      Glaucoma Mother      Cancer Father      No Known Problems Sister      Cancer Brother      Pancreatic Cancer Brother      No Known  Problems Brother      Cancer Daughter      Skin Cancer Daughter      Melanoma Daughter      Glaucoma Daughter        Social History     Tobacco Use     Smoking status: Former Smoker     Quit date: 1975     Years since quittin.2     Smokeless tobacco: Never Used   Substance Use Topics     Alcohol use: Not Currently     Comment: Alcoholic Drinks/day: occasional     Drug use: No         Allergies   Allergen Reactions     Centex-Pse Er [Pseudoephedrine-Guaifenesin Cr]      TB12        Current Outpatient Medications   Medication Sig Dispense Refill     acetaminophen (TYLENOL) 500 MG tablet Take 500-1,000 mg by mouth       acyclovir (ZOVIRAX) 400 MG tablet TAKE 1 TABLET BY MOUTH TWICE DAILY 180 tablet 1     ascorbic acid 1000 MG TABS tablet        atorvastatin (LIPITOR) 10 MG tablet Take 1 tablet (10 mg) by mouth daily 90 tablet 3     bimatoprost (LUMIGAN) 0.03 % ophthalmic drops Place 1 drop into both eyes At Bedtime.       brimonidine (ALPHAGAN P) 0.1 % SOLN Place 1 drop into both eyes every 8 hours.       calcium carbonate 1250 (500 Ca) MG CHEW Take 1 tablet by mouth       calcium carbonate-vitamin D (OYSTER SHELL CALCIUM/D) 500-200 MG-UNIT tablet Take 1 tablet by mouth       Calcium-Vitamin D (CALCIUM + D PO) Take 1 tablet by mouth daily.       fluticasone (FLONASE) 50 MCG/ACT nasal spray Spray 2 sprays into both nostrils daily       gabapentin (NEURONTIN) 300 MG capsule TAKE 1 CAPSULE(300 MG) BY MOUTH DAILY AS NEEDED 30 capsule 5     magnesium oxide 400 MG CAPS        Multiple Vitamin (MULTI-VITAMIN PO) Take 1 capsule by mouth daily.       Omega-3 Fatty Acids (OMEGA 3 PO) TAKE AS DIRECTED        omeprazole (PRILOSEC) 20 MG DR capsule TAKE 1 CAPSULE BY MOUTH ONCE DAILY 90 capsule 3     pomalidomide (POMALYST) 4 MG capsule Take 1 capsule (4 mg) by mouth daily Swallow whole, do NOT break, crush, chew or open capsule. Take on days 1-21 of repeated 28 day cycles. 21 capsule 0     potassium chloride ER (KLOR-CON  M) 20 MEQ CR tablet Take 1 tablet (20 mEq) by mouth every other day       Psyllium (METAMUCIL PO) USE AS DIRECTED        tamsulosin (FLOMAX) 0.4 MG capsule TAKE 1 CAPSULE BY MOUTH EVERY DAY 90 capsule 1     UNABLE TO FIND MEDICATION NAME: Certazine       XARELTO ANTICOAGULANT 20 MG TABS tablet          KPS:  100    Physical Exam:     Physical Exam     Kappa Free Light chain trends    TODAY'S ASSESSMENT BY SYSTEMS     HEMATOLOGY, COAGULATION    He has no evidence of marrow failure    IMMUNOCOMPROMISED HOST  He is immunocompromised by virtue of his disease    PULMONARY  No history of pulmonary disease but he is a former smoker    LIVER  He has no history of liver disease    GASTROINTESTINAL  No history gastrointestinal disease    RENAL  No history of renal disease    CARDIOVASCULAR  He has history of coronary artery disease but has not sustained an MI     ENDOCRINE  No history of diabetes    MUSCULOSKELETAL  He walks and is active without specific limitation    NEUROLOGIC  No known neurologic disease    PSYCHIATRIC  No history of psychiatric disease    SOCIAL  He lives locally and with a supportive wife    Theo understood the above assessment and recommendations.  Multiple questions answered.  No barriers to learning identified.       Known issues that I take into account for medical decisions, with salient changes to the plan considering these complexities noted above.    Patient Active Problem List   Diagnosis     Multiple myeloma (H)     Unspecified glaucoma     Cataract     DVT (deep venous thrombosis) (H)     Shingles     Shingles (herpes zoster) polyneuropathy     Back pain     Post herpetic neuralgia     Pancytopenia (H)     Syncope and collapse     Elevated INR     History of DVT (deep vein thrombosis)     Chronic deep vein thrombosis (DVT) of left lower extremity, unspecified vein (H)     Spinal stenosis of lumbar region without neurogenic claudication     Anemia, vitamin B12 deficiency      Chemotherapy-induced neutropenia (H)     Thrombocytopenia (H)     Paroxysmal atrial fibrillation (H)     Nonrheumatic aortic valve stenosis     SAMANO (dyspnea on exertion)     Immunocompromised state (H)     Neutropenic fever (H)     Personal history of DVT (deep vein thrombosis)     Paroxysmal A-fib (H)       Today's summary: We will look towards securing a a pheresis slot and hopefully move forward    I spent 90 minutes in the care of this patient today, which included time necessary for preparation for the visit, obtaining history, ordering medications/tests/procedures as medically indicated, review of pertinent medical literature, counseling of the patient, communication of recommendations to the care team, and documentation time.    FABIENNE VALDIVIA MD  February 14, 2022        ------------------------------------------------------------------------------------------------------------------------------------------------    Patient Care Team       Relationship Specialty Notifications Start End    Mathew Ott MD PCP - General Internal Medicine  10/24/19     Phone: [phone] Fax: [phone]         [facility] [facility]  [facility] [address]    Zarina Foss MD BMT Physician   6/27/11     Populated via Import on 6/28/11    Phone: [phone] Fax: [phone] [address] [facility] [address] [facility] [address]    Hannah Munoz, MIRTA BMT Nurse Coordinator   6/13/13     Per Clifford MD MD Hematology & Oncology Admissions 11/19/18     Phone: [phone] Fax: [phone]         [address] [facility] [address]    Mita Ratliff, PharmD Pharmacist Pharmacist  10/23/19     Phone: [phone] Fax: [phone]         [facility] [address] [place] [address]    Per Clifford MD Assigned Cancer Care Provider   7/16/21     Phone: [phone] Fax: [phone] [address] [facility] CANCER CENTER [facility] [address]    Mathew Ott MD Assigned PCP   10/17/21      Phone: [phone] Fax: [phone] [address] [place] [address]    Saundra Layton, RN Specialty Care Coordinator Hematology & Oncology Admissions 11/2/21         MDM    Again, thank you for allowing me to participate in the care of your patient.      Sincerely,    BMT DOM

## 2022-02-14 NOTE — NURSING NOTE
"Oncology Rooming Note    February 14, 2022 3:53 PM   Theo Meyers is a 76 year old male who presents for:    Chief Complaint   Patient presents with     RECHECK     Pt is here for a New Eval for MM     Initial Vitals: Blood Pressure (Abnormal) 144/83   Pulse 63   Temperature 98  F (36.7  C) (Oral)   Height 1.727 m (5' 8\")   Weight 75.6 kg (166 lb 9.6 oz)   Oxygen Saturation 100%   Body Mass Index 25.33 kg/m   Estimated body mass index is 25.33 kg/m  as calculated from the following:    Height as of this encounter: 1.727 m (5' 8\").    Weight as of this encounter: 75.6 kg (166 lb 9.6 oz). Body surface area is 1.9 meters squared.  No Pain (0) Comment: Data Unavailable   No LMP for male patient.  Allergies reviewed: Yes  Medications reviewed: Yes    Medications: Medication refills not needed today.  Pharmacy name entered into [facility]:    [id number] [place], [place] - [address] [facility] [address] [place]  [facility] - [place], [place] - [address] [facility]    Clinical concerns: none       Karuna Palma MA            "

## 2022-02-15 NOTE — LETTER
2/15/2022         RE: Theo Meyers  [address]  [place] [address]        Dear Colleague,    Thank you for referring your patient, Theo Meyers, to the [facility] BLOOD AND MARROW TRANSPLANT PROGRAM [place]. Please see a copy of my visit note below.    BMT  informed by MIRTA Maldonado that pt does not need a call today due to no insurance coverage for transplant at this time. Will reschedule if pt approved for transplant.         Again, thank you for allowing me to participate in the care of your patient.      Sincerely,    KAITLIN Meade

## 2022-02-21 NOTE — TELEPHONE ENCOUNTER
MIRTA Yoon from [facility] hematology area calls in today to give Dr. Clifford an update.  She states that she and a hematologist there reviewed patient's records and they see that he has only had 3 lines of treatment.  If this is the case, patient does not qualify for car T-cell therapy.  She states that that is approved in other countries however here in the [place] they need to fail 4 lines of therapy to be approved for car T-cell therapy.  She does state that we can still refer him there if he has only had 3 lines of treatment for second opinion and to look and see if there are trials that have open slots.  I let her know that I will touch base with Dr. Clifford and the RN care coordinator and someone will reach her back.  She states she can be reached at [phone].    Mere Machado RN

## 2022-02-22 NOTE — TELEPHONE ENCOUNTER
Patient calls back in today and has similar questions to what the [facility] is asking. He also has questions regarding what he is heard from the [facility] as well. Per Dr. Clifford, he can speak with the patient tomorrow morning when he is in for his labs. An appointment has been added and patient has been made aware.    Mere Machado RN

## 2022-02-23 NOTE — PROGRESS NOTES
Theo Meyers, 76 year old, male, arrived to [facility] at 0803 for lab draw. Port easily accessed, labs drawn, and flushed with 20ml Normal Saline and 500 units Heparin. Port de-accessed and site covered with gauze and papertape. Theo Meyers discharged to [facility] ambulatory and stable.

## 2022-02-23 NOTE — PROGRESS NOTES
"Oncology Rooming Note    February 23, 2022 8:31 AM   Theo Meyers is a 76 year old male who presents for:    Chief Complaint   Patient presents with     Oncology Clinic Visit     Initial Vitals: /63   Pulse 51   Temp 97.7  F (36.5  C)   Resp 16   Wt 76.7 kg (169 lb)   SpO2 98%   BMI 25.70 kg/m   Estimated body mass index is 25.7 kg/m  as calculated from the following:    Height as of 2/14/22: 1.727 m (5' 8\").    Weight as of this encounter: 76.7 kg (169 lb). Body surface area is 1.92 meters squared.  No Pain (0) Comment: Data Unavailable   No LMP for male patient.  Allergies reviewed: Yes  Medications reviewed: Yes    Medications: Medication refills not needed today.  Pharmacy name entered into [facility]:    [id number] - [place], [place] - [address] [facility] [address] [place] [address] [place]  [facility] - [place], [place] - [address] [facility]    Clinical concerns: Questions for Dr. Venessa Garcia, RN            "

## 2022-02-23 NOTE — LETTER
"    2/23/2022         RE: Theo Meyers  [address]  [place] [address]        Dear Colleague,    Thank you for referring your patient, Theo Meyers, to the [facility]. Please see a copy of my visit note below.    Oncology Rooming Note    February 23, 2022 8:31 AM   Theo Meyers is a 76 year old male who presents for:    Chief Complaint   Patient presents with     Oncology Clinic Visit     Initial Vitals: /63   Pulse 51   Temp 97.7  F (36.5  C)   Resp 16   Wt 76.7 kg (169 lb)   SpO2 98%   BMI 25.70 kg/m   Estimated body mass index is 25.7 kg/m  as calculated from the following:    Height as of 2/14/22: 1.727 m (5' 8\").    Weight as of this encounter: 76.7 kg (169 lb). Body surface area is 1.92 meters squared.  No Pain (0) Comment: Data Unavailable   No LMP for male patient.  Allergies reviewed: Yes  Medications reviewed: Yes    Medications: Medication refills not needed today.  Pharmacy name entered into [facility]:    [id number] - [place], [place] - [facility] [address] [facility] [address] [place] [address] [place]  [facility] - [place], [place] - [address] [facility]    Clinical concerns: Questions for Dr. Venessa Garcia, RN              [facility] Hematology and Oncology Progress Note    Patient: Theo Meyers  MRN: [id number]  Date of Service: Feb 23, 2022        Assessment and Plan:    1. Kappa light chain myeloma: He has recently been evaluated at the [facility] for cellular therapy.  It is unclear if he currently needs posterior for that.  Is also unclear what the extent of his insurance coverage will be.  He is also looking into the [facility] but thinks he might need to have more myeloma directed therapy before cellular therapy from what he has been told.  They are also looking into clinical trials with [facility] for him.  He also had a PET scan on January 27.  I reviewed those images.  These show a new focus of likely myeloma " in the right scapula.  He is asymptomatic.  If he does not start cellular therapy soon and we can radiate this solitary site.  For now, we will continue with this daratumumab, pomalidomide, and dexamethasone.    2.  Pancytopenia: Still has mild neutropenia but this is stable.  Mild anemia which is also stable.      3.  Prophylaxis: Continue Xarelto 20 mg daily.  No issues with bleeding recently.    I spent 40 minutes in the care of this patient today, which included time necessary for preparation for the visit, face to face time with the patient, communication of recommendations to the care team, and documentation time.    ECOG Performance  0    Diagnosis:    1.  IgG kappa light chain myeloma. Hyposecretory. Diagnosed 2009. No significant measurable M protein. 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 There was also on 11;14 translocation.  Past immunofixations from 2011 show IgG-kappa.    Bone marrow biopsy 8, 2020  at 5%.  Residual kappa light chain myeloma involving 20% of nucleated cells.  No significant change from November 2016 marrow cellularity.     Bone marrow biopsy from January 26, 2022: Hypocellular marrow involved with multiple myeloma at 20 to 30%.  FISH:  - Gains of chromosomes 9 and 15 (80%)  - Gain of JAQUI (11q) (93%)  - IGH-CCND1 fusion with gains of 1-2 extra fusion signals (92%)  - Monosomy 13 (94%)     NORMAL  - No gain of 1q  - No losses of 1p or TP53     INTERPRETATION:  These findings are consistent with the reported pathologic diagnosis of persistent plasma cell myeloma, reportedly characterized at diagnosis (study performed at an outside institution) by an IGH-CCND1 fusion and monosomy 13. The population of cells with gains of two extra IGH-CCND1 fusion signals is apparently newly arisen and would be consistent with cytogenetic evolution.    2.  Deep vein thrombosis of the left leg diagnosed 9/2012 while on treatment.     3.  Vitamin B12 deficiency diagnosed in September  2017.     4.  GI bleed and anemia requiring hospitalization in December 2017.    5.  MOHS surgery in January, 2021; May 2021 (chest; squamous).     Treatment:    He presented with a lytic lesion involving the C6 vertebral body, although no measurable M protein. IgG kappa monoclonal immunoglobulin was confirmed by [facility] as well as elevated kappa free light chains with an abnormal kappa lambda ratio. Bone marrow biopsy showed 30% involvement and cytogenetics confirmed deletion of 13 q. as well as 11;14 translocation. He was initially treated with Velcade and dexamethasone and achieved a good partial response.     He was referred to the [facility] where he underwent high-dose chemotherapy with autologous peripheral stem cell transplant in April of 2010. He began Revlimid as maintenance therapy post transplant. Repeat bone marrow biopsy done October 2010 showed about 5% kappa restricted plasma cells and so at that time weekly Velcade was added along with his maintenance Revlimid and dexamethasone.      He has done well since that time with stable minimal residual disease, best assessed by serum free light chains. He required dose reductions of weekly Velcade because of cytopenias and was ultimately switched to a q 2 week maintenance schedule which he has been on since September 2012.      His Revlimid dose was reduced to 15 mg daily and he continues on dexamethasone 20 mg p.o. weekly  Revlimid dosing changed to 20 mg, 3 weeks on 1 week (instead of 15 mg daily) for cost reasons.  Started March, 2018     Started daratumumab/pomalidomide/dexamethasone (20mg every 14 days) on October 14, 2020.     Progressed on:  bortezomib  lenalidomide    Interim History:    Keith returns today for follow-up visit.  He can in to discuss trial and treatment logistics. He is not sure if his insurance will cover it. He has also spoken to [facility].  Clinically though he seems to be doing okay.  No other new complaints today.    Review  of Systems:    As above in the history.     Review of Systems otherwise Negative for:  General: chills, fever or night sweats  Psychological: anxiety or depression  Ophthalmic: blurry vision, double vision or loss of vision, vision change  ENT: epistaxis, oral lesions, hearing changes  Hematological and Lymphatic: bleeding, bruising, jaundice, swollen lymph nodes  Endocrine: hot flashes, unexpected weight changes  Respiratory: cough, hemoptysis, orthopnea or shortness of breath/SAMANO  Cardiovascular: chest pain, edema, palpitations or PND  Gastrointestinal: abdominal pain, blood in stools, change in bowel habits, constipation, diarrhea or nausea/vomiting  Genito-Urinary: change in urinary stream, incontinence, frequency/urgency  Musculoskeletal: joint pain, stiffness, swelling, muscle pain  Neurological: dizziness, headaches, numbness/tingling  Dermatological: lumps and rash    Past History:    Past Medical History:   Diagnosis Date     Anemia 12/13/2017     Arthritis      Bilateral inguinal hernia      Chest tube in place      Glaucoma      Glaucoma      History of blood clots     DVT - left chronic     Multiple myeloma (H)     Gets chemo infusions every 2 weeks     Pancytopenia (H)      Parapneumonic effusion      Peripheral neuropathy      Syncope      TMJ (temporomandibular joint disorder)      Physical Exam:    /63   Pulse 51   Temp 97.7  F (36.5  C)   Resp 16   Wt 76.7 kg (169 lb)   SpO2 98%   BMI 25.70 kg/m      General: patient appears stated age of 76 year old. Nontoxic and in no distress.   HEENT: Head: atraumatic, normocephalic. Sclerae anicteric.  Chest:  Normal respiratory effort  Cardiac:  No edema.  Regular rate and rhythm.  3/6 systolic ejection murmur is heard.  Abdomen: abdomen is non-distended  Extremities: normal tone and muscle bulk.  Skin: no lesions or rash on visible skin. Warm and dry.   CNS: alert and oriented. Grossly non-focal.   Psychiatric: normal mood and affect.     Lab  Results:    No results found for this or any previous visit (from the past 168 hour(s)).     Imaging:    PET Oncology Whole Body    Result Date: 1/27/2022  Combined Report of: PET and CT on  1/27/2022 9:38 AM : 1. PET of the neck, chest, abdomen, and pelvis. 2. PET CT Fusion for Attenuation Correction and Anatomical Localization:  3. 3D MIP and PET-CT fused images were processed on an independent workstation and archived to PACS and reviewed by a radiologist. Technique: 1. PET: The patient received 10.65 mCi of F-18-FDG; the serum glucose was 81 prior to administration, body weight was 166 pounds images were evaluated in the axial, sagittal, and coronal planes as well as the rotational whole body MIP. Images were acquired from the Vertex to the Feet. UPTAKE WAS MEASURED AT 65 MINUTES. BACKGROUND:  Liver SUV max= 3.4,   Aorta Blood SUV Max: 2.43. 2. CT: Volumetric acquisition for clinical interpretation of the chest, abdomen, and pelvis acquired at 3 mm sections . The chest, abdomen, and pelvis were evaluated at 5 mm sections in bone, soft tissue, and lung windows.  3. 3D MIP and PET-CT fused images were processed on an independent workstation and archived to PACS and reviewed by a radiologist. INDICATION: Multiple myeloma, follow up; Multiple myeloma, recurrence; Multiple myeloma not having achieved remission (H) ADDITIONAL INFORMATION OBTAINED FROM EMR: 76-year-old male diagnosed with IgG Kappa light chain myeloma in 2009, Initial cytogenetic analysis showed a deletion 13q. There was also on 11;14 translocation.  Past immunofixation from 2011 show IgG-kappa. Bone marrow biopsy 8, 2020 at 5%.  Residual kappa light chain myeloma involving 20% of nucleated cells.  No significant change from November 2016 marrow cellularity recent PET/CT demonstrating no evidence of active myeloma COMPARISON: 10/13/2020. FINDINGS: HEAD/NECK: There is no  suspicious FDG uptake in the neck. CHEST: There is no suspicious FDG uptake in the  chest. Unchanged heavy coronary artery disease. Stable Port-A-Cath position. ABDOMEN AND PELVIS: There is no suspicious FDG uptake in the abdomen or pelvis. Stable heavy aortic and branch vessel calcifications. LOWER EXTREMITIES: No abnormal masses or hypermetabolic lesions. Enchondroma in the right proximal humerus. BONES, MUSCLES and SUPERFICIAL SOFT TISSUES: Lytic lesions in the right scapula with an SUV max of 5.75 (series 4, image 149). Otherwise no abnormal FDG uptake in the skeleton. Stable T6 vertebral body compression deformity with intervertebral cement. Stable left pubic symphyseal and right iliac lucent lesions.     IMPRESSION: In this patient with multiple myeloma diagnosed in 2009, New solitary focus of active disease in the right scapula. No pathologic fractures. Otherwise examination stable since 10/13/2020 . I have personally reviewed the examination and initial interpretation and I agree with the findings. SHANKAR MOSS MD   SYSTEM ID:  [id number]      Signed by: Per Clifford MD        Again, thank you for allowing me to participate in the care of your patient.        Sincerely,        Per Clifford MD

## 2022-02-23 NOTE — PROGRESS NOTES
[facility] Hematology and Oncology Progress Note    Patient: Theo Meyers  MRN: [id number]  Date of Service: Feb 23, 2022        Assessment and Plan:    1. Kappa light chain myeloma: He has recently been evaluated at the [facility] for cellular therapy.  It is unclear if he currently needs posterior for that.  Is also unclear what the extent of his insurance coverage will be.  He is also looking into the [facility] but thinks he might need to have more myeloma directed therapy before cellular therapy from what he has been told.  They are also looking into clinical trials with [facility] for him.  He also had a PET scan on January 27.  I reviewed those images.  These show a new focus of likely myeloma in the right scapula.  He is asymptomatic.  If he does not start cellular therapy soon and we can radiate this solitary site.  For now, we will continue with this daratumumab, pomalidomide, and dexamethasone.    2.  Pancytopenia: Still has mild neutropenia but this is stable.  Mild anemia which is also stable.      3.  Prophylaxis: Continue Xarelto 20 mg daily.  No issues with bleeding recently.    I spent 40 minutes in the care of this patient today, which included time necessary for preparation for the visit, face to face time with the patient, communication of recommendations to the care team, and documentation time.    ECOG Performance  0    Diagnosis:    1.  IgG kappa light chain myeloma. Hyposecretory. Diagnosed 2009. No significant measurable M protein. 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 There was also on 11;14 translocation.  Past immunofixations from 2011 show IgG-kappa.    Bone marrow biopsy 8, 2020  at 5%.  Residual kappa light chain myeloma involving 20% of nucleated cells.  No significant change from November 2016 marrow cellularity.     Bone marrow biopsy from January 26, 2022: Hypocellular marrow involved with multiple myeloma at 20 to 30%.  FISH:  - Gains of chromosomes 9  and 15 (80%)  - Gain of JAQUI (11q) (93%)  - IGH-CCND1 fusion with gains of 1-2 extra fusion signals (92%)  - Monosomy 13 (94%)     NORMAL  - No gain of 1q  - No losses of 1p or TP53     INTERPRETATION:  These findings are consistent with the reported pathologic diagnosis of persistent plasma cell myeloma, reportedly characterized at diagnosis (study performed at an outside institution) by an IGH-CCND1 fusion and monosomy 13. The population of cells with gains of two extra IGH-CCND1 fusion signals is apparently newly arisen and would be consistent with cytogenetic evolution.    2.  Deep vein thrombosis of the left leg diagnosed 9/2012 while on treatment.     3.  Vitamin B12 deficiency diagnosed in September 2017.     4.  GI bleed and anemia requiring hospitalization in December 2017.    5.  MOHS surgery in January, 2021; May 2021 (chest; squamous).     Treatment:    He presented with a lytic lesion involving the C6 vertebral body, although no measurable M protein. IgG kappa monoclonal immunoglobulin was confirmed by ELMA as well as elevated kappa free light chains with an abnormal kappa lambda ratio. Bone marrow biopsy showed 30% involvement and cytogenetics confirmed deletion of 13 q. as well as 11;14 translocation. He was initially treated with Velcade and dexamethasone and achieved a good partial response.     He was referred to the [facility] where he underwent high-dose chemotherapy with autologous peripheral stem cell transplant in April of 2010. He began Revlimid as maintenance therapy post transplant. Repeat bone marrow biopsy done October 2010 showed about 5% kappa restricted plasma cells and so at that time weekly Velcade was added along with his maintenance Revlimid and dexamethasone.      He has done well since that time with stable minimal residual disease, best assessed by serum free light chains. He required dose reductions of weekly Velcade because of cytopenias and was ultimately switched  to a q 2 week maintenance schedule which he has been on since September 2012.      His Revlimid dose was reduced to 15 mg daily and he continues on dexamethasone 20 mg p.o. weekly  Revlimid dosing changed to 20 mg, 3 weeks on 1 week (instead of 15 mg daily) for cost reasons.  Started March, 2018     Started daratumumab/pomalidomide/dexamethasone (20mg every 14 days) on October 14, 2020.     Progressed on:  bortezomib  lenalidomide    Interim History:    Keith returns today for follow-up visit.  He can in to discuss trial and treatment logistics. He is not sure if his insurance will cover it. He has also spoken to [facility] [place].  Clinically though he seems to be doing okay.  No other new complaints today.    Review of Systems:    As above in the history.     Review of Systems otherwise Negative for:  General: chills, fever or night sweats  Psychological: anxiety or depression  Ophthalmic: blurry vision, double vision or loss of vision, vision change  ENT: epistaxis, oral lesions, hearing changes  Hematological and Lymphatic: bleeding, bruising, jaundice, swollen lymph nodes  Endocrine: hot flashes, unexpected weight changes  Respiratory: cough, hemoptysis, orthopnea or shortness of breath/SAMANO  Cardiovascular: chest pain, edema, palpitations or PND  Gastrointestinal: abdominal pain, blood in stools, change in bowel habits, constipation, diarrhea or nausea/vomiting  Genito-Urinary: change in urinary stream, incontinence, frequency/urgency  Musculoskeletal: joint pain, stiffness, swelling, muscle pain  Neurological: dizziness, headaches, numbness/tingling  Dermatological: lumps and rash    Past History:    Past Medical History:   Diagnosis Date     Anemia 12/13/2017     Arthritis      Bilateral inguinal hernia      Chest tube in place      Glaucoma      Glaucoma      History of blood clots     DVT - left chronic     Multiple myeloma (H)     Gets chemo infusions every 2 weeks     Pancytopenia (H)      Parapneumonic effusion       Peripheral neuropathy      Syncope      TMJ (temporomandibular joint disorder)      Physical Exam:    /63   Pulse 51   Temp 97.7  F (36.5  C)   Resp 16   Wt 76.7 kg (169 lb)   SpO2 98%   BMI 25.70 kg/m      General: patient appears stated age of 76 year old. Nontoxic and in no distress.   HEENT: Head: atraumatic, normocephalic. Sclerae anicteric.  Chest:  Normal respiratory effort  Cardiac:  No edema.  Regular rate and rhythm.  3/6 systolic ejection murmur is heard.  Abdomen: abdomen is non-distended  Extremities: normal tone and muscle bulk.  Skin: no lesions or rash on visible skin. Warm and dry.   CNS: alert and oriented. Grossly non-focal.   Psychiatric: normal mood and affect.     Lab Results:    No results found for this or any previous visit (from the past 168 hour(s)).     Imaging:    PET Oncology Whole Body    Result Date: 1/27/2022  Combined Report of: PET and CT on  1/27/2022 9:38 AM : 1. PET of the neck, chest, abdomen, and pelvis. 2. PET CT Fusion for Attenuation Correction and Anatomical Localization:  3. 3D MIP and PET-CT fused images were processed on an independent workstation and archived to PACS and reviewed by a radiologist. Technique: 1. PET: The patient received 10.65 mCi of F-18-FDG; the serum glucose was 81 prior to administration, body weight was 166 pounds images were evaluated in the axial, sagittal, and coronal planes as well as the rotational whole body MIP. Images were acquired from the Vertex to the Feet. UPTAKE WAS MEASURED AT 65 MINUTES. BACKGROUND:  Liver SUV max= 3.4,   Aorta Blood SUV Max: 2.43. 2. CT: Volumetric acquisition for clinical interpretation of the chest, abdomen, and pelvis acquired at 3 mm sections . The chest, abdomen, and pelvis were evaluated at 5 mm sections in bone, soft tissue, and lung windows.  3. 3D MIP and PET-CT fused images were processed on an independent workstation and archived to PACS and reviewed by a radiologist. INDICATION: Multiple  myeloma, follow up; Multiple myeloma, recurrence; Multiple myeloma not having achieved remission (H) ADDITIONAL INFORMATION OBTAINED FROM EMR: 76-year-old male diagnosed with IgG Kappa light chain myeloma in 2009, Initial cytogenetic analysis showed a deletion 13q. There was also on 11;14 translocation.  Past immunofixation from 2011 show IgG-kappa. Bone marrow biopsy 8, 2020 at 5%.  Residual kappa light chain myeloma involving 20% of nucleated cells.  No significant change from November 2016 marrow cellularity recent PET/CT demonstrating no evidence of active myeloma COMPARISON: 10/13/2020. FINDINGS: HEAD/NECK: There is no  suspicious FDG uptake in the neck. CHEST: There is no suspicious FDG uptake in the chest. Unchanged heavy coronary artery disease. Stable Port-A-Cath position. ABDOMEN AND PELVIS: There is no suspicious FDG uptake in the abdomen or pelvis. Stable heavy aortic and branch vessel calcifications. LOWER EXTREMITIES: No abnormal masses or hypermetabolic lesions. Enchondroma in the right proximal humerus. BONES, MUSCLES and SUPERFICIAL SOFT TISSUES: Lytic lesions in the right scapula with an SUV max of 5.75 (series 4, image 149). Otherwise no abnormal FDG uptake in the skeleton. Stable T6 vertebral body compression deformity with intervertebral cement. Stable left pubic symphyseal and right iliac lucent lesions.     IMPRESSION: In this patient with multiple myeloma diagnosed in 2009, New solitary focus of active disease in the right scapula. No pathologic fractures. Otherwise examination stable since 10/13/2020 . I have personally reviewed the examination and initial interpretation and I agree with the findings. SHANKAR MOSS MD   SYSTEM ID:  [id number]      Signed by: Per Clifford MD

## 2022-03-02 NOTE — PROGRESS NOTES
Pt ambulates to infusion center for lab draw prior to NP visit.  IVAD accessed, labs drawn et sent.  Pt was seen by BRITTANI Valdez CNP and orders were approved.  Treatment administered as ordered without difficulty.  IVAD flushed w/NS et heparin and needle deaccessed.  Pt left clinic stable to lobby.  Plan RTC as scheduled.

## 2022-03-02 NOTE — LETTER
"    3/2/2022         RE: Theo Meyers  [address]  [facility] [address]        Dear Colleague,    Thank you for referring your patient, Theo Meyers, to the [facility] [place]. Please see a copy of my visit note below.    Oncology Rooming Note    March 2, 2022 8:22 AM   Theo Meyers is a 76 year old male who presents for:    Chief Complaint   Patient presents with     Oncology Clinic Visit     Initial Vitals: /61   Pulse 71   Temp 97.6  F (36.4  C)   Resp 18   Wt 75.8 kg (167 lb 1.6 oz)   SpO2 98%   BMI 25.41 kg/m   Estimated body mass index is 25.41 kg/m  as calculated from the following:    Height as of 2/14/22: 1.727 m (5' 8\").    Weight as of this encounter: 75.8 kg (167 lb 1.6 oz). Body surface area is 1.91 meters squared.  No Pain (0) Comment: Data Unavailable   No LMP for male patient.  Allergies reviewed: Yes  Medications reviewed: Yes    Medications: Medication refills not needed today.  Pharmacy name entered into [employer]:    [id number] [place], [place] - [facility] [address] [facility] [address]  [facility] [address]  [place], [place] - [address] [place] [address]    Clinical concerns: None       Elizabeth Connell LPN              [facility] Hematology and Oncology Progress Note    Patient: Theo Meyers  MRN: [id number]  Date of Service: Mar 2, 2022          Reason for Visit    Chief Complaint   Patient presents with     Oncology Clinic Visit       Assessment and Plan    Cancer Staging  No matching staging information was found for the patient.    1.  Myeloma: pt continues on daratumumab and dexamethasone and pomalyst. This regimen was initiated in October 2020.  Now getting it every 4 weeks.  We did increase pomalyst to 4mg due to rising light chains.  We did repeat a bone marrow biopsy in January that did show about 20 to 30% plasma cells.  We did repeat a PET scan which also shows a new lesion in his right scapula.  His light " chains are overall stable over the last couple months but are still pretty elevated at about 57.  We did refer for car T-cell therapy.  It sounds like there is been some confusion if his insurance will cover this or not.  At the latest the patient has hurt his that it will be covered but they only do them every 2 months so he would not be able to get it done until October.  The other question is how many lines of therapy he has had.  Patient initially had Velcade and dexamethasone.  Then went on to get chemotherapy with stem cell.  He then went on to do maintenance Revlimid.  For third line therapy when he had had progression we added Velcade.  He was on that for quite some time.  He then went on fourth line therapy in October 2020 with daratumumab, Pomalyst and dexamethasone.  We have had to reduce the Pomalyst and to go up on the Pomalyst based on his labs.  Patient will continue this current regimen for now.  We will discuss with Dr. Clifford if he needs to have any other therapy to try to get his numbers improved before the CAR-T therapy.  We could think about doing carfilzomib.     2. DVT: This is recurrent.  Will be on xarelto indefinitely.      3. Anemia: likely from treatment and myeloma. No complications. Will continue to monitor.     4.  Intermittent neutropenia: This is likely from his Pomalyst.  It does appear that his total white blood cell count is better today.  He will be due to start his next cycle of Pomalyst tomorrow.  We will go ahead and do that as long as his ANC is 800 or above.  Urged him to take precautions.  He was able to get Evusheld.     ECOG Performance    0 - Independent    Distress Screening (within last 30 days)    No data recorded     Pain  Pain Score: No Pain (0)    Problem List    Patient Active Problem List   Diagnosis     Multiple myeloma (H)     Unspecified glaucoma     Cataract     DVT (deep venous thrombosis) (H)     Shingles     Shingles (herpes zoster) polyneuropathy     Back  pain     Post herpetic neuralgia     Pancytopenia (H)     Syncope and collapse     Elevated INR     History of DVT (deep vein thrombosis)     Chronic deep vein thrombosis (DVT) of left lower extremity, unspecified vein (H)     Spinal stenosis of lumbar region without neurogenic claudication     Anemia, vitamin B12 deficiency     Chemotherapy-induced neutropenia (H)     Thrombocytopenia (H)     Paroxysmal atrial fibrillation (H)     Nonrheumatic aortic valve stenosis     SAMANO (dyspnea on exertion)     Immunocompromised state (H)     Neutropenic fever (H)     Personal history of DVT (deep vein thrombosis)     Paroxysmal A-fib (H)        ______________________________________________________________________________    History of Present Illness    DIAGNOSIS:   1. IgG kappa light chain myeloma. Hyposecretory. Diagnosed 2009.   He presented at diagnosis with a lytic lesion involving the C6 vertebral body, although no measurable M protein. IgG kappa monoclonal immunoglobulin was confirmed by ELMA as well as elevated kappa free light chains with an abnormal kappa lambda ratio. Bone marrow biopsy showed 30% involvement and cytogenetics confirmed deletion of 13 q. as well as 11;14 translocation.    2. Deep vein thrombosis of the left leg diagnosed 09/2012 while on treatment.       CURRENT TREATMENT:   Started Daratumumab in October 2020. Dexamethasone weekly as well.  Today is cycle 19. Pomalyst added with cycle 3.  We have decreased the dose initially and then have now gone back up to full dose.         PAST TREATMENT and HISTORY:    -He was initially treated with Velcade and dexamethasone and achieved a good partial response.     -He was referred to the [facility] where he underwent high-dose chemotherapy with autologous peripheral stem cell transplant in April of 2010.     -He began Revlimid as maintenance therapy post transplant.     -Repeat bone marrow biopsy done October 2010 showed about 5% kappa restricted  plasma cells and so at that time weekly Velcade was added along with his maintenance Revlimid and dexamethasone. He required dose reductions of weekly Velcade because of cytopenias and was ultimately switched to a q.2 week maintenance schedule which he has been on since September 2012. His Revlimid dose is 20 mg daily and he continues on dexamethasone 20 mg p.o. q. Week.  Was on this until October 2020 when he had signs of progression.        INTERIM HISTORY:     Pt is here today for follow up.  Patient is doing pretty well.  No new issues. Feeling fine. No new bone or back pain.  He has not noticed any pain in his right scapula.  He states he just got anxious with thinking that the myeloma is getting worse.  He is feeling pretty frustrated with the process for the CAR-T therapy.  He said when he met with the doctor at the  he felt very good about the process.  The next day they called him and told him that his insurance would only pay for it as an outpatient.  He would have to be in the hospital for at least a week so clearly is not an outpatient procedure.  He then was referred to the [facility] to be evaluated at their facility.  Overall he now feels that it probably is ago and they are making an exception with his insurance.  He says but unfortunately they only do them every 2 months and he would not be available to do it until October.  There are some questions about how treatment he has had in the past and if he needs to be on something different.  Other than that he is feeling fine and really stable.  No infectious complaints.    Review of Systems    Pertinent items are noted in HPI.    Past History    Past Medical History:   Diagnosis Date     Anemia 12/13/2017     Arthritis      Bilateral inguinal hernia      Chest tube in place      Glaucoma      Glaucoma      History of blood clots     DVT - left chronic     Multiple myeloma (H)     Gets chemo infusions every 2 weeks     Pancytopenia (H)       Parapneumonic effusion      Peripheral neuropathy      Syncope      TMJ (temporomandibular joint disorder)        PHYSICAL EXAM  /61   Pulse 71   Temp 97.6  F (36.4  C)   Resp 18   Wt 75.8 kg (167 lb 1.6 oz)   SpO2 98%   BMI 25.41 kg/m      GENERAL: no acute distress. Cooperative in conversation. Here with wife today. Mask on  RESP: Regular respiratory rate. No expiratory wheezes   MUSCULOSKELETAL: no bilateral leg swelling  NEURO: non focal. Alert and oriented x3.   PSYCH: within normal limits. No depression or anxiety.  SKIN: exposed skin is dry intact.        Lab Results    Recent Results (from the past 168 hour(s))   Comprehensive metabolic panel   Result Value Ref Range    Sodium 139 136 - 145 mmol/L    Potassium 4.3 3.5 - 5.0 mmol/L    Chloride 109 (H) 98 - 107 mmol/L    Carbon Dioxide (CO2) 23 22 - 31 mmol/L    Anion Gap 7 5 - 18 mmol/L    Urea Nitrogen 22 8 - 28 mg/dL    Creatinine 1.25 0.70 - 1.30 mg/dL    Calcium 8.7 8.5 - 10.5 mg/dL    Glucose 118 70 - 125 mg/dL    Alkaline Phosphatase 69 45 - 120 U/L    AST 13 0 - 40 U/L    ALT 15 0 - 45 U/L    Protein Total 4.9 (L) 6.0 - 8.0 g/dL    Albumin 3.1 (L) 3.5 - 5.0 g/dL    Bilirubin Total 0.5 0.0 - 1.0 mg/dL    GFR Estimate 60 (L) >60 mL/min/1.73m2   CBC with platelets and differential   Result Value Ref Range    WBC Count 2.5 (L) 4.0 - 11.0 10e3/uL    RBC Count 3.24 (L) 4.40 - 5.90 10e6/uL    Hemoglobin 10.9 (L) 13.3 - 17.7 g/dL    Hematocrit 34.3 (L) 40.0 - 53.0 %     (H) 78 - 100 fL    MCH 33.6 (H) 26.5 - 33.0 pg    MCHC 31.8 31.5 - 36.5 g/dL    RDW 15.5 (H) 10.0 - 15.0 %    Platelet Count 222 150 - 450 10e3/uL   Manual Differential   Result Value Ref Range    % Neutrophils 46 %    % Lymphocytes 21 %    % Monocytes 23 %    % Eosinophils 6 %    % Basophils 4 %    Absolute Neutrophils 1.2 (L) 1.6 - 8.3 10e3/uL    Absolute Lymphocytes 0.5 (L) 0.8 - 5.3 10e3/uL    Absolute Monocytes 0.6 0.0 - 1.3 10e3/uL    Absolute Eosinophils 0.2 0.0 - 0.7  10e3/uL    Absolute Basophils 0.1 0.0 - 0.2 10e3/uL    RBC Morphology Confirmed RBC Indices     Platelet Assessment  Automated Count Confirmed. Platelet morphology is normal.     Automated Count Confirmed. Platelet morphology is normal.    Elliptocytes Slight (A) None Seen     Reviewed myeloma labs.  [place] light chains  Lab Results   Component Value Date    KFLCA 57.39 (H) 02/23/2022    KFLCA 56.66 (H) 01/26/2022    KFLCA 55.04 (H) 01/05/2022    KFLCA 57.70 (H) 12/08/2021    KFLCA 41.63 (H) 11/10/2021    KFLCA 37.09 (H) 10/13/2021    KFLCA 34.15 (H) 08/17/2021    KFLCA 28.47 (H) 06/23/2021    KFLCA 21.55 (H) 04/28/2021    KFLCA 18.72 (H) 03/17/2021     kappa/lambda ratio  Lab Results   Component Value Date    KLRA 92.56 (H) 02/23/2022    KLRA 106.91 (H) 01/26/2022    KLRA 148.76 (H) 01/05/2022    KLRA 155.95 (H) 12/08/2021    KLRA 112.51 (H) 11/10/2021    KLRA 100.24 (H) 10/13/2021    KLRA 97.57 (H) 08/17/2021    KLRA 33.49 (H) 06/23/2021    KLRA 43.10 (H) 04/28/2021    KLRA 26.00 (H) 03/17/2021   ]  Imaging    No results found.      Signed by: CECELIA Elaine CNP      Again, thank you for allowing me to participate in the care of your patient.        Sincerely,        CECELIA Elaine CNP

## 2022-03-02 NOTE — PROGRESS NOTES
[facility] Hematology and Oncology Progress Note    Patient: Theo Meyers  MRN: [id number]  Date of Service: Mar 2, 2022          Reason for Visit    Chief Complaint   Patient presents with     Oncology Clinic Visit       Assessment and Plan    Cancer Staging  No matching staging information was found for the patient.    1.  Myeloma: pt continues on daratumumab and dexamethasone and pomalyst. This regimen was initiated in October 2020.  Now getting it every 4 weeks.  We did increase pomalyst to 4mg due to rising light chains.  We did repeat a bone marrow biopsy in January that did show about 20 to 30% plasma cells.  We did repeat a PET scan which also shows a new lesion in his right scapula.  His light chains are overall stable over the last couple months but are still pretty elevated at about 57.  We did refer for car T-cell therapy.  It sounds like there is been some confusion if his insurance will cover this or not.  At the latest the patient has hurt his that it will be covered but they only do them every 2 months so he would not be able to get it done until October.  The other question is how many lines of therapy he has had.  Patient initially had Velcade and dexamethasone.  Then went on to get chemotherapy with stem cell.  He then went on to do maintenance Revlimid.  For third line therapy when he had had progression we added Velcade.  He was on that for quite some time.  He then went on fourth line therapy in October 2020 with daratumumab, Pomalyst and dexamethasone.  We have had to reduce the Pomalyst and to go up on the Pomalyst based on his labs.  Patient will continue this current regimen for now.  We will discuss with Dr. Clifford if he needs to have any other therapy to try to get his numbers improved before the CAR-T therapy.  We could think about doing carfilzomib.     2. DVT: This is recurrent.  Will be on xarelto indefinitely.      3. Anemia: likely from treatment and myeloma. No  complications. Will continue to monitor.     4.  Intermittent neutropenia: This is likely from his Pomalyst.  It does appear that his total white blood cell count is better today.  He will be due to start his next cycle of Pomalyst tomorrow.  We will go ahead and do that as long as his ANC is 800 or above.  Urged him to take precautions.  He was able to get Evusheld.     ECOG Performance    0 - Independent    Distress Screening (within last 30 days)    No data recorded     Pain  Pain Score: No Pain (0)    Problem List    Patient Active Problem List   Diagnosis     Multiple myeloma (H)     Unspecified glaucoma     Cataract     DVT (deep venous thrombosis) (H)     Shingles     Shingles (herpes zoster) polyneuropathy     Back pain     Post herpetic neuralgia     Pancytopenia (H)     Syncope and collapse     Elevated INR     History of DVT (deep vein thrombosis)     Chronic deep vein thrombosis (DVT) of left lower extremity, unspecified vein (H)     Spinal stenosis of lumbar region without neurogenic claudication     Anemia, vitamin B12 deficiency     Chemotherapy-induced neutropenia (H)     Thrombocytopenia (H)     Paroxysmal atrial fibrillation (H)     Nonrheumatic aortic valve stenosis     SAMANO (dyspnea on exertion)     Immunocompromised state (H)     Neutropenic fever (H)     Personal history of DVT (deep vein thrombosis)     Paroxysmal A-fib (H)        ______________________________________________________________________________    History of Present Illness    DIAGNOSIS:   1. IgG kappa light chain myeloma. Hyposecretory. Diagnosed 2009.   He presented at diagnosis with a lytic lesion involving the C6 vertebral body, although no measurable M protein. IgG kappa monoclonal immunoglobulin was confirmed by [facility] as well as elevated kappa free light chains with an abnormal kappa lambda ratio. Bone marrow biopsy showed 30% involvement and cytogenetics confirmed deletion of 13 q. as well as 11;14 translocation.    2. Deep  vein thrombosis of the left leg diagnosed 09/2012 while on treatment.       CURRENT TREATMENT:   Started Daratumumab in October 2020. Dexamethasone weekly as well.  Today is cycle 19. Pomalyst added with cycle 3.  We have decreased the dose initially and then have now gone back up to full dose.         PAST TREATMENT and HISTORY:    -He was initially treated with Velcade and dexamethasone and achieved a good partial response.     -He was referred to the [facility] where he underwent high-dose chemotherapy with autologous peripheral stem cell transplant in April of 2010.     -He began Revlimid as maintenance therapy post transplant.     -Repeat bone marrow biopsy done October 2010 showed about 5% kappa restricted plasma cells and so at that time weekly Velcade was added along with his maintenance Revlimid and dexamethasone. He required dose reductions of weekly Velcade because of cytopenias and was ultimately switched to a q.2 week maintenance schedule which he has been on since September 2012. His Revlimid dose is 20 mg daily and he continues on dexamethasone 20 mg p.o. q. Week.  Was on this until October 2020 when he had signs of progression.        INTERIM HISTORY:     Pt is here today for follow up.  Patient is doing pretty well.  No new issues. Feeling fine. No new bone or back pain.  He has not noticed any pain in his right scapula.  He states he just got anxious with thinking that the myeloma is getting worse.  He is feeling pretty frustrated with the process for the CAR-T therapy.  He said when he met with the doctor at the  he felt very good about the process.  The next day they called him and told him that his insurance would only pay for it as an outpatient.  He would have to be in the hospital for at least a week so clearly is not an outpatient procedure.  He then was referred to the [facility] to be evaluated at their facility.  Overall he now feels that it probably is ago and they are  making an exception with his insurance.  He says but unfortunately they only do them every 2 months and he would not be available to do it until October.  There are some questions about how treatment he has had in the past and if he needs to be on something different.  Other than that he is feeling fine and really stable.  No infectious complaints.    Review of Systems    Pertinent items are noted in HPI.    Past History    Past Medical History:   Diagnosis Date     Anemia 12/13/2017     Arthritis      Bilateral inguinal hernia      Chest tube in place      Glaucoma      Glaucoma      History of blood clots     DVT - left chronic     Multiple myeloma (H)     Gets chemo infusions every 2 weeks     Pancytopenia (H)      Parapneumonic effusion      Peripheral neuropathy      Syncope      TMJ (temporomandibular joint disorder)        PHYSICAL EXAM  /61   Pulse 71   Temp 97.6  F (36.4  C)   Resp 18   Wt 75.8 kg (167 lb 1.6 oz)   SpO2 98%   BMI 25.41 kg/m      GENERAL: no acute distress. Cooperative in conversation. Here with wife today. Mask on  RESP: Regular respiratory rate. No expiratory wheezes   MUSCULOSKELETAL: no bilateral leg swelling  NEURO: non focal. Alert and oriented x3.   PSYCH: within normal limits. No depression or anxiety.  SKIN: exposed skin is dry intact.        Lab Results    Recent Results (from the past 168 hour(s))   Comprehensive metabolic panel   Result Value Ref Range    Sodium 139 136 - 145 mmol/L    Potassium 4.3 3.5 - 5.0 mmol/L    Chloride 109 (H) 98 - 107 mmol/L    Carbon Dioxide (CO2) 23 22 - 31 mmol/L    Anion Gap 7 5 - 18 mmol/L    Urea Nitrogen 22 8 - 28 mg/dL    Creatinine 1.25 0.70 - 1.30 mg/dL    Calcium 8.7 8.5 - 10.5 mg/dL    Glucose 118 70 - 125 mg/dL    Alkaline Phosphatase 69 45 - 120 U/L    AST 13 0 - 40 U/L    ALT 15 0 - 45 U/L    Protein Total 4.9 (L) 6.0 - 8.0 g/dL    Albumin 3.1 (L) 3.5 - 5.0 g/dL    Bilirubin Total 0.5 0.0 - 1.0 mg/dL    GFR Estimate 60 (L) >60  mL/min/1.73m2   CBC with platelets and differential   Result Value Ref Range    WBC Count 2.5 (L) 4.0 - 11.0 10e3/uL    RBC Count 3.24 (L) 4.40 - 5.90 10e6/uL    Hemoglobin 10.9 (L) 13.3 - 17.7 g/dL    Hematocrit 34.3 (L) 40.0 - 53.0 %     (H) 78 - 100 fL    MCH 33.6 (H) 26.5 - 33.0 pg    MCHC 31.8 31.5 - 36.5 g/dL    RDW 15.5 (H) 10.0 - 15.0 %    Platelet Count 222 150 - 450 10e3/uL   Manual Differential   Result Value Ref Range    % Neutrophils 46 %    % Lymphocytes 21 %    % Monocytes 23 %    % Eosinophils 6 %    % Basophils 4 %    Absolute Neutrophils 1.2 (L) 1.6 - 8.3 10e3/uL    Absolute Lymphocytes 0.5 (L) 0.8 - 5.3 10e3/uL    Absolute Monocytes 0.6 0.0 - 1.3 10e3/uL    Absolute Eosinophils 0.2 0.0 - 0.7 10e3/uL    Absolute Basophils 0.1 0.0 - 0.2 10e3/uL    RBC Morphology Confirmed RBC Indices     Platelet Assessment  Automated Count Confirmed. Platelet morphology is normal.     Automated Count Confirmed. Platelet morphology is normal.    Elliptocytes Slight (A) None Seen     Reviewed myeloma labs.  [place] light chains  Lab Results   Component Value Date    KFLCA 57.39 (H) 02/23/2022    KFLCA 56.66 (H) 01/26/2022    KFLCA 55.04 (H) 01/05/2022    KFLCA 57.70 (H) 12/08/2021    KFLCA 41.63 (H) 11/10/2021    KFLCA 37.09 (H) 10/13/2021    KFLCA 34.15 (H) 08/17/2021    KFLCA 28.47 (H) 06/23/2021    KFLCA 21.55 (H) 04/28/2021    KFLCA 18.72 (H) 03/17/2021     kappa/lambda ratio  Lab Results   Component Value Date    KLRA 92.56 (H) 02/23/2022    KLRA 106.91 (H) 01/26/2022    KLRA 148.76 (H) 01/05/2022    KLRA 155.95 (H) 12/08/2021    KLRA 112.51 (H) 11/10/2021    KLRA 100.24 (H) 10/13/2021    KLRA 97.57 (H) 08/17/2021    KLRA 33.49 (H) 06/23/2021    KLRA 43.10 (H) 04/28/2021    KLRA 26.00 (H) 03/17/2021   ]  Imaging    No results found.      Signed by: CECELIA Elaine CNP

## 2022-03-02 NOTE — PROGRESS NOTES
"Oncology Rooming Note    March 2, 2022 8:22 AM   Theo Meyers is a 76 year old male who presents for:    Chief Complaint   Patient presents with     Oncology Clinic Visit     Initial Vitals: /61   Pulse 71   Temp 97.6  F (36.4  C)   Resp 18   Wt 75.8 kg (167 lb 1.6 oz)   SpO2 98%   BMI 25.41 kg/m   Estimated body mass index is 25.41 kg/m  as calculated from the following:    Height as of 2/14/22: 1.727 m (5' 8\").    Weight as of this encounter: 75.8 kg (167 lb 1.6 oz). Body surface area is 1.91 meters squared.  No Pain (0) Comment: Data Unavailable   No LMP for male patient.  Allergies reviewed: Yes  Medications reviewed: Yes    Medications: Medication refills not needed today.  Pharmacy name entered into [facility]:    [id number] - [place], [place] - [address] [facility] [address] [facility]  [facility] - [place], [place] - [address] [facility]    Clinical concerns: None       Elizabeth Connell LPN            "

## 2022-03-14 NOTE — ED PROVIDER NOTES
EMERGENCY DEPARTMENT ENCOUNTER      NAME: Theo Meyers  AGE: 76 year old male  YOB: 1945  MRN: [id number]  EVALUATION DATE & TIME: 3/14/2022  3:09 PM    PCP: Mathew Ott    ED PROVIDER: Anant Smith MD        Chief Complaint   Patient presents with     Shortness of Breath         FINAL IMPRESSION:  1. SAMANO (dyspnea on exertion)    2. Atrial fibrillation, unspecified type (H)          ED COURSE & MEDICAL DECISION MAKING:    Pertinent Labs & Imaging studies reviewed. (See chart for details)  76 year old male presents to the Emergency Department for evaluation of SAMANO    Differential includes pneumonia, pulmonary edema, pulmonary emboli, ACS, anemia, heart failure, arrhythmia    At the conclusion of the encounter I discussed the results of all of the tests and the disposition. The questions were answered. The patient or family acknowledged understanding and was agreeable with the care plan.     ED Course as of 03/14/22 1835   Mon Mar 14, 2022   1832 Patient has history of remote A. fib, remote DVT, on Xarelto, and mild aortic stenosis who presents with 2 weeks of dyspnea on exertion.  No current chest pain.   1832 Based on history and examination pulmonary emboli unlikely   1832 Plan for EKG and labs   1832 EKG shows A. fib.  Rate controlled.   1832 Dissection without evidence of pneumonia or pulmonary edema   1832 Hemoglobin(!): 10.1  Chronic anemia   1832 BNP(!): 221  Improved compared to previous   1833 Troponin I: 0.04  Based on duration of symptoms delta troponin not indicated   1833 INR(!): 1.36  Consistent with taking Xarelto   1833 Likely A. fib and or aortic stenosis is contributing to his symptoms.  Recommend follow-up in rapid access heart clinic.     3:36 PM Initial history and physical performed. Plan of care discussed. PPE utilized includes N95 mask, face shield, and gloves.   6:27 PM I rechecked and updated the patient.     MEDICATIONS GIVEN IN THE EMERGENCY:  Medications   heparin  "100 UNIT/ML injection 500 Units (has no administration in time range)       NEW PRESCRIPTIONS STARTED AT TODAY'S ER VISIT  New Prescriptions    No medications on file          =================================================================    HPI    Triage note  \"The patient presents to the ED with shortness of breath for the past few weeks. He is a multiple myeloma patient who takes maintenance chemotherapy. The patient reports recently he has had a significant change in his energy levels and reports feeling very short of breath with his normal activity. EKG done in triage. The patient has a history of atrial fibrillation. He reports having an ECHO done in October. Denies chest pain.  \"      Patient information was obtained from: Patient    Use of : N/A       Theo Meyers is a 76 year old male with a pertinent history of multiple myeloma, atrial fibrillation, and DVT who presents to this ED by walk in for evaluation of shortness of breath.     Patient complains of worsening shortness of breath that began 2 weeks ago. Patient reports that he has been short of breath for about 2 weeks but the past few days it has gotten worse. Patient says he typically does exercises with hand weights 5 times a week but two days ago (3/12/22) he was extremely short of breath after just his first set. Patient also notes that while walking his dogs he has to make frequent stops to catch his breath. Patient was recently switched from a 3 mg does of Pomalyst to 4 mg of Pomalyst but otherwise denies any changes in medications. Patient denies any chest pain with inspiration, taking medication for atrial fibrillation, or any other concerns at this time.       REVIEW OF SYSTEMS   Review of Systems   Constitutional: Negative for fever.   Respiratory: Positive for shortness of breath.    Cardiovascular: Negative for chest pain.   Gastrointestinal: Negative for blood in stool.   Musculoskeletal: Negative for myalgias.   Skin: " Negative for wound.   Allergic/Immunologic: Negative for immunocompromised state.   Neurological: Negative for dizziness.   Hematological: Bruises/bleeds easily.   Psychiatric/Behavioral: Negative for confusion.       PAST MEDICAL HISTORY:  Past Medical History:   Diagnosis Date     Anemia 12/13/2017     Arthritis      Bilateral inguinal hernia      Chest tube in place      Glaucoma      Glaucoma      History of blood clots     DVT - left chronic     Multiple myeloma (H)     Gets chemo infusions every 2 weeks     Pancytopenia (H)      Parapneumonic effusion      Peripheral neuropathy      Syncope      TMJ (temporomandibular joint disorder)        PAST SURGICAL HISTORY:  Past Surgical History:   Procedure Laterality Date     APPENDECTOMY      Age 16     ESOPHAGOSCOPY, GASTROSCOPY, DUODENOSCOPY (EGD), COMBINED N/A 12/14/2017    Procedure: ESOPHAGOGASTRODUODENOSCOPY (EGD) WITH BIOPSYS;  Surgeon: Marlon Roy MD;  Location: [facility];  Service:      ESOPHAGOSCOPY, GASTROSCOPY, DUODENOSCOPY (EGD), COMBINED N/A 1/19/2018    Procedure: ENDOSCOPIC ULTRASOUND  ESOPHAGOGASTRODUODENOSCOPY WITH DUODENAL POLYP REMOVAL;  Surgeon: Familia Mcgraw MD;  Location: [facility] OR;  Service:      EYE SURGERY      Cataract     IR MISCELLANEOUS PROCEDURE  6/17/2009     IR MISCELLANEOUS PROCEDURE  7/31/2009     IR MISCELLANEOUS PROCEDURE  8/13/2009     IR PLEURAL DRAINAGE WITH CATHETER INSERTION  7/2/2019     PORTACATH PLACEMENT       RELEASE CARPAL TUNNEL Bilateral      US THORACENTESIS  6/27/2019     VERTEBROPLASTY      T6     WISDOM TOOTH EXTRACTION             CURRENT MEDICATIONS:    acetaminophen (TYLENOL) 500 MG tablet  acyclovir (ZOVIRAX) 400 MG tablet  ascorbic acid 1000 MG TABS tablet  atorvastatin (LIPITOR) 10 MG tablet  bimatoprost (LUMIGAN) 0.03 % ophthalmic drops  brimonidine (ALPHAGAN P) 0.1 % SOLN  calcium carbonate 1250 (500 Ca) MG CHEW  calcium carbonate-vitamin D (OYSTER SHELL CALCIUM/D) 500-200 MG-UNIT  tablet  Calcium-Vitamin D (CALCIUM + D PO)  cyanocobalamin (CYANOCOBALAMIN) 1000 MCG/ML injection  daratumumab 16 mg/kg  dextromethorphan-guaiFENesin (MUCINEX DM)  MG 12 hr tablet  fluticasone (FLONASE) 50 MCG/ACT nasal spray  gabapentin (NEURONTIN) 300 MG capsule  magnesium oxide 400 MG CAPS  Multiple Vitamin (MULTI-VITAMIN PO)  Omega-3 Fatty Acids (OMEGA 3 PO)  omeprazole (PRILOSEC) 20 MG DR capsule  pomalidomide (POMALYST) 4 MG capsule  potassium chloride ER (KLOR-CON M) 20 MEQ CR tablet  Psyllium (METAMUCIL PO)  tamsulosin (FLOMAX) 0.4 MG capsule  UNABLE TO FIND  XARELTO ANTICOAGULANT 20 MG TABS tablet        ALLERGIES:  Allergies   Allergen Reactions     Centex-Pse Er [Pseudoephedrine-Guaifenesin Cr]      TB12       FAMILY HISTORY:  Family History   Problem Relation Age of Onset     Heart Disease Mother      Glaucoma Mother      Cancer Father      No Known Problems Sister      Cancer Brother      Pancreatic Cancer Brother      No Known Problems Brother      Cancer Daughter      Skin Cancer Daughter      Melanoma Daughter      Glaucoma Daughter        SOCIAL HISTORY:   Social History     Socioeconomic History     Marital status:      Spouse name: Not on file     Number of children: Not on file     Years of education: Not on file     Highest education level: Not on file   Occupational History     Not on file   Tobacco Use     Smoking status: Former Smoker     Quit date: 1975     Years since quittin.3     Smokeless tobacco: Never Used   Substance and Sexual Activity     Alcohol use: Not Currently     Comment: Alcoholic Drinks/day: occasional     Drug use: No     Sexual activity: Never   Other Topics Concern     Not on file   Social History Narrative     Not on file     Social Determinants of Health     Financial Resource Strain: Not on file   Food Insecurity: Not on file   Transportation Needs: Not on file   Physical Activity: Not on file   Stress: Not on file   Social Connections: Not on  "file   Intimate Partner Violence: Not on file   Housing Stability: Not on file       VITALS:  BP (!) 159/72   Pulse 51   Temp 97.7  F (36.5  C) (Oral)   Resp 10   Ht 1.727 m (5' 8\")   Wt 72.6 kg (160 lb)   SpO2 99%   BMI 24.33 kg/m      PHYSICAL EXAM      Vitals: BP (!) 159/72   Pulse 51   Temp 97.7  F (36.5  C) (Oral)   Resp 10   Ht 1.727 m (5' 8\")   Wt 72.6 kg (160 lb)   SpO2 99%   BMI 24.33 kg/m    General: Appears in no acute distress, awake, alert, interactive.  Eyes: Conjunctivae non-injected. Sclera anicteric.  HENT: Atraumatic.  Neck: Supple.  Respiratory/Chest: Respiration unlabored. Systolic murmur. Irregularly irregular heart rate. No wheezing or crackles.   Abdomen: non distended  Musculoskeletal: Normal extremities. No edema or erythema.  Skin: Normal color. No rash or diaphoresis.  Neurologic: Face symmetric, moves all extremities spontaneously. Speech clear.  Psychiatric: Oriented to person, place, and time. Affect appropriate.       LAB:  All pertinent labs reviewed and interpreted.  Results for orders placed or performed during the hospital encounter of 03/14/22   XR Chest 1 View    Impression    IMPRESSION: Right-sided portacatheter with tip in the SVC. No pulmonary infiltrates. Midthoracic compression fracture with previous vertebroplasty. Heart size upper limits of normal.   Comprehensive metabolic panel   Result Value Ref Range    Sodium 141 136 - 145 mmol/L    Potassium 4.6 3.5 - 5.0 mmol/L    Chloride 107 98 - 107 mmol/L    Carbon Dioxide (CO2) 23 22 - 31 mmol/L    Anion Gap 11 5 - 18 mmol/L    Urea Nitrogen 25 8 - 28 mg/dL    Creatinine 1.12 0.70 - 1.30 mg/dL    Calcium 8.8 8.5 - 10.5 mg/dL    Glucose 89 70 - 125 mg/dL    Alkaline Phosphatase 67 45 - 120 U/L    AST 19 0 - 40 U/L    ALT 16 0 - 45 U/L    Protein Total 5.2 (L) 6.0 - 8.0 g/dL    Albumin 3.1 (L) 3.5 - 5.0 g/dL    Bilirubin Total 0.6 0.0 - 1.0 mg/dL    GFR Estimate 68 >60 mL/min/1.73m2   B-Type Natriuretic Peptide ([facility] " [facility] Only)   Result Value Ref Range     (H) 0 - 78 pg/mL   Troponin I (now)   Result Value Ref Range    Troponin I 0.04 0.00 - 0.29 ng/mL   Result Value Ref Range    INR 1.36 (H) 0.85 - 1.15   CBC with platelets and differential   Result Value Ref Range    WBC Count 5.1 4.0 - 11.0 10e3/uL    RBC Count 2.95 (L) 4.40 - 5.90 10e6/uL    Hemoglobin 10.1 (L) 13.3 - 17.7 g/dL    Hematocrit 31.4 (L) 40.0 - 53.0 %     (H) 78 - 100 fL    MCH 34.2 (H) 26.5 - 33.0 pg    MCHC 32.2 31.5 - 36.5 g/dL    RDW 15.7 (H) 10.0 - 15.0 %    Platelet Count 170 150 - 450 10e3/uL    % Neutrophils 78 %    % Lymphocytes 6 %    % Monocytes 11 %    % Eosinophils 3 %    % Basophils 2 %    % Immature Granulocytes 0 %    NRBCs per 100 WBC 0 <1 /100    Absolute Neutrophils 4.0 1.6 - 8.3 10e3/uL    Absolute Lymphocytes 0.3 (L) 0.8 - 5.3 10e3/uL    Absolute Monocytes 0.6 0.0 - 1.3 10e3/uL    Absolute Eosinophils 0.1 0.0 - 0.7 10e3/uL    Absolute Basophils 0.1 0.0 - 0.2 10e3/uL    Absolute Immature Granulocytes 0.0 <=0.4 10e3/uL    Absolute NRBCs 0.0 10e3/uL   ECG 12-LEAD WITH MUSE (L[facility])   Result Value Ref Range    Systolic Blood Pressure  mmHg    Diastolic Blood Pressure  mmHg    Ventricular Rate 70 BPM    Atrial Rate 75 BPM    SC Interval  ms    QRS Duration 86 ms     ms    QTc 449 ms    P Axis  degrees    R AXIS 46 degrees    T Axis 43 degrees    Interpretation ECG       Atrial fibrillation  Abnormal ECG  When compared with ECG of 29-DEC-2020 18:13,  Atrial fibrillation has replaced Sinus rhythm  Confirmed by SEE ED PROVIDER NOTE FOR, ECG INTERPRETATION ([id number]),  SANJUANA HYATT ([id number]) on 3/14/2022 2:30:52 PM         RADIOLOGY:  Reviewed all pertinent imaging. Please see official radiology report.  XR Chest 1 View   Final Result   IMPRESSION: Right-sided portacatheter with tip in the SVC. No pulmonary infiltrates. Midthoracic compression fracture with previous vertebroplasty. Heart size upper limits of normal.          EKG:     Performed at: 14-Mar-2022 14:16:54    Rate: 70 bpm  Rhythm: Atrial fibrillation  Axis: 46  QRS Interval: 86 ms  QTc Interval: 449 ms  ST Changes: No St changes  Comparison: when compared with ECG of 29-Dec-2020 18:13, Atrial fibrillation has replaced Sinus rhythm     I have independently reviewed and interpreted the EKG(s) documented above.    PROCEDURES:   none      I, William Sanz, am serving as a scribe to document services personally performed by Edmond Smith MD based on my observation and the provider's statements to me. I, Dr. Edmond Smith, attest that William Sanz is acting in a scribe capacity, has observed my performance of the services and has documented them in accordance with my direction.    Edmond Smith MD  Emergency Medicine  [facility] EMERGENCY ROOM  [address] [address]  [phone]      Edmond Smith MD  03/14/22 [id number]

## 2022-03-14 NOTE — DISCHARGE INSTRUCTIONS
continue taking your blood thinner.  Return to the ER for worsening symptoms.  Follow-up with rapid access heart clinic.  Follow-up with your primary care doctor Dr. Ott as well.

## 2022-03-14 NOTE — TELEPHONE ENCOUNTER
Patient calling about shortness of breath which he has had for a long time. Patient is on maintenance chemo for multiple melanoma. Normal weather patient walks dog for 3 miles and weather like this 2 miles. Patient also lifts light weights 5 days a week to keep up his muscle tone.   Yesterday patient was getting short of breath with moderate walking, and lifting light weight. Patient had to stop and consciously breath deep to feel better.   Patient is on Xarelto for history of DVT.     Protocol recommends ED now for evaluation  Care advice given. Patient feels he is safe to drive.   Patient will call back with any worsening symptoms.   Jessica Elias RN   03/14/22 12:02 PM  [facility] Nurse Advisor    COVID 19 Nurse Triage Plan/Patient Instructions    Please be aware that novel coronavirus (COVID-19) may be circulating in the community. If you develop symptoms such as fever, cough, or SOB or if you have concerns about the presence of another infection including coronavirus (COVID-19), please contact your health care provider or visit [email or web address].     Disposition/Instructions    ED Visit recommended. Follow protocol based instructions.     Bring Your Own Device:  Please also bring your smart device(s) (smart phones, tablets, laptops) and their charging cables for your personal use and to communicate with your care team during your visit.    Thank you for taking steps to prevent the spread of this virus.  o Limit your contact with others.  o Wear a simple mask to cover your cough.  o Wash your hands well and often.    Resources    [facility]: About COVID-19: [email or web address]    CDC: What to Do If You're Sick: www.cdc.gov/coronavirus/2019-ncov/about/steps-when-sick.html    CDC: Ending Home Isolation: www.cdc.gov/coronavirus/2019-ncov/hcp/disposition-in-home-patients.html     CDC: Caring for Someone: www.cdc.gov/coronavirus/2019-ncov/if-you-are-sick/care-for-someone.html  "    [facility]: Interim Guidance for Hospital Discharge to Home: www.health.[facility].[place].us/diseases/coronavirus/hcp/hospdischarge.pdf    [facility] clinical trials (COVID-19 research studies): [email or web address]     Below are the COVID-19 hotlines at the [place] Department of [facility] ([facility]). Interpreters are available.   o For health questions: Call 043-745-3823 or 1-930.712.3371 (7 a.m. to 7 p.m.)  o For questions about schools and childcare: Call 572-677-5988 or 1-874.650.7578 (7 a.m. to 7 p.m.)       Reason for Disposition    Any history of prior \"blood clot\" in leg or lungs    Additional Information    Negative: Breathing stopped and hasn't returned    Negative: Choking on something    Negative: SEVERE difficulty breathing (e.g., struggling for each breath, speaks in single words, pulse > 120)    Negative: Bluish (or gray) lips or face    Negative: Difficult to awaken or acting confused (e.g., disoriented, slurred speech)    Negative: Passed out (i.e., fainted, collapsed and was not responding)    Negative: Wheezing started suddenly after medicine, an allergic food, or bee sting    Negative: Stridor    Negative: Slow, shallow and weak breathing    Negative: Sounds like a life-threatening emergency to the triager    Negative: Chest pain    Negative: Wheezing (high pitched whistling sound) and previous asthma attacks or use of asthma medicines    Negative: Difficulty breathing and only present when coughing    Negative: Difficulty breathing and only from stuffy or runny nose    Negative: Difficulty breathing and within 14 days of COVID-19 Exposure    Negative: MODERATE difficulty breathing (e.g., speaks in phrases, SOB even at rest, pulse 100-120) of new onset or worse than normal    Negative: Wheezing can be heard across the room    Negative: Drooling or spitting out saliva (because can't swallow)    Protocols used: BREATHING DIFFICULTY-A-OH      "

## 2022-03-14 NOTE — ED TRIAGE NOTES
The patient presents to the ED with shortness of breath for the past few weeks. He is a multiple myeloma patient who takes maintenance chemotherapy. The patient reports recently he has had a significant change in his energy levels and reports feeling very short of breath with his normal activity. EKG done in triage. The patient has a history of atrial fibrillation. He reports having an ECHO done in October. Denies chest pain.

## 2022-03-17 PROBLEM — J18.9 PNEUMONIA OF BOTH LUNGS DUE TO INFECTIOUS ORGANISM, UNSPECIFIED PART OF LUNG: Status: ACTIVE | Noted: 2022-01-01

## 2022-03-17 NOTE — ED PROVIDER NOTES
EMERGENCY DEPARTMENT ENCOUNTER      NAME: Theo Meyers  AGE: 76 year old male  YOB: 1945  MRN: [id number]  EVALUATION DATE & TIME: No admission date for patient encounter.    PCP: Mathew Ott    ED PROVIDER: Kaiden Velázquez M.D.      Chief Complaint   Patient presents with     Shortness of Breath         FINAL IMPRESSION:  1. Pneumonia of both lungs due to infectious organism, unspecified part of lung    2. Multiple myeloma not having achieved remission (H)          ED COURSE & MEDICAL DECISION MAKING:    Pertinent Labs & Imaging studies reviewed. (See chart for details)  76 year old male presents to the Emergency Department for evaluation of shortness of breath.  Patient ill-appearing on arrival.  Tachypneic with wet sounding respirations.  Initially placed on BiPAP.  Did give a dose of Lasix and nitro as I thought this may be CHF.  Portable chest shows likely bilateral infiltrates.  Given Zosyn Vanco and azithromycin.  Blood cultures drawn.  Lactic acid is normal.  White count is normal.  EKG does not show any obvious ischemic changes.  He does have A. fib.  This is a known entity.  He is on Xarelto.  Patient was able to be weaned off of BiPAP.  Given his pneumonia and hypoxia patient will be admitted for further care.    4:08 AM Per nursing report, patient placed on Oxymask.  4:11 AM I met with the patient to gather history and to perform my initial exam. I discussed the plan for care while in the Emergency Department. PPE: gloves, N95, and eye protection.  5:27 AM I discussed case with Dr. Nguyen, hospitalist.    At the conclusion of the encounter I discussed the results of all of the tests and the disposition. The questions were answered. The patient or family acknowledged understanding and was agreeable with the care plan.       Critical Care     Performed by: Dr Kaiden Velázquez  Authorized by: Dr Kaiden Velázquez  Total critical care time: 60 minutes  Critical care was necessary to treat or  prevent imminent or life-threatening deterioration of the following conditions:  Hypoxia  Critical care was time spent personally by me on the following activities: development of treatment plan with patient or surrogate, discussions with consultants, examination of patient, evaluation of patient's response to treatment, obtaining history from patient or surrogate, ordering and performing treatments and interventions, ordering and review of laboratory studies, ordering and review of radiographic studies, re-evaluation of patient's condition and monitoring for potential decompensation.  Critical care time was exclusive of separately billable procedures and treating other patients.     MEDICATIONS GIVEN IN THE EMERGENCY:  Medications   nitroGLYcerin (NITROSTAT) sublingual tablet 0.4 mg (0.4 mg Sublingual Given 3/17/22 0425)   piperacillin-tazobactam (ZOSYN) 3.375 g vial to attach to  mL bag (3.375 g Intravenous New Bag 3/17/22 5818)   azithromycin 500 mg (ZITHROMAX) in 0.9% NaCl 250 mL intermittent infusion 500 mg (has no administration in time range)   furosemide (LASIX) injection 40 mg (40 mg Intravenous Given 3/17/22 0426)       NEW PRESCRIPTIONS STARTED AT TODAY'S ER VISIT  New Prescriptions    No medications on file          =================================================================    HPI    Patient information was obtained from: Patient    Use of : N/A        Theo Meyers is a 76 year old male with a pertinent history of paroxysmal atrial fibrillation, DVT, aortic stenosis, multiple myeloma, immunocompromised state, anemia, who presents to this ED via walk-in for evaluation of shortness of breath.  Patient had progressive shortness of breath over the last couple weeks but got particularly worse tonight.  He states he cannot find a position of comfort.  Does not notice worse with laying back or sitting up.  Has had some leg swelling going on for last couple months as well.  Was seen  here recently.  Was found to be in A. fib.  He is currently on blood thinners.  He notes since Tuesday (today after he was here) patient has had upper respiratory symptoms.  He took some Mucinex at home.  Has had a cough.  No fevers.  Denies any chest pain abdominal pain nausea or vomiting.  He is currently undergoing chemo for multiple myeloma.  He was diagnosed 12 years ago.  He is also trying to get into a study at the .  He has had his COVID shot boosters in addition to another medication that supposed to stop Covid as well.    Per chart review, patient was seen at [facility] on 3/14/22 for SAMANO. EKG shows A. fib.  Rate controlled. Dissection without evidence of pneumonia or pulmonary edema. . Troponin 0.04. Based on duration of symptoms delta troponin not indicated. INR 1.36. Patient discharged home with recommendation to follow-up in rapid access heart clinic.    REVIEW OF SYSTEMS   Review of Systems   Constitutional: Positive for chills and diaphoresis. Negative for fever.   Respiratory: Positive for cough and shortness of breath.    Cardiovascular: Positive for leg swelling. Negative for chest pain.   Gastrointestinal: Negative for abdominal pain.   All other systems reviewed and are negative.       PAST MEDICAL HISTORY:  Past Medical History:   Diagnosis Date     Anemia 12/13/2017     Arthritis      Bilateral inguinal hernia      Chest tube in place      Glaucoma      Glaucoma      History of blood clots     DVT - left chronic     Multiple myeloma (H)     Gets chemo infusions every 2 weeks     Pancytopenia (H)      Parapneumonic effusion      Peripheral neuropathy      Syncope      TMJ (temporomandibular joint disorder)        PAST SURGICAL HISTORY:  Past Surgical History:   Procedure Laterality Date     APPENDECTOMY      Age 16     ESOPHAGOSCOPY, GASTROSCOPY, DUODENOSCOPY (EGD), COMBINED N/A 12/14/2017    Procedure: ESOPHAGOGASTRODUODENOSCOPY (EGD) WITH BIOPSYS;  Surgeon: Marlon GIRON  MD Kirill;  Location: [facility] GI;  Service:      ESOPHAGOSCOPY, GASTROSCOPY, DUODENOSCOPY (EGD), COMBINED N/A 1/19/2018    Procedure: ENDOSCOPIC ULTRASOUND  ESOPHAGOGASTRODUODENOSCOPY WITH DUODENAL POLYP REMOVAL;  Surgeon: Familia Mcgraw MD;  Location: [facility] OR;  Service:      EYE SURGERY      Cataract     IR MISCELLANEOUS PROCEDURE  6/17/2009     IR MISCELLANEOUS PROCEDURE  7/31/2009     IR MISCELLANEOUS PROCEDURE  8/13/2009     IR PLEURAL DRAINAGE WITH CATHETER INSERTION  7/2/2019     PORTACATH PLACEMENT       RELEASE CARPAL TUNNEL Bilateral      US THORACENTESIS  6/27/2019     VERTEBROPLASTY      T6     WISDOM TOOTH EXTRACTION             CURRENT MEDICATIONS:    Current Facility-Administered Medications   Medication     azithromycin 500 mg (ZITHROMAX) in 0.9% NaCl 250 mL intermittent infusion 500 mg     lidocaine (PF) (XYLOCAINE) 1 % injection 10 mL     LORazepam (ATIVAN) tablet 1 mg     nitroGLYcerin (NITROSTAT) sublingual tablet 0.4 mg     oxyCODONE-acetaminophen (PERCOCET) 5-325 MG per tablet 2 tablet     piperacillin-tazobactam (ZOSYN) 3.375 g vial to attach to  mL bag     Current Outpatient Medications   Medication     acetaminophen (TYLENOL) 500 MG tablet     acyclovir (ZOVIRAX) 400 MG tablet     ascorbic acid 1000 MG TABS tablet     atorvastatin (LIPITOR) 10 MG tablet     bimatoprost (LUMIGAN) 0.03 % ophthalmic drops     brimonidine (ALPHAGAN P) 0.1 % SOLN     calcium carbonate 1250 (500 Ca) MG CHEW     calcium carbonate-vitamin D (OYSTER SHELL CALCIUM/D) 500-200 MG-UNIT tablet     Calcium-Vitamin D (CALCIUM + D PO)     cyanocobalamin (CYANOCOBALAMIN) 1000 MCG/ML injection     daratumumab 16 mg/kg     dextromethorphan-guaiFENesin (MUCINEX DM)  MG 12 hr tablet     fluticasone (FLONASE) 50 MCG/ACT nasal spray     gabapentin (NEURONTIN) 300 MG capsule     magnesium oxide 400 MG CAPS     Multiple Vitamin (MULTI-VITAMIN PO)     Omega-3 Fatty Acids (OMEGA 3 PO)     omeprazole (PRILOSEC)  20 MG DR capsule     pomalidomide (POMALYST) 4 MG capsule     potassium chloride ER (KLOR-CON M) 20 MEQ CR tablet     Psyllium (METAMUCIL PO)     tamsulosin (FLOMAX) 0.4 MG capsule     UNABLE TO FIND     XARELTO ANTICOAGULANT 20 MG TABS tablet         ALLERGIES:  Allergies   Allergen Reactions     Centex-Pse Er [Pseudoephedrine-Guaifenesin Cr]      TB12       FAMILY HISTORY:  Family History   Problem Relation Age of Onset     Heart Disease Mother      Glaucoma Mother      Cancer Father      No Known Problems Sister      Cancer Brother      Pancreatic Cancer Brother      No Known Problems Brother      Cancer Daughter      Skin Cancer Daughter      Melanoma Daughter      Glaucoma Daughter        SOCIAL HISTORY:   Social History     Socioeconomic History     Marital status:      Spouse name: Not on file     Number of children: Not on file     Years of education: Not on file     Highest education level: Not on file   Occupational History     Not on file   Tobacco Use     Smoking status: Former Smoker     Quit date: 1975     Years since quittin.3     Smokeless tobacco: Never Used   Substance and Sexual Activity     Alcohol use: Not Currently     Comment: Alcoholic Drinks/day: occasional     Drug use: No     Sexual activity: Never   Other Topics Concern     Not on file   Social History Narrative     Not on file     Social Determinants of Health     Financial Resource Strain: Not on file   Food Insecurity: Not on file   Transportation Needs: Not on file   Physical Activity: Not on file   Stress: Not on file   Social Connections: Not on file   Intimate Partner Violence: Not on file   Housing Stability: Not on file       VITALS:  /66   Pulse 67   Temp 97.2  F (36.2  C) (Temporal)   Resp 21   Wt 72.6 kg (160 lb)   SpO2 100%   BMI 24.33 kg/m      PHYSICAL EXAM    Physical Exam  Constitutional:       General: He is in acute distress.      Appearance: He is not diaphoretic.   HENT:      Head:  Atraumatic.   Eyes:      General: No scleral icterus.     Pupils: Pupils are equal, round, and reactive to light.   Cardiovascular:      Rate and Rhythm: Rhythm irregular.      Heart sounds: Normal heart sounds.   Pulmonary:      Effort: Tachypnea and respiratory distress present.      Breath sounds: Rhonchi present.   Abdominal:      General: Bowel sounds are normal.      Palpations: Abdomen is soft.      Tenderness: There is no abdominal tenderness.   Musculoskeletal:         General: No tenderness.      Right lower leg: Edema present.      Left lower leg: Edema present.   Skin:     General: Skin is warm.      Findings: No rash.           LAB:  All pertinent labs reviewed and interpreted.  Labs Ordered and Resulted from Time of ED Arrival to Time of ED Departure   COMPREHENSIVE METABOLIC PANEL - Abnormal       Result Value    Sodium 136      Potassium 4.2      Chloride 105      Carbon Dioxide (CO2) 23      Anion Gap 8      Urea Nitrogen 19      Creatinine 0.87      Calcium 8.6      Glucose 147 (*)     Alkaline Phosphatase 77      AST 16      ALT 14      Protein Total 5.7 (*)     Albumin 3.4 (*)     Bilirubin Total 0.9      GFR Estimate 89     B-TYPE NATRIURETIC PEPTIDE ([facility] ONLY) - Abnormal     (*)    CBC WITH PLATELETS AND DIFFERENTIAL - Abnormal    WBC Count 4.6      RBC Count 3.23 (*)     Hemoglobin 10.7 (*)     Hematocrit 33.8 (*)      (*)     MCH 33.1 (*)     MCHC 31.7      RDW 15.5 (*)     Platelet Count 128 (*)     % Neutrophils 82      % Lymphocytes 5      % Monocytes 9      % Eosinophils 1      % Basophils 1      % Immature Granulocytes 2      NRBCs per 100 WBC 0      Absolute Neutrophils 3.7      Absolute Lymphocytes 0.2 (*)     Absolute Monocytes 0.4      Absolute Eosinophils 0.0      Absolute Basophils 0.1      Absolute Immature Granulocytes 0.1      Absolute NRBCs 0.0     TROPONIN I - Normal    Troponin I 0.03     INFLUENZA A/B & SARS-COV2 PCR MULTIPLEX - Normal    Influenza A PCR  Negative      Influenza B PCR Negative      SARS CoV2 PCR Negative     BLOOD CULTURE   BLOOD CULTURE       RADIOLOGY:  Reviewed all pertinent imaging. Please see official radiology report.  XR Chest Port 1 View   Final Result   IMPRESSION: New bilateral perihilar infiltrates greater in the right infrahilar region and retrocardiac region left lung base suspicious for possible perihilar pneumonia. Right-sided Port-A-Cath with tip in good position in the low SVC. Normal heart size    and pulmonary vascularity. Aortic calcification. Vertebroplasty.          EKG:    Performed at: 410  Impression: A. fib.  Nonspecific T wave changes.  When compared to previous dated March 14, 2022 no significant changes  A. fib with a ventricular rate of 75.  QRS 82.  QTc 430    I have independently reviewed and interpreted the EKG(s) documented above.    PROCEDURES:         I, Dee Dee Lynn, am serving as a scribe to document services personally performed by Dr. Kaiden Velázquez, based on my observation and the provider's statements to me. I, Kaiden Velázquez MD attest that Dee Dee Lynn is acting in a scribe capacity, has observed my performance of the services and has documented them in accordance with my direction.    Kaiden Velázquez M.D.  Emergency Medicine  [facility] EMERGENCY ROOM  [address] [address] [phone]  Dept: [phone]     Kaiden Velázquez MD  03/17/22 0580

## 2022-03-17 NOTE — CONSULTS
[facility] Hematology and Oncology Inpatient Consult Note    Patient: Theo Meyers  MRN: [id number]  Date of Service: 3/17/2022      Reason for Visit    I was consulted by Dr. Moon regarding Multiple Myeloma, worsening respiratory failure, immunocompromised patient, at [facility] ED    Assessment/Plan      #.  Kappa light chain multiple myeloma, relapsed/refractory.  Appear to be responding to current therapy with Brianne Pom Dex.  #.  Mild hypoxia with probable community-acquired pneumonia  #.  Mild macrocytic anemia  #.  Mild thrombocytopenia     .  He looks clinically stable and hemodynamically stable.  He is currently on supplemental oxygen 2 L by nasal cannula.  He was started on empiric broad-spectrum antibiotics and antifungal per infectious disease including coverage for PCP.   His hemogram showed normal WBC with normal neutrophil but lymphopenic.  Hemoglobin is mildly low but fairly stable from his baseline.  He is mildly thrombocytopenic.  His hemogram currently is consistent with chemotherapy related.  I advised him to hold pomalidomide at this point until treating for presumed infection and further work-up for hypoxia.   Agree with evaluation by pulmonary.   Will follow.  ______________________________________________________________________________      Staging History    Cancer Staging  No matching staging information was found for the patient.      History  Mr. Theo Meyers is a very pleasant 76 year old gentleman with a diagnosis of relapse refractory kappa light chain multiple myeloma who is currently on daratumumab, pomalidomide and dexamethasone.  He is about day 14 of Pomalyst for this cycle.  Last dose of Pomalyst was yesterday.  He presented to the emergency room today due to worsening shortness of breath with activity in the last couple days.  He has a cough.  No fever.  No chest pain.  No swelling of his legs.  Admission chest x-ray showed no bilateral perihilar infiltrates  for possible perihilar pneumonia.  Subsequent CT of chest/abdomen/pelvis showed small bilateral pleural effusion with bilateral lower lobar bronchiolitis and no evidence of abscess or other inflammatory process.  He was started on supplemental oxygen with nasal cannula and is doing better.  He was able to sleep today.    Reported that he had Evusheld a month ago.    His history of myeloma, treatment history were reviewed from the notes by Dr. Clifford on 2/23/2022 and Alejandra Valdez CNP on 3/2/2022.    Review of systems.  Apart from describing in history, the remainder of comprehensive ROS was negative.      Past History  Past Medical History:   Diagnosis Date     Anemia 12/13/2017     Arthritis      Bilateral inguinal hernia      Chest tube in place      Glaucoma      Glaucoma      History of blood clots     DVT - left chronic     Multiple myeloma (H)     Gets chemo infusions every 2 weeks     Pancytopenia (H)      Parapneumonic effusion      Peripheral neuropathy      Syncope      TMJ (temporomandibular joint disorder)      Past Surgical History:   Procedure Laterality Date     APPENDECTOMY      Age 16     ESOPHAGOSCOPY, GASTROSCOPY, DUODENOSCOPY (EGD), COMBINED N/A 12/14/2017    Procedure: ESOPHAGOGASTRODUODENOSCOPY (EGD) WITH BIOPSYS;  Surgeon: Marlon Roy MD;  Location: [facility];  Service:      ESOPHAGOSCOPY, GASTROSCOPY, DUODENOSCOPY (EGD), COMBINED N/A 1/19/2018    Procedure: ENDOSCOPIC ULTRASOUND  ESOPHAGOGASTRODUODENOSCOPY WITH DUODENAL POLYP REMOVAL;  Surgeon: Familia Mcgraw MD;  Location: [facility] OR;  Service:      EYE SURGERY      Cataract     IR MISCELLANEOUS PROCEDURE  6/17/2009     IR MISCELLANEOUS PROCEDURE  7/31/2009     IR MISCELLANEOUS PROCEDURE  8/13/2009     IR PLEURAL DRAINAGE WITH CATHETER INSERTION  7/2/2019     PORTACATH PLACEMENT       RELEASE CARPAL TUNNEL Bilateral      US THORACENTESIS  6/27/2019     VERTEBROPLASTY      T6     WISDOM TOOTH EXTRACTION       Family History    Problem Relation Age of Onset     Heart Disease Mother      Glaucoma Mother      Cancer Father      No Known Problems Sister      Cancer Brother      Pancreatic Cancer Brother      No Known Problems Brother      Cancer Daughter      Skin Cancer Daughter      Melanoma Daughter      Glaucoma Daughter      Social History     Socioeconomic History     Marital status:      Spouse name: None     Number of children: None     Years of education: None     Highest education level: None   Occupational History     None   Tobacco Use     Smoking status: Former Smoker     Quit date: 1975     Years since quittin.3     Smokeless tobacco: Never Used   Substance and Sexual Activity     Alcohol use: Not Currently     Comment: Alcoholic Drinks/day: occasional     Drug use: No     Sexual activity: Never   Other Topics Concern     None   Social History Narrative     None     Social Determinants of Health     Financial Resource Strain: Not on file   Food Insecurity: Not on file   Transportation Needs: Not on file   Physical Activity: Not on file   Stress: Not on file   Social Connections: Not on file   Intimate Partner Violence: Not on file   Housing Stability: Not on file       Allergies    Allergies   Allergen Reactions     Centex-Pse Er [Pseudoephedrine-Guaifenesin Cr]      TB12          Physical Exam    BP (!) 147/79   Pulse 57   Temp 97.8  F (36.6  C) (Oral)   Resp 20   Wt 72.6 kg (160 lb)   SpO2 98%   BMI 24.33 kg/m        General: alert, awake, not in acute distress  HEENT: Head: Normal, normocephalic, atraumatic.  Eye: Normal external eye, conjunctiva, lids cornea, MACHO.  Nose: Normal external nose, mucus membranes and septum.  Pharynx: Normal buccal mucosa. Normal pharynx.  Neck / Thyroid: Supple, no masses, nodes, nodules or enlargement.  Lymphatics: No abnormally enlarged lymph nodes.  Chest: Normal chest wall and respirations. Clear to auscultation.  Heart: S1 S2 RRR, no murmur.   Abdomen: abdomen is  soft without significant tenderness, masses, organomegaly or guarding  Extremities: normal strength, tone, and muscle mass  Skin: normal. no rash or abnormalities  CNS: non focal.      Lab Results  Recent Results (from the past 24 hour(s))   ECG 12-LEAD WITH MUSE ([facility])    Collection Time: 03/17/22  4:10 AM   Result Value Ref Range    Systolic Blood Pressure  mmHg    Diastolic Blood Pressure  mmHg    Ventricular Rate 75 BPM    Atrial Rate 63 BPM    IA Interval  ms    QRS Duration 82 ms     ms    QTc 435 ms    P Axis  degrees    R AXIS 59 degrees    T Axis -2 degrees    Interpretation ECG       Atrial fibrillation  Abnormal QRS-T angle, consider primary T wave abnormality  Abnormal ECG  When compared with ECG of 14-MAR-2022 14:16,  T wave inversion now evident in Inferior leads  Confirmed by SEE ED PROVIDER NOTE FOR, ECG INTERPRETATION ([id number]),  HARSHA MCCAULEY ([id number]) on 3/17/2022 5:06:08 AM     Comprehensive metabolic panel    Collection Time: 03/17/22  4:19 AM   Result Value Ref Range    Sodium 136 136 - 145 mmol/L    Potassium 4.2 3.5 - 5.0 mmol/L    Chloride 105 98 - 107 mmol/L    Carbon Dioxide (CO2) 23 22 - 31 mmol/L    Anion Gap 8 5 - 18 mmol/L    Urea Nitrogen 19 8 - 28 mg/dL    Creatinine 0.87 0.70 - 1.30 mg/dL    Calcium 8.6 8.5 - 10.5 mg/dL    Glucose 147 (H) 70 - 125 mg/dL    Alkaline Phosphatase 77 45 - 120 U/L    AST 16 0 - 40 U/L    ALT 14 0 - 45 U/L    Protein Total 5.7 (L) 6.0 - 8.0 g/dL    Albumin 3.4 (L) 3.5 - 5.0 g/dL    Bilirubin Total 0.9 0.0 - 1.0 mg/dL    GFR Estimate 89 >60 mL/min/1.73m2   Troponin I    Collection Time: 03/17/22  4:19 AM   Result Value Ref Range    Troponin I 0.03 0.00 - 0.29 ng/mL   B-Type Natriuretic Peptide ([facility] Only)    Collection Time: 03/17/22  4:19 AM   Result Value Ref Range     (H) 0 - 78 pg/mL   CBC with platelets and differential    Collection Time: 03/17/22  4:19 AM   Result Value Ref Range    WBC Count 4.6 4.0 - 11.0 10e3/uL    RBC Count 3.23  (L) 4.40 - 5.90 10e6/uL    Hemoglobin 10.7 (L) 13.3 - 17.7 g/dL    Hematocrit 33.8 (L) 40.0 - 53.0 %     (H) 78 - 100 fL    MCH 33.1 (H) 26.5 - 33.0 pg    MCHC 31.7 31.5 - 36.5 g/dL    RDW 15.5 (H) 10.0 - 15.0 %    Platelet Count 128 (L) 150 - 450 10e3/uL    % Neutrophils 82 %    % Lymphocytes 5 %    % Monocytes 9 %    % Eosinophils 1 %    % Basophils 1 %    % Immature Granulocytes 2 %    NRBCs per 100 WBC 0 <1 /100    Absolute Neutrophils 3.7 1.6 - 8.3 10e3/uL    Absolute Lymphocytes 0.2 (L) 0.8 - 5.3 10e3/uL    Absolute Monocytes 0.4 0.0 - 1.3 10e3/uL    Absolute Eosinophils 0.0 0.0 - 0.7 10e3/uL    Absolute Basophils 0.1 0.0 - 0.2 10e3/uL    Absolute Immature Granulocytes 0.1 <=0.4 10e3/uL    Absolute NRBCs 0.0 10e3/uL   Symptomatic; Unknown Influenza A/B & SARS-CoV2 (COVID-19) Virus PCR Multiplex Nasopharyngeal    Collection Time: 03/17/22  4:19 AM    Specimen: Nasopharyngeal; Swab   Result Value Ref Range    Influenza A PCR Negative Negative    Influenza B PCR Negative Negative    SARS CoV2 PCR Negative Negative   TSH    Collection Time: 03/17/22  4:19 AM   Result Value Ref Range    TSH 2.42 0.30 - 5.00 uIU/mL   Extra Blue Top Tube    Collection Time: 03/17/22  4:20 AM   Result Value Ref Range    Hold Specimen JIC    Extra Red Top Tube    Collection Time: 03/17/22  4:20 AM   Result Value Ref Range    Hold Specimen JIC    Vitamin B12    Collection Time: 03/17/22  4:20 AM   Result Value Ref Range    Vitamin B12 1,684 (H) 213 - 816 pg/mL   Procalcitonin    Collection Time: 03/17/22  6:11 AM   Result Value Ref Range    Procalcitonin 0.11 0.00 - 0.49 ng/mL   Folate    Collection Time: 03/17/22  6:11 AM   Result Value Ref Range    Folic Acid 17.3 >=3.5 ng/mL   Echocardiogram Complete    Collection Time: 03/17/22 12:00 PM   Result Value Ref Range    LVEF  60-65%         Imaging Results    XR Chest 1 View    Result Date: 3/14/2022  EXAM: XR CHEST 1 VIEW LOCATION: [facility] DATE/TIME:  3/14/2022 4:29 PM INDICATION: Chest pain. COMPARISON: None.     IMPRESSION: Right-sided portacatheter with tip in the SVC. No pulmonary infiltrates. Midthoracic compression fracture with previous vertebroplasty. Heart size upper limits of normal.    Echocardiogram Complete    Result Date: 3/17/2022  [id number]^[address]^[place] [address]  Name: MONICA SCOTT MRN: [id number] : 1945 Study Date: 2022 11:22 AM Age: 76 yrs Gender: Male Patient Location: [facility] Reason For Study: Heart Failure Ordering Physician: ESTEVAN SORTO Performed By: ACE  BSA: 1.9 m2 Height: 68 in Weight: 160 lb HR: 79 BP: 147/79 mmHg ______________________________________________________________________________ Procedure Complete Echo Adult. Adequate quality two-dimensional was performed and interpreted. Adequate quality color and spectral Doppler were performed and interpreted. Compared to the prior study dated 11/3/2021, there are changes as noted. ______________________________________________________________________________ Interpretation Summary  1. Left ventricular size, wall thickness, and systolic function are normal. The estimated left ventricular ejection fraction is 60-65%. 2. Right ventricular size and systolic function are normal. 3. Mild left and moderate right atrial enlargement. 4. Calcified trileaflet aortic valve with moderate aortic stenosis. Peak forward velocity measures 3.2 m/s, mean gradient 27 mm Hg, calculated aortic valve area 1.1 cm2, and dimensionless index 0.28. No significant regurgitation. 5. Moderate pulmonary hypertension is present with an estimated pulmonary artery systolic pressure of 54 mm Hg. 6. Compared to the prior study dated 11/3/2021, the Doppler data today show moderate aortic stenosis. However, the aortic valve has a similar appearance on both studies. There was likely inadequate Doppler assessment of the aortic  valve on the prior study. Visually the aortic valve appears at least moderately stenotic on both studies. ______________________________________________________________________________ I      WMSI = 1.00     % Normal = 100  X - Cannot   0 -                      (2) - Mildly 2 -          Segments  Size Interpret    Hyperkinetic 1 - Normal  Hypokinetic  Hypokinetic  1-2     small                                                    7 -          3-5    moderate 3 - Akinetic 4 -          5 -         6 - Akinetic Dyskinetic   6-14    large              Dyskinetic   Aneurysmal  w/scar       w/scar       15-16   diffuse  Left Ventricle The left ventricle is normal in size. There is normal left ventricular wall thickness. Left ventricular systolic function is normal. The visual ejection fraction is 60-65%. Diastolic function not assessed due to atrial fibrillation. No regional wall motion abnormalities noted.  Right Ventricle The right ventricle is mildly dilated. The right ventricular systolic function is normal.  Atria The left atrium is mildly dilated. The right atrium is moderately dilated.  Mitral Valve The mitral valve leaflets are moderately thickened. There is mild mitral annular calcification. There is trace to mild mitral regurgitation. There is no mitral valve stenosis. The mean mitral valve gradient is 3.3 mmHg.  Tricuspid Valve Tricuspid valve leaflets appear normal. There is mild (1+) tricuspid regurgitation. The right ventricular systolic pressure is elevated at 46.4 mmHg. Moderate (46-55mmHg) pulmonary hypertension is present. There is no tricuspid stenosis.  Aortic Valve The aortic valve is trileaflet. Moderate aortic valve calcification is present. No aortic regurgitation is present. Moderate valvular aortic stenosis. The calculated aortic valve are is 1.1 cm^2. The mean AoV pressure gradient is 26.6 mmHg.  Pulmonic Valve The pulmonic valve is not well seen, but is grossly normal. There is trace to mild  pulmonic valvular regurgitation. There is no pulmonic valvular stenosis.  Vessels The aorta root is normal. The thoracic aorta cannot be assessed. IVC diameter and respiratory changes fall into an intermediate range suggesting an RA pressure of 8 mmHg.  Pericardium There is no pericardial effusion.  Rhythm The rhythm was atrial fibrillation.  ______________________________________________________________________________ MMode/2D Measurements & Calculations IVSd: 0.73 cm LVIDd: 4.5 cm LVIDs: 2.8 cm LVPWd: 0.85 cm FS: 37.6 %  LV mass(C)d: 113.4 grams LV mass(C)dI: 61.0 grams/m2 Ao root diam: 3.5 cm LA dimension: 3.9 cm LA/Ao: 1.1 LVOT diam: 2.2 cm LVOT area: 3.9 cm2 LA Volume (BP): 63.7 ml LA Volume Index (BP): 34.2 ml/m2  LA Volume Indexed (AL/bp): 35.6 ml/m2 RWT: 0.38  Time Measurements MM HR: 59.0 BPM  Doppler Measurements & Calculations MV E max iam: 168.0 cm/sec MV A max iam: 77.1 cm/sec MV E/A: 2.2 MV max PG: 10.0 mmHg MV mean PG: 3.3 mmHg MV V2 VTI: 46.4 cm MVA(VTI): 1.8 cm2 MV dec slope: 1407 cm/sec2 MV dec time: 0.12 sec Ao V2 max: 320.3 cm/sec Ao max P.0 mmHg Ao V2 mean: 244.7 cm/sec Ao mean P.6 mmHg Ao V2 VTI: 75.0 cm JOHANN(I,D): 1.1 cm2 JOHANN(V,D): 1.1 cm2 LV V1 max PG: 3.3 mmHg LV V1 max: 90.7 cm/sec LV V1 VTI: 21.7 cm SV(LVOT): 85.6 ml SI(LVOT): 46.0 ml/m2 PA acc time: 0.07 sec  TR max iam: 340.7 cm/sec TR max P.4 mmHg AV Iam Ratio (DI): 0.28 JOHANN Index (cm2/m2): 0.61 E/E' av.6 Lateral E/e': 18.4 Medial E/e': 24.9  ______________________________________________________________________________ Report approved by: Cristina Valenzuela 2022 12:33 PM       XR Chest Port 1 View    Result Date: 3/17/2022  EXAM: XR CHEST PORT 1 VIEW LOCATION: [facility] DATE/TIME: 3/17/2022 4:30 AM INDICATION: Dyspnea COMPARISON: 2022     IMPRESSION: New bilateral perihilar infiltrates greater in the right infrahilar region and retrocardiac region left lung base suspicious for  possible perihilar pneumonia. Right-sided Port-A-Cath with tip in good position in the low SVC. Normal heart size  and pulmonary vascularity. Aortic calcification. Vertebroplasty.    CT Chest Abdomen Pelvis w/o Contrast    Result Date: 3/17/2022  EXAM: CT CHEST ABDOMEN PELVIS W/O CONTRAST LOCATION: [facility] DATE/TIME: 3/17/2022 6:56 AM INDICATION: Sepsis, multiple myeloma COMPARISON: PET/CT 01/27/2022, CT chest 12/09/2020 TECHNIQUE: CT scan of the chest, abdomen, and pelvis was performed without IV contrast. Multiplanar reformats were obtained. Dose reduction techniques were used. CONTRAST: None. FINDINGS: LUNGS AND PLEURA: Mild layering tracheal secretions. Small bilateral pleural effusions with adjacent atelectasis. Bilateral lower lobar predominant bronchial wall thickening. No mass or suspicious nodule. MEDIASTINUM/AXILLAE: Right IJ port catheter. No thoracic adenopathy. Mild cardiomegaly. Trace pericardial effusion. Mitral annulus calcifications. CORONARY ARTERY CALCIFICATION: Severe. HEPATOBILIARY: Normal. PANCREAS: Normal. SPLEEN: Normal. ADRENAL GLANDS: Normal. KIDNEYS/BLADDER: 4 mm lower right renal stone. Punctate lower left renal stone. No hydronephrosis or obstructing ureteral stone. Normal urinary bladder. BOWEL: Normal caliber small bowel. Post appendectomy. Moderate stool burden within the distal sigmoid colon and rectum. No free air or free fluid. LYMPH NODES: Normal. VASCULATURE: Moderate atherosclerosis. PELVIC ORGANS: Normal. MUSCULOSKELETAL: Spinal and pelvic degenerative changes. Stable lucent and sclerotic changes within the right proximal humerus. Stable 1.1 cm lytic lesion within the right scapula extending to the scapular spine. Associated cortical erosion. Stable T6 vertebral body wedge compression fracture with vertebroplasty changes.     IMPRESSION: 1.  Small bilateral pleural effusions with adjacent atelectasis, right greater than left. Bilateral lower lobar  predominant bronchiolitis. 2.  No acute findings in the abdomen or pelvis. No evidence of an inflammatory process or abscess. 3.  Stable small lytic lesion in the right scapula correlating with the patient's known multiple myeloma. Stable lucent and sclerotic changes in the right proximal humerus perhaps reflecting a healed osseous lesion.       Signed by: Stan Henley MD

## 2022-03-17 NOTE — PLAN OF CARE
Problem: Gas Exchange Impaired  Goal: Optimal Gas Exchange  Outcome: Ongoing, Progressing   Goal Outcome Evaluation:    Pt A&Ox4, denies having pain when asked. On 2LO2 NC. Some labored breathing noted. Urinal used at bedside. Continues on IV ABX, ID, hem/onc, pulmonology consulted. RT administering nebs. Ate 100% breakfast. Call light within reach.

## 2022-03-17 NOTE — PHARMACY-ADMISSION MEDICATION HISTORY
Pharmacy Note - Admission Medication History    Pertinent Provider Information: none     ______________________________________________________________________    Prior To Admission (PTA) med list completed and updated in EMR.       PTA Med List   Medication Sig Last Dose     acetaminophen (TYLENOL) 500 MG tablet Take 500-1,000 mg by mouth 3/16/2022 at Unknown time     acyclovir (ZOVIRAX) 400 MG tablet TAKE 1 TABLET BY MOUTH TWICE DAILY 3/16/2022 at Unknown time     ascorbic acid 1000 MG TABS tablet Take 1,000 mg by mouth daily  3/16/2022 at Unknown time     atorvastatin (LIPITOR) 10 MG tablet Take 1 tablet (10 mg) by mouth daily 3/16/2022 at in the evening     bimatoprost (LUMIGAN) 0.03 % ophthalmic drops Place 1 drop into both eyes At Bedtime. 3/16/2022 at wife will bring     brimonidine (ALPHAGAN P) 0.1 % SOLN Place 1 drop into both eyes every 8 hours. 3/16/2022 at wife will bring     calcium carbonate 1250 (500 Ca) MG CHEW Take 1 tablet by mouth daily as needed  Past Week at Unknown time     calcium carbonate-vitamin D (OYSTER SHELL CALCIUM/D) 500-200 MG-UNIT tablet Take 1 tablet by mouth  3/16/2022 at Unknown time     cyanocobalamin (CYANOCOBALAMIN) 1000 MCG/ML injection Inject 1 mL into the muscle every 30 days Past Month at Unknown time     daratumumab 16 mg/kg Inject 16 mg/kg into the vein once Unknown at Unknown time     dextromethorphan-guaiFENesin (MUCINEX DM)  MG 12 hr tablet Take 1 tablet by mouth daily as needed for cough 3/17/2022 at 3 tabs this morning     fluticasone (FLONASE) 50 MCG/ACT nasal spray Spray 2 sprays into both nostrils daily Past Week at wife will bring     gabapentin (NEURONTIN) 300 MG capsule TAKE 1 CAPSULE(300 MG) BY MOUTH DAILY AS NEEDED 3/16/2022 at in the evening     magnesium oxide 400 MG CAPS Take 400 mg by mouth daily  3/16/2022 at morning     Multiple Vitamin (MULTI-VITAMIN PO) Take 1 capsule by mouth daily. 3/16/2022 at morning     Omega-3 Fatty Acids (OMEGA 3 PO) TAKE  AS DIRECTED  3/16/2022 at Unknown time     omeprazole (PRILOSEC) 20 MG DR capsule TAKE 1 CAPSULE BY MOUTH ONCE DAILY 3/16/2022 at Unknown time     pomalidomide (POMALYST) 4 MG capsule Take 1 capsule (4 mg) by mouth daily Swallow whole, do NOT break, crush, chew or open capsule. Take on days 1-21 of repeated 28 day cycles. (Patient taking differently: Take 4 mg by mouth daily Swallow whole, do NOT break, crush, chew or open capsule. Take on days 1-21 of repeated 28 day cycles.  3/17/22: today is day 16 of 28 day cycle) 3/16/2022 at wife will bring     potassium chloride ER (KLOR-CON M) 20 MEQ CR tablet Take 1 tablet (20 mEq) by mouth every other day Past Week at Unknown time     Psyllium (METAMUCIL PO) Take by mouth daily USE AS DIRECTED  3/16/2022 at Unknown time     tamsulosin (FLOMAX) 0.4 MG capsule TAKE 1 CAPSULE BY MOUTH EVERY DAY 3/16/2022 at in the evening     XARELTO ANTICOAGULANT 20 MG TABS tablet Take 20 mg by mouth daily (with dinner)  3/16/2022 at Unknown time       Information source(s): Patient and CareEverywhere/SureScripts  Method of interview communication: in-person    Summary of Changes to PTA Med List  New: none   Discontinued: clean up one time clinic administered meds  Changed: none    Patient was asked about OTC/herbal products specifically.  PTA med list reflects this.    In the past week, patient estimated taking medication this percent of the time:  greater than 90%.    Allergies were reviewed, assessed, and updated with the patient.      Medications available for use during hospital stay: Wife will bring Lumigan, Alphagan, Pomalyst, fluticasone from home.     The information provided in this note is only as accurate as the sources available at the time of the update(s).    Thank you for the opportunity to participate in the care of this patient.    CHEIKH CRUZ  3/17/2022 9:33 AM

## 2022-03-17 NOTE — ED TRIAGE NOTES
Pt presents to ED with c/o shortness of breath since getting a cold on Monday.  Pt states he was recently in ED for atrial fibrillation.  Reports has some exertional shortness of breath in the last couple of weeks, but now having difficulty breathing even at rest.  Pt has multiple myeloma.

## 2022-03-17 NOTE — PROGRESS NOTES
[facility] Medicine PROGRESS NOTE      Identification/Summary:   Theo Meyers is a 76 year old male who  has past medical history of kappa light chain myeloma on treatment, status post a stem cell transplant in 2010, sees him [facility] oncology, anemia, DVT on Xarelto, atrial fibrillation parapneumonic effusion status post chest tube in the past presented to the emergency room with complaints of sore throat, shortness of breath, cough for a week and admitted on 3/17/2022 for acute hypoxemic respiratory failure secondary to pneumonia/bronchiolitis noted on CT chest, 6 small bilateral pleural effusions.  Initially was on BiPAP for increased work of breathing.  Currently on nasal cannula 2 L oxygen.  Appears tachypneic.  ID, oncology, pulmonary consult requested.     Assessment and Plan  Bilateral Pneumonia/bronchiolitis  Acute respiratory failure with hypoxia  Immunocompromised patient  Afebrile.  No leukocytosis.  No neutropenia.  Procalcitonin 0.11  Infectious disease consulted oncology.    Pulmonary consulted.  Currently on 2 L oxygen  Initially was on Zosyn Vanco and ID added meropenem, micafungin.  Sputum cultures, respiratory viral panel  Started Bactrim for P LIZ prophylaxis  Continue acyclovir for prophylaxis  Continue nebs, flutter valve    Small bilateral pleural effusions  Elevated   Received 1 dose of IV Lasix, so far 1400 mL out  Echocardiogram EF 60 to 65%, moderate aortic stenosis    H/O A. fib with  controlled ventricular response  Monitor heart rate   Continue home Xarelto for anticoagulation.  Check another troponin.  Echo with EF of 60%  Follow-up in A. fib clinic    Moderate Aortic stenosis  Follow-up outpatient    Recurrent DVTs  Continue home Xarelto    IgG kappa light chain myeloma  Immunocompromise  Status post bone marrow transplant 2010  On daratumumab, Pomalyst, dexamethasone  Await oncology input.  Hold off on above for now     Thrombocytopenia  Macrocytic  "anemia  B12 folate levels normal  Monitor.  Iron studies pending    Diet: Combination Diet Regular Diet Adult  DVT Prophylaxis:   Continue home Xarelto  Code Status: Full Code    Anticipated possible discharge in few days to  once clinical improvement milestones are met.    Interval History/Subjective:  Shortness of breath is slightly better since last night.  He is able to talk but with some difficulty.  No chest pain.  He has cough with phlegm mostly clear.  No other nausea vomiting abdominal pain.  He had some diarrhea that is resolved now.    Physical Exam/Objective:  Vitals I/O   Vital signs:  Temp: 97.8  F (36.6  C) Temp src: Oral BP: (!) 147/79 Pulse: 57   Resp: 20 SpO2: 98 % O2 Device: Nasal cannula Oxygen Delivery: 2 LPM   Weight: 72.6 kg (160 lb)  Estimated body mass index is 24.33 kg/m  as calculated from the following:    Height as of 3/14/22: 1.727 m (5' 8\").    Weight as of this encounter: 72.6 kg (160 lb). I/O last 3 completed shifts:  In: 100 [IV Piggyback:100]  Out: 900 [Urine:900]     Body mass index is 24.33 kg/m .    General Appearance:  Alert, cooperative,  mild distress with talking   Head:  Normocephalic, atraumatic   Eyes:  PERRL    Throat:  mucosa; moist   Neck: No JVD, thyromegaly   Lungs:    Wheezing, rhonchi bilaterally, respirations unlabored   Chest Wall:  No tenderness or deformity   Heart:  distant heart sounds irregular rate and rhythm, S1, S2 normal,no murmur   Abdomen:   Soft, non tender, non distended, bowel sounds present, no guarding or rigidity   Extremities: trace edema, no joint swelling   Skin: Skin color, texture, turgor normal, no rashes or lesions   Neurologic: Alert and oriented X 3, Moves all 4 extremities       Medications:   Personally Reviewed.    acyclovir  400 mg Oral BID     atorvastatin  10 mg Oral Daily     bimatoprost  1 drop Both Eyes At Bedtime     brimonidine  1 drop Both Eyes Q8H     fluticasone  2 spray Both Nostrils Daily     ipratropium - albuterol 0.5 " mg/2.5 mg/3 mL  3 mL Nebulization Q4H     meropenem  1 g Intravenous Q8H     micafungin  100 mg Intravenous Q24H     omeprazole  20 mg Oral QAM AC     rivaroxaban ANTICOAGULANT  20 mg Oral Daily with supper     sodium chloride (PF)  3 mL Intracatheter Q8H     sulfamethoxazole-trimethoprim  2 tablet Oral BID     tamsulosin  0.4 mg Oral Daily     vancomycin  750 mg Intravenous Q12H       Data reviewed today: I personally reviewed all new medications, labs, imaging/diagnostics reports over the past 24 hours. Pertinent findings include    Labs:  Most Recent 3 CBC's:Recent Labs   Lab Test 03/17/22  0419 03/14/22  1619 03/02/22  0804   WBC 4.6 5.1 2.5*   HGB 10.7* 10.1* 10.9*   * 106* 106*   * 170 222     Most Recent 3 BMP's:Recent Labs   Lab Test 03/17/22  0419 03/14/22  1619 03/02/22  0804    141 139   POTASSIUM 4.2 4.6 4.3   CHLORIDE 105 107 109*   CO2 23 23 23   BUN 19 25 22   CR 0.87 1.12 1.25   ANIONGAP 8 11 7   NORMAN 8.6 8.8 8.7   * 89 118     Most Recent 2 LFT's:Recent Labs   Lab Test 03/17/22 0419 03/14/22  1619   AST 16 19   ALT 14 16   ALKPHOS 77 67   BILITOTAL 0.9 0.6     Most Recent 3 INR's:Recent Labs   Lab Test 03/14/22  1619 12/29/20 2026 03/18/20  0801   INR 1.36* 1.33* 1.31*     Most Recent 3 Troponin's:No lab results found.  Most Recent 3 BNP's:No lab results found.  Most Recent ABG:No lab results found.  Most Recent ESR & CRP:Recent Labs   Lab Test 12/29/20  1800   CRP 4.0*       Imaging:   Recent Results (from the past 24 hour(s))   XR Chest Port 1 View    Narrative    EXAM: XR CHEST PORT 1 VIEW  LOCATION: [facility]  DATE/TIME: 3/17/2022 4:30 AM    INDICATION: Dyspnea  COMPARISON: 03/14/2022      Impression    IMPRESSION: New bilateral perihilar infiltrates greater in the right infrahilar region and retrocardiac region left lung base suspicious for possible perihilar pneumonia. Right-sided Port-A-Cath with tip in good position in the low SVC.  Normal heart size   and pulmonary vascularity. Aortic calcification. Vertebroplasty.   CT Chest Abdomen Pelvis w/o Contrast    Narrative    EXAM: CT CHEST ABDOMEN PELVIS W/O CONTRAST  LOCATION: [facility]  DATE/TIME: 3/17/2022 6:56 AM    INDICATION: Sepsis, multiple myeloma  COMPARISON: PET/CT 01/27/2022, CT chest 12/09/2020  TECHNIQUE: CT scan of the chest, abdomen, and pelvis was performed without IV contrast. Multiplanar reformats were obtained. Dose reduction techniques were used.   CONTRAST: None.    FINDINGS:   LUNGS AND PLEURA: Mild layering tracheal secretions. Small bilateral pleural effusions with adjacent atelectasis. Bilateral lower lobar predominant bronchial wall thickening. No mass or suspicious nodule.    MEDIASTINUM/AXILLAE: Right IJ port catheter. No thoracic adenopathy. Mild cardiomegaly. Trace pericardial effusion. Mitral annulus calcifications.    CORONARY ARTERY CALCIFICATION: Severe.    HEPATOBILIARY: Normal.    PANCREAS: Normal.    SPLEEN: Normal.    ADRENAL GLANDS: Normal.    KIDNEYS/BLADDER: 4 mm lower right renal stone. Punctate lower left renal stone. No hydronephrosis or obstructing ureteral stone. Normal urinary bladder.    BOWEL: Normal caliber small bowel. Post appendectomy. Moderate stool burden within the distal sigmoid colon and rectum. No free air or free fluid.    LYMPH NODES: Normal.    VASCULATURE: Moderate atherosclerosis.    PELVIC ORGANS: Normal.    MUSCULOSKELETAL: Spinal and pelvic degenerative changes. Stable lucent and sclerotic changes within the right proximal humerus. Stable 1.1 cm lytic lesion within the right scapula extending to the scapular spine. Associated cortical erosion. Stable T6   vertebral body wedge compression fracture with vertebroplasty changes.      Impression    IMPRESSION:  1.  Small bilateral pleural effusions with adjacent atelectasis, right greater than left. Bilateral lower lobar predominant bronchiolitis.  2.  No acute  findings in the abdomen or pelvis. No evidence of an inflammatory process or abscess.  3.  Stable small lytic lesion in the right scapula correlating with the patient's known multiple myeloma. Stable lucent and sclerotic changes in the right proximal humerus perhaps reflecting a healed osseous lesion.   Echocardiogram Complete   Result Value    LVEF  60-65%    [facility]    [id number]  [id number]  [id number]  [id number]^NOREEN^[place] [address]     Name: MONICA SCOTT  MRN: [id number]  : 1945  Study Date: 2022 11:22 AM  Age: 76 yrs  Gender: Male  Patient Location: [facility]  Reason For Study: Heart Failure  Ordering Physician: ESTEVAN SORTO  Performed By: ACE     BSA: 1.9 m2  Height: 68 in  Weight: 160 lb  HR: 79  BP: 147/79 mmHg  ______________________________________________________________________________  Procedure  Complete Echo Adult. Adequate quality two-dimensional was performed and  interpreted. Adequate quality color and spectral Doppler were performed and  interpreted. Compared to the prior study dated 11/3/2021, there are changes as  noted.  ______________________________________________________________________________  Interpretation Summary     1. Left ventricular size, wall thickness, and systolic function are normal.  The estimated left ventricular ejection fraction is 60-65%.  2. Right ventricular size and systolic function are normal.  3. Mild left and moderate right atrial enlargement.  4. Calcified trileaflet aortic valve with moderate aortic stenosis. Peak  forward velocity measures 3.2 m/s, mean gradient 27 mm Hg, calculated aortic  valve area 1.1 cm2, and dimensionless index 0.28. No significant  regurgitation.  5. Moderate pulmonary hypertension is present with an estimated pulmonary  artery systolic pressure of 54 mm Hg.  6. Compared to the prior study dated 11/3/2021, the Doppler data today show  moderate aortic stenosis.  However, the aortic valve has a similar appearance  on both studies. There was likely inadequate Doppler assessment of the aortic  valve on the prior study. Visually the aortic valve appears at least  moderately stenotic on both studies.  ______________________________________________________________________________  I      WMSI = 1.00     % Normal = 100     X - Cannot   0 -                      (2) - Mildly 2 -          Segments  Size  Interpret    Hyperkinetic 1 - Normal  Hypokinetic  Hypokinetic  1-2     small                                                     7 -          3-5      moderate  3 - Akinetic 4 -          5 -         6 - Akinetic Dyskinetic   6-14    large               Dyskinetic   Aneurysmal  w/scar       w/scar       15-16   diffuse     Left Ventricle  The left ventricle is normal in size. There is normal left ventricular wall  thickness. Left ventricular systolic function is normal. The visual ejection  fraction is 60-65%. Diastolic function not assessed due to atrial  fibrillation. No regional wall motion abnormalities noted.     Right Ventricle  The right ventricle is mildly dilated. The right ventricular systolic function  is normal.     Atria  The left atrium is mildly dilated. The right atrium is moderately dilated.     Mitral Valve  The mitral valve leaflets are moderately thickened. There is mild mitral  annular calcification. There is trace to mild mitral regurgitation. There is  no mitral valve stenosis. The mean mitral valve gradient is 3.3 mmHg.     Tricuspid Valve  Tricuspid valve leaflets appear normal. There is mild (1+) tricuspid  regurgitation. The right ventricular systolic pressure is elevated at 46.4  mmHg. Moderate (46-55mmHg) pulmonary hypertension is present. There is no  tricuspid stenosis.     Aortic Valve  The aortic valve is trileaflet. Moderate aortic valve calcification is  present. No aortic regurgitation is present. Moderate valvular aortic  stenosis. The calculated  aortic valve are is 1.1 cm^2. The mean AoV pressure  gradient is 26.6 mmHg.     Pulmonic Valve  The pulmonic valve is not well seen, but is grossly normal. There is trace to  mild pulmonic valvular regurgitation. There is no pulmonic valvular stenosis.     Vessels  The aorta root is normal. The thoracic aorta cannot be assessed. IVC diameter  and respiratory changes fall into an intermediate range suggesting an RA  pressure of 8 mmHg.     Pericardium  There is no pericardial effusion.     Rhythm  The rhythm was atrial fibrillation.     ______________________________________________________________________________  MMode/2D Measurements & Calculations  IVSd: 0.73 cm  LVIDd: 4.5 cm  LVIDs: 2.8 cm  LVPWd: 0.85 cm  FS: 37.6 %     LV mass(C)d: 113.4 grams  LV mass(C)dI: 61.0 grams/m2  Ao root diam: 3.5 cm  LA dimension: 3.9 cm  LA/Ao: 1.1  LVOT diam: 2.2 cm  LVOT area: 3.9 cm2  LA Volume (BP): 63.7 ml  LA Volume Index (BP): 34.2 ml/m2     LA Volume Indexed (AL/bp): 35.6 ml/m2  RWT: 0.38     Time Measurements  MM HR: 59.0 BPM     Doppler Measurements & Calculations  MV E max ima: 168.0 cm/sec  MV A max iam: 77.1 cm/sec  MV E/A: 2.2  MV max PG: 10.0 mmHg  MV mean PG: 3.3 mmHg  MV V2 VTI: 46.4 cm  MVA(VTI): 1.8 cm2  MV dec slope: 1407 cm/sec2  MV dec time: 0.12 sec  Ao V2 max: 320.3 cm/sec  Ao max P.0 mmHg  Ao V2 mean: 244.7 cm/sec  Ao mean P.6 mmHg  Ao V2 VTI: 75.0 cm  JOHANN(I,D): 1.1 cm2  JOHANN(V,D): 1.1 cm2  LV V1 max PG: 3.3 mmHg  LV V1 max: 90.7 cm/sec  LV V1 VTI: 21.7 cm  SV(LVOT): 85.6 ml  SI(LVOT): 46.0 ml/m2  PA acc time: 0.07 sec     TR max iam: 340.7 cm/sec  TR max P.4 mmHg  AV Iam Ratio (DI): 0.28  JOHANN Index (cm2/m2): 0.61  E/E' av.6  Lateral E/e': 18.4  Medial E/e': 24.9     ______________________________________________________________________________  Report approved by: Crsitina Valenzuela 2022 12:33 PM             EKG: Personally reviewed. Ranjith Goodrich  MD  Hospitalist  [facility]

## 2022-03-17 NOTE — H&P
"Hospital Medicine Service History and Physical   [facility] [place]: [facility]    Theo Meyers is a 76 year old male who  has a past medical history of Anemia (12/13/2017), Arthritis, Bilateral inguinal hernia, Chest tube in place, Glaucoma, Glaucoma, History of blood clots, Multiple myeloma (H), Pancytopenia (H), Parapneumonic effusion, Peripheral neuropathy, Syncope, and TMJ (temporomandibular joint disorder).   Chief Complaint: Shortness of breath    Assessment and Plan  Bilateral Pneumonia  Acute respiratory failure  Would question viral etiology given immune system and subjective pharyngitis  Agree with broad-spectrum vancomycin, Zosyn started in the ED, will hold azithromycin for now  Infectious disease consult  Lungs sound tight, trial some nebulizer  Check pro-dick, sputum Cx if we can get one  Cards tele    Recurrent DVTs  PTA AC    IgG kappa light chain myeloma  Immunocompromise  Status post bone marrow transplant 2010  Med rec pending    Thrombocytopenia  Macrocytic anemia  Hb stable from prior ~10  Check B12, folate, TSH    DVTP: PTA Xarelto  Code Status: Prior  Disposition: Inpatient   Estimated body mass index is 24.33 kg/m  as calculated from the following:    Height as of 3/14/22: 1.727 m (5' 8\").    Weight as of this encounter: 72.6 kg (160 lb).    History of Present Illness  Theo Meyers has had progressive shortness of breath over a few weeks that worsened since Tuesday, and particularly tonight.  He was in bed unable to sleep trying to catch his breath and \"get ahead of it\".  On Tuesday he also noted sore throat and cough.  He took Mucinex to try and help this he was also seen in the emergency department in the past few days, noted to be in atrial fibrillation.  Patient is anticoagulated for DVTs and was just seen recently in the [facility] emergency department, found to be in atrial fibrillation this past Tuesday.  He lives with cats and dogs, no recent travel.  Denies history " of asthma or COPD.  Says he was a smoker for 5 years several decades ago.  ROS + chills, diaphoresis, shortness of breath, cough, leg swelling  ROS -chest pain, abdominal pain, dysuria  All other systems reviewed and are negative  In the ED, patient is afebrile, hemodynamically stable sats 80's, placed on BiPAP.  Hemoglobin 10.7, platelets 128, , troponin 0.03.  Chest x-ray shows new bilateral perihilar infiltrates, right-sided Port-A-Cath.    Appears ill, chronically ill  Anicteric conjunctiva, PERRL, eyes sunken  Moist mucous membranes, poor dentition  Trachea midline, no JVD  Wheeze bilaterally, Respiratory effort distressed, tachypneic, on oxygen mask satting low 90s  Irregular, no murmur, right chest port  Abdomen soft, nondistended, nontender  Trace bilateral lower extremity edema, clubbing of nails absent  Skin normal temperature, dry  Normal affect, alert  Vital signs reviewed by me    Wt Readings from Last 4 Encounters:   03/17/22 72.6 kg (160 lb)   03/14/22 72.6 kg (160 lb)   03/02/22 75.8 kg (167 lb 1.6 oz)   02/23/22 76.7 kg (169 lb)        reports that he quit smoking about 46 years ago. He has never used smokeless tobacco. He reports previous alcohol use. He reports that he does not use drugs.  family history includes Cancer in his brother, daughter, and father; Glaucoma in his daughter and mother; Heart Disease in his mother; Melanoma in his daughter; No Known Problems in his brother and sister; Pancreatic Cancer in his brother; Skin Cancer in his daughter.   has a past surgical history that includes IR Miscellaneous Procedure (6/17/2009); IR Miscellaneous Procedure (7/31/2009); IR Miscellaneous Procedure (8/13/2009); Release carpal tunnel (Bilateral); appendectomy; [place] Tooth Extraction; Portacath Placement; Vertebroplasty; Esophagoscopy, gastroscopy, duodenoscopy (EGD), combined (N/A, 12/14/2017); Eye surgery; Esophagoscopy, gastroscopy, duodenoscopy (EGD), combined (N/A, 1/19/2018); Us  Thoracentesis (6/27/2019); and IR Pleural Drainage With Catheter Insertion (7/2/2019).   Allergies   Allergen Reactions     Centex-Pse Er [Pseudoephedrine-Guaifenesin Cr]      TB12       Alexis Nguyen MD, MPH  [facility] Medicine  [facility]   Phone: #[phone]

## 2022-03-17 NOTE — CONSULTS
[facility]    Infectious Disease Consultation     Date of Admission:  3/17/2022  Date of Consult (When I saw the patient): 03/17/22    Assessment & Plan   Theo Meyers is a 76 year old male who was admitted on 3/17/2022.     Impression:  1. Myeloma: Myeloma not in remission: seen by Oncology on 3/2/2022. On daratumumab and dexamethasone and pomalyst. ?referred for CAR-T cell therapy to the [facility]. Please see Oncology notes  2. COVID negative. Received Evusheld (150 mg dose) on 2/2/2022. Will need another 150 mg dose based on updated EUA. Pharm D to coordinate   3. Pneumonia, hypoxia, at risk of opportunistic infection. Pleural effusion  4. A fib          RECOMMENDATIONS:    1. Oncology consult.   2. May need Pulmonary consult- BAL if hypoxia persistes  3. Resp panel PCR NP swab STAT  4. Empiric meropenem, vancomycin and micafungin  5. Empiric TMP-SMX (at risk of PJP)-- treatment dosing per Pharm D  6. Will need Evusheld additional dose based on new EUA. Pharm D/Oncology to coordinate  7. Patient is at risk of severe critical illness. Seen in ED   8. A fib. Echocardiogram  9. Thank you for consulting Infectious Disease. May need transfer to [facility]. Defer to Oncology consultant.    Nola Moon MD  [facility]  [phone]      Reason for Consult   Reason for consult: I was asked to evaluate this patient for hypoxia, pneumonia  Order stated:  Bilateral PNA, immunocompromise    Primary Care Physician   Mathew Ott    Chief Complaint   Shortness of breath    History is obtained from the patient and medical records    History of Present Illness   Theo Meyers is a 76 year old male who presents with shortness of breath, worsening over the last 1 week  Patient has myeloma, on treatment and not in remission, A fib, DVT, leg swelling, A fib, worsening shortness of breath, unable to get comfortable in any position. The  patient is very fatigued at the time of my evaluation in ED. Denies sick contacts. No abdominal pain.   No fever.     Past Medical History   I have reviewed this patient's medical history and updated it with pertinent information if needed.   Past Medical History:   Diagnosis Date     Anemia 12/13/2017     Arthritis      Bilateral inguinal hernia      Chest tube in place      Glaucoma      Glaucoma      History of blood clots     DVT - left chronic     Multiple myeloma (H)     Gets chemo infusions every 2 weeks     Pancytopenia (H)      Parapneumonic effusion      Peripheral neuropathy      Syncope      TMJ (temporomandibular joint disorder)        Past Surgical History   I have reviewed this patient's surgical history and updated it with pertinent information if needed.  Past Surgical History:   Procedure Laterality Date     APPENDECTOMY      Age 16     ESOPHAGOSCOPY, GASTROSCOPY, DUODENOSCOPY (EGD), COMBINED N/A 12/14/2017    Procedure: ESOPHAGOGASTRODUODENOSCOPY (EGD) WITH BIOPSYS;  Surgeon: Marlon Roy MD;  Location: [facility];  Service:      ESOPHAGOSCOPY, GASTROSCOPY, DUODENOSCOPY (EGD), COMBINED N/A 1/19/2018    Procedure: ENDOSCOPIC ULTRASOUND  ESOPHAGOGASTRODUODENOSCOPY WITH DUODENAL POLYP REMOVAL;  Surgeon: Familia Mcgraw MD;  Location: [facility] OR;  Service:      EYE SURGERY      Cataract     IR MISCELLANEOUS PROCEDURE  6/17/2009     IR MISCELLANEOUS PROCEDURE  7/31/2009     IR MISCELLANEOUS PROCEDURE  8/13/2009     IR PLEURAL DRAINAGE WITH CATHETER INSERTION  7/2/2019     PORTACATH PLACEMENT       RELEASE CARPAL TUNNEL Bilateral      US THORACENTESIS  6/27/2019     VERTEBROPLASTY      T6     WISDOM TOOTH EXTRACTION         Prior to Admission Medications   Prior to Admission Medications   Prescriptions Last Dose Informant Patient Reported? Taking?   Calcium-Vitamin D (CALCIUM + D PO)   Yes No   Sig: Take 1 tablet by mouth daily.   Multiple Vitamin (MULTI-VITAMIN PO)   Yes No   Sig: Take 1  capsule by mouth daily.   Omega-3 Fatty Acids (OMEGA 3 PO)   Yes No   Sig: TAKE AS DIRECTED    Psyllium (METAMUCIL PO)   Yes No   Sig: USE AS DIRECTED    UNABLE TO FIND   Yes No   Sig: MEDICATION NAME: Certazine   XARELTO ANTICOAGULANT 20 MG TABS tablet   Yes No   acetaminophen (TYLENOL) 500 MG tablet   Yes No   Sig: Take 500-1,000 mg by mouth   acyclovir (ZOVIRAX) 400 MG tablet   No No   Sig: TAKE 1 TABLET BY MOUTH TWICE DAILY   ascorbic acid 1000 MG TABS tablet   Yes No   atorvastatin (LIPITOR) 10 MG tablet   No No   Sig: Take 1 tablet (10 mg) by mouth daily   bimatoprost (LUMIGAN) 0.03 % ophthalmic drops   Yes No   Sig: Place 1 drop into both eyes At Bedtime.   brimonidine (ALPHAGAN P) 0.1 % SOLN   Yes No   Sig: Place 1 drop into both eyes every 8 hours.   calcium carbonate 1250 (500 Ca) MG CHEW   Yes No   Sig: Take 1 tablet by mouth   calcium carbonate-vitamin D (OYSTER SHELL CALCIUM/D) 500-200 MG-UNIT tablet   Yes No   Sig: Take 1 tablet by mouth    cyanocobalamin (CYANOCOBALAMIN) 1000 MCG/ML injection   Yes No   Sig: Inject 1 mL into the muscle every 30 days   daratumumab 16 mg/kg   Yes No   Sig: Inject 16 mg/kg into the vein once   dextromethorphan-guaiFENesin (MUCINEX DM)  MG 12 hr tablet   Yes No   Sig: Take 1 tablet by mouth daily as needed for cough   fluticasone (FLONASE) 50 MCG/ACT nasal spray   No No   Sig: Spray 2 sprays into both nostrils daily   gabapentin (NEURONTIN) 300 MG capsule   No No   Sig: TAKE 1 CAPSULE(300 MG) BY MOUTH DAILY AS NEEDED   magnesium oxide 400 MG CAPS   Yes No   omeprazole (PRILOSEC) 20 MG DR capsule   No No   Sig: TAKE 1 CAPSULE BY MOUTH ONCE DAILY   pomalidomide (POMALYST) 4 MG capsule   No No   Sig: Take 1 capsule (4 mg) by mouth daily Swallow whole, do NOT break, crush, chew or open capsule. Take on days 1-21 of repeated 28 day cycles.   potassium chloride ER (KLOR-CON M) 20 MEQ CR tablet   No No   Sig: Take 1 tablet (20 mEq) by mouth every other day   tamsulosin  (FLOMAX) 0.4 MG capsule   No No   Sig: TAKE 1 CAPSULE BY MOUTH EVERY DAY      Facility-Administered Medications Last Administration Doses Remaining   LORazepam (ATIVAN) tablet 1 mg None recorded 1   lidocaine (PF) (XYLOCAINE) 1 % injection 10 mL None recorded 1   oxyCODONE-acetaminophen (PERCOCET) 5-325 MG per tablet 2 tablet None recorded 1        Allergies   Allergies   Allergen Reactions     Centex-Pse Er [Pseudoephedrine-Guaifenesin Cr]      TB12       Immunization History   Immunization History   Administered Date(s) Administered     COVID-19,PF,Pfizer (12+ Yrs) 02/12/2021, 03/05/2021, 08/26/2021     FLU 6-35 months 11/17/2011, 09/30/2013     Hep B, Peds or Adolescent 05/31/2012     HepB, Unspecified 03/01/2011, 05/19/2011     Hib (PRP-T) 05/19/2011, 05/24/2012     Hib, Unspecified 03/01/2011, 05/19/2011     Influenza (High Dose) 3 valent vaccine 10/07/2015, 09/21/2016, 09/20/2017, 09/26/2018, 10/03/2019     Influenza Vaccine IM > 6 months Valent IIV4 (Alfuria,Fluzone) 09/24/2014     Influenza, Quad, High Dose, Pf, 65yr+ (Fluzone HD) 09/30/2020, 10/13/2021     Pneumo Conj 13-V (2010&after) 10/16/2015     Pneumococcal 23 valent 05/19/2011, 05/24/2012     Poliovirus, inactivated (IPV) 03/01/2011, 05/19/2011, 05/31/2012     Tdap (Adacel,Boostrix) 03/01/2011     Tetanus 05/19/2011       Social History   I have reviewed this patient's social history and updated it with pertinent information if needed. Theo Meyers  reports that he quit smoking about 46 years ago. He has never used smokeless tobacco. He reports previous alcohol use. He reports that he does not use drugs.    Family History   I have reviewed this patient's family history and updated it with pertinent information if needed.   Family History   Problem Relation Age of Onset     Heart Disease Mother      Glaucoma Mother      Cancer Father      No Known Problems Sister      Cancer Brother      Pancreatic Cancer Brother      No Known Problems Brother       Cancer Daughter      Skin Cancer Daughter      Melanoma Daughter      Glaucoma Daughter        Review of Systems   The 10 point Review of Systems is negative other than noted in the HPI or here.     Physical Exam   Temp: 97.8  F (36.6  C) Temp src: Oral BP: (!) 147/79 Pulse: 57   Resp: 20 SpO2: 98 % O2 Device: Nasal cannula Oxygen Delivery: 2 LPM  Vital Signs with Ranges  Temp:  [97.2  F (36.2  C)-97.8  F (36.6  C)] 97.8  F (36.6  C)  Pulse:  [57-96] 57  Resp:  [18-32] 20  BP: (121-198)/(64-91) 147/79  FiO2 (%):  [30 %] 30 %  SpO2:  [85 %-100 %] 98 %  160 lbs 0 oz  Body mass index is 24.33 kg/m .    GENERAL APPEARANCE:  Awake, appears ill  EYES: scleral redness  HENT: NC, dry mucosa  NECK: supple  RESP: rhonchi bilaterally  CV: S1 S2 tachycardia  LYMPHATICS: edema  ABDOMEN: Firm, non tender  MS: extremities normal- no gross deformities noted  SKIN:warm  Neuro: somnolent, able to answer a few questions      Data   Lab Results   Component Value Date    WBC 4.6 03/17/2022    WBC 5.1 03/14/2022    WBC 2.5 (L) 03/02/2022    WBC 2.6 (L) 02/02/2022    WBC 1.7 (L) 01/26/2022    HGB 10.7 (L) 03/17/2022    HGB 10.1 (L) 03/14/2022    HGB 10.9 (L) 03/02/2022    HGB 10.2 (L) 02/02/2022    HGB 10.2 (L) 01/26/2022    HCT 33.8 (L) 03/17/2022    HCT 31.4 (L) 03/14/2022    HCT 34.3 (L) 03/02/2022    HCT 32.0 (L) 02/02/2022    HCT 32.1 (L) 01/26/2022     (L) 03/17/2022     03/14/2022     03/02/2022     02/02/2022     (L) 01/26/2022     03/17/2022     03/14/2022     03/02/2022     01/26/2022     01/05/2022    POTASSIUM 4.2 03/17/2022    POTASSIUM 4.6 03/14/2022    POTASSIUM 4.3 03/02/2022    POTASSIUM 4.5 01/26/2022    POTASSIUM 4.9 01/05/2022    CHLORIDE 105 03/17/2022    CHLORIDE 107 03/14/2022    CHLORIDE 109 (H) 03/02/2022    CHLORIDE 112 (H) 01/26/2022    CHLORIDE 106 01/05/2022    CO2 23 03/17/2022    CO2 23 03/14/2022    CO2 23 03/02/2022    CO2 24 01/26/2022     CO2 26 01/05/2022    BUN 19 03/17/2022    BUN 25 03/14/2022    BUN 22 03/02/2022    BUN 24 01/26/2022    BUN 22 01/05/2022    CR 0.87 03/17/2022    CR 1.12 03/14/2022    CR 1.25 03/02/2022    CR 0.95 01/26/2022    CR 1.23 01/05/2022     (H) 03/17/2022    GLC 89 03/14/2022     03/02/2022    GLC 81 01/27/2022    GLC 97 01/26/2022    DD <=0.27 12/29/2020    AST 16 03/17/2022    AST 19 03/14/2022    AST 13 03/02/2022    AST 13 01/26/2022    AST 14 01/05/2022    ALT 14 03/17/2022    ALT 16 03/14/2022    ALT 15 03/02/2022    ALT 14 01/26/2022    ALT 13 01/05/2022    ALKPHOS 77 03/17/2022    ALKPHOS 67 03/14/2022    ALKPHOS 69 03/02/2022    ALKPHOS 56 01/26/2022    ALKPHOS 70 01/05/2022    BILITOTAL 0.9 03/17/2022    BILITOTAL 0.6 03/14/2022    BILITOTAL 0.5 03/02/2022    BILITOTAL 0.4 01/26/2022    BILITOTAL 0.4 01/05/2022    INR 1.36 (H) 03/14/2022    INR 1.33 (H) 12/29/2020    INR 1.31 (H) 03/18/2020    INR 1.35 (H) 11/12/2019    INR 2.34 (H) 10/30/2019     MICROBIOLOGY:    03/17/2022 0515 03/17/2022 0535 Blood Culture Line, venous [[id number]]   Blood from Line, venous    In process Component Value   No component results           03/17/2022 0515 03/17/2022 0535 Blood Culture Peripheral Blood [[id number]]   Peripheral Blood    In process Component Value   No component results           03/17/2022 0419 03/17/2022 0444 Symptomatic; Unknown Influenza A/B & SARS-CoV2 (COVID-19) Virus PCR Multiplex Nasopharyngeal [[id number]]    Swab from Nasopharyngeal    Final result Component Value   Influenza A PCR Negative   Influenza B PCR Negative   SARS CoV2 PCR Negative   NEGATIVE: SARS-CoV-2 (COVID-19) RNA not detected, presumed negative.           08/17/2021 0935 08/21/2021 1040 Symptomatic COVID-19 Virus (Coronavirus) by PCR Nose [[id number]]   Swab from Nose    Final result Component Value   COVID-19 Virus PCR - Source Nasal Swab   COVID-19 Virus PCR - Result Not Detected. See Scanned Report         RADIOLOGY:    XR Chest 1 View    Result Date: 3/14/2022  EXAM: XR CHEST 1 VIEW LOCATION: [facility] DATE/TIME: 3/14/2022 4:29 PM INDICATION: Chest pain. COMPARISON: None.     IMPRESSION: Right-sided portacatheter with tip in the SVC. No pulmonary infiltrates. Midthoracic compression fracture with previous vertebroplasty. Heart size upper limits of normal.    XR Chest Port 1 View    Result Date: 3/17/2022  EXAM: XR CHEST PORT 1 VIEW LOCATION: [facility] DATE/TIME: 3/17/2022 4:30 AM INDICATION: Dyspnea COMPARISON: 03/14/2022     IMPRESSION: New bilateral perihilar infiltrates greater in the right infrahilar region and retrocardiac region left lung base suspicious for possible perihilar pneumonia. Right-sided Port-A-Cath with tip in good position in the low SVC. Normal heart size  and pulmonary vascularity. Aortic calcification. Vertebroplasty.    CT Chest Abdomen Pelvis w/o Contrast    Result Date: 3/17/2022  EXAM: CT CHEST ABDOMEN PELVIS W/O CONTRAST LOCATION: [facility] DATE/TIME: 3/17/2022 6:56 AM INDICATION: Sepsis, multiple myeloma COMPARISON: PET/CT 01/27/2022, CT chest 12/09/2020 TECHNIQUE: CT scan of the chest, abdomen, and pelvis was performed without IV contrast. Multiplanar reformats were obtained. Dose reduction techniques were used. CONTRAST: None. FINDINGS: LUNGS AND PLEURA: Mild layering tracheal secretions. Small bilateral pleural effusions with adjacent atelectasis. Bilateral lower lobar predominant bronchial wall thickening. No mass or suspicious nodule. MEDIASTINUM/AXILLAE: Right IJ port catheter. No thoracic adenopathy. Mild cardiomegaly. Trace pericardial effusion. Mitral annulus calcifications. CORONARY ARTERY CALCIFICATION: Severe. HEPATOBILIARY: Normal. PANCREAS: Normal. SPLEEN: Normal. ADRENAL GLANDS: Normal. KIDNEYS/BLADDER: 4 mm lower right renal stone. Punctate lower left renal stone. No hydronephrosis or  obstructing ureteral stone. Normal urinary bladder. BOWEL: Normal caliber small bowel. Post appendectomy. Moderate stool burden within the distal sigmoid colon and rectum. No free air or free fluid. LYMPH NODES: Normal. VASCULATURE: Moderate atherosclerosis. PELVIC ORGANS: Normal. MUSCULOSKELETAL: Spinal and pelvic degenerative changes. Stable lucent and sclerotic changes within the right proximal humerus. Stable 1.1 cm lytic lesion within the right scapula extending to the scapular spine. Associated cortical erosion. Stable T6 vertebral body wedge compression fracture with vertebroplasty changes.     IMPRESSION: 1.  Small bilateral pleural effusions with adjacent atelectasis, right greater than left. Bilateral lower lobar predominant bronchiolitis. 2.  No acute findings in the abdomen or pelvis. No evidence of an inflammatory process or abscess. 3.  Stable small lytic lesion in the right scapula correlating with the patient's known multiple myeloma. Stable lucent and sclerotic changes in the right proximal humerus perhaps reflecting a healed osseous lesion.

## 2022-03-17 NOTE — ED NOTES
RESPIRATORY CARE NOTE     Patient Name: Theo Meyers  Today's Date: 3/17/2022     Pt started on Bipap for increased work of breathing, settings IPAP: 10, EPAP 5, RR 12, 30%O2.     RT will continue to monitor and wean off Bipap as tolerated.     /76   Pulse 62   Temp 97.2  F (36.2  C) (Temporal)   Resp 18   Wt 72.6 kg (160 lb)   SpO2 99%   BMI 24.33 kg/m      Natty Fuller, RT

## 2022-03-17 NOTE — ED NOTES
Patient trialed off bipap and placed on 6L nasal canula. Patient saturations remained above 96%. Work of breathing has decreased.

## 2022-03-17 NOTE — PHARMACY-VANCOMYCIN DOSING SERVICE
"Pharmacy Vancomycin Initial Note  Date of Service 2022  Patient's  1945  76 year old, male    Indication: Community Acquired Pneumonia    Current estimated CrCl = Estimated Creatinine Clearance: 74.2 mL/min (based on SCr of 0.87 mg/dL).    Creatinine for last 3 days  3/14/2022:  4:19 PM Creatinine 1.12 mg/dL  3/17/2022:  4:19 AM Creatinine 0.87 mg/dL    Recent Vancomycin Level(s) for last 3 days  No results found for requested labs within last 72 hours.      Vancomycin IV Administrations (past 72 hours)                   vancomycin 1250 mg in 0.9% NaCl 250 mL intermittent infusion 1,250 mg (mg) 1,250 mg New Bag 22 0613                Nephrotoxins and other renal medications (From now, onward)    Start     Dose/Rate Route Frequency Ordered Stop    22 1800  vancomycin 750 mg in 0.9% NaCl 250 mL intermittent infusion 750 mg         750 mg  over 60 Minutes Intravenous EVERY 12 HOURS 22 0635      22 1100  piperacillin-tazobactam (ZOSYN) 3.375 g vial to attach to  mL bag        Note to Pharmacy: For [facility], [facility] and [facility]: For Zosyn-naive patients, use the \"Zosyn initial dose + extended infusion\" order panel.    3.375 g  over 240 Minutes Intravenous EVERY 8 HOURS 22 0624      22 0630  vancomycin 1250 mg in 0.9% NaCl 250 mL intermittent infusion 1,250 mg         1,250 mg  over 90 Minutes Intravenous ONCE 22 0539            Contrast Orders - past 72 hours (72h ago, onward)            None          [employer] Prediction of Planned Initial Vancomycin Regimen  Loading dose: 1250 mg at 06:00 2022.  Regimen: 750 mg IV every 12 hours.  Start time:18:00 on 2022  Exposure target: AUC24 (range)400-600 mg/L.hr   AUC24,ss: 480 mg/L.hr  Probability of AUC24 > 400: 70 %  Ctrough,ss: 15.8 mg/L  Probability of Ctrough,ss > 20: 29 %  Probability of nephrotoxicity (Lodise KWAN ): 11 %          Plan:  1. Start vancomycin  750 mg IV q12h.   2. Vancomycin monitoring " method: AUC  3. Vancomycin therapeutic monitoring goal: 400-600 mg*h/L  4. Pharmacy will check vancomycin levels as appropriate in 1-3 Days.    5. Serum creatinine levels will be ordered daily for the first week of therapy and at least twice weekly for subsequent weeks.      Kristina Sol RP

## 2022-03-17 NOTE — CONSULTS
Care Management Initial Consult    General Information  Assessment completed with: Patient,    Type of CM/SW Visit: Initial Assessment    Primary Care Provider verified and updated as needed: Yes   Readmission within the last 30 days: no previous admission in last 30 days      Reason for Consult: discharge planning  Advance Care Planning: Advance Care Planning Reviewed: other (see comments) (Declined Honoring Choices information)          Communication Assessment  Patient's communication style: spoken language (English or Bilingual)    Hearing Difficulty or Deaf: no   Wear Glasses or Blind: yes    Cognitive  Cognitive/Neuro/Behavioral: WDL                      Living Environment:   People in home: spouse     Current living Arrangements: house      Able to return to prior arrangements: yes       Family/Social Support:  Care provided by: self, spouse/significant other  Provides care for: no one  Marital Status:   Wife  Cherri       Description of Support System: Supportive, Involved    Support Assessment: Adequate family and caregiver support, Patient communicates needs well met    Current Resources:   Patient receiving home care services: No     Community Resources: None  Equipment currently used at home: none  Supplies currently used at home: None    Employment/Financial:  Employment Status: retired        Financial Concerns: No concerns identified           Lifestyle & Psychosocial Needs:  Social Determinants of Health     Tobacco Use: Medium Risk     Smoking Tobacco Use: Former Smoker     Smokeless Tobacco Use: Never Used   Alcohol Use: Not on file   Financial Resource Strain: Not on file   Food Insecurity: Not on file   Transportation Needs: Not on file   Physical Activity: Not on file   Stress: Not on file   Social Connections: Not on file   Intimate Partner Violence: Not on file   Depression: Not at risk     PHQ-2 Score: 0   Housing Stability: Not on file       Functional Status:  Prior to admission  patient needed assistance:   Dependent ADLs:: Independent  Dependent IADLs:: Independent  Assesssment of Functional Status: Not at  functional baseline    Mental Health Status:          Chemical Dependency Status:                Values/Beliefs:  Spiritual, Cultural Beliefs, [employer] Practices, Values that affect care:                 Additional Information:  Alert, oriented patient lives with wife Cherri in a private residence. Patient states he is usually independent with I/ADLs. No assistive device; no home care services; does drive. He self-administers own medications. Declined Honoring Choices information. Plans to return home with wife transporting patient on discharge. Other needs pending clinical progress.    JASMYN HERNANDEZ, RN/CM

## 2022-03-18 NOTE — PROGRESS NOTES
[facility] Medicine PROGRESS NOTE      Identification/Summary:   Theo Meyers is a 76 year old male who  has past medical history of kappa light chain myeloma on treatment, status post stem cell transplant in 2010, sees [facility] oncology, anemia, DVT on Xarelto, atrial fibrillation parapneumonic effusion status post chest tube in the past presented to the emergency room with complaints of sore throat, shortness of breath, cough for a week and admitted on 3/17/2022 for acute hypoxemic respiratory failure secondary to pneumonia/bronchiolitis noted on CT chest, small bilateral pleural effusions.  Initially was on BiPAP for increased work of breathing, later on changed to nasal cannula..   ID, oncology, pulmonary consult requested.  On broad-spectrum antibiotics, antifungal agents, Bactrim for pneumocystis prophylaxis.  Follow-up on cultures, respiratory viral panel.  Feeling better overall today.     Assessment and Plan  Bilateral Pneumonia/bronchiolitis  Acute respiratory failure with hypoxia  Immunocompromised patient  Afebrile.  Leukopenia, absolute neutrophils 1000 today.  Procalcitonin 0.11  Infectious disease consulted oncology. Appreciate input.  Pulmonary consulted.  Oxygen 2lit down to 1 L today.  Initially was on Zosyn Vanco and ID added meropenem, micafungin.  Sputum cultures, respiratory viral panel  Started Bactrim for PJP prophylaxis  Continue acyclovir for prophylaxis  Continue nebs, flutter valve    Small bilateral pleural effusions  Elevated   Received 1 dose of IV Lasix, with good response.  Blood pressure borderline systolic 99  Consider additional Lasix if unable to get him off of oxygen.  Echocardiogram EF 60 to 65%, moderate aortic stenosis    H/O A. fib with controlled ventricular response  Heart rate is controlled.  Continue home Xarelto for anticoagulation.  Check another troponin.  Echo with EF of 60%  Follow-up in A. fib clinic, versus cardiology for  "consideration of stress testing given Chronic dyspnea on exertion for last 2 months once he recovers from acute illness.    Moderate Aortic stenosis  Follow-up outpatient    Recurrent DVTs  Continue home Xarelto    IgG kappa light chain myeloma  Immunocompromise  Status post bone marrow transplant 2010  On daratumumab, Pomalyst, dexamethasone  Appreciate oncology input.  Hold off on above for now     Thrombocytopenia  Macrocytic anemia  B12 folate levels normal  Monitor.    Diet: Combination Diet Regular Diet Adult  DVT Prophylaxis:   Continue home Xarelto  Code Status: Full Code    Anticipated possible discharge in few days to once clinical improvement, pending cultures milestones are met.    Interval History/Subjective:  Overall feeling improved.  He is able to talk without feeling short of breath and complaints of shortness of breath only with walking today.  No chest pain.  No palpitations.  No other nausea vomiting abdominal pain, urinary complaints.    Physical Exam/Objective:  Vitals I/O   Vital signs:  Temp: 97.7  F (36.5  C) Temp src: Oral BP: 99/65 Pulse: 82   Resp: 20 SpO2: 97 % O2 Device: None (Room air) Oxygen Delivery: 1 LPM   Weight: 74.8 kg (164 lb 14.4 oz)  Estimated body mass index is 25.07 kg/m  as calculated from the following:    Height as of 3/14/22: 1.727 m (5' 8\").    Weight as of this encounter: 74.8 kg (164 lb 14.4 oz). I/O last 3 completed shifts:  In: 610 [P.O.:360; IV Piggyback:250]  Out: 1225 [Urine:1225]     Body mass index is 25.07 kg/m .    General Appearance:  Alert, cooperative,  mild distress with talking   Head:  Normocephalic, atraumatic   Eyes:  PERRL    Throat:  mucosa; moist   Neck: No JVD, thyromegaly   Lungs:    Wheezing, rhonchi bilaterally, respirations unlabored   Chest Wall:  No tenderness or deformity   Heart:  distant heart sounds irregular rate and rhythm, S1, S2 normal,no murmur   Abdomen:   Soft, non tender, non distended, bowel sounds present, no guarding or " rigidity   Extremities: trace edema resolved, no joint swelling   Skin: Skin color, texture, turgor normal, no rashes or lesions   Neurologic: Alert and oriented X 3, Moves all 4 extremities       Medications:   Personally Reviewed.    acyclovir  400 mg Oral BID     atorvastatin  10 mg Oral QPM     bimatoprost  1 drop Both Eyes At Bedtime     brimonidine  1 drop Both Eyes TID     fluticasone  2 spray Both Nostrils Daily     furosemide  20 mg Intravenous Once     ipratropium - albuterol 0.5 mg/2.5 mg/3 mL  3 mL Nebulization Q4H     meropenem  1 g Intravenous Q8H     micafungin  100 mg Intravenous Q24H     pantoprazole  40 mg Oral QAM AC     rivaroxaban ANTICOAGULANT  20 mg Oral Daily with supper     sodium chloride (PF)  3 mL Intracatheter Q8H     sulfamethoxazole-trimethoprim  2 tablet Oral TID     tamsulosin  0.4 mg Oral At Bedtime     vancomycin  750 mg Intravenous Q12H       Data reviewed today: I personally reviewed all new medications, labs, imaging/diagnostics reports over the past 24 hours. Pertinent findings include    Labs:  Most Recent 3 CBC's:  Recent Labs   Lab Test 03/18/22  0425 03/17/22  0419 03/14/22  1619   WBC 1.5* 4.6 5.1   HGB 9.9* 10.7* 10.1*   * 105* 106*   PLT 98* 128* 170     Most Recent 3 BMP's:  Recent Labs   Lab Test 03/18/22  0425 03/17/22  0419 03/14/22  1619    136 141   POTASSIUM 4.0 4.2 4.6   CHLORIDE 103 105 107   CO2 27 23 23   BUN 17 19 25   CR 1.13 0.87 1.12   ANIONGAP 8 8 11   NORMAN 8.2* 8.6 8.8   GLC 98 147* 89     Most Recent 2 LFT's:  Recent Labs   Lab Test 03/18/22  0425 03/17/22  0419   AST 14 16   ALT 12 14   ALKPHOS 60 77   BILITOTAL 0.6 0.9     Most Recent 3 INR's:  Recent Labs   Lab Test 03/14/22  1619 12/29/20 2026 03/18/20  0801   INR 1.36* 1.33* 1.31*     Most Recent 3 Troponin's:No lab results found.  Most Recent 3 BNP's:No lab results found.  Most Recent ABG:No lab results found.  Most Recent ESR & CRP:  Recent Labs   Lab Test 03/18/22  0425   CRP 7.7*        Imaging:   Recent Results (from the past 24 hour(s))   Echocardiogram Complete   Result Value    LVEF  60-65%    [facility]    [id number]  [id number]  [id number]  [id number] [address]     Name: MONICA SCOTT  MRN: [id number]  : 1945  Study Date: 2022 11:22 AM  Age: 76 yrs  Gender: Male  Patient Location: [facility]  Reason For Study: Heart Failure  Ordering Physician: ESTEVAN SORTO  Performed By: ACE     BSA: 1.9 m2  Height: 68 in  Weight: 160 lb  HR: 79  BP: 147/79 mmHg  ______________________________________________________________________________  Procedure  Complete Echo Adult. Adequate quality two-dimensional was performed and  interpreted. Adequate quality color and spectral Doppler were performed and  interpreted. Compared to the prior study dated 11/3/2021, there are changes as  noted.  ______________________________________________________________________________  Interpretation Summary     1. Left ventricular size, wall thickness, and systolic function are normal.  The estimated left ventricular ejection fraction is 60-65%.  2. Right ventricular size and systolic function are normal.  3. Mild left and moderate right atrial enlargement.  4. Calcified trileaflet aortic valve with moderate aortic stenosis. Peak  forward velocity measures 3.2 m/s, mean gradient 27 mm Hg, calculated aortic  valve area 1.1 cm2, and dimensionless index 0.28. No significant  regurgitation.  5. Moderate pulmonary hypertension is present with an estimated pulmonary  artery systolic pressure of 54 mm Hg.  6. Compared to the prior study dated 11/3/2021, the Doppler data today show  moderate aortic stenosis. However, the aortic valve has a similar appearance  on both studies. There was likely inadequate Doppler assessment of the aortic  valve on the prior study. Visually the aortic valve appears at least  moderately stenotic on both  studies.  ______________________________________________________________________________  I      WMSI = 1.00     % Normal = 100     X - Cannot   0 -                      (2) - Mildly 2 -          Segments  Size  Interpret    Hyperkinetic 1 - Normal  Hypokinetic  Hypokinetic  1-2     small                                                     7 -          3-5      moderate  3 - Akinetic 4 -          5 -         6 - Akinetic Dyskinetic   6-14    large               Dyskinetic   Aneurysmal  w/scar       w/scar       15-16   diffuse     Left Ventricle  The left ventricle is normal in size. There is normal left ventricular wall  thickness. Left ventricular systolic function is normal. The visual ejection  fraction is 60-65%. Diastolic function not assessed due to atrial  fibrillation. No regional wall motion abnormalities noted.     Right Ventricle  The right ventricle is mildly dilated. The right ventricular systolic function  is normal.     Atria  The left atrium is mildly dilated. The right atrium is moderately dilated.     Mitral Valve  The mitral valve leaflets are moderately thickened. There is mild mitral  annular calcification. There is trace to mild mitral regurgitation. There is  no mitral valve stenosis. The mean mitral valve gradient is 3.3 mmHg.     Tricuspid Valve  Tricuspid valve leaflets appear normal. There is mild (1+) tricuspid  regurgitation. The right ventricular systolic pressure is elevated at 46.4  mmHg. Moderate (46-55mmHg) pulmonary hypertension is present. There is no  tricuspid stenosis.     Aortic Valve  The aortic valve is trileaflet. Moderate aortic valve calcification is  present. No aortic regurgitation is present. Moderate valvular aortic  stenosis. The calculated aortic valve are is 1.1 cm^2. The mean AoV pressure  gradient is 26.6 mmHg.     Pulmonic Valve  The pulmonic valve is not well seen, but is grossly normal. There is trace to  mild pulmonic valvular regurgitation. There is no  pulmonic valvular stenosis.     Vessels  The aorta root is normal. The thoracic aorta cannot be assessed. IVC diameter  and respiratory changes fall into an intermediate range suggesting an RA  pressure of 8 mmHg.     Pericardium  There is no pericardial effusion.     Rhythm  The rhythm was atrial fibrillation.     ______________________________________________________________________________  MMode/2D Measurements & Calculations  IVSd: 0.73 cm  LVIDd: 4.5 cm  LVIDs: 2.8 cm  LVPWd: 0.85 cm  FS: 37.6 %     LV mass(C)d: 113.4 grams  LV mass(C)dI: 61.0 grams/m2  Ao root diam: 3.5 cm  LA dimension: 3.9 cm  LA/Ao: 1.1  LVOT diam: 2.2 cm  LVOT area: 3.9 cm2  LA Volume (BP): 63.7 ml  LA Volume Index (BP): 34.2 ml/m2     LA Volume Indexed (AL/bp): 35.6 ml/m2  RWT: 0.38     Time Measurements  MM HR: 59.0 BPM     Doppler Measurements & Calculations  MV E max iam: 168.0 cm/sec  MV A max iam: 77.1 cm/sec  MV E/A: 2.2  MV max PG: 10.0 mmHg  MV mean PG: 3.3 mmHg  MV V2 VTI: 46.4 cm  MVA(VTI): 1.8 cm2  MV dec slope: 1407 cm/sec2  MV dec time: 0.12 sec  Ao V2 max: 320.3 cm/sec  Ao max P.0 mmHg  Ao V2 mean: 244.7 cm/sec  Ao mean P.6 mmHg  Ao V2 VTI: 75.0 cm  JOHANN(I,D): 1.1 cm2  JOHANN(V,D): 1.1 cm2  LV V1 max PG: 3.3 mmHg  LV V1 max: 90.7 cm/sec  LV V1 VTI: 21.7 cm  SV(LVOT): 85.6 ml  SI(LVOT): 46.0 ml/m2  PA acc time: 0.07 sec     TR max iam: 340.7 cm/sec  TR max P.4 mmHg  AV Iam Ratio (DI): 0.28  JOHANN Index (cm2/m2): 0.61  E/E' av.6  Lateral E/e': 18.4  Medial E/e': 24.9     ______________________________________________________________________________  Report approved by: Cristina Valenzuela 2022 12:33 PM             EKG: Personally reviewed. A. Fib with controlled ventricular response.    Ana Goodrich MD  Hospitalist  [facility]

## 2022-03-18 NOTE — PROGRESS NOTES
Pt was given scheduled duonebs, BS coarse/crackles with expiratory wheezes, pt 02 has been titrated as tolerated, currently on RA, no complications. RT following.

## 2022-03-18 NOTE — CONSULTS
PULMONARY / CRITICAL CARE CONSULT NOTE    Date / Time of Admission:  3/17/2022  4:05 AM    Assessment:     Theo Meyers is a 76 year old male with history of paroxysmal atrial fibrillation, DVT anticoagulated, relapse refractory kappa light chain multiple myeloma who is currently on daratumumab, pomalidomide and dexamethasone.   He presented to the emergency room today due to progressive shortness of breath over the last few weeks, mild swelling of LEs, difficulty tolerating the flat position. No fever, reports chills, denies night sweats. No chest pain. Over the last two days, started to have mild productive cough of clear sputum. Taking mucinex.   Patient was in mild respiratory distress, normotensive, afebrile, adequate oxygenation.   CT of chest/abdomen/pelvis showed small bilateral pleural effusion with bilateral bronchial wall thickening.   Admitted to the hospital. Initially started on BiPAP then titrated down to nasal cannula. Started on broad Abx. Pulmonary service consulted.     1. Dyspnea  Chronic symptoms likely related de moderate aortic stenosis.   Acute symptoms, combination of decompensated cardiomyopathy secondary to aortic stenosis, mild volume up on exam, bilateral pleural effusions noted in scan. Also chest CT scan showed bronchial wall thickenings suggesting acute bronchitis.   2. Acute bronchitis   Schedule bronchodilators, Abx per ID recommendation. Add low dose systemic steroid.   In view of immunocompromised state , plan for follow up Chest CT scan if two days if no significant clinical improvement.   3. Bilateral pleural effusions  Gently diuresis   4. Moderate aortic stenosis   Cardiology evaluation.   5. Secondary pulmonary hypertension   Related to Aortic stenosis. Clinically volume up status. Titrate BP meds, diuresis.   6. Paroxysmal atrial fibrillation   7. Hx DVT anticoagulated  8. Multiple myeloma on immunosuppression     Advance Directives: Full code    Plan:   1. Titrate  FiO2, keep SpO2 > 90%  2. Schedule bronchodilators  3. Add low dose systemic steroids   4. Abx per ID recommendation   5. Heplock IV fluids  6. Add gently diuresis   7. Cardiology evaluation   8. PPI for GI prophylaxis   9. DVT prophylaxis, patient is anticoagulated with xarelto    Case was discussed with hospitalist and ID service   Please contact me if you have any questions.    Bin Ward  Pulmonary / Critical Care  03/18/2022  9:46 AM        Reason for consult : Dyspnea   HPI:  Theo Meyers is a 76 year old male with history of paroxysmal atrial fibrillation, DVT anticoagulated, relapse refractory kappa light chain multiple myeloma who is currently on daratumumab, pomalidomide and dexamethasone.   He presented to the emergency room today due to progressive shortness of breath over the last few weeks, mild swelling of LEs, difficulty tolerating the flat position. No fever, reports chills, denies night sweats. No chest pain. Over the last two days, started to have mild productive cough of clear sputum. Taking mucinex.   Patient was in mild respiratory distress, normotensive, afebrile, adequate oxygenation.   CT of chest/abdomen/pelvis showed small bilateral pleural effusion with bilateral bronchial wall thickening.   Admitted to the hospital. Initially started on BiPAP then titrated down to nasal cannula. Started on broad Abx. Pulmonary service consulted.   Patient is currently off O2 supplementation, afebrile, hemodynamically stable.      Past Medical History:   Diagnosis Date     Anemia 12/13/2017     Arthritis      Bilateral inguinal hernia      Chest tube in place      Glaucoma      Glaucoma      History of blood clots     DVT - left chronic     Multiple myeloma (H)     Gets chemo infusions every 2 weeks     Pancytopenia (H)      Parapneumonic effusion      Peripheral neuropathy      Syncope      TMJ (temporomandibular joint disorder)      Allergies: Centex-pse er  [pseudoephedrine-guaifenesin cr]     MEDS:  Current Facility-Administered Medications   Medication     acetaminophen (TYLENOL) tablet 650 mg    Or     acetaminophen (TYLENOL) Suppository 650 mg     acyclovir (ZOVIRAX) capsule 400 mg     atorvastatin (LIPITOR) tablet 10 mg     bimatoprost (LUMIGAN) 0.01 % ophthalmic drops 1 drop     brimonidine (ALPHAGAN P) 0.1 % ophthalmic solution 1 drop     cilgavimab 150 mg/1.5 mL co-administered with tixagevimab 150 mg/1.5 mL (EVUSHELD) 150 & 150 MG/1.5ML injection 300 mg     fluticasone (FLONASE) 50 MCG/ACT spray 2 spray     ipratropium - albuterol 0.5 mg/2.5 mg/3 mL (DUONEB) neb solution 3 mL     lidocaine (LMX4) cream     lidocaine 1 % 0.1-1 mL     melatonin tablet 1 mg     meropenem (MERREM) 1 g vial to attach to  mL bag     micafungin (MYCAMINE) 100 mg in sodium chloride 0.9 % 100 mL intermittent infusion     nitroGLYcerin (NITROSTAT) sublingual tablet 0.4 mg     pantoprazole (PROTONIX) EC tablet 40 mg     Patient is already receiving anticoagulation with heparin, enoxaparin (LOVENOX), warfarin (COUMADIN)  or other anticoagulant medication     rivaroxaban ANTICOAGULANT (XARELTO) tablet 20 mg     sodium chloride (PF) 0.9% PF flush 3 mL     sodium chloride (PF) 0.9% PF flush 3 mL     sulfamethoxazole-trimethoprim (BACTRIM DS) 800-160 MG per tablet 2 tablet     tamsulosin (FLOMAX) capsule 0.4 mg     vancomycin 750 mg in 0.9% NaCl 250 mL intermittent infusion 750 mg     Social Connections: Not on file     Family History   Problem Relation Age of Onset     Heart Disease Mother      Glaucoma Mother      Cancer Father      No Known Problems Sister      Cancer Brother      Pancreatic Cancer Brother      No Known Problems Brother      Cancer Daughter      Skin Cancer Daughter      Melanoma Daughter      Glaucoma Daughter      ROS  - Twelve point review of systems were discussed with patient, positive findings in HPI    Objective:   VITALS:  BP 99/65 (BP Location: Left arm)    Pulse 82   Temp 97.7  F (36.5  C) (Oral)   Resp 20   Wt 74.8 kg (164 lb 14.4 oz)   SpO2 97%   BMI 25.07 kg/m    VENT:  FiO2 (%): 30 %  Resp: 20    EXAM:   Gen: awake, alert, no distress  HEENT: pink conjunctiva, moist mucosa, Mallampati II/IV  Neck: no thyromegaly, masses or JVD  Lungs: discrete ronchi at the bases  CV: irregular, systolic murmur, no gallops appreciated  Abdomen: soft, NT, BS wnl  Ext: trace edema  Neuro: CN II-XII intact, non focal     Data Review:  Recent Labs   Lab 03/18/22  0425 03/17/22  0419 03/14/22  1619   GLC 98 147* 89        3/18/2022 04:25   Sodium 138   Potassium 4.0   Chloride 103   Carbon Dioxide 27   Urea Nitrogen 17   Creatinine 1.13   GFR Estimate 67   Calcium 8.2 (L)   Anion Gap 8   Albumin 3.0 (L)   Protein Total 5.3 (L)   Bilirubin Total 0.6   Alkaline Phosphatase 60   ALT 12   AST 14    (H)   CRP 7.7 (H)   Glucose 98   WBC 1.5 (L)   Hemoglobin 9.9 (L)   Hematocrit 30.3 (L)   Platelet Count 98 (L)   RBC Count 2.96 (L)    (H)   MCH 33.4 (H)   MCHC 32.7   RDW 16.1 (H)   % Neutrophils 66   % Lymphocytes 6   % Monocytes 19   % Eosinophils 7   % Basophils 1     Echocardiogram 3/17/2022  Interpretation Summary     1. Left ventricular size, wall thickness, and systolic function are normal.  The estimated left ventricular ejection fraction is 60-65%.  2. Right ventricular size and systolic function are normal.  3. Mild left and moderate right atrial enlargement.  4. Calcified trileaflet aortic valve with moderate aortic stenosis. Peak  forward velocity measures 3.2 m/s, mean gradient 27 mm Hg, calculated aortic  valve area 1.1 cm2, and dimensionless index 0.28. No significant  regurgitation.  5. Moderate pulmonary hypertension is present with an estimated pulmonary  artery systolic pressure of 54 mm Hg.  6. Compared to the prior study dated 11/3/2021, the Doppler data today show  moderate aortic stenosis. However, the aortic valve has a similar appearance  on both  studies. There was likely inadequate Doppler assessment of the aortic  valve on the prior study. Visually the aortic valve appears at least  moderately stenotic on both studies.    CT CHEST ABDOMEN PELVIS W/O CONTRAST  LOCATION: [facility]  DATE/TIME: 3/17/2022 6:56 AM  INDICATION: Sepsis, multiple myeloma  COMPARISON: PET/CT 01/27/2022, CT chest 12/09/2020  FINDINGS:   LUNGS AND PLEURA: Mild layering tracheal secretions. Small bilateral pleural effusions with adjacent atelectasis. Bilateral lower lobar predominant bronchial wall thickening. No mass or suspicious nodule.  MEDIASTINUM/AXILLAE: Right IJ port catheter. No thoracic adenopathy. Mild cardiomegaly. Trace pericardial effusion. Mitral annulus calcifications.  CORONARY ARTERY CALCIFICATION: Severe.  HEPATOBILIARY: Normal.  PANCREAS: Normal.  SPLEEN: Normal.  ADRENAL GLANDS: Normal.  KIDNEYS/BLADDER: 4 mm lower right renal stone. Punctate lower left renal stone. No hydronephrosis or obstructing ureteral stone. Normal urinary bladder.  BOWEL: Normal caliber small bowel. Post appendectomy. Moderate stool burden within the distal sigmoid colon and rectum. No free air or free fluid.  LYMPH NODES: Normal.  VASCULATURE: Moderate atherosclerosis.  PELVIC ORGANS: Normal.  MUSCULOSKELETAL: Spinal and pelvic degenerative changes. Stable lucent and sclerotic changes within the right proximal humerus. Stable 1.1 cm lytic lesion within the right scapula extending to the scapular spine. Associated cortical erosion. Stable T6 vertebral body wedge compression fracture with vertebroplasty changes.                                   IMPRESSION:  1.  Small bilateral pleural effusions with adjacent atelectasis, right greater than left. Bilateral lower lobar predominant bronchiolitis.  2.  No acute findings in the abdomen or pelvis. No evidence of an inflammatory process or abscess.  3.  Stable small lytic lesion in the right scapula correlating with the  patient's known multiple myeloma. Stable lucent and sclerotic changes in the right proximal humerus perhaps reflecting a healed osseous lesion.  By:  Bin Ward MD, 03/18/2022  9:46 AM    Primary Care Physician:  Mathew Ott

## 2022-03-18 NOTE — PROGRESS NOTES
03/18/22 1337   Assessment   Respiratory WDL WDL   Nebulizer Assessment & Treatment   $RT Use ONLY Delivery Method Nebulizer - Additional   Pretreatment Heart Rate (beats/min) 69   Pretreatment O2 sats - (TCU only) 98   Pretreat Breath Sounds - Bilat - All Lobes clear;coarse   Patient Position HOB elevated   Breath Sounds Post-Respiratory Treatment   Breath Sounds Posttreatment All Fields All Fields   Breath Sounds Posttreatment All Fields no change   Resp Acapella   Acapella Done   Nebs given x 2 this shift. Wheezing this a.m.; clear and course for pm tx. Aerobika used inline with nebs. Bipap on s/b t/o shift. RT will continue to follow. Rosy Darby, RT

## 2022-03-18 NOTE — PROGRESS NOTES
Nuclear Medicine Lexiscan Stress Test:  Unable to do test today as nuclear medicine booked through 4:30 pm today.  Planning test for Monday 3/21/22 7am.  No caffiene for 12 hours prior to test.  Nothing to eat for 4 hours prior to test.  Pt's nurse, Dee Dee Joseph RN informed.

## 2022-03-18 NOTE — CONSULTS
Thank you, Dr. Engel, for asking the [facility] team to see Mr. Theo Meyers for evaluation of aortic valve stenosis and dyspnea on exertion.      Assessment/Recommendations   Assessment/Plan:  1.  Moderate aortic valve stenosis: The patient does have moderate aortic valve stenosis per echocardiogram.  The mean gradient of aortic valve is 27 mmHg, calculated aortic valve area 1.1 cm , DI 0.28.  When compared to previous echo images, no significant interval change.  The patient's worsening dyspnea on exertion occurred last month, especially last few days.  Less likely moderate aortic valve stenosis is a major etiology of his symptoms.  Continue to follow-up.  No aortic valve intervention at this time.    2.  Dyspnea on exertion, moderate pulmonary hypertension, calcified coronary artery disease: The patient has shortness of breath on exertion chronically, getting worse over last month, especially last 4 days.  His physical examination indicated bronchi spasm.  His CRP is also elevated.  The patient could have acute bronchitis.  Chest CT also indicated significant coronary calcification.    The etiology of his dyspnea on exertion could be caused by multiple factors.  There are differential diagnosis include acute bronchitis, severe coronary artery disease, pulmonary artery hypertension 54 mmHg secondary to diastolic dysfunction, aortic valve stenosis.  Echo images have no strong indication of multiple myeloma related amyloid cardiomyopathy.  May consider cardiac MRI for evaluation of amyloid cardiomyopathy as outpatient.  Lexiscan is requested for evaluation of myocardial ischemia.  Agree diuresis.  Please see pulmonary team for treatment of acute bronchitis.    3.  Chronic atrial fibrillation: Ventricular rate is well controlled.  Continue Xarelto for chronic anticoagulation.    4.  History of multiple myeloma, and other chronic medical conditions: Please see primary team of management  for details.         History of Present Illness/Subjective    It is my pleasure to see Theo Meyers at [facility] for evaluation of dyspnea on exertion and aortic valve stenosis.  Theo Meyers is a 76 year old male with a medical history of multiple myeloma s/p stem cell transplant in 2010, chronic atrial fibrillation, moderate aortic valve stenosis, glaucoma.    The patient states that he has chronic shortness of breath on exertion.  However, his dyspnea on exertion has been worse over last months, especially over last 4 days.  His symptoms have been significantly getting worse over last several days.  His of physical capacity has been significantly reduced due to shortness of breath on exertion.  He has no fever, chills.  He has occasional cough.  He denies chest pain, palpitations, orthopnea, PND or leg edema.  His weight has been stable.    His ECG showed atrial fibrillation with a controlled ventricular rate, no obvious ischemic change.  Echocardiogram was reported normal left ventricular size, systolic function, calcified to trileaflet aortic valve with moderate aortic valve stenosis, pulmonary artery systolic pressure 54 mmHg.  Troponins are normal.  BNP is mildly elevated.    Chest CTA was reported small bilateral pleural effusion, bilateral lower lobe predominant bronchi were thickness, calcified coronary artery atherosclerosis.       Physical Examination Review of Systems   /67 (BP Location: Left arm)   Pulse 65   Temp 97.7  F (36.5  C) (Oral)   Resp 22   Wt 74.8 kg (164 lb 14.4 oz)   SpO2 97%   BMI 25.07 kg/m    Body mass index is 25.07 kg/m .  Wt Readings from Last 3 Encounters:   03/18/22 74.8 kg (164 lb 14.4 oz)   03/14/22 72.6 kg (160 lb)   03/02/22 75.8 kg (167 lb 1.6 oz)       Intake/Output Summary (Last 24 hours) at 3/18/2022 1255  Last data filed at 3/18/2022 0929  Gross per 24 hour   Intake 480 ml   Output 725 ml   Net -245 ml        General Appearance:   Awake, Alert, No acute distress.   HEENT:  No scleral icterus; the mucous membranes were moist.   Neck: No cervical bruits. No JVD. No thyromegaly.    Chest: The spine was straight. The chest was symmetric.   Lungs:   Coarse breathing sounds. No crackles.  No wheezing.   Cardiovascular:   Irregular rhythm and rate, normal first and second heart sounds with II/VI systolic murmurs. No rubs or gallops.    Abdomen:  Soft. No tenderness. Bowels sounds are present   Extremities: Equal tibial pulses. No leg edema.   Skin: No rashes. Warm, Dry.   Musculoskeletal: No tenderness.   Neurologic: Oriented x 3. Mood and affect are appropriate.     A 12 point comprehensive review of systems was negative except as noted.        Medical History  Surgical History Family History Social History   Past Medical History:   Diagnosis Date     Anemia 12/13/2017     Arthritis      Bilateral inguinal hernia      Chest tube in place      Glaucoma      Glaucoma      History of blood clots     DVT - left chronic     Multiple myeloma (H)     Gets chemo infusions every 2 weeks     Pancytopenia (H)      Parapneumonic effusion      Peripheral neuropathy      Syncope      TMJ (temporomandibular joint disorder)     Past Surgical History:   Procedure Laterality Date     APPENDECTOMY      Age 16     ESOPHAGOSCOPY, GASTROSCOPY, DUODENOSCOPY (EGD), COMBINED N/A 12/14/2017    Procedure: ESOPHAGOGASTRODUODENOSCOPY (EGD) WITH BIOPSYS;  Surgeon: Marlon Roy MD;  Location: [facility];  Service:      ESOPHAGOSCOPY, GASTROSCOPY, DUODENOSCOPY (EGD), COMBINED N/A 1/19/2018    Procedure: ENDOSCOPIC ULTRASOUND  ESOPHAGOGASTRODUODENOSCOPY WITH DUODENAL POLYP REMOVAL;  Surgeon: Familia Mcgraw MD;  Location: [facility] OR;  Service:      EYE SURGERY      Cataract     IR MISCELLANEOUS PROCEDURE  6/17/2009     IR MISCELLANEOUS PROCEDURE  7/31/2009     IR MISCELLANEOUS PROCEDURE  8/13/2009     IR PLEURAL DRAINAGE WITH CATHETER  INSERTION  2019     PORTACATH PLACEMENT       RELEASE CARPAL TUNNEL Bilateral      US THORACENTESIS  2019     VERTEBROPLASTY      T6     WISDOM TOOTH EXTRACTION      Family History   Problem Relation Age of Onset     Heart Disease Mother      Glaucoma Mother      Cancer Father      No Known Problems Sister      Cancer Brother      Pancreatic Cancer Brother      No Known Problems Brother      Cancer Daughter      Skin Cancer Daughter      Melanoma Daughter      Glaucoma Daughter     Social History     Socioeconomic History     Marital status:      Spouse name: Not on file     Number of children: Not on file     Years of education: Not on file     Highest education level: Not on file   Occupational History     Not on file   Tobacco Use     Smoking status: Former Smoker     Quit date: 1975     Years since quittin.3     Smokeless tobacco: Never Used   Substance and Sexual Activity     Alcohol use: Not Currently     Comment: Alcoholic Drinks/day: occasional     Drug use: No     Sexual activity: Never   Other Topics Concern     Not on file   Social History Narrative     Not on file     Social Determinants of Health     Financial Resource Strain: Not on file   Food Insecurity: Not on file   Transportation Needs: Not on file   Physical Activity: Not on file   Stress: Not on file   Social Connections: Not on file   Intimate Partner Violence: Not on file   Housing Stability: Not on file          Medications  Allergies   Scheduled Meds:    acyclovir  400 mg Oral BID     atorvastatin  10 mg Oral QPM     bimatoprost  1 drop Both Eyes At Bedtime     brimonidine  1 drop Both Eyes TID     fluticasone  2 spray Both Nostrils Daily     ipratropium - albuterol 0.5 mg/2.5 mg/3 mL  3 mL Nebulization Q6H     meropenem  1 g Intravenous Q8H     micafungin  100 mg Intravenous Q24H     pantoprazole  40 mg Oral QAM AC     predniSONE  20 mg Oral Daily     rivaroxaban ANTICOAGULANT  20 mg Oral Daily with supper      sodium chloride (PF)  3 mL Intracatheter Q8H     [START ON 3/19/2022] sulfamethoxazole-trimethoprim  1 tablet Oral Daily     tamsulosin  0.4 mg Oral At Bedtime     vancomycin  750 mg Intravenous Q12H     Continuous Infusions:    - MEDICATION INSTRUCTIONS -       PRN Meds:.acetaminophen **OR** acetaminophen, cilgavimab 150 mg/1.5 mL co-administered with tixagevimab 150 mg/1.5 mL, lidocaine 4%, lidocaine (buffered or not buffered), melatonin, nitroGLYcerin, - MEDICATION INSTRUCTIONS -, sodium chloride (PF) Allergies   Allergen Reactions     Centex-Pse Er [Pseudoephedrine-Guaifenesin Cr]      TB12         Lab Results    Chemistry/lipid CBC Cardiac Enzymes/BNP/TSH/INR   Lab Results   Component Value Date    CHOL 160 10/05/2021    HDL 51 10/05/2021    TRIG 71 10/05/2021    BUN 17 03/18/2022     03/18/2022    CO2 27 03/18/2022    Lab Results   Component Value Date    WBC 1.5 (L) 03/18/2022    HGB 9.9 (L) 03/18/2022    HCT 30.3 (L) 03/18/2022     (H) 03/18/2022    PLT 98 (L) 03/18/2022    Lab Results   Component Value Date    TROPONINI 0.05 03/17/2022     (H) 03/18/2022    TSH 2.42 03/17/2022    INR 1.36 (H) 03/14/2022

## 2022-03-18 NOTE — PROGRESS NOTES
[facility]    Infectious Disease Progress Note    Date of Service (when I saw the patient): 03/18/2022     Assessment & Plan   Theo Meyers is a 76 year old male who was admitted on 3/17/2022.     1. Dyspnea on exertion, moderate pulmonary hypertension, moderate aortic valve stenosis  2. Acute bronchitis, pulmonary following, immunocompromised patient  3. Myeloma: Myeloma not in remission: seen by Oncology on 3/2/2022. On daratumumab and dexamethasone and pomalyst. referred for CAR-T cell therapy to the [facility] scheduled for July 2022. Please see Oncology notes  4. Stem cell transplant 2010  5. COVID negative. Received Evusheld (150 mg dose) on 2/2/2022. Will need another 150 mg dose based on updated EUA. Pharm D to coordinate   6. Pneumonia, hypoxia, at risk of opportunistic infection. Pleural effusion.  Appreciate pulmonary input  7. A fib  8. Retired 3M     Recommendations    1. Appreciate Oncology input. Patient has a UoM appointment scheduled (CAR-T)  2. Nasopharyngeal viral swab, follow blood cultures  3. Empiric vancomycin, meropenem and micafungin  4. Empiric Bactrim started however due to leukopenia today and rapid improvement, unlikely to be PGP.  Decrease dose to prophylaxis.  Pneumocystis on sputum requested  5. Monitor CBC, CMP  6. Appreciate pulmonary input and detailed discussion with pulmonary  7. Evusheld  Dose (150 mg in addition to previous 150 mg received in 2/2022)  based on the updated EUA.  8. Information from Uptodate:           Nola Moon MD  [facility]  [phone]           Interval History     Doing better  Breathing improved  Room air      Physical Exam   Temp: 97.7  F (36.5  C) Temp src: Oral BP: 99/65 Pulse: 82   Resp: 20 SpO2: 97 % O2 Device: None (Room air) Oxygen Delivery: 1 LPM  Vitals:    03/17/22 0405 03/17/22 1545 03/18/22 0615   Weight: 72.6 kg (160 lb) 74.8 kg (164 lb 12.8 oz) 74.8 kg (164  lb 14.4 oz)     Vital Signs with Ranges  Temp:  [97.7  F (36.5  C)-99.4  F (37.4  C)] 97.7  F (36.5  C)  Pulse:  [60-82] 82  Resp:  [20-24] 20  BP: ()/(65-81) 99/65  SpO2:  [94 %-100 %] 97 %    Constitutional: Awake, alert, cooperative, no apparent distress  Lungs: decreased at bases  Cardiovascular: S1 S2  Abdomen: non tender  Skin: warm  Neuro:    Medications     - MEDICATION INSTRUCTIONS -         acyclovir  400 mg Oral BID     atorvastatin  10 mg Oral QPM     bimatoprost  1 drop Both Eyes At Bedtime     brimonidine  1 drop Both Eyes TID     fluticasone  2 spray Both Nostrils Daily     ipratropium - albuterol 0.5 mg/2.5 mg/3 mL  3 mL Nebulization Q4H     meropenem  1 g Intravenous Q8H     micafungin  100 mg Intravenous Q24H     pantoprazole  40 mg Oral QAM AC     rivaroxaban ANTICOAGULANT  20 mg Oral Daily with supper     sodium chloride (PF)  3 mL Intracatheter Q8H     sulfamethoxazole-trimethoprim  2 tablet Oral TID     tamsulosin  0.4 mg Oral At Bedtime     vancomycin  750 mg Intravenous Q12H       Data   All microbiology laboratory data reviewed.  Recent Labs   Lab Test 03/18/22 0425 03/17/22  0419 03/14/22  1619   WBC 1.5* 4.6 5.1   HGB 9.9* 10.7* 10.1*   HCT 30.3* 33.8* 31.4*   * 105* 106*   PLT 98* 128* 170     Recent Labs   Lab Test 03/18/22  0425 03/17/22  0419 03/14/22  1619   CR 1.13 0.87 1.12       MICRO:  03/17/2022 0515 03/18/2022 1003 Blood Culture Line, venous [[id number]]   Blood from Line, venous    Preliminary result Component Value   Culture No growth after 1 day P              03/17/2022 0515 03/18/2022 1003 Blood Culture Peripheral Blood [[id number]]   Peripheral Blood    Preliminary result Component Value   Culture No growth after 1 day P              03/17/2022 0419 03/17/2022 0444 Symptomatic; Unknown Influenza A/B & SARS-CoV2 (COVID-19) Virus PCR Multiplex Nasopharyngeal [[id number]]    Swab from Nasopharyngeal    Final result Component Value   Influenza A PCR  Negative   Influenza B PCR Negative   SARS CoV2 PCR Negative   NEGATIVE: SARS-CoV-2 (COVID-19) RNA not detected, presumed negative.           2022 0419 2022 1156 Respiratory Panel PCR - NP Swab [[id number]]   Swab from Nasopharyngeal    In process Component Value   No component results             RADIOLOGY:    XR Chest 1 View    Result Date: 3/14/2022  EXAM: XR CHEST 1 VIEW LOCATION: [facility] DATE/TIME: 3/14/2022 4:29 PM INDICATION: Chest pain. COMPARISON: None.     IMPRESSION: Right-sided portacatheter with tip in the SVC. No pulmonary infiltrates. Midthoracic compression fracture with previous vertebroplasty. Heart size upper limits of normal.    Echocardiogram Complete    Result Date: 3/17/2022  [id number]^NOREEN^[place] [address]  Name: MONICA SCOTT MRN: [id number] : 1945 Study Date: 2022 11:22 AM Age: 76 yrs Gender: Male Patient Location: [facility] Reason For Study: Heart Failure Ordering Physician: ESTEVAN SORTO Performed By: ACE  BSA: 1.9 m2 Height: 68 in Weight: 160 lb HR: 79 BP: 147/79 mmHg ______________________________________________________________________________ Procedure Complete Echo Adult. Adequate quality two-dimensional was performed and interpreted. Adequate quality color and spectral Doppler were performed and interpreted. Compared to the prior study dated 11/3/2021, there are changes as noted. ______________________________________________________________________________ Interpretation Summary  1. Left ventricular size, wall thickness, and systolic function are normal. The estimated left ventricular ejection fraction is 60-65%. 2. Right ventricular size and systolic function are normal. 3. Mild left and moderate right atrial enlargement. 4. Calcified trileaflet aortic valve with moderate aortic stenosis. Peak forward velocity measures 3.2 m/s, mean gradient 27  mm Hg, calculated aortic valve area 1.1 cm2, and dimensionless index 0.28. No significant regurgitation. 5. Moderate pulmonary hypertension is present with an estimated pulmonary artery systolic pressure of 54 mm Hg. 6. Compared to the prior study dated 11/3/2021, the Doppler data today show moderate aortic stenosis. However, the aortic valve has a similar appearance on both studies. There was likely inadequate Doppler assessment of the aortic valve on the prior study. Visually the aortic valve appears at least moderately stenotic on both studies. ______________________________________________________________________________ I      WMSI = 1.00     % Normal = 100  X - Cannot   0 -                      (2) - Mildly 2 -          Segments  Size Interpret    Hyperkinetic 1 - Normal  Hypokinetic  Hypokinetic  1-2     small                                                    7 -          3-5    moderate 3 - Akinetic 4 -          5 -         6 - Akinetic Dyskinetic   6-14    large              Dyskinetic   Aneurysmal  w/scar       w/scar       15-16   diffuse  Left Ventricle The left ventricle is normal in size. There is normal left ventricular wall thickness. Left ventricular systolic function is normal. The visual ejection fraction is 60-65%. Diastolic function not assessed due to atrial fibrillation. No regional wall motion abnormalities noted.  Right Ventricle The right ventricle is mildly dilated. The right ventricular systolic function is normal.  Atria The left atrium is mildly dilated. The right atrium is moderately dilated.  Mitral Valve The mitral valve leaflets are moderately thickened. There is mild mitral annular calcification. There is trace to mild mitral regurgitation. There is no mitral valve stenosis. The mean mitral valve gradient is 3.3 mmHg.  Tricuspid Valve Tricuspid valve leaflets appear normal. There is mild (1+) tricuspid regurgitation. The right ventricular systolic pressure is elevated at 46.4  mmHg. Moderate (46-55mmHg) pulmonary hypertension is present. There is no tricuspid stenosis.  Aortic Valve The aortic valve is trileaflet. Moderate aortic valve calcification is present. No aortic regurgitation is present. Moderate valvular aortic stenosis. The calculated aortic valve are is 1.1 cm^2. The mean AoV pressure gradient is 26.6 mmHg.  Pulmonic Valve The pulmonic valve is not well seen, but is grossly normal. There is trace to mild pulmonic valvular regurgitation. There is no pulmonic valvular stenosis.  Vessels The aorta root is normal. The thoracic aorta cannot be assessed. IVC diameter and respiratory changes fall into an intermediate range suggesting an RA pressure of 8 mmHg.  Pericardium There is no pericardial effusion.  Rhythm The rhythm was atrial fibrillation.  ______________________________________________________________________________ MMode/2D Measurements & Calculations IVSd: 0.73 cm LVIDd: 4.5 cm LVIDs: 2.8 cm LVPWd: 0.85 cm FS: 37.6 %  LV mass(C)d: 113.4 grams LV mass(C)dI: 61.0 grams/m2 Ao root diam: 3.5 cm LA dimension: 3.9 cm LA/Ao: 1.1 LVOT diam: 2.2 cm LVOT area: 3.9 cm2 LA Volume (BP): 63.7 ml LA Volume Index (BP): 34.2 ml/m2  LA Volume Indexed (AL/bp): 35.6 ml/m2 RWT: 0.38  Time Measurements MM HR: 59.0 BPM  Doppler Measurements & Calculations MV E max iam: 168.0 cm/sec MV A max iam: 77.1 cm/sec MV E/A: 2.2 MV max PG: 10.0 mmHg MV mean PG: 3.3 mmHg MV V2 VTI: 46.4 cm MVA(VTI): 1.8 cm2 MV dec slope: 1407 cm/sec2 MV dec time: 0.12 sec Ao V2 max: 320.3 cm/sec Ao max P.0 mmHg Ao V2 mean: 244.7 cm/sec Ao mean P.6 mmHg Ao V2 VTI: 75.0 cm JOHANN(I,D): 1.1 cm2 JOHANN(V,D): 1.1 cm2 LV V1 max PG: 3.3 mmHg LV V1 max: 90.7 cm/sec LV V1 VTI: 21.7 cm SV(LVOT): 85.6 ml SI(LVOT): 46.0 ml/m2 PA acc time: 0.07 sec  TR max iam: 340.7 cm/sec TR max P.4 mmHg AV Iam Ratio (DI): 0.28 JOHANN Index (cm2/m2): 0.61 E/E' av.6 Lateral E/e': 18.4 Medial E/e': 24.9   ______________________________________________________________________________ Report approved by: Cristina Valenzuela 03/17/2022 12:33 PM       XR Chest Port 1 View    Result Date: 3/17/2022  EXAM: XR CHEST PORT 1 VIEW LOCATION: [facility] DATE/TIME: 3/17/2022 4:30 AM INDICATION: Dyspnea COMPARISON: 03/14/2022     IMPRESSION: New bilateral perihilar infiltrates greater in the right infrahilar region and retrocardiac region left lung base suspicious for possible perihilar pneumonia. Right-sided Port-A-Cath with tip in good position in the low SVC. Normal heart size  and pulmonary vascularity. Aortic calcification. Vertebroplasty.    CT Chest Abdomen Pelvis w/o Contrast    Result Date: 3/17/2022  EXAM: CT CHEST ABDOMEN PELVIS W/O CONTRAST LOCATION: [facility] DATE/TIME: 3/17/2022 6:56 AM INDICATION: Sepsis, multiple myeloma COMPARISON: PET/CT 01/27/2022, CT chest 12/09/2020 TECHNIQUE: CT scan of the chest, abdomen, and pelvis was performed without IV contrast. Multiplanar reformats were obtained. Dose reduction techniques were used. CONTRAST: None. FINDINGS: LUNGS AND PLEURA: Mild layering tracheal secretions. Small bilateral pleural effusions with adjacent atelectasis. Bilateral lower lobar predominant bronchial wall thickening. No mass or suspicious nodule. MEDIASTINUM/AXILLAE: Right IJ port catheter. No thoracic adenopathy. Mild cardiomegaly. Trace pericardial effusion. Mitral annulus calcifications. CORONARY ARTERY CALCIFICATION: Severe. HEPATOBILIARY: Normal. PANCREAS: Normal. SPLEEN: Normal. ADRENAL GLANDS: Normal. KIDNEYS/BLADDER: 4 mm lower right renal stone. Punctate lower left renal stone. No hydronephrosis or obstructing ureteral stone. Normal urinary bladder. BOWEL: Normal caliber small bowel. Post appendectomy. Moderate stool burden within the distal sigmoid colon and rectum. No free air or free fluid. LYMPH NODES: Normal. VASCULATURE: Moderate  atherosclerosis. PELVIC ORGANS: Normal. MUSCULOSKELETAL: Spinal and pelvic degenerative changes. Stable lucent and sclerotic changes within the right proximal humerus. Stable 1.1 cm lytic lesion within the right scapula extending to the scapular spine. Associated cortical erosion. Stable T6 vertebral body wedge compression fracture with vertebroplasty changes.     IMPRESSION: 1.  Small bilateral pleural effusions with adjacent atelectasis, right greater than left. Bilateral lower lobar predominant bronchiolitis. 2.  No acute findings in the abdomen or pelvis. No evidence of an inflammatory process or abscess. 3.  Stable small lytic lesion in the right scapula correlating with the patient's known multiple myeloma. Stable lucent and sclerotic changes in the right proximal humerus perhaps reflecting a healed osseous lesion.    Attestation:  Total time on the floor involved in the patient's care: 35 minutes. Total time spent in counseling/care coordination: >50%

## 2022-03-18 NOTE — PLAN OF CARE
Problem: Gas Exchange Impaired  Goal: Optimal Gas Exchange  Outcome: Ongoing, Progressing     Problem: Risk for Delirium  Goal: Improved Sleep  Outcome: Ongoing, Progressing    Pt is alert and oriented x4, make his needs known appropriately, VSS, afebrile, pt denies pain. Tele: a-fib with HR 50s-80s. Infrequent productive cough. Dyspnea with exertion, pt was on 1L NC through out the night and was wean to RA this morning. Pt voiding via urinal good UOP. Will continue to monitor.     Naima Shah RN

## 2022-03-19 NOTE — PLAN OF CARE
Patient remained on room air throughout shift and maintained oxygen saturations. Remains in atrial fibrillation with a controlled rate. Adequate urine output and bowel movement early in shift. Ambulates well with standby assist. Tolerating a regular diet and no complaints of pain. Sputum sample was collected but continues to have a nonproductive cough.    Problem: Plan of Care - These are the overarching goals to be used throughout the patient stay.    Goal: Absence of Hospital-Acquired Illness or Injury  Outcome: Ongoing, Progressing  Intervention: Identify and Manage Fall Risk  Recent Flowsheet Documentation  Taken 3/19/2022 1600 by Dee Dee Joseph RN  Safety Promotion/Fall Prevention: activity supervised  Taken 3/19/2022 1200 by Dee Dee Joseph RN  Safety Promotion/Fall Prevention: activity supervised  Taken 3/19/2022 0800 by Dee Dee Joseph RN  Safety Promotion/Fall Prevention: activity supervised  Intervention: Prevent Skin Injury  Recent Flowsheet Documentation  Taken 3/19/2022 1600 by Dee Dee Joseph RN  Body Position: position changed independently  Taken 3/19/2022 1300 by Dee Dee Joseph RN  Body Position: position changed independently  Taken 3/19/2022 1200 by Dee Dee Joseph RN  Body Position: position changed independently  Taken 3/19/2022 0800 by Dee Dee Joseph RN  Body Position: position changed independently  Intervention: Prevent and Manage VTE (Venous Thromboembolism) Risk  Recent Flowsheet Documentation  Taken 3/19/2022 1600 by Dee Dee Joseph RN  VTE Prevention/Management: patient refused intervention  Activity Management: activity adjusted per tolerance  Taken 3/19/2022 1300 by Dee Dee Joseph RN  Activity Management: ambulated to bathroom  Taken 3/19/2022 1200 by Dee Dee Joseph RN  VTE Prevention/Management: patient refused intervention  Activity Management: activity adjusted per tolerance  Taken 3/19/2022 0800 by Dee Dee Joseph RN  VTE Prevention/Management: patient refused  intervention  Activity Management: activity adjusted per tolerance  Goal: Optimal Comfort and Wellbeing  Outcome: Ongoing, Progressing  Intervention: Monitor Pain and Promote Comfort  Recent Flowsheet Documentation  Taken 3/19/2022 1600 by Dee Dee Joseph RN  Pain Management Interventions: declines  Taken 3/19/2022 1200 by Dee Dee Joseph RN  Pain Management Interventions: declines  Taken 3/19/2022 0800 by Dee Dee Joseph RN  Pain Management Interventions: declines     Problem: Gas Exchange Impaired  Goal: Optimal Gas Exchange  Outcome: Ongoing, Progressing  Intervention: Optimize Oxygenation and Ventilation  Recent Flowsheet Documentation  Taken 3/19/2022 1600 by Dee Dee Joseph RN  Head of Bed (HOB) Positioning: HOB at 20 degrees  Taken 3/19/2022 1300 by Dee Dee Joseph RN  Head of Bed (HOB) Positioning: HOB at 20 degrees  Taken 3/19/2022 1200 by Dee Dee Joseph RN  Head of Bed (HOB) Positioning: HOB at 20 degrees  Taken 3/19/2022 0800 by Dee Dee Joseph RN  Head of Bed (HOB) Positioning: HOB at 20 degrees

## 2022-03-19 NOTE — PROGRESS NOTES
[facility] Hematology and Oncology Inpatient Progress Note    Patient: Theo Meyers  MRN: [id number]  Date of Service: March 18, 2022         Reason for Visit    #.     Assessment and Plan    Cancer Staging  No matching staging information was found for the patient.    #.  Kappa light chain multiple myeloma, relapsed/refractory.  Appear to be responding to current therapy with Brianne Pom Dex.  #.  Mild to moderate neutropenia  #.  Mild thrombocytopenia  #.  Mild macrocytic anemia  #.  Mild hypoxia, resolved with antibiotics and diuresis.  #.  Acute bronchitis   #.  Moderate pulmonary hypertension  #. Moderate aortic stenosis     Clinically improving.  Reviewed labs.  His WBC is low at 1.5 today with ANC of 1.  Hemoglobin stable at 9.9.  He also has mild thrombocytopenia.  Noted BNP is elevated as well as CRP.  His pancytopenia is likely secondary to infection, antibiotics and was on Pomalyst.   Advised him to continue to hold Pomalyst until discharge from the hospital.   No intervention for pancytopenia at this point.   Will follow.  ______________________________________________________________________________    History of Present Illness    Mr. Theo Meyers reports that he is feeling better today.  He is off oxygen.  He is able to go to the bathroom.      Review of Systems    Apart from describing in HPI, the remainder of comprehensive ROS was negative.    Physical Exam    /64 (BP Location: Left arm)   Pulse 63   Temp 98  F (36.7  C) (Oral)   Resp 22   Wt 74.8 kg (164 lb 14.4 oz)   SpO2 94%   BMI 25.07 kg/m        General: alert, awake, not in acute distress  HEENT: Head: Normal, normocephalic, atraumatic.  Eye: Normal external eye, conjunctiva, lids cornea, MACHO.  Nose: Normal external nose, mucus membranes and septum.  Pharynx: Normal buccal mucosa. Normal pharynx.  Neck / Thyroid: Supple, no masses, nodes, nodules or enlargement.  Lymphatics: No abnormally enlarged lymph  nodes.  Chest: Normal chest wall and respirations. Clear to auscultation.  Extremities: normal strength, tone, and muscle mass  Skin: normal. no rash or abnormalities  CNS: non focal.     Lab Results    Recent Results (from the past 24 hour(s))   Comprehensive metabolic panel    Collection Time: 03/18/22  4:25 AM   Result Value Ref Range    Sodium 138 136 - 145 mmol/L    Potassium 4.0 3.5 - 5.0 mmol/L    Chloride 103 98 - 107 mmol/L    Carbon Dioxide (CO2) 27 22 - 31 mmol/L    Anion Gap 8 5 - 18 mmol/L    Urea Nitrogen 17 8 - 28 mg/dL    Creatinine 1.13 0.70 - 1.30 mg/dL    Calcium 8.2 (L) 8.5 - 10.5 mg/dL    Glucose 98 70 - 125 mg/dL    Alkaline Phosphatase 60 45 - 120 U/L    AST 14 0 - 40 U/L    ALT 12 0 - 45 U/L    Protein Total 5.3 (L) 6.0 - 8.0 g/dL    Albumin 3.0 (L) 3.5 - 5.0 g/dL    Bilirubin Total 0.6 0.0 - 1.0 mg/dL    GFR Estimate 67 >60 mL/min/1.73m2   CRP inflammation    Collection Time: 03/18/22  4:25 AM   Result Value Ref Range    CRP 7.7 (H) 0.0-<0.8 mg/dL   CBC with platelets and differential    Collection Time: 03/18/22  4:25 AM   Result Value Ref Range    WBC Count 1.5 (L) 4.0 - 11.0 10e3/uL    RBC Count 2.96 (L) 4.40 - 5.90 10e6/uL    Hemoglobin 9.9 (L) 13.3 - 17.7 g/dL    Hematocrit 30.3 (L) 40.0 - 53.0 %     (H) 78 - 100 fL    MCH 33.4 (H) 26.5 - 33.0 pg    MCHC 32.7 31.5 - 36.5 g/dL    RDW 16.1 (H) 10.0 - 15.0 %    Platelet Count 98 (L) 150 - 450 10e3/uL    % Neutrophils 66 %    % Lymphocytes 6 %    % Monocytes 19 %    % Eosinophils 7 %    % Basophils 1 %    % Immature Granulocytes 1 %    NRBCs per 100 WBC 0 <1 /100    Absolute Neutrophils 1.0 (L) 1.6 - 8.3 10e3/uL    Absolute Lymphocytes 0.1 (L) 0.8 - 5.3 10e3/uL    Absolute Monocytes 0.3 0.0 - 1.3 10e3/uL    Absolute Eosinophils 0.1 0.0 - 0.7 10e3/uL    Absolute Basophils 0.0 0.0 - 0.2 10e3/uL    Absolute Immature Granulocytes 0.0 <=0.4 10e3/uL    Absolute NRBCs 0.0 10e3/uL   B-Type Natriuretic Peptide ([facility] Only)    Collection  Time: 22  4:25 AM   Result Value Ref Range     (H) 0 - 78 pg/mL        Imaging    Echocardiogram Complete    Result Date: 3/17/2022  [id number]^NOREEN^[place] [address]  Name: MONICA SCOTT MRN: [id number] : 1945 Study Date: 2022 11:22 AM Age: 76 yrs Gender: Male Patient Location: [facility] Reason For Study: Heart Failure Ordering Physician: ESTEVAN SORTO Performed By: ACE  BSA: 1.9 m2 Height: 68 in Weight: 160 lb HR: 79 BP: 147/79 mmHg ______________________________________________________________________________ Procedure Complete Echo Adult. Adequate quality two-dimensional was performed and interpreted. Adequate quality color and spectral Doppler were performed and interpreted. Compared to the prior study dated 11/3/2021, there are changes as noted. ______________________________________________________________________________ Interpretation Summary  1. Left ventricular size, wall thickness, and systolic function are normal. The estimated left ventricular ejection fraction is 60-65%. 2. Right ventricular size and systolic function are normal. 3. Mild left and moderate right atrial enlargement. 4. Calcified trileaflet aortic valve with moderate aortic stenosis. Peak forward velocity measures 3.2 m/s, mean gradient 27 mm Hg, calculated aortic valve area 1.1 cm2, and dimensionless index 0.28. No significant regurgitation. 5. Moderate pulmonary hypertension is present with an estimated pulmonary artery systolic pressure of 54 mm Hg. 6. Compared to the prior study dated 11/3/2021, the Doppler data today show moderate aortic stenosis. However, the aortic valve has a similar appearance on both studies. There was likely inadequate Doppler assessment of the aortic valve on the prior study. Visually the aortic valve appears at least moderately stenotic on both studies.  ______________________________________________________________________________ I      WMSI = 1.00     % Normal = 100  X - Cannot   0 -                      (2) - Mildly 2 -          Segments  Size Interpret    Hyperkinetic 1 - Normal  Hypokinetic  Hypokinetic  1-2     small                                                    7 -          3-5    moderate 3 - Akinetic 4 -          5 -         6 - Akinetic Dyskinetic   6-14    large              Dyskinetic   Aneurysmal  w/scar       w/scar       15-16   diffuse  Left Ventricle The left ventricle is normal in size. There is normal left ventricular wall thickness. Left ventricular systolic function is normal. The visual ejection fraction is 60-65%. Diastolic function not assessed due to atrial fibrillation. No regional wall motion abnormalities noted.  Right Ventricle The right ventricle is mildly dilated. The right ventricular systolic function is normal.  Atria The left atrium is mildly dilated. The right atrium is moderately dilated.  Mitral Valve The mitral valve leaflets are moderately thickened. There is mild mitral annular calcification. There is trace to mild mitral regurgitation. There is no mitral valve stenosis. The mean mitral valve gradient is 3.3 mmHg.  Tricuspid Valve Tricuspid valve leaflets appear normal. There is mild (1+) tricuspid regurgitation. The right ventricular systolic pressure is elevated at 46.4 mmHg. Moderate (46-55mmHg) pulmonary hypertension is present. There is no tricuspid stenosis.  Aortic Valve The aortic valve is trileaflet. Moderate aortic valve calcification is present. No aortic regurgitation is present. Moderate valvular aortic stenosis. The calculated aortic valve are is 1.1 cm^2. The mean AoV pressure gradient is 26.6 mmHg.  Pulmonic Valve The pulmonic valve is not well seen, but is grossly normal. There is trace to mild pulmonic valvular regurgitation. There is no pulmonic valvular stenosis.  Vessels The aorta root is normal.  The thoracic aorta cannot be assessed. IVC diameter and respiratory changes fall into an intermediate range suggesting an RA pressure of 8 mmHg.  Pericardium There is no pericardial effusion.  Rhythm The rhythm was atrial fibrillation.  ______________________________________________________________________________ MMode/2D Measurements & Calculations IVSd: 0.73 cm LVIDd: 4.5 cm LVIDs: 2.8 cm LVPWd: 0.85 cm FS: 37.6 %  LV mass(C)d: 113.4 grams LV mass(C)dI: 61.0 grams/m2 Ao root diam: 3.5 cm LA dimension: 3.9 cm LA/Ao: 1.1 LVOT diam: 2.2 cm LVOT area: 3.9 cm2 LA Volume (BP): 63.7 ml LA Volume Index (BP): 34.2 ml/m2  LA Volume Indexed (AL/bp): 35.6 ml/m2 RWT: 0.38  Time Measurements MM HR: 59.0 BPM  Doppler Measurements & Calculations MV E max iam: 168.0 cm/sec MV A max iam: 77.1 cm/sec MV E/A: 2.2 MV max PG: 10.0 mmHg MV mean PG: 3.3 mmHg MV V2 VTI: 46.4 cm MVA(VTI): 1.8 cm2 MV dec slope: 1407 cm/sec2 MV dec time: 0.12 sec Ao V2 max: 320.3 cm/sec Ao max P.0 mmHg Ao V2 mean: 244.7 cm/sec Ao mean P.6 mmHg Ao V2 VTI: 75.0 cm JOHANN(I,D): 1.1 cm2 JOHANN(V,D): 1.1 cm2 LV V1 max PG: 3.3 mmHg LV V1 max: 90.7 cm/sec LV V1 VTI: 21.7 cm SV(LVOT): 85.6 ml SI(LVOT): 46.0 ml/m2 PA acc time: 0.07 sec  TR max iam: 340.7 cm/sec TR max P.4 mmHg AV Iam Ratio (DI): 0.28 JOHANN Index (cm2/m2): 0.61 E/E' av.6 Lateral E/e': 18.4 Medial E/e': 24.9  ______________________________________________________________________________ Report approved by: Cristina Valenzuela 2022 12:33 PM       XR Chest Port 1 View    Result Date: 3/17/2022  EXAM: XR CHEST PORT 1 VIEW LOCATION: [facility] DATE/TIME: 3/17/2022 4:30 AM INDICATION: Dyspnea COMPARISON: 2022     IMPRESSION: New bilateral perihilar infiltrates greater in the right infrahilar region and retrocardiac region left lung base suspicious for possible perihilar pneumonia. Right-sided Port-A-Cath with tip in good position in the low SVC. Normal heart  size  and pulmonary vascularity. Aortic calcification. Vertebroplasty.    CT Chest Abdomen Pelvis w/o Contrast    Result Date: 3/17/2022  EXAM: CT CHEST ABDOMEN PELVIS W/O CONTRAST LOCATION: [facility] DATE/TIME: 3/17/2022 6:56 AM INDICATION: Sepsis, multiple myeloma COMPARISON: PET/CT 01/27/2022, CT chest 12/09/2020 TECHNIQUE: CT scan of the chest, abdomen, and pelvis was performed without IV contrast. Multiplanar reformats were obtained. Dose reduction techniques were used. CONTRAST: None. FINDINGS: LUNGS AND PLEURA: Mild layering tracheal secretions. Small bilateral pleural effusions with adjacent atelectasis. Bilateral lower lobar predominant bronchial wall thickening. No mass or suspicious nodule. MEDIASTINUM/AXILLAE: Right IJ port catheter. No thoracic adenopathy. Mild cardiomegaly. Trace pericardial effusion. Mitral annulus calcifications. CORONARY ARTERY CALCIFICATION: Severe. HEPATOBILIARY: Normal. PANCREAS: Normal. SPLEEN: Normal. ADRENAL GLANDS: Normal. KIDNEYS/BLADDER: 4 mm lower right renal stone. Punctate lower left renal stone. No hydronephrosis or obstructing ureteral stone. Normal urinary bladder. BOWEL: Normal caliber small bowel. Post appendectomy. Moderate stool burden within the distal sigmoid colon and rectum. No free air or free fluid. LYMPH NODES: Normal. VASCULATURE: Moderate atherosclerosis. PELVIC ORGANS: Normal. MUSCULOSKELETAL: Spinal and pelvic degenerative changes. Stable lucent and sclerotic changes within the right proximal humerus. Stable 1.1 cm lytic lesion within the right scapula extending to the scapular spine. Associated cortical erosion. Stable T6 vertebral body wedge compression fracture with vertebroplasty changes.     IMPRESSION: 1.  Small bilateral pleural effusions with adjacent atelectasis, right greater than left. Bilateral lower lobar predominant bronchiolitis. 2.  No acute findings in the abdomen or pelvis. No evidence of an inflammatory  process or abscess. 3.  Stable small lytic lesion in the right scapula correlating with the patient's known multiple myeloma. Stable lucent and sclerotic changes in the right proximal humerus perhaps reflecting a healed osseous lesion.       Signed by: Stan Henley MD

## 2022-03-19 NOTE — PROGRESS NOTES
[facility] MEDICINE PROGRESS NOTE      Length of stay: Day #2 inpatient    Identification/Summary: Theo Meyers is a 76 year old male with a past medical history of see below  who was admitted with chief complaint of increasing exertional dyspnea on 3/17/2022.    Admitting impression of bilateral pneumonia, immunocompromise patient    Brief history: Progressive shortness of breath over the past few weeks that is worsened, also some sore throat and cough.  Recently seen at the ER and noted to be in atrial fibrillation.  Anticoagulated for DVTs.  History of smoking.  ER course: Found to have bilateral pneumonia and acute respiratory failure    Assessment and Plan:    Bilateral pneumonia immunocompromised patient  Acute respiratory failure, off oxygen  Reactive airway disease  Patient has history of parapneumonic effusion with chest tube placement in the past  History of small bilateral pleural effusion  Initially placed on BiPAP and oxygen.  Placed on broad-spectrum antibiotics and antifungal agents, Bactrim for pneumocystis prophylaxis.  Initially placed on Zosyn, vancomycin and ID added meropenem and micafungin.--> today: discontinue vancomycin and micafungin, continue meropenem, prophylactic Bactrim, agree with steroid  Sputum cultures and respiratory viral panel  Start Bactrim for P LIZ prophylaxis  Continue Cyclovir for prophylaxis  Nebs and flutter valve  Methylprednisolone per pulmonary.    History of atrial fibrillation with controlled ventricular response  Elevated BNP  Recurrent DVTs  Moderate aortic stenosis  Continue anticoagulation Xarelto  Given 1 dose of IV Lasix. Continue Lasix 20 mg twice daily  Borderline blood pressure.  Echocardiogram showed EF of 60 to 65%, moderate aortic stenosis  Serial troponin  Eventual stress testing, Lexiscan is requested for evaluation of myocardial ischemia on Monday.  Cardiology follow  No aortic valve intervention at this time  necessary.    IgG kappa light chain myeloma  Immunocompromised patient  Status post stem cell bone marrow transplant in 2010  Hematology oncology to follow    Thrombocytopenia  Microcytic anemia  B12 level in folate normal    Consults for this case:  Cardiology  Hematology oncology  Monitor intensive care  Infectious disease  [facility] medicine is the main attending    --------------------------------------------------------------------    Discharge disposition: To be determined  Living situation:   Diet: Combination Diet Regular Diet Adult; 2 gm NA Diet  NPO for Medical/Clinical Reasons Except for: Meds  DVT Prophylaxis: Currently on anticoagulant  Code Status: Full Code    ----------------------------------------------------------------------    Cumulative essential/pertinent data reviewed:  Labs:    Basic metabolic panel normal  LFTs normal  CRP elevated 4.0, down from 7.7  Procalcitonin normal at 0.11  Troponin negative x2  TSH normal 2.42  Vitamin B12 is above normal  Folate normal  CBC shows white count down to 1.5  Hemoglobin 9.9  Platelets 98    Respiratory panel PCR positive for rhino enterovirus  Influenza A and B-  Coronavirus and SARS COVID-19 PCR negative  Blood culture pending     Imaging:  CT of the chest shows the following:  IMPRESSION:  1.  Small bilateral pleural effusions with adjacent atelectasis, right greater than left. Bilateral lower lobar predominant bronchiolitis.  2.  No acute findings in the abdomen or pelvis. No evidence of an inflammatory process or abscess.  3.  Stable small lytic lesion in the right scapula correlating with the patient's known multiple myeloma. Stable lucent and sclerotic changes in the right proximal humerus perhaps reflecting a healed osseous lesion.    ----------------------------------------------------------------------    Interval History/Subjective:  Feels short of breath on exertion.  Pulse oximetry has been within normal limits however.  Denies any  chest pain at this time.  No abdominal pain nausea or vomiting.  Able to stand and ambulate.  Rest of review of systems unremarkable.    Physical Exam/Objective:  Temp:  [97.3  F (36.3  C)-98  F (36.7  C)] 98  F (36.7  C)  Pulse:  [60-86] 65  Resp:  [18-22] 22  BP: (136-166)/(64-91) 162/70  SpO2:  [93 %-98 %] 98 %    Body mass index is 24.36 kg/m .    GENERAL:   Alert and conversant;  appears comfortable, in no acute distress,   HEAD:  Normocephalic, without obvious abnormality   EYES:  Grossly normal;    NOSE: Grossly normal   THROAT: Lips and mouth external: grossly normal,    NECK: No mass noted; no stiffness   BACK:      LUNGS:   Auscultation:Negative for wheezing; No significant crackles, symmetric chest rise on inhalation, respirations unlabored, complains of exertional dyspnea however.   CHEST WALL:  No tenderness or deformity   HEART:  Regular rate and rhythm, significant murmurs: Negative   ABDOMEN:   Soft, non-tender, no masses, no peritoneal signs   EXTREMITIES:   No tenderness of the calves.  No significant ankle edema   SKIN:  see extremities   NEURO: no signs of acute stroke or change from prior state   PSYCH: Behavior is appropriate     Medications:   Personally Reviewed.    acyclovir  400 mg Oral BID     atorvastatin  10 mg Oral QPM     bimatoprost  1 drop Both Eyes At Bedtime     brimonidine  1 drop Both Eyes TID     fluticasone  2 spray Both Nostrils Daily     furosemide  20 mg Oral BID     ipratropium - albuterol 0.5 mg/2.5 mg/3 mL  3 mL Nebulization Q6H     meropenem  1 g Intravenous Q8H     methylPREDNISolone  62.5 mg Intravenous Q24H     pantoprazole  40 mg Oral QAM AC     rivaroxaban ANTICOAGULANT  20 mg Oral Daily with supper     sodium chloride (PF)  3 mL Intracatheter Q8H     sulfamethoxazole-trimethoprim  1 tablet Oral Daily     tamsulosin  0.4 mg Oral At Bedtime     Current Facility-Administered Medications   Medication Dose Route Frequency     acetaminophen  650 mg Oral Q6H PRN    Or      acetaminophen  650 mg Rectal Q6H PRN     lidocaine 4%   Topical Q1H PRN     lidocaine (buffered or not buffered)  0.1-1 mL Other Q1H PRN     melatonin  1 mg Oral At Bedtime PRN     nitroGLYcerin  0.4 mg Sublingual Q5 Min PRN     - MEDICATION INSTRUCTIONS -   Does not apply Continuous PRN     sodium chloride (PF)  3 mL Intracatheter q1 min prn           Guillermo Randall MD  [facility]  Phone: #[phone]

## 2022-03-19 NOTE — PROGRESS NOTES
Pt given Duoneb inline with AEROBIKA as ordered.    Pt on RA, O2 sats mid to upper 90's, BS diminished, faint exp wheeze, sl cs at times.  Will continue to monitor pt.

## 2022-03-19 NOTE — PROGRESS NOTES
[facility]    Infectious Disease Progress Note    Date of Service (when I saw the patient): 03/19/2022     Assessment & Plan   Theo Meyers is a 76 year old male who was admitted on 3/17/2022.     1. Dyspnea on exertion, moderate pulmonary hypertension, moderate aortic valve stenosis  2. Acute bronchitis, pulmonary following, immunocompromised patient  3. Myeloma: Myeloma not in remission: seen by Oncology on 3/2/2022. On daratumumab and dexamethasone and pomalyst. referred for CAR-T cell therapy to the [facility] scheduled for July 2022. Please see Oncology notes  4. Stem cell transplant 2010  5. COVID negative. Received Evusheld (150 mg dose) on 2/2/2022. Received another 150 mg dose based on updated EUA. Pharm D to coordinate   6. Pneumonia, hypoxia, at risk of opportunistic infection. Pleural effusion.  Appreciate pulmonary input. RVP with rhinovirus. Sputum pending  7. A fib  8. [employer]     Recommendations    1. Discontinue vancomycin and micafungin  2. Continue meropenem   3. Prophylactic Bactrim   4. Agree with steroids   5. Appreciate pulmonary input and detailed discussion with pulmonary    Freddy Phan MD  [facility]  Direct messaging: [employer]aging  On-Call ID provider: [phone], option: 9           =========================  Interval History     First visit by me. Overall improving. Has not needed supplemental oxygen. Getting up to bathroom without too much difficulty. Plan for CT scan tomorrow given ongoing symptoms.    Physical Exam   Temp: 97.7  F (36.5  C) Temp src: Oral BP: (!) 160/91 Pulse: 64   Resp: 20 SpO2: 98 % O2 Device: None (Room air)    Vitals:    03/17/22 1545 03/18/22 0615 03/19/22 0600   Weight: 74.8 kg (164 lb 12.8 oz) 74.8 kg (164 lb 14.4 oz) 72.7 kg (160 lb 3.2 oz)     Vital Signs with Ranges  Temp:  [97.3  F (36.3  C)-98  F (36.7  C)] 97.7  F (36.5  C)  Pulse:  [60-86] 64  Resp:  [18-22]  20  BP: (133-166)/(64-91) 160/91  SpO2:  [93 %-98 %] 98 %    Constitutional: Awake, alert, cooperative, no apparent distress  Lungs: wheezing bilaterally   Cardiovascular: S1 S2  Abdomen: non tender  Skin: warm  Ext: no edema  Neuro: AO x 3      Data   All microbiology laboratory data reviewed.  Recent Labs   Lab Test 03/18/22  0425 03/17/22  0419 03/14/22  1619   WBC 1.5* 4.6 5.1   HGB 9.9* 10.7* 10.1*   HCT 30.3* 33.8* 31.4*   * 105* 106*   PLT 98* 128* 170     Recent Labs   Lab Test 03/18/22  0425 03/17/22  0419 03/14/22  1619   CR 1.13 0.87 1.12       MICRO:  03/17/2022 0515 03/18/2022 1003 Blood Culture Line, venous [[id number]]   Blood from Line, venous    Preliminary result Component Value   Culture No growth after 1 day P              03/17/2022 0515 03/18/2022 1003 Blood Culture Peripheral Blood [[id number]]   Peripheral Blood    Preliminary result Component Value   Culture No growth after 1 day P              03/17/2022 0419 03/17/2022 0444 Symptomatic; Unknown Influenza A/B & SARS-CoV2 (COVID-19) Virus PCR Multiplex Nasopharyngeal [[id number]]    Swab from Nasopharyngeal    Final result Component Value   Influenza A PCR Negative   Influenza B PCR Negative   SARS CoV2 PCR Negative   NEGATIVE: SARS-CoV-2 (COVID-19) RNA not detected, presumed negative.           03/17/2022 0419 03/18/2022 1156 Respiratory Panel PCR - NP Swab [[id number]]   Swab from Nasopharyngeal    In process Component Value   No component results             RADIOLOGY:    XR Chest 1 View    Result Date: 3/14/2022  EXAM: XR CHEST 1 VIEW LOCATION: [facility] DATE/TIME: 3/14/2022 4:29 PM INDICATION: Chest pain. COMPARISON: None.     IMPRESSION: Right-sided portacatheter with tip in the SVC. No pulmonary infiltrates. Midthoracic compression fracture with previous vertebroplasty. Heart size upper limits of normal.    Echocardiogram Complete    Result Date: 3/17/2022  [id number]  [id number]^[place] [address]  Name: MONICA SCOTT MRN: [id number] : 1945 Study Date: 2022 11:22 AM Age: 76 yrs Gender: Male Patient Location: [facility] Reason For Study: Heart Failure Ordering Physician: ESTEVAN SORTO Performed By: ACE  BSA: 1.9 m2 Height: 68 in Weight: 160 lb HR: 79 BP: 147/79 mmHg ______________________________________________________________________________ Procedure Complete Echo Adult. Adequate quality two-dimensional was performed and interpreted. Adequate quality color and spectral Doppler were performed and interpreted. Compared to the prior study dated 11/3/2021, there are changes as noted. ______________________________________________________________________________ Interpretation Summary  1. Left ventricular size, wall thickness, and systolic function are normal. The estimated left ventricular ejection fraction is 60-65%. 2. Right ventricular size and systolic function are normal. 3. Mild left and moderate right atrial enlargement. 4. Calcified trileaflet aortic valve with moderate aortic stenosis. Peak forward velocity measures 3.2 m/s, mean gradient 27 mm Hg, calculated aortic valve area 1.1 cm2, and dimensionless index 0.28. No significant regurgitation. 5. Moderate pulmonary hypertension is present with an estimated pulmonary artery systolic pressure of 54 mm Hg. 6. Compared to the prior study dated 11/3/2021, the Doppler data today show moderate aortic stenosis. However, the aortic valve has a similar appearance on both studies. There was likely inadequate Doppler assessment of the aortic valve on the prior study. Visually the aortic valve appears at least moderately stenotic on both studies. ______________________________________________________________________________ I      WMSI = 1.00     % Normal = 100  X - Cannot   0 -                      (2) - Mildly 2 -          Segments  Size Interpret    Hyperkinetic 1 -  Normal  Hypokinetic  Hypokinetic  1-2     small                                                    7 -          3-5    moderate 3 - Akinetic 4 -          5 -         6 - Akinetic Dyskinetic   6-14    large              Dyskinetic   Aneurysmal  w/scar       w/scar       15-16   diffuse  Left Ventricle The left ventricle is normal in size. There is normal left ventricular wall thickness. Left ventricular systolic function is normal. The visual ejection fraction is 60-65%. Diastolic function not assessed due to atrial fibrillation. No regional wall motion abnormalities noted.  Right Ventricle The right ventricle is mildly dilated. The right ventricular systolic function is normal.  Atria The left atrium is mildly dilated. The right atrium is moderately dilated.  Mitral Valve The mitral valve leaflets are moderately thickened. There is mild mitral annular calcification. There is trace to mild mitral regurgitation. There is no mitral valve stenosis. The mean mitral valve gradient is 3.3 mmHg.  Tricuspid Valve Tricuspid valve leaflets appear normal. There is mild (1+) tricuspid regurgitation. The right ventricular systolic pressure is elevated at 46.4 mmHg. Moderate (46-55mmHg) pulmonary hypertension is present. There is no tricuspid stenosis.  Aortic Valve The aortic valve is trileaflet. Moderate aortic valve calcification is present. No aortic regurgitation is present. Moderate valvular aortic stenosis. The calculated aortic valve are is 1.1 cm^2. The mean AoV pressure gradient is 26.6 mmHg.  Pulmonic Valve The pulmonic valve is not well seen, but is grossly normal. There is trace to mild pulmonic valvular regurgitation. There is no pulmonic valvular stenosis.  Vessels The aorta root is normal. The thoracic aorta cannot be assessed. IVC diameter and respiratory changes fall into an intermediate range suggesting an RA pressure of 8 mmHg.  Pericardium There is no pericardial effusion.  Rhythm The rhythm was atrial  fibrillation.  ______________________________________________________________________________ MMode/2D Measurements & Calculations IVSd: 0.73 cm LVIDd: 4.5 cm LVIDs: 2.8 cm LVPWd: 0.85 cm FS: 37.6 %  LV mass(C)d: 113.4 grams LV mass(C)dI: 61.0 grams/m2 Ao root diam: 3.5 cm LA dimension: 3.9 cm LA/Ao: 1.1 LVOT diam: 2.2 cm LVOT area: 3.9 cm2 LA Volume (BP): 63.7 ml LA Volume Index (BP): 34.2 ml/m2  LA Volume Indexed (AL/bp): 35.6 ml/m2 RWT: 0.38  Time Measurements MM HR: 59.0 BPM  Doppler Measurements & Calculations MV E max iam: 168.0 cm/sec MV A max iam: 77.1 cm/sec MV E/A: 2.2 MV max PG: 10.0 mmHg MV mean PG: 3.3 mmHg MV V2 VTI: 46.4 cm MVA(VTI): 1.8 cm2 MV dec slope: 1407 cm/sec2 MV dec time: 0.12 sec Ao V2 max: 320.3 cm/sec Ao max P.0 mmHg Ao V2 mean: 244.7 cm/sec Ao mean P.6 mmHg Ao V2 VTI: 75.0 cm JOHANN(I,D): 1.1 cm2 JOHANN(V,D): 1.1 cm2 LV V1 max PG: 3.3 mmHg LV V1 max: 90.7 cm/sec LV V1 VTI: 21.7 cm SV(LVOT): 85.6 ml SI(LVOT): 46.0 ml/m2 PA acc time: 0.07 sec  TR max iam: 340.7 cm/sec TR max P.4 mmHg AV Iam Ratio (DI): 0.28 JOHANN Index (cm2/m2): 0.61 E/E' av.6 Lateral E/e': 18.4 Medial E/e': 24.9  ______________________________________________________________________________ Report approved by: Cristina Valenzuela 2022 12:33 PM       XR Chest Port 1 View    Result Date: 3/17/2022  EXAM: XR CHEST PORT 1 VIEW LOCATION: [facility] DATE/TIME: 3/17/2022 4:30 AM INDICATION: Dyspnea COMPARISON: 2022     IMPRESSION: New bilateral perihilar infiltrates greater in the right infrahilar region and retrocardiac region left lung base suspicious for possible perihilar pneumonia. Right-sided Port-A-Cath with tip in good position in the low SVC. Normal heart size  and pulmonary vascularity. Aortic calcification. Vertebroplasty.    CT Chest Abdomen Pelvis w/o Contrast    Result Date: 3/17/2022  EXAM: CT CHEST ABDOMEN PELVIS W/O CONTRAST LOCATION: [facility]  HOSPITAL DATE/TIME: 3/17/2022 6:56 AM INDICATION: Sepsis, multiple myeloma COMPARISON: PET/CT 01/27/2022, CT chest 12/09/2020 TECHNIQUE: CT scan of the chest, abdomen, and pelvis was performed without IV contrast. Multiplanar reformats were obtained. Dose reduction techniques were used. CONTRAST: None. FINDINGS: LUNGS AND PLEURA: Mild layering tracheal secretions. Small bilateral pleural effusions with adjacent atelectasis. Bilateral lower lobar predominant bronchial wall thickening. No mass or suspicious nodule. MEDIASTINUM/AXILLAE: Right IJ port catheter. No thoracic adenopathy. Mild cardiomegaly. Trace pericardial effusion. Mitral annulus calcifications. CORONARY ARTERY CALCIFICATION: Severe. HEPATOBILIARY: Normal. PANCREAS: Normal. SPLEEN: Normal. ADRENAL GLANDS: Normal. KIDNEYS/BLADDER: 4 mm lower right renal stone. Punctate lower left renal stone. No hydronephrosis or obstructing ureteral stone. Normal urinary bladder. BOWEL: Normal caliber small bowel. Post appendectomy. Moderate stool burden within the distal sigmoid colon and rectum. No free air or free fluid. LYMPH NODES: Normal. VASCULATURE: Moderate atherosclerosis. PELVIC ORGANS: Normal. MUSCULOSKELETAL: Spinal and pelvic degenerative changes. Stable lucent and sclerotic changes within the right proximal humerus. Stable 1.1 cm lytic lesion within the right scapula extending to the scapular spine. Associated cortical erosion. Stable T6 vertebral body wedge compression fracture with vertebroplasty changes.     IMPRESSION: 1.  Small bilateral pleural effusions with adjacent atelectasis, right greater than left. Bilateral lower lobar predominant bronchiolitis. 2.  No acute findings in the abdomen or pelvis. No evidence of an inflammatory process or abscess. 3.  Stable small lytic lesion in the right scapula correlating with the patient's known multiple myeloma. Stable lucent and sclerotic changes in the right proximal humerus perhaps reflecting a healed  osseous lesion.

## 2022-03-19 NOTE — PROGRESS NOTES
PULMONARY / CRITICAL CARE PROGRESS NOTE    Date / Time of Admission:  3/17/2022  4:05 AM    Assessment:     Theo Meyers is a 76 year old male with history of paroxysmal atrial fibrillation, DVT anticoagulated, relapse refractory kappa light chain multiple myeloma who is currently on daratumumab, pomalidomide and dexamethasone.   He presented to the emergency room today due to progressive shortness of breath over the last few weeks, mild swelling of LEs, difficulty tolerating the flat position. No fever, reports chills, denies night sweats. No chest pain. Over the last two days, started to have mild productive cough of clear sputum. Taking mucinex.   Patient was in mild respiratory distress, normotensive, afebrile, adequate oxygenation.   CT of chest/abdomen/pelvis showed small bilateral pleural effusion with bilateral bronchial wall thickening.   Admitted to the hospital. Initially started on BiPAP then titrated down to nasal cannula. Started on broad Abx. Echocardiogram showed moderate Aortic stenosis, diuresis added.   Ongoing wheezes , systemic steroids added.     1. Dyspnea  Treated for acute bronchitis, steroids added in view of reactive airway symptoms.   In addition on diuretics for mild volume up status. Echocardiogram showed moderate aortic stenosis, moderate pulmonary hypertension.   Cardiology was consulted. Plan for NM stress test.   2. Acute bronchitis   Schedule bronchodilators, Abx per ID recommendation.  In view of immunocompromised state , follow up Chest CT scan in AM   3. Reactive airway symptoms / signs   Increase dose of systemic steroids.   4. Bilateral pleural effusions  Continue diuresis   5. Moderate aortic stenosis   6. Secondary pulmonary hypertension   Related to Aortic stenosis. Clinically volume up status. Titrate BP meds, diuresis.   7. Paroxysmal atrial fibrillation   8. Hx DVT anticoagulated  9. Multiple myeloma on immunosuppression     Advance Directives: Full code    Plan:    1. Titrate FiO2, keep SpO2 > 90%  2. Schedule bronchodilators  3. Increase dose of systemic steroids   4. Abx per ID recommendation   5. Follow up chest CT scan in AM  6. Continue diuresis   7. Scheduled for NM stress test on 3/21.   8. PPI for GI prophylaxis   9. DVT prophylaxis, patient is anticoagulated with xarelto    Please contact me if you have any questions.    Bin Ward  Pulmonary / Critical Care  03/19/2022  11:22 AM    Subjective   HPI:  Theo Meyers is a 76 year old male with history of paroxysmal atrial fibrillation, DVT anticoagulated, relapse refractory kappa light chain multiple myeloma who is currently on daratumumab, pomalidomide and dexamethasone.   He presented to the emergency room today due to progressive shortness of breath over the last few weeks, mild swelling of LEs, difficulty tolerating the flat position. No fever, reports chills, denies night sweats. No chest pain. Over the last two days, started to have mild productive cough of clear sputum. Taking mucinex.   Patient was in mild respiratory distress, normotensive, afebrile, adequate oxygenation.   CT of chest/abdomen/pelvis showed small bilateral pleural effusion with bilateral bronchial wall thickening.   Admitted to the hospital. Initially started on BiPAP then titrated down to nasal cannula. Started on broad Abx. Echocardiogram showed moderate Aortic stenosis, diuresis added. Ongoing wheezes , systemic steroids added.     Events overnight  - Increase wheezes, on scheduled bronchodilators  - Off oxygen supplementation   - Hemodynamically stable    Allergies: Centex-pse er [pseudoephedrine-guaifenesin cr]     MEDS:  Current Facility-Administered Medications   Medication     acetaminophen (TYLENOL) tablet 650 mg    Or     acetaminophen (TYLENOL) Suppository 650 mg     acyclovir (ZOVIRAX) capsule 400 mg     atorvastatin (LIPITOR) tablet 10 mg     bimatoprost (LUMIGAN) 0.01 % ophthalmic drops 1 drop      brimonidine (ALPHAGAN P) 0.1 % ophthalmic solution 1 drop     fluticasone (FLONASE) 50 MCG/ACT spray 2 spray     furosemide (LASIX) tablet 20 mg     ipratropium - albuterol 0.5 mg/2.5 mg/3 mL (DUONEB) neb solution 3 mL     lidocaine (LMX4) cream     lidocaine 1 % 0.1-1 mL     melatonin tablet 1 mg     meropenem (MERREM) 1 g vial to attach to  mL bag     micafungin (MYCAMINE) 100 mg in sodium chloride 0.9 % 100 mL intermittent infusion     nitroGLYcerin (NITROSTAT) sublingual tablet 0.4 mg     pantoprazole (PROTONIX) EC tablet 40 mg     Patient is already receiving anticoagulation with heparin, enoxaparin (LOVENOX), warfarin (COUMADIN)  or other anticoagulant medication     predniSONE (DELTASONE) tablet 20 mg     rivaroxaban ANTICOAGULANT (XARELTO) tablet 20 mg     sodium chloride (PF) 0.9% PF flush 3 mL     sodium chloride (PF) 0.9% PF flush 3 mL     sulfamethoxazole-trimethoprim (BACTRIM) 400-80 MG per tablet 1 tablet     tamsulosin (FLOMAX) capsule 0.4 mg     vancomycin 750 mg in 0.9% NaCl 250 mL intermittent infusion 750 mg     Objective:   VITALS:  BP (!) 160/91 (BP Location: Left arm)   Pulse 64   Temp 97.7  F (36.5  C) (Oral)   Resp 20   Wt 72.7 kg (160 lb 3.2 oz)   SpO2 98%   BMI 24.36 kg/m    VENT:  Resp: 20    EXAM:   Gen: awake, alert, no distress  HEENT: pink conjunctiva, moist mucosa, Mallampati II/IV  Neck: no thyromegaly, masses or JVD  Lungs: wheezes both HT  CV: irregular, systolic murmur, no gallops appreciated  Abdomen: soft, NT, BS wnl  Ext: trace edema  Neuro: CN II-XII intact, non focal     Data Review:  Recent Labs   Lab 03/18/22  0425 03/17/22  0419 03/14/22  1619   GLC 98 147* 89      3/18/2022 04:25   Sodium 138   Potassium 4.0   Chloride 103   Carbon Dioxide 27   Urea Nitrogen 17   Creatinine 1.13   GFR Estimate 67   Calcium 8.2 (L)   Anion Gap 8   Albumin 3.0 (L)   Protein Total 5.3 (L)   Bilirubin Total 0.6   Alkaline Phosphatase 60   ALT 12   AST 14    (H)   CRP 7.7  (H)   Glucose 98   WBC 1.5 (L)   Hemoglobin 9.9 (L)   Hematocrit 30.3 (L)   Platelet Count 98 (L)   RBC Count 2.96 (L)    (H)   MCH 33.4 (H)   MCHC 32.7   RDW 16.1 (H)   % Neutrophils 66   % Lymphocytes 6   % Monocytes 19   % Eosinophils 7   % Basophils 1      3/19/2022 04:08    (H)   CRP 4.0 (H)     Echocardiogram 3/17/2022  Interpretation Summary     1. Left ventricular size, wall thickness, and systolic function are normal.  The estimated left ventricular ejection fraction is 60-65%.  2. Right ventricular size and systolic function are normal.  3. Mild left and moderate right atrial enlargement.  4. Calcified trileaflet aortic valve with moderate aortic stenosis. Peak  forward velocity measures 3.2 m/s, mean gradient 27 mm Hg, calculated aortic  valve area 1.1 cm2, and dimensionless index 0.28. No significant  regurgitation.  5. Moderate pulmonary hypertension is present with an estimated pulmonary  artery systolic pressure of 54 mm Hg.  6. Compared to the prior study dated 11/3/2021, the Doppler data today show  moderate aortic stenosis. However, the aortic valve has a similar appearance  on both studies. There was likely inadequate Doppler assessment of the aortic  valve on the prior study. Visually the aortic valve appears at least  moderately stenotic on both studies.    CT CHEST ABDOMEN PELVIS W/O CONTRAST  LOCATION: [facility]  DATE/TIME: 3/17/2022 6:56 AM  INDICATION: Sepsis, multiple myeloma  COMPARISON: PET/CT 01/27/2022, CT chest 12/09/2020  FINDINGS:   LUNGS AND PLEURA: Mild layering tracheal secretions. Small bilateral pleural effusions with adjacent atelectasis. Bilateral lower lobar predominant bronchial wall thickening. No mass or suspicious nodule.  MEDIASTINUM/AXILLAE: Right IJ port catheter. No thoracic adenopathy. Mild cardiomegaly. Trace pericardial effusion. Mitral annulus calcifications.  CORONARY ARTERY CALCIFICATION: Severe.  HEPATOBILIARY:  Normal.  PANCREAS: Normal.  SPLEEN: Normal.  ADRENAL GLANDS: Normal.  KIDNEYS/BLADDER: 4 mm lower right renal stone. Punctate lower left renal stone. No hydronephrosis or obstructing ureteral stone. Normal urinary bladder.  BOWEL: Normal caliber small bowel. Post appendectomy. Moderate stool burden within the distal sigmoid colon and rectum. No free air or free fluid.  LYMPH NODES: Normal.  VASCULATURE: Moderate atherosclerosis.  PELVIC ORGANS: Normal.  MUSCULOSKELETAL: Spinal and pelvic degenerative changes. Stable lucent and sclerotic changes within the right proximal humerus. Stable 1.1 cm lytic lesion within the right scapula extending to the scapular spine. Associated cortical erosion. Stable T6 vertebral body wedge compression fracture with vertebroplasty changes.                                   IMPRESSION:  1.  Small bilateral pleural effusions with adjacent atelectasis, right greater than left. Bilateral lower lobar predominant bronchiolitis.  2.  No acute findings in the abdomen or pelvis. No evidence of an inflammatory process or abscess.  3.  Stable small lytic lesion in the right scapula correlating with the patient's known multiple myeloma. Stable lucent and sclerotic changes in the right proximal humerus perhaps reflecting a healed osseous lesion.  By:  Bin Ward MD, 03/19/2022  11:22 AM    Primary Care Physician:  Mathew Ott

## 2022-03-19 NOTE — PROGRESS NOTES
Pt received neb treatment and tolerated well. BS; clear/coarse. SpO2 in mid 90's percent. Will continue to monitor and assess pt.    Sharon Melchor, RT

## 2022-03-19 NOTE — PROGRESS NOTES
Assessment/Plan:  1.  Moderate aortic valve stenosis: As discussed yesterday, continue to follow-up as outpatient.  No aortic valve intervention at this time.     2.  Dyspnea on exertion, moderate pulmonary hypertension, calcified coronary artery disease: The etiology of his dyspnea on exertion could be caused by multiple factors.  There are differential diagnosis include acute bronchitis, severe coronary artery disease, pulmonary artery hypertension 54 mmHg secondary to diastolic dysfunction, aortic valve stenosis.  Echo images have no strong indication of multiple myeloma related amyloid cardiomyopathy.  May consider cardiac MRI for evaluation of amyloid cardiomyopathy as outpatient.  Lexiscan is requested for evaluation of myocardial ischemia. Scheduled on Monday.  Continue Lasix 20 mg bid.     3.  Chronic atrial fibrillation: Ventricular rate is well controlled.  Continue Xarelto for chronic anticoagulation.     4.  History of multiple myeloma, and other chronic medical conditions: Please see primary team of management for details.     Subjective:  Interval History:  Has no complaints of chest pain. Still has dyspnea on exertion.       Current Medications:  Scheduled Meds:    acyclovir  400 mg Oral BID     atorvastatin  10 mg Oral QPM     bimatoprost  1 drop Both Eyes At Bedtime     brimonidine  1 drop Both Eyes TID     fluticasone  2 spray Both Nostrils Daily     furosemide  20 mg Oral BID     ipratropium - albuterol 0.5 mg/2.5 mg/3 mL  3 mL Nebulization Q6H     meropenem  1 g Intravenous Q8H     micafungin  100 mg Intravenous Q24H     pantoprazole  40 mg Oral QAM AC     predniSONE  20 mg Oral Daily     rivaroxaban ANTICOAGULANT  20 mg Oral Daily with supper     sodium chloride (PF)  3 mL Intracatheter Q8H     sulfamethoxazole-trimethoprim  1 tablet Oral Daily     tamsulosin  0.4 mg Oral At Bedtime     vancomycin  750 mg Intravenous Q12H     Continuous Infusions:    - MEDICATION INSTRUCTIONS -       PRN  Meds:.acetaminophen **OR** acetaminophen, lidocaine 4%, lidocaine (buffered or not buffered), melatonin, nitroGLYcerin, - MEDICATION INSTRUCTIONS -, sodium chloride (PF)    Objective:   Vital signs in last 24 hours:  Temp:  [97.3  F (36.3  C)-98  F (36.7  C)] 97.7  F (36.5  C)  Pulse:  [60-86] 64  Resp:  [18-22] 20  BP: (133-166)/(64-91) 160/91  SpO2:  [93 %-98 %] 98 %  Weight:   [unfilled]     Physical Exam:  General Appearance:   Awake, Alert, No acute distress.   HEENT:  No scleral icterus; the mucous membranes were moist.   Neck: No cervical bruits. No jugular venous distention   Lungs:   Coarse breathing sounds. No crackles. No wheezing.   Cardiovascular:   IRRR, normal first and second heart sounds with no murmurs. No rubs or gallops.     Abdomen:  Soft. No tenderness. Bowels sounds are present   Extremities: No leg edema. Equal posterior tibial pulsese.   Skin: Warm, Dry. No rashes.   Neurologic: Mood and affect are appropriate. No focal deficits.         Cardiographics:   Report: personally reviewed. .      Tele monitoring -  Atrial fib with control ventricular rate.    Echocardiogram 3-:  1. Left ventricular size, wall thickness, and systolic function are normal.  The estimated left ventricular ejection fraction is 60-65%.  2. Right ventricular size and systolic function are normal.  3. Mild left and moderate right atrial enlargement.  4. Calcified trileaflet aortic valve with moderate aortic stenosis. Peak  forward velocity measures 3.2 m/s, mean gradient 27 mm Hg, calculated aortic  valve area 1.1 cm2, and dimensionless index 0.28. No significant  regurgitation.  5. Moderate pulmonary hypertension is present with an estimated pulmonary  artery systolic pressure of 54 mm Hg.  6. Compared to the prior study dated 11/3/2021, the Doppler data today show  moderate aortic stenosis. However, the aortic valve has a similar appearance  on both studies. There was likely inadequate Doppler assessment of the  aortic  valve on the prior study. Visually the aortic valve appears at least  moderately stenotic on both studies.    Lab Results:   personally reviewed.     Lab Results   Component Value Date     03/18/2022     01/04/2011    CO2 27 03/18/2022    CO2 28 01/04/2011    BUN 17 03/18/2022    BUN 12 01/04/2011     Lab Results   Component Value Date    TROPONINI 0.05 03/17/2022     Lab Results   Component Value Date    WBC 1.5 03/18/2022    WBC 4.6 01/04/2011    HGB 9.9 03/18/2022    HGB 12.4 01/04/2011    HCT 30.3 03/18/2022    HCT 37.0 01/04/2011     03/18/2022    MCV 93 01/04/2011    PLT 98 03/18/2022     01/04/2011     Lab Results   Component Value Date    CHOL 160 10/05/2021    TRIG 71 10/05/2021    HDL 51 10/05/2021    LDL 95 10/05/2021

## 2022-03-20 NOTE — PROGRESS NOTES
[facility] [place] [facility] Inpatient Progress Note    Patient: Theo Meyers  MRN: [id number]  Date of Service: 3/20/2022        Reason for Visit    #.  Shortness of breath    Assessment and Plan    Cancer Staging  No matching staging information was found for the patient.    #.  Kappa light chain multiple myeloma, relapsed/refractory.  Appear to be responding to current therapy with Brianne Pom Dex.  #.  Probable viral pneumonitis with RSV  #.  Mild to moderate neutropenia  #.  Mild thrombocytopenia  #.  Mild macrocytic anemia  #.  Mild hypoxia, resolved with antibiotics and diuresis.  #.  Moderate pulmonary hypertension  #.  Moderate aortic stenosis     Clinically improving.  Reviewed labs.  His blood count continues to improve with WBC of 2, hemoglobin stable at 9.5, platelet of 148 renal function, liver functions also unremarkable. RSV positive.  Other fungal serologies, PCP are pending.  CT scan from today showed subpleural groundglass attenuation in the anterior right upper lobe consistent with inflammation.  Small bilateral pleural effusion.     His pancytopenia is likely secondary to infection, antibiotics and was on Pomalyst.   Continue to hold Pomalyst until discharge from hospital.  He has about 7 pills left from the last cycle.   No intervention for pancytopenia at this point.   Dr. Clifford will follow tomorrow.  ______________________________________________________________________________    History of Present Illness    Mr. Theo Meyers reports that he is feeling okay.  He is out of ICU.  He had slight runny nose and cough.  No fever.      Review of Systems    Apart from describing in HPI, the remainder of comprehensive ROS was negative.    Physical Exam    BP (!) 151/69 (BP Location: Left arm, Patient Position: Semi-Palacios's, Cuff Size: Adult Regular)   Pulse 71   Temp 97.5  F (36.4  C) (Oral)   Resp 18   Wt 72.2 kg (159 lb 3.2 oz)   SpO2 96%   BMI 24.21 kg/m      General:  alert, awake, not in acute distress  HEENT: Head: Normal, normocephalic, atraumatic.  Eye: Normal external eye, conjunctiva, lids cornea, MACHO.  Nose: Normal external nose, mucus membranes and septum.  Pharynx: Normal buccal mucosa. Normal pharynx.  Neck / Thyroid: Supple, no masses, nodes, nodules or enlargement.  Lymphatics: No abnormally enlarged lymph nodes.  Chest: Normal chest wall and respirations. Clear to auscultation.  Heart: S1 S2 RRR, no murmur.   Abdomen: abdomen is soft without significant tenderness, masses, organomegaly or guarding  Extremities: normal strength, tone, and muscle mass  Skin: normal. no rash or abnormalities  CNS: non focal.     Lab Results    Recent Results (from the past 24 hour(s))   CBC with platelets    Collection Time: 03/20/22  4:55 AM   Result Value Ref Range    WBC Count 2.0 (L) 4.0 - 11.0 10e3/uL    RBC Count 2.91 (L) 4.40 - 5.90 10e6/uL    Hemoglobin 9.5 (L) 13.3 - 17.7 g/dL    Hematocrit 29.0 (L) 40.0 - 53.0 %     78 - 100 fL    MCH 32.6 26.5 - 33.0 pg    MCHC 32.8 31.5 - 36.5 g/dL    RDW 15.8 (H) 10.0 - 15.0 %    Platelet Count 148 (L) 150 - 450 10e3/uL   Comprehensive metabolic panel    Collection Time: 03/20/22  4:55 AM   Result Value Ref Range    Sodium 137 136 - 145 mmol/L    Potassium 4.2 3.5 - 5.0 mmol/L    Chloride 103 98 - 107 mmol/L    Carbon Dioxide (CO2) 26 22 - 31 mmol/L    Anion Gap 8 5 - 18 mmol/L    Urea Nitrogen 23 8 - 28 mg/dL    Creatinine 1.30 0.70 - 1.30 mg/dL    Calcium 8.4 (L) 8.5 - 10.5 mg/dL    Glucose 118 70 - 125 mg/dL    Alkaline Phosphatase 50 45 - 120 U/L    AST 15 0 - 40 U/L    ALT 17 0 - 45 U/L    Protein Total 4.9 (L) 6.0 - 8.0 g/dL    Albumin 2.8 (L) 3.5 - 5.0 g/dL    Bilirubin Total 0.6 0.0 - 1.0 mg/dL    GFR Estimate 57 (L) >60 mL/min/1.73m2        Imaging    Echocardiogram Complete    Result Date: 3/17/2022  [id number]^SORTO^ESTEVAN  [place] [id number]  Name:  MONICA SCOTT MRN: [id number] : 1945 Study Date: 2022 11:22 AM Age: 76 yrs Gender: Male Patient Location: [facility] Reason For Study: Heart Failure Ordering Physician: ESTEVAN SORTO Performed By: ACE  BSA: 1.9 m2 Height: 68 in Weight: 160 lb HR: 79 BP: 147/79 mmHg ______________________________________________________________________________ Procedure Complete Echo Adult. Adequate quality two-dimensional was performed and interpreted. Adequate quality color and spectral Doppler were performed and interpreted. Compared to the prior study dated 11/3/2021, there are changes as noted. ______________________________________________________________________________ Interpretation Summary  1. Left ventricular size, wall thickness, and systolic function are normal. The estimated left ventricular ejection fraction is 60-65%. 2. Right ventricular size and systolic function are normal. 3. Mild left and moderate right atrial enlargement. 4. Calcified trileaflet aortic valve with moderate aortic stenosis. Peak forward velocity measures 3.2 m/s, mean gradient 27 mm Hg, calculated aortic valve area 1.1 cm2, and dimensionless index 0.28. No significant regurgitation. 5. Moderate pulmonary hypertension is present with an estimated pulmonary artery systolic pressure of 54 mm Hg. 6. Compared to the prior study dated 11/3/2021, the Doppler data today show moderate aortic stenosis. However, the aortic valve has a similar appearance on both studies. There was likely inadequate Doppler assessment of the aortic valve on the prior study. Visually the aortic valve appears at least moderately stenotic on both studies. ______________________________________________________________________________ I      WMSI = 1.00     % Normal = 100  X - Cannot   0 -                      (2) - Mildly 2 -          Segments  Size Interpret    Hyperkinetic 1 - Normal  Hypokinetic  Hypokinetic  1-2     small                                                     7 -          3-5    moderate 3 - Akinetic 4 -          5 -         6 - Akinetic Dyskinetic   6-14    large              Dyskinetic   Aneurysmal  w/scar       w/scar       15-16   diffuse  Left Ventricle The left ventricle is normal in size. There is normal left ventricular wall thickness. Left ventricular systolic function is normal. The visual ejection fraction is 60-65%. Diastolic function not assessed due to atrial fibrillation. No regional wall motion abnormalities noted.  Right Ventricle The right ventricle is mildly dilated. The right ventricular systolic function is normal.  Atria The left atrium is mildly dilated. The right atrium is moderately dilated.  Mitral Valve The mitral valve leaflets are moderately thickened. There is mild mitral annular calcification. There is trace to mild mitral regurgitation. There is no mitral valve stenosis. The mean mitral valve gradient is 3.3 mmHg.  Tricuspid Valve Tricuspid valve leaflets appear normal. There is mild (1+) tricuspid regurgitation. The right ventricular systolic pressure is elevated at 46.4 mmHg. Moderate (46-55mmHg) pulmonary hypertension is present. There is no tricuspid stenosis.  Aortic Valve The aortic valve is trileaflet. Moderate aortic valve calcification is present. No aortic regurgitation is present. Moderate valvular aortic stenosis. The calculated aortic valve are is 1.1 cm^2. The mean AoV pressure gradient is 26.6 mmHg.  Pulmonic Valve The pulmonic valve is not well seen, but is grossly normal. There is trace to mild pulmonic valvular regurgitation. There is no pulmonic valvular stenosis.  Vessels The aorta root is normal. The thoracic aorta cannot be assessed. IVC diameter and respiratory changes fall into an intermediate range suggesting an RA pressure of 8 mmHg.  Pericardium There is no pericardial effusion.  Rhythm The rhythm was atrial fibrillation.  ______________________________________________________________________________  MMode/2D Measurements & Calculations IVSd: 0.73 cm LVIDd: 4.5 cm LVIDs: 2.8 cm LVPWd: 0.85 cm FS: 37.6 %  LV mass(C)d: 113.4 grams LV mass(C)dI: 61.0 grams/m2 Ao root diam: 3.5 cm LA dimension: 3.9 cm LA/Ao: 1.1 LVOT diam: 2.2 cm LVOT area: 3.9 cm2 LA Volume (BP): 63.7 ml LA Volume Index (BP): 34.2 ml/m2  LA Volume Indexed (AL/bp): 35.6 ml/m2 RWT: 0.38  Time Measurements MM HR: 59.0 BPM  Doppler Measurements & Calculations MV E max iam: 168.0 cm/sec MV A max iam: 77.1 cm/sec MV E/A: 2.2 MV max PG: 10.0 mmHg MV mean PG: 3.3 mmHg MV V2 VTI: 46.4 cm MVA(VTI): 1.8 cm2 MV dec slope: 1407 cm/sec2 MV dec time: 0.12 sec Ao V2 max: 320.3 cm/sec Ao max P.0 mmHg Ao V2 mean: 244.7 cm/sec Ao mean P.6 mmHg Ao V2 VTI: 75.0 cm JOHANN(I,D): 1.1 cm2 JOHANN(V,D): 1.1 cm2 LV V1 max PG: 3.3 mmHg LV V1 max: 90.7 cm/sec LV V1 VTI: 21.7 cm SV(LVOT): 85.6 ml SI(LVOT): 46.0 ml/m2 PA acc time: 0.07 sec  TR max iam: 340.7 cm/sec TR max P.4 mmHg AV Iam Ratio (DI): 0.28 JOHANN Index (cm2/m2): 0.61 E/E' av.6 Lateral E/e': 18.4 Medial E/e': 24.9  ______________________________________________________________________________ Report approved by: Cristina Valenzuela 2022 12:33 PM       XR Chest Port 1 View    Result Date: 3/17/2022  EXAM: XR CHEST PORT 1 VIEW LOCATION: [facility] DATE/TIME: 3/17/2022 4:30 AM INDICATION: Dyspnea COMPARISON: 2022     IMPRESSION: New bilateral perihilar infiltrates greater in the right infrahilar region and retrocardiac region left lung base suspicious for possible perihilar pneumonia. Right-sided Port-A-Cath with tip in good position in the low SVC. Normal heart size  and pulmonary vascularity. Aortic calcification. Vertebroplasty.    CT Chest Abdomen Pelvis w/o Contrast    Result Date: 3/17/2022  EXAM: CT CHEST ABDOMEN PELVIS W/O CONTRAST LOCATION: [facility] DATE/TIME: 3/17/2022 6:56 AM INDICATION: Sepsis, multiple myeloma COMPARISON: PET/CT  01/27/2022, CT chest 12/09/2020 TECHNIQUE: CT scan of the chest, abdomen, and pelvis was performed without IV contrast. Multiplanar reformats were obtained. Dose reduction techniques were used. CONTRAST: None. FINDINGS: LUNGS AND PLEURA: Mild layering tracheal secretions. Small bilateral pleural effusions with adjacent atelectasis. Bilateral lower lobar predominant bronchial wall thickening. No mass or suspicious nodule. MEDIASTINUM/AXILLAE: Right IJ port catheter. No thoracic adenopathy. Mild cardiomegaly. Trace pericardial effusion. Mitral annulus calcifications. CORONARY ARTERY CALCIFICATION: Severe. HEPATOBILIARY: Normal. PANCREAS: Normal. SPLEEN: Normal. ADRENAL GLANDS: Normal. KIDNEYS/BLADDER: 4 mm lower right renal stone. Punctate lower left renal stone. No hydronephrosis or obstructing ureteral stone. Normal urinary bladder. BOWEL: Normal caliber small bowel. Post appendectomy. Moderate stool burden within the distal sigmoid colon and rectum. No free air or free fluid. LYMPH NODES: Normal. VASCULATURE: Moderate atherosclerosis. PELVIC ORGANS: Normal. MUSCULOSKELETAL: Spinal and pelvic degenerative changes. Stable lucent and sclerotic changes within the right proximal humerus. Stable 1.1 cm lytic lesion within the right scapula extending to the scapular spine. Associated cortical erosion. Stable T6 vertebral body wedge compression fracture with vertebroplasty changes.     IMPRESSION: 1.  Small bilateral pleural effusions with adjacent atelectasis, right greater than left. Bilateral lower lobar predominant bronchiolitis. 2.  No acute findings in the abdomen or pelvis. No evidence of an inflammatory process or abscess. 3.  Stable small lytic lesion in the right scapula correlating with the patient's known multiple myeloma. Stable lucent and sclerotic changes in the right proximal humerus perhaps reflecting a healed osseous lesion.       Signed by: Stan Henley MD

## 2022-03-20 NOTE — PLAN OF CARE
Problem: Plan of Care - These are the overarching goals to be used throughout the patient stay.    Goal: Optimal Comfort and Wellbeing  Outcome: Ongoing, Progressing     Problem: Gas Exchange Impaired  Goal: Optimal Gas Exchange  Outcome: Ongoing, Progressing   Goal Outcome Evaluation:    On room air throughout the night, sats stable. No SOB observed. Ambulating independently, tolerates well. Labs drawn this AM, awaiting results. Sputum collected, sent to lab.

## 2022-03-20 NOTE — TREATMENT PLAN
[facility]AT Treatment Plan    Patient Score: 7  Patient Acuity: 4    Clinical Indication for Therapy: bilateral PNA    Therapy Ordered: duoneb Q6     Assessment Summary: Pt is a former smoker with no pulmonary history. Pt is on room air. Respiratory rate and pattern WNL. BBS scattered crackles. Increased aeration post neb tx. Pt is independent with flutter valve. Will change duoneb and flutter valve to TID. Strong, effective and nonproductive cough this morning. RT to re-evaluate within 72 hours.    Eunice Suarez, RT  3/20/2022

## 2022-03-20 NOTE — PROGRESS NOTES
PULMONARY / CRITICAL CARE PROGRESS NOTE    Date / Time of Admission:  3/17/2022  4:05 AM    Assessment:     Theo Meyers is a 76 year old male with history of paroxysmal atrial fibrillation, DVT anticoagulated, relapse refractory kappa light chain multiple myeloma who is currently on daratumumab, pomalidomide and dexamethasone.   He presented to the emergency room today due to progressive shortness of breath over the last few weeks, mild swelling of LEs, difficulty tolerating the flat position. No fever, reports chills, denies night sweats. No chest pain. Over the last two days, started to have mild productive cough of clear sputum. Taking mucinex.   Patient was in mild respiratory distress, normotensive, afebrile, adequate oxygenation.   CT of chest/abdomen/pelvis showed small bilateral pleural effusion with bilateral bronchial wall thickening.   Admitted to the hospital. Initially started on BiPAP then titrated down to nasal cannula. Started on broad Abx. Echocardiogram showed moderate Aortic stenosis, diuresis added.   Ongoing wheezes , systemic steroids added.     1. Dyspnea  Treated for acute bronchitis, steroids added in view of reactive airway symptoms.   In addition on diuretics for mild volume up status. Echocardiogram showed moderate aortic stenosis, moderate pulmonary hypertension.   Cardiology was consulted. Calcified coronary arteries. Plan for NM stress test.   2. Acute bronchitis   Schedule bronchodilators, follow up sputum Cx   Abx per ID recommendation.  3. Right upper lobe peripheral ground glass opacity  Likely infectious in etiology. Continue Abx.   No indication for bronchoscopy at this time.   4. Reactive airway symptoms/signs   Systemic steroids and taper over 12 days   5. Bilateral pleural effusions  Gently diuresis   6. Moderate aortic stenosis   7. Secondary pulmonary hypertension   Related to Aortic stenosis. Clinically volume up status. Titrate BP meds, diuresis.   8. Paroxysmal  atrial fibrillation   9. Hx DVT anticoagulated  10. Multiple myeloma on immunosuppression     Advance Directives: Full code    Plan:   1. Titrate FiO2, keep SpO2 > 90%  2. Schedule bronchodilators  3. Systemic steroids and taper over 12 days  4. Abx per ID recommendation   5. Continue diuresis   6. Scheduled for NM stress test on 3/21.   7. PPI for GI prophylaxis   8. DVT prophylaxis, patient is anticoagulated with xarelto    Please contact me if you have any questions.    Bin Ward  Pulmonary / Critical Care  03/20/2022  12:00 PM    Subjective   HPI:  Theo Meyers is a 76 year old male with history of paroxysmal atrial fibrillation, DVT anticoagulated, relapse refractory kappa light chain multiple myeloma who is currently on daratumumab, pomalidomide and dexamethasone.   He presented to the emergency room today due to progressive shortness of breath over the last few weeks, mild swelling of LEs, difficulty tolerating the flat position. No fever, reports chills, denies night sweats. No chest pain. Over the last two days, started to have mild productive cough of clear sputum. Taking mucinex.   Patient was in mild respiratory distress, normotensive, afebrile, adequate oxygenation.   CT of chest/abdomen/pelvis showed small bilateral pleural effusion with bilateral bronchial wall thickening.   Admitted to the hospital. Initially started on BiPAP then titrated down to nasal cannula. Started on broad Abx. Echocardiogram showed moderate Aortic stenosis, diuresis added. Ongoing wheezes , systemic steroids added.     Events overnight  - Increase wheezes, on scheduled bronchodilators  - Off oxygen supplementation   - Hemodynamically stable    Allergies: Centex-pse er [pseudoephedrine-guaifenesin cr]     MEDS:  Current Facility-Administered Medications   Medication     acetaminophen (TYLENOL) tablet 650 mg    Or     acetaminophen (TYLENOL) Suppository 650 mg     acyclovir (ZOVIRAX) capsule 400 mg      atorvastatin (LIPITOR) tablet 10 mg     bimatoprost (LUMIGAN) 0.01 % ophthalmic drops 1 drop     brimonidine (ALPHAGAN P) 0.1 % ophthalmic solution 1 drop     cefdinir (OMNICEF) capsule 300 mg     fluticasone (FLONASE) 50 MCG/ACT spray 2 spray     furosemide (LASIX) tablet 20 mg     ipratropium - albuterol 0.5 mg/2.5 mg/3 mL (DUONEB) neb solution 3 mL     lidocaine (LMX4) cream     lidocaine 1 % 0.1-1 mL     melatonin tablet 1 mg     methylPREDNISolone sodium succinate (solu-MEDROL) injection 62.5 mg     nitroGLYcerin (NITROSTAT) sublingual tablet 0.4 mg     pantoprazole (PROTONIX) EC tablet 40 mg     Patient is already receiving anticoagulation with heparin, enoxaparin (LOVENOX), warfarin (COUMADIN)  or other anticoagulant medication     psyllium (METAMUCIL/KONSYL) Packet 1 packet     rivaroxaban ANTICOAGULANT (XARELTO) tablet 20 mg     sodium chloride (PF) 0.9% PF flush 3 mL     sodium chloride (PF) 0.9% PF flush 3 mL     sulfamethoxazole-trimethoprim (BACTRIM) 400-80 MG per tablet 1 tablet     tamsulosin (FLOMAX) capsule 0.4 mg     Objective:   VITALS:  /61 (BP Location: Left arm)   Pulse 71   Temp 97.4  F (36.3  C) (Oral)   Resp 18   Wt 72.2 kg (159 lb 3.2 oz)   SpO2 98%   BMI 24.21 kg/m    VENT:  Resp: 18    EXAM:   Gen: awake, alert, no distress  HEENT: pink conjunctiva, moist mucosa, Mallampati II/IV  Neck: no thyromegaly, masses or JVD  Lungs: wheezes both HT  CV: irregular, systolic murmur, no gallops appreciated  Abdomen: soft, NT, BS wnl  Ext: trace edema  Neuro: CN II-XII intact, non focal     Data Review:  Recent Labs   Lab 03/20/22  0455 03/18/22  0425 03/17/22  0419 03/14/22  1619    98 147* 89      3/20/2022 04:55   Sodium 137   Potassium 4.2   Chloride 103   Carbon Dioxide 26   Urea Nitrogen 23   Creatinine 1.30   GFR Estimate 57 (L)   Calcium 8.4 (L)   Anion Gap 8   Albumin 2.8 (L)   Protein Total 4.9 (L)   Bilirubin Total 0.6   Alkaline Phosphatase 50   ALT 17   AST 15   Glucose  118   WBC 2.0 (L)   Hemoglobin 9.5 (L)   Hematocrit 29.0 (L)   Platelet Count 148 (L)   RBC Count 2.91 (L)      MCH 32.6   MCHC 32.8   RDW 15.8 (H)       Gram Stain Result  <10 Squamous epithelial cells/low power field      >25 PMNs/low power field      1+ Mixed jeannette          Echocardiogram 3/17/2022  Interpretation Summary     1. Left ventricular size, wall thickness, and systolic function are normal.  The estimated left ventricular ejection fraction is 60-65%.  2. Right ventricular size and systolic function are normal.  3. Mild left and moderate right atrial enlargement.  4. Calcified trileaflet aortic valve with moderate aortic stenosis. Peak  forward velocity measures 3.2 m/s, mean gradient 27 mm Hg, calculated aortic  valve area 1.1 cm2, and dimensionless index 0.28. No significant  regurgitation.  5. Moderate pulmonary hypertension is present with an estimated pulmonary  artery systolic pressure of 54 mm Hg.  6. Compared to the prior study dated 11/3/2021, the Doppler data today show  moderate aortic stenosis. However, the aortic valve has a similar appearance  on both studies. There was likely inadequate Doppler assessment of the aortic  valve on the prior study. Visually the aortic valve appears at least  moderately stenotic on both studies.    CT CHEST ABDOMEN PELVIS W/O CONTRAST  LOCATION: [facility]  DATE/TIME: 3/17/2022 6:56 AM  INDICATION: Sepsis, multiple myeloma  COMPARISON: PET/CT 01/27/2022, CT chest 12/09/2020  FINDINGS:   LUNGS AND PLEURA: Mild layering tracheal secretions. Small bilateral pleural effusions with adjacent atelectasis. Bilateral lower lobar predominant bronchial wall thickening. No mass or suspicious nodule.  MEDIASTINUM/AXILLAE: Right IJ port catheter. No thoracic adenopathy. Mild cardiomegaly. Trace pericardial effusion. Mitral annulus calcifications.  CORONARY ARTERY CALCIFICATION: Severe.  HEPATOBILIARY: Normal.  PANCREAS: Normal.  SPLEEN:  Normal.  ADRENAL GLANDS: Normal.  KIDNEYS/BLADDER: 4 mm lower right renal stone. Punctate lower left renal stone. No hydronephrosis or obstructing ureteral stone. Normal urinary bladder.  BOWEL: Normal caliber small bowel. Post appendectomy. Moderate stool burden within the distal sigmoid colon and rectum. No free air or free fluid.  LYMPH NODES: Normal.  VASCULATURE: Moderate atherosclerosis.  PELVIC ORGANS: Normal.  MUSCULOSKELETAL: Spinal and pelvic degenerative changes. Stable lucent and sclerotic changes within the right proximal humerus. Stable 1.1 cm lytic lesion within the right scapula extending to the scapular spine. Associated cortical erosion. Stable T6 vertebral body wedge compression fracture with vertebroplasty changes.                                   IMPRESSION:  1.  Small bilateral pleural effusions with adjacent atelectasis, right greater than left. Bilateral lower lobar predominant bronchiolitis.  2.  No acute findings in the abdomen or pelvis. No evidence of an inflammatory process or abscess.  3.  Stable small lytic lesion in the right scapula correlating with the patient's known multiple myeloma. Stable lucent and sclerotic changes in the right proximal humerus perhaps reflecting a healed osseous lesion.    CT CHEST W CONTRAST  LOCATION: [facility]  DATE/TIME: 3/20/2022 8:10 AM  INDICATION: Pneumonia, effusion or abscess suspected, xray done; multiple myeloma  COMPARISON: CT of the chest 03/17/2022; PET/CT 01/27/2022  FINDINGS:   LUNGS AND PLEURA: Small bilateral pleural effusions are present layering posteriorly. Pleural fluid on the right is decreased from 03/17/2022. Associated passive atelectasis of the adjacent lower lobes. New subsegmental focus of groundglass attenuation along the anterior pleura of the right upper lobe, which does not conform to a specific vascular territory or anatomic distribution. There are no other new airspace opacities elsewhere. Mild  central airway wall thickening.  MEDIASTINUM: There is a right jugular approach chest port catheter which terminates at the SVC right atrial junction. Variant pulmonary vein anatomy with a right top pulmonary vein and independent drainage of the right middle lobe on the right and a single ostium of the pulmonary veins on the left. Cardiac chambers are not enlarged. Degenerative thickening and calcification of aortic valve leaflets. Mild mitral annular calcifications. No pericardial effusion. No thoracic aortic aneurysm. Mixed attenuation atheroma in the arch and descending aorta. No enlarged mediastinal or hilar lymph nodes. Esophagus is decompressed. Imaged thyroid gland is normal.  CORONARY ARTERY CALCIFICATION: Severe.  UPPER ABDOMEN: No new findings in the imaged upper abdomen.  MUSCULOSKELETAL: T6 compression deformity with radiopaque cement. No new vertebral compression deformity. Healed fracture deformity left anterior seventh rib area Unchanged lytic lesion in the right scapula with focal loss of the cortex (series 5, image 41) which was metabolically active on the January 2022 PET/CT consistent with an area of multiple myeloma. No new aggressive or destructive bone lesions.  IMPRESSION:   1.  Small bilateral pleural effusions are present. Right pleural fluid is slightly decreased from 03/17/2022.  2.  Limited territory of subpleural groundglass attenuation in the anterior right upper lobe consistent with small focus of new lung inflammation. Uncertain etiology.  3.  Unchanged lytic lesion in the right scapula. No aggressive or destructive bone lesions in the chest      By:  Bin Ward MD, 03/20/2022 12:00 PM  Primary Care Physician:  Mathew Ott

## 2022-03-20 NOTE — PROGRESS NOTES
Pt received neb treatment and tolerated well. Pt remain on room air, SpO2 in mid 90's%. Will continue to monitor and assess pt.    Sharon Melchor, RT

## 2022-03-20 NOTE — PROGRESS NOTES
[facility]    Infectious Disease Progress Note    Date of Service (when I saw the patient): 03/20/2022     Assessment & Plan   Theo Meyers is a 76 year old male who was admitted on 3/17/2022.     1. Dyspnea on exertion, moderate pulmonary hypertension, moderate aortic valve stenosis  2. Acute bronchitis, pulmonary following, immunocompromised patient  3. Myeloma: Myeloma not in remission: seen by Oncology on 3/2/2022. On daratumumab and dexamethasone and pomalyst. referred for CAR-T cell therapy to the [facility] scheduled for July 2022. Please see Oncology notes  4. Stem cell transplant 2010  5. COVID negative. Received Evusheld (150 mg dose) on 2/2/2022. Received another 150 mg dose based on updated EUA. Pharm D to coordinate   6. Pneumonia, hypoxia, at risk of opportunistic infection. Pleural effusion.  Appreciate pulmonary input. RVP with rhinovirus. Sputum pending  7. A fib  8. Retired 3M     Recommendations    1. Discontinue meropenem   2. Cefdinir x 5 more days for empiric cap coverage  3. Prophylactic Bactrim   4. Agree with steroids   5. Appreciate pulmonary input and detailed discussion with pulmonary    I will sign-off at this time. Please do not hesitate to call if there are any further questions or if there is a change in the patient's clinical status.     Freddy Phan MD  [facility]  Direct messaging: [facility] Paging  On-Call ID provider: [phone], option: 9           =========================  Interval History     Transferred out of the ICU.  Feeling well.  Not needing any oxygen..  CT scan of the chest this morning reviewed with pulmonary.    Physical Exam   Temp: 97.4  F (36.3  C) Temp src: Oral BP: 133/65 Pulse: 70   Resp: 18 SpO2: 97 % O2 Device: None (Room air)    Vitals:    03/18/22 0615 03/19/22 0600 03/20/22 0324   Weight: 74.8 kg (164 lb 14.4 oz) 72.7 kg (160 lb 3.2 oz) 72.2 kg (159 lb 3.2 oz)     Vital  Signs with Ranges  Temp:  [97.4  F (36.3  C)-98.7  F (37.1  C)] 97.4  F (36.3  C)  Pulse:  [58-74] 70  Resp:  [18-22] 18  BP: (107-162)/(62-70) 133/65  SpO2:  [95 %-98 %] 97 %    Constitutional: Awake, alert, cooperative, no apparent distress  Lungs: no wheezing; crackles bilaterally   Cardiovascular: S1 S2  Abdomen: non tender  Skin: warm  Ext: no edema  Neuro: AO x 3      Data   All microbiology laboratory data reviewed.  Recent Labs   Lab Test 03/20/22  0455 03/18/22  0425 03/17/22  0419   WBC 2.0* 1.5* 4.6   HGB 9.5* 9.9* 10.7*   HCT 29.0* 30.3* 33.8*    102* 105*   * 98* 128*     Recent Labs   Lab Test 03/20/22  0455 03/18/22  0425 03/17/22  0419   CR 1.30 1.13 0.87       MICRO:  03/17/2022 0515 03/18/2022 1003 Blood Culture Line, venous [[id number]]   Blood from Line, venous    Preliminary result Component Value   Culture No growth after 1 day P              03/17/2022 0515 03/18/2022 1003 Blood Culture Peripheral Blood [[id number]]   Peripheral Blood    Preliminary result Component Value   Culture No growth after 1 day P              03/17/2022 0419 03/17/2022 0444 Symptomatic; Unknown Influenza A/B & SARS-CoV2 (COVID-19) Virus PCR Multiplex Nasopharyngeal [[id number]]    Swab from Nasopharyngeal    Final result Component Value   Influenza A PCR Negative   Influenza B PCR Negative   SARS CoV2 PCR Negative   NEGATIVE: SARS-CoV-2 (COVID-19) RNA not detected, presumed negative.           03/17/2022 0419 03/18/2022 1156 Respiratory Panel PCR - NP Swab [[id number]]   Swab from Nasopharyngeal    In process Component Value   No component results             RADIOLOGY:    XR Chest 1 View    Result Date: 3/14/2022  EXAM: XR CHEST 1 VIEW LOCATION: [facility] DATE/TIME: 3/14/2022 4:29 PM INDICATION: Chest pain. COMPARISON: None.     IMPRESSION: Right-sided portacatheter with tip in the SVC. No pulmonary infiltrates. Midthoracic compression fracture with previous  vertebroplasty. Heart size upper limits of normal.    Echocardiogram Complete    Result Date: 3/17/2022  [id number]^NOREEN^[place] [address]  Name: MONICA SCOTT MRN: [id number] : 1945 Study Date: 2022 11:22 AM Age: 76 yrs Gender: Male Patient Location: [facility] Reason For Study: Heart Failure Ordering Physician: ESTEVAN SORTO Performed By: ACE  BSA: 1.9 m2 Height: 68 in Weight: 160 lb HR: 79 BP: 147/79 mmHg ______________________________________________________________________________ Procedure Complete Echo Adult. Adequate quality two-dimensional was performed and interpreted. Adequate quality color and spectral Doppler were performed and interpreted. Compared to the prior study dated 11/3/2021, there are changes as noted. ______________________________________________________________________________ Interpretation Summary  1. Left ventricular size, wall thickness, and systolic function are normal. The estimated left ventricular ejection fraction is 60-65%. 2. Right ventricular size and systolic function are normal. 3. Mild left and moderate right atrial enlargement. 4. Calcified trileaflet aortic valve with moderate aortic stenosis. Peak forward velocity measures 3.2 m/s, mean gradient 27 mm Hg, calculated aortic valve area 1.1 cm2, and dimensionless index 0.28. No significant regurgitation. 5. Moderate pulmonary hypertension is present with an estimated pulmonary artery systolic pressure of 54 mm Hg. 6. Compared to the prior study dated 11/3/2021, the Doppler data today show moderate aortic stenosis. However, the aortic valve has a similar appearance on both studies. There was likely inadequate Doppler assessment of the aortic valve on the prior study. Visually the aortic valve appears at least moderately stenotic on both studies. ______________________________________________________________________________ I      WMSI =  1.00     % Normal = 100  X - Cannot   0 -                      (2) - Mildly 2 -          Segments  Size Interpret    Hyperkinetic 1 - Normal  Hypokinetic  Hypokinetic  1-2     small                                                    7 -          3-5    moderate 3 - Akinetic 4 -          5 -         6 - Akinetic Dyskinetic   6-14    large              Dyskinetic   Aneurysmal  w/scar       w/scar       15-16   diffuse  Left Ventricle The left ventricle is normal in size. There is normal left ventricular wall thickness. Left ventricular systolic function is normal. The visual ejection fraction is 60-65%. Diastolic function not assessed due to atrial fibrillation. No regional wall motion abnormalities noted.  Right Ventricle The right ventricle is mildly dilated. The right ventricular systolic function is normal.  Atria The left atrium is mildly dilated. The right atrium is moderately dilated.  Mitral Valve The mitral valve leaflets are moderately thickened. There is mild mitral annular calcification. There is trace to mild mitral regurgitation. There is no mitral valve stenosis. The mean mitral valve gradient is 3.3 mmHg.  Tricuspid Valve Tricuspid valve leaflets appear normal. There is mild (1+) tricuspid regurgitation. The right ventricular systolic pressure is elevated at 46.4 mmHg. Moderate (46-55mmHg) pulmonary hypertension is present. There is no tricuspid stenosis.  Aortic Valve The aortic valve is trileaflet. Moderate aortic valve calcification is present. No aortic regurgitation is present. Moderate valvular aortic stenosis. The calculated aortic valve are is 1.1 cm^2. The mean AoV pressure gradient is 26.6 mmHg.  Pulmonic Valve The pulmonic valve is not well seen, but is grossly normal. There is trace to mild pulmonic valvular regurgitation. There is no pulmonic valvular stenosis.  Vessels The aorta root is normal. The thoracic aorta cannot be assessed. IVC diameter and respiratory changes fall into an  intermediate range suggesting an RA pressure of 8 mmHg.  Pericardium There is no pericardial effusion.  Rhythm The rhythm was atrial fibrillation.  ______________________________________________________________________________ MMode/2D Measurements & Calculations IVSd: 0.73 cm LVIDd: 4.5 cm LVIDs: 2.8 cm LVPWd: 0.85 cm FS: 37.6 %  LV mass(C)d: 113.4 grams LV mass(C)dI: 61.0 grams/m2 Ao root diam: 3.5 cm LA dimension: 3.9 cm LA/Ao: 1.1 LVOT diam: 2.2 cm LVOT area: 3.9 cm2 LA Volume (BP): 63.7 ml LA Volume Index (BP): 34.2 ml/m2  LA Volume Indexed (AL/bp): 35.6 ml/m2 RWT: 0.38  Time Measurements MM HR: 59.0 BPM  Doppler Measurements & Calculations MV E max iam: 168.0 cm/sec MV A max iam: 77.1 cm/sec MV E/A: 2.2 MV max PG: 10.0 mmHg MV mean PG: 3.3 mmHg MV V2 VTI: 46.4 cm MVA(VTI): 1.8 cm2 MV dec slope: 1407 cm/sec2 MV dec time: 0.12 sec Ao V2 max: 320.3 cm/sec Ao max P.0 mmHg Ao V2 mean: 244.7 cm/sec Ao mean P.6 mmHg Ao V2 VTI: 75.0 cm JOHANN(I,D): 1.1 cm2 JOHANN(V,D): 1.1 cm2 LV V1 max PG: 3.3 mmHg LV V1 max: 90.7 cm/sec LV V1 VTI: 21.7 cm SV(LVOT): 85.6 ml SI(LVOT): 46.0 ml/m2 PA acc time: 0.07 sec  TR max iam: 340.7 cm/sec TR max P.4 mmHg AV Iam Ratio (DI): 0.28 JOHANN Index (cm2/m2): 0.61 E/E' av.6 Lateral E/e': 18.4 Medial E/e': 24.9  ______________________________________________________________________________ Report approved by: Cristina Valenzuela 2022 12:33 PM       XR Chest Port 1 View    Result Date: 3/17/2022  EXAM: XR CHEST PORT 1 VIEW LOCATION: [facility] DATE/TIME: 3/17/2022 4:30 AM INDICATION: Dyspnea COMPARISON: 2022     IMPRESSION: New bilateral perihilar infiltrates greater in the right infrahilar region and retrocardiac region left lung base suspicious for possible perihilar pneumonia. Right-sided Port-A-Cath with tip in good position in the low SVC. Normal heart size  and pulmonary vascularity. Aortic calcification. Vertebroplasty.    CT Chest  Abdomen Pelvis w/o Contrast    Result Date: 3/17/2022  EXAM: CT CHEST ABDOMEN PELVIS W/O CONTRAST LOCATION: [facility] DATE/TIME: 3/17/2022 6:56 AM INDICATION: Sepsis, multiple myeloma COMPARISON: PET/CT 01/27/2022, CT chest 12/09/2020 TECHNIQUE: CT scan of the chest, abdomen, and pelvis was performed without IV contrast. Multiplanar reformats were obtained. Dose reduction techniques were used. CONTRAST: None. FINDINGS: LUNGS AND PLEURA: Mild layering tracheal secretions. Small bilateral pleural effusions with adjacent atelectasis. Bilateral lower lobar predominant bronchial wall thickening. No mass or suspicious nodule. MEDIASTINUM/AXILLAE: Right IJ port catheter. No thoracic adenopathy. Mild cardiomegaly. Trace pericardial effusion. Mitral annulus calcifications. CORONARY ARTERY CALCIFICATION: Severe. HEPATOBILIARY: Normal. PANCREAS: Normal. SPLEEN: Normal. ADRENAL GLANDS: Normal. KIDNEYS/BLADDER: 4 mm lower right renal stone. Punctate lower left renal stone. No hydronephrosis or obstructing ureteral stone. Normal urinary bladder. BOWEL: Normal caliber small bowel. Post appendectomy. Moderate stool burden within the distal sigmoid colon and rectum. No free air or free fluid. LYMPH NODES: Normal. VASCULATURE: Moderate atherosclerosis. PELVIC ORGANS: Normal. MUSCULOSKELETAL: Spinal and pelvic degenerative changes. Stable lucent and sclerotic changes within the right proximal humerus. Stable 1.1 cm lytic lesion within the right scapula extending to the scapular spine. Associated cortical erosion. Stable T6 vertebral body wedge compression fracture with vertebroplasty changes.     IMPRESSION: 1.  Small bilateral pleural effusions with adjacent atelectasis, right greater than left. Bilateral lower lobar predominant bronchiolitis. 2.  No acute findings in the abdomen or pelvis. No evidence of an inflammatory process or abscess. 3.  Stable small lytic lesion in the right scapula correlating with  the patient's known multiple myeloma. Stable lucent and sclerotic changes in the right proximal humerus perhaps reflecting a healed osseous lesion.

## 2022-03-20 NOTE — PROGRESS NOTES
[facility] MEDICINE PROGRESS NOTE      Length of stay: Day #3 inpatient    Identification/Summary: Theo Meyers is a 76 year old male with a past medical history of see below  who was admitted with chief complaint of increasing exertional dyspnea on 3/17/2022.     Admitting impression of bilateral pneumonia, immunocompromise patient     Brief history: Progressive shortness of breath over the past few weeks that is worsened, also some sore throat and cough.  Recently seen at the ER and noted to be in atrial fibrillation.  Anticoagulated for DVTs.  History of smoking.  ER course: Found to have bilateral pneumonia and acute respiratory failure     Assessment and Plan:    Bilateral pneumonia immunocompromised patient.  Acute respiratory failure, off oxygen  Reactive airway disease  Patient has history of parapneumonic effusion with chest tube placement in the past  History of small bilateral pleural effusion--> small bilateral pleural effusion seen on CT.  Initially placed on BiPAP and oxygen.  Placed on broad-spectrum antibiotics and antifungal agents, Bactrim for pneumocystis prophylaxis.  Initially placed on Zosyn, vancomycin and ID added meropenem and micafungin.--> today: discontinue vancomycin and micafungin, continue meropenem, prophylactic Bactrim, agree with steroid  Sputum cultures and respiratory viral panel  Start Bactrim for PJP prophylaxis  Continue Acyclovir for prophylaxis  Nebs and flutter valve  Methylprednisolone ongoing per pulmonary.      History of atrial fibrillation with controlled ventricular response  Elevated BNP  Recurrent DVTs  Moderate aortic stenosis  possible diastolic CHF  Continue anticoagulation Xarelto  Given 1 dose of IV Lasix. Continue Lasix 20 mg twice daily  Borderline blood pressure.  Echocardiogram showed EF of 60 to 65%, moderate aortic stenosis.  No strong indication of multiple myeloma related amyloid cardiomyopathy.  Consider cardiac MRI for  evaluation of amyloid cardiomyopathy as an outpatient.  Serial troponin negative x 2  Lexiscan is requested for evaluation of myocardial ischemia on Monday.  Cardiology follow  No aortic valve intervention at this time necessary.  No aortic valve intervention at this time.  Follow-up as an outpatient in the valve clinic.     IgG kappa light chain myeloma  Immunocompromised patient  Status post stem cell bone marrow transplant in 2010  Per hematology oncology notes on March 18, 2022, clinically improving.  Advised to hold Pomalyst until discharge from the hospital.  No intervention for the pancytopenia at this point.  We will continue to follow.     Thrombocytopenia  Microcytic anemia  B12 level in folate normal  See hematology note above.     Consults for this case:  Cardiology  Hematology oncology  Pulmonary/ intensive care  Infectious disease  [facility] medicine is the main attending     --------------------------------------------------------------------     Discharge disposition:  To be determined.  Has the Lexiscan test tomorrow.  Eager to go home  Living situation: Lives with his wife.  Diet: NPO for Medical/Clinical Reasons Except for: Meds  Combination Diet 2 gm NA Diet  DVT Prophylaxis: Currently on anticoagulant  Code Status: Full Code     ----------------------------------------------------------------------     Cumulative essential/pertinent data reviewed:  Labs:    Basic metabolic panel normal  LFTs normal  CRP elevated 4.0, down from 7.7  Procalcitonin normal at 0.11  Troponin negative x2  TSH normal 2.42  Vitamin B12 is above normal  Folate normal  CBC shows white count improved to 2.0.  Hemoglobin stable at 9.5, platelet improved to 148  Hemoglobin 9.9  Platelets 98     Respiratory panel PCR positive for rhino enterovirus  Influenza A and B-  Coronavirus and SARS COVID-19 PCR negative  Blood culture pending     Imaging:  CT of the chest shows the following:  IMPRESSION:  1.  Small bilateral  pleural effusions with adjacent atelectasis, right greater than left. Bilateral lower lobar predominant bronchiolitis.  2.  No acute findings in the abdomen or pelvis. No evidence of an inflammatory process or abscess.  3.  Stable small lytic lesion in the right scapula correlating with the patient's known multiple myeloma. Stable lucent and sclerotic changes in the right proximal humerus perhaps reflecting a healed osseous lesion.    3/20/2022  1.  Small bilateral pleural effusions are present. Right pleural fluid is slightly decreased from 03/17/2022.  2.  Limited territory of subpleural groundglass attenuation in the anterior right upper lobe consistent with small focus of new lung inflammation. Uncertain etiology.  3.  Unchanged lytic lesion in the right scapula. No aggressive or destructive bone lesions in the chest    ----------------------------------------------------------------------    Interval History/Subjective:  Doing well overall.  Denies any chest pressure.  No shortness of breath.  Sociable, conversant and pleasant overall.  Able to stand and ambulate.  Denies lightheadedness.  No chest pressure.  No abdominal pain nausea vomiting or diarrhea.  No rectal bleed  Rest of review of systems unremarkable.    Physical Exam/Objective:  Temp:  [97.4  F (36.3  C)-98.7  F (37.1  C)] 97.4  F (36.3  C)  Pulse:  [58-74] 70  Resp:  [18-22] 18  BP: (107-162)/(62-70) 133/65  SpO2:  [95 %-98 %] 97 %    Body mass index is 24.21 kg/m .    GENERAL:   Alert and conversant and coherent;  appears comfortable, in no acute distress,   HEAD:  Normocephalic, without obvious abnormality   EYES:  Grossly normal;    NOSE: Grossly normal   THROAT: Lips and mouth external: grossly normal,    NECK: No mass noted; no stiffness   BACK:    On his chest he has a central port   LUNGS:   Auscultation: Negative for wheezing; No crackles, symmetric chest rise on inhalation, respirations unlabored    CHEST WALL:  No tenderness or deformity    HEART:  Regular rate and rhythm, significant murmurs: Mild systolic murmur.   ABDOMEN:   Soft, non-tender, no masses, no peritoneal signs   EXTREMITIES:   No tenderness of the calves.  No significant ankle edema   SKIN:  see extremities   NEURO: no signs of acute stroke or change from prior state   PSYCH: Behavior is appropriate      Medications:   Personally Reviewed.    acyclovir  400 mg Oral BID     atorvastatin  10 mg Oral QPM     bimatoprost  1 drop Both Eyes At Bedtime     brimonidine  1 drop Both Eyes TID     fluticasone  2 spray Both Nostrils Daily     furosemide  20 mg Oral BID     ipratropium - albuterol 0.5 mg/2.5 mg/3 mL  3 mL Nebulization Q6H     meropenem  1 g Intravenous Q8H     methylPREDNISolone  62.5 mg Intravenous Q24H     pantoprazole  40 mg Oral QAM AC     rivaroxaban ANTICOAGULANT  20 mg Oral Daily with supper     sodium chloride (PF)  3 mL Intracatheter Q8H     sulfamethoxazole-trimethoprim  1 tablet Oral Daily     tamsulosin  0.4 mg Oral At Bedtime     Current Facility-Administered Medications   Medication Dose Route Frequency     acetaminophen  650 mg Oral Q6H PRN    Or     acetaminophen  650 mg Rectal Q6H PRN     lidocaine 4%   Topical Q1H PRN     lidocaine (buffered or not buffered)  0.1-1 mL Other Q1H PRN     melatonin  1 mg Oral At Bedtime PRN     nitroGLYcerin  0.4 mg Sublingual Q5 Min PRN     - MEDICATION INSTRUCTIONS -   Does not apply Continuous PRN     sodium chloride (PF)  3 mL Intracatheter q1 min prn         Guillermo Randall MD  [facility]  [facility]  Phone: #[phone]

## 2022-03-20 NOTE — PROGRESS NOTES
Respiratory care note:    Pt remains on room air. Pt refused 1400 neb tx. Would like neb tx later. Continue to follow.     Eunice Suarez, RT

## 2022-03-20 NOTE — PROGRESS NOTES
Assessment/Plan:  1.  Moderate aortic valve stenosis: As discussed yesterday, continue to follow-up as outpatient.  No aortic valve intervention at this time.     2.  Dyspnea on exertion, moderate pulmonary hypertension, calcified coronary artery disease: The etiology of his dyspnea on exertion could be caused by multiple factors.  There are differential diagnosis include acute bronchitis, severe coronary artery disease, pulmonary artery hypertension 54 mmHg secondary to diastolic dysfunction, aortic valve stenosis.  Echo images have no strong indication of multiple myeloma related amyloid cardiomyopathy.  May consider cardiac MRI for evaluation of amyloid cardiomyopathy as outpatient.  Lexiscan is requested for evaluation of myocardial ischemia tomorrow morning.  Continue Lasix 20 mg bid.  He lost a few pounds over last 2 days.     3.  Chronic atrial fibrillation: Ventricular rate is well controlled.  Continue Xarelto for chronic anticoagulation.     4.  History of multiple myeloma, and other chronic medical conditions: Please see primary team of management for details.     Subjective:  Interval History:  Has no complaints of chest pain.  He still has shortness of breath on exertion, but feels better today.    Current Medications:  Scheduled Meds:    acyclovir  400 mg Oral BID     atorvastatin  10 mg Oral QPM     bimatoprost  1 drop Both Eyes At Bedtime     brimonidine  1 drop Both Eyes TID     fluticasone  2 spray Both Nostrils Daily     furosemide  20 mg Oral BID     ipratropium - albuterol 0.5 mg/2.5 mg/3 mL  3 mL Nebulization Q6H     meropenem  1 g Intravenous Q8H     methylPREDNISolone  62.5 mg Intravenous Q24H     pantoprazole  40 mg Oral QAM AC     rivaroxaban ANTICOAGULANT  20 mg Oral Daily with supper     sodium chloride (PF)  3 mL Intracatheter Q8H     sulfamethoxazole-trimethoprim  1 tablet Oral Daily     tamsulosin  0.4 mg Oral At Bedtime     Continuous Infusions:    - MEDICATION INSTRUCTIONS -        PRN Meds:.acetaminophen **OR** acetaminophen, lidocaine 4%, lidocaine (buffered or not buffered), melatonin, nitroGLYcerin, - MEDICATION INSTRUCTIONS -, sodium chloride (PF)    Objective:   Vital signs in last 24 hours:  Temp:  [97.4  F (36.3  C)-98.7  F (37.1  C)] 97.4  F (36.3  C)  Pulse:  [58-74] 70  Resp:  [18-22] 18  BP: (107-162)/(62-70) 133/65  SpO2:  [95 %-98 %] 97 %  Weight:   [unfilled]     Physical Exam:  General Appearance:   Awake, Alert, No acute distress.   HEENT:  No scleral icterus; the mucous membranes were moist.   Neck: No cervical bruits. No jugular venous distention   Lungs:   Coarse breathing sounds. No crackles. No wheezing.   Cardiovascular:   IRRR, normal first and second heart sounds with no murmurs. No rubs or gallops.     Abdomen:  Soft. No tenderness. Bowels sounds are present   Extremities: No leg edema. Equal posterior tibial pulsese.   Skin: Warm, Dry. No rashes.   Neurologic: Mood and affect are appropriate. No focal deficits.         Cardiographics:   Report: personally reviewed. .      Tele monitoring -  Atrial fib with control ventricular rate.    Echocardiogram 3-:  1. Left ventricular size, wall thickness, and systolic function are normal.  The estimated left ventricular ejection fraction is 60-65%.  2. Right ventricular size and systolic function are normal.  3. Mild left and moderate right atrial enlargement.  4. Calcified trileaflet aortic valve with moderate aortic stenosis. Peak  forward velocity measures 3.2 m/s, mean gradient 27 mm Hg, calculated aortic  valve area 1.1 cm2, and dimensionless index 0.28. No significant  regurgitation.  5. Moderate pulmonary hypertension is present with an estimated pulmonary  artery systolic pressure of 54 mm Hg.  6. Compared to the prior study dated 11/3/2021, the Doppler data today show  moderate aortic stenosis. However, the aortic valve has a similar appearance  on both studies. There was likely inadequate Doppler  assessment of the aortic  valve on the prior study. Visually the aortic valve appears at least  moderately stenotic on both studies.    Lab Results:   personally reviewed.     Lab Results   Component Value Date     03/18/2022     01/04/2011    CO2 27 03/18/2022    CO2 28 01/04/2011    BUN 17 03/18/2022    BUN 12 01/04/2011     Lab Results   Component Value Date    TROPONINI 0.05 03/17/2022     Lab Results   Component Value Date    WBC 1.5 03/18/2022    WBC 4.6 01/04/2011    HGB 9.9 03/18/2022    HGB 12.4 01/04/2011    HCT 30.3 03/18/2022    HCT 37.0 01/04/2011     03/18/2022    MCV 93 01/04/2011    PLT 98 03/18/2022     01/04/2011     Lab Results   Component Value Date    CHOL 160 10/05/2021    TRIG 71 10/05/2021    HDL 51 10/05/2021    LDL 95 10/05/2021

## 2022-03-20 NOTE — PLAN OF CARE
Throughout shift patient was call light appropriate and ambulated independently in room. Plan is for lexiscan to be completed on 3/21 at 0700 and needs to be down to radiology before 0700. Adequate urine output. Patient aware of NPO status at 0000. Waiting on another sputum culture from patient. Vital signs stable. When sleeping was bradycardic to 45 but would quickly rebound to 60s.     Problem: Plan of Care - These are the overarching goals to be used throughout the patient stay.    Goal: Optimal Comfort and Wellbeing  Outcome: Ongoing, Progressing  Intervention: Monitor Pain and Promote Comfort  Recent Flowsheet Documentation  Taken 3/20/2022 1600 by Dee Dee Joseph RN  Pain Management Interventions: declines  Taken 3/20/2022 1200 by Dee Dee Joseph, RN  Pain Management Interventions: declines  Taken 3/20/2022 0800 by Dee Dee Joseph, RN  Pain Management Interventions: declines

## 2022-03-20 NOTE — PROVIDER NOTIFICATION
[id number] - patient has been bradycardic down to 45 in afib, comes back up to 50s/60s. asymptomatic. /67 MAP of 97. thanks Krystin [phone]    Paged Dr. Randall in regard to new bradycardic episodes.

## 2022-03-21 NOTE — PROGRESS NOTES
[facility] Internal Medicine          Assessment and Plan:   Assessment:   Patient Active Problem List   Diagnosis     Multiple myeloma (H)     Unspecified glaucoma     Cataract     DVT (deep venous thrombosis) (H)     Shingles     Shingles (herpes zoster) polyneuropathy     Back pain     Post herpetic neuralgia     Pancytopenia (H)     Syncope and collapse     Elevated INR     History of DVT (deep vein thrombosis)     Chronic deep vein thrombosis (DVT) of left lower extremity, unspecified vein (H)     Spinal stenosis of lumbar region without neurogenic claudication     Anemia, vitamin B12 deficiency     Chemotherapy-induced neutropenia (H)     Thrombocytopenia (H)     Paroxysmal atrial fibrillation (H)     Nonrheumatic aortic valve stenosis     SAMANO (dyspnea on exertion)     Immunocompromised state (H)     Neutropenic fever (H)     Personal history of DVT (deep vein thrombosis)     Paroxysmal A-fib (H)     Pneumonia of both lungs due to infectious organism, unspecified part of lung     Relapsed and refractory light chain kappa multiple myeloma, most recently treated with Pomalidomide / Daratumumab with evidence for gradual progression.  The treatment plan, per patient, would be salvage CAR-T therapy this summer through [facility] if he remains well enough.    Pancytopenias in context of acute illness, with resolution of thrombocytopenia, stable neutropenia and anemia at present.    Recent rising creatinine, possibly related to hydration or medication, but his creat has been in this range in the past also.          Current admission is for recent onset progressing respiratory distress, with paroxysmal atrial fibrillation and indication of bilateral non-bacterial, non-COVID pneumonia, possibly RSV.  This is improving and he's currently comfortable and well oxygenated on room air.  Currently mid-way through stress test evaluation.      Plan:   From hematologic perspective, he is stable / improved and will be  able to be discharged to home this week.  We would want to see him back in clinic (Per Clifford MD or NP) within 7-10 days.            Interval History:   The patient is articulate and hopeful.  His near term goals are to get home to his wife, two dogs and two cats.  His intermediate goal is to have CAR-T therapy this summer.  He has been treated for myeloma for >13 years and he appears to have a good understanding of his illness.    Subjectively, he's feeling much better.           Significant Problems:     Patient Active Problem List   Diagnosis     Multiple myeloma (H)     Unspecified glaucoma     Cataract     DVT (deep venous thrombosis) (H)     Shingles     Shingles (herpes zoster) polyneuropathy     Back pain     Post herpetic neuralgia     Pancytopenia (H)     Syncope and collapse     Elevated INR     History of DVT (deep vein thrombosis)     Chronic deep vein thrombosis (DVT) of left lower extremity, unspecified vein (H)     Spinal stenosis of lumbar region without neurogenic claudication     Anemia, vitamin B12 deficiency     Chemotherapy-induced neutropenia (H)     Thrombocytopenia (H)     Paroxysmal atrial fibrillation (H)     Nonrheumatic aortic valve stenosis     SAMANO (dyspnea on exertion)     Immunocompromised state (H)     Neutropenic fever (H)     Personal history of DVT (deep vein thrombosis)     Paroxysmal A-fib (H)     Pneumonia of both lungs due to infectious organism, unspecified part of lung             Review of Systems:   CONSTITUTIONAL: NEGATIVE for fever, chills, change in weight  ENT/MOUTH: NEGATIVE for ear, mouth and throat problems  RESP:He was gasping for air prior to admission, and now feels close to baseline again  CV: NEGATIVE for chest pain, palpitations or peripheral edema  ROS otherwise negative           Medications:     Facility-Administered Medications Prior to Admission   Medication Dose Route Frequency Provider Last Rate Last Admin     [DISCONTINUED] lidocaine (PF) (XYLOCAINE)  1 % injection 10 mL  10 mL Intradermal Once Per Clifford MD         [DISCONTINUED] LORazepam (ATIVAN) tablet 1 mg  1 mg Oral Once Per Clifford MD         [DISCONTINUED] oxyCODONE-acetaminophen (PERCOCET) 5-325 MG per tablet 2 tablet  2 tablet Oral Once Per Clifford MD         Medications Prior to Admission   Medication Sig Dispense Refill Last Dose     acetaminophen (TYLENOL) 500 MG tablet Take 500-1,000 mg by mouth   3/16/2022 at Unknown time     acyclovir (ZOVIRAX) 400 MG tablet TAKE 1 TABLET BY MOUTH TWICE DAILY 180 tablet 1 3/16/2022 at Unknown time     ascorbic acid 1000 MG TABS tablet Take 1,000 mg by mouth daily    3/16/2022 at Unknown time     atorvastatin (LIPITOR) 10 MG tablet Take 1 tablet (10 mg) by mouth daily 90 tablet 3 3/16/2022 at in the evening     bimatoprost (LUMIGAN) 0.03 % ophthalmic drops Place 1 drop into both eyes At Bedtime.   3/16/2022 at wife will bring     brimonidine (ALPHAGAN P) 0.1 % SOLN Place 1 drop into both eyes every 8 hours.   3/16/2022 at wife will bring     calcium carbonate 1250 (500 Ca) MG CHEW Take 1 tablet by mouth daily as needed    Past Week at Unknown time     calcium carbonate-vitamin D (OYSTER SHELL CALCIUM/D) 500-200 MG-UNIT tablet Take 1 tablet by mouth    3/16/2022 at Unknown time     cyanocobalamin (CYANOCOBALAMIN) 1000 MCG/ML injection Inject 1 mL into the muscle every 30 days   Past Month at Unknown time     daratumumab 16 mg/kg Inject 16 mg/kg into the vein once   Unknown at Unknown time     dextromethorphan-guaiFENesin (MUCINEX DM)  MG 12 hr tablet Take 1 tablet by mouth daily as needed for cough   3/17/2022 at 3 tabs this morning     fluticasone (FLONASE) 50 MCG/ACT nasal spray Spray 2 sprays into both nostrils daily   Past Week at wife will bring     gabapentin (NEURONTIN) 300 MG capsule TAKE 1 CAPSULE(300 MG) BY MOUTH DAILY AS NEEDED 30 capsule 5 3/16/2022 at in the evening     magnesium oxide 400 MG CAPS Take 400 mg by mouth daily     3/16/2022 at morning     Multiple Vitamin (MULTI-VITAMIN PO) Take 1 capsule by mouth daily.   3/16/2022 at morning     Omega-3 Fatty Acids (OMEGA 3 PO) TAKE AS DIRECTED    3/16/2022 at Unknown time     omeprazole (PRILOSEC) 20 MG DR capsule TAKE 1 CAPSULE BY MOUTH ONCE DAILY 90 capsule 3 3/16/2022 at Unknown time     pomalidomide (POMALYST) 4 MG capsule Take 1 capsule (4 mg) by mouth daily Swallow whole, do NOT break, crush, chew or open capsule. Take on days 1-21 of repeated 28 day cycles. (Patient taking differently: Take 4 mg by mouth daily Swallow whole, do NOT break, crush, chew or open capsule. Take on days 1-21 of repeated 28 day cycles.  3/17/22: today is day 16 of 28 day cycle) 21 capsule 0 3/16/2022 at wife will bring     potassium chloride ER (KLOR-CON M) 20 MEQ CR tablet Take 1 tablet (20 mEq) by mouth every other day   Past Week at Unknown time     Psyllium (METAMUCIL PO) Take by mouth daily USE AS DIRECTED    3/16/2022 at Unknown time     tamsulosin (FLOMAX) 0.4 MG capsule TAKE 1 CAPSULE BY MOUTH EVERY DAY 90 capsule 1 3/16/2022 at in the evening     XARELTO ANTICOAGULANT 20 MG TABS tablet Take 20 mg by mouth daily (with dinner)    3/16/2022 at Unknown time             Physical Exam:     Vitals were reviewed  Patient Vitals for the past 24 hrs:   BP Temp Temp src Pulse Resp SpO2 Weight   03/21/22 0839 (!) 140/75 97  F (36.1  C) Axillary 76 20 96 % --   03/21/22 0530 (!) 148/69 97.9  F (36.6  C) Oral 71 16 95 % 71.4 kg (157 lb 4.8 oz)   03/20/22 2330 (!) 167/76 97.4  F (36.3  C) Oral 71 18 96 % --   03/20/22 2022 137/68 97.3  F (36.3  C) Axillary 65 18 96 % --   03/20/22 1554 (!) 151/69 97.5  F (36.4  C) Oral -- 18 96 % --   03/20/22 1147 128/61 -- -- 71 18 98 % --     Lungs:   Moving air well in all lobes, but still has some faint residual crackles at left base             Data:     Lab Results   Component Value Date    WBC 2.2 (L) 03/21/2022    WBC 2.0 (L) 03/20/2022    WBC 1.5 (L) 03/18/2022    HGB  9.9 (L) 03/21/2022    HGB 9.5 (L) 03/20/2022    HGB 9.9 (L) 03/18/2022    HCT 30.2 (L) 03/21/2022    HCT 29.0 (L) 03/20/2022    HCT 30.3 (L) 03/18/2022     03/21/2022     (L) 03/20/2022    PLT 98 (L) 03/18/2022     03/21/2022     03/20/2022     03/18/2022    POTASSIUM 4.2 03/21/2022    POTASSIUM 4.2 03/20/2022    POTASSIUM 4.0 03/18/2022    CHLORIDE 102 03/21/2022    CHLORIDE 103 03/20/2022    CHLORIDE 103 03/18/2022    CO2 28 03/21/2022    CO2 26 03/20/2022    CO2 27 03/18/2022    BUN 31 (H) 03/21/2022    BUN 23 03/20/2022    BUN 17 03/18/2022    CR 1.31 (H) 03/21/2022    CR 1.30 03/20/2022    CR 1.13 03/18/2022     03/21/2022     03/20/2022    GLC 98 03/18/2022    DD <=0.27 12/29/2020    AST 15 03/20/2022    AST 14 03/18/2022    AST 16 03/17/2022    ALT 17 03/20/2022    ALT 12 03/18/2022    ALT 14 03/17/2022    ALKPHOS 50 03/20/2022    ALKPHOS 60 03/18/2022    ALKPHOS 77 03/17/2022    BILITOTAL 0.6 03/20/2022    BILITOTAL 0.6 03/18/2022    BILITOTAL 0.9 03/17/2022    INR 1.36 (H) 03/14/2022    INR 1.33 (H) 12/29/2020    INR 1.31 (H) 03/18/2020     -  -    Attestation:  Face-to-face time: 15 minutes     Jenniffer Salazar MD

## 2022-03-21 NOTE — PROGRESS NOTES
PULMONARY PROGRESS NOTE    Date / Time of Admission:  3/17/2022  4:05 AM  Assessment:   76yoM with history of paroxysmal afib, DVT anticoagulated, relapsed kappa light chain multiple myeloma on daratumumab, pomalidomide and dexamethasone who presented with worsening shortness of breath that has improved significantly in setting of diuresis with CT scan that was not impressive for pneumonitis.     Active Problems:    Multiple myeloma (H)    Pneumonia of both lungs due to infectious organism, unspecified part of lung      Plan:   Ongoing diuresis as able (hx of pulmonary hypertension due to AORTIC STENOSIS)  Taper prednisone as outlined. He is no longer wheezing.   No repeat imaging required  OK to discharge from a pulmonary perspective when ready.       Subjective:   HPI:  Theo Meyers is a 76 year old male with history of paroxysmal atrial fibrillation, DVT anticoagulated, relapse refractory kappa light chain multiple myeloma who is currently on daratumumab, pomalidomide and dexamethasone.   He presented to the emergency room today due to progressive shortness of breath over the last few weeks, mild swelling of LEs, difficulty tolerating the flat position. No fever, reports chills, denies night sweats. No chest pain. Over the last two days, started to have mild productive cough of clear sputum. Taking mucinex.   Patient was in mild respiratory distress, normotensive, afebrile, adequate oxygenation.   CT of chest/abdomen/pelvis showed small bilateral pleural effusion with bilateral bronchial wall thickening.   Admitted to the hospital. Initially started on BiPAP then titrated down to nasal cannula. Started on broad Abx. Echocardiogram showed moderate Aortic stenosis, diuresis added.         Allergies:   Allergies   Allergen Reactions     Centex-Pse Er [Pseudoephedrine-Guaifenesin Cr]      TB12        MEDS:  Scheduled Meds:    acyclovir  400 mg Oral BID     atorvastatin  10 mg Oral QPM     bimatoprost  1 drop  Both Eyes At Bedtime     brimonidine  1 drop Both Eyes TID     cefdinir  300 mg Oral Q12H MACRINA     fluticasone  2 spray Both Nostrils Daily     furosemide  20 mg Oral BID     ipratropium - albuterol 0.5 mg/2.5 mg/3 mL  3 mL Nebulization TID     pantoprazole  40 mg Oral QAM AC     predniSONE  40 mg Oral Daily    Followed by     [START ON 3/24/2022] predniSONE  30 mg Oral Daily    Followed by     [START ON 3/27/2022] predniSONE  20 mg Oral Daily    Followed by     [START ON 3/30/2022] predniSONE  10 mg Oral Daily     psyllium  1 packet Oral Daily     rivaroxaban ANTICOAGULANT  20 mg Oral Daily with supper     sodium chloride (PF)  3 mL Intracatheter Q8H     sulfamethoxazole-trimethoprim  1 tablet Oral Daily     tamsulosin  0.4 mg Oral At Bedtime     Continuous Infusions:    - MEDICATION INSTRUCTIONS -       PRN Meds:.acetaminophen **OR** acetaminophen, aminophylline, lidocaine 4%, lidocaine (buffered or not buffered), melatonin, nitroGLYcerin, - MEDICATION INSTRUCTIONS -, sodium chloride (PF)    Objective:   VITALS:  BP (!) 143/68   Pulse 64   Temp 97.3  F (36.3  C) (Oral)   Resp 18   Wt 71.4 kg (157 lb 4.8 oz)   SpO2 98%   BMI 23.92 kg/m    EXAM:    GENERAL APPEARANCE: Alert, no acute distress  HENT: Oral mucosa and posterior oropharynx normal, moist mucous membranes  NECK: No adenopathy,masses or thyromegaly  RESP: lungs clear to auscultation bilaterally  CV: regular rate and rhythm, no murmur, rub or gallop  ABDOMEN: normal bowel sounds, soft, nontender, no hepatosplenomegaly or other masses  LYMPHATICS: No cervical, or supraclavicular adenopathy  SKIN: no suspicious lesions or rashes  NEURO: Alert, oriented x 3, speech and mentation normal        I&O:    Date 03/21/22 0700 - 03/22/22 0659   Shift 8860-9901 3434-6768 4315-5550 24 Hour Total   INTAKE   P.O. 236   236   Shift Total(mL/kg) 236(3.31)   236(3.31)   OUTPUT   Urine 100   100   Shift Total(mL/kg) 100(1.4)   100(1.4)   Weight (kg) 71.35 71.35 71.35  71.35       Data Review:    Imaging: personally reviewed  Chest CT  EXAM: CT CHEST W CONTRAST  LOCATION: [facility]  DATE/TIME: 3/20/2022 8:10 AM     INDICATION: Pneumonia, effusion or abscess suspected, xray done; multiple myeloma  COMPARISON: CT of the chest 03/17/2022; PET/CT 01/27/2022  TECHNIQUE: CT chest with IV contrast. Multiplanar reformats were obtained. Dose reduction techniques were used.     CONTRAST: 75ml Isovue 370     FINDINGS:   LUNGS AND PLEURA: Small bilateral pleural effusions are present layering posteriorly. Pleural fluid on the right is decreased from 03/17/2022. Associated passive atelectasis of the adjacent lower lobes. New subsegmental focus of groundglass attenuation   along the anterior pleura of the right upper lobe, which does not conform to a specific vascular territory or anatomic distribution. There are no other new airspace opacities elsewhere. Mild central airway wall thickening.     MEDIASTINUM: There is a right jugular approach chest port catheter which terminates at the SVC right atrial junction. Variant pulmonary vein anatomy with a right top pulmonary vein and independent drainage of the right middle lobe on the right and a   single ostium of the pulmonary veins on the left. Cardiac chambers are not enlarged. Degenerative thickening and calcification of aortic valve leaflets. Mild mitral annular calcifications. No pericardial effusion. No thoracic aortic aneurysm. Mixed   attenuation atheroma in the arch and descending aorta.     No enlarged mediastinal or hilar lymph nodes. Esophagus is decompressed. Imaged thyroid gland is normal.     CORONARY ARTERY CALCIFICATION: Severe.     UPPER ABDOMEN: No new findings in the imaged upper abdomen.     MUSCULOSKELETAL: T6 compression deformity with radiopaque cement. No new vertebral compression deformity. Healed fracture deformity left anterior seventh rib area Unchanged lytic lesion in the right scapula with  focal loss of the cortex (series 5, image   41) which was metabolically active on the January 2022 PET/CT consistent with an area of multiple myeloma. No new aggressive or destructive bone lesions.                                                                      IMPRESSION:      1.  Small bilateral pleural effusions are present. Right pleural fluid is slightly decreased from 03/17/2022.  2.  Limited territory of subpleural groundglass attenuation in the anterior right upper lobe consistent with small focus of new lung inflammation. Uncertain etiology.  3.  Unchanged lytic lesion in the right scapula. No aggressive or destructive bone lesions in the chest  Results for orders placed or performed during the hospital encounter of 03/17/22   Basic metabolic panel   Result Value Ref Range    Sodium 137 136 - 145 mmol/L    Potassium 4.2 3.5 - 5.0 mmol/L    Chloride 102 98 - 107 mmol/L    Carbon Dioxide (CO2) 28 22 - 31 mmol/L    Anion Gap 7 5 - 18 mmol/L    Urea Nitrogen 31 (H) 8 - 28 mg/dL    Creatinine 1.31 (H) 0.70 - 1.30 mg/dL    Calcium 8.4 (L) 8.5 - 10.5 mg/dL    Glucose 103 70 - 125 mg/dL    GFR Estimate 56 (L) >60 mL/min/1.73m2     Lab Results   Component Value Date    WBC 2.2 (L) 03/21/2022    HGB 9.9 (L) 03/21/2022    HCT 30.2 (L) 03/21/2022     03/21/2022     03/21/2022     Last Arterial Blood Gas:  No lab results found in last 7 days.

## 2022-03-21 NOTE — PROGRESS NOTES
HEART CARE CONSULTATON NOTE        Assessment/Recommendations   Assessment:   1. Acute congestive heart failure (HFpEF), BNP: 260   2. Moderate aortic stenosis (JOHANN:1.1 cm2, Peak quinten: 3.2 m/sec, Mean gradient 27 mm Hg, DI:0.3, SVi:46 ml/m2)  3. Moderate elevation in RVSP (54 mmHg) on echo concerning for moderate pulmonary hypertension.  Prior DVT.  Long standing multiple myeloma. No history of chronic hypoxia.       4. Mild to moderate bilateral enlargement.   5. Atrial fibrillation, chornic   6. Multiple myeloma, kappa light chain.  Pomalidomide / Daratumumab.  Working toward possible CAR-T therapy in July 2022 at [facility].  Normal LV thickness.   Normal voltage on ECG.    7.  Pancytopenia  8.  CKD stage III  9. Hx DVT  10.  Acute Pneumonia     Plan:   1. Will need to consider RHC and possible LHC.  Will discuss case with pulmonary HTN specialist.    2. Continue lasix at this time.   3. Continue Xaerlto for atrial fib  4. If pulmonary hypertension confirm will need VQ scan given DVT history.      Patient will discharge home today.  According to follow-up after conversation with specialist.       History of Present Illness/Subjective    HPI: Theo Meyers is a 76 year old male with longstanding history of multiple myeloma who has been refractory to therapy now being considered for CAR-T therapy in July 2022, chronic A. fib, chronic disease stage III, history of DVT who presented to [facility] with 2-day history of progressive dyspnea found to have acute pneumonia.    Patient states for the past 12 months has been noticing gradual shortness of breath.  He is a very active person walks his dog 2 to 3 miles on days when the weather allows.  He has been noticing over the past 12 to 6 months gradual development of dyspnea on exertion particularly with walking his dog up inclines.  The symptoms have become more more progressive over the past 3 months.    On Wednesday evening he developed severe shortness of  breath.  Prompted him to come to emergency department.  There is noted that he had possible viral pneumonia (rhinovirius).  During his work-up he underwent echocardiogram which demonstrated normal left ventricular size wall thickness and systolic function.  Ejection fraction was 60-65%.  There is evidence of moderate pulmonary hypertension based on tricuspid valve velocity.  He had signs of mild fluid overload.  He was started on Lasix therapy with good diuresis.  His dyspnea has improved.    Of note the patient does have a history of DVT.  He is on Xarelto for his A. Fib.      Patient zackery in A. fib on review of telemetry.  No sustained ventricular arrhythmias.  Case was discussed with Dr. Macedo.     NM STRESS: 3/21/22     The nuclear stress test is negative for inducible myocardial ischemia or infarction.     The left ventricular ejection fraction at stress is greater than 70%.     The patient is at a low risk of future cardiac ischemic events.     There is no prior study for comparison.       ECHO: 3/22  1. Left ventricular size, wall thickness, and systolic function are normal.  The estimated left ventricular ejection fraction is 60-65%.  2. Right ventricular size and systolic function are normal.  3. Mild left and moderate right atrial enlargement.  4. Calcified trileaflet aortic valve with moderate aortic stenosis. Peak  forward velocity measures 3.2 m/s, mean gradient 27 mm Hg, calculated aortic  valve area 1.1 cm2, and dimensionless index 0.28. No significant  regurgitation.  5. Moderate pulmonary hypertension is present with an estimated pulmonary  artery systolic pressure of 54 mm Hg.  6. Compared to the prior study dated 11/3/2021, the Doppler data today show  moderate aortic stenosis. However, the aortic valve has a similar appearance  on both studies. There was likely inadequate Doppler assessment of the aortic  valve on the prior study. Visually the aortic valve appears at least  moderately stenotic  on both studies.     Physical Examination  Review of Systems   VITALS: BP (!) 143/68   Pulse 68   Temp 97.3  F (36.3  C) (Oral)   Resp 18   Wt 71.4 kg (157 lb 4.8 oz)   SpO2 98%   BMI 23.92 kg/m    BMI: Body mass index is 23.92 kg/m .  Wt Readings from Last 3 Encounters:   03/21/22 71.4 kg (157 lb 4.8 oz)   03/14/22 72.6 kg (160 lb)   03/02/22 75.8 kg (167 lb 1.6 oz)       Intake/Output Summary (Last 24 hours) at 3/21/2022 1240  Last data filed at 3/21/2022 1100  Gross per 24 hour   Intake 716 ml   Output 1425 ml   Net -709 ml     General Appearance:   no distress, normal body habitus, lying in bed   ENT/Mouth: membranes moist, no oral lesions or bleeding gums.      EYES:  no scleral icterus, normal conjunctivae   Neck: no carotid bruits or thyromegaly   Chest/Lungs:   lungs are clear to auscultation, no rales or wheezing, no sternal scar, equal chest wall expansion    Cardiovascular:   Irregular. Normal first and second heart sounds with 3/6 mid peaking systolic murmurs. No, rubs, or gallops; the carotid, radial and posterior tibial pulses are intact, Jugular venous pressure normal, trace edema bilaterally    Abdomen:  no organomegaly, masses, bruits, or tenderness; bowel sounds are present   Extremities: no cyanosis or clubbing   Skin: no xanthelasma, warm.    Neurologic: normal  bilateral, no tremors     Psychiatric: alert and oriented x3, calm     Review Of Systems  Skin: negative  Eyes: negative  Ears/Nose/Throat: negative  Respiratory:Cough and dyspnea improved.  Cardiovascular: Edema dn dyspnea improved.    Gastrointestinal: negative  Genitourinary: negative  Musculoskeletal: negative  Neurologic: negative  Psychiatric: negative  Hematologic/Lymphatic/Immunologic: negative  Endocrine: negative          Lab Results    Chemistry/lipid CBC Cardiac Enzymes/BNP/TSH/INR   Recent Labs   Lab Test 10/05/21  1651   CHOL 160   HDL 51   LDL 95   TRIG 71     Recent Labs   Lab Test 10/05/21  1651 10/16/15  1243    LDL 95 70     Recent Labs   Lab Test 03/21/22  0526      POTASSIUM 4.2   CHLORIDE 102   CO2 28      BUN 31*   CR 1.31*   GFRESTIMATED 56*   NORMAN 8.4*     Recent Labs   Lab Test 03/21/22  0526 03/20/22  0455 03/18/22  0425   CR 1.31* 1.30 1.13     No results for input(s): A1C in the last 75865 hours.       Recent Labs   Lab Test 03/21/22  0526   WBC 2.2*   HGB 9.9*   HCT 30.2*           Recent Labs   Lab Test 03/21/22  0526 03/20/22  0455 03/18/22  0425   HGB 9.9* 9.5* 9.9*    Recent Labs   Lab Test 03/17/22  1418 03/17/22  0419 03/14/22  1619   TROPONINI 0.05 0.03 0.04     Recent Labs   Lab Test 03/19/22  0408 03/18/22  0425 03/17/22  0419   * 227* 270*     Recent Labs   Lab Test 03/17/22  0419   TSH 2.42     Recent Labs   Lab Test 03/14/22  1619 12/29/20  2026 03/18/20  0801   INR 1.36* 1.33* 1.31*        Medical History  Surgical History Family History Social History   Past Medical History:   Diagnosis Date     Anemia 12/13/2017     Arthritis      Bilateral inguinal hernia      Chest tube in place      Glaucoma      Glaucoma      History of blood clots     DVT - left chronic     Multiple myeloma (H)     Gets chemo infusions every 2 weeks     Pancytopenia (H)      Parapneumonic effusion      Peripheral neuropathy      Syncope      TMJ (temporomandibular joint disorder)      Past Surgical History:   Procedure Laterality Date     APPENDECTOMY      Age 16     ESOPHAGOSCOPY, GASTROSCOPY, DUODENOSCOPY (EGD), COMBINED N/A 12/14/2017    Procedure: ESOPHAGOGASTRODUODENOSCOPY (EGD) WITH BIOPSYS;  Surgeon: Marlon Roy MD;  Location: [facility] GI;  Service:      ESOPHAGOSCOPY, GASTROSCOPY, DUODENOSCOPY (EGD), COMBINED N/A 1/19/2018    Procedure: ENDOSCOPIC ULTRASOUND  ESOPHAGOGASTRODUODENOSCOPY WITH DUODENAL POLYP REMOVAL;  Surgeon: Familia Mcgraw MD;  Location: [facility] OR;  Service:      EYE SURGERY      Cataract     IR MISCELLANEOUS PROCEDURE  6/17/2009     IR MISCELLANEOUS  PROCEDURE  2009     IR MISCELLANEOUS PROCEDURE  2009     IR PLEURAL DRAINAGE WITH CATHETER INSERTION  2019     PORTACATH PLACEMENT       RELEASE CARPAL TUNNEL Bilateral      US THORACENTESIS  2019     VERTEBROPLASTY      T6     WISDOM TOOTH EXTRACTION       Family History   Problem Relation Age of Onset     Heart Disease Mother      Glaucoma Mother      Cancer Father      No Known Problems Sister      Cancer Brother      Pancreatic Cancer Brother      No Known Problems Brother      Cancer Daughter      Skin Cancer Daughter      Melanoma Daughter      Glaucoma Daughter         Social History     Socioeconomic History     Marital status:      Spouse name: Not on file     Number of children: Not on file     Years of education: Not on file     Highest education level: Not on file   Occupational History     Not on file   Tobacco Use     Smoking status: Former Smoker     Quit date: 1975     Years since quittin.3     Smokeless tobacco: Never Used   Substance and Sexual Activity     Alcohol use: Not Currently     Comment: Alcoholic Drinks/day: occasional     Drug use: No     Sexual activity: Never   Other Topics Concern     Not on file   Social History Narrative     Not on file     Social Determinants of Health     Financial Resource Strain: Not on file   Food Insecurity: Not on file   Transportation Needs: Not on file   Physical Activity: Not on file   Stress: Not on file   Social Connections: Not on file   Intimate Partner Violence: Not on file   Housing Stability: Not on file         Medications  Allergies   No current outpatient medications on file.        Allergies   Allergen Reactions     Centex-Pse Er [Pseudoephedrine-Guaifenesin Cr]      TB12         Bernardo Rincon,

## 2022-03-21 NOTE — PROGRESS NOTES
Pt remain on room air with sat in the mid 90's percent. Pt received scheduled neb and tolerated well. BS; diminished pre/post nebs. RTs will continue to monitor and assess.    Sharon Melchor, RT

## 2022-03-21 NOTE — PLAN OF CARE
Problem: Plan of Care - These are the overarching goals to be used throughout the patient stay.    Goal: Plan of Care Review/Shift Note  Description: The Plan of Care Review/Shift note should be completed every shift.  The Outcome Evaluation is a brief statement about your assessment that the patient is improving, declining, or no change.  This information will be displayed automatically on your shift note.  Outcome: Met     Problem: Gas Exchange Impaired  Goal: Optimal Gas Exchange  Intervention: Optimize Oxygenation and Ventilation  Recent Flowsheet Documentation  Taken 3/21/2022 1200 by Melisa Kim, RN  Head of Bed (HOB) Positioning: HOB at 30-45 degrees  Taken 3/21/2022 0900 by Melisa Kim, RN  Head of Bed (HOB) Positioning: HOB at 30-45 degrees   Goal Outcome Evaluation:      Pt A/Ox4. VSS. On RA. Denies SOB, CP. Pt completed omar scan today. Adequate I/O. Pt discharging home w spouse.

## 2022-03-21 NOTE — PLAN OF CARE
Problem: Plan of Care - These are the overarching goals to be used throughout the patient stay.    Goal: Plan of Care Review/Shift Note  Description: The Plan of Care Review/Shift note should be completed every shift.  The Outcome Evaluation is a brief statement about your assessment that the patient is improving, declining, or no change.  This information will be displayed automatically on your shift note.  Outcome: Ongoing, Progressing     Problem: Gas Exchange Impaired  Goal: Optimal Gas Exchange  Outcome: Ongoing, Progressing   Goal Outcome Evaluation:     Pt call light appropriate and ind in the room. BP slightly elevated with other VSS. Pt NPO for test today. Pt denies any pain. Will continue to monitor.

## 2022-03-21 NOTE — PROGRESS NOTES
Nuclear Pharmacological Stress Test:  Sitting Protocol. Lexiscan 0.4mg IV administered over 15sec. Peak VS: HR= 88, BP= 103/65. No symptoms of CP produced. Patient felt typical vasodilator side effects from Lexiscan. Results pending cardiology interpretation.  Marla Waldrop RN  [facility]

## 2022-03-21 NOTE — PROGRESS NOTES
Care Management Discharge Note    Discharge Date: 03/21/2022       Discharge Disposition: Home    Discharge Services: None    Discharge DME: None    Discharge Transportation: family or friend will provide    Private pay costs discussed: Not applicable    PAS Confirmation Code:    Patient/family educated on Medicare website which has current facility and service quality ratings: no    Education Provided on the Discharge Plan:    Persons Notified of Discharge Plans: pt  Patient/Family in Agreement with the Plan:      Handoff Referral Completed: Yes    Additional Information:  2:40 PM  Pt medically ready to discharge home with family.  Family will transport home.        KAITLIN Michael

## 2022-03-23 NOTE — TELEPHONE ENCOUNTER
Health Call Center    Phone Message    May a detailed message be left on voicemail: yes     Reason for Call: Other: The patient has an Urgent appt on Monday 3/28 w/Dr Rivera and per a conversation with Dr Rincon he is not sure if he needs to keep this appt since he is also scheduled with Dr Brizuela in early April?    Patient would like to have a nurse call him with an answer from Dr Rincon as Dr Rincon said that he would cx the appt with Dr Rivera on Monday 3/28/22    Action Taken: Other: cardiology    Travel Screening: Not Applicable

## 2022-03-23 NOTE — TELEPHONE ENCOUNTER
3/23 Called and LVM to schedule       NEW PH patient with any PH provider ( Gelacio Peters, Guillermo ) with labs prior.     First available.

## 2022-03-23 NOTE — TELEPHONE ENCOUNTER
Dr Montemayor,  Pt does have a New Pulmonary HTN appt with Dr. Brizuela on 4/19. ER placed RAC referral and scheduled with KML for Monday 3/28. Is the RAC appt necessary? Please advise. Clara MCARTHUR

## 2022-03-24 NOTE — PROGRESS NOTES
[facility] INFECTIOUS DISEASE CLINIC - [place]   FOLLOW UP NOTE    Date: 03/24/2022   Patient Name: Theo Meyers   YOB: 1945  MRN: [id number]      ASSESSMENT:  77-year-old man with a history of multiple myeloma, being treated with daratumumab, who has been referred for car T-cell therapy the summer, who is seen for follow-up from recent hospitalization.  He is admitted for respiratory failure and pneumonia.  His respiratory viral panel was positive for rhinovirus.  Sputum cultures were negative.  Clinically he improved with medical management.  He is currently on a steroid taper.  There was some question of possible nodular infiltrate on chest x-ray therefore he was continued on antibiotics.  He continues to have some dyspnea with exertion, but overall is feeling well.    PLAN:  -Complete course of cefdinir  -Complete prednisone taper recommended per pulmonary, he has about a week left  -Continue pneumocystis prophylaxis with Bactrim single strength daily.  This was started in the hospital, but I am not sure he has criteria for this since he is not on long-term prednisone.  I will check a CD4 count today, if less than 200, will continue Bactrim prophylaxis  -CBC with differential    Return to clinic as needed.    Freddy Phan MD  [facility]   Clinic phone: [phone]  Clinic fax: [phone]    ______________________________________________________________________    HISTORY OF PRESENT ILLNESS:   From inpatient ID consult by Dr. Moon on 3/17/2022:    Theo Meyers is a 76 year old male who presents with shortness of breath, worsening over the last 1 week  Patient has myeloma, on treatment and not in remission, A fib, DVT, leg swelling, A fib, worsening shortness of breath, unable to get comfortable in any position. The patient is very fatigued at the time of my evaluation in ED. Denies sick contacts. No abdominal pain.   No fever.       SUBJECTIVE / INTERVAL  HISTORY:   Theo Meyers returns for follow up.  He was admitted to [facility] from 3/17-3/21 (discharge summary not complete).  He was admitted to the ICU for close observation.  By the time I saw him on 3/19 he was not requiring oxygen.  His respiratory viral panel was positive for rhinovirus.  Sputum cultures and Covid PCR were negative.  Pneumocystis PCR was also negative.  He was started on a prednisone taper and he showed improvement.  Empiric antibiotics were switched to oral cefdinir and he was discharged.  Today, he returns with his wife.  He continues to have dyspnea on exertion however he has been doing his usual activities.  He denies any fevers.  He is still coughing and bringing up greenish phlegm.  He has scheduled follow-up with Dr. Clifford in oncology next week.      ROS:   No fevers, no rashes      Current Outpatient Medications:      cefdinir (OMNICEF) 300 MG capsule, Take 1 capsule (300 mg) by mouth every 12 hours for 5 days, Disp: 10 capsule, Rfl: 0     furosemide (LASIX) 20 MG tablet, Take 1 tablet (20 mg) by mouth daily, Disp: 30 tablet, Rfl: 0     predniSONE (DELTASONE) 10 MG tablet, Take prednisone, 10 mg tab - 4 tabs for 3 days, then 3 tabs for 3 days, then 2 tabs for 3 days, then 1 tab for 3 days, then stop., Disp: 30 tablet, Rfl: 0     sulfamethoxazole-trimethoprim (BACTRIM) 400-80 MG tablet, Take 1 tablet by mouth daily, Disp: 30 tablet, Rfl: 1     acetaminophen (TYLENOL) 500 MG tablet, Take 500 mg by mouth every 8 hours as needed , Disp: , Rfl:      acyclovir (ZOVIRAX) 400 MG tablet, TAKE 1 TABLET BY MOUTH TWICE DAILY, Disp: 180 tablet, Rfl: 1     ascorbic acid 1000 MG TABS tablet, Take 1,000 mg by mouth daily , Disp: , Rfl:      atorvastatin (LIPITOR) 10 MG tablet, Take 1 tablet (10 mg) by mouth daily, Disp: 90 tablet, Rfl: 3     bimatoprost (LUMIGAN) 0.03 % ophthalmic drops, Place 1 drop into both eyes At Bedtime., Disp: , Rfl:      brimonidine (ALPHAGAN P) 0.1 % SOLN,  Place 1 drop into both eyes every 8 hours., Disp: , Rfl:      calcium carbonate 1250 (500 Ca) MG CHEW, Take 1 tablet by mouth daily as needed , Disp: , Rfl:      calcium carbonate-vitamin D (OYSTER SHELL CALCIUM/D) 500-200 MG-UNIT tablet, Take 1 tablet by mouth daily , Disp: , Rfl:      cyanocobalamin (CYANOCOBALAMIN) 1000 MCG/ML injection, Inject 1 mL into the muscle every 30 days, Disp: , Rfl:      daratumumab 16 mg/kg, Inject 16 mg/kg into the vein once, Disp: , Rfl:      dextromethorphan-guaiFENesin (MUCINEX DM)  MG 12 hr tablet, Take 1 tablet by mouth daily as needed for cough, Disp: , Rfl:      fluticasone (FLONASE) 50 MCG/ACT nasal spray, Spray 2 sprays into both nostrils daily, Disp: , Rfl:      gabapentin (NEURONTIN) 300 MG capsule, TAKE 1 CAPSULE(300 MG) BY MOUTH DAILY AS NEEDED, Disp: 30 capsule, Rfl: 5     magnesium oxide 400 MG CAPS, Take 400 mg by mouth daily , Disp: , Rfl:      Multiple Vitamin (MULTI-VITAMIN PO), Take 1 capsule by mouth daily., Disp: , Rfl:      Omega-3 Fatty Acids (OMEGA 3 PO), TAKE AS DIRECTED , Disp: , Rfl:      omeprazole (PRILOSEC) 20 MG DR capsule, TAKE 1 CAPSULE BY MOUTH ONCE DAILY, Disp: 90 capsule, Rfl: 3     pomalidomide (POMALYST) 4 MG capsule, Take 1 capsule (4 mg) by mouth daily Swallow whole, do NOT break, crush, chew or open capsule. Take on days 1-21 of repeated 28 day cycles. (Patient taking differently: Take 4 mg by mouth daily Swallow whole, do NOT break, crush, chew or open capsule. Take on days 1-21 of repeated 28 day cycles. 3/17/22: today is day 16 of 28 day cycle), Disp: 21 capsule, Rfl: 0     potassium chloride ER (KLOR-CON M) 20 MEQ CR tablet, Take 1 tablet (20 mEq) by mouth every other day, Disp: , Rfl:      Psyllium (METAMUCIL PO), Take by mouth daily USE AS DIRECTED , Disp: , Rfl:      tamsulosin (FLOMAX) 0.4 MG capsule, TAKE 1 CAPSULE BY MOUTH EVERY DAY, Disp: 90 capsule, Rfl: 1     XARELTO ANTICOAGULANT 20 MG TABS tablet, Take 20 mg by mouth daily  (with dinner) , Disp: , Rfl:       OBJECTIVE:  /86   Pulse 80   Temp 97.6  F (36.4  C)   Wt 73.5 kg (162 lb)   BMI 24.63 kg/m        GEN: No acute distress.    HENT: Head is normocephalic, atraumatic.   EYES: Eyes have anicteric sclerae without conjunctival injection or stigmata of endocarditis.   RESPIRATORY:  Normal breathing pattern. Clear to auscultation without wheezing or crackles  CARDIOVASCULAR:  Regular rate and rhythm. Normal S1 and S2. No murmur, click, gallop or rub.   EXTREMITIES: No edema. No stigmata of endocarditis.  SKIN/HAIR/NAILS:  No rashes  NEUROLOGIC: Grossly nonfocal. Normal gait and station      Pertinent labs:    Lab Results   Component Value Date    CRP 0.6 03/21/2022         Lab Results   Component Value Date    ALT 17 03/20/2022    AST 15 03/20/2022    ALKPHOS 50 03/20/2022         MICROBIOLOGY DATA:  Reviewed    RADIOLOGY:  Reviewed    Attestation:  Total time preparing to see this patient, face-to-face time, and coordinating care time on the same calendar date: 40 minutes.  Face-face time: 30 minutes.  Over 50% of face-to-face time was spent in counseling/coordination of care.

## 2022-03-24 NOTE — PATIENT INSTRUCTIONS
Complete course of cefdinir (finishes this week)    Continue bactrim (Trimethoprim/sulfamethoxazole) once daily for prophylaxis (prevention of fungal pneumonia)    Return to clinic as needed.

## 2022-03-25 NOTE — TELEPHONE ENCOUNTER
===View-only below this line===  ----- Message -----  From: Bernardo Rincon DO  Sent: 3/25/2022  12:37 PM CDT  To: Kashif Ortega RN    Patient does not require Dr.. Rivera visit.

## 2022-03-25 NOTE — TELEPHONE ENCOUNTER
PC with patient and discussion. Pt agreeable to canceling upcoming appointment. Appt cancelled. No further concerns at this time. LYUBOV,Rn

## 2022-03-28 NOTE — PROGRESS NOTES
Thank you, Dr. Mathew Ott, for asking the [facility] team to see . Theo Meyers to follow-up on dyspnea, new atrial fibrillation.    Assessment/Recommendations   Assessment:    1.  Dyspnea on exertion, likely multifactorial.  Suspect it is due to a combination of recent viral pneumonia, mild diastolic heart failure exacerbation, moderate pulmonary hypertension and newly diagnosed atrial fibrillation although rates remain well controlled.  No evidence of ischemia by nuclear stress test.  He was discharged on low-dose furosemide therapy with stabilization of his weight since discharge.  Suggested we check a BNP today.  Also offered the option of attempting conversion back to sinus rhythm as he has been on Xarelto for a long time now.  He would be agreeable to see if restoring sinus rhythm provides any benefit.  He does have follow-up at [facility] for assessment of his pulmonary hypertension in mid April.  2.  Newly diagnosed atrial fibrillation, rate controlled.  Suspect he has some underlying conduction system disease since he is not requiring any AV sean blocking medication to control rate.  Again he has been on Xarelto anticoagulation for a year and has not missed any doses.  Left atrium is only mildly enlarged.  Feel it would be reasonable to see if we can restore sinus rhythm to see if this provides any symptomatic benefit.  3.  Moderate pulmonary hypertension, likely due to left heart disease, possibly exacerbated by acute viral pneumonia and CHF.  Given patient does have referral to [facility] for further evaluation.    Plan:  1.  Continue current medications  2.  Schedule outpatient elective cardioversion to see if he converts back to sinus rhythm and whether there is any symptomatic improvement.  3.  Follow-up in the pulmonary hypertension clinic as scheduled.       History of Present Illness    Mr. Theo Meyers is a 77 year old male with history of multiple myeloma status post stem  cell transplant in 2010, moderate aortic valve stenosis and glaucoma who was recently hospitalized for progressive exertional dyspnea.  He was found to have new atrial fibrillation although rates were adequately controlled.  Also noted to have bilateral pleural effusions and elevated BNP consistent with congestive heart failure.  Echocardiogram demonstrated normal systolic function without regional wall motion abnormality.  He did undergo nuclear stress testing due to significant coronary artery calcification; however, this was negative for evidence of ischemia or infarction.  Was ultimately diagnosed with rhinovirus pneumonia as well.  Received tapering course of steroids.    Since discharge from hospital, he states his dyspnea on exertion is somewhat better although still persists.  He has a cough productive of of clear sputum although cough is gradually diminishing.  His weight has remained stable since discharge.  Denies any significant lower extremity edema.    ECG (personally reviewed): No ECG today    Cardiac Imaging Studies (personally reviewed): No additional imaging     Physical Examination Review of Systems   /64 (BP Location: Right arm, Patient Position: Sitting, Cuff Size: Adult Regular)   Pulse 59   Resp 16   Wt 73 kg (161 lb)   SpO2 99%   BMI 24.48 kg/m    Body mass index is 24.48 kg/m .  Wt Readings from Last 3 Encounters:   03/28/22 73 kg (161 lb)   03/24/22 73.5 kg (162 lb)   03/21/22 71.4 kg (157 lb 4.8 oz)     General Appearance:   Awake, Alert, No acute distress.   HEENT:  No scleral icterus; the mucous membranes were pink and moist.   Neck: No cervical bruits or jugular venous distention    Chest: The spine was straight. The chest was symmetric.   Lungs:   Respirations unlabored; the lungs are clear to auscultation. No wheezing   Cardiovascular:    Irregularly irregular rhythm.  S1 normal, S2 reduced.  2/6 mid peaking crescendo decrescendo murmur heard at the left upper sternal  border.   Abdomen:  No organomegaly, masses, bruits, or tenderness. Bowels sounds are present   Extremities:  No peripheral edema bilaterally   Skin: No xanthelasma. Warm, Dry.   Musculoskeletal: No tenderness.   Neurologic: Mood and affect are appropriate.    Enc Vitals  BP: 110/64  Pulse: 59  Resp: 16  SpO2: 99 %  Weight: 73 kg (161 lb)                                         Medical History  Surgical History Family History Social History   Past Medical History:   Diagnosis Date     Anemia 12/13/2017     Arthritis      Bilateral inguinal hernia      Chest tube in place      Glaucoma      Glaucoma      History of blood clots     DVT - left chronic     Multiple myeloma (H)     Gets chemo infusions every 2 weeks     Pancytopenia (H)      Parapneumonic effusion      Peripheral neuropathy      Syncope      TMJ (temporomandibular joint disorder)     Past Surgical History:   Procedure Laterality Date     APPENDECTOMY      Age 16     ESOPHAGOSCOPY, GASTROSCOPY, DUODENOSCOPY (EGD), COMBINED N/A 12/14/2017    Procedure: ESOPHAGOGASTRODUODENOSCOPY (EGD) WITH BIOPSYS;  Surgeon: Marlon Roy MD;  Location: [facility];  Service:      ESOPHAGOSCOPY, GASTROSCOPY, DUODENOSCOPY (EGD), COMBINED N/A 1/19/2018    Procedure: ENDOSCOPIC ULTRASOUND  ESOPHAGOGASTRODUODENOSCOPY WITH DUODENAL POLYP REMOVAL;  Surgeon: Familia Mcgraw MD;  Location: [facility] OR;  Service:      EYE SURGERY      Cataract     IR MISCELLANEOUS PROCEDURE  6/17/2009     IR MISCELLANEOUS PROCEDURE  7/31/2009     IR MISCELLANEOUS PROCEDURE  8/13/2009     IR PLEURAL DRAINAGE WITH CATHETER INSERTION  7/2/2019     PORTACATH PLACEMENT       RELEASE CARPAL TUNNEL Bilateral      US THORACENTESIS  6/27/2019     VERTEBROPLASTY      T6     WISDOM TOOTH EXTRACTION      Family History   Problem Relation Age of Onset     Heart Disease Mother      Glaucoma Mother      Cancer Father      No Known Problems Sister      Cancer Brother      Pancreatic Cancer Brother       No Known Problems Brother      Cancer Daughter      Skin Cancer Daughter      Melanoma Daughter      Glaucoma Daughter     Social History     Socioeconomic History     Marital status:      Spouse name: Not on file     Number of children: Not on file     Years of education: Not on file     Highest education level: Not on file   Occupational History     Not on file   Tobacco Use     Smoking status: Former Smoker     Quit date: 1975     Years since quittin.3     Smokeless tobacco: Never Used   Substance and Sexual Activity     Alcohol use: Not Currently     Comment: Alcoholic Drinks/day: occasional     Drug use: No     Sexual activity: Never   Other Topics Concern     Not on file   Social History Narrative     Not on file     Social Determinants of Health     Financial Resource Strain: Not on file   Food Insecurity: Not on file   Transportation Needs: Not on file   Physical Activity: Not on file   Stress: Not on file   Social Connections: Not on file   Intimate Partner Violence: Not on file   Housing Stability: Not on file          Medications  Allergies   Current Outpatient Medications   Medication Sig Dispense Refill     acetaminophen (TYLENOL) 500 MG tablet Take 500 mg by mouth every 8 hours as needed        acyclovir (ZOVIRAX) 400 MG tablet TAKE 1 TABLET BY MOUTH TWICE DAILY 180 tablet 1     ascorbic acid 1000 MG TABS tablet Take 1,000 mg by mouth daily        atorvastatin (LIPITOR) 10 MG tablet Take 1 tablet (10 mg) by mouth daily 90 tablet 3     bimatoprost (LUMIGAN) 0.03 % ophthalmic drops Place 1 drop into both eyes At Bedtime.       brimonidine (ALPHAGAN P) 0.1 % SOLN Place 1 drop into both eyes every 8 hours.       calcium carbonate 1250 (500 Ca) MG CHEW Take 1 tablet by mouth daily as needed        calcium carbonate-vitamin D (OYSTER SHELL CALCIUM/D) 500-200 MG-UNIT tablet Take 1 tablet by mouth daily        cyanocobalamin (CYANOCOBALAMIN) 1000 MCG/ML injection Inject 1 mL into the muscle  every 30 days       daratumumab 16 mg/kg Inject 16 mg/kg into the vein once       dextromethorphan-guaiFENesin (MUCINEX DM)  MG 12 hr tablet Take 1 tablet by mouth daily as needed for cough       fluticasone (FLONASE) 50 MCG/ACT nasal spray Spray 2 sprays into both nostrils daily       furosemide (LASIX) 20 MG tablet Take 1 tablet (20 mg) by mouth daily 30 tablet 0     gabapentin (NEURONTIN) 300 MG capsule TAKE 1 CAPSULE(300 MG) BY MOUTH DAILY AS NEEDED 30 capsule 5     magnesium oxide 400 MG CAPS Take 400 mg by mouth daily        Multiple Vitamin (MULTI-VITAMIN PO) Take 1 capsule by mouth daily.       Omega-3 Fatty Acids (OMEGA 3 PO) TAKE AS DIRECTED        omeprazole (PRILOSEC) 20 MG DR capsule TAKE 1 CAPSULE BY MOUTH ONCE DAILY 90 capsule 3     pomalidomide (POMALYST) 4 MG capsule Take 1 capsule (4 mg) by mouth daily Swallow whole, do NOT break, crush, chew or open capsule. Take on days 1-21 of repeated 28 day cycles. (Patient taking differently: Take 4 mg by mouth daily Swallow whole, do NOT break, crush, chew or open capsule. Take on days 1-21 of repeated 28 day cycles.  3/17/22: today is day 16 of 28 day cycle) 21 capsule 0     potassium chloride ER (KLOR-CON M) 20 MEQ CR tablet Take 1 tablet (20 mEq) by mouth every other day       predniSONE (DELTASONE) 10 MG tablet Take prednisone, 10 mg tab - 4 tabs for 3 days, then 3 tabs for 3 days, then 2 tabs for 3 days, then 1 tab for 3 days, then stop. (Patient taking differently: Take 20 mg by mouth Take prednisone, 10 mg tab - 4 tabs for 3 days, then 3 tabs for 3 days, then 2 tabs for 3 days, then 1 tab for 3 days, then stop.) 30 tablet 0     Psyllium (METAMUCIL PO) Take by mouth daily USE AS DIRECTED        sulfamethoxazole-trimethoprim (BACTRIM) 400-80 MG tablet Take 1 tablet by mouth daily 30 tablet 1     tamsulosin (FLOMAX) 0.4 MG capsule TAKE 1 CAPSULE BY MOUTH EVERY DAY 90 capsule 1     XARELTO ANTICOAGULANT 20 MG TABS tablet Take 20 mg by mouth daily  (with dinner)         Allergies   Allergen Reactions     Centex-Pse Er [Pseudoephedrine-Guaifenesin Cr]      TB12         Lab Results    Chemistry/lipid CBC Cardiac Enzymes/BNP/TSH/INR   Recent Labs   Lab Test 03/24/22  1106 10/13/21  0804 10/05/21  1651   TRIG  --   --  71   LDL  --   --  95   BUN 39*   < >  --       < >  --    CO2 25   < >  --     < > = values in this interval not displayed.    Recent Labs   Lab Test 03/24/22  1106   WBC 3.1*   HGB 11.9*   HCT 37.5*   *       Recent Labs   Lab Test 03/19/22  0408 03/18/22  0425 03/17/22  1418 03/17/22  0419 03/14/22  1619   TROPONINI  --   --  0.05 0.03 0.04   *   < >  --  270*  --    TSH  --   --   --  2.42  --    INR  --   --   --   --  1.36*    < > = values in this interval not displayed.        A total of 60 minutes was spent reviewing patient's medical records, obtaining history and performing examination, as well as discussing diagnoses/ recommendations with patient and answering all questions.

## 2022-03-28 NOTE — TELEPHONE ENCOUNTER
Echo does not show any significant left atrial enlargement, so no medication changes prior to cardioversion.  Thanks,  Allison

## 2022-03-28 NOTE — H&P (VIEW-ONLY)
Thank you, Dr. Mathew Ott, for asking the [facility] team to see . Theo Meyers to follow-up on dyspnea, new atrial fibrillation.    Assessment/Recommendations   Assessment:    1.  Dyspnea on exertion, likely multifactorial.  Suspect it is due to a combination of recent viral pneumonia, mild diastolic heart failure exacerbation, moderate pulmonary hypertension and newly diagnosed atrial fibrillation although rates remain well controlled.  No evidence of ischemia by nuclear stress test.  He was discharged on low-dose furosemide therapy with stabilization of his weight since discharge.  Suggested we check a BNP today.  Also offered the option of attempting conversion back to sinus rhythm as he has been on Xarelto for a long time now.  He would be agreeable to see if restoring sinus rhythm provides any benefit.  He does have follow-up at [facility] for assessment of his pulmonary hypertension in mid April.  2.  Newly diagnosed atrial fibrillation, rate controlled.  Suspect he has some underlying conduction system disease since he is not requiring any AV sean blocking medication to control rate.  Again he has been on Xarelto anticoagulation for a year and has not missed any doses.  Left atrium is only mildly enlarged.  Feel it would be reasonable to see if we can restore sinus rhythm to see if this provides any symptomatic benefit.  3.  Moderate pulmonary hypertension, likely due to left heart disease, possibly exacerbated by acute viral pneumonia and CHF.  Given patient does have referral to [facility] for further evaluation.    Plan:  1.  Continue current medications  2.  Schedule outpatient elective cardioversion to see if he converts back to sinus rhythm and whether there is any symptomatic improvement.  3.  Follow-up in the pulmonary hypertension clinic as scheduled.       History of Present Illness    Mr. Theo Meyers is a 77 year old male with history of multiple myeloma status post stem  cell transplant in 2010, moderate aortic valve stenosis and glaucoma who was recently hospitalized for progressive exertional dyspnea.  He was found to have new atrial fibrillation although rates were adequately controlled.  Also noted to have bilateral pleural effusions and elevated BNP consistent with congestive heart failure.  Echocardiogram demonstrated normal systolic function without regional wall motion abnormality.  He did undergo nuclear stress testing due to significant coronary artery calcification; however, this was negative for evidence of ischemia or infarction.  Was ultimately diagnosed with rhinovirus pneumonia as well.  Received tapering course of steroids.    Since discharge from hospital, he states his dyspnea on exertion is somewhat better although still persists.  He has a cough productive of of clear sputum although cough is gradually diminishing.  His weight has remained stable since discharge.  Denies any significant lower extremity edema.    ECG (personally reviewed): No ECG today    Cardiac Imaging Studies (personally reviewed): No additional imaging     Physical Examination Review of Systems   /64 (BP Location: Right arm, Patient Position: Sitting, Cuff Size: Adult Regular)   Pulse 59   Resp 16   Wt 73 kg (161 lb)   SpO2 99%   BMI 24.48 kg/m    Body mass index is 24.48 kg/m .  Wt Readings from Last 3 Encounters:   03/28/22 73 kg (161 lb)   03/24/22 73.5 kg (162 lb)   03/21/22 71.4 kg (157 lb 4.8 oz)     General Appearance:   Awake, Alert, No acute distress.   HEENT:  No scleral icterus; the mucous membranes were pink and moist.   Neck: No cervical bruits or jugular venous distention    Chest: The spine was straight. The chest was symmetric.   Lungs:   Respirations unlabored; the lungs are clear to auscultation. No wheezing   Cardiovascular:    Irregularly irregular rhythm.  S1 normal, S2 reduced.  2/6 mid peaking crescendo decrescendo murmur heard at the left upper sternal  border.   Abdomen:  No organomegaly, masses, bruits, or tenderness. Bowels sounds are present   Extremities:  No peripheral edema bilaterally   Skin: No xanthelasma. Warm, Dry.   Musculoskeletal: No tenderness.   Neurologic: Mood and affect are appropriate.    Enc Vitals  BP: 110/64  Pulse: 59  Resp: 16  SpO2: 99 %  Weight: 73 kg (161 lb)                                         Medical History  Surgical History Family History Social History   Past Medical History:   Diagnosis Date     Anemia 12/13/2017     Arthritis      Bilateral inguinal hernia      Chest tube in place      Glaucoma      Glaucoma      History of blood clots     DVT - left chronic     Multiple myeloma (H)     Gets chemo infusions every 2 weeks     Pancytopenia (H)      Parapneumonic effusion      Peripheral neuropathy      Syncope      TMJ (temporomandibular joint disorder)     Past Surgical History:   Procedure Laterality Date     APPENDECTOMY      Age 16     ESOPHAGOSCOPY, GASTROSCOPY, DUODENOSCOPY (EGD), COMBINED N/A 12/14/2017    Procedure: ESOPHAGOGASTRODUODENOSCOPY (EGD) WITH BIOPSYS;  Surgeon: Marlon Roy MD;  Location: [facility];  Service:      ESOPHAGOSCOPY, GASTROSCOPY, DUODENOSCOPY (EGD), COMBINED N/A 1/19/2018    Procedure: ENDOSCOPIC ULTRASOUND  ESOPHAGOGASTRODUODENOSCOPY WITH DUODENAL POLYP REMOVAL;  Surgeon: Familia Mcgraw MD;  Location: [facility] OR;  Service:      EYE SURGERY      Cataract     IR MISCELLANEOUS PROCEDURE  6/17/2009     IR MISCELLANEOUS PROCEDURE  7/31/2009     IR MISCELLANEOUS PROCEDURE  8/13/2009     IR PLEURAL DRAINAGE WITH CATHETER INSERTION  7/2/2019     PORTACATH PLACEMENT       RELEASE CARPAL TUNNEL Bilateral      US THORACENTESIS  6/27/2019     VERTEBROPLASTY      T6     WISDOM TOOTH EXTRACTION      Family History   Problem Relation Age of Onset     Heart Disease Mother      Glaucoma Mother      Cancer Father      No Known Problems Sister      Cancer Brother      Pancreatic Cancer Brother       No Known Problems Brother      Cancer Daughter      Skin Cancer Daughter      Melanoma Daughter      Glaucoma Daughter     Social History     Socioeconomic History     Marital status:      Spouse name: Not on file     Number of children: Not on file     Years of education: Not on file     Highest education level: Not on file   Occupational History     Not on file   Tobacco Use     Smoking status: Former Smoker     Quit date: 1975     Years since quittin.3     Smokeless tobacco: Never Used   Substance and Sexual Activity     Alcohol use: Not Currently     Comment: Alcoholic Drinks/day: occasional     Drug use: No     Sexual activity: Never   Other Topics Concern     Not on file   Social History Narrative     Not on file     Social Determinants of Health     Financial Resource Strain: Not on file   Food Insecurity: Not on file   Transportation Needs: Not on file   Physical Activity: Not on file   Stress: Not on file   Social Connections: Not on file   Intimate Partner Violence: Not on file   Housing Stability: Not on file          Medications  Allergies   Current Outpatient Medications   Medication Sig Dispense Refill     acetaminophen (TYLENOL) 500 MG tablet Take 500 mg by mouth every 8 hours as needed        acyclovir (ZOVIRAX) 400 MG tablet TAKE 1 TABLET BY MOUTH TWICE DAILY 180 tablet 1     ascorbic acid 1000 MG TABS tablet Take 1,000 mg by mouth daily        atorvastatin (LIPITOR) 10 MG tablet Take 1 tablet (10 mg) by mouth daily 90 tablet 3     bimatoprost (LUMIGAN) 0.03 % ophthalmic drops Place 1 drop into both eyes At Bedtime.       brimonidine (ALPHAGAN P) 0.1 % SOLN Place 1 drop into both eyes every 8 hours.       calcium carbonate 1250 (500 Ca) MG CHEW Take 1 tablet by mouth daily as needed        calcium carbonate-vitamin D (OYSTER SHELL CALCIUM/D) 500-200 MG-UNIT tablet Take 1 tablet by mouth daily        cyanocobalamin (CYANOCOBALAMIN) 1000 MCG/ML injection Inject 1 mL into the muscle  every 30 days       daratumumab 16 mg/kg Inject 16 mg/kg into the vein once       dextromethorphan-guaiFENesin (MUCINEX DM)  MG 12 hr tablet Take 1 tablet by mouth daily as needed for cough       fluticasone (FLONASE) 50 MCG/ACT nasal spray Spray 2 sprays into both nostrils daily       furosemide (LASIX) 20 MG tablet Take 1 tablet (20 mg) by mouth daily 30 tablet 0     gabapentin (NEURONTIN) 300 MG capsule TAKE 1 CAPSULE(300 MG) BY MOUTH DAILY AS NEEDED 30 capsule 5     magnesium oxide 400 MG CAPS Take 400 mg by mouth daily        Multiple Vitamin (MULTI-VITAMIN PO) Take 1 capsule by mouth daily.       Omega-3 Fatty Acids (OMEGA 3 PO) TAKE AS DIRECTED        omeprazole (PRILOSEC) 20 MG DR capsule TAKE 1 CAPSULE BY MOUTH ONCE DAILY 90 capsule 3     pomalidomide (POMALYST) 4 MG capsule Take 1 capsule (4 mg) by mouth daily Swallow whole, do NOT break, crush, chew or open capsule. Take on days 1-21 of repeated 28 day cycles. (Patient taking differently: Take 4 mg by mouth daily Swallow whole, do NOT break, crush, chew or open capsule. Take on days 1-21 of repeated 28 day cycles.  3/17/22: today is day 16 of 28 day cycle) 21 capsule 0     potassium chloride ER (KLOR-CON M) 20 MEQ CR tablet Take 1 tablet (20 mEq) by mouth every other day       predniSONE (DELTASONE) 10 MG tablet Take prednisone, 10 mg tab - 4 tabs for 3 days, then 3 tabs for 3 days, then 2 tabs for 3 days, then 1 tab for 3 days, then stop. (Patient taking differently: Take 20 mg by mouth Take prednisone, 10 mg tab - 4 tabs for 3 days, then 3 tabs for 3 days, then 2 tabs for 3 days, then 1 tab for 3 days, then stop.) 30 tablet 0     Psyllium (METAMUCIL PO) Take by mouth daily USE AS DIRECTED        sulfamethoxazole-trimethoprim (BACTRIM) 400-80 MG tablet Take 1 tablet by mouth daily 30 tablet 1     tamsulosin (FLOMAX) 0.4 MG capsule TAKE 1 CAPSULE BY MOUTH EVERY DAY 90 capsule 1     XARELTO ANTICOAGULANT 20 MG TABS tablet Take 20 mg by mouth daily  (with dinner)         Allergies   Allergen Reactions     Centex-Pse Er [Pseudoephedrine-Guaifenesin Cr]      TB12         Lab Results    Chemistry/lipid CBC Cardiac Enzymes/BNP/TSH/INR   Recent Labs   Lab Test 03/24/22  1106 10/13/21  0804 10/05/21  1651   TRIG  --   --  71   LDL  --   --  95   BUN 39*   < >  --       < >  --    CO2 25   < >  --     < > = values in this interval not displayed.    Recent Labs   Lab Test 03/24/22  1106   WBC 3.1*   HGB 11.9*   HCT 37.5*   *       Recent Labs   Lab Test 03/19/22  0408 03/18/22  0425 03/17/22  1418 03/17/22  0419 03/14/22  1619   TROPONINI  --   --  0.05 0.03 0.04   *   < >  --  270*  --    TSH  --   --   --  2.42  --    INR  --   --   --   --  1.36*    < > = values in this interval not displayed.        A total of 60 minutes was spent reviewing patient's medical records, obtaining history and performing examination, as well as discussing diagnoses/ recommendations with patient and answering all questions.

## 2022-03-28 NOTE — PROGRESS NOTES
H&P  PMD []  Received [] OV: [x]KML  Date: 3-28 Sent LM  []  Teach  [] Orders  I [x] P  [x]  Letter []   COVID  FV/EPIC []  Date:  External  []  Date: AC: []Eliquis  [x] Xarelto  [] Warfarin  [] Pradaxa Meds: [x] Needs review of AAD with EP NP  [] Per EP NO chg AAD/Med  [] Per EP Hold:      Pt reports taking anticoagulation appropriately, denies any missed doses in anticoagulation and pt is aware to call if any missed doses prior to CV    1945  Home:[phone] (home) Cell:[phone] (mobile)  Emergency Contact: JULIENNE OLIVER [phone]  PCP: Mathew Ott, [phone]    +++Important patient information for CSC/Cath Lab staff : None+++    [facility] EP Cath Lab Procedure Order   Cardioversion:  Cardioversion  Ordering Provider: Dr. Rivera  Ordering Date: 3/28/2022    Diagnosis:  AF  Anticipated Case Duration:  Standard  Scheduling Needs/Timeframe:  Next Available  Scheduling Contact: Please contact pt to schedule date/time for COVID testing and CV, if you are unable to schedule date within the next 24 hours please contact pt to update on scheduling process    Current Device: None None  Device Company/Device Rep Needed for Procedure: None    Pre-Procedural Testing needed: COVID 19 nasal/lab test within 48hrs of procedure  Anesthesia:  General    [facility] EP Cath Lab Prep   H&P:  Compled by Dr. Rivera on 3-28-22 if scheduled within 30 days, pt to schedule with PMD if procedure outside of this timeframe    Pre-op Labs: N/A for procedure    Medical Records Pertinent for Procedure:  Stress test 3-17-22 negative, EF 70%;  Echo 3-17-22 EF 60-65%;  EKG 3-17-22 Afib @75    Patient Education:  Teach with Patient: Will be completed via phone prior to procedure, and letter was also sent to pt via mail/[employer] with written pre-procedural instructions.    Risks Reviewed:     Cardioversion    >90% acute success rate, <10% failure to convert or   reverts shortly after cardioversion.    <1% embolic event of (CVA, pulmonary  embolism, or   1. other site).    75% risk for superficial burn.  Risks associated with general anesthesia will be addressed by the Anesthesiology Department    Pre-Procedure Instructions:  NPO after midnight, Remove all jewelry prior to coming in for procedure, Shower prior to arrival, Notified patient of time and date of procedure by CV , Transportation arrangements needed s/p procedure, Post-procedure follow up process, Sedation plan/orders and Pre-procedure letter was sent to pt    Pre-Procedure Medication Instructions:  Instructions given to pt regarding anticoagulants: Xarelto- instructed to continue anticoagulation uninterrupted through their procedure  Instructions given to pt regarding antiarrhythmic medication: None; N/A  Instructions for medication, other than anticoagulants/antiarrhythmics listed above, given to pt: to take all morning medications with small sips of water, with the exception of OTC supplements and MVI    Allergies   Allergen Reactions     Centex-Pse Er [Pseudoephedrine-Guaifenesin Cr]      TB12       Current Outpatient Medications:      acetaminophen (TYLENOL) 500 MG tablet, Take 500 mg by mouth every 8 hours as needed , Disp: , Rfl:      acyclovir (ZOVIRAX) 400 MG tablet, TAKE 1 TABLET BY MOUTH TWICE DAILY, Disp: 180 tablet, Rfl: 1     ascorbic acid 1000 MG TABS tablet, Take 1,000 mg by mouth daily , Disp: , Rfl:      atorvastatin (LIPITOR) 10 MG tablet, Take 1 tablet (10 mg) by mouth daily, Disp: 90 tablet, Rfl: 3     bimatoprost (LUMIGAN) 0.03 % ophthalmic drops, Place 1 drop into both eyes At Bedtime., Disp: , Rfl:      brimonidine (ALPHAGAN P) 0.1 % SOLN, Place 1 drop into both eyes every 8 hours., Disp: , Rfl:      calcium carbonate 1250 (500 Ca) MG CHEW, Take 1 tablet by mouth daily as needed , Disp: , Rfl:      calcium carbonate-vitamin D (OYSTER SHELL CALCIUM/D) 500-200 MG-UNIT tablet, Take 1 tablet by mouth daily , Disp: , Rfl:      cyanocobalamin (CYANOCOBALAMIN) 1000  MCG/ML injection, Inject 1 mL into the muscle every 30 days, Disp: , Rfl:      daratumumab 16 mg/kg, Inject 16 mg/kg into the vein once, Disp: , Rfl:      dextromethorphan-guaiFENesin (MUCINEX DM)  MG 12 hr tablet, Take 1 tablet by mouth daily as needed for cough, Disp: , Rfl:      fluticasone (FLONASE) 50 MCG/ACT nasal spray, Spray 2 sprays into both nostrils daily, Disp: , Rfl:      furosemide (LASIX) 20 MG tablet, Take 1 tablet (20 mg) by mouth daily, Disp: 30 tablet, Rfl: 0     gabapentin (NEURONTIN) 300 MG capsule, TAKE 1 CAPSULE(300 MG) BY MOUTH DAILY AS NEEDED, Disp: 30 capsule, Rfl: 5     magnesium oxide 400 MG CAPS, Take 400 mg by mouth daily , Disp: , Rfl:      Multiple Vitamin (MULTI-VITAMIN PO), Take 1 capsule by mouth daily., Disp: , Rfl:      Omega-3 Fatty Acids (OMEGA 3 PO), TAKE AS DIRECTED , Disp: , Rfl:      omeprazole (PRILOSEC) 20 MG DR capsule, TAKE 1 CAPSULE BY MOUTH ONCE DAILY, Disp: 90 capsule, Rfl: 3     pomalidomide (POMALYST) 4 MG capsule, Take 1 capsule (4 mg) by mouth daily Swallow whole, do NOT break, crush, chew or open capsule. Take on days 1-21 of repeated 28 day cycles. (Patient taking differently: Take 4 mg by mouth daily Swallow whole, do NOT break, crush, chew or open capsule. Take on days 1-21 of repeated 28 day cycles. 3/17/22: today is day 16 of 28 day cycle), Disp: 21 capsule, Rfl: 0     potassium chloride ER (KLOR-CON M) 20 MEQ CR tablet, Take 1 tablet (20 mEq) by mouth every other day, Disp: , Rfl:      predniSONE (DELTASONE) 10 MG tablet, Take prednisone, 10 mg tab - 4 tabs for 3 days, then 3 tabs for 3 days, then 2 tabs for 3 days, then 1 tab for 3 days, then stop. (Patient taking differently: Take 20 mg by mouth Take prednisone, 10 mg tab - 4 tabs for 3 days, then 3 tabs for 3 days, then 2 tabs for 3 days, then 1 tab for 3 days, then stop.), Disp: 30 tablet, Rfl: 0     Psyllium (METAMUCIL PO), Take by mouth daily USE AS DIRECTED , Disp: , Rfl:       sulfamethoxazole-trimethoprim (BACTRIM) 400-80 MG tablet, Take 1 tablet by mouth daily, Disp: 30 tablet, Rfl: 1     tamsulosin (FLOMAX) 0.4 MG capsule, TAKE 1 CAPSULE BY MOUTH EVERY DAY, Disp: 90 capsule, Rfl: 1     XARELTO ANTICOAGULANT 20 MG TABS tablet, Take 20 mg by mouth daily (with dinner) , Disp: , Rfl:     Documentation Date:3/28/2022 12:51 PM  Verenice Denny RN

## 2022-03-28 NOTE — LETTER
3/28/2022    Mathew Ott MD  [address] [place] [address]    RE: Theo Turpinmoreliatio       Dear Colleague,     I had the pleasure of seeing Theo Meyers in the [facility].      Thank you, Dr. Mathew Ott, for asking the [facility] team to see Mr. Theo Meyers to follow-up on dyspnea, new atrial fibrillation.    Assessment/Recommendations   Assessment:    1.  Dyspnea on exertion, likely multifactorial.  Suspect it is due to a combination of recent viral pneumonia, mild diastolic heart failure exacerbation, moderate pulmonary hypertension and newly diagnosed atrial fibrillation although rates remain well controlled.  No evidence of ischemia by nuclear stress test.  He was discharged on low-dose furosemide therapy with stabilization of his weight since discharge.  Suggested we check a BNP today.  Also offered the option of attempting conversion back to sinus rhythm as he has been on Xarelto for a long time now.  He would be agreeable to see if restoring sinus rhythm provides any benefit.  He does have follow-up at [facility] for assessment of his pulmonary hypertension in mid April.  2.  Newly diagnosed atrial fibrillation, rate controlled.  Suspect he has some underlying conduction system disease since he is not requiring any AV sean blocking medication to control rate.  Again he has been on Xarelto anticoagulation for a year and has not missed any doses.  Left atrium is only mildly enlarged.  Feel it would be reasonable to see if we can restore sinus rhythm to see if this provides any symptomatic benefit.  3.  Moderate pulmonary hypertension, likely due to left heart disease, possibly exacerbated by acute viral pneumonia and CHF.  Given patient does have referral to [facility] for further evaluation.    Plan:  1.  Continue current medications  2.  Schedule outpatient elective cardioversion to see if he converts back to sinus rhythm and whether there is any symptomatic  improvement.  3.  Follow-up in the pulmonary hypertension clinic as scheduled.       History of Present Illness    Mr. Theo Meyers is a 77 year old male with history of multiple myeloma status post stem cell transplant in 2010, moderate aortic valve stenosis and glaucoma who was recently hospitalized for progressive exertional dyspnea.  He was found to have new atrial fibrillation although rates were adequately controlled.  Also noted to have bilateral pleural effusions and elevated BNP consistent with congestive heart failure.  Echocardiogram demonstrated normal systolic function without regional wall motion abnormality.  He did undergo nuclear stress testing due to significant coronary artery calcification; however, this was negative for evidence of ischemia or infarction.  Was ultimately diagnosed with rhinovirus pneumonia as well.  Received tapering course of steroids.    Since discharge from hospital, he states his dyspnea on exertion is somewhat better although still persists.  He has a cough productive of of clear sputum although cough is gradually diminishing.  His weight has remained stable since discharge.  Denies any significant lower extremity edema.    ECG (personally reviewed): No ECG today    Cardiac Imaging Studies (personally reviewed): No additional imaging     Physical Examination Review of Systems   /64 (BP Location: Right arm, Patient Position: Sitting, Cuff Size: Adult Regular)   Pulse 59   Resp 16   Wt 73 kg (161 lb)   SpO2 99%   BMI 24.48 kg/m    Body mass index is 24.48 kg/m .  Wt Readings from Last 3 Encounters:   03/28/22 73 kg (161 lb)   03/24/22 73.5 kg (162 lb)   03/21/22 71.4 kg (157 lb 4.8 oz)     General Appearance:   Awake, Alert, No acute distress.   HEENT:  No scleral icterus; the mucous membranes were pink and moist.   Neck: No cervical bruits or jugular venous distention    Chest: The spine was straight. The chest was symmetric.   Lungs:   Respirations  unlabored; the lungs are clear to auscultation. No wheezing   Cardiovascular:    Irregularly irregular rhythm.  S1 normal, S2 reduced.  2/6 mid peaking crescendo decrescendo murmur heard at the left upper sternal border.   Abdomen:  No organomegaly, masses, bruits, or tenderness. Bowels sounds are present   Extremities:  No peripheral edema bilaterally   Skin: No xanthelasma. Warm, Dry.   Musculoskeletal: No tenderness.   Neurologic: Mood and affect are appropriate.    Enc Vitals  BP: 110/64  Pulse: 59  Resp: 16  SpO2: 99 %  Weight: 73 kg (161 lb)                                         Medical History  Surgical History Family History Social History   Past Medical History:   Diagnosis Date     Anemia 12/13/2017     Arthritis      Bilateral inguinal hernia      Chest tube in place      Glaucoma      Glaucoma      History of blood clots     DVT - left chronic     Multiple myeloma (H)     Gets chemo infusions every 2 weeks     Pancytopenia (H)      Parapneumonic effusion      Peripheral neuropathy      Syncope      TMJ (temporomandibular joint disorder)     Past Surgical History:   Procedure Laterality Date     APPENDECTOMY      Age 16     ESOPHAGOSCOPY, GASTROSCOPY, DUODENOSCOPY (EGD), COMBINED N/A 12/14/2017    Procedure: ESOPHAGOGASTRODUODENOSCOPY (EGD) WITH BIOPSYS;  Surgeon: Marlon Roy MD;  Location: [facility];  Service:      ESOPHAGOSCOPY, GASTROSCOPY, DUODENOSCOPY (EGD), COMBINED N/A 1/19/2018    Procedure: ENDOSCOPIC ULTRASOUND  ESOPHAGOGASTRODUODENOSCOPY WITH DUODENAL POLYP REMOVAL;  Surgeon: Familia Mcgraw MD;  Location: [facility] OR;  Service:      EYE SURGERY      Cataract     IR MISCELLANEOUS PROCEDURE  6/17/2009     IR MISCELLANEOUS PROCEDURE  7/31/2009     IR MISCELLANEOUS PROCEDURE  8/13/2009     IR PLEURAL DRAINAGE WITH CATHETER INSERTION  7/2/2019     PORTACATH PLACEMENT       RELEASE CARPAL TUNNEL Bilateral      US THORACENTESIS  6/27/2019     VERTEBROPLASTY      T6     WISDOM TOOTH  EXTRACTION      Family History   Problem Relation Age of Onset     Heart Disease Mother      Glaucoma Mother      Cancer Father      No Known Problems Sister      Cancer Brother      Pancreatic Cancer Brother      No Known Problems Brother      Cancer Daughter      Skin Cancer Daughter      Melanoma Daughter      Glaucoma Daughter     Social History     Socioeconomic History     Marital status:      Spouse name: Not on file     Number of children: Not on file     Years of education: Not on file     Highest education level: Not on file   Occupational History     Not on file   Tobacco Use     Smoking status: Former Smoker     Quit date: 1975     Years since quittin.3     Smokeless tobacco: Never Used   Substance and Sexual Activity     Alcohol use: Not Currently     Comment: Alcoholic Drinks/day: occasional     Drug use: No     Sexual activity: Never   Other Topics Concern     Not on file   Social History Narrative     Not on file     Social Determinants of Health     Financial Resource Strain: Not on file   Food Insecurity: Not on file   Transportation Needs: Not on file   Physical Activity: Not on file   Stress: Not on file   Social Connections: Not on file   Intimate Partner Violence: Not on file   Housing Stability: Not on file          Medications  Allergies   Current Outpatient Medications   Medication Sig Dispense Refill     acetaminophen (TYLENOL) 500 MG tablet Take 500 mg by mouth every 8 hours as needed        acyclovir (ZOVIRAX) 400 MG tablet TAKE 1 TABLET BY MOUTH TWICE DAILY 180 tablet 1     ascorbic acid 1000 MG TABS tablet Take 1,000 mg by mouth daily        atorvastatin (LIPITOR) 10 MG tablet Take 1 tablet (10 mg) by mouth daily 90 tablet 3     bimatoprost (LUMIGAN) 0.03 % ophthalmic drops Place 1 drop into both eyes At Bedtime.       brimonidine (ALPHAGAN P) 0.1 % SOLN Place 1 drop into both eyes every 8 hours.       calcium carbonate 1250 (500 Ca) MG CHEW Take 1 tablet by mouth  daily as needed        calcium carbonate-vitamin D (OYSTER SHELL CALCIUM/D) 500-200 MG-UNIT tablet Take 1 tablet by mouth daily        cyanocobalamin (CYANOCOBALAMIN) 1000 MCG/ML injection Inject 1 mL into the muscle every 30 days       daratumumab 16 mg/kg Inject 16 mg/kg into the vein once       dextromethorphan-guaiFENesin (MUCINEX DM)  MG 12 hr tablet Take 1 tablet by mouth daily as needed for cough       fluticasone (FLONASE) 50 MCG/ACT nasal spray Spray 2 sprays into both nostrils daily       furosemide (LASIX) 20 MG tablet Take 1 tablet (20 mg) by mouth daily 30 tablet 0     gabapentin (NEURONTIN) 300 MG capsule TAKE 1 CAPSULE(300 MG) BY MOUTH DAILY AS NEEDED 30 capsule 5     magnesium oxide 400 MG CAPS Take 400 mg by mouth daily        Multiple Vitamin (MULTI-VITAMIN PO) Take 1 capsule by mouth daily.       Omega-3 Fatty Acids (OMEGA 3 PO) TAKE AS DIRECTED        omeprazole (PRILOSEC) 20 MG DR capsule TAKE 1 CAPSULE BY MOUTH ONCE DAILY 90 capsule 3     pomalidomide (POMALYST) 4 MG capsule Take 1 capsule (4 mg) by mouth daily Swallow whole, do NOT break, crush, chew or open capsule. Take on days 1-21 of repeated 28 day cycles. (Patient taking differently: Take 4 mg by mouth daily Swallow whole, do NOT break, crush, chew or open capsule. Take on days 1-21 of repeated 28 day cycles.  3/17/22: today is day 16 of 28 day cycle) 21 capsule 0     potassium chloride ER (KLOR-CON M) 20 MEQ CR tablet Take 1 tablet (20 mEq) by mouth every other day       predniSONE (DELTASONE) 10 MG tablet Take prednisone, 10 mg tab - 4 tabs for 3 days, then 3 tabs for 3 days, then 2 tabs for 3 days, then 1 tab for 3 days, then stop. (Patient taking differently: Take 20 mg by mouth Take prednisone, 10 mg tab - 4 tabs for 3 days, then 3 tabs for 3 days, then 2 tabs for 3 days, then 1 tab for 3 days, then stop.) 30 tablet 0     Psyllium (METAMUCIL PO) Take by mouth daily USE AS DIRECTED        sulfamethoxazole-trimethoprim (BACTRIM)  400-80 MG tablet Take 1 tablet by mouth daily 30 tablet 1     tamsulosin (FLOMAX) 0.4 MG capsule TAKE 1 CAPSULE BY MOUTH EVERY DAY 90 capsule 1     XARELTO ANTICOAGULANT 20 MG TABS tablet Take 20 mg by mouth daily (with dinner)         Allergies   Allergen Reactions     Centex-Pse Er [Pseudoephedrine-Guaifenesin Cr]      TB12         Lab Results    Chemistry/lipid CBC Cardiac Enzymes/BNP/TSH/INR   Recent Labs   Lab Test 03/24/22  1106 10/13/21  0804 10/05/21  1651   TRIG  --   --  71   LDL  --   --  95   BUN 39*   < >  --       < >  --    CO2 25   < >  --     < > = values in this interval not displayed.    Recent Labs   Lab Test 03/24/22  1106   WBC 3.1*   HGB 11.9*   HCT 37.5*   *       Recent Labs   Lab Test 03/19/22  0408 03/18/22  0425 03/17/22  1418 03/17/22  0419 03/14/22  1619   TROPONINI  --   --  0.05 0.03 0.04   *   < >  --  270*  --    TSH  --   --   --  2.42  --    INR  --   --   --   --  1.36*    < > = values in this interval not displayed.        A total of 60 minutes was spent reviewing patient's medical records, obtaining history and performing examination, as well as discussing diagnoses/ recommendations with patient and answering all questions.

## 2022-03-29 NOTE — TELEPHONE ENCOUNTER
Left a voicemail for Keith to call me back. Wanted to let him know MD would like to wait until his appointment to renew his pomalyst script.    Thank You!  Ondina Vanegas CPhT  [facility]   Phone# [phone]  Fax# [phone]

## 2022-03-29 NOTE — TELEPHONE ENCOUNTER
Pre-Procedure Education Phone Call    Procedure: CV with with Linda Alexander NP on 4/1/2022 AT 8 30    PT IS ON XARELTO AND TAKES AT DINNER NO MISSED DOSES       Education :            PT HAS A  FOR PROCEDURE THAT WILL STAY WITH HIM    PT INSTRUCTED TO HOLD ANY VITAMINS, MINERALS CALCIUM IRON OR SUPPLEMENTS THE MORNING OF CV  PT INSTRUCTED NO GUM CHEWING MINTS OR CANDY THE MORNING OF CV  PT INSTRUCTED TO BATHE OR SHOWER BEFORE COMING IN  PT INSTRUCTED TO LEAVE JEWELRY AT HOME  PT IS ON XARELTO  PT HAS A PORT  PT HAS SAMANO  PT HAS A FOLLOW UP APPT WITH LINDA 4/25       Reviewed Pre-Intra-Post education and instructions reviewed via phone- refer to procedure prep for instructions that were reviewed    COIVD: [facility], results will be viewable in EPIC    Pre-Op: completed and in Epic    Medications: Pt verbalized understanding of medication instructions pre procedure    Important patient information for staff: PT HAS SAMANO, HAS  A PORT    3/29/2022 1:51 PM  Ladonna Valenzuela

## 2022-03-29 NOTE — TELEPHONE ENCOUNTER
Patient was called with an update.  Myself, Dr. Clifford and Dr. Limon from the [facility] been talking about the patient's treatment plan.  As requested by the [facility] for him to qualify for the next step of therapy with them he needs to have a fourth line of treatment.  Since he is progressing on his current treatment with the daratumumab and Pomalyst we are going to go ahead and switch him to carfilzomib and dexamethasone.  At this time I do not think it makes sense to continue the Pomalyst since he is progressing on it.  I told patient I will place orders and he will start next week.  We will cancel his appointment for tomorrow.  We talked about expected side effects I will talk to him further next week.  He did give verbal consent to start the process and to start next week.  We will check his myeloma labs on day 1 and then we will check it on day 15 of subsequent cycles.  Patient is on the waiting list to get CAR-T therapy at the [facility].  He is also working with cardiologist.  He was recently admitted with pneumonia and some A. fib.  The patient's questions were answered.  Will call us if he has any further questions before I see him in clinic next week.

## 2022-03-31 NOTE — TELEPHONE ENCOUNTER
Patient calls in today with some questions regarding the carfilzomib and to schedule since this is a new infusion to him.  He notices that for the first 4 weeks he is coming in twice a week.  He wants to know if this is similar to Darzalex or you gradually increase.  I did let him know that the first 2 doses in the first week will be a starting dose.  He will get the same dose on both days.  The second week the dose will increase but he will still get the same dose on both days.  The same for the third week and the fourth week.  Patient verbalized understanding and was very appreciative.    Mere Machado RN

## 2022-03-31 NOTE — DISCHARGE SUMMARY
[facility]  Hospitalist Discharge Summary      Date of Admission:  3/17/2022  Date of Discharge:  3/21/2022  3:07 PM  Discharging Provider: Flavio Macedo MD  Discharge Service: Hospitalist Service    Discharge Diagnoses        Theo Meyers is a 76 year old male with a past medical history of see below  who was admitted with chief complaint of increasing exertional dyspnea on 3/17/2022.     Admitting impression of bilateral pneumonia, immunocompromise patient     Brief history: Progressive shortness of breath over the past few weeks that is worsened, also some sore throat and cough.  Recently seen at the ER and noted to be in atrial fibrillation.  Anticoagulated for DVTs.  History of smoking.  ER course: Found to have bilateral pneumonia and acute respiratory failure            Assessment and Plan        Bilateral pneumonia immunocompromised patient.    Acute respiratory failure, off oxygen    Reactive airway disease    Patient has history of parapneumonic effusion with chest tube placement in the past    History of small bilateral pleural effusion--> small bilateral pleural effusion seen on CT.  Initially placed on BiPAP and oxygen.  Placed on broad-spectrum antibiotics and antifungal agents, Bactrim for pneumocystis prophylaxis.  Initially placed on Zosyn, vancomycin and ID added meropenem and micafungin.--> today: discontinue vancomycin and micafungin, continue meropenem, prophylactic Bactrim, agree with steroid  Sputum cultures and respiratory viral panel  Start Bactrim for PJP prophylaxis  Continue Acyclovir for prophylaxis  Nebs and flutter valve  Methylprednisolone ongoing per pulmonary.     Clinically improved  Will discharge on cefdinir for 5 days + continue bactrim for PCP prevention + steroid taper       History of atrial fibrillation with controlled ventricular response    Elevated BNP    Recurrent DVTs    Moderate aortic stenosis    Acute diastolic CHF  Continue  anticoagulation Xarelto  Given 1 dose of IV Lasix. Continue Lasix 20 mg twice daily  Borderline blood pressure.  Echocardiogram showed EF of 60 to 65%, moderate aortic stenosis.  No strong indication of multiple myeloma related amyloid cardiomyopathy.  Consider cardiac MRI for evaluation of amyloid cardiomyopathy as an outpatient.  Serial troponin negative x 2  Lexiscan is requested for evaluation of myocardial ischemia on Monday - negative  Cardiology follow  No aortic valve intervention at this time necessary.  No aortic valve intervention at this time.  Follow-up as an outpatient in the valve clinic.    Clinically improved  Discharge on oral lasix 20 mg daily         IgG kappa light chain myeloma    Immunocompromised patient    Status post stem cell bone marrow transplant in 2010  Per hematology oncology notes on March 18, 2022, clinically improving.  Advised to hold Pomalyst until discharge from the hospital.  No intervention for the pancytopenia at this point.  We will continue to follow.  Stable, outpatient f/u       Thrombocytopenia    Microcytic anemia  B12 level in folate normal  See hematology note above.    Follow-ups Needed After Discharge   Follow-up Appointments     Follow-up and recommended labs and tests       Follow up with primary care provider, Mathew Ott, within 7 days for   hospital follow- up.  The following labs/tests are recommended: BMP.    Follow up with cardiology       As advised   Follow up with Oncology      As advised         {Additional follow-up instructions/to-do's for PCP    :BMP    Unresulted Labs Ordered in the Past 30 Days of this Admission     Date and Time Order Name Status Description    3/18/2022  2:29 PM Coccidioides antibody In process     3/18/2022  2:29 PM Aspergillus antibody by CF In process       These results will be followed up by PCP    Discharge Disposition   Discharged to home  Condition at discharge: Good        Hospital Course     See above    Consultations  This Hospital Stay   PHARMACY TO DOSE VANCO  INFECTIOUS DISEASES IP CONSULT  PHARMACY TO DOSE VANCO  HEMATOLOGY & ONCOLOGY IP CONSULT  PULMONARY IP CONSULT  SOCIAL WORK IP CONSULT  CARDIOLOGY IP CONSULT    Code Status   Prior    Time Spent on this Encounter   I, Flavio Macedo MD, personally saw the patient today and spent greater than 30 minutes discharging this patient.       Flavio Macedo MD  [facility]  [address] [address]  Phone: [phone]  Fax: [phone]  ______________________________________________________________________    Physical Exam   Vital Signs:                   Weight: 157 lbs 4.8 oz         GENERAL: The patient is not in any acute distressed. Awake and alert.  HEENT: Nonicteric sclerae, PERRLA, EOMI. Oropharynx clear. Moist mucous membranes. Conjunctivae appear well perfused.  HEART: Regular rate and rhythm without murmurs.  LUNGS: Clear to auscultation bilaterally. No wheezing or crackles.  ABDOMEN: Soft, positive bowel sounds, nontender.  SKIN: No rash, no excessive bruising, petechiae, or purpura.  EXTREMITIES : no rashes, no swelling in legs.  NEUROLOGIC: conscious and oriented, follows commands, no obvious focal deficits.  ROS: All other systems negative          Primary Care Physician   Mathew Ott    Discharge Orders      Basic metabolic panel     Reason for your hospital stay    sob     Follow-up and recommended labs and tests     Follow up with primary care provider, Mathew Ott, within 7 days for hospital follow- up.  The following labs/tests are recommended: BMP.    Follow up with cardiology       As advised   Follow up with Oncology      As advised     Activity    Your activity upon discharge: activity as tolerated     Diet    Follow this diet upon discharge: Orders Placed This Encounter      Combination Diet 2 gm NA Diet       Significant Results and Procedures   Most Recent 3 CBC's:Recent Labs   Lab Test  03/24/22  1106 03/21/22  0526 03/20/22  0455   WBC 3.1* 2.2* 2.0*   HGB 11.9* 9.9* 9.5*   * 100 100    170 148*     Most Recent 3 BMP's:Recent Labs   Lab Test 03/24/22  1106 03/21/22  0526 03/20/22  0455    137 137   POTASSIUM 4.4 4.2 4.2   CHLORIDE 106 102 103   CO2 25 28 26   BUN 39* 31* 23   CR 1.26 1.31* 1.30   ANIONGAP 11 7 8   NORMAN 8.6 8.4* 8.4*    103 118     Most Recent 2 LFT's:Recent Labs   Lab Test 03/20/22 0455 03/18/22  0425   AST 15 14   ALT 17 12   ALKPHOS 50 60   BILITOTAL 0.6 0.6       Discharge Medications   Discharge Medication List as of 3/21/2022  2:36 PM      START taking these medications    Details   cefdinir (OMNICEF) 300 MG capsule Take 1 capsule (300 mg) by mouth every 12 hours for 5 days, Disp-10 capsule, R-0, E-Prescribe      predniSONE (DELTASONE) 10 MG tablet Take prednisone, 10 mg tab - 4 tabs for 3 days, then 3 tabs for 3 days, then 2 tabs for 3 days, then 1 tab for 3 days, then stop., Disp-30 tablet, R-0, E-Prescribe      sulfamethoxazole-trimethoprim (BACTRIM) 400-80 MG tablet Take 1 tablet by mouth daily, Disp-30 tablet, R-1, E-Prescribe      furosemide (LASIX) 20 MG tablet Take 1 tablet (20 mg) by mouth daily, Disp-30 tablet, R-0, E-Prescribe         CONTINUE these medications which have NOT CHANGED    Details   acetaminophen (TYLENOL) 500 MG tablet Take 500 mg by mouth every 8 hours as needed , Historical      ascorbic acid 1000 MG TABS tablet Take 1,000 mg by mouth daily , Historical      atorvastatin (LIPITOR) 10 MG tablet Take 1 tablet (10 mg) by mouth daily, Disp-90 tablet, R-3, E-PrescribeMay not need a refill right now, but I am extending the prescription for a full years worth of refills      bimatoprost (LUMIGAN) 0.03 % ophthalmic drops Place 1 drop into both eyes At Bedtime., Historical      brimonidine (ALPHAGAN P) 0.1 % SOLN Place 1 drop into both eyes every 8 hours., Historical      calcium carbonate 1250 (500 Ca) MG CHEW Take 1 tablet by  mouth daily as needed , Historical      calcium carbonate-vitamin D (OYSTER SHELL CALCIUM/D) 500-200 MG-UNIT tablet Take 1 tablet by mouth daily , Historical      cyanocobalamin (CYANOCOBALAMIN) 1000 MCG/ML injection Inject 1 mL into the muscle every 30 days, Historical      daratumumab 16 mg/kg Inject 16 mg/kg into the vein once, Historical      dextromethorphan-guaiFENesin (MUCINEX DM)  MG 12 hr tablet Take 1 tablet by mouth daily as needed for cough, Historical      fluticasone (FLONASE) 50 MCG/ACT nasal spray Spray 2 sprays into both nostrils daily, OTC      gabapentin (NEURONTIN) 300 MG capsule TAKE 1 CAPSULE(300 MG) BY MOUTH DAILY AS NEEDED, Disp-30 capsule, R-5, E-Prescribe      magnesium oxide 400 MG CAPS Take 400 mg by mouth daily , Historical      Multiple Vitamin (MULTI-VITAMIN PO) Take 1 capsule by mouth daily., Historical      Omega-3 Fatty Acids (OMEGA 3 PO) TAKE AS DIRECTED , Historical      omeprazole (PRILOSEC) 20 MG DR capsule TAKE 1 CAPSULE BY MOUTH ONCE DAILY, Disp-90 capsule, R-3, E-Prescribe      potassium chloride ER (KLOR-CON M) 20 MEQ CR tablet Take 1 tablet (20 mEq) by mouth every other day, No Print Out      Psyllium (METAMUCIL PO) Take by mouth daily USE AS DIRECTED , Historical      tamsulosin (FLOMAX) 0.4 MG capsule TAKE 1 CAPSULE BY MOUTH EVERY DAY, Disp-90 capsule, R-1, E-Prescribe      XARELTO ANTICOAGULANT 20 MG TABS tablet Take 20 mg by mouth daily (with dinner) , NONA, Historical      acyclovir (ZOVIRAX) 400 MG tablet TAKE 1 TABLET BY MOUTH TWICE DAILY, Disp-180 tablet, R-1, E-Prescribe      pomalidomide (POMALYST) 4 MG capsule Take 1 capsule (4 mg) by mouth daily Swallow whole, do NOT break, crush, chew or open capsule. Take on days 1-21 of repeated 28 day cycles., Disp-21 capsule, R-0, E-PrescribeREMS [facility] # [id number]-- free drug program           Allergies   Allergies   Allergen Reactions     Centex-Pse Er [Pseudoephedrine-Guaifenesin Cr]      TB12

## 2022-03-31 NOTE — ORAL ONC MGMT
Thank you for the opportunity to be a part in this patient's oral chemotherapy. The oral chemotherapy pharmacy team will no longer be following this patient for oral chemotherapy. If there are any questions or the plan changes, feel free to contact us.    Sudarshan Zaragoza, PharmD  Oral Chemotherapy Pharmacist  [phone]

## 2022-04-01 NOTE — ANESTHESIA POSTPROCEDURE EVALUATION
Patient: Theo Meyers    Procedure: * No procedures listed *  Cardioversion External    Anesthesia Type:  General    Note:  Disposition: Outpatient   Postop Pain Control: Uneventful            Sign Out: Well controlled pain   PONV: No   Neuro/Psych: Uneventful            Sign Out: Acceptable/Baseline neuro status   Airway/Respiratory: Uneventful            Sign Out: Acceptable/Baseline resp. status   CV/Hemodynamics: Uneventful            Sign Out: Acceptable CV status; No obvious hypovolemia; No obvious fluid overload   Other NRE: NONE   DID A NON-ROUTINE EVENT OCCUR? No           Last vitals:  Vitals Value Taken Time   /63 04/01/22 1046   Temp     Pulse 73 04/01/22 1053   Resp 20 04/01/22 1053   SpO2     Vitals shown include unvalidated device data.    Electronically Signed By: Cuba Benítez MD  April 1, 2022  10:54 AM

## 2022-04-01 NOTE — ANESTHESIA CARE TRANSFER NOTE
Patient: Theo Meyers    Procedure: * No procedures listed *  Cardioversion External    Diagnosis: * No pre-op diagnosis entered *  Diagnosis Additional Information: No value filed.    Anesthesia Type:   General     Note:    Oropharynx: oropharynx clear of all foreign objects and spontaneously breathing  Level of Consciousness: awake  Oxygen Supplementation: nasal cannula  Level of Supplemental Oxygen (L/min / FiO2): 4  Independent Airway: airway patency satisfactory and stable  Dentition: dentition unchanged  Vital Signs Stable: post-procedure vital signs reviewed and stable  Report to RN Given: handoff report given  Patient transferred to: [facility]          Vitals:  Vitals Value Taken Time   /66 04/01/22 0958   Temp     Pulse 68 04/01/22 1000   Resp 16 04/01/22 1000   SpO2 100 % 04/01/22 1000   Vitals shown include unvalidated device data.    Electronically Signed By: CECELIA Myers CRNA  April 1, 2022  10:01 AM

## 2022-04-01 NOTE — PLAN OF CARE
Goal Outcome Evaluation:        Pt admitted for cardioversion due to atrial fibrillation.  Pt received 200J successful cardioversion. Discharge instructions given to pt and wife. Pt discharged home with wife.      Mariya Cordon RN

## 2022-04-01 NOTE — ANESTHESIA PREPROCEDURE EVALUATION
Anesthesia Pre-Procedure Evaluation    Patient: Theo Meyers   MRN: [id number] : 1945        Procedure : * No procedures listed *  Cardioversion External       Past Medical History:   Diagnosis Date     Anemia 2017     Arthritis      Bilateral inguinal hernia      Chest tube in place      Glaucoma      Glaucoma      History of blood clots     DVT - left chronic     Multiple myeloma (H)     Gets chemo infusions every 2 weeks     Pancytopenia (H)      Parapneumonic effusion      Peripheral neuropathy      Syncope      TMJ (temporomandibular joint disorder)       Past Surgical History:   Procedure Laterality Date     APPENDECTOMY      Age 16     ESOPHAGOSCOPY, GASTROSCOPY, DUODENOSCOPY (EGD), COMBINED N/A 2017    Procedure: ESOPHAGOGASTRODUODENOSCOPY (EGD) WITH BIOPSYS;  Surgeon: Marlon Roy MD;  Location: [facility];  Service:      ESOPHAGOSCOPY, GASTROSCOPY, DUODENOSCOPY (EGD), COMBINED N/A 2018    Procedure: ENDOSCOPIC ULTRASOUND  ESOPHAGOGASTRODUODENOSCOPY WITH DUODENAL POLYP REMOVAL;  Surgeon: Familia Mcgraw MD;  Location: [facility] OR;  Service:      EYE SURGERY      Cataract     IR MISCELLANEOUS PROCEDURE  2009     IR MISCELLANEOUS PROCEDURE  2009     IR MISCELLANEOUS PROCEDURE  2009     IR PLEURAL DRAINAGE WITH CATHETER INSERTION  2019     PORTACATH PLACEMENT       RELEASE CARPAL TUNNEL Bilateral      US THORACENTESIS  2019     VERTEBROPLASTY      T6     WISDOM TOOTH EXTRACTION        Allergies   Allergen Reactions     Centex-Pse Er [Pseudoephedrine-Guaifenesin Cr]      TB12      Social History     Tobacco Use     Smoking status: Former Smoker     Quit date: 1975     Years since quittin.4     Smokeless tobacco: Never Used   Substance Use Topics     Alcohol use: Not Currently     Comment: Alcoholic Drinks/day: occasional      Wt Readings from Last 1 Encounters:   22 72 kg (158 lb 11.2 oz)        Anesthesia Evaluation   Pt has  had prior anesthetic.     No history of anesthetic complications       ROS/MED HX  ENT/Pulmonary:     (+) tobacco use, Past use, recent URI (Treated, improving respiratory status.  Remains on antibiotics, immunocompromised),     Neurologic:     (+) peripheral neuropathy,     Cardiovascular: Comment: 3/17/22 NM stress     The nuclear stress test is negative for inducible myocardial ischemia or infarction.     The left ventricular ejection fraction at stress is greater than 70%.     The patient is at a low risk of future cardiac ischemic events.     There is no prior study for comparison.       (+) -----Taking blood thinners fainting (syncope). dysrhythmias, a-fib, valvular problems/murmurs (moderate AS) type: AS pulmonary hypertension (Moderate pulmonary hypertension),     METS/Exercise Tolerance:     Hematologic:     (+) History of blood clots, anemia,     Musculoskeletal:       GI/Hepatic:  - neg GI/hepatic ROS     Renal/Genitourinary:       Endo:       Psychiatric/Substance Use:       Infectious Disease:       Malignancy: Comment: Multiple myeloma      Other: Comment: Glaucoma           Physical Exam    Airway        Mallampati: II   TM distance: > 3 FB   Neck ROM: full   Mouth opening: > 3 cm    Respiratory Devices and Support         Dental  no notable dental history         Cardiovascular          Rhythm and rate: irregular and bradycardia     Pulmonary   pulmonary exam normal                OUTSIDE LABS:  CBC:   Lab Results   Component Value Date    WBC 3.1 (L) 03/24/2022    WBC 2.2 (L) 03/21/2022    HGB 11.9 (L) 03/24/2022    HGB 9.9 (L) 03/21/2022    HCT 37.5 (L) 03/24/2022    HCT 30.2 (L) 03/21/2022     03/24/2022     03/21/2022     BMP:   Lab Results   Component Value Date     03/24/2022     03/21/2022    POTASSIUM 3.7 04/01/2022    POTASSIUM 4.4 03/24/2022    CHLORIDE 106 03/24/2022    CHLORIDE 102 03/21/2022    CO2 25 03/24/2022    CO2 28 03/21/2022    BUN 39 (H) 03/24/2022     BUN 31 (H) 03/21/2022    CR 1.26 03/24/2022    CR 1.31 (H) 03/21/2022     03/24/2022     03/21/2022     COAGS:   Lab Results   Component Value Date    PTT 35 06/21/2019    INR 1.36 (H) 03/14/2022     POC: No results found for: BGM, HCG, HCGS  HEPATIC:   Lab Results   Component Value Date    ALBUMIN 2.8 (L) 03/20/2022    PROTTOTAL 4.9 (L) 03/20/2022    ALT 17 03/20/2022    AST 15 03/20/2022    ALKPHOS 50 03/20/2022    BILITOTAL 0.6 03/20/2022     OTHER:   Lab Results   Component Value Date    PH 7.43 03/04/2010    LACT 1.7 06/21/2019    NORMAN 8.6 03/24/2022    PHOS 3.2 04/13/2010    MAG 1.9 06/28/2019    TSH 2.42 03/17/2022    CRP 0.6 03/21/2022       Anesthesia Plan    ASA Status:  4   NPO Status:  NPO Appropriate    Anesthesia Type: General.     - Airway: Mask Only      Maintenance: TIVA.        Consents    Anesthesia Plan(s) and associated risks, benefits, and realistic alternatives discussed. Questions answered and patient/representative(s) expressed understanding.     - Discussed: Risks, Benefits and Alternatives for BOTH SEDATION and the PROCEDURE were discussed     - Discussed with:  Patient, Spouse      - Extended Intubation/Ventilatory Support Discussed: No.      - Patient is DNR/DNI Status: No    Use of blood products discussed: No .     Postoperative Care            Comments:                Cuba Benítez MD

## 2022-04-01 NOTE — INTERVAL H&P NOTE
I have reviewed the surgical (or preoperative) H&P that is linked to this encounter, and examined the patient. There are no significant changes.  Pt denies missing any doses of Xarelto in > 3 weeks and takes with full meal.             # Coagulation Defect: home medication list includes an anticoagulant medication

## 2022-04-01 NOTE — PROCEDURES
[facility]    Procedure: Cardioversion External    Date/Time: 4/1/2022 10:25 AM  Performed by: Allison Alexander APRN CNP  Authorized by: Phuong Rivera MD       UNIVERSAL PROTOCOL   Site Marked: NA  Prior Images Obtained and Reviewed:  Yes  Required items: Required blood products, implants, devices and special equipment available    Patient identity confirmed:  Verbally with patient, arm band and provided demographic data  Patient was reevaluated immediately before administering moderate or deep sedation or anesthesia  Confirmation Checklist:  Patient's identity using two indicators, relevant allergies, procedure was appropriate and matched the consent or emergent situation and correct equipment/implants were available  Time out: Immediately prior to the procedure a time out was called    Universal Protocol: the Joint Commission Universal Protocol was followed    Preparation: Patient was prepped and draped in usual sterile fashion       ANESTHESIA  Anesthesia was administered and monitored by anesthesiology.  See anesthesia documentation for details.    PROCEDURE DETAILS  Pre-procedure rhythm: atrial fibrillation  Patient position: patient was placed in a supine position  Chest area: chest area exposed  Electrodes: pads  Electrodes placed: anterior-posterior  Number of attempts: 1    Details of Attempts:  At 0958, after administration of IV Brevital by MDA and confirmation of adequate sedation, he received a single synchronous shock at 200 J with prompt restoration of sinus rhythm in the 50s to 60s, frequent atrial ectopy.  Post cardioversion EKG pending.  Post-procedure rhythm: normal sinus rhythm  Complications: no complications      PROCEDURE  Describe Procedure: History of paroxysmal atrial fibrillation for approximately 3 years, appears to be newly persistent.  He reports fatigue, decreased activity tolerance, and dyspnea on exertion for the past 3 months.  He is undergoing cardioversion  for symptom clarification.  Of note, he arrived in atrial fibrillation with periods of slow ventricular response in the 30s to 40s which may be the source of his symptoms.  He is not on any AV sean blocking agents or membrane active antiarrhythmic medications.  He would not likely tolerate AADs without the support of a pacemaker.  He is on Xarelto 20 mg daily for stroke prophylaxis which he takes with a full meal.  He denies missing any doses in >3 weeks.  Successful cardioversion to sinus rhythm, but with frequent PACs.  Continue Xarelto 20 mg daily for stroke prophylaxis.  Follow-up with Allison Alexander CNP on 4/25/2022  Discharge to home 1 hour after cardioversion fully awake and stable.  Patient Tolerance:  Patient tolerated the procedure well with no immediate complications  Length of time physician/provider present for 1:1 monitoring during sedation: 10

## 2022-04-06 NOTE — PATIENT INSTRUCTIONS
Continue your current medications.    Follow-up with cardiology, all 3 appointments as scheduled.    Follow-up with Dr. Floyd in a month, see you can touch base.    If you start to have shortness of breath or that strange feeling, present back to the ER for further evaluation of your symptoms.  Patient Education     Understanding Atrial Fibrillation     An arrhythmia is any problem with the speed or pattern of the heartbeat. Atrial fibrillation (AFib) is the most common type of arrhythmia. It causes fast, chaotic electrical signals in the atria. This makes it hard for the heart to work as it should. It also affects how much blood your heart can pump out to the body.  AFib may occur once in a while and go away on its own. Or it may continue for longer periods and need treatment.  AFib can lead to serious problems, such as stroke. Your healthcare provider will need to monitor and manage it.    What happens during atrial fibrillation?   The heart has an electrical system that sends signals to control the heartbeat. As signals move through the heart, they tell the heart s upper chambers (atria) and lower chambers (ventricles) when to squeeze (contract) and relax. This lets blood move through the heart and out to the body and lungs.  With AFib, the atria receive abnormal signals. This causes them to contract in a fast and irregular way, and out of sync with the ventricles. When this happens, the atria also have a harder time moving blood into the ventricles. Blood may then pool in the atria. This increases the risk for blood clots and stroke. The ventricles also may contract too quickly and irregularly. As a result, they may not pump blood to the body and lungs as well as they should. This can weaken the heart muscle over time and cause heart failure.  What causes atrial fibrillation?  AFib is more common in older adults. It has many possible causes:    Coronary artery disease    Heart valve disease    Heart  attack    Heart surgery    High blood pressure    Thyroid disease    Diabetes    Lung disease    Sleep apnea    Heavy alcohol use  In some cases of AFib, doctors don't know the cause.  What are the symptoms of atrial fibrillation?  AFib may not cause symptoms. If symptoms do occur, they may include:    A fast, pounding, irregular heartbeat    Shortness of breath    Tiredness    Dizziness or fainting    Chest pain  How is atrial fibrillation treated?  Treatments for AFib can include any of the options below.    Medicines. You may be prescribed:  ? Heart rate medicines to help slow down the heartbeat  ? Heart rhythm medicines to help the heart beat more regularly  ? Blood thinners or anti-clotting medicines to help reduce the risk for blood clots and stroke.    Left atrial appendage closure. Your healthcare provider may advise this device to prevent stroke. You may need if you are at high risk for stroke but have problems taking blood-thinner (anticoagulant) medicines. The device is placed in the part of the heart where most clots form. This area is called the left atrial appendage (TIM). It's a pouch-like structure in the muscle wall of the left atrium. The device closes off the TIM to prevent clots moving from the heart to the brain and causing a stroke.    Electrical cardioversion. Your healthcare provider uses special pads or paddles to send one or more brief electrical shocks to the heart. This can help reset the heartbeat to normal.    Ablation. Long, thin tubes (catheters) are threaded through a blood vessel to the heart. There, the catheters send out hot or cold energy to the areas causing the abnormal signals. This energy destroys the problem tissue or cells. This improves the chances that your heart will stay in normal rhythm without using medicines. If your heart rate and rhythm can t be controlled, you may need ablation and a pacemaker. These will help control the heart rate and regularity of the  heartbeat.    Surgery. During surgery, your healthcare provider may use different methods to create scar tissue in the areas of the heart causing the abnormal signals. The scar tissue disrupts the abnormal signals and may stop AFib from occurring.    Hybrid surgical-catheter ablation for AFib. This treatment is used for people with AFib that continues or is hard to treat.. It combines surgery with a catheter ablation. During the surgery, the surgeon makes small cuts (incisions) between the ribs in the chest or in the abdomen near the sternum. The surgeon puts a scope through the incisions to get to the backside of the heart. The catheter portion of the procedure is done by putting a catheter into a vein in the groin. The catheter is guided to the inside of the heart. Using the catheter, radiofrequency ablation is done to destroy the tissue inside the heart that is causing the AFib. Using both of these approaches may work better to block the abnormal electrical signals and be a more permanent treatment for persistent AFib.  What are possible complications of atrial fibrillation?  Complications can include:    Blood clots    Stroke    Heart failure. This problem occurs when the heart muscle weakens so much that it can no longer pump blood well.  When should I call my healthcare provider?  Call your healthcare provider right away if you have any of these:    Symptoms that don t get better with treatment, or get worse    New symptoms  David last reviewed this educational content on 4/1/2019 2000-2021 [employer]. All rights reserved. This information is not intended as a substitute for professional medical care. Always follow your healthcare professional's instructions.

## 2022-04-06 NOTE — PROGRESS NOTES
[facility] Progress Note    Patient: Theo Meyers  MRN: [id number]  Date of Service: Apr 6, 2022          Reason for Visit    Chief Complaint   Patient presents with     Oncology Clinic Visit       Assessment and Plan    Cancer Staging  No matching staging information was found for the patient.    1.  Myeloma: pt continues on daratumumab and dexamethasone and pomalyst. This regimen was initiated in October 2020.  He was having signs of progression.  He did meet with you to discuss CAR-T therapy.  He does need to be on another line of therapy before he qualifies for that so we are going to start a new regimen today with carfilzomib and dexamethasone.  He will get lower doses today and tomorrow and then if things go well he will increase his dose next week.  I think on day 8 and 9 we will do 50% dosing and then on day 15 and 16 we will do 75% dosing if things go fine.  Patient does have some cardiac issues going on right now so I am reluctant to do full dose of the carfilzomib so we will see how he is doing with the cardiology appointments before deciding.  Patient will also get dexamethasone 20 mg on the day of his carfilzomib infusions which are day 1, 2, 8, 9, 15, 16.  He then has 1 off week.  We will see Dr. Clifford for cycle 2.  We will get baseline labs today.  Patient gave verbal consent today we talked about the expected side effects.  Encourage patient to call us with any worsening cardiac symptoms including shortness of breath, swelling in his legs, chest pain or high blood pressure.  His last echo that was done a couple weeks ago showed normal ejection fraction.  His BNP is quite elevated he is on diuretics currently.       2. DVT: This is recurrent.  Will be on xarelto indefinitely.      3. Anemia: likely from treatment and myeloma. No complications. Will continue to monitor.     4.  A. fib with CHF: Patient is following up with cardiology.  He did have a cardioversion  on Friday.  His heart rate is fairly normal today but there is a couple of irregular heartbeats.  Patient is asymptomatic.  He states that he has been put on increasing doses of furosemide.  He is hoping he can go back down on that because he is lost a lot of weight.  He never noticed much swelling in his legs but they are definitely thinner now.  He has an appointment with someone from cardiology on Friday and then he is also go to be seen someone at the [facility] on the 19th for pulmonary hypertension.  Patient is very focused on getting his heart in good shape so he can go on to get his CAR-T therapy this summer when he needs it.     ECOG Performance    0 - Independent    Distress Screening (within last 30 days)    No data recorded     Pain  Pain Score: No Pain (0)    Problem List    Patient Active Problem List   Diagnosis     Multiple myeloma (H)     Unspecified glaucoma     Cataract     DVT (deep venous thrombosis) (H)     Shingles     Shingles (herpes zoster) polyneuropathy     Back pain     Post herpetic neuralgia     Pancytopenia (H)     Syncope and collapse     Elevated INR     History of DVT (deep vein thrombosis)     Chronic deep vein thrombosis (DVT) of left lower extremity, unspecified vein (H)     Spinal stenosis of lumbar region without neurogenic claudication     Anemia, vitamin B12 deficiency     Chemotherapy-induced neutropenia (H)     Thrombocytopenia (H)     Paroxysmal atrial fibrillation (H)     Nonrheumatic aortic valve stenosis     SAMANO (dyspnea on exertion)     Immunocompromised state (H)     Neutropenic fever (H)     Personal history of DVT (deep vein thrombosis)     Paroxysmal A-fib (H)     Pneumonia of both lungs due to infectious organism, unspecified part of lung        ______________________________________________________________________________    History of Present Illness    DIAGNOSIS:   1. IgG kappa light chain myeloma. Hyposecretory. Diagnosed 2009.   He presented at  diagnosis with a lytic lesion involving the C6 vertebral body, although no measurable M protein. IgG kappa monoclonal immunoglobulin was confirmed by [facility] as well as elevated kappa free light chains with an abnormal kappa lambda ratio. Bone marrow biopsy showed 30% involvement and cytogenetics confirmed deletion of 13 q. as well as 11;14 translocation.    2. Deep vein thrombosis of the left leg diagnosed 09/2012 while on treatment.       CURRENT TREATMENT:   Started Daratumumab in October 2020. Dexamethasone weekly as well.  Today is cycle 19. Pomalyst added with cycle 3.  We have decreased the dose initially and then have now gone back up to full dose.         PAST TREATMENT and HISTORY:    -He was initially treated with Velcade and dexamethasone and achieved a good partial response.     -He was referred to the [facility] where he underwent high-dose chemotherapy with autologous peripheral stem cell transplant in April of 2010.     -He began Revlimid as maintenance therapy post transplant.     -Repeat bone marrow biopsy done October 2010 showed about 5% kappa restricted plasma cells and so at that time weekly Velcade was added along with his maintenance Revlimid and dexamethasone. He required dose reductions of weekly Velcade because of cytopenias and was ultimately switched to a q.2 week maintenance schedule which he has been on since September 2012. His Revlimid dose is 20 mg daily and he continues on dexamethasone 20 mg p.o. q. Week.  Was on this until October 2020 when he had signs of progression.        INTERIM HISTORY:     Pt is here today for follow up.  Patient is doing pretty well.  He was recently in the hospital with some pneumonia and started having some cardiac issues.  He was found to have some A. fib and some heart failure and may be even some pulmonary hypertension.  He is been seeing a couple different cardiologist to deal with all this and has been put on medications.  He did get a  cardioversion on Friday and said it went well.  Overall he states he is asymptomatic from A. fib and does not really feel any palpitations.  He denies any breathing issues.  He says overall he continues to get better and stronger from being in the hospital but does not quite feel that he is at 100%.  A little nervous to start a new regimen but understands why we have to do it.    Review of Systems    Pertinent items are noted in HPI.    Past History    Past Medical History:   Diagnosis Date     Anemia 12/13/2017     Arthritis      Bilateral inguinal hernia      Chest tube in place      Glaucoma      Glaucoma      History of blood clots     DVT - left chronic     Multiple myeloma (H)     Gets chemo infusions every 2 weeks     Pancytopenia (H)      Parapneumonic effusion      Peripheral neuropathy      Syncope      TMJ (temporomandibular joint disorder)        PHYSICAL EXAM  /58   Pulse 68   Temp 97.9  F (36.6  C)   Resp 20   Wt 72.5 kg (159 lb 12.8 oz)   SpO2 95%   BMI 23.60 kg/m      GENERAL: no acute distress. Cooperative in conversation. Here with wife today. Mask on  RESP: Regular respiratory rate. No expiratory wheezes   MUSCULOSKELETAL: no bilateral leg swelling  NEURO: non focal. Alert and oriented x3.   PSYCH: within normal limits. No depression or anxiety.  SKIN: exposed skin is dry intact.        Lab Results    Recent Results (from the past 168 hour(s))   Potassium   Result Value Ref Range    Potassium 3.7 3.5 - 5.0 mmol/L   ECG 12-lead with MUSE [LHE]   Result Value Ref Range    Systolic Blood Pressure  mmHg    Diastolic Blood Pressure  mmHg    Ventricular Rate 59 BPM    Atrial Rate 59 BPM    MA Interval 174 ms    QRS Duration 88 ms     ms    QTc 394 ms    P Axis 83 degrees    R AXIS 37 degrees    T Axis 60 degrees    Interpretation ECG       Sinus bradycardia with Premature supraventricular complexes  Blocked Premature supraventricular complexes  Otherwise normal ECG  When compared with  ECG of 17-MAR-2022 04:10,  Sinus rhythm has replaced Atrial fibrillation  Non-specific change in ST segment in Inferior leads  T wave inversion no longer evident in Inferior leads  Nonspecific T wave abnormality no longer evident in Anterior leads  Confirmed by BHAVESH RAMÍREZ MD LOC:JN ([id number]) on 4/1/2022 2:54:59 PM     Comprehensive metabolic panel   Result Value Ref Range    Sodium 140 136 - 145 mmol/L    Potassium 3.5 3.5 - 5.0 mmol/L    Chloride 107 98 - 107 mmol/L    Carbon Dioxide (CO2) 24 22 - 31 mmol/L    Anion Gap 9 5 - 18 mmol/L    Urea Nitrogen 22 8 - 28 mg/dL    Creatinine 1.31 (H) 0.70 - 1.30 mg/dL    Calcium 8.4 (L) 8.5 - 10.5 mg/dL    Glucose 121 70 - 125 mg/dL    Alkaline Phosphatase 67 45 - 120 U/L    AST 14 0 - 40 U/L    ALT 27 0 - 45 U/L    Protein Total 5.0 (L) 6.0 - 8.0 g/dL    Albumin 2.6 (L) 3.5 - 5.0 g/dL    Bilirubin Total 0.4 0.0 - 1.0 mg/dL    GFR Estimate 56 (L) >60 mL/min/1.73m2   CBC with platelets and differential   Result Value Ref Range    WBC Count 3.1 (L) 4.0 - 11.0 10e3/uL    RBC Count 3.30 (L) 4.40 - 5.90 10e6/uL    Hemoglobin 11.1 (L) 13.3 - 17.7 g/dL    Hematocrit 35.0 (L) 40.0 - 53.0 %     (H) 78 - 100 fL    MCH 33.6 (H) 26.5 - 33.0 pg    MCHC 31.7 31.5 - 36.5 g/dL    RDW 16.3 (H) 10.0 - 15.0 %    Platelet Count 173 150 - 450 10e3/uL   Manual Differential   Result Value Ref Range    % Neutrophils 65 %    % Lymphocytes 12 %    % Monocytes 17 %    % Eosinophils 4 %    % Basophils 2 %    Absolute Neutrophils 2.0 1.6 - 8.3 10e3/uL    Absolute Lymphocytes 0.4 (L) 0.8 - 5.3 10e3/uL    Absolute Monocytes 0.5 0.0 - 1.3 10e3/uL    Absolute Eosinophils 0.1 0.0 - 0.7 10e3/uL    Absolute Basophils 0.1 0.0 - 0.2 10e3/uL    RBC Morphology Confirmed RBC Indices     Platelet Assessment (A) Automated Count Confirmed. Platelet morphology is normal.     Automated Count Confirmed. Giant platelets are present.    Elliptocytes Moderate (A) None Seen    RBC Fragments Slight (A) None Seen     Teardrop Cells Slight (A) None Seen     Reviewed myeloma labs.  Waterflow light chains  Lab Results   Component Value Date    KFLCA 57.39 (H) 2022    KFLCA 56.66 (H) 2022    KFLCA 55.04 (H) 2022    KFLCA 57.70 (H) 2021    KFLCA 41.63 (H) 11/10/2021    KFLCA 37.09 (H) 10/13/2021    KFLCA 34.15 (H) 2021    KFLCA 28.47 (H) 2021    KFLCA 21.55 (H) 2021    KFLCA 18.72 (H) 2021     kappa/lambda ratio  Lab Results   Component Value Date    KLRA 92.56 (H) 2022    KLRA 106.91 (H) 2022    KLRA 148.76 (H) 2022    KLRA 155.95 (H) 2021    KLRA 112.51 (H) 11/10/2021    KLRA 100.24 (H) 10/13/2021    KLRA 97.57 (H) 2021    KLRA 33.49 (H) 2021    KLRA 43.10 (H) 2021    KLRA 26.00 (H) 2021   ]  Imaging    XR Chest 1 View    Result Date: 3/14/2022  EXAM: XR CHEST 1 VIEW LOCATION: [facility] DATE/TIME: 3/14/2022 4:29 PM INDICATION: Chest pain. COMPARISON: None.     IMPRESSION: Right-sided portacatheter with tip in the SVC. No pulmonary infiltrates. Midthoracic compression fracture with previous vertebroplasty. Heart size upper limits of normal.    Echocardiogram Complete    Result Date: 3/17/2022  [id number]^SORTO^[place] [address]  Name: MONICA SCOTT MRN: [id number] : 1945 Study Date: 2022 11:22 AM Age: 76 yrs Gender: Male Patient Location: [facility] Reason For Study: Heart Failure Ordering Physician: ESTEVAN SORTO Performed By: ACE  BSA: 1.9 m2 Height: 68 in Weight: 160 lb HR: 79 BP: 147/79 mmHg ______________________________________________________________________________ Procedure Complete Echo Adult. Adequate quality two-dimensional was performed and interpreted. Adequate quality color and spectral Doppler were performed and interpreted. Compared to the prior study dated 11/3/2021, there are changes as noted.  ______________________________________________________________________________ Interpretation Summary  1. Left ventricular size, wall thickness, and systolic function are normal. The estimated left ventricular ejection fraction is 60-65%. 2. Right ventricular size and systolic function are normal. 3. Mild left and moderate right atrial enlargement. 4. Calcified trileaflet aortic valve with moderate aortic stenosis. Peak forward velocity measures 3.2 m/s, mean gradient 27 mm Hg, calculated aortic valve area 1.1 cm2, and dimensionless index 0.28. No significant regurgitation. 5. Moderate pulmonary hypertension is present with an estimated pulmonary artery systolic pressure of 54 mm Hg. 6. Compared to the prior study dated 11/3/2021, the Doppler data today show moderate aortic stenosis. However, the aortic valve has a similar appearance on both studies. There was likely inadequate Doppler assessment of the aortic valve on the prior study. Visually the aortic valve appears at least moderately stenotic on both studies. ______________________________________________________________________________ I      WMSI = 1.00     % Normal = 100  X - Cannot   0 -                      (2) - Mildly 2 -          Segments  Size Interpret    Hyperkinetic 1 - Normal  Hypokinetic  Hypokinetic  1-2     small                                                    7 -          3-5    moderate 3 - Akinetic 4 -          5 -         6 - Akinetic Dyskinetic   6-14    large              Dyskinetic   Aneurysmal  w/scar       w/scar       15-16   diffuse  Left Ventricle The left ventricle is normal in size. There is normal left ventricular wall thickness. Left ventricular systolic function is normal. The visual ejection fraction is 60-65%. Diastolic function not assessed due to atrial fibrillation. No regional wall motion abnormalities noted.  Right Ventricle The right ventricle is mildly dilated. The right ventricular systolic function is normal.   Atria The left atrium is mildly dilated. The right atrium is moderately dilated.  Mitral Valve The mitral valve leaflets are moderately thickened. There is mild mitral annular calcification. There is trace to mild mitral regurgitation. There is no mitral valve stenosis. The mean mitral valve gradient is 3.3 mmHg.  Tricuspid Valve Tricuspid valve leaflets appear normal. There is mild (1+) tricuspid regurgitation. The right ventricular systolic pressure is elevated at 46.4 mmHg. Moderate (46-55mmHg) pulmonary hypertension is present. There is no tricuspid stenosis.  Aortic Valve The aortic valve is trileaflet. Moderate aortic valve calcification is present. No aortic regurgitation is present. Moderate valvular aortic stenosis. The calculated aortic valve are is 1.1 cm^2. The mean AoV pressure gradient is 26.6 mmHg.  Pulmonic Valve The pulmonic valve is not well seen, but is grossly normal. There is trace to mild pulmonic valvular regurgitation. There is no pulmonic valvular stenosis.  Vessels The aorta root is normal. The thoracic aorta cannot be assessed. IVC diameter and respiratory changes fall into an intermediate range suggesting an RA pressure of 8 mmHg.  Pericardium There is no pericardial effusion.  Rhythm The rhythm was atrial fibrillation.  ______________________________________________________________________________ MMode/2D Measurements & Calculations IVSd: 0.73 cm LVIDd: 4.5 cm LVIDs: 2.8 cm LVPWd: 0.85 cm FS: 37.6 %  LV mass(C)d: 113.4 grams LV mass(C)dI: 61.0 grams/m2 Ao root diam: 3.5 cm LA dimension: 3.9 cm LA/Ao: 1.1 LVOT diam: 2.2 cm LVOT area: 3.9 cm2 LA Volume (BP): 63.7 ml LA Volume Index (BP): 34.2 ml/m2  LA Volume Indexed (AL/bp): 35.6 ml/m2 RWT: 0.38  Time Measurements MM HR: 59.0 BPM  Doppler Measurements & Calculations MV E max quinten: 168.0 cm/sec MV A max quinten: 77.1 cm/sec MV E/A: 2.2 MV max PG: 10.0 mmHg MV mean PG: 3.3 mmHg MV V2 VTI: 46.4 cm MVA(VTI): 1.8 cm2 MV dec slope: 1407 cm/sec2 MV  dec time: 0.12 sec Ao V2 max: 320.3 cm/sec Ao max P.0 mmHg Ao V2 mean: 244.7 cm/sec Ao mean P.6 mmHg Ao V2 VTI: 75.0 cm JOHANN(I,D): 1.1 cm2 JOHANN(V,D): 1.1 cm2 LV V1 max PG: 3.3 mmHg LV V1 max: 90.7 cm/sec LV V1 VTI: 21.7 cm SV(LVOT): 85.6 ml SI(LVOT): 46.0 ml/m2 PA acc time: 0.07 sec  TR max iam: 340.7 cm/sec TR max P.4 mmHg AV Iam Ratio (DI): 0.28 JOHANN Index (cm2/m2): 0.61 E/E' av.6 Lateral E/e': 18.4 Medial E/e': 24.9  ______________________________________________________________________________ Report approved by: Cristina Valenzuela 2022 12:33 PM       XR Chest Port 1 View    Result Date: 3/17/2022  EXAM: XR CHEST PORT 1 VIEW LOCATION: [facility] DATE/TIME: 3/17/2022 4:30 AM INDICATION: Dyspnea COMPARISON: 2022     IMPRESSION: New bilateral perihilar infiltrates greater in the right infrahilar region and retrocardiac region left lung base suspicious for possible perihilar pneumonia. Right-sided Port-A-Cath with tip in good position in the low SVC. Normal heart size  and pulmonary vascularity. Aortic calcification. Vertebroplasty.    NM Lexiscan stress test    Result Date: 3/21/2022     The nuclear stress test is negative for inducible myocardial ischemia or infarction.    The left ventricular ejection fraction at stress is greater than 70%.    The patient is at a low risk of future cardiac ischemic events.    There is no prior study for comparison.     CT Chest Abdomen Pelvis w/o Contrast    Result Date: 3/17/2022  EXAM: CT CHEST ABDOMEN PELVIS W/O CONTRAST LOCATION: [facility] DATE/TIME: 3/17/2022 6:56 AM INDICATION: Sepsis, multiple myeloma COMPARISON: PET/CT 2022, CT chest 2020 TECHNIQUE: CT scan of the chest, abdomen, and pelvis was performed without IV contrast. Multiplanar reformats were obtained. Dose reduction techniques were used. CONTRAST: None. FINDINGS: LUNGS AND PLEURA: Mild layering tracheal secretions. Small  bilateral pleural effusions with adjacent atelectasis. Bilateral lower lobar predominant bronchial wall thickening. No mass or suspicious nodule. MEDIASTINUM/AXILLAE: Right IJ port catheter. No thoracic adenopathy. Mild cardiomegaly. Trace pericardial effusion. Mitral annulus calcifications. CORONARY ARTERY CALCIFICATION: Severe. HEPATOBILIARY: Normal. PANCREAS: Normal. SPLEEN: Normal. ADRENAL GLANDS: Normal. KIDNEYS/BLADDER: 4 mm lower right renal stone. Punctate lower left renal stone. No hydronephrosis or obstructing ureteral stone. Normal urinary bladder. BOWEL: Normal caliber small bowel. Post appendectomy. Moderate stool burden within the distal sigmoid colon and rectum. No free air or free fluid. LYMPH NODES: Normal. VASCULATURE: Moderate atherosclerosis. PELVIC ORGANS: Normal. MUSCULOSKELETAL: Spinal and pelvic degenerative changes. Stable lucent and sclerotic changes within the right proximal humerus. Stable 1.1 cm lytic lesion within the right scapula extending to the scapular spine. Associated cortical erosion. Stable T6 vertebral body wedge compression fracture with vertebroplasty changes.     IMPRESSION: 1.  Small bilateral pleural effusions with adjacent atelectasis, right greater than left. Bilateral lower lobar predominant bronchiolitis. 2.  No acute findings in the abdomen or pelvis. No evidence of an inflammatory process or abscess. 3.  Stable small lytic lesion in the right scapula correlating with the patient's known multiple myeloma. Stable lucent and sclerotic changes in the right proximal humerus perhaps reflecting a healed osseous lesion.    CT Chest w Contrast    Result Date: 3/20/2022  EXAM: CT CHEST W CONTRAST LOCATION: [facility] DATE/TIME: 3/20/2022 8:10 AM INDICATION: Pneumonia, effusion or abscess suspected, xray done; multiple myeloma COMPARISON: CT of the chest 03/17/2022; PET/CT 01/27/2022 TECHNIQUE: CT chest with IV contrast. Multiplanar reformats were  obtained. Dose reduction techniques were used. CONTRAST: 75ml Isovue 370 FINDINGS: LUNGS AND PLEURA: Small bilateral pleural effusions are present layering posteriorly. Pleural fluid on the right is decreased from 03/17/2022. Associated passive atelectasis of the adjacent lower lobes. New subsegmental focus of groundglass attenuation along the anterior pleura of the right upper lobe, which does not conform to a specific vascular territory or anatomic distribution. There are no other new airspace opacities elsewhere. Mild central airway wall thickening. MEDIASTINUM: There is a right jugular approach chest port catheter which terminates at the SVC right atrial junction. Variant pulmonary vein anatomy with a right top pulmonary vein and independent drainage of the right middle lobe on the right and a single ostium of the pulmonary veins on the left. Cardiac chambers are not enlarged. Degenerative thickening and calcification of aortic valve leaflets. Mild mitral annular calcifications. No pericardial effusion. No thoracic aortic aneurysm. Mixed attenuation atheroma in the arch and descending aorta. No enlarged mediastinal or hilar lymph nodes. Esophagus is decompressed. Imaged thyroid gland is normal. CORONARY ARTERY CALCIFICATION: Severe. UPPER ABDOMEN: No new findings in the imaged upper abdomen. MUSCULOSKELETAL: T6 compression deformity with radiopaque cement. No new vertebral compression deformity. Healed fracture deformity left anterior seventh rib area Unchanged lytic lesion in the right scapula with focal loss of the cortex (series 5, image 41) which was metabolically active on the January 2022 PET/CT consistent with an area of multiple myeloma. No new aggressive or destructive bone lesions.     IMPRESSION: 1.  Small bilateral pleural effusions are present. Right pleural fluid is slightly decreased from 03/17/2022. 2.  Limited territory of subpleural groundglass attenuation in the anterior right upper lobe  consistent with small focus of new lung inflammation. Uncertain etiology. 3.  Unchanged lytic lesion in the right scapula. No aggressive or destructive bone lesions in the chest    Cardioversion External    Result Date: 4/1/2022  Allison Alexander APRN CNP     4/1/2022 10:29 AM [facility] Procedure: Cardioversion External Date/Time: 4/1/2022 10:25 AM Performed by: Allison Alexander APRN CNP Authorized by: Phuong Rivera MD UN[employer]ERSAL [employer]OTOC[employer] Site Marked: NA Prior Images Obtained and Reviewed:  Yes Required items: Required blood products, implants, devices and special equipment available  Patient identity confirmed:  Verbally with patient, arm band and provided demographic data Patient was reevaluated immediately before administering moderate or deep sedation or anesthesia Confirmation Checklist:  Patient's identity using two indicators, relevant allergies, procedure was appropriate and matched the consent or emergent situation and correct equipment/implants were available Time out: Immediately prior to the procedure a time out was called  Universal Protocol: the Joint Commission Universal Protocol was followed  Preparation: Patient was prepped and draped in usual sterile fashion   ANESTHESIA Anesthesia was administered and monitored by anesthesiology.  See anesthesia documentation for details. PROCEDURE DETAILS Pre-procedure rhythm: atrial fibrillation Patient position: patient was placed in a supine position Chest area: chest area exposed Electrodes: pads Electrodes placed: anterior-posterior Number of attempts: 1 Details of Attempts:  At 0958, after administration of IV Brevital by MDA and confirmation of adequate sedation, he received a single synchronous shock at 200 J with prompt restoration of sinus rhythm in the 50s to 60s, frequent atrial ectopy.  Post cardioversion EKG pending. Post-procedure rhythm: normal sinus rhythm Complications: no complications PROCEDURE Describe Procedure: History  of paroxysmal atrial fibrillation for approximately 3 years, appears to be newly persistent.  He reports fatigue, decreased activity tolerance, and dyspnea on exertion for the past 3 months.  He is undergoing cardioversion for symptom clarification.  Of note, he arrived in atrial fibrillation with periods of slow ventricular response in the 30s to 40s which may be the source of his symptoms.  He is not on any AV sean blocking agents or membrane active antiarrhythmic medications.  He would not likely tolerate AADs without the support of a pacemaker.  He is on Xarelto 20 mg daily for stroke prophylaxis which he takes with a full meal.  He denies missing any doses in >3 weeks. Successful cardioversion to sinus rhythm, but with frequent PACs. Continue Xarelto 20 mg daily for stroke prophylaxis. Follow-up with Allison Alexander CNP on 4/25/2022 Discharge to home 1 hour after cardioversion fully awake and stable. Patient Tolerance:  Patient tolerated the procedure well with no immediate complications Length of time physician/provider present for 1:1 monitoring during sedation: 10        Signed by: CECELIA Elaine CNP

## 2022-04-06 NOTE — PROGRESS NOTES
"Oncology Rooming Note    April 6, 2022 8:39 AM   Theo Myeers is a 77 year old male who presents for:    Chief Complaint   Patient presents with     Oncology Clinic Visit     Initial Vitals: /58   Pulse 68   Temp 97.9  F (36.6  C)   Resp 20   Wt 72.5 kg (159 lb 12.8 oz)   SpO2 95%   BMI 23.60 kg/m   Estimated body mass index is 23.6 kg/m  as calculated from the following:    Height as of 4/1/22: 1.753 m (5' 9\").    Weight as of this encounter: 72.5 kg (159 lb 12.8 oz). Body surface area is 1.88 meters squared.  No Pain (0) Comment: Data Unavailable   No LMP for male patient.  Allergies reviewed: Yes  Medications reviewed: Yes    Medications: Medication refills not needed today.  Pharmacy name entered into [facility]:    [id number] - [place], [place] - [address] [facility] [address] [facility]  [facility] - [place], [place] - [address]    Clinical concerns: None       Elizabeth Connell LPN            "

## 2022-04-06 NOTE — PROGRESS NOTES
Infusion Nursing Note:  Theo HURT Jasbircuauhtemocchris presents today for C1 D1 treatment.    Patient seen by provider today: Yes: BRITTANI Valdez CNP   present during visit today: Not Applicable.    Note: Treatment administered as ordered without difficulty.      Intravenous Access:  Labs drawn without difficulty.  Implanted Port.    Treatment Conditions:  Results reviewed, labs MET treatment parameters, ok to proceed with treatment.      Post Infusion Assessment:  Patient tolerated infusion without incident.  Blood return noted pre and post infusion.  Site patent and intact, free from redness, edema or discomfort.  No evidence of extravasations.  Access discontinued per protocol.       Discharge Plan:   Patient discharged in stable condition accompanied by: self.      Tashia Elias RN

## 2022-04-06 NOTE — LETTER
"    4/6/2022         RE: Theo Meyers  [address]  [facility] [address]        Dear Colleague,    Thank you for referring your patient, Theo Meyers, to the [facility] [place]. Please see a copy of my visit note below.    Oncology Rooming Note    April 6, 2022 8:39 AM   Theo Meyers is a 77 year old male who presents for:    Chief Complaint   Patient presents with     Oncology Clinic Visit     Initial Vitals: /58   Pulse 68   Temp 97.9  F (36.6  C)   Resp 20   Wt 72.5 kg (159 lb 12.8 oz)   SpO2 95%   BMI 23.60 kg/m   Estimated body mass index is 23.6 kg/m  as calculated from the following:    Height as of 4/1/22: 1.753 m (5' 9\").    Weight as of this encounter: 72.5 kg (159 lb 12.8 oz). Body surface area is 1.88 meters squared.  No Pain (0) Comment: Data Unavailable   No LMP for male patient.  Allergies reviewed: Yes  Medications reviewed: Yes    Medications: Medication refills not needed today.  Pharmacy name entered into [employer]:    [id number] [place], [place] - [address] AT [facility] [place] [address]  [facility] [address]  [place], [place] - [address] [place] [address]    Clinical concerns: None       Elizabeth Connell LPN              [facility] Progress Note    Patient: Theo Meyers  MRN: [id number]  Date of Service: Apr 6, 2022          Reason for Visit    Chief Complaint   Patient presents with     Oncology Clinic Visit       Assessment and Plan    Cancer Staging  No matching staging information was found for the patient.    1.  Myeloma: pt continues on daratumumab and dexamethasone and pomalyst. This regimen was initiated in October 2020.  He was having signs of progression.  He did meet with you to discuss CAR-T therapy.  He does need to be on another line of therapy before he qualifies for that so we are going to start a new regimen today with carfilzomib and dexamethasone.  He will get lower doses today " and tomorrow and then if things go well he will increase his dose next week.  I think on day 8 and 9 we will do 50% dosing and then on day 15 and 16 we will do 75% dosing if things go fine.  Patient does have some cardiac issues going on right now so I am reluctant to do full dose of the carfilzomib so we will see how he is doing with the cardiology appointments before deciding.  Patient will also get dexamethasone 20 mg on the day of his carfilzomib infusions which are day 1, 2, 8, 9, 15, 16.  He then has 1 off week.  We will see Dr. Clifford for cycle 2.  We will get baseline labs today.  Patient gave verbal consent today we talked about the expected side effects.  Encourage patient to call us with any worsening cardiac symptoms including shortness of breath, swelling in his legs, chest pain or high blood pressure.  His last echo that was done a couple weeks ago showed normal ejection fraction.  His BNP is quite elevated he is on diuretics currently.       2. DVT: This is recurrent.  Will be on xarelto indefinitely.      3. Anemia: likely from treatment and myeloma. No complications. Will continue to monitor.     4.  A. fib with CHF: Patient is following up with cardiology.  He did have a cardioversion on Friday.  His heart rate is fairly normal today but there is a couple of irregular heartbeats.  Patient is asymptomatic.  He states that he has been put on increasing doses of furosemide.  He is hoping he can go back down on that because he is lost a lot of weight.  He never noticed much swelling in his legs but they are definitely thinner now.  He has an appointment with someone from cardiology on Friday and then he is also go to be seen someone at the [facility] on the 19th for pulmonary hypertension.  Patient is very focused on getting his heart in good shape so he can go on to get his CAR-T therapy this summer when he needs it.     ECOG Performance    0 - Independent    Distress Screening (within  last 30 days)    No data recorded     Pain  Pain Score: No Pain (0)    Problem List    Patient Active Problem List   Diagnosis     Multiple myeloma (H)     Unspecified glaucoma     Cataract     DVT (deep venous thrombosis) (H)     Shingles     Shingles (herpes zoster) polyneuropathy     Back pain     Post herpetic neuralgia     Pancytopenia (H)     Syncope and collapse     Elevated INR     History of DVT (deep vein thrombosis)     Chronic deep vein thrombosis (DVT) of left lower extremity, unspecified vein (H)     Spinal stenosis of lumbar region without neurogenic claudication     Anemia, vitamin B12 deficiency     Chemotherapy-induced neutropenia (H)     Thrombocytopenia (H)     Paroxysmal atrial fibrillation (H)     Nonrheumatic aortic valve stenosis     SAMANO (dyspnea on exertion)     Immunocompromised state (H)     Neutropenic fever (H)     Personal history of DVT (deep vein thrombosis)     Paroxysmal A-fib (H)     Pneumonia of both lungs due to infectious organism, unspecified part of lung        ______________________________________________________________________________    History of Present Illness    DIAGNOSIS:   1. IgG kappa light chain myeloma. Hyposecretory. Diagnosed 2009.   He presented at diagnosis with a lytic lesion involving the C6 vertebral body, although no measurable M protein. IgG kappa monoclonal immunoglobulin was confirmed by [facility] as well as elevated kappa free light chains with an abnormal kappa lambda ratio. Bone marrow biopsy showed 30% involvement and cytogenetics confirmed deletion of 13 q. as well as 11;14 translocation.    2. Deep vein thrombosis of the left leg diagnosed 09/2012 while on treatment.       CURRENT TREATMENT:   Started Daratumumab in October 2020. Dexamethasone weekly as well.  Today is cycle 19. Pomalyst added with cycle 3.  We have decreased the dose initially and then have now gone back up to full dose.         PAST TREATMENT and HISTORY:    -He was initially treated  with Velcade and dexamethasone and achieved a good partial response.     -He was referred to the [facility] where he underwent high-dose chemotherapy with autologous peripheral stem cell transplant in April of 2010.     -He began Revlimid as maintenance therapy post transplant.     -Repeat bone marrow biopsy done October 2010 showed about 5% kappa restricted plasma cells and so at that time weekly Velcade was added along with his maintenance Revlimid and dexamethasone. He required dose reductions of weekly Velcade because of cytopenias and was ultimately switched to a q.2 week maintenance schedule which he has been on since September 2012. His Revlimid dose is 20 mg daily and he continues on dexamethasone 20 mg p.o. q. Week.  Was on this until October 2020 when he had signs of progression.        INTERIM HISTORY:     Pt is here today for follow up.  Patient is doing pretty well.  He was recently in the hospital with some pneumonia and started having some cardiac issues.  He was found to have some A. fib and some heart failure and may be even some pulmonary hypertension.  He is been seeing a couple different cardiologist to deal with all this and has been put on medications.  He did get a cardioversion on Friday and said it went well.  Overall he states he is asymptomatic from A. fib and does not really feel any palpitations.  He denies any breathing issues.  He says overall he continues to get better and stronger from being in the hospital but does not quite feel that he is at 100%.  A little nervous to start a new regimen but understands why we have to do it.    Review of Systems    Pertinent items are noted in HPI.    Past History    Past Medical History:   Diagnosis Date     Anemia 12/13/2017     Arthritis      Bilateral inguinal hernia      Chest tube in place      Glaucoma      Glaucoma      History of blood clots     DVT - left chronic     Multiple myeloma (H)     Gets chemo infusions every 2  weeks     Pancytopenia (H)      Parapneumonic effusion      Peripheral neuropathy      Syncope      TMJ (temporomandibular joint disorder)        PHYSICAL EXAM  /58   Pulse 68   Temp 97.9  F (36.6  C)   Resp 20   Wt 72.5 kg (159 lb 12.8 oz)   SpO2 95%   BMI 23.60 kg/m      GENERAL: no acute distress. Cooperative in conversation. Here with wife today. Mask on  RESP: Regular respiratory rate. No expiratory wheezes   MUSCULOSKELETAL: no bilateral leg swelling  NEURO: non focal. Alert and oriented x3.   PSYCH: within normal limits. No depression or anxiety.  SKIN: exposed skin is dry intact.        Lab Results    Recent Results (from the past 168 hour(s))   Potassium   Result Value Ref Range    Potassium 3.7 3.5 - 5.0 mmol/L   ECG 12-lead with MUSE [LHE]   Result Value Ref Range    Systolic Blood Pressure  mmHg    Diastolic Blood Pressure  mmHg    Ventricular Rate 59 BPM    Atrial Rate 59 BPM    NM Interval 174 ms    QRS Duration 88 ms     ms    QTc 394 ms    P Axis 83 degrees    R AXIS 37 degrees    T Axis 60 degrees    Interpretation ECG       Sinus bradycardia with Premature supraventricular complexes  Blocked Premature supraventricular complexes  Otherwise normal ECG  When compared with ECG of 17-MAR-2022 04:10,  Sinus rhythm has replaced Atrial fibrillation  Non-specific change in ST segment in Inferior leads  T wave inversion no longer evident in Inferior leads  Nonspecific T wave abnormality no longer evident in Anterior leads  Confirmed by DESIREE GRANDE, BHAVESH LOC:JN ([id number]) on 4/1/2022 2:54:59 PM     Comprehensive metabolic panel   Result Value Ref Range    Sodium 140 136 - 145 mmol/L    Potassium 3.5 3.5 - 5.0 mmol/L    Chloride 107 98 - 107 mmol/L    Carbon Dioxide (CO2) 24 22 - 31 mmol/L    Anion Gap 9 5 - 18 mmol/L    Urea Nitrogen 22 8 - 28 mg/dL    Creatinine 1.31 (H) 0.70 - 1.30 mg/dL    Calcium 8.4 (L) 8.5 - 10.5 mg/dL    Glucose 121 70 - 125 mg/dL    Alkaline Phosphatase 67 45 - 120 U/L     AST 14 0 - 40 U/L    ALT 27 0 - 45 U/L    Protein Total 5.0 (L) 6.0 - 8.0 g/dL    Albumin 2.6 (L) 3.5 - 5.0 g/dL    Bilirubin Total 0.4 0.0 - 1.0 mg/dL    GFR Estimate 56 (L) >60 mL/min/1.73m2   CBC with platelets and differential   Result Value Ref Range    WBC Count 3.1 (L) 4.0 - 11.0 10e3/uL    RBC Count 3.30 (L) 4.40 - 5.90 10e6/uL    Hemoglobin 11.1 (L) 13.3 - 17.7 g/dL    Hematocrit 35.0 (L) 40.0 - 53.0 %     (H) 78 - 100 fL    MCH 33.6 (H) 26.5 - 33.0 pg    MCHC 31.7 31.5 - 36.5 g/dL    RDW 16.3 (H) 10.0 - 15.0 %    Platelet Count 173 150 - 450 10e3/uL   Manual Differential   Result Value Ref Range    % Neutrophils 65 %    % Lymphocytes 12 %    % Monocytes 17 %    % Eosinophils 4 %    % Basophils 2 %    Absolute Neutrophils 2.0 1.6 - 8.3 10e3/uL    Absolute Lymphocytes 0.4 (L) 0.8 - 5.3 10e3/uL    Absolute Monocytes 0.5 0.0 - 1.3 10e3/uL    Absolute Eosinophils 0.1 0.0 - 0.7 10e3/uL    Absolute Basophils 0.1 0.0 - 0.2 10e3/uL    RBC Morphology Confirmed RBC Indices     Platelet Assessment (A) Automated Count Confirmed. Platelet morphology is normal.     Automated Count Confirmed. Giant platelets are present.    Elliptocytes Moderate (A) None Seen    RBC Fragments Slight (A) None Seen    Teardrop Cells Slight (A) None Seen     Reviewed myeloma labs.  Gideon light chains  Lab Results   Component Value Date    KFLCA 57.39 (H) 02/23/2022    KFLCA 56.66 (H) 01/26/2022    KFLCA 55.04 (H) 01/05/2022    KFLCA 57.70 (H) 12/08/2021    KFLCA 41.63 (H) 11/10/2021    KFLCA 37.09 (H) 10/13/2021    KFLCA 34.15 (H) 08/17/2021    KFLCA 28.47 (H) 06/23/2021    KFLCA 21.55 (H) 04/28/2021    KFLCA 18.72 (H) 03/17/2021     kappa/lambda ratio  Lab Results   Component Value Date    KLRA 92.56 (H) 02/23/2022    KLRA 106.91 (H) 01/26/2022    KLRA 148.76 (H) 01/05/2022    KLRA 155.95 (H) 12/08/2021    KLRA 112.51 (H) 11/10/2021    KLRA 100.24 (H) 10/13/2021    KLRA 97.57 (H) 08/17/2021    KLRA 33.49 (H) 06/23/2021    KLRA  43.10 (H) 2021    KLJYOTI 26.00 (H) 2021   ]  Imaging    XR Chest 1 View    Result Date: 3/14/2022  EXAM: XR CHEST 1 VIEW LOCATION: [facility] DATE/TIME: 3/14/2022 4:29 PM INDICATION: Chest pain. COMPARISON: None.     IMPRESSION: Right-sided portacatheter with tip in the SVC. No pulmonary infiltrates. Midthoracic compression fracture with previous vertebroplasty. Heart size upper limits of normal.    Echocardiogram Complete    Result Date: 3/17/2022  [id number]^NOREEN^[place] [address]  Name: MONICA SCOTT MRN: [id number] : 1945 Study Date: 2022 11:22 AM Age: 76 yrs Gender: Male Patient Location: [facility] Reason For Study: Heart Failure Ordering Physician: ESTEVAN SORTO Performed By: ACE  BSA: 1.9 m2 Height: 68 in Weight: 160 lb HR: 79 BP: 147/79 mmHg ______________________________________________________________________________ Procedure Complete Echo Adult. Adequate quality two-dimensional was performed and interpreted. Adequate quality color and spectral Doppler were performed and interpreted. Compared to the prior study dated 11/3/2021, there are changes as noted. ______________________________________________________________________________ Interpretation Summary  1. Left ventricular size, wall thickness, and systolic function are normal. The estimated left ventricular ejection fraction is 60-65%. 2. Right ventricular size and systolic function are normal. 3. Mild left and moderate right atrial enlargement. 4. Calcified trileaflet aortic valve with moderate aortic stenosis. Peak forward velocity measures 3.2 m/s, mean gradient 27 mm Hg, calculated aortic valve area 1.1 cm2, and dimensionless index 0.28. No significant regurgitation. 5. Moderate pulmonary hypertension is present with an estimated pulmonary artery systolic pressure of 54 mm Hg. 6. Compared to the prior study dated  11/3/2021, the Doppler data today show moderate aortic stenosis. However, the aortic valve has a similar appearance on both studies. There was likely inadequate Doppler assessment of the aortic valve on the prior study. Visually the aortic valve appears at least moderately stenotic on both studies. ______________________________________________________________________________ I      WMSI = 1.00     % Normal = 100  X - Cannot   0 -                      (2) - Mildly 2 -          Segments  Size Interpret    Hyperkinetic 1 - Normal  Hypokinetic  Hypokinetic  1-2     small                                                    7 -          3-5    moderate 3 - Akinetic 4 -          5 -         6 - Akinetic Dyskinetic   6-14    large              Dyskinetic   Aneurysmal  w/scar       w/scar       15-16   diffuse  Left Ventricle The left ventricle is normal in size. There is normal left ventricular wall thickness. Left ventricular systolic function is normal. The visual ejection fraction is 60-65%. Diastolic function not assessed due to atrial fibrillation. No regional wall motion abnormalities noted.  Right Ventricle The right ventricle is mildly dilated. The right ventricular systolic function is normal.  Atria The left atrium is mildly dilated. The right atrium is moderately dilated.  Mitral Valve The mitral valve leaflets are moderately thickened. There is mild mitral annular calcification. There is trace to mild mitral regurgitation. There is no mitral valve stenosis. The mean mitral valve gradient is 3.3 mmHg.  Tricuspid Valve Tricuspid valve leaflets appear normal. There is mild (1+) tricuspid regurgitation. The right ventricular systolic pressure is elevated at 46.4 mmHg. Moderate (46-55mmHg) pulmonary hypertension is present. There is no tricuspid stenosis.  Aortic Valve The aortic valve is trileaflet. Moderate aortic valve calcification is present. No aortic regurgitation is present. Moderate valvular aortic stenosis.  The calculated aortic valve are is 1.1 cm^2. The mean AoV pressure gradient is 26.6 mmHg.  Pulmonic Valve The pulmonic valve is not well seen, but is grossly normal. There is trace to mild pulmonic valvular regurgitation. There is no pulmonic valvular stenosis.  Vessels The aorta root is normal. The thoracic aorta cannot be assessed. IVC diameter and respiratory changes fall into an intermediate range suggesting an RA pressure of 8 mmHg.  Pericardium There is no pericardial effusion.  Rhythm The rhythm was atrial fibrillation.  ______________________________________________________________________________ MMode/2D Measurements & Calculations IVSd: 0.73 cm LVIDd: 4.5 cm LVIDs: 2.8 cm LVPWd: 0.85 cm FS: 37.6 %  LV mass(C)d: 113.4 grams LV mass(C)dI: 61.0 grams/m2 Ao root diam: 3.5 cm LA dimension: 3.9 cm LA/Ao: 1.1 LVOT diam: 2.2 cm LVOT area: 3.9 cm2 LA Volume (BP): 63.7 ml LA Volume Index (BP): 34.2 ml/m2  LA Volume Indexed (AL/bp): 35.6 ml/m2 RWT: 0.38  Time Measurements MM HR: 59.0 BPM  Doppler Measurements & Calculations MV E max iam: 168.0 cm/sec MV A max iam: 77.1 cm/sec MV E/A: 2.2 MV max PG: 10.0 mmHg MV mean PG: 3.3 mmHg MV V2 VTI: 46.4 cm MVA(VTI): 1.8 cm2 MV dec slope: 1407 cm/sec2 MV dec time: 0.12 sec Ao V2 max: 320.3 cm/sec Ao max P.0 mmHg Ao V2 mean: 244.7 cm/sec Ao mean P.6 mmHg Ao V2 VTI: 75.0 cm JOHANN(I,D): 1.1 cm2 JOHANN(V,D): 1.1 cm2 LV V1 max PG: 3.3 mmHg LV V1 max: 90.7 cm/sec LV V1 VTI: 21.7 cm SV(LVOT): 85.6 ml SI(LVOT): 46.0 ml/m2 PA acc time: 0.07 sec  TR max iam: 340.7 cm/sec TR max P.4 mmHg AV Iam Ratio (DI): 0.28 JOHANN Index (cm2/m2): 0.61 E/E' av.6 Lateral E/e': 18.4 Medial E/e': 24.9  ______________________________________________________________________________ Report approved by: Cristina Valenzuela 2022 12:33 PM       XR Chest Port 1 View    Result Date: 3/17/2022  EXAM: XR CHEST PORT 1 VIEW LOCATION: [facility] DATE/TIME: 3/17/2022 4:30 AM  INDICATION: Dyspnea COMPARISON: 03/14/2022     IMPRESSION: New bilateral perihilar infiltrates greater in the right infrahilar region and retrocardiac region left lung base suspicious for possible perihilar pneumonia. Right-sided Port-A-Cath with tip in good position in the low SVC. Normal heart size  and pulmonary vascularity. Aortic calcification. Vertebroplasty.    NM Lexiscan stress test    Result Date: 3/21/2022     The nuclear stress test is negative for inducible myocardial ischemia or infarction.    The left ventricular ejection fraction at stress is greater than 70%.    The patient is at a low risk of future cardiac ischemic events.    There is no prior study for comparison.     CT Chest Abdomen Pelvis w/o Contrast    Result Date: 3/17/2022  EXAM: CT CHEST ABDOMEN PELVIS W/O CONTRAST LOCATION: [facility] DATE/TIME: 3/17/2022 6:56 AM INDICATION: Sepsis, multiple myeloma COMPARISON: PET/CT 01/27/2022, CT chest 12/09/2020 TECHNIQUE: CT scan of the chest, abdomen, and pelvis was performed without IV contrast. Multiplanar reformats were obtained. Dose reduction techniques were used. CONTRAST: None. FINDINGS: LUNGS AND PLEURA: Mild layering tracheal secretions. Small bilateral pleural effusions with adjacent atelectasis. Bilateral lower lobar predominant bronchial wall thickening. No mass or suspicious nodule. MEDIASTINUM/AXILLAE: Right IJ port catheter. No thoracic adenopathy. Mild cardiomegaly. Trace pericardial effusion. Mitral annulus calcifications. CORONARY ARTERY CALCIFICATION: Severe. HEPATOBILIARY: Normal. PANCREAS: Normal. SPLEEN: Normal. ADRENAL GLANDS: Normal. KIDNEYS/BLADDER: 4 mm lower right renal stone. Punctate lower left renal stone. No hydronephrosis or obstructing ureteral stone. Normal urinary bladder. BOWEL: Normal caliber small bowel. Post appendectomy. Moderate stool burden within the distal sigmoid colon and rectum. No free air or free fluid. LYMPH NODES: Normal.  VASCULATURE: Moderate atherosclerosis. PELVIC ORGANS: Normal. MUSCULOSKELETAL: Spinal and pelvic degenerative changes. Stable lucent and sclerotic changes within the right proximal humerus. Stable 1.1 cm lytic lesion within the right scapula extending to the scapular spine. Associated cortical erosion. Stable T6 vertebral body wedge compression fracture with vertebroplasty changes.     IMPRESSION: 1.  Small bilateral pleural effusions with adjacent atelectasis, right greater than left. Bilateral lower lobar predominant bronchiolitis. 2.  No acute findings in the abdomen or pelvis. No evidence of an inflammatory process or abscess. 3.  Stable small lytic lesion in the right scapula correlating with the patient's known multiple myeloma. Stable lucent and sclerotic changes in the right proximal humerus perhaps reflecting a healed osseous lesion.    CT Chest w Contrast    Result Date: 3/20/2022  EXAM: CT CHEST W CONTRAST LOCATION: [facility] DATE/TIME: 3/20/2022 8:10 AM INDICATION: Pneumonia, effusion or abscess suspected, xray done; multiple myeloma COMPARISON: CT of the chest 03/17/2022; PET/CT 01/27/2022 TECHNIQUE: CT chest with IV contrast. Multiplanar reformats were obtained. Dose reduction techniques were used. CONTRAST: 75ml Isovue 370 FINDINGS: LUNGS AND PLEURA: Small bilateral pleural effusions are present layering posteriorly. Pleural fluid on the right is decreased from 03/17/2022. Associated passive atelectasis of the adjacent lower lobes. New subsegmental focus of groundglass attenuation along the anterior pleura of the right upper lobe, which does not conform to a specific vascular territory or anatomic distribution. There are no other new airspace opacities elsewhere. Mild central airway wall thickening. MEDIASTINUM: There is a right jugular approach chest port catheter which terminates at the SVC right atrial junction. Variant pulmonary vein anatomy with a right top pulmonary  vein and independent drainage of the right middle lobe on the right and a single ostium of the pulmonary veins on the left. Cardiac chambers are not enlarged. Degenerative thickening and calcification of aortic valve leaflets. Mild mitral annular calcifications. No pericardial effusion. No thoracic aortic aneurysm. Mixed attenuation atheroma in the arch and descending aorta. No enlarged mediastinal or hilar lymph nodes. Esophagus is decompressed. Imaged thyroid gland is normal. CORONARY ARTERY CALCIFICATION: Severe. UPPER ABDOMEN: No new findings in the imaged upper abdomen. MUSCULOSKELETAL: T6 compression deformity with radiopaque cement. No new vertebral compression deformity. Healed fracture deformity left anterior seventh rib area Unchanged lytic lesion in the right scapula with focal loss of the cortex (series 5, image 41) which was metabolically active on the January 2022 PET/CT consistent with an area of multiple myeloma. No new aggressive or destructive bone lesions.     IMPRESSION: 1.  Small bilateral pleural effusions are present. Right pleural fluid is slightly decreased from 03/17/2022. 2.  Limited territory of subpleural groundglass attenuation in the anterior right upper lobe consistent with small focus of new lung inflammation. Uncertain etiology. 3.  Unchanged lytic lesion in the right scapula. No aggressive or destructive bone lesions in the chest    Cardioversion External    Result Date: 4/1/2022  Allison Alexander APRN CNP     4/1/2022 10:29 AM [facility] Procedure: Cardioversion External Date/Time: 4/1/2022 10:25 AM Performed by: Allison Alexander APRN CNP Authorized by: Phuong Rivera MD [employer] Site Marked: NA Prior Images Obtained and Reviewed:  Yes Required items: Required blood products, implants, devices and special equipment available  Patient identity confirmed:  Verbally with patient, arm band and provided demographic data Patient was reevaluated immediately  before administering moderate or deep sedation or anesthesia Confirmation Checklist:  Patient's identity using two indicators, relevant allergies, procedure was appropriate and matched the consent or emergent situation and correct equipment/implants were available Time out: Immediately prior to the procedure a time out was called  Universal Protocol: the Joint Commission Universal Protocol was followed  Preparation: Patient was prepped and draped in usual sterile fashion   ANESTHESIA Anesthesia was administered and monitored by anesthesiology.  See anesthesia documentation for details. PROCEDURE DETAILS Pre-procedure rhythm: atrial fibrillation Patient position: patient was placed in a supine position Chest area: chest area exposed Electrodes: pads Electrodes placed: anterior-posterior Number of attempts: 1 Details of Attempts:  At 0958, after administration of IV Brevital by MDA and confirmation of adequate sedation, he received a single synchronous shock at 200 J with prompt restoration of sinus rhythm in the 50s to 60s, frequent atrial ectopy.  Post cardioversion EKG pending. Post-procedure rhythm: normal sinus rhythm Complications: no complications PROCEDURE Describe Procedure: History of paroxysmal atrial fibrillation for approximately 3 years, appears to be newly persistent.  He reports fatigue, decreased activity tolerance, and dyspnea on exertion for the past 3 months.  He is undergoing cardioversion for symptom clarification.  Of note, he arrived in atrial fibrillation with periods of slow ventricular response in the 30s to 40s which may be the source of his symptoms.  He is not on any AV sean blocking agents or membrane active antiarrhythmic medications.  He would not likely tolerate AADs without the support of a pacemaker.  He is on Xarelto 20 mg daily for stroke prophylaxis which he takes with a full meal.  He denies missing any doses in >3 weeks. Successful cardioversion to sinus rhythm, but with  frequent PACs. Continue Xarelto 20 mg daily for stroke prophylaxis. Follow-up with Allison Alexander CNP on 4/25/2022 Discharge to home 1 hour after cardioversion fully awake and stable. Patient Tolerance:  Patient tolerated the procedure well with no immediate complications Length of time physician/provider present for 1:1 monitoring during sedation: 10        Signed by: CECELIA Elaine CNP      Again, thank you for allowing me to participate in the care of your patient.        Sincerely,        CECELIA Elaine CNP

## 2022-04-07 PROBLEM — I50.31 ACUTE HEART FAILURE WITH PRESERVED EJECTION FRACTION (HFPEF) (H): Status: ACTIVE | Noted: 2022-01-01

## 2022-04-07 NOTE — PROGRESS NOTES
Pt arrived ambulatory to clinic for Cycle # 1 Day # 2 of his chemotherapy regimen.  Port was accessed using aseptic technique without difficulties with excellent blood return.  Administered Dextrose and Carfilzomib per MD order.  Pt tolerated infusion well, no s/s of infusion reaction.  Port was flushed with NS and Heparin then de-accessed using 2x2 and papertape.  Pt verbalized understanding of plan of care and return to clinic.

## 2022-04-08 PROBLEM — I82.502 CHRONIC DEEP VEIN THROMBOSIS (DVT) OF LEFT LOWER EXTREMITY, UNSPECIFIED VEIN (H): Status: RESOLVED | Noted: 2018-03-28 | Resolved: 2022-01-01

## 2022-04-08 PROBLEM — I27.20 MODERATE PULMONARY HYPERTENSION (H): Status: ACTIVE | Noted: 2022-01-01

## 2022-04-08 NOTE — LETTER
4/8/2022    Mathew Ott MD  [address] [place] [address]    RE: Theo Meyers       Dear Colleague,     I had the pleasure of seeing Theo Meyers in the [facility].          Assessment/Recommendations   Assessment:    1.  Heart failure with preserved ejection fraction, NYHA class II -III: Likely secondary to multifactorial including diastolic heart failure, moderate pulmonary hypertension, and atrial fibrillation.      Most recent NT proBNP level is in 1800s.  He is currently on furosemide 40 mg daily  On potassium chloride 20 mEq every other day  Most recent BMP stable on 4/6/2022-reviewed    Patient reports feeling improvement in shortness of breath since cardioversion.  He also thinks that his shortness of breath is improved since his pneumonia is resolving.  He still gets some shortness of breath on exertion.    He is well compensated with no evidence of fluid retention on assessment.    His current home weight is stable at 155 pounds.  His admission weight was 160 pounds and his discharge weight was 149 pounds.    He is trying to follow a low-sodium diet less than 2 g/day.  However he does report eating huge breakfast at [employer].  He also reports improvement in his appetite since hospitalization in March.    We discussed and reviewed about heart failure, medication management, and lifestyle management including low sodium diet <2 g/day, daily weight, and staying physically active as tolerated. Patient met with the nurse clinician for heart failure education.    2.  Newly diagnosis of atrial fibrillation in March: Patient underwent successful cardioversion on 4/1/2022.  His heart rate is regular and controlled except occasional extra beats.  On Xarelto for stroke prophylaxis.    3.  Moderate pulmonary hypertension: Patient is scheduled to see Dr. Brizuela in April in pulmonary hypertension clinic at [facility].    Plan/Recommendation:  -Continue current diuretic  regimen.  -Patient was highly encouraged to stay on a low-sodium diet less than 2 g/day, daily weight check, and to maintain fluid intake at 50 to 60 ounces per day.  -He was encouraged to call back if experience persistent weight gain with worsening heart failure symptoms  -Continue current A. fib regimen and follow-up with Allison Alexander CNP in A. fib clinic as scheduled  -Follow-up with me in 8 weeks      History of Present Illness/Subjective    Mr. Theo Meyers is a 77 year old male with a past medical history of multiple myeloma with status post stem cell transplant 2010, moderate aortic valve stenosis, glaucoma, atrial fibrillation, viral pneumonia, pulmonary hypertension, and heart failure with preserved ejection fraction who is seen at [facility] for heart failure follow-up.    Patient was hospitalized in March with progressive exertional dyspnea likely secondary to acute congestive heart failure, new diagnosis of atrial fibrillation, primary hypertension, and viral pneumonia.  Echocardiogram showed preserved LVEF 60 to 65% with moderate aortic stenosis, moderate portal hypertension.  Patient was treated with tapering course of steroid therapy.  Patient underwent successful cardioversion.  He also had a follow-up appointment with primary cardiology-note reviewed.    Today, Keith reports overall feeling improvement in his heart failure symptoms since he was discharged from the hospital and especially with recent cardioversion although he also states that he is pneumonia is resolving too.    He denies fatigue, lightheadedness, shortness of breath, orthopnea, PND, palpitations, chest pain, abdominal fullness/bloating and lower extremity edema.  He is try to keep his sodium intake low.  He is keeping track of his weight.  He reports improvement in his appetite and there has been slight weight gain.    Lexiscan Stress Test done on 3/18/22  Result Text      The nuclear  "stress test is negative for inducible myocardial ischemia or infarction.     The left ventricular ejection fraction at stress is greater than 70%.     The patient is at a low risk of future cardiac ischemic events.     There is no prior study for comparison.    ECHO-Reviewed:   Interpretation Summary   1. Left ventricular size, wall thickness, and systolic function are normal.  The estimated left ventricular ejection fraction is 60-65%.  2. Right ventricular size and systolic function are normal.  3. Mild left and moderate right atrial enlargement.  4. Calcified trileaflet aortic valve with moderate aortic stenosis. Peak  forward velocity measures 3.2 m/s, mean gradient 27 mm Hg, calculated aortic  valve area 1.1 cm2, and dimensionless index 0.28. No significant  regurgitation.  5. Moderate pulmonary hypertension is present with an estimated pulmonary  artery systolic pressure of 54 mm Hg.  6. Compared to the prior study dated 11/3/2021, the Doppler data today show  moderate aortic stenosis. However, the aortic valve has a similar appearance  on both studies. There was likely inadequate Doppler assessment of the aortic  valve on the prior study. Visually the aortic valve appears at least  moderately stenotic on both studies.         Physical Examination Review of Systems   /70 (BP Location: Left arm, Patient Position: Sitting, Cuff Size: Adult Regular)   Pulse 80   Resp 20   Ht 1.753 m (5' 9\")   Wt 74.8 kg (165 lb)   BMI 24.37 kg/m    Body mass index is 24.37 kg/m .  Wt Readings from Last 3 Encounters:   04/08/22 74.8 kg (165 lb)   04/06/22 72.5 kg (159 lb 12.8 oz)   04/06/22 72.5 kg (159 lb 12.8 oz)     General Appearance:   no distress, normal body habitus   ENT/Mouth: membranes moist, no oral lesions or bleeding gums.      EYES:  no scleral icterus, normal conjunctivae   Neck: no carotid bruits or thyromegaly   Chest/Lungs:   lungs are clear to auscultation, no rales or wheezing, equal chest wall " expansion    Cardiovascular:    Heart rate regular with occasional skipped beats. Normal first and second heart sounds with no murmurs, rubs, or gallops; the carotid, radial and posterior tibial pulses are intact,. Jugular venous pressure flat with no edema bilaterally    Abdomen:  no organomegaly, masses, bruits, or tenderness; bowel sounds are present   Extremities   no cyanosis or clubbing   Radial pulses and Pedal pulses intact and symmetrical.  CMS intact.   Skin: no xanthelasma, warm.    Neurologic: normal  bilateral, no tremors   Psychiatric: alert and oriented x3, calm                             Negative unless noted in HPI     Medical History  Surgical History Family History Social History   Past Medical History:   Diagnosis Date     Anemia 12/13/2017     Arthritis      Bilateral inguinal hernia      Chest tube in place      Glaucoma      Glaucoma      History of blood clots     DVT - left chronic     Multiple myeloma (H)     Gets chemo infusions every 2 weeks     Pancytopenia (H)      Parapneumonic effusion      Peripheral neuropathy      Syncope      TMJ (temporomandibular joint disorder)     Past Surgical History:   Procedure Laterality Date     APPENDECTOMY      Age 16     ESOPHAGOSCOPY, GASTROSCOPY, DUODENOSCOPY (EGD), COMBINED N/A 12/14/2017    Procedure: ESOPHAGOGASTRODUODENOSCOPY (EGD) WITH BIOPSYS;  Surgeon: Marlon Roy MD;  Location: [facility];  Service:      ESOPHAGOSCOPY, GASTROSCOPY, DUODENOSCOPY (EGD), COMBINED N/A 1/19/2018    Procedure: ENDOSCOPIC ULTRASOUND  ESOPHAGOGASTRODUODENOSCOPY WITH DUODENAL POLYP REMOVAL;  Surgeon: Familia Mcgraw MD;  Location: [facility] OR;  Service:      EYE SURGERY      Cataract     IR MISCELLANEOUS PROCEDURE  6/17/2009     IR MISCELLANEOUS PROCEDURE  7/31/2009     IR MISCELLANEOUS PROCEDURE  8/13/2009     IR PLEURAL DRAINAGE WITH CATHETER INSERTION  7/2/2019     PORTACATH PLACEMENT       RELEASE CARPAL TUNNEL Bilateral      US THORACENTESIS   2019     VERTEBROPLASTY      T6     WISDOM TOOTH EXTRACTION      Family History   Problem Relation Age of Onset     Heart Disease Mother      Glaucoma Mother      Cancer Father      No Known Problems Sister      Cancer Brother      Pancreatic Cancer Brother      No Known Problems Brother      Cancer Daughter      Skin Cancer Daughter      Melanoma Daughter      Glaucoma Daughter     Social History     Socioeconomic History     Marital status:      Spouse name: Not on file     Number of children: Not on file     Years of education: Not on file     Highest education level: Not on file   Occupational History     Not on file   Tobacco Use     Smoking status: Former Smoker     Quit date: 1975     Years since quittin.4     Smokeless tobacco: Never Used   Substance and Sexual Activity     Alcohol use: Not Currently     Comment: Alcoholic Drinks/day: occasional     Drug use: No     Sexual activity: Never   Other Topics Concern     Not on file   Social History Narrative     Not on file     Social Determinants of Health     Financial Resource Strain: Not on file   Food Insecurity: Not on file   Transportation Needs: Not on file   Physical Activity: Not on file   Stress: Not on file   Social Connections: Not on file   Intimate Partner Violence: Not on file   Housing Stability: Not on file          Medications  Allergies   Current Outpatient Medications   Medication Sig Dispense Refill     acetaminophen (TYLENOL) 500 MG tablet Take 500 mg by mouth every 8 hours as needed        acyclovir (ZOVIRAX) 400 MG tablet TAKE 1 TABLET BY MOUTH TWICE DAILY 180 tablet 1     ascorbic acid 1000 MG TABS tablet Take 1,000 mg by mouth daily        atorvastatin (LIPITOR) 10 MG tablet Take 1 tablet (10 mg) by mouth daily 90 tablet 3     bimatoprost (LUMIGAN) 0.03 % ophthalmic drops Place 1 drop into both eyes At Bedtime.       brimonidine (ALPHAGAN P) 0.1 % SOLN Place 1 drop into both eyes every 8 hours.       calcium  carbonate 1250 (500 Ca) MG CHEW Take 1 tablet by mouth daily as needed        calcium carbonate-vitamin D (OYSTER SHELL CALCIUM/D) 500-200 MG-UNIT tablet Take 1 tablet by mouth daily        cyanocobalamin (CYANOCOBALAMIN) 1000 MCG/ML injection Inject 1 mL into the muscle every 30 days       daratumumab 16 mg/kg Inject 16 mg/kg into the vein once        dexamethasone (DECADRON) 4 MG tablet Take 5 tablets (20 mg) by mouth with food. Days 1, 2, 8, 9, 15, 16, 22, and 23. On carfilzomib days, take prior to carfilzomib dose. 120 tablet 1     dextromethorphan-guaiFENesin (MUCINEX DM)  MG 12 hr tablet Take 1 tablet by mouth daily as needed for cough       fluticasone (FLONASE) 50 MCG/ACT nasal spray Spray 2 sprays into both nostrils daily       furosemide (LASIX) 20 MG tablet Take 2 tablets (40 mg) by mouth daily 180 tablet 0     gabapentin (NEURONTIN) 300 MG capsule TAKE 1 CAPSULE(300 MG) BY MOUTH DAILY AS NEEDED 30 capsule 5     magnesium oxide 400 MG CAPS Take 400 mg by mouth daily        Multiple Vitamin (MULTI-VITAMIN PO) Take 1 capsule by mouth daily.       Omega-3 Fatty Acids (OMEGA 3 PO) TAKE AS DIRECTED        omeprazole (PRILOSEC) 20 MG DR capsule TAKE 1 CAPSULE BY MOUTH ONCE DAILY 90 capsule 3     potassium chloride ER (KLOR-CON M) 20 MEQ CR tablet Take 1 tablet (20 mEq) by mouth every other day       Psyllium (METAMUCIL PO) Take by mouth daily USE AS DIRECTED        sulfamethoxazole-trimethoprim (BACTRIM) 400-80 MG tablet Take 1 tablet by mouth daily 30 tablet 1     tamsulosin (FLOMAX) 0.4 MG capsule TAKE 1 CAPSULE BY MOUTH EVERY DAY 90 capsule 1     XARELTO ANTICOAGULANT 20 MG TABS tablet Take 20 mg by mouth daily (with dinner)        predniSONE (DELTASONE) 10 MG tablet Take prednisone, 10 mg tab - 4 tabs for 3 days, then 3 tabs for 3 days, then 2 tabs for 3 days, then 1 tab for 3 days, then stop. (Patient not taking: Reported on 4/8/2022) 30 tablet 0    Allergies   Allergen Reactions     Centex-Pse Er  [Pseudoephedrine-Guaifenesin Cr]      TB12         Lab Results    Chemistry/lipid CBC Cardiac Enzymes/BNP/TSH/INR   Lab Results   Component Value Date    CHOL 160 10/05/2021    HDL 51 10/05/2021    TRIG 71 10/05/2021    BUN 22 04/06/2022     04/06/2022    CO2 24 04/06/2022    Lab Results   Component Value Date    WBC 3.1 (L) 04/06/2022    HGB 11.1 (L) 04/06/2022    HCT 35.0 (L) 04/06/2022     (H) 04/06/2022     04/06/2022    Lab Results   Component Value Date    TROPONINI 0.05 03/17/2022     (H) 03/19/2022    TSH 2.42 03/17/2022    INR 1.36 (H) 03/14/2022        45 minutes spent on the date of encounter doing chart review, review of test results, interpretation with above tests, patient visit and documentation.        This note has been dictated using voice recognition software. Any grammatical, typographical, or context distortions are unintentional and inherent to the software          Thank you for allowing me to participate in the care of your patient.      Sincerely,     CECELIA Flores [facility]  cc:   Phuong Rivera MD  [address]  [address]  [place] [address]

## 2022-04-08 NOTE — PROGRESS NOTES
Assessment/Recommendations   Assessment:    1.  Heart failure with preserved ejection fraction, NYHA class II -III: Likely secondary to multifactorial including diastolic heart failure, moderate pulmonary hypertension, and atrial fibrillation.      Most recent NT proBNP level is in 1800s.  He is currently on furosemide 40 mg daily  On potassium chloride 20 mEq every other day  Most recent BMP stable on 4/6/2022-reviewed    Patient reports feeling improvement in shortness of breath since cardioversion.  He also thinks that his shortness of breath is improved since his pneumonia is resolving.  He still gets some shortness of breath on exertion.    He is well compensated with no evidence of fluid retention on assessment.    His current home weight is stable at 155 pounds.  His admission weight was 160 pounds and his discharge weight was 149 pounds.    He is trying to follow a low-sodium diet less than 2 g/day.  However he does report eating huge breakfast at [employer].  He also reports improvement in his appetite since hospitalization in March.    We discussed and reviewed about heart failure, medication management, and lifestyle management including low sodium diet <2 g/day, daily weight, and staying physically active as tolerated. Patient met with the nurse clinician for heart failure education.    2.  Newly diagnosis of atrial fibrillation in March: Patient underwent successful cardioversion on 4/1/2022.  His heart rate is regular and controlled except occasional extra beats.  On Xarelto for stroke prophylaxis.    3.  Moderate pulmonary hypertension: Patient is scheduled to see Dr. Brizuela in April in pulmonary hypertension clinic at [facility].    Plan/Recommendation:  -Continue current diuretic regimen.  -Patient was highly encouraged to stay on a low-sodium diet less than 2 g/day, daily weight check, and to maintain fluid intake at 50 to 60 ounces per day.  -He was encouraged to call back if experience  persistent weight gain with worsening heart failure symptoms  -Continue current A. fib regimen and follow-up with Allison Alexander CNP in A. fib clinic as scheduled  -Follow-up with me in 8 weeks      History of Present Illness/Subjective    Mr. Theo Meyers is a 77 year old male with a past medical history of multiple myeloma with status post stem cell transplant 2010, moderate aortic valve stenosis, glaucoma, atrial fibrillation, viral pneumonia, pulmonary hypertension, and heart failure with preserved ejection fraction who is seen at [facility] for heart failure follow-up.    Patient was hospitalized in March with progressive exertional dyspnea likely secondary to acute congestive heart failure, new diagnosis of atrial fibrillation, primary hypertension, and viral pneumonia.  Echocardiogram showed preserved LVEF 60 to 65% with moderate aortic stenosis, moderate portal hypertension.  Patient was treated with tapering course of steroid therapy.  Patient underwent successful cardioversion.  He also had a follow-up appointment with primary cardiology-note reviewed.    Today, Keith reports overall feeling improvement in his heart failure symptoms since he was discharged from the hospital and especially with recent cardioversion although he also states that he is pneumonia is resolving too.    He denies fatigue, lightheadedness, shortness of breath, orthopnea, PND, palpitations, chest pain, abdominal fullness/bloating and lower extremity edema.  He is try to keep his sodium intake low.  He is keeping track of his weight.  He reports improvement in his appetite and there has been slight weight gain.    Lexiscan Stress Test done on 3/18/22  Result Text      The nuclear stress test is negative for inducible myocardial ischemia or infarction.     The left ventricular ejection fraction at stress is greater than 70%.     The patient is at a low risk of future cardiac ischemic events.     " There is no prior study for comparison.    ECHO-Reviewed:   Interpretation Summary   1. Left ventricular size, wall thickness, and systolic function are normal.  The estimated left ventricular ejection fraction is 60-65%.  2. Right ventricular size and systolic function are normal.  3. Mild left and moderate right atrial enlargement.  4. Calcified trileaflet aortic valve with moderate aortic stenosis. Peak  forward velocity measures 3.2 m/s, mean gradient 27 mm Hg, calculated aortic  valve area 1.1 cm2, and dimensionless index 0.28. No significant  regurgitation.  5. Moderate pulmonary hypertension is present with an estimated pulmonary  artery systolic pressure of 54 mm Hg.  6. Compared to the prior study dated 11/3/2021, the Doppler data today show  moderate aortic stenosis. However, the aortic valve has a similar appearance  on both studies. There was likely inadequate Doppler assessment of the aortic  valve on the prior study. Visually the aortic valve appears at least  moderately stenotic on both studies.         Physical Examination Review of Systems   /70 (BP Location: Left arm, Patient Position: Sitting, Cuff Size: Adult Regular)   Pulse 80   Resp 20   Ht 1.753 m (5' 9\")   Wt 74.8 kg (165 lb)   BMI 24.37 kg/m    Body mass index is 24.37 kg/m .  Wt Readings from Last 3 Encounters:   04/08/22 74.8 kg (165 lb)   04/06/22 72.5 kg (159 lb 12.8 oz)   04/06/22 72.5 kg (159 lb 12.8 oz)     General Appearance:   no distress, normal body habitus   ENT/Mouth: membranes moist, no oral lesions or bleeding gums.      EYES:  no scleral icterus, normal conjunctivae   Neck: no carotid bruits or thyromegaly   Chest/Lungs:   lungs are clear to auscultation, no rales or wheezing, equal chest wall expansion    Cardiovascular:    Heart rate regular with occasional skipped beats. Normal first and second heart sounds with no murmurs, rubs, or gallops; the carotid, radial and posterior tibial pulses are intact,. Jugular " venous pressure flat with no edema bilaterally    Abdomen:  no organomegaly, masses, bruits, or tenderness; bowel sounds are present   Extremities   no cyanosis or clubbing   Radial pulses and Pedal pulses intact and symmetrical.  CMS intact.   Skin: no xanthelasma, warm.    Neurologic: normal  bilateral, no tremors   Psychiatric: alert and oriented x3, calm                             Negative unless noted in HPI     Medical History  Surgical History Family History Social History   Past Medical History:   Diagnosis Date     Anemia 12/13/2017     Arthritis      Bilateral inguinal hernia      Chest tube in place      Glaucoma      Glaucoma      History of blood clots     DVT - left chronic     Multiple myeloma (H)     Gets chemo infusions every 2 weeks     Pancytopenia (H)      Parapneumonic effusion      Peripheral neuropathy      Syncope      TMJ (temporomandibular joint disorder)     Past Surgical History:   Procedure Laterality Date     APPENDECTOMY      Age 16     ESOPHAGOSCOPY, GASTROSCOPY, DUODENOSCOPY (EGD), COMBINED N/A 12/14/2017    Procedure: ESOPHAGOGASTRODUODENOSCOPY (EGD) WITH BIOPSYS;  Surgeon: Marlon Roy MD;  Location: [facility];  Service:      ESOPHAGOSCOPY, GASTROSCOPY, DUODENOSCOPY (EGD), COMBINED N/A 1/19/2018    Procedure: ENDOSCOPIC ULTRASOUND  ESOPHAGOGASTRODUODENOSCOPY WITH DUODENAL POLYP REMOVAL;  Surgeon: Familia Mcgraw MD;  Location: [facility] OR;  Service:      EYE SURGERY      Cataract     IR MISCELLANEOUS PROCEDURE  6/17/2009     IR MISCELLANEOUS PROCEDURE  7/31/2009     IR MISCELLANEOUS PROCEDURE  8/13/2009     IR PLEURAL DRAINAGE WITH CATHETER INSERTION  7/2/2019     PORTACATH PLACEMENT       RELEASE CARPAL TUNNEL Bilateral      US THORACENTESIS  6/27/2019     VERTEBROPLASTY      T6     WISDOM TOOTH EXTRACTION      Family History   Problem Relation Age of Onset     Heart Disease Mother      Glaucoma Mother      Cancer Father      No Known Problems Sister      Cancer  Brother      Pancreatic Cancer Brother      No Known Problems Brother      Cancer Daughter      Skin Cancer Daughter      Melanoma Daughter      Glaucoma Daughter     Social History     Socioeconomic History     Marital status:      Spouse name: Not on file     Number of children: Not on file     Years of education: Not on file     Highest education level: Not on file   Occupational History     Not on file   Tobacco Use     Smoking status: Former Smoker     Quit date: 1975     Years since quittin.4     Smokeless tobacco: Never Used   Substance and Sexual Activity     Alcohol use: Not Currently     Comment: Alcoholic Drinks/day: occasional     Drug use: No     Sexual activity: Never   Other Topics Concern     Not on file   Social History Narrative     Not on file     Social Determinants of Health     Financial Resource Strain: Not on file   Food Insecurity: Not on file   Transportation Needs: Not on file   Physical Activity: Not on file   Stress: Not on file   Social Connections: Not on file   Intimate Partner Violence: Not on file   Housing Stability: Not on file          Medications  Allergies   Current Outpatient Medications   Medication Sig Dispense Refill     acetaminophen (TYLENOL) 500 MG tablet Take 500 mg by mouth every 8 hours as needed        acyclovir (ZOVIRAX) 400 MG tablet TAKE 1 TABLET BY MOUTH TWICE DAILY 180 tablet 1     ascorbic acid 1000 MG TABS tablet Take 1,000 mg by mouth daily        atorvastatin (LIPITOR) 10 MG tablet Take 1 tablet (10 mg) by mouth daily 90 tablet 3     bimatoprost (LUMIGAN) 0.03 % ophthalmic drops Place 1 drop into both eyes At Bedtime.       brimonidine (ALPHAGAN P) 0.1 % SOLN Place 1 drop into both eyes every 8 hours.       calcium carbonate 1250 (500 Ca) MG CHEW Take 1 tablet by mouth daily as needed        calcium carbonate-vitamin D (OYSTER SHELL CALCIUM/D) 500-200 MG-UNIT tablet Take 1 tablet by mouth daily        cyanocobalamin (CYANOCOBALAMIN) 1000  MCG/ML injection Inject 1 mL into the muscle every 30 days       daratumumab 16 mg/kg Inject 16 mg/kg into the vein once        dexamethasone (DECADRON) 4 MG tablet Take 5 tablets (20 mg) by mouth with food. Days 1, 2, 8, 9, 15, 16, 22, and 23. On carfilzomib days, take prior to carfilzomib dose. 120 tablet 1     dextromethorphan-guaiFENesin (MUCINEX DM)  MG 12 hr tablet Take 1 tablet by mouth daily as needed for cough       fluticasone (FLONASE) 50 MCG/ACT nasal spray Spray 2 sprays into both nostrils daily       furosemide (LASIX) 20 MG tablet Take 2 tablets (40 mg) by mouth daily 180 tablet 0     gabapentin (NEURONTIN) 300 MG capsule TAKE 1 CAPSULE(300 MG) BY MOUTH DAILY AS NEEDED 30 capsule 5     magnesium oxide 400 MG CAPS Take 400 mg by mouth daily        Multiple Vitamin (MULTI-VITAMIN PO) Take 1 capsule by mouth daily.       Omega-3 Fatty Acids (OMEGA 3 PO) TAKE AS DIRECTED        omeprazole (PRILOSEC) 20 MG DR capsule TAKE 1 CAPSULE BY MOUTH ONCE DAILY 90 capsule 3     potassium chloride ER (KLOR-CON M) 20 MEQ CR tablet Take 1 tablet (20 mEq) by mouth every other day       Psyllium (METAMUCIL PO) Take by mouth daily USE AS DIRECTED        sulfamethoxazole-trimethoprim (BACTRIM) 400-80 MG tablet Take 1 tablet by mouth daily 30 tablet 1     tamsulosin (FLOMAX) 0.4 MG capsule TAKE 1 CAPSULE BY MOUTH EVERY DAY 90 capsule 1     XARELTO ANTICOAGULANT 20 MG TABS tablet Take 20 mg by mouth daily (with dinner)        predniSONE (DELTASONE) 10 MG tablet Take prednisone, 10 mg tab - 4 tabs for 3 days, then 3 tabs for 3 days, then 2 tabs for 3 days, then 1 tab for 3 days, then stop. (Patient not taking: Reported on 4/8/2022) 30 tablet 0    Allergies   Allergen Reactions     Centex-Pse Er [Pseudoephedrine-Guaifenesin Cr]      TB12         Lab Results    Chemistry/lipid CBC Cardiac Enzymes/BNP/TSH/INR   Lab Results   Component Value Date    CHOL 160 10/05/2021    HDL 51 10/05/2021    TRIG 71 10/05/2021    BUN 22  04/06/2022     04/06/2022    CO2 24 04/06/2022    Lab Results   Component Value Date    WBC 3.1 (L) 04/06/2022    HGB 11.1 (L) 04/06/2022    HCT 35.0 (L) 04/06/2022     (H) 04/06/2022     04/06/2022    Lab Results   Component Value Date    TROPONINI 0.05 03/17/2022     (H) 03/19/2022    TSH 2.42 03/17/2022    INR 1.36 (H) 03/14/2022        45 minutes spent on the date of encounter doing chart review, review of test results, interpretation with above tests, patient visit and documentation.        This note has been dictated using voice recognition software. Any grammatical, typographical, or context distortions are unintentional and inherent to the software

## 2022-04-08 NOTE — PATIENT INSTRUCTIONS
Theo Meyers,    It was a pleasure to see you today at the [facility].     My recommendations after this visit include:    - No medications changes made today    - Follow up with Allison as scheduled    - Follow up with Saima in 8 weeks    - Follow up with Dr. Brizuela as scheduled in April    - Please call MIRTA Gutierrez on [phone]  if you have any questions or concerns    - Please call [phone], if you have any questions or concerns    Saima Méndez CNP    What Is Heart Failure?  The heart is a muscle. It pumps oxygen-rich blood to all parts of the body. When you have heart failure, the heart can t pump as well as it should. Blood and fluid may back up into the lungs, and some parts of the body don t get enough oxygen-rich blood to work normally. These problems lead to the symptoms you feel.    When You Have Heart Failure  Because of heart failure, not enough blood leaves the heart with each beat. There are two types of heart failure. Both affect the ventricles  ability to pump blood. You may have one or both types.     Systolic heart failure: The heart muscle becomes weak and enlarged. It can t pump enough blood forward when the ventricles contract. Ejection fraction is lower than normal.   Diastolic heart failure: The heart muscle becomes stiff. It doesn t relax normally between contractions, which keeps the ventricles from filling with blood. Ejection fraction is often in the normal range.     How Heart Failure Affects Your Body  When the heart doesn t pump enough blood, hormones (body chemicals) are sent to increase the amount of work the heart does. Some hormones make the heart grow larger. Others tell the heart to pump faster. As a result, the heart may pump more blood at first, but it can t keep up with the ongoing demands. So, the heart muscle becomes more damaged. Over time, even less blood is pumped through the heart. This leads to problems throughout the body.    What Is  Ejection Fraction?  Ejection fraction (EF) measures how much blood the heart pumps out (ejects). This is measured to help diagnose heart failure. A healthy heart pumps at least half of the blood from the ventricles with each beat. This means a normal ejection fraction is around 50% or more.       4845-2313 [employer]. [address] [place] [address], [place] [address]. All rights reserved. This information is not intended as a substitute for professional medical care. Always follow your healthcare professional's instructions.

## 2022-04-08 NOTE — NURSING NOTE
Patient seen in clinic for HF education s/p recent hospital discharge 3/17/2022.  Reviewed HF Folder that includes a Weight log booklet and highlighting :  _X__Na management in diet  _X_importance of daily wts      Instructed patient in signs and sx of heart failure, reiterated when to call clinic - reviewed HF hotline # [phone] and after hours call # [phone].  Majority of time was spent reviewing: cutting out high sodium foods: [employer], canned soup and hot dogs.  Patient verbalized understanding of HF discussion.  Plan for f/u with continued HF education reviewed.  Patient will follow up in HF in 2 months.

## 2022-04-13 NOTE — TELEPHONE ENCOUNTER
I called LM on the pt's person VM informing that he will need to stay on this medication indefinitely due to his CD4 T cell counts being low. Pt is advised to call with questions.

## 2022-04-13 NOTE — PROGRESS NOTES
Infusion Nursing Note:  Theo Meyers presents today for labs and treatment.    Patient seen by provider today: No   present during visit today: Not Applicable.    Note: N/A.      Intravenous Access:  Labs drawn without difficulty.  Implanted Port.    Treatment Conditions:  Results reviewed, labs MET treatment parameters, ok to proceed with treatment.      Post Infusion Assessment:  Patient tolerated infusion without incident.  Site patent and intact, free from redness, edema or discomfort.  No evidence of extravasations.  Access discontinued per protocol.       Discharge Plan:   Patient discharged in stable condition accompanied by: self.      Tashia Elias RN

## 2022-04-13 NOTE — TELEPHONE ENCOUNTER
Patient called. He is wondering how long he should be taking the Bactrim for. He will be picking up his next Rx at the end of this week.

## 2022-04-14 NOTE — PROGRESS NOTES
Infusion Nursing Note:  Theo Meyers presents today for Day 9, Cycle 1 of his chemotherapy treatment plan.    Patient seen by provider today: No   present during visit today: Not Applicable.    Note: Keith arrived ambulatory by himself for treatment. Plan of care reviewed and he has no additional questions.   D5 infused at 500ml/hr x 30 minutes beginning 30 minutes prior to carfilzomib, D5 stopped during carfilzomib, D5 resumed at 500ml/hr for 30 minutes following completion of carfilzomib as ordered.    Intravenous Access:  Implanted Port.    Treatment Conditions:  Not Applicable.    Post Infusion Assessment:  Patient tolerated infusion without incident.  Blood return noted pre and post infusion.  Site patent and intact, free from redness, edema or discomfort.  Access discontinued per protocol.     Discharge Plan:   Patient will return April 20th for next appointment.   Patient discharged in stable condition accompanied by: self.  Departure Mode: Ambulatory.      Morena Guzman RN

## 2022-04-20 NOTE — PROGRESS NOTES
Infusion Nursing Note:  Theo HURT Jasbircuauhtemocchris presents today for cycle 1 day 15 treatment with Carfilzomib.    Patient seen by provider today: No   present during visit today: Not Applicable.    Note: VSS.  Pt assessed and is feeling well today.  His SOB and fatigue is improving.  He was educated on his plan of care and drug was reviewed prior to administration.      Intravenous Access:  Implanted Port.    Treatment Conditions:  Results reviewed, labs MET treatment parameters, ok to proceed with treatment.      Post Infusion Assessment:  Patient tolerated infusion without incident.  Blood return noted pre and post infusion.  Site patent and intact, free from redness, edema or discomfort.  No evidence of extravasations.  Access discontinued per protocol.       Discharge Plan:   Patient discharged in stable condition accompanied by: self.      Milagros Huntley RN

## 2022-04-21 NOTE — PROGRESS NOTES
Infusion Nursing Note:  Theo HURT Mira presents today for cycle 1 day 16 treatment with Carfilzomib.    Patient seen by provider today: No   present during visit today: Not Applicable.    Note: VSS.  Pt assessed and feeling well today.  He was educated on his plan of care.  He received treatment as ordered.      Intravenous Access:  Implanted Port.    Treatment Conditions:    Not Applicable. Yesterdays labs noted.    Post Infusion Assessment:  Patient tolerated infusion without incident.  Blood return noted pre and post infusion.  Site patent and intact, free from redness, edema or discomfort.  No evidence of extravasations.  Access discontinued per protocol.       Discharge Plan:   Patient discharged in stable condition accompanied by: self.      Milagros Huntley RN

## 2022-04-25 PROBLEM — I48.19 PERSISTENT ATRIAL FIBRILLATION (H): Status: ACTIVE | Noted: 2019-06-25

## 2022-04-25 NOTE — PATIENT INSTRUCTIONS
Theo Meyers,    It was a pleasure to see you today at the [facility] [place].     My recommendations after this visit include:    Continue current medications    Allison Alexander CNP  [facility] [place], Electrophysiology  [phone]  EP nurses [phone]

## 2022-04-25 NOTE — LETTER
4/25/2022    Mathew Ott MD  [address] [place] [address]    RE: Theo Turpinhodan       Dear Colleague,     I had the pleasure of seeing Theo Meyers in the [facility].    HEART CARE ELECTROPHYSIOLOGY CONSULTATON NOTE      [facility]  [phone]    Thank you, Dr. Rivera, for asking the [facility] Care Electrophysiology team to see . Theo Meyers to evaluate atrial fibrillation.    Assessment/Recommendations   Assessment/Plan:  1.  Persistent Atrial Fibrillation: He continues to have some dyspnea on exertion, but has had some symptomatic improvement despite early recurrence of atrial fibrillation with controlled ventricular response.  He did not note any symptomatic improvement right after cardioversion.  BLU. miguel appears to be asymptomatic.  Ventricular rates controlled without AV sean blocking agents.    We had an extensive discussion of the physiology and natural progression of atrial fibrillation and treatment options including rate control versus rhythm control depending upon the presence of symptoms.  He would not likely tolerate any antiarrhythmic medications without the support of a pacemaker and is not a great candidate for ablation with his upcoming plans for chemotherapy.  Recommend following a rate control strategy unless he has significant symptom correlation.  He was given information to review at home.    He was reassured that atrial fibrillation is not life-threatening, but carries an increased risk for stroke.  He has a [id number] score of 4 for age >75 and recurrent DVT.  Continue Xarelto 20 mg daily for stroke prophylaxis.    2.  Heart failure with preserved ejection fraction, class II: Multifactorial with diastolic heart failure, pulmonary hypertension, and atrial fibrillation.  No sign of fluid retention on exam and weights are stable.  He is trying to follow a low-sodium diet.  Continue furosemide and potassium,  reevaluate doses in upcoming appointment with Dr. Peters for pulmonary hypertension on 5/2/2022.    Follow up as needed     History of Present Illness/Subjective    HPI: Theo Meyers is a 77 year old male who comes in today for EP consultation of atrial fibrillation.  He has a history of atrial fibrillation, moderate aortic stenosis, heart failure with preserved ejection fraction, pulmonary hypertension referred to [facility], multiple myeloma s/p stem cell transplant in 2010 with recurrence, recurrent DVT for which he is on long-term oral anticoagulation, and glaucoma.    He has an approximate 3-year history of paroxysmal atrial fibrillation, at some point transitioning to persistent, documented with hospitalization for bilateral pneumonia and acute heart failure 3/17/2022.  He reports fatigue, decreased activity tolerance, and dyspnea on exertion for 3 months.  He underwent cardioversion for symptom clarification on 4/1/2022.  Of note, he arrived for cardioversion with periods of slow ventricular response in the 30s to 40s which may be the source of his symptoms as he was not on any AV sean blocking agents or membrane active antiarrhythmic medications.  He is on Xarelto for stroke prophylaxis and recurrent DVT.    Theo states that he continues to have some dyspnea on exertion, but less than previous.  He did not notice any symptomatic improvement immediately following cardioversion.  He has no awareness of arrhythmia.  His weights have been stable at 153 to 154 pounds.  He denies chest discomfort, palpitations, abdominal fullness/bloating or peripheral edema, shortness of breath at rest, paroxysmal nocturnal dyspnea, orthopnea, lightheadedness, dizziness, pre-syncope, or syncope.    Cardiographics (EKG personally reviewed):  EKG done 4/25/2022 shows atrial fibrillation with controlled ventricular response at 66 bpm  EKG done 4/1/2022 shows sinus rhythm at 59 bpm, PACs, QT/QTc interval measures 398/394 ms  EKG  "done 3/17/2022 shows atrial fibrillation with controlled ventricular response at 75 bpm    ECHO done 3/17/2022:  1. Left ventricular size, wall thickness, and systolic function are normal.  The estimated left ventricular ejection fraction is 60-65%.  2. Right ventricular size and systolic function are normal.  3. Mild left and moderate right atrial enlargement.  4. Calcified trileaflet aortic valve with moderate aortic stenosis. Peak  forward velocity measures 3.2 m/s, mean gradient 27 mm Hg, calculated aortic  valve area 1.1 cm2, and dimensionless index 0.28. No significant  regurgitation.  5. Moderate pulmonary hypertension is present with an estimated pulmonary  artery systolic pressure of 54 mm Hg.  6. Compared to the prior study dated 11/3/2021, the Doppler data today show  moderate aortic stenosis. However, the aortic valve has a similar appearance  on both studies. There was likely inadequate Doppler assessment of the aortic  valve on the prior study. Visually the aortic valve appears at least  moderately stenotic on both studies.    NM stress test done 3/21/2022:     The nuclear stress test is negative for inducible myocardial ischemia or infarction.     The left ventricular ejection fraction at stress is greater than 70%.     The patient is at a low risk of future cardiac ischemic events.     There is no prior study for comparison.    I have reviewed and updated the patient's Past Medical History, Social History, Family History and Medication List.     Physical Examination  Review of Systems   Vitals: /70 (BP Location: Right arm, Patient Position: Sitting, Cuff Size: Adult Regular)   Pulse 73   Resp 14   Ht 1.746 m (5' 8.75\")   Wt 72.6 kg (160 lb)   BMI 23.80 kg/m    BMI= Body mass index is 23.8 kg/m .  Wt Readings from Last 3 Encounters:   04/25/22 72.6 kg (160 lb)   04/08/22 74.8 kg (165 lb)   04/06/22 72.5 kg (159 lb 12.8 oz)       General Appearance:   Awake, alert, in no acute distress. "   HEENT: Atraumatic, normocephalic.  No scleral icterus, normal conjunctivae, EOMs intact, PERRL.  Wearing a mask.   Chest/Lungs:   Chest symmetric, spine straight.  Respirations unlabored.  Lungs are clear to auscultation.   Cardiovascular:    Irregularly irregular rate and rhythm.  Normal first and second heart sounds with no rubs or gallops; 2/6 systolic murmur; radial and posterior tibial pulses are intact, no edema.   Abdomen:  Soft, nondistended, bowel sounds present.   Extremities: No cyanosis or clubbing.   Musculoskeletal: Moves all extremities.     Skin: Warm, dry, intact.    Neurologic: Mood and affect are appropriate.  Alert and oriented to person, place, time, and situation.     ROS: 10 point ROS neg other than the symptoms noted above in the HPI.        Medical History  Surgical History Family History Social History   Past Medical History:   Diagnosis Date     Anemia 12/13/2017     Arthritis      Bilateral inguinal hernia      Chest tube in place      Elevated INR      Glaucoma      Glaucoma      History of blood clots     DVT - left chronic     Multiple myeloma (H)     Gets chemo infusions every 2 weeks     Pancytopenia (H)      Parapneumonic effusion      Peripheral neuropathy      Shingles 9/24/2014     Syncope      TMJ (temporomandibular joint disorder)      Past Surgical History:   Procedure Laterality Date     APPENDECTOMY      Age 16     ESOPHAGOSCOPY, GASTROSCOPY, DUODENOSCOPY (EGD), COMBINED N/A 12/14/2017    Procedure: ESOPHAGOGASTRODUODENOSCOPY (EGD) WITH BIOPSYS;  Surgeon: Marlon Roy MD;  Location: [facility];  Service:      ESOPHAGOSCOPY, GASTROSCOPY, DUODENOSCOPY (EGD), COMBINED N/A 1/19/2018    Procedure: ENDOSCOPIC ULTRASOUND  ESOPHAGOGASTRODUODENOSCOPY WITH DUODENAL POLYP REMOVAL;  Surgeon: Familia Mcgraw MD;  Location: [facility] OR;  Service:      EYE SURGERY      Cataract     IR MISCELLANEOUS PROCEDURE  6/17/2009     IR MISCELLANEOUS PROCEDURE  7/31/2009     IR  MISCELLANEOUS PROCEDURE  2009     IR PLEURAL DRAINAGE WITH CATHETER INSERTION  2019     PORTACATH PLACEMENT       RELEASE CARPAL TUNNEL Bilateral      US THORACENTESIS  2019     VERTEBROPLASTY      T6     WISDOM TOOTH EXTRACTION       Family History   Problem Relation Age of Onset     Heart Disease Mother      Glaucoma Mother      Cancer Father      No Known Problems Sister      Cancer Brother      Pancreatic Cancer Brother      No Known Problems Brother      Cancer Daughter      Skin Cancer Daughter      Melanoma Daughter      Glaucoma Daughter         Social History     Socioeconomic History     Marital status:      Spouse name: Not on file     Number of children: Not on file     Years of education: Not on file     Highest education level: Not on file   Occupational History     Not on file   Tobacco Use     Smoking status: Former Smoker     Quit date: 1975     Years since quittin.4     Smokeless tobacco: Never Used   Substance and Sexual Activity     Alcohol use: Not Currently     Comment: Alcoholic Drinks/day: occasional     Drug use: No     Sexual activity: Never   Other Topics Concern     Not on file   Social History Narrative     Not on file     Social Determinants of Health     Financial Resource Strain: Not on file   Food Insecurity: Not on file   Transportation Needs: Not on file   Physical Activity: Not on file   Stress: Not on file   Social Connections: Not on file   Intimate Partner Violence: Not on file   Housing Stability: Not on file           Medications  Allergies   Current Outpatient Medications   Medication Sig Dispense Refill     acetaminophen (TYLENOL) 500 MG tablet Take 500 mg by mouth every 8 hours as needed        acyclovir (ZOVIRAX) 400 MG tablet TAKE 1 TABLET BY MOUTH TWICE DAILY 180 tablet 1     ascorbic acid 1000 MG TABS tablet Take 1,000 mg by mouth daily        atorvastatin (LIPITOR) 10 MG tablet Take 1 tablet (10 mg) by mouth daily 90 tablet 3      bimatoprost (LUMIGAN) 0.03 % ophthalmic solution Place 1 drop into both eyes At Bedtime.       brimonidine (ALPHAGAN P) 0.1 % ophthalmic solution Place 1 drop into both eyes every 8 hours.       calcium carbonate 1250 (500 Ca) MG CHEW Take 1 tablet by mouth daily as needed        calcium carbonate-vitamin D (OYSTER SHELL CALCIUM/D) 500-200 MG-UNIT tablet Take 1 tablet by mouth daily        cyanocobalamin (CYANOCOBALAMIN) 1000 MCG/ML injection Inject 1 mL into the muscle every 30 days       dexamethasone (DECADRON) 4 MG tablet Take 5 tablets (20 mg) by mouth with food. Days 1, 2, 8, 9, 15, 16, 22, and 23. On carfilzomib days, take prior to carfilzomib dose. 120 tablet 1     dextromethorphan-guaiFENesin (MUCINEX DM)  MG 12 hr tablet Take 1 tablet by mouth daily as needed for cough       fluticasone (FLONASE) 50 MCG/ACT nasal spray Spray 2 sprays into both nostrils daily       furosemide (LASIX) 20 MG tablet Take 2 tablets (40 mg) by mouth daily 180 tablet 0     gabapentin (NEURONTIN) 300 MG capsule TAKE 1 CAPSULE(300 MG) BY MOUTH DAILY AS NEEDED 30 capsule 5     magnesium oxide 400 MG CAPS Take 400 mg by mouth daily        Multiple Vitamin (MULTI-VITAMIN PO) Take 1 capsule by mouth daily.       Omega-3 Fatty Acids (OMEGA 3 PO) TAKE AS DIRECTED       omeprazole (PRILOSEC) 20 MG DR capsule TAKE 1 CAPSULE BY MOUTH ONCE DAILY 90 capsule 3     potassium chloride ER (KLOR-CON M) 20 MEQ CR tablet Take 1 tablet (20 mEq) by mouth every other day       Psyllium (METAMUCIL PO) Take by mouth daily USE AS DIRECTED       sulfamethoxazole-trimethoprim (BACTRIM) 400-80 MG tablet Take 1 tablet by mouth daily 30 tablet 1     tamsulosin (FLOMAX) 0.4 MG capsule TAKE 1 CAPSULE BY MOUTH EVERY DAY 90 capsule 1     XARELTO ANTICOAGULANT 20 MG TABS tablet Take 20 mg by mouth daily (with dinner)          Allergies   Allergen Reactions     Centex-Pse Er [Pseudoephedrine-Guaifenesin Cr]      TB12          Lab Results    Chemistry/lipid CBC  Cardiac Enzymes/BNP/TSH/INR   Recent Labs   Lab Test 10/05/21  1651   CHOL 160   HDL 51   LDL 95   TRIG 71     Recent Labs   Lab Test 10/05/21  1651 10/16/15  1243   LDL 95 70     Recent Labs   Lab Test 04/06/22  0809      POTASSIUM 3.5   CHLORIDE 107   CO2 24      BUN 22   CR 1.31*   GFRESTIMATED 56*   NORMAN 8.4*     Recent Labs   Lab Test 04/06/22  0809 03/24/22  1106 03/21/22  0526   CR 1.31* 1.26 1.31*          Recent Labs   Lab Test 04/20/22  0825   WBC 5.4   HGB 10.5*   HCT 32.4*   *   *     Recent Labs   Lab Test 04/20/22  0825 04/13/22  0816 04/06/22  0809   HGB 10.5* 10.3* 11.1*    Recent Labs   Lab Test 03/17/22  1418 03/17/22  0419 03/14/22  1619   TROPONINI 0.05 0.03 0.04     Recent Labs   Lab Test 04/13/22  0816 04/06/22  0809 03/28/22  1033 03/19/22  0408 03/18/22  0425   *  --   --  260* 227*   NTBNP  --  1,805* 3,039*  --   --      Recent Labs   Lab Test 03/17/22  0419   TSH 2.42     Recent Labs   Lab Test 03/14/22  1619 12/29/20  2026 03/18/20  0801   INR 1.36* 1.33* 1.31*      66 minutes were spent on the date of encounter performing chart review, history and exam, documentation, and further activities as noted above.            Thank you for allowing me to participate in the care of your patient.      Sincerely,     CECELIA Dave [facility]  cc:   No referring provider defined for this encounter.

## 2022-04-25 NOTE — PROGRESS NOTES
HEART CARE ELECTROPHYSIOLOGY CONSULTATON NOTE      [facility]  [phone]    Thank you, Dr. Rivera, for asking the [facility] Heart Care Electrophysiology team to see Mr. Theo Meyers to evaluate atrial fibrillation.    Assessment/Recommendations   Assessment/Plan:  1.  Persistent Atrial Fibrillation: He continues to have some dyspnea on exertion, but has had some symptomatic improvement despite early recurrence of atrial fibrillation with controlled ventricular response.  He did not note any symptomatic improvement right after cardioversion.  A. fib appears to be asymptomatic.  Ventricular rates controlled without AV sean blocking agents.    We had an extensive discussion of the physiology and natural progression of atrial fibrillation and treatment options including rate control versus rhythm control depending upon the presence of symptoms.  He would not likely tolerate any antiarrhythmic medications without the support of a pacemaker and is not a great candidate for ablation with his upcoming plans for chemotherapy.  Recommend following a rate control strategy unless he has significant symptom correlation.  He was given information to review at home.    He was reassured that atrial fibrillation is not life-threatening, but carries an increased risk for stroke.  He has a [id number] score of 4 for age >75 and recurrent DVT.  Continue Xarelto 20 mg daily for stroke prophylaxis.    2.  Heart failure with preserved ejection fraction, class II: Multifactorial with diastolic heart failure, pulmonary hypertension, and atrial fibrillation.  No sign of fluid retention on exam and weights are stable.  He is trying to follow a low-sodium diet.  Continue furosemide and potassium, reevaluate doses in upcoming appointment with Dr. Peters for pulmonary hypertension on 5/2/2022.    Follow up as needed     History of Present Illness/Subjective    HPI: Theo Meyers is a 77 year old  male who comes in today for EP consultation of atrial fibrillation.  He has a history of atrial fibrillation, moderate aortic stenosis, heart failure with preserved ejection fraction, pulmonary hypertension referred to [facility], multiple myeloma s/p stem cell transplant in 2010 with recurrence, recurrent DVT for which he is on long-term oral anticoagulation, and glaucoma.    He has an approximate 3-year history of paroxysmal atrial fibrillation, at some point transitioning to persistent, documented with hospitalization for bilateral pneumonia and acute heart failure 3/17/2022.  He reports fatigue, decreased activity tolerance, and dyspnea on exertion for 3 months.  He underwent cardioversion for symptom clarification on 4/1/2022.  Of note, he arrived for cardioversion with periods of slow ventricular response in the 30s to 40s which may be the source of his symptoms as he was not on any AV sean blocking agents or membrane active antiarrhythmic medications.  He is on Xarelto for stroke prophylaxis and recurrent DVT.    Theo states that he continues to have some dyspnea on exertion, but less than previous.  He did not notice any symptomatic improvement immediately following cardioversion.  He has no awareness of arrhythmia.  His weights have been stable at 153 to 154 pounds.  He denies chest discomfort, palpitations, abdominal fullness/bloating or peripheral edema, shortness of breath at rest, paroxysmal nocturnal dyspnea, orthopnea, lightheadedness, dizziness, pre-syncope, or syncope.    Cardiographics (EKG personally reviewed):  EKG done 4/25/2022 shows atrial fibrillation with controlled ventricular response at 66 bpm  EKG done 4/1/2022 shows sinus rhythm at 59 bpm, PACs, QT/QTc interval measures 398/394 ms  EKG done 3/17/2022 shows atrial fibrillation with controlled ventricular response at 75 bpm    ECHO done 3/17/2022:  1. Left ventricular size, wall thickness, and systolic function are normal.  The estimated left  "ventricular ejection fraction is 60-65%.  2. Right ventricular size and systolic function are normal.  3. Mild left and moderate right atrial enlargement.  4. Calcified trileaflet aortic valve with moderate aortic stenosis. Peak  forward velocity measures 3.2 m/s, mean gradient 27 mm Hg, calculated aortic  valve area 1.1 cm2, and dimensionless index 0.28. No significant  regurgitation.  5. Moderate pulmonary hypertension is present with an estimated pulmonary  artery systolic pressure of 54 mm Hg.  6. Compared to the prior study dated 11/3/2021, the Doppler data today show  moderate aortic stenosis. However, the aortic valve has a similar appearance  on both studies. There was likely inadequate Doppler assessment of the aortic  valve on the prior study. Visually the aortic valve appears at least  moderately stenotic on both studies.    NM stress test done 3/21/2022:     The nuclear stress test is negative for inducible myocardial ischemia or infarction.     The left ventricular ejection fraction at stress is greater than 70%.     The patient is at a low risk of future cardiac ischemic events.     There is no prior study for comparison.    I have reviewed and updated the patient's Past Medical History, Social History, Family History and Medication List.     Physical Examination  Review of Systems   Vitals: /70 (BP Location: Right arm, Patient Position: Sitting, Cuff Size: Adult Regular)   Pulse 73   Resp 14   Ht 1.746 m (5' 8.75\")   Wt 72.6 kg (160 lb)   BMI 23.80 kg/m    BMI= Body mass index is 23.8 kg/m .  Wt Readings from Last 3 Encounters:   04/25/22 72.6 kg (160 lb)   04/08/22 74.8 kg (165 lb)   04/06/22 72.5 kg (159 lb 12.8 oz)       General Appearance:   Awake, alert, in no acute distress.   HEENT: Atraumatic, normocephalic.  No scleral icterus, normal conjunctivae, EOMs intact, PERRL.  Wearing a mask.   Chest/Lungs:   Chest symmetric, spine straight.  Respirations unlabored.  Lungs are clear to " auscultation.   Cardiovascular:    Irregularly irregular rate and rhythm.  Normal first and second heart sounds with no rubs or gallops; 2/6 systolic murmur; radial and posterior tibial pulses are intact, no edema.   Abdomen:  Soft, nondistended, bowel sounds present.   Extremities: No cyanosis or clubbing.   Musculoskeletal: Moves all extremities.     Skin: Warm, dry, intact.    Neurologic: Mood and affect are appropriate.  Alert and oriented to person, place, time, and situation.     ROS: 10 point ROS neg other than the symptoms noted above in the HPI.        Medical History  Surgical History Family History Social History   Past Medical History:   Diagnosis Date     Anemia 12/13/2017     Arthritis      Bilateral inguinal hernia      Chest tube in place      Elevated INR      Glaucoma      Glaucoma      History of blood clots     DVT - left chronic     Multiple myeloma (H)     Gets chemo infusions every 2 weeks     Pancytopenia (H)      Parapneumonic effusion      Peripheral neuropathy      Shingles 9/24/2014     Syncope      TMJ (temporomandibular joint disorder)      Past Surgical History:   Procedure Laterality Date     APPENDECTOMY      Age 16     ESOPHAGOSCOPY, GASTROSCOPY, DUODENOSCOPY (EGD), COMBINED N/A 12/14/2017    Procedure: ESOPHAGOGASTRODUODENOSCOPY (EGD) WITH BIOPSYS;  Surgeon: Marlon Roy MD;  Location: [facility];  Service:      ESOPHAGOSCOPY, GASTROSCOPY, DUODENOSCOPY (EGD), COMBINED N/A 1/19/2018    Procedure: ENDOSCOPIC ULTRASOUND  ESOPHAGOGASTRODUODENOSCOPY WITH DUODENAL POLYP REMOVAL;  Surgeon: Familia Mcgraw MD;  Location: [facility];  Service:      EYE SURGERY      Cataract     IR MISCELLANEOUS PROCEDURE  6/17/2009     IR MISCELLANEOUS PROCEDURE  7/31/2009     IR MISCELLANEOUS PROCEDURE  8/13/2009     IR PLEURAL DRAINAGE WITH CATHETER INSERTION  7/2/2019     PORTACATH PLACEMENT       RELEASE CARPAL TUNNEL Bilateral      US THORACENTESIS  6/27/2019     VERTEBROPLASTY      T6      WISDOM TOOTH EXTRACTION       Family History   Problem Relation Age of Onset     Heart Disease Mother      Glaucoma Mother      Cancer Father      No Known Problems Sister      Cancer Brother      Pancreatic Cancer Brother      No Known Problems Brother      Cancer Daughter      Skin Cancer Daughter      Melanoma Daughter      Glaucoma Daughter         Social History     Socioeconomic History     Marital status:      Spouse name: Not on file     Number of children: Not on file     Years of education: Not on file     Highest education level: Not on file   Occupational History     Not on file   Tobacco Use     Smoking status: Former Smoker     Quit date: 1975     Years since quittin.4     Smokeless tobacco: Never Used   Substance and Sexual Activity     Alcohol use: Not Currently     Comment: Alcoholic Drinks/day: occasional     Drug use: No     Sexual activity: Never   Other Topics Concern     Not on file   Social History Narrative     Not on file     Social Determinants of Health     Financial Resource Strain: Not on file   Food Insecurity: Not on file   Transportation Needs: Not on file   Physical Activity: Not on file   Stress: Not on file   Social Connections: Not on file   Intimate Partner Violence: Not on file   Housing Stability: Not on file           Medications  Allergies   Current Outpatient Medications   Medication Sig Dispense Refill     acetaminophen (TYLENOL) 500 MG tablet Take 500 mg by mouth every 8 hours as needed        acyclovir (ZOVIRAX) 400 MG tablet TAKE 1 TABLET BY MOUTH TWICE DAILY 180 tablet 1     ascorbic acid 1000 MG TABS tablet Take 1,000 mg by mouth daily        atorvastatin (LIPITOR) 10 MG tablet Take 1 tablet (10 mg) by mouth daily 90 tablet 3     bimatoprost (LUMIGAN) 0.03 % ophthalmic solution Place 1 drop into both eyes At Bedtime.       brimonidine (ALPHAGAN P) 0.1 % ophthalmic solution Place 1 drop into both eyes every 8 hours.       calcium carbonate 1250 (500  Ca) MG CHEW Take 1 tablet by mouth daily as needed        calcium carbonate-vitamin D (OYSTER SHELL CALCIUM/D) 500-200 MG-UNIT tablet Take 1 tablet by mouth daily        cyanocobalamin (CYANOCOBALAMIN) 1000 MCG/ML injection Inject 1 mL into the muscle every 30 days       dexamethasone (DECADRON) 4 MG tablet Take 5 tablets (20 mg) by mouth with food. Days 1, 2, 8, 9, 15, 16, 22, and 23. On carfilzomib days, take prior to carfilzomib dose. 120 tablet 1     dextromethorphan-guaiFENesin (MUCINEX DM)  MG 12 hr tablet Take 1 tablet by mouth daily as needed for cough       fluticasone (FLONASE) 50 MCG/ACT nasal spray Spray 2 sprays into both nostrils daily       furosemide (LASIX) 20 MG tablet Take 2 tablets (40 mg) by mouth daily 180 tablet 0     gabapentin (NEURONTIN) 300 MG capsule TAKE 1 CAPSULE(300 MG) BY MOUTH DAILY AS NEEDED 30 capsule 5     magnesium oxide 400 MG CAPS Take 400 mg by mouth daily        Multiple Vitamin (MULTI-VITAMIN PO) Take 1 capsule by mouth daily.       Omega-3 Fatty Acids (OMEGA 3 PO) TAKE AS DIRECTED       omeprazole (PRILOSEC) 20 MG DR capsule TAKE 1 CAPSULE BY MOUTH ONCE DAILY 90 capsule 3     potassium chloride ER (KLOR-CON M) 20 MEQ CR tablet Take 1 tablet (20 mEq) by mouth every other day       Psyllium (METAMUCIL PO) Take by mouth daily USE AS DIRECTED       sulfamethoxazole-trimethoprim (BACTRIM) 400-80 MG tablet Take 1 tablet by mouth daily 30 tablet 1     tamsulosin (FLOMAX) 0.4 MG capsule TAKE 1 CAPSULE BY MOUTH EVERY DAY 90 capsule 1     XARELTO ANTICOAGULANT 20 MG TABS tablet Take 20 mg by mouth daily (with dinner)          Allergies   Allergen Reactions     Centex-Pse Er [Pseudoephedrine-Guaifenesin Cr]      TB12          Lab Results    Chemistry/lipid CBC Cardiac Enzymes/BNP/TSH/INR   Recent Labs   Lab Test 10/05/21  1651   CHOL 160   HDL 51   LDL 95   TRIG 71     Recent Labs   Lab Test 10/05/21  1651 10/16/15  1243   LDL 95 70     Recent Labs   Lab Test 04/06/22  0809   NA  140   POTASSIUM 3.5   CHLORIDE 107   CO2 24      BUN 22   CR 1.31*   GFRESTIMATED 56*   NORMAN 8.4*     Recent Labs   Lab Test 04/06/22  0809 03/24/22  1106 03/21/22  0526   CR 1.31* 1.26 1.31*          Recent Labs   Lab Test 04/20/22  0825   WBC 5.4   HGB 10.5*   HCT 32.4*   *   *     Recent Labs   Lab Test 04/20/22  0825 04/13/22  0816 04/06/22  0809   HGB 10.5* 10.3* 11.1*    Recent Labs   Lab Test 03/17/22  1418 03/17/22  0419 03/14/22  1619   TROPONINI 0.05 0.03 0.04     Recent Labs   Lab Test 04/13/22  0816 04/06/22  0809 03/28/22  1033 03/19/22  0408 03/18/22  0425   *  --   --  260* 227*   NTBNP  --  1,805* 3,039*  --   --      Recent Labs   Lab Test 03/17/22  0419   TSH 2.42     Recent Labs   Lab Test 03/14/22  1619 12/29/20  2026 03/18/20  0801   INR 1.36* 1.33* 1.31*      66 minutes were spent on the date of encounter performing chart review, history and exam, documentation, and further activities as noted above.

## 2022-04-30 NOTE — PROGRESS NOTES
"Todd Peters M.D.  Cardiovascular Medicine    I personally saw and examined this patient, discussed care with r consultants, reviewed current laboratories and imaging studies, and conveyed impression and diagnostic/therapeutic plan to patient.    Per Clifford MD    [address] [place] [id number]    [phone] (Fax)      Familia Guan M.D.  [facility]        Problem List  1. Multiple myeloma kappa light tchain   2. Glaucoma  3. Shingles  4. Syncope  5. History of DVT  6. History of B12 deficiency  7. HFpEF by history  8. History of pneumonia  9. Extensive coronary calcification  10. NWOAC  11. Prostatism  12. Atrial fibrillation with recurrence post cardioversion  13. + Carotid doppler for ASCD  14. Hyperlipidemia treated with atorvastatin  15. Anemia with macrocytosis  16. History of pleural effusions  17. Immunocompromise  18. Hypoproteinemia      Discussion  1. The therapy that is contemplated is associated with a high incidence of cytokine storm  2. The patient, I and his wife discussed the findings including aortic valve narrowing, carotid atherosclerosis, \"severe\" coronary calcification and elevated PA pressure and the need to attempt to distinguish what is what in terms of risk and therapy.  He will likely require bilateral heart catheterization and coronary angiography.      I thoroughly discussed the risks and benefits of the contemplated catherization including bleeding, vessel rupture, death, arrhythmia, embolism, stroke, renal failure perforation of pulmonary artery, aortic,lung or heart.  I discussed how the procedure would be performed and alternative diagnostic and therapeutic management strategies.  All questions were answered.      We elected to start with VQ lung scan to look at the issue of chronic pulmonary emboli.        History    77 year old male seen for evaluation of shortness of breath with associated cardiac history of atrial fibrillation " (recurrent post cardioversion), aortic stenosis, and pulmonary hypertension.  He has a clear history of exertionally related chest pressure and shortness of breath that is mitigated by reduction by intensity of activity.  In March he had a negative non-exercise stress test.  He has known extensive coronary calcification as well as carotid atherosclerotic disease.  He has no symptoms at rest.  He also has a history of DVT/PE.  His most recent CT scans have been done without contrast. He has had to reduce his level of exertion secondary to exertional chest pressure/pain and shortness of breath - relieved by rest.  He has had no prolonged episodes.      Objective    Constitutional: alert, oriented, normal gait and station, normal mentation.  Oral: moist mucous membranes  Lymph: without pathologic adenopathy  Chest: clear to ausculation and percussion  Cor: No evidence of left or right ventricular activity.  Rhythm is irregular.  S1 variable S2 split physiologically. Murmurs of aortic flow  Abdomen: without tenderness, rebound, guarding, masses, ascites  Extremities: Edema not present  Neuro: no focal defects, normal mentation  Skin: without open lesions  Psych: oriented, verbal, mental status in tact    Wt Readings from Last 5 Encounters:   04/25/22 72.6 kg (160 lb)   04/08/22 74.8 kg (165 lb)   04/06/22 72.5 kg (159 lb 12.8 oz)   04/06/22 72.5 kg (159 lb 12.8 oz)   04/01/22 72 kg (158 lb 11.2 oz)       Meds  Current Outpatient Medications   Medication     acetaminophen (TYLENOL) 500 MG tablet     acyclovir (ZOVIRAX) 400 MG tablet     ascorbic acid 1000 MG TABS tablet     atorvastatin (LIPITOR) 10 MG tablet     bimatoprost (LUMIGAN) 0.03 % ophthalmic solution     brimonidine (ALPHAGAN P) 0.1 % ophthalmic solution     calcium carbonate 1250 (500 Ca) MG CHEW     calcium carbonate-vitamin D (OYSTER SHELL CALCIUM/D) 500-200 MG-UNIT tablet     cyanocobalamin (CYANOCOBALAMIN) 1000 MCG/ML injection     dexamethasone  (DECADRON) 4 MG tablet     dextromethorphan-guaiFENesin (MUCINEX DM)  MG 12 hr tablet     fluticasone (FLONASE) 50 MCG/ACT nasal spray     furosemide (LASIX) 20 MG tablet     gabapentin (NEURONTIN) 300 MG capsule     magnesium oxide 400 MG CAPS     Multiple Vitamin (MULTI-VITAMIN PO)     Omega-3 Fatty Acids (OMEGA 3 PO)     omeprazole (PRILOSEC) 20 MG DR capsule     potassium chloride ER (KLOR-CON M) 20 MEQ CR tablet     Psyllium (METAMUCIL PO)     sulfamethoxazole-trimethoprim (BACTRIM) 400-80 MG tablet     tamsulosin (FLOMAX) 0.4 MG capsule     XARELTO ANTICOAGULANT 20 MG TABS tablet     No current facility-administered medications for this visit.       Labs     Latest Reference Range & Units 04/06/22 08:09 04/13/22 08:16 04/20/22 08:25 04/27/22 08:03   Calcium 8.5 - 10.5 mg/dL 8.4 (L)      Anion Gap 5 - 18 mmol/L 9      Albumin 3.5 - 5.0 g/dL 2.6 (L)      Protein Total 6.0 - 8.0 g/dL 5.0 (L)      Bilirubin Total 0.0 - 1.0 mg/dL 0.4      Alkaline Phosphatase 45 - 120 U/L 67      ALT 0 - 45 U/L 27      AST 0 - 40 U/L 14      BNP 0 - 80 pg/mL  207 (H)     N-Terminal Pro Bnp 0 - 450 pg/mL 1,805 (H) [1]      Glucose 70 - 125 mg/dL 121      WBC 4.0 - 11.0 10e3/uL 3.1 (L) [2] 3.2 (L) [3] 5.4 [4]    Hemoglobin 13.3 - 17.7 g/dL 11.1 (L) 10.3 (L) 10.5 (L)    Hematocrit 40.0 - 53.0 % 35.0 (L) 32.1 (L) 32.4 (L)    Platelet Count 150 - 450 10e3/uL 173 144 (L) 131 (L)    RBC Count 4.40 - 5.90 10e6/uL 3.30 (L) 3.04 (L) 3.03 (L)    MCV 78 - 100 fL 106 (H) 106 (H) 107 (H)    MCH 26.5 - 33.0 pg 33.6 (H) 33.9 (H) 34.7 (H)    MCHC 31.5 - 36.5 g/dL 31.7 32.1 32.4    RDW 10.0 - 15.0 % 16.3 (H) 17.2 (H) 17.4 (H)    % Neutrophils % 65 80 85    % Lymphocytes % 12 5 6    % Monocytes % 17 4 5    % Eosinophils % 4 11 4    % Basophils % 2 0 0    Absolute Basophils 0.0 - 0.2 10e3/uL 0.1 0.0 0.0    Absolute Neutrophil 1.6 - 8.3 10e3/uL 2.0 2.6 4.6    Absolute Lymphocytes 0.8 - 5.3 10e3/uL 0.4 (L) 0.2 (L) 0.3 (L)    Absolute Monocytes 0.0  - 1.3 10e3/uL 0.5 0.1 0.3    Absolute Eosinophils 0.0 - 0.7 10e3/uL 0.1 0.4 0.2    RBC Morphology  Confirmed RBC Indices Confirmed RBC Indices Confirmed RBC Indices    Platelet Morphology Automated Count Confirmed. Platelet morphology is normal.  Automated Count Confirmed. Giant platelets are present. ! Automated Count Confirmed. Platelet morphology is normal. Automated Count Confirmed. Platelet morphology is normal.    RBC Fragments None Seen  Slight !  Slight !    Teardrop Cells None Seen  Slight !      Elliptocytes None Seen  Moderate ! Slight ! Slight !    [place] Free Lt Chain 0.33 - 1.94 mg/dL 100.90 (H)   38.53 (H)   Kappa Lambda Ratio 0.26 - 1.65  347.93 (H)   256.87 (H)   Lambda Free Lt Chain 0.57 - 2.63 mg/dL 0.29 (L)   0.15 (L)   Total Protein Serum for ELP 6.0 - 8.0 g/dL    4.6 (L)       Imaging     Echocardiogram  Name: MONICA SCOTT  MRN: [id number]  : 1945  Study Date: 2022 11:22 AM  Age: 76 yrs  Gender: Male  Patient Location: [facility]  Reason For Study: Heart Failure  Ordering Physician: ESTEVAN SORTO  Performed By: ACE     BSA: 1.9 m2  Height: 68 in  Weight: 160 lb  HR: 79  BP: 147/79 mmHg  ______________________________________________________________________________  Procedure  Complete Echo Adult. Adequate quality two-dimensional was performed and  interpreted. Adequate quality color and spectral Doppler were performed and  interpreted. Compared to the prior study dated 11/3/2021, there are changes as  noted.  ______________________________________________________________________________  Interpretation Summary     1. Left ventricular size, wall thickness, and systolic function are normal.  The estimated left ventricular ejection fraction is 60-65%.  2. Right ventricular size and systolic function are normal.  3. Mild left and moderate right atrial enlargement.  4. Calcified trileaflet aortic valve with moderate aortic stenosis. Peak  forward velocity measures 3.2 m/s, mean  gradient 27 mm Hg, calculated aortic  valve area 1.1 cm2, and dimensionless index 0.28. No significant  regurgitation.  5. Moderate pulmonary hypertension is present with an estimated pulmonary  artery systolic pressure of 54 mm Hg.  6. Compared to the prior study dated 11/3/2021, the Doppler data today show  moderate aortic stenosis. However, the aortic valve has a similar appearance  on both studies. There was likely inadequate Doppler assessment of the aortic  valve on the prior study. Visually the aortic valve appears at least  moderately stenotic on both studies.  ______________________________________________________________________________  I      WMSI = 1.00     % Normal = 100     X - Cannot   0 -                      (2) - Mildly 2 -          Segments  Size  Interpret    Hyperkinetic 1 - Normal  Hypokinetic  Hypokinetic  1-2     small                                                     7 -          3-5      moderate  3 - Akinetic 4 -          5 -         6 - Akinetic Dyskinetic   6-14    large               Dyskinetic   Aneurysmal  w/scar       w/scar       15-16   diffuse     Left Ventricle  The left ventricle is normal in size. There is normal left ventricular wall  thickness. Left ventricular systolic function is normal. The visual ejection  fraction is 60-65%. Diastolic function not assessed due to atrial  fibrillation. No regional wall motion abnormalities noted.     Right Ventricle  The right ventricle is mildly dilated. The right ventricular systolic function  is normal.     Atria  The left atrium is mildly dilated. The right atrium is moderately dilated.     Mitral Valve  The mitral valve leaflets are moderately thickened. There is mild mitral  annular calcification. There is trace to mild mitral regurgitation. There is  no mitral valve stenosis. The mean mitral valve gradient is 3.3 mmHg.     Tricuspid Valve  Tricuspid valve leaflets appear normal. There is mild (1+) tricuspid  regurgitation. The  right ventricular systolic pressure is elevated at 46.4  mmHg. Moderate (46-55mmHg) pulmonary hypertension is present. There is no  tricuspid stenosis.     Aortic Valve  The aortic valve is trileaflet. Moderate aortic valve calcification is  present. No aortic regurgitation is present. Moderate valvular aortic  stenosis. The calculated aortic valve are is 1.1 cm^2. The mean AoV pressure  gradient is 26.6 mmHg.     Pulmonic Valve  The pulmonic valve is not well seen, but is grossly normal. There is trace to  mild pulmonic valvular regurgitation. There is no pulmonic valvular stenosis.     Vessels  The aorta root is normal. The thoracic aorta cannot be assessed. IVC diameter  and respiratory changes fall into an intermediate range suggesting an RA  pressure of 8 mmHg.     Pericardium  There is no pericardial effusion.     Rhythm  The rhythm was atrial fibrillation.     ______________________________________________________________________________  MMode/2D Measurements & Calculations  IVSd: 0.73 cm  LVIDd: 4.5 cm  LVIDs: 2.8 cm  LVPWd: 0.85 cm  FS: 37.6 %     LV mass(C)d: 113.4 grams  LV mass(C)dI: 61.0 grams/m2  Ao root diam: 3.5 cm  LA dimension: 3.9 cm  LA/Ao: 1.1  LVOT diam: 2.2 cm  LVOT area: 3.9 cm2  LA Volume (BP): 63.7 ml  LA Volume Index (BP): 34.2 ml/m2     LA Volume Indexed (AL/bp): 35.6 ml/m2  RWT: 0.38     Time Measurements  MM HR: 59.0 BPM     Doppler Measurements & Calculations  MV E max quinten: 168.0 cm/sec  MV A max quinten: 77.1 cm/sec  MV E/A: 2.2  MV max PG: 10.0 mmHg  MV mean PG: 3.3 mmHg  MV V2 VTI: 46.4 cm  MVA(VTI): 1.8 cm2  MV dec slope: 1407 cm/sec2  MV dec time: 0.12 sec  Ao V2 max: 320.3 cm/sec  Ao max P.0 mmHg  Ao V2 mean: 244.7 cm/sec  Ao mean P.6 mmHg  Ao V2 VTI: 75.0 cm  JOHANN(I,D): 1.1 cm2  JOHANN(V,D): 1.1 cm2  LV V1 max PG: 3.3 mmHg  LV V1 max: 90.7 cm/sec  LV V1 VTI: 21.7 cm  SV(LVOT): 85.6 ml  SI(LVOT): 46.0 ml/m2  PA acc time: 0.07 sec     TR max quinten: 340.7 cm/sec  TR max P.4  mmHg  AV Iam Ratio (DI): 0.28  JOHANN Index (cm2/m2): 0.61  E/E' av.6  Lateral E/e': 18.4  Medial E/e': 24.9    CT scan     EXAM: CT CHEST W CONTRAST  LOCATION: [facility]  DATE/TIME: 3/20/2022 8:10 AM     INDICATION: Pneumonia, effusion or abscess suspected, xray done; multiple myeloma  COMPARISON: CT of the chest 2022; PET/CT 2022  TECHNIQUE: CT chest with IV contrast. Multiplanar reformats were obtained. Dose reduction techniques were used.     CONTRAST: 75ml Isovue 370     FINDINGS:   LUNGS AND PLEURA: Small bilateral pleural effusions are present layering posteriorly. Pleural fluid on the right is decreased from 2022. Associated passive atelectasis of the adjacent lower lobes. New subsegmental focus of groundglass attenuation   along the anterior pleura of the right upper lobe, which does not conform to a specific vascular territory or anatomic distribution. There are no other new airspace opacities elsewhere. Mild central airway wall thickening.     MEDIASTINUM: There is a right jugular approach chest port catheter which terminates at the SVC right atrial junction. Variant pulmonary vein anatomy with a right top pulmonary vein and independent drainage of the right middle lobe on the right and a   single ostium of the pulmonary veins on the left. Cardiac chambers are not enlarged. Degenerative thickening and calcification of aortic valve leaflets. Mild mitral annular calcifications. No pericardial effusion. No thoracic aortic aneurysm. Mixed   attenuation atheroma in the arch and descending aorta.     No enlarged mediastinal or hilar lymph nodes. Esophagus is decompressed. Imaged thyroid gland is normal.     CORONARY ARTERY CALCIFICATION: Severe.     UPPER ABDOMEN: No new findings in the imaged upper abdomen.     MUSCULOSKELETAL: T6 compression deformity with radiopaque cement. No new vertebral compression deformity. Healed fracture deformity left anterior seventh  rib area Unchanged lytic lesion in the right scapula with focal loss of the cortex (series 5, image   41) which was metabolically active on the January 2022 PET/CT consistent with an area of multiple myeloma. No new aggressive or destructive bone lesions.                                                                      IMPRESSION:      1.  Small bilateral pleural effusions are present. Right pleural fluid is slightly decreased from 03/17/2022.  2.  Limited territory of subpleural groundglass attenuation in the anterior right upper lobe consistent with small focus of new lung inflammation. Uncertain etiology.  3.  Unchanged lytic lesion in the right scapula. No aggressive or destructive bone lesions in the chest    Electrocardiograms        Component Ref Range & Units 4/25/22  3:02 PM 4/1/22 10:47 AM    Systolic Blood Pressure mmHg       Diastolic Blood Pressure mmHg       Ventricular Rate BPM 66  59     Atrial Rate   59     NY Interval ms  174     QRS Duration ms 88  88     QT ms 392  398     QTc ms 410  394     P Axis degrees  83     R AXIS degrees 5  37     T Axis degrees 3  60     Interpretation ECG  Atrial fibrillation   Minimal voltage criteria for LVH, may be normal variant   ST elevation, consider early repolarization, pericarditis, or injury   Abnormal ECG   When compared with ECG of 01-APR-2022 10:47,   Atrial fibrillation has replaced Sinus rhythm   T wave inversion now evident in Inferior leads   Confirmed by TERI SEVILLA MD LOC:JN ([id number]) on 4/26/2022 3:35:06 PM  Sinus bradycardia with Premature supraventricular complexes   Blocked Premature supraventricular complexes   Otherwise normal ECG   When compared with ECG of 17-MAR-2022 04:10,   Sinus rhythm has replaced Atrial fibrillation   Non-specific change in ST segment in Inferior leads   T wave inversion no longer evident in Inferior leads   Nonspecific T wave abnormality no longer evident in Anterior leads   Confirmed by DESIREE GRANDE, LES  LOC:JN [id number]) on 4/1/2022 2:54:59 PM           Result Notes      Component Ref Range & Units 4/25/22  3:02 PM 4/1/22 10:47 AM    Systolic Blood Pressure mmHg       Diastolic Blood Pressure mmHg       Ventricular Rate BPM 66  59     Atrial Rate   59     AK Interval ms  174     QRS Duration ms 88  88     QT ms 392  398     QTc ms 410  394     P Axis degrees  83     R AXIS degrees 5  37     T Axis degrees 3  60     Interpretation ECG  Atrial fibrillation   Minimal voltage criteria for LVH, may be normal variant   ST elevation, consider early repolarization, pericarditis, or injury   Abnormal ECG   When compared with ECG of 01-APR-2022 10:47,   Atrial fibrillation has replaced Sinus rhythm   T wave inversion now evident in Inferior leads   Confirmed by TERI SEVILLA MD LOC:JN [id number]) on 4/26/2022 3:35:06 PM  Sinus bradycardia with Premature supraventricular complexes   Blocked Premature supraventricular complexes   Otherwise normal ECG   When compared with ECG of 17-MAR-2022 04:10,   Sinus rhythm has replaced Atrial fibrillation   Non-specific change in ST segment in Inferior leads   T wave inversion no longer evident in Inferior leads   Nonspecific T wave abnormality no longer evident in Anterior leads   Confirmed by DESIREE GRANDE, LES LOC:JN [id number]) on 4/1/2022 2:54:59 PM               Specimen Collected: 04/25/22  3:02 PM Last Resulted: 04/26/22                 CC: doctors above

## 2022-05-02 NOTE — LETTER
"5/2/2022      RE: Theo Meyers  [address]  [facility] [address]       Dear Colleague,    Thank you for the opportunity to participate in the care of your patient, Theo Meyers, at the [facility] [place] [facility]. Please see a copy of my visit note below.    Todd Peters M.D.  Cardiovascular Medicine    I personally saw and examined this patient, discussed care with r consultants, reviewed current laboratories and imaging studies, and conveyed impression and diagnostic/therapeutic plan to patient.    Per Clifford MD    [address] [place] [address] [phone] (Fax)      Familia Guan M.D.  [facility]      Problem List  1. Multiple myeloma kappa light tchain   2. Glaucoma  3. Shingles  4. Syncope  5. History of DVT  6. History of B12 deficiency  7. HFpEF by history  8. History of pneumonia  9. Extensive coronary calcification  10. NWOAC  11. Prostatism  12. Atrial fibrillation with recurrence post cardioversion  13. + Carotid doppler for ASCD  14. Hyperlipidemia treated with atorvastatin  15. Anemia with macrocytosis  16. History of pleural effusions  17. Immunocompromise  18. Hypoproteinemia      Discussion  1. The therapy that is contemplated is associated with a high incidence of cytokine storm  2. The patient, I and his wife discussed the findings including aortic valve narrowing, carotid atherosclerosis, \"severe\" coronary calcification and elevated PA pressure and the need to attempt to distinguish what is what in terms of risk and therapy.  He will likely require bilateral heart catheterization and coronary angiography.      I thoroughly discussed the risks and benefits of the contemplated catherization including bleeding, vessel rupture, death, arrhythmia, embolism, stroke, renal failure perforation of pulmonary artery, aortic,lung or heart.  I discussed how the procedure would " be performed and alternative diagnostic and therapeutic management strategies.  All questions were answered.      We elected to start with VQ lung scan to look at the issue of chronic pulmonary emboli.        History    77 year old male seen for evaluation of shortness of breath with associated cardiac history of atrial fibrillation (recurrent post cardioversion), aortic stenosis, and pulmonary hypertension.  He has a clear history of exertionally related chest pressure and shortness of breath that is mitigated by reduction by intensity of activity.  In March he had a negative non-exercise stress test.  He has known extensive coronary calcification as well as carotid atherosclerotic disease.  He has no symptoms at rest.  He also has a history of DVT/PE.  His most recent CT scans have been done without contrast. He has had to reduce his level of exertion secondary to exertional chest pressure/pain and shortness of breath - relieved by rest.  He has had no prolonged episodes.      Objective    Constitutional: alert, oriented, normal gait and station, normal mentation.  Oral: moist mucous membranes  Lymph: without pathologic adenopathy  Chest: clear to ausculation and percussion  Cor: No evidence of left or right ventricular activity.  Rhythm is irregular.  S1 variable S2 split physiologically. Murmurs of aortic flow  Abdomen: without tenderness, rebound, guarding, masses, ascites  Extremities: Edema not present  Neuro: no focal defects, normal mentation  Skin: without open lesions  Psych: oriented, verbal, mental status in tact    Wt Readings from Last 5 Encounters:   04/25/22 72.6 kg (160 lb)   04/08/22 74.8 kg (165 lb)   04/06/22 72.5 kg (159 lb 12.8 oz)   04/06/22 72.5 kg (159 lb 12.8 oz)   04/01/22 72 kg (158 lb 11.2 oz)       Meds  Current Outpatient Medications   Medication     acetaminophen (TYLENOL) 500 MG tablet     acyclovir (ZOVIRAX) 400 MG tablet     ascorbic acid 1000 MG TABS tablet     atorvastatin  (LIPITOR) 10 MG tablet     bimatoprost (LUMIGAN) 0.03 % ophthalmic solution     brimonidine (ALPHAGAN P) 0.1 % ophthalmic solution     calcium carbonate 1250 (500 Ca) MG CHEW     calcium carbonate-vitamin D (OYSTER SHELL CALCIUM/D) 500-200 MG-UNIT tablet     cyanocobalamin (CYANOCOBALAMIN) 1000 MCG/ML injection     dexamethasone (DECADRON) 4 MG tablet     dextromethorphan-guaiFENesin (MUCINEX DM)  MG 12 hr tablet     fluticasone (FLONASE) 50 MCG/ACT nasal spray     furosemide (LASIX) 20 MG tablet     gabapentin (NEURONTIN) 300 MG capsule     magnesium oxide 400 MG CAPS     Multiple Vitamin (MULTI-VITAMIN PO)     Omega-3 Fatty Acids (OMEGA 3 PO)     omeprazole (PRILOSEC) 20 MG DR capsule     potassium chloride ER (KLOR-CON M) 20 MEQ CR tablet     Psyllium (METAMUCIL PO)     sulfamethoxazole-trimethoprim (BACTRIM) 400-80 MG tablet     tamsulosin (FLOMAX) 0.4 MG capsule     XARELTO ANTICOAGULANT 20 MG TABS tablet     No current facility-administered medications for this visit.       Labs     Latest Reference Range & Units 04/06/22 08:09 04/13/22 08:16 04/20/22 08:25 04/27/22 08:03   Calcium 8.5 - 10.5 mg/dL 8.4 (L)      Anion Gap 5 - 18 mmol/L 9      Albumin 3.5 - 5.0 g/dL 2.6 (L)      Protein Total 6.0 - 8.0 g/dL 5.0 (L)      Bilirubin Total 0.0 - 1.0 mg/dL 0.4      Alkaline Phosphatase 45 - 120 U/L 67      ALT 0 - 45 U/L 27      AST 0 - 40 U/L 14      BNP 0 - 80 pg/mL  207 (H)     N-Terminal Pro Bnp 0 - 450 pg/mL 1,805 (H) [1]      Glucose 70 - 125 mg/dL 121      WBC 4.0 - 11.0 10e3/uL 3.1 (L) [2] 3.2 (L) [3] 5.4 [4]    Hemoglobin 13.3 - 17.7 g/dL 11.1 (L) 10.3 (L) 10.5 (L)    Hematocrit 40.0 - 53.0 % 35.0 (L) 32.1 (L) 32.4 (L)    Platelet Count 150 - 450 10e3/uL 173 144 (L) 131 (L)    RBC Count 4.40 - 5.90 10e6/uL 3.30 (L) 3.04 (L) 3.03 (L)    MCV 78 - 100 fL 106 (H) 106 (H) 107 (H)    MCH 26.5 - 33.0 pg 33.6 (H) 33.9 (H) 34.7 (H)    MCHC 31.5 - 36.5 g/dL 31.7 32.1 32.4    RDW 10.0 - 15.0 % 16.3 (H) 17.2 (H)  17.4 (H)    % Neutrophils % 65 80 85    % Lymphocytes % 12 5 6    % Monocytes % 17 4 5    % Eosinophils % 4 11 4    % Basophils % 2 0 0    Absolute Basophils 0.0 - 0.2 10e3/uL 0.1 0.0 0.0    Absolute Neutrophil 1.6 - 8.3 10e3/uL 2.0 2.6 4.6    Absolute Lymphocytes 0.8 - 5.3 10e3/uL 0.4 (L) 0.2 (L) 0.3 (L)    Absolute Monocytes 0.0 - 1.3 10e3/uL 0.5 0.1 0.3    Absolute Eosinophils 0.0 - 0.7 10e3/uL 0.1 0.4 0.2    RBC Morphology  Confirmed RBC Indices Confirmed RBC Indices Confirmed RBC Indices    Platelet Morphology Automated Count Confirmed. Platelet morphology is normal.  Automated Count Confirmed. Giant platelets are present. ! Automated Count Confirmed. Platelet morphology is normal. Automated Count Confirmed. Platelet morphology is normal.    RBC Fragments None Seen  Slight !  Slight !    Teardrop Cells None Seen  Slight !      Elliptocytes None Seen  Moderate ! Slight ! Slight !    [place] Free Lt Chain 0.33 - 1.94 mg/dL 100.90 (H)   38.53 (H)   Kappa Lambda Ratio 0.26 - 1.65  347.93 (H)   256.87 (H)   Lambda Free Lt Chain 0.57 - 2.63 mg/dL 0.29 (L)   0.15 (L)   Total Protein Serum for ELP 6.0 - 8.0 g/dL    4.6 (L)       Imaging     Echocardiogram  Name: MONICA SCOTT  MRN: [id number]  : 1945  Study Date: 2022 11:22 AM  Age: 76 yrs  Gender: Male  Patient Location: [facility]  Reason For Study: Heart Failure  Ordering Physician: ESTEVAN SORTO  Performed By: ACE     BSA: 1.9 m2  Height: 68 in  Weight: 160 lb  HR: 79  BP: 147/79 mmHg  ______________________________________________________________________________  Procedure  Complete Echo Adult. Adequate quality two-dimensional was performed and  interpreted. Adequate quality color and spectral Doppler were performed and  interpreted. Compared to the prior study dated 11/3/2021, there are changes as  noted.  ______________________________________________________________________________  Interpretation Summary     1. Left ventricular size, wall  thickness, and systolic function are normal.  The estimated left ventricular ejection fraction is 60-65%.  2. Right ventricular size and systolic function are normal.  3. Mild left and moderate right atrial enlargement.  4. Calcified trileaflet aortic valve with moderate aortic stenosis. Peak  forward velocity measures 3.2 m/s, mean gradient 27 mm Hg, calculated aortic  valve area 1.1 cm2, and dimensionless index 0.28. No significant  regurgitation.  5. Moderate pulmonary hypertension is present with an estimated pulmonary  artery systolic pressure of 54 mm Hg.  6. Compared to the prior study dated 11/3/2021, the Doppler data today show  moderate aortic stenosis. However, the aortic valve has a similar appearance  on both studies. There was likely inadequate Doppler assessment of the aortic  valve on the prior study. Visually the aortic valve appears at least  moderately stenotic on both studies.  ______________________________________________________________________________  I      WMSI = 1.00     % Normal = 100     X - Cannot   0 -                      (2) - Mildly 2 -          Segments  Size  Interpret    Hyperkinetic 1 - Normal  Hypokinetic  Hypokinetic  1-2     small                                                     7 -          3-5      moderate  3 - Akinetic 4 -          5 -         6 - Akinetic Dyskinetic   6-14    large               Dyskinetic   Aneurysmal  w/scar       w/scar       15-16   diffuse     Left Ventricle  The left ventricle is normal in size. There is normal left ventricular wall  thickness. Left ventricular systolic function is normal. The visual ejection  fraction is 60-65%. Diastolic function not assessed due to atrial  fibrillation. No regional wall motion abnormalities noted.     Right Ventricle  The right ventricle is mildly dilated. The right ventricular systolic function  is normal.     Atria  The left atrium is mildly dilated. The right atrium is moderately dilated.     Mitral  Valve  The mitral valve leaflets are moderately thickened. There is mild mitral  annular calcification. There is trace to mild mitral regurgitation. There is  no mitral valve stenosis. The mean mitral valve gradient is 3.3 mmHg.     Tricuspid Valve  Tricuspid valve leaflets appear normal. There is mild (1+) tricuspid  regurgitation. The right ventricular systolic pressure is elevated at 46.4  mmHg. Moderate (46-55mmHg) pulmonary hypertension is present. There is no  tricuspid stenosis.     Aortic Valve  The aortic valve is trileaflet. Moderate aortic valve calcification is  present. No aortic regurgitation is present. Moderate valvular aortic  stenosis. The calculated aortic valve are is 1.1 cm^2. The mean AoV pressure  gradient is 26.6 mmHg.     Pulmonic Valve  The pulmonic valve is not well seen, but is grossly normal. There is trace to  mild pulmonic valvular regurgitation. There is no pulmonic valvular stenosis.     Vessels  The aorta root is normal. The thoracic aorta cannot be assessed. IVC diameter  and respiratory changes fall into an intermediate range suggesting an RA  pressure of 8 mmHg.     Pericardium  There is no pericardial effusion.     Rhythm  The rhythm was atrial fibrillation.     ______________________________________________________________________________  MMode/2D Measurements & Calculations  IVSd: 0.73 cm  LVIDd: 4.5 cm  LVIDs: 2.8 cm  LVPWd: 0.85 cm  FS: 37.6 %     LV mass(C)d: 113.4 grams  LV mass(C)dI: 61.0 grams/m2  Ao root diam: 3.5 cm  LA dimension: 3.9 cm  LA/Ao: 1.1  LVOT diam: 2.2 cm  LVOT area: 3.9 cm2  LA Volume (BP): 63.7 ml  LA Volume Index (BP): 34.2 ml/m2     LA Volume Indexed (AL/bp): 35.6 ml/m2  RWT: 0.38     Time Measurements  MM HR: 59.0 BPM     Doppler Measurements & Calculations  MV E max quinten: 168.0 cm/sec  MV A max quinten: 77.1 cm/sec  MV E/A: 2.2  MV max PG: 10.0 mmHg  MV mean PG: 3.3 mmHg  MV V2 VTI: 46.4 cm  MVA(VTI): 1.8 cm2  MV dec slope: 1407 cm/sec2  MV dec time:  0.12 sec  Ao V2 max: 320.3 cm/sec  Ao max P.0 mmHg  Ao V2 mean: 244.7 cm/sec  Ao mean P.6 mmHg  Ao V2 VTI: 75.0 cm  JOHANN(I,D): 1.1 cm2  JOHANN(V,D): 1.1 cm2  LV V1 max PG: 3.3 mmHg  LV V1 max: 90.7 cm/sec  LV V1 VTI: 21.7 cm  SV(LVOT): 85.6 ml  SI(LVOT): 46.0 ml/m2  PA acc time: 0.07 sec     TR max iam: 340.7 cm/sec  TR max P.4 mmHg  AV Iam Ratio (DI): 0.28  JOHANN Index (cm2/m2): 0.61  E/E' av.6  Lateral E/e': 18.4  Medial E/e': 24.9    CT scan     EXAM: CT CHEST W CONTRAST  LOCATION: [facility]  DATE/TIME: 3/20/2022 8:10 AM     INDICATION: Pneumonia, effusion or abscess suspected, xray done; multiple myeloma  COMPARISON: CT of the chest 2022; PET/CT 2022  TECHNIQUE: CT chest with IV contrast. Multiplanar reformats were obtained. Dose reduction techniques were used.     CONTRAST: 75ml Isovue 370     FINDINGS:   LUNGS AND PLEURA: Small bilateral pleural effusions are present layering posteriorly. Pleural fluid on the right is decreased from 2022. Associated passive atelectasis of the adjacent lower lobes. New subsegmental focus of groundglass attenuation   along the anterior pleura of the right upper lobe, which does not conform to a specific vascular territory or anatomic distribution. There are no other new airspace opacities elsewhere. Mild central airway wall thickening.     MEDIASTINUM: There is a right jugular approach chest port catheter which terminates at the SVC right atrial junction. Variant pulmonary vein anatomy with a right top pulmonary vein and independent drainage of the right middle lobe on the right and a   single ostium of the pulmonary veins on the left. Cardiac chambers are not enlarged. Degenerative thickening and calcification of aortic valve leaflets. Mild mitral annular calcifications. No pericardial effusion. No thoracic aortic aneurysm. Mixed   attenuation atheroma in the arch and descending aorta.     No enlarged mediastinal or  hilar lymph nodes. Esophagus is decompressed. Imaged thyroid gland is normal.     CORONARY ARTERY CALCIFICATION: Severe.     UPPER ABDOMEN: No new findings in the imaged upper abdomen.     MUSCULOSKELETAL: T6 compression deformity with radiopaque cement. No new vertebral compression deformity. Healed fracture deformity left anterior seventh rib area Unchanged lytic lesion in the right scapula with focal loss of the cortex (series 5, image   41) which was metabolically active on the January 2022 PET/CT consistent with an area of multiple myeloma. No new aggressive or destructive bone lesions.                                                                      IMPRESSION:      1.  Small bilateral pleural effusions are present. Right pleural fluid is slightly decreased from 03/17/2022.  2.  Limited territory of subpleural groundglass attenuation in the anterior right upper lobe consistent with small focus of new lung inflammation. Uncertain etiology.  3.  Unchanged lytic lesion in the right scapula. No aggressive or destructive bone lesions in the chest    Electrocardiograms        Component Ref Range & Units 4/25/22  3:02 PM 4/1/22 10:47 AM    Systolic Blood Pressure mmHg       Diastolic Blood Pressure mmHg       Ventricular Rate BPM 66  59     Atrial Rate   59     UT Interval ms  174     QRS Duration ms 88  88     QT ms 392  398     QTc ms 410  394     P Axis degrees  83     R AXIS degrees 5  37     T Axis degrees 3  60     Interpretation ECG  Atrial fibrillation   Minimal voltage criteria for LVH, may be normal variant   ST elevation, consider early repolarization, pericarditis, or injury   Abnormal ECG   When compared with ECG of 01-APR-2022 10:47,   Atrial fibrillation has replaced Sinus rhythm   T wave inversion now evident in Inferior leads   Confirmed by TERI SEVILLA MD LOC:JN ([id number]) on 4/26/2022 3:35:06 PM  Sinus bradycardia with Premature supraventricular complexes   Blocked Premature  supraventricular complexes   Otherwise normal ECG   When compared with ECG of 17-MAR-2022 04:10,   Sinus rhythm has replaced Atrial fibrillation   Non-specific change in ST segment in Inferior leads   T wave inversion no longer evident in Inferior leads   Nonspecific T wave abnormality no longer evident in Anterior leads   Confirmed by BHAVESH RAMÍREZ MD LOC:JN [id number]) on 4/1/2022 2:54:59 PM           Result Notes      Component Ref Range & Units 4/25/22  3:02 PM 4/1/22 10:47 AM    Systolic Blood Pressure mmHg       Diastolic Blood Pressure mmHg       Ventricular Rate BPM 66  59     Atrial Rate   59     AL Interval ms  174     QRS Duration ms 88  88     QT ms 392  398     QTc ms 410  394     P Axis degrees  83     R AXIS degrees 5  37     T Axis degrees 3  60     Interpretation ECG  Atrial fibrillation   Minimal voltage criteria for LVH, may be normal variant   ST elevation, consider early repolarization, pericarditis, or injury   Abnormal ECG   When compared with ECG of 01-APR-2022 10:47,   Atrial fibrillation has replaced Sinus rhythm   T wave inversion now evident in Inferior leads   Confirmed by TERI SEVILLA MD LOC:[id number]) on 4/26/2022 3:35:06 PM  Sinus bradycardia with Premature supraventricular complexes   Blocked Premature supraventricular complexes   Otherwise normal ECG   When compared with ECG of 17-MAR-2022 04:10,   Sinus rhythm has replaced Atrial fibrillation   Non-specific change in ST segment in Inferior leads   T wave inversion no longer evident in Inferior leads   Nonspecific T wave abnormality no longer evident in Anterior leads   Confirmed by BHAVESH RAMÍREZ MD LOC:JN [id number]) on 4/1/2022 2:54:59 PM               Specimen Collected: 04/25/22  3:02 PM Last Resulted: 04/26/22                 CC: doctors above      Please do not hesitate to contact me if you have any questions/concerns.     Sincerely,     Todd Peters MD

## 2022-05-02 NOTE — NURSING NOTE
Patient educated to the following during visit and educated to contact RN or MD for any questions.     Plan: Labs today. Schedule for VQ lung scan    Return Appointment: Follow up with Lorraine after testing    Patient demonstrated understanding of all health information given and agreed to call with further questions or concerns.    Patient brought to POD for scheduling

## 2022-05-02 NOTE — NURSING NOTE
Chief Complaint   Patient presents with     New Patient     Reason for Visit: New PH referral from      Vitals were taken and medications reconciled.    Fransico Cooper, EMT  7:58 AM

## 2022-05-02 NOTE — PATIENT INSTRUCTIONS
Medication Changes:  No changes  Follow up Appointment Information:  Schedule for VQ Lung scan to rule out blood clots  Go to the lab after visit today      Check-In  Time Check-In Location Estimated Length Procedure   Name        GOLD   waiting room 60 minutes VQ/Lung Perfusion Scan**   Procedure Preparations & Instructions     This is a non-invasive procedure and does NOT require any preparation             Patient Instructions:  1. Continue staying active and eat a heart healthy diet.    2. Please keep current list of medications with you at all times.    3. Remember to weigh yourself daily after voiding and before you consume any food or beverages and log the numbers.  If you have gained 2 pounds overnight or 5 pounds in a week contact us immediately for medication adjustments or further instructions.    4. **Please call us immediately if you have any syncope (fainting or passing out), chest pain, edema (swelling or weight gain), or decline in your functional status (general decline in how you are feeling).    5. Patients on Remodulin (treprostinil) or Veletri (epoprostenol): Please make sure that you have your backup pump and supplies with you at all times, your mixing instructions, and contact information for your specialty pharmacy.    We are located on the third floor of the [facility] ([facility] on the [facility].  Our address is     [address] [place] [address] [place] [address] [place] [address]    Thank you for allowing us to be a part of your care here at the [facility]    If you have questions or concerns please contact us at:    Ellen Dewitt, RN, BSN, PHN  Karen Inman (Scheduling,Prior Auth)  Nurse Coordinator     Clinic   Pulmonary Hypertension   Pulmonary Hypertension  [facility]  (Phone)[phone]   (Phone) [phone]        (Fax)  [phone]    ** Please note that you will NOT receive a reminder call regarding your scheduled testing, reminder calls are for provider appointments only.  If you are scheduled for testing within the [facility] system you may receive a call regarding pre-registration for billing purposes only.**     Support Group:  Pulmonary Hypertension Association  Https://www.phassociation.org/  **Look at the Events Tab** They even have Support Groups that you can call into    [facility] PH Support Group  Second Saturday of the Month from 1-3 PM   Location: [address] [place] [address] [facility] [address]  Leader: Surekha Wilkins  Phone: [phone]  Email: [email or web address]

## 2022-05-04 NOTE — LETTER
"    5/4/2022         RE: Theo Meyers  [address]  [facility] [address]        Dear Colleague,    Thank you for referring your patient, Theo Meyers, to the [facility] [place]. Please see a copy of my visit note below.    Oncology Rooming Note    May 4, 2022 8:31 AM   Theo eMyers is a 77 year old male who presents for:    Chief Complaint   Patient presents with     Oncology Clinic Visit     Return visit for labs and infusion related to Multiple myeloma not having achieved remission (H)     Initial Vitals: /58 (BP Location: Left arm, Patient Position: Sitting, Cuff Size: Adult Regular)   Pulse 78   Temp 97.7  F (36.5  C) (Oral)   Resp 14   Ht 1.746 m (5' 8.74\")   Wt 73.1 kg (161 lb 3.2 oz)   SpO2 100%   BMI 23.99 kg/m   Estimated body mass index is 23.99 kg/m  as calculated from the following:    Height as of this encounter: 1.746 m (5' 8.74\").    Weight as of this encounter: 73.1 kg (161 lb 3.2 oz). Body surface area is 1.88 meters squared.  No Pain (0) Comment: Data Unavailable   No LMP for male patient.  Allergies reviewed: Yes  Medications reviewed: Yes    Medications: Medication refills not needed today.  Pharmacy name entered into [employer]:    [id number] - [place], [place] - [facility] [address] [facility] [address]  [facility] - [place], [place] - [address] [place] [address]    Clinical concerns: Return visit for labs and infusion related to Multiple myeloma not having achieved remission (H)      RONN SALMERON MA              [facility] Hematology and Oncology Progress Note    Patient: Theo Meyers  MRN: [id number]  Date of Service: May 4, 2022        Assessment and Plan:    1. Kappa light chain myeloma: His myeloma numbers from April 27 were reviewed.  His kappa light chain has come down nicely.  Seems to be responding to his current therapy.  We will continue on his carfilzomib for now.  He is being evaluated at the [facility] " for cardiopulmonary issues prior to enrollment to clinic.  We will not make any changes to his current dosing.    2.  Pancytopenia: Still has mild neutropenia but this is stable.  Mild anemia which is also stable.  White blood cell count today is 3.3 but hemoglobin is 10.5.    3.  Prophylaxis: Continue Xarelto 20 mg daily.  No issues with bleeding recently.    ECOG Performance  0    Diagnosis:    1.  IgG kappa light chain myeloma. Hyposecretory. Diagnosed 2009. No significant measurable M protein. 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 There was also on 11;14 translocation.  Past immunofixations from 2011 show IgG-kappa.    Bone marrow biopsy 8, 2020  at 5%.  Residual kappa light chain myeloma involving 20% of nucleated cells.  No significant change from November 2016 marrow cellularity.     Bone marrow biopsy from January 26, 2022: Hypocellular marrow involved with multiple myeloma at 20 to 30%.  FISH:  - Gains of chromosomes 9 and 15 (80%)  - Gain of JAQUI (11q) (93%)  - IGH-CCND1 fusion with gains of 1-2 extra fusion signals (92%)  - Monosomy 13 (94%)     NORMAL  - No gain of 1q  - No losses of 1p or TP53     INTERPRETATION:  These findings are consistent with the reported pathologic diagnosis of persistent plasma cell myeloma, reportedly characterized at diagnosis (study performed at an outside institution) by an IGH-CCND1 fusion and monosomy 13. The population of cells with gains of two extra IGH-CCND1 fusion signals is apparently newly arisen and would be consistent with cytogenetic evolution.    2.  Deep vein thrombosis of the left leg diagnosed 9/2012 while on treatment.     3.  Vitamin B12 deficiency diagnosed in September 2017.     4.  GI bleed and anemia requiring hospitalization in December 2017.    5.  MOHS surgery in January, 2021; May 2021 (chest; squamous).     Treatment:    He presented with a lytic lesion involving the C6 vertebral body, although no measurable M protein. IgG kappa  monoclonal immunoglobulin was confirmed by ELMA as well as elevated kappa free light chains with an abnormal kappa lambda ratio. Bone marrow biopsy showed 30% involvement and cytogenetics confirmed deletion of 13 q. as well as 11;14 translocation. He was initially treated with Velcade and dexamethasone and achieved a good partial response.     He was referred to the [facility] where he underwent high-dose chemotherapy with autologous peripheral stem cell transplant in April of 2010. He began Revlimid as maintenance therapy post transplant. Repeat bone marrow biopsy done October 2010 showed about 5% kappa restricted plasma cells and so at that time weekly Velcade was added along with his maintenance Revlimid and dexamethasone.      He has done well since that time with stable minimal residual disease, best assessed by serum free light chains. He required dose reductions of weekly Velcade because of cytopenias and was ultimately switched to a q 2 week maintenance schedule which he has been on since September 2012.      His Revlimid dose was reduced to 15 mg daily and he continues on dexamethasone 20 mg p.o. weekly  Revlimid dosing changed to 20 mg, 3 weeks on 1 week (instead of 15 mg daily) for cost reasons.  Started March, 2018     Started daratumumab/pomalidomide/dexamethasone (20mg every 14 days) on October 14, 2020.     Carfilzomib/dexamethasone started4/6/22. Days 1, 2, 8 9, 15, 16      Progressed on:  bortezomib  lenalidomide    Interim History:    Keith returns today for follow-up visit.  He can in to discuss trial and treatment logistics. He is not sure if his insurance will cover it. He has also spoken to [facility].  Clinically though he seems to be doing okay.  No other new complaints today.    Review of Systems:    As above in the history.     Review of Systems otherwise Negative for:  General: chills, fever or night sweats  Psychological: anxiety or depression  Ophthalmic: blurry vision, double  "vision or loss of vision, vision change  ENT: epistaxis, oral lesions, hearing changes  Hematological and Lymphatic: bleeding, bruising, jaundice, swollen lymph nodes  Endocrine: hot flashes, unexpected weight changes  Respiratory: cough, hemoptysis, orthopnea or shortness of breath/SAMANO  Cardiovascular: chest pain, edema, palpitations or PND  Gastrointestinal: abdominal pain, blood in stools, change in bowel habits, constipation, diarrhea or nausea/vomiting  Genito-Urinary: change in urinary stream, incontinence, frequency/urgency  Musculoskeletal: joint pain, stiffness, swelling, muscle pain  Neurological: dizziness, headaches, numbness/tingling  Dermatological: lumps and rash    Past History:    Past Medical History:   Diagnosis Date     Anemia 12/13/2017     Arthritis      Bilateral inguinal hernia      Chest tube in place      Elevated INR      Glaucoma      Glaucoma      History of blood clots     DVT - left chronic     Multiple myeloma (H)     Gets chemo infusions every 2 weeks     Pancytopenia (H)      Parapneumonic effusion      Peripheral neuropathy      Shingles 9/24/2014     Syncope      TMJ (temporomandibular joint disorder)      Physical Exam:    /58 (BP Location: Left arm, Patient Position: Sitting, Cuff Size: Adult Regular)   Pulse 78   Temp 97.7  F (36.5  C) (Oral)   Resp 14   Ht 1.746 m (5' 8.74\")   Wt 73.1 kg (161 lb 3.2 oz)   SpO2 100%   BMI 23.99 kg/m      General: patient appears stated age of 76 year old. Nontoxic and in no distress.   HEENT: Head: atraumatic, normocephalic. Sclerae anicteric.  Chest:  Normal respiratory effort  Cardiac:  No edema.  Regular rate and rhythm.  3/6 systolic ejection murmur is heard.  Abdomen: abdomen is non-distended  Extremities: normal tone and muscle bulk.  Skin: no lesions or rash on visible skin. Warm and dry.   CNS: alert and oriented. Grossly non-focal.   Psychiatric: normal mood and affect.     Lab Results:    Recent Results (from the past 168 " hour(s))   Comprehensive metabolic panel   Result Value Ref Range    Sodium 144 133 - 144 mmol/L    Potassium 4.2 3.4 - 5.3 mmol/L    Chloride 111 (H) 94 - 109 mmol/L    Carbon Dioxide (CO2) 26 20 - 32 mmol/L    Anion Gap 7 3 - 14 mmol/L    Urea Nitrogen 25 7 - 30 mg/dL    Creatinine 1.64 (H) 0.66 - 1.25 mg/dL    Calcium 8.6 8.5 - 10.1 mg/dL    Glucose 90 70 - 99 mg/dL    Alkaline Phosphatase 62 40 - 150 U/L    AST 27 0 - 45 U/L    ALT 40 0 - 70 U/L    Protein Total 5.4 (L) 6.8 - 8.8 g/dL    Albumin 3.0 (L) 3.4 - 5.0 g/dL    Bilirubin Total 0.4 0.2 - 1.3 mg/dL    GFR Estimate 43 (L) >60 mL/min/1.73m2   CBC with platelets   Result Value Ref Range    WBC Count 3.3 (L) 4.0 - 11.0 10e3/uL    RBC Count 3.04 (L) 4.40 - 5.90 10e6/uL    Hemoglobin 10.5 (L) 13.3 - 17.7 g/dL    Hematocrit 32.8 (L) 40.0 - 53.0 %     (H) 78 - 100 fL    MCH 34.5 (H) 26.5 - 33.0 pg    MCHC 32.0 31.5 - 36.5 g/dL    RDW 17.1 (H) 10.0 - 15.0 %    Platelet Count 150 150 - 450 10e3/uL   N terminal pro BNP outpatient   Result Value Ref Range    N Terminal Pro BNP Outpatient 1,042 0 - 1,800 pg/mL   Troponin I   Result Value Ref Range    Troponin I High Sensitivity 23 <79 ng/L   D dimer quantitative   Result Value Ref Range    D-Dimer Quantitative <0.27 0.00 - 0.50 ug/mL FEU   CBC with platelets and differential   Result Value Ref Range    WBC Count 3.0 (L) 4.0 - 11.0 10e3/uL    RBC Count 2.72 (L) 4.40 - 5.90 10e6/uL    Hemoglobin 9.4 (L) 13.3 - 17.7 g/dL    Hematocrit 29.3 (L) 40.0 - 53.0 %     (H) 78 - 100 fL    MCH 34.6 (H) 26.5 - 33.0 pg    MCHC 32.1 31.5 - 36.5 g/dL    RDW 17.3 (H) 10.0 - 15.0 %    Platelet Count 153 150 - 450 10e3/uL        Imaging:    XR Chest 2 Views    Result Date: 5/3/2022  Chest 2 views INDICATION: For lung scan protocol COMPARISON: 3/17/2022 FINDINGS: Right-sided size appears normal. Calcifications at the aortic knob. Right IJ port catheter tip of the SVC apparent vertebroplasty material an upper thoracic  vertebral body with underlying osteopenia. Abdominal aortic atherosclerosis. Descending thoracic aortic atherosclerosis. No new opacities or ectopic air collections. There is relative upper lung oligemia and slight flattening of the hemidiaphragms on the lateral view. Overlapping of vascular structures and bony structures in the right infrahilar area.     IMPRESSION: Continued hyperinflation of lungs. Osteopenia. Atherosclerosis. KATIE MONROY MD   SYSTEM ID:  [id number]    NM Lung Scan Perfusion Particulate    Result Date: 5/3/2022  Examination:  NM LUNG SCAN PERFUSION PARTICULATE   Date:  5/3/2022 12:14 PM Indication:    SAMANO (dyspnea on exertion) Previous Study: none Additional Information: none Technique: The patient received 7 mCi of Tc-99m labeled MAA intravenously. Anterior and posterior quantitative views were obtained of the lungs using a dual headed camera camera. A standard eight view lung perfusion scan was also obtained. Calculations were performed using the geometric mean technique. Findings: There were no previous perfusion studies for comparison. The perfusion images demonstrate no perfusion deficit.     Impression: No evidence of acute pulmonary emboli. I have personally reviewed the examination and initial interpretation and I agree with the findings. SHANKAR MOSS MD   SYSTEM ID:  [id number]      Signed by: Per Clifford MD        Again, thank you for allowing me to participate in the care of your patient.        Sincerely,        Per Clifford MD

## 2022-05-04 NOTE — PROGRESS NOTES
Infusion Nursing Note:  Theo Meyers presents today for treatment.    Patient seen by provider today: Yes: Dr. Clifford   present during visit today: Not Applicable.    Note: N/A.      Intravenous Access:  Labs drawn without difficulty.  Implanted Port.    Treatment Conditions:  Results reviewed, labs MET treatment parameters, ok to proceed with treatment.      Post Infusion Assessment:  Patient tolerated infusion without incident.  Blood return noted pre and post infusion.  Site patent and intact, free from redness, edema or discomfort.  No evidence of extravasations.  Access discontinued per protocol.       Discharge Plan:   Patient discharged in stable condition accompanied by: self.  Departure Mode: Ambulatory.      Tashia Elias RN

## 2022-05-04 NOTE — PROGRESS NOTES
[facility] Hematology and Oncology Progress Note    Patient: Theo Meyers  MRN: [id number]  Date of Service: May 4, 2022        Assessment and Plan:    1. Kappa light chain myeloma: His myeloma numbers from April 27 were reviewed.  His kappa light chain has come down nicely.  Seems to be responding to his current therapy.  We will continue on his carfilzomib for now.  He is being evaluated at the [facility] for cardiopulmonary issues prior to enrollment to clinic.  We will not make any changes to his current dosing.    2.  Pancytopenia: Still has mild neutropenia but this is stable.  Mild anemia which is also stable.  White blood cell count today is 3.3 but hemoglobin is 10.5.    3.  Prophylaxis: Continue Xarelto 20 mg daily.  No issues with bleeding recently.    ECOG Performance  0    Diagnosis:    1.  IgG kappa light chain myeloma. Hyposecretory. Diagnosed 2009. No significant measurable M protein. 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 There was also on 11;14 translocation.  Past immunofixations from 2011 show IgG-kappa.    Bone marrow biopsy 8, 2020  at 5%.  Residual kappa light chain myeloma involving 20% of nucleated cells.  No significant change from November 2016 marrow cellularity.     Bone marrow biopsy from January 26, 2022: Hypocellular marrow involved with multiple myeloma at 20 to 30%.  FISH:  - Gains of chromosomes 9 and 15 (80%)  - Gain of JAQUI (11q) (93%)  - IGH-CCND1 fusion with gains of 1-2 extra fusion signals (92%)  - Monosomy 13 (94%)     NORMAL  - No gain of 1q  - No losses of 1p or TP53     INTERPRETATION:  These findings are consistent with the reported pathologic diagnosis of persistent plasma cell myeloma, reportedly characterized at diagnosis (study performed at an outside institution) by an IGH-CCND1 fusion and monosomy 13. The population of cells with gains of two extra IGH-CCND1 fusion signals is apparently newly arisen and would be consistent with cytogenetic  evolution.    2.  Deep vein thrombosis of the left leg diagnosed 9/2012 while on treatment.     3.  Vitamin B12 deficiency diagnosed in September 2017.     4.  GI bleed and anemia requiring hospitalization in December 2017.    5.  MOHS surgery in January, 2021; May 2021 (chest; squamous).     Treatment:    He presented with a lytic lesion involving the C6 vertebral body, although no measurable M protein. IgG kappa monoclonal immunoglobulin was confirmed by ELMA as well as elevated kappa free light chains with an abnormal kappa lambda ratio. Bone marrow biopsy showed 30% involvement and cytogenetics confirmed deletion of 13 q. as well as 11;14 translocation. He was initially treated with Velcade and dexamethasone and achieved a good partial response.     He was referred to the [facility] where he underwent high-dose chemotherapy with autologous peripheral stem cell transplant in April of 2010. He began Revlimid as maintenance therapy post transplant. Repeat bone marrow biopsy done October 2010 showed about 5% kappa restricted plasma cells and so at that time weekly Velcade was added along with his maintenance Revlimid and dexamethasone.      He has done well since that time with stable minimal residual disease, best assessed by serum free light chains. He required dose reductions of weekly Velcade because of cytopenias and was ultimately switched to a q 2 week maintenance schedule which he has been on since September 2012.      His Revlimid dose was reduced to 15 mg daily and he continues on dexamethasone 20 mg p.o. weekly  Revlimid dosing changed to 20 mg, 3 weeks on 1 week (instead of 15 mg daily) for cost reasons.  Started March, 2018     Started daratumumab/pomalidomide/dexamethasone (20mg every 14 days) on October 14, 2020.     Carfilzomib/dexamethasone started4/6/22. Days 1, 2, 8 9, 15, 16      Progressed on:  bortezomib  lenalidomide    Interim History:    Keith returns today for follow-up visit.   "He can in to discuss trial and treatment logistics. He is not sure if his insurance will cover it. He has also spoken to [facility].  Clinically though he seems to be doing okay.  No other new complaints today.    Review of Systems:    As above in the history.     Review of Systems otherwise Negative for:  General: chills, fever or night sweats  Psychological: anxiety or depression  Ophthalmic: blurry vision, double vision or loss of vision, vision change  ENT: epistaxis, oral lesions, hearing changes  Hematological and Lymphatic: bleeding, bruising, jaundice, swollen lymph nodes  Endocrine: hot flashes, unexpected weight changes  Respiratory: cough, hemoptysis, orthopnea or shortness of breath/SAMANO  Cardiovascular: chest pain, edema, palpitations or PND  Gastrointestinal: abdominal pain, blood in stools, change in bowel habits, constipation, diarrhea or nausea/vomiting  Genito-Urinary: change in urinary stream, incontinence, frequency/urgency  Musculoskeletal: joint pain, stiffness, swelling, muscle pain  Neurological: dizziness, headaches, numbness/tingling  Dermatological: lumps and rash    Past History:    Past Medical History:   Diagnosis Date     Anemia 12/13/2017     Arthritis      Bilateral inguinal hernia      Chest tube in place      Elevated INR      Glaucoma      Glaucoma      History of blood clots     DVT - left chronic     Multiple myeloma (H)     Gets chemo infusions every 2 weeks     Pancytopenia (H)      Parapneumonic effusion      Peripheral neuropathy      Shingles 9/24/2014     Syncope      TMJ (temporomandibular joint disorder)      Physical Exam:    /58 (BP Location: Left arm, Patient Position: Sitting, Cuff Size: Adult Regular)   Pulse 78   Temp 97.7  F (36.5  C) (Oral)   Resp 14   Ht 1.746 m (5' 8.74\")   Wt 73.1 kg (161 lb 3.2 oz)   SpO2 100%   BMI 23.99 kg/m      General: patient appears stated age of 76 year old. Nontoxic and in no distress.   HEENT: Head: atraumatic, normocephalic. " Sclerae anicteric.  Chest:  Normal respiratory effort  Cardiac:  No edema.  Regular rate and rhythm.  3/6 systolic ejection murmur is heard.  Abdomen: abdomen is non-distended  Extremities: normal tone and muscle bulk.  Skin: no lesions or rash on visible skin. Warm and dry.   CNS: alert and oriented. Grossly non-focal.   Psychiatric: normal mood and affect.     Lab Results:    Recent Results (from the past 168 hour(s))   Comprehensive metabolic panel   Result Value Ref Range    Sodium 144 133 - 144 mmol/L    Potassium 4.2 3.4 - 5.3 mmol/L    Chloride 111 (H) 94 - 109 mmol/L    Carbon Dioxide (CO2) 26 20 - 32 mmol/L    Anion Gap 7 3 - 14 mmol/L    Urea Nitrogen 25 7 - 30 mg/dL    Creatinine 1.64 (H) 0.66 - 1.25 mg/dL    Calcium 8.6 8.5 - 10.1 mg/dL    Glucose 90 70 - 99 mg/dL    Alkaline Phosphatase 62 40 - 150 U/L    AST 27 0 - 45 U/L    ALT 40 0 - 70 U/L    Protein Total 5.4 (L) 6.8 - 8.8 g/dL    Albumin 3.0 (L) 3.4 - 5.0 g/dL    Bilirubin Total 0.4 0.2 - 1.3 mg/dL    GFR Estimate 43 (L) >60 mL/min/1.73m2   CBC with platelets   Result Value Ref Range    WBC Count 3.3 (L) 4.0 - 11.0 10e3/uL    RBC Count 3.04 (L) 4.40 - 5.90 10e6/uL    Hemoglobin 10.5 (L) 13.3 - 17.7 g/dL    Hematocrit 32.8 (L) 40.0 - 53.0 %     (H) 78 - 100 fL    MCH 34.5 (H) 26.5 - 33.0 pg    MCHC 32.0 31.5 - 36.5 g/dL    RDW 17.1 (H) 10.0 - 15.0 %    Platelet Count 150 150 - 450 10e3/uL   N terminal pro BNP outpatient   Result Value Ref Range    N Terminal Pro BNP Outpatient 1,042 0 - 1,800 pg/mL   Troponin I   Result Value Ref Range    Troponin I High Sensitivity 23 <79 ng/L   D dimer quantitative   Result Value Ref Range    D-Dimer Quantitative <0.27 0.00 - 0.50 ug/mL FEU   CBC with platelets and differential   Result Value Ref Range    WBC Count 3.0 (L) 4.0 - 11.0 10e3/uL    RBC Count 2.72 (L) 4.40 - 5.90 10e6/uL    Hemoglobin 9.4 (L) 13.3 - 17.7 g/dL    Hematocrit 29.3 (L) 40.0 - 53.0 %     (H) 78 - 100 fL    MCH 34.6 (H) 26.5 -  33.0 pg    MCHC 32.1 31.5 - 36.5 g/dL    RDW 17.3 (H) 10.0 - 15.0 %    Platelet Count 153 150 - 450 10e3/uL        Imaging:    XR Chest 2 Views    Result Date: 5/3/2022  Chest 2 views INDICATION: For lung scan protocol COMPARISON: 3/17/2022 FINDINGS: Right-sided size appears normal. Calcifications at the aortic knob. Right IJ port catheter tip of the SVC apparent vertebroplasty material an upper thoracic vertebral body with underlying osteopenia. Abdominal aortic atherosclerosis. Descending thoracic aortic atherosclerosis. No new opacities or ectopic air collections. There is relative upper lung oligemia and slight flattening of the hemidiaphragms on the lateral view. Overlapping of vascular structures and bony structures in the right infrahilar area.     IMPRESSION: Continued hyperinflation of lungs. Osteopenia. Atherosclerosis. KATIE MONROY MD   SYSTEM ID:  [id number]    NM Lung Scan Perfusion Particulate    Result Date: 5/3/2022  Examination:  NM LUNG SCAN PERFUSION PARTICULATE   Date:  5/3/2022 12:14 PM Indication:    SAMANO (dyspnea on exertion) Previous Study: none Additional Information: none Technique: The patient received 7 mCi of Tc-99m labeled MAA intravenously. Anterior and posterior quantitative views were obtained of the lungs using a dual headed camera camera. A standard eight view lung perfusion scan was also obtained. Calculations were performed using the geometric mean technique. Findings: There were no previous perfusion studies for comparison. The perfusion images demonstrate no perfusion deficit.     Impression: No evidence of acute pulmonary emboli. I have personally reviewed the examination and initial interpretation and I agree with the findings. SHANKAR MOSS MD   SYSTEM ID:  [id number]      Signed by: Per Clifford MD

## 2022-05-04 NOTE — PROGRESS NOTES
"Oncology Rooming Note    May 4, 2022 8:31 AM   Theo Meyers is a 77 year old male who presents for:    Chief Complaint   Patient presents with     Oncology Clinic Visit     Return visit for labs and infusion related to Multiple myeloma not having achieved remission (H)     Initial Vitals: /58 (BP Location: Left arm, Patient Position: Sitting, Cuff Size: Adult Regular)   Pulse 78   Temp 97.7  F (36.5  C) (Oral)   Resp 14   Ht 1.746 m (5' 8.74\")   Wt 73.1 kg (161 lb 3.2 oz)   SpO2 100%   BMI 23.99 kg/m   Estimated body mass index is 23.99 kg/m  as calculated from the following:    Height as of this encounter: 1.746 m (5' 8.74\").    Weight as of this encounter: 73.1 kg (161 lb 3.2 oz). Body surface area is 1.88 meters squared.  No Pain (0) Comment: Data Unavailable   No LMP for male patient.  Allergies reviewed: Yes  Medications reviewed: Yes    Medications: Medication refills not needed today.  Pharmacy name entered into [employer]:    [id number] - [place], [place] - [address] [facility] [place] [address] [place]  [facility] - [place], [place] - [address] [facility]    Clinical concerns: Return visit for labs and infusion related to Multiple myeloma not having achieved remission (H)      RONN SALMERON MA            "

## 2022-05-05 NOTE — PROGRESS NOTES
Infusion Nursing Note:  Theo Meyers presents today for C#2 D#2 of chemotherapy regimen.    Patient seen by provider today: No   present during visit today: Not Applicable.    Note: Patient assessed and vital signs stable. Administered Carfilzomib as ordered per provider.     Intravenous Access:  Implanted Port.    Treatment Conditions:  Not Applicable.      Post Infusion Assessment:  Patient tolerated infusion without incident.  Site patent and intact, free from redness, edema or discomfort.  Access discontinued per protocol.       Discharge Plan:   Patient discharged in stable condition accompanied by: self.  Departure Mode: Ambulatory.      KIMI SHEFFIELD RN

## 2022-05-06 NOTE — TELEPHONE ENCOUNTER
patient LM he had completed the VQ scan which came back negative.  He is now wondering what the next step is.  He would like to get the next test scheduled ASAP.  -----------------------------  Verified patient had seen Dr. Peters Monday 5/2 as a new patient, plan was to start with VQ scan and results would dictate next step.    Called Dr. Peters, but he was unavailable. Ellen Dewitt RN on 5/6/2022 at 10:29 AM    Spoke with Dr. Peters and verified next step is a Bilateral Heart cath with Cors.  Verbalized understanding and ordered procedure.  Ellen Dewitt RN on 5/6/2022 at 11:45 AM  ------------------------------  Sent patient [employer] message with plan.  Ellen Dewitt RN on 5/9/2022 at 9:14 AM

## 2022-05-06 NOTE — PATIENT INSTRUCTIONS
Make an appointment with the pulmonary hypertension clinic that you had recently seen.    No change in medication at this time.    Make an appointment to see me in 2 weeks for a longer appointment to discuss these issues further.

## 2022-05-06 NOTE — PROGRESS NOTES
[facility] clinic Follow Up Note    Theo Meyers   77 year old male    Date of Visit: 5/6/2022    Chief Complaint   Patient presents with     Shortness of Breath     Follow up     Subjective  Theo is a 77-year-old male with a history of multiple myeloma diagnosed in 2009 with bone marrow transplant in 2010 and he has been on maintenance chemotherapy.  January 2022 PET scan did show a new focus in the right scapula, still asymptomatic.  Patient was considering a new T-cell therapy with risk of cytokine storm.    Patient felt increasing shortness of breath and new onset atrial fibrillation in March of this year.  He was diagnosed with a rhinovirus viral pneumonia, treat with antibiotics and steroids.  Chest CT scan March 2022 showed right upper lobe inflammatory infiltrate.    Patient was cardioverted for A. fib ablation April 1 with a did relapse into atrial fibrillation again.    Patient has been on Xarelto long-term for history of DVT in 2018.    Patient had a heart echo March 2022 that showed ejection fraction 60-65%.  Progression of moderate aortic stenosis 1.1 cm  and moderate pulmonary hypertension.  Previous echo last October was mild aortic stenosis.    Patient has moderate coronary calcification but has not had an event.  March 2022 nuclear stress test was negative for ischemia.    Patient continues to have progressive shortness of breath even over the last month.  He is still able to walk, but does need to rest and catch of breath periodically.  No shortness of breath at rest.  No significant cough.    Patient did see a pulmonary hypertension clinic cardiologist on May 2, with discussion of right and left sided heart catheterization.  But apparently a follow-up appointment was not made and patient did not know what the plan was for further evaluation.    Patient has a previously scheduled cardiology appointment on Graciela 3.    Patient has been on furosemide 40 mg a day started in March for his  shortness of breath with the pulmonary hypertension.  He does not have lower extremity edema.    He has mild renal insufficiency with a slightly higher creatinine of 1.4 on May 4.    Albumin level low at 2.9.    On May 4 his AST was 55 and ALT 91.  No jaundice or right upper quadrant pain.  He does continue on the atorvastatin 10 mg.    Has history of GI bleed with anemia in 2017.  He has pancytopenia with his chemotherapy.  Hemoglobin was 9.4 on May 4.    No flare of his spinal stenosis, continues ambulatory.    PMHx:    Past Medical History:   Diagnosis Date     Anemia 12/13/2017     Arthritis      Bilateral inguinal hernia      Chest tube in place      Elevated INR      Glaucoma      Glaucoma      History of blood clots     DVT - left chronic     Multiple myeloma (H)     Gets chemo infusions every 2 weeks     Pancytopenia (H)      Parapneumonic effusion      Peripheral neuropathy      Shingles 9/24/2014     Syncope      TMJ (temporomandibular joint disorder)      PSHx:    Past Surgical History:   Procedure Laterality Date     APPENDECTOMY      Age 16     ESOPHAGOSCOPY, GASTROSCOPY, DUODENOSCOPY (EGD), COMBINED N/A 12/14/2017    Procedure: ESOPHAGOGASTRODUODENOSCOPY (EGD) WITH BIOPSYS;  Surgeon: Marlon Roy MD;  Location: [facility] GI;  Service:      ESOPHAGOSCOPY, GASTROSCOPY, DUODENOSCOPY (EGD), COMBINED N/A 1/19/2018    Procedure: ENDOSCOPIC ULTRASOUND  ESOPHAGOGASTRODUODENOSCOPY WITH DUODENAL POLYP REMOVAL;  Surgeon: Familia Mcgraw MD;  Location: [facility] OR;  Service:      EYE SURGERY      Cataract     IR MISCELLANEOUS PROCEDURE  6/17/2009     IR MISCELLANEOUS PROCEDURE  7/31/2009     IR MISCELLANEOUS PROCEDURE  8/13/2009     IR PLEURAL DRAINAGE WITH CATHETER INSERTION  7/2/2019     PORTACATH PLACEMENT       RELEASE CARPAL TUNNEL Bilateral      US THORACENTESIS  6/27/2019     VERTEBROPLASTY      T6     WISDOM TOOTH EXTRACTION       Immunizations:   Immunization History   Administered Date(s)  Administered     COVID-19,PF,Pfizer (12+ Yrs) 02/12/2021, 03/05/2021, 08/26/2021     FLU 6-35 months 11/17/2011, 09/30/2013     Hep B, Peds or Adolescent 05/31/2012     HepB, Unspecified 03/01/2011, 05/19/2011     Hib (PRP-T) 05/19/2011, 05/24/2012     Hib, Unspecified 03/01/2011, 05/19/2011     Influenza (High Dose) 3 valent vaccine 10/07/2015, 09/21/2016, 09/20/2017, 09/26/2018, 10/03/2019     Influenza Vaccine IM > 6 months Valent IIV4 (Alfuria,Fluzone) 09/24/2014     Influenza, Quad, High Dose, Pf, 65yr+ (Fluzone HD) 09/30/2020, 10/13/2021     Pneumo Conj 13-V (2010&after) 10/16/2015     Pneumococcal 23 valent 05/19/2011, 05/24/2012     Poliovirus, inactivated (IPV) 03/01/2011, 05/19/2011, 05/31/2012     Tdap (Adacel,Boostrix) 03/01/2011     Tetanus 05/19/2011       ROS A comprehensive review of systems was performed and was otherwise negative    Medications, allergies, and problem list were reviewed and updated    Exam  /74 (BP Location: Right arm, Patient Position: Sitting, Cuff Size: Adult Large)   Pulse 59   Wt 73.8 kg (162 lb 12.8 oz)   SpO2 99%   BMI 24.22 kg/m    O2 sat 99% on room air.  No jaundice.  His lungs are clear to auscultation there is no crackles or wheezing and good respiratory excursion.  Patient is thin.  No JVD.  Heart is irregularly irregular with 3 out of 6 systolic murmur right upper sternal border.  Abdomen is nontender no hepatomegaly or right upper quadrant tenderness.  No ankle edema.    Assessment/Plan  1. Moderate pulmonary hypertension (H)  Unclear etiology of the moderate pulmonary hypertension, but most likely related to worsening aortic stenosis.    He had a VQ scan on May 3 that did not show evidence of pulmonary embolism and he has been on long-term Xarelto in any case.    He did have a viral pneumonia in March which may have set off a pulmonary hypertension condition.    Patient was told to follow-up with the pulmonary hypertension clinic with cardiology for  further evaluation and may need to proceed with a right and left heart catheterization, although patient was told there is some risk with that catheterization.    Patient does not have indication for oxygen at this time with O2 sat 99%.  Mild to moderate dyspnea on exertion but still able to be ambulatory and walk his dog.    2. Shortness of breath on exertion  Multifactorial, most likely associated largely with his progressive moderate pulmonary hypertension.  He also has the previous viral pneumonia in March.  He also has new onset atrial fibrillation since March.  He also has pancytopenia with anemia related to multiple myeloma.    If worsening shortness of breath, consider ambulatory oxygen measurement to see if he would benefit from supplementary oxygen.    Follow-up with the pulmonary hypertension clinic.    3. Personal history of DVT (deep vein thrombosis)  Long-term Xarelto    4. Aortic valve stenosis, etiology of cardiac valve disease unspecified  Moderate aortic stenosis.  Follow-up with cardiology.    5. Atrial fibrillation, persistent (H)  Atrial fibrillation is rate controlled without medication.  On Xarelto.    6. Coronary artery disease due to calcified coronary lesion  Asymptomatic.  Has not had a cardiac event and negative stress test in March of this year.    Does continue on Lipitor 10 mg a day at this time.    7. Elevated liver function tests  I did tell patient that his liver tests were moderately elevated and this does need to be reevaluated with him on atorvastatin.  Follow-up later this month for recheck of labs.    8. Multiple myeloma not having achieved remission (H)  Follow-up with oncology on June 1 to discuss further treatment for his progressive multiple myeloma.  He is considering a new therapy with cytokine storm risk.  In focusing his right scapula without symptoms.    History of spinal stenosis without exacerbation, continue regular walking.    Anemia consistent with his multiple  myeloma, plan recheck at next visit, history of GI bleed but no new abdominal pain or evidence of bleeding at this time.    October 2021 carotid ultrasound 50-69% in the left and mild plaque in the right without history of stroke.  Plan to continue simvastatin if liver test do not worsen      Return in about 2 weeks (around 5/20/2022) for Follow up.   Patient Instructions   Make an appointment with the pulmonary hypertension clinic that you had recently seen.    No change in medication at this time.    Make an appointment to see me in 2 weeks for a longer appointment to discuss these issues further.    Mathew Ott MD, MD        Current Outpatient Medications   Medication Sig Dispense Refill     acetaminophen (TYLENOL) 500 MG tablet Take 500 mg by mouth every 8 hours as needed        acyclovir (ZOVIRAX) 400 MG tablet TAKE 1 TABLET BY MOUTH TWICE DAILY 180 tablet 1     ascorbic acid 1000 MG TABS tablet Take 1,000 mg by mouth daily        atorvastatin (LIPITOR) 10 MG tablet Take 1 tablet (10 mg) by mouth daily 90 tablet 3     bimatoprost (LUMIGAN) 0.03 % ophthalmic solution Place 1 drop into both eyes At Bedtime.       brimonidine (ALPHAGAN P) 0.1 % ophthalmic solution Place 1 drop into both eyes every 8 hours.       calcium carbonate-vitamin D (OYSTER SHELL CALCIUM/D) 500-200 MG-UNIT tablet Take 1 tablet by mouth daily        cyanocobalamin (CYANOCOBALAMIN) 1000 MCG/ML injection Inject 1 mL into the muscle every 30 days       dexamethasone (DECADRON) 4 MG tablet Take 5 tablets (20 mg) by mouth with food. Days 1, 2, 8, 9, 15, 16, 22, and 23. On carfilzomib days, take prior to carfilzomib dose. 120 tablet 1     dextromethorphan-guaiFENesin (MUCINEX DM)  MG 12 hr tablet Take 1 tablet by mouth daily as needed for cough       fluticasone (FLONASE) 50 MCG/ACT nasal spray Spray 2 sprays into both nostrils daily (Patient taking differently: Spray 2 sprays into both nostrils as needed)       furosemide (LASIX) 20 MG  tablet Take 2 tablets (40 mg) by mouth daily 180 tablet 0     gabapentin (NEURONTIN) 300 MG capsule TAKE 1 CAPSULE(300 MG) BY MOUTH DAILY AS NEEDED (Patient taking differently: Take 300 mg by mouth At Bedtime) 30 capsule 5     Magnesium Oxide 250 MG TABS Take 240 mg by mouth daily       Multiple Vitamin (MULTI-VITAMIN PO) Take 1 capsule by mouth daily.       Omega-3 Fatty Acids (OMEGA 3 PO) Take 1 capsule by mouth daily TAKE AS DIRECTED       omeprazole (PRILOSEC) 20 MG DR capsule TAKE 1 CAPSULE BY MOUTH ONCE DAILY 90 capsule 3     potassium chloride ER (KLOR-CON M) 20 MEQ CR tablet Take 1 tablet (20 mEq) by mouth every other day       Psyllium (METAMUCIL PO) Take by mouth daily USE AS DIRECTED       sulfamethoxazole-trimethoprim (BACTRIM) 400-80 MG tablet Take 1 tablet by mouth daily 30 tablet 1     tamsulosin (FLOMAX) 0.4 MG capsule TAKE 1 CAPSULE BY MOUTH EVERY DAY 90 capsule 1     XARELTO ANTICOAGULANT 20 MG TABS tablet Take 20 mg by mouth daily (with dinner)        calcium carbonate 1250 (500 Ca) MG CHEW Take 1 tablet by mouth daily as needed  (Patient not taking: Reported on 2022)       Allergies   Allergen Reactions     Centex-Pse Er [Pseudoephedrine-Guaifenesin Cr]      TB12     Social History     Tobacco Use     Smoking status: Former Smoker     Quit date: 1975     Years since quittin.5     Smokeless tobacco: Never Used   Substance Use Topics     Alcohol use: Not Currently     Comment: Alcoholic Drinks/day: occasional     Drug use: No           Answers for HPI/ROS submitted by the patient on 2022  Nitroglycerin use:: never  Do you take an aspirin every day?: No  How many servings of fruits and vegetables do you eat daily?: 2-3  On average, how many sweetened beverages do you drink each day (Examples: soda, juice, sweet tea, etc.  Do NOT count diet or artificially sweetened beverages)?: 0  How many minutes a day do you exercise enough to make your heart beat faster?: 20 to 29  How many  days a week do you exercise enough to make your heart beat faster?: 5  How many days per week do you miss taking your medication?: 0

## 2022-05-09 NOTE — TELEPHONE ENCOUNTER
He is calling back to speak to ellen.. ellen please call him back. He said any dates work for him in 2 weeks from today/ sooner the better he said      Left & Right heart catheterization along with Coronary Angiogram      Per [employer] message. Sent to pt. He said he will try to responded to that as well  -------------------------------------  Called patient and informed him I got him scheduled for procedure on May 20th check in at 12:30pm.  Reviewed pre-covid testing requirements and general info.  Advised him I would send all instructions via [employer]. Patient verbalized understanding, agreed with plan and denied any further questions. Ellen Dewitt RN on 5/10/2022 at 9:55 AM

## 2022-05-09 NOTE — TELEPHONE ENCOUNTER
[facility] Call Center    Phone Message    May a detailed message be left on voicemail: yes     Reason for Call: Other: Patient was calling regarding scheduling his procedure. Please call to discuss.     Action Taken: Other: Cardiology    Travel Screening: Not Applicable

## 2022-05-11 NOTE — PROGRESS NOTES
Infusion Nursing Note:  Theo Meyers presents today for carfilzomib.    Patient seen by provider today: No   present during visit today: Not Applicable.    Note: N/A.      Intravenous Access:  Labs drawn without difficulty.  Implanted Port.    Treatment Conditions:  Results reviewed, labs MET treatment parameters, ok to proceed with treatment.      Post Infusion Assessment:  Patient tolerated infusion without incident.       Discharge Plan:   Patient discharged in stable condition accompanied by: self.  Departure Mode: Ambulatory.      Tashia Elias RN

## 2022-05-12 NOTE — PROGRESS NOTES
Infusion Nursing Note:  Theo Meyers presents today for C#2 D#9 of chemotherapy regimen.    Patient seen by provider today: No   present during visit today: Not Applicable.    Note: Patient assessed and vital signs stable. Patient overall feeling same as yesterday, just mentioned more fatigued due to difficulty sleeping from dexamethasone side-effects. Administered treatment per provider order.       Intravenous Access:  Implanted Port.    Treatment Conditions:  Not Applicable.      Post Infusion Assessment:  Patient tolerated infusion without incident.  Blood return noted pre and post infusion.  Site patent and intact, free from redness, edema or discomfort.  No evidence of extravasations.  Access discontinued per protocol.       Discharge Plan:   Patient discharged in stable condition accompanied by: self.  Departure Mode: Ambulatory.      KIMI SHEFFIELD RN

## 2022-05-13 NOTE — TELEPHONE ENCOUNTER
Reason for Call:  Medication refill:    Do you use a [facility]? no Name of the pharmacy and phone number for the current request:  [phone]  [facility] on [address] in [place]    Name of the medication requested:   Sulfamethoxazole-trimethoprim (BACTRIM) 400-80 mg tablet    Other request: Patient wants to make sure Care Team is aware he is having procedure at [facility] on 05/20.  Wants to make sure this medication will not complicate or cause an issue for the procedure.    It is his understanding that he will need to be using this medication for the remainder of his life.     Can we leave a detailed message on this number? YES    Phone number patient can be reached at: Cell number on file:    Telephone Information:   Mobile [phone]       Best Time: Any    Call taken on 5/13/2022 at 2:42 PM by Latonya Martinez

## 2022-05-13 NOTE — TELEPHONE ENCOUNTER
I called the pt and informed that he should continue this for prophylaxis. Pt understands, he will continue, RX sent.

## 2022-05-13 NOTE — TELEPHONE ENCOUNTER
Pt called, wondering he could stop taking his med Bactrim. Please follow up with pt. Giovana to [facility].

## 2022-05-18 NOTE — PROGRESS NOTES
Infusion Nursing Note:  Theo Meyers presents today for labs, Carfilzomib.    Patient seen by provider today: No   present during visit today: Not Applicable.    Note: N/A.      Intravenous Access:  Labs drawn without difficulty.  Implanted Port.    Treatment Conditions:  Results reviewed, labs MET treatment parameters, ok to proceed with treatment.      Post Infusion Assessment:  Patient tolerated infusion without incident.  Blood return noted pre and post infusion.  Site patent and intact, free from redness, edema or discomfort.  No evidence of extravasations.  Access discontinued per protocol.       Discharge Plan:   Patient discharged in stable condition accompanied by: self.  Departure Mode: Ambulatory.      Tashia Elias RN

## 2022-05-19 NOTE — PATIENT INSTRUCTIONS
Do not take furosemide or your trimethoprim sulfamethoxazole/Bactrim antibiotic tomorrow morning before the angiogram.  Do not have breakfast.  You do not need to take your other vitamins or medications, except the aspirin, tomorrow morning.  Do not take your Metamucil tomorrow morning.    See me in early June for a follow-up appointment.    You will receive contact from the home oxygen company set up home oxygen 4 L nasal cannula.  Make sure you get a portable unit.  Use the oxygen especially when you are active or moving, which is when your oxygen levels dropped.

## 2022-05-19 NOTE — PROGRESS NOTES
Infusion Nursing Note:  BertBLU Meyers presents today for C#2 D#16 of treatment regimen.    Patient seen by provider today: No   present during visit today: Not Applicable.    Note: Patient assessed and vital signs stable. Patient feeling more fatigued today and more short of breath at times. He does have a procedure tomorrow that he hopes will help his shortness of breath and chest pain. Administered treatment as ordered per provider.       Intravenous Access:  Implanted Port.    Treatment Conditions:  Not Applicable.      Post Infusion Assessment:  Patient tolerated infusion without incident.  Blood return noted pre and post infusion.  Site patent and intact, free from redness, edema or discomfort.  No evidence of extravasations.  Access discontinued per protocol.       Discharge Plan:   Patient discharged in stable condition accompanied by: self.  Departure Mode: Ambulatory.      KIMI SHEFFIELD RN

## 2022-05-19 NOTE — TELEPHONE ENCOUNTER
Call complete for pre procedure reminder and updated visitor policy.  COVID Negative    
Detail Level: Detailed

## 2022-05-19 NOTE — PROGRESS NOTES
[facility] clinic Follow Up Note    Theo Meyers   77 year old male    Date of Visit: 5/19/2022    Chief Complaint   Patient presents with     Follow Up     Subjective  Keith is a 77-year-old male with history of multiple myeloma diagnosed in 2009, bone marrow transplant in 2010 and on maintenance chemotherapy since.  He started a new chemotherapy in October.    He began having increasing shortness of breath and dyspnea on exertion since January of this year.  Its been slowly progressive.    It became severe in March of this year, he had new onset atrial fibrillation, then relapsed after cardioversion.  He was admitted to the hospital for viral pneumonia that was diagnosed as a rhinovirus.    March 2022 heart echo showed ejection fraction 60 to 65% with moderate pulmonary hypertension and moderate aortic stenosis.  Previous echo was mild aortic stenosis.    Patient has a history of DVT in 2018, has been on chronic Xarelto.  He had a VQ scan earlier this month was negative for pulmonary embolism.    He has moderate coronary calcification but no history of cardiac event or heart attack.  He is not had chest pain.  March 2022 nuclear stress test was negative for ischemia.    October 2021 carotid ultrasound showed 50-69% left carotid artery stenosis, just mild plaque in the right.  No history of stroke.    Was put on furosemide in March with a diagnosis of pulmonary hypertension and shortness of breath but has not had edema.  He has mild renal insufficiency with a mild increase in creatinine to 1.4 on May 4.  He takes the potassium every other day.  Lasix 40 mg daily.    Patient continues on Lipitor 10 mg in the evening.  He had mild elevation of liver tests on May 4 with AST 59 and ALT 91.  Has not had previous liver test elevation.  No history of alcohol.    Patient has continued progressive dyspnea on exertion, now severe, difficulty walking his dog.    No new cough or fever.    He continues on Bactrim  PCP for history of neutropenia.  But his white count is up to 5.3 yesterday.  Hemoglobin stable at 8.8.  Platelets 67.    COVID test was negative yesterday.    No flare of his spinal stenosis and chronic back pain.    On drops for glaucoma, controlled    Patient is planning a new T-cell therapy with risk of cytokine storm, is being evaluated for that soon by his hematologist, but he plans to have his coronaries evaluated prior to that.    He has a left and right heart catheterization and angiogram scheduled for tomorrow.    PMHx:    Past Medical History:   Diagnosis Date     Anemia 12/13/2017     Arthritis      Bilateral inguinal hernia      Chest tube in place      Elevated INR      Glaucoma      Glaucoma      History of blood clots     DVT - left chronic     Multiple myeloma (H)     Gets chemo infusions every 2 weeks     Pancytopenia (H)      Parapneumonic effusion      Peripheral neuropathy      Shingles 9/24/2014     Syncope      TMJ (temporomandibular joint disorder)      PSHx:    Past Surgical History:   Procedure Laterality Date     APPENDECTOMY      Age 16     ESOPHAGOSCOPY, GASTROSCOPY, DUODENOSCOPY (EGD), COMBINED N/A 12/14/2017    Procedure: ESOPHAGOGASTRODUODENOSCOPY (EGD) WITH BIOPSYS;  Surgeon: Marlon Roy MD;  Location: [facility];  Service:      ESOPHAGOSCOPY, GASTROSCOPY, DUODENOSCOPY (EGD), COMBINED N/A 1/19/2018    Procedure: ENDOSCOPIC ULTRASOUND  ESOPHAGOGASTRODUODENOSCOPY WITH DUODENAL POLYP REMOVAL;  Surgeon: Familia Mcgraw MD;  Location: [facility] OR;  Service:      EYE SURGERY      Cataract     IR MISCELLANEOUS PROCEDURE  6/17/2009     IR MISCELLANEOUS PROCEDURE  7/31/2009     IR MISCELLANEOUS PROCEDURE  8/13/2009     IR PLEURAL DRAINAGE WITH CATHETER INSERTION  7/2/2019     PORTACATH PLACEMENT       RELEASE CARPAL TUNNEL Bilateral      US THORACENTESIS  6/27/2019     VERTEBROPLASTY      T6     WISDOM TOOTH EXTRACTION       Immunizations:   Immunization History   Administered  Date(s) Administered     COVID-19,PF,Pfizer (12+ Yrs) 02/12/2021, 03/05/2021, 08/26/2021     FLU 6-35 months 11/17/2011, 09/30/2013     Hep B, Peds or Adolescent 05/31/2012     HepB, Unspecified 03/01/2011, 05/19/2011     Hib (PRP-T) 05/19/2011, 05/24/2012     Hib, Unspecified 03/01/2011, 05/19/2011     Influenza (High Dose) 3 valent vaccine 10/07/2015, 09/21/2016, 09/20/2017, 09/26/2018, 10/03/2019     Influenza Vaccine IM > 6 months Valent IIV4 (Alfuria,Fluzone) 09/24/2014     Influenza, Quad, High Dose, Pf, 65yr+ (Fluzone HD) 09/30/2020, 10/13/2021     Pneumo Conj 13-V (2010&after) 10/16/2015     Pneumococcal 23 valent 05/19/2011, 05/24/2012     Poliovirus, inactivated (IPV) 03/01/2011, 05/19/2011, 05/31/2012     Tdap (Adacel,Boostrix) 03/01/2011     Tetanus 05/19/2011       ROS A comprehensive review of systems was performed and was otherwise negative    Medications, allergies, and problem list were reviewed and updated    Exam  /66 (BP Location: Right arm, Patient Position: Sitting, Cuff Size: Adult Regular)   Pulse 57   Wt 75.4 kg (166 lb 3.2 oz)   SpO2 (!) 89%   BMI 24.73 kg/m    O2 sat at rest was 98% on room air.  100% O2 sat on 2 L nasal cannula at rest    With ambulation he was 79% oxygen saturation on room air.  On 3 L nasal cannula with ambulation he was 86% saturation.  With 4 L nasal cannula oxygen saturation was 90%.  He exhibited significant dyspnea on exertion.    Lungs are clear to auscultation without crackles or wheezing.  Heart is irregularly irregular with 3 out of 6 systolic murmur at the left upper sternal border.  He has no ankle edema.  Abdomen is thin, nontender.  No jaundice.  No hepatomegaly or tenderness.    Assessment/Plan  1. Moderate pulmonary hypertension (H)  Unclear etiology of progressive pulmonary hypertension with progressive increasing shortness of breath.    He may have chemotherapy-induced pulmonary hypertension.  He may have pulm hypertension associate with his  multiple myeloma.  He may have pulmonary hypertension associate with his aortic stenosis, although that was just described as moderate.    He did not have a new pulmonary embolism and has been on Xarelto blood thinner during this time of increasing shortness of breath.    We will proceed with left and right heart catheterization tomorrow and follow-up with the cardiology clinic to determine treatment plan for his pulmonary hypertension.    He does qualify for home oxygen with significant hypoxemia with exertion, corrected with supplemental oxygen.  He would benefit from home oxygen.  I did order 4 L nasal cannula home oxygen, does need a portable unit as well as home concentrator.  Use oxygen continuous, but especially with exercise.  - Home Oxygen Order for DME - ONLY FOR DME    Follow-up with me in early June.    Follows up with cardiology on Graciela 3, after his catheterization.    We discussed medication for preparation for the left and right heart catheterization tomorrow.  Cardiology had him hold his Xarelto since May 17.  He takes 4 baby aspirin tomorrow morning.    I will have him not take the furosemide or the Bactrim tomorrow, as he does have the mild renal insufficiency.    2. Aortic valve stenosis, etiology of cardiac valve disease unspecified  Await catheterization evaluation tomorrow    3. Coronary artery disease due to calcified coronary lesion  Await angiogram results tomorrow.    He will stay on the Lipitor 10 mg a day at this time but did have moderate elevation of liver test recently.  Recheck liver tests at this time.    4. Asymptomatic carotid artery stenosis, left  Treatment plan as above, asymptomatic    5. Persistent atrial fibrillation (H)  Persistent atrial fibrillation related to increased heart strain with the pulmonary hypertension condition, which needs further evaluation.    Rate controlled currently without medication.  Xarelto on hold temporarily for the heart catheterization.    6.  Personal history of DVT (deep vein thrombosis)  Patient has been on chronic Xarelto for history of DVT since 2018.  But VQ scan was negative on May 3    7. Multiple myeloma not having achieved remission (H)  Chemotherapy plans per hematology.  He is planning to be evaluated for T-cell therapy.  He continues on maintenance chemotherapy.    WBC count has increased.  Anemia stable    On PCP Bactrim prophylaxis    8. 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 Thrombocytopenia (H)  As above    10. Elevated liver function tests  Unclear etiology, possibly from hepatic congestion related to his pulmonary hypertension condition.  Possibly related to statin treatment.  Possibly related to his chemotherapy.  Rechecking liver test today.  If liver tests are just mild to moderately elevated, I will have him continue on the atorvastatin.  - Comprehensive metabolic panel    11. Chronic renal insufficiency, stage 2 (mild)  Recheck kidney labs today.  He was warned about risk of kidney injury with the angiogram tomorrow.  He will hold his furosemide and Bactrim tomorrow.  - Comprehensive metabolic panel    12. Glaucoma, unspecified glaucoma type, unspecified laterality  Eyedrops per ophthalmology    13. Spinal stenosis of lumbar region without neurogenic claudication  No flare.  Continue regular ambulation    Patient had many questions.  Full review of chart including previous imaging and heart echo.  Time with patient was greater than 1 hour.      Return in about 18 days (around 6/6/2022) for Follow up.   Patient Instructions   Do not take furosemide or your trimethoprim sulfamethoxazole/Bactrim antibiotic tomorrow morning before the angiogram.  Do not have breakfast.  You do not need to take your other vitamins or medications, except the aspirin, tomorrow morning.  Do not take your Metamucil tomorrow morning.    See me in early June for a follow-up appointment.    You will receive contact from the home oxygen  company set up home oxygen 4 L nasal cannula.  Make sure you get a portable unit.  Use the oxygen especially when you are active or moving, which is when your oxygen levels dropped.    Mathew Ott MD, MD        Current Outpatient Medications   Medication Sig Dispense Refill     acetaminophen (TYLENOL) 500 MG tablet Take 500 mg by mouth every 8 hours as needed        acyclovir (ZOVIRAX) 400 MG tablet TAKE 1 TABLET BY MOUTH TWICE DAILY 180 tablet 1     ascorbic acid 1000 MG TABS tablet Take 1,000 mg by mouth daily        atorvastatin (LIPITOR) 10 MG tablet Take 1 tablet (10 mg) by mouth daily 90 tablet 3     bimatoprost (LUMIGAN) 0.03 % ophthalmic solution Place 1 drop into both eyes At Bedtime.       brimonidine (ALPHAGAN P) 0.1 % ophthalmic solution Place 1 drop into both eyes every 8 hours.       calcium carbonate 1250 (500 Ca) MG CHEW Take 1 tablet by mouth daily as needed       calcium carbonate-vitamin D (OYSTER SHELL CALCIUM/D) 500-200 MG-UNIT tablet Take 1 tablet by mouth daily        cyanocobalamin (CYANOCOBALAMIN) 1000 MCG/ML injection Inject 1 mL into the muscle every 30 days       dexamethasone (DECADRON) 4 MG tablet Take 5 tablets (20 mg) by mouth with food. Days 1, 2, 8, 9, 15, 16, 22, and 23. On carfilzomib days, take prior to carfilzomib dose. 120 tablet 1     dextromethorphan-guaiFENesin (MUCINEX DM)  MG 12 hr tablet Take 1 tablet by mouth daily as needed for cough       fluticasone (FLONASE) 50 MCG/ACT nasal spray Spray 2 sprays into both nostrils daily (Patient taking differently: Spray 2 sprays into both nostrils as needed)       furosemide (LASIX) 20 MG tablet Take 2 tablets (40 mg) by mouth daily 180 tablet 0     gabapentin (NEURONTIN) 300 MG capsule TAKE 1 CAPSULE(300 MG) BY MOUTH DAILY AS NEEDED (Patient taking differently: Take 300 mg by mouth At Bedtime) 30 capsule 5     Magnesium Oxide 250 MG TABS Take 250 mg by mouth 2 times daily       Multiple Vitamin (MULTI-VITAMIN PO) Take 1  capsule by mouth daily.       Omega-3 Fatty Acids (OMEGA 3 PO) Take 1 capsule by mouth daily TAKE AS DIRECTED       omeprazole (PRILOSEC) 20 MG DR capsule TAKE 1 CAPSULE BY MOUTH ONCE DAILY 90 capsule 3     potassium chloride ER (KLOR-CON M) 20 MEQ CR tablet Take 1 tablet (20 mEq) by mouth every other day       Psyllium (METAMUCIL PO) Take by mouth daily USE AS DIRECTED       sulfamethoxazole-trimethoprim (BACTRIM) 400-80 MG tablet Take 1 tablet by mouth daily 90 tablet 1     tamsulosin (FLOMAX) 0.4 MG capsule TAKE 1 CAPSULE BY MOUTH EVERY DAY 90 capsule 1     XARELTO ANTICOAGULANT 20 MG TABS tablet Take 20 mg by mouth daily (with dinner)  (Patient not taking: Reported on 2022)       Allergies   Allergen Reactions     Centex-Pse Er [Pseudoephedrine-Guaifenesin Cr]      TB12     Social History     Tobacco Use     Smoking status: Former Smoker     Quit date: 1975     Years since quittin.5     Smokeless tobacco: Never Used   Substance Use Topics     Alcohol use: Not Currently     Comment: Alcoholic Drinks/day: occasional     Drug use: No           Answers for HPI/ROS submitted by the patient on 2022  Dyspnea:: with activity only  Status:: much worse  Edema:: same as usual  Are you using more pillows than usual at night?: No  Do you cough at night?: No  Checking weight daily:: Yes  Weight change recently:: None  Heart Medication Side Effects:: None  Frequency:: 2 times  How many servings of fruits and vegetables do you eat daily?: 2-3  On average, how many sweetened beverages do you drink each day (Examples: soda, juice, sweet tea, etc.  Do NOT count diet or artificially sweetened beverages)?: 0  How many minutes a day do you exercise enough to make your heart beat faster?: 20 to 29  How many days a week do you exercise enough to make your heart beat faster?: 5  How many days per week do you miss taking your medication?: 0

## 2022-05-20 PROBLEM — Z98.890 STATUS POST CORONARY ANGIOGRAM: Status: ACTIVE | Noted: 2022-01-01

## 2022-05-20 NOTE — DISCHARGE INSTRUCTIONS
Going Home after Coronary Angiogram  For 24 hours:        Have an adult stay with you for 24 hours.        Relax and take it easy.        Drink plenty of fluids.        You may eat your normal diet, unless your doctor tells you otherwise.        Do NOT make any important or legal decisions.        Do NOT drive or operate machines at home or at work.        Do NOT drink alcohol.      Do NOT smoke.     Medicines:        If you have begun Plavix (clopidogrel), Effient (prasugrel), or Brilinta (ticagrelor), do not stop taking it until you talk to your heart doctor (cardiologist).        If you are on metformin (Glucophage), do not restart it until you have blood tests (within 2 to 3 days after discharge). When your doctor tells you it is safe, you may restart the metformin.        If you have stopped any other medicines, check with your nurse or provider about when to restart them.    Care of wrist or arm site:        It is normal to have soreness at the puncture site and mild tingling in your hand for up to 3 days.        Remove the Band-Aid after 24 hours. If there is minor oozing, apply another Band-aid and remove it after 12 hours.         Do NOT take a bath, or use a hot tub or pool for the next 48 hours. You may shower.         It is normal to have a small bruise.  There should not be a lump at the site.        Do not scrub the site.        Do not use lotion or powder near the puncture site for 3 days.        For 2 days, do not use your hand or arm to support your weight (such as rising from a chair) or bend your wrist (such as lifting a garage door).        For 2 days, do not lift more than 5 pounds or exercise your arm (tennis, golf or bowling).    If you start bleeding from the site in your arm: Sit down and press firmly on the site with your fingers for 10 minutes. Call your doctor as soon as you can.    Call 911 right away if you have bleeding that is heavy or does not stop.     Call your doctor if:         You have a large or growing hard lump around the site.        The site is red, swollen, hot or tender.        Blood or fluid is draining from the site.        You have chills or a fever greater than 101 F (38 C).        Your leg or arm turns bluish, feels numb or cool.        You have hives, a rash or unusual itching.     Discharge Instructions Post Right Heart Cath  AFTER YOU GO HOME:  DO drink plenty of fluids  DO resume your regular diet and medications unless otherwise instructed by your Primary Physician  Do Not scrub the procedure site vigorously  No lotion or powder to the puncture site for 3 days    CALL YOUR PRIMARY PHYSICIAN IF: You may resume all normal activity.  Monitor neck site for bleeding, swelling, or voice changes. If you notice bleeding or swelling immediately apply pressure to the site and call number below to speak with Cardiology Fellow.  If you experience any changes in your breathing you should call your doctor immediately or come to the closest Emergency Department.  Do not drive yourself.    ADDITIONAL INSTRUCTIONS: Medications: You are to resume all home medications including anticoagulation therapy unless otherwise advised by your primary cardiologist or nurse coordinator.    Follow Up: Per your primary cardiology team    [facility] [place]:   [phone] (7 days a week)

## 2022-05-20 NOTE — PROGRESS NOTES
Patient arrived to , [address], at 1600 via litter s/p RHC/LHC/CORs procedure. Right neck site is slightly puffy but soft to palpation with no bleeding. Right wrist site has TR band in place with 12cc of air and will begin deflation at 1640. Patient's wife is at bedside.

## 2022-05-20 NOTE — Clinical Note
dry, intact and no bleeding. 6 Fr sheath removed from the RRA. TR band placed. See flowsheet for details.  7 Fr sheath removed from the RIJ vein. Manual pressure held. Hemostasis obtained. Band aid applied.

## 2022-05-20 NOTE — Clinical Note
Called to [facility]. All questions answered. All belongings sent with patient. Patient transported to 3C via stretcher with CCL RN.

## 2022-05-20 NOTE — PROGRESS NOTES
Pt prepped for procedure. Pt took aspirin (324mg) this AM. Port accessed, order obtained for 100u/ml heparin to de-access port once pt can discharge. Pt brought O2 tank with him but O2 sats appeared 100% on RA.

## 2022-05-20 NOTE — PROGRESS NOTES
Right TR band deflated and off, site is soft and flat to palpation with no bleeding or hematoma. Right neck site unchanged. Patient ambulated to the restroom and voided a little but. Patient instructed not to twist, bend, or push up from bed with his right hand. Patient's wife is at bedside reminding him as well. Will continue to monitor.

## 2022-05-21 NOTE — PROGRESS NOTES
Patient meets all criteria for discharge: tolerated a regular diet, ambulated in hallway, voided, and is without pain. Right neck soft and flat to palpation with no bleeding or hematoma. Right wrist soft and flat to palpation with no bleeding or hematoma. Patient and wife verbalized understanding of all discharge instructions, all questions answered. Port de-accessed. Patient has all belongings. Patient assisted to main entrance via wheelchair accompanied by staff and wife.

## 2022-05-24 NOTE — PROGRESS NOTES
"Keith is a 77 year old who is being evaluated via a billable  telephone visit as no HIPPA compliant space    Todd Peters M.D.  Cardiovascular Medicine    I personally saw and examined this patient, discussed care with r consultants, reviewed current laboratories and imaging studies, and conveyed impression and diagnostic/therapeutic plan to patient.    Per Clifford MD    [address] [place] [address] [id number] [phone] (Fax)      Familia Guan M.D.  [facility]        Problem List  1. Multiple myeloma kappa light tchain   2. Glaucoma  3. Shingles  4. Syncope  5. History of DVT  6. History of B12 deficiency  7. HFpEF by history  8. History of pneumonia  9. Extensive coronary calcification  10. NWOAC  11. Prostatism  12. Atrial fibrillation with recurrence post cardioversion  13. + Carotid doppler for ASCD  14. Hyperlipidemia treated with atorvastatin  15. Anemia with macrocytosis  16. History of pleural effusions  17. Immunocompromise  18. Hypoproteinemia      Discussion  1. The therapy that is contemplated is associated with a high incidence of cytokine storm  2. The patient, I and his wife discussed the findings including aortic valve narrowing, carotid atherosclerosis, \"severe\" coronary calcification and elevated PA pressure and the need to attempt to distinguish what is what in terms of risk and therapy.  He will likely require bilateral heart catheterization and coronary angiography.      I thoroughly discussed the risks and benefits of the contemplated catherization including bleeding, vessel rupture, death, arrhythmia, embolism, stroke, renal failure perforation of pulmonary artery, aortic,lung or heart.  I discussed how the procedure would be performed and alternative diagnostic and therapeutic management strategies.  All questions were answered.              History    77 year old male seen for evaluation of shortness of breath with associated cardiac history of " atrial fibrillation (recurrent post cardioversion), aortic stenosis, and pulmonary hypertension.  He has a clear history of exertionally related chest pressure and shortness of breath that is mitigated by reduction by intensity of activity.  In March he had a negative non-exercise stress test.  He has known extensive coronary calcification as well as carotid atherosclerotic disease.  He has no symptoms at rest.  He also has a history of DVT/PE.  His most recent CT scans have been done without contrast. He has had to reduce his level of exertion secondary to exertional chest pressure/pain and shortness of breath - relieved by rest.  He has had no prolonged episodes.      Objective    Constitutional: alert, oriented, normal gait and station, normal mentation.  Oral: moist mucous membranes  Lymph: without pathologic adenopathy  Chest: clear to ausculation and percussion  Cor: No evidence of left or right ventricular activity.  Rhythm is irregular.  S1 variable S2 split physiologically. Murmurs of aortic flow  Abdomen: without tenderness, rebound, guarding, masses, ascites  Extremities: Edema not present  Neuro: no focal defects, normal mentation  Skin: without open lesions  Psych: oriented, verbal, mental status in tact    Wt Readings from Last 5 Encounters:   05/20/22 71.2 kg (157 lb)   05/19/22 75.4 kg (166 lb 3.2 oz)   05/06/22 73.8 kg (162 lb 12.8 oz)   05/04/22 73.1 kg (161 lb 3.2 oz)   05/02/22 71.3 kg (157 lb 3.2 oz)       Meds  Current Outpatient Medications   Medication     acetaminophen (TYLENOL) 500 MG tablet     acyclovir (ZOVIRAX) 400 MG tablet     ascorbic acid 1000 MG TABS tablet     atorvastatin (LIPITOR) 10 MG tablet     bimatoprost (LUMIGAN) 0.03 % ophthalmic solution     brimonidine (ALPHAGAN P) 0.1 % ophthalmic solution     calcium carbonate 1250 (500 Ca) MG CHEW     calcium carbonate-vitamin D (OYSTER SHELL CALCIUM/D) 500-200 MG-UNIT tablet     cyanocobalamin (CYANOCOBALAMIN) 1000 MCG/ML injection      dexamethasone (DECADRON) 4 MG tablet     dextromethorphan-guaiFENesin (MUCINEX DM)  MG 12 hr tablet     fluticasone (FLONASE) 50 MCG/ACT nasal spray     furosemide (LASIX) 20 MG tablet     gabapentin (NEURONTIN) 300 MG capsule     Magnesium Oxide 250 MG TABS     Multiple Vitamin (MULTI-VITAMIN PO)     Omega-3 Fatty Acids (OMEGA 3 PO)     omeprazole (PRILOSEC) 20 MG DR capsule     potassium chloride ER (KLOR-CON M) 20 MEQ CR tablet     Psyllium (METAMUCIL PO)     sulfamethoxazole-trimethoprim (BACTRIM) 400-80 MG tablet     tamsulosin (FLOMAX) 0.4 MG capsule     XARELTO ANTICOAGULANT 20 MG TABS tablet     No current facility-administered medications for this visit.       Labs         Latest Reference Range & Units 05/20/22 12:55   Sodium 133 - 144 mmol/L 142   Potassium 3.4 - 5.3 mmol/L 4.1   Chloride 94 - 109 mmol/L 112 (H)   Carbon Dioxide 20 - 32 mmol/L 21   Urea Nitrogen 7 - 30 mg/dL 62 (H)   Creatinine 0.66 - 1.25 mg/dL 2.39 (H)   GFR Estimate >60 mL/min/1.73m2 27 (L) [1]   Calcium 8.5 - 10.1 mg/dL 8.1 (L)   Anion Gap 3 - 14 mmol/L 9   N-Terminal Pro BNP Inpatient 0 - 1,800 pg/mL 5,262 (H) [2]   Glucose 70 - 99 mg/dL 124 (H)   WBC 4.0 - 11.0 10e3/uL 3.5 (L)   Hemoglobin 13.3 - 17.7 g/dL 9.0 (L)   Hematocrit 40.0 - 53.0 % 28.4 (L)   Platelet Count 150 - 450 10e3/uL 63 (L)   RBC Count 4.40 - 5.90 10e6/uL 2.57 (L)   MCV 78 - 100 fL 111 (H)   MCH 26.5 - 33.0 pg 35.0 (H)   MCHC 31.5 - 36.5 g/dL 31.7   RDW 10.0 - 15.0 % 18.3 (H)            Latest Reference Range & Units 04/06/22 08:09 04/13/22 08:16 04/20/22 08:25 04/27/22 08:03   Calcium 8.5 - 10.5 mg/dL 8.4 (L)      Anion Gap 5 - 18 mmol/L 9      Albumin 3.5 - 5.0 g/dL 2.6 (L)      Protein Total 6.0 - 8.0 g/dL 5.0 (L)      Bilirubin Total 0.0 - 1.0 mg/dL 0.4      Alkaline Phosphatase 45 - 120 U/L 67      ALT 0 - 45 U/L 27      AST 0 - 40 U/L 14      BNP 0 - 80 pg/mL  207 (H)     N-Terminal Pro Bnp 0 - 450 pg/mL 1,805 (H) [1]      Glucose 70 - 125 mg/dL 121       WBC 4.0 - 11.0 10e3/uL 3.1 (L) [2] 3.2 (L) [3] 5.4 [4]    Hemoglobin 13.3 - 17.7 g/dL 11.1 (L) 10.3 (L) 10.5 (L)    Hematocrit 40.0 - 53.0 % 35.0 (L) 32.1 (L) 32.4 (L)    Platelet Count 150 - 450 10e3/uL 173 144 (L) 131 (L)    RBC Count 4.40 - 5.90 10e6/uL 3.30 (L) 3.04 (L) 3.03 (L)    MCV 78 - 100 fL 106 (H) 106 (H) 107 (H)    MCH 26.5 - 33.0 pg 33.6 (H) 33.9 (H) 34.7 (H)    MCHC 31.5 - 36.5 g/dL 31.7 32.1 32.4    RDW 10.0 - 15.0 % 16.3 (H) 17.2 (H) 17.4 (H)    % Neutrophils % 65 80 85    % Lymphocytes % 12 5 6    % Monocytes % 17 4 5    % Eosinophils % 4 11 4    % Basophils % 2 0 0    Absolute Basophils 0.0 - 0.2 10e3/uL 0.1 0.0 0.0    Absolute Neutrophil 1.6 - 8.3 10e3/uL 2.0 2.6 4.6    Absolute Lymphocytes 0.8 - 5.3 10e3/uL 0.4 (L) 0.2 (L) 0.3 (L)    Absolute Monocytes 0.0 - 1.3 10e3/uL 0.5 0.1 0.3    Absolute Eosinophils 0.0 - 0.7 10e3/uL 0.1 0.4 0.2    RBC Morphology  Confirmed RBC Indices Confirmed RBC Indices Confirmed RBC Indices    Platelet Morphology Automated Count Confirmed. Platelet morphology is normal.  Automated Count Confirmed. Giant platelets are present. ! Automated Count Confirmed. Platelet morphology is normal. Automated Count Confirmed. Platelet morphology is normal.    RBC Fragments None Seen  Slight !  Slight !    Teardrop Cells None Seen  Slight !      Elliptocytes None Seen  Moderate ! Slight ! Slight !    Cherry Log Free Lt Chain 0.33 - 1.94 mg/dL 100.90 (H)   38.53 (H)   Kappa Lambda Ratio 0.26 - 1.65  347.93 (H)   256.87 (H)   Lambda Free Lt Chain 0.57 - 2.63 mg/dL 0.29 (L)   0.15 (L)   Total Protein Serum for ELP 6.0 - 8.0 g/dL    4.6 (L)       Imaging     Echocardiogram  Name: MONICA SCOTT  MRN: [id number]  : 1945  Study Date: 2022 11:22 AM  Age: 76 yrs  Gender: Male  Patient Location: [facility]  Reason For Study: Heart Failure  Ordering Physician: ESTEVAN SORTO  Performed By: ACE     BSA: 1.9 m2  Height: 68 in  Weight: 160 lb  HR: 79  BP: 147/79  mmHg  ______________________________________________________________________________  Procedure  Complete Echo Adult. Adequate quality two-dimensional was performed and  interpreted. Adequate quality color and spectral Doppler were performed and  interpreted. Compared to the prior study dated 11/3/2021, there are changes as  noted.  ______________________________________________________________________________  Interpretation Summary     1. Left ventricular size, wall thickness, and systolic function are normal.  The estimated left ventricular ejection fraction is 60-65%.  2. Right ventricular size and systolic function are normal.  3. Mild left and moderate right atrial enlargement.  4. Calcified trileaflet aortic valve with moderate aortic stenosis. Peak  forward velocity measures 3.2 m/s, mean gradient 27 mm Hg, calculated aortic  valve area 1.1 cm2, and dimensionless index 0.28. No significant  regurgitation.  5. Moderate pulmonary hypertension is present with an estimated pulmonary  artery systolic pressure of 54 mm Hg.  6. Compared to the prior study dated 11/3/2021, the Doppler data today show  moderate aortic stenosis. However, the aortic valve has a similar appearance  on both studies. There was likely inadequate Doppler assessment of the aortic  valve on the prior study. Visually the aortic valve appears at least  moderately stenotic on both studies.  ______________________________________________________________________________  I      WMSI = 1.00     % Normal = 100     X - Cannot   0 -                      (2) - Mildly 2 -          Segments  Size  Interpret    Hyperkinetic 1 - Normal  Hypokinetic  Hypokinetic  1-2     small                                                     7 -          3-5      moderate  3 - Akinetic 4 -          5 -         6 - Akinetic Dyskinetic   6-14    large               Dyskinetic   Aneurysmal  w/scar       w/scar       15-16   diffuse     Left Ventricle  The left ventricle is  normal in size. There is normal left ventricular wall  thickness. Left ventricular systolic function is normal. The visual ejection  fraction is 60-65%. Diastolic function not assessed due to atrial  fibrillation. No regional wall motion abnormalities noted.     Right Ventricle  The right ventricle is mildly dilated. The right ventricular systolic function  is normal.     Atria  The left atrium is mildly dilated. The right atrium is moderately dilated.     Mitral Valve  The mitral valve leaflets are moderately thickened. There is mild mitral  annular calcification. There is trace to mild mitral regurgitation. There is  no mitral valve stenosis. The mean mitral valve gradient is 3.3 mmHg.     Tricuspid Valve  Tricuspid valve leaflets appear normal. There is mild (1+) tricuspid  regurgitation. The right ventricular systolic pressure is elevated at 46.4  mmHg. Moderate (46-55mmHg) pulmonary hypertension is present. There is no  tricuspid stenosis.     Aortic Valve  The aortic valve is trileaflet. Moderate aortic valve calcification is  present. No aortic regurgitation is present. Moderate valvular aortic  stenosis. The calculated aortic valve are is 1.1 cm^2. The mean AoV pressure  gradient is 26.6 mmHg.     Pulmonic Valve  The pulmonic valve is not well seen, but is grossly normal. There is trace to  mild pulmonic valvular regurgitation. There is no pulmonic valvular stenosis.     Vessels  The aorta root is normal. The thoracic aorta cannot be assessed. IVC diameter  and respiratory changes fall into an intermediate range suggesting an RA  pressure of 8 mmHg.     Pericardium  There is no pericardial effusion.     Rhythm  The rhythm was atrial fibrillation.     ______________________________________________________________________________  MMode/2D Measurements & Calculations  IVSd: 0.73 cm  LVIDd: 4.5 cm  LVIDs: 2.8 cm  LVPWd: 0.85 cm  FS: 37.6 %     LV mass(C)d: 113.4 grams  LV mass(C)dI: 61.0 grams/m2  Ao root diam:  3.5 cm  LA dimension: 3.9 cm  LA/Ao: 1.1  LVOT diam: 2.2 cm  LVOT area: 3.9 cm2  LA Volume (BP): 63.7 ml  LA Volume Index (BP): 34.2 ml/m2     LA Volume Indexed (AL/bp): 35.6 ml/m2  RWT: 0.38     Time Measurements  MM HR: 59.0 BPM     Doppler Measurements & Calculations  MV E max iam: 168.0 cm/sec  MV A max iam: 77.1 cm/sec  MV E/A: 2.2  MV max PG: 10.0 mmHg  MV mean PG: 3.3 mmHg  MV V2 VTI: 46.4 cm  MVA(VTI): 1.8 cm2  MV dec slope: 1407 cm/sec2  MV dec time: 0.12 sec  Ao V2 max: 320.3 cm/sec  Ao max P.0 mmHg  Ao V2 mean: 244.7 cm/sec  Ao mean P.6 mmHg  Ao V2 VTI: 75.0 cm  JOHANN(I,D): 1.1 cm2  JOHANN(V,D): 1.1 cm2  LV V1 max PG: 3.3 mmHg  LV V1 max: 90.7 cm/sec  LV V1 VTI: 21.7 cm  SV(LVOT): 85.6 ml  SI(LVOT): 46.0 ml/m2  PA acc time: 0.07 sec     TR max iam: 340.7 cm/sec  TR max P.4 mmHg  AV Iam Ratio (DI): 0.28  JOHANN Index (cm2/m2): 0.61  E/E' av.6  Lateral E/e': 18.4  Medial E/e': 24.9    CT scan     EXAM: CT CHEST W CONTRAST  LOCATION: [facility]  DATE/TIME: 3/20/2022 8:10 AM     INDICATION: Pneumonia, effusion or abscess suspected, xray done; multiple myeloma  COMPARISON: CT of the chest 2022; PET/CT 2022  TECHNIQUE: CT chest with IV contrast. Multiplanar reformats were obtained. Dose reduction techniques were used.     CONTRAST: 75ml Isovue 370     FINDINGS:   LUNGS AND PLEURA: Small bilateral pleural effusions are present layering posteriorly. Pleural fluid on the right is decreased from 2022. Associated passive atelectasis of the adjacent lower lobes. New subsegmental focus of groundglass attenuation   along the anterior pleura of the right upper lobe, which does not conform to a specific vascular territory or anatomic distribution. There are no other new airspace opacities elsewhere. Mild central airway wall thickening.     MEDIASTINUM: There is a right jugular approach chest port catheter which terminates at the SVC right atrial junction. Variant  pulmonary vein anatomy with a right top pulmonary vein and independent drainage of the right middle lobe on the right and a   single ostium of the pulmonary veins on the left. Cardiac chambers are not enlarged. Degenerative thickening and calcification of aortic valve leaflets. Mild mitral annular calcifications. No pericardial effusion. No thoracic aortic aneurysm. Mixed   attenuation atheroma in the arch and descending aorta.     No enlarged mediastinal or hilar lymph nodes. Esophagus is decompressed. Imaged thyroid gland is normal.     CORONARY ARTERY CALCIFICATION: Severe.     UPPER ABDOMEN: No new findings in the imaged upper abdomen.     MUSCULOSKELETAL: T6 compression deformity with radiopaque cement. No new vertebral compression deformity. Healed fracture deformity left anterior seventh rib area Unchanged lytic lesion in the right scapula with focal loss of the cortex (series 5, image   41) which was metabolically active on the January 2022 PET/CT consistent with an area of multiple myeloma. No new aggressive or destructive bone lesions.                                                                      IMPRESSION:      1.  Small bilateral pleural effusions are present. Right pleural fluid is slightly decreased from 03/17/2022.  2.  Limited territory of subpleural groundglass attenuation in the anterior right upper lobe consistent with small focus of new lung inflammation. Uncertain etiology.  3.  Unchanged lytic lesion in the right scapula. No aggressive or destructive bone lesions in the chest        Catheterization 05/2022      Panel 1    Primary Surgeon:  Christopher Bobo MD   Procedure:  Right Heart Catheterization    Left Heart Catheterization    Coronary Angiogram              Indications    SAMANO (dyspnea on exertion) [R06.00 (ICD-10-CM)]   Moderate pulmonary hypertension (H) [I27.20 (ICD-10-CM)]   Acute heart failure with preserved ejection fraction (HFpEF) (H) [I50.31 (ICD-10-CM)]        Comments/Patient Narrative    78 y/o M with PMH of multiple myeloma kappa light chain, DVT, HFpEF, extensive coronary calcification who presents for evaluation of SAMANO with RHC and coronary angiogram.           Conclusion         Hemodynamic data has been modified in [facility] per physician review.    Left ventricular filling pressures are moderately elevated.    Severely elevated pulmonary artery hypertension.    Right sided filling pressures are severely elevated.    Left sided filling pressures are moderately elevated.    Reduced cardiac output level.     Non obstructive coronary artery disease.   Hemostasis with TR band.            Plan          Diagnostic  Dominance: Right    Left Main   The vessel is large and is angiographically normal.   Left Anterior Descending   The vessel is large.   Prox LAD to Mid LAD lesion is 30% stenosed.   First Diagonal Branch   The vessel is large and is angiographically normal.   Lateral First Diagonal Branch   The vessel is large and is angiographically normal.   Second Diagonal Branch   The vessel is small and is angiographically normal.   Third Diagonal Branch   The vessel is small and is angiographically normal.   Left Circumflex   The vessel is large and is angiographically normal.   First Obtuse Marginal Branch   The vessel is small and is angiographically normal.   Second Obtuse Marginal Branch   The vessel is large and is angiographically normal.   Right Coronary Artery   The vessel is large and is angiographically normal.   Right Posterior Descending Artery   The vessel is large and is angiographically normal.   Right Posterior Atrioventricular Artery   The vessel is large and is angiographically normal.   First Right Posterolateral Branch   The vessel is large and is angiographically normal.   Second Right Posterolateral Branch   The vessel is large and is angiographically normal.       Intervention      No interventions have been documented.          Hemodynamics    RA  -- /27/22 mmHg  RV 72/22 mmHg  PA 72/40/50 mmHg  CWP -- /31/27 mmHg  CO (Trav) 3.5 L/min  CI (Trav) 1.9 L/min/m2  LVEDP 22 mmHg   mmHg   SVR 1920 dynes  PVR 6.5 KOEHLER Right sided filling pressures are severely elevated. Left sided filling pressures are moderately elevated. Severely elevated pulmonary artery hypertension. Left ventricular filling pressures are moderately elevated. Reduced cardiac output level. Hemodynamic data has been modified in Epic per physician review.         Pressures Phase: Baseline     Time Systolic (mmHg) Diastolic (mmHg) Mean (mmHg) A Wave (mmHg) V Wave (mmHg) EDP (mmHg) Max dp/dt (mmHg/sec) HR (bpm) Content (mL/dL) SAT (%)   RA Pressures  2:48 PM   24     26      72        RV Pressures  2:49 PM 70        21     65        PA Pressures  2:49 PM 70    38    51        54        PCW Pressures  2:50 PM   28     30      63        AO Pressures  3:25     78    100        69        LV Pressures  3:25     1       14     57          3:25         17     61          3:25     8       24     63            Blood Flow Results Phase: Baseline     Time Results  Indexed Values (L/min/m2)   QP  2:34 PM 3.81 L/min    2.05      QS  2:34 PM 3.51 L/min    1.89          Blood Oximetry Phase: Baseline     Time Hb  SAT(%)  PO2  Content (mL/dL) PA Sat (%)   PA  2:34 PM  49.5 %      49.5      Art  2:34 PM  100 %     12.24           Cardiac Output Phase: Baseline     Time TDCO (L/min) TDCI (L/min/m2) Trav C.O. (L/min) Trav C.I. (L/min/m2) Trav HR (bpm)   Cardiac Output Results  2:34 PM   3.51    1.89           Resistance Results Phase: Baseline     Time PVR  SVR  TPR  TVR  PVR/SVR  TPR/TVR    Resistance Results (Metric)  2:34 .71 dsc-5    1732.24 dsc-5    1070.35 dsc-5    2279.26 dsc-5    0.28    0.47      Resistance Results (Wood)  2:34 PM 6.04 KOEHLER    21.66 KOEHLER    13.38 KOEHLER    28.5 KOEHLER    0.28    0.47          Stoke Volume Results Phase: Baseline     Time RVSW (gm*m) LVSW (gm*m) RVSW-I  (gm*m/m2) LVSW-I (gm*m/m2)   Stroke Work Results  2:34 PM 25.91    49.8    13.92    26.75                        Hemodynamic Waveforms -- Encounter Level:    Hemodynamic Waveforms: None found at the encounter level.          Electrocardiograms    Ca   Result Notes      Component Ref Range & Units 4/25/22  3:02 PM 4/1/22 10:47 AM    Systolic Blood Pressure mmHg       Diastolic Blood Pressure mmHg       Ventricular Rate BPM 66  59     Atrial Rate   59     GA Interval ms  174     QRS Duration ms 88  88     QT ms 392  398     QTc ms 410  394     P Axis degrees  83     R AXIS degrees 5  37     T Axis degrees 3  60     Interpretation ECG  Atrial fibrillation   Minimal voltage criteria for LVH, may be normal variant   ST elevation, consider early repolarization, pericarditis, or injury   Abnormal ECG   When compared with ECG of 01-APR-2022 10:47,   Atrial fibrillation has replaced Sinus rhythm   T wave inversion now evident in Inferior leads   Confirmed by TERI SEVILLA MD LOC:JN ([id number]) on 4/26/2022 3:35:06 PM  Sinus bradycardia with Premature supraventricular complexes   Blocked Premature supraventricular complexes   Otherwise normal ECG   When compared with ECG of 17-MAR-2022 04:10,   Sinus rhythm has replaced Atrial fibrillation   Non-specific change in ST segment in Inferior leads   T wave inversion no longer evident in Inferior leads   Nonspecific T wave abnormality no longer evident in Anterior leads   Confirmed by BHAVESH RAMÍREZ MD LOC:JN ([id number]) on 4/1/2022 2:54:59 PM               CC: doctors above

## 2022-05-24 NOTE — NURSING NOTE
Reviewed Med list    Completed AVS    Covid order placed    Sent myself a reminder to set up admission for 6/9/22      Ellen Deiwtt RN on 5/24/2022 at 11:32 AM

## 2022-05-24 NOTE — PATIENT INSTRUCTIONS
Medication Changes:  -None    Patient Instructions:  1. Continue staying active and eat a heart healthy diet.    2. Please keep current list of medications with you at all times.    3. Remember to weigh yourself daily after voiding and before you consume any food or beverages and log the numbers.  If you have gained 2 pounds overnight or 5 pounds in a week contact us immediately for medication adjustments or further instructions.    4. **Please call us immediately if you have any syncope (fainting or passing out), chest pain, edema (swelling or weight gain), or decline in your functional status (general decline in how you are feeling).    5. Patients on Remodulin (treprostinil) or Veletri (epoprostenol): Please make sure that you have your backup pump and supplies with you at all times, your mixing instructions, and contact information for your specialty pharmacy.    Follow up Appointment Information:  *Plan for hospital Admission to [facility] June 9th @ 1pm.  You will be required to have a Covid test prior to admission.    Results:  Component      Latest Ref Rng & Units 5/20/2022   Sodium      133 - 144 mmol/L 142   Potassium      3.4 - 5.3 mmol/L 4.1   Chloride      94 - 109 mmol/L 112 (H)   Carbon Dioxide      20 - 32 mmol/L 21   Anion Gap      3 - 14 mmol/L 9   Urea Nitrogen      7 - 30 mg/dL 62 (H)   Creatinine      0.66 - 1.25 mg/dL 2.39 (H)   Calcium      8.5 - 10.1 mg/dL 8.1 (L)   Glucose      70 - 99 mg/dL 124 (H)   GFR Estimate      >60 mL/min/1.73m2 27 (L)   WBC      4.0 - 11.0 10e3/uL 3.5 (L)   RBC Count      4.40 - 5.90 10e6/uL 2.57 (L)   Hemoglobin      13.3 - 17.7 g/dL 9.0 (L)   Hematocrit      40.0 - 53.0 % 28.4 (L)   MCV      78 - 100 fL 111 (H)   MCH      26.5 - 33.0 pg 35.0 (H)   MCHC      31.5 - 36.5 g/dL 31.7   RDW      10.0 - 15.0 % 18.3 (H)   Platelet Count      150 - 450 10e3/uL 63 (L)   N-Terminal Pro BNP Inpatient      0 - 1,800 pg/mL 5,262 (H)     We are located [facility] the third floor [facility] [place] [place] [facility].  Our address is     [address] [place] [address] [place] [address]    Thank you for allowing us to be a part of your care here at the [facility]    If you have questions or concerns please contact us at:    Ellen Dewitt RN, BSN, PHN  Karen Inman (Scheduling,Prior Auth)  Nurse Coordinator     Clinic   Pulmonary Hypertension   Pulmonary Hypertension  [facility]  (Phone)[phone]   (Phone) [phone]        (Fax) [phone]    ** Please note that you will NOT receive a reminder call regarding your scheduled testing, reminder calls are for provider appointments only.  If you are scheduled for testing within the [employer] system you may receive a call regarding pre-registration for billing purposes only.**     Support Group:  Pulmonary Hypertension Association  Https://www.phassociation.org/  **Look at the Events Tab** They even have Support Groups that you can call into    [facility] Support Group  Second Saturday of the Month from 1-3 PM   Location: [address] [place] [address]  Leader: Surekha Wilkins  Phone: [phone]  Email: [email or web address]   Admit

## 2022-05-24 NOTE — NURSING NOTE
Chief Complaint   Patient presents with     Follow Up     Procedure, pt wondering what the plan is going forward/if there is anything he should be doing, no other vitals to report, pt has resumed taking xarelto     Patient denies any changes regarding medication and allergies and states all information remains accurate since last reviewed.    Lisandra Cook, VF/CMA

## 2022-05-24 NOTE — LETTER
"5/24/2022      RE: Theo Meyers  [address]  [facility] [address]       Dear Colleague,    Thank you for the opportunity to participate in the care of your patient, Theo Meyers, at the [facility] [place] at [facility]. Please see a copy of my visit note below.    Keith is a 77 year old who is being evaluated via a billable  telephone visit as no HIPPA compliant space    Todd Peters M.D.  Cardiovascular Medicine    I personally saw and examined this patient, discussed care with r consultants, reviewed current laboratories and imaging studies, and conveyed impression and diagnostic/therapeutic plan to patient.    Per Clifford MD    [address] [place] [id number] [phone] (Fax)      Familia Guan M.D.  [facility]      Problem List  1. Multiple myeloma kappa light tchain   2. Glaucoma  3. Shingles  4. Syncope  5. History of DVT  6. History of B12 deficiency  7. HFpEF by history  8. History of pneumonia  9. Extensive coronary calcification  10. NWOAC  11. Prostatism  12. Atrial fibrillation with recurrence post cardioversion  13. + Carotid doppler for ASCD  14. Hyperlipidemia treated with atorvastatin  15. Anemia with macrocytosis  16. History of pleural effusions  17. Immunocompromise  18. Hypoproteinemia      Discussion  1. The therapy that is contemplated is associated with a high incidence of cytokine storm  2. The patient, I and his wife discussed the findings including aortic valve narrowing, carotid atherosclerosis, \"severe\" coronary calcification and elevated PA pressure and the need to attempt to distinguish what is what in terms of risk and therapy.  He will likely require bilateral heart catheterization and coronary angiography.      I thoroughly discussed the risks and benefits of the contemplated catherization including bleeding, vessel rupture, death, arrhythmia, embolism, " stroke, renal failure perforation of pulmonary artery, aortic,lung or heart.  I discussed how the procedure would be performed and alternative diagnostic and therapeutic management strategies.  All questions were answered.              History    77 year old male seen for evaluation of shortness of breath with associated cardiac history of atrial fibrillation (recurrent post cardioversion), aortic stenosis, and pulmonary hypertension.  He has a clear history of exertionally related chest pressure and shortness of breath that is mitigated by reduction by intensity of activity.  In March he had a negative non-exercise stress test.  He has known extensive coronary calcification as well as carotid atherosclerotic disease.  He has no symptoms at rest.  He also has a history of DVT/PE.  His most recent CT scans have been done without contrast. He has had to reduce his level of exertion secondary to exertional chest pressure/pain and shortness of breath - relieved by rest.  He has had no prolonged episodes.      Objective    Constitutional: alert, oriented, normal gait and station, normal mentation.  Oral: moist mucous membranes  Lymph: without pathologic adenopathy  Chest: clear to ausculation and percussion  Cor: No evidence of left or right ventricular activity.  Rhythm is irregular.  S1 variable S2 split physiologically. Murmurs of aortic flow  Abdomen: without tenderness, rebound, guarding, masses, ascites  Extremities: Edema not present  Neuro: no focal defects, normal mentation  Skin: without open lesions  Psych: oriented, verbal, mental status in tact    Wt Readings from Last 5 Encounters:   05/20/22 71.2 kg (157 lb)   05/19/22 75.4 kg (166 lb 3.2 oz)   05/06/22 73.8 kg (162 lb 12.8 oz)   05/04/22 73.1 kg (161 lb 3.2 oz)   05/02/22 71.3 kg (157 lb 3.2 oz)       Meds  Current Outpatient Medications   Medication     acetaminophen (TYLENOL) 500 MG tablet     acyclovir (ZOVIRAX) 400 MG tablet     ascorbic acid 1000 MG  TABS tablet     atorvastatin (LIPITOR) 10 MG tablet     bimatoprost (LUMIGAN) 0.03 % ophthalmic solution     brimonidine (ALPHAGAN P) 0.1 % ophthalmic solution     calcium carbonate 1250 (500 Ca) MG CHEW     calcium carbonate-vitamin D (OYSTER SHELL CALCIUM/D) 500-200 MG-UNIT tablet     cyanocobalamin (CYANOCOBALAMIN) 1000 MCG/ML injection     dexamethasone (DECADRON) 4 MG tablet     dextromethorphan-guaiFENesin (MUCINEX DM)  MG 12 hr tablet     fluticasone (FLONASE) 50 MCG/ACT nasal spray     furosemide (LASIX) 20 MG tablet     gabapentin (NEURONTIN) 300 MG capsule     Magnesium Oxide 250 MG TABS     Multiple Vitamin (MULTI-VITAMIN PO)     Omega-3 Fatty Acids (OMEGA 3 PO)     omeprazole (PRILOSEC) 20 MG DR capsule     potassium chloride ER (KLOR-CON M) 20 MEQ CR tablet     Psyllium (METAMUCIL PO)     sulfamethoxazole-trimethoprim (BACTRIM) 400-80 MG tablet     tamsulosin (FLOMAX) 0.4 MG capsule     XARELTO ANTICOAGULANT 20 MG TABS tablet     No current facility-administered medications for this visit.       Labs         Latest Reference Range & Units 05/20/22 12:55   Sodium 133 - 144 mmol/L 142   Potassium 3.4 - 5.3 mmol/L 4.1   Chloride 94 - 109 mmol/L 112 (H)   Carbon Dioxide 20 - 32 mmol/L 21   Urea Nitrogen 7 - 30 mg/dL 62 (H)   Creatinine 0.66 - 1.25 mg/dL 2.39 (H)   GFR Estimate >60 mL/min/1.73m2 27 (L) [1]   Calcium 8.5 - 10.1 mg/dL 8.1 (L)   Anion Gap 3 - 14 mmol/L 9   N-Terminal Pro BNP Inpatient 0 - 1,800 pg/mL 5,262 (H) [2]   Glucose 70 - 99 mg/dL 124 (H)   WBC 4.0 - 11.0 10e3/uL 3.5 (L)   Hemoglobin 13.3 - 17.7 g/dL 9.0 (L)   Hematocrit 40.0 - 53.0 % 28.4 (L)   Platelet Count 150 - 450 10e3/uL 63 (L)   RBC Count 4.40 - 5.90 10e6/uL 2.57 (L)   MCV 78 - 100 fL 111 (H)   MCH 26.5 - 33.0 pg 35.0 (H)   MCHC 31.5 - 36.5 g/dL 31.7   RDW 10.0 - 15.0 % 18.3 (H)            Latest Reference Range & Units 04/06/22 08:09 04/13/22 08:16 04/20/22 08:25 04/27/22 08:03   Calcium 8.5 - 10.5 mg/dL 8.4 (L)      Anion  Gap 5 - 18 mmol/L 9      Albumin 3.5 - 5.0 g/dL 2.6 (L)      Protein Total 6.0 - 8.0 g/dL 5.0 (L)      Bilirubin Total 0.0 - 1.0 mg/dL 0.4      Alkaline Phosphatase 45 - 120 U/L 67      ALT 0 - 45 U/L 27      AST 0 - 40 U/L 14      BNP 0 - 80 pg/mL  207 (H)     N-Terminal Pro Bnp 0 - 450 pg/mL 1,805 (H) [1]      Glucose 70 - 125 mg/dL 121      WBC 4.0 - 11.0 10e3/uL 3.1 (L) [2] 3.2 (L) [3] 5.4 [4]    Hemoglobin 13.3 - 17.7 g/dL 11.1 (L) 10.3 (L) 10.5 (L)    Hematocrit 40.0 - 53.0 % 35.0 (L) 32.1 (L) 32.4 (L)    Platelet Count 150 - 450 10e3/uL 173 144 (L) 131 (L)    RBC Count 4.40 - 5.90 10e6/uL 3.30 (L) 3.04 (L) 3.03 (L)    MCV 78 - 100 fL 106 (H) 106 (H) 107 (H)    MCH 26.5 - 33.0 pg 33.6 (H) 33.9 (H) 34.7 (H)    MCHC 31.5 - 36.5 g/dL 31.7 32.1 32.4    RDW 10.0 - 15.0 % 16.3 (H) 17.2 (H) 17.4 (H)    % Neutrophils % 65 80 85    % Lymphocytes % 12 5 6    % Monocytes % 17 4 5    % Eosinophils % 4 11 4    % Basophils % 2 0 0    Absolute Basophils 0.0 - 0.2 10e3/uL 0.1 0.0 0.0    Absolute Neutrophil 1.6 - 8.3 10e3/uL 2.0 2.6 4.6    Absolute Lymphocytes 0.8 - 5.3 10e3/uL 0.4 (L) 0.2 (L) 0.3 (L)    Absolute Monocytes 0.0 - 1.3 10e3/uL 0.5 0.1 0.3    Absolute Eosinophils 0.0 - 0.7 10e3/uL 0.1 0.4 0.2    RBC Morphology  Confirmed RBC Indices Confirmed RBC Indices Confirmed RBC Indices    Platelet Morphology Automated Count Confirmed. Platelet morphology is normal.  Automated Count Confirmed. Giant platelets are present. ! Automated Count Confirmed. Platelet morphology is normal. Automated Count Confirmed. Platelet morphology is normal.    RBC Fragments None Seen  Slight !  Slight !    Teardrop Cells None Seen  Slight !      Elliptocytes None Seen  Moderate ! Slight ! Slight !    Meridian Free Lt Chain 0.33 - 1.94 mg/dL 100.90 (H)   38.53 (H)   Kappa Lambda Ratio 0.26 - 1.65  347.93 (H)   256.87 (H)   Lambda Free Lt Chain 0.57 - 2.63 mg/dL 0.29 (L)   0.15 (L)   Total Protein Serum for ELP 6.0 - 8.0 g/dL    4.6 (L)        Imaging     Echocardiogram  Name: MONICA SCOTT  MRN: [id number]  : 1945  Study Date: 2022 11:22 AM  Age: 76 yrs  Gender: Male  Patient Location: [facility]  Reason For Study: Heart Failure  Ordering Physician: ESTEVAN SORTO  Performed By: ACE     BSA: 1.9 m2  Height: 68 in  Weight: 160 lb  HR: 79  BP: 147/79 mmHg  ______________________________________________________________________________  Procedure  Complete Echo Adult. Adequate quality two-dimensional was performed and  interpreted. Adequate quality color and spectral Doppler were performed and  interpreted. Compared to the prior study dated 11/3/2021, there are changes as  noted.  ______________________________________________________________________________  Interpretation Summary     1. Left ventricular size, wall thickness, and systolic function are normal.  The estimated left ventricular ejection fraction is 60-65%.  2. Right ventricular size and systolic function are normal.  3. Mild left and moderate right atrial enlargement.  4. Calcified trileaflet aortic valve with moderate aortic stenosis. Peak  forward velocity measures 3.2 m/s, mean gradient 27 mm Hg, calculated aortic  valve area 1.1 cm2, and dimensionless index 0.28. No significant  regurgitation.  5. Moderate pulmonary hypertension is present with an estimated pulmonary  artery systolic pressure of 54 mm Hg.  6. Compared to the prior study dated 11/3/2021, the Doppler data today show  moderate aortic stenosis. However, the aortic valve has a similar appearance  on both studies. There was likely inadequate Doppler assessment of the aortic  valve on the prior study. Visually the aortic valve appears at least  moderately stenotic on both studies.  ______________________________________________________________________________  I      WMSI = 1.00     % Normal = 100     X - Cannot   0 -                      (2) - Mildly 2 -          Segments  Size  Interpret    Hyperkinetic 1  - Normal  Hypokinetic  Hypokinetic  1-2     small                                                     7 -          3-5      moderate  3 - Akinetic 4 -          5 -         6 - Akinetic Dyskinetic   6-14    large               Dyskinetic   Aneurysmal  w/scar       w/scar       15-16   diffuse     Left Ventricle  The left ventricle is normal in size. There is normal left ventricular wall  thickness. Left ventricular systolic function is normal. The visual ejection  fraction is 60-65%. Diastolic function not assessed due to atrial  fibrillation. No regional wall motion abnormalities noted.     Right Ventricle  The right ventricle is mildly dilated. The right ventricular systolic function  is normal.     Atria  The left atrium is mildly dilated. The right atrium is moderately dilated.     Mitral Valve  The mitral valve leaflets are moderately thickened. There is mild mitral  annular calcification. There is trace to mild mitral regurgitation. There is  no mitral valve stenosis. The mean mitral valve gradient is 3.3 mmHg.     Tricuspid Valve  Tricuspid valve leaflets appear normal. There is mild (1+) tricuspid  regurgitation. The right ventricular systolic pressure is elevated at 46.4  mmHg. Moderate (46-55mmHg) pulmonary hypertension is present. There is no  tricuspid stenosis.     Aortic Valve  The aortic valve is trileaflet. Moderate aortic valve calcification is  present. No aortic regurgitation is present. Moderate valvular aortic  stenosis. The calculated aortic valve are is 1.1 cm^2. The mean AoV pressure  gradient is 26.6 mmHg.     Pulmonic Valve  The pulmonic valve is not well seen, but is grossly normal. There is trace to  mild pulmonic valvular regurgitation. There is no pulmonic valvular stenosis.     Vessels  The aorta root is normal. The thoracic aorta cannot be assessed. IVC diameter  and respiratory changes fall into an intermediate range suggesting an RA  pressure of 8 mmHg.     Pericardium  There is no  pericardial effusion.     Rhythm  The rhythm was atrial fibrillation.     ______________________________________________________________________________  MMode/2D Measurements & Calculations  IVSd: 0.73 cm  LVIDd: 4.5 cm  LVIDs: 2.8 cm  LVPWd: 0.85 cm  FS: 37.6 %     LV mass(C)d: 113.4 grams  LV mass(C)dI: 61.0 grams/m2  Ao root diam: 3.5 cm  LA dimension: 3.9 cm  LA/Ao: 1.1  LVOT diam: 2.2 cm  LVOT area: 3.9 cm2  LA Volume (BP): 63.7 ml  LA Volume Index (BP): 34.2 ml/m2     LA Volume Indexed (AL/bp): 35.6 ml/m2  RWT: 0.38     Time Measurements  MM HR: 59.0 BPM     Doppler Measurements & Calculations  MV E max iam: 168.0 cm/sec  MV A max iam: 77.1 cm/sec  MV E/A: 2.2  MV max PG: 10.0 mmHg  MV mean PG: 3.3 mmHg  MV V2 VTI: 46.4 cm  MVA(VTI): 1.8 cm2  MV dec slope: 1407 cm/sec2  MV dec time: 0.12 sec  Ao V2 max: 320.3 cm/sec  Ao max P.0 mmHg  Ao V2 mean: 244.7 cm/sec  Ao mean P.6 mmHg  Ao V2 VTI: 75.0 cm  OJHANN(I,D): 1.1 cm2  JOHANN(V,D): 1.1 cm2  LV V1 max PG: 3.3 mmHg  LV V1 max: 90.7 cm/sec  LV V1 VTI: 21.7 cm  SV(LVOT): 85.6 ml  SI(LVOT): 46.0 ml/m2  PA acc time: 0.07 sec     TR max iam: 340.7 cm/sec  TR max P.4 mmHg  AV Iam Ratio (DI): 0.28  JOHANN Index (cm2/m2): 0.61  E/E' av.6  Lateral E/e': 18.4  Medial E/e': 24.9    CT scan     EXAM: CT CHEST W CONTRAST  LOCATION: [facility]  DATE/TIME: 3/20/2022 8:10 AM     INDICATION: Pneumonia, effusion or abscess suspected, xray done; multiple myeloma  COMPARISON: CT of the chest 2022; PET/CT 2022  TECHNIQUE: CT chest with IV contrast. Multiplanar reformats were obtained. Dose reduction techniques were used.     CONTRAST: 75ml Isovue 370     FINDINGS:   LUNGS AND PLEURA: Small bilateral pleural effusions are present layering posteriorly. Pleural fluid on the right is decreased from 2022. Associated passive atelectasis of the adjacent lower lobes. New subsegmental focus of groundglass attenuation   along the anterior  pleura of the right upper lobe, which does not conform to a specific vascular territory or anatomic distribution. There are no other new airspace opacities elsewhere. Mild central airway wall thickening.     MEDIASTINUM: There is a right jugular approach chest port catheter which terminates at the SVC right atrial junction. Variant pulmonary vein anatomy with a right top pulmonary vein and independent drainage of the right middle lobe on the right and a   single ostium of the pulmonary veins on the left. Cardiac chambers are not enlarged. Degenerative thickening and calcification of aortic valve leaflets. Mild mitral annular calcifications. No pericardial effusion. No thoracic aortic aneurysm. Mixed   attenuation atheroma in the arch and descending aorta.     No enlarged mediastinal or hilar lymph nodes. Esophagus is decompressed. Imaged thyroid gland is normal.     CORONARY ARTERY CALCIFICATION: Severe.     UPPER ABDOMEN: No new findings in the imaged upper abdomen.     MUSCULOSKELETAL: T6 compression deformity with radiopaque cement. No new vertebral compression deformity. Healed fracture deformity left anterior seventh rib area Unchanged lytic lesion in the right scapula with focal loss of the cortex (series 5, image   41) which was metabolically active on the January 2022 PET/CT consistent with an area of multiple myeloma. No new aggressive or destructive bone lesions.                                                                      IMPRESSION:      1.  Small bilateral pleural effusions are present. Right pleural fluid is slightly decreased from 03/17/2022.  2.  Limited territory of subpleural groundglass attenuation in the anterior right upper lobe consistent with small focus of new lung inflammation. Uncertain etiology.  3.  Unchanged lytic lesion in the right scapula. No aggressive or destructive bone lesions in the chest        Catheterization 05/2022      Panel 1    Primary Surgeon:  Jihan  MD Christopher   Procedure:  Right Heart Catheterization    Left Heart Catheterization    Coronary Angiogram              Indications    SAMANO (dyspnea on exertion) [R06.00 (ICD-10-CM)]   Moderate pulmonary hypertension (H) [I27.20 (ICD-10-CM)]   Acute heart failure with preserved ejection fraction (HFpEF) (H) [I50.31 (ICD-10-CM)]       Comments/Patient Narrative    76 y/o M with PMH of multiple myeloma kappa light chain, DVT, HFpEF, extensive coronary calcification who presents for evaluation of SAMANO with RHC and coronary angiogram.           Conclusion         Hemodynamic data has been modified in [facility] per physician review.    Left ventricular filling pressures are moderately elevated.    Severely elevated pulmonary artery hypertension.    Right sided filling pressures are severely elevated.    Left sided filling pressures are moderately elevated.    Reduced cardiac output level.     Non obstructive coronary artery disease.   Hemostasis with TR band.            Plan          Diagnostic  Dominance: Right    Left Main   The vessel is large and is angiographically normal.   Left Anterior Descending   The vessel is large.   Prox LAD to Mid LAD lesion is 30% stenosed.   First Diagonal Branch   The vessel is large and is angiographically normal.   Lateral First Diagonal Branch   The vessel is large and is angiographically normal.   Second Diagonal Branch   The vessel is small and is angiographically normal.   Third Diagonal Branch   The vessel is small and is angiographically normal.   Left Circumflex   The vessel is large and is angiographically normal.   First Obtuse Marginal Branch   The vessel is small and is angiographically normal.   Second Obtuse Marginal Branch   The vessel is large and is angiographically normal.   Right Coronary Artery   The vessel is large and is angiographically normal.   Right Posterior Descending Artery   The vessel is large and is angiographically normal.   Right Posterior Atrioventricular  Artery   The vessel is large and is angiographically normal.   First Right Posterolateral Branch   The vessel is large and is angiographically normal.   Second Right Posterolateral Branch   The vessel is large and is angiographically normal.       Intervention      No interventions have been documented.          Hemodynamics    RA -- /27/22 mmHg  RV 72/22 mmHg  PA 72/40/50 mmHg  CWP -- /31/27 mmHg  CO (Trav) 3.5 L/min  CI (Trav) 1.9 L/min/m2  LVEDP 22 mmHg   mmHg   SVR 1920 dynes  PVR 6.5 KOEHLER Right sided filling pressures are severely elevated. Left sided filling pressures are moderately elevated. Severely elevated pulmonary artery hypertension. Left ventricular filling pressures are moderately elevated. Reduced cardiac output level. Hemodynamic data has been modified in Epic per physician review.         Pressures Phase: Baseline     Time Systolic (mmHg) Diastolic (mmHg) Mean (mmHg) A Wave (mmHg) V Wave (mmHg) EDP (mmHg) Max dp/dt (mmHg/sec) HR (bpm) Content (mL/dL) SAT (%)   RA Pressures  2:48 PM   24     26      72        RV Pressures  2:49 PM 70        21     65        PA Pressures  2:49 PM 70    38    51        54        PCW Pressures  2:50 PM   28     30      63        AO Pressures  3:25     78    100        69        LV Pressures  3:25     1       14     57          3:25         17     61          3:25     8       24     63            Blood Flow Results Phase: Baseline     Time Results  Indexed Values (L/min/m2)   QP  2:34 PM 3.81 L/min    2.05      QS  2:34 PM 3.51 L/min    1.89          Blood Oximetry Phase: Baseline     Time Hb  SAT(%)  PO2  Content (mL/dL) PA Sat (%)   PA  2:34 PM  49.5 %      49.5      Art  2:34 PM  100 %     12.24           Cardiac Output Phase: Baseline     Time TDCO (L/min) TDCI (L/min/m2) Trav C.O. (L/min) Trav C.I. (L/min/m2) Trav HR (bpm)   Cardiac Output Results  2:34 PM   3.51    1.89           Resistance Results Phase: Baseline     Time PVR  SVR   TPR  TVR  PVR/SVR  TPR/TVR    Resistance Results (Metric)  2:34 .71 dsc-5    1732.24 dsc-5    1070.35 dsc-5    2279.26 dsc-5    0.28    0.47      Resistance Results (Wood)  2:34 PM 6.04 KOEHLER    21.66 KOEHLER    13.38 KOEHLER    28.5 KOEHLER    0.28    0.47          Stoke Volume Results Phase: Baseline     Time RVSW (gm*m) LVSW (gm*m) RVSW-I (gm*m/m2) LVSW-I (gm*m/m2)   Stroke Work Results  2:34 PM 25.91    49.8    13.92    26.75                        Hemodynamic Waveforms -- Encounter Level:    Hemodynamic Waveforms: None found at the encounter level.          Electrocardiograms    Ca   Result Notes      Component Ref Range & Units 4/25/22  3:02 PM 4/1/22 10:47 AM    Systolic Blood Pressure mmHg       Diastolic Blood Pressure mmHg       Ventricular Rate BPM 66  59     Atrial Rate   59     WY Interval ms  174     QRS Duration ms 88  88     QT ms 392  398     QTc ms 410  394     P Axis degrees  83     R AXIS degrees 5  37     T Axis degrees 3  60     Interpretation ECG  Atrial fibrillation   Minimal voltage criteria for LVH, may be normal variant   ST elevation, consider early repolarization, pericarditis, or injury   Abnormal ECG   When compared with ECG of 01-APR-2022 10:47,   Atrial fibrillation has replaced Sinus rhythm   T wave inversion now evident in Inferior leads   Confirmed by TERI SEVILLA MD LOC:JN ([id number]) on 4/26/2022 3:35:06 PM  Sinus bradycardia with Premature supraventricular complexes   Blocked Premature supraventricular complexes   Otherwise normal ECG   When compared with ECG of 17-MAR-2022 04:10,   Sinus rhythm has replaced Atrial fibrillation   Non-specific change in ST segment in Inferior leads   T wave inversion no longer evident in Inferior leads   Nonspecific T wave abnormality no longer evident in Anterior leads   Confirmed by DESIREE GRANDE LES LOC:JN ([id number]) on 4/1/2022 2:54:59 PM           CC: doctors above      Please do not hesitate to contact me if you have any questions/concerns.      Sincerely,     Todd Peters MD

## 2022-05-24 NOTE — TELEPHONE ENCOUNTER
The Patient was contacted to confirm BMT-New Transplant appointment.     The patient stated that he is not currently hospitalized or plans to be, during the time of the visit.     Provided appointment details and answered questions that the patient had.    We went over the time of the appointment and the location of the clinic.     Provided our office number in case the patient has any further questions or concerns.

## 2022-05-25 NOTE — TELEPHONE ENCOUNTER
[facility] Call Center    Phone Message    May a detailed message be left on voicemail: yes     Reason for Call: Other: pt had BMT lab done today 5.25.22 w/mid range levels.  Pt wants to know if this new draw changes the expectation for pt regarding a hospital stay that was mentioned last week when his BMT was over 5000?  Please call pt to advise what the next step is for him.    Action Taken: Message routed to:  [facility] cardio    Travel Screening: Not Applicable           --------------------------  Soke with Dr. Peters regarding the significant decreas in his BNP, but we don't know his kidney function, so I zach add on a BMP per Dr. Peters's request.  Ellen Dewitt RN on 5/25/2022 at 5:17 PM         --------------------------   See separate encounter for outcome.

## 2022-05-26 NOTE — PATIENT INSTRUCTIONS
Will discuss with Dr. Peters about his cardiac issues  Will discuss with Dr. Clifford about whether to stop the carfilzomib.  Will work on NK cell therapy

## 2022-05-26 NOTE — LETTER
Date:June 8, 2022      Provider requested that no letter be sent. Do not send.       [facility]

## 2022-05-26 NOTE — PROGRESS NOTES
Spoke with BMT fellow, who saw Keith today.  He is not a candidate for Car-T with current cardiac issues.  There are some NK cell therapy clinical studies that he qualifies for which Keith signed consent for.  The BMT team thinks NK therapy might be a good stall tactic until such time Keith can handle CAR-T.  July might be a good start time for this.      It was recommended to stop Carfilzomib as this can be exacerbating his cardiac issues.  This will be up to Dr. Clifford to decide.    Chris Flores, MIRTACC

## 2022-05-26 NOTE — PROGRESS NOTES
BMT Daily Progress Note    Active Medical Management Issues:     Hematologic history:  Multiple myeloma diagnosed in 2009, IgG kappa but evolving to light chain secretory, del 13q, t(11;14).       Date Treatment Response Toxicities/Complications   7/2009 Velcade/Dex x 8 cycles VGPR     4/2010 HD-Jennifer/PBSCT CR     6/2010 Rev maintenance       10/2012 RVD   cytopenias   9/2012 RVD (q 2wk velcade) Long remission     10/2020 Brianne/pom/dex Recent progresson     4/6/2022 Carf/dex                                 Chief Complaint: Follow up refractory multiple myeloma    HPI: Hematologic history above. 76 year old former [employer] [facility] , followed by Dr. Clifford for long standing diagnosis of multiple myeloma. We initially saw him for BMT evaluation on 2/14/22. He had been tolerating on Brianne, Pom, Dex but with a rising kappa chain, he was changed in 4/2022 to carfilzomib and dexamethasone. The carfilzomib was given at 20mg/m2 on day 1, 2, 28 mg/m2 on day 8,9; 42mg/m2 on days 15, 16, because of cardiac issues. He started on 4/6/22. With cycle 2 he is getting full dose 56 mg/m2. His kappa chains and kappa/lambda ratio have started to improve.  Next dose of the carfilzomib is next Wednesday.    He has been having issues with dyspnea. Needed to catch his breath with regular activities. Since our last visit, he was admitted to the hospital 3/17-3/21 for dyspnea thought to be multifactorial from pneumonia, and heart failure with contributions from progressive (now moderate) aortic stenosis, new onset atrial fibrillation, severe coronary artery disease, and moderate pulmonary hypertension. He had cardioversion for his afib on 4/1/22 but this relapsed. He has been on furosemide. He was seen by Dr. Peters. VQ scan was negative for perfusion deficits. Left and right cardiac catheterization done 5/20. Dr. Peters is concerned that with his heart function Theo would not be able to tolerate cytokine release syndrome. He had a virtual visit  on Tuesday with Dr. Peters. His BNP and Cr went up after the cardiac cath. He was set to come into the hospital 6/9 to work up the elevated BNP, though that is now improved.    Currently he is a little better. He still walks the dogs but a little slower. Previously 3 miles in 42 minutes. Lately he has been walking 2 miles in a little more than 40 minutes. He works in the yard for 2 hours and did ok with that without gasping for breath. Minimal edema, a little more on the right.   AST and ALT are up a little.     ROS: Otherwise negative x 10 systems    Physical Exam:     Vital Signs: /59 (BP Location: Right arm, Patient Position: Fowlers, Cuff Size: Adult Regular)   Pulse 64   Temp 97.4  F (36.3  C) (Oral)   Wt 71.8 kg (158 lb 3.2 oz)   SpO2 100%   BMI 23.36 kg/m       Physical Exam  Constitutional: Awake, alert, cooperative, in NAD.  Eyes: PERRL, EOMI, sclera clear, conjunctiva normal.  ENT: Normocephalic, without obvious abnormality, oral pharynx with moist mucus membranes  Respiratory: Non-labored breathing, good air exchange, clear to auscultation bilaterally, no crackles or wheezing.  Cardiovascular: RRR, no murmur noted.  GI: + bowel sounds, soft, non-distended, non-tender, no masses palpated, no hepatosplenomegaly.  Skin: No concerning lesions or rash on exposed areas.  Musculoskeletal: trace edema shun LEs.  Neurologic: Awake, alert & oriented x3.  Cranial nerves II-XII are grossly intact.   Psych: appropriate affect     Assessment Plans:     He has progressive IgG kappa multiple myeloma, del 13q and t(11;14) on initial cytogenetics, now with progressive disease, new cytogenetics: +11q, IGH-CCND1 fusion, monosomy 13, and add 9, add 15; now on 4th line of therapy as of 4/6/22. Unfortunately he has developed worsening dyspnea since our last visit, along with a diagnosis of severe pulmonary hypertension, moderate aortic stenosis, atrial fibrillation, and non obstructive coronary artery  disease.    With his cardiac issues, we likely would have to put myeloma CAR-T therapy (Abecma) on hold.    We will discuss with Dr. Peters regarding what the expected time course and reversibility of the pulmonary hypertension. If there are interventions that can improve the pHTN, we may still want to pursue CAR-T therapy.     If not, other options include enrollment on a clinical trial. Once he has been off daratumumab for 3 months (last given 3/2/22), he could receive  NK cell therapy in combination with daratumumab for antibody-directed cellular cytotoxicity. We haven't seen a lot of cytokine release syndrome with this product. We discussed the potential benefits, risks, and unknowns of the  therapy, which includes 3 modified NK cell infusions along with daratumumab. We discussed that he could still get CAR-T in the future if the cardiac issues improve.    While his cardiac issues seemed to present prior to starting his carfilzomib, we would also be concerned that the carfilzomib could be exacerbating his heart issues. I spoke with Chris, Dr. Clifford' nurse about whether to discontinue this medication, especially if we can give cell therapy (likely NK cell therapy) as early as July. Chris will discuss with Dr. Clifford and he will let us know if there any questions. Otherwise, there are other options for refractory myeloma.    BMT/Engraftment: CAR-T therapy on hold as above for discussion with cardiology. Consented for  in case he is not going to be a candidate for CAR-T.  Hematology: Recurrent DVT last in 2018, for which he is on rivaroxaban indefinitely.  Infectious Disease: No current issues. Taking bactrim indefinitely since pneumonia on CT chest 3/2022. Continues on ACV ppx.  Fluids: On lasix  Electrolytes and Nutrition: no issues  Cardiovascular: Heart failure, aortic stenosis, atrial fibrillation as above. On 2 lasix tabs daily.  Respiratory: Dyspnea improving  Renal: LATANYA to Cr 2.39 after  recent contrast, now coming back down to baseline ~1.3  GI: AST, ALT are up a little bit over the last month. Continue to monitor.  Graft vs. Host Disease: NA    Seen and discussed with Dr. Freida Boyle MD  Hematology/BMT Fellow    Attending Note:   I saw this patient with Dr. Boyle and agree with the findings and plan of care as documented in the note above. I personally reviewed the history, salient aspects of the exam, laboratory and imaging as needed.  The key findings are that he has cardiac disease, pulm hypertension and ASCVD.  We discussed cell therapy options given his current cardiac concerns and recommended proceeding with /dimple which has been well tolerated and has lower risk of CRS.  He is interested in proceeding.      FABIENNE VALDIVIA MD  May 27, 2022    [facility] Consult Note   We spent 40 minutes in consultation reviewing the prognosis of chemotherapy multiple myeloma and treatment options.    The therapeutic options are limited and given the failure of prior therapy, CAR-T is indicated; I recommended therapy  using a clinical trial using   with dimple.      I reviewed the treatment course with the patient. The CAR-T provides the opportunity to control chemotherapy refractory disease with reported overall response around 55-75% and complete response rate at 40-45%.     The process consists of non-mobilized apheresis followed by 3 weeks period of waiting while autologous CAR19-T cells are reprogrammed and manufactured ex vivo.  The treatment phase includes lympho-depleting chemotherapy with fludarabine and Cytoxan x3 days.  The patient will receive this outpatient.    CAR-T therapy will be administered in short term inpatient setting.    We reviewed the side effects associated with LD chemotherapy and CAR-T infusion such as low blood counts, risk of infection and bleeding. We focused on expected side effects of cytokine release syndrome and the neurotoxicity. Symptoms may vary from mild to  severe, and potentially life-threatening and can include the need for ICU care and intubation.  Some patients can get very ill, but the usual course is transient and reversible.  The patient will have to stay within 30 minutes from clinic for 28 days and are advised to participate in 15 years follow-up.      CAR-T cell products available commercially are standard of care therapy and our center is certified to administer CAR-Ts, the research aspects are limited to collection of data to research database.     Patient will be handed and is  advised to wear the CAR-T therapy wallet card which needs to presented to ER at the time of visit.     Patient and the family asked many questions and had a good understanding of CAR-T treatment and its potential complications. Patient verbalized the wish to proceed with CAR-T therapy as recommended. I discussed these recommendations with referring physician  and request the BMT office to pursuit the following plan:    1. Pursuit insurance approval for CAR-T therapy with /dimple cell product.        FABIENNE VALDIVIA MD  May 27, 2022      Active Problems:     Patient Active Problem List   Diagnosis     Multiple myeloma (H)     Unspecified glaucoma     Cataract     Shingles (herpes zoster) polyneuropathy     Back pain     Post herpetic neuralgia     Pancytopenia (H)     Syncope and collapse     History of DVT (deep vein thrombosis)     Spinal stenosis of lumbar region without neurogenic claudication     Anemia, vitamin B12 deficiency     Chemotherapy-induced neutropenia (H)     Thrombocytopenia (H)     Persistent atrial fibrillation (H)     Nonrheumatic aortic valve stenosis     SAMANO (dyspnea on exertion)     Immunocompromised state (H)     Neutropenic fever (H)     Personal history of DVT (deep vein thrombosis)     Pneumonia of both lungs due to infectious organism, unspecified part of lung     Acute heart failure with preserved ejection fraction (HFpEF) (H)     Moderate  pulmonary hypertension (H)     Status post coronary angiogram     Counseling time: 60 minutes  Total time: 80

## 2022-05-26 NOTE — LETTER
5/26/2022         RE: Theo Meyers  [address]  [facility] [address]        Dear Colleague,    Thank you for referring your patient, Theo Meyers, to the [facility] [place]. Please see a copy of my visit note below.    BMT Daily Progress Note    Active Medical Management Issues:     Hematologic history:  Multiple myeloma diagnosed in 2009, IgG kappa but evolving to light chain secretory, del 13q, t(11;14).       Date Treatment Response Toxicities/Complications   7/2009 Velcade/Dex x 8 cycles VGPR     4/2010 HD-Jennifer/PBSCT CR     6/2010 Rev maintenance       10/2012 RVD   cytopenias   9/2012 RVD (q 2wk velcade) Long remission     10/2020 Brianne/pom/dex Recent progresson     4/6/2022 Carf/dex                                 Chief Complaint: Follow up refractory multiple myeloma    HPI: Hematologic history above. 76 year old former [employer] , followed by Dr. Clifford for long standing diagnosis of multiple myeloma. We initially saw him for BMT evaluation on 2/14/22. He had been tolerating on Brianne, Pom, Dex but with a rising kappa chain, he was changed in 4/2022 to carfilzomib and dexamethasone. The carfilzomib was given at 20mg/m2 on day 1, 2, 28 mg/m2 on day 8,9; 42mg/m2 on days 15, 16, because of cardiac issues. He started on 4/6/22. With cycle 2 he is getting full dose 56 mg/m2. His kappa chains and kappa/lambda ratio have started to improve.  Next dose of the carfilzomib is next Wednesday.    He has been having issues with dyspnea. Needed to catch his breath with regular activities. Since our last visit, he was admitted to the hospital 3/17-3/21 for dyspnea thought to be multifactorial from pneumonia, and heart failure with contributions from progressive (now moderate) aortic stenosis, new onset atrial fibrillation, severe coronary artery disease, and moderate pulmonary hypertension. He had cardioversion for his afib on 4/1/22 but this relapsed. He has  been on furosemide. He was seen by Dr. Peters. VQ scan was negative for perfusion deficits. Left and right cardiac catheterization done 5/20. Dr. Peters is concerned that with his heart function Theo would not be able to tolerate cytokine release syndrome. He had a virtual visit on Tuesday with Dr. Peters. His BNP and Cr went up after the cardiac cath. He was set to come into the hospital 6/9 to work up the elevated BNP, though that is now improved.    Currently he is a little better. He still walks the dogs but a little slower. Previously 3 miles in 42 minutes. Lately he has been walking 2 miles in a little more than 40 minutes. He works in the yard for 2 hours and did ok with that without gasping for breath. Minimal edema, a little more on the right.   AST and ALT are up a little.     ROS: Otherwise negative x 10 systems    Physical Exam:     Vital Signs: /59 (BP Location: Right arm, Patient Position: Fowlers, Cuff Size: Adult Regular)   Pulse 64   Temp 97.4  F (36.3  C) (Oral)   Wt 71.8 kg (158 lb 3.2 oz)   SpO2 100%   BMI 23.36 kg/m       Physical Exam  Constitutional: Awake, alert, cooperative, in NAD.  Eyes: PERRL, EOMI, sclera clear, conjunctiva normal.  ENT: Normocephalic, without obvious abnormality, oral pharynx with moist mucus membranes  Respiratory: Non-labored breathing, good air exchange, clear to auscultation bilaterally, no crackles or wheezing.  Cardiovascular: RRR, no murmur noted.  GI: + bowel sounds, soft, non-distended, non-tender, no masses palpated, no hepatosplenomegaly.  Skin: No concerning lesions or rash on exposed areas.  Musculoskeletal: trace edema shun LEs.  Neurologic: Awake, alert & oriented x3.  Cranial nerves II-XII are grossly intact.   Psych: appropriate affect     Assessment Plans:     He has progressive IgG kappa multiple myeloma, del 13q and t(11;14) on initial cytogenetics, now with progressive disease, new cytogenetics: +11q, IGH-CCND1 fusion, monosomy 13,  and add 9, add 15; now on 4th line of therapy as of 4/6/22. Unfortunately he has developed worsening dyspnea since our last visit, along with a diagnosis of severe pulmonary hypertension, moderate aortic stenosis, atrial fibrillation, and non obstructive coronary artery disease.    With his cardiac issues, we likely would have to put myeloma CAR-T therapy (Abecma) on hold.    We will discuss with Dr. Peters regarding what the expected time course and reversibility of the pulmonary hypertension. If there are interventions that can improve the pHTN, we may still want to pursue CAR-T therapy.     If not, other options include enrollment on a clinical trial. Once he has been off daratumumab for 3 months (last given 3/2/22), he could receive  NK cell therapy in combination with daratumumab for antibody-directed cellular cytotoxicity. We haven't seen a lot of cytokine release syndrome with this product. We discussed the potential benefits, risks, and unknowns of the  therapy, which includes 3 modified NK cell infusions along with daratumumab. We discussed that he could still get CAR-T in the future if the cardiac issues improve.    While his cardiac issues seemed to present prior to starting his carfilzomib, we would also be concerned that the carfilzomib could be exacerbating his heart issues. I spoke with Chris, Dr. Clifford' nurse about whether to discontinue this medication, especially if we can give cell therapy (likely NK cell therapy) as early as July. Chris will discuss with Dr. Clifford and he will let us know if there any questions. Otherwise, there are other options for refractory myeloma.    BMT/Engraftment: CAR-T therapy on hold as above for discussion with cardiology. Consented for  in case he is not going to be a candidate for CAR-T.  Hematology: Recurrent DVT last in 2018, for which he is on rivaroxaban indefinitely.  Infectious Disease: No current issues. Taking bactrim indefinitely since  pneumonia on CT chest 3/2022. Continues on ACV ppx.  Fluids: On lasix  Electrolytes and Nutrition: no issues  Cardiovascular: Heart failure, aortic stenosis, atrial fibrillation as above. On 2 lasix tabs daily.  Respiratory: Dyspnea improving  Renal: LATANYA to Cr 2.39 after recent contrast, now coming back down to baseline ~1.3  GI: AST, ALT are up a little bit over the last month. Continue to monitor.  Graft vs. Host Disease: NA    Seen and discussed with Dr. Freida Boyle MD  Hematology/BMT Fellow    Attending Note:   I saw this patient with Dr. Boyle and agree with the findings and plan of care as documented in the note above. I personally reviewed the history, salient aspects of the exam, laboratory and imaging as needed.  The key findings are that he has cardiac disease, pulm hypertension and ASCVD.  We discussed cell therapy options given his current cardiac concerns and recommended proceeding with /dimple which has been well tolerated and has lower risk of CRS.  He is interested in proceeding.      FABIENNE VALDIVIA MD  May 27, 2022    [facility] Consult Note   We spent 40 minutes in consultation reviewing the prognosis of chemotherapy multiple myeloma and treatment options.    The therapeutic options are limited and given the failure of prior therapy, CAR-T is indicated; I recommended therapy  using a clinical trial using   with dimple.      I reviewed the treatment course with the patient. The CAR-T provides the opportunity to control chemotherapy refractory disease with reported overall response around 55-75% and complete response rate at 40-45%.     The process consists of non-mobilized apheresis followed by 3 weeks period of waiting while autologous CAR19-T cells are reprogrammed and manufactured ex vivo.  The treatment phase includes lympho-depleting chemotherapy with fludarabine and Cytoxan x3 days.  The patient will receive this outpatient.    CAR-T therapy will be administered in short term inpatient  setting.    We reviewed the side effects associated with LD chemotherapy and CAR-T infusion such as low blood counts, risk of infection and bleeding. We focused on expected side effects of cytokine release syndrome and the neurotoxicity. Symptoms may vary from mild to severe, and potentially life-threatening and can include the need for ICU care and intubation.  Some patients can get very ill, but the usual course is transient and reversible.  The patient will have to stay within 30 minutes from clinic for 28 days and are advised to participate in 15 years follow-up.      CAR-T cell products available commercially are standard of care therapy and our center is certified to administer CAR-Ts, the research aspects are limited to collection of data to research database.     Patient will be handed and is  advised to wear the CAR-T therapy wallet card which needs to presented to ER at the time of visit.     Patient and the family asked many questions and had a good understanding of CAR-T treatment and its potential complications. Patient verbalized the wish to proceed with CAR-T therapy as recommended. I discussed these recommendations with referring physician  and request the BMT office to pursuit the following plan:    1. Pursuit insurance approval for CAR-T therapy with /dimple cell product.        FABIENNE VALDIVIA MD  May 27, 2022      Active Problems:     Patient Active Problem List   Diagnosis     Multiple myeloma (H)     Unspecified glaucoma     Cataract     Shingles (herpes zoster) polyneuropathy     Back pain     Post herpetic neuralgia     Pancytopenia (H)     Syncope and collapse     History of DVT (deep vein thrombosis)     Spinal stenosis of lumbar region without neurogenic claudication     Anemia, vitamin B12 deficiency     Chemotherapy-induced neutropenia (H)     Thrombocytopenia (H)     Persistent atrial fibrillation (H)     Nonrheumatic aortic valve stenosis     SAMANO (dyspnea on exertion)      Immunocompromised state (H)     Neutropenic fever (H)     Personal history of DVT (deep vein thrombosis)     Pneumonia of both lungs due to infectious organism, unspecified part of lung     Acute heart failure with preserved ejection fraction (HFpEF) (H)     Moderate pulmonary hypertension (H)     Status post coronary angiogram     Counseling time: 60 minutes  Total time: 80      Again, thank you for allowing me to participate in the care of your patient.        Sincerely,        FABIENNE VALDIVIA MD

## 2022-05-29 NOTE — PROGRESS NOTES
Keith is a 77 year old who is being evaluated via a billable  telephone visit as no HIPPA compliant space    Todd Peters M.D.  Cardiovascular Medicine    I personally saw and examined this patient, discussed care with r consultants, reviewed current laboratories and imaging studies, and conveyed impression and diagnostic/therapeutic plan to patient.    Per Clifford MD    [address] [place] [address] [id number] [phone] (Fax)      Familia Guan M.D.  [facility]        Problem List  1. Multiple myeloma kappa light tchain   2. Glaucoma  3. Shingles  4. Syncope  5. History of DVT  6. History of B12 deficiency  7. HFpEF by history  8. History of pneumonia  9. Extensive coronary calcification  10. NWOAC  11. Prostatism  12. Atrial fibrillation with recurrence post cardioversion  13. + Carotid doppler for ASCD  14. Hyperlipidemia treated with atorvastatin  15. Anemia with macrocytosis  16. History of pleural effusions  17. Immunocompromise  18. Hypoproteinemia  19. Chronic renal failue      I personally spoke with the patient x 45 minutes to relate our interpretation of recent testing and clinical course.  Unfortunately, the heart catheterization/echo are sequential not simultaneous.  The patient had been deteriorating prior to initiation of new myeloma medication and has improved to some degree post initiating.  Myeloma and other myeloproliferative syndromes are associated with PAH.  It has been my experience with PAH and myeloma that the PAH may be responsive to changes in myeloma condition.      I suggested to him that we repeat cardiac pressures, echocardiograms and laboratories.  To reassess condition and desirability of continuing on with current myeloma regimen..              History    77 year old male seen for evaluation of shortness of breath with associated cardiac history of atrial fibrillation (recurrent post cardioversion), aortic stenosis, and pulmonary  hypertension.  He has a clear history of exertionally related chest pressure and shortness of breath that is mitigated by reduction by intensity of activity.  In March he had a negative non-exercise stress test.  He has known extensive coronary calcification as well as carotid atherosclerotic disease.  He has no symptoms at rest.  He also has a history of DVT/PE.  His most recent CT scans have been done without contrast. He has had to reduce his level of exertion secondary to exertional chest pressure/pain and shortness of breath - relieved by rest.  He has had no prolonged episodes.      Objective    Constitutional: alert, oriented, normal gait and station, normal mentation.  Oral: moist mucous membranes  Lymph: without pathologic adenopathy  Chest: clear to ausculation and percussion  Cor: No evidence of left or right ventricular activity.  Rhythm is irregular.  S1 variable S2 split physiologically. Murmurs of aortic flow  Abdomen: without tenderness, rebound, guarding, masses, ascites  Extremities: Edema not present  Neuro: no focal defects, normal mentation  Skin: without open lesions  Psych: oriented, verbal, mental status in tact    Wt Readings from Last 5 Encounters:   05/26/22 71.8 kg (158 lb 3.2 oz)   05/20/22 71.2 kg (157 lb)   05/19/22 75.4 kg (166 lb 3.2 oz)   05/06/22 73.8 kg (162 lb 12.8 oz)   05/04/22 73.1 kg (161 lb 3.2 oz)       Meds  Current Outpatient Medications   Medication     acetaminophen (TYLENOL) 500 MG tablet     acyclovir (ZOVIRAX) 400 MG tablet     ascorbic acid 1000 MG TABS tablet     atorvastatin (LIPITOR) 10 MG tablet     bimatoprost (LUMIGAN) 0.03 % ophthalmic solution     brimonidine (ALPHAGAN P) 0.1 % ophthalmic solution     calcium carbonate 1250 (500 Ca) MG CHEW     calcium carbonate-vitamin D (OYSTER SHELL CALCIUM/D) 500-200 MG-UNIT tablet     cyanocobalamin (CYANOCOBALAMIN) 1000 MCG/ML injection     dexamethasone (DECADRON) 4 MG tablet     dextromethorphan-guaiFENesin (MUCINEX  DM)  MG 12 hr tablet     fluticasone (FLONASE) 50 MCG/ACT nasal spray     furosemide (LASIX) 20 MG tablet     gabapentin (NEURONTIN) 300 MG capsule     Magnesium Oxide 250 MG TABS     Multiple Vitamin (MULTI-VITAMIN PO)     Omega-3 Fatty Acids (OMEGA 3 PO)     omeprazole (PRILOSEC) 20 MG DR capsule     potassium chloride ER (KLOR-CON M) 20 MEQ CR tablet     Psyllium (METAMUCIL PO)     sulfamethoxazole-trimethoprim (BACTRIM) 400-80 MG tablet     tamsulosin (FLOMAX) 0.4 MG capsule     XARELTO ANTICOAGULANT 20 MG TABS tablet     No current facility-administered medications for this visit.       Labs         Latest Reference Range & Units 05/20/22 12:55   Sodium 133 - 144 mmol/L 142   Potassium 3.4 - 5.3 mmol/L 4.1   Chloride 94 - 109 mmol/L 112 (H)   Carbon Dioxide 20 - 32 mmol/L 21   Urea Nitrogen 7 - 30 mg/dL 62 (H)   Creatinine 0.66 - 1.25 mg/dL 2.39 (H)   GFR Estimate >60 mL/min/1.73m2 27 (L) [1]   Calcium 8.5 - 10.1 mg/dL 8.1 (L)   Anion Gap 3 - 14 mmol/L 9   N-Terminal Pro BNP Inpatient 0 - 1,800 pg/mL 5,262 (H) [2]   Glucose 70 - 99 mg/dL 124 (H)   WBC 4.0 - 11.0 10e3/uL 3.5 (L)   Hemoglobin 13.3 - 17.7 g/dL 9.0 (L)   Hematocrit 40.0 - 53.0 % 28.4 (L)   Platelet Count 150 - 450 10e3/uL 63 (L)   RBC Count 4.40 - 5.90 10e6/uL 2.57 (L)   MCV 78 - 100 fL 111 (H)   MCH 26.5 - 33.0 pg 35.0 (H)   MCHC 31.5 - 36.5 g/dL 31.7   RDW 10.0 - 15.0 % 18.3 (H)            Latest Reference Range & Units 04/06/22 08:09 04/13/22 08:16 04/20/22 08:25 04/27/22 08:03   Calcium 8.5 - 10.5 mg/dL 8.4 (L)      Anion Gap 5 - 18 mmol/L 9      Albumin 3.5 - 5.0 g/dL 2.6 (L)      Protein Total 6.0 - 8.0 g/dL 5.0 (L)      Bilirubin Total 0.0 - 1.0 mg/dL 0.4      Alkaline Phosphatase 45 - 120 U/L 67      ALT 0 - 45 U/L 27      AST 0 - 40 U/L 14      BNP 0 - 80 pg/mL  207 (H)     N-Terminal Pro Bnp 0 - 450 pg/mL 1,805 (H) [1]      Glucose 70 - 125 mg/dL 121      WBC 4.0 - 11.0 10e3/uL 3.1 (L) [2] 3.2 (L) [3] 5.4 [4]    Hemoglobin 13.3 -  17.7 g/dL 11.1 (L) 10.3 (L) 10.5 (L)    Hematocrit 40.0 - 53.0 % 35.0 (L) 32.1 (L) 32.4 (L)    Platelet Count 150 - 450 10e3/uL 173 144 (L) 131 (L)    RBC Count 4.40 - 5.90 10e6/uL 3.30 (L) 3.04 (L) 3.03 (L)    MCV 78 - 100 fL 106 (H) 106 (H) 107 (H)    MCH 26.5 - 33.0 pg 33.6 (H) 33.9 (H) 34.7 (H)    MCHC 31.5 - 36.5 g/dL 31.7 32.1 32.4    RDW 10.0 - 15.0 % 16.3 (H) 17.2 (H) 17.4 (H)    % Neutrophils % 65 80 85    % Lymphocytes % 12 5 6    % Monocytes % 17 4 5    % Eosinophils % 4 11 4    % Basophils % 2 0 0    Absolute Basophils 0.0 - 0.2 10e3/uL 0.1 0.0 0.0    Absolute Neutrophil 1.6 - 8.3 10e3/uL 2.0 2.6 4.6    Absolute Lymphocytes 0.8 - 5.3 10e3/uL 0.4 (L) 0.2 (L) 0.3 (L)    Absolute Monocytes 0.0 - 1.3 10e3/uL 0.5 0.1 0.3    Absolute Eosinophils 0.0 - 0.7 10e3/uL 0.1 0.4 0.2    RBC Morphology  Confirmed RBC Indices Confirmed RBC Indices Confirmed RBC Indices    Platelet Morphology Automated Count Confirmed. Platelet morphology is normal.  Automated Count Confirmed. Giant platelets are present. ! Automated Count Confirmed. Platelet morphology is normal. Automated Count Confirmed. Platelet morphology is normal.    RBC Fragments None Seen  Slight !  Slight !    Teardrop Cells None Seen  Slight !      Elliptocytes None Seen  Moderate ! Slight ! Slight !    [place] Free Lt Chain 0.33 - 1.94 mg/dL 100.90 (H)   38.53 (H)   Kappa Lambda Ratio 0.26 - 1.65  347.93 (H)   256.87 (H)   Lambda Free Lt Chain 0.57 - 2.63 mg/dL 0.29 (L)   0.15 (L)   Total Protein Serum for ELP 6.0 - 8.0 g/dL    4.6 (L)       Imaging     Echocardiogram  Name: MONICA SCOTT  MRN: [id number]  : 1945  Study Date: 2022 11:22 AM  Age: 76 yrs  Gender: Male  Patient Location: [facility]  Reason For Study: Heart Failure  Ordering Physician: ESTEVAN SORTO  Performed By: ACE     BSA: 1.9 m2  Height: 68 in  Weight: 160 lb  HR: 79  BP: 147/79  mmHg  ______________________________________________________________________________  Procedure  Complete Echo Adult. Adequate quality two-dimensional was performed and  interpreted. Adequate quality color and spectral Doppler were performed and  interpreted. Compared to the prior study dated 11/3/2021, there are changes as  noted.  ______________________________________________________________________________  Interpretation Summary     1. Left ventricular size, wall thickness, and systolic function are normal.  The estimated left ventricular ejection fraction is 60-65%.  2. Right ventricular size and systolic function are normal.  3. Mild left and moderate right atrial enlargement.  4. Calcified trileaflet aortic valve with moderate aortic stenosis. Peak  forward velocity measures 3.2 m/s, mean gradient 27 mm Hg, calculated aortic  valve area 1.1 cm2, and dimensionless index 0.28. No significant  regurgitation.  5. Moderate pulmonary hypertension is present with an estimated pulmonary  artery systolic pressure of 54 mm Hg.  6. Compared to the prior study dated 11/3/2021, the Doppler data today show  moderate aortic stenosis. However, the aortic valve has a similar appearance  on both studies. There was likely inadequate Doppler assessment of the aortic  valve on the prior study. Visually the aortic valve appears at least  moderately stenotic on both studies.  ______________________________________________________________________________  I      WMSI = 1.00     % Normal = 100     X - Cannot   0 -                      (2) - Mildly 2 -          Segments  Size  Interpret    Hyperkinetic 1 - Normal  Hypokinetic  Hypokinetic  1-2     small                                                     7 -          3-5      moderate  3 - Akinetic 4 -          5 -         6 - Akinetic Dyskinetic   6-14    large               Dyskinetic   Aneurysmal  w/scar       w/scar       15-16   diffuse     Left Ventricle  The left ventricle is  normal in size. There is normal left ventricular wall  thickness. Left ventricular systolic function is normal. The visual ejection  fraction is 60-65%. Diastolic function not assessed due to atrial  fibrillation. No regional wall motion abnormalities noted.     Right Ventricle  The right ventricle is mildly dilated. The right ventricular systolic function  is normal.     Atria  The left atrium is mildly dilated. The right atrium is moderately dilated.     Mitral Valve  The mitral valve leaflets are moderately thickened. There is mild mitral  annular calcification. There is trace to mild mitral regurgitation. There is  no mitral valve stenosis. The mean mitral valve gradient is 3.3 mmHg.     Tricuspid Valve  Tricuspid valve leaflets appear normal. There is mild (1+) tricuspid  regurgitation. The right ventricular systolic pressure is elevated at 46.4  mmHg. Moderate (46-55mmHg) pulmonary hypertension is present. There is no  tricuspid stenosis.     Aortic Valve  The aortic valve is trileaflet. Moderate aortic valve calcification is  present. No aortic regurgitation is present. Moderate valvular aortic  stenosis. The calculated aortic valve are is 1.1 cm^2. The mean AoV pressure  gradient is 26.6 mmHg.     Pulmonic Valve  The pulmonic valve is not well seen, but is grossly normal. There is trace to  mild pulmonic valvular regurgitation. There is no pulmonic valvular stenosis.     Vessels  The aorta root is normal. The thoracic aorta cannot be assessed. IVC diameter  and respiratory changes fall into an intermediate range suggesting an RA  pressure of 8 mmHg.     Pericardium  There is no pericardial effusion.     Rhythm  The rhythm was atrial fibrillation.     ______________________________________________________________________________  MMode/2D Measurements & Calculations  IVSd: 0.73 cm  LVIDd: 4.5 cm  LVIDs: 2.8 cm  LVPWd: 0.85 cm  FS: 37.6 %     LV mass(C)d: 113.4 grams  LV mass(C)dI: 61.0 grams/m2  Ao root diam:  3.5 cm  LA dimension: 3.9 cm  LA/Ao: 1.1  LVOT diam: 2.2 cm  LVOT area: 3.9 cm2  LA Volume (BP): 63.7 ml  LA Volume Index (BP): 34.2 ml/m2     LA Volume Indexed (AL/bp): 35.6 ml/m2  RWT: 0.38     Time Measurements  MM HR: 59.0 BPM     Doppler Measurements & Calculations  MV E max iam: 168.0 cm/sec  MV A max iam: 77.1 cm/sec  MV E/A: 2.2  MV max PG: 10.0 mmHg  MV mean PG: 3.3 mmHg  MV V2 VTI: 46.4 cm  MVA(VTI): 1.8 cm2  MV dec slope: 1407 cm/sec2  MV dec time: 0.12 sec  Ao V2 max: 320.3 cm/sec  Ao max P.0 mmHg  Ao V2 mean: 244.7 cm/sec  Ao mean P.6 mmHg  Ao V2 VTI: 75.0 cm  JOHANN(I,D): 1.1 cm2  JOHANN(V,D): 1.1 cm2  LV V1 max PG: 3.3 mmHg  LV V1 max: 90.7 cm/sec  LV V1 VTI: 21.7 cm  SV(LVOT): 85.6 ml  SI(LVOT): 46.0 ml/m2  PA acc time: 0.07 sec     TR max iam: 340.7 cm/sec  TR max P.4 mmHg  AV Iam Ratio (DI): 0.28  JOHANN Index (cm2/m2): 0.61  E/E' av.6  Lateral E/e': 18.4  Medial E/e': 24.9    CT scan     EXAM: CT CHEST W CONTRAST  LOCATION: [facility]  DATE/TIME: 3/20/2022 8:10 AM     INDICATION: Pneumonia, effusion or abscess suspected, xray done; multiple myeloma  COMPARISON: CT of the chest 2022; PET/CT 2022  TECHNIQUE: CT chest with IV contrast. Multiplanar reformats were obtained. Dose reduction techniques were used.     CONTRAST: 75ml Isovue 370     FINDINGS:   LUNGS AND PLEURA: Small bilateral pleural effusions are present layering posteriorly. Pleural fluid on the right is decreased from 2022. Associated passive atelectasis of the adjacent lower lobes. New subsegmental focus of groundglass attenuation   along the anterior pleura of the right upper lobe, which does not conform to a specific vascular territory or anatomic distribution. There are no other new airspace opacities elsewhere. Mild central airway wall thickening.     MEDIASTINUM: There is a right jugular approach chest port catheter which terminates at the SVC right atrial junction. Variant  pulmonary vein anatomy with a right top pulmonary vein and independent drainage of the right middle lobe on the right and a   single ostium of the pulmonary veins on the left. Cardiac chambers are not enlarged. Degenerative thickening and calcification of aortic valve leaflets. Mild mitral annular calcifications. No pericardial effusion. No thoracic aortic aneurysm. Mixed   attenuation atheroma in the arch and descending aorta.     No enlarged mediastinal or hilar lymph nodes. Esophagus is decompressed. Imaged thyroid gland is normal.     CORONARY ARTERY CALCIFICATION: Severe.     UPPER ABDOMEN: No new findings in the imaged upper abdomen.     MUSCULOSKELETAL: T6 compression deformity with radiopaque cement. No new vertebral compression deformity. Healed fracture deformity left anterior seventh rib area Unchanged lytic lesion in the right scapula with focal loss of the cortex (series 5, image   41) which was metabolically active on the January 2022 PET/CT consistent with an area of multiple myeloma. No new aggressive or destructive bone lesions.                                                                      IMPRESSION:      1.  Small bilateral pleural effusions are present. Right pleural fluid is slightly decreased from 03/17/2022.  2.  Limited territory of subpleural groundglass attenuation in the anterior right upper lobe consistent with small focus of new lung inflammation. Uncertain etiology.  3.  Unchanged lytic lesion in the right scapula. No aggressive or destructive bone lesions in the chest        Catheterization 05/2022      Panel 1    Primary Surgeon:  Christopher Bobo MD   Procedure:  Right Heart Catheterization    Left Heart Catheterization    Coronary Angiogram              Indications    SAMANO (dyspnea on exertion) [R06.00 (ICD-10-CM)]   Moderate pulmonary hypertension (H) [I27.20 (ICD-10-CM)]   Acute heart failure with preserved ejection fraction (HFpEF) (H) [I50.31 (ICD-10-CM)]        Comments/Patient Narrative    76 y/o M with PMH of multiple myeloma kappa light chain, DVT, HFpEF, extensive coronary calcification who presents for evaluation of SAMANO with RHC and coronary angiogram.           Conclusion         Hemodynamic data has been modified in [facility] per physician review.    Left ventricular filling pressures are moderately elevated.    Severely elevated pulmonary artery hypertension.    Right sided filling pressures are severely elevated.    Left sided filling pressures are moderately elevated.    Reduced cardiac output level.     Non obstructive coronary artery disease.   Hemostasis with TR band.      Dominance: Right    Left Main   The vessel is large and is angiographically normal.   Left Anterior Descending   The vessel is large.   Prox LAD to Mid LAD lesion is 30% stenosed.   First Diagonal Branch   The vessel is large and is angiographically normal.   Lateral First Diagonal Branch   The vessel is large and is angiographically normal.   Second Diagonal Branch   The vessel is small and is angiographically normal.   Third Diagonal Branch   The vessel is small and is angiographically normal.   Left Circumflex   The vessel is large and is angiographically normal.   First Obtuse Marginal Branch   The vessel is small and is angiographically normal.   Second Obtuse Marginal Branch   The vessel is large and is angiographically normal.   Right Coronary Artery   The vessel is large and is angiographically normal.   Right Posterior Descending Artery   The vessel is large and is angiographically normal.   Right Posterior Atrioventricular Artery   The vessel is large and is angiographically normal.   First Right Posterolateral Branch   The vessel is large and is angiographically normal.   Second Right Posterolateral Branch   The vessel is large and is angiographically normal.       Intervention      No interventions have been documented.          Hemodynamics    RA -- /27/22 mmHg  RV 72/22  mmHg  PA 72/40/50 mmHg  CWP -- /31/27 mmHg  CO (Trav) 3.5 L/min  CI (Trav) 1.9 L/min/m2  LVEDP 22 mmHg   mmHg   SVR 1920 dynes  PVR 6.5 KOEHLER Right sided filling pressures are severely elevated. Left sided filling pressures are moderately elevated. Severely elevated pulmonary artery hypertension. Left ventricular filling pressures are moderately elevated. Reduced cardiac output level. Hemodynamic data has been modified in Epic per physician review.         Pressures Phase: Baseline     Time Systolic (mmHg) Diastolic (mmHg) Mean (mmHg) A Wave (mmHg) V Wave (mmHg) EDP (mmHg) Max dp/dt (mmHg/sec) HR (bpm) Content (mL/dL) SAT (%)   RA Pressures  2:48 PM   24     26      72        RV Pressures  2:49 PM 70        21     65        PA Pressures  2:49 PM 70    38    51        54        PCW Pressures  2:50 PM   28     30      63        AO Pressures  3:25     78    100        69        LV Pressures  3:25     1       14     57          3:25         17     61          3:25     8       24     63            Blood Flow Results Phase: Baseline     Time Results  Indexed Values (L/min/m2)   QP  2:34 PM 3.81 L/min    2.05      QS  2:34 PM 3.51 L/min    1.89          Blood Oximetry Phase: Baseline     Time Hb  SAT(%)  PO2  Content (mL/dL) PA Sat (%)   PA  2:34 PM  49.5 %      49.5      Art  2:34 PM  100 %     12.24           Cardiac Output Phase: Baseline     Time TDCO (L/min) TDCI (L/min/m2) Trav C.O. (L/min) Trav C.I. (L/min/m2) Trav HR (bpm)   Cardiac Output Results  2:34 PM   3.51    1.89           Resistance Results Phase: Baseline     Time PVR  SVR  TPR  TVR  PVR/SVR  TPR/TVR    Resistance Results (Metric)  2:34 .71 dsc-5    1732.24 dsc-5    1070.35 dsc-5    2279.26 dsc-5    0.28    0.47      Resistance Results (Wood)  2:34 PM 6.04 KOEHLER    21.66 KOEHLER    13.38 KOEHLER    28.5 KOEHLER    0.28    0.47          Stoke Volume Results Phase: Baseline     Time RVSW (gm*m) LVSW (gm*m) RVSW-I (gm*m/m2) LVSW-I (gm*m/m2)    Stroke Work Results  2:34 PM 25.91    49.8    13.92    26.75                        Hemodynamic Waveforms -- Encounter Level:    Hemodynamic Waveforms: None found at the encounter level.          Electrocardiograms    Ca   Result Notes      Component Ref Range & Units 4/25/22  3:02 PM 4/1/22 10:47 AM    Systolic Blood Pressure mmHg       Diastolic Blood Pressure mmHg       Ventricular Rate BPM 66  59     Atrial Rate   59     NC Interval ms  174     QRS Duration ms 88  88     QT ms 392  398     QTc ms 410  394     P Axis degrees  83     R AXIS degrees 5  37     T Axis degrees 3  60     Interpretation ECG  Atrial fibrillation   Minimal voltage criteria for LVH, may be normal variant   ST elevation, consider early repolarization, pericarditis, or injury   Abnormal ECG   When compared with ECG of 01-APR-2022 10:47,   Atrial fibrillation has replaced Sinus rhythm   T wave inversion now evident in Inferior leads   Confirmed by TERI SEVILLA MD LOC:JN ([id number]) on 4/26/2022 3:35:06 PM  Sinus bradycardia with Premature supraventricular complexes   Blocked Premature supraventricular complexes   Otherwise normal ECG   When compared with ECG of 17-MAR-2022 04:10,   Sinus rhythm has replaced Atrial fibrillation   Non-specific change in ST segment in Inferior leads   T wave inversion no longer evident in Inferior leads   Nonspecific T wave abnormality no longer evident in Anterior leads   Confirmed by BHAVESH RAMÍREZ MD LOC:[facility]N ([id number]) on 4/1/2022 2:54:59 PM               CC: doctors above

## 2022-05-31 NOTE — TELEPHONE ENCOUNTER
Phone visit completed with Dr. Peters.    Sarah Neff RN on 5/31/2022 at 10:51 AM    ----------------------------------------------   Health Call Center    Phone Message    May a detailed message be left on voicemail: yes     Reason for Call: Other: Pt waiting for call for appt and is worried he missed it     Action Taken: Message routed to:  [facility] ([facility]): Cardio    Travel Screening: Not Applicable

## 2022-05-31 NOTE — PATIENT INSTRUCTIONS
Today we discussed:  1.Completed labs tomorrow at [facility]  2.The week of June 13th we would like you to have a Right heart Catheterization and Pulmonary Angiogram         *Mandatory COVID Testing: Pt will need to complete a COVID test no sooner than 4 days prior to their procedure.    To schedule COVID testing Please call [phone]  If you want to complete this at an outside facility please call them to find out if they will have the results within the appropriate time frame and their fax number.  You will need to provide us with that information so we can send them the order.  They will need to fax the results to [phone]  If you are running into and issues please call us.         Check-In  Time Check-In Location Estimated Length Procedure   Name        [facility] [place]  waiting room  minutes Right heart catheterization and Invasive Pulmonary Angiogram**     Procedure Preparations & Instructions     This is an invasive procedure that DOES require preparation:    Take your temperature in the morning prior to coming in.  If your temperature is 100 F please call [phone] (Opt. 1) and notify them.  If you do not have access to a thermometer at home, please come in for testing.  If you are running a temperature your procedure may be rescheduled.  Nothing to eat for 6 hours prior. You may drink clear liquids up until 2 hours prior to exam (no pop, or carbonated drinks).   You may have clear liquids up until the time of your procedure  DO NOT use alcohol, tobacco or food/beverages containing caffeine for at least 12 hours prior to your test (ie. Coffee, tea, soda, chocolate, de-caffeinated beverages, certain medications including Excedrin, Anacin, NoDoz)  A ride should be arranged for you in the instance you are unable to drive home, however you should be able to function as you normally would after the procedure  Following the exam you will be on complete bedrest for 2-4 hours.  When you are discharged you may  resume normal activities but no strenuous exercise or lifting for 48 hours.           *For Patients on anticoagulants: Your Coumadin Clinic will give you instructions on medication adjustments or bridging prior to the procedure (INR must be <2.0)  rivaroxaban (XARELTO) - Hold 24 hours prior to procedure         We are located on the third floor [facility].  Our address is     [address] [place] [address] on [address] [place] [address]    Thank you for allowing us to be a part of your care here at the [facility]    If you have questions or concerns please contact us at:    Ellen Dewitt, RN, BSN, PHN  Karen Inman (Scheduling,Prior Auth)  Nurse Coordinator     Clinic   Pulmonary Hypertension   Pulmonary Hypertension  [facility]  (Phone)[phone]   (Phone) [phone]        (Fax) [phone]    ** Please note that you will NOT receive a reminder call regarding your scheduled testing, reminder calls are for provider appointments only.  If you are scheduled for testing within the [facility] system you may receive a call regarding pre-registration for billing purposes only.**     Support Group:  Pulmonary Hypertension Association  Https://www.phassociation.org/  **Look at the Events Tab** They even have Support Groups that you can call into    [facility] PH Support Group  Second Saturday of the Month from 1-3 PM   Location: [address] [place], [place] [id number]  Leader: Surekha Wilkins  Phone: [phone]  Email: [email or web address]

## 2022-05-31 NOTE — NURSING NOTE
Chief Complaint   Patient presents with     Follow Up     1 week follow up to discuss labs. Medications reviewed with pt. Pt has a question regarding the medication listed on his allergy list. No pt reported vitals today. Pt did state he doesn't particularly have pain but experiences SOB, sometimes when he exerts himself.      Uriel Martinez, Visit Facilitator/MA.

## 2022-05-31 NOTE — LETTER
5/31/2022    RE: Theo Meyers  [address]  [facility] [address]     Dear Colleague,    Thank you for the opportunity to participate in the care of your patient, Theo Meyers, at the [facility]. Please see a copy of my visit note below.    Keith is a 77 year old who is being evaluated via a billable  telephone visit as no HIPPA compliant space    Todd Peters M.D.  Cardiovascular Medicine    I personally saw and examined this patient, discussed care with r consultants, reviewed current laboratories and imaging studies, and conveyed impression and diagnostic/therapeutic plan to patient.    Per Clifford MD    [address] [place] [id number] [phone] (Fax)      Familia Guan M.D.  [facility]      Problem List  1. Multiple myeloma kappa light tchain   2. Glaucoma  3. Shingles  4. Syncope  5. History of DVT  6. History of B12 deficiency  7. HFpEF by history  8. History of pneumonia  9. Extensive coronary calcification  10. NWOAC  11. Prostatism  12. Atrial fibrillation with recurrence post cardioversion  13. + Carotid doppler for ASCD  14. Hyperlipidemia treated with atorvastatin  15. Anemia with macrocytosis  16. History of pleural effusions  17. Immunocompromise  18. Hypoproteinemia  19. Chronic renal failue      I personally spoke with the patient x 45 minutes to relate our interpretation of recent testing and clinical course.  Unfortunately, the heart catheterization/echo are sequential not simultaneous.  The patient had been deteriorating prior to initiation of new myeloma medication and has improved to some degree post initiating.  Myeloma and other myeloproliferative syndromes are associated with PAH.  It has been my experience with PAH and myeloma that the PAH may be responsive to changes in myeloma condition.      I suggested to him that we repeat cardiac pressures,  echocardiograms and laboratories.  To reassess condition and desirability of continuing on with current myeloma regimen..        History  77 year old male seen for evaluation of shortness of breath with associated cardiac history of atrial fibrillation (recurrent post cardioversion), aortic stenosis, and pulmonary hypertension.  He has a clear history of exertionally related chest pressure and shortness of breath that is mitigated by reduction by intensity of activity.  In March he had a negative non-exercise stress test.  He has known extensive coronary calcification as well as carotid atherosclerotic disease.  He has no symptoms at rest.  He also has a history of DVT/PE.  His most recent CT scans have been done without contrast. He has had to reduce his level of exertion secondary to exertional chest pressure/pain and shortness of breath - relieved by rest.  He has had no prolonged episodes.      Objective    Constitutional: alert, oriented, normal gait and station, normal mentation.  Oral: moist mucous membranes  Lymph: without pathologic adenopathy  Chest: clear to ausculation and percussion  Cor: No evidence of left or right ventricular activity.  Rhythm is irregular.  S1 variable S2 split physiologically. Murmurs of aortic flow  Abdomen: without tenderness, rebound, guarding, masses, ascites  Extremities: Edema not present  Neuro: no focal defects, normal mentation  Skin: without open lesions  Psych: oriented, verbal, mental status in tact    Wt Readings from Last 5 Encounters:   05/26/22 71.8 kg (158 lb 3.2 oz)   05/20/22 71.2 kg (157 lb)   05/19/22 75.4 kg (166 lb 3.2 oz)   05/06/22 73.8 kg (162 lb 12.8 oz)   05/04/22 73.1 kg (161 lb 3.2 oz)     Meds  Current Outpatient Medications   Medication     acetaminophen (TYLENOL) 500 MG tablet     acyclovir (ZOVIRAX) 400 MG tablet     ascorbic acid 1000 MG TABS tablet     atorvastatin (LIPITOR) 10 MG tablet     bimatoprost (LUMIGAN) 0.03 % ophthalmic solution      brimonidine (ALPHAGAN P) 0.1 % ophthalmic solution     calcium carbonate 1250 (500 Ca) MG CHEW     calcium carbonate-vitamin D (OYSTER SHELL CALCIUM/D) 500-200 MG-UNIT tablet     cyanocobalamin (CYANOCOBALAMIN) 1000 MCG/ML injection     dexamethasone (DECADRON) 4 MG tablet     dextromethorphan-guaiFENesin (MUCINEX DM)  MG 12 hr tablet     fluticasone (FLONASE) 50 MCG/ACT nasal spray     furosemide (LASIX) 20 MG tablet     gabapentin (NEURONTIN) 300 MG capsule     Magnesium Oxide 250 MG TABS     Multiple Vitamin (MULTI-VITAMIN PO)     Omega-3 Fatty Acids (OMEGA 3 PO)     omeprazole (PRILOSEC) 20 MG DR capsule     potassium chloride ER (KLOR-CON M) 20 MEQ CR tablet     Psyllium (METAMUCIL PO)     sulfamethoxazole-trimethoprim (BACTRIM) 400-80 MG tablet     tamsulosin (FLOMAX) 0.4 MG capsule     XARELTO ANTICOAGULANT 20 MG TABS tablet     No current facility-administered medications for this visit.       Labs         Latest Reference Range & Units 05/20/22 12:55   Sodium 133 - 144 mmol/L 142   Potassium 3.4 - 5.3 mmol/L 4.1   Chloride 94 - 109 mmol/L 112 (H)   Carbon Dioxide 20 - 32 mmol/L 21   Urea Nitrogen 7 - 30 mg/dL 62 (H)   Creatinine 0.66 - 1.25 mg/dL 2.39 (H)   GFR Estimate >60 mL/min/1.73m2 27 (L) [1]   Calcium 8.5 - 10.1 mg/dL 8.1 (L)   Anion Gap 3 - 14 mmol/L 9   N-Terminal Pro BNP Inpatient 0 - 1,800 pg/mL 5,262 (H) [2]   Glucose 70 - 99 mg/dL 124 (H)   WBC 4.0 - 11.0 10e3/uL 3.5 (L)   Hemoglobin 13.3 - 17.7 g/dL 9.0 (L)   Hematocrit 40.0 - 53.0 % 28.4 (L)   Platelet Count 150 - 450 10e3/uL 63 (L)   RBC Count 4.40 - 5.90 10e6/uL 2.57 (L)   MCV 78 - 100 fL 111 (H)   MCH 26.5 - 33.0 pg 35.0 (H)   MCHC 31.5 - 36.5 g/dL 31.7   RDW 10.0 - 15.0 % 18.3 (H)          Latest Reference Range & Units 04/06/22 08:09 04/13/22 08:16 04/20/22 08:25 04/27/22 08:03   Calcium 8.5 - 10.5 mg/dL 8.4 (L)      Anion Gap 5 - 18 mmol/L 9      Albumin 3.5 - 5.0 g/dL 2.6 (L)      Protein Total 6.0 - 8.0 g/dL 5.0 (L)       Bilirubin Total 0.0 - 1.0 mg/dL 0.4      Alkaline Phosphatase 45 - 120 U/L 67      ALT 0 - 45 U/L 27      AST 0 - 40 U/L 14      BNP 0 - 80 pg/mL  207 (H)     N-Terminal Pro Bnp 0 - 450 pg/mL 1,805 (H) [1]      Glucose 70 - 125 mg/dL 121      WBC 4.0 - 11.0 10e3/uL 3.1 (L) [2] 3.2 (L) [3] 5.4 [4]    Hemoglobin 13.3 - 17.7 g/dL 11.1 (L) 10.3 (L) 10.5 (L)    Hematocrit 40.0 - 53.0 % 35.0 (L) 32.1 (L) 32.4 (L)    Platelet Count 150 - 450 10e3/uL 173 144 (L) 131 (L)    RBC Count 4.40 - 5.90 10e6/uL 3.30 (L) 3.04 (L) 3.03 (L)    MCV 78 - 100 fL 106 (H) 106 (H) 107 (H)    MCH 26.5 - 33.0 pg 33.6 (H) 33.9 (H) 34.7 (H)    MCHC 31.5 - 36.5 g/dL 31.7 32.1 32.4    RDW 10.0 - 15.0 % 16.3 (H) 17.2 (H) 17.4 (H)    % Neutrophils % 65 80 85    % Lymphocytes % 12 5 6    % Monocytes % 17 4 5    % Eosinophils % 4 11 4    % Basophils % 2 0 0    Absolute Basophils 0.0 - 0.2 10e3/uL 0.1 0.0 0.0    Absolute Neutrophil 1.6 - 8.3 10e3/uL 2.0 2.6 4.6    Absolute Lymphocytes 0.8 - 5.3 10e3/uL 0.4 (L) 0.2 (L) 0.3 (L)    Absolute Monocytes 0.0 - 1.3 10e3/uL 0.5 0.1 0.3    Absolute Eosinophils 0.0 - 0.7 10e3/uL 0.1 0.4 0.2    RBC Morphology  Confirmed RBC Indices Confirmed RBC Indices Confirmed RBC Indices    Platelet Morphology Automated Count Confirmed. Platelet morphology is normal.  Automated Count Confirmed. Giant platelets are present. ! Automated Count Confirmed. Platelet morphology is normal. Automated Count Confirmed. Platelet morphology is normal.    RBC Fragments None Seen  Slight !  Slight !    Teardrop Cells None Seen  Slight !      Elliptocytes None Seen  Moderate ! Slight ! Slight !    [place] Free Lt Chain 0.33 - 1.94 mg/dL 100.90 (H)   38.53 (H)   Kappa Lambda Ratio 0.26 - 1.65  347.93 (H)   256.87 (H)   Lambda Free Lt Chain 0.57 - 2.63 mg/dL 0.29 (L)   0.15 (L)   Total Protein Serum for ELP 6.0 - 8.0 g/dL    4.6 (L)       Imaging     Echocardiogram  Name: MONICA SCOTT  MRN: [id number]  : 1945  Study Date:  03/17/2022 11:22 AM  Age: 76 yrs  Gender: Male  Patient Location: [facility]  Reason For Study: Heart Failure  Ordering Physician: ESTEVAN SORTO  Performed By: ACE     BSA: 1.9 m2  Height: 68 in  Weight: 160 lb  HR: 79  BP: 147/79 mmHg  ______________________________________________________________________________  Procedure  Complete Echo Adult. Adequate quality two-dimensional was performed and  interpreted. Adequate quality color and spectral Doppler were performed and  interpreted. Compared to the prior study dated 11/3/2021, there are changes as  noted.  ______________________________________________________________________________  Interpretation Summary     1. Left ventricular size, wall thickness, and systolic function are normal.  The estimated left ventricular ejection fraction is 60-65%.  2. Right ventricular size and systolic function are normal.  3. Mild left and moderate right atrial enlargement.  4. Calcified trileaflet aortic valve with moderate aortic stenosis. Peak  forward velocity measures 3.2 m/s, mean gradient 27 mm Hg, calculated aortic  valve area 1.1 cm2, and dimensionless index 0.28. No significant  regurgitation.  5. Moderate pulmonary hypertension is present with an estimated pulmonary  artery systolic pressure of 54 mm Hg.  6. Compared to the prior study dated 11/3/2021, the Doppler data today show  moderate aortic stenosis. However, the aortic valve has a similar appearance  on both studies. There was likely inadequate Doppler assessment of the aortic  valve on the prior study. Visually the aortic valve appears at least  moderately stenotic on both studies.  ______________________________________________________________________________  I      WMSI = 1.00     % Normal = 100     X - Cannot   0 -                      (2) - Mildly 2 -          Segments  Size  Interpret    Hyperkinetic 1 - Normal  Hypokinetic  Hypokinetic  1-2     small                                                     7 -           3-5      moderate  3 - Akinetic 4 -          5 -         6 - Akinetic Dyskinetic   6-14    large               Dyskinetic   Aneurysmal  w/scar       w/scar       15-16   diffuse     Left Ventricle  The left ventricle is normal in size. There is normal left ventricular wall  thickness. Left ventricular systolic function is normal. The visual ejection  fraction is 60-65%. Diastolic function not assessed due to atrial  fibrillation. No regional wall motion abnormalities noted.     Right Ventricle  The right ventricle is mildly dilated. The right ventricular systolic function  is normal.     Atria  The left atrium is mildly dilated. The right atrium is moderately dilated.     Mitral Valve  The mitral valve leaflets are moderately thickened. There is mild mitral  annular calcification. There is trace to mild mitral regurgitation. There is  no mitral valve stenosis. The mean mitral valve gradient is 3.3 mmHg.     Tricuspid Valve  Tricuspid valve leaflets appear normal. There is mild (1+) tricuspid  regurgitation. The right ventricular systolic pressure is elevated at 46.4  mmHg. Moderate (46-55mmHg) pulmonary hypertension is present. There is no  tricuspid stenosis.     Aortic Valve  The aortic valve is trileaflet. Moderate aortic valve calcification is  present. No aortic regurgitation is present. Moderate valvular aortic  stenosis. The calculated aortic valve are is 1.1 cm^2. The mean AoV pressure  gradient is 26.6 mmHg.     Pulmonic Valve  The pulmonic valve is not well seen, but is grossly normal. There is trace to  mild pulmonic valvular regurgitation. There is no pulmonic valvular stenosis.     Vessels  The aorta root is normal. The thoracic aorta cannot be assessed. IVC diameter  and respiratory changes fall into an intermediate range suggesting an RA  pressure of 8 mmHg.     Pericardium  There is no pericardial effusion.     Rhythm  The rhythm was atrial fibrillation.      ______________________________________________________________________________  MMode/2D Measurements & Calculations  IVSd: 0.73 cm  LVIDd: 4.5 cm  LVIDs: 2.8 cm  LVPWd: 0.85 cm  FS: 37.6 %     LV mass(C)d: 113.4 grams  LV mass(C)dI: 61.0 grams/m2  Ao root diam: 3.5 cm  LA dimension: 3.9 cm  LA/Ao: 1.1  LVOT diam: 2.2 cm  LVOT area: 3.9 cm2  LA Volume (BP): 63.7 ml  LA Volume Index (BP): 34.2 ml/m2     LA Volume Indexed (AL/bp): 35.6 ml/m2  RWT: 0.38     Time Measurements  MM HR: 59.0 BPM     Doppler Measurements & Calculations  MV E max iam: 168.0 cm/sec  MV A max iam: 77.1 cm/sec  MV E/A: 2.2  MV max PG: 10.0 mmHg  MV mean PG: 3.3 mmHg  MV V2 VTI: 46.4 cm  MVA(VTI): 1.8 cm2  MV dec slope: 1407 cm/sec2  MV dec time: 0.12 sec  Ao V2 max: 320.3 cm/sec  Ao max P.0 mmHg  Ao V2 mean: 244.7 cm/sec  Ao mean P.6 mmHg  Ao V2 VTI: 75.0 cm  JOHANN(I,D): 1.1 cm2  JOHANN(V,D): 1.1 cm2  LV V1 max PG: 3.3 mmHg  LV V1 max: 90.7 cm/sec  LV V1 VTI: 21.7 cm  SV(LVOT): 85.6 ml  SI(LVOT): 46.0 ml/m2  PA acc time: 0.07 sec     TR max iam: 340.7 cm/sec  TR max P.4 mmHg  AV Iam Ratio (DI): 0.28  JOHANN Index (cm2/m2): 0.61  E/E' av.6  Lateral E/e': 18.4  Medial E/e': 24.9    CT scan     EXAM: CT CHEST W CONTRAST  LOCATION: [facility]  DATE/TIME: 3/20/2022 8:10 AM     INDICATION: Pneumonia, effusion or abscess suspected, xray done; multiple myeloma  COMPARISON: CT of the chest 2022; PET/CT 2022  TECHNIQUE: CT chest with IV contrast. Multiplanar reformats were obtained. Dose reduction techniques were used.     CONTRAST: 75ml Isovue 370     FINDINGS:   LUNGS AND PLEURA: Small bilateral pleural effusions are present layering posteriorly. Pleural fluid on the right is decreased from 2022. Associated passive atelectasis of the adjacent lower lobes. New subsegmental focus of groundglass attenuation   along the anterior pleura of the right upper lobe, which does not conform to a specific  vascular territory or anatomic distribution. There are no other new airspace opacities elsewhere. Mild central airway wall thickening.     MEDIASTINUM: There is a right jugular approach chest port catheter which terminates at the SVC right atrial junction. Variant pulmonary vein anatomy with a right top pulmonary vein and independent drainage of the right middle lobe on the right and a   single ostium of the pulmonary veins on the left. Cardiac chambers are not enlarged. Degenerative thickening and calcification of aortic valve leaflets. Mild mitral annular calcifications. No pericardial effusion. No thoracic aortic aneurysm. Mixed   attenuation atheroma in the arch and descending aorta.     No enlarged mediastinal or hilar lymph nodes. Esophagus is decompressed. Imaged thyroid gland is normal.     CORONARY ARTERY CALCIFICATION: Severe.     UPPER ABDOMEN: No new findings in the imaged upper abdomen.     MUSCULOSKELETAL: T6 compression deformity with radiopaque cement. No new vertebral compression deformity. Healed fracture deformity left anterior seventh rib area Unchanged lytic lesion in the right scapula with focal loss of the cortex (series 5, image   41) which was metabolically active on the January 2022 PET/CT consistent with an area of multiple myeloma. No new aggressive or destructive bone lesions.                                                                      IMPRESSION:      1.  Small bilateral pleural effusions are present. Right pleural fluid is slightly decreased from 03/17/2022.  2.  Limited territory of subpleural groundglass attenuation in the anterior right upper lobe consistent with small focus of new lung inflammation. Uncertain etiology.  3.  Unchanged lytic lesion in the right scapula. No aggressive or destructive bone lesions in the chest        Catheterization 05/2022      Panel 1    Primary Surgeon:  Christopher Bobo MD   Procedure:  Right Heart Catheterization    Left Heart  Catheterization    Coronary Angiogram              Indications    SAMANO (dyspnea on exertion) [R06.00 (ICD-10-CM)]   Moderate pulmonary hypertension (H) [I27.20 (ICD-10-CM)]   Acute heart failure with preserved ejection fraction (HFpEF) (H) [I50.31 (ICD-10-CM)]       Comments/Patient Narrative    76 y/o M with PMH of multiple myeloma kappa light chain, DVT, HFpEF, extensive coronary calcification who presents for evaluation of SAMANO with RHC and coronary angiogram.           Conclusion         Hemodynamic data has been modified in [facility] per physician review.    Left ventricular filling pressures are moderately elevated.    Severely elevated pulmonary artery hypertension.    Right sided filling pressures are severely elevated.    Left sided filling pressures are moderately elevated.    Reduced cardiac output level.     Non obstructive coronary artery disease.   Hemostasis with TR band.      Dominance: Right    Left Main   The vessel is large and is angiographically normal.   Left Anterior Descending   The vessel is large.   Prox LAD to Mid LAD lesion is 30% stenosed.   First Diagonal Branch   The vessel is large and is angiographically normal.   Lateral First Diagonal Branch   The vessel is large and is angiographically normal.   Second Diagonal Branch   The vessel is small and is angiographically normal.   Third Diagonal Branch   The vessel is small and is angiographically normal.   Left Circumflex   The vessel is large and is angiographically normal.   First Obtuse Marginal Branch   The vessel is small and is angiographically normal.   Second Obtuse Marginal Branch   The vessel is large and is angiographically normal.   Right Coronary Artery   The vessel is large and is angiographically normal.   Right Posterior Descending Artery   The vessel is large and is angiographically normal.   Right Posterior Atrioventricular Artery   The vessel is large and is angiographically normal.   First Right Posterolateral Branch   The  vessel is large and is angiographically normal.   Second Right Posterolateral Branch   The vessel is large and is angiographically normal.       Intervention      No interventions have been documented.      Hemodynamics    RA -- /27/22 mmHg  RV 72/22 mmHg  PA 72/40/50 mmHg  CWP -- /31/27 mmHg  CO (Trav) 3.5 L/min  CI (Trav) 1.9 L/min/m2  LVEDP 22 mmHg   mmHg   SVR 1920 dynes  PVR 6.5 KOEHLER Right sided filling pressures are severely elevated. Left sided filling pressures are moderately elevated. Severely elevated pulmonary artery hypertension. Left ventricular filling pressures are moderately elevated. Reduced cardiac output level. Hemodynamic data has been modified in Epic per physician review.         Pressures Phase: Baseline     Time Systolic (mmHg) Diastolic (mmHg) Mean (mmHg) A Wave (mmHg) V Wave (mmHg) EDP (mmHg) Max dp/dt (mmHg/sec) HR (bpm) Content (mL/dL) SAT (%)   RA Pressures  2:48 PM   24     26      72        RV Pressures  2:49 PM 70        21     65        PA Pressures  2:49 PM 70    38    51        54        PCW Pressures  2:50 PM   28     30      63        AO Pressures  3:25     78    100        69        LV Pressures  3:25     1       14     57          3:25         17     61          3:25     8       24     63            Blood Flow Results Phase: Baseline     Time Results  Indexed Values (L/min/m2)   QP  2:34 PM 3.81 L/min    2.05      QS  2:34 PM 3.51 L/min    1.89          Blood Oximetry Phase: Baseline     Time Hb  SAT(%)  PO2  Content (mL/dL) PA Sat (%)   PA  2:34 PM  49.5 %      49.5      Art  2:34 PM  100 %     12.24           Cardiac Output Phase: Baseline     Time TDCO (L/min) TDCI (L/min/m2) Trav C.O. (L/min) Trav C.I. (L/min/m2) Trav HR (bpm)   Cardiac Output Results  2:34 PM   3.51    1.89           Resistance Results Phase: Baseline     Time PVR  SVR  TPR  TVR  PVR/SVR  TPR/TVR    Resistance Results (Metric)  2:34 .71 dsc-5    1732.24 dsc-5    1070.35  dsc-5    2279.26 dsc-5    0.28    0.47      Resistance Results (Wood)  2:34 PM 6.04 KOEHLER    21.66 KOEHLER    13.38 KOEHLER    28.5 KOEHLER    0.28    0.47          Stoke Volume Results Phase: Baseline     Time RVSW (gm*m) LVSW (gm*m) RVSW-I (gm*m/m2) LVSW-I (gm*m/m2)   Stroke Work Results  2:34 PM 25.91    49.8    13.92    26.75                        Hemodynamic Waveforms -- Encounter Level:    Hemodynamic Waveforms: None found at the encounter level.      Electrocardiograms    Ca   Result Notes      Component Ref Range & Units 4/25/22  3:02 PM 4/1/22 10:47 AM    Systolic Blood Pressure mmHg       Diastolic Blood Pressure mmHg       Ventricular Rate BPM 66  59     Atrial Rate   59     VA Interval ms  174     QRS Duration ms 88  88     QT ms 392  398     QTc ms 410  394     P Axis degrees  83     R AXIS degrees 5  37     T Axis degrees 3  60     Interpretation ECG  Atrial fibrillation   Minimal voltage criteria for LVH, may be normal variant   ST elevation, consider early repolarization, pericarditis, or injury   Abnormal ECG   When compared with ECG of 01-APR-2022 10:47,   Atrial fibrillation has replaced Sinus rhythm   T wave inversion now evident in Inferior leads   Confirmed by TERI SEVILLA MD LOC:JN ([id number]) on 4/26/2022 3:35:06 PM  Sinus bradycardia with Premature supraventricular complexes   Blocked Premature supraventricular complexes   Otherwise normal ECG   When compared with ECG of 17-MAR-2022 04:10,   Sinus rhythm has replaced Atrial fibrillation   Non-specific change in ST segment in Inferior leads   T wave inversion no longer evident in Inferior leads   Nonspecific T wave abnormality no longer evident in Anterior leads   Confirmed by BHAVESH RAMÍREZ MD LOC:JN ([id number]) on 4/1/2022 2:54:59 PM           Please do not hesitate to contact me if you have any questions/concerns.     Sincerely,     Todd Peters MD    CC: doctors above

## 2022-06-01 NOTE — LETTER
"    6/1/2022         RE: Theo Meyers  [address]  [facility] [address]        Dear Colleague,    Thank you for referring your patient, Theo Meyers, to the [facility] [place]. Please see a copy of my visit note below.    Oncology Rooming Note    June 1, 2022 9:04 AM   Theo Meyers is a 77 year old male who presents for:    Chief Complaint   Patient presents with     Hematology     Anemia due to vitamin b12 deficiency      Initial Vitals: /66   Pulse 70   Temp 98.1  F (36.7  C)   Resp 20   Wt 72.7 kg (160 lb 4.8 oz)   SpO2 100%   BMI 23.67 kg/m   Estimated body mass index is 23.67 kg/m  as calculated from the following:    Height as of 5/20/22: 1.753 m (5' 9\").    Weight as of this encounter: 72.7 kg (160 lb 4.8 oz). Body surface area is 1.88 meters squared.  No Pain (0) Comment: Data Unavailable   No LMP for male patient.  Allergies reviewed: Yes  Medications reviewed: Yes    Medications: Medication refills not needed today.  Pharmacy name entered into [employer]:    [id number] - [place], [place] - [address] AT [facility] [address]  [facility] - [place], [place] - [address] [place] [address]    Clinical concerns: None       Elizabeth Connell LPN              [facility] Progress Note    Patient: Theo Meyers  MRN: [id number]  Date of Service: Jun 1, 2022        Assessment and Plan:    1. Kappa light chain myeloma: He has not had carfilzomib now since May 19.  He has noticed that he is feeling significantly better from a cardiopulmonary standpoint since then.  He is walking better with less dyspnea.  Kappa light chain values from May 25 at 29 which is continued improvements when compared to April 6.  He continues to respond to his therapy.  Recent evaluation at the [facility] has shown pulmonary hypertension.  There is concern that carfilzomib is worsening his cardiac function.  Therefore, we will hold his " therapy going forward as it is anticipated he will be undergoing a clinical trial in the near future. He is following up with the pulmonary hypertension team there as well if they have another right and left heart cath in the near future.  We will continue to see him every month or so until he is started on treatment at the [facility].    2.  Pancytopenia: CBC today was reviewed.  Neutrophil count is 2.7, hemoglobin is 8.3.    3.  Prophylaxis: Continue Xarelto 20 mg daily.  No issues with bleeding recently.    I spent 35 minutes in the care of this patient today, which included time necessary for preparation for the visit, face to face time with the patient, communication of recommendations to the care team, and documentation time.    ECOG Performance  1    Diagnosis:    1.  IgG kappa light chain myeloma. Hyposecretory. Diagnosed 2009. No significant measurable M protein. 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 There was also on 11;14 translocation.  Past immunofixations from 2011 show IgG-kappa.    Bone marrow biopsy 8, 2020  at 5%.  Residual kappa light chain myeloma involving 20% of nucleated cells.  No significant change from November 2016 marrow cellularity.     Bone marrow biopsy from January 26, 2022: Hypocellular marrow involved with multiple myeloma at 20 to 30%.  FISH:  - Gains of chromosomes 9 and 15 (80%)  - Gain of JAQUI (11q) (93%)  - IGH-CCND1 fusion with gains of 1-2 extra fusion signals (92%)  - Monosomy 13 (94%)     NORMAL  - No gain of 1q  - No losses of 1p or TP53     INTERPRETATION:  These findings are consistent with the reported pathologic diagnosis of persistent plasma cell myeloma, reportedly characterized at diagnosis (study performed at an outside institution) by an IGH-CCND1 fusion and monosomy 13. The population of cells with gains of two extra IGH-CCND1 fusion signals is apparently newly arisen and would be consistent with cytogenetic evolution.    2.  Deep vein thrombosis of  the left leg diagnosed 9/2012 while on treatment.     3.  Vitamin B12 deficiency diagnosed in September 2017.     4.  GI bleed and anemia requiring hospitalization in December 2017.    5.  MOHS surgery in January, 2021; May 2021 (chest; squamous).     Treatment:    He presented with a lytic lesion involving the C6 vertebral body, although no measurable M protein. IgG kappa monoclonal immunoglobulin was confirmed by [facility] as well as elevated kappa free light chains with an abnormal kappa lambda ratio. Bone marrow biopsy showed 30% involvement and cytogenetics confirmed deletion of 13 q. as well as 11;14 translocation. He was initially treated with Velcade and dexamethasone and achieved a good partial response.     He was referred to the [facility] where he underwent high-dose chemotherapy with autologous peripheral stem cell transplant in April of 2010. He began Revlimid as maintenance therapy post transplant. Repeat bone marrow biopsy done October 2010 showed about 5% kappa restricted plasma cells and so at that time weekly Velcade was added along with his maintenance Revlimid and dexamethasone.      He has done well since that time with stable minimal residual disease, best assessed by serum free light chains. He required dose reductions of weekly Velcade because of cytopenias and was ultimately switched to a q 2 week maintenance schedule which he has been on since September 2012.      His Revlimid dose was reduced to 15 mg daily and he continues on dexamethasone 20 mg p.o. weekly  Revlimid dosing changed to 20 mg, 3 weeks on 1 week (instead of 15 mg daily) for cost reasons.  Started March, 2018     Started daratumumab/pomalidomide/dexamethasone (20mg every 14 days) on October 14, 2020.     Carfilzomib/dexamethasone started4/6/22. Days 1, 2, 8 9, 15, 16      Progressed on:  bortezomib  lenalidomide    Interim History:    Keith returns today for follow-up visit. He has recently been evaluated in a  cardiology and pulmonary team for pulmonary hypertension at the [facility].  He notes that his breathing is improved for the past 2 weeks.  Stamina has also improved.  No new complaints today.    Review of Systems:    As above in the history.     Review of Systems otherwise Negative for:  General: chills, fever or night sweats  Psychological: anxiety or depression  Ophthalmic: blurry vision, double vision or loss of vision, vision change  ENT: epistaxis, oral lesions, hearing changes  Hematological and Lymphatic: bleeding, bruising, jaundice, swollen lymph nodes  Endocrine: hot flashes, unexpected weight changes  Respiratory: cough, hemoptysis, orthopnea  Cardiovascular: chest pain, edema, palpitations or PND  Gastrointestinal: abdominal pain, blood in stools, change in bowel habits, constipation, diarrhea or nausea/vomiting  Genito-Urinary: change in urinary stream, incontinence, frequency/urgency  Musculoskeletal: joint pain, stiffness, swelling, muscle pain  Neurological: dizziness, headaches, numbness/tingling  Dermatological: lumps and rash    Past History:    Past Medical History:   Diagnosis Date     Anemia 12/13/2017     Arthritis      Bilateral inguinal hernia      Chest tube in place      Elevated INR      Glaucoma      Glaucoma      History of blood clots     DVT - left chronic     Multiple myeloma (H)     Gets chemo infusions every 2 weeks     Pancytopenia (H)      Parapneumonic effusion      Peripheral neuropathy      Shingles 9/24/2014     Syncope      TMJ (temporomandibular joint disorder)      Physical Exam:    /66   Pulse 70   Temp 98.1  F (36.7  C)   Resp 20   Wt 72.7 kg (160 lb 4.8 oz)   SpO2 100%   BMI 23.67 kg/m      General: patient appears stated age of 76 year old. Nontoxic and in no distress.   HEENT: Head: atraumatic, normocephalic. Sclerae anicteric.  Chest:  Normal respiratory effort  Cardiac:  No edema.   Abdomen: abdomen is non-distended  Extremities: normal tone and muscle  bulk.  Skin: no lesions or rash on visible skin. Warm and dry.   CNS: alert and oriented. Grossly non-focal.   Psychiatric: normal mood and affect.     Lab Results:      Imaging:        Signed by: Per Clifford MD        Again, thank you for allowing me to participate in the care of your patient.        Sincerely,        Per Clifford MD

## 2022-06-01 NOTE — PROGRESS NOTES
"Oncology Rooming Note    June 1, 2022 9:04 AM   Theo Meyers is a 77 year old male who presents for:    Chief Complaint   Patient presents with     Hematology     Anemia due to vitamin b12 deficiency      Initial Vitals: /66   Pulse 70   Temp 98.1  F (36.7  C)   Resp 20   Wt 72.7 kg (160 lb 4.8 oz)   SpO2 100%   BMI 23.67 kg/m   Estimated body mass index is 23.67 kg/m  as calculated from the following:    Height as of 5/20/22: 1.753 m (5' 9\").    Weight as of this encounter: 72.7 kg (160 lb 4.8 oz). Body surface area is 1.88 meters squared.  No Pain (0) Comment: Data Unavailable   No LMP for male patient.  Allergies reviewed: Yes  Medications reviewed: Yes    Medications: Medication refills not needed today.  Pharmacy name entered into [place]:    [facility] #[id number] - [place], [place] - [address] AT [facility] [address]  [facility] - [place], [place] - [address] [facility]    Clinical concerns: None       Elizabeth Connell LPN            "

## 2022-06-02 NOTE — PROGRESS NOTES
Assessment/Recommendations   Assessment:    1.  Heart failure with preserved ejection fraction, NYHA class II -III: Likely secondary to multifactorial including diastolic heart failure, moderate pulmonary hypertension, and atrial fibrillation.      Patient is well compensated with no evidence of fluid retention on assessment.  He reports feeling improvement in his shortness of breath and increasing activity tolerance since he was taken off of carfilzomib by oncology team about couple weeks ago.    Current weight is stable between 151 253 pounds.  He is trying to keep his sodium intake less than 2 g/day.    He is currently on furosemide 40 mg daily  On potassium chloride 20 mEq every other day  BMP on 6/1/22 showed K+ 3.9 and Cr 1.55  Most recent BNP stable in 200's at oncology office.    Blood pressure today is 100/64.  He is asymptomatic.    2.  Persistent atrial fibrillation: HR stable and controlled. On Xarelto for stroke prophylaxis.  He had a consult in A. fib clinic and was recommended to stay on rate control strategy for now.  Noted bruising on left eye.  Denies fall.  Denies any major bleeding complications.    3.  Moderate pulmonary hypertension: Patient follows up with Stephanie in PAH clinic at [facility] and scheduled for heart catheterization around June 23.    Plan/Recommendation:  -Continue current heart failure regimen.  -Reinforced heart healthy low-sodium diet and adequate hydration of at least 50 to 60 ounces of fluid per day.  -Continue current A. fib regimen.  Monitor for bleeding.  -Follow-up in A. fib clinic as needed per the recommendation  -Follow-up with Stephanie as recommended    Patient initially had a cardiology consultation with Dr. Silveira in March during his hospitalization.  He has not followed up with him as an outpatient.  We discussed about following up with him in couple months and follow-up with me in 4 months.  Patient is seeing his Dr. Ott today.  He will discuss with his PCP and  will call back to schedule appointment.     History of Present Illness/Subjective    Mr. Theo Meyers is a 77 year old male with a past medical history of multiple myeloma with status post stem cell transplant 2010, moderate aortic valve stenosis, glaucoma, atrial fibrillation, viral pneumonia, pulmonary hypertension, and heart failure with preserved ejection fraction who is seen at [facility] for continued heart failure follow-up.    During last clinic visit, Keith overall felt improvement in his heart failure symptoms.    Today Keith reports that he continues to make improvement in his shortness of breath and activity tolerance especially when he stopped taking carfilzomib that was discontinued by oncology team.  He noticed some chest tightness when he is exerting with shortness of breath.  He states this was discussed with Dr. Brown during last clinic visit.  His troponin was negative.  He had a recent NT pro BNP level elevated up in 5000 on 5/20/2022 and his repeat NT proBNP was 1240.  Most recent BNP levels in 200s.      He denies fatigue, lightheadedness, shortness of breath, orthopnea, PND, palpitations, abdominal fullness/bloating and lower extremity edema.  He has been trying to follow low-sodium diet.  His weight has been stable.  His appetite is fair.  Oncology team has recommended him to stay off of carfilzomib for next 1 to 2 months and he is anticipating to participate in T-cell program sometime in the near future.  He remains active and walks regularly up to 2 miles.    Lexiscan Stress Test done on 3/18/22  Result Text      The nuclear stress test is negative for inducible myocardial ischemia or infarction.     The left ventricular ejection fraction at stress is greater than 70%.     The patient is at a low risk of future cardiac ischemic events.     There is no prior study for comparison.    ECHO-Reviewed:   Interpretation Summary   1. Left ventricular size,  "wall thickness, and systolic function are normal.  The estimated left ventricular ejection fraction is 60-65%.  2. Right ventricular size and systolic function are normal.  3. Mild left and moderate right atrial enlargement.  4. Calcified trileaflet aortic valve with moderate aortic stenosis. Peak  forward velocity measures 3.2 m/s, mean gradient 27 mm Hg, calculated aortic  valve area 1.1 cm2, and dimensionless index 0.28. No significant  regurgitation.  5. Moderate pulmonary hypertension is present with an estimated pulmonary  artery systolic pressure of 54 mm Hg.  6. Compared to the prior study dated 11/3/2021, the Doppler data today show  moderate aortic stenosis. However, the aortic valve has a similar appearance  on both studies. There was likely inadequate Doppler assessment of the aortic  valve on the prior study. Visually the aortic valve appears at least  moderately stenotic on both studies.     Physical Examination Review of Systems   /64 (BP Location: Left arm, Patient Position: Sitting, Cuff Size: Adult Regular)   Pulse 96   Resp 20   Ht 1.753 m (5' 9\")   Wt 72.6 kg (160 lb)   BMI 23.63 kg/m    Body mass index is 23.63 kg/m .  Wt Readings from Last 3 Encounters:   06/03/22 72.6 kg (160 lb)   06/01/22 72.7 kg (160 lb 4.8 oz)   05/26/22 71.8 kg (158 lb 3.2 oz)     General Appearance:   no distress, normal body habitus   ENT/Mouth: membranes moist, no oral lesions or bleeding gums.      EYES:  no scleral icterus, normal conjunctivae   Neck: no carotid bruits or thyromegaly   Chest/Lungs:   lungs are clear to auscultation, no rales or wheezing, equal chest wall expansion    Cardiovascular:    Heart rate regular with occasional skipped beats. Normal first and second heart sounds with 2/6 systolic murmurs, rubs, or gallops; the carotid, radial and posterior tibial pulses are intact,. Jugular venous pressure flat with no edema bilaterally    Abdomen:  no organomegaly, masses, bruits, or tenderness; " bowel sounds are present   Extremities   no cyanosis or clubbing   Radial pulses and Pedal pulses intact and symmetrical.  CMS intact.   Skin: no xanthelasma, warm.    Neurologic: normal  bilateral, no tremors   Psychiatric: alert and oriented x3, calm                         Negative unless noted in HPI     Medical History  Surgical History Family History Social History   Past Medical History:   Diagnosis Date     Anemia 12/13/2017     Arthritis      Bilateral inguinal hernia      Chest tube in place      Elevated INR      Glaucoma      Glaucoma      History of blood clots     DVT - left chronic     Multiple myeloma (H)     Gets chemo infusions every 2 weeks     Pancytopenia (H)      Parapneumonic effusion      Peripheral neuropathy      Shingles 9/24/2014     Syncope      TMJ (temporomandibular joint disorder)     Past Surgical History:   Procedure Laterality Date     APPENDECTOMY      Age 16     CV CORONARY ANGIOGRAM N/A 5/20/2022    Procedure: Coronary Angiogram;  Surgeon: Christopher Bobo MD;  Location:  HEART CARDIAC CATH LAB     CV CORONARY ANGIOGRAM  5/20/2022    Procedure: ;  Surgeon: Christopher Bobo MD;  Location:  HE[facility]     CV LEFT HEART CATH N/A 5/20/2022    Procedure: Left Heart Catheterization;  Surgeon: Christopher Bobo MD;  Location:  HEART CARDIAC CATH LAB     CV RIGHT HEART CATH MEASUREMENTS RECORDED N/A 5/20/2022    Procedure: Right Heart Catheterization;  Surgeon: Christopher Bobo MD;  Location: [facility] CARDIAC CATH LAB     ESOPHAGOSCOPY, GASTROSCOPY, DUODENOSCOPY (EGD), COMBINED N/A 12/14/2017    Procedure: ESOPHAGOGASTRODUODENOSCOPY (EGD) WITH BIOPSYS;  Surgeon: Marlon Roy MD;  Location: [facility];  Service:      ESOPHAGOSCOPY, GASTROSCOPY, DUODENOSCOPY (EGD), COMBINED N/A 1/19/2018    Procedure: ENDOSCOPIC ULTRASOUND  ESOPHAGOGASTRODUODENOSCOPY WITH DUODENAL POLYP REMOVAL;  Surgeon: Familia Mcgraw MD;  Location: [facility] OR;   Service:      EYE SURGERY      Cataract     IR MISCELLANEOUS PROCEDURE  2009     IR MISCELLANEOUS PROCEDURE  2009     IR MISCELLANEOUS PROCEDURE  2009     IR PLEURAL DRAINAGE WITH CATHETER INSERTION  2019     PORTACATH PLACEMENT       RELEASE CARPAL TUNNEL Bilateral      US THORACENTESIS  2019     VERTEBROPLASTY      T6     WISDOM TOOTH EXTRACTION      Family History   Problem Relation Age of Onset     Heart Disease Mother      Glaucoma Mother      Cancer Father      No Known Problems Sister      Cancer Brother      Pancreatic Cancer Brother      No Known Problems Brother      Cancer Daughter      Skin Cancer Daughter      Melanoma Daughter      Glaucoma Daughter     Social History     Socioeconomic History     Marital status:      Spouse name: Not on file     Number of children: Not on file     Years of education: Not on file     Highest education level: Not on file   Occupational History     Not on file   Tobacco Use     Smoking status: Former Smoker     Quit date: 1975     Years since quittin.5     Smokeless tobacco: Never Used   Substance and Sexual Activity     Alcohol use: Not Currently     Comment: Alcoholic Drinks/day: occasional     Drug use: No     Sexual activity: Never   Other Topics Concern     Not on file   Social History Narrative     Not on file     Social Determinants of Health     Financial Resource Strain: Not on file   Food Insecurity: Not on file   Transportation Needs: Not on file   Physical Activity: Not on file   Stress: Not on file   Social Connections: Not on file   Intimate Partner Violence: Not on file   Housing Stability: Not on file          Medications  Allergies   Current Outpatient Medications   Medication Sig Dispense Refill     acetaminophen (TYLENOL) 500 MG tablet Take 500 mg by mouth every 8 hours as needed        acyclovir (ZOVIRAX) 400 MG tablet TAKE 1 TABLET BY MOUTH TWICE DAILY 180 tablet 1     ascorbic acid 1000 MG TABS tablet Take  2,000 mg by mouth daily       atorvastatin (LIPITOR) 10 MG tablet Take 1 tablet (10 mg) by mouth daily 90 tablet 3     bimatoprost (LUMIGAN) 0.03 % ophthalmic solution Place 1 drop into both eyes At Bedtime.       brimonidine (ALPHAGAN P) 0.1 % ophthalmic solution Place 1 drop into both eyes every 8 hours.       calcium carbonate 1250 (500 Ca) MG CHEW Take 1 tablet by mouth daily as needed       calcium carbonate-vitamin D (OYSTER SHELL CALCIUM/D) 500-200 MG-UNIT tablet Take 1 tablet by mouth daily        cyanocobalamin (CYANOCOBALAMIN) 1000 MCG/ML injection Inject 1 mL into the muscle every 30 days       dexamethasone (DECADRON) 4 MG tablet Take 5 tablets (20 mg) by mouth with food. Days 1, 2, 8, 9, 15, 16, 22, and 23. On carfilzomib days, take prior to carfilzomib dose. 120 tablet 1     dextromethorphan-guaiFENesin (MUCINEX DM)  MG 12 hr tablet Take 1 tablet by mouth daily as needed for cough       fluticasone (FLONASE) 50 MCG/ACT nasal spray Spray 2 sprays into both nostrils daily (Patient taking differently: Spray 2 sprays into both nostrils as needed)       furosemide (LASIX) 20 MG tablet Take 2 tablets (40 mg) by mouth daily 180 tablet 0     gabapentin (NEURONTIN) 300 MG capsule TAKE 1 CAPSULE(300 MG) BY MOUTH DAILY AS NEEDED (Patient taking differently: Take 300 mg by mouth At Bedtime) 30 capsule 5     Magnesium Oxide 250 MG TABS Take 250 mg by mouth 2 times daily       Multiple Vitamin (MULTI-VITAMIN PO) Take 1 capsule by mouth daily.       Omega-3 Fatty Acids (OMEGA 3 PO) Take 1 capsule by mouth daily TAKE AS DIRECTED       omeprazole (PRILOSEC) 20 MG DR capsule TAKE 1 CAPSULE BY MOUTH ONCE DAILY 90 capsule 3     potassium chloride ER (KLOR-CON M) 20 MEQ CR tablet Take 1 tablet (20 mEq) by mouth every other day       Psyllium (METAMUCIL PO) Take by mouth daily USE AS DIRECTED       sulfamethoxazole-trimethoprim (BACTRIM) 400-80 MG tablet Take 1 tablet by mouth daily 90 tablet 1     tamsulosin (FLOMAX)  0.4 MG capsule TAKE 1 CAPSULE BY MOUTH EVERY DAY 90 capsule 1     XARELTO ANTICOAGULANT 20 MG TABS tablet Take 20 mg by mouth daily (with dinner)      Allergies   Allergen Reactions     Centex-Pse Er [Pseudoephedrine-Guaifenesin Cr]      TB12         Lab Results    Chemistry/lipid CBC Cardiac Enzymes/BNP/TSH/INR   Lab Results   Component Value Date    CHOL 160 10/05/2021    HDL 51 10/05/2021    TRIG 71 10/05/2021    BUN 29 (H) 06/01/2022     06/01/2022    CO2 22 06/01/2022    Lab Results   Component Value Date    WBC 3.2 (L) 06/01/2022    HGB 8.3 (L) 06/01/2022    HCT 27.1 (L) 06/01/2022     (H) 06/01/2022     06/01/2022    Lab Results   Component Value Date    TROPONINI 0.05 03/17/2022     (H) 06/01/2022    TSH 2.42 03/17/2022    INR 1.36 (H) 03/14/2022        38 minutes spent on the date of encounter doing chart review, review of test results, interpretation with above tests, patient visit and documentation.        This note has been dictated using voice recognition software. Any grammatical, typographical, or context distortions are unintentional and inherent to the software

## 2022-06-02 NOTE — TELEPHONE ENCOUNTER
Called patient to discuss pre-procedure instructions. Survela message sent as well for follow-up discussion. Patient denied further questions, verbalized understanding of instructions.      Sarah Neff RN on 6/2/2022 at 11:46 AM

## 2022-06-03 NOTE — LETTER
6/3/2022    Mathew Ott MD  [address] [place] [address]    RE: Bert Mira       Dear Colleague,     I had the pleasure of seeing Theo HURT Mira in the [facility].          Assessment/Recommendations   Assessment:    1.  Heart failure with preserved ejection fraction, NYHA class II -III: Likely secondary to multifactorial including diastolic heart failure, moderate pulmonary hypertension, and atrial fibrillation.      Patient is well compensated with no evidence of fluid retention on assessment.  He reports feeling improvement in his shortness of breath and increasing activity tolerance since he was taken off of carfilzomib by oncology team about couple weeks ago.    Current weight is stable between 151 253 pounds.  He is trying to keep his sodium intake less than 2 g/day.    He is currently on furosemide 40 mg daily  On potassium chloride 20 mEq every other day  BMP on 6/1/22 showed K+ 3.9 and Cr 1.55  Most recent BNP stable in 200's at oncology office.    Blood pressure today is 100/64.  He is asymptomatic.    2.  Persistent atrial fibrillation: HR stable and controlled. On Xarelto for stroke prophylaxis.  He had a consult in A. fib clinic and was recommended to stay on rate control strategy for now.  Noted bruising on left eye.  Denies fall.  Denies any major bleeding complications.    3.  Moderate pulmonary hypertension: Patient follows up with Stephanie in PAH clinic at [facility] and scheduled for heart catheterization around June 23.    Plan/Recommendation:  -Continue current heart failure regimen.  -Reinforced heart healthy low-sodium diet and adequate hydration of at least 50 to 60 ounces of fluid per day.  -Continue current A. fib regimen.  Monitor for bleeding.  -Follow-up in A. fib clinic as needed per the recommendation  -Follow-up with Stephanie as recommended    Patient initially had a cardiology consultation with Dr. Silveira in March during his hospitalization.  He has not  followed up with him as an outpatient.  We discussed about following up with him in couple months and follow-up with me in 4 months.  Patient is seeing his Dr. Ott today.  He will discuss with his PCP and will call back to schedule appointment.     History of Present Illness/Subjective    Mr. Theo Meyers is a 77 year old male with a past medical history of multiple myeloma with status post stem cell transplant 2010, moderate aortic valve stenosis, glaucoma, atrial fibrillation, viral pneumonia, pulmonary hypertension, and heart failure with preserved ejection fraction who is seen at [facility] for continued heart failure follow-up.    During last clinic visit, Keith overall felt improvement in his heart failure symptoms.    Today Keith reports that he continues to make improvement in his shortness of breath and activity tolerance especially when he stopped taking carfilzomib that was discontinued by oncology team.  He noticed some chest tightness when he is exerting with shortness of breath.  He states this was discussed with Dr. Brown during last clinic visit.  His troponin was negative.  He had a recent NT pro BNP level elevated up in 5000 on 5/20/2022 and his repeat NT proBNP was 1240.  Most recent BNP levels in 200s.      He denies fatigue, lightheadedness, shortness of breath, orthopnea, PND, palpitations, abdominal fullness/bloating and lower extremity edema.  He has been trying to follow low-sodium diet.  His weight has been stable.  His appetite is fair.  Oncology team has recommended him to stay off of carfilzomib for next 1 to 2 months and he is anticipating to participate in T-cell program sometime in the near future.  He remains active and walks regularly up to 2 miles.    Lexiscan Stress Test done on 3/18/22  Result Text      The nuclear stress test is negative for inducible myocardial ischemia or infarction.     The left ventricular ejection fraction at  "stress is greater than 70%.     The patient is at a low risk of future cardiac ischemic events.     There is no prior study for comparison.    ECHO-Reviewed:   Interpretation Summary   1. Left ventricular size, wall thickness, and systolic function are normal.  The estimated left ventricular ejection fraction is 60-65%.  2. Right ventricular size and systolic function are normal.  3. Mild left and moderate right atrial enlargement.  4. Calcified trileaflet aortic valve with moderate aortic stenosis. Peak  forward velocity measures 3.2 m/s, mean gradient 27 mm Hg, calculated aortic  valve area 1.1 cm2, and dimensionless index 0.28. No significant  regurgitation.  5. Moderate pulmonary hypertension is present with an estimated pulmonary  artery systolic pressure of 54 mm Hg.  6. Compared to the prior study dated 11/3/2021, the Doppler data today show  moderate aortic stenosis. However, the aortic valve has a similar appearance  on both studies. There was likely inadequate Doppler assessment of the aortic  valve on the prior study. Visually the aortic valve appears at least  moderately stenotic on both studies.     Physical Examination Review of Systems   /64 (BP Location: Left arm, Patient Position: Sitting, Cuff Size: Adult Regular)   Pulse 96   Resp 20   Ht 1.753 m (5' 9\")   Wt 72.6 kg (160 lb)   BMI 23.63 kg/m    Body mass index is 23.63 kg/m .  Wt Readings from Last 3 Encounters:   06/03/22 72.6 kg (160 lb)   06/01/22 72.7 kg (160 lb 4.8 oz)   05/26/22 71.8 kg (158 lb 3.2 oz)     General Appearance:   no distress, normal body habitus   ENT/Mouth: membranes moist, no oral lesions or bleeding gums.      EYES:  no scleral icterus, normal conjunctivae   Neck: no carotid bruits or thyromegaly   Chest/Lungs:   lungs are clear to auscultation, no rales or wheezing, equal chest wall expansion    Cardiovascular:    Heart rate regular with occasional skipped beats. Normal first and second heart sounds with 2/6 " systolic murmurs, rubs, or gallops; the carotid, radial and posterior tibial pulses are intact,. Jugular venous pressure flat with no edema bilaterally    Abdomen:  no organomegaly, masses, bruits, or tenderness; bowel sounds are present   Extremities   no cyanosis or clubbing   Radial pulses and Pedal pulses intact and symmetrical.  CMS intact.   Skin: no xanthelasma, warm.    Neurologic: normal  bilateral, no tremors   Psychiatric: alert and oriented x3, calm                         Negative unless noted in HPI     Medical History  Surgical History Family History Social History   Past Medical History:   Diagnosis Date     Anemia 12/13/2017     Arthritis      Bilateral inguinal hernia      Chest tube in place      Elevated INR      Glaucoma      Glaucoma      History of blood clots     DVT - left chronic     Multiple myeloma (H)     Gets chemo infusions every 2 weeks     Pancytopenia (H)      Parapneumonic effusion      Peripheral neuropathy      Shingles 9/24/2014     Syncope      TMJ (temporomandibular joint disorder)     Past Surgical History:   Procedure Laterality Date     APPENDECTOMY      Age 16     CV CORONARY ANGIOGRAM N/A 5/20/2022    Procedure: Coronary Angiogram;  Surgeon: Christopher Bobo MD;  Location:  HEART CARDIAC CATH LAB     CV CORONARY ANGIOGRAM  5/20/2022    Procedure: ;  Surgeon: Christopher Bobo MD;  Location:  HEART [facility]ARDIAC CATH LAB     CV LEFT HEART CATH N/A 5/20/2022    Procedure: Left Heart Catheterization;  Surgeon: Christopher Bobo MD;  Location:  HEART CARDIAC CATH LAB     CV RIGHT HEART CATH MEASUREMENTS RECORDED N/A 5/20/2022    Procedure: Right Heart Catheterization;  Surgeon: Christopher Bobo MD;  Location: [facility] CARDIAC CATH LAB     ESOPHAGOSCOPY, GASTROSCOPY, DUODENOSCOPY (EGD), COMBINED N/A 12/14/2017    Procedure: ESOPHAGOGASTRODUODENOSCOPY (EGD) WITH BIOPSYS;  Surgeon: Marlon Roy MD;  Location: [facility];  Service:       ESOPHAGOSCOPY, GASTROSCOPY, DUODENOSCOPY (EGD), COMBINED N/A 2018    Procedure: ENDOSCOPIC ULTRASOUND  ESOPHAGOGASTRODUODENOSCOPY WITH DUODENAL POLYP REMOVAL;  Surgeon: Familia Mcgraw MD;  Location: [facility];  Service:      EYE SURGERY      Cataract     IR MISCELLANEOUS PROCEDURE  2009     IR MISCELLANEOUS PROCEDURE  2009     IR MISCELLANEOUS PROCEDURE  2009     IR PLEURAL DRAINAGE WITH CATHETER INSERTION  2019     PORTACATH PLACEMENT       RELEASE CARPAL TUNNEL Bilateral      US THORACENTESIS  2019     VERTEBROPLASTY      T6     WISDOM TOOTH EXTRACTION      Family History   Problem Relation Age of Onset     Heart Disease Mother      Glaucoma Mother      Cancer Father      No Known Problems Sister      Cancer Brother      Pancreatic Cancer Brother      No Known Problems Brother      Cancer Daughter      Skin Cancer Daughter      Melanoma Daughter      Glaucoma Daughter     Social History     Socioeconomic History     Marital status:      Spouse name: Not on file     Number of children: Not on file     Years of education: Not on file     Highest education level: Not on file   Occupational History     Not on file   Tobacco Use     Smoking status: Former Smoker     Quit date: 1975     Years since quittin.5     Smokeless tobacco: Never Used   Substance and Sexual Activity     Alcohol use: Not Currently     Comment: Alcoholic Drinks/day: occasional     Drug use: No     Sexual activity: Never   Other Topics Concern     Not on file   Social History Narrative     Not on file     Social Determinants of Health     Financial Resource Strain: Not on file   Food Insecurity: Not on file   Transportation Needs: Not on file   Physical Activity: Not on file   Stress: Not on file   Social Connections: Not on file   Intimate Partner Violence: Not on file   Housing Stability: Not on file          Medications  Allergies   Current Outpatient Medications   Medication Sig Dispense  Refill     acetaminophen (TYLENOL) 500 MG tablet Take 500 mg by mouth every 8 hours as needed        acyclovir (ZOVIRAX) 400 MG tablet TAKE 1 TABLET BY MOUTH TWICE DAILY 180 tablet 1     ascorbic acid 1000 MG TABS tablet Take 2,000 mg by mouth daily       atorvastatin (LIPITOR) 10 MG tablet Take 1 tablet (10 mg) by mouth daily 90 tablet 3     bimatoprost (LUMIGAN) 0.03 % ophthalmic solution Place 1 drop into both eyes At Bedtime.       brimonidine (ALPHAGAN P) 0.1 % ophthalmic solution Place 1 drop into both eyes every 8 hours.       calcium carbonate 1250 (500 Ca) MG CHEW Take 1 tablet by mouth daily as needed       calcium carbonate-vitamin D (OYSTER SHELL CALCIUM/D) 500-200 MG-UNIT tablet Take 1 tablet by mouth daily        cyanocobalamin (CYANOCOBALAMIN) 1000 MCG/ML injection Inject 1 mL into the muscle every 30 days       dexamethasone (DECADRON) 4 MG tablet Take 5 tablets (20 mg) by mouth with food. Days 1, 2, 8, 9, 15, 16, 22, and 23. On carfilzomib days, take prior to carfilzomib dose. 120 tablet 1     dextromethorphan-guaiFENesin (MUCINEX DM)  MG 12 hr tablet Take 1 tablet by mouth daily as needed for cough       fluticasone (FLONASE) 50 MCG/ACT nasal spray Spray 2 sprays into both nostrils daily (Patient taking differently: Spray 2 sprays into both nostrils as needed)       furosemide (LASIX) 20 MG tablet Take 2 tablets (40 mg) by mouth daily 180 tablet 0     gabapentin (NEURONTIN) 300 MG capsule TAKE 1 CAPSULE(300 MG) BY MOUTH DAILY AS NEEDED (Patient taking differently: Take 300 mg by mouth At Bedtime) 30 capsule 5     Magnesium Oxide 250 MG TABS Take 250 mg by mouth 2 times daily       Multiple Vitamin (MULTI-VITAMIN PO) Take 1 capsule by mouth daily.       Omega-3 Fatty Acids (OMEGA 3 PO) Take 1 capsule by mouth daily TAKE AS DIRECTED       omeprazole (PRILOSEC) 20 MG DR capsule TAKE 1 CAPSULE BY MOUTH ONCE DAILY 90 capsule 3     potassium chloride ER (KLOR-CON M) 20 MEQ CR tablet Take 1 tablet  (20 mEq) by mouth every other day       Psyllium (METAMUCIL PO) Take by mouth daily USE AS DIRECTED       sulfamethoxazole-trimethoprim (BACTRIM) 400-80 MG tablet Take 1 tablet by mouth daily 90 tablet 1     tamsulosin (FLOMAX) 0.4 MG capsule TAKE 1 CAPSULE BY MOUTH EVERY DAY 90 capsule 1     XARELTO ANTICOAGULANT 20 MG TABS tablet Take 20 mg by mouth daily (with dinner)      Allergies   Allergen Reactions     Centex-Pse Er [Pseudoephedrine-Guaifenesin Cr]      TB12         Lab Results    Chemistry/lipid CBC Cardiac Enzymes/BNP/TSH/INR   Lab Results   Component Value Date    CHOL 160 10/05/2021    HDL 51 10/05/2021    TRIG 71 10/05/2021    BUN 29 (H) 06/01/2022     06/01/2022    CO2 22 06/01/2022    Lab Results   Component Value Date    WBC 3.2 (L) 06/01/2022    HGB 8.3 (L) 06/01/2022    HCT 27.1 (L) 06/01/2022     (H) 06/01/2022     06/01/2022    Lab Results   Component Value Date    TROPONINI 0.05 03/17/2022     (H) 06/01/2022    TSH 2.42 03/17/2022    INR 1.36 (H) 03/14/2022        38 minutes spent on the date of encounter doing chart review, review of test results, interpretation with above tests, patient visit and documentation.        This note has been dictated using voice recognition software. Any grammatical, typographical, or context distortions are unintentional and inherent to the software          Thank you for allowing me to participate in the care of your patient.    Sincerely,     CECELIA Flores CNP     [facility]  cc:   CECELIA Flores CNP  [address]  [place] [address]

## 2022-06-03 NOTE — PATIENT INSTRUCTIONS
Theo Meyers,    It was a pleasure to see you today at the [facility].     My recommendations after this visit include:    - No medications changes made today    -Follow up with me as needed    - Please call [phone], if you have any questions or concerns    Saima Méndez, CNP

## 2022-06-06 NOTE — PROGRESS NOTES
[facility] clinic Follow Up Note    Theo Meyers   77 year old male    Date of Visit: 6/6/2022    Chief Complaint   Patient presents with     Follow Up     Chemo discontinued by Oncologist on May 18 - Patient is feeling better     Subjective  Theo is a 77-year-old male with multiple myeloma diagnosed in 2009 with bone marrow transplant in 2010.  He has been on maintenance chemotherapy since.  He started a new chemotherapy in October of this year.  He developed steadily increasing shortness of breath with exertion.    He was also hospitalized in March of this year with a viral type pneumonia.    In March she had significant increase in dyspnea exertion with new onset atrial fibrillation.    March 2022 heart echo with ejection fraction 60-65% with but with moderate pulmonary hypertension and moderate aortic stenosis.    Patient had progressive increasing dyspnea on exertion and in May he was having significant hypoxia with ambulation, desatted to 79% with walking on room air.  Normal oxygen saturation at rest.  He was sent home with home oxygen at that time.  He did use his oxygen for a brief amount of time.    He underwent cardiac catheterization on May 20 that showed severe pulmonary hypertension but nonobstructive coronary disease.  Just moderate increased left-sided pressures.  The right and left heart catheterization was not done simultaneously, and apparently they are doing further right heart cath on June 23.    But he was discontinued from the chemotherapy, carfilzomib in May.  He has had steadily improved dyspnea on exertion since.  He no longer needs oxygen for ambulation.  No lower extremity edema.  He is back to walking dog regularly.  He has significantly improved energy levels.    Patient is asymptomatic with his atrial fibrillation.  He had failed an earlier cardioversion.    He was having worsening creatinine last month, but creatinine improved to 1.5 on June 1.    Is also had elevated  liver test, but also improved on June 1, after he stopped his chemotherapy.  No right upper quadrant pain or jaundice.    He does continue on atorvastatin 10 mg a day.    Is not had chest pain and has not had a cardiac event.  March 2022 negative stress test for ischemia.    October 2021 carotid evaluation showed 50-69% on the left and just mild plaque on the right, no history of stroke.    Multiple myeloma with hemoglobin 8.3 on June 1 with white count 3.2.  Patient is still being evaluated for new T-cell therapy or NK therapy.    Has history of DVT in 2018 and he remains on chronic Xarelto.  May 2022 VQ scan negative for PE.    No flare of his spinal stenosis and chronic back pain.    On glaucoma drops, stable.    PMHx:    Past Medical History:   Diagnosis Date     Anemia 12/13/2017     Arthritis      Bilateral inguinal hernia      Chest tube in place      Elevated INR      Glaucoma      Glaucoma      History of blood clots     DVT - left chronic     Multiple myeloma (H)     Gets chemo infusions every 2 weeks     Pancytopenia (H)      Parapneumonic effusion      Peripheral neuropathy      Shingles 9/24/2014     Syncope      TMJ (temporomandibular joint disorder)      PSHx:    Past Surgical History:   Procedure Laterality Date     APPENDECTOMY      Age 16     CV CORONARY ANGIOGRAM N/A 5/20/2022    Procedure: Coronary Angiogram;  Surgeon: Christopher Bobo MD;  Location:  HEART CARDIAC CATH LAB     CV CORONARY ANGIOGRAM  5/20/2022    Procedure: ;  Surgeon: Christopher Bobo MD;  Location:  HEART CARDIAC CATH LAB     CV LEFT HEART CATH N/A 5/20/2022    Procedure: Left Heart Catheterization;  Surgeon: Christopher Bobo MD;  Location:  HEART CARDIAC CATH LAB     CV RIGHT HEART CATH MEASUREMENTS RECORDED N/A 5/20/2022    Procedure: Right Heart Catheterization;  Surgeon: Christopher Bobo MD;  Location:  HEART CARDIAC CATH LAB     ESOPHAGOSCOPY, GASTROSCOPY, DUODENOSCOPY (EGD), COMBINED N/A  12/14/2017    Procedure: ESOPHAGOGASTRODUODENOSCOPY (EGD) WITH BIOPSYS;  Surgeon: Marlon Roy MD;  Location: [facility] GI;  Service:      ESOPHAGOSCOPY, GASTROSCOPY, DUODENOSCOPY (EGD), COMBINED N/A 1/19/2018    Procedure: ENDOSCOPIC ULTRASOUND  ESOPHAGOGASTRODUODENOSCOPY WITH DUODENAL POLYP REMOVAL;  Surgeon: Familia Mcgraw MD;  Location: [facility] OR;  Service:      EYE SURGERY      Cataract     IR MISCELLANEOUS PROCEDURE  6/17/2009     IR MISCELLANEOUS PROCEDURE  7/31/2009     IR MISCELLANEOUS PROCEDURE  8/13/2009     IR PLEURAL DRAINAGE WITH CATHETER INSERTION  7/2/2019     PORTACATH PLACEMENT       RELEASE CARPAL TUNNEL Bilateral      US THORACENTESIS  6/27/2019     VERTEBROPLASTY      T6     WISDOM TOOTH EXTRACTION       Immunizations:   Immunization History   Administered Date(s) Administered     COVID-19,PF,Pfizer (12+ Yrs) 02/12/2021, 03/05/2021, 08/26/2021     FLU 6-35 months 11/17/2011, 09/30/2013     Hep B, Peds or Adolescent 05/31/2012     HepB, Unspecified 03/01/2011, 05/19/2011     Hib (PRP-T) 05/19/2011, 05/24/2012     Hib, Unspecified 03/01/2011, 05/19/2011     Influenza (High Dose) 3 valent vaccine 10/07/2015, 09/21/2016, 09/20/2017, 09/26/2018, 10/03/2019     Influenza Vaccine IM > 6 months Valent IIV4 (Alfuria,Fluzone) 09/24/2014     Influenza, Quad, High Dose, Pf, 65yr+ (Fluzone HD) 09/30/2020, 10/13/2021     Pneumo Conj 13-V (2010&after) 10/16/2015     Pneumococcal 23 valent 05/19/2011, 05/24/2012     Poliovirus, inactivated (IPV) 03/01/2011, 05/19/2011, 05/31/2012     Tdap (Adacel,Boostrix) 03/01/2011     Tetanus 05/19/2011       ROS A comprehensive review of systems was performed and was otherwise negative    Medications, allergies, and problem list were reviewed and updated    Exam  /70 (BP Location: Right arm, Patient Position: Sitting, Cuff Size: Adult Large)   Pulse 67   Wt 72.5 kg (159 lb 14.4 oz)   SpO2 100%   BMI 23.61 kg/m    100% oxygen saturation on room air at  rest.  97% on room air with walking.    His color appears significantly better.  Alert and oriented.  Lungs without crackles or wheezing.  Heart is irregularly irregular but rate controlled.  3/6 systolic murmur left upper sternal border noted unchanged.  Abdomen nontender nonobese and there is no ankle edema.    Assessment/Plan  1. Pulmonary hypertension (H)  Symptoms have significantly improved off his chemotherapy.  Presumed severe pulmonary hypertension associated with the chemotherapy.  No longer needing oxygen supplementation a message was sent to discontinue oxygen at home.    He still will be undergoing right heart cath on June 23.    Lower suspicion that aortic stenosis is the etiology for his pulmonary hypertension.    No history of pulmonary embolism and he remains on chronic Xarelto in any case for his history of DVT.    Follow-up with me in July after he meets with cardiology on June 28.    No longer needing home oxygen.    His lower extremity edema is now well controlled.  He continues on Lasix and potassium.  Could reevaluate his furosemide management at next visit    Elevated creatinine, also likely associate with his chemotherapy.  Plan to recheck labs next month.    2. Aortic valve stenosis, etiology of cardiac valve disease unspecified  Repeat echo later this month.    3. Personal history of DVT (deep vein thrombosis)  Chronic Xarelto    4. Multiple myeloma not having achieved remission (H)  Follow-up with hematology/oncology.  For additional treatment    Bactrim for PCP.    Thrombocytopenia, resolved on recent blood check.  Off chemotherapy.    5. Persistent atrial fibrillation (H)  Rate controlled without medication.  On Xarelto.  Appears to be tolerating well, but could consider cardioversion to see if that improves his dyspnea on exertion further.  Follow-up with cardiology later this month    6. Coronary artery disease due to calcified coronary lesion  Nonobstructive coronary disease.   Continue on Lipitor 10 mg.    Elevated liver test consistent with his chemotherapy, improving on June 1 check.  I will plan to have that rechecked in July.    7. Carotid artery stenosis, asymptomatic, left  Asymptomatic.  Lipitor.  Xarelto.    8. Hypercholesterolemia  Lipitor, follow liver test as above    9. Elevated liver function tests  As above    10. Spinal stenosis of lumbar region without neurogenic claudication  No flare of back pain.  Continue regular walking    11. Need for COVID-19 vaccine  Patient did want to proceed with booster shot today.  Patient was warned about immunization reaction.  - COVID-19,PF,PFIZER (12+ YRS)    Glaucoma controlled on eyedrops      Return in about 25 days (around 7/1/2022) for Follow up.   Patient Instructions   No change in medication treatment plan at this time.    I will have your oxygen company contacted to remove your home oxygen, as you no longer need the oxygen.    No change in medication at this time.    Follow-up with me in July, after you see cardiology.    Mathew Ott MD, MD        Current Outpatient Medications   Medication Sig Dispense Refill     acetaminophen (TYLENOL) 500 MG tablet Take 500 mg by mouth every 8 hours as needed        acyclovir (ZOVIRAX) 400 MG tablet TAKE 1 TABLET BY MOUTH TWICE DAILY 180 tablet 1     ascorbic acid 1000 MG TABS tablet Take 2,000 mg by mouth daily       atorvastatin (LIPITOR) 10 MG tablet Take 1 tablet (10 mg) by mouth daily 90 tablet 3     bimatoprost (LUMIGAN) 0.03 % ophthalmic solution Place 1 drop into both eyes At Bedtime.       brimonidine (ALPHAGAN P) 0.1 % ophthalmic solution Place 1 drop into both eyes every 8 hours.       calcium carbonate 1250 (500 Ca) MG CHEW Take 1 tablet by mouth daily as needed       calcium carbonate-vitamin D (OYSTER SHELL CALCIUM/D) 500-200 MG-UNIT tablet Take 1 tablet by mouth daily        cyanocobalamin (CYANOCOBALAMIN) 1000 MCG/ML injection Inject 1 mL into the muscle every 30 days        dextromethorphan-guaiFENesin (MUCINEX DM)  MG 12 hr tablet Take 1 tablet by mouth daily as needed for cough       fluticasone (FLONASE) 50 MCG/ACT nasal spray Spray 2 sprays into both nostrils daily (Patient taking differently: Spray 2 sprays into both nostrils as needed)       furosemide (LASIX) 20 MG tablet Take 2 tablets (40 mg) by mouth daily 180 tablet 0     gabapentin (NEURONTIN) 300 MG capsule TAKE 1 CAPSULE(300 MG) BY MOUTH DAILY AS NEEDED (Patient taking differently: Take 300 mg by mouth At Bedtime) 30 capsule 5     Magnesium Oxide 250 MG TABS Take 250 mg by mouth 2 times daily       Multiple Vitamin (MULTI-VITAMIN PO) Take 1 capsule by mouth daily.       Omega-3 Fatty Acids (OMEGA 3 PO) Take 1 capsule by mouth daily TAKE AS DIRECTED       omeprazole (PRILOSEC) 20 MG DR capsule TAKE 1 CAPSULE BY MOUTH ONCE DAILY 90 capsule 3     potassium chloride ER (KLOR-CON M) 20 MEQ CR tablet Take 1 tablet (20 mEq) by mouth every other day       Psyllium (METAMUCIL PO) Take by mouth daily USE AS DIRECTED       sulfamethoxazole-trimethoprim (BACTRIM) 400-80 MG tablet Take 1 tablet by mouth daily 90 tablet 1     tamsulosin (FLOMAX) 0.4 MG capsule TAKE 1 CAPSULE BY MOUTH EVERY DAY 90 capsule 1     XARELTO ANTICOAGULANT 20 MG TABS tablet Take 20 mg by mouth daily (with dinner)       dexamethasone (DECADRON) 4 MG tablet Take 5 tablets (20 mg) by mouth with food. Days 1, 2, 8, 9, 15, 16, 22, and 23. On carfilzomib days, take prior to carfilzomib dose. (Patient not taking: Reported on 2022) 120 tablet 1     Allergies   Allergen Reactions     Centex-Pse Er [Pseudoephedrine-Guaifenesin Cr]      TB12     Social History     Tobacco Use     Smoking status: Former Smoker     Quit date: 1975     Years since quittin.5     Smokeless tobacco: Never Used   Substance Use Topics     Alcohol use: Not Currently     Comment: Alcoholic Drinks/day: occasional     Drug use: No

## 2022-06-06 NOTE — PROGRESS NOTES
[facility]: Cancer Care                                                                                          Keith called in with some questions regarding upcoming appointments.  He is wondering when Dr. Clifford would like labs, and any clinic follow up.  He is hoping for labs on 6/21 since he needs a pre procedure covid test and would like to do one trip to the lab.  I will review with Dr. Clifford tomorrow and call Keith back once I have some clarification.    Signature:  Charles Flores RN

## 2022-06-06 NOTE — PATIENT INSTRUCTIONS
No change in medication treatment plan at this time.    I will have your oxygen company contacted to remove your home oxygen, as you no longer need the oxygen.    No change in medication at this time.    Follow-up with me in July, after you see cardiology.

## 2022-06-20 NOTE — TELEPHONE ENCOUNTER
[facility] Call Center    Phone Message    May a detailed message be left on voicemail: yes     Reason for Call: Other: Pt would like a call back to discuss his meds as he was told to stop taking his Xeralto 2 days in advance but it does not say to take asprin as he had to do the day before his procedure as he had to the last time, Please reach out to discuss     Action Taken: Message routed to:  [facility] Cardio    Travel Screening: Not Applicable           -----------------------------  Called patient an verified he is only having RHC & Pulmonary Angio.  Denied he needed to take ASA, as he does with the  Coronary Angio.  Advised him  If they want him to have it, they will give it to him before procedure. Patient verbalized understanding, agreed with plan and denied any further questions. Ellen Dewitt RN on 6/20/2022 at 10:58 AM

## 2022-06-21 NOTE — TELEPHONE ENCOUNTER
Contacted Keith to provide instructions to hold Xarelto 2 days prior to marrow on 7/5. He verbalized understanding of plan

## 2022-06-23 NOTE — PROGRESS NOTES
Pre procedure complete. Awaiting consent. VSS. Pt denies pain. Appropriately NPO. Last dose Xarelto 6/20 evening. Cherri (wife) at bedside.

## 2022-06-23 NOTE — DISCHARGE INSTRUCTIONS
[facility]                        Interventional Cardiology  Discharge Instructions   Post Right Heart Cath      AFTER YOU GO HOME:  DO drink plenty of fluids  DO resume your regular diet and medications unless otherwise instructed by your Primary Physician  Do not scrub the procedure site vigorously  No lotion or powder to the puncture site for 3 days  Keep dressing in place for 24 hours    CALL YOUR PRIMARY PHYSICIAN IF: You may resume all normal activity.  Monitor neck site for bleeding, swelling, or voice changes. If you notice bleeding or swelling immediately apply pressure to the site and call number below to speak with Cardiology Fellow.  If you experience any changes in your breathing you should call your doctor immediately or come to the closest Emergency Department.  Do not drive yourself.    ADDITIONAL INSTRUCTIONS: Medications: You are to resume all home medications including anticoagulation therapy unless otherwise advised by your primary cardiologist or nurse coordinator.    Follow Up: Per your primary cardiology team    If you have any questions or concerns regarding your procedure site please call [phone] at anytime and ask for Cardiology Fellow on call.  They are available 24 hours a day.  You may also contact the Cardiology Clinic after hours number at [phone].                                                       Telephone Numbers [phone] Monday-Friday 8:00 am to 4:30 pm    [phone] After 4:30 pm Monday-Friday, Weekends & Holidays  Ask for Interventional Cardiologist on call. Someone is on call 24 hours/day   [facility] toll free number [phone] Monday-Friday 8:00 am to 4:30 pm   [facility] Emergency Dept [phone]

## 2022-06-23 NOTE — PROGRESS NOTES
Pt returned to 2A with CCL with RN;pt awake and alert, denies pain. Site at right neck is soft, dry and intact. Family at bedside; family updated.    Pt has discharge instructions from previous RN. Discharged to self care with wife.

## 2022-06-24 NOTE — TELEPHONE ENCOUNTER
Refill Request  Medication name: Pending Prescriptions:                       Disp   Refills    XARELTO ANTICOAGULANT 20 MG TABS tablet   90 tab*3            Sig: Take 1 tablet (20 mg) by mouth daily (with           dinner)    Who prescribed the medication: Namrata  Last refill on medication: 8/17/21  Requested Pharmacy: [facility]  Last appointment with PCP: 6/6/22  Next appointment: Appointment scheduled for 7/8/22

## 2022-06-25 NOTE — TELEPHONE ENCOUNTER
Routing refill request to provider for review/approval because:  Labs out of range:  plt  Medication is reported/historical    Last Written Prescription Date:    Last Fill Quantity: ,  # refills:    Last office visit provider:  6/6/22     Requested Prescriptions   Pending Prescriptions Disp Refills     XARELTO ANTICOAGULANT 20 MG TABS tablet 90 tablet 3     Sig: Take 1 tablet (20 mg) by mouth daily (with dinner)       Direct Oral Anticoagulant Agents Failed - 6/24/2022  2:48 PM        Failed - Normal Platelets on file in past 12 months     Recent Labs   Lab Test 06/21/22  0923   *               Failed - Creatinine Clearance greater than 50 ml/min on file in past 3 mos     No lab results found.          Failed - Serum creatinine less than or equal to 1.4 on file in past 3 mos     Recent Labs   Lab Test 06/23/22  1039   CR 1.72*       Ok to refill medication if creatinine is low          Passed - Medication is active on med list        Passed - Patient is 18 years of age or older        Passed - Recent (6 mo) or future (30 days) visit within the authorizing provider's specialty             Latonya Garcia RN 06/24/22 9:42 PM

## 2022-06-28 NOTE — PROGRESS NOTES
[facility] Progress Note    Patient: Theo Meyers  MRN: [id number]  Date of Service: Jun 1, 2022        Assessment and Plan:    1. Kappa light chain myeloma: He has not had carfilzomib now since May 19.  He has noticed that he is feeling significantly better from a cardiopulmonary standpoint since then.  He is walking better with less dyspnea.  Kappa light chain values from May 25 at 29 which is continued improvements when compared to April 6.  He continues to respond to his therapy.  Recent evaluation at the [place] has shown pulmonary hypertension.  There is concern that carfilzomib is worsening his cardiac function.  Therefore, we will hold his therapy going forward as it is anticipated he will be undergoing a clinical trial in the near future. He is following up with the pulmonary hypertension team there as well if they have another right and left heart cath in the near future.  We will continue to see him every month or so until he is started on treatment at the [place].    2.  Pancytopenia: CBC today was reviewed.  Neutrophil count is 2.7, hemoglobin is 8.3.    3.  Prophylaxis: Continue Xarelto 20 mg daily.  No issues with bleeding recently.    I spent 35 minutes in the care of this patient today, which included time necessary for preparation for the visit, face to face time with the patient, communication of recommendations to the care team, and documentation time.    ECOG Performance  1    Diagnosis:    1.  IgG kappa light chain myeloma. Hyposecretory. Diagnosed 2009. No significant measurable M protein. 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 There was also on 11;14 translocation.  Past immunofixations from 2011 show IgG-kappa.    Bone marrow biopsy 8, 2020  at 5%.  Residual kappa light chain myeloma involving 20% of nucleated cells.  No significant change from November 2016 marrow cellularity.     Bone marrow biopsy from January 26, 2022: Hypocellular  marrow involved with multiple myeloma at 20 to 30%.  FISH:  - Gains of chromosomes 9 and 15 (80%)  - Gain of JAQUI (11q) (93%)  - IGH-CCND1 fusion with gains of 1-2 extra fusion signals (92%)  - Monosomy 13 (94%)     NORMAL  - No gain of 1q  - No losses of 1p or TP53     INTERPRETATION:  These findings are consistent with the reported pathologic diagnosis of persistent plasma cell myeloma, reportedly characterized at diagnosis (study performed at an outside institution) by an IGH-CCND1 fusion and monosomy 13. The population of cells with gains of two extra IGH-CCND1 fusion signals is apparently newly arisen and would be consistent with cytogenetic evolution.    2.  Deep vein thrombosis of the left leg diagnosed 9/2012 while on treatment.     3.  Vitamin B12 deficiency diagnosed in September 2017.     4.  GI bleed and anemia requiring hospitalization in December 2017.    5.  MOHS surgery in January, 2021; May 2021 (chest; squamous).     Treatment:    He presented with a lytic lesion involving the C6 vertebral body, although no measurable M protein. IgG kappa monoclonal immunoglobulin was confirmed by [facility] as well as elevated kappa free light chains with an abnormal kappa lambda ratio. Bone marrow biopsy showed 30% involvement and cytogenetics confirmed deletion of 13 q. as well as 11;14 translocation. He was initially treated with Velcade and dexamethasone and achieved a good partial response.     He was referred to the [facility] where he underwent high-dose chemotherapy with autologous peripheral stem cell transplant in April of 2010. He began Revlimid as maintenance therapy post transplant. Repeat bone marrow biopsy done October 2010 showed about 5% kappa restricted plasma cells and so at that time weekly Velcade was added along with his maintenance Revlimid and dexamethasone.      He has done well since that time with stable minimal residual disease, best assessed by serum free light chains. He required  dose reductions of weekly Velcade because of cytopenias and was ultimately switched to a q 2 week maintenance schedule which he has been on since September 2012.      His Revlimid dose was reduced to 15 mg daily and he continues on dexamethasone 20 mg p.o. weekly  Revlimid dosing changed to 20 mg, 3 weeks on 1 week (instead of 15 mg daily) for cost reasons.  Started March, 2018     Started daratumumab/pomalidomide/dexamethasone (20mg every 14 days) on October 14, 2020.     Carfilzomib/dexamethasone started4/6/22. Days 1, 2, 8 9, 15, 16      Progressed on:  bortezomib  lenalidomide    Interim History:    Keith returns today for follow-up visit. He has recently been evaluated in a cardiology and pulmonary team for pulmonary hypertension at the [place].  He notes that his breathing is improved for the past 2 weeks.  Stamina has also improved.  No new complaints today.    Review of Systems:    As above in the history.     Review of Systems otherwise Negative for:  General: chills, fever or night sweats  Psychological: anxiety or depression  Ophthalmic: blurry vision, double vision or loss of vision, vision change  ENT: epistaxis, oral lesions, hearing changes  Hematological and Lymphatic: bleeding, bruising, jaundice, swollen lymph nodes  Endocrine: hot flashes, unexpected weight changes  Respiratory: cough, hemoptysis, orthopnea  Cardiovascular: chest pain, edema, palpitations or PND  Gastrointestinal: abdominal pain, blood in stools, change in bowel habits, constipation, diarrhea or nausea/vomiting  Genito-Urinary: change in urinary stream, incontinence, frequency/urgency  Musculoskeletal: joint pain, stiffness, swelling, muscle pain  Neurological: dizziness, headaches, numbness/tingling  Dermatological: lumps and rash    Past History:    Past Medical History:   Diagnosis Date     Anemia 12/13/2017     Arthritis      Bilateral inguinal hernia      Chest tube in place      Elevated INR      Glaucoma      Glaucoma       History of blood clots     DVT - left chronic     Multiple myeloma (H)     Gets chemo infusions every 2 weeks     Pancytopenia (H)      Parapneumonic effusion      Peripheral neuropathy      Shingles 9/24/2014     Syncope      TMJ (temporomandibular joint disorder)      Physical Exam:    /66   Pulse 70   Temp 98.1  F (36.7  C)   Resp 20   Wt 72.7 kg (160 lb 4.8 oz)   SpO2 100%   BMI 23.67 kg/m      General: patient appears stated age of 76 year old. Nontoxic and in no distress.   HEENT: Head: atraumatic, normocephalic. Sclerae anicteric.  Chest:  Normal respiratory effort  Cardiac:  No edema.   Abdomen: abdomen is non-distended  Extremities: normal tone and muscle bulk.  Skin: no lesions or rash on visible skin. Warm and dry.   CNS: alert and oriented. Grossly non-focal.   Psychiatric: normal mood and affect.     Lab Results:      Imaging:        Signed by: Per Clifford MD

## 2022-06-28 NOTE — PROGRESS NOTES
BMT Daily Progress Note    Active Medical Management Issues:     Hematologic history:  Multiple myeloma diagnosed in 2009, IgG kappa but evolving to light chain secretory, del 13q, t(11;14).       Date Treatment Response Toxicities/Complications   7/2009 Velcade/Dex x 8 cycles VGPR     4/2010 HD-Jennifer/PBSCT CR     6/2010 Rev maintenance       10/2012 RVD   cytopenias   9/2012 RVD (q 2wk velcade) Long remission     10/2020 Brianne/pom/dex Recent progresson     4/6/2022 Carf/dex  HI - held Possible cardiac toxicity                             Chief Complaint: Follow up refractory multiple myeloma    HPI: He returns today to carry on a conversation regarding immune effector cell therapy treatment for his myeloma.  He completed his cardiac work-up and its been revealed that he has significant pulmonary hypertension and aortic valve stenosis.  While these are significant problems, they do not require urgent intervention and fortunately Keith feels much better since discontinuing the carfilzomib.    PS: ECOG 1, KPS 80    ROS: Otherwise negative x 10 systems    Physical Exam:     Vital Signs: /74   Pulse 50   Temp 98  F (36.7  C) (Oral)   Resp 16   Wt 72.8 kg (160 lb 9.6 oz)   SpO2 100%   BMI 23.72 kg/m       Physical Exam  Constitutional: Awake, alert, cooperative, in NAD.  Eyes: PERRL, EOMI, sclera clear, conjunctiva normal.  ENT: Normocephalic, without obvious abnormality, oral pharynx with moist mucus membranes  Respiratory: Non-labored breathing, good air exchange, clear to auscultation bilaterally, no crackles or wheezing.  Cardiovascular: RRR, no murmur noted.  Musculoskeletal: trace edema shun LEs.  Neurologic: Awake, alert & oriented x3.  Cranial nerves II-XII are grossly intact.   Psych: appropriate affect     Assessment Plans:     Multiple myeloma- today I reviewed with him the -101 clinical trial which would include the use of daratumumab with a genetically modified natural killer cell product.   Reviewed the risk and the benefits which include possible [id number] disease, cytokine release syndrome, pancytopenia, or side effects from the lymph depleting chemotherapy.  The potential benefit is a remission from his myeloma.  I described our experience with this study treatment is favorable with relatively minimal toxicity relative to treatment with Abecma and given the concurrent cardiac issue, I believe this is a good option for him currently.  If he were to respond well to the treatment, he would be in a better situation to proceed with cardiac intervention as indicated.  I reviewed the clinical trial procedures, requirements, and other aspect of the clinical trial and at the conclusion of my discussion, he wishes to proceed.  He has signed a written consent and we have provided him a copy of the consent.    FABIENNE VALDIVIA MD  June 29, 2022

## 2022-06-28 NOTE — LETTER
6/28/2022      RE: Theo Meyers  [address]  [facility] [address]       Dear Colleague,    Thank you for the opportunity to participate in the care of your patient, Theo Meyers, at the [facility] [place] at [facility]. Please see a copy of my visit note below.    Todd Peters M.D.  Cardiovascular Medicine    I personally saw and examined this patient, discussed care with r consultants, reviewed current laboratories and imaging studies, and conveyed impression and diagnostic/therapeutic plan to patient.    Per Clifford MD    [address] [place] [id number] [phone] (Fax)      Familia Guan M.D.  [facility] KAREN Ott.    Telephone visit x 30 minutes to discuss labs and course.  Problem List  1. Multiple myeloma kappa light tchain   2. Glaucoma  3. Shingles  4. Syncope  5. History of DVT  6. History of B12 deficiency  7. HFpEF by history  8. History of pneumonia  9. Extensive coronary calcification  10. NWOAC  11. Prostatism  12. Atrial fibrillation with recurrence post cardioversion  13. + Carotid doppler for ASCD  14. Hyperlipidemia treated with atorvastatin  15. Anemia with macrocytosis  16. History of pleural effusions  17. Immunocompromise  18. Hypoproteinemia  19. Chronic renal failue    Discussion:    The patient has elevated right and left (left greater) elevated pressures as well as echocardiographic evidence of moderate, but more likely severe aortic stenosis.      Options discussed:  1. Valve clinic for consideration of TAVR  2. Augmented diuretics and follow shortness of breath  3. Consider amiodarone for 4-6 weeks and attempt cardioversion  There is only mild LA enlargement by most recent echocardiogram  4. Cardiac MRI with husam to look for amyloid    Discussed activity to moderation in hot and humid weather, the need to call immediately for changing symptoms (syncope,  pre-syncope, chest pain)      History    77 year old male seen for evaluation of shortness of breath with associated cardiac history of atrial fibrillation (recurrent post cardioversion), aortic stenosis, and pulmonary hypertension.  He has a clear history of exertionally related chest pressure and shortness of breath that is mitigated by reduction by intensity of activity.  In March he had a negative non-exercise stress test.  He has known extensive coronary calcification as well as carotid atherosclerotic disease.  He has no symptoms at rest.  He also has a history of DVT/PE.  His most recent CT scans have been done without contrast. He has had to reduce his level of exertion secondary to exertional chest pressure/pain and shortness of breath - relieved by rest.  He has had no prolonged episodes.      Objective    Constitutional: alert, oriented, normal gait and station, normal mentation.  Oral: moist mucous membranes  Lymph: without pathologic adenopathy  Chest: clear to ausculation and percussion  Cor: No evidence of left or right ventricular activity.  Rhythm is irregular.  S1 variable S2 split physiologically. Murmurs of aortic flow  Abdomen: without tenderness, rebound, guarding, masses, ascites  Extremities: Edema not present  Neuro: no focal defects, normal mentation  Skin: without open lesions  Psych: oriented, verbal, mental status in tact    Wt Readings from Last 24 Encounters:   06/23/22 70.3 kg (155 lb)   06/06/22 72.5 kg (159 lb 14.4 oz)   06/03/22 72.6 kg (160 lb)   06/01/22 72.7 kg (160 lb 4.8 oz)   05/26/22 71.8 kg (158 lb 3.2 oz)   05/20/22 71.2 kg (157 lb)   05/19/22 75.4 kg (166 lb 3.2 oz)   05/06/22 73.8 kg (162 lb 12.8 oz)   05/04/22 73.1 kg (161 lb 3.2 oz)   05/02/22 71.3 kg (157 lb 3.2 oz)   04/25/22 72.6 kg (160 lb)   04/08/22 74.8 kg (165 lb)   04/06/22 72.5 kg (159 lb 12.8 oz)   04/06/22 72.5 kg (159 lb 12.8 oz)   04/01/22 72 kg (158 lb 11.2 oz)   03/28/22 73 kg (161 lb)   03/24/22 73.5  kg (162 lb)   03/21/22 71.4 kg (157 lb 4.8 oz)   03/14/22 72.6 kg (160 lb)   03/02/22 75.8 kg (167 lb 1.6 oz)   02/23/22 76.7 kg (169 lb)   02/14/22 75.6 kg (166 lb 9.6 oz)   02/02/22 77.6 kg (171 lb)   01/26/22 77.1 kg (170 lb)     Current Outpatient Medications   Medication     acetaminophen (TYLENOL) 500 MG tablet     acyclovir (ZOVIRAX) 400 MG tablet     ascorbic acid 1000 MG TABS tablet     atorvastatin (LIPITOR) 10 MG tablet     bimatoprost (LUMIGAN) 0.03 % ophthalmic solution     brimonidine (ALPHAGAN P) 0.1 % ophthalmic solution     calcium carbonate 1250 (500 Ca) MG CHEW     calcium carbonate-vitamin D (OYSTER SHELL CALCIUM/D) 500-200 MG-UNIT tablet     cyanocobalamin (CYANOCOBALAMIN) 1000 MCG/ML injection     dexamethasone (DECADRON) 4 MG tablet     dextromethorphan-guaiFENesin (MUCINEX DM)  MG 12 hr tablet     fluticasone (FLONASE) 50 MCG/ACT nasal spray     furosemide (LASIX) 20 MG tablet     gabapentin (NEURONTIN) 300 MG capsule     Magnesium Oxide 250 MG TABS     Multiple Vitamin (MULTI-VITAMIN PO)     Omega-3 Fatty Acids (OMEGA 3 PO)     omeprazole (PRILOSEC) 20 MG DR capsule     potassium chloride ER (KLOR-CON M) 20 MEQ CR tablet     Psyllium (METAMUCIL PO)     sulfamethoxazole-trimethoprim (BACTRIM) 400-80 MG tablet     tamsulosin (FLOMAX) 0.4 MG capsule     XARELTO ANTICOAGULANT 20 MG TABS tablet     No current facility-administered medications for this visit.     Facility-Administered Medications Ordered in Other Visits   Medication     heparin 100 UNIT/ML injection 5 mL     sodium chloride (PF) 0.9% PF flush 3-20 mL       Labs   Latest Reference Range & Units 06/01/22 08:20 06/21/22 09:23 06/23/22 10:39   Sodium 136 - 145 mmol/L 140 141 142   Potassium 3.5 - 5.0 mmol/L 3.9 4.3    Potassium 3.4 - 5.3 mmol/L   4.4   Chloride 98 - 107 mmol/L 110 (H) 111 (H)    Chloride 98 - 107 mmol/L   108 (H)   Carbon Dioxide 22 - 31 mmol/L 22 24    Carbon Dioxide (CO2) 22 - 29 mmol/L   21 (L)   Urea  Nitrogen 8 - 28 mg/dL 29 (H) 34 (H)    Urea Nitrogen 8.0 - 23.0 mg/dL   31.7 (H)   Creatinine 0.67 - 1.17 mg/dL 1.55 (H) 1.61 (H) 1.72 (H)   GFR Estimate >60 mL/min/1.73m2 46 (L) 44 (L) 40 (L)   Calcium 8.8 - 10.2 mg/dL 8.2 (L) 8.1 (L) 8.8   Anion Gap 7 - 15 mmol/L 8 6 13   Albumin 3.5 - 5.0 g/dL 3.0 (L) 3.2 (L)    Albumin 3.5 - 5.2 g/dL   3.8   Protein Total 6.4 - 8.3 g/dL 5.0 (L) 5.0 (L) 5.3 (L)   Alkaline Phosphatase 40 - 129 U/L 79 72 97   ALT 10 - 50 U/L 50 (H) 85 (H) 53 (H)   AST 10 - 50 U/L 13 39 25   Bilirubin Total <=1.2 mg/dL 0.7 0.8 0.7   BNP 0 - 80 pg/mL 211 (H)     N-Terminal Pro BNP Inpatient 0 - 1,800 pg/mL   1,779   Glucose 70 - 125 mg/dL 116 119    WBC 4.0 - 11.0 10e3/uL 3.2 (L) 4.8    Hemoglobin 13.3 - 17.7 g/dL 8.3 (L) 7.8 (L)    Hematocrit 40.0 - 53.0 % 27.1 (L) 25.2 (L)    Platelet Count 150 - 450 10e3/uL 160 139 (L)    RBC Count 4.40 - 5.90 10e6/uL 2.35 (L) 2.13 (L)    MCV 78 - 100 fL 115 (H) 118 (H)    MCH 26.5 - 33.0 pg 35.3 (H) 36.6 (H)    MCHC 31.5 - 36.5 g/dL 30.6 (L) 31.0 (L)    RDW 10.0 - 15.0 % 16.7 (H) 14.9    % Neutrophils % 84 90    % Lymphocytes % 2 2    % Monocytes % 12 6    % Eosinophils % 1 1    % Basophils % 0 0    % Myelocytes %  1    Absolute Basophils 0.0 - 0.2 10e3/uL 0.0     Absolute Basophils 0.0 - 0.2 10e3/uL  0.0    Absolute Neutrophil 1.6 - 8.3 10e3/uL  4.3    Absolute Lymphocytes 0.8 - 5.3 10e3/uL  0.1 (L)    Absolute Monocytes 0.0 - 1.3 10e3/uL  0.3    Absolute Eosinophils 0.0 - 0.7 10e3/uL  0.0    Absolute Eosinophils 0.0 - 0.7 10e3/uL 0.0     Absolute Immature Granulocytes <=0.4 10e3/uL 0.0     Absolute Lymphocytes 0.8 - 5.3 10e3/uL 0.1 (L)     Absolute Monocytes 0.0 - 1.3 10e3/uL 0.4     % Immature Granulocytes % 1     Absolute Neutrophils 1.6 - 8.3 10e3/uL 2.7     (H): Data is abnormally high  (L): Data is abnormally low      Imaging     Echocardiogram  Name: MONICA SCOTT  MRN: [id number]  : 1945  Study Date: 2022 11:22 AM  Age: 76  yrs  Gender: Male  Patient [facility]  Reason For Study: Heart Failure  Ordering Physician: ESTEVAN SORTO  Performed By: ACE     BSA: 1.9 m2  Height: 68 in  Weight: 160 lb  HR: 79  BP: 147/79 mmHg  ______________________________________________________________________________  Procedure  Complete Echo Adult. Adequate quality two-dimensional was performed and  interpreted. Adequate quality color and spectral Doppler were performed and  interpreted. Compared to the prior study dated 11/3/2021, there are changes as  noted.  ______________________________________________________________________________  Interpretation Summary     1. Left ventricular size, wall thickness, and systolic function are normal.  The estimated left ventricular ejection fraction is 60-65%.  2. Right ventricular size and systolic function are normal.  3. Mild left and moderate right atrial enlargement.  4. Calcified trileaflet aortic valve with moderate aortic stenosis. Peak  forward velocity measures 3.2 m/s, mean gradient 27 mm Hg, calculated aortic  valve area 1.1 cm2, and dimensionless index 0.28. No significant  regurgitation.  5. Moderate pulmonary hypertension is present with an estimated pulmonary  artery systolic pressure of 54 mm Hg.  6. Compared to the prior study dated 11/3/2021, the Doppler data today show  moderate aortic stenosis. However, the aortic valve has a similar appearance  on both studies. There was likely inadequate Doppler assessment of the aortic  valve on the prior study. Visually the aortic valve appears at least  moderately stenotic on both studies.  ______________________________________________________________________________  I      WMSI = 1.00     % Normal = 100     X - Cannot   0 -                      (2) - Mildly 2 -          Segments  Size  Interpret    Hyperkinetic 1 - Normal  Hypokinetic  Hypokinetic  1-2     small                                                     7 -          3-5      moderate  3  - Akinetic 4 -          5 -         6 - Akinetic Dyskinetic   6-14    large               Dyskinetic   Aneurysmal  w/scar       w/scar       15-16   diffuse     Left Ventricle  The left ventricle is normal in size. There is normal left ventricular wall  thickness. Left ventricular systolic function is normal. The visual ejection  fraction is 60-65%. Diastolic function not assessed due to atrial  fibrillation. No regional wall motion abnormalities noted.     Right Ventricle  The right ventricle is mildly dilated. The right ventricular systolic function  is normal.     Atria  The left atrium is mildly dilated. The right atrium is moderately dilated.     Mitral Valve  The mitral valve leaflets are moderately thickened. There is mild mitral  annular calcification. There is trace to mild mitral regurgitation. There is  no mitral valve stenosis. The mean mitral valve gradient is 3.3 mmHg.     Tricuspid Valve  Tricuspid valve leaflets appear normal. There is mild (1+) tricuspid  regurgitation. The right ventricular systolic pressure is elevated at 46.4  mmHg. Moderate (46-55mmHg) pulmonary hypertension is present. There is no  tricuspid stenosis.     Aortic Valve  The aortic valve is trileaflet. Moderate aortic valve calcification is  present. No aortic regurgitation is present. Moderate valvular aortic  stenosis. The calculated aortic valve are is 1.1 cm^2. The mean AoV pressure  gradient is 26.6 mmHg.     Pulmonic Valve  The pulmonic valve is not well seen, but is grossly normal. There is trace to  mild pulmonic valvular regurgitation. There is no pulmonic valvular stenosis.     Vessels  The aorta root is normal. The thoracic aorta cannot be assessed. IVC diameter  and respiratory changes fall into an intermediate range suggesting an RA  pressure of 8 mmHg.     Pericardium  There is no pericardial effusion.     Rhythm  The rhythm was atrial fibrillation.      ______________________________________________________________________________  MMode/2D Measurements & Calculations  IVSd: 0.73 cm  LVIDd: 4.5 cm  LVIDs: 2.8 cm  LVPWd: 0.85 cm  FS: 37.6 %     LV mass(C)d: 113.4 grams  LV mass(C)dI: 61.0 grams/m2  Ao root diam: 3.5 cm  LA dimension: 3.9 cm  LA/Ao: 1.1  LVOT diam: 2.2 cm  LVOT area: 3.9 cm2  LA Volume (BP): 63.7 ml  LA Volume Index (BP): 34.2 ml/m2     LA Volume Indexed (AL/bp): 35.6 ml/m2  RWT: 0.38     Time Measurements  MM HR: 59.0 BPM     Doppler Measurements & Calculations  MV E max iam: 168.0 cm/sec  MV A max iam: 77.1 cm/sec  MV E/A: 2.2  MV max PG: 10.0 mmHg  MV mean PG: 3.3 mmHg  MV V2 VTI: 46.4 cm  MVA(VTI): 1.8 cm2  MV dec slope: 1407 cm/sec2  MV dec time: 0.12 sec  Ao V2 max: 320.3 cm/sec  Ao max P.0 mmHg  Ao V2 mean: 244.7 cm/sec  Ao mean P.6 mmHg  Ao V2 VTI: 75.0 cm  JOHANN(I,D): 1.1 cm2  JOHANN(V,D): 1.1 cm2  LV V1 max PG: 3.3 mmHg  LV V1 max: 90.7 cm/sec  LV V1 VTI: 21.7 cm  SV(LVOT): 85.6 ml  SI(LVOT): 46.0 ml/m2  PA acc time: 0.07 sec     TR max iam: 340.7 cm/sec  TR max P.4 mmHg  AV Iam Ratio (DI): 0.28  JOHANN Index (cm2/m2): 0.61  E/E' av.6  Lateral E/e': 18.4  Medial E/e': 24.9    CT scan     EXAM: CT CHEST W CONTRAST  LOCATION: [facility]  DATE/TIME: 3/20/2022 8:10 AM     INDICATION: Pneumonia, effusion or abscess suspected, xray done; multiple myeloma  COMPARISON: CT of the chest 2022; PET/CT 2022  TECHNIQUE: CT chest with IV contrast. Multiplanar reformats were obtained. Dose reduction techniques were used.     CONTRAST: 75ml Isovue 370     FINDINGS:   LUNGS AND PLEURA: Small bilateral pleural effusions are present layering posteriorly. Pleural fluid on the right is decreased from 2022. Associated passive atelectasis of the adjacent lower lobes. New subsegmental focus of groundglass attenuation   along the anterior pleura of the right upper lobe, which does not conform to a specific  vascular territory or anatomic distribution. There are no other new airspace opacities elsewhere. Mild central airway wall thickening.     MEDIASTINUM: There is a right jugular approach chest port catheter which terminates at the SVC right atrial junction. Variant pulmonary vein anatomy with a right top pulmonary vein and independent drainage of the right middle lobe on the right and a   single ostium of the pulmonary veins on the left. Cardiac chambers are not enlarged. Degenerative thickening and calcification of aortic valve leaflets. Mild mitral annular calcifications. No pericardial effusion. No thoracic aortic aneurysm. Mixed   attenuation atheroma in the arch and descending aorta.     No enlarged mediastinal or hilar lymph nodes. Esophagus is decompressed. Imaged thyroid gland is normal.     CORONARY ARTERY CALCIFICATION: Severe.     UPPER ABDOMEN: No new findings in the imaged upper abdomen.     MUSCULOSKELETAL: T6 compression deformity with radiopaque cement. No new vertebral compression deformity. Healed fracture deformity left anterior seventh rib area Unchanged lytic lesion in the right scapula with focal loss of the cortex (series 5, image   41) which was metabolically active on the January 2022 PET/CT consistent with an area of multiple myeloma. No new aggressive or destructive bone lesions.                                                                      IMPRESSION:      1.  Small bilateral pleural effusions are present. Right pleural fluid is slightly decreased from 03/17/2022.  2.  Limited territory of subpleural groundglass attenuation in the anterior right upper lobe consistent with small focus of new lung inflammation. Uncertain etiology.  3.  Unchanged lytic lesion in the right scapula. No aggressive or destructive bone lesions in the chest    Catheterization 6.23/2022  RA 12  RV 58/12  PA 58/30 (40)  PCWP --/40/26    Trav CO 4.49 CI 2.42  TDCO 4.6 CI 2.48  PVR 3.0 by TDCO Right sided  filling pressures are moderately elevated. Left sided filling pressures are severely elevated. Moderately elevated pulmonary artery hypertension. Normal cardiac output level. Hemodynamic data has been modified in Epic per physician revi    Echocardiogram 2022    Name: MONICA SCOTT  MRN: [id number]  : 1945  Study Date: 2022 01:49 PM  Age: 77 yrs  Gender: Male  Patient Location: [facility]  Reason For Study: Moderate pulmonary hypertension (H)  Ordering Physician: KRISTEN VINSON  Referring Physician: KRISTEN VINSON  Performed By: KASHMIR     BSA: 1.9 m2  Height: 69 in  Weight: 159 lb  HR: 63  BP: 100/64 mmHg  ______________________________________________________________________________  Procedure  Complete Echo Adult.  ______________________________________________________________________________  Interpretation Summary     1. Normal left ventricular size and systolic performance with a visually  estimated ejection fraction of 55-60%.  2. There is mild concentric increase in left ventricular wall thickness.  3. Suspected severe aortic stenosis.  Â  The calculated valve area is 0.56-0.61 cmÂ  with a dimensionless index of  0.2. Although the mean gradient (24 mmHg) and peak velocity (3.2 m/s) across  the aortic valve are only moderately increased, the stroke-volume index  measures low at 24.3 mL/mÂ .  4. Mild right ventricular enlargement with mildly reduced right ventricular  systolic performance.  5. There is mild left atrial enlargement. There is moderate right atrial  enlargement.  6. Right ventricular systolic pressure relative to right atrial pressure  measures within the normal range. The pulmonary artery pressure is estimated  at 23 mmHg plus right atrial pressure (the IVC is borderline dilated)     When compared to the prior real-time echocardiogram dated 2022, there  has been a decrease in the pulmonary artery pressure estimate. The degree of  aortic stenosis is now  suspected to be severe (at least moderate on the prior  examination).  ______________________________________________________________________________  Left ventricle:  Normal left ventricular size and systolic performance with a visually  estimated ejection fraction of 55-60%. There is normal regional wall motion.  There is mild concentric increase in left ventricular wall thickness.  Incidental note is made of a pseudotendon/false chord in the LV cavity.     Assessment of LV Diastolic Function: Patient appears to be in atrial  fibrillation which limits assessment of diastolic filling.     Right ventricle:  Mild right ventricular enlargement with mildly reduced right ventricular  systolic performance.     Left atrium:  There is mild left atrial enlargement.     Right atrium:  There is moderate right atrial enlargement.     IVC:  The IVC is borderline dilated.     Aortic valve:  The aortic valve is comprised of three cusps. The aortic valve is calcified  with reduced cusp excursion. There is suspected severe aortic stenosis. There  is no significant aortic insufficiency.     Mitral valve:  There is mild mitral annular calcification. There is mild mitral  insufficiency.     Tricuspid valve:  The tricuspid valve is grossly morphologically normal. There is mild tricuspid  insufficiency.     Pulmonic valve:  The pulmonic valve is grossly morphologically normal. There is trace pulmonic  insufficiency.     Thoracic aorta:  The aortic root and proximal ascending aorta are of normal dimension.     Pericardium:  There is no significant pericardial effusion.  ______________________________________________________________________________  ______________________________________________________________________________  MMode/2D Measurements & Calculations  IVSd: 1.3 cm  LVIDd: 4.5 cm  LVIDs: 3.2 cm  LVPWd: 1.4 cm  FS: 28.8 %  LV mass(C)d: 228.2 grams  LV mass(C)dI: 121.8 grams/m2  Ao root diam: 3.3 cm  LA dimension: 3.7 cm  asc  Aorta Diam: 3.2 cm  LA/Ao: 1.1  LVOT diam: 1.9 cm  LVOT area: 3.0 cm2  LA Volume Indexed (AL/bp): 49.9 ml/m2  RWT: 0.60     Time Measurements  MM HR: 106.0 BPM     Doppler Measurements & Calculations  MV E max iam: 127.8 cm/sec  MV dec time: 0.38 sec  Ao V2 max: 324.6 cm/sec  Ao max P.0 mmHg  Ao V2 mean: 230.3 cm/sec  Ao mean P.0 mmHg  Ao V2 VTI: 80.9 cm  JOHANN(I,D): 0.56 cm2  JOHANN(V,D): 0.61 cm2  LV V1 max P.8 mmHg  LV V1 max: 66.9 cm/sec  LV V1 VTI: 15.4 cm  SV(LVOT): 45.6 ml  SI(LVOT): 24.3 ml/m2  PA acc time: 0.10 sec  TR max iam: 238.9 cm/sec  TR max P.8 mmHg  AV Iam Ratio (DI): 0.21  JOHANN Index (cm2/m2): 0.30  E/E' avg: 15.5  Lateral E/e': 9.2     Medial E/e': 21.8     ______________________________________________________________________________  Report approved by: Cristina Ariza 2022 04:07 PM         Catheterization 2022      Panel 1    Primary Surgeon:  Christopher Bobo MD   Procedure:  Right Heart Catheterization    Left Heart Catheterization    Coronary Angiogram              Indications    SAMANO (dyspnea on exertion) [R06.00 (ICD-10-CM)]   Moderate pulmonary hypertension (H) [I27.20 (ICD-10-CM)]   Acute heart failure with preserved ejection fraction (HFpEF) (H) [I50.31 (ICD-10-CM)]       Comments/Patient Narrative    76 y/o M with PMH of multiple myeloma kappa light chain, DVT, HFpEF, extensive coronary calcification who presents for evaluation of SAMANO with RHC and coronary angiogram.           Conclusion         Hemodynamic data has been modified in [facility] per physician review.    Left ventricular filling pressures are moderately elevated.    Severely elevated pulmonary artery hypertension.    Right sided filling pressures are severely elevated.    Left sided filling pressures are moderately elevated.    Reduced cardiac output level.     Non obstructive coronary artery disease.   Hemostasis with TR band.      Dominance: Right    Left Main   The vessel is large and is  angiographically normal.   Left Anterior Descending   The vessel is large.   Prox LAD to Mid LAD lesion is 30% stenosed.   First Diagonal Branch   The vessel is large and is angiographically normal.   Lateral First Diagonal Branch   The vessel is large and is angiographically normal.   Second Diagonal Branch   The vessel is small and is angiographically normal.   Third Diagonal Branch   The vessel is small and is angiographically normal.   Left Circumflex   The vessel is large and is angiographically normal.   First Obtuse Marginal Branch   The vessel is small and is angiographically normal.   Second Obtuse Marginal Branch   The vessel is large and is angiographically normal.   Right Coronary Artery   The vessel is large and is angiographically normal.   Right Posterior Descending Artery   The vessel is large and is angiographically normal.   Right Posterior Atrioventricular Artery   The vessel is large and is angiographically normal.   First Right Posterolateral Branch   The vessel is large and is angiographically normal.   Second Right Posterolateral Branch   The vessel is large and is angiographically normal.       Intervention      No interventions have been documented.          Hemodynamics    RA -- /27/22 mmHg  RV 72/22 mmHg  PA 72/40/50 mmHg  CWP -- /31/27 mmHg  CO (Trav) 3.5 L/min  CI (Trav) 1.9 L/min/m2  LVEDP 22 mmHg   mmHg   SVR 1920 dynes  PVR 6.5 KOEHLER Right sided filling pressures are severely elevated. Left sided filling pressures are moderately elevated. Severely elevated pulmonary artery hypertension. Left ventricular filling pressures are moderately elevated. Reduced cardiac output level. Hemodynamic data has been modified in Epic per physician review.         Pressures Phase: Baseline     Time Systolic (mmHg) Diastolic (mmHg) Mean (mmHg) A Wave (mmHg) V Wave (mmHg) EDP (mmHg) Max dp/dt (mmHg/sec) HR (bpm) Content (mL/dL) SAT (%)   RA Pressures  2:48 PM   24     26      72        RV Pressures   2:49 PM 70        21     65        PA Pressures  2:49 PM 70    38    51        54        PCW Pressures  2:50 PM   28     30      63        AO Pressures  3:25     78    100        69        LV Pressures  3:25     1       14     57          3:25         17     61          3:25     8       24     63            Blood Flow Results Phase: Baseline     Time Results  Indexed Values (L/min/m2)   QP  2:34 PM 3.81 L/min    2.05      QS  2:34 PM 3.51 L/min    1.89          Blood Oximetry Phase: Baseline     Time Hb  SAT(%)  PO2  Content (mL/dL) PA Sat (%)   PA  2:34 PM  49.5 %      49.5      Art  2:34 PM  100 %     12.24           Cardiac Output Phase: Baseline     Time TDCO (L/min) TDCI (L/min/m2) Trav C.O. (L/min) Trav C.I. (L/min/m2) Trav HR (bpm)   Cardiac Output Results  2:34 PM   3.51    1.89           Resistance Results Phase: Baseline     Time PVR  SVR  TPR  TVR  PVR/SVR  TPR/TVR    Resistance Results (Metric)  2:34 .71 dsc-5    1732.24 dsc-5    1070.35 dsc-5    2279.26 dsc-5    0.28    0.47      Resistance Results (Wood)  2:34 PM 6.04 KOEHLER    21.66 KOEHLER    13.38 KOEHLER    28.5 KOEHLER    0.28    0.47          Stoke Volume Results Phase: Baseline     Time RVSW (gm*m) LVSW (gm*m) RVSW-I (gm*m/m2) LVSW-I (gm*m/m2)   Stroke Work Results  2:34 PM 25.91    49.8    13.92    26.75                        Hemodynamic Waveforms -- Encounter Level:    Hemodynamic Waveforms: None found at the encounter level.          Electrocardiograms    Ca   Result Notes      Component Ref Range & Units 4/25/22  3:02 PM 4/1/22 10:47 AM    Systolic Blood Pressure mmHg       Diastolic Blood Pressure mmHg       Ventricular Rate BPM 66  59     Atrial Rate   59     WY Interval ms  174     QRS Duration ms 88  88     QT ms 392  398     QTc ms 410  394     P Axis degrees  83     R AXIS degrees 5  37     T Axis degrees 3  60     Interpretation ECG  Atrial fibrillation   Minimal voltage criteria for LVH, may be normal variant   ST  elevation, consider early repolarization, pericarditis, or injury   Abnormal ECG   When compared with ECG of 01-APR-2022 10:47,   Atrial fibrillation has replaced Sinus rhythm   T wave inversion now evident in Inferior leads   Confirmed by TERI SEVILLA MD LOC:[facility] ([id number]) on 4/26/2022 3:35:06 PM  Sinus bradycardia with Premature supraventricular complexes   Blocked Premature supraventricular complexes   Otherwise normal ECG   When compared with ECG of 17-MAR-2022 04:10,   Sinus rhythm has replaced Atrial fibrillation   Non-specific change in ST segment in Inferior leads   T wave inversion no longer evident in Inferior leads   Nonspecific T wave abnormality no longer evident in Anterior leads   Confirmed by BHAVESH RAMÍREZ MD LOC:[facility] ([id number]) on 4/1/2022 2:54:59 PM         CC: doctors above      Please do not hesitate to contact me if you have any questions/concerns.     Sincerely,     Todd Peters MD

## 2022-06-28 NOTE — PATIENT INSTRUCTIONS
Medication Changes:  -none    Patient Instructions:  1. Continue staying active and eat a heart healthy diet.    2. Please keep current list of medications with you at all times.    3. Remember to weigh yourself daily after voiding and before you consume any food or beverages and log the numbers.  If you have gained 2 pounds overnight or 5 pounds in a week contact us immediately for medication adjustments or further instructions.    4. **Please call us immediately if you have any syncope (fainting or passing out), chest pain, edema (swelling or weight gain), or decline in your functional status (general decline in how you are feeling).    5. Patients on Remodulin (treprostinil) or Veletri (epoprostenol): Please make sure that you have your backup pump and supplies with you at all times, your mixing instructions, and contact information for your specialty pharmacy.    Follow up Appointment Information:  EKG Friday 7/1/22  Add-on labs Friday 7/1/22      We are located on the third [facility].  Our address is     [address] [place] [address] [place] [address] [place] [address]    Thank you for allowing us to be a part of your care here at the [facility]    If you have questions or concerns please contact us at:    Ellen Dewitt RN, BSN, PHN  Karen Inman (Scheduling,Prior Auth)  Nurse Coordinator     Clinic   Pulmonary Hypertension   Pulmonary Hypertension  [facility]  (Phone)[phone]   (Phone) [phone]        (Fax) [phone]    ** Please note that you will NOT receive a reminder call regarding your scheduled testing, reminder calls are for provider appointments only.  If you are scheduled for testing within the [facility] system you may receive a call regarding pre-registration for billing purposes only.**      Support Group:  Pulmonary Hypertension Association  Https://www.phassociation.org/  **Look at the Events Tab** They even have Support Groups that you can call into    [facility] PH Support Group  Second Saturday of the Month from 1-3 PM   [facility] [address]  Leader: Surekha Wilkins  Phone: [phone]  Email: [email or web address]

## 2022-06-28 NOTE — PROGRESS NOTES
Todd Peters M.D.  Cardiovascular Medicine    I personally saw and examined this patient, discussed care with r consultants, reviewed current laboratories and imaging studies, and conveyed impression and diagnostic/therapeutic plan to patient.    Per Clifford MD    [address] [place] [id number]    [phone] (Fax)      Familia Guan M.D.  [facility] KAREN Ott.        Telephone visit x 30 minutes to discuss labs and course.  Problem List  1. Multiple myeloma kappa light tchain   2. Glaucoma  3. Shingles  4. Syncope  5. History of DVT  6. History of B12 deficiency  7. HFpEF by history  8. History of pneumonia  9. Extensive coronary calcification  10. NWOAC  11. Prostatism  12. Atrial fibrillation with recurrence post cardioversion  13. + Carotid doppler for ASCD  14. Hyperlipidemia treated with atorvastatin  15. Anemia with macrocytosis  16. History of pleural effusions  17. Immunocompromise  18. Hypoproteinemia  19. Chronic renal failue    Discussion:    The patient has elevated right and left (left greater) elevated pressures as well as echocardiographic evidence of moderate, but more likely severe aortic stenosis.      Options discussed:  1. Valve clinic for consideration of TAVR  2. Augmented diuretics and follow shortness of breath  3. Consider amiodarone for 4-6 weeks and attempt cardioversion  There is only mild LA enlargement by most recent echocardiogram  4. Cardiac MRI with husam to look for amyloid    Discussed activity to moderation in hot and humid weather, the need to call immediately for changing symptoms (syncope, pre-syncope, chest pain)      History    77 year old male seen for evaluation of shortness of breath with associated cardiac history of atrial fibrillation (recurrent post cardioversion), aortic stenosis, and pulmonary hypertension.  He has a clear history of exertionally related chest pressure and shortness of breath that is  mitigated by reduction by intensity of activity.  In March he had a negative non-exercise stress test.  He has known extensive coronary calcification as well as carotid atherosclerotic disease.  He has no symptoms at rest.  He also has a history of DVT/PE.  His most recent CT scans have been done without contrast. He has had to reduce his level of exertion secondary to exertional chest pressure/pain and shortness of breath - relieved by rest.  He has had no prolonged episodes.      Objective    Constitutional: alert, oriented, normal gait and station, normal mentation.  Oral: moist mucous membranes  Lymph: without pathologic adenopathy  Chest: clear to ausculation and percussion  Cor: No evidence of left or right ventricular activity.  Rhythm is irregular.  S1 variable S2 split physiologically. Murmurs of aortic flow  Abdomen: without tenderness, rebound, guarding, masses, ascites  Extremities: Edema not present  Neuro: no focal defects, normal mentation  Skin: without open lesions  Psych: oriented, verbal, mental status in tact    Wt Readings from Last 24 Encounters:   06/23/22 70.3 kg (155 lb)   06/06/22 72.5 kg (159 lb 14.4 oz)   06/03/22 72.6 kg (160 lb)   06/01/22 72.7 kg (160 lb 4.8 oz)   05/26/22 71.8 kg (158 lb 3.2 oz)   05/20/22 71.2 kg (157 lb)   05/19/22 75.4 kg (166 lb 3.2 oz)   05/06/22 73.8 kg (162 lb 12.8 oz)   05/04/22 73.1 kg (161 lb 3.2 oz)   05/02/22 71.3 kg (157 lb 3.2 oz)   04/25/22 72.6 kg (160 lb)   04/08/22 74.8 kg (165 lb)   04/06/22 72.5 kg (159 lb 12.8 oz)   04/06/22 72.5 kg (159 lb 12.8 oz)   04/01/22 72 kg (158 lb 11.2 oz)   03/28/22 73 kg (161 lb)   03/24/22 73.5 kg (162 lb)   03/21/22 71.4 kg (157 lb 4.8 oz)   03/14/22 72.6 kg (160 lb)   03/02/22 75.8 kg (167 lb 1.6 oz)   02/23/22 76.7 kg (169 lb)   02/14/22 75.6 kg (166 lb 9.6 oz)   02/02/22 77.6 kg (171 lb)   01/26/22 77.1 kg (170 lb)     Current Outpatient Medications   Medication     acetaminophen (TYLENOL) 500 MG tablet      acyclovir (ZOVIRAX) 400 MG tablet     ascorbic acid 1000 MG TABS tablet     atorvastatin (LIPITOR) 10 MG tablet     bimatoprost (LUMIGAN) 0.03 % ophthalmic solution     brimonidine (ALPHAGAN P) 0.1 % ophthalmic solution     calcium carbonate 1250 (500 Ca) MG CHEW     calcium carbonate-vitamin D (OYSTER SHELL CALCIUM/D) 500-200 MG-UNIT tablet     cyanocobalamin (CYANOCOBALAMIN) 1000 MCG/ML injection     dexamethasone (DECADRON) 4 MG tablet     dextromethorphan-guaiFENesin (MUCINEX DM)  MG 12 hr tablet     fluticasone (FLONASE) 50 MCG/ACT nasal spray     furosemide (LASIX) 20 MG tablet     gabapentin (NEURONTIN) 300 MG capsule     Magnesium Oxide 250 MG TABS     Multiple Vitamin (MULTI-VITAMIN PO)     Omega-3 Fatty Acids (OMEGA 3 PO)     omeprazole (PRILOSEC) 20 MG DR capsule     potassium chloride ER (KLOR-CON M) 20 MEQ CR tablet     Psyllium (METAMUCIL PO)     sulfamethoxazole-trimethoprim (BACTRIM) 400-80 MG tablet     tamsulosin (FLOMAX) 0.4 MG capsule     XARELTO ANTICOAGULANT 20 MG TABS tablet     No current facility-administered medications for this visit.     Facility-Administered Medications Ordered in Other Visits   Medication     heparin 100 UNIT/ML injection 5 mL     sodium chloride (PF) 0.9% PF flush 3-20 mL       Labs   Latest Reference Range & Units 06/01/22 08:20 06/21/22 09:23 06/23/22 10:39   Sodium 136 - 145 mmol/L 140 141 142   Potassium 3.5 - 5.0 mmol/L 3.9 4.3    Potassium 3.4 - 5.3 mmol/L   4.4   Chloride 98 - 107 mmol/L 110 (H) 111 (H)    Chloride 98 - 107 mmol/L   108 (H)   Carbon Dioxide 22 - 31 mmol/L 22 24    Carbon Dioxide (CO2) 22 - 29 mmol/L   21 (L)   Urea Nitrogen 8 - 28 mg/dL 29 (H) 34 (H)    Urea Nitrogen 8.0 - 23.0 mg/dL   31.7 (H)   Creatinine 0.67 - 1.17 mg/dL 1.55 (H) 1.61 (H) 1.72 (H)   GFR Estimate >60 mL/min/1.73m2 46 (L) 44 (L) 40 (L)   Calcium 8.8 - 10.2 mg/dL 8.2 (L) 8.1 (L) 8.8   Anion Gap 7 - 15 mmol/L 8 6 13   Albumin 3.5 - 5.0 g/dL 3.0 (L) 3.2 (L)    Albumin 3.5 -  5.2 g/dL   3.8   Protein Total 6.4 - 8.3 g/dL 5.0 (L) 5.0 (L) 5.3 (L)   Alkaline Phosphatase 40 - 129 U/L 79 72 97   ALT 10 - 50 U/L 50 (H) 85 (H) 53 (H)   AST 10 - 50 U/L 13 39 25   Bilirubin Total <=1.2 mg/dL 0.7 0.8 0.7   BNP 0 - 80 pg/mL 211 (H)     N-Terminal Pro BNP Inpatient 0 - 1,800 pg/mL   1,779   Glucose 70 - 125 mg/dL 116 119    WBC 4.0 - 11.0 10e3/uL 3.2 (L) 4.8    Hemoglobin 13.3 - 17.7 g/dL 8.3 (L) 7.8 (L)    Hematocrit 40.0 - 53.0 % 27.1 (L) 25.2 (L)    Platelet Count 150 - 450 10e3/uL 160 139 (L)    RBC Count 4.40 - 5.90 10e6/uL 2.35 (L) 2.13 (L)    MCV 78 - 100 fL 115 (H) 118 (H)    MCH 26.5 - 33.0 pg 35.3 (H) 36.6 (H)    MCHC 31.5 - 36.5 g/dL 30.6 (L) 31.0 (L)    RDW 10.0 - 15.0 % 16.7 (H) 14.9    % Neutrophils % 84 90    % Lymphocytes % 2 2    % Monocytes % 12 6    % Eosinophils % 1 1    % Basophils % 0 0    % Myelocytes %  1    Absolute Basophils 0.0 - 0.2 10e3/uL 0.0     Absolute Basophils 0.0 - 0.2 10e3/uL  0.0    Absolute Neutrophil 1.6 - 8.3 10e3/uL  4.3    Absolute Lymphocytes 0.8 - 5.3 10e3/uL  0.1 (L)    Absolute Monocytes 0.0 - 1.3 10e3/uL  0.3    Absolute Eosinophils 0.0 - 0.7 10e3/uL  0.0    Absolute Eosinophils 0.0 - 0.7 10e3/uL 0.0     Absolute Immature Granulocytes <=0.4 10e3/uL 0.0     Absolute Lymphocytes 0.8 - 5.3 10e3/uL 0.1 (L)     Absolute Monocytes 0.0 - 1.3 10e3/uL 0.4     % Immature Granulocytes % 1     Absolute Neutrophils 1.6 - 8.3 10e3/uL 2.7     (H): Data is abnormally high  (L): Data is abnormally low      Imaging     Echocardiogram  Name: MONICA SCOTT  MRN: [id number]  : 1945  Study Date: 2022 11:22 AM  Age: 76 yrs  Gender: Male  Patient Location: [facility]  Reason For Study: Heart Failure  Ordering Physician: ESTEVAN SORTO  Performed By: ACE     BSA: 1.9 m2  Height: 68 in  Weight: 160 lb  HR: 79  BP: 147/79 mmHg  ______________________________________________________________________________  Procedure  Complete Echo Adult. Adequate quality  two-dimensional was performed and  interpreted. Adequate quality color and spectral Doppler were performed and  interpreted. Compared to the prior study dated 11/3/2021, there are changes as  noted.  ______________________________________________________________________________  Interpretation Summary     1. Left ventricular size, wall thickness, and systolic function are normal.  The estimated left ventricular ejection fraction is 60-65%.  2. Right ventricular size and systolic function are normal.  3. Mild left and moderate right atrial enlargement.  4. Calcified trileaflet aortic valve with moderate aortic stenosis. Peak  forward velocity measures 3.2 m/s, mean gradient 27 mm Hg, calculated aortic  valve area 1.1 cm2, and dimensionless index 0.28. No significant  regurgitation.  5. Moderate pulmonary hypertension is present with an estimated pulmonary  artery systolic pressure of 54 mm Hg.  6. Compared to the prior study dated 11/3/2021, the Doppler data today show  moderate aortic stenosis. However, the aortic valve has a similar appearance  on both studies. There was likely inadequate Doppler assessment of the aortic  valve on the prior study. Visually the aortic valve appears at least  moderately stenotic on both studies.  ______________________________________________________________________________  I      WMSI = 1.00     % Normal = 100     X - Cannot   0 -                      (2) - Mildly 2 -          Segments  Size  Interpret    Hyperkinetic 1 - Normal  Hypokinetic  Hypokinetic  1-2     small                                                     7 -          3-5      moderate  3 - Akinetic 4 -          5 -         6 - Akinetic Dyskinetic   6-14    large               Dyskinetic   Aneurysmal  w/scar       w/scar       15-16   diffuse     Left Ventricle  The left ventricle is normal in size. There is normal left ventricular wall  thickness. Left ventricular systolic function is normal. The visual  ejection  fraction is 60-65%. Diastolic function not assessed due to atrial  fibrillation. No regional wall motion abnormalities noted.     Right Ventricle  The right ventricle is mildly dilated. The right ventricular systolic function  is normal.     Atria  The left atrium is mildly dilated. The right atrium is moderately dilated.     Mitral Valve  The mitral valve leaflets are moderately thickened. There is mild mitral  annular calcification. There is trace to mild mitral regurgitation. There is  no mitral valve stenosis. The mean mitral valve gradient is 3.3 mmHg.     Tricuspid Valve  Tricuspid valve leaflets appear normal. There is mild (1+) tricuspid  regurgitation. The right ventricular systolic pressure is elevated at 46.4  mmHg. Moderate (46-55mmHg) pulmonary hypertension is present. There is no  tricuspid stenosis.     Aortic Valve  The aortic valve is trileaflet. Moderate aortic valve calcification is  present. No aortic regurgitation is present. Moderate valvular aortic  stenosis. The calculated aortic valve are is 1.1 cm^2. The mean AoV pressure  gradient is 26.6 mmHg.     Pulmonic Valve  The pulmonic valve is not well seen, but is grossly normal. There is trace to  mild pulmonic valvular regurgitation. There is no pulmonic valvular stenosis.     Vessels  The aorta root is normal. The thoracic aorta cannot be assessed. IVC diameter  and respiratory changes fall into an intermediate range suggesting an RA  pressure of 8 mmHg.     Pericardium  There is no pericardial effusion.     Rhythm  The rhythm was atrial fibrillation.     ______________________________________________________________________________  MMode/2D Measurements & Calculations  IVSd: 0.73 cm  LVIDd: 4.5 cm  LVIDs: 2.8 cm  LVPWd: 0.85 cm  FS: 37.6 %     LV mass(C)d: 113.4 grams  LV mass(C)dI: 61.0 grams/m2  Ao root diam: 3.5 cm  LA dimension: 3.9 cm  LA/Ao: 1.1  LVOT diam: 2.2 cm  LVOT area: 3.9 cm2  LA Volume (BP): 63.7 ml  LA Volume Index  (BP): 34.2 ml/m2     LA Volume Indexed (AL/bp): 35.6 ml/m2  RWT: 0.38     Time Measurements  MM HR: 59.0 BPM     Doppler Measurements & Calculations  MV E max iam: 168.0 cm/sec  MV A max iam: 77.1 cm/sec  MV E/A: 2.2  MV max PG: 10.0 mmHg  MV mean PG: 3.3 mmHg  MV V2 VTI: 46.4 cm  MVA(VTI): 1.8 cm2  MV dec slope: 1407 cm/sec2  MV dec time: 0.12 sec  Ao V2 max: 320.3 cm/sec  Ao max P.0 mmHg  Ao V2 mean: 244.7 cm/sec  Ao mean P.6 mmHg  Ao V2 VTI: 75.0 cm  JOHANN(I,D): 1.1 cm2  JOHANN(V,D): 1.1 cm2  LV V1 max PG: 3.3 mmHg  LV V1 max: 90.7 cm/sec  LV V1 VTI: 21.7 cm  SV(LVOT): 85.6 ml  SI(LVOT): 46.0 ml/m2  PA acc time: 0.07 sec     TR max iam: 340.7 cm/sec  TR max P.4 mmHg  AV Iam Ratio (DI): 0.28  JOHANN Index (cm2/m2): 0.61  E/E' av.6  Lateral E/e': 18.4  Medial E/e': 24.9    CT scan     EXAM: CT CHEST W CONTRAST  LOCATION: [facility]  DATE/TIME: 3/20/2022 8:10 AM     INDICATION: Pneumonia, effusion or abscess suspected, xray done; multiple myeloma  COMPARISON: CT of the chest 2022; PET/CT 2022  TECHNIQUE: CT chest with IV contrast. Multiplanar reformats were obtained. Dose reduction techniques were used.     CONTRAST: 75ml Isovue 370     FINDINGS:   LUNGS AND PLEURA: Small bilateral pleural effusions are present layering posteriorly. Pleural fluid on the right is decreased from 2022. Associated passive atelectasis of the adjacent lower lobes. New subsegmental focus of groundglass attenuation   along the anterior pleura of the right upper lobe, which does not conform to a specific vascular territory or anatomic distribution. There are no other new airspace opacities elsewhere. Mild central airway wall thickening.     MEDIASTINUM: There is a right jugular approach chest port catheter which terminates at the SVC right atrial junction. Variant pulmonary vein anatomy with a right top pulmonary vein and independent drainage of the right middle lobe on the right and a    single ostium of the pulmonary veins on the left. Cardiac chambers are not enlarged. Degenerative thickening and calcification of aortic valve leaflets. Mild mitral annular calcifications. No pericardial effusion. No thoracic aortic aneurysm. Mixed   attenuation atheroma in the arch and descending aorta.     No enlarged mediastinal or hilar lymph nodes. Esophagus is decompressed. Imaged thyroid gland is normal.     CORONARY ARTERY CALCIFICATION: Severe.     UPPER ABDOMEN: No new findings in the imaged upper abdomen.     MUSCULOSKELETAL: T6 compression deformity with radiopaque cement. No new vertebral compression deformity. Healed fracture deformity left anterior seventh rib area Unchanged lytic lesion in the right scapula with focal loss of the cortex (series 5, image   41) which was metabolically active on the 2022 PET/CT consistent with an area of multiple myeloma. No new aggressive or destructive bone lesions.                                                                      IMPRESSION:      1.  Small bilateral pleural effusions are present. Right pleural fluid is slightly decreased from 2022.  2.  Limited territory of subpleural groundglass attenuation in the anterior right upper lobe consistent with small focus of new lung inflammation. Uncertain etiology.  3.  Unchanged lytic lesion in the right scapula. No aggressive or destructive bone lesions in the chest    Catheterization 6.  RA 12  RV 58/12  PA 58/30 (40)  PCWP --/40/26    Trav CO 4.49 CI 2.42  TDCO 4.6 CI 2.48  PVR 3.0 by TDCO Right sided filling pressures are moderately elevated. Left sided filling pressures are severely elevated. Moderately elevated pulmonary artery hypertension. Normal cardiac output level. Hemodynamic data has been modified in Epic per physician revi    Echocardiogram 2022    Name: MONICA SCOTT  MRN: [id number]  : 1945  Study Date: 2022 01:49 PM  Age: 77 yrs  Gender: Male  Patient  Location: [facility]  Reason For Study: Moderate pulmonary hypertension (H)  Ordering Physician: KRISTEN VINSON  Referring Physician: KRISTEN VINSON  Performed By: KASHMIR     BSA: 1.9 m2  Height: 69 in  Weight: 159 lb  HR: 63  BP: 100/64 mmHg  ______________________________________________________________________________  Procedure  Complete Echo Adult.  ______________________________________________________________________________  Interpretation Summary     1. Normal left ventricular size and systolic performance with a visually  estimated ejection fraction of 55-60%.  2. There is mild concentric increase in left ventricular wall thickness.  3. Suspected severe aortic stenosis.  Â  The calculated valve area is 0.56-0.61 cmÂ  with a dimensionless index of  0.2. Although the mean gradient (24 mmHg) and peak velocity (3.2 m/s) across  the aortic valve are only moderately increased, the stroke-volume index  measures low at 24.3 mL/mÂ .  4. Mild right ventricular enlargement with mildly reduced right ventricular  systolic performance.  5. There is mild left atrial enlargement. There is moderate right atrial  enlargement.  6. Right ventricular systolic pressure relative to right atrial pressure  measures within the normal range. The pulmonary artery pressure is estimated  at 23 mmHg plus right atrial pressure (the IVC is borderline dilated)     When compared to the prior real-time echocardiogram dated 17 March 2022, there  has been a decrease in the pulmonary artery pressure estimate. The degree of  aortic stenosis is now suspected to be severe (at least moderate on the prior  examination).  ______________________________________________________________________________  Left ventricle:  Normal left ventricular size and systolic performance with a visually  estimated ejection fraction of 55-60%. There is normal regional wall motion.  There is mild concentric increase in left ventricular wall  thickness.  Incidental note is made of a pseudotendon/false chord in the LV cavity.     Assessment of LV Diastolic Function: Patient appears to be in atrial  fibrillation which limits assessment of diastolic filling.     Right ventricle:  Mild right ventricular enlargement with mildly reduced right ventricular  systolic performance.     Left atrium:  There is mild left atrial enlargement.     Right atrium:  There is moderate right atrial enlargement.     IVC:  The IVC is borderline dilated.     Aortic valve:  The aortic valve is comprised of three cusps. The aortic valve is calcified  with reduced cusp excursion. There is suspected severe aortic stenosis. There  is no significant aortic insufficiency.     Mitral valve:  There is mild mitral annular calcification. There is mild mitral  insufficiency.     Tricuspid valve:  The tricuspid valve is grossly morphologically normal. There is mild tricuspid  insufficiency.     Pulmonic valve:  The pulmonic valve is grossly morphologically normal. There is trace pulmonic  insufficiency.     Thoracic aorta:  The aortic root and proximal ascending aorta are of normal dimension.     Pericardium:  There is no significant pericardial effusion.  ______________________________________________________________________________  ______________________________________________________________________________  MMode/2D Measurements & Calculations  IVSd: 1.3 cm  LVIDd: 4.5 cm  LVIDs: 3.2 cm  LVPWd: 1.4 cm  FS: 28.8 %  LV mass(C)d: 228.2 grams  LV mass(C)dI: 121.8 grams/m2  Ao root diam: 3.3 cm  LA dimension: 3.7 cm  asc Aorta Diam: 3.2 cm  LA/Ao: 1.1  LVOT diam: 1.9 cm  LVOT area: 3.0 cm2  LA Volume Indexed (AL/bp): 49.9 ml/m2  RWT: 0.60     Time Measurements  MM HR: 106.0 BPM     Doppler Measurements & Calculations  MV E max quinten: 127.8 cm/sec  MV dec time: 0.38 sec  Ao V2 max: 324.6 cm/sec  Ao max P.0 mmHg  Ao V2 mean: 230.3 cm/sec  Ao mean P.0 mmHg  Ao V2 VTI: 80.9 cm  JOHANN(I,D):  0.56 cm2  JOHANN(V,D): 0.61 cm2  LV V1 max P.8 mmHg  LV V1 max: 66.9 cm/sec  LV V1 VTI: 15.4 cm  SV(LVOT): 45.6 ml  SI(LVOT): 24.3 ml/m2  PA acc time: 0.10 sec  TR max iam: 238.9 cm/sec  TR max P.8 mmHg  AV Iam Ratio (DI): 0.21  JOHANN Index (cm2/m2): 0.30  E/E' avg: 15.5  Lateral E/e': 9.2     Medial E/e': 21.8     ______________________________________________________________________________  Report approved by: Cristina Ariza 2022 04:07 PM         Catheterization 2022      Panel 1    Primary Surgeon:  Christopher Bobo MD   Procedure:  Right Heart Catheterization    Left Heart Catheterization    Coronary Angiogram              Indications    SAMANO (dyspnea on exertion) [R06.00 (ICD-10-CM)]   Moderate pulmonary hypertension (H) [I27.20 (ICD-10-CM)]   Acute heart failure with preserved ejection fraction (HFpEF) (H) [I50.31 (ICD-10-CM)]       Comments/Patient Narrative    78 y/o M with PMH of multiple myeloma kappa light chain, DVT, HFpEF, extensive coronary calcification who presents for evaluation of SAMANO with RHC and coronary angiogram.           Conclusion         Hemodynamic data has been modified in [facility] per physician review.    Left ventricular filling pressures are moderately elevated.    Severely elevated pulmonary artery hypertension.    Right sided filling pressures are severely elevated.    Left sided filling pressures are moderately elevated.    Reduced cardiac output level.     Non obstructive coronary artery disease.   Hemostasis with TR band.      Dominance: Right    Left Main   The vessel is large and is angiographically normal.   Left Anterior Descending   The vessel is large.   Prox LAD to Mid LAD lesion is 30% stenosed.   First Diagonal Branch   The vessel is large and is angiographically normal.   Lateral First Diagonal Branch   The vessel is large and is angiographically normal.   Second Diagonal Branch   The vessel is small and is angiographically normal.   Third Diagonal  Branch   The vessel is small and is angiographically normal.   Left Circumflex   The vessel is large and is angiographically normal.   First Obtuse Marginal Branch   The vessel is small and is angiographically normal.   Second Obtuse Marginal Branch   The vessel is large and is angiographically normal.   Right Coronary Artery   The vessel is large and is angiographically normal.   Right Posterior Descending Artery   The vessel is large and is angiographically normal.   Right Posterior Atrioventricular Artery   The vessel is large and is angiographically normal.   First Right Posterolateral Branch   The vessel is large and is angiographically normal.   Second Right Posterolateral Branch   The vessel is large and is angiographically normal.       Intervention      No interventions have been documented.          Hemodynamics    RA -- /27/22 mmHg  RV 72/22 mmHg  PA 72/40/50 mmHg  CWP -- /31/27 mmHg  CO (Trav) 3.5 L/min  CI (Trav) 1.9 L/min/m2  LVEDP 22 mmHg   mmHg   SVR 1920 dynes  PVR 6.5 KOEHLER Right sided filling pressures are severely elevated. Left sided filling pressures are moderately elevated. Severely elevated pulmonary artery hypertension. Left ventricular filling pressures are moderately elevated. Reduced cardiac output level. Hemodynamic data has been modified in Epic per physician review.         Pressures Phase: Baseline     Time Systolic (mmHg) Diastolic (mmHg) Mean (mmHg) A Wave (mmHg) V Wave (mmHg) EDP (mmHg) Max dp/dt (mmHg/sec) HR (bpm) Content (mL/dL) SAT (%)   RA Pressures  2:48 PM   24     26      72        RV Pressures  2:49 PM 70        21     65        PA Pressures  2:49 PM 70    38    51        54        PCW Pressures  2:50 PM   28     30      63        AO Pressures  3:25     78    100        69        LV Pressures  3:25     1       14     57          3:25         17     61          3:25     8       24     63            Blood Flow Results Phase: Baseline     Time  Results  Indexed Values (L/min/m2)   QP  2:34 PM 3.81 L/min    2.05      QS  2:34 PM 3.51 L/min    1.89          Blood Oximetry Phase: Baseline     Time Hb  SAT(%)  PO2  Content (mL/dL) PA Sat (%)   PA  2:34 PM  49.5 %      49.5      Art  2:34 PM  100 %     12.24           Cardiac Output Phase: Baseline     Time TDCO (L/min) TDCI (L/min/m2) Trav C.O. (L/min) Trav C.I. (L/min/m2) Trav HR (bpm)   Cardiac Output Results  2:34 PM   3.51    1.89           Resistance Results Phase: Baseline     Time PVR  SVR  TPR  TVR  PVR/SVR  TPR/TVR    Resistance Results (Metric)  2:34 .71 dsc-5    1732.24 dsc-5    1070.35 dsc-5    2279.26 dsc-5    0.28    0.47      Resistance Results (Wood)  2:34 PM 6.04 KOEHLER    21.66 KOEHLER    13.38 KOEHLER    28.5 KOEHLER    0.28    0.47          Stoke Volume Results Phase: Baseline     Time RVSW (gm*m) LVSW (gm*m) RVSW-I (gm*m/m2) LVSW-I (gm*m/m2)   Stroke Work Results  2:34 PM 25.91    49.8    13.92    26.75                        Hemodynamic Waveforms -- Encounter Level:    Hemodynamic Waveforms: None found at the encounter level.          Electrocardiograms    Ca   Result Notes      Component Ref Range & Units 4/25/22  3:02 PM 4/1/22 10:47 AM    Systolic Blood Pressure mmHg       Diastolic Blood Pressure mmHg       Ventricular Rate BPM 66  59     Atrial Rate   59     AR Interval ms  174     QRS Duration ms 88  88     QT ms 392  398     QTc ms 410  394     P Axis degrees  83     R AXIS degrees 5  37     T Axis degrees 3  60     Interpretation ECG  Atrial fibrillation   Minimal voltage criteria for LVH, may be normal variant   ST elevation, consider early repolarization, pericarditis, or injury   Abnormal ECG   When compared with ECG of 01-APR-2022 10:47,   Atrial fibrillation has replaced Sinus rhythm   T wave inversion now evident in Inferior leads   Confirmed by TERI SEVILLA MD LOC:JN ([id number]) on 4/26/2022 3:35:06 PM  Sinus bradycardia with Premature supraventricular complexes   Blocked  Premature supraventricular complexes   Otherwise normal ECG   When compared with ECG of 17-MAR-2022 04:10,   Sinus rhythm has replaced Atrial fibrillation   Non-specific change in ST segment in Inferior leads   T wave inversion no longer evident in Inferior leads   Nonspecific T wave abnormality no longer evident in Anterior leads   Confirmed by DESIREE GRANDE, LES LOC:JN ([id number]) on 4/1/2022 2:54:59 PM                   CC: doctors above

## 2022-06-29 PROBLEM — N18.31 CHRONIC KIDNEY DISEASE, STAGE 3A (H): Status: ACTIVE | Noted: 2022-01-01

## 2022-06-29 NOTE — LETTER
Date:June 30, 2022      Provider requested that no letter be sent. Do not send.       [facility]

## 2022-06-29 NOTE — NURSING NOTE
"Oncology Rooming Note    June 29, 2022 3:43 PM   Theo Meyers is a 77 year old male who presents for:    Chief Complaint   Patient presents with     RECHECK     Pt is here for a Repeat New Eval for MM      Initial Vitals: Blood Pressure 138/74   Pulse 50   Temperature 98  F (36.7  C) (Oral)   Respiration 16   Weight 72.8 kg (160 lb 9.6 oz)   Oxygen Saturation 100%   Body Mass Index 23.72 kg/m   Estimated body mass index is 23.72 kg/m  as calculated from the following:    Height as of 6/23/22: 1.753 m (5' 9\").    Weight as of this encounter: 72.8 kg (160 lb 9.6 oz). Body surface area is 1.88 meters squared.  No Pain (0) Comment: Data Unavailable   No LMP for male patient.  Allergies reviewed: Yes  Medications reviewed: Yes    Medications: Medication refills not needed today.  Pharmacy name entered into [employer]:    [id number] - [place], [place] - [address] AT [facility] [address]  [facility] - [place], [place] - [address] [facility]    Clinical concerns: none       Karuna Palma MA            "

## 2022-06-30 NOTE — PROGRESS NOTES
Met with patient and his wife for PICC line education. Demonstrated flushing, cap change, and use of the emergency clamp. Patient's wife was able to return demonstrate flushing the line without difficulty. They both verbalized understanding of the information given.     Literature given: Handwashing and Skin Care, Getting Ready for Your PICC, Caring for Your PICC at Home, Changing the End Cap, Flushing the Line with Heparin, Saline or Citrate.

## 2022-06-30 NOTE — PROGRESS NOTES
Patient here for work up day. Height, weight, vital signs obtained. Med/allergy review completed. Calendar reviewed and patient was educated on tests/procedures. Blood thinners assessed- instructed to hold Xarelto for at least 24hrs prior to BMBX. Labs drawn via port. Patient provided UA and COVID 19 specimen was obtained. Pt was given 24hr urine kit and instructed to turn in to lab on Tuesday 7/5. Patient discharged in the care of self to next work up appointment.  Patient aware of next appointment.     BMT Teaching Flowsheet    Theo Meyers is a 77 year old male  Diagnoses of Multiple myeloma (H), Personal history of diseases of blood and blood-forming organs, and Multiple myeloma not having achieved remission (H) were pertinent to this visit.    Teaching Topic:work up routine  Person(s) involved in teaching: Patient  Motivation Level  Asks Questions: Yes  Eager to Learn: Yes  Cooperative: Yes  Receptive (willing/able to accept information): Yes    Patient demonstrates understanding of the following:  - Reason for the appointment, diagnosis and treatment plan: Yes     - Which situations necessitate calling provider and whom to contact: Yes    Teaching concerns addressed: reviewed calendar and explained all unfamiliar tests and locations of procedures  Pt instructed to check with  daily for schedule changes    Patient instructed on hand hygiene: Yes      Instructional Materials Used/Given:verbal, copy of calendar, map of campus.     Specific Concerns: NA

## 2022-07-01 PROBLEM — J44.9 COPD (CHRONIC OBSTRUCTIVE PULMONARY DISEASE) (H): Status: ACTIVE | Noted: 2022-01-01

## 2022-07-05 NOTE — LETTER
7/5/2022         RE: Theo eMyers  [address]  [facility] [facility] [address]        Dear Colleague,    Thank you for referring your patient, Theo Meyers, to the [facility] BLOOD AND MARROW TRANSPLANT PROGRAM [place]. Please see a copy of my visit note below.    BMT ONC Adult Bone Marrow Biopsy Procedure Note  July 5, 2022  /73 (BP Location: Right arm, Patient Position: Sitting, Cuff Size: Adult Regular)   Pulse 54   Temp 97.5  F (36.4  C) (Oral)   Resp 16   Wt 73.6 kg (162 lb 4.8 oz)   SpO2 100%   BMI 24.74 kg/m       Learning needs assessment complete within 12 months? YES    DIAGNOSIS: MM    PROCEDURE: Unilateral Bone Marrow Biopsy and Unilateral Aspirate    [place] [facility] [place]    Patient s identification was positively verified by verbal identification and invasive procedure safety checklist was completed. Informed consent was obtained. Following the administration of no  pre-medication, patient was placed in the prone position and prepped and draped in a sterile manner. Approximately 10 cc of 1% Lidocaine was used over the left posterior iliac spine. Following this a 3 mm incision was made. Trephine bone marrow core(s) was (were) obtained from the [facility]. Bone marrow aspirates were obtained from the [facility]. Aspirates were sent for morphology, immunophenotyping, cytogenetics, molecular diagnostics process & hold and research studies. A total of approximately 46 ml of marrow was aspirated. Following this procedure a sterile dressing was applied to the bone marrow biopsy site(s). The patient was placed in the supine position to maintain pressure on the biopsy site. Post-procedure wound care instructions were given.     Complications: NO    Pre-procedural pain: 0 out of 10 on the numeric pain rating scale.     Procedural pain: 2 out of 10 on the numeric pain rating scale.     Post-procedural pain assessment: 0 out of 10 on the numeric pain rating scale.     Interventions: NO    Length  of procedure:20 minutes or less    Procedure performed by: Keyanna Denis pa-c  [phone]          Again, thank you for allowing me to participate in the care of your patient.      Sincerely,    [facility]

## 2022-07-05 NOTE — NURSING NOTE
"Oncology Rooming Note    July 5, 2022 10:48 AM   Theo Meyers is a 77 year old male who presents for:    Chief Complaint   Patient presents with     Bone Marrow Biopsy     BMBX r/t multiple myeloma.     Initial Vitals: /73 (BP Location: Right arm, Patient Position: Sitting, Cuff Size: Adult Regular)   Pulse 54   Temp 97.5  F (36.4  C) (Oral)   Resp 16   Wt 73.6 kg (162 lb 4.8 oz)   SpO2 100%   BMI 24.74 kg/m   Estimated body mass index is 24.74 kg/m  as calculated from the following:    Height as of 6/30/22: 1.725 m (5' 7.91\").    Weight as of this encounter: 73.6 kg (162 lb 4.8 oz). Body surface area is 1.88 meters squared.  No Pain (0) Comment: Data Unavailable   No LMP for male patient.  Allergies reviewed: Yes  Medications reviewed: Yes    Medications: Medication refills not needed today.  Pharmacy name entered into [facility]:    [id number] - [place], [place] - [address] AT [facility] [address] [facility]  [facility] - [place], [place] - [address] [facility]    Clinical concerns: None    Luis Stephenson RN                        " car service

## 2022-07-05 NOTE — PROGRESS NOTES
BMT ONC Adult Bone Marrow Biopsy Procedure Note  July 5, 2022  /73 (BP Location: Right arm, Patient Position: Sitting, Cuff Size: Adult Regular)   Pulse 54   Temp 97.5  F (36.4  C) (Oral)   Resp 16   Wt 73.6 kg (162 lb 4.8 oz)   SpO2 100%   BMI 24.74 kg/m       Learning needs assessment complete within 12 months? YES    DIAGNOSIS: MM    PROCEDURE: Unilateral Bone Marrow Biopsy and Unilateral Aspirate    [place] [facility] [place] [facility] [place]    Patient s identification was positively verified by verbal identification and invasive procedure safety checklist was completed. Informed consent was obtained. Following the administration of no  pre-medication, patient was placed in the prone position and prepped and draped in a sterile manner. Approximately 10 cc of 1% Lidocaine was used over the left posterior iliac spine. Following this a 3 mm incision was made. Trephine bone marrow core(s) was (were) obtained from the [facility]. Bone marrow aspirates were obtained from the [facility]. Aspirates were sent for morphology, immunophenotyping, cytogenetics, molecular diagnostics process & hold and research studies. A total of approximately 46 ml of marrow was aspirated. Following this procedure a sterile dressing was applied to the bone marrow biopsy site(s). The patient was placed in the supine position to maintain pressure on the biopsy site. Post-procedure wound care instructions were given.     Complications: NO    Pre-procedural pain: 0 out of 10 on the numeric pain rating scale.     Procedural pain: 2 out of 10 on the numeric pain rating scale.     Post-procedural pain assessment: 0 out of 10 on the numeric pain rating scale.     Interventions: NO    Length of procedure:20 minutes or less      Procedure performed by: Keyanna Denis pa-c  [phone]

## 2022-07-05 NOTE — NURSING NOTE
BMT Teaching Flowsheet   Teaching Topic: post biopsy instructions    Person(s) involved in teaching: Patient  Motivation Level  Asks Questions: Yes  Eager to Learn: Yes  Cooperative: Yes  Receptive (willing/able to accept information): Yes    Patient demonstrates understanding of the following:   - Reason for the appointment, diagnosis and treatment plan: Yes  - Knowledge of proper use of medications and conditions for which they are ordered (with special attention to potential side effects or drug interactions): Yes  - Which situations necessitate calling provider and whom to contact: Yes    Teaching concerns addressed: reviewed activity restrictions if received premeds, potential for bleeding and actions to take if develops any of the issues below    Proper use and care of (medical equipment, care aids, etc.) Yes  Pain management techniques: Yes  Patient instructed on hand hygiene: Yes  How and/when to access community resources: Yes    Infection Control:  Patient demonstrates understanding of the following:   Surgical procedure site care taught NA  Signs and symptoms of infection taught Yes  Wound care taught Yes  Central venous catheter care taught NA    Instructional Materials Used/Given: verbal, print out of post biopsy instructions.     Labs drawn via port by rn in clinic. VSS. Patient declined versed for the biopsy.    Patient supine for 30 minutes following biopsy. After 30 minutes, dressing clean, dry and intact. Vital signs stable. See DOC flow sheets for details. Left ambulatory with family member.    Luis Stephenson RN

## 2022-07-06 NOTE — PROGRESS NOTES
"CLINICAL SOCIAL WORK   PSYCHOSOCIAL ASSESSMENT  BLOOD AND MARROW TRANSPLANT SERVICE      Assessment completed on July 6, 2022  of living situation, support system, financial status, functional status, coping, stressors, need for resources and social work intervention provided as needed.  Information for this assessment was provided by Pt and wife report in addition to medical chart review and consultation with medical team.     Present at assessment: Patient,Theo \"Keith\" Mira and Cherri Meyers were present for this assessment conducted by Tammy Fenton, BMT .     Diagnosis: Multiple Myeloma (MM)    Date of Diagnosis: 6/2009    Transplant type: NK    Donor:   Unrelated NK donor    Physician: Familia Guan MD    Nurse Coordinator: Sonu Inman RN    : MARIE Cornell, [facility]     Permanent Address:   [address] [place] [id number]    Contact Information:  Pt Home Phone: [phone]  Pt Cell Phone: [phone]  Pt Email: [email or web address]  Pt's wife Cherri Phone: [phone]    Presenting Information:  Keith is a 77 year old male diagnosed with MM who presents for evaluation for NK cell infusion at the [facility]  Pt was accompanied to today's visit by his wife Cherri.     Decision Making:   Self     Health Care Directive:   Provided education     Relationship Status:    to his wife Cherri. Both indicate their relationship as stable and supportive. Cherri did note she feels the pt is more angry now and gets made at her more. Pt denies this.    Special Lodging Needs: None identified at this time. Pt lives in [place], [place] and does not need to relocate.     Family/Support System: Pt endorsed a good support system including family and close friends who will be available to support Pt throughout transplant process.     Spouse: Cherri Meyers    Children: 1 daughter Ellen Deleon    Grandchildren: 6 ranging in age from " 22-32    Parents:     Siblings: 1 brother Anant- 2 additional siblings who are     Friends: many close friends and neighbors    Caregiver: SW discussed with pt the caregiver role and expectation at length. Pt is agreeable to having a full time caregiver for a minimum of 30 days until cleared by the BMT physician. Pt's identified caregiver is his wife Cherri. Pt signed the caregiver contract which will be scanned into the EMR. Caregiver education and resources provided. No caregiver concerns identified. Pt and Pt's wife confirmed understanding caregiving requirement, including driving restrictions, as discussed during psychosocial assessment.     Name & Numbers  Cherri Meyers [phone]    Transportation Mode:  Private Car . Pt is aware of driving restrictions post-BMT and the need for the caregiver is to drive until cleared to drive by the  BMT physician. SW provided information on parking info and monthly parking pass options. Pt will utilize the [employer] security shuttle for transportation to and from the [facility] and BMT Clinic/Hospital.    Insurance:  No Insurance issues identified.  Pt denied specific insurance concerns at this time. SW reiterated information about the BMT Financial  should specific insurance questions arise as Pt moves through transplant process.     Sources of Income:  No income concerns identified  Keith is supported by his halfway and savings. Pt denied anticipation of financial hardship related to BMT at this time.  SW encouraged Pt to contact this SW for additional potential resources should financial situation change.     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  GAD7  "assessment, score was 0, which indicates no current concerns of anxiety.     We talked about how some patients may see an increase in feelings of anxiety or depression while hospitalized for extended periods along with isolation. Encouraged Keith and Cherri to let us know if they are noticing an increase in symptoms. We talked about the variety of modalities available to use as coping mechanisms (including but not limited to guided imagery, relaxation techniques, progressive muscle relaxation, counseling/talk therapy and medication).    Chemical Use: No issues identified.  Keith denied any current use of tobacco, alcohol, marijuana or other drugs. Based on the information provided, there appear to be no specific risks or concerns identified at this time.     Trauma/Loss/Abuse History: Multiple losses associated with cancer diagnosis and treatment, including health, employment, changes to physical appearance, etc.     Spirituality:  Patient identifies with meet community. Keith shared that he was raised Congregational, but does not currently practice. Keith is not interested in a blessing ceremony.     Coping: Pt noted that he is currently feeling \"positive, hopeful, and focused\".  Pt shared that his main coping mechanisms are talking with friends and family and prayer/spiritual practice.  Pt noted that he also manuela by exercise. SW and Pt discussed additional positive coping mechanisms that Pt can utilize while in the hospital.     Caregiver Coping: Pt's wife Cherri noted that she is feeling \"fearful, hopeful, worried, nervous, excited, ready to begin and focused\" at this time.  Cherri noted that she manuela by talking with friends and family, reading books and gathering educational information.     Education Provided: Transplant process expectations, Caregiver requirements, Caregiver self-care, Financial issues related to transplant, Financial resources/grants available, Common psychosocial stressors pre/post transplant, Support " group(s) available, Tour/layout of the inpatient unit/non-use of cell phones, Hospital resources available, Web site information, Resources for transplant patients and their families as well as the Clinical Social Work role.     Interventions Provided: Supportive counseling and education     Recreation/Leisure Activities:  Pt shared that he enjoys going for walks, hanging with friends, watching TV and prior he enjoyed riding horses.    Plans for Hospital Stay Leisure:  While IP he plans to watch TV and be on his phone.    Assessment and Recommendations for Team:  Pt is a 77 year old male diagnosed with MM who is here undergoing preparation for a planned NK cell infusion.     Pt is a pleasant and articulate crowe who feels comfortable communicating with the medical team. Pt is pleasant, calm and able to articulate concerns/coping mechanisms in an appropriate manner. The pt was alert, Keith was interactive, affect was full, he displayed appropriate eye contact, memory and thought processes. Pt has a strong supportive network of family and friends who are involved.     Pt will benefit from ongoing psychosocial support in regards to coping with the adjustment to the BMT process. CSW has discussed  psychosocial support options in regards to coping with the adjustment to the BMT process and support groups opportunities.      Pt has a good support system and a good caregiver plan. Pt verbalizes understanding of the transplant process and wanting to proceed. SW provided contact information and encouraged Pt to contact SW with questions, concerns, resources and for support. Per this assessment, I did not identify any barriers to this patient moving forward with transplant.        Important Information:   - Keith is not interested in a blessing ceremony    Follow up Planned:   Psychosocial support      MARIE Cornell, [facility]  Pager: [phone]  Phone: [phone]

## 2022-07-06 NOTE — LETTER
7/6/2022         RE: Theo Meyers  [address]  [facility] [address]        Dear Colleague,    Thank you for referring your patient, Theo Meyers, to the [facility] BLOOD AND MARROW TRANSPLANT PROGRAM [place]. Please see a copy of my visit note below.    Pharmacy Assessment - Pre-Stem Cell Transplant    Assessments & Recommendations:  1) With current renal function <70 mL/min, would recommend reducing fludarabine dose to 80% of the original dose.    2) Recommended patient stop taking omega-3 fatty acids and ascorbic acid now through recovery after cellular therapy.   3) Monitor platelets and consider holding Xarelto when significantly low.      History of Present Illness:  Theo Meyers is a 77 year old year old male diagnosed with multiple myeloma. He has been treated with Velcade/Dexamethasone, melphalan auto-SCT, Revlimid/Velcade/dexamethasone, Daratumumab/Pomalidomide/Dexamethasone, and Carfilzomib/dexamethasone. He is now being work up for his Cell Transplant on protocol 2020-23, which utilizes Cyclophosphamide/Fludarabine as a lymphodepletion regimen.    Pertinent labs/tests:  Viral Testing:  CMV(unknown) / HSV(-) / EBV(+) / VZV (unknown)  Ejection Fraction: 55-60% (7/1/22)  QTc: 428 msec (7/1/22)    Weights:   Wt Readings from Last 3 Encounters:   07/05/22 73.6 kg (162 lb 4.8 oz)   06/30/22 73.1 kg (161 lb 1.6 oz)   06/29/22 72.8 kg (160 lb 9.6 oz)   Ideal body weight: 68.2 kg (150 lb 5.7 oz)  Adjusted ideal body weight: 70.4 kg (155 lb 2.1 oz)  % IBW:  108%  There is no height or weight on file to calculate BMI.    Primary BMT Physician: Dr. Guan  BMT RN Coordinator:  Sonu Inman RN    Past Medical History:  Past Medical History:   Diagnosis Date     Anemia 12/13/2017     Arthritis      Bilateral inguinal hernia      Chest tube in place      Elevated INR      Glaucoma      Glaucoma      History of blood clots     DVT - left chronic     Multiple myeloma (H)     Gets chemo  infusions every 2 weeks     Pancytopenia (H)      Parapneumonic effusion      Peripheral neuropathy      Shingles 9/24/2014     Syncope      TMJ (temporomandibular joint disorder)        Medication Allergies:  Allergies   Allergen Reactions     Blood Transfusion Related (Informational Only) Other (See Comments)     Patient has a history of a clinically significant antibody against RBC antigens.  A delay in compatible RBCs may occur.        Current Medications (pre-admit):  Current Outpatient Medications   Medication Sig Dispense Refill     acyclovir (ZOVIRAX) 400 MG tablet TAKE 1 TABLET BY MOUTH TWICE DAILY 180 tablet 1     allopurinol (ZYLOPRIM) 300 MG tablet Take 1 tablet (300 mg) by mouth daily 30 tablet 3     atorvastatin (LIPITOR) 10 MG tablet Take 1 tablet (10 mg) by mouth daily 90 tablet 3     bimatoprost (LUMIGAN) 0.03 % ophthalmic solution Place 1 drop into both eyes At Bedtime.       brimonidine (ALPHAGAN P) 0.1 % ophthalmic solution Place 1 drop into both eyes every 8 hours.       calcium carbonate 1250 (500 Ca) MG CHEW Take 1 tablet by mouth daily as needed (upset stomach)       calcium carbonate-vitamin D (OYSTER SHELL CALCIUM/D) 500-200 MG-UNIT tablet Take 1 tablet by mouth daily        furosemide (LASIX) 20 MG tablet TAKE 2 TABLETS(40 MG) BY MOUTH DAILY 180 tablet 1     gabapentin (NEURONTIN) 300 MG capsule Take 1 capsule (300 mg) by mouth At Bedtime 30 capsule 3     Magnesium Oxide 250 MG TABS Take 250 mg by mouth 2 times daily       Multiple Vitamin (MULTI-VITAMIN PO) Take 1 capsule by mouth daily.       Omega-3 Fatty Acids (OMEGA 3 PO) Take 1 capsule by mouth daily TAKE AS DIRECTED       omeprazole (PRILOSEC) 20 MG DR capsule TAKE 1 CAPSULE BY MOUTH ONCE DAILY 90 capsule 3     potassium chloride ER (KLOR-CON M) 20 MEQ CR tablet Take 1 tablet (20 mEq) by mouth every other day (Patient taking differently: Take 20 mEq by mouth four times a week On Saturday, Sunday, Tuesday, Thursday)       Psyllium  (METAMUCIL PO) Take by mouth daily USE AS DIRECTED       sulfamethoxazole-trimethoprim (BACTRIM) 400-80 MG tablet Take 1 tablet by mouth daily 90 tablet 1     tamsulosin (FLOMAX) 0.4 MG capsule TAKE 1 CAPSULE BY MOUTH EVERY DAY 90 capsule 1     XARELTO ANTICOAGULANT 20 MG TABS tablet Take 1 tablet (20 mg) by mouth daily (with dinner) 90 tablet 3     acetaminophen (TYLENOL) 500 MG tablet Take 500 mg by mouth every 8 hours as needed        ascorbic acid 1000 MG TABS tablet Take 2,000 mg by mouth daily       cyanocobalamin (CYANOCOBALAMIN) 1000 MCG/ML injection Inject 1 mL into the muscle every 30 days (Patient not taking: No sig reported)       dextromethorphan-guaiFENesin (MUCINEX DM)  MG 12 hr tablet Take 1 tablet by mouth daily as needed for cough (Patient not taking: No sig reported)       fluticasone (FLONASE) 50 MCG/ACT nasal spray Spray 2 sprays into both nostrils daily (Patient not taking: No sig reported)         Herbal Medication/Nutritional Supplements:  His wife asked if he needed to be on all his over the counter medications. We discussed these and specifically mentioned the possible risks with taking ascorbic acid (antioxidizer) and omega-3 fatty acids (platelet inhibition) during the cellular therapy process. Recommended to stop taking these two medications for now, but that the rest would be okay.    Smoking/Past Drug Use:  Former smoker    Nausea/Vomiting, Pain, or other issues:  Did not mention specific nausea or pain medications that have helped in the past.    Summary:  I met with Theo Meyers for approximately 45 minutes. We discussed allergies, home medications, lymphodepletion regimen (Cytoxan, Fludarabine), anti-infective prophylaxis (acyclovir, levofloxacin, fluconazole, Bactrim),  pharmacy expectations.     Eleazar Mcrae, EvieD.        Again, thank you for allowing me to participate in the care of your patient.      Sincerely,    BMT Pharm D, [facility]

## 2022-07-06 NOTE — PROGRESS NOTES
BMT  informed by MIRTA Maldonado that pt does not need a call today due to no insurance coverage for transplant at this time. Will reschedule if pt approved for transplant.     MARIE Cornell, [facility]  Phone [phone]  Pager [phone]

## 2022-07-06 NOTE — LETTER
7/6/2022         RE: Theo Meyers  [address]  [facility] [address]        Dear Colleague,    Thank you for referring your patient, Theo Meyers, to the [facility] BLOOD AND MARROW TRANSPLANT PROGRAM [place]. Please see a copy of my visit note below.    BMT Teaching Flowsheet    Theo Meyers is a 77 year old male  Diagnoses of Multiple myeloma (H) and Personal history of diseases of blood and blood-forming organs were pertinent to this visit.    Teaching Topic:     Person(s) involved in teaching: Patient and Spouse  Motivation Level  Asks Questions: Yes  Eager to Learn: Yes  Cooperative: Yes  Receptive (willing/able to accept information): Yes  Any cultural factors/Voodoo beliefs that may influence understanding or compliance? No    Patient and Caregiver demonstrates understanding of the following:  - Reason for the appointment, diagnosis and treatment plan: Yes  - Knowledge of proper use of medications and conditions for which they are ordered (with special attention to potential side effects or drug interactions): Yes  - Which situations necessitate calling provider and whom to contact: Yes    Teaching concerns addressed: None    Proper use and care of (medical equipment, care aids, etc.) NA  Pain management techniques: NA  Patient instructed on hand hygiene: Yes  How and/when to access community resources: Yes    Infection Control:  Patient and Caregiver demonstrates understanding of the following:  Surgical procedure site care taught NA  Signs and symptoms of infection taught Yes  Wound care taught NA  Central venous catheter care taught NA    Instructional Materials Used/Given:   Patient was given and reviewed BMT Teaching Binder, including medication pamphlets, sample treatment calendars, consents, contact phone numbers, hospital and discharge guidelines.  Patient verbalizes understanding of the material and was encouraged to call with any additional questions.  .    For [place]ri[place]/Yescarta/CAR-T patient wallet card given. Patient instructed to remain within close proximity (at least two hours) for at least four weeks post infusion. CAR-T patient is instructed not to operate motorized vehicle or heavy machinery for a period of 8 weeks.    Research participant Theo HURT Mira was provided the information on the Research Participant Information Sheet by Sonu Inman RN on July 6, 2022. This document contains information regarding the risks of participating in a research study during the COVID-19 pandemic. Receipt of the information sheet was confirmed by study personnel prior to the visit.    Time spent with patient: 60 minutes.    Specific Concerns: NA          Again, thank you for allowing me to participate in the care of your patient.      Sincerely,    BMT Nurse Coordinator

## 2022-07-06 NOTE — PROGRESS NOTES
BMT Teaching Flowsheet    Theo Meyers is a 77 year old male  Diagnoses of Multiple myeloma (H) and Personal history of diseases of blood and blood-forming organs were pertinent to this visit.    Teaching Topic:     Person(s) involved in teaching: Patient and Spouse  Motivation Level  Asks Questions: Yes  Eager to Learn: Yes  Cooperative: Yes  Receptive (willing/able to accept information): Yes  Any cultural factors/Temple beliefs that may influence understanding or compliance? No    Patient and Caregiver demonstrates understanding of the following:  - Reason for the appointment, diagnosis and treatment plan: Yes  - Knowledge of proper use of medications and conditions for which they are ordered (with special attention to potential side effects or drug interactions): Yes  - Which situations necessitate calling provider and whom to contact: Yes    Teaching concerns addressed: None    Proper use and care of (medical equipment, care aids, etc.) NA  Pain management techniques: NA  Patient instructed on hand hygiene: Yes  How and/when to access community resources: Yes    Infection Control:  Patient and Caregiver demonstrates understanding of the following:  Surgical procedure site care taught NA  Signs and symptoms of infection taught Yes  Wound care taught NA  Central venous catheter care taught NA    Instructional Materials Used/Given:   Patient was given and reviewed BMT Teaching Binder, including medication pamphlets, sample treatment calendars, consents, contact phone numbers, hospital and discharge guidelines.  Patient verbalizes understanding of the material and was encouraged to call with any additional questions. .    For [place]ymri[place]/Yescarta/CAR-T patient wallet card given. Patient instructed to remain within close proximity (at least two hours) for at least four weeks post infusion. CAR-T patient is instructed not to operate motorized vehicle or heavy machinery for a period of 8 weeks.    Research  participant Theo HURT Mira was provided the information on the Research Participant Information Sheet by Sonu Inman RN on July 6, 2022. This document contains information regarding the risks of participating in a research study during the COVID-19 pandemic. Receipt of the information sheet was confirmed by study personnel prior to the visit.    Time spent with patient: 60 minutes.      Specific Concerns: NA

## 2022-07-06 NOTE — PROGRESS NOTES
Pharmacy Assessment - Pre-Stem Cell Transplant    Assessments & Recommendations:  1) With current renal function <70 mL/min, would recommend reducing fludarabine dose to 80% of the original dose.    2) Recommended patient stop taking omega-3 fatty acids and ascorbic acid now through recovery after cellular therapy.   3) Monitor platelets and consider holding Xarelto when significantly low.      History of Present Illness:  Theo Meyers is a 77 year old year old male diagnosed with multiple myeloma. He has been treated with Velcade/Dexamethasone, melphalan auto-SCT, Revlimid/Velcade/dexamethasone, Daratumumab/Pomalidomide/Dexamethasone, and Carfilzomib/dexamethasone. He is now being work up for his Cell Transplant on protocol 2020-23, which utilizes Cyclophosphamide/Fludarabine as a lymphodepletion regimen.    Pertinent labs/tests:  Viral Testing:  CMV(unknown) / HSV(-) / EBV(+) / VZV (unknown)  Ejection Fraction: 55-60% (7/1/22)  QTc: 428 msec (7/1/22)    Weights:   Wt Readings from Last 3 Encounters:   07/05/22 73.6 kg (162 lb 4.8 oz)   06/30/22 73.1 kg (161 lb 1.6 oz)   06/29/22 72.8 kg (160 lb 9.6 oz)   Ideal body weight: 68.2 kg (150 lb 5.7 oz)  Adjusted ideal body weight: 70.4 kg (155 lb 2.1 oz)  % IBW:  108%  There is no height or weight on file to calculate BMI.    Primary BMT Physician: Dr. Guan  BMT RN Coordinator:  Sonu Inman RN    Past Medical History:  Past Medical History:   Diagnosis Date     Anemia 12/13/2017     Arthritis      Bilateral inguinal hernia      Chest tube in place      Elevated INR      Glaucoma      Glaucoma      History of blood clots     DVT - left chronic     Multiple myeloma (H)     Gets chemo infusions every 2 weeks     Pancytopenia (H)      Parapneumonic effusion      Peripheral neuropathy      Shingles 9/24/2014     Syncope      TMJ (temporomandibular joint disorder)        Medication Allergies:  Allergies   Allergen Reactions     Blood Transfusion Related  (Informational Only) Other (See Comments)     Patient has a history of a clinically significant antibody against RBC antigens.  A delay in compatible RBCs may occur.        Current Medications (pre-admit):  Current Outpatient Medications   Medication Sig Dispense Refill     acyclovir (ZOVIRAX) 400 MG tablet TAKE 1 TABLET BY MOUTH TWICE DAILY 180 tablet 1     allopurinol (ZYLOPRIM) 300 MG tablet Take 1 tablet (300 mg) by mouth daily 30 tablet 3     atorvastatin (LIPITOR) 10 MG tablet Take 1 tablet (10 mg) by mouth daily 90 tablet 3     bimatoprost (LUMIGAN) 0.03 % ophthalmic solution Place 1 drop into both eyes At Bedtime.       brimonidine (ALPHAGAN P) 0.1 % ophthalmic solution Place 1 drop into both eyes every 8 hours.       calcium carbonate 1250 (500 Ca) MG CHEW Take 1 tablet by mouth daily as needed (upset stomach)       calcium carbonate-vitamin D (OYSTER SHELL CALCIUM/D) 500-200 MG-UNIT tablet Take 1 tablet by mouth daily        furosemide (LASIX) 20 MG tablet TAKE 2 TABLETS(40 MG) BY MOUTH DAILY 180 tablet 1     gabapentin (NEURONTIN) 300 MG capsule Take 1 capsule (300 mg) by mouth At Bedtime 30 capsule 3     Magnesium Oxide 250 MG TABS Take 250 mg by mouth 2 times daily       Multiple Vitamin (MULTI-VITAMIN PO) Take 1 capsule by mouth daily.       Omega-3 Fatty Acids (OMEGA 3 PO) Take 1 capsule by mouth daily TAKE AS DIRECTED       omeprazole (PRILOSEC) 20 MG DR capsule TAKE 1 CAPSULE BY MOUTH ONCE DAILY 90 capsule 3     potassium chloride ER (KLOR-CON M) 20 MEQ CR tablet Take 1 tablet (20 mEq) by mouth every other day (Patient taking differently: Take 20 mEq by mouth four times a week On Saturday, Sunday, Tuesday, Thursday)       Psyllium (METAMUCIL PO) Take by mouth daily USE AS DIRECTED       sulfamethoxazole-trimethoprim (BACTRIM) 400-80 MG tablet Take 1 tablet by mouth daily 90 tablet 1     tamsulosin (FLOMAX) 0.4 MG capsule TAKE 1 CAPSULE BY MOUTH EVERY DAY 90 capsule 1     XARELTO ANTICOAGULANT 20 MG  TABS tablet Take 1 tablet (20 mg) by mouth daily (with dinner) 90 tablet 3     acetaminophen (TYLENOL) 500 MG tablet Take 500 mg by mouth every 8 hours as needed        ascorbic acid 1000 MG TABS tablet Take 2,000 mg by mouth daily       cyanocobalamin (CYANOCOBALAMIN) 1000 MCG/ML injection Inject 1 mL into the muscle every 30 days (Patient not taking: No sig reported)       dextromethorphan-guaiFENesin (MUCINEX DM)  MG 12 hr tablet Take 1 tablet by mouth daily as needed for cough (Patient not taking: No sig reported)       fluticasone (FLONASE) 50 MCG/ACT nasal spray Spray 2 sprays into both nostrils daily (Patient not taking: No sig reported)         Herbal Medication/Nutritional Supplements:  His wife asked if he needed to be on all his over the counter medications. We discussed these and specifically mentioned the possible risks with taking ascorbic acid (antioxidizer) and omega-3 fatty acids (platelet inhibition) during the cellular therapy process. Recommended to stop taking these two medications for now, but that the rest would be okay.    Smoking/Past Drug Use:  Former smoker    Nausea/Vomiting, Pain, or other issues:  Did not mention specific nausea or pain medications that have helped in the past.    Summary:  I met with Theo Meyers for approximately 45 minutes. We discussed allergies, home medications, lymphodepletion regimen (Cytoxan, Fludarabine), anti-infective prophylaxis (acyclovir, levofloxacin, fluconazole, Bactrim),  pharmacy expectations.     Eleazar Mcrae, PharmD.

## 2022-07-07 NOTE — NURSING NOTE
"Oncology Rooming Note    July 7, 2022 1:17 PM   Theo Meyers is a 77 year old male who presents for:    Chief Complaint   Patient presents with     Oncology Clinic Visit     Multiple Myeloma     Initial Vitals: /69 (BP Location: Right arm, Patient Position: Sitting, Cuff Size: Adult Regular)   Pulse 58   Temp 97.6  F (36.4  C) (Oral)   Wt 72.9 kg (160 lb 11.2 oz)   SpO2 100%   BMI 24.50 kg/m   Estimated body mass index is 24.5 kg/m  as calculated from the following:    Height as of 6/30/22: 1.725 m (5' 7.91\").    Weight as of this encounter: 72.9 kg (160 lb 11.2 oz). Body surface area is 1.87 meters squared.  No Pain (0) Comment: Data Unavailable   No LMP for male patient.  Allergies reviewed: Yes  Medications reviewed: Yes    Medications: Medication refills not needed today.  Pharmacy name entered into [facility]:    [id number] - [place], [place] - [address] [facility] [address] [place]  [facility] - [place], [place] - [address]    Clinical concerns: none.       Elvis Vargas"

## 2022-07-07 NOTE — PROGRESS NOTES
Infusion Nursing Note:  BertBLU Meyers presents today for add-on infusion.    Patient seen by provider today: Yes: Dr. Guan   present during visit today: Not Applicable.        Intravenous Access:  Implanted Port.    Treatment Conditions:  Per Provider order, Patient received an add-on IV fluid infusion over two hours for a recent elevation in his creatinine. After the infusion, a creatinine level was drawn and sent to lab.    Post Infusion Assessment:  Patient tolerated infusion without incident.     Discharge Plan:   Patient discharged in stable condition accompanied by: wife.      BARBARA SAENZ RN

## 2022-07-07 NOTE — LETTER
7/7/2022         RE: Theo Meyers  [address]  [facility] [address]        Dear Colleague,    Thank you for referring your patient, Theo Meyers, to the [facility] BLOOD AND MARROW TRANSPLANT PROGRAM [place]. Please see a copy of my visit note below.    Infusion Nursing Note:  Theo Meyers presents today for add-on infusion.    Patient seen by provider today: Yes: Dr. Guan   present during visit today: Not Applicable.        Intravenous Access:  Implanted Port.    Treatment Conditions:  Per Provider order, Patient received an add-on IV fluid infusion over two hours for a recent elevation in his creatinine. After the infusion, a creatinine level was drawn and sent to lab.    Post Infusion Assessment:  Patient tolerated infusion without incident.     Discharge Plan:   Patient discharged in stable condition accompanied by: wife.      BARBARA SAENZ RN                          Again, thank you for allowing me to participate in the care of your patient.        Sincerely,        [facility]

## 2022-07-07 NOTE — LETTER
7/7/2022         RE: Theo Meyers  [address]  [facility] [address]        Dear Colleague,    Thank you for referring your patient, Theo Meyers, to the [facility] [place]. Please see a copy of my visit note below.    BMT Daily Progress Note    Active Medical Management Issues:     Hematologic history:  Multiple myeloma diagnosed in 2009, IgG kappa but evolving to light chain secretory, del 13q, t(11;14).       Date Treatment Response Toxicities/Complications   7/2009 Velcade/Dex x 8 cycles VGPR     4/2010 HD-Jennifer/PBSCT CR     6/2010 Rev maintenance       10/2012 RVD   cytopenias   9/2012 RVD (q 2wk velcade) Long remission     10/2020 Brianne/pom/dex Recent progresson     4/6/2022 Carf/dex  ID - held Possible cardiac toxicity                             Chief Complaint: Follow up refractory multiple myeloma    HPI: Keith returns for his final evaluation prior to beginning therapy.  He continues to feel improved since coming off the carfilzomib, or at least that is when he dates his general sense of improvement.  He is now walking 2 miles without difficulty and timing his walks to be 33 minutes.  This compares to 2 mile walk in the 40 to 50-minute range just a few months ago.  He is walking with minimal discomfort and without needing to stop or rest.  He is also active around the house without difficulty.  He has no pain, no new bone pain, joint pain, headaches, dizziness, neurologic change.  He is eating well.    PS: ECOG 1, KPS 80, NYHA class I     Review of Systems   Constitutional: Positive for malaise/fatigue. Negative for chills, diaphoresis, fever and weight loss.   HENT: Negative.  Negative for nosebleeds, sinus pain, sore throat and tinnitus.    Eyes: Negative.  Negative for blurred vision, photophobia and pain.   Respiratory: Negative.  Negative for cough, sputum production and shortness of breath.    Cardiovascular: Negative.  Negative for chest  pain, palpitations, orthopnea, leg swelling and PND.   Gastrointestinal: Negative.  Negative for constipation and diarrhea.   Genitourinary: Negative.    Musculoskeletal: Negative.  Negative for back pain and neck pain.   Skin: Negative for itching and rash.   Neurological: Negative.  Negative for dizziness, sensory change, focal weakness, weakness and headaches.   Endo/Heme/Allergies: Negative.  Does not bruise/bleed easily.   Psychiatric/Behavioral: Negative.  Negative for depression and memory loss. The patient is not nervous/anxious.          Physical Exam:     Vital Signs: /69 (BP Location: Right arm, Patient Position: Sitting, Cuff Size: Adult Regular)   Pulse 58   Temp 97.6  F (36.4  C) (Oral)   Wt 72.9 kg (160 lb 11.2 oz)   SpO2 100%   BMI 24.50 kg/m       Physical Exam  Constitutional:       General: He is not in acute distress.     Appearance: Normal appearance. He is normal weight. He is not ill-appearing.   HENT:      Head: Normocephalic.      Nose: Nose normal. No congestion.      Mouth/Throat:      Mouth: Mucous membranes are moist.      Pharynx: Oropharynx is clear. No oropharyngeal exudate or posterior oropharyngeal erythema.   Eyes:      Conjunctiva/sclera: Conjunctivae normal.      Comments: Small ecchymosis under right eye   Cardiovascular:      Rate and Rhythm: Normal rate and regular rhythm.      Pulses: Normal pulses.      Heart sounds: Murmur heard.   Pulmonary:      Effort: Pulmonary effort is normal. No respiratory distress.      Breath sounds: Normal breath sounds. No rhonchi or rales.   Abdominal:      General: Abdomen is flat.      Palpations: Abdomen is soft.      Tenderness: There is no abdominal tenderness.   Musculoskeletal:         General: No swelling. Normal range of motion.      Cervical back: Normal range of motion and neck supple.      Comments: Trace edema   Lymphadenopathy:      Cervical: No cervical adenopathy.   Skin:     General: Skin is warm.      Findings: No  bruising or rash.   Neurological:      General: No focal deficit present.      Mental Status: He is alert and oriented to person, place, and time. Mental status is at baseline.      Cranial Nerves: No cranial nerve deficit.      Sensory: No sensory deficit.      Motor: No weakness.      Coordination: Coordination normal.   Psychiatric:         Mood and Affect: Mood normal.         Thought Content: Thought content normal.         Judgment: Judgment normal.       Blood Counts       Recent Labs   Lab Test 07/05/22  1100 06/30/22  0952 06/23/22  1444 06/23/22  1441 06/21/22  0923 06/01/22  0820 05/20/22  1255 05/18/22  0810 05/11/22  0830   HGB 8.5* 8.4* 8.8*   < > 7.8* 8.3*   < > 8.8* 8.9*   HCT 26.6* 26.9*  --   --  25.2* 27.1*   < > 27.8* 27.3*   WBC 2.3* 4.2  --   --  4.8 3.2*   < > 5.3 3.9*   ANEUTAUTO  --   --   --   --   --  2.7  --  4.5 3.0   ALYMPAUTO  --   --   --   --   --  0.1*  --  0.2* 0.2*   AMONOAUTO  --   --   --   --   --  0.4  --  0.7 0.6   AEOSAUTO  --   --   --   --   --  0.0  --  0.0 0.0   ABSBASO  --   --   --   --   --  0.0  --  0.0 0.0   NRBCMAN  --   --   --   --   --  0.0  --  0.0 0.0   * 142*  --   --  139* 160   < > 67* 62*    < > = values in this interval not displayed.         Recent Labs   Lab Test 06/30/22 0952   ABORH A POS       Chemistries     Basic Panel  Recent Labs   Lab Test 07/07/22  1605 07/05/22  0730 06/30/22  0952 06/23/22  1039     --  145* 142   POTASSIUM 3.8  --  4.4 4.4   CHLORIDE 114*  --  111* 108*   CO2 22  --  24 21*   BUN 30  --  25 31.7*   CR 1.48*  1.47* 1.47* 1.58* 1.72*   *  --  112* 89        Calcium, Magnesium, Phosphorus  Recent Labs   Lab Test 07/07/22  1605 06/30/22  0952 06/23/22  1039 07/08/19  0511 06/28/19  0421 06/27/19  0501   NORMAN 8.2* 8.5 8.8   < >  --   --    MAG  --  1.8  --   --  1.9 1.1*   PHOS  --  3.8  --   --   --   --     < > = values in this interval not displayed.        LFTs  Recent Labs   Lab Test 06/30/22 0952  06/23/22  1039 06/21/22  0923   BILITOTAL 0.5  0.5 0.7 0.8   ALKPHOS 71 97 72   AST 17 25 39   ALT 31 53* 85*   ALBUMIN 3.2* 3.8 3.2*       LDH  Recent Labs   Lab Test 06/30/22  0952          B2-Microglobulin  Recent Labs   Lab Test 06/30/22  0952   [id number] 5.3*       Urine Studies       Recent Labs   Lab Test 06/30/22  0952   COLOR Yellow   APPEARANCE Slightly Cloudy*   URINEGLC Negative   URINEBILI Negative   URINEKETONE Negative   SG 1.013   UBLD Negative   URINEPH 5.0   PROTEIN Negative   UUROI Normal   NITRITE Negative   LEUKEST Negative   MUCUS Present*   RBCU 0   WBCU 1       Creatinine Clearance    Recent Labs   Lab Test 07/05/22  0730      WT 73.0   RAW 45   STD 42   VOL 1,121   DUR 24.0   UCRR 85   UCR24 0.95*         Immunoglobulins     Recent Labs   Lab Test 06/30/22  0952   IGG 86*       Recent Labs   Lab Test 06/30/22  0952 06/23/21  0801 04/28/21  0802 03/17/21  1245 01/20/21  0801 12/23/20  0806   IGA 2* 13* 12* 18* 8* 6*       Recent Labs   Lab Test 06/30/22  0952 06/23/21  0801 04/28/21  0802 03/17/21  1245 01/20/21  0801 12/23/20  0806   IGM <10* <5* <5* 8* <5* <5*         Monocloncal Protein Studies     M spike    Recent Labs   Lab Test 06/30/22  0952 05/25/22  0844 02/19/20  0759   ELPM 0.1* 0.2 0.1       [place] [facility]    Recent Labs   Lab Test 06/30/22  0952 05/25/22  0844 04/27/22  0803 04/06/22  0809 02/23/22  0811 01/26/22  0805   KFLCA 88.26* 29.02* 38.53* 100.90* 57.39* 56.66*       [facility]    Recent Labs   Lab Test 06/30/22  0952 05/25/22  0844 04/27/22  0803 04/06/22  0809 02/23/22  0811 01/26/22  0805   LFLCA 0.17* 0.14* 0.15* 0.29* 0.62 0.53*       FLC Ratio    Recent Labs   Lab Test 06/30/22  0952 05/25/22  0844 04/27/22  0803 04/06/22  0809 02/23/22  0811 01/26/22  0805   KLRA 519.18* 207.29* 256.87* 347.93* 92.56* 106.91*          GFR = 49 mL/min/1.73 m2 X 1.87 m2 = 53.5 mL/min    Bone Marrow Biopsy       Morphology    Pending        Flow Cytometry    Flow  Interpretation   A. Iliac Crest, Bone Marrow Aspirate, Left:  - Kappa-monotypic plasma cells  - Polytypic B cells  - See comment   Electronically signed by Ayah Hook MD on 7/6/2022 at  6:02 PM   Comment    The immunophenotypic findings are consistent with recurrent/persistent plasma cell neoplasm.  Final interpretation requires correlation with the results of other ancillary studies and the morphologic and clinical features.        PET Scan       Results for orders placed during the hospital encounter of 07/01/22    PET ONCOLOGY WHOLE BODY    Status: Normal 7/1/2022    Narrative  Combined Report of:    PET and CT on  7/1/2022 9:16 AM :    1. PET of the neck, chest, abdomen, and pelvis.  2. PET CT Fusion for Attenuation Correction and Anatomical  Localization:  3. 3D MIP and PET-CT fused images were processed on an independent  workstation and archived to PACS and reviewed by a radiologist.    Technique:    1. PET: The patient received 10.12 mCi of F-18-FDG; the serum glucose  was 90 prior to administration, body weight was 73.1 kg. Images were  evaluated in the axial, sagittal, and coronal planes as well as the  rotational whole body MIP. Images were acquired from the Vertex to the  Feet.    UPTAKE WAS MEASURED AT 60 MINUTES.    BACKGROUND:  Liver SUV max= 3.45,   Aorta Blood SUV Max: 3.31.    2. CT: Volumetric acquisition for clinical interpretation of the  chest, abdomen, and pelvis acquired at 3 mm sections. The chest,  abdomen, and pelvis were evaluated at 5 mm sections in bone, soft  tissue, and lung windows.    3. 3D MIP and PET-CT fused images were processed on an independent  workstation and archived to PACS and reviewed by a radiologist.      INDICATION: Multiple myeloma (H); Personal history of diseases of  blood and blood-forming organs    ADDITIONAL INFORMATION OBTAINED FROM EMR: 77-year-old male diagnosed  with IgG Kappa light chain myeloma in 2009. Initial cytogenetic  analysis showed a  deletion 13q. There was also on 11;14 translocation.  ?Past immunofixations from 2011 show IgG-kappa. Bone marrow biopsy  from January 26, 2022: Hypocellular marrow involved with multiple  myeloma at 20 to 30%. Findings are consistent wth persistent plasma  cell myeloma, reportedly characterized at diagnosis (study performed  at an outside institution) by an IGH-CCND1 fusion and monosomy 13. The  population of cells with gains of two extra IGH-CCND1 fusion signals  is apparently newly arisen and would be consistent with cytogenetic  evolution. Carfilzomib/dexamethasone started4/6/22. Days 1, 2, 8 9,  15, 16.      COMPARISON: 1/27/2022.    FINDINGS:    HEAD/NECK:  There is no  suspicious FDG uptake in the neck.    No masses, mass effect or pathologically enlarged lymph nodes are  evident. The thyroid gland is unremarkable.    CHEST:  There are no pathologically enlarged mediastinal, hilar or axillary  lymph nodes.    Approximately 6 patches of nodular groundglass in the right upper lobe  (series 4 image 129) new since 1/27/2022 with mild FDG uptake.  Bilateral trace pleural effusions.  Bibasilar atelectasis. Calcified  granuloma right upper lobe. Severe calcifications of the aorta and  coronary arteries. Right chest wall Port-A-Cath in stable position.  There is dilation of the left atrium and right atrium with increased  FDG uptake.    ABDOMEN AND PELVIS:  There is no suspicious FDG uptake in the abdomen or pelvis.    There are no suspicious hepatic lesions. There is no splenomegaly or  evidence for splenic or pancreatic mass lesion.  There are no suspicious adrenal mass lesions or opaque gallbladder  calculi. There is symmetric nephrographic renal phase without  hydronephrosis. Nonobstructing renal calculi in the right inferior  pole, and left mid pole. Pelvic phleboliths.      LOWER EXTREMITIES:  No abnormal masses or hypermetabolic lesions. Enchondroma in the right  proximal humerus.    BONES/MUSCLES:  Persistent  lytic lesions in the right scapula with increased FDG  uptake with an SUV max of 8.05 (series 4, image 103), prior 5.9.  Left femur intertrochanteric foci of hypermetabolism max SUV 5.2,  prior 1.9.  C3 right lamina new foci of hypermetabolism max SUV 4.7.    There is new focal FDG uptake in the soft tissues of the left shoulder  with an SUV max of 5.31 (series 4, image 116), likely physiologic. No  additional abnormal FDG uptake in the skeleton . Stable non-FDG avid  lucent lesion in the right posterior iliac bone and left pubic bone.  Degenerative changes of the spine. Stable T6 vertebral compression  fracture deformity with intervertebral cement.    Impression  IMPRESSION:  In this patient with multiple myeloma diagnosed in 2009, there has  been disease progression since PET/CT 1/27/2022:  1. Multiple myeloma summary:  - Most avid lesion in the right scapula increased. New hypermetabolic  uptake in C3 and increased uptake in the intertrochanteric left femur.  - no extramedullary or para-medullary disease.    2. Incidentally noted left and right atrial uptake consistent with  patient's known elevated  filling pressures and atrial fibrillation.  3. Right upper lobe approximately 6 small groundglass nodules, favor  infection.    I have personally reviewed the examination and initial interpretation  and I agree with the findings.    ANABELLE CANDELARIA MD      SYSTEM ID:  [id number]         Assessment Plans:     Multiple myeloma- today I reviewed with him the -101 clinical trial which would include the use of daratumumab with a genetically modified natural killer cell product.  Reviewed the risk and the benefits which include possible [id number] disease, cytokine release syndrome, pancytopenia, or side effects from the lymph depleting chemotherapy.  The potential benefit is a remission from his myeloma.  I described our experience with this study treatment is favorable with relatively low risk of toxicity  relative to treatment with Abecma. I believe this is a good option for him.   I reviewed the clinical trial procedures, requirements, and other aspect of the clinical trial and at the conclusion of my discussion, he wishes to proceed.     FABIENNE VALDIVIA MD  July 8, 2022                Again, thank you for allowing me to participate in the care of your patient.        Sincerely,        FABIENNE VALDIVIA MD

## 2022-07-07 NOTE — PROGRESS NOTES
BMT Daily Progress Note    Active Medical Management Issues:     Hematologic history:  Multiple myeloma diagnosed in 2009, IgG kappa but evolving to light chain secretory, del 13q, t(11;14).       Date Treatment Response Toxicities/Complications   7/2009 Velcade/Dex x 8 cycles VGPR     4/2010 HD-Jennifer/PBSCT CR     6/2010 Rev maintenance       10/2012 RVD   cytopenias   9/2012 RVD (q 2wk velcade) Long remission     10/2020 Brianne/pom/dex Recent progresson     4/6/2022 Carf/dex  IA - held Possible cardiac toxicity                             Chief Complaint: Follow up refractory multiple myeloma    HPI: Keith returns for his final evaluation prior to beginning therapy.  He continues to feel improved since coming off the carfilzomib, or at least that is when he dates his general sense of improvement.  He is now walking 2 miles without difficulty and timing his walks to be 33 minutes.  This compares to 2 mile walk in the 40 to 50-minute range just a few months ago.  He is walking with minimal discomfort and without needing to stop or rest.  He is also active around the house without difficulty.  He has no pain, no new bone pain, joint pain, headaches, dizziness, neurologic change.  He is eating well.    PS: ECOG 1, KPS 80, NYHA class I     Review of Systems   Constitutional: Positive for malaise/fatigue. Negative for chills, diaphoresis, fever and weight loss.   HENT: Negative.  Negative for nosebleeds, sinus pain, sore throat and tinnitus.    Eyes: Negative.  Negative for blurred vision, photophobia and pain.   Respiratory: Negative.  Negative for cough, sputum production and shortness of breath.    Cardiovascular: Negative.  Negative for chest pain, palpitations, orthopnea, leg swelling and PND.   Gastrointestinal: Negative.  Negative for constipation and diarrhea.   Genitourinary: Negative.    Musculoskeletal: Negative.  Negative for back pain and neck pain.   Skin: Negative for itching and rash.   Neurological: Negative.   Negative for dizziness, sensory change, focal weakness, weakness and headaches.   Endo/Heme/Allergies: Negative.  Does not bruise/bleed easily.   Psychiatric/Behavioral: Negative.  Negative for depression and memory loss. The patient is not nervous/anxious.          Physical Exam:     Vital Signs: /69 (BP Location: Right arm, Patient Position: Sitting, Cuff Size: Adult Regular)   Pulse 58   Temp 97.6  F (36.4  C) (Oral)   Wt 72.9 kg (160 lb 11.2 oz)   SpO2 100%   BMI 24.50 kg/m       Physical Exam  Constitutional:       General: He is not in acute distress.     Appearance: Normal appearance. He is normal weight. He is not ill-appearing.   HENT:      Head: Normocephalic.      Nose: Nose normal. No congestion.      Mouth/Throat:      Mouth: Mucous membranes are moist.      Pharynx: Oropharynx is clear. No oropharyngeal exudate or posterior oropharyngeal erythema.   Eyes:      Conjunctiva/sclera: Conjunctivae normal.      Comments: Small ecchymosis under right eye   Cardiovascular:      Rate and Rhythm: Normal rate and regular rhythm.      Pulses: Normal pulses.      Heart sounds: Murmur heard.   Pulmonary:      Effort: Pulmonary effort is normal. No respiratory distress.      Breath sounds: Normal breath sounds. No rhonchi or rales.   Abdominal:      General: Abdomen is flat.      Palpations: Abdomen is soft.      Tenderness: There is no abdominal tenderness.   Musculoskeletal:         General: No swelling. Normal range of motion.      Cervical back: Normal range of motion and neck supple.      Comments: Trace edema   Lymphadenopathy:      Cervical: No cervical adenopathy.   Skin:     General: Skin is warm.      Findings: No bruising or rash.   Neurological:      General: No focal deficit present.      Mental Status: He is alert and oriented to person, place, and time. Mental status is at baseline.      Cranial Nerves: No cranial nerve deficit.      Sensory: No sensory deficit.      Motor: No weakness.       Coordination: Coordination normal.   Psychiatric:         Mood and Affect: Mood normal.         Thought Content: Thought content normal.         Judgment: Judgment normal.       Blood Counts       Recent Labs   Lab Test 07/05/22  1100 06/30/22  0952 06/23/22  1444 06/23/22  1441 06/21/22  0923 06/01/22  0820 05/20/22  1255 05/18/22  0810 05/11/22  0830   HGB 8.5* 8.4* 8.8*   < > 7.8* 8.3*   < > 8.8* 8.9*   HCT 26.6* 26.9*  --   --  25.2* 27.1*   < > 27.8* 27.3*   WBC 2.3* 4.2  --   --  4.8 3.2*   < > 5.3 3.9*   ANEUTAUTO  --   --   --   --   --  2.7  --  4.5 3.0   ALYMPAUTO  --   --   --   --   --  0.1*  --  0.2* 0.2*   AMONOAUTO  --   --   --   --   --  0.4  --  0.7 0.6   AEOSAUTO  --   --   --   --   --  0.0  --  0.0 0.0   ABSBASO  --   --   --   --   --  0.0  --  0.0 0.0   NRBCMAN  --   --   --   --   --  0.0  --  0.0 0.0   * 142*  --   --  139* 160   < > 67* 62*    < > = values in this interval not displayed.         Recent Labs   Lab Test 06/30/22  0952   ABORH A POS       Chemistries     Basic Panel  Recent Labs   Lab Test 07/07/22  1605 07/05/22  0730 06/30/22  0952 06/23/22  1039     --  145* 142   POTASSIUM 3.8  --  4.4 4.4   CHLORIDE 114*  --  111* 108*   CO2 22  --  24 21*   BUN 30  --  25 31.7*   CR 1.48*  1.47* 1.47* 1.58* 1.72*   *  --  112* 89        Calcium, Magnesium, Phosphorus  Recent Labs   Lab Test 07/07/22  1605 06/30/22  0952 06/23/22  1039 07/08/19  0511 06/28/19  0421 06/27/19  0501   NORMAN 8.2* 8.5 8.8   < >  --   --    MAG  --  1.8  --   --  1.9 1.1*   PHOS  --  3.8  --   --   --   --     < > = values in this interval not displayed.        LFTs  Recent Labs   Lab Test 06/30/22  0952 06/23/22  1039 06/21/22  0923   BILITOTAL 0.5  0.5 0.7 0.8   ALKPHOS 71 97 72   AST 17 25 39   ALT 31 53* 85*   ALBUMIN 3.2* 3.8 3.2*       LDH  Recent Labs   Lab Test 06/30/22  0952          B2-Microglobulin  Recent Labs   Lab Test 06/30/22  0952   [id number] 5.3*       Urine Studies        Recent Labs   Lab Test 06/30/22  0952   COLOR Yellow   APPEARANCE Slightly Cloudy*   URINEGLC Negative   URINEBILI Negative   URINEKETONE Negative   SG 1.013   UBLD Negative   URINEPH 5.0   PROTEIN Negative   UUROI Normal   NITRITE Negative   LEUKEST Negative   MUCUS Present*   RBCU 0   WBCU 1       Creatinine Clearance    Recent Labs   Lab Test 07/05/22  0730      WT 73.0   RAW 45   STD 42   VOL 1,121   DUR 24.0   UCRR 85   UCR24 0.95*         Immunoglobulins     Recent Labs   Lab Test 06/30/22  0952   IGG 86*       Recent Labs   Lab Test 06/30/22  0952 06/23/21  0801 04/28/21  0802 03/17/21  1245 01/20/21  0801 12/23/20  0806   IGA 2* 13* 12* 18* 8* 6*       Recent Labs   Lab Test 06/30/22  0952 06/23/21  0801 04/28/21  0802 03/17/21  1245 01/20/21  0801 12/23/20  0806   IGM <10* <5* <5* 8* <5* <5*         Monocloncal Protein Studies     M spike    Recent Labs   Lab Test 06/30/22  0952 05/25/22  0844 02/19/20  0759   ELPM 0.1* 0.2 0.1       Cornlea FLC    Recent Labs   Lab Test 06/30/22  0952 05/25/22  0844 04/27/22  0803 04/06/22  0809 02/23/22  0811 01/26/22  0805   KFLCA 88.26* 29.02* 38.53* 100.90* 57.39* 56.66*       Lambda FLC    Recent Labs   Lab Test 06/30/22  0952 05/25/22  0844 04/27/22  0803 04/06/22  0809 02/23/22  0811 01/26/22  0805   LFLCA 0.17* 0.14* 0.15* 0.29* 0.62 0.53*       FLC Ratio    Recent Labs   Lab Test 06/30/22  0952 05/25/22  0844 04/27/22  0803 04/06/22  0809 02/23/22  0811 01/26/22  0805   KLRA 519.18* 207.29* 256.87* 347.93* 92.56* 106.91*          GFR = 49 mL/min/1.73 m2 X 1.87 m2 = 53.5 mL/min    Bone Marrow Biopsy       Morphology    Pending        Flow Cytometry    Flow Interpretation   A. Iliac Crest, Bone Marrow Aspirate, Left:  - Kappa-monotypic plasma cells  - Polytypic B cells  - See comment   Electronically signed by Ayah Hook MD on 7/6/2022 at  6:02 PM   Comment    The immunophenotypic findings are consistent with recurrent/persistent plasma cell  neoplasm.  Final interpretation requires correlation with the results of other ancillary studies and the morphologic and clinical features.        PET Scan       Results for orders placed during the hospital encounter of 07/01/22    PET ONCOLOGY WHOLE BODY    Status: Normal 7/1/2022    Narrative  Combined Report of:    PET and CT on  7/1/2022 9:16 AM :    1. PET of the neck, chest, abdomen, and pelvis.  2. PET CT Fusion for Attenuation Correction and Anatomical  Localization:  3. 3D MIP and PET-CT fused images were processed on an independent  workstation and archived to PACS and reviewed by a radiologist.    Technique:    1. PET: The patient received 10.12 mCi of F-18-FDG; the serum glucose  was 90 prior to administration, body weight was 73.1 kg. Images were  evaluated in the axial, sagittal, and coronal planes as well as the  rotational whole body MIP. Images were acquired from the Vertex to the  Feet.    UPTAKE WAS MEASURED AT 60 MINUTES.    BACKGROUND:  Liver SUV max= 3.45,   Aorta Blood SUV Max: 3.31.    2. CT: Volumetric acquisition for clinical interpretation of the  chest, abdomen, and pelvis acquired at 3 mm sections. The chest,  abdomen, and pelvis were evaluated at 5 mm sections in bone, soft  tissue, and lung windows.    3. 3D MIP and PET-CT fused images were processed on an independent  workstation and archived to PACS and reviewed by a radiologist.      INDICATION: Multiple myeloma (H); Personal history of diseases of  blood and blood-forming organs    ADDITIONAL INFORMATION OBTAINED FROM EMR: 77-year-old male diagnosed  with IgG Kappa light chain myeloma in 2009. Initial cytogenetic  analysis showed a deletion 13q. There was also on 11;14 translocation.  ?Past immunofixations from 2011 show IgG-kappa. Bone marrow biopsy  from January 26, 2022: Hypocellular marrow involved with multiple  myeloma at 20 to 30%. Findings are consistent wth persistent plasma  cell myeloma, reportedly characterized at  diagnosis (study performed  at an outside institution) by an IGH-CCND1 fusion and monosomy 13. The  population of cells with gains of two extra IGH-CCND1 fusion signals  is apparently newly arisen and would be consistent with cytogenetic  evolution. Carfilzomib/dexamethasone started4/6/22. Days 1, 2, 8 9,  15, 16.      COMPARISON: 1/27/2022.    FINDINGS:    HEAD/NECK:  There is no  suspicious FDG uptake in the neck.    No masses, mass effect or pathologically enlarged lymph nodes are  evident. The thyroid gland is unremarkable.    CHEST:  There are no pathologically enlarged mediastinal, hilar or axillary  lymph nodes.    Approximately 6 patches of nodular groundglass in the right upper lobe  (series 4 image 129) new since 1/27/2022 with mild FDG uptake.  Bilateral trace pleural effusions.  Bibasilar atelectasis. Calcified  granuloma right upper lobe. Severe calcifications of the aorta and  coronary arteries. Right chest wall Port-A-Cath in stable position.  There is dilation of the left atrium and right atrium with increased  FDG uptake.    ABDOMEN AND PELVIS:  There is no suspicious FDG uptake in the abdomen or pelvis.    There are no suspicious hepatic lesions. There is no splenomegaly or  evidence for splenic or pancreatic mass lesion.  There are no suspicious adrenal mass lesions or opaque gallbladder  calculi. There is symmetric nephrographic renal phase without  hydronephrosis. Nonobstructing renal calculi in the right inferior  pole, and left mid pole. Pelvic phleboliths.      LOWER EXTREMITIES:  No abnormal masses or hypermetabolic lesions. Enchondroma in the right  proximal humerus.    BONES/MUSCLES:  Persistent lytic lesions in the right scapula with increased FDG  uptake with an SUV max of 8.05 (series 4, image 103), prior 5.9.  Left femur intertrochanteric foci of hypermetabolism max SUV 5.2,  prior 1.9.  C3 right lamina new foci of hypermetabolism max SUV 4.7.    There is new focal FDG uptake in the  soft tissues of the left shoulder  with an SUV max of 5.31 (series 4, image 116), likely physiologic. No  additional abnormal FDG uptake in the skeleton . Stable non-FDG avid  lucent lesion in the right posterior iliac bone and left pubic bone.  Degenerative changes of the spine. Stable T6 vertebral compression  fracture deformity with intervertebral cement.    Impression  IMPRESSION:  In this patient with multiple myeloma diagnosed in 2009, there has  been disease progression since PET/CT 1/27/2022:  1. Multiple myeloma summary:  - Most avid lesion in the right scapula increased. New hypermetabolic  uptake in C3 and increased uptake in the intertrochanteric left femur.  - no extramedullary or para-medullary disease.    2. Incidentally noted left and right atrial uptake consistent with  patient's known elevated  filling pressures and atrial fibrillation.  3. Right upper lobe approximately 6 small groundglass nodules, favor  infection.    I have personally reviewed the examination and initial interpretation  and I agree with the findings.    ANABELLE CANDELARIA MD      SYSTEM ID:  [id number]         Assessment Plans:     Multiple myeloma- today I reviewed with him the -101 clinical trial which would include the use of daratumumab with a genetically modified natural killer cell product.  Reviewed the risk and the benefits which include possible [id number] disease, cytokine release syndrome, pancytopenia, or side effects from the lymph depleting chemotherapy.  The potential benefit is a remission from his myeloma.  I described our experience with this study treatment is favorable with relatively low risk of toxicity relative to treatment with Abecma. I believe this is a good option for him.   I reviewed the clinical trial procedures, requirements, and other aspect of the clinical trial and at the conclusion of my discussion, he wishes to proceed.     FABIENNE VALDIVIA MD  July 8, 2022

## 2022-07-07 NOTE — LETTER
Date:July 8, 2022      Provider requested that no letter be sent. Do not send.       [facility]

## 2022-07-14 NOTE — PATIENT INSTRUCTIONS
Return as scheduled on 7/20 for labs, provider visit and LD chemo: flu Cy  Please schedule on 7/21 or 7/22 for pentamidine in the PFT lab

## 2022-07-14 NOTE — PROGRESS NOTES
Infusion Nursing Note:  Theo Meyers presents today for scheduled infusion.    Patient seen by provider today: Yes: Sophie   present during visit today: Not Applicable.    Note: Patient received darzalex faspro subcutaneous injection per research plan. Pre and post labs obtained and sent to lab. Patient was premedicated with tylenol, benadryl, and solu-medrol. Patient was monitored for 3.5 hours post injection, no reaction noted. 500 mL bolus given for hypotension-BP improved (110/63).    Intravenous Access:  Implanted Port.    Treatment Conditions:  Results reviewed, labs MET treatment parameters, ok to proceed with treatment.    Post Infusion Assessment:  Patient tolerated infusion without incident.     Discharge Plan:   Patient and/or family verbalized understanding of discharge instructions and all questions answered.      Brynn Gunderson RN

## 2022-07-14 NOTE — NURSING NOTE
Chief Complaint   Patient presents with     Port Flush     Vitals taken, port accessed labs drawn, heparin locked, checked into next appt     BP 99/64 (BP Location: Left arm, Patient Position: Sitting, Cuff Size: Adult Regular)   Pulse 54   Temp 97.5  F (36.4  C) (Oral)   Resp 16   Wt 73.8 kg (162 lb 11.2 oz)   SpO2 100%   BMI 24.80 kg/m    Theo Burr RN on 7/14/2022 at 7:36 AM

## 2022-07-14 NOTE — LETTER
7/14/2022         RE: Theo Meyers  [address]  [facility] [address]        Dear Colleague,    Thank you for referring your patient, Theo Meyers, to the [facility] [place]. Please see a copy of my visit note below.    Infusion Nursing Note:  Theo Meyers presents today for scheduled infusion.    Patient seen by provider today: Yes: Sophie   present during visit today: Not Applicable.    Note: Patient received darzalex faspro subcutaneous injection per research plan. Pre and post labs obtained and sent to lab. Patient was premedicated with tylenol, benadryl, and solu-medrol. Patient was monitored for 3.5 hours post injection, no reaction noted. 500 mL bolus given for hypotension-BP improved (110/63).    Intravenous Access:  Implanted Port.    Treatment Conditions:  Results reviewed, labs MET treatment parameters, ok to proceed with treatment.    Post Infusion Assessment:  Patient tolerated infusion without incident.     Discharge Plan:   Patient and/or family verbalized understanding of discharge instructions and all questions answered.      Brynn Gunderson RN                          Again, thank you for allowing me to participate in the care of your patient.        Sincerely,        [facility]

## 2022-07-14 NOTE — PROGRESS NOTES
"  BMT/Cellular therapyClinic  Progress Note    MT 2020-23  Mr Theo Meyers is a 77 year old male who is Day -11 of   as Monotherapy in  in Combination with Monoclonal Antibodies in Relapsed/Refractory Multiple Myeloma     Active Medical Management Issues:     Hematologic history:  Multiple myeloma diagnosed in 2009, IgG kappa but evolving to light chain secretory, del 13q, t(11;14).       Date Treatment Response Toxicities/Complications   7/2009 Velcade/Dex x 8 cycles VGPR     4/2010 HD-Jennifer/PBSCT CR     6/2010 Rev maintenance       10/2012 RVD   cytopenias   9/2012 RVD (q 2wk velcade) Long remission     10/2020 Brianne/pom/dex Recent progresson     4/6/2022 Carf/dex  OR - held Possible cardiac toxicity                             Chief Complaint: daratumumab    HPI: Keith returns today to get daratumumab.He had issues with his first dose( including \"couldn't breathe) of brianne but no problems since that first dose. His sob with activity is getting better. He does feel some pleuretic pain with walking that is getting better.He had no dizziness/lightheadedness.He is walking although slower then before.  No fevers.  No n,v,d. No cough.  No cardiac pain, he cannot tell if he is in atrial fib. No bleeding but has some bruises that just appear on xarelto. No fevers.  He is eating well-\"maybe too much.\" No hematuria or dysuria.      ROS:     10 point ROS otherwise negative     PHYSICAL EXAM                                                                                                                                      Wt Readings from Last 4 Encounters:   07/14/22 73.8 kg (162 lb 11.2 oz)   07/07/22 72.9 kg (160 lb 11.2 oz)   07/05/22 73.6 kg (162 lb 4.8 oz)   06/30/22 73.1 kg (161 lb 1.6 oz)     Blood pressure 99/64, pulse 54, temperature 97.5  F (36.4  C), temperature source Oral, resp. rate 16, weight 73.8 kg (162 lb 11.2 oz), SpO2 100 %.    Repeat blood pressure is 110/63        General: NAD, sitting in infusion " chair Eyes: sclera anicteric   Mouth: Dry mucous membranes, no ulcers  Lungs: CTAB  Cardiovascular: andres-sometimes irregular like PVC, with murmer, heart sounds are distant   Lymphatics: slt edema  Skin: No rash but thin skin with bruises on his arms  Neuro: non-focal  Access: Port a cath:right: accessed       Blood Counts     Lab Results   Component Value Date    WBC 2.8 (L) 07/14/2022    ANEU 1.8 07/05/2022    HGB 8.4 (L) 07/14/2022    HCT 26.4 (L) 07/14/2022     (L) 07/14/2022     (H) 07/14/2022    POTASSIUM 4.2 07/14/2022    CHLORIDE 111 (H) 07/14/2022    CO2 25 07/14/2022    GLC 92 07/14/2022    BUN 25 07/14/2022    CR 1.79 (H) 07/14/2022    MAG 1.6 07/14/2022    INR 1.15 06/23/2022    BILITOTAL 0.3 07/14/2022    AST 38 07/14/2022    ALT 70 07/14/2022    ALKPHOS 81 07/14/2022    PROTTOTAL 5.0 (L) 07/14/2022    ALBUMIN 3.0 (L) 07/14/2022       I have assessed all abnormal lab values for their clinical significance and any values considered clinically significant have been addressed in the assessment and plan.        Assessment Plans:     Keith Meyers is a 77 year old man who is Day -11 before  as Monotherapy in Relapsed/Refractory Acute Myelogenous Leukemia and in Combination with Monoclonal Antibodies in Relapsed/Refractory Multiple Myeloma     1. Cellular Therapy: Relapsed refractory Multiple myeloma-  Day -11 here for daratumumab     Day-5(7/20)  Begin Flu/CY: LD chemo  Day-4:( 7/21) Flu/CY: LD chemo  Day -3: 7/22)  Flu/CY: LD chemo    Day 1: ( 7/25/2022)  PICC placement and infusion of   Remains on allopurinol  ( decrease dose due to LATANYA on CKI)    2. Heme:  Remains on Xarelto: History of blood clots, chronic and stroke prophy for atrial fib  - no transfusion  Remains pancytopenic    3. ID: afebrile  remains on acyclovir and Bactrim( hold due to LATANYA on [facility]I)  Will schedule for pentamidine on Thursday in the PFT lab, 7/21-did not have a chance to talk to him about this    4.  FEN:  Remains on supplemental potassium potassium  LATANYA on [facility]I: unclear etiology- hold bactrim and decrease dose of allopurinol- Give small bolus of fluid today( 500 ml-HF)    5. CV:   -Bradycardia  -history of atrial fib and heart failure  Remains on lasix and supplemental K    6.Neuro:  Has neuropathy in hands mainly and some in feet, neurontin    Sophie Espinal PA-C  [phone]  7/14/2022    RTC:  7/20 to start LD chemo, sooner with issues    40 minutes spent on the date of the encounter doing chart review, review of test results, interpretation of tests, patient visit and documentation

## 2022-07-14 NOTE — LETTER
"    7/14/2022         RE: Theo Meyers  [address]  [facility] [address]        Dear Colleague,    Thank you for referring your patient, Theo Meyers, to the [facility] BLOOD AND MARROW TRANSPLANT PROGRAM [facility]. Please see a copy of my visit note below.      BMT/Cellular therapyClinic  Progress Note    MT 2020-23  Mr Theo Meyers is a 77 year old male who is Day -11 of   as Monotherapy in  in Combination with Monoclonal Antibodies in Relapsed/Refractory Multiple Myeloma     Active Medical Management Issues:     Hematologic history:  Multiple myeloma diagnosed in 2009, IgG kappa but evolving to light chain secretory, del 13q, t(11;14).       Date Treatment Response Toxicities/Complications   7/2009 Velcade/Dex x 8 cycles VGPR     4/2010 HD-Jennifer/PBSCT CR     6/2010 Rev maintenance       10/2012 RVD   cytopenias   9/2012 RVD (q 2wk velcade) Long remission     10/2020 Brianne/pom/dex Recent progresson     4/6/2022 Carf/dex  AK - held Possible cardiac toxicity                             Chief Complaint: daratumumab    HPI: Keith returns today to get daratumumab.He had issues with his first dose( including \"couldn't breathe) of brianne but no problems since that first dose. His sob with activity is getting better. He does feel some pleuretic pain with walking that is getting better.He had no dizziness/lightheadedness.He is walking although slower then before.  No fevers.  No n,v,d. No cough.  No cardiac pain, he cannot tell if he is in atrial fib. No bleeding but has some bruises that just appear on xarelto. No fevers.  He is eating well-\"maybe too much.\" No hematuria or dysuria.      ROS:     10 point ROS otherwise negative     PHYSICAL EXAM                                                                                                                                      Wt Readings from Last 4 Encounters:   07/14/22 73.8 kg (162 lb 11.2 oz)   07/07/22 72.9 kg (160 lb 11.2 oz)   07/05/22 73.6 " kg (162 lb 4.8 oz)   06/30/22 73.1 kg (161 lb 1.6 oz)     Blood pressure 99/64, pulse 54, temperature 97.5  F (36.4  C), temperature source Oral, resp. rate 16, weight 73.8 kg (162 lb 11.2 oz), SpO2 100 %.    Repeat blood pressure is 110/63        General: NAD, sitting in infusion chair Eyes: sclera anicteric   Mouth: Dry mucous membranes, no ulcers  Lungs: CTAB  Cardiovascular: andres-sometimes irregular like PVC, with murmer, heart sounds are distant   Lymphatics: slt edema  Skin: No rash but thin skin with bruises on his arms  Neuro: non-focal  Access: Port a cath:right: accessed       Blood Counts     Lab Results   Component Value Date    WBC 2.8 (L) 07/14/2022    ANEU 1.8 07/05/2022    HGB 8.4 (L) 07/14/2022    HCT 26.4 (L) 07/14/2022     (L) 07/14/2022     (H) 07/14/2022    POTASSIUM 4.2 07/14/2022    CHLORIDE 111 (H) 07/14/2022    CO2 25 07/14/2022    GLC 92 07/14/2022    BUN 25 07/14/2022    CR 1.79 (H) 07/14/2022    MAG 1.6 07/14/2022    INR 1.15 06/23/2022    BILITOTAL 0.3 07/14/2022    AST 38 07/14/2022    ALT 70 07/14/2022    ALKPHOS 81 07/14/2022    PROTTOTAL 5.0 (L) 07/14/2022    ALBUMIN 3.0 (L) 07/14/2022       I have assessed all abnormal lab values for their clinical significance and any values considered clinically significant have been addressed in the assessment and plan.        Assessment Plans:     Keith Meyers is a 77 year old man who is Day -11 before  as Monotherapy in Relapsed/Refractory Acute Myelogenous Leukemia and in Combination with Monoclonal Antibodies in Relapsed/Refractory Multiple Myeloma     1. Cellular Therapy: Relapsed refractory Multiple myeloma-  Day -11 here for daratumumab     Day-5(7/20)  Begin Flu/CY: LD chemo  Day-4:( 7/21) Flu/CY: LD chemo  Day -3: 7/22)  Flu/CY: LD chemo    Day 1: ( 7/25/2022)  PICC placement and infusion of   Remains on allopurinol  ( decrease dose due to LATANYA on CKI)    2. Heme:  Remains on Xarelto: History of blood clots,  chronic and stroke prophy for atrial fib  - no transfusion  Remains pancytopenic    3. ID: afebrile  remains on acyclovir and Bactrim( hold due to LATANYA on [facility])  Will schedule for pentamidine on Thursday in the PFT lab, 7/21-did not have a chance to talk to him about this    4. FEN:  Remains on supplemental potassium potassium  LATANYA on [facility]: unclear etiology- hold bactrim and decrease dose of allopurinol- Give small bolus of fluid today( 500 ml-HF)    5. CV:   -Bradycardia  -history of atrial fib and heart failure  Remains on lasix and supplemental K    6.Neuro:  Has neuropathy in hands mainly and some in feet, neurontin    Sophie Espinal PA-C  [phone]  7/14/2022    RTC:  7/20 to start LD chemo, sooner with issues    40 minutes spent on the date of the encounter doing chart review, review of test results, interpretation of tests, patient visit and documentation         Again, thank you for allowing me to participate in the care of your patient.      Sincerely,    BMT Advanced Practice Provider

## 2022-07-20 NOTE — PROGRESS NOTES
[id number]: Study Visit Note   Subject name: Theo Meyers     Visit: Day -5    Did the study visit occur within the appropriate window allowed by the protocol? yes      Since the last study visit, He has been doing well. No issues after his first dose of daratumumab last week, getting LD chemo for three days starting today. Upcoming appointments reviewed with patient and his wife.    7/20/2022: ICE score 10/10     I have personally interviewed Theo HURT Mira and reviewed his medical record for adverse events and concomitant medications and these have been recorded on the corresponding logs in Theo Meyers's research file.     Theo HURT Stephaniechris was given the opportunity to ask any trial related questions.  Please see provider progress note for physical exam and other clinical information. Labs were reviewed - any significant lab values were addressed and reviewed.    Cristiana Sanz RN

## 2022-07-20 NOTE — NURSING NOTE
"Oncology Rooming Note    July 20, 2022 8:05 AM   Theo Meyers is a 77 year old male who presents for:    Chief Complaint   Patient presents with     Port Draw     Labs drawn by RN in Lab from Right Chest Port-a-Cath. Line flushed with Saline and Heparin.      Oncology Clinic Visit     Hx MM here for provider visit     Initial Vitals: BP 96/62   Pulse 62   Temp 97.5  F (36.4  C) (Oral)   Resp 16   Wt 72.3 kg (159 lb 6.3 oz)   SpO2 100%   BMI 24.30 kg/m   Estimated body mass index is 24.3 kg/m  as calculated from the following:    Height as of 6/30/22: 1.725 m (5' 7.91\").    Weight as of this encounter: 72.3 kg (159 lb 6.3 oz). Body surface area is 1.86 meters squared.  No Pain (0) Comment: Data Unavailable   No LMP for male patient.  Allergies reviewed: Yes  Medications reviewed: Yes    Medications: Medication refills not needed today.  Pharmacy name entered into [facility]:    [id number] - [place], [place] - [address] AT [facility]  [facility] - [place], [place] - [address] [facility]    Clinical concerns: N/A      Brynn Gunderson RN              "

## 2022-07-20 NOTE — PROGRESS NOTES
"  BMT/Cellular therapyClinic  Progress Note    MT 2020-23  Mr Theo Meyers is a 77 year old male who is Day -5 of   as Monotherapy in  in Combination with Monoclonal Antibodies in Relapsed/Refractory Multiple Myeloma     Active Medical Management Issues:     Hematologic history:  Multiple myeloma diagnosed in 2009, IgG kappa but evolving to light chain secretory, del 13q, t(11;14).       Date Treatment Response Toxicities/Complications   7/2009 Velcade/Dex x 8 cycles VGPR     4/2010 HD-Jennifer/PBSCT CR     6/2010 Rev maintenance       10/2012 RVD   cytopenias   9/2012 RVD (q 2wk velcade) Long remission     10/2020 Brianne/pom/dex Recent progresson     4/6/2022 Carf/dex  MD - held Possible cardiac toxicity                             Chief Complaint: daratumumab    HPI: Keith returns to start LD chemotherapy for NKcells. He is fatigued- stable over the last few months since carfizimab. He is eating and drinking well. He had issues with his first dose( including \"couldn't breathe) of brianne but no problems since that first dose. His sob with activity is getting better, able to walk the dogs a couple of miles.   No infectious concerns. Many questions about logistics of study - how long on phase 1 unit for cells next week. No hx of significant issues n/v with prior chemo. Will  compazine/zofran downstairs today.         ROS:     10 point ROS otherwise negative     PHYSICAL EXAM                                                                                                                                      Wt Readings from Last 4 Encounters:   07/20/22 72.3 kg (159 lb 6.3 oz)   07/14/22 73.8 kg (162 lb 11.2 oz)   07/07/22 72.9 kg (160 lb 11.2 oz)   07/05/22 73.6 kg (162 lb 4.8 oz)     Blood pressure 96/62, pulse 62, temperature 97.5  F (36.4  C), temperature source Oral, resp. rate 16, weight 72.3 kg (159 lb 6.3 oz), SpO2 100 %.          General: NAD, sitting in infusion chair Eyes: sclera anicteric   Mouth: Dry " mucous membranes, no ulcers  Lungs: CTAB  Cardiovascular: RRR, with murmer, heart sounds are distant   Lymphatics: slt edema   Skin: No rash but thin skin with bruises on his arms  Neuro: non-focal  Access: Port a cath:right: accessed       Blood Counts     Lab Results   Component Value Date    WBC 2.8 (L) 07/14/2022    ANEU 1.8 07/05/2022    HGB 8.4 (L) 07/14/2022    HCT 26.4 (L) 07/14/2022     (L) 07/14/2022     (H) 07/14/2022    POTASSIUM 4.2 07/14/2022    CHLORIDE 111 (H) 07/14/2022    CO2 25 07/14/2022    GLC 92 07/14/2022    BUN 25 07/14/2022    CR 1.79 (H) 07/14/2022    MAG 1.6 07/14/2022    INR 1.15 06/23/2022    BILITOTAL 0.3 07/14/2022    AST 38 07/14/2022    ALT 70 07/14/2022    ALKPHOS 81 07/14/2022    PROTTOTAL 5.0 (L) 07/14/2022    ALBUMIN 3.0 (L) 07/14/2022       I have assessed all abnormal lab values for their clinical significance and any values considered clinically significant have been addressed in the assessment and plan.        Assessment Plans:     Keith Meyers is a 77 year old man who is Day -5 before  as Monotherapy in Relapsed/Refractory MM and in Combination with Monoclonal Antibodies in Relapsed/Refractory Multiple Myeloma     1. Cellular Therapy: Relapsed refractory Multiple myeloma-  Day -5 here for daratumumab     Day-5(7/20)  Begin Flu/CY: LD chemo  Day-4:( 7/21) Flu/CY/dimple: LD chemo  Day -3: 7/22)  Flu/CY: LD chemo  Rest over the weekend   -Baseline ICE score by study RN.       Day 1: ( 7/25/2022)  PICC placement and infusion of   Remains on allopurinol  ( decrease dose due to LATANYA on [facility])    2. Heme:  Remains on Xarelto: History of blood clots, chronic and stroke prophy for atrial fib  - 7/20 does have right eye echymosis - no trauma/falls- thinks he runs hsi eyes as night has has large bruising around eye as result.   - Monitor plts closely - hold xeralto when plts <50k.   - no transfusion      3. ID: afebrile  remains on acyclovir and Bactrim( hold due  to LATANYA on [facility])   pentamidine 7/22 in the PFT lab.     4. FEN:  Remains on supplemental potassium potassium  LATANYA on [facility]- Cr improved today 1.34-- Give 500cc flush pre and post chemo.   Encouraged to drink 2L water daily while gettign chemotherapy.     5. CV:   -Bradycardia  -history of atrial fib and heart failure  Remains on lasix and supplemental K    6.Neuro:  Has neuropathy in hands mainly and some in feet, neurontin    Alissa Villagomez PA-C  [phone]    RTC:  7/21 to continue LD chemo.   - Start zofran q5pm for  Next few days. Encouraged to use PRN compazine if nausea despite zofran.   - hx of some issues with constipation- monitor with zofran.     30 minutes spent on the date of the encounter doing chart review, review of test results, interpretation of tests, patient visit and documentation       Alissa Villagomez PA-C  [phone]

## 2022-07-20 NOTE — LETTER
7/20/2022         RE: Theo Meyers  [address]  [place] [facility] [address]        Dear Colleague,    Thank you for referring your patient, Theo Meyers, to the [facility] BLOOD AND MARROW TRANSPLANT PROGRAM [place]. Please see a copy of my visit note below.    Infusion Nursing Note:  Theo Meyers presents today for scheduled infusion.    Patient seen by provider today: Yes: Alissa   present during visit today: Not Applicable.    Note: Patient received a pre and post flush of 500 mL over one hour- the original treatment plan was 250 mL/hr for two hours. The pharmacist, provider and research RN approved a flush of 500 mL over one hour. Patient was premedicated with zofran. Cytoxan and fludarabine given with no reaction. Blood return noted before and after infusion.     Intravenous Access:  Implanted Port.    Treatment Conditions:  Results reviewed, labs MET treatment parameters, ok to proceed with treatment.    Post Infusion Assessment:  Patient tolerated infusion without incident.     Discharge Plan:   Patient and/or family verbalized understanding of discharge instructions and all questions answered.      Brynn Gunderson, RN                          Again, thank you for allowing me to participate in the care of your patient.        Sincerely,        [facility]

## 2022-07-20 NOTE — LETTER
"    7/20/2022         RE: Theo Meyers  [address]  [facility] [address]        Dear Colleague,    Thank you for referring your patient, Theo Meyers, to the [facility] BLOOD AND MARROW TRANSPLANT PROGRAM [place]. Please see a copy of my visit note below.      BMT/Cellular therapy Clinic  Progress Note    MT 2020-23  Mr Theo Meyers is a 77 year old male who is Day -5 of   as Monotherapy in  in Combination with Monoclonal Antibodies in Relapsed/Refractory Multiple Myeloma     Active Medical Management Issues:     Hematologic history:  Multiple myeloma diagnosed in 2009, IgG kappa but evolving to light chain secretory, del 13q, t(11;14).       Date Treatment Response Toxicities/Complications   7/2009 Velcade/Dex x 8 cycles VGPR     4/2010 HD-Jennifer/PBSCT CR     6/2010 Rev maintenance       10/2012 RVD   cytopenias   9/2012 RVD (q 2wk velcade) Long remission     10/2020 Brianne/pom/dex Recent progresson     4/6/2022 Carf/dex  [place] - held Possible cardiac toxicity                             Chief Complaint: daratumumab    HPI: Keith returns to start LD chemotherapy for NKcells. He is fatigued- stable over the last few months since carfizimab. He is eating and drinking well. He had issues with his first dose( including \"couldn't breathe) of brianne but no problems since that first dose. His sob with activity is getting better, able to walk the dogs a couple of miles.   No infectious concerns. Many questions about logistics of study - how long on phase 1 unit for cells next week. No hx of significant issues n/v with prior chemo. Will  compazine/zofran downstairs today.         ROS:     10 point ROS otherwise negative     PHYSICAL EXAM                                                                                                                                      Wt Readings from Last 4 Encounters:   07/20/22 72.3 kg (159 lb 6.3 oz)   07/14/22 73.8 kg (162 lb 11.2 oz)   07/07/22 72.9 kg " (160 lb 11.2 oz)   07/05/22 73.6 kg (162 lb 4.8 oz)     Blood pressure 96/62, pulse 62, temperature 97.5  F (36.4  C), temperature source Oral, resp. rate 16, weight 72.3 kg (159 lb 6.3 oz), SpO2 100 %.          General: NAD, sitting in infusion chair Eyes: sclera anicteric   Mouth: Dry mucous membranes, no ulcers  Lungs: CTAB  Cardiovascular: RRR, with murmer, heart sounds are distant   Lymphatics: slt edema   Skin: No rash but thin skin with bruises on his arms  Neuro: non-focal  Access: Port a cath:right: accessed       Blood Counts     Lab Results   Component Value Date    WBC 2.8 (L) 07/14/2022    ANEU 1.8 07/05/2022    HGB 8.4 (L) 07/14/2022    HCT 26.4 (L) 07/14/2022     (L) 07/14/2022     (H) 07/14/2022    POTASSIUM 4.2 07/14/2022    CHLORIDE 111 (H) 07/14/2022    CO2 25 07/14/2022    GLC 92 07/14/2022    BUN 25 07/14/2022    CR 1.79 (H) 07/14/2022    MAG 1.6 07/14/2022    INR 1.15 06/23/2022    BILITOTAL 0.3 07/14/2022    AST 38 07/14/2022    ALT 70 07/14/2022    ALKPHOS 81 07/14/2022    PROTTOTAL 5.0 (L) 07/14/2022    ALBUMIN 3.0 (L) 07/14/2022       I have assessed all abnormal lab values for their clinical significance and any values considered clinically significant have been addressed in the assessment and plan.        Assessment Plans:     Keith Meyers is a 77 year old man who is Day -5 before  as Monotherapy in Relapsed/Refractory MM and in Combination with Monoclonal Antibodies in Relapsed/Refractory Multiple Myeloma     1. Cellular Therapy: Relapsed refractory Multiple myeloma-  Day -5 here for daratumumab     Day-5(7/20)  Begin Flu/CY: LD chemo  Day-4:( 7/21) Flu/CY/dimple: LD chemo  Day -3: 7/22)  Flu/CY: LD chemo  Rest over the weekend   -Baseline ICE score by study RN.       Day 1: ( 7/25/2022)  PICC placement and infusion of   Remains on allopurinol  ( decrease dose due to LATANYA on CKI)    2. Heme:  Remains on Xarelto: History of blood clots, chronic and stroke prophy  for atrial fib  - 7/20 does have right eye echymosis - no trauma/falls- thinks he runs hsi eyes as night has has large bruising around eye as result.   - Monitor plts closely - hold xeralto when plts <50k.   - no transfusion      3. ID: afebrile  remains on acyclovir and Bactrim( hold due to LATANYA on [facility])   pentamidine 7/22 in the PFT lab.     4. FEN:  Remains on supplemental potassium potassium  LATANYA on CKI- Cr improved today 1.34-- Give 500cc flush pre and post chemo.   Encouraged to drink 2L water daily while gettign chemotherapy.     5. CV:   -Bradycardia  -history of atrial fib and heart failure  Remains on lasix and supplemental K    6.Neuro:  Has neuropathy in hands mainly and some in feet, neurontin    Alissa Villagomez PA-C  [phone]    RTC:  7/21 to continue LD chemo.   - Start zofran q5pm for  Next few days. Encouraged to use PRN compazine if nausea despite zofran.   - hx of some issues with constipation- monitor with zofran.     30 minutes spent on the date of the encounter doing chart review, review of test results, interpretation of tests, patient visit and documentation       Alissa Villagomez PA-C  [phone]

## 2022-07-20 NOTE — PROGRESS NOTES
Infusion Nursing Note:  Theo Meyers presents today for scheduled infusion.    Patient seen by provider today: Yes: Alissa   present during visit today: Not Applicable.    Note: Patient received a pre and post flush of 500 mL over one hour- the original treatment plan was 250 mL/hr for two hours. The pharmacist, provider and research RN approved a flush of 500 mL over one hour. Patient was premedicated with zofran. Cytoxan and fludarabine given with no reaction. Blood return noted before and after infusion.     Intravenous Access:  Implanted Port.    Treatment Conditions:  Results reviewed, labs MET treatment parameters, ok to proceed with treatment.    Post Infusion Assessment:  Patient tolerated infusion without incident.     Discharge Plan:   Patient and/or family verbalized understanding of discharge instructions and all questions answered.      Brynn Gunderson RN

## 2022-07-20 NOTE — NURSING NOTE
Chief Complaint   Patient presents with     Port Draw     Labs drawn by RN in Lab from Right Chest Port-a-Cath. Line flushed with Saline and Heparin.      Chloe Quinteros RN

## 2022-07-21 NOTE — NURSING NOTE
"Oncology Rooming Note    July 21, 2022 8:11 AM   Theo Meyers is a 77 year old male who presents for:    Chief Complaint   Patient presents with     Port Draw     Labs drawn via port by rn in lab. VS taken.     Oncology Clinic Visit     Return clinic visit dx MM     Initial Vitals: /65 (BP Location: Right arm, Patient Position: Sitting, Cuff Size: Adult Regular)   Pulse 54   Temp 96.8  F (36  C) (Oral)   Resp 16   Wt 73.6 kg (162 lb 3.2 oz)   SpO2 100%   BMI 24.73 kg/m   Estimated body mass index is 24.73 kg/m  as calculated from the following:    Height as of 6/30/22: 1.725 m (5' 7.91\").    Weight as of this encounter: 73.6 kg (162 lb 3.2 oz). Body surface area is 1.88 meters squared.  No Pain (0) Comment: Data Unavailable   No LMP for male patient.  Allergies reviewed: Yes  Medications reviewed: Yes    Medications: Medication refills not needed today.  Pharmacy name entered into [facility]:    [id number] - [place], [place] - [address] [facility] [place]  [facility] - [place], [place] - [address] [facility]    Clinical concerns: Reports feeling well. Denies pain.        Haley Fu RN              "

## 2022-07-21 NOTE — PROGRESS NOTES
BMT/Cellular therapyClinic  Progress Note    MT 2020-23  Mr Theo Meyers is a 77 year old male who is Day -4 of   as Monotherapy in  in Combination with Monoclonal Antibodies in Relapsed/Refractory Multiple Myeloma     Active Medical Management Issues:     Hematologic history:  Multiple myeloma diagnosed in 2009, IgG kappa but evolving to light chain secretory, del 13q, t(11;14).       Date Treatment Response Toxicities/Complications   7/2009 Velcade/Dex x 8 cycles VGPR     4/2010 HD-Jennifer/PBSCT CR     6/2010 Rev maintenance       10/2012 RVD   cytopenias   9/2012 RVD (q 2wk velcade) Long remission     10/2020 Brianne/pom/dex Recent progresson     4/6/2022 Carf/dex  LA - held Possible cardiac toxicity                             Chief Complaint: daratumumab    HPI: Keith returns to for day #2 LD chemotherapy, today also with daratumumab.  He is doing ok with no n/v/d.  No fever, chilles.  Has a small blood blister on the tip of his tongue from biting it.  Also has a black eye and some subconjuntival hemorrgahe, right eye.  It happened a week or so ago, does not bother him nor has it progressed.    ROS:     10 point ROS otherwise negative     PHYSICAL EXAM                                                                                                                                      Wt Readings from Last 4 Encounters:   07/21/22 73.6 kg (162 lb 3.2 oz)   07/20/22 72.3 kg (159 lb 6.3 oz)   07/14/22 73.8 kg (162 lb 11.2 oz)   07/07/22 72.9 kg (160 lb 11.2 oz)     Blood pressure 107/65, pulse 54, temperature 96.8  F (36  C), temperature source Oral, resp. rate 16, weight 73.6 kg (162 lb 3.2 oz), SpO2 100 %.       General: NAD, sitting in infusion chair Eyes: sclera anicteric   HEENT:  Old black eye and R subconjuntical hemorrhage (right eye).  Mouth: single blood blister at tip of tongue.  No other oral lesions.  Lungs: CTAB  Cardiovascular: RRR, with murmer, heart sounds are distant   Lymphatics: slt edema    Skin: No rash but thin skin with bruises on his arms  Neuro: non-focal  Access: Port a cath:right: accessed       Blood Counts     Lab Results   Component Value Date    WBC 2.8 (L) 07/21/2022    ANEU 1.8 07/05/2022    HGB 8.7 (L) 07/21/2022    HCT 27.1 (L) 07/21/2022     (L) 07/21/2022     07/21/2022    POTASSIUM 4.4 07/21/2022    CHLORIDE 113 (H) 07/21/2022    CO2 27 07/21/2022    GLC 98 07/21/2022    BUN 29 07/21/2022    CR 1.33 (H) 07/21/2022    MAG 1.6 07/21/2022    INR 1.15 06/23/2022    BILITOTAL 0.3 07/20/2022    AST 57 (H) 07/20/2022     (H) 07/20/2022    ALKPHOS 70 07/20/2022    PROTTOTAL 4.8 (L) 07/20/2022    ALBUMIN 2.9 (L) 07/20/2022       I have assessed all abnormal lab values for their clinical significance and any values considered clinically significant have been addressed in the assessment and plan.        Assessment Plans:     Keith Meyers is a 77 year old man who is Day -4 before  as Monotherapy in Relapsed/Refractory MM and in Combination with Monoclonal Antibodies in Relapsed/Refractory Multiple Myeloma     1. Cellular Therapy: Relapsed refractory Multiple myeloma-  Day -4 here for daratumumab , flu/cy    Day-5(7/20)  Begin Flu/CY: LD chemo  Day-4:( 7/21) Flu/CY/dimple: LD chemo; IH pentam  Day -3: 7/22)  Flu/CY: LD chemo, covid test  Rest over the weekend   -Baseline ICE score by study RN.       Day 1: ( 7/25/2022)  PICC placement and infusion of   Remains on allopurinol  (decrease dose due to LATANYA on CKI)    2. Heme:  Remains on Xarelto: History of blood clots, chronic and stroke prophy for atrial fib  - 7/20 does have right eye echymosis - no trauma/falls- thinks he runs hsi eyes as night has has large bruising around eye as result.   - Monitor plts closely - hold xaralto when plts <50k.   - no transfusion      3. ID: afebrile  remains on acyclovir and Bactrim( hold due to LATANYA on CKI)   pentamidine 7/22 in the PFT lab.     4. FEN:  Remains on supplemental  potassium potassium  LATANYA on CKI- Cr improved today 1.34-- Give 500cc flush pre and post chemo.   Encouraged to drink 2L water daily while gettign chemotherapy.     5. CV:   -Bradycardia  -history of atrial fib and heart failure  Remains on lasix and supplemental K    6.Neuro:  Has neuropathy in hands mainly and some in feet, neurontin    RTC:    7/22 to continue LD chemo; IH pentam; covid test (pre-admission)  - Continue zofran q5pm for  Next few days. Encouraged to use PRN compazine if nausea despite zofran.   7/25:  PICC placement, clinic appt and then to 5C for cells on Monday for his first infusion.       30 minutes spent on the date of the encounter doing chart review, review of test results, interpretation of tests, patient visit and documentation       Keyanna Denis pa-c  [phone]

## 2022-07-21 NOTE — NURSING NOTE
Chief Complaint   Patient presents with     Port Draw     Labs drawn via port by rn in lab. VS taken.     Port accessed with 20g 3/4 inch gripper needle by RN, labs collected, line flushed with saline and heparin.  Vitals taken. Pt checked in for appointment(s).    Luis Stephenson, RN

## 2022-07-21 NOTE — LETTER
Date:July 22, 2022      Provider requested that no letter be sent. Do not send.       [facility]

## 2022-07-21 NOTE — LETTER
7/21/2022         RE: Theo Meyers  [address]  [facility] [address]        Dear Colleague,    Thank you for referring your patient, Theo Meyers, to the [facility] BLOOD AND MARROW TRANSPLANT PROGRAM [place]. Please see a copy of my visit note below.      BMT/Cellular therapy Clinic  Progress Note    MT 2020-23  Mr Theo Meyers is a 77 year old male who is Day -4 of   as Monotherapy in  in Combination with Monoclonal Antibodies in Relapsed/Refractory Multiple Myeloma     Active Medical Management Issues:     Hematologic history:  Multiple myeloma diagnosed in 2009, IgG kappa but evolving to light chain secretory, del 13q, t(11;14).       Date Treatment Response Toxicities/Complications   7/2009 Velcade/Dex x 8 cycles VGPR     4/2010 HD-Jennifer/PBSCT CR     6/2010 Rev maintenance       10/2012 RVD   cytopenias   9/2012 RVD (q 2wk velcade) Long remission     10/2020 Brianne/pom/dex Recent progresson     4/6/2022 Carf/dex  VT - held Possible cardiac toxicity                             Chief Complaint: daratumumab    HPI: Keith returns to for day #2 LD chemotherapy, today also with daratumumab.  He is doing ok with no n/v/d.  No fever, chilles.  Has a small blood blister on the tip of his tongue from biting it.  Also has a black eye and some subconjuntival hemorrgahe, right eye.  It happened a week or so ago, does not bother him nor has it progressed.    ROS:     10 point ROS otherwise negative     PHYSICAL EXAM                                                                                                                                      Wt Readings from Last 4 Encounters:   07/21/22 73.6 kg (162 lb 3.2 oz)   07/20/22 72.3 kg (159 lb 6.3 oz)   07/14/22 73.8 kg (162 lb 11.2 oz)   07/07/22 72.9 kg (160 lb 11.2 oz)     Blood pressure 107/65, pulse 54, temperature 96.8  F (36  C), temperature source Oral, resp. rate 16, weight 73.6 kg (162 lb 3.2 oz), SpO2 100 %.       General: NAD,  sitting in infusion chair Eyes: sclera anicteric   HEENT:  Old black eye and R subconjuntical hemorrhage (right eye).  Mouth: single blood blister at tip of tongue.  No other oral lesions.  Lungs: CTAB  Cardiovascular: RRR, with murmer, heart sounds are distant   Lymphatics: slt edema   Skin: No rash but thin skin with bruises on his arms  Neuro: non-focal  Access: Port a cath:right: accessed       Blood Counts     Lab Results   Component Value Date    WBC 2.8 (L) 07/21/2022    ANEU 1.8 07/05/2022    HGB 8.7 (L) 07/21/2022    HCT 27.1 (L) 07/21/2022     (L) 07/21/2022     07/21/2022    POTASSIUM 4.4 07/21/2022    CHLORIDE 113 (H) 07/21/2022    CO2 27 07/21/2022    GLC 98 07/21/2022    BUN 29 07/21/2022    CR 1.33 (H) 07/21/2022    MAG 1.6 07/21/2022    INR 1.15 06/23/2022    BILITOTAL 0.3 07/20/2022    AST 57 (H) 07/20/2022     (H) 07/20/2022    ALKPHOS 70 07/20/2022    PROTTOTAL 4.8 (L) 07/20/2022    ALBUMIN 2.9 (L) 07/20/2022       I have assessed all abnormal lab values for their clinical significance and any values considered clinically significant have been addressed in the assessment and plan.        Assessment Plans:     Keith Meyers is a 77 year old man who is Day -4 before  as Monotherapy in Relapsed/Refractory MM and in Combination with Monoclonal Antibodies in Relapsed/Refractory Multiple Myeloma     1. Cellular Therapy: Relapsed refractory Multiple myeloma-  Day -4 here for daratumumab , flu/cy    Day-5(7/20)  Begin Flu/CY: LD chemo  Day-4:( 7/21) Flu/CY/dimple: LD chemo; IH pentam  Day -3: 7/22)  Flu/CY: LD chemo, covid test  Rest over the weekend   -Baseline ICE score by study RN.       Day 1: ( 7/25/2022)  PICC placement and infusion of   Remains on allopurinol  (decrease dose due to LATANYA on CKI)    2. Heme:  Remains on Xarelto: History of blood clots, chronic and stroke prophy for atrial fib  - 7/20 does have right eye echymosis - no trauma/falls- thinks he runs hsi  eyes as night has has large bruising around eye as result.   - Monitor plts closely - hold xaralto when plts <50k.   - no transfusion      3. ID: afebrile  remains on acyclovir and Bactrim( hold due to LATANYA on CKI)   pentamidine 7/22 in the PFT lab.     4. FEN:  Remains on supplemental potassium potassium  LATANYA on CKI- Cr improved today 1.34-- Give 500cc flush pre and post chemo.   Encouraged to drink 2L water daily while gettign chemotherapy.     5. CV:   -Bradycardia  -history of atrial fib and heart failure  Remains on lasix and supplemental K    6.Neuro:  Has neuropathy in hands mainly and some in feet, neurontin    RTC:    7/22 to continue LD chemo; IH pentam; covid test (pre-admission)  - Continue zofran q5pm for  Next few days. Encouraged to use PRN compazine if nausea despite zofran.   7/25:  PICC placement, clinic appt and then to 5C for cells on Monday for his first infusion.       30 minutes spent on the date of the encounter doing chart review, review of test results, interpretation of tests, patient visit and documentation       Keyanna Denis pa-c  [phone]

## 2022-07-21 NOTE — PROGRESS NOTES
Infusion Nursing Note:  BertBLU Meyers presents today for scheduled infusion .    Patient seen by provider today: Yes: Keyanna Denis PA-C   present during visit today: Not Applicable.    Note: Patient arrived for Day 2 of chemotherapy. Premedicated with Tylenol 650 mg PO, Benadryl 50 mg PO, Zofran 8 mg IVP, and solu- Medrol 62.5 mg IVP.    Received Cytoxan 560 mg IV over 2 hours. Pre and post 500mL NS flush given over 1 hour per ALMA. Received fludarabine 45 mg IV as scheduled.     Darzalex 1800 mg given subcutaneous to right lower abdomen. Pre and post research labs collected.     Elevated BP post infusion. OK to discharge patient per ALMA.   Intravenous Access:  Implanted Port. +BR pre/post infusion.    Treatment Conditions:  Lab Results   Component Value Date    HGB 8.7 (L) 07/21/2022    WBC 2.8 (L) 07/21/2022    ANEU 1.8 07/05/2022    ANEUTAUTO 2.3 07/21/2022     (L) 07/21/2022      Lab Results   Component Value Date     07/21/2022    POTASSIUM 4.4 07/21/2022    MAG 1.6 07/21/2022    CR 1.33 (H) 07/21/2022    NORMAN 7.9 (L) 07/21/2022    BILITOTAL 0.3 07/20/2022    ALBUMIN 2.9 (L) 07/20/2022     (H) 07/20/2022    AST 57 (H) 07/20/2022       Post Infusion Assessment:  Patient tolerated infusion without incident.     Discharge Plan:   Patient discharged in stable condition accompanied by: self.  Departure Mode: Ambulatory.      Haley Fu RN

## 2022-07-21 NOTE — LETTER
7/21/2022         RE: Theo Meyers  [address]  [facility] [address]        Dear Colleague,    Thank you for referring your patient, Theo Meyers, to the [facility] BLOOD AND MARROW TRANSPLANT PROGRAM [place]. Please see a copy of my visit note below.    Infusion Nursing Note:  Theo Meyers presents today for scheduled infusion .    Patient seen by provider today: Yes: Keyanna Denis PA-C   present during visit today: Not Applicable.    Note: Patient arrived for Day 2 of chemotherapy. Premedicated with Tylenol 650 mg PO, Benadryl 50 mg PO, Zofran 8 mg IVP, and solu- Medrol 62.5 mg IVP.    Received Cytoxan 560 mg IV over 2 hours. Pre and post 500mL NS flush given over 1 hour per ALMA. Received fludarabine 45 mg IV as scheduled.     Darzalex 1800 mg given subcutaneous to right lower abdomen. Pre and post research labs collected.     Elevated BP post infusion. OK to discharge patient per ALMA.   Intravenous Access:  Implanted Port. +BR pre/post infusion.    Treatment Conditions:  Lab Results   Component Value Date    HGB 8.7 (L) 07/21/2022    WBC 2.8 (L) 07/21/2022    ANEU 1.8 07/05/2022    ANEUTAUTO 2.3 07/21/2022     (L) 07/21/2022      Lab Results   Component Value Date     07/21/2022    POTASSIUM 4.4 07/21/2022    MAG 1.6 07/21/2022    CR 1.33 (H) 07/21/2022    NORMAN 7.9 (L) 07/21/2022    BILITOTAL 0.3 07/20/2022    ALBUMIN 2.9 (L) 07/20/2022     (H) 07/20/2022    AST 57 (H) 07/20/2022       Post Infusion Assessment:  Patient tolerated infusion without incident.     Discharge Plan:   Patient discharged in stable condition accompanied by: self.  Departure Mode: Ambulatory.      Haley Fu RN                          Again, thank you for allowing me to participate in the care of your patient.        Sincerely,        [facility]

## 2022-07-22 NOTE — LETTER
"    7/22/2022         RE: Theo Meyers  [address]  [facility] [address]        Dear Colleague,    Thank you for referring your patient, Theo Meyers, to the [facility] BLOOD AND MARROW TRANSPLANT PROGRAM [place]. Please see a copy of my visit note below.      BMT/Cellular therapy Clinic  Progress Note    MT 2020-23  Mr Theo Meyers is a 77 year old male who is Day -3 of   as Monotherapy in  in Combination with Monoclonal Antibodies in Relapsed/Refractory Multiple Myeloma     Active Medical Management Issues:     Hematologic history:  Multiple myeloma diagnosed in 2009, IgG kappa but evolving to light chain secretory, del 13q, t(11;14).       Date Treatment Response Toxicities/Complications   7/2009 Velcade/Dex x 8 cycles VGPR     4/2010 HD-Jennifer/PBSCT CR     6/2010 Rev maintenance       10/2012 RVD   cytopenias   9/2012 RVD (q 2wk velcade) Long remission     10/2020 Brianne/pom/dex Recent progresson     4/6/2022 Carf/dex  AK - held Possible cardiac toxicity                             Chief Complaint: daratumumab    HPI: Keith returns to for day #3 LD chemotherapy. Tolerating ok however feels more SOB and retaining fluid today. Took his home lasix 40 this morning and only urinated \"small amount.\" No bleeding. No other complaints.     ROS:     10 point ROS otherwise negative     PHYSICAL EXAM                                                                                                                                      Wt Readings from Last 4 Encounters:   07/22/22 75.2 kg (165 lb 12.8 oz)   07/21/22 73.6 kg (162 lb 3.2 oz)   07/20/22 72.3 kg (159 lb 6.3 oz)   07/14/22 73.8 kg (162 lb 11.2 oz)     Blood pressure 107/65, pulse 76, temperature 97.6  F (36.4  C), temperature source Oral, resp. rate 16, weight 75.2 kg (165 lb 12.8 oz), SpO2 100 %.       General: NAD, sitting in infusion chair Eyes: sclera anicteric   HEENT:  Old black eye and R subconjuntical hemorrhage (right " eye).  Mouth: single blood blister at tip of tongue.  No other oral lesions.  Lungs: CTAB  Cardiovascular: RRR, with murmer, heart sounds are distant   Lymphatics: slt edema   Skin: No rash but thin skin with bruises on his arms  Neuro: non-focal  Access: Port a cath:right: accessed       Blood Counts     Lab Results   Component Value Date    WBC 7.2 07/22/2022    ANEU 1.8 07/05/2022    HGB 8.3 (L) 07/22/2022    HCT 25.7 (L) 07/22/2022     (L) 07/22/2022     07/22/2022    POTASSIUM 4.3 07/22/2022    CHLORIDE 111 (H) 07/22/2022    CO2 24 07/22/2022     (H) 07/22/2022    BUN 29 07/22/2022    CR 1.31 (H) 07/22/2022    MAG 1.6 07/21/2022    INR 1.15 06/23/2022    BILITOTAL 0.3 07/22/2022    AST 65 (H) 07/22/2022     (H) 07/22/2022    ALKPHOS 86 07/22/2022    PROTTOTAL 5.0 (L) 07/22/2022    ALBUMIN 2.9 (L) 07/22/2022       I have assessed all abnormal lab values for their clinical significance and any values considered clinically significant have been addressed in the assessment and plan.        Assessment Plans:     Keith Meyers is a 77 year old man who is Day -3 before  as Monotherapy in Relapsed/Refractory MM and in Combination with Monoclonal Antibodies in Relapsed/Refractory Multiple Myeloma     1. Cellular Therapy: Relapsed refractory Multiple myeloma-  Day - 3    Day-5(7/20)  Begin Flu/CY: LD chemo  Day-4:( 7/21) Flu/CY/dimple: LD chemo; IH pentam  Day -3: 7/22)  Flu/CY: LD chemo, covid test  Rest over the weekend   -Baseline ICE score by study RN.       Day 1: ( 7/25/2022)  PICC placement and infusion of   Remains on allopurinol  (decrease dose due to LATANYA on CKI)    2. Heme:  Remains on Xarelto: History of blood clots, chronic and stroke prophy for atrial fib  - 7/20 does have right eye echymosis - no trauma/falls- thinks he runs hsi eyes as night has has large bruising around eye as result.   - Monitor plts closely - hold xaralto when plts <50k.   - no transfusion      3.  ID: afebrile  remains on acyclovir and Bactrim( hold due to LATANYA on CKI)   pentamidine 7/22 in the PFT lab.     4. FEN: Given additional Lasix 40mg IV in infusion today.   Remains on supplemental potassium   LATANYA on CKI- Cr improved 1.3-- 500cc flush pre and post chemo.  Lasix 40mg PO daily prn at home        5. CV:   -Bradycardia  -history of atrial fib and heart failure  Remains on lasix and supplemental K    6.Neuro:  Has neuropathy in hands mainly and some in feet, neurontin    RTC:    7/25:  PICC placement, clinic appt and then to  for cells on Monday for his first infusion.       40 minutes spent on the date of the encounter doing chart review, review of test results, interpretation of tests, patient visit and documentation     Bibiana Ramos NP      Sincerely,    BMT Advanced Practice Provider

## 2022-07-22 NOTE — PROGRESS NOTES
"  BMT/Cellular therapyClinic  Progress Note    MT 2020-23  Mr Theo Meyers is a 77 year old male who is Day -3 of   as Monotherapy in  in Combination with Monoclonal Antibodies in Relapsed/Refractory Multiple Myeloma     Active Medical Management Issues:     Hematologic history:  Multiple myeloma diagnosed in 2009, IgG kappa but evolving to light chain secretory, del 13q, t(11;14).       Date Treatment Response Toxicities/Complications   7/2009 Velcade/Dex x 8 cycles VGPR     4/2010 HD-Jennifer/PBSCT CR     6/2010 Rev maintenance       10/2012 RVD   cytopenias   9/2012 RVD (q 2wk velcade) Long remission     10/2020 Brianne/pom/dex Recent progresson     4/6/2022 Carf/dex  ME - held Possible cardiac toxicity                             Chief Complaint: daratumumab    HPI: Keith returns to for day #3 LD chemotherapy. Tolerating ok however feels more SOB and retaining fluid today. Took his home lasix 40 this morning and only urinated \"small amount.\" No bleeding. No other complaints.     ROS:     10 point ROS otherwise negative     PHYSICAL EXAM                                                                                                                                      Wt Readings from Last 4 Encounters:   07/22/22 75.2 kg (165 lb 12.8 oz)   07/21/22 73.6 kg (162 lb 3.2 oz)   07/20/22 72.3 kg (159 lb 6.3 oz)   07/14/22 73.8 kg (162 lb 11.2 oz)     Blood pressure 107/65, pulse 76, temperature 97.6  F (36.4  C), temperature source Oral, resp. rate 16, weight 75.2 kg (165 lb 12.8 oz), SpO2 100 %.       General: NAD, sitting in infusion chair Eyes: sclera anicteric   HEENT:  Old black eye and R subconjuntical hemorrhage (right eye).  Mouth: single blood blister at tip of tongue.  No other oral lesions.  Lungs: CTAB  Cardiovascular: RRR, with murmer, heart sounds are distant   Lymphatics: slt edema   Skin: No rash but thin skin with bruises on his arms  Neuro: non-focal  Access: Port a cath:right: accessed       Blood " Counts     Lab Results   Component Value Date    WBC 7.2 07/22/2022    ANEU 1.8 07/05/2022    HGB 8.3 (L) 07/22/2022    HCT 25.7 (L) 07/22/2022     (L) 07/22/2022     07/22/2022    POTASSIUM 4.3 07/22/2022    CHLORIDE 111 (H) 07/22/2022    CO2 24 07/22/2022     (H) 07/22/2022    BUN 29 07/22/2022    CR 1.31 (H) 07/22/2022    MAG 1.6 07/21/2022    INR 1.15 06/23/2022    BILITOTAL 0.3 07/22/2022    AST 65 (H) 07/22/2022     (H) 07/22/2022    ALKPHOS 86 07/22/2022    PROTTOTAL 5.0 (L) 07/22/2022    ALBUMIN 2.9 (L) 07/22/2022       I have assessed all abnormal lab values for their clinical significance and any values considered clinically significant have been addressed in the assessment and plan.        Assessment Plans:     Keith Meyers is a 77 year old man who is Day -3 before  as Monotherapy in Relapsed/Refractory MM and in Combination with Monoclonal Antibodies in Relapsed/Refractory Multiple Myeloma     1. Cellular Therapy: Relapsed refractory Multiple myeloma-  Day - 3    Day-5(7/20)  Begin Flu/CY: LD chemo  Day-4:( 7/21) Flu/CY/dimple: LD chemo; IH pentam  Day -3: 7/22)  Flu/CY: LD chemo, covid test  Rest over the weekend   -Baseline ICE score by study RN.       Day 1: ( 7/25/2022)  PICC placement and infusion of   Remains on allopurinol  (decrease dose due to LATANYA on CKI)    2. Heme:  Remains on Xarelto: History of blood clots, chronic and stroke prophy for atrial fib  - 7/20 does have right eye echymosis - no trauma/falls- thinks he runs hsi eyes as night has has large bruising around eye as result.   - Monitor plts closely - hold xaralto when plts <50k.   - no transfusion      3. ID: afebrile  remains on acyclovir and Bactrim( hold due to LATANYA on CKI)   pentamidine 7/22 in the PFT lab.     4. FEN: Given additional Lasix 40mg IV in infusion today.   Remains on supplemental potassium   LATANYA on CKI- Cr improved 1.3-- 500cc flush pre and post chemo.  Lasix 40mg PO daily prn at  home        5. CV:   -Bradycardia  -history of atrial fib and heart failure  Remains on lasix and supplemental K    6.Neuro:  Has neuropathy in hands mainly and some in feet, neurontin    RTC:    7/25:  PICC placement, clinic appt and then to  for cells on Monday for his first infusion.       40 minutes spent on the date of the encounter doing chart review, review of test results, interpretation of tests, patient visit and documentation     Bibiana Ramos NP

## 2022-07-22 NOTE — PROGRESS NOTES
Theo Meyers was seen today for a Pentamidine nebulizer tx ordered by Dr. Familia Guan.    Patient was first given 2.5 mg of Albuterol nebulizer, NDC [id number],lot [id number], exp 2/24, after which Pentamidine 300 mg (Lot # [id number]) mixed with 6cc Sterile H20 was administered through a filtered nebulizer.    Pre-treatment: SpO2 = 100%   HR = 62   BBS = clear   Post-treatment: SpO2 = 100%  HR = 64  BBS = clear    No adverse side effects noted by the patient.    This service today was provided under the direct supervision of Jessenia Smith who was available if needed.     Procedure was completed by Pooja Whitehead RRT.

## 2022-07-22 NOTE — LETTER
Date:July 22, 2022      Provider requested that no letter be sent. Do not send.       [facility]

## 2022-07-22 NOTE — LETTER
7/22/2022         RE: Theo Meyers  [address]  [facility] [address]        Dear Colleague,    Thank you for referring your patient, Theo Meyers, to the [facility]LOOD AND [facility]PL[facility] [facility]. Please see a copy of my visit note below.    Infusion Nursing Note:  Theo Meyers presents today for scheduled infusion.    Patient seen by provider today: Yes: Bibiana Ramos PA-C   present during visit today: Not Applicable.    Note: Patient arrived for scheduled chemotherapy. Premedicated with Zofran 8 mg IVP. Pre and post 500 mL NS flush given over 1 hour per ALMA. Cytoxan 560 mg IV given over 2 hour and Fludarabine 45 mg IV given over 1 hour. Received Lasix 40 mg IVP for increased SOB and 5 lb weight gain.     COVID swab collected.     Intravenous Access:  Implanted Port. Positive BR pre/post infusion.    Treatment Conditions:  Lab Results   Component Value Date    HGB 8.3 (L) 07/22/2022    WBC 7.2 07/22/2022    ANEU 1.8 07/05/2022    ANEUTAUTO 6.9 07/22/2022     (L) 07/22/2022        Post Infusion Assessment:  Patient tolerated infusion without incident.     Discharge Plan:   Patient discharged in stable condition accompanied by: self.  Departure Mode: Ambulatory.      Haley Fu RN                          Again, thank you for allowing me to participate in the care of your patient.        Sincerely,        [facility]

## 2022-07-22 NOTE — PROGRESS NOTES
Infusion Nursing Note:  Theo Meyers presents today for scheduled infusion.    Patient seen by provider today: Yes: Bibiana Ramos PA-C   present during visit today: Not Applicable.    Note: Patient arrived for scheduled chemotherapy. Premedicated with Zofran 8 mg IVP. Pre and post 500 mL NS flush given over 1 hour per ALMA. Cytoxan 560 mg IV given over 2 hour and Fludarabine 45 mg IV given over 1 hour. Received Lasix 40 mg IVP for increased SOB and 5 lb weight gain.     COVID swab collected.     Intravenous Access:  Implanted Port. Positive BR pre/post infusion.    Treatment Conditions:  Lab Results   Component Value Date    HGB 8.3 (L) 07/22/2022    WBC 7.2 07/22/2022    ANEU 1.8 07/05/2022    ANEUTAUTO 6.9 07/22/2022     (L) 07/22/2022        Post Infusion Assessment:  Patient tolerated infusion without incident.     Discharge Plan:   Patient discharged in stable condition accompanied by: self.  Departure Mode: Ambulatory.      Haley Fu RN

## 2022-07-22 NOTE — NURSING NOTE
"Oncology Rooming Note    July 22, 2022 7:53 AM   Theo Meyers is a 77 year old male who presents for:    Chief Complaint   Patient presents with     Port Draw     Labs drawn via port by RN. Vitals taken.     Oncology Clinic Visit     Return clinic visit dx MM     Initial Vitals: /65 (BP Location: Right arm, Patient Position: Sitting, Cuff Size: Adult Regular)   Pulse 76   Temp 97.6  F (36.4  C) (Oral)   Resp 16   Wt 75.2 kg (165 lb 12.8 oz)   SpO2 100%   BMI 25.27 kg/m   Estimated body mass index is 25.27 kg/m  as calculated from the following:    Height as of 6/30/22: 1.725 m (5' 7.91\").    Weight as of this encounter: 75.2 kg (165 lb 12.8 oz). Body surface area is 1.9 meters squared.  No Pain (0) Comment: Data Unavailable   No LMP for male patient.  Allergies reviewed: Yes  Medications reviewed: Yes    Medications: Medication refills not needed today.  Pharmacy name entered into [facility]:    [id number] - [place], [place] - [address] AT [facility] OF [facility]  [facility] - [place], [place] - [address] [facility]    Clinical concerns: Patient reports feeling SOB and headache this morning. Took Tylenol 1000 mg PO and headache has resolved. Edema to ankles bilaterally.      Haley Fu RN              "

## 2022-07-22 NOTE — NURSING NOTE
"Chief Complaint   Patient presents with     Port Draw     Labs drawn via port by RN. Vitals taken.     Labs drawn via port by RN. Port accessed with 20G 3/4\" gripper needle. Flushed with NS and heparin. Pt tolerated well. Vitals taken. Pt checked in for next appointment.    Elizabeth Paiz RN  "

## 2022-07-25 NOTE — BRIEF OP NOTE
[facility]    Brief Operative Note    Pre-operative diagnosis: Multiple myeloma in relapse (H) [C90.02]  Post-operative diagnosis Same as pre-operative diagnosis    Procedure: Procedure(s):  INSERTION, PICC  Surgeon: Surgeon(s) and Role:     * Shabbir Bueno MD - Primary  Anesthesia: Local   Estimated Blood Loss: Minimal    Drains: None  Specimens: * No specimens in log *  Findings:   5F x 49.5 cm DL PICC via left basilic vein, with tip at atriocaval junction. Hep locked and ready for use..  Complications: None.  Implants:   Implant Name Type Inv. Item Serial No.  Lot No. LRB No. Used Action   CATH VA POWER PICC 5FR DL [id number] - [id number] Catheter CATH VA POWER PICC 5FR DL [id number]  [facility] [id number] Left 1 Used as a Supply

## 2022-07-25 NOTE — NURSING NOTE
"Oncology Rooming Note    July 25, 2022 9:48 AM   Theo Meyers is a 77 year old male who presents for:    Chief Complaint   Patient presents with     Oncology Clinic Visit     Pt with MM here for provider visit,      Labs Only     Labs drawn form PICC, heparin locked, vitals checked     Initial Vitals: BP (!) 144/79   Pulse 59   Temp 97.4  F (36.3  C)   Resp 18   Wt 72.6 kg (160 lb)   SpO2 100%   BMI 25.06 kg/m   Estimated body mass index is 25.06 kg/m  as calculated from the following:    Height as of an earlier encounter on 7/25/22: 1.702 m (5' 7\").    Weight as of this encounter: 72.6 kg (160 lb). Body surface area is 1.85 meters squared.  No Pain (0) Comment: Data Unavailable   No LMP for male patient.  Allergies reviewed: Yes  Medications reviewed: Yes    Medications: Medication refills not needed today.  Pharmacy name entered into [employer]:    [id number] - [place], [place] - [address] AT [facility]  [facility] - [place], [place] - [address] [facility]    Clinical concerns: none     research labs collected, ua collected      Sandie Mak RN              "

## 2022-07-25 NOTE — PROGRESS NOTES
Type of transplant: Donor: Allogeneic - Unrelated  Product:   BMT INFUSION DOCUMENTATION (last 48 hours)     BMT/Cellular Product Infusion     Row Name 07/25/22 1000                Product 07/25/22 0942 Other (specify in comment)    Product Details Product Release Date: 07/25/22 -LL Product Release Time: 0942 -LL Product Type: Other (specify in comment)  -LL DIN: [id number]  -LL Product Description Code: [id number]  -LL Volume Dispensed (mL): 18 mL  -LL       Checked by (Patient RN) mirta Payton       Checked by (Witness) MIRTA Bishop       Product Volume Infused (mL) 18 mL  -NNAMDI       Flush Volume (mL) 100 mL  -NNAMDI       Volume Dispensed (mL) --                Product 07/25/22 0942 Other (specify in comment)    Product Details Product Release Date: 07/25/22 -LL Product Release Time: 0942 -LL Product Type: Other (specify in comment)  -LL DIN: [id number]  -LL Product Description Code: [id number]  -LL Volume Dispensed (mL): 18 mL  -LL       Checked by (Patient RN) MIRTA Payton       Checked by (Witness) MIRTA Bishop       Product Volume Infused (mL) 18 mL  -NNAMDI       Flush Volume (mL) 100 mL  -NNAMDI       Volume Dispensed (mL) --             User Key  (r) = Recorded By, (t) = Taken By, (c) = Cosigned By    Initials Name Effective Dates    Willy Ball RN 04/29/14 -     Kareen Maciel RN 06/18/22 -     Romelia High 07/11/21 -               Preparation: RN Documentation  Patient was premedicated as ordered: yes  Line Type: central line, left  Patient Stable Prior to Infusion: yes  Time Infusion Started: 1300 (finished at 1331)  Teaching: side effects/monitoring  Tolerated/Reaction: Patient tolerance of product infusion  Immediate suspected transfusion reaction to the product: none  Did patient have prior history of similar signs/symptoms during this hospitalization?: no  Symptoms during/after infusion: other (comment) (n/a)  Did the patient tolerate  the infusion well: yes  Medications and treatment for symptoms: n/a  Did the symptoms resolve?:  (n/a)  Enter comments if clots, leaks, broken bag, infusion delays, other issues with bag/infusion: n/a  Flush until: 1435  Plan: Pt given Tylenol and Benadryl to take home to take at 1611 this afternoon today. Pt discharged home. Will be back to clinic tomorrow. Pt is aware.

## 2022-07-25 NOTE — PROGRESS NOTES
BMT Post Infusion Documentation    Data   Patient Vitals for the past 72 hrs:   Temp Temp src Pulse Resp BP   07/25/22 1039 97.6  F (36.4  C) -- 94 16 (!) 141/98   07/25/22 1250 (!) 96.7  F (35.9  C) Oral 66 20 (!) 160/80   07/25/22 1312 97.4  F (36.3  C) Oral 66 16 (!) 160/98   07/25/22 1327 97.4  F (36.3  C) Oral 55 18 (!) 150/90     BMT INFUSION DOCUMENTATION (last 24 hours)     BMT/Cellular Product Infusion     Row Name 07/25/22 1000                Cell Therapy Documentation    Product Release Date 07/25/22  -       Recipient Study -  -       Donor Allogeneic - Unrelated  -LL       Donor MRN/-2004  -       Donor ABO/Rh --  N/A  -LL       Allogeneic Donor Eligibility Determination and Summary of Records Eligible  -LL       Type of Infusion Allogeneic  -LL       Total Volume Dispensed (mL) 36  -LL       Total NC Dose N/A  -LL       Total CD34 Dose N/A  -LL       Total CD3 dose N/A  -LL       Total NC Dose Left in Storage NONE  -LL       Comments for Product Issues  INVESTIGATIONAL PRODUCT 1.0E+09 TOTAL CELLS  -LL       Donor ABO/Rh --       Product Types --       Product Numbers --       Product Types and Numbers --       Volume --       ABO Mismatch --       ZZTotal NC Dose --       ZZTotal CD34 Dose --       ZZTotal NC Dose Left in Storage --                Product 07/25/22 0942 Other (specify in comment)    Product Details Product Release Date: 07/25/22  - Product Release Time: 0942  -LL Product Type: Other (specify in comment)  -LL DIN: [id number]  - Product Description Code: [id number]  - Volume Dispensed (mL): 18 mL  -LL       Checked by (Patient RN) --       Checked by (Witness) --       Product Volume Infused (mL) --       Flush Volume (mL) --       Volume Dispensed (mL) --                Product 07/25/22 0942 Other (specify in comment)    Product Details Product Release Date: 07/25/22  - Product Release Time: 0942  -LL Product Type: Other (specify in comment)  -LL  DIN: [id number]  -LL Product Description Code: [id number]  -LL Volume Dispensed (mL): 18 mL  -LL       Checked by (Patient RN) --       Checked by (Witness) --       Product Volume Infused (mL) --       Flush Volume (mL) --       Volume Dispensed (mL) --                RN Documentation    Patient was premedicated as ordered --       Line Type --       Patient Stable Prior to Infusion --       Time Infusion Started --       Checked by (Patient RN) --       Checked by (RN 2) --       Broken Bag? --       Immediate suspected transfusion reaction to the product --       Time Infusion Stopped --       Total Flush Volume (mL) --       Checked by (Witness) --       Date Infusion Started --       Date Infusion Stopped --       Volume Infused (mL) --       Total Volume Infused (cc) --                Patient tolerance of product infusion    Immediate suspected transfusion reaction to the product --       Did patient have prior history of similar signs/symptoms during this hospitalization? --       Symptoms during/after infusion --       Did the patient tolerate the infusion well --       Medications and treatment for symptoms --       Did the symptoms resolve? --       Enter comments if clots, leaks, broken bag, infusion delays, other issues with bag/infusion --       Describe symptoms --             User Key  (r) = Recorded By, (t) = Taken By, (c) = Cosigned By    Initials Name Effective Dates    Romelia High 07/11/21 -                   Post-Infusion Evaluation:   Infusion Related Reaction: Grade 0 - none  Dyspnea: Grade 0 - none  Hypoxia: Grade 0 - not present  Fever: Grade 0 - afebrile  Chills: Grade 0 - none  Febrile Neutropenia: Grade 0 - not present  Sinus Bradycardia: Grade 0 - none  Hypertension: Grade 0 - none  Hypotension: Grade 0 - none  Chest Pain: Grade 0 - none  Bronchospasm: Grade 0 - none  Pain: Grade 0 - none  Rash: Grade 0 - None  Neurologic Specify: none    If this was a cord blood transplant, was  more than one cord blood unit infused? no    Keyanna Denis PA-C

## 2022-07-25 NOTE — PROGRESS NOTES
BP4165-02: Study Visit Note   Subject name: Theo Meyers     Visit: C1D1    Did the study visit occur within the appropriate window allowed by the protocol? yes    Since the last study visit, pt has been doing well. No new complaints or changes since last visit.     I was present in patient's room on 5C for his  infusion. He received his full dose of cells without complication. Pt will return to clinic tomorrow, 7/26.    I have personally interviewed Theo Meyers and reviewed his medical record for adverse events and concomitant medications and these have been recorded on the corresponding logs in Theo Meyers's research file.     Theo Meyers was given the opportunity to ask any trial related questions and was given satisfactory answers.  Please see provider progress note for physical exam and other clinical information. Labs were reviewed - any significant lab values were addressed and reviewed.      Performance Status     Subject name: Theo Meyers   Eastern Cooperative Oncology Group (ECOG) Performance Status  GRADE ECOG PERFORMANCE STATUS   0 Fully active, able to carry on all pre-disease performance without restriction   1 Restricted in physically strenuous activity but ambulatory and able to carry out work of a light   2 Ambulatory and capable of all selfcare but unable to carry out any work activities; up and about more than 50% of waking hours   3 Capable of only limited selfcare; confined to bed or chair more than 50% of waking hours   4 Completely disabled; cannot carry on any selfcare; totally confined to bed or chair   5 Dead     The patient was assessed on 07/25/2022 using the ECOG scale. ECOG Score: 1    Form 503.00.01 (Version 1)     Effective date: 01JUL2018     Next Review Date: 01JUL2020        Kareen Campoverde RN

## 2022-07-25 NOTE — PROGRESS NOTES
BMT/Cellular therapyClinic  Progress Note    MT 2020-23  Mr Theo Meyers is a 77 year old male who is Day -3 of   as Monotherapy in  in Combination with Monoclonal Antibodies in Relapsed/Refractory Multiple Myeloma     Active Medical Management Issues:     Hematologic history:  Multiple myeloma diagnosed in 2009, IgG kappa but evolving to light chain secretory, del 13q, t(11;14).       Date Treatment Response Toxicities/Complications   7/2009 Velcade/Dex x 8 cycles VGPR     4/2010 HD-Jennifer/PBSCT CR     6/2010 Rev maintenance       10/2012 RVD   cytopenias   9/2012 RVD (q 2wk velcade) Long remission     10/2020 Brianne/pom/dex Recent progresson     4/6/2022 Carf/dex  CA - held Possible cardiac toxicity                             Chief Complaint: daratumumab    HPI: Keith is here prior to 5c admission today. After he received pentamidine on Friday he felt SOB however each day since it's improved. Today he has no SOB. No other complaints. New PICC in left arm.  ICANS 10/10 7/25    ROS:     10 point ROS otherwise negative     PHYSICAL EXAM                                                                                                                                      Wt Readings from Last 4 Encounters:   07/25/22 72.6 kg (160 lb)   07/25/22 74.8 kg (165 lb)   07/22/22 75.2 kg (165 lb 12.8 oz)   07/21/22 73.6 kg (162 lb 3.2 oz)     Blood pressure (!) 144/79, pulse 59, temperature 97.4  F (36.3  C), resp. rate 18, weight 72.6 kg (160 lb), SpO2 100 %.       General: NAD, s  Eyes: sclera anicteric   HEENT:  Old black eye and R subconjuntical hemorrhage (right eye).  Mouth: masked  Lungs: CTAB  Cardiovascular: RRR, with murmer, heart sounds are distant   Lymphatics: slt edema   Skin: No rash but thin skin with bruises on his arms  Neuro: non-focal  Access: Port a cath:right: accessed. New PICC in left arm       Blood Counts     Lab Results   Component Value Date    WBC 1.5 (L) 07/25/2022    ANEU 1.8 07/05/2022     HGB 9.2 (L) 07/25/2022    HCT 28.3 (L) 07/25/2022    PLT 89 (L) 07/25/2022     07/25/2022    POTASSIUM 3.9 07/25/2022    CHLORIDE 112 (H) 07/25/2022    CO2 24 07/22/2022     (H) 07/22/2022    BUN 29 07/22/2022    CR 1.31 (H) 07/22/2022    MAG 1.6 07/21/2022    INR 1.15 06/23/2022    BILITOTAL 0.3 07/22/2022    AST 65 (H) 07/22/2022     (H) 07/22/2022    ALKPHOS 86 07/22/2022    PROTTOTAL 5.0 (L) 07/22/2022    ALBUMIN 2.9 (L) 07/22/2022       I have assessed all abnormal lab values for their clinical significance and any values considered clinically significant have been addressed in the assessment and plan.        Assessment Plans:     Keith Meyers is a 77 year old man who is Day -3 before  as Monotherapy in Relapsed/Refractory MM and in Combination with Monoclonal Antibodies in Relapsed/Refractory Multiple Myeloma     1. Cellular Therapy: Relapsed refractory Multiple myeloma-  Day - 3    Day-5(7/20)  Begin Flu/CY: LD chemo  Day-4:( 7/21) Flu/CY/dimple: LD chemo; IH pentam  Day -3: 7/22)  Flu/CY: LD chemo, covid test  Rest over the weekend   -Baseline ICE score by study RN.       Day 1: ( 7/25/2022)  PICC placement and infusion of   Remains on allopurinol  (decrease dose due to LATANYA on CKI)    2. Heme:  Remains on Xarelto: History of blood clots, chronic and stroke prophy for atrial fib  - 7/20 does have right eye echymosis - no trauma/falls- thinks he runs hsi eyes as night has has large bruising around eye as result.   - Monitor plts closely - hold xaralto when plts <50k.   - no transfusion      3. ID: afebrile  remains on acyclovir and Bactrim( hold due to LATANYA on CKI)   pentamidine 7/22 in the PFT lab.     4. FEN: Given additional Lasix 40mg IV in infusion 7/22.   Remains on supplemental potassium   LATANYA on CKI- Cr improved 1.3-- 500cc flush pre and post chemo.  Lasix 40mg PO daily at home        5. CV:   -Bradycardia  -history of atrial fib and heart failure  Remains on lasix and  supplemental K    6.Neuro:  Has neuropathy in hands mainly and some in feet, neurontin    Dispo:    5C for cells         30 minutes spent on the date of the encounter doing chart review, review of test results, interpretation of tests, patient visit and documentation     Bibiana Ramos NP

## 2022-07-25 NOTE — PROGRESS NOTES
Admission to Early Phase Research Unit    July 25, 2022    Clinical Trial:  as Monotherapy in Relapsed/Refractory MM and in Combination with Monoclonal Antibodies in Relapsed/Refractory Multiple Myeloma     Principle Investigator: Familia Guan    Research Nurse:  Cristiana Sanz RN    Common Side Effects & Recommended Intervention(s): potential for CRS      Theo Meyers was admitted to the early phase research unit at the [facility] for administration of therapy and clinical monitoring. The patient has been assessed by a provider prior to admission and medically cleared for therapy. In the case of an adverse event or change in clinical status requiring additional medical management, the PI will be contacted and the decision to admit the patient to the 5C unit will be determined by the BMT team B advanced practice provider and collaborating physician. The attending physician on service is Dr. Negra Denis pa-c  [phone]

## 2022-07-25 NOTE — PROGRESS NOTES
BMT/Cellular Allogeneic Product Infusion       Patient Vitals for the past 24 hrs:   Temp Pulse Resp BP   07/25/22 1039 97.6  F (36.4  C) 94 16 (!) 141/98      BMT INFUSION DOCUMENTATION (last 48 hours)     BMT/Cellular Product Infusion     Row Name                  Product 07/25/22 0942 Other (specify in comment)    Product Details Product Release Date: 07/25/22  -LL Product Release Time: 0942 -LL Product Type: Other (specify in comment)  -LL DIN: [id number]  - Product Description Code: [id number]  -LL Volume Dispensed (mL): 18 mL  -LL       Checked by (Patient RN) --       Checked by (Witness) --       Product Volume Infused (mL) --       Flush Volume (mL) --       Volume Dispensed (mL) --                Product 07/25/22 0942 Other (specify in comment)    Product Details Product Release Date: 07/25/22  -LL Product Release Time: 0942  -LL Product Type: Other (specify in comment)  -LL DIN: [id number]  - Product Description Code: [id number]  -LL Volume Dispensed (mL): 18 mL  -LL       Checked by (Patient RN) --       Checked by (Witness) --       Product Volume Infused (mL) --       Flush Volume (mL) --       Volume Dispensed (mL) --             User Key  (r) = Recorded By, (t) = Taken By, (c) = Cosigned By    Initials Name Effective Dates    LL Romelia Beatty 07/11/21 -               Allogeneic Donor Eligibility Determination and Summary of Records: Eligible        Type of Infusion: Allogeneic      Baseline Pre-Infusion Evaluation (to be completed by Provider):   Dyspnea: Grade 0 - none  Hypoxia: Grade 0 - not present  Fever: Grade 0 - afebrile  Chills: Grade 0 - none  Febrile Neutropenia: Grade 0 - not present  Sinus Bradycardia: Grade 0 - none  Hypertension: Grade 0 - none  Hypotension: Grade 0 - none  Chest Pain: Grade 0 - none  Bronchospasm: Grade 0 - none  Pain: Grade 0 - none  Rash: Grade 0 - None  Neurologic Specify: none    If adverse reactions, events or complications occur (fever greater than 2  degrees fahrenheit increase, and severe reactions of the following types: chills, dyspnea, bronchospasm, hyper/hypotension, hypoxia, bradycardia, chest pain, back/flank pain, hypoxia, and any other reaction deemed severe or life threatening; any instance of product bag breakage or unusual product appearance)    Any other events that are >= grade 3, then immediately contact the BMT Attending physician, the Cell Therapy Laboratory Medical Director (pager [phone]) and the Cell Therapy Laboratory ([phone]).  After midnight, holidays & weekends contact the [facility] on the appropriate campus ([facility] [place]: [phone]; [facility] [place]: [phone]).    Keyanna Denis PA-C

## 2022-07-25 NOTE — PROGRESS NOTES
Patient received  today without issues. He was premedicated prior to infusion. He was Hypertensive pre and during infusion. BP eventually went down to 130/80's at the end of 1 hr post infusion VS. Denies any symptoms. Patient is discharged at 1545 and instructed patient to take Tylenol and Benadryl at 1611 which was given to him at discharge. His wife picked him up and sent all his belongings home. He is aware of his clinic appointment tomorrow.

## 2022-07-25 NOTE — DISCHARGE INSTRUCTIONS
A collaboration between [facility] and [facility]  Experts in minimally invasive, targeted treatments performed using imaging guidance    PICC Line Placement    Today you had a procedure today to install PICC line.     After you go home:  Drink plenty of fluids.  Generally 6-8 (8 ounce) glasses a day is recommended.  Resume your regular diet unless otherwise ordered by a medical provider.  Keep any applied tape/gauze dressings clean and dry.  Change tape/gauze dressings if they get wet or soiled.  You may shower the following day after procedure, however cover and protect from moisture any tape/gauze dressings  Do not perform strenuous activities or lift greater than 10 pounds for the next three days.  If there is bleeding or oozing from the procedure site, apply firm pressure to the area for 5-10 minutes.  If the bleeding continues seek medical advice at the numbers below.  Mild procedure site discomfort can be treated with an ice pack and over-the-counter pain relievers.        Call our Interventional Radiology (IR) service if:  If you start bleeding from the procedure site.  If you do start to bleed from the site, lie down and hold some pressure on the site.  Our radiology provider can help you decide if you need to return to the hospital.  If you have new or worsening pain related to the procedure.  If you have concerning swelling at the procedure site.  If you develop persistent nausea or vomiting.  If you develop hives or a rash or any unexplained itching.  If you have a fever of greater than 100.5  F and chills in the first 5 days after procedure.  Any other concerns related to your procedure.      [facility]  Interventional Radiology (IR)  [address] [place] [address]    Contact Number:  [phone]  (IR control desk)  Monday - Friday 8:00 am - 4:30 pm    After hours for urgent  concerns:  [phone]  After 4:30 pm Monday - Friday, Weekends and Holidays.   Ask for Interventional Radiology on-call.  Someone is available 24 hours a day.  [facility] toll free number:  [phone]

## 2022-07-25 NOTE — PHARMACY-ADMISSION MEDICATION HISTORY
Admission Medication History Completed by Pharmacy    See [facility] Admission Navigator for allergy information, preferred outpatient pharmacy, prior to admission medications and immunization status.     Medication History Sources:     7/6 BMT clinic PharmD med review.     Changes made to PTA medication list (reason):    Added: None    Deleted: Vit C and Fish oil     Changed: None    Additional Information:    None    Prior to Admission medications    Medication Sig Last Dose Taking? Auth Provider Long Term End Date   acetaminophen (TYLENOL) 500 MG tablet Take 500 mg by mouth every 8 hours as needed    Reported, Patient     acyclovir (ZOVIRAX) 400 MG tablet Take 1 tablet (400 mg) by mouth 2 times daily Take on first day of daratumumab dose and continue for 3 months after treatment is complete.   Familia Guan MD Yes    allopurinol (ZYLOPRIM) 300 MG tablet Please cut allopurinol in half due to kidneys   Sophei Espinal PA-C     ascorbic acid 1000 MG TABS tablet Take 2,000 mg by mouth daily  Patient not taking: Reported on 7/25/2022   Reported, Patient     atorvastatin (LIPITOR) 10 MG tablet Take 1 tablet (10 mg) by mouth daily   Mathew Ott MD Yes    bimatoprost (LUMIGAN) 0.03 % ophthalmic solution Place 1 drop into both eyes At Bedtime.   Reported, Patient     brimonidine (ALPHAGAN P) 0.1 % ophthalmic solution Place 1 drop into both eyes every 8 hours.   Reported, Patient     calcium carbonate 1250 (500 Ca) MG CHEW Take 1 tablet by mouth daily as needed (upset stomach)  Patient not taking: Reported on 7/25/2022   Reported, Patient     calcium carbonate-vitamin D (OYSTER SHELL CALCIUM/D) 500-200 MG-UNIT tablet Take 1 tablet by mouth daily    Reported, Patient     cyanocobalamin (CYANOCOBALAMIN) 1000 MCG/ML injection Inject 1 mL into the muscle every 30 days  Patient not taking: No sig reported   Reported, Patient     dextromethorphan-guaiFENesin (MUCINEX DM)  MG 12 hr tablet Take 1 tablet by mouth daily as  needed for cough  Patient not taking: No sig reported   Reported, Patient     doxycycline monohydrate (MONODOX) 100 MG capsule TAKE 1 CAPSULE BY MOUTH TWICE DAILY FOR 1 WEEK  Patient not taking: Reported on 7/25/2022   Reported, Patient     fluticasone (FLONASE) 50 MCG/ACT nasal spray Spray 2 sprays into both nostrils daily   Talat Contreras MD Yes    furosemide (LASIX) 20 MG tablet TAKE 2 TABLETS(40 MG) BY MOUTH DAILY   Phuong Rivera MD Yes    gabapentin (NEURONTIN) 300 MG capsule Take 1 capsule (300 mg) by mouth At Bedtime   Per Clifford MD Yes    LUMIGAN 0.01 % SOLN ophthalmic solution    Reported, Patient     Magnesium Oxide 250 MG TABS Take 250 mg by mouth 2 times daily   Reported, Patient     Multiple Vitamin (MULTI-VITAMIN PO) Take 1 capsule by mouth daily.   Reported, Patient     Omega-3 Fatty Acids (OMEGA 3 PO) Take 1 capsule by mouth daily TAKE AS DIRECTED  Patient not taking: No sig reported   Reported, Patient     omeprazole (PRILOSEC) 20 MG DR capsule TAKE 1 CAPSULE BY MOUTH ONCE DAILY   Mathew Ott MD     ondansetron (ZOFRAN) 8 MG tablet Take 1 tablet (8 mg) by mouth every 8 hours as needed (nausea/vomiting)  Patient not taking: No sig reported   Familia Guan MD     potassium chloride ER (KLOR-CON M) 20 MEQ CR tablet Take 1 tablet (20 mEq) by mouth every other day  Patient taking differently: Take 20 mEq by mouth four times a week On Saturday, Sunday, Tuesday, Thursday   Mathew Ott MD     prochlorperazine (COMPAZINE) 10 MG tablet Take 1 tablet (10 mg) by mouth every 6 hours as needed (Nausea/Vomiting)  Patient not taking: Reported on 7/25/2022   Familia Guan MD     Psyllium (METAMUCIL PO) Take by mouth daily USE AS DIRECTED   Reported, Patient     sulfamethoxazole-trimethoprim (BACTRIM) 400-80 MG tablet Hold for now  Patient not taking: Reported on 7/25/2022   Sophie Espinal PA-C     tamsulosin (FLOMAX) 0.4 MG capsule TAKE 1 CAPSULE BY MOUTH EVERY DAY   Per Clifford MD      XARELTO ANTICOAGULANT 20 MG TABS tablet Take 1 tablet (20 mg) by mouth daily (with dinner)   Mathew Ott MD Yes    pomalidomide (POMALYST) 4 MG capsule Take 1 capsule (4 mg) by mouth daily Swallow whole, do NOT break, crush, chew or open capsule. Take on days 1-21 of repeated 28 day cycles.  Patient taking differently: Take 4 mg by mouth daily Swallow whole, do NOT break, crush, chew or open capsule. Take on days 1-21 of repeated 28 day cycles.  3/17/22: today is day 16 of 28 day cycle   Per Clifford MD Yes 3/29/22       Date completed: 07/25/22    Medication history completed by: Luis Webber [facility]

## 2022-07-26 NOTE — LETTER
"    7/26/2022         RE: Theo Meyers  [address]  [facility] [address]        Dear Colleague,    Thank you for referring your patient, Theo Meyers, to the [facility] BLOOD AND MARROW TRANSPLANT PROGRAM [place]. Please see a copy of my visit note below.      BMT/Cellular Therapy Clinic Progress Note    MT 2020-23  Mr Theo Meyers is a 77 year old male who is Day 2 of  as Monotherapy in Combination with Monoclonal Antibodies in Relapsed/Refractory Multiple Myeloma     Active Medical Management Issues:     Hematologic history:  Multiple myeloma diagnosed in 2009, IgG kappa but evolving to light chain secretory, del 13q, t(11;14).       Date Treatment Response Toxicities/Complications   7/2009 Velcade/Dex x 8 cycles VGPR     4/2010 HD-Jennifer/PBSCT CR     6/2010 Rev maintenance       10/2012 RVD   cytopenias   9/2012 RVD (q 2wk velcade) Long remission     10/2020 Brianne/pom/dex Recent progresson     4/6/2022 Carf/dex  [place] - held Possible cardiac toxicity                             Chief Complaint: daratumumab    HPI: Long day yesterday. Here for follow-up. Feeling relatively well. No n/v/d/c. No bleeding. No fevers. Mild tenderness L arm PICC site better after dressing change.    ICANS 10/10 7/26    ROS:     10 point ROS otherwise negative     PHYSICAL EXAM                                                                                                                                      Wt Readings from Last 4 Encounters:   07/26/22 73.2 kg (161 lb 6.4 oz)   07/25/22 72.5 kg (159 lb 14.4 oz)   07/25/22 74.8 kg (165 lb)   07/25/22 72.6 kg (160 lb)     Blood pressure 130/68, pulse 60, temperature 96.8  F (36  C), temperature source Oral, resp. rate 16, height 1.745 m (5' 8.7\"), weight 73.2 kg (161 lb 6.4 oz), SpO2 96 %.       General: NAD  Eyes: sclera anicteric   HEENT:  resolving black eye and R subconjuntical hemorrhage (right eye).  Mouth: OP moist without lesions  Lungs: " CTAB  Cardiovascular: RRR, with murmer, heart sounds are distant   Lymphatics: no edema   Skin: No rash but thin skin with bruises on his arms  Neuro: non-focal  Access: Port a cath:right. New PICC in left arm dressing cdi.       Blood Counts     Lab Results   Component Value Date    WBC 1.0 (L) 07/26/2022    ANEU 1.8 07/05/2022    HGB 9.1 (L) 07/26/2022    HCT 27.3 (L) 07/26/2022    PLT 75 (L) 07/26/2022     07/26/2022    POTASSIUM 4.2 07/26/2022    CHLORIDE 110 (H) 07/26/2022    CO2 26 07/26/2022     (H) 07/26/2022    BUN 26 07/26/2022    CR 1.12 07/26/2022    MAG 1.8 07/26/2022    INR 1.15 06/23/2022    BILITOTAL 0.3 07/26/2022    AST 31 07/26/2022    ALT 71 (H) 07/26/2022    ALKPHOS 92 07/26/2022    PROTTOTAL 5.1 (L) 07/26/2022    ALBUMIN 2.9 (L) 07/26/2022       I have assessed all abnormal lab values for their clinical significance and any values considered clinically significant have been addressed in the assessment and plan.        Assessment Plans:     Keith Meyers is a 77 year old man who is Day 2 of  as Monotherapy in Relapsed/Refractory MM and in Combination with Monoclonal Antibodies in Relapsed/Refractory Multiple Myeloma     1. Cellular Therapy: Relapsed refractory Multiple myeloma    Day-5(7/20)  Begin Flu/CY: LD chemo  Day-4:( 7/21) Flu/CY/dimple: LD chemo; IH pentam  Day -3: 7/22)  Flu/CY: LD chemo, covid test    Day 1: ( 7/25/2022)  PICC placement and infusion of   Remains on allopurinol  (decrease dose due to LATANYA on CKI)  Day 2 ICE 10/10 (sentence log in manilla folder in clinic file)    2. Heme:  #History of blood clots, chronic and stroke prophy for atrial fib. Hold Xarelto now with down-trending plts (x7/25).  - 7/20 does have right eye echymosis - no trauma/falls- thinks he runs hsi eyes as night has has large bruising around eye as result.     3. ID: afebrile. ANC 1, downtrending. Start prophy Levo 7/26.  - prophy ACV, Pentamidine (7/22). He reports sob day after  Pentam.  Bactrim held d/t acute on CKD    4. FEN: Given additional Lasix 40mg IV in infusion 7/22.   Remains on supplemental potassium   LATANYA on CKI- Cr improved  Lasix 40mg PO daily at home      5. CV:   -Bradycardia  -history of atrial fib and heart failure  Remains on lasix and supplemental K    6.Neuro:  Has neuropathy in hands mainly and some in feet, neurontin    40 minutes spent on the date of the encounter doing chart review, review of test results, interpretation of tests, patient visit and documentation     Final Plan:  Start [facility] tomorrow for labs, BMBx  Thurs labs, follow-up, dimple Yang, PA-C  [phone]        Again, thank you for allowing me to participate in the care of your patient.      Sincerely,    BMT Advanced Practice Provider

## 2022-07-26 NOTE — NURSING NOTE
"Oncology Rooming Note    July 26, 2022 10:47 AM   Theo Meyers is a 77 year old male who presents for:    Chief Complaint   Patient presents with     Port Draw     Labs drawn via port by rn in lab. VS taken.     Oncology Clinic Visit     [facility] RETURN - MULTIPLE MYELOMA     Initial Vitals: /68 (BP Location: Right arm, Patient Position: Sitting, Cuff Size: Adult Regular)   Pulse 60   Temp 96.8  F (36  C) (Oral)   Resp 16   Ht 1.745 m (5' 8.7\")   Wt 73.2 kg (161 lb 6.4 oz)   SpO2 96%   BMI 24.04 kg/m   Estimated body mass index is 24.04 kg/m  as calculated from the following:    Height as of this encounter: 1.745 m (5' 8.7\").    Weight as of this encounter: 73.2 kg (161 lb 6.4 oz). Body surface area is 1.88 meters squared.  Mild Pain (2) Comment: Data Unavailable   No LMP for male patient.  Allergies reviewed: Yes  Medications reviewed: Yes    Medications: Medication refills not needed today.  Pharmacy name entered into [employer]:    [id number] - [place], [place] - [address] AT [facility] [address]  [facility] - [place], [place] - [address] [facility]    Clinical concerns: No new concerns. Natty was notified.      Ross Chavis LPN            "

## 2022-07-26 NOTE — PROGRESS NOTES
"  BMT/Cellular Therapy Clinic Progress Note    MT 2020-23  Mr Theo Meyers is a 77 year old male who is Day 2 of  as Monotherapy in Combination with Monoclonal Antibodies in Relapsed/Refractory Multiple Myeloma     Active Medical Management Issues:     Hematologic history:  Multiple myeloma diagnosed in 2009, IgG kappa but evolving to light chain secretory, del 13q, t(11;14).       Date Treatment Response Toxicities/Complications   7/2009 Velcade/Dex x 8 cycles VGPR     4/2010 HD-Jennifer/PBSCT CR     6/2010 Rev maintenance       10/2012 RVD   cytopenias   9/2012 RVD (q 2wk velcade) Long remission     10/2020 Brianne/pom/dex Recent progresson     4/6/2022 Carf/dex  [place] - held Possible cardiac toxicity                             Chief Complaint: daratumumab    HPI: Long day yesterday. Here for follow-up. Feeling relatively well. No n/v/d/c. No bleeding. No fevers. Mild tenderness L arm PICC site better after dressing change.    ICANS 10/10 7/26    ROS:     10 point ROS otherwise negative     PHYSICAL EXAM                                                                                                                                      Wt Readings from Last 4 Encounters:   07/26/22 73.2 kg (161 lb 6.4 oz)   07/25/22 72.5 kg (159 lb 14.4 oz)   07/25/22 74.8 kg (165 lb)   07/25/22 72.6 kg (160 lb)     Blood pressure 130/68, pulse 60, temperature 96.8  F (36  C), temperature source Oral, resp. rate 16, height 1.745 m (5' 8.7\"), weight 73.2 kg (161 lb 6.4 oz), SpO2 96 %.    ECOG 1  General: NAD  Eyes: sclera anicteric   HEENT:  resolving black eye and R subconjuntical hemorrhage (right eye).  Mouth: OP moist without lesions  Lungs: CTAB  Cardiovascular: RRR, with murmer, heart sounds are distant   Lymphatics: no edema   Skin: No rash but thin skin with bruises on his arms  Neuro: non-focal  Access: Port a cath:right. New PICC in left arm dressing cdi.  ICE 10/10    Blood Counts     Lab Results   Component Value Date    " WBC 1.0 (L) 07/26/2022    ANEU 1.8 07/05/2022    HGB 9.1 (L) 07/26/2022    HCT 27.3 (L) 07/26/2022    PLT 75 (L) 07/26/2022     07/26/2022    POTASSIUM 4.2 07/26/2022    CHLORIDE 110 (H) 07/26/2022    CO2 26 07/26/2022     (H) 07/26/2022    BUN 26 07/26/2022    CR 1.12 07/26/2022    MAG 1.8 07/26/2022    INR 1.15 06/23/2022    BILITOTAL 0.3 07/26/2022    AST 31 07/26/2022    ALT 71 (H) 07/26/2022    ALKPHOS 92 07/26/2022    PROTTOTAL 5.1 (L) 07/26/2022    ALBUMIN 2.9 (L) 07/26/2022       I have assessed all abnormal lab values for their clinical significance and any values considered clinically significant have been addressed in the assessment and plan.        Assessment Plans:     Keith Meyers is a 77 year old man who is Day 2 of  as Monotherapy in Relapsed/Refractory MM and in Combination with Monoclonal Antibodies in Relapsed/Refractory Multiple Myeloma     1. Cellular Therapy: Relapsed refractory Multiple myeloma    Day-5(7/20)  Begin Flu/CY: LD chemo  Day-4:( 7/21) Flu/CY/dimple: LD chemo; IH pentam  Day -3: 7/22)  Flu/CY: LD chemo, covid test    Day 1: ( 7/25/2022)  PICC placement and infusion of   Remains on allopurinol  (decrease dose due to LATANYA on CKI)  Day 2 ICE 10/10 (sentence log in manilla folder in clinic file)    2. Heme:  #History of blood clots, chronic and stroke prophy for atrial fib. Hold Xarelto now with down-trending plts (x7/25).  - 7/20 does have right eye echymosis - no trauma/falls- thinks he runs hsi eyes as night has has large bruising around eye as result.     3. ID: afebrile. ANC 1, downtrending. Start prophy Levo 7/26.  - prophy ACV, Pentamidine (7/22). He reports sob day after Pentam.  Bactrim held d/t acute on CKD    4. FEN: Given additional Lasix 40mg IV in infusion 7/22.   Remains on supplemental potassium   LATANYA on CKI- Cr improved  Lasix 40mg PO daily at home      5. CV:   -Bradycardia  -history of atrial fib and heart failure  Remains on lasix and  supplemental K    6.Neuro:  Has neuropathy in hands mainly and some in feet, neurontin    40 minutes spent on the date of the encounter doing chart review, review of test results, interpretation of tests, patient visit and documentation     Final Plan:  [facility] tomorrow for labs, BMBx  Thurs labs, follow-up, dimple Yang PA-C  [phone]

## 2022-07-26 NOTE — NURSING NOTE
Chief Complaint   Patient presents with     Port Draw     Labs drawn via port by rn in lab. VS taken.     Port accessed with 20g 3/4 inch gripper needle by RN, labs collected, line flushed with saline and heparin.  Vitals taken. Pt checked in for appointment(s).     PICC Line flushed with saline and heparin locked. Picc dressing changed by rn in lab.      Luis Stephenson RN

## 2022-07-27 NOTE — NURSING NOTE
Patient supine for 30 minutes following biopsy. After 30 minutes, dressing clean, dry and intact. Vital signs stable. See DOC flow sheets for details. Left ambulatory with family member.    BMT Teaching Flowsheet   Teaching Topic: post biopsy instructions    Person(s) involved in teaching: Patient  Motivation Level  Asks Questions: Yes  Eager to Learn: Yes  Cooperative: Yes  Receptive (willing/able to accept information): Yes    Patient demonstrates understanding of the following:   - Reason for the appointment, diagnosis and treatment plan: Yes  - Knowledge of proper use of medications and conditions for which they are ordered (with special attention to potential side effects or drug interactions): Yes  - Which situations necessitate calling provider and whom to contact: Yes    Teaching concerns addressed: reviewed activity restrictions if received premeds, potential for bleeding and actions to take if develops any of the issues below    Proper use and care of (medical equipment, care aids, etc.) Yes  Pain management techniques: Yes  Patient instructed on hand hygiene: Yes  How and/when to access community resources: Yes    Infection Control:  Patient demonstrates understanding of the following:   Surgical procedure site care taught NA  Signs and symptoms of infection taught Yes  Wound care taught Yes  Central venous catheter care taught NA    Instructional Materials Used/Given: verbal, print out of post biopsy instructions.       Specific Concerns: NA

## 2022-07-27 NOTE — PROGRESS NOTES
BMT ONC Adult Bone Marrow Biopsy Procedure Note  July 27, 2022  /64 (BP Location: Right arm)   Pulse 69   Temp 97.7  F (36.5  C) (Oral)   Resp 18   Wt 72.7 kg (160 lb 3.2 oz)   SpO2 100%   BMI 23.86 kg/m       Learning needs assessment complete within 12 months? YES    DIAGNOSIS: MM     PROCEDURE: Unilateral Bone Marrow Biopsy and Unilateral Aspirate    LOCATION: [facility]nd [facility]    Patient s identification was positively verified by patient identification band and invasive procedure safety checklist was completed. Informed consent was obtained. Following the administration of no premed as pre-medication, patient was placed in the prone position and prepped and draped in a sterile manner. Approximately 10 cc of 1% Lidocaine was used over the right posterior iliac spine. Following this a 3 mm incision was made.   Aspirates were sent for research. A total of approximately 24 ml of marrow was aspirated. Following this procedure a sterile dressing was applied to the bone marrow biopsy site(s). The patient was placed in the supine position to maintain pressure on the biopsy site. Post-procedure wound care instructions were given.     Complications: NO    Pre-procedural pain: 0 out of 10 on the numeric pain rating scale.     Procedural pain: 0 out of 10 on the numeric pain rating scale.     Post-procedural pain assessment: 0 out of 10 on the numeric pain rating scale.     Interventions: NO    Length of procedure:21 minutes to 45 minutes    Procedure performed by: Bibiana Ramos NP

## 2022-07-27 NOTE — LETTER
7/27/2022         RE: Theo Meyers  [address]  [facility] [address]        Dear Colleague,    Thank you for referring your patient, Theo Meyers, to the [facility] BLOOD AND MARROW TRANSPLANT PROGRAM [place]. Please see a copy of my visit note below.    BMT ONC Adult Bone Marrow Biopsy Procedure Note  July 27, 2022  /64 (BP Location: Right arm)   Pulse 69   Temp 97.7  F (36.5  C) (Oral)   Resp 18   Wt 72.7 kg (160 lb 3.2 oz)   SpO2 100%   BMI 23.86 kg/m       Learning needs assessment complete within 12 months? YES    DIAGNOSIS: MM     PROCEDURE: Unilateral Bone Marrow Biopsy and Unilateral Aspirate    LOCATION: [facility] 2nd [facility]    Patient s identification was positively verified by patient identification band and invasive procedure safety checklist was completed. Informed consent was obtained. Following the administration of no premed as pre-medication, patient was placed in the prone position and prepped and draped in a sterile manner. Approximately 10 cc of 1% Lidocaine was used over the right posterior iliac spine. Following this a 3 mm incision was made.   Aspirates were sent for research. A total of approximately 24 ml of marrow was aspirated. Following this procedure a sterile dressing was applied to the bone marrow biopsy site(s). The patient was placed in the supine position to maintain pressure on the biopsy site. Post-procedure wound care instructions were given.     Complications: NO    Pre-procedural pain: 0 out of 10 on the numeric pain rating scale.     Procedural pain: 0 out of 10 on the numeric pain rating scale.     Post-procedural pain assessment: 0 out of 10 on the numeric pain rating scale.     Interventions: NO    Length of procedure:21 minutes to 45 minutes  Procedure performed by: Bibiana Ramos NP          Again, thank you for allowing me to participate in the care of your patient.      Sincerely,    [facility] BONE MARROW BIOPSY

## 2022-07-28 NOTE — LETTER
7/28/2022         RE: Theo Meyers  [address]  [facility] [address]        Dear Colleague,    Thank you for referring your patient, Theo Meyers, to the [facility] BLOOD AND MARROW TRANSPLANT PROGRAM [place]. Please see a copy of my visit note below.    BMT/Cellular Therapy Clinic Progress Note    MT 2020-23  Mr Theo Meyers is a 77 year old male who is Day 4 of  as Monotherapy in Combination with Monoclonal Antibodies in Relapsed/Refractory Multiple Myeloma     Active Medical Management Issues:     Hematologic history:  Multiple myeloma diagnosed in 2009, IgG kappa but evolving to light chain secretory, del 13q, t(11;14).       Date Treatment Response Toxicities/Complications   7/2009 Velcade/Dex x 8 cycles VGPR     4/2010 HD-Jennifer/PBSCT CR     6/2010 Rev maintenance       10/2012 RVD   cytopenias   9/2012 RVD (q 2wk velcade) Long remission     10/2020 Brianne/pom/dex Recent progresson     4/6/2022 Carf/dex  [place] - held Possible cardiac toxicity                               HPI: Here for daratumumab. Feeling ok. No n/v/d/c. No bleeding. No fevers.     ICANS 10/10 7/28    ROS:     10 point ROS otherwise negative     PHYSICAL EXAM                                                                                                                                      Wt Readings from Last 4 Encounters:   07/28/22 71.9 kg (158 lb 8 oz)   07/27/22 72.7 kg (160 lb 3.2 oz)   07/26/22 73.2 kg (161 lb 6.4 oz)   07/25/22 72.5 kg (159 lb 14.4 oz)     Blood pressure 116/71, pulse 62, temperature 97.3  F (36.3  C), temperature source Oral, resp. rate 16, weight 71.9 kg (158 lb 8 oz), SpO2 100 %.    ECOG 1  General: NAD  Eyes: sclera anicteric   HEENT:  resolving ecchymosis below R eye.  Mouth: OP moist without lesions  Lungs: CTAB  Cardiovascular: RRR, with murmer, heart sounds are distant   Lymphatics: no edema   Skin: No rash but thin skin with bruises on his arms  Neuro: non-focal  Access: Port a  cath:right. PICC in left arm dressing cdi.  ICE 10/10    Blood Counts     Lab Results   Component Value Date    WBC 0.6 (LL) 07/27/2022    ANEU 1.8 07/05/2022    HGB 8.9 (L) 07/27/2022    HCT 27.7 (L) 07/27/2022    PLT 60 (L) 07/27/2022     07/27/2022    POTASSIUM 3.7 07/27/2022    CHLORIDE 112 (H) 07/27/2022    CO2 26 07/27/2022     (H) 07/27/2022    BUN 24 07/27/2022    CR 1.07 07/27/2022    MAG 1.8 07/26/2022    INR 1.15 06/23/2022    BILITOTAL 0.2 07/27/2022    AST 26 07/27/2022    ALT 57 07/27/2022    ALKPHOS 86 07/27/2022    PROTTOTAL 4.9 (L) 07/27/2022    ALBUMIN 2.7 (L) 07/27/2022       I have assessed all abnormal lab values for their clinical significance and any values considered clinically significant have been addressed in the assessment and plan.        Assessment Plans:     Keith Meyers is a 77 year old man who is Day 4 of  as Monotherapy in Relapsed/Refractory MM and in Combination with Monoclonal Antibodies in Relapsed/Refractory Multiple Myeloma     1. Cellular Therapy: Relapsed refractory Multiple myeloma    Day-5(7/20)  Begin Flu/CY: LD chemo  Day-4:( 7/21) Flu/CY/dimple: LD chemo; IH pentam  Day -3: 7/22)  Flu/CY: LD chemo, covid test    Day 1: ( 7/25/2022)  PICC placement and infusion of   Remains on allopurinol  (decrease dose due to LATANYA on CKI)  Day 4 ICE 10/10 (sentence log in [place] folder in clinic file)    2. Heme:  #History of blood clots, chronic and stroke prophy for atrial fib. Hold Xarelto now with down-trending plts (x7/25).  - 7/20 does have right eye echymosis - no trauma/falls- thinks he runs hsi eyes as night has has large bruising around eye as result.     3. ID: afebrile.  - prophy Levo (started 7/26), ACV, Pentamidine (7/22). He reports sob day after Pentam.  Bactrim held d/t acute on CKD    4. FEN:    Remains on supplemental potassium   LATANYA on CKI- Cr improved  Lasix 40mg PO daily at home      5. CV:   -Bradycardia  -history of atrial fib and heart  failure  Remains on lasix and supplemental K    6.Neuro:  Has neuropathy in hands mainly and some in feet, neurontin    30 minutes spent on the date of the encounter doing chart review, review of test results, interpretation of tests, patient visit and documentation     Final Plan:  [facility] Monday for labs, follow-up; to 5C phase 1 for cell infusion (possible RBC/plts on 5C)      Natty Yang PA-C  [phone]

## 2022-07-28 NOTE — PROGRESS NOTES
Infusion Nursing Note:  Theo HURT Papihodan presents today for scheduled daratumumab.    Patient seen by provider today: Yes: [employer] ALMA   present during visit today: Not Applicable.    Note: VSS. Pt assessed by provider. Labs monitored, no parameters for today's treatment. Pt premedicated with 50 mg Benadryl PO and 650 mg Tylenol PO. Pt took 500 mg Tylenol this earlier this AM, per [employer] ALMA and Cristiana Sanz (research coordinator), OK to still premedicate with 650 mg Tylenol but do not need to wait 60 min post Tylenol to administer daratumumab. 1800 mg Daratumumab given subcutaneously into left lower abdomen 1 hour post Benadryl administration. VS and PK labs drawn pre and post daratumumab per orders.      Intravenous Access:  PICC.    Treatment Conditions:  Not Applicable.    Post Infusion Assessment:  Patient tolerated injection without incident.  Patient observed for 30 minutes post daratumumab per protocol.     Discharge Plan:   Patient discharged in stable condition accompanied by: wife.  Departure Mode: Ambulatory.      Saundra Sims RN

## 2022-07-28 NOTE — LETTER
Date:July 28, 2022      Provider requested that no letter be sent. Do not send.       [facility]

## 2022-07-28 NOTE — LETTER
7/28/2022         RE: Theo Meyers  [address]  [facility] [address]        Dear Colleague,    Thank you for referring your patient, Theo Meyers, to the [facility] BLOOD AND MARROW TRANSPLANT PROGRAM [place]. Please see a copy of my visit note below.    Infusion Nursing Note:  Theo Meyers presents today for scheduled daratumumab.    Patient seen by provider today: Yes: [facility] ALMA   present during visit today: Not Applicable.    Note: VSS. Pt assessed by provider. Labs monitored, no parameters for today's treatment. Pt premedicated with 50 mg Benadryl PO and 650 mg Tylenol PO. Pt took 500 mg Tylenol this earlier this AM, per [facility] ALMA and Cristiana Sanz (research coordinator), OK to still premedicate with 650 mg Tylenol but do not need to wait 60 min post Tylenol to administer daratumumab. 1800 mg Daratumumab given subcutaneously into left lower abdomen 1 hour post Benadryl administration. VS and PK labs drawn pre and post daratumumab per orders.      Intravenous Access:  PICC.    Treatment Conditions:  Not Applicable.    Post Infusion Assessment:  Patient tolerated injection without incident.  Patient observed for 30 minutes post daratumumab per protocol.     Discharge Plan:   Patient discharged in stable condition accompanied by: wife.  Departure Mode: Ambulatory.      Saundra Sims RN                          Again, thank you for allowing me to participate in the care of your patient.        Sincerely,        [facility]

## 2022-07-28 NOTE — PROGRESS NOTES
BMT/Cellular Therapy Clinic Progress Note    MT 2020-23  Mr Theo Meyers is a 77 year old male who is Day 4 of  as Monotherapy in Combination with Monoclonal Antibodies in Relapsed/Refractory Multiple Myeloma     Active Medical Management Issues:     Hematologic history:  Multiple myeloma diagnosed in 2009, IgG kappa but evolving to light chain secretory, del 13q, t(11;14).       Date Treatment Response Toxicities/Complications   7/2009 Velcade/Dex x 8 cycles VGPR     4/2010 HD-Jennifer/PBSCT CR     6/2010 Rev maintenance       10/2012 RVD   cytopenias   9/2012 RVD (q 2wk velcade) Long remission     10/2020 Brianne/pom/dex Recent progresson     4/6/2022 Carf/dex  [place] - held Possible cardiac toxicity                               HPI: Here for daratumumab. Feeling ok. No n/v/d/c. No bleeding. No fevers.     ICANS 10/10 7/28    ROS:     10 point ROS otherwise negative     PHYSICAL EXAM                                                                                                                                      Wt Readings from Last 4 Encounters:   07/28/22 71.9 kg (158 lb 8 oz)   07/27/22 72.7 kg (160 lb 3.2 oz)   07/26/22 73.2 kg (161 lb 6.4 oz)   07/25/22 72.5 kg (159 lb 14.4 oz)     Blood pressure 116/71, pulse 62, temperature 97.3  F (36.3  C), temperature source Oral, resp. rate 16, weight 71.9 kg (158 lb 8 oz), SpO2 100 %.    ECOG 1  General: NAD  Eyes: sclera anicteric   HEENT:  resolving ecchymosis below R eye.  Mouth: OP moist without lesions  Lungs: CTAB  Cardiovascular: RRR, with murmer, heart sounds are distant   Lymphatics: no edema   Skin: No rash but thin skin with bruises on his arms  Neuro: non-focal  Access: Port a cath:right. PICC in left arm dressing cdi.  ICE 10/10    Blood Counts     Lab Results   Component Value Date    WBC 0.6 (LL) 07/27/2022    ANEU 1.8 07/05/2022    HGB 8.9 (L) 07/27/2022    HCT 27.7 (L) 07/27/2022    PLT 60 (L) 07/27/2022     07/27/2022    POTASSIUM 3.7  07/27/2022    CHLORIDE 112 (H) 07/27/2022    CO2 26 07/27/2022     (H) 07/27/2022    BUN 24 07/27/2022    CR 1.07 07/27/2022    MAG 1.8 07/26/2022    INR 1.15 06/23/2022    BILITOTAL 0.2 07/27/2022    AST 26 07/27/2022    ALT 57 07/27/2022    ALKPHOS 86 07/27/2022    PROTTOTAL 4.9 (L) 07/27/2022    ALBUMIN 2.7 (L) 07/27/2022       I have assessed all abnormal lab values for their clinical significance and any values considered clinically significant have been addressed in the assessment and plan.        Assessment Plans:     Keith Meyers is a 77 year old man who is Day 4 of  as Monotherapy in Relapsed/Refractory MM and in Combination with Monoclonal Antibodies in Relapsed/Refractory Multiple Myeloma     1. Cellular Therapy: Relapsed refractory Multiple myeloma    Day-5(7/20)  Begin Flu/CY: LD chemo  Day-4:( 7/21) Flu/CY/dimple: LD chemo; IH pentam  Day -3: 7/22)  Flu/CY: LD chemo, covid test    Day 1: ( 7/25/2022)  PICC placement and infusion of   Remains on allopurinol  (decrease dose due to LATANYA on CKI)  Day 4 ICE 10/10 (sentence log in [place] folder in clinic file)    2. Heme:  #History of blood clots, chronic and stroke prophy for atrial fib. Hold Xarelto now with down-trending plts (x7/25).  - 7/20 does have right eye echymosis - no trauma/falls- thinks he runs hsi eyes as night has has large bruising around eye as result.     3. ID: afebrile.  - prophy Levo (started 7/26), ACV, Pentamidine (7/22). He reports sob day after Pentam.  Bactrim held d/t acute on CKD    4. FEN:    Remains on supplemental potassium   LATANYA on CKI- Cr improved  Lasix 40mg PO daily at home      5. CV:   -Bradycardia  -history of atrial fib and heart failure  Remains on lasix and supplemental K    6.Neuro:  Has neuropathy in hands mainly and some in feet, neurontin    30 minutes spent on the date of the encounter doing chart review, review of test results, interpretation of tests, patient visit and documentation      Final Plan:  dimple  [facility] Monday for labs, follow-up; to 5C phase 1 for cell infusion (possible RBC/plts on 5C)      Natty Yang PA-C  [phone]

## 2022-07-28 NOTE — NURSING NOTE
"Oncology Rooming Note    July 28, 2022 9:57 AM   Theo Meyers is a 77 year old male who presents for:    Chief Complaint   Patient presents with     Blood Draw     Labs drawn via PICC by RN. VS taken.     Oncology Clinic Visit     Provider visit; hx MM     Initial Vitals: /71   Pulse 62   Temp 97.3  F (36.3  C) (Oral)   Resp 16   Wt 71.9 kg (158 lb 8 oz)   SpO2 100%   BMI 23.61 kg/m   Estimated body mass index is 23.61 kg/m  as calculated from the following:    Height as of 7/26/22: 1.745 m (5' 8.7\").    Weight as of this encounter: 71.9 kg (158 lb 8 oz). Body surface area is 1.87 meters squared.  No Pain (0) Comment: Data Unavailable   No LMP for male patient.  Allergies reviewed: Yes  Medications reviewed: Yes    Medications: MEDICATION REFILLS NEEDED TODAY. Provider was notified.  Pharmacy name entered into [place]:    [id number] - [place], [place] - [address] [facility] [address] [facility]  [facility] - [place], [place] - [address] [facility]    Clinical concerns: Needs heparin order for home PICC line flushing. Also wondering when to restart Xarelto.     Saundra Sims RN              "

## 2022-07-28 NOTE — NURSING NOTE
Chief Complaint   Patient presents with     Blood Draw     Labs drawn via PICC by RN. VS taken.     Labs drawn from PICC by rn.  Good blood return noted in both lumens.  Both lumens flushed with NS and heparin.  Pt tolerated well.  VS taken.  Pt checked in for next appt.    Percy Flores RN

## 2022-08-01 PROBLEM — C90.02 MULTIPLE MYELOMA IN RELAPSE (H): Status: ACTIVE | Noted: 2022-01-01

## 2022-08-01 NOTE — CONFIDENTIAL NOTE
BMT/Cellular Therapy Clinic Progress Note    MT 2020-23  Mr Theo Meyers is a 77 year old male who is Day 8 of  as Monotherapy in Combination with Monoclonal Antibodies in Relapsed/Refractory Multiple Myeloma     Active Medical Management Issues:     Hematologic history:  Multiple myeloma diagnosed in 2009, IgG kappa but evolving to light chain secretory, del 13q, t(11;14).       Date Treatment Response Toxicities/Complications   7/2009 Velcade/Dex x 8 cycles VGPR     4/2010 HD-Jennifer/PBSCT CR     6/2010 Rev maintenance       10/2012 RVD   cytopenias   9/2012 RVD (q 2wk velcade) Long remission     10/2020 Brianne/pom/dex Recent progresson     4/6/2022 Carf/dex  [place] - held Possible cardiac toxicity                               HPI: Here for evaluation prior to cell infusion on 5C.     Feeling ok. No n/v/d/c. No bleeding. No fevers. No palpitations or SOB while walking dogs. Eye bruise improving per pt, no vision changes.  Off xarelto.    No ICANS today per study nurse    ROS:     10 point ROS otherwise negative     PHYSICAL EXAM                                                                                                                                      Wt Readings from Last 4 Encounters:   07/28/22 71.9 kg (158 lb 8 oz)   07/27/22 72.7 kg (160 lb 3.2 oz)   07/26/22 73.2 kg (161 lb 6.4 oz)   07/25/22 72.5 kg (159 lb 14.4 oz)     Blood pressure 98/65, pulse 58, temperature 97.4  F (36.3  C), temperature source Oral, resp. rate 18, SpO2 99 %.    ECOG 1  General: NAD  Eyes: sclera anicteric   HEENT:  resolving ecchymosis below R eye.  Mouth: OP moist without lesions  Lungs: CTAB  Cardiovascular: bradycardic 50s-60bpm, with systolic murmer, heart sounds are distant   Lymphatics: no edema   Skin: No rash but thin skin with bruises on his arms  Neuro: non-focal  Access: Port a cath:right. PICC in left arm dressing cdi.      Blood Counts     Lab Results   Component Value Date    WBC 0.6 (LL) 07/28/2022    ANEU 0.6  (L) 07/28/2022    HGB 8.7 (L) 07/28/2022    HCT 26.9 (L) 07/28/2022    PLT 48 (LL) 07/28/2022     07/27/2022    POTASSIUM 3.7 07/27/2022    CHLORIDE 112 (H) 07/27/2022    CO2 26 07/27/2022     (H) 07/27/2022    BUN 24 07/27/2022    CR 1.07 07/27/2022    MAG 1.8 07/26/2022    INR 1.15 06/23/2022    BILITOTAL 0.2 07/27/2022    AST 26 07/27/2022    ALT 57 07/27/2022    ALKPHOS 86 07/27/2022    PROTTOTAL 4.9 (L) 07/27/2022    ALBUMIN 2.7 (L) 07/27/2022       I have assessed all abnormal lab values for their clinical significance and any values considered clinically significant have been addressed in the assessment and plan.        Assessment Plans:     Keith Meyers is a 77 year old man who is Day 8 of  as Monotherapy in Relapsed/Refractory MM and in Combination with Monoclonal Antibodies in Relapsed/Refractory Multiple Myeloma     1. Cellular Therapy: Relapsed refractory Multiple myeloma    Day-5(7/20)  Begin Flu/CY: LD chemo  Day-4:( 7/21) Flu/CY/dimple: LD chemo; IH pentam  Day -3: 7/22)  Flu/CY: LD chemo, covid test    Day 1: ( 7/25/2022)  PICC placement and infusion of   Remains on allopurinol  (decrease dose due to LATANYA on CKI)  Day 4 ICE 10/10 (sentence log in [place] folder in clinic file)    2. Heme:  #History of blood clots, chronic and stroke prophy for atrial fib. Hold Xarelto now with down-trending plts (x7/25).  - 7/20 does have right eye echymosis - no trauma/falls- thinks he runs hsi eyes as night has has large bruising around eye as result. Slowly improving off xarelto    3. ID: afebrile.  - prophy Levo (started 7/26), ACV, Pentamidine (7/22).   Bactrim held d/t acute on CKD    4. FEN:    Remains on supplemental potassium   LATANYA on CKI- Cr slightly up today to 1.3. Will push fluids  Lasix 40mg PO daily at home      5. CV:   - Bradycardia  - history of atrial fib and heart failure  Remains on lasix and supplemental K    6. Neuro:  Has neuropathy in hands mainly and some in feet,  neurontin    7. GI: no complaints  AST 4x upper limit of normal, no bilirubin elevation. No dose changes and no LFT parameter per PharmD for brianne 8/4. Study nurse aware. Not on vfend (held per Freida)    30 minutes spent on the date of the encounter doing chart review, review of test results, interpretation of tests, patient visit and documentation       Final Plan:  Present to 5C as planned for infusion  cells, RBCs and plt  8/4 Brianne Langley PAC  [phone]

## 2022-08-01 NOTE — PROGRESS NOTES
Admission to Early Phase Research Unit    August 1, 2022    Clinical Trial: [id number] Phase 1 study  in combination with monoclonal antibodies in relapsed/refractory MM    Principle Investigator: Dr. Guan [phone]    Research Nurse: Rama Collazo [phone]    BertBLU Meyers was admitted to the early phase research unit at the [facility] for administration of therapy and clinical monitoring. The patient has been assessed by a provider prior to admission and medically cleared for therapy. In the case of an adverse event or change in clinical status requiring additional medical management, the PI will be contacted and the decision to admit the patient to the 5C unit will be determined by the BMT team B advanced practice provider and collaborating physician. The attending physician on service is Dr. Sonu Herndon [phone]    Received cells without incident.  Will get ICE score this afternoon and then discharge if clinically stable.    Morena Watson PA-C [phone]

## 2022-08-01 NOTE — NURSING NOTE
"Oncology Rooming Note    August 1, 2022 9:06 AM   Theo Meyers is a 77 year old male who presents for:    Chief Complaint   Patient presents with     Labs Only     Picc, heparin locked, vitals checked     Oncology Clinic Visit     Multiple Myeloma     Initial Vitals: BP 98/65   Pulse 58   Temp 97.4  F (36.3  C) (Oral)   Resp 18   SpO2 99%  Estimated body mass index is 23.61 kg/m  as calculated from the following:    Height as of 7/26/22: 1.745 m (5' 8.7\").    Weight as of 7/28/22: 71.9 kg (158 lb 8 oz). There is no height or weight on file to calculate BSA.  No Pain (0) Comment: Data Unavailable   No LMP for male patient.  Allergies reviewed: Yes  Medications reviewed: Yes    Medications: MEDICATION REFILLS NEEDED TODAY. Provider was notified.  Pharmacy name entered into [place]:    [id number] - [place], [place] - [address] AT [facility] [address] [facility]  [facility] - [place], [place] - [address] [place] [address]    Clinical concerns: Pt needs refill for Gabapentin (300mg)      Elvis Vargas            "

## 2022-08-01 NOTE — PROGRESS NOTES
Type of transplant: Donor: Allogeneic - Unrelated  Product:   BMT INFUSION DOCUMENTATION (last 48 hours)     BMT/Cellular Product Infusion     Row Name 08/01/22 1000                [REMOVED] Product 08/01/22 1000 Other (specify in comment)    Product Details Product Release Date: 08/01/22  -LL Product Release Time: 1000  -LL Product Type: Other (specify in comment)  -LL DIN: [id number]  -LL Product Description Code: [id number]  -LL Volume Dispensed (mL): 18 mL  -LL Completion Date (RN to complete): 08/01/22  -CW Completion Time (RN to complete): 1320  -CW       Checked by (Patient RN) Negra Samuel RN  -CW       Checked by (Witness) MIRTA Bishop       Product Volume Infused (mL) 18 mL  -CW       Flush Volume (mL) 50 mL  -CW       Volume Dispensed (mL) --                [REMOVED] Product 08/01/22 1000 Other (specify in comment)    Product Details Product Release Date: 08/01/22  -LL Product Release Time: 1000  -LL Product Type: Other (specify in comment)  -LL DIN: [id number]  -LL Product Description Code: [id number]  -LL Volume Dispensed (mL): 18 mL  -LL Completion Date (RN to complete): 08/01/22  -CW Completion Time (RN to complete): 1338  -CW       Checked by (Patient RN) Negra Samuel RN  -CW       Checked by (Witness) MIRTA Bishop       Product Volume Infused (mL) 18 mL  -CW       Flush Volume (mL) 50 mL  -CW       Volume Dispensed (mL) --             User Key  (r) = Recorded By, (t) = Taken By, (c) = Cosigned By    Initials Name Effective Dates    Negra Albright RN 05/11/22 -     Kareen Maciel RN 06/18/22 -     Romelia High 07/11/21 -               Preparation: RN Documentation  Patient was premedicated as ordered: yes  Line Type: central line, left  Patient Stable Prior to Infusion: yes  Time Infusion Started: 1309  Teaching: side effects/monitoring  Tolerated/Reaction: Patient tolerance of product infusion  Immediate suspected transfusion reaction to the product:  "none  Did patient have prior history of similar signs/symptoms during this hospitalization?: NA  Symptoms during/after infusion: other (comment) (\"tickle in throat\")  Did the patient tolerate the infusion well: yes  Medications and treatment for symptoms: NA  Did the symptoms resolve?: yes  Enter comments if clots, leaks, broken bag, infusion delays, other issues with bag/infusion: N/A  Flush until: 8/1/2022 @ 1446  Plan: Discharge after ICANS completed & cleared by physician. Per research coordinator RN to have patient take post tylenol & benadryl at home at 1738. Spoke with wife at discharge & expressed understanding. Patient picked up medications at discharge pharmacy. Patient's PICC line locked with heparin bilaterally. ICANS assessment completed by physician assistant at 6221.    "

## 2022-08-01 NOTE — PROGRESS NOTES
BMT Post Infusion Documentation    Data   Patient Vitals for the past 72 hrs:   Temp Temp src Pulse Resp BP   08/01/22 1054 (!) 96.6  F (35.9  C) Axillary 59 18 130/83   08/01/22 1128 97.9  F (36.6  C) Temporal -- -- --   08/01/22 1258 97.3  F (36.3  C) Temporal 56 14 (!) 161/98   08/01/22 1300 -- -- -- -- (!) 144/99   08/01/22 1322 97.7  F (36.5  C) Temporal 73 16 (!) 155/98   08/01/22 1336 97.6  F (36.4  C) Temporal 57 18 138/81   08/01/22 1339 97.5  F (36.4  C) Temporal 62 14 (!) 150/76   08/01/22 1353 97.6  F (36.4  C) Temporal 69 16 126/89     BMT INFUSION DOCUMENTATION (last 24 hours)     BMT/Cellular Product Infusion     Row Name 08/01/22 1000                Cell Therapy Documentation    Product Release Date 08/01/22  -       Recipient Study -  -       Donor Allogeneic - Unrelated  -LL       Donor MRN/-2004  -LL       Donor ABO/Rh --  N/A  -LL       Allogeneic Donor Eligibility Determination and Summary of Records Eligible  -LL       Type of Infusion Allogeneic  -LL       Total Volume Dispensed (mL) 36  -LL       Total NC Dose N/A  -LL       Total CD34 Dose N/A  -LL       Total CD3 dose N/A  -LL       Total NC Dose Left in Storage NONE  -LL       Comments for Product Issues  INVESTIGATIONAL PRODUCT 1.0E+09 TOTAL CELLS. PRODUCT LOT# [id number]  -AK       Donor ABO/Rh --       Product Types --       Product Numbers --       Product Types and Numbers --       Volume --       ABO Mismatch --       ZZTotal NC Dose --       ZZTotal CD34 Dose --       ZZTotal NC Dose Left in Storage --                [REMOVED] Product 08/01/22 1000 Other (specify in comment)    Product Details Product Release Date: 08/01/22  -LL Product Release Time: 1000  -LL Product Type: Other (specify in comment)  - DIN: [id number]  - Product Description Code: [id number]  -LL Volume Dispensed (mL): 18 mL  -LL Completion Date (RN to complete): 08/01/22  -CW Completion Time (RN to complete): 1320  -CW        Checked by (Patient RN) Negra Samuel RN  -CW       Checked by (Witness) MIRTA BishopKS       Product Volume Infused (mL) 18 mL  -CW       Flush Volume (mL) 50 mL  -CW       Volume Dispensed (mL) --                [REMOVED] Product 08/01/22 1000 Other (specify in comment)    Product Details Product Release Date: 08/01/22  -LL Product Release Time: 1000  -LL Product Type: Other (specify in comment)  -LL DIN: [id number]  -LL Product Description Code: [id number]  -LL Volume Dispensed (mL): 18 mL  -LL Completion Date (RN to complete): 08/01/22  -CW Completion Time (RN to complete): 1338  -CW       Checked by (Patient RN) Negra Samuel RN  -CW       Checked by (Witness) MIRTA Bishop       Product Volume Infused (mL) 18 mL  -CW       Flush Volume (mL) 50 mL  -CW       Volume Dispensed (mL) --                RN Documentation    Patient was premedicated as ordered yes  -CW       Line Type central line, left  -CW       Patient Stable Prior to Infusion yes  -CW       Time Infusion Started 1309  -CW       Checked by (Patient RN) --       Checked by (RN 2) --       Broken Bag? --       Immediate suspected transfusion reaction to the product --       Time Infusion Stopped --       Total Flush Volume (mL) --       Checked by (Witness) --       Date Infusion Started --       Date Infusion Stopped --       Volume Infused (mL) --       Total Volume Infused (cc) --                Patient tolerance of product infusion    Immediate suspected transfusion reaction to the product --       Did patient have prior history of similar signs/symptoms during this hospitalization? --       Symptoms during/after infusion --       Did the patient tolerate the infusion well --       Medications and treatment for symptoms --       Did the symptoms resolve? --       Enter comments if clots, leaks, broken bag, infusion delays, other issues with bag/infusion --       Describe symptoms --             User Key  (r) = Recorded By, (t)  = Taken By, (c) = Cosigned By    Initials Name Effective Dates    Mere Bolton 11/10/15 -     Negra Albright RN 05/11/22 -     Kareen Maciel RN 06/18/22 -     Romelia High 07/11/21 -                   Post-Infusion Evaluation:   Infusion Related Reaction: Grade 0 - none  Dyspnea: Grade 0 - none  Hypoxia: Grade 0 - not present  Fever: Grade 0 - afebrile  Chills: Grade 0 - none  Febrile Neutropenia: Grade 0 - not present  Sinus Bradycardia: Grade 1 - asymptomatic, intervention not indicated  Hypertension: Grade 3 - stage 2 hypertension (systolic BP >/ 160 mm Hg or diastolic BP >/ 100 mm Hg); medical intervention indicated; more than one drug or more intensive therapy than previously used indicated  Hypotension: Grade 0 - none  Chest Pain: Grade 0 - none  Bronchospasm: Grade 0 - none  Pain: Grade 0 - none  Rash: Grade 0 - None  Neurologic Specify: none    If this was a cord blood transplant, was more than one cord blood unit infused? Not applicable    Morena Watson PA-C

## 2022-08-01 NOTE — LETTER
8/1/2022         RE: Theo Meyers  [address]  [facility] [address]        Dear Colleague,    Thank you for referring your patient, Theo Meyers, to the [facility] BLOOD AND MARROW TRANSPLANT PROGRAM [place]. Please see a copy of my visit note below.    No notes on file         Again, thank you for allowing me to participate in the care of your patient.      Sincerely,    BMT Advanced Practice Provider

## 2022-08-01 NOTE — NURSING NOTE
Patient had dressing changed using sterile technique. Site is clean and dry. New dressing was applied. Patient tolerated without incident and was discharged after. Please see flow sheet for additional details.       Karuna Palma MA

## 2022-08-01 NOTE — NURSING NOTE
Chief Complaint   Patient presents with     Labs Only     Picc, heparin locked, vitals checked     Oncology Clinic Visit     Sandie Mak RN on 8/1/2022 at 8:53 AM

## 2022-08-01 NOTE — PROGRESS NOTES
BMT/Cellular Allogeneic Product Infusion       Patient Vitals for the past 24 hrs:   Temp Temp src Pulse Resp BP   08/01/22 1054 (!) 96.6  F (35.9  C) Axillary 59 18 130/83   08/01/22 1128 97.9  F (36.6  C) Temporal -- -- --      BMT INFUSION DOCUMENTATION (last 48 hours)     BMT/Cellular Product Infusion     Row Name                  Product 08/01/22 1000 Other (specify in comment)    Product Details Product Release Date: 08/01/22  -LL Product Release Time: 1000  -LL Product Type: Other (specify in comment)  -LL DIN: [id number]  -LL Product Description Code: [id number]  -LL Volume Dispensed (mL): 18 mL  -LL       Checked by (Patient RN) --       Checked by (Witness) --       Product Volume Infused (mL) --       Flush Volume (mL) --       Volume Dispensed (mL) --                Product 08/01/22 1000 Other (specify in comment)    Product Details Product Release Date: 08/01/22  -LL Product Release Time: 1000  -LL Product Type: Other (specify in comment)  - DIN: [id number]  -LL Product Description Code: [id number]  -LL Volume Dispensed (mL): 18 mL  -LL       Checked by (Patient RN) --       Checked by (Witness) --       Product Volume Infused (mL) --       Flush Volume (mL) --       Volume Dispensed (mL) --             User Key  (r) = Recorded By, (t) = Taken By, (c) = Cosigned By    Initials Name Effective Dates    LL JaciRomelia Mccall 07/11/21 -               Allogeneic Donor Eligibility Determination and Summary of Records: Eligible        Type of Infusion: Allogeneic      Baseline Pre-Infusion Evaluation (to be completed by Provider):   Dyspnea: Grade 0 - none  Hypoxia: Grade 0 - not present  Fever: Grade 0 - afebrile  Chills: Grade 0 - none  Febrile Neutropenia: Grade 0 - not present  Sinus Bradycardia: Grade 0 - none  Hypertension: Grade 1 - prehypertension (systolic -139 mm Hg or diastolic BP 80-89 mm Hg)  Hypotension: Grade 0 - none  Chest Pain: Grade 0 - none  Bronchospasm: Grade 0 - none  Pain:  Grade 0 - none  Rash: Grade 0 - None  Neurologic Specify: none    If adverse reactions, events or complications occur (fever greater than 2 degrees fahrenheit increase, and severe reactions of the following types: chills, dyspnea, bronchospasm, hyper/hypotension, hypoxia, bradycardia, chest pain, back/flank pain, hypoxia, and any other reaction deemed severe or life threatening; any instance of product bag breakage or unusual product appearance)    Any other events that are >= grade 3, then immediately contact the BMT Attending physician, the Cell Therapy Laboratory Medical Director (pager [phone]) and the Cell Therapy Laboratory ([phone]).  After midnight, holidays & weekends contact the [facility] on the appropriate campus ([facility] [place]: [phone]; [facility] [place]: [phone]).    Morena Watson PA-C

## 2022-08-01 NOTE — PROGRESS NOTES
[id number]: Study Visit Note   Subject name: Theo Brownchris     Visit: C1 D8    Did the study visit occur within the appropriate window allowed by the protocol? yes    Since the last study visit, He has been doing well, pt endorses continued fatigue, but reports that it has been improving and that he is still able to complete all of his ADLs, take his dogs for walks, and work in the garden. Some additional bruising noted around left eye which started today. Bruising around right eye is healing. Keith endorsed a tickle in his throat during cell infusion that started at 1310 and resolved by 1340.     I was present on 5C for the entirety of the  infusion, which proceeded without incident. There were no issues with the product and all new pt complaints reviewed above. Keith will stay on the unit until his post-infusion ICANS assessment is complete (between 1538 -1738) and will take his post-infusion medications of tylenol and benadryl at home.    I have personally interviewed Theo Meyers and reviewed his medical record for adverse events and concomitant medications and these have been recorded on the corresponding logs in Theo Meyers's research file.     Theo Meyers was given the opportunity to ask any trial related questions.  Please see provider progress note for physical exam and other clinical information. Labs were reviewed - any significant lab values were addressed and reviewed.    Kareen Campoverde RN

## 2022-08-04 NOTE — NURSING NOTE
"Oncology Rooming Note    August 4, 2022 8:09 AM   Theo Meyers is a 77 year old male who presents for:    Chief Complaint   Patient presents with     Blood Draw     Labs drawn via picc by RN. Vitals taken.     Initial Vitals: BP 98/63 (BP Location: Right arm)   Pulse 82   Temp 97.6  F (36.4  C) (Oral)   Resp 16   Wt 73.3 kg (161 lb 8 oz)   SpO2 98%   BMI 24.06 kg/m   Estimated body mass index is 24.06 kg/m  as calculated from the following:    Height as of 7/26/22: 1.745 m (5' 8.7\").    Weight as of this encounter: 73.3 kg (161 lb 8 oz). Body surface area is 1.88 meters squared.  No Pain (0) Comment: Data Unavailable   No LMP for male patient.  Allergies reviewed: Yes  Medications reviewed: Yes    Medications: MEDICATION REFILLS NEEDED TODAY. Provider was notified.  Pharmacy name entered into [facility]:    [id number] - [place], [place] - [address] [facility] [address] [place]  [facility] - [place], [place] - [address] [facility] - [place], [place] - [address] [facility]    Clinical concerns: none       Melisa Smith RN            "

## 2022-08-04 NOTE — LETTER
Date:August 5, 2022      Provider requested that no letter be sent. Do not send.       [facility]

## 2022-08-04 NOTE — LETTER
8/4/2022         RE: Theo Meyers  [address]  [facility] [facility] [address]        Dear Colleague,    Thank you for referring your patient, Theo Meyers, to the [facility] BLOOD AND MARROW TRANSPLANT PROGRAM [place]. Please see a copy of my visit note below.    Infusion Nursing Note:  Theo Meyers presents today for scheduled subcutaneous Daratumumab, poss transfusions.    Patient seen by provider today: Yes: Holly Langley   present during visit today: Not Applicable.    Note: Labs were monitored, no parameters for daratumumab. Pt was premedicated with 50mg PO benadryl, he reportedly took 500mg of PO tylenol at home at 0600 prior to coming to clinic today- this was reported to  Cristiana Sanz, who ok'd that Tylenol premed be held today. Pt was administered 1,800mg of subcutaneous daratumumab in his right lower quadrant. He tolerated this well. Seen in infusion by Holly Langley, 500ml NS bolus ordered for creatinine elevation. Infused over 30 minutes. Pt also received 1u PRBCs for hgb of 7.7. Tolerated all infusions without side effect or symptom.    Intravenous Access:  Earl.    Treatment Conditions:  Lab Results   Component Value Date    HGB 7.7 (L) 08/04/2022    WBC 0.1 (LL) 08/04/2022    ANEU 0.6 (L) 07/28/2022    ANEUTAUTO 0.6 (L) 07/27/2022    PLT 39 (LL) 08/04/2022      Lab Results   Component Value Date     08/04/2022    POTASSIUM 4.1 08/04/2022    MAG 1.7 08/01/2022    CR 1.41 (H) 08/04/2022    NORMAN 8.0 (L) 08/04/2022    BILITOTAL 0.3 08/04/2022    ALBUMIN 2.7 (L) 08/04/2022     (H) 08/04/2022    AST 53 (H) 08/04/2022       Post Infusion Assessment:  Patient tolerated infusion without incident.  Blood return noted pre and post infusion.  Site patent and intact, free from redness, edema or discomfort.  No evidence of extravasations.  Access discontinued per protocol.     Discharge Plan:   Patient discharged in stable condition accompanied  by: self.  Departure Mode: Ambulatory.      Melisa Smith RN                          Again, thank you for allowing me to participate in the care of your patient.        Sincerely,        [facility]

## 2022-08-04 NOTE — LETTER
8/4/2022         RE: Theo Meyers  [address]  [facility] [address]        Dear Colleague,    Thank you for referring your patient, Theo Meyers, to the [facility] BLOOD AND MARROW TRANSPLANT PROGRAM [place]. Please see a copy of my visit note below.    BMT/Cellular Therapy Clinic Progress Note    MT 2020-23  Mr Theo Meyers is a 77 year old male who is Day 11 of  as Monotherapy in Combination with Monoclonal Antibodies in Relapsed/Refractory Multiple Myeloma     Active Medical Management Issues:     Hematologic history:  Multiple myeloma diagnosed in 2009, IgG kappa but evolving to light chain secretory, del 13q, t(11;14).       Date Treatment Response Toxicities/Complications   7/2009 Velcade/Dex x 8 cycles VGPR     4/2010 HD-Jennifer/PBSCT CR     6/2010 Rev maintenance       10/2012 RVD   cytopenias   9/2012 RVD (q 2wk velcade) Long remission     10/2020 Brianne/pom/dex Recent progresson     4/6/2022 Carf/dex  ID - held Possible cardiac toxicity                               HPI: Here for daratumumab. Notes some shortness of breath over the last day or two, thinks it may be related to hgb <8, which is when he has been symptomatic in the past. No n/v/d/c. No bleeding. No fevers. He denies SOB, cough or congestion.    ICANS 10/10    ROS:     10 point ROS otherwise negative     PHYSICAL EXAM                                                                                                                                      Wt Readings from Last 4 Encounters:   08/04/22 73.3 kg (161 lb 8 oz)   07/28/22 71.9 kg (158 lb 8 oz)   07/27/22 72.7 kg (160 lb 3.2 oz)   07/26/22 73.2 kg (161 lb 6.4 oz)     Blood pressure 98/63, pulse 82, temperature 97.6  F (36.4  C), temperature source Oral, resp. rate 16, weight 73.3 kg (161 lb 8 oz), SpO2 98 %.    ECOG 1  General: NAD  Eyes: sclera anicteric   HEENT:  resolving ecchymosis below R eye.  Mouth: OP moist without lesions  Lungs: CTAB  Cardiovascular:  RRR, with murmer, heart sounds are distant   Lymphatics: no edema   Skin: No rash but thin skin with bruises on his arms  Neuro: non-focal  Access: Port a cath:right. PICC in left arm dressing cdi.    Blood Counts     Lab Results   Component Value Date    WBC 0.1 (LL) 08/04/2022    ANEU 0.6 (L) 07/28/2022    HGB 7.7 (L) 08/04/2022    HCT 22.9 (L) 08/04/2022    PLT 39 (LL) 08/04/2022     08/04/2022    POTASSIUM 4.1 08/04/2022    CHLORIDE 107 08/04/2022    CO2 26 08/04/2022     (H) 08/04/2022    BUN 21 08/04/2022    CR 1.41 (H) 08/04/2022    MAG 1.7 08/01/2022    INR 1.15 06/23/2022    BILITOTAL 0.3 08/04/2022    AST 53 (H) 08/04/2022     (H) 08/04/2022    ALKPHOS 136 08/04/2022    PROTTOTAL 4.8 (L) 08/04/2022    ALBUMIN 2.7 (L) 08/04/2022       I have assessed all abnormal lab values for their clinical significance and any values considered clinically significant have been addressed in the assessment and plan.        Assessment Plans:     Keith Meyers is a 77 year old man who is Day +11 of  as Monotherapy in Relapsed/Refractory MM and in Combination with Monoclonal Antibodies in Relapsed/Refractory Multiple Myeloma     1. Cellular Therapy: Relapsed refractory Multiple myeloma    Day-5(7/20)  Begin Flu/CY: LD chemo  Day-4:( 7/21) Flu/CY/dimple: LD chemo; IH pentam  Day -3: 7/22)  Flu/CY: LD chemo, covid test    Day 1: ( 7/25/2022)  PICC placement and infusion of   Remains on allopurinol  (decrease dose due to LATANYA on CKI)  Day 11 ICE     2. Heme:  #History of blood clots, chronic and stroke prophy for atrial fib. Hold Xarelto now with down-trending plts (x7/25).  - 7/20 does have right eye echymosis - no trauma/falls- thinks he runs hsi eyes as night has has large bruising around eye as result.     3. ID: afebrile.  - prophy Levo (started 7/26), ACV, Pentamidine (7/22). He reports sob day after Pentam.  Bactrim held d/t acute on CKD    4. FEN:    Remains on supplemental potassium   LATANYA  on CKI- Cr improved  Lasix 40mg PO daily at home      5. CV:   - Bradycardia  - history of atrial fib and heart failure  Remains on lasix and supplemental K    6.Neuro:  Has neuropathy in hands mainly and some in feet, neurontin    30 minutes spent on the date of the encounter doing chart review, review of test results, interpretation of tests, patient visit and documentation     Final Plan: Brianne, RBC infusion    RTC tomorrow for labs and possible transfusions (want type&screen current for Monday)  Monday per study      Holly Langley PAC  [phone]

## 2022-08-04 NOTE — PROGRESS NOTES
BMT/Cellular Therapy Clinic Progress Note    MT 2020-23  Mr Theo Meyers is a 77 year old male who is Day 11 of  as Monotherapy in Combination with Monoclonal Antibodies in Relapsed/Refractory Multiple Myeloma     Active Medical Management Issues:     Hematologic history:  Multiple myeloma diagnosed in 2009, IgG kappa but evolving to light chain secretory, del 13q, t(11;14).       Date Treatment Response Toxicities/Complications   7/2009 Velcade/Dex x 8 cycles VGPR     4/2010 HD-Jennifer/PBSCT CR     6/2010 Rev maintenance       10/2012 RVD   cytopenias   9/2012 RVD (q 2wk velcade) Long remission     10/2020 Brianne/pom/dex Recent progresson     4/6/2022 Carf/dex  NM - held Possible cardiac toxicity                               HPI: Here for daratumumab. Notes some shortness of breath over the last day or two, thinks it may be related to hgb <8, which is when he has been symptomatic in the past. No n/v/d/c. No bleeding. No fevers. He denies SOB, cough or congestion.    ICANS 10/10    ROS:     10 point ROS otherwise negative     PHYSICAL EXAM                                                                                                                                      Wt Readings from Last 4 Encounters:   08/04/22 73.3 kg (161 lb 8 oz)   07/28/22 71.9 kg (158 lb 8 oz)   07/27/22 72.7 kg (160 lb 3.2 oz)   07/26/22 73.2 kg (161 lb 6.4 oz)     Blood pressure 98/63, pulse 82, temperature 97.6  F (36.4  C), temperature source Oral, resp. rate 16, weight 73.3 kg (161 lb 8 oz), SpO2 98 %.    ECOG 1  General: NAD  Eyes: sclera anicteric   HEENT:  resolving ecchymosis below R eye.  Mouth: OP moist without lesions  Lungs: CTAB  Cardiovascular: RRR, with murmer, heart sounds are distant   Lymphatics: no edema   Skin: No rash but thin skin with bruises on his arms  Neuro: non-focal  Access: Port a cath:right. PICC in left arm dressing cdi.    Blood Counts     Lab Results   Component Value Date    WBC 0.1 (LL) 08/04/2022     ANEU 0.6 (L) 07/28/2022    HGB 7.7 (L) 08/04/2022    HCT 22.9 (L) 08/04/2022    PLT 39 (LL) 08/04/2022     08/04/2022    POTASSIUM 4.1 08/04/2022    CHLORIDE 107 08/04/2022    CO2 26 08/04/2022     (H) 08/04/2022    BUN 21 08/04/2022    CR 1.41 (H) 08/04/2022    MAG 1.7 08/01/2022    INR 1.15 06/23/2022    BILITOTAL 0.3 08/04/2022    AST 53 (H) 08/04/2022     (H) 08/04/2022    ALKPHOS 136 08/04/2022    PROTTOTAL 4.8 (L) 08/04/2022    ALBUMIN 2.7 (L) 08/04/2022       I have assessed all abnormal lab values for their clinical significance and any values considered clinically significant have been addressed in the assessment and plan.        Assessment Plans:     Keith Meyers is a 77 year old man who is Day +11 of  as Monotherapy in Relapsed/Refractory MM and in Combination with Monoclonal Antibodies in Relapsed/Refractory Multiple Myeloma     1. Cellular Therapy: Relapsed refractory Multiple myeloma    Day-5(7/20)  Begin Flu/CY: LD chemo  Day-4:( 7/21) Flu/CY/dimple: LD chemo; IH pentam  Day -3: 7/22)  Flu/CY: LD chemo, covid test    Day 1: ( 7/25/2022)  PICC placement and infusion of   Remains on allopurinol  (decrease dose due to LATANYA on CKI)  Day 11 ICE     2. Heme:  #History of blood clots, chronic and stroke prophy for atrial fib. Hold Xarelto now with down-trending plts (x7/25).  - 7/20 does have right eye echymosis - no trauma/falls- thinks he runs hsi eyes as night has has large bruising around eye as result.     3. ID: afebrile.  - prophy Levo (started 7/26), ACV, Pentamidine (7/22). He reports sob day after Pentam.  Bactrim held d/t acute on CKD    4. FEN:    Remains on supplemental potassium   LATANYA on CKI- Cr improved  Lasix 40mg PO daily at home      5. CV:   - Bradycardia  - history of atrial fib and heart failure  Remains on lasix and supplemental K    6.Neuro:  Has neuropathy in hands mainly and some in feet, neurontin    30 minutes spent on the date of the encounter  doing chart review, review of test results, interpretation of tests, patient visit and documentation     Final Plan: Brianne, RBC infusion    RTC tomorrow for labs and possible transfusions (want type&screen current for Monday)  Monday per study  Holly Langley PAC  [phone]

## 2022-08-04 NOTE — PROGRESS NOTES
Infusion Nursing Note:  Theo Meyers presents today for scheduled subcutaneous Daratumumab, poss transfusions.    Patient seen by provider today: Yes: Holly Langley   present during visit today: Not Applicable.    Note: Labs were monitored, no parameters for daratumumab. Pt was premedicated with 50mg PO benadryl, he reportedly took 500mg of PO tylenol at home at 0600 prior to coming to clinic today- this was reported to  Cristiana Sanz, who ok'd that Tylenol premed be held today. Pt was administered 1,800mg of subcutaneous daratumumab in his right lower quadrant. He tolerated this well. Seen in infusion by Holly Langley, 500ml NS bolus ordered for creatinine elevation. Infused over 30 minutes. Pt also received 1u PRBCs for hgb of 7.7. Tolerated all infusions without side effect or symptom.    Intravenous Access:  Earl.    Treatment Conditions:  Lab Results   Component Value Date    HGB 7.7 (L) 08/04/2022    WBC 0.1 (LL) 08/04/2022    ANEU 0.6 (L) 07/28/2022    ANEUTAUTO 0.6 (L) 07/27/2022    PLT 39 (LL) 08/04/2022      Lab Results   Component Value Date     08/04/2022    POTASSIUM 4.1 08/04/2022    MAG 1.7 08/01/2022    CR 1.41 (H) 08/04/2022    NORMAN 8.0 (L) 08/04/2022    BILITOTAL 0.3 08/04/2022    ALBUMIN 2.7 (L) 08/04/2022     (H) 08/04/2022    AST 53 (H) 08/04/2022       Post Infusion Assessment:  Patient tolerated infusion without incident.  Blood return noted pre and post infusion.  Site patent and intact, free from redness, edema or discomfort.  No evidence of extravasations.  Access discontinued per protocol.     Discharge Plan:   Patient discharged in stable condition accompanied by: self.  Departure Mode: Ambulatory.      Melisa Smith RN

## 2022-08-04 NOTE — NURSING NOTE
Chief Complaint   Patient presents with     Blood Draw     Labs drawn via picc by RN. Vitals taken.     Labs collected from PICC.  Line flushed with saline and heparin locked.  Vitals taken and pt checked in for appt.    Ashley Villeda RN

## 2022-08-05 NOTE — PROGRESS NOTES
[id number]: Study Visit Note   Subject name: Theo Meyers     Visit: Cycle 1 Day 11    Did the study visit occur within the appropriate window allowed by the protocol? yes      Since the last study visit, He has been doing well. He reports some fatigue and dizziness. Will receive red blood cells today for hemoglobin <8. Patient reports taking 500 mg tylenol at home prior to appointment, so scheduled tylenol for daratumumab premed held. Patient will return for labs on Friday to ensure platelets and RBCs are appropriate for the weekend. Will return for 3rd  cell infusion on Monday 8/8.     I have personally interviewed Theo Meyers and reviewed his medical record for adverse events and concomitant medications and these have been recorded on the corresponding logs in Theo Meyers's research file.     Theo HURT Stephaniechris was given the opportunity to ask any trial related questions.  Please see provider progress note for physical exam and other clinical information. Labs were reviewed - any significant lab values were addressed and reviewed.    Cristiana Sanz RN

## 2022-08-08 NOTE — PHARMACY-ADMISSION MEDICATION HISTORY
Admission Medication History Completed by Pharmacy    See [facility] Admission Navigator for allergy information, preferred outpatient pharmacy, prior to admission medications and immunization status.     Medication History Sources:     BMT clinic med review.     Changes made to PTA medication list (reason):    Added: None    Deleted: None    Changed: None    Additional Information:    None    Prior to Admission medications    Medication Sig Last Dose Taking? Auth Provider Long Term End Date   acetaminophen (TYLENOL) 500 MG tablet Take 500 mg by mouth every 8 hours as needed    Reported, Patient     acyclovir (ZOVIRAX) 400 MG tablet Take 1 tablet (400 mg) by mouth 2 times daily Take on first day of daratumumab dose and continue for 3 months after treatment is complete.   Familia Guan MD Yes    allopurinol (ZYLOPRIM) 300 MG tablet Please cut allopurinol in half due to kidneys   Sophie Espinal PA-C     ascorbic acid 1000 MG TABS tablet Take 2,000 mg by mouth daily Pt taking 1000 mg   Reported, Patient     atorvastatin (LIPITOR) 10 MG tablet Take 1 tablet (10 mg) by mouth daily   Mathew Ott MD Yes    bimatoprost (LUMIGAN) 0.03 % ophthalmic solution Place 1 drop into both eyes At Bedtime.   Reported, Patient     brimonidine (ALPHAGAN P) 0.1 % ophthalmic solution Place 1 drop into both eyes every 8 hours.   Reported, Patient     calcium carbonate 1250 (500 Ca) MG CHEW Take 1 tablet by mouth daily as needed (upset stomach)  Patient not taking: No sig reported   Reported, Patient     calcium carbonate-vitamin D (OYSTER SHELL CALCIUM/D) 500-200 MG-UNIT tablet Take 1 tablet by mouth daily    Reported, Patient     cyanocobalamin (CYANOCOBALAMIN) 1000 MCG/ML injection Inject 1 mL into the muscle every 30 days  Patient not taking: No sig reported   Reported, Patient     dextromethorphan-guaiFENesin (MUCINEX DM)  MG 12 hr tablet Take 1 tablet by mouth daily as needed for cough  Patient not taking: No sig reported    Reported, Patient     fluticasone (FLONASE) 50 MCG/ACT nasal spray Spray 2 sprays into both nostrils daily   Talat Contreras MD Yes    furosemide (LASIX) 20 MG tablet TAKE 2 TABLETS(40 MG) BY MOUTH DAILY   Phuong Rivera MD Yes    gabapentin (NEURONTIN) 300 MG capsule Take 1 capsule (300 mg) by mouth At Bedtime   Per Clifford MD Yes    heparin lock flush 10 UNIT/ML SOLN injection 5 mLs by Intracatheter route daily To each port of PICC line when not done in clinic   Natty Yang PA-C     levofloxacin (LEVAQUIN) 250 MG tablet Take 1 tablet (250 mg) by mouth daily   Natty Yang PA-C     LUMIGAN 0.01 % SOLN ophthalmic solution    Reported, Patient     Magnesium Oxide 250 MG TABS Take 250 mg by mouth 2 times daily   Reported, Patient     Multiple Vitamin (MULTI-VITAMIN PO) Take 1 capsule by mouth daily.   Reported, Patient     Omega-3 Fatty Acids (OMEGA 3 PO) Take 1 capsule by mouth daily TAKE AS DIRECTED  Patient not taking: No sig reported   Reported, Patient     omeprazole (PRILOSEC) 20 MG DR capsule TAKE 1 CAPSULE BY MOUTH ONCE DAILY   Mathew Ott MD     ondansetron (ZOFRAN) 8 MG tablet Take 1 tablet (8 mg) by mouth every 8 hours as needed (nausea/vomiting)  Patient not taking: No sig reported   Familia Guan MD     potassium & sodium phosphates (NEUTRA-PHOS) 280-160-250 MG Packet Take 1 packet by mouth 4 times daily   Holly Langley PA-C     potassium chloride ER (KLOR-CON M) 20 MEQ CR tablet Take 1 tablet (20 mEq) by mouth every other day  Patient taking differently: Take 20 mEq by mouth four times a week On Saturday, Sunday, Tuesday, Thursday   Mathew Ott MD     prochlorperazine (COMPAZINE) 10 MG tablet Take 1 tablet (10 mg) by mouth every 6 hours as needed (Nausea/Vomiting)  Patient not taking: No sig reported   Familia Guan MD     Psyllium (METAMUCIL PO) Take by mouth daily USE AS DIRECTED   Reported, Patient     sulfamethoxazole-trimethoprim (BACTRIM) 400-80  MG tablet Hold for now  Patient not taking: No sig reported   Sophie Espinal PA-C     tamsulosin (FLOMAX) 0.4 MG capsule TAKE 1 CAPSULE BY MOUTH EVERY DAY   Per Clifford MD     XARELTO ANTICOAGULANT 20 MG TABS tablet HOLD x7/25/22  Patient not taking: No sig reported   Natty Yang PA-C Yes    pomalidomide (POMALYST) 4 MG capsule Take 1 capsule (4 mg) by mouth daily Swallow whole, do NOT break, crush, chew or open capsule. Take on days 1-21 of repeated 28 day cycles.  Patient taking differently: Take 4 mg by mouth daily Swallow whole, do NOT break, crush, chew or open capsule. Take on days 1-21 of repeated 28 day cycles.  3/17/22: today is day 16 of 28 day cycle   Per Clifford MD Yes 3/29/22       Date completed: 08/08/22    Medication history completed by: Luis Webber, [facility]

## 2022-08-08 NOTE — PROGRESS NOTES
Admission to Early Phase Research Unit    August 8, 2022    Clinical Trial: [id number] Phase 1 study  in combination with monoclonal antibodies in relapsed/refractory MM    Principle Investigator: Dr. Guan [phone]    Research Nurse: Rama Collazo [phone]    Common side effects & Recommended intervention: Potential for CRS    Theo Meyers is a 77 year old male who is day 14 of  as monotherapy in combination with monoclonal antibodies in relapsed/refractory multiple myeloma who was admitted to the early phase research unit at the [facility] for administration of therapy and clinical monitoring. The patient has been assessed by a provider prior to admission and medically cleared for therapy. In the case of an adverse event or change in clinical status requiring additional medical management, the PI will be contacted and the decision to admit the patient to the 5C unit will be determined by the BMT team B advanced practice provider and collaborating physician. The attending physician on service is Dr. Woodson [phone]    Received cells without incident.  ICE 10/10 at 15:20 and clinically stable. Will discharge to home.       Jenniffer Ferguson PA-C [phone]

## 2022-08-08 NOTE — NURSING NOTE
"Oncology Rooming Note    August 8, 2022 10:16 AM   Theo Meyers is a 77 year old male who presents for:    Chief Complaint   Patient presents with     Blood Draw     Labs drawn via picc by rn in lab. VS taken.     Oncology Clinic Visit     Multiple myeloma not having achieved remission (H) (Primary Dx)      Initial Vitals: /68 (BP Location: Right arm, Patient Position: Sitting, Cuff Size: Adult Regular)   Pulse 63   Temp 96.8  F (36  C) (Oral)   Resp 16   Wt 73.3 kg (161 lb 11.2 oz)   SpO2 100%   BMI 24.09 kg/m   Estimated body mass index is 24.09 kg/m  as calculated from the following:    Height as of 7/26/22: 1.745 m (5' 8.7\").    Weight as of this encounter: 73.3 kg (161 lb 11.2 oz). Body surface area is 1.88 meters squared.  No Pain (0) Comment: Data Unavailable   No LMP for male patient.  Allergies reviewed: Yes  Medications reviewed: Yes    Medications: Medication refills not needed today.  Pharmacy name entered into [employer]:    [id number] [place], [place] - [address] [facility] [address]  [facility] - [place], [place] - [address] [facility] [place], [place] - [address] [place] [address]    Clinical concerns:     Pt wondering if he should get vitamin B12 tablets.     Douglas Elizondo            "
Chief Complaint   Patient presents with     Blood Draw     Labs drawn via picc by rn in lab. VS taken.     Labs collected from PICC.  Line flushed with saline and heparin locked.  Vitals taken and pt checked in for appt.    Luis Stephenson RN    
Detail Level: Simple
Size Of Lesion: 0.4 x 0.4cm
Instructions (Optional): this was shown to him in the mirror.  He states that this has been present for years.  He is to call if this changes or becomes symptomatic.

## 2022-08-08 NOTE — PROGRESS NOTES
Pt discharged to: home  Via: personal vehicle  Accompanied by: friend  Belongings: with patient  Teaching: take post meds tylenol/benadryl at 1715  Clinic appointment: Thursday 8/11/22

## 2022-08-08 NOTE — PROGRESS NOTES
BMT/Cellular Therapy Clinic Progress Note    MT 2020-23  Mr Theo Meyers is a 77 year old male who is Day 14 of  as Monotherapy in Combination with Monoclonal Antibodies in Relapsed/Refractory Multiple Myeloma     Active Medical Management Issues:     Hematologic history:  Multiple myeloma diagnosed in 2009, IgG kappa but evolving to light chain secretory, del 13q, t(11;14).       Date Treatment Response Toxicities/Complications   7/2009 Velcade/Dex x 8 cycles VGPR     4/2010 HD-Jennifer/PBSCT CR     6/2010 Rev maintenance       10/2012 RVD   cytopenias   9/2012 RVD (q 2wk velcade) Long remission     10/2020 Brianne/pom/dex Recent progresson     4/6/2022 Carf/dex  PA - held Possible cardiac toxicity                               HPI: Here for exam prior to 3rd and last dose of . He feels well. No new complaints. He would like his PICC removed following cell infusion today.         ROS:     10 point ROS otherwise negative     PHYSICAL EXAM                                                                                                                                      Wt Readings from Last 4 Encounters:   08/08/22 73.3 kg (161 lb 11.2 oz)   08/05/22 73.9 kg (163 lb)   08/04/22 73.3 kg (161 lb 8 oz)   07/28/22 71.9 kg (158 lb 8 oz)     Blood pressure 114/68, pulse 63, temperature 96.8  F (36  C), temperature source Oral, resp. rate 16, weight 73.3 kg (161 lb 11.2 oz), SpO2 100 %.    ECOG 1  General: NAD  Eyes: sclera anicteric   HEENT:  resolving ecchymosis below R eye.  Mouth: masked  Lungs: breathing comfortably on RA     Lymphatics: no edema   Skin: No rash but thin skin with bruises on his arms  Neuro: non-focal  Access: Port a cath:right. PICC in left arm dressing cdi.    Blood Counts     Lab Results   Component Value Date    WBC 0.4 (LL) 08/08/2022    ANEU 0.6 (L) 07/28/2022    HGB 9.3 (L) 08/08/2022    HCT 27.8 (L) 08/08/2022    PLT 47 (LL) 08/08/2022     08/08/2022    POTASSIUM 4.2 08/08/2022     CHLORIDE 108 08/08/2022    CO2 26 08/04/2022     (H) 08/04/2022    BUN 21 08/04/2022    CR 1.41 (H) 08/04/2022    MAG 1.7 08/01/2022    INR 1.15 06/23/2022    BILITOTAL 0.3 08/04/2022    AST 53 (H) 08/04/2022     (H) 08/04/2022    ALKPHOS 136 08/04/2022    PROTTOTAL 4.8 (L) 08/04/2022    ALBUMIN 2.7 (L) 08/04/2022       I have assessed all abnormal lab values for their clinical significance and any values considered clinically significant have been addressed in the assessment and plan.        Assessment Plans:     Keith Meyers is a 77 year old man who is Day +14 of  as Monotherapy in Relapsed/Refractory MM and in Combination with Monoclonal Antibodies in Relapsed/Refractory Multiple Myeloma     1. Cellular Therapy: Relapsed refractory Multiple myeloma    Day-5(7/20)  Begin Flu/CY: LD chemo  Day-4:( 7/21) Flu/CY/dimple: LD chemo; IH pentam  Day -3: 7/22)  Flu/CY: LD chemo, covid test    Day 1: ( 7/25/2022)  PICC placement and infusion of   Remains on allopurinol  (decrease dose due to LATANYA on CKI)  Day 11 ICE     2. Heme:  #History of blood clots, chronic and stroke prophy for atrial fib. Hold Xarelto now with down-trending plts (x7/25).  - 7/20 does have right eye echymosis - no trauma/falls- thinks he rubs his eyes as night has has large bruising around eye as result.     3. ID: afebrile.  - prophy Levo (started 7/26), ACV, Pentamidine (7/22). He reports sob day after Pentam.  Bactrim held d/t acute on CKD    4. FEN:  Chemistry panel in process  Remains on supplemental potassium   LATANYA on CKI   Lasix 40mg PO daily at home      5. CV:   - Bradycardia  - history of atrial fib and heart failure  Remains on lasix and supplemental K    6.Neuro:  Has neuropathy in hands mainly and some in feet, neurontin    20 minutes spent on the date of the encounter doing chart review, review of test results, interpretation of tests, patient visit and documentation     Final Plan: 5c    RETURN TO CLINIC    Thursday per study    Bibiana Ramos NP

## 2022-08-08 NOTE — PROGRESS NOTES
Type of transplant: Donor: Allogeneic - Unrelated  Product:   BMT INFUSION DOCUMENTATION (last 48 hours)     BMT/Cellular Product Infusion     Row Name 08/08/22 1100                [REMOVED] Product 08/08/22 1057 Other (specify in comment)    Product Details Product Release Date: 08/08/22  - Product Release Time: 1057 -JL Product Type: Other (specify in comment)  - DIN: [id number]  -JL Product Description Code: [id number]  -JL Volume Dispensed (mL): 18 mL  -JL Completion Date (RN to complete): 08/08/22  -CJ Completion Time (RN to complete): 1300  -CJ       Checked by (Patient RN) Yusra Denny RN  -KS       Checked by (Witness) Erica Campoverde RN  -KS       Product Volume Infused (mL) 18 mL  -KS       Flush Volume (mL) 50 mL  -KS       Volume Dispensed (mL) --                [REMOVED] Product 08/08/22 1057 Other (specify in comment)    Product Details Product Release Date: 08/08/22  - Product Release Time: 1057 -JL Product Type: Other (specify in comment)  - DIN: [id number]  - Product Description Code: [id number]  -JL Volume Dispensed (mL): 18 mL  -JL Completion Date (RN to complete): 08/08/22  -CJ Completion Time (RN to complete): 1312  -CJ       Checked by (Patient RN) Yusra Denny RN  -KS       Checked by (Witness) Erica Campoverde RN  -KS       Product Volume Infused (mL) 18 mL  -KS       Flush Volume (mL) 50 mL  -KS       Volume Dispensed (mL) --             User Key  (r) = Recorded By, (t) = Taken By, (c) = Cosigned By    Initials Name Effective Dates    Yusra Little RN 04/29/14 -     Michelle Andrade 07/11/21 -     Kareen Maciel RN 06/18/22 -               Preparation: RN Documentation  Patient was premedicated as ordered: yes  Line Type: central line, left  Patient Stable Prior to Infusion: yes  Time Infusion Started: 1247  Teaching: side effects/monitoring  Tolerated/Reaction: Patient tolerance of product infusion  Immediate suspected transfusion reaction to the product:  none  Did patient have prior history of similar signs/symptoms during this hospitalization?: no  Symptoms during/after infusion: other (comment) (N/A)  Did the patient tolerate the infusion well: yes  Medications and treatment for symptoms: N/A  Did the symptoms resolve?:  (N/A)  Enter comments if clots, leaks, broken bag, infusion delays, other issues with bag/infusion: N/A  Flush until: post flush complete  Plan: ICANS to be completed and PICC to be pulled and patient can discharge.

## 2022-08-08 NOTE — PROGRESS NOTES
BMT Post Infusion Documentation    Data   Patient Vitals for the past 72 hrs:   Temp Temp src Pulse Resp BP   08/08/22 1238 97.4  F (36.3  C) Temporal 50 14 (!) 149/95   08/08/22 1302 97.4  F (36.3  C) Temporal 52 12 (!) 145/91   08/08/22 1315 97.4  F (36.3  C) Temporal 66 14 137/86   08/08/22 1331 97.9  F (36.6  C) Temporal 56 16 (!) 104/94   08/08/22 1342 97.4  F (36.3  C) Temporal 57 16 (!) 150/89   08/08/22 1357 97.3  F (36.3  C) Temporal 61 16 (!) 147/99   08/08/22 1412 97.5  F (36.4  C) Temporal 65 -- (!) 141/88     BMT INFUSION DOCUMENTATION (last 24 hours)     BMT/Cellular Product Infusion     Row Name 08/08/22 1100                Cell Therapy Documentation    Product Release Date 08/08/22  -JL       Recipient Study -  -JL       Donor Allogeneic - Unrelated  -JL       Donor MRN/-2004  -JK       Donor ABO/Rh --  N/A  -JL       Allogeneic Donor Eligibility Determination and Summary of Records Eligible  -JL       Type of Infusion Allogeneic  -JL       Total Volume Dispensed (mL) 36  -JL       Total NC Dose N/A  -JL       Total CD34 Dose N/A  -JL       Total CD3 dose N/A  -JL       Total NC Dose Left in Storage NONE  -JL       Comments for Product Issues  IVESTIGATIONAL PRODUCT 1.0E+09 TOTAL CELLS.  PRODUCT LOT [id number]  -JL       Donor ABO/Rh --       Product Types --       Product Numbers --       Product Types and Numbers --       Volume --       ABO Mismatch --       ZZTotal NC Dose --       ZZTotal CD34 Dose --       ZZTotal NC Dose Left in Storage --                [REMOVED] Product 08/08/22 1057 Other (specify in comment)    Product Details Product Release Date: 08/08/22  -JL Product Release Time: 1057  -JL Product Type: Other (specify in comment)  - DIN: [id number]  - Product Description Code: [id number]  -JL Volume Dispensed (mL): 18 mL  -JL Completion Date (RN to complete): 08/08/22  -CJ Completion Time (RN to complete): 1300  -CJ       Checked by (Patient RN) Yusra  MIRTA Denny  -KS       Checked by (Witness) Erica Campoverde RN  -KS       Product Volume Infused (mL) 18 mL  -KS       Flush Volume (mL) 50 mL  -KS       Volume Dispensed (mL) --                [REMOVED] Product 08/08/22 1057 Other (specify in comment)    Product Details Product Release Date: 08/08/22  -JL Product Release Time: 1057  -JL Product Type: Other (specify in comment)  - DIN: [id number]  - Product Description Code: [id number]  -JL Volume Dispensed (mL): 18 mL  -JL Completion Date (RN to complete): 08/08/22  -CJ Completion Time (RN to complete): 1312  -CJ       Checked by (Patient RN) Yusra Denny RN  -KS       Checked by (Witness) Erica Campoverde RN  -KS       Product Volume Infused (mL) 18 mL  -KS       Flush Volume (mL) 50 mL  -KS       Volume Dispensed (mL) --                RN Documentation    Patient was premedicated as ordered yes  -KS       Line Type central line, left  -KS       Patient Stable Prior to Infusion yes  -KS       Time Infusion Started 1247  -KS       Checked by (Patient RN) --       Checked by (RN 2) --       Broken Bag? --       Immediate suspected transfusion reaction to the product --       Time Infusion Stopped --       Total Flush Volume (mL) --       Checked by (Witness) --       Date Infusion Started --       Date Infusion Stopped --       Volume Infused (mL) --       Total Volume Infused (cc) --                Patient tolerance of product infusion    Immediate suspected transfusion reaction to the product none  -CJ       Did patient have prior history of similar signs/symptoms during this hospitalization? no  -CJ       Symptoms during/after infusion other (comment)  N/A  -CJ       Did the patient tolerate the infusion well yes  -CJ       Medications and treatment for symptoms N/A  -CJ       Did the symptoms resolve? --  N/A  -CJ       Enter comments if clots, leaks, broken bag, infusion delays, other issues with bag/infusion N/A  -CJ       Describe symptoms --              User Key  (r) = Recorded By, (t) = Taken By, (c) = Cosigned By    Initials Name Effective Dates    Yusra Little RN 04/29/14 -     Michelle Andrade 07/11/21 -     Dee Dee Hernandes 02/20/20 -     Kareen Maciel RN 06/18/22 -                   Post-Infusion Evaluation:   Infusion Related Reaction: Grade 0 - none  Dyspnea: Grade 0 - none  Hypoxia: Grade 0 - not present  Fever: Grade 0 - afebrile  Chills: Grade 0 - none  Febrile Neutropenia: Grade 0 - not present  Sinus Bradycardia: Grade 1 - asymptomatic, intervention not indicated  Hypertension: Grade 2 - stage 1 hypertension (systolic -159 mm Hg or diastolic BP 90-99 mm Hg); medical intervention indicated; recurrent or persistent (>/ 24 hours); symptomatic increase by >/ 20 mm Hg (diastolic) or to > 140/90 mm Hg if previously WNL; monotherapy indicated  Hypotension: Grade 0 - none  Chest Pain: Grade 0 - none  Bronchospasm: Grade 0 - none  Pain: Grade 0 - none  Rash: Grade 0 - None  Neurologic Specify: none      Jennifefr Ferguson PA-C

## 2022-08-08 NOTE — LETTER
8/8/2022         RE: Theo Meyers  [address]  [facility] [address]        Dear Colleague,    Thank you for referring your patient, Theo Meyers, to the [facility] BLOOD AND MARROW TRANSPLANT PROGRAM [place]. Please see a copy of my visit note below.    BMT/Cellular Therapy Clinic Progress Note    MT 2020-23  Mr Theo Meyers is a 77 year old male who is Day 14 of  as Monotherapy in Combination with Monoclonal Antibodies in Relapsed/Refractory Multiple Myeloma     Active Medical Management Issues:     Hematologic history:  Multiple myeloma diagnosed in 2009, IgG kappa but evolving to light chain secretory, del 13q, t(11;14).       Date Treatment Response Toxicities/Complications   7/2009 Velcade/Dex x 8 cycles VGPR     4/2010 HD-Jennifer/PBSCT CR     6/2010 Rev maintenance       10/2012 RVD   cytopenias   9/2012 RVD (q 2wk velcade) Long remission     10/2020 Brianne/pom/dex Recent progresson     4/6/2022 Carf/dex  [place] - held Possible cardiac toxicity                               HPI: Here for exam prior to 3rd and last dose of . He feels well. No new complaints. He would like his PICC removed following cell infusion today.         ROS:     10 point ROS otherwise negative     PHYSICAL EXAM                                                                                                                                      Wt Readings from Last 4 Encounters:   08/08/22 73.3 kg (161 lb 11.2 oz)   08/05/22 73.9 kg (163 lb)   08/04/22 73.3 kg (161 lb 8 oz)   07/28/22 71.9 kg (158 lb 8 oz)     Blood pressure 114/68, pulse 63, temperature 96.8  F (36  C), temperature source Oral, resp. rate 16, weight 73.3 kg (161 lb 11.2 oz), SpO2 100 %.    ECOG 1  General: NAD  Eyes: sclera anicteric   HEENT:  resolving ecchymosis below R eye.  Mouth: masked  Lungs: breathing comfortably on RA     Lymphatics: no edema   Skin: No rash but thin skin with bruises on his arms  Neuro: non-focal  Access: Port a  cath:right. PICC in left arm dressing cdi.    Blood Counts     Lab Results   Component Value Date    WBC 0.4 (LL) 08/08/2022    ANEU 0.6 (L) 07/28/2022    HGB 9.3 (L) 08/08/2022    HCT 27.8 (L) 08/08/2022    PLT 47 (LL) 08/08/2022     08/08/2022    POTASSIUM 4.2 08/08/2022    CHLORIDE 108 08/08/2022    CO2 26 08/04/2022     (H) 08/04/2022    BUN 21 08/04/2022    CR 1.41 (H) 08/04/2022    MAG 1.7 08/01/2022    INR 1.15 06/23/2022    BILITOTAL 0.3 08/04/2022    AST 53 (H) 08/04/2022     (H) 08/04/2022    ALKPHOS 136 08/04/2022    PROTTOTAL 4.8 (L) 08/04/2022    ALBUMIN 2.7 (L) 08/04/2022       I have assessed all abnormal lab values for their clinical significance and any values considered clinically significant have been addressed in the assessment and plan.        Assessment Plans:     Keith Meyers is a 77 year old man who is Day +14 of  as Monotherapy in Relapsed/Refractory MM and in Combination with Monoclonal Antibodies in Relapsed/Refractory Multiple Myeloma     1. Cellular Therapy: Relapsed refractory Multiple myeloma    Day-5(7/20)  Begin Flu/CY: LD chemo  Day-4:( 7/21) Flu/CY/dimple: LD chemo; IH pentam  Day -3: 7/22)  Flu/CY: LD chemo, covid test    Day 1: ( 7/25/2022)  PICC placement and infusion of   Remains on allopurinol  (decrease dose due to LATANYA on CKI)  Day 11 ICE     2. Heme:  #History of blood clots, chronic and stroke prophy for atrial fib. Hold Xarelto now with down-trending plts (x7/25).  - 7/20 does have right eye echymosis - no trauma/falls- thinks he rubs his eyes as night has has large bruising around eye as result.     3. ID: afebrile.  - prophy Levo (started 7/26), ACV, Pentamidine (7/22). He reports sob day after Pentam.  Bactrim held d/t acute on CKD    4. FEN:  Chemistry panel in process  Remains on supplemental potassium   LATANYA on CKI   Lasix 40mg PO daily at home      5. CV:   - Bradycardia  - history of atrial fib and heart failure  Remains on lasix and  supplemental K    6.Neuro:  Has neuropathy in hands mainly and some in feet, neurontin    20 minutes spent on the date of the encounter doing chart review, review of test results, interpretation of tests, patient visit and documentation     Final Plan: 5c    RETURN TO CLINIC   Thursday per study    Bibiana Ramos NP          Again, thank you for allowing me to participate in the care of your patient.      Sincerely,    BMT Advanced Practice Provider

## 2022-08-08 NOTE — PROGRESS NOTES
[id number]: Study Visit Note   Subject name: Theo Meyers     Visit: C1 D15    Did the study visit occur within the appropriate window allowed by the protocol? yes    Keith is doing well and has no complaints at this time. He denies any tickle in his throat or other reaction to DMSO, today. Ecchymoses under right eye and around inner corner of left eye are resolving.    I was present on 5C for the entirety of the  infusion, which proceeded without incident. There were no issues with the product. Keith will stay on the unit until his post-infusion ICANS assessment is complete (anytime between 8940-0149) and will take his post-infusion medications of tylenol and benadryl at home.    I have personally interviewed Theo Meyers and reviewed his medical record for adverse events and concomitant medications and these have been recorded on the corresponding logs in Theo Meyers's research file.     Theo Meyers was given the opportunity to ask any trial related questions.  Please see provider progress note for physical exam and other clinical information. Labs were reviewed - any significant lab values were addressed and reviewed.    Kareen Campoverde RN

## 2022-08-08 NOTE — PROGRESS NOTES
BMT/Cellular Allogeneic Product Infusion       No data found.   BMT INFUSION DOCUMENTATION (last 48 hours)     BMT/Cellular Product Infusion     Row Name                  Product 08/08/22 1057 Other (specify in comment)    Product Details Product Release Date: 08/08/22  - Product Release Time: 1057 -JL Product Type: Other (specify in comment)  -JL DIN: [id number]  -JL Product Description Code: M01010E2  -JL Volume Dispensed (mL): 18 mL  -JL       Checked by (Patient RN) --       Checked by (Witness) --       Product Volume Infused (mL) --       Flush Volume (mL) --       Volume Dispensed (mL) --                Product 08/08/22 1057 Other (specify in comment)    Product Details Product Release Date: 08/08/22  -JL Product Release Time: 1057  -JL Product Type: Other (specify in comment)  -DASIA DIN: [id number]  -JL Product Description Code: [id number]  -JL Volume Dispensed (mL): 18 mL  -JL       Checked by (Patient RN) --       Checked by (Witness) --       Product Volume Infused (mL) --       Flush Volume (mL) --       Volume Dispensed (mL) --             User Key  (r) = Recorded By, (t) = Taken By, (c) = Cosigned By    Initials Name Effective Dates    Michelle Andrade 07/11/21 -               Allogeneic Donor Eligibility Determination and Summary of Records: Eligible        Type of Infusion: Allogeneic      Baseline Pre-Infusion Evaluation (to be completed by Provider):   Dyspnea: Grade 0 - none  Hypoxia: Grade 0 - not present  Fever: Grade 0 - afebrile  Chills: Grade 0 - none  Febrile Neutropenia: Grade 0 - not present  Sinus Bradycardia: Grade 0 - none  Hypertension: Grade 0 - none  Hypotension: Grade 0 - none  Chest Pain: Grade 0 - none  Bronchospasm: Grade 0 - none  Pain: Grade 0 - none  Rash: Grade 0 - None  Neurologic Specify: none    If adverse reactions, events or complications occur (fever greater than 2 degrees fahrenheit increase, and severe reactions of the following types: chills, dyspnea,  bronchospasm, hyper/hypotension, hypoxia, bradycardia, chest pain, back/flank pain, hypoxia, and any other reaction deemed severe or life threatening; any instance of product bag breakage or unusual product appearance)    Any other events that are >= grade 3, then immediately contact the BMT Attending physician, the Cell Therapy Laboratory Medical Director (pager [phone]) and the Cell Therapy Laboratory ([phone]).  After midnight, holidays & weekends contact the [facility] on the appropriate campus ([facility]: [phone]; [facility]: [phone]).    Jenniffer Ferguson PA-C

## 2022-08-11 NOTE — LETTER
8/11/2022         RE: Theo Meyers  [address]  [facility] [address]        Dear Colleague,    Thank you for referring your patient, Theo Meyers, to the [facility] BLOOD AND MARROW TRANSPLANT PROGRAM [place]. Please see a copy of my visit note below.    BMT/Cellular Therapy Clinic Progress Note    MT 2020-23  Mr Theo Meyers is a 77 year old male who is Day 17 s/p  as Monotherapy in Combination with Monoclonal Antibodies in Relapsed/Refractory Multiple Myeloma     Active Medical Management Issues:     Hematologic history:  Multiple myeloma diagnosed in 2009, IgG kappa but evolving to light chain secretory, del 13q, t(11;14).       Date Treatment Response Toxicities/Complications   7/2009 Velcade/Dex x 8 cycles VGPR     4/2010 HD-Jennifer/PBSCT CR     6/2010 Rev maintenance       10/2012 RVD   cytopenias   9/2012 RVD (q 2wk velcade) Long remission     10/2020 Brianne/pom/dex Recent progresson     4/6/2022 Carf/dex  MT - held Possible cardiac toxicity                               HPI: Here for follow-up and Brianne. He is fatigued but otherwise doing fairly well. No n/v/d/c. No fevers or infectious sx. PICC removed after cell infusion Monday.        ROS:     8 point ROS otherwise negative     PHYSICAL EXAM                                                                                                                                      Wt Readings from Last 4 Encounters:   08/11/22 72.9 kg (160 lb 12.8 oz)   08/08/22 73.3 kg (161 lb 11.2 oz)   08/05/22 73.9 kg (163 lb)   08/04/22 73.3 kg (161 lb 8 oz)     Blood pressure 117/68, pulse 60, temperature 97.7  F (36.5  C), temperature source Oral, resp. rate 16, weight 72.9 kg (160 lb 12.8 oz), SpO2 100 %.    ECOG 1  General: NAD  HEENT: sclera anicteric ; OP moist without lesions  CV: Occasional irregular beats, no mrg  Lungs: CTAB   Lymphatics: no edema   Skin: No rash but thin skin with bruises on his arms  Neuro: non-focal  Access: Port a  cath: right.     Labs     Lab Results   Component Value Date    WBC 0.9 (LL) 08/11/2022    ANEU 0.7 (L) 08/11/2022    HGB 8.3 (L) 08/11/2022    HCT 24.3 (L) 08/11/2022    PLT 43 (LL) 08/11/2022     08/11/2022    POTASSIUM 4.5 08/11/2022    CHLORIDE 109 08/11/2022    CO2 28 08/11/2022     (H) 08/11/2022    BUN 19 08/11/2022    CR 1.35 (H) 08/11/2022    MAG 1.8 08/08/2022    INR 1.15 06/23/2022    BILITOTAL 0.3 08/11/2022    AST 19 08/11/2022    ALT 64 08/11/2022    ALKPHOS 117 08/11/2022    PROTTOTAL 5.0 (L) 08/11/2022    ALBUMIN 2.7 (L) 08/11/2022       I have assessed all abnormal lab values for their clinical significance and any values considered clinically significant have been addressed in the assessment and plan.        Assessment Plans:     Keith Meyers is a 77 year old man Day +17 s/p  as Monotherapy in Relapsed/Refractory MM and in Combination with Monoclonal Antibodies in Relapsed/Refractory Multiple Myeloma     1. Cellular Therapy: Relapsed refractory Multiple myeloma    Day-5(7/20)  Begin Flu/CY: LD chemo  Day-4:( 7/21) Flu/CY/dimple: LD chemo; IH pentam  Day -3: 7/22)  Flu/CY: LD chemo, covid test    Day 1: ( 7/25/2022)  PICC placement and infusion of   Remains on allopurinol  (decrease dose due to LATANYA on CKI)- ok to stop when runs out  ICE 10/10 today (8/11)    2. Heme:  #pancytopenia 2/2 chemo. WBC recovering; plts recovery, stable today.  #History of blood clots, chronic and stroke prophy for atrial fib. Hold Xarelto now with thrombocytopenia (x7/25).  - 7/20 does have right eye echymosis - no trauma/falls- thinks he rubs his eyes as night has has large bruising around eye as result.     3. ID: afebrile.  - prophy Levo (started 7/26 w/ neutropenia), ACV, Pentamidine (7/22). He reports sob day after Pentam. *8/11: realized pt is not on antifungal. With WBC rising will not start today.   Bactrim held d/t acute on CKD    4. FEN:    Remains on supplemental potassium   LATANYA on CKI    Lasix 40mg PO daily at home      5. CV:   - Bradycardia  - history of atrial fib and heart failure  Remains on lasix and supplemental K    6. Neuro:  Has neuropathy in hands mainly and some in feet, neurontin    7. GI: no complaints. Recent transaminitis (unclear etiology), resolved today.    30 minutes spent on the date of the encounter doing chart review, review of test results, interpretation of tests, patient visit and documentation     Summary:  dimple  ICE 10/10  RTC Mon per protocol; call with fevers etc.      Natty Yang PA-C

## 2022-08-11 NOTE — PROGRESS NOTES
Infusion Nursing Note:  Theo HURT Mira presents today for scheduled Day +18 Daratumumab.    Patient seen by provider today: Yes: Natty Yang ALMA   present during visit today: Not Applicable.    Note: Meds and allergies reviewed. VSS. Pt reports feeling well today and was assessed by provider prior to injection. Pt received 650 mg Tylenol PO and 50 mg Benadryl PO 1 hour prior to Daratumumab. Pt received 1800 mg Daratumumab subcutaneously into left abdomen. Pre and Post VS and labs completed per orders.     Intravenous Access:  Implanted Port.    Treatment Conditions:  Not Applicable.    Post Infusion Assessment:  Patient tolerated injection without incident.  Patient observed for 30 minutes post Daratumumab per protocol.     Discharge Plan:   [facility]  Patient will return Monday for next appointment.   Patient discharged in stable condition accompanied by: self.  Departure Mode: Ambulatory.      Saundra Sims RN

## 2022-08-11 NOTE — NURSING NOTE
"Chief Complaint   Patient presents with     Port Draw     Labs drawn via port by RN in lab.  VS taken       Port accessed with 20 gauge 3/4\" gripper needle by RN, labs collected, line flushed with saline and heparin, then de-accessed.  Vitals taken. Pt checked in for appointment(s).    Jana Carter RN    "

## 2022-08-11 NOTE — LETTER
Date:August 11, 2022      Provider requested that no letter be sent. Do not send.       [facility]

## 2022-08-11 NOTE — LETTER
8/11/2022         RE: Theo Meyers  [address]  [facility] [address]        Dear Colleague,    Thank you for referring your patient, Theo Meyers, to the [facility] BLOOD AND MARROW TRANSPLANT PROGRAM [place]. Please see a copy of my visit note below.    Infusion Nursing Note:  Theo Meyers presents today for scheduled Day +18 Daratumumab.    Patient seen by provider today: Yes: Natty Yang ALMA   present during visit today: Not Applicable.    Note: Meds and allergies reviewed. VSS. Pt reports feeling well today and was assessed by provider prior to injection. Pt received 650 mg Tylenol PO and 50 mg Benadryl PO 1 hour prior to Daratumumab. Pt received 1800 mg Daratumumab subcutaneously into left abdomen. Pre and Post VS and labs completed per orders.     Intravenous Access:  Implanted Port.    Treatment Conditions:  Not Applicable.    Post Infusion Assessment:  Patient tolerated injection without incident.  Patient observed for 30 minutes post Daratumumab per protocol.     [facility] Plan:   [facility]  Patient will return Monday for next appointment.   Patient discharged in stable condition accompanied by: self.  Departure Mode: Ambulatory.      Saundra Sims RN                          Again, thank you for allowing me to participate in the care of your patient.        Sincerely,        [facility] [place]

## 2022-08-11 NOTE — PROGRESS NOTES
BMT/Cellular Therapy Clinic Progress Note    MT 2020-23  Mr Theo Meyers is a 77 year old male who is Day 17 s/p  as Monotherapy in Combination with Monoclonal Antibodies in Relapsed/Refractory Multiple Myeloma     Active Medical Management Issues:     Hematologic history:  Multiple myeloma diagnosed in 2009, IgG kappa but evolving to light chain secretory, del 13q, t(11;14).       Date Treatment Response Toxicities/Complications   7/2009 Velcade/Dex x 8 cycles VGPR     4/2010 HD-Jennifer/PBSCT CR     6/2010 Rev maintenance       10/2012 RVD   cytopenias   9/2012 RVD (q 2wk velcade) Long remission     10/2020 Brianne/pom/dex Recent progresson     4/6/2022 Carf/dex  [place] - held Possible cardiac toxicity                               HPI: Here for follow-up and Brianne. He is fatigued but otherwise doing fairly well. No n/v/d/c. No fevers or infectious sx. PICC removed after cell infusion Monday.        ROS:     8 point ROS otherwise negative     PHYSICAL EXAM                                                                                                                                      Wt Readings from Last 4 Encounters:   08/11/22 72.9 kg (160 lb 12.8 oz)   08/08/22 73.3 kg (161 lb 11.2 oz)   08/05/22 73.9 kg (163 lb)   08/04/22 73.3 kg (161 lb 8 oz)     Blood pressure 117/68, pulse 60, temperature 97.7  F (36.5  C), temperature source Oral, resp. rate 16, weight 72.9 kg (160 lb 12.8 oz), SpO2 100 %.    ECOG 1  General: NAD  HEENT: sclera anicteric ; OP moist without lesions  CV: Occasional irregular beats, no mrg  Lungs: CTAB   Lymphatics: no edema   Skin: No rash but thin skin with bruises on his arms  Neuro: non-focal  Access: Port a cath: right.     Labs     Lab Results   Component Value Date    WBC 0.9 (LL) 08/11/2022    ANEU 0.7 (L) 08/11/2022    HGB 8.3 (L) 08/11/2022    HCT 24.3 (L) 08/11/2022    PLT 43 (LL) 08/11/2022     08/11/2022    POTASSIUM 4.5 08/11/2022    CHLORIDE 109 08/11/2022    CO2 28  08/11/2022     (H) 08/11/2022    BUN 19 08/11/2022    CR 1.35 (H) 08/11/2022    MAG 1.8 08/08/2022    INR 1.15 06/23/2022    BILITOTAL 0.3 08/11/2022    AST 19 08/11/2022    ALT 64 08/11/2022    ALKPHOS 117 08/11/2022    PROTTOTAL 5.0 (L) 08/11/2022    ALBUMIN 2.7 (L) 08/11/2022       I have assessed all abnormal lab values for their clinical significance and any values considered clinically significant have been addressed in the assessment and plan.        Assessment Plans:     Keith Meyers is a 77 year old man Day +17 s/p  as Monotherapy in Relapsed/Refractory MM and in Combination with Monoclonal Antibodies in Relapsed/Refractory Multiple Myeloma     1. Cellular Therapy: Relapsed refractory Multiple myeloma    Day-5(7/20)  Begin Flu/CY: LD chemo  Day-4:( 7/21) Flu/CY/dimple: LD chemo; IH pentam  Day -3: 7/22)  Flu/CY: LD chemo, covid test    Day 1: ( 7/25/2022)  PICC placement and infusion of   Remains on allopurinol  (decrease dose due to LATANYA on CKI)- ok to stop when runs out  ICE 10/10 today (8/11)    2. Heme:  #pancytopenia 2/2 chemo. WBC recovering; plts recovery, stable today.  #History of blood clots, chronic and stroke prophy for atrial fib. Hold Xarelto now with thrombocytopenia (x7/25).  - 7/20 does have right eye echymosis - no trauma/falls- thinks he rubs his eyes as night has has large bruising around eye as result.     3. ID: afebrile.  - prophy Levo (started 7/26 w/ neutropenia), ACV, Pentamidine (7/22). He reports sob day after Pentam. *8/11: realized pt is not on antifungal. With WBC rising will not start today.   Bactrim held d/t acute on CKD    4. FEN:    Remains on supplemental potassium   LATANYA on CKI   Lasix 40mg PO daily at home      5. CV:   - Bradycardia  - history of atrial fib and heart failure  Remains on lasix and supplemental K    6. Neuro:  Has neuropathy in hands mainly and some in feet, neurontin    7. GI: no complaints. Recent transaminitis (unclear etiology),  resolved today.    30 minutes spent on the date of the encounter doing chart review, review of test results, interpretation of tests, patient visit and documentation     Summary:  dimple  ICE 10/10  RTC Mon per protocol; call with fevers etc.    Natty Yang PA-C

## 2022-08-11 NOTE — NURSING NOTE
"Oncology Rooming Note    August 11, 2022 10:18 AM   Theo Meyers is a 77 year old male who presents for:    Chief Complaint   Patient presents with     Port Draw     Labs drawn via port by RN in lab.  VS taken     Oncology Clinic Visit     Provider visit; hx MM     Initial Vitals: /68   Pulse 60   Temp 97.7  F (36.5  C) (Oral)   Resp 16   Wt 72.9 kg (160 lb 12.8 oz)   SpO2 100%   BMI 23.95 kg/m   Estimated body mass index is 23.95 kg/m  as calculated from the following:    Height as of 7/26/22: 1.745 m (5' 8.7\").    Weight as of this encounter: 72.9 kg (160 lb 12.8 oz). Body surface area is 1.88 meters squared.  No Pain (0) Comment: Data Unavailable   No LMP for male patient.  Allergies reviewed: Yes  Medications reviewed: Yes    Medications: Medication refills not needed today.  Pharmacy name entered into [employer]:    [id number] - [place], [place] - [address] [facility] [address] [place]  [facility] - [place], [place] - [address] [facility] - [place], [place] - [address] [facility]    Clinical concerns: None.     Saundra Sims RN                "

## 2022-08-15 NOTE — NURSING NOTE
Chief Complaint   Patient presents with     Port Draw     Labs drawn via port by RN in lab. VS taken.      Labs drawn via Port accessed using 20g flat needle. Line flushed and Heparin locked. Vital signs taken. Checked into next appointment.     Haley Fu RN

## 2022-08-15 NOTE — NURSING NOTE
"Oncology Rooming Note    August 15, 2022 9:20 AM   Theo Meyers is a 77 year old male who presents for:    Chief Complaint   Patient presents with     Port Draw     Labs drawn via port by RN in lab. VS taken.      Oncology Clinic Visit     Multiple myeloma not having achieved remission (H) (Primary Dx)      Initial Vitals: /77   Pulse 54   Temp 97.8  F (36.6  C) (Oral)   Resp 14   Wt 72.3 kg (159 lb 4.8 oz)   SpO2 100%   BMI 23.73 kg/m   Estimated body mass index is 23.73 kg/m  as calculated from the following:    Height as of 7/26/22: 1.745 m (5' 8.7\").    Weight as of this encounter: 72.3 kg (159 lb 4.8 oz). Body surface area is 1.87 meters squared.  No Pain (0) Comment: Data Unavailable   No LMP for male patient.  Allergies reviewed: Yes  Medications reviewed: Yes    Medications: Medication refills not needed today.  Pharmacy name entered into [employer]:    [id number] - [place], [place] - [address] [facility] [address] [facility]  [facility] - [place], [place] - [address] [facility] - [place], [place] - [address] [facility]    Clinical concerns: Pt has questions on killer cells       Dee Dee Sin [facility]            "

## 2022-08-15 NOTE — LETTER
8/15/2022         RE: Theo Meyers  [address]  [facility] [address]        Dear Colleague,    Thank you for referring your patient, Theo Meyers, to the [facility] BLOOD AND MARROW TRANSPLANT PROGRAM [place]. Please see a copy of my visit note below.    BMT/Cellular Therapy Clinic Progress Note    MT 2020-23  Mr Theo Meyers is a 77 year old male who is Day 22 (discrepancy from Epic saying D21) s/p  as Monotherapy in Combination with Monoclonal Antibodies in Relapsed/Refractory Multiple Myeloma     Active Medical Management Issues:     Hematologic history:  Multiple myeloma diagnosed in 2009, IgG kappa but evolving to light chain secretory, del 13q, t(11;14).       Date Treatment Response Toxicities/Complications   7/2009 Velcade/Dex x 8 cycles VGPR     4/2010 HD-Jennifer/PBSCT CR     6/2010 Rev maintenance       10/2012 RVD   cytopenias   9/2012 RVD (q 2wk velcade) Long remission     10/2020 Brianne/pom/dex Recent progresson     4/6/2022 Carf/dex  [place] - held Possible cardiac toxicity                               HPI: Here for follow-up. Remains fatigued and noted SAMANO, no worse. No bleeding. No n/v/d/c. No fevers or infectious sx.        ROS:     8 point ROS otherwise negative     PHYSICAL EXAM                                                                                                                                      Wt Readings from Last 4 Encounters:   08/15/22 72.3 kg (159 lb 4.8 oz)   08/11/22 72.9 kg (160 lb 12.8 oz)   08/08/22 73.3 kg (161 lb 11.2 oz)   08/05/22 73.9 kg (163 lb)     Blood pressure 109/77, pulse 54, temperature 97.8  F (36.6  C), temperature source Oral, resp. rate 14, weight 72.3 kg (159 lb 4.8 oz), SpO2 100 %.    ECOG 1  General: NAD  HEENT: sclera anicteric ; OP moist without lesions  CV: HR ~60s, regular today, no mrg  Lungs: CTAB   Lymphatics: no edema   Skin: No rash but thin skin with bruises on his arms  Neuro: non-focal  Access: Port a cath: right.      Labs     Lab Results   Component Value Date    WBC 1.2 (L) 08/15/2022    ANEU 0.7 (L) 08/11/2022    HGB 8.5 (L) 08/15/2022    HCT 25.5 (L) 08/15/2022    PLT 46 (LL) 08/15/2022     08/15/2022    POTASSIUM 4.3 08/15/2022    CHLORIDE 108 08/15/2022    CO2 28 08/15/2022    GLC 98 08/15/2022    BUN 18 08/15/2022    CR 1.24 08/15/2022    MAG 2.0 08/15/2022    INR 1.15 06/23/2022    BILITOTAL 0.4 08/15/2022    AST 20 08/15/2022    ALT 31 08/15/2022    ALKPHOS 110 08/15/2022    PROTTOTAL 5.3 (L) 08/15/2022    ALBUMIN 2.8 (L) 08/15/2022       I have assessed all abnormal lab values for their clinical significance and any values considered clinically significant have been addressed in the assessment and plan.        Assessment Plans:     Keith Meyers is a 77 year old man Day +22 s/p  as Monotherapy in Relapsed/Refractory MM and in Combination with Monoclonal Antibodies in Relapsed/Refractory Multiple Myeloma     1. Cellular Therapy: Relapsed refractory Multiple myeloma    Day-5(7/20)  Begin Flu/CY: LD chemo  Day-4:( 7/21) Flu/CY/dimple: LD chemo; IH pentam  Day -3: 7/22)  Flu/CY: LD chemo, covid test    Day 1: ( 7/25/2022)  PICC placement and infusion of   Remains on allopurinol  (decrease dose due to LATANYA on CKI)- ok to stop when runs out  ICE 10/10 today (8/15)    2. Heme:  #pancytopenia 2/2 chemo. WBC recovering; plts improved but still <50k.  #History of blood clots, chronic and stroke prophy for atrial fib. Holding Xarelto now with thrombocytopenia (x7/25); Resume Xarelto when pts stable >50k.    3. ID: afebrile.  - prophy Levo (started 7/26 w/ neutropenia), ACV, Pentamidine (7/22). He reports sob day after Pentam. *8/11: realized pt is not on antifungal. With WBC rising did not start.   Bactrim held d/t acute on CKD    4. FEN:    Remains on supplemental potassium   LATANYA on CKI ; creat improved today  Lasix 40mg PO daily at home      5. CV:   - Bradycardia  - history of atrial fib and heart  failure  Remains on lasix and supplemental K    6. Neuro:  Has neuropathy in hands mainly and some in feet, neurontin    7. GI: no complaints. Recent transaminitis (unclear etiology), resolved today.    30 minutes spent on the date of the encounter doing chart review, review of test results, interpretation of tests, patient visit and documentation     Summary:  ICE 10/10  RTC Thurs for labs, dimple, ICANS, follow-up.   Restaging PET, BMBx 8/22  Follow-up with Dr. Guan 8/25    Natty Yang PA-C        Again, thank you for allowing me to participate in the care of your patient.      Sincerely,    BMT Advanced Practice Provider

## 2022-08-15 NOTE — PROGRESS NOTES
BMT/Cellular Therapy Clinic Progress Note    MT 2020-23  Mr Theo Meyers is a 77 year old male who is Day 22 (discrepancy from Epic saying D21) s/p  as Monotherapy in Combination with Monoclonal Antibodies in Relapsed/Refractory Multiple Myeloma     Active Medical Management Issues:     Hematologic history:  Multiple myeloma diagnosed in 2009, IgG kappa but evolving to light chain secretory, del 13q, t(11;14).       Date Treatment Response Toxicities/Complications   7/2009 Velcade/Dex x 8 cycles VGPR     4/2010 HD-Jennifer/PBSCT CR     6/2010 Rev maintenance       10/2012 RVD   cytopenias   9/2012 RVD (q 2wk velcade) Long remission     10/2020 Brianne/pom/dex Recent progresson     4/6/2022 Carf/dex  [place] - held Possible cardiac toxicity                               HPI: Here for follow-up. Remains fatigued and noted SAMANO, no worse. No bleeding. No n/v/d/c. No fevers or infectious sx.        ROS:     8 point ROS otherwise negative     PHYSICAL EXAM                                                                                                                                      Wt Readings from Last 4 Encounters:   08/15/22 72.3 kg (159 lb 4.8 oz)   08/11/22 72.9 kg (160 lb 12.8 oz)   08/08/22 73.3 kg (161 lb 11.2 oz)   08/05/22 73.9 kg (163 lb)     Blood pressure 109/77, pulse 54, temperature 97.8  F (36.6  C), temperature source Oral, resp. rate 14, weight 72.3 kg (159 lb 4.8 oz), SpO2 100 %.    ECOG 1  General: NAD  HEENT: sclera anicteric ; OP moist without lesions  CV: HR ~60s, regular today, no mrg  Lungs: CTAB   Lymphatics: no edema   Skin: No rash but thin skin with bruises on his arms  Neuro: non-focal  Access: Port a cath: right.     Labs     Lab Results   Component Value Date    WBC 1.2 (L) 08/15/2022    ANEU 0.7 (L) 08/11/2022    HGB 8.5 (L) 08/15/2022    HCT 25.5 (L) 08/15/2022    PLT 46 (LL) 08/15/2022     08/15/2022    POTASSIUM 4.3 08/15/2022    CHLORIDE 108 08/15/2022    CO2 28 08/15/2022     GLC 98 08/15/2022    BUN 18 08/15/2022    CR 1.24 08/15/2022    MAG 2.0 08/15/2022    INR 1.15 06/23/2022    BILITOTAL 0.4 08/15/2022    AST 20 08/15/2022    ALT 31 08/15/2022    ALKPHOS 110 08/15/2022    PROTTOTAL 5.3 (L) 08/15/2022    ALBUMIN 2.8 (L) 08/15/2022       I have assessed all abnormal lab values for their clinical significance and any values considered clinically significant have been addressed in the assessment and plan.        Assessment Plans:     Keith Meyers is a 77 year old man Day +22 s/p  as Monotherapy in Relapsed/Refractory MM and in Combination with Monoclonal Antibodies in Relapsed/Refractory Multiple Myeloma     1. Cellular Therapy: Relapsed refractory Multiple myeloma    Day-5(7/20)  Begin Flu/CY: LD chemo  Day-4:( 7/21) Flu/CY/dimple: LD chemo; IH pentam  Day -3: 7/22)  Flu/CY: LD chemo, covid test    Day 1: ( 7/25/2022)  PICC placement and infusion of   Remains on allopurinol  (decrease dose due to LATANYA on CKI)- ok to stop when runs out  ICE 10/10 today (8/15)    2. Heme:  #pancytopenia 2/2 chemo. WBC recovering; plts improved but still <50k.  #History of blood clots, chronic and stroke prophy for atrial fib. Holding Xarelto now with thrombocytopenia (x7/25); Resume Xarelto when pts stable >50k.    3. ID: afebrile.  - prophy Levo (started 7/26 w/ neutropenia), ACV, Pentamidine (7/22). He reports sob day after Pentam. *8/11: realized pt is not on antifungal. With WBC rising did not start.   Bactrim held d/t acute on CKD    4. FEN:    Remains on supplemental potassium   LATANYA on CKI ; creat improved today  Lasix 40mg PO daily at home      5. CV:   - Bradycardia  - history of atrial fib and heart failure  Remains on lasix and supplemental K    6. Neuro:  Has neuropathy in hands mainly and some in feet, neurontin    7. GI: no complaints. Recent transaminitis (unclear etiology), resolved today.    30 minutes spent on the date of the encounter doing chart review, review of test  results, interpretation of tests, patient visit and documentation     Summary:  ICE 10/10  RTC Thurs for labs, dimple, ICANS, follow-up.   Restaging PET, BMBx 8/22  Follow-up with Dr. Guan 8/25    Natty Yang PA-C

## 2022-08-17 NOTE — PROGRESS NOTES
[id number]: Study Visit Note   Subject name: Theo Meyers     Visit: C1 D22    Did the study visit occur within the appropriate window allowed by the protocol? yes    Since the last study visit, Keith has been doing well. He continues to endorse fatigue that has not changed in severity since starting  regimen. His intermittent dyspnea is also unchanged.  Keith and his wife had several trial related questions that were answered to their satisfaction.    I have personally interviewed Theo HURT Mira and reviewed his medical record for adverse events and concomitant medications and these have been recorded on the corresponding logs in Theo Meyers's research file.     Please see provider progress note for physical exam and other clinical information. Labs were reviewed - any significant lab values were addressed and reviewed.    Kareen Campoverde RN

## 2022-08-18 PROBLEM — Z94.84 STEM CELLS TRANSPLANT STATUS (H): Status: ACTIVE | Noted: 2022-01-01

## 2022-08-18 NOTE — PROGRESS NOTES
BMT/Cellular Therapy Clinic Progress Note    MT 2020-23  Mr Theo Meyers is a 77 year old male who is Day 24 (discrepancy from Epic saying D21) s/p  as Monotherapy in Combination with Monoclonal Antibodies in Relapsed/Refractory Multiple Myeloma     Active Medical Management Issues:     Hematologic history:  Multiple myeloma diagnosed in 2009, IgG kappa but evolving to light chain secretory, del 13q, t(11;14).       Date Treatment Response Toxicities/Complications   7/2009 Velcade/Dex x 8 cycles VGPR     4/2010 HD-Jennifer/PBSCT CR     6/2010 Rev maintenance       10/2012 RVD   cytopenias   9/2012 RVD (q 2wk velcade) Long remission     10/2020 Brianne/pom/dex Recent progresson     4/6/2022 Carf/dex  [place] - held Possible cardiac toxicity                               HPI: Here for follow-up. Less fatigued today, still with some SAMANO, no worse. No new complaints. He denies rashes, bruises or bleeding. Eating normally without n/v/d/c. No fevers or infectious sx.        ROS:     8 point ROS otherwise negative     PHYSICAL EXAM                                                                                                                                      Wt Readings from Last 4 Encounters:   08/15/22 72.3 kg (159 lb 4.8 oz)   08/11/22 72.9 kg (160 lb 12.8 oz)   08/08/22 73.3 kg (161 lb 11.2 oz)   08/05/22 73.9 kg (163 lb)     Blood pressure 118/64, pulse 73, temperature 97.3  F (36.3  C), temperature source Oral, resp. rate 16, weight 72.5 kg (159 lb 12.8 oz), SpO2 100 %.    ECOG 1  General: NAD  HEENT: sclera anicteric ; OP moist without lesions  CV: HR ~60s, regular today, no mrg  Lungs: CTAB   Lymphatics: no edema   Skin: No rash but thin skin with bruises on his arms  Neuro: non-focal  Access: Port a cath: right.   ICE score 10/10 study RN has handwriting log    Labs     Lab Results   Component Value Date    WBC 1.2 (L) 08/15/2022    ANEU 0.7 (L) 08/11/2022    HGB 8.5 (L) 08/15/2022    HCT 25.5 (L) 08/15/2022     PLT 46 (LL) 08/15/2022     08/15/2022    POTASSIUM 4.3 08/15/2022    CHLORIDE 108 08/15/2022    CO2 28 08/15/2022    GLC 98 08/15/2022    BUN 18 08/15/2022    CR 1.24 08/15/2022    MAG 2.0 08/15/2022    INR 1.15 06/23/2022    BILITOTAL 0.4 08/15/2022    AST 20 08/15/2022    ALT 31 08/15/2022    ALKPHOS 110 08/15/2022    PROTTOTAL 5.3 (L) 08/15/2022    ALBUMIN 2.8 (L) 08/15/2022       I have assessed all abnormal lab values for their clinical significance and any values considered clinically significant have been addressed in the assessment and plan.        Assessment Plans:     Keith Meyers is a 77 year old man Day +24 s/p  as Monotherapy in Relapsed/Refractory MM and in Combination with Monoclonal Antibodies in Relapsed/Refractory Multiple Myeloma     1. Cellular Therapy: Relapsed refractory Multiple myeloma    Day-5(7/20)  Begin Flu/CY: LD chemo  Day-4:( 7/21) Flu/CY/dimple: LD chemo; IH pentam  Day -3: 7/22)  Flu/CY: LD chemo, covid test    Day 1: ( 7/25/2022)  PICC placement and infusion of   Remains on allopurinol  (decrease dose due to LATANYA on CKI)- ok to stop when runs out  ICE 10/10     2. Heme:  #pancytopenia 2/2 chemo. WBC recovering; plts improved but still <50k.  #History of blood clots, chronic and stroke prophy for atrial fib. Holding Xarelto now with thrombocytopenia (x7/25); Resume Xarelto when pts stable >50k.    3. ID: afebrile.  - prophy Levo (started 7/26 w/ neutropenia), ACV, IV Pentamidine (7/22). He reports sob day after Pentam. *8/11: realized pt is not on antifungal. With WBC rising did not start. Anticipating improvement of ANC with gcsf today, fluconazole deferred again.  Bactrim held d/t acute on CKD    4. FEN:    Remains on supplemental potassium   LATANYA on CKI; creat up again today, notes he is not drinking much. Will try to push fluids   Lasix 40mg PO daily at home      5. CV:   - Bradycardia  - history of atrial fib and heart failure  Remains on lasix and supplemental  K    6. Neuro:  Has neuropathy in hands mainly and some in feet, neurontin    7. GI: no complaints. Recent transaminitis (unclear etiology), resolved today.      30 minutes spent on the date of the encounter doing chart review, review of test results, interpretation of tests, patient visit and documentation     Summary: gscf given (checked with study nurse and ok to give)  ICE 10/10    RTC:  Restaging PET, BMBx 8/22, requested IV pentamidine  Follow-up with Dr. Guan 8/25    [facility]  [phone]

## 2022-08-18 NOTE — LETTER
8/18/2022         RE: Theo Meyers  [address]  [facility] [address]        Dear Colleague,    Thank you for referring your patient, Theo Meyers, to the [facility] BLOOD AND MARROW TRANSPLANT PROGRAM [place]. Please see a copy of my visit note below.    BMT/Cellular Therapy Clinic Progress Note    MT 2020-23  Mr Theo Meyers is a 77 year old male who is Day 24 (discrepancy from Epic saying D21) s/p  as Monotherapy in Combination with Monoclonal Antibodies in Relapsed/Refractory Multiple Myeloma     Active Medical Management Issues:     Hematologic history:  Multiple myeloma diagnosed in 2009, IgG kappa but evolving to light chain secretory, del 13q, t(11;14).       Date Treatment Response Toxicities/Complications   7/2009 Velcade/Dex x 8 cycles VGPR     4/2010 HD-Jennifer/PBSCT CR     6/2010 Rev maintenance       10/2012 RVD   cytopenias   9/2012 RVD (q 2wk velcade) Long remission     10/2020 Brianne/pom/dex Recent progresson     4/6/2022 Carf/dex  [place] - held Possible cardiac toxicity                               HPI: Here for follow-up. Less fatigued today, still with some SAMANO, no worse. No new complaints. He denies rashes, bruises or bleeding. Eating normally without n/v/d/c. No fevers or infectious sx.        ROS:     8 point ROS otherwise negative     PHYSICAL EXAM                                                                                                                                      Wt Readings from Last 4 Encounters:   08/15/22 72.3 kg (159 lb 4.8 oz)   08/11/22 72.9 kg (160 lb 12.8 oz)   08/08/22 73.3 kg (161 lb 11.2 oz)   08/05/22 73.9 kg (163 lb)     Blood pressure 118/64, pulse 73, temperature 97.3  F (36.3  C), temperature source Oral, resp. rate 16, weight 72.5 kg (159 lb 12.8 oz), SpO2 100 %.    ECOG 1  General: NAD  HEENT: sclera anicteric ; OP moist without lesions  CV: HR ~60s, regular today, no mrg  Lungs: CTAB   Lymphatics: no edema   Skin: No rash but thin  skin with bruises on his arms  Neuro: non-focal  Access: Port a cath: right.   ICE score 10/10 study RN has handwriting log    Labs     Lab Results   Component Value Date    WBC 1.2 (L) 08/15/2022    ANEU 0.7 (L) 08/11/2022    HGB 8.5 (L) 08/15/2022    HCT 25.5 (L) 08/15/2022    PLT 46 (LL) 08/15/2022     08/15/2022    POTASSIUM 4.3 08/15/2022    CHLORIDE 108 08/15/2022    CO2 28 08/15/2022    GLC 98 08/15/2022    BUN 18 08/15/2022    CR 1.24 08/15/2022    MAG 2.0 08/15/2022    INR 1.15 06/23/2022    BILITOTAL 0.4 08/15/2022    AST 20 08/15/2022    ALT 31 08/15/2022    ALKPHOS 110 08/15/2022    PROTTOTAL 5.3 (L) 08/15/2022    ALBUMIN 2.8 (L) 08/15/2022       I have assessed all abnormal lab values for their clinical significance and any values considered clinically significant have been addressed in the assessment and plan.        Assessment Plans:     Keith Meyers is a 77 year old man Day +24 s/p  as Monotherapy in Relapsed/Refractory MM and in Combination with Monoclonal Antibodies in Relapsed/Refractory Multiple Myeloma     1. Cellular Therapy: Relapsed refractory Multiple myeloma    Day-5(7/20)  Begin Flu/CY: LD chemo  Day-4:( 7/21) Flu/CY/dimple: LD chemo; IH pentam  Day -3: 7/22)  Flu/CY: LD chemo, covid test    Day 1: ( 7/25/2022)  PICC placement and infusion of   Remains on allopurinol  (decrease dose due to LATANYA on CKI)- ok to stop when runs out  ICE 10/10     2. Heme:  #pancytopenia 2/2 chemo. WBC recovering; plts improved but still <50k.  #History of blood clots, chronic and stroke prophy for atrial fib. Holding Xarelto now with thrombocytopenia (x7/25); Resume Xarelto when pts stable >50k.    3. ID: afebrile.  - prophy Levo (started 7/26 w/ neutropenia), ACV, IV Pentamidine (7/22). He reports sob day after Pentam. *8/11: realized pt is not on antifungal. With WBC rising did not start. Anticipating improvement of ANC with gcsf today, fluconazole deferred again.  Bactrim held d/t acute on  CKD    4. FEN:    Remains on supplemental potassium   LATANYA on CKI; creat up again today, notes he is not drinking much. Will try to push fluids   Lasix 40mg PO daily at home      5. CV:   - Bradycardia  - history of atrial fib and heart failure  Remains on lasix and supplemental K    6. Neuro:  Has neuropathy in hands mainly and some in feet, neurontin    7. GI: no complaints. Recent transaminitis (unclear etiology), resolved today.      30 minutes spent on the date of the encounter doing chart review, review of test results, interpretation of tests, patient visit and documentation     Summary: gscf given (checked with study nurse and ok to give)  ICE 10/10    RTC:  Restaging PET, BMBx 8/22, requested IV pentamidine  Follow-up with Dr. Guan 8/25      Holly Langley PAC  [phone]

## 2022-08-18 NOTE — LETTER
8/18/2022         RE: Theo Meyers  [address]  [facility] [address]        Dear Colleague,    Thank you for referring your patient, Theo Meyers, to the [facility] BLOOD AND MARROW TRANSPLANT PROGRAM [place]. Please see a copy of my visit note below.    Infusion Nursing Note:  Theo Meyers presents today for scheduled Day 25 Daratumumab.    Patient seen by provider today: Yes: Holly PETER    present during visit today: Not Applicable.     Note: Meds and allergies reviewed. VSS. Pt reports feeling well today and was assessed by provider prior to injection. Pt received 650 mg Tylenol PO and 50 mg Benadryl PO 30 mins prior to Daratumumab. Pt received 1800 mg Daratumumab subcutaneously into right abdomen. Pt also received growth factor in left abd.      Intravenous Access:  Implanted Port.     Treatment Conditions:  Not Applicable.     Post Infusion Assessment:  Patient tolerated injection without incident.  Patient observed for 30 minutes post Daratumumab per protocol.      Discharge Plan:   AVS to patient via MYCHART.  Patient will return Monday for next appointment.   Patient discharged in stable condition accompanied by: self.  Departure Mode: Ambulatory.      Again, thank you for allowing me to participate in the care of your patient.        Sincerely,        [facility]

## 2022-08-18 NOTE — PROGRESS NOTES
Infusion Nursing Note:  Theo Meyers presents today for scheduled Day 25 Daratumumab.    Patient seen by provider today: Yes: Holly PETER    present during visit today: Not Applicable.     Note: Meds and allergies reviewed. VSS. Pt reports feeling well today and was assessed by provider prior to injection. Pt received 650 mg Tylenol PO and 50 mg Benadryl PO 30 mins prior to Daratumumab. Pt received 1800 mg Daratumumab subcutaneously into right abdomen. Pt also received growth factor in left abd.      Intravenous Access:  Implanted Port.     Treatment Conditions:  Not Applicable.     Post Infusion Assessment:  Patient tolerated injection without incident.  Patient observed for 30 minutes post Daratumumab per protocol.      Discharge Plan:   [facility]  Patient will return Monday for next appointment.   Patient discharged in stable condition accompanied by: self.  Departure Mode: Ambulatory.

## 2022-08-18 NOTE — NURSING NOTE
"Oncology Rooming Note    August 18, 2022 9:32 AM   Theo Meyers is a 77 year old male who presents for:    Chief Complaint   Patient presents with     Port Draw     Labs drawn via port by rn in lab. VS taken.     Oncology Clinic Visit     Multiple Myeloma     Initial Vitals: /64 (BP Location: Right arm, Patient Position: Sitting, Cuff Size: Adult Regular)   Pulse 73   Temp 97.3  F (36.3  C) (Oral)   Resp 16   Wt 72.5 kg (159 lb 12.8 oz)   SpO2 100%   BMI 23.80 kg/m   Estimated body mass index is 23.8 kg/m  as calculated from the following:    Height as of 7/26/22: 1.745 m (5' 8.7\").    Weight as of this encounter: 72.5 kg (159 lb 12.8 oz). Body surface area is 1.87 meters squared.  No Pain (0) Comment: Data Unavailable   No LMP for male patient.  Allergies reviewed: Yes  Medications reviewed: Yes    Medications: Medication refills not needed today.  Pharmacy name entered into ROLI:    [id number] - [place], [place] - [address] [facility] [address]  [facility] - [place], [place] - [address] [facility] - [place], [place] - [address]    Clinical concerns: none.       Elvis Vargas"

## 2022-08-18 NOTE — LETTER
Date:August 19, 2022      Provider requested that no letter be sent. Do not send.       [facility]

## 2022-08-20 NOTE — PROGRESS NOTES
"Oncology Rooming Note    January 26, 2022 9:09 AM   Theo Meyers is a 76 year old male who presents for:    Chief Complaint   Patient presents with     Oncology Clinic Visit     Initial Vitals: /70   Pulse 52   Temp 97.6  F (36.4  C)   Resp 16   Wt 77.1 kg (170 lb)   SpO2 100%   BMI 25.10 kg/m   Estimated body mass index is 25.1 kg/m  as calculated from the following:    Height as of 10/5/21: 1.753 m (5' 9\").    Weight as of this encounter: 77.1 kg (170 lb). Body surface area is 1.94 meters squared.  No Pain (0) Comment: Data Unavailable   No LMP for male patient.  Allergies reviewed: Yes  Medications reviewed: Yes    Medications: Medication refills not needed today.  Pharmacy name entered into [facility]:    [id number] - [place], [place] - [address] [facility] [address]  [facility] - [place], [place] - [address]    Clinical concerns: Bone Marrow Bx      Meg Garcia RN            "
PROCEDURE: Bone marrow Biopsy and Aspiration    INDICATIONS FOR PROCEDURE: myeloma, restaging    DESCRIPTION OF PROCEDURE: Pt was given written information about the procedure.  They provided their written informed consent. Pt declined premeds.  The patient got into the prone position and I sterilely prepped and draped their right posterior iliac crest.  I used 1% local lidocaine for anesthesia.  I then used an 11-gauge Jamshidi needle.  I was able to get about 10 mL of particulate aspirate.  I then went on to get a core of trabecular bone that was about 10 mm in size.  Patient tolerated the procedure with minimal pain and bleeding.  The specimens were sent for routine histology, flow cytometry, and cytogenetics.  I placed a sterile pressure dressing with instructions for it to remain clean dry and intact for 24 hours.  Patient will return to the clinic for follow-up of these results.    
Patient is here for a Bone Marrow Biopsy.  Patient given written and verbal instructions.  Patient signed consent.  Patient declined pre-meds  Patient tolerated procedure.  Site was checked and is clean dry and intact. VSS. Patient left ambulatory.  
0

## 2022-08-22 NOTE — NURSING NOTE
"Oncology Rooming Note    August 22, 2022 10:34 AM   Theo Meyers is a 77 year old male who presents for:    Chief Complaint   Patient presents with     Bone Marrow Biopsy     BmBx; hx MM s/p cell therapy     Initial Vitals: /74 (BP Location: Right arm, Patient Position: Sitting)   Pulse 68   Temp 97.3  F (36.3  C) (Oral)   Resp 18   Wt 73.3 kg (161 lb 11.2 oz)   SpO2 99%   BMI 24.09 kg/m   Estimated body mass index is 24.09 kg/m  as calculated from the following:    Height as of 7/26/22: 1.745 m (5' 8.7\").    Weight as of this encounter: 73.3 kg (161 lb 11.2 oz). Body surface area is 1.88 meters squared.  No Pain (0) Comment: Data Unavailable   No LMP for male patient.  Allergies reviewed: Yes  Medications reviewed: Yes    Medications: Medication refills not needed today.  Pharmacy name entered into [facility]:    [id number] - [place], [place] - [address] AT [facility] [address] [facility]  [facility] - [place], [place] - [address] [facility] - [place], [place] - [address] [facility]    Clinical concerns: None.    Saundra Sims RN              "

## 2022-08-22 NOTE — NURSING NOTE
EKG was performed today per order written by MANDI Green.  Name and  verified with patient. Patient tolerated well without incident. File transmitted to chart.    MESFIN Miller on 2022 at 12:11 PM
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
## 2022-08-22 NOTE — LETTER
8/22/2022         RE: Theo Meyers  [address]  [facility] [address]        Dear Colleague,    Thank you for referring your patient, Theo Meyers, to the [facility] BLOOD AND MARROW TRANSPLANT PROGRAM [place]. Please see a copy of my visit note below.      BMT ONC Adult Bone Marrow Biopsy Procedure Note  August 22, 2022  /74 (BP Location: Right arm, Patient Position: Sitting)   Pulse 68   Temp 97.3  F (36.3  C) (Oral)   Resp 18   Wt 73.3 kg (161 lb 11.2 oz)   SpO2 99%   BMI 24.09 kg/m       Learning needs assessment complete within 12 months? YES    DIAGNOSIS: multiple myeloma     PROCEDURE: Unilateral Bone Marrow Biopsy and Unilateral Aspirate    LOCATION: [facility]    Patient s identification was positively verified by verbal identification and invasive procedure safety checklist was completed. Informed consent was obtained. Without the administration of pre-medication, patient was placed in the prone position and prepped and draped in a sterile manner. Approximately 10 cc of 1% Lidocaine was used over the right posterior iliac spine. Following this a 3 mm incision was made. Trephine bone marrow core(s) was (were) obtained from the [facility]. Bone marrow aspirates were obtained from the [facility]. Aspirates were sent for research studies and flow cytometry. A total of approximately 30 ml of marrow was aspirated. Following this procedure a sterile dressing was applied to the bone marrow biopsy site(s). The patient was placed in the supine position to maintain pressure on the biopsy site. Post-procedure wound care instructions were given.     Complications: NO    Pre-procedural pain: 0 out of 10 on the numeric pain rating scale.     Procedural pain: 1 out of 10 on the numeric pain rating scale.     Post-procedural pain assessment: 0 out of 10 on the numeric pain rating scale.     Interventions: NO    Length of procedure:20 minutes or less    Procedure performed by: Holly Langley  PAC  [phone]        Again, thank you for allowing me to participate in the care of your patient.      Sincerely,    [facility]

## 2022-08-22 NOTE — PROGRESS NOTES
BMT ONC Adult Bone Marrow Biopsy Procedure Note  August 22, 2022  /74 (BP Location: Right arm, Patient Position: Sitting)   Pulse 68   Temp 97.3  F (36.3  C) (Oral)   Resp 18   Wt 73.3 kg (161 lb 11.2 oz)   SpO2 99%   BMI 24.09 kg/m       Learning needs assessment complete within 12 months? YES    DIAGNOSIS: multiple myeloma     PROCEDURE: Unilateral Bone Marrow Biopsy and Unilateral Aspirate    LOCATION: [facility] [place] 2nd [facility]    Patient s identification was positively verified by verbal identification and invasive procedure safety checklist was completed. Informed consent was obtained. Without the administration of pre-medication, patient was placed in the prone position and prepped and draped in a sterile manner. Approximately 10 cc of 1% Lidocaine was used over the right posterior iliac spine. Following this a 3 mm incision was made. Trephine bone marrow core(s) was (were) obtained from the [facility]. Bone marrow aspirates were obtained from the [facility]. Aspirates were sent for research studies and flow cytometry. A total of approximately 30 ml of marrow was aspirated. Following this procedure a sterile dressing was applied to the bone marrow biopsy site(s). The patient was placed in the supine position to maintain pressure on the biopsy site. Post-procedure wound care instructions were given.     Complications: NO    Pre-procedural pain: 0 out of 10 on the numeric pain rating scale.     Procedural pain: 1 out of 10 on the numeric pain rating scale.     Post-procedural pain assessment: 0 out of 10 on the numeric pain rating scale.     Interventions: NO    Length of procedure:20 minutes or less      Procedure performed by: Holly Langley PAC  [phone]

## 2022-08-22 NOTE — NURSING NOTE
BMT Teaching Flowsheet   Teaching Topic: post biopsy instructions    Person(s) involved in teaching: Patient  Motivation Level  Asks Questions: Yes  Eager to Learn: Yes  Cooperative: Yes  Receptive (willing/able to accept information): Yes    Patient demonstrates understanding of the following:   - Reason for the appointment, diagnosis and treatment plan: Yes  - Knowledge of proper use of medications and conditions for which they are ordered (with special attention to potential side effects or drug interactions): Yes  - Which situations necessitate calling provider and whom to contact: Yes    Teaching concerns addressed: reviewed activity restrictions if received premeds, potential for bleeding and actions to take if develops any of the issues below    Proper use and care of (medical equipment, care aids, etc.) Yes  Pain management techniques: Yes  Patient instructed on hand hygiene: Yes  How and/when to access community resources: Yes    Infection Control:  Patient demonstrates understanding of the following:   Surgical procedure site care taught NA  Signs and symptoms of infection taught Yes  Wound care taught Yes  Central venous catheter care taught NA    Instructional Materials Used/Given: verbal, print out of post biopsy instructions.       Pt arrived to clinic today for Day 29 BmBx. Weight and VS obtained. VSS. Meds and allergies reviewed. Labs drawn from port. Aftercare instructions reviewed including leaving dressing dry and intact for 24 hours, to report bleeding from bx site, fevers >100.4 or other signs of infection, and to take Tylenol as needed for pain. All questions answered and pt verbalized understanding.     Patient supine for 30 minutes following biopsy. After 30 minutes, dressing clean, dry and intact. See DOC flow sheets for details. Left ambulatory with family member.    Saundra Sims RN
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
## 2022-08-24 NOTE — PROGRESS NOTES
BMT/Cellular Therapy Clinic Progress Note    MT 2020-23  Mr Theo Meyers is a 77 year old male who is s/p  as Monotherapy in Combination with Monoclonal Antibodies in Relapsed/Refractory Multiple Myeloma     CC: chest pain with exertion       HPI: Theo is now 1 month following receipt of lymph depleting chemotherapy and  NK cells.  From his point of view the treatment is gone quite smoothly.  He has had no significant adverse effects, no dose-limiting toxicities, and he continues to tolerate the daratumumab well.  He does continue to have chronic problems of dyspnea on exertion that he feels is worse since beginning chemotherapy.  The worsening of his dyspnea on exertion parallels his development of anemia and pancytopenia.  He is requiring intermittent transfusions.  Today, he has no new symptoms to report, no fevers, chills, episodes of confusion, change in bowel habits, no nausea, vomiting, or skin rash.  He continues to take his medications without difficulty.  He has no new pain to report.       ROS:     8 point ROS otherwise negative     PHYSICAL EXAM                                                                                                                                      Wt Readings from Last 4 Encounters:   08/25/22 72.6 kg (160 lb)   08/22/22 73.3 kg (161 lb 11.2 oz)   08/18/22 72.5 kg (159 lb 12.8 oz)   08/15/22 72.3 kg (159 lb 4.8 oz)     VSS, HR 60s, regular BP, oxygenation and temp  ECOG 1  General: NAD  HEENT: sclera anicteric ; OP moist without lesions  CV: HR ~60s irregular consistent with atrial fibrillation  Lungs: CTAB   Lymphatics: no edema   Skin: No rash but thin skin with bruises on his arms  Neuro: non-focal  Access: Port a cath: right.     Labs     Lab Results   Component Value Date    WBC 1.5 (L) 08/25/2022    ANEU 1.0 (L) 08/25/2022    HGB 8.0 (L) 08/25/2022    HCT 23.9 (L) 08/25/2022    PLT 55 (L) 08/25/2022     08/25/2022    POTASSIUM 4.7 08/25/2022     CHLORIDE 103 08/25/2022    CO2 29 08/25/2022    GLC 85 08/25/2022    BUN 26 08/25/2022    CR 1.37 (H) 08/25/2022    MAG 2.1 08/25/2022    INR 1.15 06/23/2022    BILITOTAL 0.4 08/22/2022    AST 50 (H) 08/22/2022    ALT 97 (H) 08/22/2022    ALKPHOS 110 08/22/2022    PROTTOTAL 5.4 (L) 08/22/2022    ALBUMIN 3.0 (L) 08/22/2022       I have assessed all abnormal lab values for their clinical significance and any values considered clinically significant have been addressed in the assessment and plan.        Assessment Plans:     Keith Meyers is a 77 year old man Day +24 s/p  as Monotherapy in Relapsed/Refractory MM and in Combination with Monoclonal Antibodies in Relapsed/Refractory Multiple Myeloma     1. Cellular Therapy: Relapsed refractory Multiple myeloma    Day-5(7/20)  Begin Flu/CY: LD chemo  Day-4:( 7/21) Flu/CY/dimple: LD chemo; IH pentam  Day -3: 7/22)  Flu/CY: LD chemo, covid test    Day 1: ( 7/25/2022)  PICC placement and infusion of   Remains on allopurinol  (decrease dose due to LATANYA on CKI)- ok to stop when runs out  ICE 10/10     2. Heme:  #pancytopenia 2/2 chemo. WBC recovering; plts improved but still <50k.  Transfuse for hgb 8 or below.  #History of blood clots, chronic and stroke prophy for atrial fib. Holding Xarelto now with thrombocytopenia (x7/25); Resume Xarelto when pts stable >50k.    3. ID: afebrile.  - prophy Levo (started 7/26 w/ neutropenia), ACV, IV Pentamidine (7/22). He reports sob day after Pentam. *8/11: realized pt is not on antifungal. With WBC rising did not start. Improvement of ANC with gcsf 8/19, ANC 1.0, fluconazole deferred again.  Bactrim held d/t acute on CKD.  I recommended he discontinue Levaquin today    4. FEN:    Remains on supplemental potassium   Lasix 40mg PO daily at home      5. CV: In a fib today, and with ongoing dyspnea on exertion.  This latter problem is certainly due to a combination of pulmonary hypertension, valvular dysfunction, and anemia that is  worse after receiving chemotherapy.  Today he has been transfused and will continue to support him.  His platelets are improving and hopefully will see a rise in his hemoglobin soon.  - Bradycardia  - history of atrial fib and heart failure  Remains on lasix and supplemental K. 8/22 Would rather decrease lasix, however with pleural effusions will leave on for now    6. Neuro:  Has neuropathy in hands mainly and some in feet, neurontin    7. GI: no complaints. Recent transaminitis (unclear etiology), resolved today.        40 minutes spent on the date of the encounter doing chart review, review of test results, interpretation of tests, patient visit and documentation     Summary: Today I reviewed the results of his recent evaluation.  His kappa free light chain has fallen both in his blood and urine.  He does not meet specific criteria for partial response but clearly there is improvement.  The finalized bone marrow is pending.  He is tolerating the daratumumab well.  We discussed options and at this point given the signs of improvement, and is otherwise relatively stable course, I recommended that we continue the daratumumab with a plan for following his labs for signs of ongoing response.  I does not discussed with him the pros and cons of possible retreatment and he expressed an interest in being retreated given the signs of improvement.  I pointed out that we are currently working on the necessary steps to be able to provide that.  For now we will simply continue the daratumumab.  We also discussed the use of Abecma.  I reiterated my concern about using this approach given the high risk of significant cytokine release syndrome, and his significant cardiovascular comorbidity.  We will continue this conversation.    Plan:  - continue dimple  - stop levaquin  - restart xarelto  - continue to monitor hgb and transfuse for Hgb 8 or below    FABIENNE VALDIVIA MD  August 26, 2022

## 2022-08-25 NOTE — TELEPHONE ENCOUNTER
"Last Written Prescription Date:  8/19/2021  Last Fill Quantity: 90,  # refills: 3   Last office visit provider:  6/6/22     Requested Prescriptions   Pending Prescriptions Disp Refills     omeprazole (PRILOSEC) 20 MG DR capsule 90 capsule 3     Sig: TAKE 1 CAPSULE BY MOUTH ONCE DAILY       PPI Protocol Passed - 8/25/2022 10:59 AM        Passed - Not on Clopidogrel (unless Pantoprazole ordered)        Passed - No diagnosis of osteoporosis on record        Passed - Recent (12 mo) or future (30 days) visit within the authorizing provider's specialty     Patient has had an office visit with the authorizing provider or a provider within the authorizing providers department within the previous 12 mos or has a future within next 30 days. See \"Patient Info\" tab in inbasket, or \"Choose Columns\" in Meds & Orders section of the refill encounter.              Passed - Medication is active on med list        Passed - Patient is age 18 or older             Chloe Tristan RN 08/25/22 10:59 AM  "

## 2022-08-25 NOTE — NURSING NOTE
"Oncology Rooming Note    August 25, 2022 4:09 PM   Theo Meyers is a 77 year old male who presents for:    Chief Complaint   Patient presents with     Port Draw     Labs drawn via port by RN. Vitals taken.     Oncology Clinic Visit     Hx MM here for provider visit     Initial Vitals: BP (!) 154/80 (BP Location: Right arm)   Pulse 75   Temp 97.5  F (36.4  C) (Oral)   Resp 16   Wt 72.6 kg (160 lb)   SpO2 97%   BMI 23.83 kg/m   Estimated body mass index is 23.83 kg/m  as calculated from the following:    Height as of 7/26/22: 1.745 m (5' 8.7\").    Weight as of this encounter: 72.6 kg (160 lb). Body surface area is 1.88 meters squared.  Mild Pain (3) Comment: pt states not new, MD aware   No LMP for male patient.  Allergies reviewed: Yes  Medications reviewed: Yes    Medications: MEDICATION REFILLS NEEDED TODAY. Provider was notified.  Pharmacy name entered into [employer]:    [id number] - [place], [place] - [address] AT [facility] OF [place] & [address]  [facility] - [place], [place] - [address] [facility] - [place], [place] - [address] [facility] [address]    Clinical concerns: Patient needs next pentamidine scheduled. He also is wondering if he should continue taking levaquin- if so, he will need a refill.      Brynn Gunderson RN                                "

## 2022-08-25 NOTE — PROGRESS NOTES
Infusion Nursing Note:  Theo Meyers presents today for scheduled infusion.    Patient seen by provider today: Yes: Freida   present during visit today: Not Applicable.    Note: Patient received one unit RBC per blood plan. Patient also received darzalex faspro subcutaneous injection. Patient was premedicated with tylenol, benadryl and medrol. No reaction noted; stable upon discharge.    Intravenous Access:  Implanted Port.    Treatment Conditions:  Results reviewed, labs MET treatment parameters, ok to proceed with treatment.    Post Infusion Assessment:  Patient tolerated infusion without incident.     Discharge Plan:   Patient and/or family verbalized understanding of discharge instructions and all questions answered.      Brynn Gunderson RN

## 2022-08-25 NOTE — LETTER
8/25/2022         RE: Theo Meyers  [address]  [place] [address]        Dear Colleague,    Thank you for referring your patient, Theo Meyers, to the [facility] [place]. Please see a copy of my visit note below.    Infusion Nursing Note:  Theo Meyers presents today for scheduled infusion.    Patient seen by provider today: Yes: Freida   present during visit today: Not Applicable.    Note: Patient received one unit RBC per blood plan. Patient also received darzalex faspro subcutaneous injection. Patient was premedicated with tylenol, benadryl and medrol. No reaction noted; stable upon discharge.    Intravenous Access:  Implanted Port.    Treatment Conditions:  Results reviewed, labs MET treatment parameters, ok to proceed with treatment.    Post Infusion Assessment:  Patient tolerated infusion without incident.     Discharge Plan:   Patient and/or family verbalized understanding of discharge instructions and all questions answered.      Brynn Gunderson RN        Again, thank you for allowing me to participate in the care of your patient.        Sincerely,        [facility]

## 2022-08-25 NOTE — LETTER
8/25/2022         RE: Theo Meyers  [address]  [place] [place] [address]        Dear Colleague,    Thank you for referring your patient, Thoe Meyers, to the [facility]ARR[facility]ANSPL[facility] [place]. Please see a copy of my visit note below.    BMT/Cellular Therapy Clinic Progress Note    MT 2020-23  Mr Theo Meyers is a 77 year old male who is s/p  as Monotherapy in Combination with Monoclonal Antibodies in Relapsed/Refractory Multiple Myeloma     CC: chest pain with exertion       HPI: Theo is now 1 month following receipt of lymph depleting chemotherapy and  NK cells.  From his point of view the treatment is gone quite smoothly.  He has had no significant adverse effects, no dose-limiting toxicities, and he continues to tolerate the daratumumab well.  He does continue to have chronic problems of dyspnea on exertion that he feels is worse since beginning chemotherapy.  The worsening of his dyspnea on exertion parallels his development of anemia and pancytopenia.  He is requiring intermittent transfusions.  Today, he has no new symptoms to report, no fevers, chills, episodes of confusion, change in bowel habits, no nausea, vomiting, or skin rash.  He continues to take his medications without difficulty.  He has no new pain to report.       ROS:     8 point ROS otherwise negative     PHYSICAL EXAM                                                                                                                                      Wt Readings from Last 4 Encounters:   08/25/22 72.6 kg (160 lb)   08/22/22 73.3 kg (161 lb 11.2 oz)   08/18/22 72.5 kg (159 lb 12.8 oz)   08/15/22 72.3 kg (159 lb 4.8 oz)     VSS, HR 60s, regular BP, oxygenation and temp  ECOG 1  General: NAD  HEENT: sclera anicteric ; OP moist without lesions  CV: HR ~60s irregular consistent with atrial fibrillation  Lungs: CTAB   Lymphatics: no edema   Skin: No rash but thin skin with bruises on his  arms  Neuro: non-focal  Access: Port a cath: right.     Labs     Lab Results   Component Value Date    WBC 1.5 (L) 08/25/2022    ANEU 1.0 (L) 08/25/2022    HGB 8.0 (L) 08/25/2022    HCT 23.9 (L) 08/25/2022    PLT 55 (L) 08/25/2022     08/25/2022    POTASSIUM 4.7 08/25/2022    CHLORIDE 103 08/25/2022    CO2 29 08/25/2022    GLC 85 08/25/2022    BUN 26 08/25/2022    CR 1.37 (H) 08/25/2022    MAG 2.1 08/25/2022    INR 1.15 06/23/2022    BILITOTAL 0.4 08/22/2022    AST 50 (H) 08/22/2022    ALT 97 (H) 08/22/2022    ALKPHOS 110 08/22/2022    PROTTOTAL 5.4 (L) 08/22/2022    ALBUMIN 3.0 (L) 08/22/2022       I have assessed all abnormal lab values for their clinical significance and any values considered clinically significant have been addressed in the assessment and plan.        Assessment Plans:     Keith Meyers is a 77 year old man Day +24 s/p  as Monotherapy in Relapsed/Refractory MM and in Combination with Monoclonal Antibodies in Relapsed/Refractory Multiple Myeloma     1. Cellular Therapy: Relapsed refractory Multiple myeloma    Day-5(7/20)  Begin Flu/CY: LD chemo  Day-4:( 7/21) Flu/CY/dimple: LD chemo; IH pentam  Day -3: 7/22)  Flu/CY: LD chemo, covid test    Day 1: ( 7/25/2022)  PICC placement and infusion of   Remains on allopurinol  (decrease dose due to LATANYA on CKI)- ok to stop when runs out  ICE 10/10     2. Heme:  #pancytopenia 2/2 chemo. WBC recovering; plts improved but still <50k.  Transfuse for hgb 8 or below.  #History of blood clots, chronic and stroke prophy for atrial fib. Holding Xarelto now with thrombocytopenia (x7/25); Resume Xarelto when pts stable >50k.    3. ID: afebrile.  - prophy Levo (started 7/26 w/ neutropenia), ACV, IV Pentamidine (7/22). He reports sob day after Pentam. *8/11: realized pt is not on antifungal. With WBC rising did not start. Improvement of ANC with gcsf 8/19, ANC 1.0, fluconazole deferred again.  Bactrim held d/t acute on CKD.  I recommended he discontinue  Levaquin today    4. FEN:    Remains on supplemental potassium   Lasix 40mg PO daily at home      5. CV: In a fib today, and with ongoing dyspnea on exertion.  This latter problem is certainly due to a combination of pulmonary hypertension, valvular dysfunction, and anemia that is worse after receiving chemotherapy.  Today he has been transfused and will continue to support him.  His platelets are improving and hopefully will see a rise in his hemoglobin soon.  - Bradycardia  - history of atrial fib and heart failure  Remains on lasix and supplemental K. 8/22 Would rather decrease lasix, however with pleural effusions will leave on for now    6. Neuro:  Has neuropathy in hands mainly and some in feet, neurontin    7. GI: no complaints. Recent transaminitis (unclear etiology), resolved today.        40 minutes spent on the date of the encounter doing chart review, review of test results, interpretation of tests, patient visit and documentation     Summary: Today I reviewed the results of his recent evaluation.  His kappa free light chain has fallen both in his blood and urine.  He does not meet specific criteria for partial response but clearly there is improvement.  The finalized bone marrow is pending.  He is tolerating the daratumumab well.  We discussed options and at this point given the signs of improvement, and is otherwise relatively stable course, I recommended that we continue the daratumumab with a plan for following his labs for signs of ongoing response.  I does not discussed with him the pros and cons of possible retreatment and he expressed an interest in being retreated given the signs of improvement.  I pointed out that we are currently working on the necessary steps to be able to provide that.  For now we will simply continue the daratumumab.  We also discussed the use of Abecma.  I reiterated my concern about using this approach given the high risk of significant cytokine release syndrome, and his  significant cardiovascular comorbidity.  We will continue this conversation.    Plan:  - continue dimple  - stop levaquin  - restart xarelto  - continue to monitor hgb and transfuse for Hgb 8 or below            Again, thank you for allowing me to participate in the care of your patient.      Sincerely,    FABIENNE VALDIVIA MD

## 2022-08-25 NOTE — LETTER
Date:August 26, 2022      Provider requested that no letter be sent. Do not send.       [facility]

## 2022-08-25 NOTE — NURSING NOTE
"Chief Complaint   Patient presents with     Port Draw     Labs drawn via port by RN. Vitals taken.     Port accessed with 20G 3/4\" flat needle by RN, labs collected, line flushed with saline and heparin.  Vitals taken. Pt checked in for appointment(s).    Ashley Villeda RN    "

## 2022-08-25 NOTE — TELEPHONE ENCOUNTER
"Last Written Prescription Date:  8/17/2021  Last Fill Quantity: 90,  # refills: 1   Last office visit provider:  6/6/22     Requested Prescriptions   Pending Prescriptions Disp Refills     tamsulosin (FLOMAX) 0.4 MG capsule 90 capsule 1     Sig: TAKE 1 CAPSULE BY MOUTH EVERY DAY       Alpha Blockers Passed - 8/25/2022 11:01 AM        Passed - Blood pressure under 140/90 in past 12 months     BP Readings from Last 3 Encounters:   08/22/22 127/74   08/18/22 120/72   08/18/22 118/64                 Passed - Recent (12 mo) or future (30 days) visit within the authorizing provider's specialty     Patient has had an office visit with the authorizing provider or a provider within the authorizing providers department within the previous 12 mos or has a future within next 30 days. See \"Patient Info\" tab in inbasket, or \"Choose Columns\" in Meds & Orders section of the refill encounter.              Passed - Patient does not have Tadalafil, Vardenafil, or Sildenafil on their medication list        Passed - Medication is active on med list        Passed - Patient is 18 years of age or older             Chloe Tristan RN 08/25/22 11:01 AM  "

## 2022-09-01 NOTE — PROGRESS NOTES
Infusion Nursing Note:  Theo Meyers presents today for scheduled injection.    Patient seen by provider today: Yes: Meg   present during visit today: Not Applicable.    Note: Patient received daratumumab injection per treatment plan. Patient was premedicated with tylenol, benadryl, and solu-medrol; no reaction noted. PRN neupogen injection given per therapy plan.    Intravenous Access:  Implanted Port.    Treatment Conditions:  Results reviewed, labs MET treatment parameters, ok to proceed with treatment.    Post Infusion Assessment:  Patient tolerated injection without incident.     Discharge Plan:   Patient and/or family verbalized understanding of discharge instructions and all questions answered.      Brynn Gunderson RN

## 2022-09-01 NOTE — NURSING NOTE
"Oncology Rooming Note    September 1, 2022 10:14 AM   Theo Meyers is a 77 year old male who presents for:    Chief Complaint   Patient presents with     Oncology Clinic Visit     Multiple myeloma in relapse (H) (Primary Dx)     Initial Vitals: /76   Pulse 53   Temp (!) 96  F (35.6  C) (Tympanic)   Resp 16   Wt 70.6 kg (155 lb 9.6 oz)   SpO2 100%   BMI 23.18 kg/m   Estimated body mass index is 23.18 kg/m  as calculated from the following:    Height as of 7/26/22: 1.745 m (5' 8.7\").    Weight as of this encounter: 70.6 kg (155 lb 9.6 oz). Body surface area is 1.85 meters squared.  No Pain (0) Comment: Data Unavailable   No LMP for male patient.  Allergies reviewed: Yes  Medications reviewed: Yes    Medications: Medication refills not needed today.  Pharmacy name entered into web care [employer]:    [id number] - [place], [place] - [address] [facility] [address] [place]  [facility] - [place], [place] - [address] [facility] - [place], [place] - [address] [facility] [address]    Clinical concerns:     Pt has stopped taking Xarelto because he got a huge black eye.    Douglas Elizondo            "

## 2022-09-01 NOTE — PROGRESS NOTES
BMT/Cellular Therapy Clinic Progress Note    MT 2020-23  Mr Theo Meyers is a 77 year old male who is s/p  as Monotherapy in Combination with Monoclonal Antibodies in Relapsed/Refractory Multiple Myeloma. Here for daratumumab       HPI: Seen for follow up and weekly daratumumab infusion. Recently restarted xarelto and now has a spontaneous black eye. No trauma. This happened several times in the past when he has been on blood thinners. He denies any other bleeding. No vision changes in his left eye.    Chest pain and dyspnea with exertion are much better. Able to walk with dogs ~ 2 miles.     No infectious symptoms. No GI symptoms. No cardiac symptoms (he remains in afib but is asymptomatic)    ROS:     8 point ROS otherwise negative     PHYSICAL EXAM                                                                                                                                      Wt Readings from Last 4 Encounters:   09/01/22 70.6 kg (155 lb 9.6 oz)   08/25/22 72.6 kg (160 lb)   08/22/22 73.3 kg (161 lb 11.2 oz)   08/18/22 72.5 kg (159 lb 12.8 oz)     /76   Pulse 53   Temp (!) 96  F (35.6  C) (Tympanic)   Resp 16   Wt 70.6 kg (155 lb 9.6 oz)   SpO2 100%   BMI 23.18 kg/m      ECOG 1  General: NAD  HEENT: Echymosses around left orbit. sclera anicteric; OP moist without lesions  CV: HR ~60s irregular consistent with atrial fibrillation  Lungs: CTAB   Lymphatics: no edema   Skin: No rash but thin skin with bruises on his arms  Neuro: non-focal  Access: Port a cath: right.     Labs     Lab Results   Component Value Date    WBC 1.5 (L) 08/25/2022    ANEU 1.0 (L) 08/25/2022    HGB 8.0 (L) 08/25/2022    HCT 23.9 (L) 08/25/2022    PLT 55 (L) 08/25/2022     08/25/2022    POTASSIUM 4.7 08/25/2022    CHLORIDE 103 08/25/2022    CO2 29 08/25/2022    GLC 85 08/25/2022    BUN 26 08/25/2022    CR 1.37 (H) 08/25/2022    MAG 2.1 08/25/2022    INR 1.15 06/23/2022    BILITOTAL 0.4 08/22/2022    AST 50 (H)  08/22/2022    ALT 97 (H) 08/22/2022    ALKPHOS 110 08/22/2022    PROTTOTAL 5.4 (L) 08/22/2022    ALBUMIN 3.0 (L) 08/22/2022       I have assessed all abnormal lab values for their clinical significance and any values considered clinically significant have been addressed in the assessment and plan.        Assessment Plans:     Keith Meyers is a 77 year old man Day +38 s/p  as Monotherapy in Relapsed/Refractory MM and in Combination with Monoclonal Antibodies in Relapsed/Refractory Multiple Myeloma     1. Cellular Therapy: Relapsed refractory Multiple myeloma    Day-5(7/20)  Begin Flu/CY: LD chemo  Day-4:( 7/21) Flu/CY/dimple: LD chemo; IH pentam  Day -3: 7/22)  Flu/CY: LD chemo, covid test    Day 1: ( 7/25/2022)  PICC placement and infusion of   Remains on allopurinol  (decrease dose due to LATANYA on [facility])- ok to stop when runs out  ICE 10/10     2. Heme:  #pancytopenia 2/2 chemo. WBC lower and ANC 0.6. give GCSF 9/1  Transfuse for hgb 8 or below. No RBCs 9/1  #History of blood clots, chronic and stroke prophy for atrial fib. Holding Xarelto now with thrombocytopenia (x7/25); Resumed Xarelto last week - now has a spontaneous black eye. Plts are better than last week at 67k. He will stop taking for one week and re-evaluate if plts are better at next visit and bruise improved could resume. Seems his plts are not dropping while on weekly dimple    3. ID: afebrile.  - prophy Levo (started 9/1 w/ neutropenia), ACV, fluc (9/1 start for neutropenia) IV Pentamidine (7/22). He reports sob day after Pentam. Bactrim held d/t acute on CKD. Will request pentamidine for next visit 9/8    4. FEN:    Remains on supplemental potassium   Lasix 40mg PO daily at home      5. CV: In a fiband with ongoing dyspnea on exertion.  This latter problem is certainly due to a combination of pulmonary hypertension, valvular dysfunction, and anemia that is worse after receiving chemotherapy. Much better 9/1 with improvement in Hgb  - history  of atrial fib and heart failure  Remains on lasix and supplemental K. 8/22 Would rather decrease lasix, however with pleural effusions will leave on for now    6. Neuro:  Has neuropathy in hands mainly and some in feet, neurontin    7. GI: no complaints. Recent transaminitis (unclear etiology), resolved today.      40 minutes spent on the date of the encounter doing chart review, review of test results, interpretation of tests, patient visit and documentation     Plan:  - continue dimple   - give GCSF  - restart leva and fluc prophy (LFTs normal today)  - request pentamidine for 9/8  - request next dimple 9/22 (plan is q2 weeks x 8 doses, then monthly thereafter  - possibly may retreat on  end of Sept or early October - waiting to hear  - hold xarelto for one week and reassess next week  - continue to monitor hgb and transfuse for Hgb 8 or below    Meg Corona PA-C  [phone]

## 2022-09-01 NOTE — NURSING NOTE
Chief Complaint   Patient presents with     Oncology Clinic Visit     Multiple myeloma in relapse (H) (Primary Dx)     Port Draw     Port accessed with 20g gripper needle by RN, labs collected, line flushed with saline and heparin.  Vitals taken. Pt checked in for appointment(s).      Natalie SINGER RN PHN BSN  BMT/Oncology Lab

## 2022-09-07 NOTE — PROGRESS NOTES
BMT/Cellular Therapy Clinic Progress Note    MT 2020-23  Mr Theo Meyers is a 77 year old male who is s/p  as Monotherapy in Combination with Monoclonal Antibodies in Relapsed/Refractory Multiple Myeloma. Here for daratumumab       HPI: Seen for follow up and daratumumab subcutaneous injection. Black eye resolving.  No new bleeding.  SOB much better overall.  Continues on his lasix.  Still walking dogs ~ 2 miles at a time.     No fever, cough. No GI symptoms. No cardiac symptoms (he remains in afib but is asymptomatic)    ROS:     8 point ROS otherwise negative     PHYSICAL EXAM                                                                                                                                      Wt Readings from Last 4 Encounters:   09/08/22 69.9 kg (154 lb 1.6 oz)   09/01/22 70.6 kg (155 lb 9.6 oz)   08/25/22 72.6 kg (160 lb)   08/22/22 73.3 kg (161 lb 11.2 oz)     /64 (Patient Position: Standing)   Pulse 66   Temp 97.4  F (36.3  C) (Oral)   Resp 16   Wt 69.9 kg (154 lb 1.6 oz)   SpO2 100%   BMI 22.96 kg/m      ECOG 1  General: NAD  HEENT: Echymosses around left orbit resolving. sclera anicteric; OP moist without lesions  CV: HR ~60s irregular consistent with atrial fibrillation  Lungs: CTAB   Lymphatics: no edema   Skin: No rash but thin skin with bruises on his arms and on his thigh  Neuro: non-focal  Access: Port a cath: right.     Labs     Lab Results   Component Value Date    WBC 1.2 (L) 09/01/2022    ANEU 1.0 (L) 08/25/2022    HGB 9.8 (L) 09/01/2022    HCT 29.7 (L) 09/01/2022    PLT 67 (L) 09/01/2022     09/01/2022    POTASSIUM 4.7 09/01/2022    CHLORIDE 102 09/01/2022    CO2 25 09/01/2022     (H) 09/01/2022    BUN 26.9 (H) 09/01/2022    CR 1.24 (H) 09/01/2022    MAG 2.1 08/25/2022    INR 1.15 06/23/2022    BILITOTAL 0.3 09/01/2022    AST 20 09/01/2022    ALT 23 09/01/2022    ALKPHOS 74 09/01/2022    PROTTOTAL 4.7 (L) 09/01/2022    ALBUMIN 3.4 (L) 09/01/2022        I have assessed all abnormal lab values for their clinical significance and any values considered clinically significant have been addressed in the assessment and plan.        Assessment Plans:     Keith Meyers is a 77 year old man Day +45 s/p  as Monotherapy in Relapsed/Refractory MM and in Combination with Monoclonal Antibodies in Relapsed/Refractory Multiple Myeloma     1. Cellular Therapy: Relapsed refractory Multiple myeloma    Day-5(7/20)  Begin Flu/CY: LD chemo  Day-4:( 7/21) Flu/CY/dimple: LD chemo; IH pentam  Day -3: 7/22)  Flu/CY: LD chemo, covid test    Day 1: ( 7/25/2022)  PICC placement and infusion of   Remains on allopurinol  (decrease dose due to LATANYA on [facility])- ok to stop when runs out    2. Heme:  #pancytopenia 2/2 chemo. WBC lower and ANC 0.3. give GCSF 9/8  Transfuse for hgb 8 or below. No RBCs 9/8  #History of blood clots, chronic and stroke prophy for atrial fib. Holding Xarelto now with thrombocytopenia (x7/25); Xarelto held last week due to bleeding (spontaneous black eye). Plt today down to 56k.  Will continue to hold.    3. ID: afebrile.  - prophy Levo (started 9/1 w/ neutropenia), ACV, fluc (9/1 start for neutropenia) IV Pentamidine (7/22)--next 9/13.  He reports sob day after Pentam. Bactrim held d/t acute on CKD. Will request pentamidine to be changed to 9/15 (next appt time)    4. FEN:    Remains on supplemental potassium   Lasix 40mg PO daily at home      5. CV: In a fiband with ongoing dyspnea on exertion.  This latter problem is certainly due to a combination of pulmonary hypertension, valvular dysfunction, and anemia that is worse after receiving chemotherapy. Much better 9/1 with improvement in Hgb  - history of atrial fib and heart failure  Remains on lasix and supplemental K.   LATANYA, will give some gentle IVF today    6. Neuro:  Has neuropathy in hands mainly and some in feet, neurontin    7. GI: no complaints. Recent transaminitis (unclear etiology), resolved  today.      40 minutes spent on the date of the encounter doing chart review, review of test results, interpretation of tests, patient visit and documentation     Plan:  - continue dimple as planned, next dose 9/22  - give GCSF  - request pentamidine for 9/15    - request next dimple 9/22 (plan is q2 weeks x 8 doses, then monthly thereafter)  - possibly may retreat on  end of Sept or early October - waiting to hear  - continue to hold xarelto for one week and reassess next week  - continue to monitor hgb and transfuse for Hgb 8 or below  - neutropenic precautions given    Keyanna Denis pa-c  [phone]

## 2022-09-08 NOTE — PROGRESS NOTES
Infusion Nursing Note:  Theo HURT Jasbircuauhtemocchris presents today for daratumumab, IV fluids, and filgrastim..    Patient seen by provider today: Yes: Keyanna Denis   present during visit today: Not Applicable.    Note:   Pt received premeds of 650 mg tylenol, 25 mg benadryl, and 62.5 mg solu-medrol one hour before darzalex faspro.    Pt received 1800 mg darzalex faspro by subcutaneous route to right lower abdomen.    Pt received 300 mcg filgrastim by subcutaneous route to left lower abdomen for an ANC of 0.3    Pt received 500 ml 0.9NS over 30 minutes for a creatinine of 1.63    Intravenous Access:  Implanted Port.    Treatment Conditions:  Lab Results   Component Value Date    HGB 9.3 (L) 09/08/2022    WBC 0.9 (LL) 09/08/2022    ANEU 0.3 (LL) 09/08/2022    ANEUTAUTO 0.6 (L) 09/01/2022    PLT 56 (L) 09/08/2022      Lab Results   Component Value Date     09/08/2022    POTASSIUM 4.4 09/08/2022    MAG 1.9 09/08/2022    CR 1.63 (H) 09/08/2022    NORMAN 8.7 (L) 09/08/2022    BILITOTAL 0.3 09/08/2022    ALBUMIN 3.3 (L) 09/08/2022    ALT 22 09/08/2022    AST 20 09/08/2022       Post Infusion Assessment:  Patient tolerated infusion without incident.  Patient tolerated injection without incident.  Blood return noted pre and post infusion.  Site patent and intact, free from redness, edema or discomfort.  No evidence of extravasations.  Access discontinued per protocol.     Discharge Plan:   Discharge instructions reviewed with: Patient and wife.  Patient discharged in stable condition accompanied by: self and wife.  Departure Mode: Ambulatory.      Beth Pastrana RN

## 2022-09-08 NOTE — LETTER
Date:September 9, 2022      Provider requested that no letter be sent. Do not send.       [facility]

## 2022-09-08 NOTE — LETTER
9/8/2022         RE: Theo Meyers  [address]  [place] [address]        Dear Colleague,    Thank you for referring your patient, Theo Meyers, to the [facility] BLOOD AND [facility]ARR[facility]ANSPLANT PRO[facility] [place]. Please see a copy of my visit note below.    Infusion Nursing Note:  Theo Meyers presents today for daratumumab, IV fluids, and filgrastim..    Patient seen by provider today: Yes: Keyanna Denis   present during visit today: Not Applicable.    Note:   Pt received premeds of 650 mg tylenol, 25 mg benadryl, and 62.5 mg solu-medrol one hour before darzalex faspro.    Pt received 1800 mg darzalex faspro by subcutaneous route to right lower abdomen.    Pt received 300 mcg filgrastim by subcutaneous route to left lower abdomen for an ANC of 0.3    Pt received 500 ml 0.9NS over 30 minutes for a creatinine of 1.63    Intravenous Access:  Implanted Port.    Treatment Conditions:  Lab Results   Component Value Date    HGB 9.3 (L) 09/08/2022    WBC 0.9 (LL) 09/08/2022    ANEU 0.3 (LL) 09/08/2022    ANEUTAUTO 0.6 (L) 09/01/2022    PLT 56 (L) 09/08/2022      Lab Results   Component Value Date     09/08/2022    POTASSIUM 4.4 09/08/2022    MAG 1.9 09/08/2022    CR 1.63 (H) 09/08/2022    NORMAN 8.7 (L) 09/08/2022    BILITOTAL 0.3 09/08/2022    ALBUMIN 3.3 (L) 09/08/2022    ALT 22 09/08/2022    AST 20 09/08/2022       Post Infusion Assessment:  Patient tolerated infusion without incident.  Patient tolerated injection without incident.  Blood return noted pre and post infusion.  Site patent and intact, free from redness, edema or discomfort.  No evidence of extravasations.  Access discontinued per protocol.     Discharge Plan:   Discharge instructions reviewed with: Patient and wife.  Patient discharged in stable condition accompanied by: self and wife.  Departure Mode: Ambulatory.      Beth Pastrana RN                          Again, thank you for allowing me to participate  in the care of your patient.        Sincerely,        [facility]

## 2022-09-08 NOTE — NURSING NOTE
Chief Complaint   Patient presents with     Chemotherapy     Scheduled subcutaneous daratumumab, add on IV fluids, g-csf.  History of multiple myeloma.     Phuong Pastrana RN

## 2022-09-08 NOTE — NURSING NOTE
"Chief Complaint   Patient presents with     Port Draw     Labs drawn via port by RN in lab.  VS taken       Port accessed with 20 gauge 3/4\" gripper needle by RN, labs collected, line flushed with saline and heparin.  Vitals taken. Pt checked in for appointment(s).    Jana Carter RN    "

## 2022-09-08 NOTE — NURSING NOTE
"Oncology Rooming Note    September 8, 2022 7:46 AM   Theo Meyers is a 77 year old male who presents for:    Chief Complaint   Patient presents with     Port Draw     Labs drawn via port by RN in lab.  VS taken     Oncology Clinic Visit     Return clinic visit dx MM      Initial Vitals: /64 (Patient Position: Standing)   Pulse 66   Temp 97.4  F (36.3  C) (Oral)   Resp 16   Wt 69.9 kg (154 lb 1.6 oz)   SpO2 100%   BMI 22.96 kg/m   Estimated body mass index is 22.96 kg/m  as calculated from the following:    Height as of 7/26/22: 1.745 m (5' 8.7\").    Weight as of this encounter: 69.9 kg (154 lb 1.6 oz). Body surface area is 1.84 meters squared.  No Pain (0) Comment: Data Unavailable   No LMP for male patient.  Allergies reviewed: Yes  Medications reviewed: Yes    Medications: Medication refills not needed today.  Pharmacy name entered into [employer]:    [id number] [place], [place] - [address] [facility] [address] [facility]  [facility] - [place], [place] - [address] [facility] - [place], [place] - [address]    Clinical concerns: Feeling ok today. Has some medication questions for provider.    Haley Fu, RN              "

## 2022-09-08 NOTE — LETTER
9/8/2022         RE: Theo Meyers  [address]  [place] [address]        Dear Colleague,    Thank you for referring your patient, Theo Meyers, to the [facility] BLOOD AND MARROW TRANSPLANT PROGRAM [place]. Please see a copy of my visit note below.    BMT/Cellular Therapy Clinic Progress Note    MT 2020-23  Mr Theo Meyers is a 77 year old male who is s/p  as Monotherapy in Combination with Monoclonal Antibodies in Relapsed/Refractory Multiple Myeloma. Here for daratumumab     HPI: Seen for follow up and daratumumab subcutaneous injection. Black eye resolving.  No new bleeding.  SOB much better overall.  Continues on his lasix.  Still walking dogs ~ 2 miles at a time.     No fever, cough. No GI symptoms. No cardiac symptoms (he remains in afib but is asymptomatic)    ROS:     8 point ROS otherwise negative     PHYSICAL EXAM                                                                                                                                      Wt Readings from Last 4 Encounters:   09/08/22 69.9 kg (154 lb 1.6 oz)   09/01/22 70.6 kg (155 lb 9.6 oz)   08/25/22 72.6 kg (160 lb)   08/22/22 73.3 kg (161 lb 11.2 oz)     /64 (Patient Position: Standing)   Pulse 66   Temp 97.4  F (36.3  C) (Oral)   Resp 16   Wt 69.9 kg (154 lb 1.6 oz)   SpO2 100%   BMI 22.96 kg/m      ECOG 1  General: NAD  HEENT: Echymosses around left orbit resolving. sclera anicteric; OP moist without lesions  CV: HR ~60s irregular consistent with atrial fibrillation  Lungs: CTAB   Lymphatics: no edema   Skin: No rash but thin skin with bruises on his arms and on his thigh  Neuro: non-focal  Access: Port a cath: right.     Labs     Lab Results   Component Value Date    WBC 1.2 (L) 09/01/2022    ANEU 1.0 (L) 08/25/2022    HGB 9.8 (L) 09/01/2022    HCT 29.7 (L) 09/01/2022    PLT 67 (L) 09/01/2022     09/01/2022    POTASSIUM 4.7 09/01/2022    CHLORIDE 102 09/01/2022    CO2 25 09/01/2022      (H) 09/01/2022    BUN 26.9 (H) 09/01/2022    CR 1.24 (H) 09/01/2022    MAG 2.1 08/25/2022    INR 1.15 06/23/2022    BILITOTAL 0.3 09/01/2022    AST 20 09/01/2022    ALT 23 09/01/2022    ALKPHOS 74 09/01/2022    PROTTOTAL 4.7 (L) 09/01/2022    ALBUMIN 3.4 (L) 09/01/2022       I have assessed all abnormal lab values for their clinical significance and any values considered clinically significant have been addressed in the assessment and plan.        Assessment Plans:     Keith Meyers is a 77 year old man Day +45 s/p  as Monotherapy in Relapsed/Refractory MM and in Combination with Monoclonal Antibodies in Relapsed/Refractory Multiple Myeloma     1. Cellular Therapy: Relapsed refractory Multiple myeloma    Day-5(7/20)  Begin Flu/CY: LD chemo  Day-4:( 7/21) Flu/CY/dimple: LD chemo; IH pentam  Day -3: 7/22)  Flu/CY: LD chemo, covid test    Day 1: ( 7/25/2022)  PICC placement and infusion of   Remains on allopurinol  (decrease dose due to LATANYA on [facility])- ok to stop when runs out    2. Heme:  #pancytopenia 2/2 chemo. WBC lower and ANC 0.3. give GCSF 9/8  Transfuse for hgb 8 or below. No RBCs 9/8  #History of blood clots, chronic and stroke prophy for atrial fib. Holding Xarelto now with thrombocytopenia (x7/25); Xarelto held last week due to bleeding (spontaneous black eye). Plt today down to 56k.  Will continue to hold.    3. ID: afebrile.  - prophy Levo (started 9/1 w/ neutropenia), ACV, fluc (9/1 start for neutropenia) IV Pentamidine (7/22)--next 9/13.  He reports sob day after Pentam. Bactrim held d/t acute on CKD. Will request pentamidine to be changed to 9/15 (next appt time)    4. FEN:    Remains on supplemental potassium   Lasix 40mg PO daily at home      5. CV: In a fiband with ongoing dyspnea on exertion.  This latter problem is certainly due to a combination of pulmonary hypertension, valvular dysfunction, and anemia that is worse after receiving chemotherapy. Much better 9/1 with improvement in  Hgb  - history of atrial fib and heart failure  Remains on lasix and supplemental K.   LATANYA, will give some gentle IVF today    6. Neuro:  Has neuropathy in hands mainly and some in feet, neurontin    7. GI: no complaints. Recent transaminitis (unclear etiology), resolved today.    40 minutes spent on the date of the encounter doing chart review, review of test results, interpretation of tests, patient visit and documentation     Plan:  - continue dimple as planned, next dose 9/22  - give GCSF  - request pentamidine for 9/15    - request next dimple 9/22 (plan is q2 weeks x 8 doses, then monthly thereafter)  - possibly may retreat on  end of Sept or early October - waiting to hear  - continue to hold xarelto for one week and reassess next week  - continue to monitor hgb and transfuse for Hgb 8 or below  - neutropenic precautions given    Keyanna Denis pa-c  [phone]

## 2022-09-10 NOTE — TELEPHONE ENCOUNTER
Paged about chest pain and shortness of breath :    Theo Meyers is a 77 year old gentleman with refractory/relapsed multiple myeloma received  about a month ago.     Per his wife he has been having some exertional dyspnea and chest discomfort radiating to the shoulder since last Thursday it is on and off currently he is asymptomatic.  No palpitations or lightheadedness.  No fever or chills.    He has a history of coronary artery disease with atrial fibrillation and pulmonary hypertension.  His symptoms are most likely related to cardiac etiology and would recommend evaluation at a nearby hospital.    Updated Dr. Guan and he agrees with the plan.    Hill Sheikh MD  Hematology/Oncology/Transplant Fellow   [facility]   Pgr : [phone]

## 2022-09-10 NOTE — ED TRIAGE NOTES
Pt presents to the ED with c/o SOB. Pt states that since march, pt has episodes of SOB. Pt states that it happens upon exersion, and usually last roughly 15 minutes. Denies any hx of asthma or COPD. Hx of afib. Denies any CP.      Triage Assessment     Row Name 09/10/22 [id number]       Triage Assessment (Adult)    Airway WDL WDL       Respiratory WDL    Respiratory WDL WDL       Skin Circulation/Temperature WDL    Skin Circulation/Temperature WDL WDL       Cardiac WDL    Cardiac WDL WDL       Peripheral/Neurovascular WDL    Peripheral Neurovascular WDL WDL       Cognitive/Neuro/Behavioral WDL    Cognitive/Neuro/Behavioral WDL WDL

## 2022-09-10 NOTE — TELEPHONE ENCOUNTER
Paged by patient because he has been having worsening shortness of breath on exertion. Had to lay down on the ground while taking his dogs for a walk due to shortness of breath. Also had chest pain on Thursday while laying in bed. Advised pt to get to nearest ER. Pt stated [facility] will be closest.    Flavio Barboza MD  Cardiology Fellow

## 2022-09-11 NOTE — DISCHARGE INSTRUCTIONS
Your blood work today overall suggested mild fluid overload.  I recommend you increase your Lasix by 20mg tomorrow and Monday.    Go back to your normal dosing on Tuesday.    Call your cardiology team at [facility] on Monday for follow-up.  Limit your exertional activity.  Return for any worsening breathing or chest pain.

## 2022-09-11 NOTE — ED PROVIDER NOTES
"  Emergency Department Encounter         FINAL IMPRESSION:  Dyspnea on exertion        ED COURSE AND MEDICAL DECISION MAKING       ED Course as of 09/10/22 2120   Sat Sep 10, 2022   2011 Patient is a 77 male with extensive past medical history including multiple myeloma, hypertension, hyperlipidemia, here with 3 days of worsening dyspnea on exertion.  States abdomen is moderately exerts himself he has to stop and rest.  States he has difficulty walking in from her car into a restaurant without severe dyspnea.  To sit down today on a walk.  No chest discomfort.  No fevers chills nausea vomiting.  No abdominal pain but no leg swelling.  Arrival he looks well.  Vitals are stable.  States he is overall comfortable however if he exerts himself he will be uncomfortable.  Labs from triage showing a BNP elevated 689 with a troponin that is not negative at 0.14.  Concern for ACS versus CHF.  Does have a history of angiograms within the last few months that were negative.  Plan for CT PE and reevaluate.  Most likely admission.   Labs suggesting fluid overload.  BNP elevated.  CT scan normal.  Troponin mildly elevated however still below threshold for positivity.  Patient endorses that he feels fine as long as he does not moderately to severely exert himself.  States walk around the house and doing daily chores he has no symptoms however if he starts walking which she states \"I walk 15-minute miles.\"  He starts getting short of breath.  We discussed admission and patient's family as well as patient declined and stated he would rather go home.  I think at this point is reasonable considering his blood test.  We will increase his Lasix for the next couple days to help with the BNP elevation.  His vitals are stable here.  -Hemoglobin 9.3.  Plan for close outpatient and discharged home      9:20 PM rechecked and updated patient.  At the conclusion of the encounter I discussed the results of all the tests and the disposition. The " questions were answered. The patient or family acknowledged understanding and was agreeable with the care plan.      MEDICATIONS GIVEN IN THE EMERGENCY DEPARTMENT:  Medications   iopamidol (ISOVUE-370) solution 100 mL (75 mLs Intravenous Given 9/10/22 2033)       NEW PRESCRIPTIONS STARTED AT TODAY'S ED VISIT:  New Prescriptions    No medications on file       HPI     Patient information obtained from: patient     Use of Interpretor: N/A    Theo Meyers is a 77 year old male with a pertinent history of Afib, former tobacco use, para pneumonic effusion, pancytopenia, blood clots, syncope, pneumonia, s/p right heart catheterization (6/23/22) and multiple myeloma who presents to this ED by walk in escorted by family member for evaluation of shortness of breath. Last Thursday, patient starting experiencing increasing shortness of breath with mild to moderate exertion. Patient was seen here in February/March for pneumonia and stayed in the hospital for 5 days, and he says, since then, he has continued generalized shortness of breath.    Patient usually takes his dogs for a 2 mile walk, but for the last few days, he has become short of breath on his walks. Last night, he walked into a restaurant and had to sit down because he was gasping. Today, he was 1.75 miles into his 2 mile walk, and he had to stop to sit down in the grass. Two ladies saw and called his wife. He attempted to walk one more block, but he again ran out of breath. His wife picked him up and drove him 2 more blocks to their home. Patient says in the past, he usually gets a noticeable chest pain before getting winded; however, these past few episodes, he has not had any chest pain.    Patient sees Dr. Peters, cardiologist at the [facility]. He has catheterized his heart bilaterally. His last catheterization was at the end of June. Patient says he had a PET scan done less than one month ago, and they found pleural effusion around his  right lung and a little around his left lung.    Patient denies fever, vomiting, diarrhea, and abdominal pain. He endorses a little leg swelling.    Patient has been off of Xarelto since 7/25.      REVIEW OF SYSTEMS:  Review of Systems   Constitutional: Negative for fever, malaise  HEENT: Negative runny nose, sore throat, ear pain, neck pain  Respiratory: Negative for cough, congestion. Positive for shortness of breath.  Cardiovascular: Negative for chest pain. Positive for mild leg edema.  Gastrointestinal: Negative for abdominal distention, abdominal pain, constipation, vomiting, nausea, diarrhea  Genitourinary: Negative for dysuria and hematuria.   Integument: Negative for rash, skin breakdown  Neurological: Negative for paresthesias, weakness, headache.  Musculoskeletal: Negative for joint pain, joint swelling      All other systems reviewed and are negative.          MEDICAL HISTORY     Past Medical History:   Diagnosis Date     Anemia 12/13/2017     Arthritis      Bilateral inguinal hernia      Chest tube in place      Elevated INR      Glaucoma      Glaucoma      History of blood clots      Multiple myeloma (H)      Pancytopenia (H)      Parapneumonic effusion      Peripheral neuropathy      Shingles 9/24/2014     Syncope      TMJ (temporomandibular joint disorder)        Past Surgical History:   Procedure Laterality Date     APPENDECTOMY      Age 16     CV CORONARY ANGIOGRAM N/A 5/20/2022    Procedure: Coronary Angiogram;  Surgeon: Christopher Bobo MD;  Location:  HEART CARDIAC CATH LAB     CV CORONARY ANGIOGRAM  5/20/2022    Procedure: ;  Surgeon: Christopher Bobo MD;  Location:  HEART CARDIAC CATH LAB     CV LEFT HEART CATH N/A 5/20/2022    Procedure: Left Heart Catheterization;  Surgeon: Christopher Bobo MD;  Location:  HEART CARDIAC CATH LAB     CV RIGHT HEART CATH MEASUREMENTS RECORDED N/A 5/20/2022    Procedure: Right Heart Catheterization;  Surgeon: Christopher Bobo MD;   Location:  HEART CARDIAC CATH LAB     CV RIGHT HEART CATH MEASUREMENTS RECORDED N/A 2022    Procedure: Heart Cath Right Heart Cath;  Surgeon: Jostin Yuan MD;  Location:  HEART CARDIAC CATH LAB     ESOPHAGOSCOPY, GASTROSCOPY, DUODENOSCOPY (EGD), COMBINED N/A 2017    Procedure: ESOPHAGOGASTRODUODENOSCOPY (EGD) WITH BIOPSYS;  Surgeon: Marlon Roy MD;  Location: [facility];  Service:      ESOPHAGOSCOPY, GASTROSCOPY, DUODENOSCOPY (EGD), COMBINED N/A 2018    Procedure: ENDOSCOPIC ULTRASOUND  ESOPHAGOGASTRODUODENOSCOPY WITH DUODENAL POLYP REMOVAL;  Surgeon: Familia Mcgraw MD;  Location: [facility];  Service:      EYE SURGERY      Cataract     INSERT PICC LINE Left 2022    Procedure: INSERTION, PICC;  Surgeon: Shabbir Bueno MD;  Location: [facility] [place]     IR MISCELLANEOUS PROCEDURE  2009     IR MISCELLANEOUS PROCEDURE  2009     IR MISCELLANEOUS PROCEDURE  2009     IR PICC PLACEMENT > 5 YRS OF AGE  2022     IR PLEURAL DRAINAGE WITH CATHETER INSERTION  2019     PORTACATH PLACEMENT       RELEASE CARPAL TUNNEL Bilateral      US THORACENTESIS  2019     VERTEBROPLASTY      T6     WISDOM TOOTH EXTRACTION         Social History     Tobacco Use     Smoking status: Former Smoker     Quit date: 1975     Years since quittin.8     Smokeless tobacco: Never Used   Substance Use Topics     Alcohol use: Not Currently     Comment: Alcoholic Drinks/day: occasional     Drug use: No       acetaminophen (TYLENOL) 500 MG tablet  acyclovir (ZOVIRAX) 400 MG tablet  ascorbic acid 1000 MG TABS tablet  atorvastatin (LIPITOR) 10 MG tablet  bimatoprost (LUMIGAN) 0.03 % ophthalmic solution  brimonidine (ALPHAGAN P) 0.1 % ophthalmic solution  calcium carbonate 1250 (500 Ca) MG CHEW  calcium carbonate-vitamin D (OYSTER SHELL CALCIUM/D) 500-200 MG-UNIT tablet  cetirizine (ZYRTEC) 10 MG tablet  fluconazole (DIFLUCAN) 200 MG tablet  fluticasone (FLONASE) 50  MCG/ACT nasal spray  furosemide (LASIX) 20 MG tablet  gabapentin (NEURONTIN) 300 MG capsule  levofloxacin (LEVAQUIN) 250 MG tablet  Magnesium Oxide 250 MG TABS  methylPREDNISolone (MEDROL) 4 MG tablet  Multiple Vitamin (MULTI-VITAMIN PO)  potassium chloride ER (KLOR-CON M) 20 MEQ CR tablet  Psyllium (METAMUCIL PO)  sulfamethoxazole-trimethoprim (BACTRIM) 400-80 MG tablet  tamsulosin (FLOMAX) 0.4 MG capsule  XARELTO ANTICOAGULANT 20 MG TABS tablet  heparin lock flush 10 UNIT/ML SOLN injection  omeprazole (PRILOSEC) 20 MG DR capsule            PHYSICAL EXAM     BP (!) 163/85   Pulse 72   Temp 97.4  F (36.3  C) (Temporal)   Resp 19   Wt 69.9 kg (154 lb)   SpO2 100%   BMI 22.94 kg/m        PHYSICAL EXAM:     General: Patient appears well, nontoxic, comfortable  HEENT: Moist mucous membranes,  No head trauma.  No midline neck pain.  Cardiovascular: Normal rate, normal rhythm, no extremity edema.  No appreciable murmur.  Respiratory: No signs of respiratory distress, lungs are clear to auscultation bilaterally with no wheezes rhonchi or rales.  Abdominal: Soft, nontender, nondistended, no palpable masses, no guarding, no rebound  Musculoskeletal: Full range of motion of joints, no deformities appreciated.  Neurological: Alert and oriented, grossly neurologically intact.  Psychological: Normal affect and mood.  Integument: No rashes appreciated    RESULTS       Labs Ordered and Resulted from Time of ED Arrival to Time of ED Departure   COMPREHENSIVE METABOLIC PANEL - Abnormal       Result Value    Sodium 136      Potassium 4.5      Chloride 103      Carbon Dioxide (CO2) 24      Anion Gap 9      Urea Nitrogen 37 (*)     Creatinine 1.41 (*)     Calcium 8.6      Glucose 121      Alkaline Phosphatase 69      AST 21      ALT 29      Protein Total 5.5 (*)     Albumin 3.5      Bilirubin Total 0.5      GFR Estimate 51 (*)    B-TYPE NATRIURETIC PEPTIDE ( EAST ONLY) - Abnormal     (*)    TROPONIN I - Normal     Troponin I 0.14     MAGNESIUM - Normal    Magnesium 2.2     INR - Normal    INR 0.91     CBC WITH PLATELETS AND DIFFERENTIAL       CT Chest Pulmonary Embolism w Contrast   Final Result   IMPRESSION:      1.  No acute pulmonary embolism.   2.  Calcific aortic stenosis. Minimal enlargement of the mid ascending aorta measuring 4 cm.   3.  Stable lytic lesion at the base of the right scapular spine consistent with known diagnosis of multiple myeloma.                        PROCEDURES:  Procedures:  Procedures       I, Ayse Diaz am serving as a scribe to document services personally performed by Quintin Talley DO, based on my observations and the provider's statements to me.  I, Quintin Talley DO, attest that Ayse Diaz is acting in a scribe capacity, has observed my performance of the services and has documented them in accordance with my direction.    Quintin Talley DO  Emergency Medicine  [facility] EMERGENCY ROOM     Quintin Talley DO  09/10/22 [id number]

## 2022-09-11 NOTE — PHARMACY-ADMISSION MEDICATION HISTORY
Pharmacy Note - Admission Medication History    Pertinent Provider Information: holding septra and Xarelto     ______________________________________________________________________    Prior To Admission (PTA) med list completed and updated in EMR.       PTA Med List   Medication Sig Last Dose     acetaminophen (TYLENOL) 500 MG tablet Take 500 mg by mouth every 8 hours as needed  Past Month at Unknown time     acyclovir (ZOVIRAX) 400 MG tablet Take 1 tablet (400 mg) by mouth 2 times daily Take on first day of daratumumab dose and continue for 3 months after treatment is complete. 9/10/2022 at Unknown time     ascorbic acid 1000 MG TABS tablet Take 1,000 mg by mouth daily Pt taking 1000 mg 9/10/2022 at Unknown time     atorvastatin (LIPITOR) 10 MG tablet Take 1 tablet (10 mg) by mouth daily 9/9/2022 at evening     bimatoprost (LUMIGAN) 0.03 % ophthalmic solution Place 1 drop into both eyes At Bedtime. 9/9/2022 at not with     brimonidine (ALPHAGAN P) 0.1 % ophthalmic solution Place 1 drop into both eyes every 8 hours. 9/10/2022 at not with     calcium carbonate 1250 (500 Ca) MG CHEW Take 1 tablet by mouth daily as needed (upset stomach) Past Month at Unknown time     calcium carbonate-vitamin D (OYSTER SHELL CALCIUM/D) 500-200 MG-UNIT tablet Take 1 tablet by mouth daily  9/10/2022 at Unknown time     cetirizine (ZYRTEC) 10 MG tablet Take 10 mg by mouth every evening 9/9/2022 at Unknown time     fluconazole (DIFLUCAN) 200 MG tablet Take 1 tablet (200 mg) by mouth daily 9/10/2022 at Unknown time     fluticasone (FLONASE) 50 MCG/ACT nasal spray Spray 2 sprays into both nostrils daily 9/10/2022 at not with     furosemide (LASIX) 20 MG tablet TAKE 2 TABLETS(40 MG) BY MOUTH DAILY 9/10/2022 at Unknown time     gabapentin (NEURONTIN) 300 MG capsule Take 1 capsule (300 mg) by mouth At Bedtime 9/9/2022 at Unknown time     levofloxacin (LEVAQUIN) 250 MG tablet Take 1 tablet (250 mg) by mouth daily 9/10/2022 at Unknown time      Magnesium Oxide 250 MG TABS Take 250 mg by mouth 2 times daily 9/10/2022 at Unknown time     methylPREDNISolone (MEDROL) 4 MG tablet Take 20mg (five 4mg tablets) by mouth daily for 2 days after daratumumab (Patient taking differently: Take 20mg (five 4mg tablets) by mouth daily for 2 days after daratumumab  Finished second day 9/10/22) 9/10/2022 at Unknown time     Multiple Vitamin (MULTI-VITAMIN PO) Take 1 capsule by mouth daily. 9/10/2022 at Unknown time     potassium chloride ER (KLOR-CON M) 20 MEQ CR tablet Take 1 tablet (20 mEq) by mouth four times a week On Saturday, Sunday, Tuesday, Thursday 9/10/2022 at Unknown time     Psyllium (METAMUCIL PO) Take by mouth daily USE AS DIRECTED 9/10/2022 at Unknown time     sulfamethoxazole-trimethoprim (BACTRIM) 400-80 MG tablet Hold for now      tamsulosin (FLOMAX) 0.4 MG capsule TAKE 1 CAPSULE BY MOUTH EVERY DAY 9/10/2022 at Unknown time     XARELTO ANTICOAGULANT 20 MG TABS tablet On HOLD        Information source(s): Patient, Family member and CareEverywhere/[facility]  Method of interview communication: in-person    Summary of Changes to PTA Med List      Patient was asked about OTC/herbal products specifically.  PTA med list reflects this.    No home meds with    The information provided in this note is only as accurate as the sources available at the time of the update(s).    Thank you for the opportunity to participate in the care of this patient.    Easton Clark RPH  9/10/2022 9:10 PM

## 2022-09-11 NOTE — ED NOTES
Discharge instructions reviewed with patient and spouse, verbalized understanding of instructions. Alert and oriented x 4, ambulatory with steady gait.

## 2022-09-15 NOTE — LETTER
9/15/2022         RE: Theo Meyers  [address]  [place] [address]        Dear Colleague,    Thank you for referring your patient, Theo Meyers, to the [facility] [place]. Please see a copy of my visit note below.    BMT/Cellular Therapy Clinic Progress Note    MT 2020-23  Mr Theo Meyers is a 77 year old male who is s/p  as Monotherapy in Combination with Monoclonal Antibodies in Relapsed/Refractory Multiple Myeloma. Here for daratumumab       HPI: Seen for follow up and daratumumab subcutaneous injection. Black eye resolving.  No new bleeding.  SOB seems owrse- tried to walk the dog Saturday at 1.75 miels more sob, Had to sit down on sidewalk. He rested about 15 minutes thought he could walk the 3 blocks home but after 1 block he was again very sob and mild chest aching more right than left. He has had significant SAMANO again since ER visit (recommended just lasix 20mg daily) has walked about a mile per day slower and no significant SAMANO but still present. Last night laying in bed had aching right sided chest pain. He can't say he thinks taking lasix has been helpful.     No fever, cough. No GI symptoms. No cardiac symptoms (he remains in afib but is asymptomatic)    ROS:     8 point ROS otherwise negative     PHYSICAL EXAM                                                                                                                                      Wt Readings from Last 4 Encounters:   09/15/22 70.1 kg (154 lb 9.6 oz)   09/10/22 69.9 kg (154 lb)   09/08/22 69.9 kg (154 lb 1.6 oz)   09/01/22 70.6 kg (155 lb 9.6 oz)     BP 99/69   Pulse 63   Temp 97.4  F (36.3  C) (Oral)   Resp 16   Wt 70.1 kg (154 lb 9.6 oz)   SpO2 100%   BMI 23.03 kg/m      ECOG 1  General: NAD  HEENT: Echymosses around left orbit resolving. sclera anicteric; OP moist without lesions  CV: HR ~60s irregular consistent with atrial fibrillation  Lungs: CTAB    Lymphatics: no edema   Skin: No rash but thin skin with bruises on his arms and on his thigh  Neuro: non-focal  Access: Port a cath: right.     Labs     Lab Results   Component Value Date    WBC 4.4 09/10/2022    ANEU 0.3 (LL) 09/08/2022    HGB 9.3 (L) 09/10/2022    HCT 28.0 (L) 09/10/2022    PLT 48 (LL) 09/10/2022     09/10/2022    POTASSIUM 4.5 09/10/2022    CHLORIDE 103 09/10/2022    CO2 24 09/10/2022     09/10/2022    BUN 37 (H) 09/10/2022    CR 1.41 (H) 09/10/2022    MAG 2.2 09/10/2022    INR 0.91 09/10/2022    BILITOTAL 0.5 09/10/2022    AST 21 09/10/2022    ALT 29 09/10/2022    ALKPHOS 69 09/10/2022    PROTTOTAL 5.5 (L) 09/10/2022    ALBUMIN 3.5 09/10/2022       I have assessed all abnormal lab values for their clinical significance and any values considered clinically significant have been addressed in the assessment and plan.        Assessment Plans:     Keith Meyers is a 77 year old man Day +45 s/p  as Monotherapy in Relapsed/Refractory MM and in Combination with Monoclonal Antibodies in Relapsed/Refractory Multiple Myeloma     1. Cellular Therapy: Relapsed refractory Multiple myeloma    Day-5(7/20)  Begin Flu/CY: LD chemo  Day-4:( 7/21) Flu/CY/dimple: LD chemo; IH pentam  Day -3: 7/22)  Flu/CY: LD chemo, covid test    Day 1: ( 7/25/2022)  PICC placement and infusion of   Remains on allopurinol  (decrease dose due to LATANYA on CKI)- ok to stop when runs out.    2. Heme:  #pancytopenia 2/2 chemo. WBC lower and ANC 0.3. give GCSF 9/8  Transfuse for hgb 8 or below. No RBCs 9/8  9/15: hgb 8.3- given SAMANO will add on type and screen today. Plan for rbcs tomorrow. Pt aware of tylebnol premed- will take 1hr before appt then benadryl to be ginve here  -WBC down 0.7- plan for gcsf tomorrow while here for RBCs.    #History of blood clots, chronic and stroke prophy for atrial fib. Holding Xarelto now with thrombocytopenia (x7/25); Xarelto held last week due to bleeding (spontaneous black eye).  Plt today down to 56k.  Will continue to hold.    3. ID: afebrile.  - prophy Levo (started 9/1 w/ neutropenia), ACV, fluc (9/1 start for neutropenia) IV Pentamidine (7/22)--next 9/13.  He reports sob day after Pentam. Bactrim held d/t acute on CKD. Will request pentamidine to be changed to 9/15 (next appt time).     4. FEN:    Remains on supplemental potassium   Lasix 40mg PO daily at home      5. CV: In a fiband with ongoing dyspnea on exertion.  This latter problem is certainly due to a combination of pulmonary hypertension, valvular dysfunction, and anemia that is worse after receiving chemotherapy. Much better 9/1 with improvement in Hgb  - history of atrial fib and heart failure  Remains on lasix and supplemental K.   - Repeated EKG - rate controlled afib. Discussed EKG and troponin I reported at 71 - mildly elevated. Discussed that without repeat tomorrow hard to interrpret. Given EKG stable from satruday and previous no over intervention. Will trial giving RBCS to see if helps SAMANO.   If recurrent SAMANO/Chest pain stressed with Keith he needs to go to ER for     6. Neuro:  Has neuropathy in hands mainly and some in feet, neurontin    7. GI: no complaints. Recent transaminitis (unclear etiology), resolved today.      40 minutes spent on the date of the encounter doing chart review, review of test results, interpretation of tests, patient visit and documentation     Plan:  - hgb 8.3- given SAMANO plan have rtc tomorrow for RBCS and given gcsf as missed gcsf today with cardiac work up and labs not released until 10:30am.   - given pentamidine for 9/15    - request next dimple 9/22 (this will be 9th dose since )  - Dr Guan 9/19 to discuss retreatment with  and if break from dimple would be warranted given increased SAMANO/intermittent chest pain).   - Cardiology follow up 9/27.   - IF recurrent SAMANO or SOB low threshold to return to ER for repeat Ekg/troponin and observation admission for serial troponins.   -  Discussed troponin/EKG and pt symptoms with oncall cardiology provider.   - continue to hold xarelto for one week and reassess next week      MANDI Kidd-C  [phone]

## 2022-09-15 NOTE — NURSING NOTE
EKG done in clinic, pt tolerated. Results given to provider and scanned to chart.    Cyndi Julien CMA on 9/15/2022 at 8:02 AM

## 2022-09-15 NOTE — PROGRESS NOTES
Patient was seen today for a Pentamidine nebulizer tx ordered by Dr. Guan    Patient was first given 2.5 mg of Albuterol via nebulizer, after which Pentamidine 300 mg (Lot # [id number]) mixed with 6cc Sterile H20 was administered through a filtered nebulizer.    Pre-treatment: SpO2 = 99%   HR = 93  BBS = Clear  Post-treatment: SpO2 = 98%  HR = 68 BBS = Clear  Patient stated after last pentamidine he had some shortness of breath that lasted for approximately half the day but then subsided.   No adverse side effects noted by the patient with today's treatment.  Instructed him to call the BMT department is he gets dyspnea after he leaves here today.  He stated he felt fine to leave today.,    This service today was provided under the direct supervision of Dr. Perlman, who was available if needed.     Procedure was completed by Martha Stratton. LILY

## 2022-09-15 NOTE — PROGRESS NOTES
BMT/Cellular Therapy Clinic Progress Note    MT 2020-23  Mr Theo Meyers is a 77 year old male who is s/p  as Monotherapy in Combination with Monoclonal Antibodies in Relapsed/Refractory Multiple Myeloma. Here for daratumumab       HPI: Seen for follow up and daratumumab subcutaneous injection. Black eye resolving.  No new bleeding.  SOB seems owrse- tried to walk the dog Saturday at 1.75 miels more sob, Had to sit down on sidewalk. He rested about 15 minutes thought he could walk the 3 blocks home but after 1 block he was again very sob and mild chest aching more right than left. He has had significant SAMANO again since ER visit (recommended just lasix 20mg daily) has walked about a mile per day slower and no significant SAMANO but still present. Last night laying in bed had aching right sided chest pain. He can't say he thinks taking lasix has been helpful.     No fever, cough. No GI symptoms. No cardiac symptoms (he remains in afib but is asymptomatic)    ROS:     8 point ROS otherwise negative     PHYSICAL EXAM                                                                                                                                      Wt Readings from Last 4 Encounters:   09/15/22 70.1 kg (154 lb 9.6 oz)   09/10/22 69.9 kg (154 lb)   09/08/22 69.9 kg (154 lb 1.6 oz)   09/01/22 70.6 kg (155 lb 9.6 oz)     BP 99/69   Pulse 63   Temp 97.4  F (36.3  C) (Oral)   Resp 16   Wt 70.1 kg (154 lb 9.6 oz)   SpO2 100%   BMI 23.03 kg/m      ECOG 1  General: NAD  HEENT: Echymosses around left orbit resolving. sclera anicteric; OP moist without lesions  CV: HR ~60s irregular consistent with atrial fibrillation  Lungs: CTAB   Lymphatics: no edema   Skin: No rash but thin skin with bruises on his arms and on his thigh  Neuro: non-focal  Access: Port a cath: right.     Labs     Lab Results   Component Value Date    WBC 4.4 09/10/2022    ANEU 0.3 (LL) 09/08/2022    HGB 9.3 (L) 09/10/2022    HCT 28.0 (L) 09/10/2022     PLT 48 (LL) 09/10/2022     09/10/2022    POTASSIUM 4.5 09/10/2022    CHLORIDE 103 09/10/2022    CO2 24 09/10/2022     09/10/2022    BUN 37 (H) 09/10/2022    CR 1.41 (H) 09/10/2022    MAG 2.2 09/10/2022    INR 0.91 09/10/2022    BILITOTAL 0.5 09/10/2022    AST 21 09/10/2022    ALT 29 09/10/2022    ALKPHOS 69 09/10/2022    PROTTOTAL 5.5 (L) 09/10/2022    ALBUMIN 3.5 09/10/2022       I have assessed all abnormal lab values for their clinical significance and any values considered clinically significant have been addressed in the assessment and plan.        Assessment Plans:     Keith Meyers is a 77 year old man Day +45 s/p  as Monotherapy in Relapsed/Refractory MM and in Combination with Monoclonal Antibodies in Relapsed/Refractory Multiple Myeloma     1. Cellular Therapy: Relapsed refractory Multiple myeloma    Day-5(7/20)  Begin Flu/CY: LD chemo  Day-4:( 7/21) Flu/CY/dimple: LD chemo; IH pentam  Day -3: 7/22)  Flu/CY: LD chemo, covid test    Day 1: ( 7/25/2022)  PICC placement and infusion of   Remains on allopurinol  (decrease dose due to LATANYA on [facility])- ok to stop when runs out.    2. Heme:  #pancytopenia 2/2 chemo. WBC lower and ANC 0.3. give GCSF 9/8  Transfuse for hgb 8 or below. No RBCs 9/8  9/15: hgb 8.3- given SAMANO will add on type and screen today. Plan for rbcs tomorrow. Pt aware of tylebnol premed- will take 1hr before appt then benadryl to be ginve here  -WBC down 0.7- plan for gcsf tomorrow while here for RBCs.    #History of blood clots, chronic and stroke prophy for atrial fib. Holding Xarelto now with thrombocytopenia (x7/25); Xarelto held last week due to bleeding (spontaneous black eye). Plt today down to 56k.  Will continue to hold.    3. ID: afebrile.  - prophy Levo (started 9/1 w/ neutropenia), ACV, fluc (9/1 start for neutropenia) IV Pentamidine (7/22)--next 9/13.  He reports sob day after Pentam. Bactrim held d/t acute on CKD. Will request pentamidine to be changed to  9/15 (next appt time).     4. FEN:    Remains on supplemental potassium   Lasix 40mg PO daily at home      5. CV: In a fiband with ongoing dyspnea on exertion.  This latter problem is certainly due to a combination of pulmonary hypertension, valvular dysfunction, and anemia that is worse after receiving chemotherapy. Much better 9/1 with improvement in Hgb  - history of atrial fib and heart failure  Remains on lasix and supplemental K.   - Repeated EKG - rate controlled afib. Discussed EKG and troponin I reported at 71 - mildly elevated. Discussed that without repeat tomorrow hard to interrpret. Given EKG stable from satruday and previous no over intervention. Will trial giving RBCS to see if helps SAMANO.   If recurrent SAMANO/Chest pain stressed with Keith he needs to go to ER for     6. Neuro:  Has neuropathy in hands mainly and some in feet, neurontin    7. GI: no complaints. Recent transaminitis (unclear etiology), resolved today.      40 minutes spent on the date of the encounter doing chart review, review of test results, interpretation of tests, patient visit and documentation     Plan:  - hgb 8.3- given SAMANO plan have rtc tomorrow for RBCS and given gcsf as missed gcsf today with cardiac work up and labs not released until 10:30am.   - given pentamidine for 9/15    - request next dimple 9/22 (this will be 9th dose since )  - Dr Guan 9/19 to discuss retreatment with  and if break from dimple would be warranted given increased SAMANO/intermittent chest pain).   - Cardiology follow up 9/27.   - IF recurrent SAMANO or SOB low threshold to return to ER for repeat Ekg/troponin and observation admission for serial troponins.   - Discussed troponin/EKG and pt symptoms with oncall cardiology provider.   - continue to hold xarelto for one week and reassess next week    MANDI Kidd-C  [phone]

## 2022-09-15 NOTE — NURSING NOTE
"Chief Complaint   Patient presents with     Port Draw     Labs drawn via port by RN. VS taken.     Port accessed with 20g 3/4\" power needle and labs drawn by rn.  Port flushed with NS and heparin.  Pt tolerated well.  VS taken. De-accessed. Pt checked in for next appt.    Percy Flores RN  "

## 2022-09-15 NOTE — NURSING NOTE
"Oncology Rooming Note    September 15, 2022 6:58 AM   Theo Meyers is a 77 year old male who presents for:    Chief Complaint   Patient presents with     Port Draw     Labs drawn via port by RN. VS taken.     Oncology Clinic Visit     Multiple myeloma in relapse (H)      Initial Vitals: BP 99/69   Pulse 63   Temp 97.4  F (36.3  C) (Oral)   Resp 16   Wt 70.1 kg (154 lb 9.6 oz)   SpO2 100%   BMI 23.03 kg/m   Estimated body mass index is 23.03 kg/m  as calculated from the following:    Height as of 7/26/22: 1.745 m (5' 8.7\").    Weight as of this encounter: 70.1 kg (154 lb 9.6 oz). Body surface area is 1.84 meters squared.  No Pain (0) Comment: Data Unavailable   No LMP for male patient.  Allergies reviewed: Yes  Medications reviewed: Yes    Medications: Medication refills not needed today.  Pharmacy name entered into [employer]:    [id number] - [place], [place] - [address] AT [facility] [address] [place]  [facility] - [place], [place] - [address] [facility] [place], [place] - [address] [place] [address]    Clinical concerns:     Pt has had trouble with his heart. Thursday he had bad chest pains (worse when laying down than sitting), Friday he could hardly breathe, and Saturday he had trouble breathing and had to sit down several times while taking a 2 mile walk (Only made it 1 3/4 way through the walk).     He did go into ER on Saturday night and he was told to take 3 Lasix for a few days instead of 2. He feels like it helped.     He was told there might be some pressure or fluid around his heart.  (Mild fluid overload)      He's back to taking 2 Lasix.    He was told the right side of his heart was working harder. He has an appt with his cardiologist on the 27th.    Pt reports still being short of breathe. Any exertion causes the shortness of breath to increase.      Douglas Elizondo            "

## 2022-09-16 NOTE — NURSING NOTE
"Chief Complaint   Patient presents with     Port Draw     Labs drawn via port by RN. Vitals taken.     Labs drawn via port by RN. Port accessed with 20G 3/4\" power needle. Flushed with NS and heparin. Pt tolerated well. Vitals taken. Pt checked in for next appointment.    Elizabeth Paiz, RN  "

## 2022-09-16 NOTE — PROGRESS NOTES
Infusion Nursing Note:  Theo Meyers presents today for add-on infusion.    Patient seen by provider today: No   present during visit today: Not Applicable.    Note: Patient received RBC per blood plan. Patient slightly above parameter- provider agreed to give RBC due to SOB. Neupogen given per therapy plan.    Intravenous Access:  Implanted Port.    Treatment Conditions:  Results reviewed, labs MET treatment parameters, ok to proceed with treatment.    Post Infusion Assessment:  Patient tolerated infusion without incident.     Discharge Plan:   Patient and/or family verbalized understanding of discharge instructions and all questions answered.      Brynn Gunderson RN

## 2022-09-16 NOTE — LETTER
Date:September 17, 2022      Provider requested that no letter be sent. Do not send.       [facility]

## 2022-09-16 NOTE — LETTER
9/16/2022         RE: Theo Meyers  [address]  [place] [facility] [address]        Dear Colleague,    Thank you for referring your patient, Theo Meyers, to the [facility] [place]. Please see a copy of my visit note below.    Infusion Nursing Note:  Theo Meyers presents today for add-on infusion.    Patient seen by provider today: No   present during visit today: Not Applicable.    Note: Patient received RBC per blood plan. Patient slightly above parameter- provider agreed to give RBC due to SOB. Neupogen given per therapy plan.    Intravenous Access:  Implanted Port.    Treatment Conditions:  Results reviewed, labs MET treatment parameters, ok to proceed with treatment.    Post Infusion Assessment:  Patient tolerated infusion without incident.     Discharge Plan:   Patient and/or family verbalized understanding of discharge instructions and all questions answered.      Brynn Gunderson RN                          Again, thank you for allowing me to participate in the care of your patient.        Sincerely,        [facility]

## 2022-09-19 NOTE — LETTER
9/19/2022         RE: Theo Meyers  [address]  [place] [address]        Dear Colleague,    Thank you for referring your patient, Theo Meyers, to the [facility]ARR[facility]PL[facility] PROGRAM [place]. Please see a copy of my visit note below.    BMT/Cellular Therapy Clinic Progress Note    MT 2020-23  Mr Theo Meyers is a 77 year old male who is s/p  as Monotherapy in Combination with Monoclonal Antibodies in Relapsed/Refractory Multiple Myeloma     Hematologic history:  Multiple myeloma diagnosed in 2009, IgG kappa but evolving to light chain secretory, del 13q, t(11;14).       Date Treatment Response Toxicities/Complications   7/2009 Velcade/Dex x 8 cycles VGPR     4/2010 HD-Jennifer/PBSCT CR     6/2010 Rev maintenance       10/2012 RVD   cytopenias   9/2012 RVD (q 2wk velcade) Long remission     10/2020 Brianne/pom/dex Recent progresson     4/6/2022 Carf/dex  [place] - held Possible cardiac toxicity    7/25/22 Flu/Cy brianne  MR                            HPI: Theo returns now almost 2 months after receiving  for treatment of his myeloma.  Last month is been difficult for him, he has had increased episodes of chest pain and what sounds like exertional angina.  He has been to the emergency room room once.  In addition he has had deterioration of his counts and required a transfusion last week.  He has no signs or symptoms of cytokine release syndrome, GVH, or myeloma, but he has continued to have significant pancytopenia.  His angina now is occurring with any long walks and at one point he had to lay down in a neighbor's yard to recover.       ROS:     8 point ROS otherwise negative     PHYSICAL EXAM                                                                                                                                      Wt Readings from Last 4 Encounters:   09/16/22 70.6 kg (155 lb 9.6 oz)   09/15/22 70.1 kg (154 lb 9.6 oz)   09/10/22 69.9 kg (154 lb)   09/08/22  69.9 kg (154 lb 1.6 oz)     VSS, HR 60s, regular BP, oxygenation and temp  ECOG 1  General: NAD  HEENT: sclera anicteric ; OP moist without lesions  CV: HR ~60s irregular consistent with atrial fibrillation  Lungs: CTAB   Lymphatics: no edema   Skin: No rash but thin skin with bruises on his arms  Neuro: non-focal  Access: Port a cath: right.     Labs     Lab Results   Component Value Date    WBC 0.7 (LL) 09/16/2022    ANEU 0.4 (LL) 09/16/2022    HGB 8.1 (L) 09/16/2022    HCT 24.4 (L) 09/16/2022    PLT 42 (LL) 09/16/2022     09/16/2022    POTASSIUM 4.5 09/16/2022    CHLORIDE 102 09/16/2022    CO2 25 09/16/2022     (H) 09/16/2022    BUN 27.0 (H) 09/16/2022    CR 1.41 (H) 09/16/2022    MAG 2.2 09/10/2022    INR 0.91 09/10/2022    BILITOTAL 0.2 09/16/2022    AST 20 09/16/2022    ALT 38 09/16/2022    ALKPHOS 69 09/16/2022    PROTTOTAL 4.8 (L) 09/16/2022    ALBUMIN 3.1 (L) 09/16/2022       I have assessed all abnormal lab values for their clinical significance and any values considered clinically significant have been addressed in the assessment and plan.        Assessment Plans:     Keith Meyers is a 77 year old man with relapsed/Refractory MM     1. Cellular Therapy: Relapsed refractory Multiple myeloma    Day-5(7/20)  Begin Flu/CY: LD chemo  Day-4:( 7/21) Flu/CY/dimple: LD chemo; IH pentam  Day -3: 7/22)  Flu/CY: LD chemo, covid test    Day 1: ( 7/25/2022)  PICC placement and infusion of   Remains on allopurinol  (decrease dose due to LATANYA on [facility])- ok to stop when runs out  ICE 10/10     2. Heme:  #pancytopenia 2/2 chemo. WBC recovering; plts improved but still <50k.  Transfuse for hgb 8-9 or below.  #History of blood clots, chronic and stroke prophy for atrial fib. Holding Xarelto now with thrombocytopenia (x7/25); Resume Xarelto when pts stable >50k.    3. ID: afebrile.  -I recommended that he continue the acyclovir and levofloxacin for the time being given his ongoing neutropenia    4. FEN:     Remains on supplemental potassium   Lasix 40mg PO daily at home      5. CV: He has known significant aortic valve stenosis, coronary artery disease, and pulmonary hypertension.  His symptoms are worsening and he has follow-up with cardiology next week.  The anemia is certainly exacerbating his angina and at this point we will continue to keep his hemoglobin above 9.    6. Neuro:  Has neuropathy in hands mainly and some in feet, neurontin    7. GI: no complaints. Recent transaminitis (unclear etiology), resolved today.        40 minutes spent on the date of the encounter doing chart review, review of test results, interpretation of tests, patient visit and documentation     Summary: Today I reviewed with him the current issues and made some recommendations.  He is having worsening angina and now with continuing the daratumumab he is having worsening cytopenias.  In addition, his last light chains suggested that he is not continuing to improve but in fact is reaching the point of stability which is likely to lead later to progression.  We had previously discussed retreatment, however it will be a good 1 to 2 months before retreatment can be accomplished and currently I feel he needs to come off protocol, stop daratumumab, and we need to consider other options.  I explained that there are other standard approaches which could include selinexor/dex, CyBortD, or Ninlaro/Dex.  In addition he has significant symptoms of worsening heart failure and angina.  This is a contraindication to moving to CAR-T therapy.  He has follow-up with cardiology next week    Plan:  - stop daratumumab  - return to see Dr. Clifford regarding further myeloma options  - cancel plans for retreatment with   - follow up with cardiology  - continue to monitor hgb and transfuse for Hgb 8-9 or below based on symptoms          Again, thank you for allowing me to participate in the care of your patient.      Sincerely,    FABIENNE VALDIVIA MD

## 2022-09-19 NOTE — PROGRESS NOTES
BMT/Cellular Therapy Clinic Progress Note    MT 2020-23  Mr Theo Meyers is a 77 year old male who is s/p  as Monotherapy in Combination with Monoclonal Antibodies in Relapsed/Refractory Multiple Myeloma     Hematologic history:  Multiple myeloma diagnosed in 2009, IgG kappa but evolving to light chain secretory, del 13q, t(11;14).       Date Treatment Response Toxicities/Complications   7/2009 Velcade/Dex x 8 cycles VGPR     4/2010 HD-Jennifer/PBSCT CR     6/2010 Rev maintenance       10/2012 RVD   cytopenias   9/2012 RVD (q 2wk velcade) Long remission     10/2020 Brianne/pom/dex Recent progresson     4/6/2022 Carf/dex  [place] - held Possible cardiac toxicity    7/25/22 Flu/Cy brianne  MR                            HPI: Theo returns now almost 2 months after receiving  for treatment of his myeloma.  Last month is been difficult for him, he has had increased episodes of chest pain and what sounds like exertional angina.  He has been to the emergency room room once.  In addition he has had deterioration of his counts and required a transfusion last week.  He has no signs or symptoms of cytokine release syndrome, GVH, or myeloma, but he has continued to have significant pancytopenia.  His angina now is occurring with any long walks and at one point he had to lay down in a neighbor's yard to recover.       ROS:     8 point ROS otherwise negative     PHYSICAL EXAM                                                                                                                                      Wt Readings from Last 4 Encounters:   09/16/22 70.6 kg (155 lb 9.6 oz)   09/15/22 70.1 kg (154 lb 9.6 oz)   09/10/22 69.9 kg (154 lb)   09/08/22 69.9 kg (154 lb 1.6 oz)     VSS, HR 60s, regular BP, oxygenation and temp  ECOG 1  General: NAD  HEENT: sclera anicteric ; OP moist without lesions  CV: HR ~60s irregular consistent with atrial fibrillation  Lungs: CTAB   Lymphatics: no edema   Skin: No rash but thin skin with  bruises on his arms  Neuro: non-focal  Access: Port a cath: right.     Labs     Lab Results   Component Value Date    WBC 0.7 (LL) 09/16/2022    ANEU 0.4 (LL) 09/16/2022    HGB 8.1 (L) 09/16/2022    HCT 24.4 (L) 09/16/2022    PLT 42 (LL) 09/16/2022     09/16/2022    POTASSIUM 4.5 09/16/2022    CHLORIDE 102 09/16/2022    CO2 25 09/16/2022     (H) 09/16/2022    BUN 27.0 (H) 09/16/2022    CR 1.41 (H) 09/16/2022    MAG 2.2 09/10/2022    INR 0.91 09/10/2022    BILITOTAL 0.2 09/16/2022    AST 20 09/16/2022    ALT 38 09/16/2022    ALKPHOS 69 09/16/2022    PROTTOTAL 4.8 (L) 09/16/2022    ALBUMIN 3.1 (L) 09/16/2022       I have assessed all abnormal lab values for their clinical significance and any values considered clinically significant have been addressed in the assessment and plan.        Assessment Plans:     Keith Meyers is a 77 year old man with relapsed/Refractory MM     1. Cellular Therapy: Relapsed refractory Multiple myeloma    Day-5(7/20)  Begin Flu/CY: LD chemo  Day-4:( 7/21) Flu/CY/dimlpe: LD chemo; IH pentam  Day -3: 7/22)  Flu/CY: LD chemo, covid test    Day 1: ( 7/25/2022)  PICC placement and infusion of   Remains on allopurinol  (decrease dose due to LATANYA on [facility])- ok to stop when runs out  ICE 10/10     2. Heme:  #pancytopenia 2/2 chemo. WBC recovering; plts improved but still <50k.  Transfuse for hgb 8-9 or below.  #History of blood clots, chronic and stroke prophy for atrial fib. Holding Xarelto now with thrombocytopenia (x7/25); Resume Xarelto when pts stable >50k.    3. ID: afebrile.  -I recommended that he continue the acyclovir and levofloxacin for the time being given his ongoing neutropenia    4. FEN:    Remains on supplemental potassium   Lasix 40mg PO daily at home      5. CV: He has known significant aortic valve stenosis, coronary artery disease, and pulmonary hypertension.  His symptoms are worsening and he has follow-up with cardiology next week.  The anemia is certainly  exacerbating his angina and at this point we will continue to keep his hemoglobin above 9.    6. Neuro:  Has neuropathy in hands mainly and some in feet, neurontin    7. GI: no complaints. Recent transaminitis (unclear etiology), resolved today.        40 minutes spent on the date of the encounter doing chart review, review of test results, interpretation of tests, patient visit and documentation     Summary: Today I reviewed with him the current issues and made some recommendations.  He is having worsening angina and now with continuing the daratumumab he is having worsening cytopenias.  In addition, his last light chains suggested that he is not continuing to improve but in fact is reaching the point of stability which is likely to lead later to progression.  We had previously discussed retreatment, however it will be a good 1 to 2 months before retreatment can be accomplished and currently I feel he needs to come off protocol, stop daratumumab, and we need to consider other options.  I explained that there are other standard approaches which could include selinexor/dex, CyBortD, or Ninlaro/Dex.  In addition he has significant symptoms of worsening heart failure and angina.  This is a contraindication to moving to CAR-T therapy.  He has follow-up with cardiology next week    Plan:  - stop daratumumab  - return to see Dr. Clifford regarding further myeloma options  - cancel plans for retreatment with   - follow up with cardiology  - continue to monitor hgb and transfuse for Hgb 8-9 or below based on symptoms    FABIENNE VALDIVIA MD  September 19, 2022

## 2022-09-19 NOTE — NURSING NOTE
"Oncology Rooming Note    September 19, 2022 8:57 AM   Theo Meyers is a 77 year old male who presents for:    Chief Complaint   Patient presents with     Oncology Clinic Visit     Multiple Myeloma      Initial Vitals: /86 (BP Location: Right arm, Patient Position: Sitting, Cuff Size: Adult Regular)   Pulse 56   Temp 98  F (36.7  C) (Oral)   Wt 72.3 kg (159 lb 4.8 oz)   SpO2 97%   BMI 23.73 kg/m   Estimated body mass index is 23.73 kg/m  as calculated from the following:    Height as of 7/26/22: 1.745 m (5' 8.7\").    Weight as of this encounter: 72.3 kg (159 lb 4.8 oz). Body surface area is 1.87 meters squared.  Moderate Pain (5) Comment: Data Unavailable   No LMP for male patient.  Allergies reviewed: Yes  Medications reviewed: Yes    Medications: MEDICATION REFILLS NEEDED TODAY. Provider was notified.  Pharmacy name entered into [employer]:    [id number] - [place], [place] - [address] AT [facility] [address] [facility] [address] [place]  [facility] - [place], [place] - [address] [facility] - [place], [place] - [address] [facility]    Clinical concerns: pt needs refill for Levaquin. Has chest pain rated 7/10 and shortness of breath after exertion, sometime even at night unprovoked. Says it is getting worse by the day.  Freida was notified.      Elvis Vargas"

## 2022-09-21 NOTE — PROGRESS NOTES
"Oncology Rooming Note    September 21, 2022 8:46 AM   Theo Meyers is a 77 year old male who presents for:    Chief Complaint   Patient presents with     Oncology Clinic Visit     Return visit related to Multiple myeloma not having achieved remission     Initial Vitals: BP (!) 89/52 (BP Location: Left arm, Patient Position: Sitting, Cuff Size: Adult Regular)   Pulse 61   Temp 97.8  F (36.6  C) (Oral)   Resp 14   Ht 1.745 m (5' 8.7\")   Wt 71.5 kg (157 lb 9.6 oz)   SpO2 100%   BMI 23.48 kg/m   Estimated body mass index is 23.48 kg/m  as calculated from the following:    Height as of this encounter: 1.745 m (5' 8.7\").    Weight as of this encounter: 71.5 kg (157 lb 9.6 oz). Body surface area is 1.86 meters squared.  No Pain (0) Comment: Data Unavailable   No LMP for male patient.  Allergies reviewed: Yes  Medications reviewed: Yes    Medications: MEDICATION REFILLS NEEDED TODAY. Provider was notified.  Pharmacy name entered into GeeYuu:    [id number] - [place], [place] - [address] [facility] & [address] [place]  [facility] - [place], [place] - [address] [facility] - [place], [place] - [address] [facility]    Clinical concerns: Return visit related to Multiple myeloma not having achieved remission        RONN SALMERON CMA            "

## 2022-09-21 NOTE — PROGRESS NOTES
DATE:  9/21/2022   TIME OF RECEIPT FROM LAB:  1148  LAB TEST:  WBC, Plt, prelim ANC  LAB VALUE:  0.7, 35, 0.3  RESULTS GIVEN WITH READ-BACK TO (PROVIDER):  Dr Clifford  TIME LAB VALUE REPORTED TO PROVIDER:   1149 - patient was seen in clinic today and labs done after but I did get these results over to Dr Clifford.    Mere Machado, RN

## 2022-09-21 NOTE — LETTER
"    9/21/2022         RE: Theo Meyers  [address]  [facility] [address]        Dear Colleague,    Thank you for referring your patient, Theo Meyers, to the [facility]. Please see a copy of my visit note below.    Oncology Rooming Note    September 21, 2022 8:46 AM   Theo Meyers is a 77 year old male who presents for:    Chief Complaint   Patient presents with     Oncology Clinic Visit     Return visit related to Multiple myeloma not having achieved remission     Initial Vitals: BP (!) 89/52 (BP Location: Left arm, Patient Position: Sitting, Cuff Size: Adult Regular)   Pulse 61   Temp 97.8  F (36.6  C) (Oral)   Resp 14   Ht 1.745 m (5' 8.7\")   Wt 71.5 kg (157 lb 9.6 oz)   SpO2 100%   BMI 23.48 kg/m   Estimated body mass index is 23.48 kg/m  as calculated from the following:    Height as of this encounter: 1.745 m (5' 8.7\").    Weight as of this encounter: 71.5 kg (157 lb 9.6 oz). Body surface area is 1.86 meters squared.  No Pain (0) Comment: Data Unavailable   No LMP for male patient.  Allergies reviewed: Yes  Medications reviewed: Yes    Medications: MEDICATION REFILLS NEEDED TODAY. Provider was notified.  Pharmacy name entered into Omni-ID:    [id number] - [place], [place] - [address] AT [facility] & [place]  [facility] - [place], [place] - [address] [facility] - [place], [place] - [address] [place] [address]    Clinical concerns: Return visit related to Multiple myeloma not having achieved remission        RONN SALMERON CMA              [facility]ematology and Oncology Progress Note    Patient: Theo Meyers  MRN: [id number]  Date of Service: Sep 21, 2022        Assessment and Plan:    1. Kappa light chain myeloma: He was last in the clinic on June 1.  Since then he had cellular therapy at [facility].  This resulted in stable disease.  He now returns to discuss ongoing therapy.  " He has been receiving weekly, subcutaneous daratumumab as well.  He has not on any other myeloma therapy.  He has had persistent, severe cytopenias since his chemotherapy in July.  Reviewed with the patient and his wife today that her treatment options for myeloma are now limited. Additionally, they will be difficult to safely administer unless he has improvement in his blood counts.  This was reviewed with Keith today.  For now, we will continue to monitor his blood counts weekly.  We will follow his myeloma labs every 4 weeks.    2.    Therapy related cytopenias: Transfuse to keep hemoglobin above 8 given his significant cardiac related symptoms.  He has not been eating platelet transfusions.    3.  Prophylaxis: He had been on Xarelto for his atrial fibrillation.  This is being held until platelets are above 50,000.  He is on prophylactic acyclovir, and levofloxacin.  He received a dose of pentamadine on September 15.  This will be administered monthly.  We will continue his antimicrobials until his neutrophil count is above 500.    4.  F/E/N: We will continue supplemental potassium.    5.  Aortic valve stenosis: I think he is quite symptomatic from this issue.  He has chest pain, sometimes at rest.  Also has severe dyspnea on exertion.  Will try to get him into see cardiology sooner than later.  He might benefit from a  TAVR. He had a coronary angiogram on May 20 of this year which showed nonobstructive coronary artery disease.    6.  Pulmonary artery hypertension: Angiogram from the 28th showed elevated pulmonary artery hypertension.  Echocardiogram from June 9 suggested decrease in the pulmonary artery pressure when compared to March 17 2022.  I do not think this is being specifically treated.  May be worthwhile for him to see Dr. Landeros or somebody at the [place] for treatment of this.    7.  Chemotherapy-induced peripheral neuropathy: He is on Neurontin but can consider switching to Cymbalta.    I spent 50  minutes in the care of this patient today, which included time necessary for preparation for the visit, face to face time with the patient, communication of recommendations to the care team, and documentation time.    ECOG Performance  1    Diagnosis:    1.  IgG kappa light chain myeloma. Hyposecretory. Diagnosed 2009. No significant measurable M protein. 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 There was also on 11;14 translocation.  Past immunofixations from 2011 show IgG-kappa.    Bone marrow biopsy 8, 2020  at 5%.  Residual kappa light chain myeloma involving 20% of nucleated cells.  No significant change from November 2016 marrow cellularity.     Bone marrow biopsy from January 26, 2022: Hypocellular marrow involved with multiple myeloma at 20 to 30%.  FISH:  - Gains of chromosomes 9 and 15 (80%)  - Gain of JAQUI (11q) (93%)  - IGH-CCND1 fusion with gains of 1-2 extra fusion signals (92%)  - Monosomy 13 (94%)     NORMAL  - No gain of 1q  - No losses of 1p or TP53     INTERPRETATION:  These findings are consistent with the reported pathologic diagnosis of persistent plasma cell myeloma, reportedly characterized at diagnosis (study performed at an outside institution) by an IGH-CCND1 fusion and monosomy 13. The population of cells with gains of two extra IGH-CCND1 fusion signals is apparently newly arisen and would be consistent with cytogenetic evolution.    2.  Deep vein thrombosis of the left leg diagnosed 9/2012 while on treatment.     3.  Vitamin B12 deficiency diagnosed in September 2017.     4.  GI bleed and anemia requiring hospitalization in December 2017.    Treatment:    He presented with a lytic lesion involving the C6 vertebral body, although no measurable M protein. IgG kappa monoclonal immunoglobulin was confirmed by ELMA as well as elevated kappa free light chains with an abnormal kappa lambda ratio. Bone marrow biopsy showed 30% involvement and cytogenetics confirmed deletion of 13 q. as  well as 11;14 translocation. He was initially treated with Velcade and dexamethasone and achieved a good partial response.     He was referred to the [facility] where he underwent high-dose chemotherapy with autologous peripheral stem cell transplant in April of 2010. He began Revlimid as maintenance therapy post transplant. Repeat bone marrow biopsy done October 2010 showed about 5% kappa restricted plasma cells and so at that time weekly Velcade was added along with his maintenance Revlimid and dexamethasone.      He has done well since that time with stable minimal residual disease, best assessed by serum free light chains. He required dose reductions of weekly Velcade because of cytopenias and was ultimately switched to a q 2 week maintenance schedule which he has been on since September 2012.      His Revlimid dose was reduced to 15 mg daily and he continues on dexamethasone 20 mg p.o. weekly  Revlimid dosing changed to 20 mg, 3 weeks on 1 week (instead of 15 mg daily) for cost reasons. Started March, 2018     Started daratumumab/pomalidomide/dexamethasone (20mg every 14 days) on October 14, 2020.     Carfilzomib/dexamethasone started 4/6/22. Days 1, 2, 8, 9, 15, 16    Flu/Cy/Brianne  (allogeneic cell therapy/NK cell)  7/25/2022 clinical trial      Progressed on:  bortezomib  lenalidomide    Interim History:    Keith returns today for follow-up visit.  He has not been seen in our clinic in almost 4 months.  In the interim he was at [place] and underwent treatment with an experimental natural killer cell therapy. He comes back now to discuss further treatment options.  He has persistent cytopenias.  Also having worsening symptoms of exertional dyspnea and angina.    Review of Systems:    As above in the history.     Review of Systems otherwise Negative for:  General: chills, fever or night sweats  Psychological: anxiety or depression  Ophthalmic: blurry vision, double vision or loss of vision,  "vision change  ENT: epistaxis, oral lesions, hearing changes  Hematological and Lymphatic: bleeding, bruising, jaundice, swollen lymph nodes  Endocrine: hot flashes, unexpected weight changes  Respiratory: cough, hemoptysis, orthopnea  Cardiovascular: edema, palpitations or PND  Gastrointestinal: abdominal pain, blood in stools, change in bowel habits, constipation, diarrhea or nausea/vomiting  Genito-Urinary: change in urinary stream, incontinence, frequency/urgency  Musculoskeletal: joint pain, stiffness, swelling, muscle pain  Neurological: dizziness, headaches  Dermatological: lumps and rash    Past History:    Past Medical History:   Diagnosis Date     Anemia 12/13/2017     Arthritis      Bilateral inguinal hernia      Chest tube in place      Elevated INR      Glaucoma      Glaucoma      History of blood clots     DVT - left chronic     Multiple myeloma (H)     Gets chemo infusions every 2 weeks     Pancytopenia (H)      Parapneumonic effusion      Peripheral neuropathy      Shingles 9/24/2014     Syncope      TMJ (temporomandibular joint disorder)      Physical Exam:    BP (!) 89/52 (BP Location: Left arm, Patient Position: Sitting, Cuff Size: Adult Regular)   Pulse 61   Temp 97.8  F (36.6  C) (Oral)   Resp 14   Ht 1.745 m (5' 8.7\")   Wt 71.5 kg (157 lb 9.6 oz)   SpO2 100%   BMI 23.48 kg/m      General: patient appears stated age of 76 year old. Nontoxic and in no distress.   HEENT: Head: atraumatic, normocephalic. Sclerae anicteric.  Chest:  Normal respiratory effort  Cardiac:  No edema.   Abdomen: abdomen is non-distended  Extremities: normal tone and muscle bulk.  Skin: no lesions or rash on visible skin. Warm and dry.   CNS: alert and oriented. Grossly non-focal.   Psychiatric: normal mood and affect.     Lab Results:    Recent Results (from the past 24 hour(s))   Comprehensive metabolic panel    Collection Time: 09/21/22 10:27 AM   Result Value Ref Range    Sodium 141 136 - 145 mmol/L    Potassium " 4.6 3.4 - 5.3 mmol/L    Chloride 103 98 - 107 mmol/L    Carbon Dioxide (CO2) 29 22 - 29 mmol/L    Anion Gap 9 7 - 15 mmol/L    Urea Nitrogen 23.4 (H) 8.0 - 23.0 mg/dL    Creatinine 1.64 (H) 0.67 - 1.17 mg/dL    Calcium 9.0 8.8 - 10.2 mg/dL    Glucose 84 70 - 99 mg/dL    Alkaline Phosphatase 99 40 - 129 U/L    AST 17 10 - 50 U/L    ALT 26 10 - 50 U/L    Protein Total 5.5 (L) 6.4 - 8.3 g/dL    Albumin 3.7 3.5 - 5.2 g/dL    Bilirubin Total 0.3 <=1.2 mg/dL    GFR Estimate 43 (L) >60 mL/min/1.73m2   TSH with free T4 reflex    Collection Time: 09/21/22 10:27 AM   Result Value Ref Range    TSH 7.60 (H) 0.30 - 4.20 uIU/mL   CBC with platelets and differential    Collection Time: 09/21/22 10:27 AM   Result Value Ref Range    WBC Count 0.7 (LL) 4.0 - 11.0 10e3/uL    RBC Count 2.96 (L) 4.40 - 5.90 10e6/uL    Hemoglobin 10.0 (L) 13.3 - 17.7 g/dL    Hematocrit 30.8 (L) 40.0 - 53.0 %     (H) 78 - 100 fL    MCH 33.8 (H) 26.5 - 33.0 pg    MCHC 32.5 31.5 - 36.5 g/dL    RDW 16.1 (H) 10.0 - 15.0 %    Platelet Count 35 (LL) 150 - 450 10e3/uL   T4 free    Collection Time: 09/21/22 10:27 AM   Result Value Ref Range    Free T4 1.11 0.90 - 1.70 ng/dL   Manual Differential    Collection Time: 09/21/22 10:27 AM   Result Value Ref Range    % Neutrophils 28 %    % Lymphocytes 42 %    % Monocytes 28 %    % Eosinophils 0 %    % Basophils 2 %    Absolute Neutrophils 0.2 (LL) 1.6 - 8.3 10e3/uL    Absolute Lymphocytes 0.3 (L) 0.8 - 5.3 10e3/uL    Absolute Monocytes 0.2 0.0 - 1.3 10e3/uL    Absolute Eosinophils 0.0 0.0 - 0.7 10e3/uL    Absolute Basophils 0.0 0.0 - 0.2 10e3/uL    RBC Morphology Confirmed RBC Indices     Platelet Assessment  Automated Count Confirmed. Platelet morphology is normal.     Automated Count Confirmed. Platelet morphology is normal.    Elliptocytes Slight (A) None Seen       Signed by: Per Clifford MD        Again, thank you for allowing me to participate in the care of your patient.        Sincerely,        Per  MD Venessa

## 2022-09-21 NOTE — PROGRESS NOTES
[facility] Hematology and Oncology Progress Note    Patient: Theo Meyers  MRN: [id number]  Date of Service: Sep 21, 2022        Assessment and Plan:    1. Kappa light chain myeloma: He was last in the clinic on June 1.  Since then he had cellular therapy at [facility].  This resulted in stable disease.  He now returns to discuss ongoing therapy.  He had been receiving weekly, subcutaneous daratumumab as well but this has recently been stopped due to cytopenias.  He has not on any other myeloma therapy.  He has had persistent, severe cytopenias since his chemotherapy in July.  Reviewed with the patient and his wife today that her treatment options for myeloma are now limited. Additionally, they will be difficult to safely administer unless he has improvement in his blood counts.  This was reviewed with Keith today.  For now, we will continue to monitor his blood counts weekly.  We will follow his myeloma labs every 4 weeks.    2.    Therapy related cytopenias: Transfuse to keep hemoglobin above 8 given his significant cardiac related symptoms.  He has not been eating platelet transfusions.    3.  Prophylaxis: He had been on Xarelto for his atrial fibrillation.  This is being held until platelets are above 50,000.  He is on prophylactic acyclovir, and levofloxacin.  He received a dose of pentamadine on September 15.  This will be administered monthly.  We will continue his antimicrobials until his neutrophil count is above 500.    4.  F/E/N: We will continue supplemental potassium.    5.  Aortic valve stenosis: I think he is quite symptomatic from this issue.  He has chest pain, sometimes at rest.  Also has severe dyspnea on exertion.  Will try to get him into see cardiology sooner than later.  He might benefit from a  TAVR. He had a coronary angiogram on May 20 of this year which showed nonobstructive coronary artery disease.    6.  Pulmonary artery hypertension: Angiogram from the 28th showed  elevated pulmonary artery hypertension.  Echocardiogram from June 9 suggested decrease in the pulmonary artery pressure when compared to March 17 2022.  I do not think this is being specifically treated.  May be worthwhile for him to see Dr. Landeros or somebody at the [place] for treatment of this.    7.  Chemotherapy-induced peripheral neuropathy: He is on Neurontin but can consider switching to Cymbalta.    I spent 50 minutes in the care of this patient today, which included time necessary for preparation for the visit, face to face time with the patient, communication of recommendations to the care team, and documentation time.    ECOG Performance  1    Diagnosis:    1.  IgG kappa light chain myeloma. Hyposecretory. Diagnosed 2009. No significant measurable M protein. 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 There was also on 11;14 translocation.  Past immunofixations from 2011 show IgG-kappa.    Bone marrow biopsy 8, 2020  at 5%.  Residual kappa light chain myeloma involving 20% of nucleated cells.  No significant change from November 2016 marrow cellularity.     Bone marrow biopsy from January 26, 2022: Hypocellular marrow involved with multiple myeloma at 20 to 30%.  FISH:  - Gains of chromosomes 9 and 15 (80%)  - Gain of JAQUI (11q) (93%)  - IGH-CCND1 fusion with gains of 1-2 extra fusion signals (92%)  - Monosomy 13 (94%)     NORMAL  - No gain of 1q  - No losses of 1p or TP53     INTERPRETATION:  These findings are consistent with the reported pathologic diagnosis of persistent plasma cell myeloma, reportedly characterized at diagnosis (study performed at an outside institution) by an IGH-CCND1 fusion and monosomy 13. The population of cells with gains of two extra IGH-CCND1 fusion signals is apparently newly arisen and would be consistent with cytogenetic evolution.    2.  Deep vein thrombosis of the left leg diagnosed 9/2012 while on treatment.     3.  Vitamin B12 deficiency diagnosed in  September 2017.     4.  GI bleed and anemia requiring hospitalization in December 2017.    Treatment:    He presented with a lytic lesion involving the C6 vertebral body, although no measurable M protein. IgG kappa monoclonal immunoglobulin was confirmed by ELMA as well as elevated kappa free light chains with an abnormal kappa lambda ratio. Bone marrow biopsy showed 30% involvement and cytogenetics confirmed deletion of 13 q. as well as 11;14 translocation. He was initially treated with Velcade and dexamethasone and achieved a good partial response.     He was referred to the [facility] where he underwent high-dose chemotherapy with autologous peripheral stem cell transplant in April of 2010. He began Revlimid as maintenance therapy post transplant. Repeat bone marrow biopsy done October 2010 showed about 5% kappa restricted plasma cells and so at that time weekly Velcade was added along with his maintenance Revlimid and dexamethasone.      He has done well since that time with stable minimal residual disease, best assessed by serum free light chains. He required dose reductions of weekly Velcade because of cytopenias and was ultimately switched to a q 2 week maintenance schedule which he has been on since September 2012.      His Revlimid dose was reduced to 15 mg daily and he continues on dexamethasone 20 mg p.o. weekly  Revlimid dosing changed to 20 mg, 3 weeks on 1 week (instead of 15 mg daily) for cost reasons. Started March, 2018     Started daratumumab/pomalidomide/dexamethasone (20mg every 14 days) on October 14, 2020.     Carfilzomib/dexamethasone started 4/6/22. Days 1, 2, 8, 9, 15, 16    Flu/Cy/Brianne  (allogeneic cell therapy/NK cell)  7/25/2022 clinical trial      Progressed on:  bortezomib  lenalidomide    Interim History:    Keith returns today for follow-up visit.  He has not been seen in our clinic in almost 4 months.  In the interim he was at [place] and underwent treatment with  "an experimental natural killer cell therapy. He comes back now to discuss further treatment options.  He has persistent cytopenias.  Also having worsening symptoms of exertional dyspnea and angina.    Review of Systems:    As above in the history.     Review of Systems otherwise Negative for:  General: chills, fever or night sweats  Psychological: anxiety or depression  Ophthalmic: blurry vision, double vision or loss of vision, vision change  ENT: epistaxis, oral lesions, hearing changes  Hematological and Lymphatic: bleeding, bruising, jaundice, swollen lymph nodes  Endocrine: hot flashes, unexpected weight changes  Respiratory: cough, hemoptysis, orthopnea  Cardiovascular: edema, palpitations or PND  Gastrointestinal: abdominal pain, blood in stools, change in bowel habits, constipation, diarrhea or nausea/vomiting  Genito-Urinary: change in urinary stream, incontinence, frequency/urgency  Musculoskeletal: joint pain, stiffness, swelling, muscle pain  Neurological: dizziness, headaches  Dermatological: lumps and rash    Past History:    Past Medical History:   Diagnosis Date     Anemia 12/13/2017     Arthritis      Bilateral inguinal hernia      Chest tube in place      Elevated INR      Glaucoma      Glaucoma      History of blood clots     DVT - left chronic     Multiple myeloma (H)     Gets chemo infusions every 2 weeks     Pancytopenia (H)      Parapneumonic effusion      Peripheral neuropathy      Shingles 9/24/2014     Syncope      TMJ (temporomandibular joint disorder)      Physical Exam:    BP (!) 89/52 (BP Location: Left arm, Patient Position: Sitting, Cuff Size: Adult Regular)   Pulse 61   Temp 97.8  F (36.6  C) (Oral)   Resp 14   Ht 1.745 m (5' 8.7\")   Wt 71.5 kg (157 lb 9.6 oz)   SpO2 100%   BMI 23.48 kg/m      General: patient appears stated age of 76 year old. Nontoxic and in no distress.   HEENT: Head: atraumatic, normocephalic. Sclerae anicteric.  Chest:  Normal respiratory effort  Cardiac: "  No edema.   Abdomen: abdomen is non-distended  Extremities: normal tone and muscle bulk.  Skin: no lesions or rash on visible skin. Warm and dry.   CNS: alert and oriented. Grossly non-focal.   Psychiatric: normal mood and affect.     Lab Results:    Recent Results (from the past 24 hour(s))   Comprehensive metabolic panel    Collection Time: 09/21/22 10:27 AM   Result Value Ref Range    Sodium 141 136 - 145 mmol/L    Potassium 4.6 3.4 - 5.3 mmol/L    Chloride 103 98 - 107 mmol/L    Carbon Dioxide (CO2) 29 22 - 29 mmol/L    Anion Gap 9 7 - 15 mmol/L    Urea Nitrogen 23.4 (H) 8.0 - 23.0 mg/dL    Creatinine 1.64 (H) 0.67 - 1.17 mg/dL    Calcium 9.0 8.8 - 10.2 mg/dL    Glucose 84 70 - 99 mg/dL    Alkaline Phosphatase 99 40 - 129 U/L    AST 17 10 - 50 U/L    ALT 26 10 - 50 U/L    Protein Total 5.5 (L) 6.4 - 8.3 g/dL    Albumin 3.7 3.5 - 5.2 g/dL    Bilirubin Total 0.3 <=1.2 mg/dL    GFR Estimate 43 (L) >60 mL/min/1.73m2   TSH with free T4 reflex    Collection Time: 09/21/22 10:27 AM   Result Value Ref Range    TSH 7.60 (H) 0.30 - 4.20 uIU/mL   CBC with platelets and differential    Collection Time: 09/21/22 10:27 AM   Result Value Ref Range    WBC Count 0.7 (LL) 4.0 - 11.0 10e3/uL    RBC Count 2.96 (L) 4.40 - 5.90 10e6/uL    Hemoglobin 10.0 (L) 13.3 - 17.7 g/dL    Hematocrit 30.8 (L) 40.0 - 53.0 %     (H) 78 - 100 fL    MCH 33.8 (H) 26.5 - 33.0 pg    MCHC 32.5 31.5 - 36.5 g/dL    RDW 16.1 (H) 10.0 - 15.0 %    Platelet Count 35 (LL) 150 - 450 10e3/uL   T4 free    Collection Time: 09/21/22 10:27 AM   Result Value Ref Range    Free T4 1.11 0.90 - 1.70 ng/dL   Manual Differential    Collection Time: 09/21/22 10:27 AM   Result Value Ref Range    % Neutrophils 28 %    % Lymphocytes 42 %    % Monocytes 28 %    % Eosinophils 0 %    % Basophils 2 %    Absolute Neutrophils 0.2 (LL) 1.6 - 8.3 10e3/uL    Absolute Lymphocytes 0.3 (L) 0.8 - 5.3 10e3/uL    Absolute Monocytes 0.2 0.0 - 1.3 10e3/uL    Absolute Eosinophils 0.0  0.0 - 0.7 10e3/uL    Absolute Basophils 0.0 0.0 - 0.2 10e3/uL    RBC Morphology Confirmed RBC Indices     Platelet Assessment  Automated Count Confirmed. Platelet morphology is normal.     Automated Count Confirmed. Platelet morphology is normal.    Elliptocytes Slight (A) None Seen       Signed by: Per Clifford MD

## 2022-09-22 NOTE — PROGRESS NOTES
[id number]: Study Visit Note   Subject name: BertBLU Meyers     I spoke with Keith regarding post treatment follow up after his meeting with Dr. Guan where the decision was made to stop daratumumab due to continuing cytopenias. Keith is pursuing further work up for his ongoing cardiac symptoms and at this time he would like to stop post treatment follow up for the  study. He is agreeable to returning for long term follow up visits and for us to continue to monitor his records for survival and any new cancer therapies.     I have personally interviewed Theo Meyers and reviewed his medical record for adverse events and concomitant medications and these have been recorded on the corresponding logs in Theo Meyers's research file.     Theo Meyers was given the opportunity to ask any trial related questions.  Please see provider progress note for physical exam and other clinical information. Labs were reviewed - any significant lab values were addressed and reviewed.    Cristiana Sanz RN

## 2022-09-22 NOTE — LETTER
9/22/2022    Mathew Ott MD  [address] [place] [address]    RE: Theo HURT Mira       Dear Colleague,     I had the pleasure of seeing Theo Meyers in the [facility].    HEART CARE ENCOUNTER CONSULTATON NOTE      [facility]  [phone]      Assessment/Recommendations   Assessment:   1.  Severe exertional dyspnea secondary to left ventricular diastolic heart failure with low cardiac output (CI:1.9) and severe aortic stenosis.  2.  Severe aortic stenosis (JOHANN by invasive hemodynamics is 0.7 cm ).  Hemodynamic tracings from [facility] were reviewed.  By invasive hemodynamics and Gorling equation aortic valve area is 0.7 cm2 (severe), this supports echo findings of low-flow low gradient aortic stenosis.  3.  Minimal coronary disease by coronary angiogram  4.  Heart failure with preserved ejection fraction, acute on chronic  5.  Pulmonary hypertension likely WHO group II.  Invasive hemodynamics demonstrated severely elevated right atrial pressure, severely elevated pulmonary arterial pressures, severely elevated pulmonary capillary wedge pressure.  LVEDP was 24 mmHg.    6.  Multiple myeloma, not currently in remission  7.  Thrombocytopenia   8.  Anemia  9.  Atrial fibrillation    Plan:   1.  This was discussed in detail with valve team including Dr. Garland.  Echocardiogram was reviewed in detail.  Invasive hemodynamics reviewed in detail from [facility].  Further calculations were performed to a calculated valve area from data from [facility].   2.  Plan for CT of the chest with vascular runoffs.  We will perform planimetry of the valve as well  3.  Urgent consultation with Dr. Garland in valve clinic to work towards transaortic valve replacement  4.  We will communicate with oncology team  5.  Increase Lasix    Very close follow-up and coordination of his care will be necessary.     History of Present Illness/Subjective    HPI: Theo HURT Mira is a 77 year old  "male history of congestive heart failure, multiple myeloma, pulmonary hypertension, aortic valve stenosis who presents to cardiology rapid access clinic for dyspnea on exertion.    The patient has been experiencing progressive dyspnea on exertion and chest pain with minimal activity level.  With any prolonged activity such as walking he developed severe pressure across his chest along with shortness of breath.  Symptoms last for a few minutes and require him to immediately rest.  He is also been noticing severe PND symptoms.  At night he has to rapidly sit up has severe shortness of breath and chest pressure.  With standing and sitting upright in a chair for a few minutes his symptoms gradually improved.  He has mild lower extremity edema.    He is undergone extensive work-up with [facility].  His right heart cath recently was reviewed in more detail.  There was concern about low-flow low gradient aortic stenosis on recent echocardiogram.  He also underwent a right and left heart cath this year.  Reviewed his left heart cath in detail including recalculating the valve area which is in the severe category by Gorlin equation.  Pulmonary hypertension was also reviewed in detail with the patient and also with Dr. Garland.    Concern is his aortic stenosis has progressed.  He is now in decompensated heart failure with low output.  Concern remains his multiple myeloma and the prognosis which is difficult to determine.  His oncology team remains concerned about the severity of his cardiac condition and ability to proceed with treatment which is necessary.        Physical Examination  Review of Systems   Vitals: /54 (BP Location: Right arm, Patient Position: Sitting, Cuff Size: Adult Regular)   Pulse 62   Resp 16   Ht 1.727 m (5' 8\")   Wt 71.7 kg (158 lb)   BMI 24.02 kg/m    BMI= Body mass index is 24.02 kg/m .  Wt Readings from Last 3 Encounters:   09/22/22 71.7 kg (158 lb)   09/21/22 71.5 kg (157 lb 9.6 " oz)   09/19/22 72.3 kg (159 lb 4.8 oz)        Pleasant   ENT/Mouth: membranes moist, no oral lesions or bleeding gums.      EYES:  no scleral icterus, normal conjunctivae       Chest/Lungs:   lungs are clear to auscultation, faint rales or wheezing, no sternal scar, equal chest wall expansion    Cardiovascular:   Irregular. Normal first and second heart sounds with 3-6 mid to late peaking systolic murmur. No rubs, or gallops; the carotid, radial and posterior tibial pulses are intact, Jugular venous pressure 8-10, mild pitting edema bilaterally    Abdomen:  no tenderness; bowel sounds are present   Extremities: no cyanosis or clubbing   Skin: no xanthelasma, warm.    Neurologic: normal  bilateral, no tremors     Psychiatric: alert and oriented x3, calm        Please refer above for cardiac ROS details.        Medical History  Surgical History Family History Social History   Past Medical History:   Diagnosis Date     Anemia 12/13/2017     Arthritis      Bilateral inguinal hernia      Chest tube in place      Elevated INR      Glaucoma      Glaucoma      History of blood clots     DVT - left chronic     Multiple myeloma (H)     Gets chemo infusions every 2 weeks     Pancytopenia (H)      Parapneumonic effusion      Peripheral neuropathy      Shingles 9/24/2014     Syncope      TMJ (temporomandibular joint disorder)      Past Surgical History:   Procedure Laterality Date     APPENDECTOMY      Age 16     CV CORONARY ANGIOGRAM N/A 5/20/2022    Procedure: Coronary Angiogram;  Surgeon: Christopher Bobo MD;  Location:  HEART CARDIAC CATH LAB     CV CORONARY ANGIOGRAM  5/20/2022    Procedure: ;  Surgeon: Christopher Bobo MD;  Location:  HEART CARDIAC CATH LAB     CV LEFT HEART CATH N/A 5/20/2022    Procedure: Left Heart Catheterization;  Surgeon: Christopher Bobo MD;  Location:  HEART CARDIAC CATH LAB     CV RIGHT HEART CATH MEASUREMENTS RECORDED N/A 5/20/2022    Procedure: Right Heart  Catheterization;  Surgeon: Christopher Bobo MD;  Location:  HEART CARDIAC CATH LAB     CV RIGHT HEART CATH MEASUREMENTS RECORDED N/A 2022    Procedure: Heart Cath Right Heart Cath;  Surgeon: Jostin Yuan MD;  Location:  HEART CARDIAC CATH LAB     ESOPHAGOSCOPY, GASTROSCOPY, DUODENOSCOPY (EGD), COMBINED N/A 2017    Procedure: ESOPHAGOGASTRODUODENOSCOPY (EGD) WITH BIOPSYS;  Surgeon: Marlon Roy MD;  Location: [facility];  Service:      ESOPHAGOSCOPY, GASTROSCOPY, DUODENOSCOPY (EGD), COMBINED N/A 2018    Procedure: ENDOSCOPIC ULTRASOUND  ESOPHAGOGASTRODUODENOSCOPY WITH DUODENAL POLYP REMOVAL;  Surgeon: Familia Mcgraw MD;  Location: [facility];  Service:      EYE SURGERY      Cataract     INSERT PICC LINE Left 2022    Procedure: INSERTION, PICC;  Surgeon: Shabbir Bueno MD;  Location: [facility] OR     IR MISCELLANEOUS PROCEDURE  2009     IR MISCELLANEOUS PROCEDURE  2009     IR MISCELLANEOUS PROCEDURE  2009     IR PICC PLACEMENT > 5 YRS OF AGE  2022     IR PLEURAL DRAINAGE WITH CATHETER INSERTION  2019     PORTACATH PLACEMENT       RELEASE CARPAL TUNNEL Bilateral      US THORACENTESIS  2019     VERTEBROPLASTY      T6     WISDOM TOOTH EXTRACTION       Family History   Problem Relation Age of Onset     Heart Disease Mother      Glaucoma Mother      Cancer Father      No Known Problems Sister      Cancer Brother      Pancreatic Cancer Brother      No Known Problems Brother      Cancer Daughter      Skin Cancer Daughter      Melanoma Daughter      Glaucoma Daughter         Social History     Socioeconomic History     Marital status:      Spouse name: Not on file     Number of children: Not on file     Years of education: Not on file     Highest education level: Not on file   Occupational History     Not on file   Tobacco Use     Smoking status: Former Smoker     Quit date: 1975     Years since quittin.8      Smokeless tobacco: Never Used   Substance and Sexual Activity     Alcohol use: Not Currently     Comment: Alcoholic Drinks/day: occasional     Drug use: No     Sexual activity: Never   Other Topics Concern     Not on file   Social History Narrative     Not on file     Social Determinants of Health     Financial Resource Strain: Not on file   Food Insecurity: Not on file   Transportation Needs: Not on file   Physical Activity: Not on file   Stress: Not on file   Social Connections: Not on file   Intimate Partner Violence: Not At Risk     Fear of Current or Ex-Partner: No     Emotionally Abused: No     Physically Abused: No     Sexually Abused: No   Housing Stability: Not on file           Medications  Allergies   Current Outpatient Medications   Medication Sig Dispense Refill     acetaminophen (TYLENOL) 500 MG tablet Take 500 mg by mouth every 8 hours as needed        acyclovir (ZOVIRAX) 400 MG tablet Take 1 tablet (400 mg) by mouth 2 times daily Take on first day of daratumumab dose and continue for 3 months after treatment is complete. 60 tablet 11     ascorbic acid 1000 MG TABS tablet Take 1,000 mg by mouth daily Pt taking 1000 mg       atorvastatin (LIPITOR) 10 MG tablet Take 1 tablet (10 mg) by mouth daily 90 tablet 3     bimatoprost (LUMIGAN) 0.03 % ophthalmic solution Place 1 drop into both eyes At Bedtime.       brimonidine (ALPHAGAN P) 0.1 % ophthalmic solution Place 1 drop into both eyes every 8 hours.       calcium carbonate 1250 (500 Ca) MG CHEW Take 1 tablet by mouth daily as needed (upset stomach)       calcium carbonate-vitamin D (OYSTER SHELL CALCIUM/D) 500-200 MG-UNIT tablet Take 1 tablet by mouth daily        cetirizine (ZYRTEC) 10 MG tablet Take 10 mg by mouth every evening       fluconazole (DIFLUCAN) 200 MG tablet Take 1 tablet (200 mg) by mouth daily 30 tablet 1     fluticasone (FLONASE) 50 MCG/ACT nasal spray Spray 2 sprays into both nostrils daily       furosemide (LASIX) 20 MG tablet TAKE 2  TABLETS(40 MG) BY MOUTH DAILY 180 tablet 1     gabapentin (NEURONTIN) 300 MG capsule Take 1 capsule (300 mg) by mouth At Bedtime 30 capsule 3     levofloxacin (LEVAQUIN) 500 MG tablet Take 1 tablet (500 mg) by mouth daily 30 tablet 3     magnesium oxide (MAG-OX) 400 MG tablet Take 400 mg by mouth 2 times daily       Multiple Vitamin (MULTI-VITAMIN PO) Take 1 capsule by mouth daily.       omeprazole (PRILOSEC) 20 MG DR capsule TAKE 1 CAPSULE BY MOUTH ONCE DAILY 90 capsule 2     potassium chloride ER (KLOR-CON M) 20 MEQ CR tablet Take 1 tablet (20 mEq) by mouth four times a week On Saturday, Sunday, Tuesday, Thursday 20 tablet 3     Psyllium (METAMUCIL PO) Take by mouth daily USE AS DIRECTED       tamsulosin (FLOMAX) 0.4 MG capsule TAKE 1 CAPSULE BY MOUTH EVERY DAY 90 capsule 2     XARELTO ANTICOAGULANT 20 MG TABS tablet  (Patient not taking: Reported on 9/22/2022)         Allergies   Allergen Reactions     Blood Transfusion Related (Informational Only) Other (See Comments)     Patient has a history of a clinically significant antibody against RBC antigens.  A delay in compatible RBCs may occur.           Lab Results    Chemistry/lipid CBC Cardiac Enzymes/BNP/TSH/INR   Recent Labs   Lab Test 10/05/21  1651   CHOL 160   HDL 51   LDL 95   TRIG 71     Recent Labs   Lab Test 10/05/21  1651 10/16/15  1243   LDL 95 70     Recent Labs   Lab Test 09/21/22  1027      POTASSIUM 4.6   CHLORIDE 103   CO2 29   GLC 84   BUN 23.4*   CR 1.64*   GFRESTIMATED 43*   NORMAN 9.0     Recent Labs   Lab Test 09/21/22  1027 09/16/22  1259 09/15/22  0646   CR 1.64* 1.41* 1.45*     No results for input(s): A1C in the last [id number] hours.       Recent Labs   Lab Test 09/21/22  1027   WBC 0.7*   HGB 10.0*   HCT 30.8*   *   PLT 35*     Recent Labs   Lab Test 09/21/22  1027 09/16/22  1259 09/15/22  0646   HGB 10.0* 8.1* 8.3*    Recent Labs   Lab Test 09/10/22  2158 09/10/22  1928 03/17/22  1418   TROPONINI 0.13 0.14 0.05     Recent Labs    Lab Test 09/10/22  1928 06/23/22  1039 06/01/22  0820 05/25/22  0844 05/20/22  1255 05/02/22  1130 04/13/22  0816 04/06/22  0809 03/28/22  1033   *  --  211*  --   --   --  207*  --   --    NTBNPI  --  1,779  --  1,240 5,262*  --   --   --   --    NTBNP  --   --   --   --   --  1,042  --  1,805* 3,039*     Recent Labs   Lab Test 09/21/22  1027   TSH 7.60*     Recent Labs   Lab Test 09/10/22  1928 06/23/22  1039 03/14/22  1619   INR 0.91 1.15 1.36*        Bernardo Rincon DO  Today's clinic visit entailed:  75 minutes spent on the date of the encounter doing chart review, history and exam, documentation and further activities per the note.        Thank you for allowing me to participate in the care of your patient.      Sincerely,     Bernardo Rincon DO     [facility]  cc:   Per Clifford MD  [address] [place] [address]

## 2022-09-22 NOTE — PROGRESS NOTES
HEART CARE ENCOUNTER CONSULTATON NOTE      [facility]  [phone]      Assessment/Recommendations   Assessment:   1.  Severe exertional dyspnea secondary to left ventricular diastolic heart failure with low cardiac output (CI:1.9) and severe aortic stenosis.  2.  Severe aortic stenosis (JOHANN by invasive hemodynamics is 0.7 cm ).  Hemodynamic tracings from [facility] were reviewed.  By invasive hemodynamics and Gorling equation aortic valve area is 0.7 cm2 (severe), this supports echo findings of low-flow low gradient aortic stenosis.  3.  Minimal coronary disease by coronary angiogram  4.  Heart failure with preserved ejection fraction, acute on chronic  5.  Pulmonary hypertension likely WHO group II.  Invasive hemodynamics demonstrated severely elevated right atrial pressure, severely elevated pulmonary arterial pressures, severely elevated pulmonary capillary wedge pressure.  LVEDP was 24 mmHg.    6.  Multiple myeloma, not currently in remission  7.  Thrombocytopenia   8.  Anemia  9.  Atrial fibrillation    Plan:   1.  This was discussed in detail with valve team including Dr. Garland.  Echocardiogram was reviewed in detail.  Invasive hemodynamics reviewed in detail from [facility].  Further calculations were performed to a calculated valve area from data from [facility].   2.  Plan for CT of the chest with vascular runoffs.  We will perform planimetry of the valve as well  3.  Urgent consultation with Dr. Garland in valve clinic to work towards transaortic valve replacement  4.  We will communicate with oncology team  5.  Increase Lasix    Very close follow-up and coordination of his care will be necessary.     History of Present Illness/Subjective    HPI: Theo Meyers is a 77 year old male history of congestive heart failure, multiple myeloma, pulmonary hypertension, aortic valve stenosis who presents to cardiology rapid access clinic for dyspnea on exertion.    The patient has been  "experiencing progressive dyspnea on exertion and chest pain with minimal activity level.  With any prolonged activity such as walking he developed severe pressure across his chest along with shortness of breath.  Symptoms last for a few minutes and require him to immediately rest.  He is also been noticing severe PND symptoms.  At night he has to rapidly sit up has severe shortness of breath and chest pressure.  With standing and sitting upright in a chair for a few minutes his symptoms gradually improved.  He has mild lower extremity edema.    He is undergone extensive work-up with [facility].  His right heart cath recently was reviewed in more detail.  There was concern about low-flow low gradient aortic stenosis on recent echocardiogram.  He also underwent a right and left heart cath this year.  Reviewed his left heart cath in detail including recalculating the valve area which is in the severe category by Gorlin equation.  Pulmonary hypertension was also reviewed in detail with the patient and also with Dr. Garland.    Concern is his aortic stenosis has progressed.  He is now in decompensated heart failure with low output.  Concern remains his multiple myeloma and the prognosis which is difficult to determine.  His oncology team remains concerned about the severity of his cardiac condition and ability to proceed with treatment which is necessary.        Physical Examination  Review of Systems   Vitals: /54 (BP Location: Right arm, Patient Position: Sitting, Cuff Size: Adult Regular)   Pulse 62   Resp 16   Ht 1.727 m (5' 8\")   Wt 71.7 kg (158 lb)   BMI 24.02 kg/m    BMI= Body mass index is 24.02 kg/m .  Wt Readings from Last 3 Encounters:   09/22/22 71.7 kg (158 lb)   09/21/22 71.5 kg (157 lb 9.6 oz)   09/19/22 72.3 kg (159 lb 4.8 oz)        Pleasant   ENT/Mouth: membranes moist, no oral lesions or bleeding gums.      EYES:  no scleral icterus, normal conjunctivae       Chest/Lungs:   lungs are " clear to auscultation, faint rales or wheezing, no sternal scar, equal chest wall expansion    Cardiovascular:   Irregular. Normal first and second heart sounds with 3-6 mid to late peaking systolic murmur. No rubs, or gallops; the carotid, radial and posterior tibial pulses are intact, Jugular venous pressure 8-10, mild pitting edema bilaterally    Abdomen:  no tenderness; bowel sounds are present   Extremities: no cyanosis or clubbing   Skin: no xanthelasma, warm.    Neurologic: normal  bilateral, no tremors     Psychiatric: alert and oriented x3, calm        Please refer above for cardiac ROS details.        Medical History  Surgical History Family History Social History   Past Medical History:   Diagnosis Date     Anemia 12/13/2017     Arthritis      Bilateral inguinal hernia      Chest tube in place      Elevated INR      Glaucoma      Glaucoma      History of blood clots     DVT - left chronic     Multiple myeloma (H)     Gets chemo infusions every 2 weeks     Pancytopenia (H)      Parapneumonic effusion      Peripheral neuropathy      Shingles 9/24/2014     Syncope      TMJ (temporomandibular joint disorder)      Past Surgical History:   Procedure Laterality Date     APPENDECTOMY      Age 16     CV CORONARY ANGIOGRAM N/A 5/20/2022    Procedure: Coronary Angiogram;  Surgeon: Christopher Bobo MD;  Location:  HEART CARDIAC CATH LAB     CV CORONARY ANGIOGRAM  5/20/2022    Procedure: ;  Surgeon: Christopher Bobo MD;  Location:  HEART CARDIAC CATH LAB     CV LEFT HEART CATH N/A 5/20/2022    Procedure: Left Heart Catheterization;  Surgeon: Christopher Bobo MD;  Location:  HEART CARDIAC CATH LAB     CV RIGHT HEART CATH MEASUREMENTS RECORDED N/A 5/20/2022    Procedure: Right Heart Catheterization;  Surgeon: Christopher Bobo MD;  Location:  HEART CARDIAC CATH LAB     CV RIGHT HEART CATH MEASUREMENTS RECORDED N/A 6/23/2022    Procedure: Heart Cath Right Heart Cath;  Surgeon: Lissy  Jostin Sheehan MD;  Location:  HEART CARDIAC CATH LAB     ESOPHAGOSCOPY, GASTROSCOPY, DUODENOSCOPY (EGD), COMBINED N/A 2017    Procedure: ESOPHAGOGASTRODUODENOSCOPY (EGD) WITH BIOPSYS;  Surgeon: Marlon Roy MD;  Location: [facility];  Service:      ESOPHAGOSCOPY, GASTROSCOPY, DUODENOSCOPY (EGD), COMBINED N/A 2018    Procedure: ENDOSCOPIC ULTRASOUND  ESOPHAGOGASTRODUODENOSCOPY WITH DUODENAL POLYP REMOVAL;  Surgeon: Familia Mcgraw MD;  Location: [facility] OR;  Service:      EYE SURGERY      Cataract     INSERT PICC LINE Left 2022    Procedure: INSERTION, PICC;  Surgeon: Shabbir Bueno MD;  [place] [facility] [place]     IR MISCELLANEOUS PROCEDURE  2009     IR MISCELLANEOUS PROCEDURE  2009     IR MISCELLANEOUS PROCEDURE  2009     IR PICC PLACEMENT > 5 YRS OF AGE  2022     IR PLEURAL DRAINAGE WITH CATHETER INSERTION  2019     PORTACATH PLACEMENT       RELEASE CARPAL TUNNEL Bilateral      US THORACENTESIS  2019     VERTEBROPLASTY      T6     WISDOM TOOTH EXTRACTION       Family History   Problem Relation Age of Onset     Heart Disease Mother      Glaucoma Mother      Cancer Father      No Known Problems Sister      Cancer Brother      Pancreatic Cancer Brother      No Known Problems Brother      Cancer Daughter      Skin Cancer Daughter      Melanoma Daughter      Glaucoma Daughter         Social History     Socioeconomic History     Marital status:      Spouse name: Not on file     Number of children: Not on file     Years of education: Not on file     Highest education level: Not on file   Occupational History     Not on file   Tobacco Use     Smoking status: Former Smoker     Quit date: 1975     Years since quittin.8     Smokeless tobacco: Never Used   Substance and Sexual Activity     Alcohol use: Not Currently     Comment: Alcoholic Drinks/day: occasional     Drug use: No     Sexual activity: Never   Other Topics Concern     Not on file    Social History Narrative     Not on file     Social Determinants of Health     Financial Resource Strain: Not on file   Food Insecurity: Not on file   Transportation Needs: Not on file   Physical Activity: Not on file   Stress: Not on file   Social Connections: Not on file   Intimate Partner Violence: Not At Risk     Fear of Current or Ex-Partner: No     Emotionally Abused: No     Physically Abused: No     Sexually Abused: No   Housing Stability: Not on file           Medications  Allergies   Current Outpatient Medications   Medication Sig Dispense Refill     acetaminophen (TYLENOL) 500 MG tablet Take 500 mg by mouth every 8 hours as needed        acyclovir (ZOVIRAX) 400 MG tablet Take 1 tablet (400 mg) by mouth 2 times daily Take on first day of daratumumab dose and continue for 3 months after treatment is complete. 60 tablet 11     ascorbic acid 1000 MG TABS tablet Take 1,000 mg by mouth daily Pt taking 1000 mg       atorvastatin (LIPITOR) 10 MG tablet Take 1 tablet (10 mg) by mouth daily 90 tablet 3     bimatoprost (LUMIGAN) 0.03 % ophthalmic solution Place 1 drop into both eyes At Bedtime.       brimonidine (ALPHAGAN P) 0.1 % ophthalmic solution Place 1 drop into both eyes every 8 hours.       calcium carbonate 1250 (500 Ca) MG CHEW Take 1 tablet by mouth daily as needed (upset stomach)       calcium carbonate-vitamin D (OYSTER SHELL CALCIUM/D) 500-200 MG-UNIT tablet Take 1 tablet by mouth daily        cetirizine (ZYRTEC) 10 MG tablet Take 10 mg by mouth every evening       fluconazole (DIFLUCAN) 200 MG tablet Take 1 tablet (200 mg) by mouth daily 30 tablet 1     fluticasone (FLONASE) 50 MCG/ACT nasal spray Spray 2 sprays into both nostrils daily       furosemide (LASIX) 20 MG tablet TAKE 2 TABLETS(40 MG) BY MOUTH DAILY 180 tablet 1     gabapentin (NEURONTIN) 300 MG capsule Take 1 capsule (300 mg) by mouth At Bedtime 30 capsule 3     levofloxacin (LEVAQUIN) 500 MG tablet Take 1 tablet (500 mg) by mouth daily  30 tablet 3     magnesium oxide (MAG-OX) 400 MG tablet Take 400 mg by mouth 2 times daily       Multiple Vitamin (MULTI-VITAMIN PO) Take 1 capsule by mouth daily.       omeprazole (PRILOSEC) 20 MG DR capsule TAKE 1 CAPSULE BY MOUTH ONCE DAILY 90 capsule 2     potassium chloride ER (KLOR-CON M) 20 MEQ CR tablet Take 1 tablet (20 mEq) by mouth four times a week On Saturday, Sunday, Tuesday, Thursday 20 tablet 3     Psyllium (METAMUCIL PO) Take by mouth daily USE AS DIRECTED       tamsulosin (FLOMAX) 0.4 MG capsule TAKE 1 CAPSULE BY MOUTH EVERY DAY 90 capsule 2     XARELTO ANTICOAGULANT 20 MG TABS tablet  (Patient not taking: Reported on 9/22/2022)         Allergies   Allergen Reactions     Blood Transfusion Related (Informational Only) Other (See Comments)     Patient has a history of a clinically significant antibody against RBC antigens.  A delay in compatible RBCs may occur.           Lab Results    Chemistry/lipid CBC Cardiac Enzymes/BNP/TSH/INR   Recent Labs   Lab Test 10/05/21  1651   CHOL 160   HDL 51   LDL 95   TRIG 71     Recent Labs   Lab Test 10/05/21  1651 10/16/15  1243   LDL 95 70     Recent Labs   Lab Test 09/21/22  1027      POTASSIUM 4.6   CHLORIDE 103   CO2 29   GLC 84   BUN 23.4*   CR 1.64*   GFRESTIMATED 43*   NORMAN 9.0     Recent Labs   Lab Test 09/21/22  1027 09/16/22  1259 09/15/22  0646   CR 1.64* 1.41* 1.45*     No results for input(s): A1C in the last 24650 hours.       Recent Labs   Lab Test 09/21/22  1027   WBC 0.7*   HGB 10.0*   HCT 30.8*   *   PLT 35*     Recent Labs   Lab Test 09/21/22  1027 09/16/22  1259 09/15/22  0646   HGB 10.0* 8.1* 8.3*    Recent Labs   Lab Test 09/10/22  2158 09/10/22  1928 03/17/22  1418   TROPONINI 0.13 0.14 0.05     Recent Labs   Lab Test 09/10/22  1928 06/23/22  1039 06/01/22  0820 05/25/22  0844 05/20/22  1255 05/02/22  1130 04/13/22  0816 04/06/22  0809 03/28/22  1033   *  --  211*  --   --   --  207*  --   --    NTBNPI  --  1,779  --   1,240 5,262*  --   --   --   --    NTBNP  --   --   --   --   --  1,042  --  1,805* 3,039*     Recent Labs   Lab Test 09/21/22  1027   TSH 7.60*     Recent Labs   Lab Test 09/10/22  1928 06/23/22  1039 03/14/22  1619   INR 0.91 1.15 1.36*        Bernardo Rincon,   Today's clinic visit entailed:  75 minutes spent on the date of the encounter doing chart review, history and exam, documentation and further activities per the note.

## 2022-09-23 NOTE — TELEPHONE ENCOUNTER
----- Message from Bernardo Rincon DO sent at 9/23/2022  1:21 PM CDT -----  Regarding: Referral for highly symptomatic aortic stenosis.  Case was reviewed with Dr. Garland yesterday who agrees there is severe low-flow low gradient aortic stenosis.  Patient is quite symptomatic.  Kinga can we arrange urgent CT scan TAVR protocol.  I would also like to have him in valve clinic next available if possible.  Discussed this with the patient in detail.  He agrees with proceeding with CT scan and valve clinic referral.    Thanks,     Maury

## 2022-09-26 NOTE — TELEPHONE ENCOUNTER
PC with patient and discussion of plan of care. Per MAT first to have an urgent CT scan per TAVR protocol. Discussion with TAVR/Valve clinic team, order CTA scan for TAVR at . Must ask MAT if pre-CTA hydration per TAVR protocol. Will discuss with provider, and then return call to patient. Also, referral to valve clinic placed. LYUBOVRn     Dr. Rincon order needed for pt is a CTA can for pre-TAVR at . Please review labs. Does patient need hydration protocol followed: IVF NS @ 100cc/hour for 20 hours pre and post CTA? Thank you LYUBOV,Rn

## 2022-09-26 NOTE — TELEPHONE ENCOUNTER
----- Message from Ashli Cosby sent at 9/26/2022  9:59 AM CDT -----  Regarding: IRVIN PATIENT  General phone call:    Caller: PATIENT    Primary cardiologist: IRVIN    Detailed reason for call: PATIENT WOULD LIKE YOU TO CALL HIM, HE HAS QUESTIONS RE: A PROCEDURE HE WILL BE HAVING AN DWHAT IS THE NEXT STEP. PLEASE CALL PATIENT.     Best phone number: [phone]    Best time to contact: ANY    Ok to leave a detailedmessage? YES    Device? NO    Additional Info:

## 2022-09-26 NOTE — TELEPHONE ENCOUNTER
[facility] Call Center    Phone Message    May a detailed message be left on voicemail: no     Reason for Call: Other: Keith is returning a missed phone call, please reach back out to him at [phone].     Action Taken: Other: [facility]    Travel Screening: Not Applicable

## 2022-09-27 NOTE — TELEPHONE ENCOUNTER
===View-only below this line===  ----- Message -----  From: Bernardo Rincon DO  Sent: 9/26/2022   5:29 PM CDT  To: Kashif Ortega RN    Would recommend patient hold lasix to day of CTA and increase oral hydration that day. Given CHF would prefer to not give IV fluids.  Should have renal function check a 2-3 days prior to testing.

## 2022-09-27 NOTE — TELEPHONE ENCOUNTER
Spoke with patient and informed of plan of care. CTA order placed. Staff message sent to procedural  pool to have arranged at . Pt prefers to have the lab checked at  [facility]CC by appt. Discussion with patient the day before CTA, increase his oral hydration by 1-2 extra glasses of water. Morning of CTA hold his furosemide medication. Hydrate well before and after CTA. Following day resume furosemide per usual routine. Wife overheard conversation, and verbalized understanding. Pt also. Pt requests this to be sent via [employer] for clarity. Informed patient Valve Clinic is aware of patient referral., direct # [phone].     My chart message sent. Staff message sent to schedulers to arrange the CTA and lab appt. CMM,Rn

## 2022-09-27 NOTE — TELEPHONE ENCOUNTER
===View-only below this line===  ----- Message -----  From: Bernardo Rincon DO  Sent: 9/26/2022   9:50 AM CDT  To: Kashif Ortega RN  Subject: FW: Handicapped Parking                          Can we get a form and I can fill it out.        Thanks,     Maury

## 2022-09-27 NOTE — TELEPHONE ENCOUNTER
Form received. PC with patient and review of paperwork. Informed patient once completed. Receipt of paperwork via US mail. No further quesitons at this time. LYUBOV,Rn

## 2022-09-29 NOTE — TELEPHONE ENCOUNTER
CTA scheduled for 10/4. Labs to check kidney function tomorrow. Await valve clinic to see patient/arrange appointment. Disability/handicap parking papers completed. Mailed to patient home. CMM,Rn     Patient has Valve Clinic clinic appointment arranged in November. CMM,Rn

## 2022-09-30 NOTE — PROGRESS NOTES
PT here ambulatory for possible blood transfusion. Pt reports fatigue and shortness of breath. Bruising around eyes noted. hgb today 8.0 and per orders pt to have one unit prbc. Platelets 29 and pt does not meet orders for platelets. At 11 blood bank called reporting pt has antibodies and transfusion is unable to be done today. [place] extra lab tubes for blood bank and port flushed/deaccessed with 2x2 to site. Follow up reviewed/reviewed with pt to leave id band on till after transfusion on Monday. PT will return Monday am for transfusion.pt dc'd steady gait.

## 2022-10-03 NOTE — PROGRESS NOTES
Infusion Nursing Note:  Theo Meyers presents today for Blood Transfusion 1unit PRBC.    Patient seen by provider today: No    Note: Pt arrives ambulatory to [facility] infusion for blood transfusion that was planned on Friday 9/30, due to further blood typing needed. Pt reports not having slept last night and feeling very tired/fatigued. Pt reports ongoing known chest pain; pt reports scheduled appt to evaluate known valve problem. Pt reports eating well, with normal bowel and bladder function.    Intravenous Access:  Implanted Port.    Treatment Conditions:  Lab Results   Component Value Date    HGB 8.0 (L) 09/30/2022    WBC 1.4 (L) 09/30/2022    ANEU 0.2 (LL) 09/21/2022    ANEUTAUTO 1.0 (L) 09/30/2022    PLT 29 (LL) 09/30/2022      Per Blood Therapy plan parameters pt to receive 1 unit PRBC.     + notified by Blood Bank that  (Pathologist) was contacted for blood typing, due to previous Brianne. Reported that ABO O+ K- irradiated was the least incompatible, and to notify and approve through .  called at 0826 and updated, and directed to proceed with O+ K- irradiated blood.    Blood Consent signed 8/4/22.    Post Infusion Assessment:  Patient tolerated transfusion without incident. VSS. Pt was able to sleep during transfusion.    Site patent and intact, free from redness, edema or discomfort.  No evidence of extravasations.  Access discontinued per protocol.     Discharge Plan:   Pt to return Wednesday for labs and visit with CNP.  Patient discharged in stable condition accompanied by: self.  Departure Mode: Ambulatory.      Ofelia Cadena RN

## 2022-10-05 NOTE — LETTER
"    10/5/2022         RE: Theo Meyers  [address]  [facility] [address]        Dear Colleague,    Thank you for referring your patient, Theo Meyers, to the [facility] [place]. Please see a copy of my visit note below.    Oncology Rooming Note    October 5, 2022 10:14 AM   Theo Meyers is a 77 year old male who presents for:    Chief Complaint   Patient presents with     Hematology     Anemia      Initial Vitals: BP 96/51   Pulse 61   Temp 97.6  F (36.4  C)   Resp 20   Wt 74 kg (163 lb 1.6 oz)   SpO2 100%   BMI 24.80 kg/m   Estimated body mass index is 24.8 kg/m  as calculated from the following:    Height as of 9/22/22: 1.727 m (5' 8\").    Weight as of this encounter: 74 kg (163 lb 1.6 oz). Body surface area is 1.88 meters squared.  Severe Pain (6) Comment: Data Unavailable   No LMP for male patient.  Allergies reviewed: Yes  Medications reviewed: Yes    Medications: Medication refills not needed today.  Pharmacy name entered into [employer] [employer]:    [id number] - [place], [place] - [address] AT [facility] [place] [address]  [facility] [place], [place] - [address] [facility] [place], [place] - [address] [facility]    Clinical concerns: None       Elizabeth Connell LPN              [facility] Hematology and Oncology Progress Note    Patient: Theo Meyers  MRN: [id number]  Date of Service: Oct 5, 2022          Reason for Visit    Chief Complaint   Patient presents with     Hematology     Anemia        Assessment and Plan    Cancer Staging  No matching staging information was found for the patient.    1.  Myeloma, kappa light chain disease: Patient had natural killer cell therapy at the [facility] Dr. Guan.  This does not appear to have helped his myeloma unfortunately.  He had his myeloma labs drawn last week and his light chains are significantly elevated which is very worrisome.  " Unfortunately patient's performance status and his heart is preventing us from really doing any significant treatment.  Patient is also having some pancytopenia issues which may be from his myeloma or his previous treatment.  Last dose of chemo in July, last dose of Daratumumab 9/8. At this point patient would like to pursue more treatment for his myeloma if possible.  Per Dr. Clifford he feels like he needs to have his heart fixed before we can do that.  I will have patient see Dr. Clifford in 2 weeks to evaluate what is been going on and to see if we can pursue some treatment.  Some treatment options that Dr. Guan suggested were: Selinexor/Dex, CyBorD, Ninlaro/Dex     2. DVT: This is recurrent.    Is been on Xarelto but we have been holding it since his platelets have been less than 50.     3.  Pancytopenia: Unclear if this is from his disease or all of his previous treatment.  He has not really had any major treatment for a while now.  Fludarabine/Cytoxan, daratumumab and NK cell therapy was done at the end of July.  That has now been over 8 weeks ago.  He did have a little bit of an improvement in his counts last week but today they are worse again.  I am slightly concerned that some of his pancytopenia may be from his myeloma.  We may have to do a bone marrow biopsy at some point to evaluate.  For right now we will do weekly labs on patient.  He knows about neutropenic precautions.  We want to keep his hemoglobin above 8 since he has some any heart issues.  He did get 1 unit of blood on Monday.  No need for any products today.  Encourage patient to call us with any worsening symptoms especially infection, bruising or bleeding    4.  Aortic valve stenosis: Patient is quite symptomatic, likely from this issue.  He has significant chest pain even at rest.  He also has a lot of dyspnea on exertion.  He did see cardiology about a week and a half ago.  He had a CT of his heart yesterday and is waiting an appointment  tomorrow.  Is hoping to get a TAVR.  If that is the case the sooner this gets done the better so we can try to pursue some treatment for his myeloma.  Other than his shortness of breath and chest issues he has a good performance status.    5.  Pulmonary artery hypertension: Patient may need to see someone at the [facility] for this.  At this point we can hold off until his heart issues hopefully improved.    6.  Prophylaxis: Patient continues on acyclovir and levofloxacin.  Would like to continue these until his neutrophil count is consistently above 500.  He also I believe was started on pentamadine at the [facility].  His last dose was September 15.  He likely should be getting that monthly.     ECOG Performance    0 - Independent    Distress Screening (within last 30 days)    No data recorded     Pain  Pain Score: Severe Pain (6)    Problem List    Patient Active Problem List   Diagnosis     Multiple myeloma (H)     Unspecified glaucoma     Cataract     Shingles (herpes zoster) polyneuropathy     Back pain     Post herpetic neuralgia     Pancytopenia (H)     Syncope and collapse     History of DVT (deep vein thrombosis)     Spinal stenosis of lumbar region without neurogenic claudication     Anemia, vitamin B12 deficiency     Chemotherapy-induced neutropenia (H)     Thrombocytopenia (H)     Persistent atrial fibrillation (H)     Nonrheumatic aortic valve stenosis     SAMANO (dyspnea on exertion)     Immunocompromised state (H)     Neutropenic fever (H)     Personal history of DVT (deep vein thrombosis)     Pneumonia of both lungs due to infectious organism, unspecified part of lung     Acute heart failure with preserved ejection fraction (HFpEF) (H)     Moderate pulmonary hypertension (H)     Status post coronary angiogram     Chronic kidney disease, stage 3a (H)     COPD (chronic obstructive pulmonary disease) (H)     Multiple myeloma in relapse (H)     Stem cells transplant status (H)         ______________________________________________________________________________    History of Present Illness    DIAGNOSIS:   1. IgG kappa light chain myeloma. Hyposecretory. Diagnosed 2009.   He presented at diagnosis with a lytic lesion involving the C6 vertebral body, although no measurable M protein. IgG kappa monoclonal immunoglobulin was confirmed by ELMA as well as elevated kappa free light chains with an abnormal kappa lambda ratio. Bone marrow biopsy showed 30% involvement and cytogenetics confirmed deletion of 13 q. as well as 11;14 translocation.    2. Deep vein thrombosis of the left leg diagnosed 09/2012 while on treatment.       CURRENT TREATMENT:   Observation.  Patient last had treatment at the [facility] with fludarabine and Cytoxan given on July 22.  Patient then had infusion of  NK cellular therapy on July 25.  Patient also went on to have weekly daratumumab subcu. Last dose 9/8/22.          PAST TREATMENT and HISTORY:    -He was initially treated with Velcade and dexamethasone and achieved a good partial response.     -He was referred to the [facility] where he underwent high-dose chemotherapy with autologous peripheral stem cell transplant in April of 2010.     -He began Revlimid as maintenance therapy post transplant.     -Repeat bone marrow biopsy done October 2010 showed about 5% kappa restricted plasma cells and so at that time weekly Velcade was added along with his maintenance Revlimid and dexamethasone. He required dose reductions of weekly Velcade because of cytopenias and was ultimately switched to a q.2 week maintenance schedule which he has been on since September 2012. His Revlimid dose is 20 mg daily and he continues on dexamethasone 20 mg p.o. q. Week.  Was on this until October 2020 when he had signs of progression.  His Revlimid dose was reduced to 15 mg daily and he continues on dexamethasone 20 mg p.o. weekly  Revlimid dosing changed to 20 mg,  3 weeks on 1 week (instead of 15 mg daily) for cost reasons. Started March, 2018     Started daratumumab/pomalidomide/dexamethasone (20mg every 14 days) on October 14, 2020.     Carfilzomib/dexamethasone started 4/6/22. Days 1, 2, 8, 9, 15, 16     Flu/Cy/Brianne  (allogeneic cell therapy/NK cell)  7/25/2022 clinical trial    Progressed on:  bortezomib  lenalidomide        INTERIM HISTORY:     Pt is here today for follow up.  He is doing poorly. Mainly issues with chest pain, SOB. Ongoing issues. Causing him to not sleep well. Hoping heart clinic will be able to help him. Unable to walk and do any exercise because of it. Denies pain. Denies bleeding. Having bruising. No fevers/infectious complaints. Pt very anxious about worsening myeloma.     Review of Systems    Pertinent items are noted in HPI.    Past History    Past Medical History:   Diagnosis Date     Anemia 12/13/2017     Arthritis      Bilateral inguinal hernia      Chest tube in place      Elevated INR      Glaucoma      Glaucoma      History of blood clots     DVT - left chronic     Multiple myeloma (H)     Gets chemo infusions every 2 weeks     Pancytopenia (H)      Parapneumonic effusion      Peripheral neuropathy      Shingles 9/24/2014     Syncope      TMJ (temporomandibular joint disorder)        PHYSICAL EXAM  BP 96/51   Pulse 61   Temp 97.6  F (36.4  C)   Resp 20   Wt 74 kg (163 lb 1.6 oz)   SpO2 100%   BMI 24.80 kg/m      GENERAL: no acute distress. Cooperative in conversation. Here with wife today. Mask on  RESP: Regular respiratory rate. No expiratory wheezes   MUSCULOSKELETAL: no bilateral leg swelling  NEURO: non focal. Alert and oriented x3.   PSYCH: within normal limits. No depression or anxiety.  SKIN: exposed skin is dry intact.  Some bruising around eyes.       Lab Results    Recent Results (from the past 168 hour(s))   Comprehensive metabolic panel   Result Value Ref Range    Sodium 137 136 - 145 mmol/L    Potassium 4.8 3.4 - 5.3  mmol/L    Chloride 101 98 - 107 mmol/L    Carbon Dioxide (CO2) 27 22 - 29 mmol/L    Anion Gap 9 7 - 15 mmol/L    Urea Nitrogen 42.7 (H) 8.0 - 23.0 mg/dL    Creatinine 1.93 (H) 0.67 - 1.17 mg/dL    Calcium 8.4 (L) 8.8 - 10.2 mg/dL    Glucose 107 (H) 70 - 99 mg/dL    Alkaline Phosphatase 105 40 - 129 U/L    AST 26 10 - 50 U/L    ALT 57 (H) 10 - 50 U/L    Protein Total 4.8 (L) 6.4 - 8.3 g/dL    Albumin 3.4 (L) 3.5 - 5.2 g/dL    Bilirubin Total 0.3 <=1.2 mg/dL    GFR Estimate 35 (L) >60 mL/min/1.73m2   Magnesium   Result Value Ref Range    Magnesium 1.9 1.7 - 2.3 mg/dL   Phosphorus   Result Value Ref Range    Phosphorus 4.2 2.5 - 4.5 mg/dL   Lactate Dehydrogenase   Result Value Ref Range    Lactate Dehydrogenase 169 0 - 250 U/L   Bilirubin direct   Result Value Ref Range    Bilirubin Direct <0.20 0.00 - 0.30 mg/dL   Protein Immunofixation Serum   Result Value Ref Range    Immunofixation ELP       Monoclonal free immunoglobulin light chain of kappa chain type.  Monoclonal IgG immunoglobulin of kappa light chain type. Monoclonal antibody therapeutics (e.g. Daratumumab) may appear as monoclonal proteins on serum electrophoresis and immunofixation. Results should be interpreted with caution.  Pathologic significance requires clinical correlation. Waldemar Enamorado MD   Immunoglobulins A G and M   Result Value Ref Range    Immunoglobulin G 186 (L) 610 - 1,616 mg/dL    Immunoglobulin A <2 (L) 84 - 499 mg/dL    Immunoglobulin M <10 (L) 35 - 242 mg/dL   Protein electrophoresis timed urine   Result Value Ref Range    Albumin Urine 20.6 (H) <=0.0 %    Alpha 1 Urine 2.5 (H) <=0.0 %    Alpha 2 Urine 5.7 (H) <=0.0 %    Beta Globulin Urine 58.4 (H) <=0.0 %    Gamma Globulin Urine 12.8 (H) <=0.0 %    Monocloncal Peak Urine % 25.7 (H) <=0.0 %    ELP Interpretation Urine       Albumin and globulins seen. A monoclonal protein (25.7%) is seen in the beta fraction. A possible second small monoclonal protein is also present. See  immunofixation report on this sample.  Pathologic significance requires clinical correlation. Waldemar Enamorado MD   Protein immunofixation urine   Result Value Ref Range    Immunofixation ELP Urine       Two monoclonal free immunoglobulin light chains of kappa type.  Pathologic significance requires clinical correlation. Waldemar Enamorado MD   Hasty and lambda light chain   Result Value Ref Range    Kappa Free Light Chains 152.02 (H) 0.33 - 1.94 mg/dL    Lambda Free Light Chains 0.18 (L) 0.57 - 2.63 mg/dL    Kappa /Lambda Ratio 844.56 (H) 0.26 - 1.65   CBC with platelets and differential   Result Value Ref Range    WBC Count 1.4 (L) 4.0 - 11.0 10e3/uL    RBC Count 2.38 (L) 4.40 - 5.90 10e6/uL    Hemoglobin 8.0 (L) 13.3 - 17.7 g/dL    Hematocrit 24.7 (L) 40.0 - 53.0 %     (H) 78 - 100 fL    MCH 33.6 (H) 26.5 - 33.0 pg    MCHC 32.4 31.5 - 36.5 g/dL    RDW 16.3 (H) 10.0 - 15.0 %    Platelet Count 29 (LL) 150 - 450 10e3/uL    % Neutrophils 75 %    % Lymphocytes 6 %    % Monocytes 16 %    % Eosinophils 2 %    % Basophils 0 %    % Immature Granulocytes 1 %    NRBCs per 100 WBC 0 <1 /100    Absolute Neutrophils 1.0 (L) 1.6 - 8.3 10e3/uL    Absolute Lymphocytes 0.1 (L) 0.8 - 5.3 10e3/uL    Absolute Monocytes 0.2 0.0 - 1.3 10e3/uL    Absolute Eosinophils 0.0 0.0 - 0.7 10e3/uL    Absolute Basophils 0.0 0.0 - 0.2 10e3/uL    Absolute Immature Granulocytes 0.0 <=0.4 10e3/uL    Absolute NRBCs 0.0 10e3/uL   Total Protein, Serum for ELP   Result Value Ref Range    Total Protein Serum for ELP 4.5 (L) 6.4 - 8.3 g/dL   Protein Electrophoresis, Serum   Result Value Ref Range    Albumin 2.9 (L) 3.7 - 5.1 g/dL    Alpha 1 0.3 0.2 - 0.4 g/dL    Alpha 2 0.6 0.5 - 0.9 g/dL    Beta Globulin 0.5 (L) 0.6 - 1.0 g/dL    Gamma Globulin 0.2 (L) 0.7 - 1.6 g/dL    Monoclonal Peak 0.1 (H) <=0.0 g/dL    ELP Interpretation       Small monoclonal protein (0.1 g/dL) seen in the gamma fraction. Very small monoclonal protein (0.1 g/dL) seen in  the alpha 2 fraction. See immunofixation report on same specimen. Hypoalbuminemia. Decreased beta fraction. Marked hypogammaglobulinemia. Pathologic significance requires clinical correlation. Waldemar Enamorado MD   Adult Type and Screen   Result Value Ref Range    Antibody Screen Positive (A) Negative    SPECIMEN EXPIRATION DATE 20221003235900    Antibody Screen (Reflex)   Result Value Ref Range    ANTIBODY SCREEN, TUBE POS     SPECIMEN EXPIRATION DATE 20221003235900    ABO/RH Type & Screen   Result Value Ref Range    SPECIMEN EXPIRATION DATE [id number]     ABORH A POS    Prepare red blood cells (unit)   Result Value Ref Range    Blood Component Type Red Blood Cells     Product Code O4850Y91     Unit Status Released     Unit Number [id number]     UNIT ABO/RH O+     CROSSMATCH LEAST INCOMPATIBLE     CODING SYSTEM [id number]     UNIT TYPE ISBT 5100    Other Laboratory; MBC; Reference Lab Workup (Laboratory Miscellaneous Order)   Result Value Ref Range    Performing Laboratory MBC     Test Name Reference Lab Workup     Test Code     Extra Red Top Tube   Result Value Ref Range    Hold Specimen JIC    Extra Purple Top Tube   Result Value Ref Range    Hold Specimen JIC    Prepare red blood cells (unit)   Result Value Ref Range    ISSUE DATE AND TIME [id number]     Blood Component Type Red Blood Cells     Product Code Q4975C49     Unit Status Transfused     Unit Number [id number]     UNIT ABO/RH O+     CROSSMATCH LEAST INCOMPATIBLE     CODING SYSTEM [id number]     UNIT TYPE ISBT 5100    CBC with platelets and differential   Result Value Ref Range    WBC Count 0.7 (LL) 4.0 - 11.0 10e3/uL    RBC Count 2.67 (L) 4.40 - 5.90 10e6/uL    Hemoglobin 8.8 (L) 13.3 - 17.7 g/dL    Hematocrit 26.9 (L) 40.0 - 53.0 %     (H) 78 - 100 fL    MCH 33.0 26.5 - 33.0 pg    MCHC 32.7 31.5 - 36.5 g/dL    RDW 16.9 (H) 10.0 - 15.0 %    Platelet Count 30 (LL) 150 - 450 10e3/uL    NRBCs per 100 WBC 0 <1 /100    Absolute NRBCs 0.0  10e3/uL     Reviewed myeloma labs.  [place]well light chains  Lab Results   Component Value Date    KFLCA 152.02 (H) 09/30/2022    KFLCA 87.74 (H) 09/08/2022    KFLCA 84.26 (H) 08/22/2022    KFLCA 108.57 (H) 07/14/2022    KFLCA 88.26 (H) 06/30/2022    KFLCA 29.02 (H) 05/25/2022    KFLCA 38.53 (H) 04/27/2022    KFLCA 100.90 (H) 04/06/2022    KFLCA 57.39 (H) 02/23/2022    KFLCA 56.66 (H) 01/26/2022     kappa/lambda ratio  Lab Results   Component Value Date    KLRA 844.56 (H) 09/30/2022    KLRA 516.12 (H) 09/08/2022    KLRA 443.47 (H) 08/22/2022    KLRA 517.00 (H) 07/14/2022    KLRA 519.18 (H) 06/30/2022    KLRA 207.29 (H) 05/25/2022    KLRA 256.87 (H) 04/27/2022    KLRA 347.93 (H) 04/06/2022    KLRA 92.56 (H) 02/23/2022    KLRA 106.91 (H) 01/26/2022   ]  Imaging    Radiologist Consult For Cardiology    Result Date: 10/4/2022  OVERREAD: DETAILED [place] RADIOLOGY EXTRACARDIAC OVERREAD OF CARDIAC CT LOCATION: [facility] DATE/TIME: 10/4/2022 1:19 PM INDICATION:  Aortic valve stenosis, etiology of cardiac valve disease unspecified, SAMANO (dyspnea on exertion), Angina at rest (H) TECHNIQUE: Dose reduction techniques were used. COMPARISON: 09/10/2022 FINDINGS:  LIMITED CHEST: Small right greater than left pleural effusions with atelectasis.  Left pleural effusions are partially loculated. Low attenuation in the cardiac ventricles. LIMITED MEDIASTINUM: Right chest wall Port-A-Cath. LIMITED ABDOMEN: Atherosclerotic disease.  Nonobstructing 3 mm calculus at the lower pole of the right kidney. LIMITED PELVIS: Trace ascites. LIMITED MUSCULOSKELETAL: T6 vertebroplasty.     IMPRESSION:  1.  Right greater than left pleural effusions with atelectasis. Left pleural fluid is partially loculated. 2.  Low attenuation in the cardiac ventricles could be seen with anemia. 3.  Please refer to cardiologist's dictation for the cardiac CT report.    CT Chest Pulmonary Embolism w Contrast    Result Date: 9/10/2022  EXAM: CT  CHEST PULMONARY EMBOLISM W CONTRAST LOCATION: [facility] DATE/TIME: 9/10/2022 8:26 PM INDICATION: Shortness of breath COMPARISON: PET/CT 08/22/2022 TECHNIQUE: CT chest pulmonary angiogram during arterial phase injection of IV contrast. Multiplanar reformats and MIP reconstructions were performed. Dose reduction techniques were used. CONTRAST: 75ml Isovue 370 FINDINGS: ANGIOGRAM CHEST: Pulmonary arteries are normal caliber and negative for pulmonary emboli. Mid ascending aorta is dilated measuring 4 cm in diameter, but does not meet size criteria for aneurysm. Considerable thickening/calcification of aortic valve leaflets. Mild arch and descending aorta atheromatous calcifications. No findings of acute aortic syndrome. LUNGS AND PLEURA: Symmetric lung inflation. No airspace opacities. Symmetric central airway wall thickening is present. No bronchiectasis. Minimal foci of subpleural atelectasis in the paradiaphragmatic left lower lobe. No pleural effusion. MEDIASTINUM: Chest port catheter terminates near the SVC right atrial junction. Mitral annular calcifications. Mild enlargement of the left atrium and left atrial appendage. No pericardial effusion. No lymphadenopathy in the chest. Esophagus is decompressed. CORONARY ARTERY CALCIFICATION: Severe. UPPER ABDOMEN: Normal. MUSCULOSKELETAL: T6 compression deformity is moderate containing radiopaque cement from prior treatment. Generalized bone demineralization. No other compression deformities. Multilevel disc space calcifications are present. Small lytic lesion at the base  of the right scapular spine (series 6, image 50. No new lytic lesions in the chest.     IMPRESSION: 1.  No acute pulmonary embolism. 2.  Calcific aortic stenosis. Minimal enlargement of the mid ascending aorta measuring 4 cm. 3.  Stable lytic lesion at the base of the right scapular spine consistent with known diagnosis of multiple myeloma.    Total time spent with patient  in face to face time, chart review and documentation was 45 minutes.  Discussed with Dr. Clifford      Signed by: CECELIA Elaine CNP      Again, thank you for allowing me to participate in the care of your patient.        Sincerely,        CECELIA Elaine CNP

## 2022-10-05 NOTE — PROGRESS NOTES
"Oncology Rooming Note    October 5, 2022 10:14 AM   Theo Meyers is a 77 year old male who presents for:    Chief Complaint   Patient presents with     Hematology     Anemia      Initial Vitals: BP 96/51   Pulse 61   Temp 97.6  F (36.4  C)   Resp 20   Wt 74 kg (163 lb 1.6 oz)   SpO2 100%   BMI 24.80 kg/m   Estimated body mass index is 24.8 kg/m  as calculated from the following:    Height as of 9/22/22: 1.727 m (5' 8\").    Weight as of this encounter: 74 kg (163 lb 1.6 oz). Body surface area is 1.88 meters squared.  Severe Pain (6) Comment: Data Unavailable   No LMP for male patient.  Allergies reviewed: Yes  Medications reviewed: Yes    Medications: Medication refills not needed today.  Pharmacy name entered into [facility]:    [id number] - [place], [place] - [address] [facility] [address] [place]  [facility] - [place], [place] - [address] [facility] - [place], [place] - [address] [facility]    Clinical concerns: None       Elizabeth Connell LPN            "

## 2022-10-06 NOTE — PROGRESS NOTES
HEART CARE ENCOUNTER CONSULTATON NOTE      [facility]  [phone]      Assessment/Recommendations   Assessment/Plan:    Severe symptomatic aortic valve stenosis: Given the significant decline in his functional capacity, progressive dyspnea as well as severity of his aortic valve stenosis (confirmed by CTA as well as invasive hemodynamics), Mr. Meyers will benefit from aortic valve replacement.  With his age and multiple comorbidities, he would be a better candidate for transcatheter aortic valve replacement rather than surgery.    The risks, benefits and alternatives of TAVR were reviewed, and he would like to proceed.    Given the rapid progression of his symptoms and onset of congestive heart failure, he will benefit from expedited valve replacement, and his TAVR has tentatively been scheduled for next week.    In the interim, he has been asked to avoid heavy exertion, continue his current medical regimen and knows to call if there is a change in his condition or worsening symptoms.    Chronic atrial fibrillation: Anticoagulation was discontinued due to anemia and thrombocytopenia.  He could be evaluated for left atrial appendage closure in the future.    Thank you for the opportunity to participate in the care of Mr. Meyers.  Please do not hesitate to call with any questions or concerns regarding his cardiovascular status.       History of Present Illness/Subjective    HPI: Theo Meyers is a pleasant 77 year old male with a history of multiple myeloma, pulmonary hypertension, atrial fibrillation (not currently on anticoagulation due to anemia), congestive heart failure and known aortic valve stenosis who has had a decline in his functional capacity with increasing dyspnea on exertion over the past several weeks, and is referred to the valve clinic for consideration of management options for his aortic valve stenosis.    's symptoms include exertional dyspnea as well as  orthopnea and PND.  He had a right and left heart catheterization in the [facility], which was reviewed and discussed with Dr. Rincon, and based on the hemodynamic waveforms and data, he was found to have an aortic valve area of 0.7 cm  consistent with severe aortic valve stenosis.  There was no significant coronary artery disease noted.  His recent echocardiogram from July 2020 was reviewed as well and showed normal left ventricular systolic function, and was read as having moderate to severe aortic valve stenosis with a valve area of 1 cm , and mean gradient of 20 mmHg which was likely due to low flow.    The severity of his aortic valve stenosis was also confirmed on his recent CTA, where planimetry across the aortic valve orifice showed a valve area of only 0.76 cm squire, again consistent with severe aortic valve stenosis.           Physical Examination  Review of Systems   Vitals: /80 (BP Location: Right arm, Patient Position: Sitting, Cuff Size: Adult Regular)   Pulse 78   Resp 14   Wt 73.5 kg (162 lb)   BMI 24.63 kg/m    BMI= Body mass index is 24.63 kg/m .  Wt Readings from Last 3 Encounters:   10/06/22 73.5 kg (162 lb)   10/05/22 74 kg (163 lb 1.6 oz)   09/22/22 71.7 kg (158 lb)       General Appearance:   no distress, normal body habitus, upright.   ENT/Mouth: membranes moist, no nasal discharge or bleeding gums.  Normal head shape, no evidence of injury or laceration.     EYES:  no scleral icterus, normal conjunctivae   Neck: no evidence of thyromegaly.  Supple   Chest/Lungs:   No audible wheezing equal chest wall expansion. Non labored breathing.  No cough.   Cardiovascular:   No evidence of elevated jugular venous pressure.  No evidence of pitting edema bilaterally    Abdomen:  no evidence of abdominal distention. No observe juandice.     Extremities: no cyanosis or clubbing noted.    Skin: no xanthelasma, normal skin color. No evidence of facial lacerations.      Neurologic:  Normal arm motion bilateral, no tremors.  No evidence of focal defect.       Psychiatric: alert and oriented x3, calm        Please refer above for cardiac ROS details.        Medical History  Surgical History Family History Social History   Past Medical History:   Diagnosis Date     Anemia 12/13/2017     Arthritis      Bilateral inguinal hernia      Chest tube in place      Elevated INR      Glaucoma      Glaucoma      History of blood clots     DVT - left chronic     Multiple myeloma (H)     Gets chemo infusions every 2 weeks     Pancytopenia (H)      Parapneumonic effusion      Peripheral neuropathy      Shingles 9/24/2014     Syncope      TMJ (temporomandibular joint disorder)      Past Surgical History:   Procedure Laterality Date     APPENDECTOMY      Age 16     CV CORONARY ANGIOGRAM N/A 5/20/2022    Procedure: Coronary Angiogram;  Surgeon: Christopher Bobo MD;  Location:  [facility]     CV CORONARY ANGIOGRAM  5/20/2022    Procedure: ;  Surgeon: Christopher Bobo MD;  Location:  HE[facility] CARD[facility] CATH LAB     CV LEFT HEART CATH N/A 5/20/2022    Procedure: Left Heart Catheterization;  Surgeon: Christopher Bobo MD;  [facility]     CV RIGHT HEART CATH MEASUREMENTS RECORDED N/A 5/20/2022    Procedure: Right Heart Catheterization;  Surgeon: Christopher Bobo MD;  Location:  HEART CARDIAC CATH LAB     CV RIGHT HEART CATH MEASUREMENTS RECORDED N/A 6/23/2022    Procedure: Heart Cath Right Heart Cath;  Surgeon: Jostin Yuan MD;  Location:  HEART CARDIAC CATH LAB     ESOPHAGOSCOPY, GASTROSCOPY, DUODENOSCOPY (EGD), COMBINED N/A 12/14/2017    Procedure: ESOPHAGOGASTRODUODENOSCOPY (EGD) WITH BIOPSYS;  Surgeon: Marlon Roy MD;  Location: [facility];  Service:      ESOPHAGOSCOPY, GASTROSCOPY, DUODENOSCOPY (EGD), COMBINED N/A 1/19/2018    Procedure: ENDOSCOPIC ULTRASOUND  ESOPHAGOGASTRODUODENOSCOPY WITH DUODENAL POLYP REMOVAL;  Surgeon:  Familia Mcgraw MD;  Location: [facility] OR;  Service:      EYE SURGERY      Cataract     INSERT PICC LINE Left 2022    Procedure: INSERTION, PICC;  Surgeon: Shabbir Bueno MD;  Location: [facility] OR     IR MISCELLANEOUS PROCEDURE  2009     IR MISCELLANEOUS PROCEDURE  2009     IR MISCELLANEOUS PROCEDURE  2009     IR PICC PLACEMENT > 5 YRS OF AGE  2022     IR PLEURAL DRAINAGE WITH CATHETER INSERTION  2019     PORTACATH PLACEMENT       RELEASE CARPAL TUNNEL Bilateral      US THORACENTESIS  2019     VERTEBROPLASTY      T6     WISDOM TOOTH EXTRACTION       Family History   Problem Relation Age of Onset     Heart Disease Mother      Glaucoma Mother      Cancer Father      No Known Problems Sister      Cancer Brother      Pancreatic Cancer Brother      No Known Problems Brother      Cancer Daughter      Skin Cancer Daughter      Melanoma Daughter      Glaucoma Daughter         Social History     Socioeconomic History     Marital status:      Spouse name: Not on file     Number of children: Not on file     Years of education: Not on file     Highest education level: Not on file   Occupational History     Not on file   Tobacco Use     Smoking status: Former Smoker     Quit date: 1975     Years since quittin.9     Smokeless tobacco: Never Used   Substance and Sexual Activity     Alcohol use: Not Currently     Comment: Alcoholic Drinks/day: occasional     Drug use: No     Sexual activity: Never   Other Topics Concern     Not on file   Social History Narrative     Not on file     Social Determinants of Health     Financial Resource Strain: Not on file   Food Insecurity: Not on file   Transportation Needs: Not on file   Physical Activity: Not on file   Stress: Not on file   Social Connections: Not on file   Intimate Partner Violence: Not At Risk     Fear of Current or Ex-Partner: No     Emotionally Abused: No     Physically Abused: No     Sexually Abused: No   Housing  Stability: Not on file           Medications  Allergies   Current Outpatient Medications   Medication Sig Dispense Refill     acetaminophen (TYLENOL) 500 MG tablet Take 500 mg by mouth every 8 hours as needed        acyclovir (ZOVIRAX) 400 MG tablet Take 1 tablet (400 mg) by mouth 2 times daily Take on first day of daratumumab dose and continue for 3 months after treatment is complete. 180 tablet 1     ascorbic acid 1000 MG TABS tablet Take 1,000 mg by mouth daily Pt taking 1000 mg       atorvastatin (LIPITOR) 10 MG tablet Take 1 tablet (10 mg) by mouth daily 90 tablet 3     bimatoprost (LUMIGAN) 0.03 % ophthalmic solution Place 1 drop into both eyes At Bedtime.       brimonidine (ALPHAGAN P) 0.1 % ophthalmic solution Place 1 drop into both eyes every 8 hours.       calcium carbonate 1250 (500 Ca) MG CHEW Take 1 tablet by mouth daily as needed (upset stomach)       calcium carbonate-vitamin D (OYSTER SHELL CALCIUM/D) 500-200 MG-UNIT tablet Take 1 tablet by mouth daily        cetirizine (ZYRTEC) 10 MG tablet Take 10 mg by mouth every evening       fluconazole (DIFLUCAN) 200 MG tablet Take 1 tablet (200 mg) by mouth daily 90 tablet 1     fluticasone (FLONASE) 50 MCG/ACT nasal spray Spray 2 sprays into both nostrils daily       furosemide (LASIX) 20 MG tablet TAKE 2 TABLETS(40 MG) BY MOUTH DAILY (Patient taking differently: Take 60 mg by mouth daily) 180 tablet 1     gabapentin (NEURONTIN) 300 MG capsule Take 1 capsule (300 mg) by mouth At Bedtime 30 capsule 3     levofloxacin (LEVAQUIN) 500 MG tablet Take 1 tablet (500 mg) by mouth daily 30 tablet 3     magnesium oxide (MAG-OX) 400 MG tablet Take 400 mg by mouth 2 times daily       Multiple Vitamin (MULTI-VITAMIN PO) Take 1 capsule by mouth daily.       omeprazole (PRILOSEC) 20 MG DR capsule TAKE 1 CAPSULE BY MOUTH ONCE DAILY 90 capsule 2     potassium chloride ER (KLOR-CON M) 20 MEQ CR tablet Take 1 tablet (20 mEq) by mouth four times a week On Saturday, Sunday,  Tuesday, Thursday 20 tablet 3     Psyllium (METAMUCIL PO) Take by mouth daily USE AS DIRECTED       tamsulosin (FLOMAX) 0.4 MG capsule TAKE 1 CAPSULE BY MOUTH EVERY DAY 90 capsule 2     XARELTO ANTICOAGULANT 20 MG TABS tablet  (Patient not taking: Reported on 10/6/2022)         Allergies   Allergen Reactions     Blood Transfusion Related (Informational Only) Other (See Comments)     Hx:Antibodies present. Draw 2x tall purples and 1x red.          Lab Results    Chemistry/lipid CBC Cardiac Enzymes/BNP/TSH/INR   Recent Labs   Lab Test 10/05/21  1651   CHOL 160   HDL 51   LDL 95   TRIG 71     Recent Labs   Lab Test 10/05/21  1651 10/16/15  1243   LDL 95 70     Recent Labs   Lab Test 09/30/22  0825      POTASSIUM 4.8   CHLORIDE 101   CO2 27   *   BUN 42.7*   CR 1.93*   GFRESTIMATED 35*   NORMAN 8.4*     Recent Labs   Lab Test 09/30/22  0825 09/21/22  1027 09/16/22  1259   CR 1.93* 1.64* 1.41*     No results for input(s): A1C in the last 27906 hours.       Recent Labs   Lab Test 10/05/22  0953   WBC 0.7*   HGB 8.8*   HCT 26.9*   *   PLT 30*     Recent Labs   Lab Test 10/05/22  0953 09/30/22  0825 09/21/22  1027   HGB 8.8* 8.0* 10.0*    Recent Labs   Lab Test 09/10/22  2158 09/10/22  1928 03/17/22  1418   TROPONINI 0.13 0.14 0.05     Recent Labs   Lab Test 09/10/22  1928 06/23/22  1039 06/01/22  0820 05/25/22  0844 05/20/22  1255 05/02/22  1130 04/13/22  0816 04/06/22  0809 03/28/22  1033   *  --  211*  --   --   --  207*  --   --    NTBNPI  --  1,779  --  1,240 5,262*  --   --   --   --    NTBNP  --   --   --   --   --  1,042  --  1,805* 3,039*     Recent Labs   Lab Test 09/21/22  1027   TSH 7.60*     Recent Labs   Lab Test 09/10/22  1928 06/23/22  1039 03/14/22  1619   INR 0.91 1.15 1.36*        Xochitl Garland MD

## 2022-10-06 NOTE — LETTER
10/6/2022    Mathew Ott MD  [address] [place] [address]    RE: Theo Brownchris       Dear Colleague,     I had the pleasure of seeing Theo Brownchris in the [facility].  ASKED BY REFERRING PHYSICIAN: Dr. Garland to evaluate this patient for open surgical versus transcatheter aortic valve replacement    CHIEF COMPLAINT: Dyspnea on exertion, orthopnea, congestive heart failure.    HPI: Theo is a 77 year old male who presents with a history of multiple myeloma, pulmonary hypertension, atrial fibrillation, and congestive heart failure.  He has severe aortic stenosis.  His aortic valve area is 0.7 cm squared and his mean gradient is 20 mm Hg    PAST MEDICAL HISTORY:  Past Medical History:   Diagnosis Date     Anemia 12/13/2017     Arthritis      Bilateral inguinal hernia      Chest tube in place      Elevated INR      Glaucoma      Glaucoma      History of blood clots     DVT - left chronic     Multiple myeloma (H)     Gets chemo infusions every 2 weeks     Pancytopenia (H)      Parapneumonic effusion      Peripheral neuropathy      Shingles 9/24/2014     Syncope      TMJ (temporomandibular joint disorder)        PAST SURGICAL HISTORY:  Past Surgical History:   Procedure Laterality Date     APPENDECTOMY      Age 16     CV CORONARY ANGIOGRAM N/A 5/20/2022    Procedure: Coronary Angiogram;  Surgeon: Christopher Bobo MD;  Location:  [facility]     CV CORONARY ANGIOGRAM  5/20/2022    Procedure: ;  Surgeon: Christopher Bobo MD;  Location:  [facility]     CV LEFT HEART CATH N/A 5/20/2022    Procedure: Left Heart Catheterization;  Surgeon: Christopher Bobo MD;  Location:  [facility]ARD[facility] CATH LAB     CV RIGHT HEART CATH MEASUREMENTS RECORDED N/A 5/20/2022    Procedure: Right Heart Catheterization;  Surgeon: Christopher Bobo MD;  [facility]     CV RIGHT HEART CATH MEASUREMENTS RECORDED N/A 6/23/2022    Procedure:  Heart Cath Right Heart Cath;  Surgeon: Jostin Yuan MD;  Location:  HEART CARD[facility] CATH LAB     ESOPHAGOSCOPY, GASTROSCOPY, DUODENOSCOPY (EGD), COMBINED N/A 2017    Procedure: ESOPHAGOGASTRODUODENOSCOPY (EGD) WITH BIOPSYS;  Surgeon: Marlon Roy MD;  Location: [facility];  Service:      ESOPHAGOSCOPY, GASTROSCOPY, DUODENOSCOPY (EGD), COMBINED N/A 2018    Procedure: ENDOSCOPIC ULTRASOUND  ESOPHAGOGASTRODUODENOSCOPY WITH DUODENAL POLYP REMOVAL;  Surgeon: Familia Mcgraw MD;  Location: [facility] OR;  Service:      EYE SURGERY      Cataract     INSERT PICC LINE Left 2022    Procedure: INSERTION, PICC;  Surgeon: Shabbir Bueno MD;  Location: [facility] OR     IR MISCELLANEOUS PROCEDURE  2009     IR MISCELLANEOUS PROCEDURE  2009     IR MISCELLANEOUS PROCEDURE  2009     IR PICC PLACEMENT > 5 YRS OF AGE  2022     IR PLEURAL DRAINAGE WITH CATHETER INSERTION  2019     PORTACATH PLACEMENT       RELEASE CARPAL TUNNEL Bilateral      US THORACENTESIS  2019     VERTEBROPLASTY      T6     WISDOM TOOTH EXTRACTION         FAMILY HISTORY:   Family History   Problem Relation Age of Onset     Heart Disease Mother      Glaucoma Mother      Cancer Father      No Known Problems Sister      Cancer Brother      Pancreatic Cancer Brother      No Known Problems Brother      Cancer Daughter      Skin Cancer Daughter      Melanoma Daughter      Glaucoma Daughter        SOCIAL HISTORY:  Social History     Socioeconomic History     Marital status:      Spouse name: Not on file     Number of children: Not on file     Years of education: Not on file     Highest education level: Not on file   Occupational History     Not on file   Tobacco Use     Smoking status: Former Smoker     Quit date: 1975     Years since quittin.9     Smokeless tobacco: Never Used   Substance and Sexual Activity     Alcohol use: Not Currently     Comment: Alcoholic Drinks/day:  occasional     Drug use: No     Sexual activity: Never   Other Topics Concern     Not on file   Social History Narrative     Not on file     Social Determinants of Health     Financial Resource Strain: Not on file   Food Insecurity: Not on file   Transportation Needs: Not on file   Physical Activity: Not on file   Stress: Not on file   Social Connections: Not on file   Intimate Partner Violence: Not At Risk     Fear of Current or Ex-Partner: No     Emotionally Abused: No     Physically Abused: No     Sexually Abused: No   Housing Stability: Not on file        ALLERGIES:   Allergies   Allergen Reactions     Blood Transfusion Related (Informational Only) Other (See Comments)     Hx:Antibodies present. Draw 2x tall purples and 1x red.       CURRENT MEDICATIONS:   Prescription Medications as of 10/6/2022       Rx Number Disp Refills Start End Last Dispensed Date Next Fill Date Owning Pharmacy    acetaminophen (TYLENOL) 500 MG tablet    12/31/2020        Sig: Take 500 mg by mouth every 8 hours as needed     Class: Historical    Route: Oral    acyclovir (ZOVIRAX) 400 MG tablet  180 tablet 1 9/27/2022    [facility] #[id number] [place] [address] - [facility] AT [facility]    Sig: Take 1 tablet (400 mg) by mouth 2 times daily Take on first day of daratumumab dose and continue for 3 months after treatment is complete.    Class: E-Prescribe    Route: Oral    Non-formulary Exception Code: Specific indication for non-formulary alternative    ascorbic acid 1000 MG TABS tablet    1/1/2000        Sig: Take 1,000 mg by mouth daily Pt taking 1000 mg    Class: Historical    Route: Oral    atorvastatin (LIPITOR) 10 MG tablet  90 tablet 3 1/18/2022    [facility] #[id number] [place] [address] [facility] AT [facility]    Sig: Take 1 tablet (10 mg) by mouth daily    Class: E-Prescribe    Notes to Pharmacy: May not need a refill right now, but I am extending the prescription for a full  years worth of refills    Route: Oral    bimatoprost (LUMIGAN) 0.03 % ophthalmic solution            Sig: Place 1 drop into both eyes At Bedtime.    Class: Historical    Route: Both Eyes    brimonidine (ALPHAGAN P) 0.1 % ophthalmic solution            Sig: Place 1 drop into both eyes every 8 hours.    Class: Historical    Route: Both Eyes    calcium carbonate 1250 (500 Ca) MG CHEW            Sig: Take 1 tablet by mouth daily as needed (upset stomach)    Class: Historical    Route: Oral    calcium carbonate-vitamin D (OYSTER SHELL CALCIUM/D) 500-200 MG-UNIT tablet            Sig: Take 1 tablet by mouth daily     Class: Historical    Route: Oral    cetirizine (ZYRTEC) 10 MG tablet            Sig: Take 10 mg by mouth every evening    Class: Historical    Route: Oral    fluconazole (DIFLUCAN) 200 MG tablet  90 tablet 1 9/30/2022    [facility] [address] [place] [address] [facility] [facility] AT [facility]    Sig: Take 1 tablet (200 mg) by mouth daily    Class: E-Prescribe    Route: Oral    fluticasone (FLONASE) 50 MCG/ACT nasal spray    8/23/2021    [facility] [address] [place] [address] [facility] [facility] AT [facility]    Sig: [place] 2 sprays into both nostrils daily    Class: OTC    Route: Both Nostrils    furosemide (LASIX) 20 MG tablet  180 tablet 1 6/21/2022    [facility] [address] [place] [address] [facility] [facility] AT [facility]    Sig: TAKE 2 TABLETS(40 MG) BY MOUTH DAILY    Class: E-Prescribe    gabapentin (NEURONTIN) 300 MG capsule  30 capsule 3 6/28/2022    [facility] [address] [place] [address] [facility] [facility] AT [facility]    Sig: Take 1 capsule (300 mg) by mouth At Bedtime    Class: E-Prescribe    Route: Oral    levofloxacin (LEVAQUIN) 500 MG tablet  30 tablet 3 9/21/2022    [facility] [address] [place] [address] [facility] [facility] AT [facility]    Sig: Take 1 tablet (500 mg) by mouth daily     Class: E-Prescribe    Route: Oral    magnesium oxide (MAG-OX) 400 MG tablet            Sig: Take 400 mg by mouth 2 times daily    Class: Historical    Route: Oral    Multiple Vitamin (MULTI-VITAMIN PO)            Sig: Take 1 capsule by mouth daily.    Class: Historical    Route: Oral    omeprazole (PRILOSEC) 20 MG DR capsule  90 capsule 2 8/25/2022    [id number] [place] [address] [address] AT [facility]    Sig: TAKE 1 CAPSULE BY MOUTH ONCE DAILY    Class: E-Prescribe    potassium chloride ER (KLOR-CON M) 20 MEQ CR tablet  20 tablet 3 8/23/2022    [id number] [place] [address] [address] AT [facility]    Sig: Take 1 tablet (20 mEq) by mouth four times a week On Saturday, Sunday, Tuesday, Thursday    Class: E-Prescribe    Route: Oral    Psyllium (METAMUCIL PO)            Sig: Take by mouth daily USE AS DIRECTED    Class: Historical    Route: Oral    tamsulosin (FLOMAX) 0.4 MG capsule  90 capsule 2 8/25/2022    [facility] #[id number] [place] [address] AT [facility]    Sig: TAKE 1 CAPSULE BY MOUTH EVERY DAY    Class: E-Prescribe    XARELTO ANTICOAGULANT 20 MG TABS tablet    9/21/2022        Class: Historical      Clinic-Administered Medications as of 10/6/2022       Dose Frequency Start End    heparin 100 UNIT/ML injection 5 mL 5 mL ONCE PRN 6/1/2022     Admin Instructions: PORT: to de-access, instill 5 mL before PORT needle is removed and every 28 days.    Apheresis: only for use when patient is NOT under care of apheresis services; after a 10 mL NS flush, lock each lumen of the catheter with 5 mL.    Route: Intracatheter    sodium chloride (PF) 0.9% PF flush 3-20 mL 3-20 mL EVERY 1 HOUR PRN 6/1/2022     Admin Instructions: PIV: 3 mL after each use & PRN to assess patency.     Midline; CVC; PICC; Earl; PORT: 10 mL after each use & PRN to assess patency; 20 mL after blood draw.    Apheresis: only for  "use when patient is NOT under care of apheresis services; 10 mL after each use & PRN to assess patency; 20 mL after blood draw.    Route: Intracatheter          REVIEW OF SYSTEMS:   Gen: denies frequent headaches, double/blurry vision, insomnia, fatigue, unexplained weight loss/gain. No previous anesthesia reactions.  CV: SAMANO with chest pain,shortness of breath, palpitations, peripheral edema.    He sleeps in a recliner.  Pulm: denies shortness of breath, asthma or wheezing  GI/: denies liver or kidney problems, blood in stool or BRBPR, difficulty urinating  Endo: denies thyroid problems or Diabetes  Heme/Onc: Multiple myeloma with pancytopenia.  MS: no weakness, tremors or gait instability  Neuro: denies depression, memory problems, no dysesthesias, no previous strokes, no migraines, no dysphagia  Skin: Very easy bruisability, recent spontaneous black eyes.    PHYSICAL EXAMINATION:   /56 (BP Location: Right arm, Patient Position: Sitting, Cuff Size: Adult Regular)   Pulse 63   Resp 14   Ht 1.727 m (5' 8\")   Wt 73.5 kg (162 lb)   BMI 24.63 kg/m    General: alert and oriented x 3, pleasant, no acute distress  Skin:  Bilateral black eyes.  CV: Irregularly irregular rate and rhythm with a grade II/VI systolic ejection murmur heard best over the right upper sternal border, but no rubs or gallops, no peripheral edema, no carotid or abdominal bruits, pulses in upper and lower extremities palpable  Pulm: bilateral breath sounds, clear to auscultation, easy work of breathing  GI: (+) bowel sounds, soft non-tender and non-distended  : voiding without problems  MS: moves all extremities x 4,  5+/5+ equal strength bilaterally  Neuro: pupils equal round and reactive to light, cranial nerves, II-XII grossly intact, no gross neurologic deficits noted    LABS: Pancytopenic    PROCEDURES/IMAGING:  Chest X-Ray: Not done  Angio: No coronary artery disease  Echo: Severer aortic stenosis  CT: OK for TAVR  MRI: Not " done  Carotid US: Not done     ASSESSMENT/PLAN:   Theo is very symptomatic from his aortic stenosis and given his multiple myeloma and pancytopenia he is not an appropriate open surgical candidate for aortic valve replacement.  Therefore I support his candidacy for transcatheter aortic valve replacement.  His pancytopenia will make even this challenging.  He and his wife understand that the risks for this procedure include: bleeding, infection, stroke, heart block requiring a pacemaker, cardiac perforation, aortic root rupture, aortic dissection, and an operative mortality of 1 to 2 percent.    1. Complete outpatient work-up  2. TAVR next week     Approximately 30 minutes were spent with the patient in clinic at this visit.    CC  Patient Care Team:  Mathew Ott MD as PCP - General (Internal Medicine)  Per Clifford MD as MD (Hematology & Oncology)  Mita Ratliff, EvieD as Pharmacist (Pharmacist)  Mathew Ott MD as Assigned PCP  Charles Flores, RN as Specialty Care Coordinator (Hematology & Oncology)  Freddy Phan MD as Assigned Infectious Disease Provider  Willa Pineda, RN as Specialty Care Coordinator (Cardiology)  Ellen Dewitt, RN as Specialty Care Coordinator (Cardiology)  Suly Borrego, RN as Specialty Care Coordinator (Cardiology)  Sarah Neff, RN as Specialty Care Coordinator (Cardiology)  Todd Peters MD as Assigned Heart and Vascular Provider  Familia Guan MD as BMT Physician (Hematology)  Chloe Fenton LICSW as  (BMT - Adult)  Maria Fernanda Bustamante PA as Physician Assistant (Cardiovascular Disease)  Per Clifford MD as Assigned Cancer Care Provider      Thank you for allowing me to participate in the care of your patient.      Sincerely,     Alison Kumar MD     [facility]  cc:   No referring provider defined for this encounter.

## 2022-10-06 NOTE — LETTER
10/6/2022    Mathew Ott MD  [address] [place] [address]    RE: Theo Meyers       Dear Colleague,     I had the pleasure of seeing Theo Meyers in the [facility].    HEART CARE ENCOUNTER CONSULTATON NOTE      [facility]  [phone]      Assessment/Recommendations   Assessment/Plan:    Severe symptomatic aortic valve stenosis: Given the significant decline in his functional capacity, progressive dyspnea as well as severity of his aortic valve stenosis (confirmed by CTA as well as invasive hemodynamics), Mr. Meyers will benefit from aortic valve replacement.  With his age and multiple comorbidities, he would be a better candidate for transcatheter aortic valve replacement rather than surgery.    The risks, benefits and alternatives of TAVR were reviewed, and he would like to proceed.    Given the rapid progression of his symptoms and onset of congestive heart failure, he will benefit from expedited valve replacement, and his TAVR has tentatively been scheduled for next week.    In the interim, he has been asked to avoid heavy exertion, continue his current medical regimen and knows to call if there is a change in his condition or worsening symptoms.    Chronic atrial fibrillation: Anticoagulation was discontinued due to anemia and thrombocytopenia.  He could be evaluated for left atrial appendage closure in the future.    Thank you for the opportunity to participate in the care of Mr. Meyers.  Please do not hesitate to call with any questions or concerns regarding his cardiovascular status.       History of Present Illness/Subjective    HPI: Theo Meyers is a pleasant 77 year old male with a history of multiple myeloma, pulmonary hypertension, atrial fibrillation (not currently on anticoagulation due to anemia), congestive heart failure and known aortic valve stenosis who has had a decline in his functional capacity with increasing dyspnea  on exertion over the past several weeks, and is referred to the valve clinic for consideration of management options for his aortic valve stenosis.    's symptoms include exertional dyspnea as well as orthopnea and PND.  He had a right and left heart catheterization in the [facility], which was reviewed and discussed with Dr. Rincon, and based on the hemodynamic waveforms and data, he was found to have an aortic valve area of 0.7 cm  consistent with severe aortic valve stenosis.  There was no significant coronary artery disease noted.  His recent echocardiogram from July 2020 was reviewed as well and showed normal left ventricular systolic function, and was read as having moderate to severe aortic valve stenosis with a valve area of 1 cm , and mean gradient of 20 mmHg which was likely due to low flow.    The severity of his aortic valve stenosis was also confirmed on his recent CTA, where planimetry across the aortic valve orifice showed a valve area of only 0.76 cm squire, again consistent with severe aortic valve stenosis.           Physical Examination  Review of Systems   Vitals: /80 (BP Location: Right arm, Patient Position: Sitting, Cuff Size: Adult Regular)   Pulse 78   Resp 14   Wt 73.5 kg (162 lb)   BMI 24.63 kg/m    BMI= Body mass index is 24.63 kg/m .  Wt Readings from Last 3 Encounters:   10/06/22 73.5 kg (162 lb)   10/05/22 74 kg (163 lb 1.6 oz)   09/22/22 71.7 kg (158 lb)       General Appearance:   no distress, normal body habitus, upright.   ENT/Mouth: membranes moist, no nasal discharge or bleeding gums.  Normal head shape, no evidence of injury or laceration.     EYES:  no scleral icterus, normal conjunctivae   Neck: no evidence of thyromegaly.  Supple   Chest/Lungs:   No audible wheezing equal chest wall expansion. Non labored breathing.  No cough.   Cardiovascular:   No evidence of elevated jugular venous pressure.  No evidence of pitting edema bilaterally    Abdomen:  no  evidence of abdominal distention. No observe juandice.     Extremities: no cyanosis or clubbing noted.    Skin: no xanthelasma, normal skin color. No evidence of facial lacerations.      Neurologic: Normal arm motion bilateral, no tremors.  No evidence of focal defect.       Psychiatric: alert and oriented x3, calm        Please refer above for cardiac ROS details.        Medical History  Surgical History Family History Social History   Past Medical History:   Diagnosis Date     Anemia 12/13/2017     Arthritis      Bilateral inguinal hernia      Chest tube in place      Elevated INR      Glaucoma      Glaucoma      History of blood clots     DVT - left chronic     Multiple myeloma (H)     Gets chemo infusions every 2 weeks     Pancytopenia (H)      Parapneumonic effusion      Peripheral neuropathy      Shingles 9/24/2014     Syncope      TMJ (temporomandibular joint disorder)      Past Surgical History:   Procedure Laterality Date     APPENDECTOMY      Age 16     CV CORONARY ANGIOGRAM N/A 5/20/2022    Procedure: Coronary Angiogram;  Surgeon: Christopher Bobo MD;  Location:  HEART CARDIAC CATH LAB     CV CORONARY ANGIOGRAM  5/20/2022    Procedure: ;  Surgeon: Christopher Bobo MD;  Location:  HEART CARDIAC CATH LAB     CV LEFT HEART CATH N/A 5/20/2022    Procedure: Left Heart Catheterization;  Surgeon: Christopher Bobo MD;  Location:  HEART CARDIAC CATH LAB     CV RIGHT HEART CATH MEASUREMENTS RECORDED N/A 5/20/2022    Procedure: Right Heart Catheterization;  Surgeon: Christopher Bobo MD;  Location:  HEART CARDIAC CATH LAB     CV RIGHT HEART CATH MEASUREMENTS RECORDED N/A 6/23/2022    Procedure: Heart Cath Right Heart Cath;  Surgeon: Jostin Yuan MD;  Location:  HEART CARDIAC CATH LAB     ESOPHAGOSCOPY, GASTROSCOPY, DUODENOSCOPY (EGD), COMBINED N/A 12/14/2017    Procedure: ESOPHAGOGASTRODUODENOSCOPY (EGD) WITH BIOPSYS;  Surgeon: Marlon Roy MD;  Location: [facility];  Service:      ESOPHAGOSCOPY, GASTROSCOPY, DUODENOSCOPY (EGD), COMBINED N/A 2018    Procedure: ENDOSCOPIC ULTRASOUND  ESOPHAGOGASTRODUODENOSCOPY WITH DUODENAL POLYP REMOVAL;  Surgeon: Familia Mcgraw MD;  Location: [facility] Main OR;  Service:      EYE SURGERY      Cataract     INSERT PICC LINE Left 2022    Procedure: INSERTION, PICC;  Surgeon: Shabbir Bueno MD;  Location: [facility] OR     IR MISCELLANEOUS PROCEDURE  2009     IR MISCELLANEOUS PROCEDURE  2009     IR MISCELLANEOUS PROCEDURE  2009     IR PICC PLACEMENT > 5 YRS OF AGE  2022     IR PLEURAL DRAINAGE WITH CATHETER INSERTION  2019     PORTACATH PLACEMENT       RELEASE CARPAL TUNNEL Bilateral      US THORACENTESIS  2019     VERTEBROPLASTY      T6     WISDOM TOOTH EXTRACTION       Family History   Problem Relation Age of Onset     Heart Disease Mother      Glaucoma Mother      Cancer Father      No Known Problems Sister      Cancer Brother      Pancreatic Cancer Brother      No Known Problems Brother      Cancer Daughter      Skin Cancer Daughter      Melanoma Daughter      Glaucoma Daughter         Social History     Socioeconomic History     Marital status:      Spouse name: Not on file     Number of children: Not on file     Years of education: Not on file     Highest education level: Not on file   Occupational History     Not on file   Tobacco Use     Smoking status: Former Smoker     Quit date: 1975     Years since quittin.9     Smokeless tobacco: Never Used   Substance and Sexual Activity     Alcohol use: Not Currently     Comment: Alcoholic Drinks/day: occasional     Drug use: No     Sexual activity: Never   Other Topics Concern     Not on file   Social History Narrative     Not on file     Social Determinants of Health     Financial Resource Strain: Not on file   Food Insecurity: Not on file   Transportation Needs: Not on file   Physical Activity: Not on file   Stress: Not on file    Social Connections: Not on file   Intimate Partner Violence: Not At Risk     Fear of Current or Ex-Partner: No     Emotionally Abused: No     Physically Abused: No     Sexually Abused: No   Housing Stability: Not on file           Medications  Allergies   Current Outpatient Medications   Medication Sig Dispense Refill     acetaminophen (TYLENOL) 500 MG tablet Take 500 mg by mouth every 8 hours as needed        acyclovir (ZOVIRAX) 400 MG tablet Take 1 tablet (400 mg) by mouth 2 times daily Take on first day of daratumumab dose and continue for 3 months after treatment is complete. 180 tablet 1     ascorbic acid 1000 MG TABS tablet Take 1,000 mg by mouth daily Pt taking 1000 mg       atorvastatin (LIPITOR) 10 MG tablet Take 1 tablet (10 mg) by mouth daily 90 tablet 3     bimatoprost (LUMIGAN) 0.03 % ophthalmic solution Place 1 drop into both eyes At Bedtime.       brimonidine (ALPHAGAN P) 0.1 % ophthalmic solution Place 1 drop into both eyes every 8 hours.       calcium carbonate 1250 (500 Ca) MG CHEW Take 1 tablet by mouth daily as needed (upset stomach)       calcium carbonate-vitamin D (OYSTER SHELL CALCIUM/D) 500-200 MG-UNIT tablet Take 1 tablet by mouth daily        cetirizine (ZYRTEC) 10 MG tablet Take 10 mg by mouth every evening       fluconazole (DIFLUCAN) 200 MG tablet Take 1 tablet (200 mg) by mouth daily 90 tablet 1     fluticasone (FLONASE) 50 MCG/ACT nasal spray Spray 2 sprays into both nostrils daily       furosemide (LASIX) 20 MG tablet TAKE 2 TABLETS(40 MG) BY MOUTH DAILY (Patient taking differently: Take 60 mg by mouth daily) 180 tablet 1     gabapentin (NEURONTIN) 300 MG capsule Take 1 capsule (300 mg) by mouth At Bedtime 30 capsule 3     levofloxacin (LEVAQUIN) 500 MG tablet Take 1 tablet (500 mg) by mouth daily 30 tablet 3     magnesium oxide (MAG-OX) 400 MG tablet Take 400 mg by mouth 2 times daily       Multiple Vitamin (MULTI-VITAMIN PO) Take 1 capsule by mouth daily.       omeprazole  (PRILOSEC) 20 MG DR capsule TAKE 1 CAPSULE BY MOUTH ONCE DAILY 90 capsule 2     potassium chloride ER (KLOR-CON M) 20 MEQ CR tablet Take 1 tablet (20 mEq) by mouth four times a week On Saturday, Sunday, Tuesday, Thursday 20 tablet 3     Psyllium (METAMUCIL PO) Take by mouth daily USE AS DIRECTED       tamsulosin (FLOMAX) 0.4 MG capsule TAKE 1 CAPSULE BY MOUTH EVERY DAY 90 capsule 2     XARELTO ANTICOAGULANT 20 MG TABS tablet  (Patient not taking: Reported on 10/6/2022)         Allergies   Allergen Reactions     Blood Transfusion Related (Informational Only) Other (See Comments)     Hx:Antibodies present. Draw 2x tall purples and 1x red.          Lab Results    Chemistry/lipid CBC Cardiac Enzymes/BNP/TSH/INR   Recent Labs   Lab Test 10/05/21  1651   CHOL 160   HDL 51   LDL 95   TRIG 71     Recent Labs   Lab Test 10/05/21  1651 10/16/15  1243   LDL 95 70     Recent Labs   Lab Test 09/30/22  0825      POTASSIUM 4.8   CHLORIDE 101   CO2 27   *   BUN 42.7*   CR 1.93*   GFRESTIMATED 35*   NORMAN 8.4*     Recent Labs   Lab Test 09/30/22  0825 09/21/22  1027 09/16/22  1259   CR 1.93* 1.64* 1.41*     No results for input(s): A1C in the last 27269 hours.       Recent Labs   Lab Test 10/05/22  0953   WBC 0.7*   HGB 8.8*   HCT 26.9*   *   PLT 30*     Recent Labs   Lab Test 10/05/22  0953 09/30/22  0825 09/21/22  1027   HGB 8.8* 8.0* 10.0*    Recent Labs   Lab Test 09/10/22  2158 09/10/22  1928 03/17/22  1418   TROPONINI 0.13 0.14 0.05     Recent Labs   Lab Test 09/10/22  1928 06/23/22  1039 06/01/22  0820 05/25/22  0844 05/20/22  1255 05/02/22  1130 04/13/22  0816 04/06/22  0809 03/28/22  1033   *  --  211*  --   --   --  207*  --   --    NTBNPI  --  1,779  --  1,240 5,262*  --   --   --   --    NTBNP  --   --   --   --   --  1,042  --  1,805* 3,039*     Recent Labs   Lab Test 09/21/22  1027   TSH 7.60*     Recent Labs   Lab Test 09/10/22  1928 06/23/22  1039 03/14/22  1619   INR 0.91 1.15 1.36*         Xochitl Garland MD    Thank you for allowing me to participate in the care of your patient.      Sincerely,     Xochitl Garland MD     [facility]  cc:   Bernardo Rincon DO  [address] [place] [address]

## 2022-10-06 NOTE — PROGRESS NOTES
Will plan to schedule TAVR for 10/11. Pt is scheduled for dental clearance tomorrow 10/7. They will call after that appointment to further discuss. Pt scheduled for TAVR pre-op and procedure for next week. Patient verbalizes understanding and agrees with plan. No further questions or concerns at this time.

## 2022-10-06 NOTE — PROGRESS NOTES
ASKED BY REFERRING PHYSICIAN: Dr. Garland to evaluate this patient for open surgical versus transcatheter aortic valve replacement    CHIEF COMPLAINT: Dyspnea on exertion, orthopnea, congestive heart failure.    HPI: Theo is a 77 year old male who presents with a history of multiple myeloma, pulmonary hypertension, atrial fibrillation, and congestive heart failure.  He has severe aortic stenosis.  His aortic valve area is 0.7 cm squared and his mean gradient is 20 mm Hg    PAST MEDICAL HISTORY:  Past Medical History:   Diagnosis Date     Anemia 12/13/2017     Arthritis      Bilateral inguinal hernia      Chest tube in place      Elevated INR      Glaucoma      Glaucoma      History of blood clots     DVT - left chronic     Multiple myeloma (H)     Gets chemo infusions every 2 weeks     Pancytopenia (H)      Parapneumonic effusion      Peripheral neuropathy      Shingles 9/24/2014     Syncope      TMJ (temporomandibular joint disorder)        PAST SURGICAL HISTORY:  Past Surgical History:   Procedure Laterality Date     APPENDECTOMY      Age 16     CV CORONARY ANGIOGRAM N/A 5/20/2022    Procedure: Coronary Angiogram;  Surgeon: Christopher Bobo MD;  Location:  [facility]     CV CORONARY ANGIOGRAM  5/20/2022    Procedure: ;  Surgeon: Christopher Bobo MD;  Location:  [facility]     CV LEFT HEART CATH N/A 5/20/2022    Procedure: Left Heart Catheterization;  Surgeon: Christopher Bobo MD;  Location:  [facility]     CV RIGHT HEART CATH MEASUREMENTS RECORDED N/A 5/20/2022    Procedure: Right Heart Catheterization;  Surgeon: Christopher Bobo MD;  Location:  [facility] CATH [facility]     CV RIGHT HEART CATH MEASUREMENTS RECORDED N/A 6/23/2022    Procedure: Heart Cath Right Heart Cath;  Surgeon: Jostin Yuan MD;  Location:  HE[facility]ARD[facility] CATH LAB     ESOPHAGOSCOPY, GASTROSCOPY, DUODENOSCOPY (EGD), COMBINED N/A 12/14/2017    Procedure:  ESOPHAGOGASTRODUODENOSCOPY (EGD) WITH BIOPSYS;  Surgeon: Marlon Roy MD;  Location: [facility] GI;  Service:      ESOPHAGOSCOPY, GASTROSCOPY, DUODENOSCOPY (EGD), COMBINED N/A 2018    Procedure: ENDOSCOPIC ULTRASOUND  ESOPHAGOGASTRODUODENOSCOPY WITH DUODENAL POLYP REMOVAL;  Surgeon: Familia Mcgraw MD;  Location: [facility] Main OR;  Service:      EYE SURGERY      Cataract     INSERT PICC LINE Left 2022    Procedure: INSERTION, PICC;  Surgeon: Shabbir Bueno MD;  [place] [facility] [place]     IR MISCELLANEOUS PROCEDURE  2009     IR MISCELLANEOUS PROCEDURE  2009     IR MISCELLANEOUS PROCEDURE  2009     IR PICC PLACEMENT > 5 YRS OF AGE  2022     IR PLEURAL DRAINAGE WITH CATHETER INSERTION  2019     PORTACATH PLACEMENT       RELEASE CARPAL TUNNEL Bilateral      US THORACENTESIS  2019     VERTEBROPLASTY      T6     WISDOM TOOTH EXTRACTION         FAMILY HISTORY:   Family History   Problem Relation Age of Onset     Heart Disease Mother      Glaucoma Mother      Cancer Father      No Known Problems Sister      Cancer Brother      Pancreatic Cancer Brother      No Known Problems Brother      Cancer Daughter      Skin Cancer Daughter      Melanoma Daughter      Glaucoma Daughter        SOCIAL HISTORY:  Social History     Socioeconomic History     Marital status:      Spouse name: Not on file     Number of children: Not on file     Years of education: Not on file     Highest education level: Not on file   Occupational History     Not on file   Tobacco Use     Smoking status: Former Smoker     Quit date: 1975     Years since quittin.9     Smokeless tobacco: Never Used   Substance and Sexual Activity     Alcohol use: Not Currently     Comment: Alcoholic Drinks/day: occasional     Drug use: No     Sexual activity: Never   Other Topics Concern     Not on file   Social History Narrative     Not on file     Social Determinants of Health     Financial Resource Strain: Not on  file   Food Insecurity: Not on file   Transportation Needs: Not on file   Physical Activity: Not on file   Stress: Not on file   Social Connections: Not on file   Intimate Partner Violence: Not At Risk     Fear of Current or Ex-Partner: No     Emotionally Abused: No     Physically Abused: No     Sexually Abused: No   Housing Stability: Not on file        ALLERGIES:   Allergies   Allergen Reactions     Blood Transfusion Related (Informational Only) Other (See Comments)     Hx:Antibodies present. Draw 2x tall purples and 1x red.       CURRENT MEDICATIONS:   Prescription Medications as of 10/6/2022       Rx Number Disp Refills Start End Last Dispensed Date Next Fill Date Owning Pharmacy    acetaminophen (TYLENOL) 500 MG tablet    12/31/2020        Sig: Take 500 mg by mouth every 8 hours as needed     Class: Historical    Route: Oral    acyclovir (ZOVIRAX) 400 MG tablet  180 tablet 1 9/27/2022    [facility] STORE #[id number] [place] [address] [address]ACE AT [facility]    Sig: Take 1 tablet (400 mg) by mouth 2 times daily Take on first day of daratumumab dose and continue for 3 months after treatment is complete.    Class: E-Prescribe    Route: Oral    Non-formulary Exception Code: Specific indication for non-formulary alternative    ascorbic acid 1000 MG TABS tablet    1/1/2000        Sig: Take 1,000 mg by mouth daily Pt taking 1000 mg    Class: Historical    Route: Oral    atorvastatin (LIPITOR) 10 MG tablet  90 tablet 3 1/18/2022    [facility] [id number] [place] [address] GUAL[address] GRACE AT [facility]    Sig: Take 1 tablet (10 mg) by mouth daily    Class: E-Prescribe    Notes to Pharmacy: May not need a refill right now, but I am extending the prescription for a full years worth of refills    Route: Oral    bimatoprost (LUMIGAN) 0.03 % ophthalmic solution            Sig: Place 1 drop into both eyes At Bedtime.    Class: Historical    Route: Both Eyes    brimonidine  (ALPHAGAN P) 0.1 % ophthalmic solution            Sig: Place 1 drop into both eyes every 8 hours.    Class: Historical    Route: Both Eyes    calcium carbonate 1250 (500 Ca) MG CHEW            Sig: Take 1 tablet by mouth daily as needed (upset stomach)    Class: Historical    Route: Oral    calcium carbonate-vitamin D (OYSTER SHELL CALCIUM/D) 500-200 MG-UNIT tablet            Sig: Take 1 tablet by mouth daily     Class: Historical    Route: Oral    cetirizine (ZYRTEC) 10 MG tablet            Sig: Take 10 mg by mouth every evening    Class: Historical    Route: Oral    fluconazole (DIFLUCAN) 200 MG tablet  90 tablet 1 9/30/2022    [facility] STORE [address] [place] [address] GUALBERTO GR[address] AT [facility]    Sig: Take 1 tablet (200 mg) by mouth daily    Class: E-Prescribe    Route: Oral    fluticasone (FLONASE) 50 MCG/ACT nasal spray    8/23/2021    [facility] [address] [place] [address] GUALBERT[address] GRACE AT [facility]    Sig: [place] 2 sprays into both nostrils daily    Class: OTC    Route: Both Nostrils    furosemide (LASIX) 20 MG tablet  180 tablet 1 6/21/2022    [facility] [address] [place] [address] [address] AT [facility]    Sig: TAKE 2 TABLETS(40 MG) BY MOUTH DAILY    Class: E-Prescribe    gabapentin (NEURONTIN) 300 MG capsule  30 capsule 3 6/28/2022    [facility] [address] [place] [address] [address] AT [facility]    Sig: Take 1 capsule (300 mg) by mouth At Bedtime    Class: E-Prescribe    Route: Oral    levofloxacin (LEVAQUIN) 500 MG tablet  30 tablet 3 9/21/2022    [facility] [address] [place] [address] [address] AT [facility]    Sig: Take 1 tablet (500 mg) by mouth daily    Class: E-Prescribe    Route: Oral    magnesium oxide (MAG-OX) 400 MG tablet            Sig: Take 400 mg by mouth 2 times daily    Class: Historical    Route: Oral    Multiple Vitamin (MULTI-VITAMIN PO)             Sig: Take 1 capsule by mouth daily.    Class: Historical    Route: Oral    omeprazole (PRILOSEC) 20 MG DR capsule  90 capsule 2 8/25/2022    [id number] [place] [address] AT [facility]    Sig: TAKE 1 CAPSULE BY MOUTH ONCE DAILY    Class: E-Prescribe    potassium chloride ER (KLOR-CON M) 20 MEQ CR tablet  20 tablet 3 8/23/2022    [facility] #[id number] [place] [address] AT [facility]    Sig: Take 1 tablet (20 mEq) by mouth four times a week On Saturday, Sunday, Tuesday, Thursday    Class: E-Prescribe    Route: Oral    Psyllium (METAMUCIL PO)            Sig: Take by mouth daily USE AS DIRECTED    Class: Historical    Route: Oral    tamsulosin (FLOMAX) 0.4 MG capsule  90 capsule 2 8/25/2022    [email or web address] ST[facility] #[id number] [place] [address] [address] GRACE AT [facility]    Sig: TAKE 1 CAPSULE BY MOUTH EVERY DAY    Class: E-Prescribe    XARELTO ANTICOAGULANT 20 MG TABS tablet    9/21/2022        Class: Historical      Clinic-Administered Medications as of 10/6/2022       Dose Frequency Start End    heparin 100 UNIT/ML injection 5 mL 5 mL ONCE PRN 6/1/2022     Admin Instructions: PORT: to de-access, instill 5 mL before PORT needle is removed and every 28 days.    Apheresis: only for use when patient is NOT under care of apheresis services; after a 10 mL NS flush, lock each lumen of the catheter with 5 mL.    Route: Intracatheter    sodium chloride (PF) 0.9% PF flush 3-20 mL 3-20 mL EVERY 1 HOUR PRN 6/1/2022     Admin Instructions: PIV: 3 mL after each use & PRN to assess patency.     Midline; CVC; PICC; Earl; PORT: 10 mL after each use & PRN to assess patency; 20 mL after blood draw.    Apheresis: only for use when patient is NOT under care of apheresis services; 10 mL after each use & PRN to assess patency; 20 mL after blood draw.    Route: Intracatheter          REVIEW OF SYSTEMS:   Gen: denies frequent  "headaches, double/blurry vision, insomnia, fatigue, unexplained weight loss/gain. No previous anesthesia reactions.  CV: SAMANO with chest pain,shortness of breath, palpitations, peripheral edema.    He sleeps in a recliner.  Pulm: denies shortness of breath, asthma or wheezing  GI/: denies liver or kidney problems, blood in stool or BRBPR, difficulty urinating  Endo: denies thyroid problems or Diabetes  Heme/Onc: Multiple myeloma with pancytopenia.  MS: no weakness, tremors or gait instability  Neuro: denies depression, memory problems, no dysesthesias, no previous strokes, no migraines, no dysphagia  Skin: Very easy bruisability, recent spontaneous black eyes.    PHYSICAL EXAMINATION:   /56 (BP Location: Right arm, Patient Position: Sitting, Cuff Size: Adult Regular)   Pulse 63   Resp 14   Ht 1.727 m (5' 8\")   Wt 73.5 kg (162 lb)   BMI 24.63 kg/m    General: alert and oriented x 3, pleasant, no acute distress  Skin:  Bilateral black eyes.  CV: Irregularly irregular rate and rhythm with a grade II/VI systolic ejection murmur heard best over the right upper sternal border, but no rubs or gallops, no peripheral edema, no carotid or abdominal bruits, pulses in upper and lower extremities palpable  Pulm: bilateral breath sounds, clear to auscultation, easy work of breathing  GI: (+) bowel sounds, soft non-tender and non-distended  : voiding without problems  MS: moves all extremities x 4,  5+/5+ equal strength bilaterally  Neuro: pupils equal round and reactive to light, cranial nerves, II-XII grossly intact, no gross neurologic deficits noted    LABS: Pancytopenic    PROCEDURES/IMAGING:  Chest X-Ray: Not done  Angio: No coronary artery disease  Echo: Severer aortic stenosis  CT: OK for TAVR  MRI: Not done  Carotid US: Not done     ASSESSMENT/PLAN:   Theo is very symptomatic from his aortic stenosis and given his multiple myeloma and pancytopenia he is not an appropriate open surgical candidate for aortic " valve replacement.  Therefore I support his candidacy for transcatheter aortic valve replacement.  His pancytopenia will make even this challenging.  He and his wife understand that the risks for this procedure include: bleeding, infection, stroke, heart block requiring a pacemaker, cardiac perforation, aortic root rupture, aortic dissection, and an operative mortality of 1 to 2 percent.    1. Complete outpatient work-up  2. TAVR next week     Approximately 30 minutes were spent with the patient in clinic at this visit.    CC  Patient Care Team:  Mathew Ott MD as PCP - General (Internal Medicine)  Per Clifford MD as MD (Hematology & Oncology)  Mita Ratliff, EvieD as Pharmacist (Pharmacist)  Mathew Ott MD as Assigned PCP  Charles Flores, RN as Specialty Care Coordinator (Hematology & Oncology)  Freddy Phan MD as Assigned Infectious Disease Provider  Willa Pineda, RN as Specialty Care Coordinator (Cardiology)  Ellen Dewitt, RN as Specialty Care Coordinator (Cardiology)  Suly Borrego, RN as Specialty Care Coordinator (Cardiology)  Sarah Neff, RN as Specialty Care Coordinator (Cardiology)  Todd Peters MD as Assigned Heart and Vascular Provider  Familia Guan MD as BMT Physician (Hematology)  Chloe Fenton LICSW as  (BMT - Adult)  Maria Fernanda Bustamante PA as Physician Assistant (Cardiovascular Disease)  Per Clifford MD as Assigned Cancer Care Provider

## 2022-10-07 NOTE — TELEPHONE ENCOUNTER
Pt calls stating he has valve replacement surgery Tuesday the 11th and wonders if Dr Clifford could order myeloma labs to be drawn while inpatient. Chart message sent to Dr Clifford and Chris PETERSEN

## 2023-06-14 NOTE — TELEPHONE ENCOUNTER
Caller: Waleska Shah    Relationship: Self    Best call back number: [phone]       What was the call regarding:      THE PATIENT SAID SHE CAME INTO THE OFFICE YESTERDAY AND DROPPED OFF PAPERWORK. SHE SAID IT HAS THE PROVIDER INFORMATION THAT SHE WAS TO BE SCHEDULE WITH. SHE SAID SHE CALLED THEM TO SCHEDULE AN APPOINTMENT AND THEY SAID THEY DO NOT TAKE [employer]. THE PATIENT SAID SHE ADVISED THEM THAT IT SHOULD BE . SHE IS REQUESTING PCP TO CALL THEM TO TRY TO GET AN APPOINTMENT SET UP AND TO CLEAR UP THE INSURANCE INFORMATION.     PLEASE CALL PATIENT IF NEEDED              Contact his home oxygen company.  Discontinue his oxygen.  Patient's pulmonary hypertension and hypoxia improved after stopping chemotherapy, and oxygen levels were normal with ambulation today on room air.  97% oxygen saturation on room air with ambulation today.    Discontinue home oxygen.

## 2023-07-11 NOTE — PROGRESS NOTES
[facility] Hematology and Oncology Progress Note    Patient: Theo Meyers  MRN: [id number]  Date of Service: August 19, 2020      Assessment and Plan:    1. Kappa light chain myeloma: We reviewed his myeloma labs from August 5.  His kappa light chain as well as the ratio have been steadily increasing over the past few months.  We have decided to get a bone marrow biopsy to see if he is having evidence of progression of the marrow.  He has had situations like this in the past 4weeks worsening indices which have then improved.  We will see him back after the bone marrow.  I told him that if he has evidence of progression on the marrow that we could consider switching to a new regimen.    2.  Pancytopenia: Generally stable.  Continuing B12 injections monthly.  I believe this is from the Revlimid.      ECOG Performance   ECOG Performance Status: 0    Distress Assessment  Distress Assessment Score: 1    Pain  Currently in Pain: No/denies    Diagnosis:    1.  IgG kappa light chain myeloma. Hyposecretory. Diagnosed 2009. No significant measurable M protein. 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 There was also on 11;14 translocation.  Past immunofixations from 2011 show IgG-kappa.  Bone marrow biopsy at 5%.  Residual kappa light chain myeloma involving 20% of nucleated cells.  No significant change from November 2016 marrow cellularity.    2.  Deep vein thrombosis of the left leg diagnosed 9/2012 while on treatment.    3.  Vitamin B12 deficiency diagnosed in September 2017.    4.  GI bleed and anemia requiring hospitalization in December 2017.    Treatment:    He presented with a lytic lesion involving the C6 vertebral body, although no measurable M protein. IgG kappa monoclonal immunoglobulin was confirmed by [facility] as well as elevated kappa free light chains with an abnormal kappa lambda ratio. Bone marrow biopsy showed 30% involvement and cytogenetics confirmed deletion of 13 q. as well as 11;14  translocation. He was initially treated with Velcade and dexamethasone and achieved a good partial response.     He was referred to the [facility] where he underwent high-dose chemotherapy with autologous peripheral stem cell transplant in April of 2010. He began Revlimid as maintenance therapy post transplant. Repeat bone marrow biopsy done October 2010 showed about 5% kappa restricted plasma cells and so at that time weekly Velcade was added along with his maintenance Revlimid and dexamethasone.   He has done well since that time with stable minimal residual disease, best assessed by serum free light chains. He required dose reductions of weekly Velcade because of cytopenias and was ultimately switched to a q 2 week maintenance schedule which he has been on since September 2012.     His Revlimid dose was reduced to 15 mg daily and he continues on dexamethasone 20 mg p.o. weekly  Revlimid dosing changed to 20 mg, 3 weeks on 1 week (instead of 15 mg daily) for cost reasons.  Started March, 2018    Interim History:    Theo returns today for follow-up visit.  Seems to be doing okay.  No recent changes in his health.  No new symptoms to report.  Energy and appetite are good.  Still walks 3 miles a day.  No new areas of pain.  No skin rash or worsening neuropathy.    Review of Systems:    As above in the history.     Review of Systems otherwise Negative for:  General: chills, fever or night sweats  Psychological: anxiety or depression  Ophthalmic: blurry vision, double vision or loss of vision, vision change  ENT: epistaxis, oral lesions, hearing changes  Hematological and Lymphatic: bleeding, bruising, jaundice, swollen lymph nodes  Endocrine: hot flashes, unexpected weight changes  Respiratory: cough, hemoptysis, orthopnea  Cardiovascular: chest pain, edema, palpitations or PND  Gastrointestinal: abdominal pain, blood in stools, change in bowel habits, constipation, diarrhea or  nausea/vomiting  Genito-Urinary: change in urinary stream, incontinence, frequency/urgency  Musculoskeletal: joint pain, stiffness, swelling, muscle pain  Neurological: dizziness, headaches, numbness/tingling  Dermatological: lumps and rash    ECOG performance status is 0    Patient Coping  Patient Coping: Accepting  Accompanied by  Accompanied by: Family Member    Past History:    Past Medical History:   Diagnosis Date     Anemia 12/13/2017     Arthritis      Bilateral inguinal hernia      Glaucoma      Glaucoma      History of blood clots     DVT - left chronic     Multiple myeloma (H)     Gets chemo infusions every 2 weeks     Pancytopenia (H)      Peripheral neuropathy      Syncope      TMJ (temporomandibular joint disorder)      Physical Exam:    Recent Vitals 9/16/2020   Weight -   BSA (m2) -   /49   Pulse 63   Temp 97.8   Temp src 1   SpO2 98   Some recent data might be hidden     General: patient appears stated age of 75 y.o.. Nontoxic and in no distress.   HEENT: Head: atraumatic, normocephalic. Sclerae anicteric.  Chest:  Normal respiratory effort.   Cardiac:  No edema.   Abdomen: abdomen is soft, non-distended  Extremities: normal tone and muscle bulk.   Skin: no lesions or rash. Warm and dry.   CNS: alert and oriented. Grossly non-focal.   Psychiatric: normal mood and affect.     Lab Results:    Results for LENIN SCOTT (MRN [id number]) as of 9/19/2020 15:56   Ref. Range 8/19/2020 09:34   GFR MDRD Non Af Amer Latest Ref Range: >60 mL/min/1.73m2 54 (L)   Calcium Latest Ref Range: 8.5 - 10.5 mg/dL 8.5   AST Latest Ref Range: 0 - 40 U/L 53 (H)   ALT Latest Ref Range: 0 - 45 U/L 55 (H)   ALBUMIN Latest Ref Range: 3.5 - 5.0 g/dL 3.3 (L)   Protein, Total Latest Ref Range: 6.0 - 8.0 g/dL 5.1 (L)   Alkaline Phosphatase Latest Ref Range: 45 - 120 U/L 73   Bilirubin, Total Latest Ref Range: 0.0 - 1.0 mg/dL 0.4   Glucose Latest Ref Range: 70 - 125 mg/dL 110   WBC Latest Ref Range: 4.0 - 11.0 thou/uL 1.4  (LL)   RBC Latest Ref Range: 4.40 - 6.20 mill/uL 2.28 (L)   Hemoglobin Latest Ref Range: 14.0 - 18.0 g/dL 8.3 (L)   Hematocrit Latest Ref Range: 40.0 - 54.0 % 25.7 (L)   MCV Latest Ref Range: 80 - 100 fL 113 (H)   MCH Latest Ref Range: 27.0 - 34.0 pg 36.4 (H)   MCHC Latest Ref Range: 32.0 - 36.0 g/dL 32.3   RDW Latest Ref Range: 11.0 - 14.5 % 15.1 (H)   Platelets Latest Ref Range: 140 - 440 thou/uL 78 (L)   MPV Latest Ref Range: 8.5 - 12.5 fL 12.5   Total Neutrophils % Latest Ref Range: 50 - 70 % 60   Lymphocytes % Latest Ref Range: 20 - 40 % 24   Monocytes % Latest Ref Range: 2 - 10 % 10   Eosinophils %  Latest Ref Range: 0 - 6 % 4   Basophils % Latest Ref Range: 0 - 2 % 2   Total Neutrophils Absolute Latest Ref Range: 2.0 - 7.7 thou/ul 0.8 (L)   Lymphocytes Absolute Latest Ref Range: 0.8 - 4.4 thou/uL 0.3 (L)   Monocytes Absolute Latest Ref Range: 0.0 - 0.9 thou/uL 0.1   Eosinophils Absolute Latest Ref Range: 0.0 - 0.4 thou/uL 0.1   Basophils Absolute Latest Ref Range: 0.0 - 0.2 thou/uL 0.0     Imaging:    No results found.       Signed by: Per Clifford MD     20

## 2024-10-24 NOTE — ANESTHESIA CARE TRANSFER NOTE
Last vitals:   Vitals:    01/19/18 1048   BP: 127/67   Pulse: (!) 56   Resp: 12   Temp: 36.9  C (98.5  F)   SpO2: 100%     Patient's level of consciousness is awake  Spontaneous respirations: yes  Maintains airway independently: yes  Dentition unchanged: yes  Oropharynx: oropharynx clear of all foreign objects    QCDR Measures:  ASA# 20 - Surgical Safety Checklist: WHO surgical safety checklist completed prior to induction  PQRS# 430 - Adult PONV Prevention: 4558F - Pt received => 2 anti-emetic agents (different classes) preop & intraop  ASA# 8 - Peds PONV Prevention: NA - Not pediatric patient, not GA or 2 or more risk factors NOT present  PQRS# 424 - Daisy-op Temp Management: 4559F - At least one body temp DOCUMENTED => 35.5C or 95.9F within required timeframe  PQRS# 426 - PACU Transfer Protocol: - Transfer of care checklist used  ASA# 14 - Acute Post-op Pain: ASA14B - Patient did NOT experience pain >= 7 out of 10   Kassi Dela Cruz     Left vm for pt to have labs drawn.

## (undated) DEVICE — LINEN GOWN XLG 5407

## (undated) DEVICE — Device

## (undated) DEVICE — CATH ANGIO SUPERTORQUE AL1 6FRX100CM 532-645

## (undated) DEVICE — SLEEVE TR BAND RADIAL COMPRESSION DEVICE 24CM TRB24-REG

## (undated) DEVICE — SHTH INTRO 0.021IN ID 6FR DIA

## (undated) DEVICE — KIT RIGHT HEART CATH 60130719

## (undated) DEVICE — COVER ULTRASOUND PROBE W/GEL FLEXI-FEEL 6"X58" LF  25-FF658

## (undated) DEVICE — KIT HAND CONTROL ACIST 014644 AR-P54

## (undated) DEVICE — MANIFOLD KIT ANGIO AUTOMATED 014613

## (undated) DEVICE — INTRO SHEATH 7FRX10CM PINNACLE RSS702

## (undated) DEVICE — GOWN XLG DISP 9545

## (undated) DEVICE — GUIDEWIRE ROSEN CVD .035X260CM G01253 THSCF-35-260-1.5-ROSEN

## (undated) DEVICE — DECANTER BAG 2002S

## (undated) DEVICE — DRSG TEGADERM IV ADVANCED 3.5X4.5" 1685

## (undated) DEVICE — PACK HEART LEFT CUSTOM

## (undated) DEVICE — KIT INTRODUCER FLUENT MICRO 5FRX10CM ECHO TIP KIT-038-04

## (undated) DEVICE — GLIDEWIRE TERUMO .035X180CM 1.5,, J-TIP GR3525

## (undated) DEVICE — LINEN TOWEL PACK X5 5464

## (undated) DEVICE — DRSG BIOPATCH GERMICIDAL SPLIT SPONGE 4MM MED 4150

## (undated) DEVICE — CATH ANGIO INFINITI PIGTAIL 145 6 SH 6FRX110CM  534-652S

## (undated) DEVICE — CAP LUER LOCK MALE/FEMALE DUAL 2C6250

## (undated) DEVICE — 30IN (76CM) EXCITE FLEXIBLE, CLEAR NYLON/POLYURETHANE CO-EXTRUDED CONTRAST INJECTION TUBING, FIXED MALE CONNECTOR

## (undated) DEVICE — COVER EASY EQUIP BAG W/BAND LATEX FREE EZ-28

## (undated) DEVICE — FASTENER CATH BALLOON CLAMPX2 STATLOCK 0684-00-493

## (undated) DEVICE — CAST STOCKINETTE 3"

## (undated) DEVICE — WIRE GUIDE 0.035"X150CM EMERALD STR 502542

## (undated) DEVICE — GLOVE PROTEXIS POWDER FREE SMT 7.5  2D72PT75X

## (undated) DEVICE — PACK HEART RIGHT CUSTOM SAN32RHF18

## (undated) RX ORDER — ALBUTEROL SULFATE 0.83 MG/ML
SOLUTION RESPIRATORY (INHALATION)
Status: DISPENSED
Start: 2022-01-01

## (undated) RX ORDER — SODIUM CHLORIDE 9 MG/ML
INJECTION, SOLUTION INTRAVENOUS
Status: DISPENSED
Start: 2022-01-01

## (undated) RX ORDER — LIDOCAINE 40 MG/G
CREAM TOPICAL
Status: DISPENSED
Start: 2022-01-01

## (undated) RX ORDER — FENTANYL CITRATE 50 UG/ML
INJECTION, SOLUTION INTRAMUSCULAR; INTRAVENOUS
Status: DISPENSED
Start: 2022-01-01

## (undated) RX ORDER — NICARDIPINE HCL-0.9% SOD CHLOR 1 MG/10 ML
SYRINGE (ML) INTRAVENOUS
Status: DISPENSED
Start: 2022-01-01

## (undated) RX ORDER — NITROGLYCERIN 5 MG/ML
VIAL (ML) INTRAVENOUS
Status: DISPENSED
Start: 2022-01-01

## (undated) RX ORDER — LIDOCAINE HYDROCHLORIDE 10 MG/ML
INJECTION, SOLUTION EPIDURAL; INFILTRATION; INTRACAUDAL; PERINEURAL
Status: DISPENSED
Start: 2022-01-01

## (undated) RX ORDER — HEPARIN SODIUM 1000 [USP'U]/ML
INJECTION, SOLUTION INTRAVENOUS; SUBCUTANEOUS
Status: DISPENSED
Start: 2022-01-01

## (undated) RX ORDER — PENTAMIDINE ISETHIONATE 300 MG/300MG
INHALANT RESPIRATORY (INHALATION)
Status: DISPENSED
Start: 2022-01-01

## (undated) RX ORDER — ASPIRIN 325 MG
TABLET ORAL
Status: DISPENSED
Start: 2022-01-01

## (undated) RX ORDER — HEPARIN SODIUM (PORCINE) LOCK FLUSH IV SOLN 100 UNIT/ML 100 UNIT/ML
SOLUTION INTRAVENOUS
Status: DISPENSED
Start: 2022-01-01